# Patient Record
Sex: MALE | Race: WHITE | NOT HISPANIC OR LATINO | Employment: UNEMPLOYED | URBAN - METROPOLITAN AREA
[De-identification: names, ages, dates, MRNs, and addresses within clinical notes are randomized per-mention and may not be internally consistent; named-entity substitution may affect disease eponyms.]

---

## 2018-04-15 ENCOUNTER — NON-PROVIDER VISIT (OUTPATIENT)
Dept: URGENT CARE | Facility: CLINIC | Age: 58
End: 2018-04-15

## 2018-04-15 DIAGNOSIS — Z11.1 ENCOUNTER FOR PPD TEST: ICD-10-CM

## 2018-04-15 PROCEDURE — 86580 TB INTRADERMAL TEST: CPT | Performed by: FAMILY MEDICINE

## 2018-04-16 NOTE — PROGRESS NOTES
Pawel Tatum is a 57 y.o. male here for a non-provider visit for PPD placement -- Step 1 of 2    Reason for PPD:  group home requirement    1. TB evaluation questionnaire completed by patient? Yes      -  If any answers marked yes did you contact a provider prior to placing? Not Indicated  2.  Patient notified to return to clinic for reading on: 4/17/18  3.  PPD Placement documentation completed on TB evaluation questionnaire? Yes  4.  Location of TB evaluation questionnaire filed: [facility]

## 2018-04-18 ENCOUNTER — NON-PROVIDER VISIT (OUTPATIENT)
Dept: URGENT CARE | Facility: CLINIC | Age: 58
End: 2018-04-18

## 2018-04-18 LAB — TB WHEAL 3D P 5 TU DIAM: NORMAL MM

## 2018-04-18 NOTE — NON-PROVIDER
Pawel Tatum is a 57 y.o. male here for a non-provider visit for PPD reading -- Step 1 of 1.      1.  Resulted in Epic under enter/edit results? Yes   2.  TB evaluation questionnaire scanned into chart and original given to patient?Yes      3. Was induration greater than 0 mm? No.    Routed to PCP? No

## 2018-04-23 ENCOUNTER — NON-PROVIDER VISIT (OUTPATIENT)
Dept: URGENT CARE | Facility: CLINIC | Age: 58
End: 2018-04-23

## 2018-04-23 DIAGNOSIS — Z11.1 ENCOUNTER FOR PPD TEST: ICD-10-CM

## 2018-04-23 PROCEDURE — 86580 TB INTRADERMAL TEST: CPT | Performed by: PHYSICIAN ASSISTANT

## 2018-04-23 NOTE — NON-PROVIDER
Pawel Tatum is a 57 y.o. male here for a non-provider visit for PPD placement -- Step 2 of 2    Reason for PPD:  nursing home requirement    1. TB evaluation questionnaire completed by patient? Yes      -  If any answers marked yes did you contact a provider prior to placing? Not Indicated  2.  Patient notified to return to clinic for reading on: 4/25/18  3.  PPD Placement documentation completed on TB evaluation questionnaire? Yes  4.  Location of TB evaluation questionnaire filed: [facility]

## 2018-04-26 ENCOUNTER — NON-PROVIDER VISIT (OUTPATIENT)
Dept: URGENT CARE | Facility: CLINIC | Age: 58
End: 2018-04-26

## 2018-04-26 LAB — TB WHEAL 3D P 5 TU DIAM: NORMAL MM

## 2018-07-20 ENCOUNTER — HOSPITAL ENCOUNTER (INPATIENT)
Dept: HOSPITAL 8 - ED | Age: 58
LOS: 3 days | Discharge: NURSING, REHAB OR LONG-TERM CARE | DRG: 637 | End: 2018-07-23
Attending: FAMILY MEDICINE | Admitting: INTERNAL MEDICINE
Payer: COMMERCIAL

## 2018-07-20 VITALS — HEIGHT: 70 IN | WEIGHT: 153.88 LBS | BODY MASS INDEX: 22.03 KG/M2

## 2018-07-20 DIAGNOSIS — Z90.81: ICD-10-CM

## 2018-07-20 DIAGNOSIS — Z79.82: ICD-10-CM

## 2018-07-20 DIAGNOSIS — F32.9: ICD-10-CM

## 2018-07-20 DIAGNOSIS — Z79.899: ICD-10-CM
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
## 2018-07-20 DIAGNOSIS — K59.00: ICD-10-CM

## 2018-07-20 DIAGNOSIS — J69.0: ICD-10-CM

## 2018-07-20 DIAGNOSIS — R32: ICD-10-CM

## 2018-07-20 DIAGNOSIS — E10.641: Primary | ICD-10-CM

## 2018-07-20 DIAGNOSIS — T38.3X5A: ICD-10-CM

## 2018-07-20 DIAGNOSIS — E10.51: ICD-10-CM

## 2018-07-20 DIAGNOSIS — F03.90: ICD-10-CM

## 2018-07-20 DIAGNOSIS — K86.1: ICD-10-CM

## 2018-07-20 DIAGNOSIS — J98.11: ICD-10-CM

## 2018-07-20 DIAGNOSIS — F10.21: ICD-10-CM

## 2018-07-20 DIAGNOSIS — R33.9: ICD-10-CM

## 2018-07-20 DIAGNOSIS — I10: ICD-10-CM

## 2018-07-20 DIAGNOSIS — G93.41: ICD-10-CM

## 2018-07-20 DIAGNOSIS — E78.00: ICD-10-CM

## 2018-07-20 DIAGNOSIS — Z87.820: ICD-10-CM

## 2018-07-20 DIAGNOSIS — E78.5: ICD-10-CM

## 2018-07-20 DIAGNOSIS — F41.9: ICD-10-CM

## 2018-07-20 DIAGNOSIS — Z79.84: ICD-10-CM

## 2018-07-20 DIAGNOSIS — E55.9: ICD-10-CM

## 2018-07-20 DIAGNOSIS — Z79.4: ICD-10-CM

## 2018-07-20 LAB
<PLATELET ESTIMATE>: (no result)
<PLT MORPHOLOGY>: (no result)
ALBUMIN SERPL-MCNC: 3.5 G/DL (ref 3.4–5)
ALP SERPL-CCNC: 165 U/L (ref 45–117)
ALT SERPL-CCNC: 41 U/L (ref 12–78)
ANION GAP SERPL CALC-SCNC: 12 MMOL/L (ref 5–15)
ANISOCYTOSIS BLD QL SMEAR: (no result)
APAP SERPL-MCNC: < 2 MCG/ML (ref 10–30)
BAND#(MANUAL): 0.2 X10^3/UL
BILIRUB SERPL-MCNC: 0.3 MG/DL (ref 0.2–1)
CALCIUM SERPL-MCNC: 8.5 MG/DL (ref 8.5–10.1)
CHLORIDE SERPL-SCNC: 110 MMOL/L (ref 98–107)
CREAT SERPL-MCNC: 1.01 MG/DL (ref 0.7–1.3)
ERYTHROCYTE [DISTWIDTH] IN BLOOD BY AUTOMATED COUNT: 21 % (ref 9.4–14.8)
INR PPP: 0.95 (ref 0.93–1.1)
LYMPH#(MANUAL): 2.02 X10^3/UL (ref 1–3.4)
LYMPHS% (MANUAL): 10 % (ref 22–44)
MACROCYTES BLD QL SMEAR: (no result)
MCH RBC QN AUTO: 25.9 PG (ref 27.5–34.5)
MCHC RBC AUTO-ENTMCNC: 31.3 G/DL (ref 33.2–36.2)
MCV RBC AUTO: 82.7 FL (ref 81–97)
MD: YES
MICROCYTES BLD QL SMEAR: (no result)
MONOS#(MANUAL): 1.01 X10^3/UL (ref 0.3–2.7)
MONOS% (MANUAL): 5 % (ref 2–9)
NEUTS BAND NFR BLD: 1 % (ref 0–7)
OVALOCYTES BLD QL SMEAR: (no result)
PLATELET # BLD AUTO: 434 X10^3/UL (ref 130–400)
PMV BLD AUTO: 7.4 FL (ref 7.4–10.4)
PROT SERPL-MCNC: 7.4 G/DL (ref 6.4–8.2)
PROTHROMBIN TIME: 9.9 SECONDS (ref 9.6–11.5)
RBC # BLD AUTO: 4.43 X10^6/UL (ref 4.38–5.82)
SEG#(MANUAL): 16.97 X10^3/UL (ref 1.8–6.8)
SEGS% (MANUAL): 84 % (ref 42–75)
TOXIC GRAN: (no result)
TROPONIN I SERPL-MCNC: < 0.015 NG/ML (ref 0–0.04)
VANCOMYCIN TROUGH SERPL-MCNC: 1.9 MG/DL (ref 2.8–20)

## 2018-07-20 PROCEDURE — 84443 ASSAY THYROID STIM HORMONE: CPT

## 2018-07-20 PROCEDURE — 84100 ASSAY OF PHOSPHORUS: CPT

## 2018-07-20 PROCEDURE — 80048 BASIC METABOLIC PNL TOTAL CA: CPT

## 2018-07-20 PROCEDURE — 93005 ELECTROCARDIOGRAM TRACING: CPT

## 2018-07-20 PROCEDURE — 84484 ASSAY OF TROPONIN QUANT: CPT

## 2018-07-20 PROCEDURE — 86900 BLOOD TYPING SEROLOGIC ABO: CPT

## 2018-07-20 PROCEDURE — 99285 EMERGENCY DEPT VISIT HI MDM: CPT

## 2018-07-20 PROCEDURE — 87040 BLOOD CULTURE FOR BACTERIA: CPT

## 2018-07-20 PROCEDURE — 96375 TX/PRO/DX INJ NEW DRUG ADDON: CPT

## 2018-07-20 PROCEDURE — 83605 ASSAY OF LACTIC ACID: CPT

## 2018-07-20 PROCEDURE — 85025 COMPLETE CBC W/AUTO DIFF WBC: CPT

## 2018-07-20 PROCEDURE — 82947 ASSAY GLUCOSE BLOOD QUANT: CPT

## 2018-07-20 PROCEDURE — 83735 ASSAY OF MAGNESIUM: CPT

## 2018-07-20 PROCEDURE — 80307 DRUG TEST PRSMV CHEM ANLYZR: CPT

## 2018-07-20 PROCEDURE — 82962 GLUCOSE BLOOD TEST: CPT

## 2018-07-20 PROCEDURE — C9113 INJ PANTOPRAZOLE SODIUM, VIA: HCPCS

## 2018-07-20 PROCEDURE — 71045 X-RAY EXAM CHEST 1 VIEW: CPT

## 2018-07-20 PROCEDURE — 74175 CTA ABDOMEN W/CONTRAST: CPT

## 2018-07-20 PROCEDURE — 82140 ASSAY OF AMMONIA: CPT

## 2018-07-20 PROCEDURE — 87081 CULTURE SCREEN ONLY: CPT

## 2018-07-20 PROCEDURE — 96365 THER/PROPH/DIAG IV INF INIT: CPT

## 2018-07-20 PROCEDURE — 96361 HYDRATE IV INFUSION ADD-ON: CPT

## 2018-07-20 PROCEDURE — 36415 COLL VENOUS BLD VENIPUNCTURE: CPT

## 2018-07-20 PROCEDURE — 80053 COMPREHEN METABOLIC PANEL: CPT

## 2018-07-20 PROCEDURE — 85610 PROTHROMBIN TIME: CPT

## 2018-07-20 PROCEDURE — 80061 LIPID PANEL: CPT

## 2018-07-20 PROCEDURE — 51702 INSERT TEMP BLADDER CATH: CPT

## 2018-07-20 PROCEDURE — 81001 URINALYSIS AUTO W/SCOPE: CPT

## 2018-07-20 PROCEDURE — 70450 CT HEAD/BRAIN W/O DYE: CPT

## 2018-07-20 PROCEDURE — 96367 TX/PROPH/DG ADDL SEQ IV INF: CPT

## 2018-07-20 PROCEDURE — 86850 RBC ANTIBODY SCREEN: CPT

## 2018-07-20 PROCEDURE — G0480 DRUG TEST DEF 1-7 CLASSES: HCPCS

## 2018-07-20 PROCEDURE — 80329 ANALGESICS NON-OPIOID 1 OR 2: CPT

## 2018-07-20 PROCEDURE — 71275 CT ANGIOGRAPHY CHEST: CPT

## 2018-07-20 RX ADMIN — AMPICILLIN SODIUM AND SULBACTAM SODIUM SCH MLS/HR: 2; 1 INJECTION, POWDER, FOR SOLUTION INTRAMUSCULAR; INTRAVENOUS at 18:38

## 2018-07-20 RX ADMIN — SODIUM CHLORIDE, PRESERVATIVE FREE SCH ML: 5 INJECTION INTRAVENOUS at 21:00

## 2018-07-20 RX ADMIN — ENOXAPARIN SODIUM SCH MG: 40 INJECTION SUBCUTANEOUS at 18:37

## 2018-07-21 VITALS — SYSTOLIC BLOOD PRESSURE: 132 MMHG | DIASTOLIC BLOOD PRESSURE: 67 MMHG

## 2018-07-21 VITALS — SYSTOLIC BLOOD PRESSURE: 111 MMHG | DIASTOLIC BLOOD PRESSURE: 53 MMHG

## 2018-07-21 LAB
<PLATELET ESTIMATE>: ADEQUATE
<PLT MORPHOLOGY>: (no result)
ALBUMIN SERPL-MCNC: 2.9 G/DL (ref 3.4–5)
ALP SERPL-CCNC: 143 U/L (ref 45–117)
ALT SERPL-CCNC: 37 U/L (ref 12–78)
ANION GAP SERPL CALC-SCNC: 10 MMOL/L (ref 5–15)
ANISOCYTOSIS BLD QL SMEAR: (no result)
BASOPHILS NFR BLD MANUAL: 1 % (ref 0–1)
BASOS#(MANUAL): 0.19 X10^3/UL (ref 0–0.1)
BILIRUB SERPL-MCNC: 0.4 MG/DL (ref 0.2–1)
CALCIUM SERPL-MCNC: 8.2 MG/DL (ref 8.5–10.1)
CHLORIDE SERPL-SCNC: 112 MMOL/L (ref 98–107)
CHOL/HDL RATIO: 1.6
CREAT SERPL-MCNC: 0.77 MG/DL (ref 0.7–1.3)
CULTURE INDICATED?: NO
ERYTHROCYTE [DISTWIDTH] IN BLOOD BY AUTOMATED COUNT: 20.8 % (ref 9.4–14.8)
HDL CHOL %: 61 % (ref 26–37)
HDL CHOLESTEROL (DIRECT): 65 MG/DL (ref 40–60)
LDL CHOLESTEROL,CALCULATED: 27 MG/DL (ref 54–169)
LDLC/HDLC SERPL: 0.4 {RATIO} (ref 0.5–3)
LYMPH#(MANUAL): 4.86 X10^3/UL (ref 1–3.4)
LYMPHS% (MANUAL): 26 % (ref 22–44)
MACROCYTES BLD QL SMEAR: (no result)
MCH RBC QN AUTO: 25.3 PG (ref 27.5–34.5)
MCHC RBC AUTO-ENTMCNC: 31.4 G/DL (ref 33.2–36.2)
MCV RBC AUTO: 80.6 FL (ref 81–97)
MD: YES
MICROCYTES BLD QL SMEAR: (no result)
MICROSCOPIC: (no result)
MONOS#(MANUAL): 1.87 X10^3/UL (ref 0.3–2.7)
MONOS% (MANUAL): 10 % (ref 2–9)
OVALOCYTES BLD QL SMEAR: (no result)
PLATELET # BLD AUTO: 395 X10^3/UL (ref 130–400)
PMV BLD AUTO: 7.8 FL (ref 7.4–10.4)
PROT SERPL-MCNC: 6.1 G/DL (ref 6.4–8.2)
RBC # BLD AUTO: 3.98 X10^6/UL (ref 4.38–5.82)
SCHISTOCYTES BLD QL SMEAR: (no result)
SEG#(MANUAL): 11.78 X10^3/UL (ref 1.8–6.8)
SEGS% (MANUAL): 63 % (ref 42–75)
TOXIC GRAN: (no result)
TRIGL SERPL-MCNC: 76 MG/DL (ref 50–200)
VLDLC SERPL CALC-MCNC: 15 MG/DL (ref 0–25)

## 2018-07-21 RX ADMIN — AMPICILLIN SODIUM AND SULBACTAM SODIUM SCH MLS/HR: 2; 1 INJECTION, POWDER, FOR SOLUTION INTRAMUSCULAR; INTRAVENOUS at 15:58

## 2018-07-21 RX ADMIN — AMPICILLIN SODIUM AND SULBACTAM SODIUM SCH MLS/HR: 2; 1 INJECTION, POWDER, FOR SOLUTION INTRAMUSCULAR; INTRAVENOUS at 06:22

## 2018-07-21 RX ADMIN — MEMANTINE SCH MG: 5 TABLET ORAL at 09:25

## 2018-07-21 RX ADMIN — SODIUM CHLORIDE, PRESERVATIVE FREE SCH ML: 5 INJECTION INTRAVENOUS at 22:33

## 2018-07-21 RX ADMIN — AMPICILLIN SODIUM AND SULBACTAM SODIUM SCH MLS/HR: 2; 1 INJECTION, POWDER, FOR SOLUTION INTRAMUSCULAR; INTRAVENOUS at 00:47

## 2018-07-21 RX ADMIN — LISINOPRIL SCH MG: 10 TABLET ORAL at 09:24

## 2018-07-21 RX ADMIN — AMPICILLIN SODIUM AND SULBACTAM SODIUM SCH MLS/HR: 2; 1 INJECTION, POWDER, FOR SOLUTION INTRAMUSCULAR; INTRAVENOUS at 22:33

## 2018-07-21 RX ADMIN — SODIUM CHLORIDE, PRESERVATIVE FREE SCH ML: 5 INJECTION INTRAVENOUS at 07:31

## 2018-07-21 RX ADMIN — ASPIRIN SCH MG: 81 TABLET, COATED ORAL at 09:24

## 2018-07-21 RX ADMIN — DOCUSATE SODIUM 50MG AND SENNOSIDES 8.6MG SCH TAB: 8.6; 5 TABLET, FILM COATED ORAL at 07:30

## 2018-07-21 RX ADMIN — BUDESONIDE AND FORMOTEROL FUMARATE DIHYDRATE SCH MG: 160; 4.5 AEROSOL RESPIRATORY (INHALATION) at 09:00

## 2018-07-21 RX ADMIN — VITAMIN D, TAB 1000IU (100/BT) SCH UNITS: 25 TAB at 09:24

## 2018-07-21 RX ADMIN — ATORVASTATIN CALCIUM SCH MG: 10 TABLET, FILM COATED ORAL at 22:33

## 2018-07-21 RX ADMIN — ENOXAPARIN SODIUM SCH MG: 40 INJECTION SUBCUTANEOUS at 15:58

## 2018-07-22 VITALS — SYSTOLIC BLOOD PRESSURE: 117 MMHG | DIASTOLIC BLOOD PRESSURE: 61 MMHG

## 2018-07-22 VITALS — DIASTOLIC BLOOD PRESSURE: 57 MMHG | SYSTOLIC BLOOD PRESSURE: 112 MMHG

## 2018-07-22 VITALS — DIASTOLIC BLOOD PRESSURE: 66 MMHG | SYSTOLIC BLOOD PRESSURE: 131 MMHG

## 2018-07-22 VITALS — SYSTOLIC BLOOD PRESSURE: 129 MMHG | DIASTOLIC BLOOD PRESSURE: 68 MMHG

## 2018-07-22 LAB
<PLATELET ESTIMATE>: ADEQUATE
<PLT MORPHOLOGY>: (no result)
ALBUMIN SERPL-MCNC: 2.6 G/DL (ref 3.4–5)
ALP SERPL-CCNC: 127 U/L (ref 45–117)
ALT SERPL-CCNC: 35 U/L (ref 12–78)
ANION GAP SERPL CALC-SCNC: 10 MMOL/L (ref 5–15)
ANISOCYTOSIS BLD QL SMEAR: (no result)
BASOPHILS NFR BLD MANUAL: 1 % (ref 0–1)
BASOS#(MANUAL): 0.15 X10^3/UL (ref 0–0.1)
BILIRUB SERPL-MCNC: 0.5 MG/DL (ref 0.2–1)
CALCIUM SERPL-MCNC: 8.2 MG/DL (ref 8.5–10.1)
CHLORIDE SERPL-SCNC: 113 MMOL/L (ref 98–107)
CREAT SERPL-MCNC: 0.7 MG/DL (ref 0.7–1.3)
EOS#(MANUAL): 0.15 X10^3/UL (ref 0–0.4)
EOS% (MANUAL): 1 % (ref 1–7)
ERYTHROCYTE [DISTWIDTH] IN BLOOD BY AUTOMATED COUNT: 20.7 % (ref 9.4–14.8)
LYMPH#(MANUAL): 4.23 X10^3/UL (ref 1–3.4)
LYMPHS% (MANUAL): 28 % (ref 22–44)
MACROCYTES BLD QL SMEAR: (no result)
MCH RBC QN AUTO: 26.3 PG (ref 27.5–34.5)
MCHC RBC AUTO-ENTMCNC: 32.1 G/DL (ref 33.2–36.2)
MCV RBC AUTO: 82 FL (ref 81–97)
MD: YES
MICROCYTES BLD QL SMEAR: (no result)
MONOS#(MANUAL): 2.11 X10^3/UL (ref 0.3–2.7)
MONOS% (MANUAL): 14 % (ref 2–9)
OVALOCYTES BLD QL SMEAR: (no result)
PLATELET # BLD AUTO: 343 X10^3/UL (ref 130–400)
PMV BLD AUTO: 8.4 FL (ref 7.4–10.4)
PROT SERPL-MCNC: 5.7 G/DL (ref 6.4–8.2)
RBC # BLD AUTO: 3.73 X10^6/UL (ref 4.38–5.82)
SEG#(MANUAL): 8.46 X10^3/UL (ref 1.8–6.8)
SEGS% (MANUAL): 56 % (ref 42–75)
TARGETS BLD QL SMEAR: (no result)

## 2018-07-22 PROCEDURE — 0T9B80Z DRAINAGE OF BLADDER WITH DRAINAGE DEVICE, VIA NATURAL OR ARTIFICIAL OPENING ENDOSCOPIC: ICD-10-PCS | Performed by: UROLOGY

## 2018-07-22 RX ADMIN — ASPIRIN SCH MG: 81 TABLET, COATED ORAL at 09:22

## 2018-07-22 RX ADMIN — BUDESONIDE AND FORMOTEROL FUMARATE DIHYDRATE SCH MG: 160; 4.5 AEROSOL RESPIRATORY (INHALATION) at 09:00

## 2018-07-22 RX ADMIN — AMPICILLIN SODIUM AND SULBACTAM SODIUM SCH MLS/HR: 2; 1 INJECTION, POWDER, FOR SOLUTION INTRAMUSCULAR; INTRAVENOUS at 16:53

## 2018-07-22 RX ADMIN — INSULIN LISPRO SCH UNITS: 100 INJECTION, SOLUTION INTRAVENOUS; SUBCUTANEOUS at 21:36

## 2018-07-22 RX ADMIN — LISINOPRIL SCH MG: 10 TABLET ORAL at 09:22

## 2018-07-22 RX ADMIN — VITAMIN D, TAB 1000IU (100/BT) SCH UNITS: 25 TAB at 09:22

## 2018-07-22 RX ADMIN — DOCUSATE SODIUM 50MG AND SENNOSIDES 8.6MG SCH TAB: 8.6; 5 TABLET, FILM COATED ORAL at 09:00

## 2018-07-22 RX ADMIN — INSULIN LISPRO SCH UNITS: 100 INJECTION, SOLUTION INTRAVENOUS; SUBCUTANEOUS at 16:00

## 2018-07-22 RX ADMIN — ATORVASTATIN CALCIUM SCH MG: 10 TABLET, FILM COATED ORAL at 21:35

## 2018-07-22 RX ADMIN — AMPICILLIN SODIUM AND SULBACTAM SODIUM SCH MLS/HR: 2; 1 INJECTION, POWDER, FOR SOLUTION INTRAMUSCULAR; INTRAVENOUS at 10:48

## 2018-07-22 RX ADMIN — SODIUM CHLORIDE, PRESERVATIVE FREE SCH ML: 5 INJECTION INTRAVENOUS at 21:37

## 2018-07-22 RX ADMIN — ENOXAPARIN SODIUM SCH MG: 40 INJECTION SUBCUTANEOUS at 16:56

## 2018-07-22 RX ADMIN — INSULIN LISPRO SCH UNITS: 100 INJECTION, SOLUTION INTRAVENOUS; SUBCUTANEOUS at 14:19

## 2018-07-22 RX ADMIN — AMPICILLIN SODIUM AND SULBACTAM SODIUM SCH MLS/HR: 2; 1 INJECTION, POWDER, FOR SOLUTION INTRAMUSCULAR; INTRAVENOUS at 04:42

## 2018-07-22 RX ADMIN — SODIUM CHLORIDE, PRESERVATIVE FREE SCH ML: 5 INJECTION INTRAVENOUS at 09:00

## 2018-07-22 RX ADMIN — AMPICILLIN SODIUM AND SULBACTAM SODIUM SCH MLS/HR: 2; 1 INJECTION, POWDER, FOR SOLUTION INTRAMUSCULAR; INTRAVENOUS at 22:04

## 2018-07-22 RX ADMIN — MEMANTINE SCH MG: 5 TABLET ORAL at 09:22

## 2018-07-23 VITALS — DIASTOLIC BLOOD PRESSURE: 53 MMHG | SYSTOLIC BLOOD PRESSURE: 119 MMHG

## 2018-07-23 VITALS — DIASTOLIC BLOOD PRESSURE: 61 MMHG | SYSTOLIC BLOOD PRESSURE: 127 MMHG

## 2018-07-23 LAB
<PLATELET ESTIMATE>: ADEQUATE
<PLT MORPHOLOGY>: (no result)
ANION GAP SERPL CALC-SCNC: 8 MMOL/L (ref 5–15)
ANISOCYTOSIS BLD QL SMEAR: (no result)
BURR CELLS BLD QL SMEAR: (no result)
CALCIUM SERPL-MCNC: 8 MG/DL (ref 8.5–10.1)
CHLORIDE SERPL-SCNC: 113 MMOL/L (ref 98–107)
CREAT SERPL-MCNC: 0.68 MG/DL (ref 0.7–1.3)
ERYTHROCYTE [DISTWIDTH] IN BLOOD BY AUTOMATED COUNT: 20.8 % (ref 9.4–14.8)
LYMPH#(MANUAL): 5.22 X10^3/UL (ref 1–3.4)
LYMPHS% (MANUAL): 35 % (ref 22–44)
MACROCYTES BLD QL SMEAR: (no result)
MCH RBC QN AUTO: 26.2 PG (ref 27.5–34.5)
MCHC RBC AUTO-ENTMCNC: 31.9 G/DL (ref 33.2–36.2)
MCV RBC AUTO: 82.3 FL (ref 81–97)
MD: YES
MICROCYTES BLD QL SMEAR: (no result)
MONOS#(MANUAL): 0.75 X10^3/UL (ref 0.3–2.7)
MONOS% (MANUAL): 5 % (ref 2–9)
OVALOCYTES BLD QL SMEAR: (no result)
PLATELET # BLD AUTO: 343 X10^3/UL (ref 130–400)
PMV BLD AUTO: 8.2 FL (ref 7.4–10.4)
RBC # BLD AUTO: 3.5 X10^6/UL (ref 4.38–5.82)
SEG#(MANUAL): 8.94 X10^3/UL (ref 1.8–6.8)
SEGS% (MANUAL): 60 % (ref 42–75)
TARGETS BLD QL SMEAR: (no result)
TOXIC GRAN: (no result)

## 2018-07-23 RX ADMIN — INSULIN LISPRO SCH UNITS: 100 INJECTION, SOLUTION INTRAVENOUS; SUBCUTANEOUS at 17:06

## 2018-07-23 RX ADMIN — SODIUM CHLORIDE, PRESERVATIVE FREE SCH ML: 5 INJECTION INTRAVENOUS at 08:11

## 2018-07-23 RX ADMIN — LISINOPRIL SCH MG: 10 TABLET ORAL at 08:12

## 2018-07-23 RX ADMIN — BUDESONIDE AND FORMOTEROL FUMARATE DIHYDRATE SCH MG: 160; 4.5 AEROSOL RESPIRATORY (INHALATION) at 08:10

## 2018-07-23 RX ADMIN — INSULIN LISPRO SCH UNITS: 100 INJECTION, SOLUTION INTRAVENOUS; SUBCUTANEOUS at 11:50

## 2018-07-23 RX ADMIN — DOCUSATE SODIUM 50MG AND SENNOSIDES 8.6MG SCH TAB: 8.6; 5 TABLET, FILM COATED ORAL at 08:12

## 2018-07-23 RX ADMIN — AMPICILLIN SODIUM AND SULBACTAM SODIUM SCH MLS/HR: 2; 1 INJECTION, POWDER, FOR SOLUTION INTRAMUSCULAR; INTRAVENOUS at 16:45

## 2018-07-23 RX ADMIN — VITAMIN D, TAB 1000IU (100/BT) SCH UNITS: 25 TAB at 08:11

## 2018-07-23 RX ADMIN — AMPICILLIN SODIUM AND SULBACTAM SODIUM SCH MLS/HR: 2; 1 INJECTION, POWDER, FOR SOLUTION INTRAMUSCULAR; INTRAVENOUS at 16:00

## 2018-07-23 RX ADMIN — ENOXAPARIN SODIUM SCH MG: 40 INJECTION SUBCUTANEOUS at 16:42

## 2018-07-23 RX ADMIN — INSULIN LISPRO SCH UNITS: 100 INJECTION, SOLUTION INTRAVENOUS; SUBCUTANEOUS at 08:13

## 2018-07-23 RX ADMIN — AMPICILLIN SODIUM AND SULBACTAM SODIUM SCH MLS/HR: 2; 1 INJECTION, POWDER, FOR SOLUTION INTRAMUSCULAR; INTRAVENOUS at 04:56

## 2018-07-23 RX ADMIN — AMPICILLIN SODIUM AND SULBACTAM SODIUM SCH MLS/HR: 2; 1 INJECTION, POWDER, FOR SOLUTION INTRAMUSCULAR; INTRAVENOUS at 10:20

## 2018-07-23 RX ADMIN — ASPIRIN SCH MG: 81 TABLET, COATED ORAL at 08:11

## 2018-07-23 RX ADMIN — MEMANTINE SCH MG: 5 TABLET ORAL at 08:11

## 2018-08-06 ENCOUNTER — APPOINTMENT (OUTPATIENT)
Dept: MEDICAL GROUP | Facility: MEDICAL CENTER | Age: 58
End: 2018-08-06

## 2018-08-08 ENCOUNTER — TELEPHONE (OUTPATIENT)
Dept: HEALTH INFORMATION MANAGEMENT | Facility: OTHER | Age: 58
End: 2018-08-08

## 2018-08-08 NOTE — TELEPHONE ENCOUNTER
CM outreach call to Devika at [facility].  Pt was scheduled by Devika for discharge follow-up appointment.  Per Devika patient is currently an inpatient at [facility]. [facility] cancelled CM outreach call for today.  Devika requesting CM keep appointment for patient with Dr. Garcia for tomorrow 8/9/18. Devika reports patient may be discharged today.  [facility] requested records be faxed to discharge clinic provider when patient discharges.

## 2018-08-10 ENCOUNTER — PATIENT OUTREACH (OUTPATIENT)
Dept: HEALTH INFORMATION MANAGEMENT | Facility: OTHER | Age: 58
End: 2018-08-10

## 2018-08-16 ENCOUNTER — HOSPITAL ENCOUNTER (OUTPATIENT)
Dept: LAB | Facility: MEDICAL CENTER | Age: 58
End: 2018-08-16
Attending: FAMILY MEDICINE
Payer: COMMERCIAL

## 2018-08-16 ENCOUNTER — TELEPHONE (OUTPATIENT)
Dept: HEALTH INFORMATION MANAGEMENT | Facility: OTHER | Age: 58
End: 2018-08-16

## 2018-08-16 ENCOUNTER — OFFICE VISIT (OUTPATIENT)
Dept: MEDICAL GROUP | Facility: MEDICAL CENTER | Age: 58
End: 2018-08-16
Payer: COMMERCIAL

## 2018-08-16 VITALS
RESPIRATION RATE: 16 BRPM | HEART RATE: 72 BPM | TEMPERATURE: 98.9 F | OXYGEN SATURATION: 96 % | HEIGHT: 75 IN | DIASTOLIC BLOOD PRESSURE: 58 MMHG | SYSTOLIC BLOOD PRESSURE: 118 MMHG

## 2018-08-16 DIAGNOSIS — E11.649 HYPOGLYCEMIC REACTION TO INSULIN IN TYPE 2 DIABETES MELLITUS (HCC): ICD-10-CM

## 2018-08-16 DIAGNOSIS — I10 ESSENTIAL HYPERTENSION: ICD-10-CM

## 2018-08-16 DIAGNOSIS — E11.00 UNCONTROLLED TYPE 2 DIABETES MELLITUS WITH HYPEROSMOLARITY WITHOUT COMA, UNSPECIFIED WHETHER LONG TERM INSULIN USE: ICD-10-CM

## 2018-08-16 DIAGNOSIS — D50.9 MICROCYTIC ANEMIA: ICD-10-CM

## 2018-08-16 DIAGNOSIS — Z09 HOSPITAL DISCHARGE FOLLOW-UP: ICD-10-CM

## 2018-08-16 DIAGNOSIS — E11.51 DIABETIC PERIPHERAL ANGIOPATHY (HCC): ICD-10-CM

## 2018-08-16 DIAGNOSIS — Z87.820 HISTORY OF TRAUMATIC BRAIN INJURY: ICD-10-CM

## 2018-08-16 DIAGNOSIS — E55.9 VITAMIN D DEFICIENCY: ICD-10-CM

## 2018-08-16 DIAGNOSIS — Z96.0 URINARY CATHETER IN PLACE: ICD-10-CM

## 2018-08-16 DIAGNOSIS — R33.9 URINARY RETENTION: ICD-10-CM

## 2018-08-16 LAB
25(OH)D3 SERPL-MCNC: 18 NG/ML (ref 30–100)
ALBUMIN SERPL BCP-MCNC: 4.2 G/DL (ref 3.2–4.9)
ALBUMIN/GLOB SERPL: 1.4 G/DL
ALP SERPL-CCNC: 138 U/L (ref 30–99)
ALT SERPL-CCNC: 13 U/L (ref 2–50)
ANION GAP SERPL CALC-SCNC: 11 MMOL/L (ref 0–11.9)
AST SERPL-CCNC: 16 U/L (ref 12–45)
BASOPHILS # BLD AUTO: 0.7 % (ref 0–1.8)
BASOPHILS # BLD: 0.14 K/UL (ref 0–0.12)
BILIRUB SERPL-MCNC: 0.3 MG/DL (ref 0.1–1.5)
BUN SERPL-MCNC: 22 MG/DL (ref 8–22)
CALCIUM SERPL-MCNC: 9.5 MG/DL (ref 8.5–10.5)
CHLORIDE SERPL-SCNC: 106 MMOL/L (ref 96–112)
CO2 SERPL-SCNC: 18 MMOL/L (ref 20–33)
CREAT SERPL-MCNC: 1.33 MG/DL (ref 0.5–1.4)
EOSINOPHIL # BLD AUTO: 0.14 K/UL (ref 0–0.51)
EOSINOPHIL NFR BLD: 0.7 % (ref 0–6.9)
ERYTHROCYTE [DISTWIDTH] IN BLOOD BY AUTOMATED COUNT: 57.7 FL (ref 35.9–50)
EST. AVERAGE GLUCOSE BLD GHB EST-MCNC: 229 MG/DL
FERRITIN SERPL-MCNC: 15.9 NG/ML (ref 22–322)
GLOBULIN SER CALC-MCNC: 3 G/DL (ref 1.9–3.5)
GLUCOSE SERPL-MCNC: 197 MG/DL (ref 65–99)
HBA1C MFR BLD: 9.6 % (ref 0–5.6)
HCT VFR BLD AUTO: 38 % (ref 42–52)
HGB BLD-MCNC: 11.5 G/DL (ref 14–18)
IMM GRANULOCYTES # BLD AUTO: 0.17 K/UL (ref 0–0.11)
IMM GRANULOCYTES NFR BLD AUTO: 0.8 % (ref 0–0.9)
IRON SATN MFR SERPL: 4 % (ref 15–55)
IRON SERPL-MCNC: 21 UG/DL (ref 50–180)
LYMPHOCYTES # BLD AUTO: 2.95 K/UL (ref 1–4.8)
LYMPHOCYTES NFR BLD: 13.9 % (ref 22–41)
MCH RBC QN AUTO: 24.6 PG (ref 27–33)
MCHC RBC AUTO-ENTMCNC: 30.3 G/DL (ref 33.7–35.3)
MCV RBC AUTO: 81.2 FL (ref 81.4–97.8)
MONOCYTES # BLD AUTO: 1.8 K/UL (ref 0–0.85)
MONOCYTES NFR BLD AUTO: 8.5 % (ref 0–13.4)
NEUTROPHILS # BLD AUTO: 16 K/UL (ref 1.82–7.42)
NEUTROPHILS NFR BLD: 75.4 % (ref 44–72)
NRBC # BLD AUTO: 0 K/UL
NRBC BLD-RTO: 0 /100 WBC
PLATELET # BLD AUTO: 410 K/UL (ref 164–446)
PMV BLD AUTO: 11 FL (ref 9–12.9)
POTASSIUM SERPL-SCNC: 4 MMOL/L (ref 3.6–5.5)
PROT SERPL-MCNC: 7.2 G/DL (ref 6–8.2)
RBC # BLD AUTO: 4.68 M/UL (ref 4.7–6.1)
SODIUM SERPL-SCNC: 135 MMOL/L (ref 135–145)
TIBC SERPL-MCNC: 504 UG/DL (ref 250–450)
WBC # BLD AUTO: 21.2 K/UL (ref 4.8–10.8)

## 2018-08-16 PROCEDURE — 83036 HEMOGLOBIN GLYCOSYLATED A1C: CPT

## 2018-08-16 PROCEDURE — 82728 ASSAY OF FERRITIN: CPT

## 2018-08-16 PROCEDURE — 83550 IRON BINDING TEST: CPT

## 2018-08-16 PROCEDURE — 80053 COMPREHEN METABOLIC PANEL: CPT

## 2018-08-16 PROCEDURE — 83540 ASSAY OF IRON: CPT

## 2018-08-16 PROCEDURE — 82306 VITAMIN D 25 HYDROXY: CPT

## 2018-08-16 PROCEDURE — 85025 COMPLETE CBC W/AUTO DIFF WBC: CPT

## 2018-08-16 PROCEDURE — 99204 OFFICE O/P NEW MOD 45 MIN: CPT | Performed by: FAMILY MEDICINE

## 2018-08-16 PROCEDURE — 36415 COLL VENOUS BLD VENIPUNCTURE: CPT

## 2018-08-16 RX ORDER — ACETAMINOPHEN 325 MG/1
650 TABLET ORAL
COMMUNITY
End: 2018-11-21

## 2018-08-16 RX ORDER — LISINOPRIL 10 MG/1
TABLET ORAL
COMMUNITY
Start: 2018-04-04 | End: 2018-10-04 | Stop reason: SDUPTHER

## 2018-08-16 RX ORDER — GLIMEPIRIDE 1 MG/1
1 TABLET ORAL 2 TIMES DAILY
COMMUNITY
End: 2018-10-04

## 2018-08-16 RX ORDER — ALLOPURINOL 100 MG/1
100 TABLET ORAL
COMMUNITY
Start: 2015-09-15 | End: 2018-08-16

## 2018-08-16 RX ORDER — TAMSULOSIN HYDROCHLORIDE 0.4 MG/1
0.4 CAPSULE ORAL
COMMUNITY
End: 2018-10-04 | Stop reason: SDUPTHER

## 2018-08-16 RX ORDER — MEMANTINE HYDROCHLORIDE 5 MG/1
TABLET ORAL
Status: ON HOLD | COMMUNITY
Start: 2017-11-27 | End: 2018-08-30

## 2018-08-16 RX ORDER — LANOLIN ALCOHOL/MO/W.PET/CERES
100 CREAM (GRAM) TOPICAL
COMMUNITY
End: 2018-11-21

## 2018-08-16 RX ORDER — FOLIC ACID 1 MG/1
1 TABLET ORAL
COMMUNITY
End: 2018-11-21

## 2018-08-16 RX ORDER — ATORVASTATIN CALCIUM 10 MG/1
TABLET, FILM COATED ORAL
COMMUNITY
Start: 2018-04-02 | End: 2018-10-04 | Stop reason: SDUPTHER

## 2018-08-16 RX ORDER — MEMANTINE HYDROCHLORIDE 5 MG/1
TABLET ORAL
COMMUNITY
Start: 2015-10-06 | End: 2018-10-04 | Stop reason: SDUPTHER

## 2018-08-16 RX ORDER — DIPHENOXYLATE HYDROCHLORIDE AND ATROPINE SULFATE 2.5; .025 MG/1; MG/1
TABLET ORAL
Status: ON HOLD | COMMUNITY
End: 2018-08-30

## 2018-08-16 RX ORDER — IBUPROFEN 600 MG/1
600 TABLET ORAL
Status: ON HOLD | COMMUNITY
Start: 2015-10-06 | End: 2018-08-30

## 2018-08-16 RX ORDER — BUPROPION HYDROCHLORIDE 300 MG/1
TABLET ORAL
COMMUNITY
Start: 2018-04-02 | End: 2018-10-04 | Stop reason: SDUPTHER

## 2018-08-16 RX ORDER — BLOOD-GLUCOSE METER
EACH MISCELLANEOUS
Status: ON HOLD | COMMUNITY
End: 2018-08-30

## 2018-08-16 RX ORDER — BLOOD-GLUCOSE METER
KIT MISCELLANEOUS
COMMUNITY
Start: 2017-03-13 | End: 2018-11-21

## 2018-08-16 ASSESSMENT — PATIENT HEALTH QUESTIONNAIRE - PHQ9: CLINICAL INTERPRETATION OF PHQ2 SCORE: 0

## 2018-08-16 NOTE — PATIENT INSTRUCTIONS
If you need further assistance, or have any questions; concerns or lingering symptoms before seeing your Primary Care Provider or specialist.     Do not hesitate to contact us.     Please contact us at the Post-Hospital Follow Up Program at [phone] and ask for the Medical Assistant for Dr Garcia.   Our offices hours are Monday-Friday 8 am-5 pm.

## 2018-08-16 NOTE — TELEPHONE ENCOUNTER
CM contacted [facility] living and requested an updated medication list. CM also contacted [facility] and requested records from assisted living facility. CM previously requested records from SNF.  Devika at [facility] previously informed this CM that  records would be faxed to discharge clinicafter pt discharged.  Discharge clinic has not received records from [facility].

## 2018-08-16 NOTE — PROGRESS NOTES
Subjective:     Pawel Tatum is a 58 y.o. male who presents for Hospital Follow-up.  Chart and discharge summary reviewed.   Date of discharge from [facility]: 8/9/18.  48- hour post discharge RN call completed on 8/10/18 and documented in the medical record by Melanie Soria RN:  POST DISCHARGE CALL:  Discharge Date:8/9/2018   Date of Outreach Call: 8/10/2018  9:20 AM  Now that you're home, how are you doing? Good  Comment:No discharge summary available from [facility].   CM contacted [facility] two requests for records. Pt reports  he was at [facility] for low blood sugar  Do you have questions about your medications? No    Did you fill your medications? Yes    Do you have a follow-up appointment scheduled?Yes  Comment:Post discharge clinic 8/16/18    Discharging Department: Skilled Nursing    Number of Attempts: 1  Current or previous attempts completed within two business days of discharge? Yes  Provided education regarding treatment plan, medication, self-management, ADLs? Yes  Comment:Pt reports he lives at [facility].  Reports he recently moved to [place]. Pt states history of  diabetes. Pt not sure of his current medications.  Has patient completed Advance Directive? If yes, advise them to bring to appointment. No  Care Manager phone number provided? Yes  Is there anything else I can help you with? No  Comment:Pt reports he does get assistance with meds and  care at [facility].         HPI: The patient is a 58-year-old male with type 2 diabetes mellitus with diabetic peripheral angiopathy who was hospitalized last month at [facility] for a severe hypoglycemic reaction to insulin with a blood glucose level of 24.  He was then transferred to [facility] and discharged from [facility] 1 week ago.  We have no records from [facility] despite multiple requests.  We have partial records from [facility].  The patient himself is a poor historian.  He has a history of a traumatic brain  injury with memory loss.  He came alone to this appointment.  He was taken off of insulin and just placed on oral meds due to recurrent hypoglycemic episodes.  He admits to inadvertently giving himself too much insulin.  He lives at [facility] living.  He says that since he returned to [facility] they are now managing his medications.  A urinary catheter was placed during his hospitalization at [facility] due to urinary retention.  Discharge summary mentions needing a urology follow-up in a couple of weeks.  The patient does not recall seeing any urologist and he still has the catheter in place.  He did not recall the reason for the catheter.  The records we received from [facility] showed microcytic anemia.  We have not received an updated medication list from [facility].    Since returning home, patient reports feeling better.  He brought his glucose meter which showed a blood sugar this morning of 99.  However all the blood sugars from the last several days show the readings in the 200s and 300s.     The patient has questions regarding hospitalization or discharge: All of his questions are answered  The patient denies new weakness; has difficulty taking care of self at home.  Patient reports that [facility] gives him his medications and he does not know what they are.      Allergies:   Patient has no known allergies.    Social History:  Social History   Substance Use Topics   • Smoking status: Current Every Day Smoker     Packs/day: 0.25     Years: 15.00   • Smokeless tobacco: Never Used      Comment: Trying to quit   • Alcohol use No        ROS:    Constitutional:  Denies fever, chills, night sweats, fatigue or malaise  HENT: Denies head congestion, ear pain or drainage, decreased hearing, sore throat  Eyes: Denies visual changes, eye drainage or redness, eye pain  Neck: Denies pain, swollen glands, decreased range of motion  Lungs: Denies shortness of breath, wheezing, cough  Cardiovascular: Denies chest  "pain, orthopnea, lower extremity edema, palpitations  Abdominal: Denies abdominal pain, change in bowel habits, nausea or vomiting  Musculoskeletal: Denies back pain, joint pains, decreased range of motion  Endocrine: Denies unexplained weight changes, heat or cold intolerance, hair loss, polyuria or polydipsia  Neurological: Denies dizziness, headache, confusion, focal weakness or numbness.  Admits to memory loss.  Psychiatric: Denies depression, anxiety, insomnia       Objective:     Blood pressure 118/58, pulse 72, temperature 37.2 °C (98.9 °F), resp. rate 16, height 1.905 m (6' 3\"), SpO2 96 %.     Physical Exam:    GEN:  Alert, oriented, in no distress  HEENT:  PERRLA, EOMI.  NECK;  Supple without adenopathy   LUNGS:  Clear to auscultation without rales, rhonci, or wheezes.  CV:  Heart RRR without murmurs or S3 or S4  EXT:  Without cyanosis, clubbing, or edema.  NEURO:  Cranial nerves II through XII intact.  Motor function and sensation grossly intact.  SKIN: Skin discoloration bilateral lower extremities.  PSYCH:  Behavior is appropriate.      Assessment and Plan:     1. Hospital follow-up:   Hospitalization and results reviewed with patient. High risk conditions requiring teaching or care coordination were identified and addressed.The patient demonstrate understanding of admission and underlying conditions. The patient understands discharge instructions and when to seek medical attention. Medications reviewed including instructions regarding high risk medications, dosing and side effects.    The patient is able to safely adhere to ADL/IADL, treatment and medication regimen, self-manage of high-risk conditions? No   The patient requires physical therapy/home health/DME referral? Yes, [facility] started  The patient requires referral to care coordination/behavioral health/social work?  No   Patient requires referral for pharmacy consult? No     2. Hypoglycemic reaction to insulin in type 2 diabetes " mellitus (HCC)  -No recurrence since he is no longer managing his medications.    3. Uncontrolled type 2 diabetes mellitus with hyperosmolarity without coma, unspecified whether long term insulin use (HCC)  -Based on his recent blood sugar readings, he may benefit from restarting insulin since he will not have the increased risk of hypoglycemia because he is no longer managing his own medications.  - COMP METABOLIC PANEL; Future  - HEMOGLOBIN A1C; Future  -    4. Diabetic peripheral angiopathy (HCC)  -Details unknown.    5. Essential hypertension  -Blood pressure currently under control  - COMP METABOLIC PANEL; Future    6. Urinary catheter in place    -Urgent REFERRAL TO UROLOGY    7. Urinary retention    -Urgent REFERRAL TO UROLOGY    8. Microcytic anemia    - CBC WITH DIFFERENTIAL; Future  - IRON/TOTAL IRON BIND; Future  - FERRITIN; Future    9. History of traumatic brain injury  -Subsequent memory loss.    10. Vitamin D deficiency  - VITAMIN D,25 HYDROXY; Future  -He is on a supplement based on his med list from [facility].        Medication Reconciliation  Medication list at end of encounter:   Current Outpatient Prescriptions   Medication Sig Dispense Refill   • allopurinol (ZYLOPRIM) 100 MG Tab Take 100 mg by mouth.     • glucose blood ([employer] TEST STRIPS) strip by Other route.     • ibuprofen (MOTRIN) 600 MG Tab Take 600 mg by mouth.     • memantine (NAMENDA) 5 MG Tab TAKE 1 TAB BY MOUTH TWICE DAILY     • aspirin EC (ECOTRIN) 81 MG Tablet Delayed Response Take  by mouth.     • atorvastatin (LIPITOR) 10 MG Tab Take 1 tablet by mouth daily to prevent heart attacks and strokes     • glucose blood (ONETOUCH VERIO) strip by Does not apply route.     • Cholecalciferol (VITAMIN D-3 SUPER STRENGTH) 2000 UNIT Tab Take  by mouth.     • buPROPion (WELLBUTRIN XL) 300 MG XL tablet Take 1 tablet by mouth every morning     • Glucagon, rDNA, (GLUCAGON EMERGENCY) 1 MG Kit by Intramuscular route.     • lisinopril  (PRINIVIL) 10 MG Tab Take  by mouth.     • memantine (NAMENDA) 5 MG Tab Take 1 tablet by mouth daily     • nicotine polacrilex (NICORETTE) 2 MG Gum Take  by mouth.     • ONETOUCH DELICA LANCETS FINE Misc by Does not apply route.     • Blood Glucose Monitoring Suppl (ONETOUCH VERIO IQ SYSTEM) w/Device Kit by Does not apply route.     • Blood Glucose Calibration (ONETOUCH VERIO) Solution by Does not apply route.     • Insulin Pen Needle (B-D UF III MINI PEN NEEDLES) 31G X 5 MM Misc by Does not apply route.     • acetaminophen (TYLENOL) 325 MG Tab Take 650 mg by mouth.     • Cholecalciferol (VITAMIN D3) 5000 units Tab Take  by mouth.     • folic acid (FOLVITE) 1 MG Tab Take 1 mg by mouth.     • insulin lispro (HUMALOG) 100 UNIT/ML Solution by Subdermal route.     • LIFESCAN FINEPOINT LANCETS Misc by Misc.(Non-Drug; Combo Route) route.     • Multiple Vitamin (MULTI-VITAMINS) Tab Take  by mouth.     • thiamine (THIAMINE) 100 MG tablet Take 100 mg by mouth.       No current facility-administered medications for this visit.        Primary care follow-up:    Recommended followup:  With new Pcp.  I instructed the patient to ask his sister to accompany him to the appointment.  Future Appointments       Provider Department Center    9/12/2018 1:40 PM Flip Carroll M.D. [facility]          Patient Instruction  Patient provided with educational material on discharge diagnosis and management of symptoms/red flags. Patient instructed to keep follow-up appointments and to bring written questions and and actual medications to each office visit. Patient instructed to call PCP/specialist with any problems/questions/concerns. Patient verbalizes understanding and has no further questions at this time.    Total of 45 minutes face-to-face time was spent with patient, with greater than 50% of the time spent in counseling about treatment of his diabetes and coordination of care, including  obtaining his medical records and referring him for urgent urology consult.

## 2018-08-18 DIAGNOSIS — D50.9 IRON DEFICIENCY ANEMIA, UNSPECIFIED IRON DEFICIENCY ANEMIA TYPE: ICD-10-CM

## 2018-08-18 DIAGNOSIS — D72.829 LEUKOCYTOSIS, UNSPECIFIED TYPE: ICD-10-CM

## 2018-08-18 RX ORDER — LANOLIN ALCOHOL/MO/W.PET/CERES
325 CREAM (GRAM) TOPICAL
Qty: 90 TAB | Refills: 2 | OUTPATIENT
Start: 2018-08-18 | End: 2018-10-04 | Stop reason: SDUPTHER

## 2018-08-20 ENCOUNTER — TELEPHONE (OUTPATIENT)
Dept: MEDICAL GROUP | Facility: MEDICAL CENTER | Age: 58
End: 2018-08-20

## 2018-08-20 ENCOUNTER — HOSPITAL ENCOUNTER (OUTPATIENT)
Dept: LAB | Facility: MEDICAL CENTER | Age: 58
End: 2018-08-20
Attending: FAMILY MEDICINE
Payer: COMMERCIAL

## 2018-08-20 DIAGNOSIS — D50.9 IRON DEFICIENCY ANEMIA, UNSPECIFIED IRON DEFICIENCY ANEMIA TYPE: ICD-10-CM

## 2018-08-20 DIAGNOSIS — D72.829 LEUKOCYTOSIS, UNSPECIFIED TYPE: ICD-10-CM

## 2018-08-20 LAB
APPEARANCE UR: ABNORMAL
BACTERIA #/AREA URNS HPF: ABNORMAL /HPF
BASOPHILS # BLD AUTO: 0.9 % (ref 0–1.8)
BASOPHILS # BLD: 0.1 K/UL (ref 0–0.12)
BILIRUB UR QL STRIP.AUTO: NEGATIVE
COLOR UR: YELLOW
EOSINOPHIL # BLD AUTO: 0.3 K/UL (ref 0–0.51)
EOSINOPHIL NFR BLD: 2.6 % (ref 0–6.9)
EPI CELLS #/AREA URNS HPF: ABNORMAL /HPF
ERYTHROCYTE [DISTWIDTH] IN BLOOD BY AUTOMATED COUNT: 56.7 FL (ref 35.9–50)
GLUCOSE UR STRIP.AUTO-MCNC: NEGATIVE MG/DL
HCT VFR BLD AUTO: 34.3 % (ref 42–52)
HGB BLD-MCNC: 10.5 G/DL (ref 14–18)
IMM GRANULOCYTES # BLD AUTO: 0.17 K/UL (ref 0–0.11)
IMM GRANULOCYTES NFR BLD AUTO: 1.5 % (ref 0–0.9)
KETONES UR STRIP.AUTO-MCNC: ABNORMAL MG/DL
LEUKOCYTE ESTERASE UR QL STRIP.AUTO: ABNORMAL
LYMPHOCYTES # BLD AUTO: 3.52 K/UL (ref 1–4.8)
LYMPHOCYTES NFR BLD: 30.3 % (ref 22–41)
MCH RBC QN AUTO: 24.7 PG (ref 27–33)
MCHC RBC AUTO-ENTMCNC: 30.6 G/DL (ref 33.7–35.3)
MCV RBC AUTO: 80.7 FL (ref 81.4–97.8)
MICRO URNS: ABNORMAL
MONOCYTES # BLD AUTO: 1.18 K/UL (ref 0–0.85)
MONOCYTES NFR BLD AUTO: 10.1 % (ref 0–13.4)
MUCOUS THREADS #/AREA URNS HPF: ABNORMAL /HPF
NEUTROPHILS # BLD AUTO: 6.36 K/UL (ref 1.82–7.42)
NEUTROPHILS NFR BLD: 54.6 % (ref 44–72)
NITRITE UR QL STRIP.AUTO: POSITIVE
NRBC # BLD AUTO: 0.04 K/UL
NRBC BLD-RTO: 0.3 /100 WBC
PH UR STRIP.AUTO: 6 [PH]
PLATELET # BLD AUTO: 425 K/UL (ref 164–446)
PMV BLD AUTO: 10.3 FL (ref 9–12.9)
PROT UR QL STRIP: 100 MG/DL
RBC # BLD AUTO: 4.25 M/UL (ref 4.7–6.1)
RBC # URNS HPF: ABNORMAL /HPF
RBC UR QL AUTO: ABNORMAL
SP GR UR STRIP.AUTO: >=1.03
UROBILINOGEN UR STRIP.AUTO-MCNC: 0.2 MG/DL
WBC # BLD AUTO: 11.6 K/UL (ref 4.8–10.8)
WBC #/AREA URNS HPF: ABNORMAL /HPF
YEAST BUDDING URNS QL: PRESENT /HPF
YEAST HYPHAE #/AREA URNS HPF: PRESENT /HPF

## 2018-08-20 PROCEDURE — 87186 SC STD MICRODIL/AGAR DIL: CPT

## 2018-08-20 PROCEDURE — 81001 URINALYSIS AUTO W/SCOPE: CPT

## 2018-08-20 PROCEDURE — 85025 COMPLETE CBC W/AUTO DIFF WBC: CPT

## 2018-08-20 PROCEDURE — 87077 CULTURE AEROBIC IDENTIFY: CPT

## 2018-08-20 PROCEDURE — 87086 URINE CULTURE/COLONY COUNT: CPT

## 2018-08-20 PROCEDURE — 36415 COLL VENOUS BLD VENIPUNCTURE: CPT

## 2018-08-20 RX ORDER — MEMANTINE HYDROCHLORIDE 5 MG/1
TABLET ORAL
Qty: 60 TAB | OUTPATIENT
Start: 2018-08-20

## 2018-08-20 RX ORDER — DIPHENOXYLATE HYDROCHLORIDE AND ATROPINE SULFATE 2.5; .025 MG/1; MG/1
TABLET ORAL
Qty: 30 TAB | OUTPATIENT
Start: 2018-08-20

## 2018-08-20 RX ORDER — LANOLIN ALCOHOL/MO/W.PET/CERES
100 CREAM (GRAM) TOPICAL
Qty: 30 TAB | OUTPATIENT
Start: 2018-08-20

## 2018-08-20 RX ORDER — IBUPROFEN 600 MG/1
600 TABLET ORAL
Qty: 30 TAB | OUTPATIENT
Start: 2018-08-20

## 2018-08-20 RX ORDER — FOLIC ACID 1 MG/1
1 TABLET ORAL
Qty: 30 TAB | OUTPATIENT
Start: 2018-08-20

## 2018-08-20 NOTE — TELEPHONE ENCOUNTER
"VOICEMAIL  1. Caller Name: KELLEY Miles @ [facility]                      Call Back Number: [phone]    2. Message: Returning our call.  1. Pt pharmacy is: [facility]  2. Pt is taking: Vitamin D3 31,000 U, 2 tabs PO (they said they would fax the info)  3. Received med list from [facility], will updated list. Dr Garcia, it wouldn't let me jody meds for removal. Therefore I made it as a \"reorder\" and marked the ones for removal by declined.     "

## 2018-08-20 NOTE — LETTER
"August 20, 2018        Pawel SIEGEL [address] [place] [address]        Dear Pawel:    I left you a general voicemail requesting a call back to relay the following message to you from Dr Garcia about your labs. Please read her message below:     \"Pawel has multiple lab abnormalities;    1. His WBC is very elevated which usually indicates a possible serious infection although he did not have signs or symptoms of illness at his office visit. I have ordered a UA and a repeat CBC which should be done as soon as possible. Instruct them to monitor for signs of illness and have him taken to the ER if he appears ill or if vital signs are abnormal.   2. He has iron deficiency anemia. I have ordered fecal occult stool testing and an Rx for iron supplements. There is no pharmacy listed so please add pharmacy and call in.   3. Vitamin D level is very low. Please verify what dose he is currently taking and instruct them to double it.   4. His diabetes is poorly controlled but that can be addressed at his upcoming appointment with new PCP.     If you have any questions or concerns, please don't hesitate to call.        Sincerely,        Pcp Pt States None    Electronically Signed     "

## 2018-08-20 NOTE — TELEPHONE ENCOUNTER
----- Message from Deandra Garcia M.D. sent at 2018  3:12 PM PDT -----  Patient has multiple lab abnormalities. He lives at [facility] and has memory problems. Please notify Chiquis of the followin. His WBC is very elevated which usually indicates a possible serious infection although he did not have signs or symptoms of illness at his office visit. I have ordered a UA and a repeat CBC which should be done as soon as possible. Instruct them to monitor for signs of illness and have him taken to the ER if he appears ill or if vital signs are abnormal.  2. He has iron deficiency anemia. I have ordered fecal occult stool testing and an Rx for iron supplements. There is no pharmacy listed so please add pharmacy and call in.  3. Vitamin D level is very low. Please verify what dose he is currently taking and instruct them to double it.  4. His diabetes is poorly controlled but that can be addressed at his upcoming appointment with new PCP.  NOTE:  I routed this to Rad[facility] because [facility] will not allow me to route to [address] [place] [address] staff (only Johnny) for test result messages.

## 2018-08-20 NOTE — TELEPHONE ENCOUNTER
General voicemail left on both guest phones, to return call.    Call made to [facility], was [facility], [facility].   They don't take vitals but the med tech said his blood sugars first thing in the morning before eating are in the 300's. Antonette made the comment that guest looks very pale today. She was advised of Dr Garcia's message that his WBC was very elevated and that they should have him take to ER if he appears ill or if vitals are abnormal. She was advised that he has iron deficiency anemia, which could cause the guest to look pale but due to a very elevated WBC, I think he should just go to ER. Advised that Dr Garcia wants repeat CBC and a UA.  I asked them to verify which pharmacy he is using. Antonette thinks it is [facility] but tom might switch so she will verify and let us know. She was advised of the remaining message and will have the nurse call me.

## 2018-08-21 DIAGNOSIS — E55.9 VITAMIN D DEFICIENCY: ICD-10-CM

## 2018-08-21 RX ORDER — MULTIVIT-MIN/IRON/FOLIC ACID/K 18-600-40
2 CAPSULE ORAL DAILY
Qty: 60 CAP | Refills: 2 | Status: SHIPPED | OUTPATIENT
Start: 2018-08-21 | End: 2018-10-04 | Stop reason: SDUPTHER

## 2018-08-21 NOTE — TELEPHONE ENCOUNTER
Per [facility] list, patient is taking Vitamin D3 2,000 U daily. Just to confirm, you want pt to double this amount for 4,000 U daily? We will need to send updated Rx to [facility] so that I can fax actual orders to [facility] per their requirement for orders and meds.

## 2018-08-21 NOTE — TELEPHONE ENCOUNTER
The [facility] med list would be the most accurate since that reflects what he is actually given on a daily basis, specifically the vitamin D dosage.

## 2018-08-22 ENCOUNTER — TELEPHONE (OUTPATIENT)
Dept: MEDICAL GROUP | Facility: MEDICAL CENTER | Age: 58
End: 2018-08-22

## 2018-08-22 NOTE — TELEPHONE ENCOUNTER
Call placed to [facility]. They did receive the 8/16/18 referral and pt is scheduled to see Dr Gleason tomorrow 8/23/18.   [facility] has gotten the supplies for the hemoccult.

## 2018-08-22 NOTE — TELEPHONE ENCOUNTER
----- Message from Deandra Garcia M.D. sent at 8/21/2018  4:05 PM PDT -----  WBC now much closer to normal. We will await urine culture results before treating. Asymptomatic yeast does not generally need treatment. Please inquire re: status of urology consult (which I placed as urgent last week due to indwelling catheter).  Regarding anemia, I ordered hemoccult stool testing but may need to find out from [facility] if patient has does this. I believe sampled can be mailed.   Thanks.

## 2018-08-23 ENCOUNTER — TELEPHONE (OUTPATIENT)
Dept: MEDICAL GROUP | Facility: MEDICAL CENTER | Age: 58
End: 2018-08-23

## 2018-08-23 ENCOUNTER — HOSPITAL ENCOUNTER (INPATIENT)
Facility: MEDICAL CENTER | Age: 58
LOS: 6 days | DRG: 698 | End: 2018-08-30
Attending: EMERGENCY MEDICINE | Admitting: INTERNAL MEDICINE
Payer: COMMERCIAL

## 2018-08-23 ENCOUNTER — APPOINTMENT (OUTPATIENT)
Dept: RADIOLOGY | Facility: MEDICAL CENTER | Age: 58
DRG: 698 | End: 2018-08-23
Attending: EMERGENCY MEDICINE
Payer: COMMERCIAL

## 2018-08-23 DIAGNOSIS — Z16.12 ESBL (EXTENDED SPECTRUM BETA-LACTAMASE) PRODUCING BACTERIA INFECTION: ICD-10-CM

## 2018-08-23 DIAGNOSIS — R10.11 RUQ ABDOMINAL PAIN: ICD-10-CM

## 2018-08-23 DIAGNOSIS — A49.9 ESBL (EXTENDED SPECTRUM BETA-LACTAMASE) PRODUCING BACTERIA INFECTION: ICD-10-CM

## 2018-08-23 DIAGNOSIS — K92.2 GASTROINTESTINAL HEMORRHAGE, UNSPECIFIED GASTROINTESTINAL HEMORRHAGE TYPE: ICD-10-CM

## 2018-08-23 DIAGNOSIS — N39.0 ACUTE UTI: ICD-10-CM

## 2018-08-23 LAB
ALBUMIN SERPL BCP-MCNC: 4.2 G/DL (ref 3.2–4.9)
ALBUMIN/GLOB SERPL: 1.4 G/DL
ALP SERPL-CCNC: 124 U/L (ref 30–99)
ALT SERPL-CCNC: 9 U/L (ref 2–50)
ANION GAP SERPL CALC-SCNC: 13 MMOL/L (ref 0–11.9)
AST SERPL-CCNC: 15 U/L (ref 12–45)
BACTERIA UR CULT: ABNORMAL
BACTERIA UR CULT: ABNORMAL
BASOPHILS # BLD AUTO: 0.8 % (ref 0–1.8)
BASOPHILS # BLD: 0.13 K/UL (ref 0–0.12)
BILIRUB SERPL-MCNC: 0.3 MG/DL (ref 0.1–1.5)
BUN SERPL-MCNC: 17 MG/DL (ref 8–22)
CALCIUM SERPL-MCNC: 9 MG/DL (ref 8.5–10.5)
CHLORIDE SERPL-SCNC: 106 MMOL/L (ref 96–112)
CO2 SERPL-SCNC: 19 MMOL/L (ref 20–33)
CREAT SERPL-MCNC: 1.06 MG/DL (ref 0.5–1.4)
EOSINOPHIL # BLD AUTO: 0.25 K/UL (ref 0–0.51)
EOSINOPHIL NFR BLD: 1.5 % (ref 0–6.9)
ERYTHROCYTE [DISTWIDTH] IN BLOOD BY AUTOMATED COUNT: 56.8 FL (ref 35.9–50)
GLOBULIN SER CALC-MCNC: 2.9 G/DL (ref 1.9–3.5)
GLUCOSE SERPL-MCNC: 94 MG/DL (ref 65–99)
HCT VFR BLD AUTO: 37.5 % (ref 42–52)
HGB BLD-MCNC: 11.4 G/DL (ref 14–18)
IMM GRANULOCYTES # BLD AUTO: 0.17 K/UL (ref 0–0.11)
IMM GRANULOCYTES NFR BLD AUTO: 1 % (ref 0–0.9)
LIPASE SERPL-CCNC: 7 U/L (ref 11–82)
LYMPHOCYTES # BLD AUTO: 3.61 K/UL (ref 1–4.8)
LYMPHOCYTES NFR BLD: 21.4 % (ref 22–41)
MCH RBC QN AUTO: 24.2 PG (ref 27–33)
MCHC RBC AUTO-ENTMCNC: 30.4 G/DL (ref 33.7–35.3)
MCV RBC AUTO: 79.6 FL (ref 81.4–97.8)
MONOCYTES # BLD AUTO: 1.45 K/UL (ref 0–0.85)
MONOCYTES NFR BLD AUTO: 8.6 % (ref 0–13.4)
NEUTROPHILS # BLD AUTO: 11.23 K/UL (ref 1.82–7.42)
NEUTROPHILS NFR BLD: 66.7 % (ref 44–72)
NRBC # BLD AUTO: 0.02 K/UL
NRBC BLD-RTO: 0.1 /100 WBC
PLATELET # BLD AUTO: 464 K/UL (ref 164–446)
PMV BLD AUTO: 9.8 FL (ref 9–12.9)
POTASSIUM SERPL-SCNC: 3.8 MMOL/L (ref 3.6–5.5)
PROT SERPL-MCNC: 7.1 G/DL (ref 6–8.2)
RBC # BLD AUTO: 4.71 M/UL (ref 4.7–6.1)
SIGNIFICANT IND 70042: ABNORMAL
SITE SITE: ABNORMAL
SODIUM SERPL-SCNC: 138 MMOL/L (ref 135–145)
SOURCE SOURCE: ABNORMAL
TROPONIN I SERPL-MCNC: <0.01 NG/ML (ref 0–0.04)
WBC # BLD AUTO: 16.8 K/UL (ref 4.8–10.8)

## 2018-08-23 PROCEDURE — 87086 URINE CULTURE/COLONY COUNT: CPT

## 2018-08-23 PROCEDURE — 87077 CULTURE AEROBIC IDENTIFY: CPT

## 2018-08-23 PROCEDURE — 93005 ELECTROCARDIOGRAM TRACING: CPT | Performed by: EMERGENCY MEDICINE

## 2018-08-23 PROCEDURE — 36415 COLL VENOUS BLD VENIPUNCTURE: CPT

## 2018-08-23 PROCEDURE — 84484 ASSAY OF TROPONIN QUANT: CPT

## 2018-08-23 PROCEDURE — 85025 COMPLETE CBC W/AUTO DIFF WBC: CPT

## 2018-08-23 PROCEDURE — 82271 OCCULT BLOOD OTHER SOURCES: CPT

## 2018-08-23 PROCEDURE — 87186 SC STD MICRODIL/AGAR DIL: CPT

## 2018-08-23 PROCEDURE — 51798 US URINE CAPACITY MEASURE: CPT

## 2018-08-23 PROCEDURE — 700111 HCHG RX REV CODE 636 W/ 250 OVERRIDE (IP): Performed by: EMERGENCY MEDICINE

## 2018-08-23 PROCEDURE — 83690 ASSAY OF LIPASE: CPT

## 2018-08-23 PROCEDURE — 76705 ECHO EXAM OF ABDOMEN: CPT

## 2018-08-23 PROCEDURE — 80053 COMPREHEN METABOLIC PANEL: CPT

## 2018-08-23 PROCEDURE — 99285 EMERGENCY DEPT VISIT HI MDM: CPT

## 2018-08-23 PROCEDURE — 96375 TX/PRO/DX INJ NEW DRUG ADDON: CPT

## 2018-08-23 PROCEDURE — 87040 BLOOD CULTURE FOR BACTERIA: CPT

## 2018-08-23 PROCEDURE — 96376 TX/PRO/DX INJ SAME DRUG ADON: CPT

## 2018-08-23 RX ORDER — GRANULES FOR ORAL 3 G/1
3 POWDER ORAL ONCE
Status: DISCONTINUED | OUTPATIENT
Start: 2018-08-23 | End: 2018-08-24

## 2018-08-23 RX ORDER — KETOROLAC TROMETHAMINE 30 MG/ML
15 INJECTION, SOLUTION INTRAMUSCULAR; INTRAVENOUS ONCE
Status: COMPLETED | OUTPATIENT
Start: 2018-08-23 | End: 2018-08-23

## 2018-08-23 RX ORDER — ONDANSETRON 2 MG/ML
4 INJECTION INTRAMUSCULAR; INTRAVENOUS ONCE
Status: COMPLETED | OUTPATIENT
Start: 2018-08-23 | End: 2018-08-23

## 2018-08-23 RX ORDER — ONDANSETRON 2 MG/ML
4 INJECTION INTRAMUSCULAR; INTRAVENOUS ONCE
Status: COMPLETED | OUTPATIENT
Start: 2018-08-24 | End: 2018-08-23

## 2018-08-23 RX ADMIN — ONDANSETRON 4 MG: 2 INJECTION, SOLUTION INTRAMUSCULAR; INTRAVENOUS at 21:40

## 2018-08-23 RX ADMIN — ONDANSETRON 4 MG: 2 INJECTION, SOLUTION INTRAMUSCULAR; INTRAVENOUS at 23:41

## 2018-08-23 RX ADMIN — KETOROLAC TROMETHAMINE 15 MG: 30 INJECTION, SOLUTION INTRAMUSCULAR at 21:40

## 2018-08-23 ASSESSMENT — PAIN SCALES - GENERAL: PAINLEVEL_OUTOF10: 7

## 2018-08-24 PROBLEM — A41.9 SEPSIS (HCC): Status: ACTIVE | Noted: 2018-08-24

## 2018-08-24 PROBLEM — K92.2 UPPER GI BLEED: Status: ACTIVE | Noted: 2018-08-24

## 2018-08-24 PROBLEM — B96.29 UTI DUE TO EXTENDED-SPECTRUM BETA LACTAMASE (ESBL) PRODUCING ESCHERICHIA COLI: Status: ACTIVE | Noted: 2018-08-24

## 2018-08-24 PROBLEM — Z72.0 TOBACCO ABUSE: Status: ACTIVE | Noted: 2018-08-24

## 2018-08-24 PROBLEM — K80.50 CHOLEDOCHOLITHIASIS: Status: ACTIVE | Noted: 2018-08-24

## 2018-08-24 PROBLEM — N39.0 UTI DUE TO EXTENDED-SPECTRUM BETA LACTAMASE (ESBL) PRODUCING ESCHERICHIA COLI: Status: ACTIVE | Noted: 2018-08-24

## 2018-08-24 PROBLEM — E78.5 HYPERLIPIDEMIA: Status: ACTIVE | Noted: 2018-08-24

## 2018-08-24 PROBLEM — Z16.12 UTI DUE TO EXTENDED-SPECTRUM BETA LACTAMASE (ESBL) PRODUCING ESCHERICHIA COLI: Status: ACTIVE | Noted: 2018-08-24

## 2018-08-24 LAB
ABO GROUP BLD: NORMAL
ABO GROUP BLD: NORMAL
ALBUMIN SERPL BCP-MCNC: 3.4 G/DL (ref 3.2–4.9)
ALBUMIN/GLOB SERPL: 1.5 G/DL
ALP SERPL-CCNC: 99 U/L (ref 30–99)
ALT SERPL-CCNC: 7 U/L (ref 2–50)
ANION GAP SERPL CALC-SCNC: 16 MMOL/L (ref 0–11.9)
APPEARANCE UR: ABNORMAL
APTT PPP: 27.7 SEC (ref 24.7–36)
AST SERPL-CCNC: 12 U/L (ref 12–45)
BACTERIA #/AREA URNS HPF: ABNORMAL /HPF
BASOPHILS # BLD AUTO: 0.6 % (ref 0–1.8)
BASOPHILS # BLD: 0.11 K/UL (ref 0–0.12)
BILIRUB SERPL-MCNC: 0.3 MG/DL (ref 0.1–1.5)
BILIRUB UR QL STRIP.AUTO: NEGATIVE
BLD GP AB SCN SERPL QL: NORMAL
BODY FLD TYPE: ABNORMAL
BUN SERPL-MCNC: 19 MG/DL (ref 8–22)
CALCIUM SERPL-MCNC: 7.4 MG/DL (ref 8.5–10.5)
CHLORIDE SERPL-SCNC: 106 MMOL/L (ref 96–112)
CO2 SERPL-SCNC: 18 MMOL/L (ref 20–33)
COLOR UR: YELLOW
CREAT SERPL-MCNC: 1.03 MG/DL (ref 0.5–1.4)
EKG IMPRESSION: NORMAL
EOSINOPHIL # BLD AUTO: 0.1 K/UL (ref 0–0.51)
EOSINOPHIL NFR BLD: 0.5 % (ref 0–6.9)
EPI CELLS #/AREA URNS HPF: NEGATIVE /HPF
ERYTHROCYTE [DISTWIDTH] IN BLOOD BY AUTOMATED COUNT: 53.1 FL (ref 35.9–50)
GLOBULIN SER CALC-MCNC: 2.3 G/DL (ref 1.9–3.5)
GLUCOSE BLD-MCNC: 168 MG/DL (ref 65–99)
GLUCOSE SERPL-MCNC: 90 MG/DL (ref 65–99)
GLUCOSE UR STRIP.AUTO-MCNC: NEGATIVE MG/DL
HCT VFR BLD AUTO: 31.6 % (ref 42–52)
HEMOCCULT SP1 SPEC QL: POSITIVE
HGB BLD-MCNC: 10.2 G/DL (ref 14–18)
HGB BLD-MCNC: 9.9 G/DL (ref 14–18)
HYALINE CASTS #/AREA URNS LPF: ABNORMAL /LPF
IMM GRANULOCYTES # BLD AUTO: 0.15 K/UL (ref 0–0.11)
IMM GRANULOCYTES NFR BLD AUTO: 0.8 % (ref 0–0.9)
INR PPP: 1.05 (ref 0.87–1.13)
KETONES UR STRIP.AUTO-MCNC: 15 MG/DL
LACTATE BLD-SCNC: 1.2 MMOL/L (ref 0.5–2)
LEUKOCYTE ESTERASE UR QL STRIP.AUTO: ABNORMAL
LYMPHOCYTES # BLD AUTO: 2.84 K/UL (ref 1–4.8)
LYMPHOCYTES NFR BLD: 14.3 % (ref 22–41)
MCH RBC QN AUTO: 24.5 PG (ref 27–33)
MCHC RBC AUTO-ENTMCNC: 31.3 G/DL (ref 33.7–35.3)
MCV RBC AUTO: 78.2 FL (ref 81.4–97.8)
MICRO URNS: ABNORMAL
MONOCYTES # BLD AUTO: 1.49 K/UL (ref 0–0.85)
MONOCYTES NFR BLD AUTO: 7.5 % (ref 0–13.4)
NEUTROPHILS # BLD AUTO: 15.23 K/UL (ref 1.82–7.42)
NEUTROPHILS NFR BLD: 76.3 % (ref 44–72)
NITRITE UR QL STRIP.AUTO: NEGATIVE
NRBC # BLD AUTO: 0.02 K/UL
NRBC BLD-RTO: 0.1 /100 WBC
PH UR STRIP.AUTO: 6 [PH]
PLATELET # BLD AUTO: 408 K/UL (ref 164–446)
PMV BLD AUTO: 9.8 FL (ref 9–12.9)
POTASSIUM SERPL-SCNC: 3.4 MMOL/L (ref 3.6–5.5)
PROT SERPL-MCNC: 5.7 G/DL (ref 6–8.2)
PROT UR QL STRIP: 100 MG/DL
PROTHROMBIN TIME: 13.4 SEC (ref 12–14.6)
RBC # BLD AUTO: 4.04 M/UL (ref 4.7–6.1)
RBC # URNS HPF: ABNORMAL /HPF
RBC UR QL AUTO: ABNORMAL
RH BLD: NORMAL
RH BLD: NORMAL
SODIUM SERPL-SCNC: 140 MMOL/L (ref 135–145)
SP GR UR STRIP.AUTO: 1.02
UROBILINOGEN UR STRIP.AUTO-MCNC: 0.2 MG/DL
WBC # BLD AUTO: 19.9 K/UL (ref 4.8–10.8)
WBC #/AREA URNS HPF: ABNORMAL /HPF

## 2018-08-24 PROCEDURE — 51702 INSERT TEMP BLADDER CATH: CPT

## 2018-08-24 PROCEDURE — 96368 THER/DIAG CONCURRENT INF: CPT

## 2018-08-24 PROCEDURE — 36415 COLL VENOUS BLD VENIPUNCTURE: CPT

## 2018-08-24 PROCEDURE — A9270 NON-COVERED ITEM OR SERVICE: HCPCS | Performed by: INTERNAL MEDICINE

## 2018-08-24 PROCEDURE — 82962 GLUCOSE BLOOD TEST: CPT

## 2018-08-24 PROCEDURE — 86901 BLOOD TYPING SEROLOGIC RH(D): CPT

## 2018-08-24 PROCEDURE — 86900 BLOOD TYPING SEROLOGIC ABO: CPT

## 2018-08-24 PROCEDURE — 700105 HCHG RX REV CODE 258: Performed by: EMERGENCY MEDICINE

## 2018-08-24 PROCEDURE — 96366 THER/PROPH/DIAG IV INF ADDON: CPT

## 2018-08-24 PROCEDURE — 96365 THER/PROPH/DIAG IV INF INIT: CPT

## 2018-08-24 PROCEDURE — 86850 RBC ANTIBODY SCREEN: CPT

## 2018-08-24 PROCEDURE — 85025 COMPLETE CBC W/AUTO DIFF WBC: CPT

## 2018-08-24 PROCEDURE — C9113 INJ PANTOPRAZOLE SODIUM, VIA: HCPCS | Performed by: EMERGENCY MEDICINE

## 2018-08-24 PROCEDURE — 81001 URINALYSIS AUTO W/SCOPE: CPT

## 2018-08-24 PROCEDURE — 80053 COMPREHEN METABOLIC PANEL: CPT

## 2018-08-24 PROCEDURE — 700102 HCHG RX REV CODE 250 W/ 637 OVERRIDE(OP): Performed by: INTERNAL MEDICINE

## 2018-08-24 PROCEDURE — 96375 TX/PRO/DX INJ NEW DRUG ADDON: CPT

## 2018-08-24 PROCEDURE — 85730 THROMBOPLASTIN TIME PARTIAL: CPT

## 2018-08-24 PROCEDURE — 83605 ASSAY OF LACTIC ACID: CPT

## 2018-08-24 PROCEDURE — 85018 HEMOGLOBIN: CPT

## 2018-08-24 PROCEDURE — 85610 PROTHROMBIN TIME: CPT

## 2018-08-24 PROCEDURE — 303105 HCHG CATHETER EXTRA

## 2018-08-24 PROCEDURE — 700105 HCHG RX REV CODE 258: Performed by: INTERNAL MEDICINE

## 2018-08-24 PROCEDURE — 99223 1ST HOSP IP/OBS HIGH 75: CPT | Mod: 25 | Performed by: INTERNAL MEDICINE

## 2018-08-24 PROCEDURE — 700111 HCHG RX REV CODE 636 W/ 250 OVERRIDE (IP): Performed by: EMERGENCY MEDICINE

## 2018-08-24 PROCEDURE — 770020 HCHG ROOM/CARE - TELE (206)

## 2018-08-24 PROCEDURE — 700111 HCHG RX REV CODE 636 W/ 250 OVERRIDE (IP): Performed by: INTERNAL MEDICINE

## 2018-08-24 PROCEDURE — 99406 BEHAV CHNG SMOKING 3-10 MIN: CPT | Mod: 25 | Performed by: INTERNAL MEDICINE

## 2018-08-24 PROCEDURE — C9113 INJ PANTOPRAZOLE SODIUM, VIA: HCPCS | Performed by: INTERNAL MEDICINE

## 2018-08-24 RX ORDER — ONDANSETRON 4 MG/1
4 TABLET, ORALLY DISINTEGRATING ORAL EVERY 4 HOURS PRN
Status: DISCONTINUED | OUTPATIENT
Start: 2018-08-24 | End: 2018-08-30 | Stop reason: HOSPADM

## 2018-08-24 RX ORDER — ONDANSETRON 2 MG/ML
4 INJECTION INTRAMUSCULAR; INTRAVENOUS EVERY 4 HOURS PRN
Status: DISCONTINUED | OUTPATIENT
Start: 2018-08-24 | End: 2018-08-30 | Stop reason: HOSPADM

## 2018-08-24 RX ORDER — GLIMEPIRIDE 2 MG/1
1 TABLET ORAL 2 TIMES DAILY WITH MEALS
Status: DISCONTINUED | OUTPATIENT
Start: 2018-08-24 | End: 2018-08-26

## 2018-08-24 RX ORDER — BUPROPION HYDROCHLORIDE 150 MG/1
300 TABLET, EXTENDED RELEASE ORAL DAILY
Status: DISCONTINUED | OUTPATIENT
Start: 2018-08-24 | End: 2018-08-30 | Stop reason: HOSPADM

## 2018-08-24 RX ORDER — SODIUM CHLORIDE 9 MG/ML
1000 INJECTION, SOLUTION INTRAVENOUS
Status: DISCONTINUED | OUTPATIENT
Start: 2018-08-24 | End: 2018-08-30 | Stop reason: HOSPADM

## 2018-08-24 RX ORDER — SODIUM CHLORIDE, SODIUM LACTATE, POTASSIUM CHLORIDE, CALCIUM CHLORIDE 600; 310; 30; 20 MG/100ML; MG/100ML; MG/100ML; MG/100ML
INJECTION, SOLUTION INTRAVENOUS CONTINUOUS
Status: DISCONTINUED | OUTPATIENT
Start: 2018-08-24 | End: 2018-08-30 | Stop reason: HOSPADM

## 2018-08-24 RX ORDER — ATORVASTATIN CALCIUM 10 MG/1
10 TABLET, FILM COATED ORAL DAILY
Status: DISCONTINUED | OUTPATIENT
Start: 2018-08-24 | End: 2018-08-30 | Stop reason: HOSPADM

## 2018-08-24 RX ORDER — PROMETHAZINE HYDROCHLORIDE 25 MG/1
12.5-25 SUPPOSITORY RECTAL EVERY 4 HOURS PRN
Status: DISCONTINUED | OUTPATIENT
Start: 2018-08-24 | End: 2018-08-30 | Stop reason: HOSPADM

## 2018-08-24 RX ORDER — DEXTROSE MONOHYDRATE 25 G/50ML
25 INJECTION, SOLUTION INTRAVENOUS
Status: DISCONTINUED | OUTPATIENT
Start: 2018-08-24 | End: 2018-08-30 | Stop reason: HOSPADM

## 2018-08-24 RX ORDER — ACETAMINOPHEN 325 MG/1
650 TABLET ORAL EVERY 6 HOURS PRN
Status: DISCONTINUED | OUTPATIENT
Start: 2018-08-24 | End: 2018-08-30 | Stop reason: HOSPADM

## 2018-08-24 RX ORDER — METOCLOPRAMIDE HYDROCHLORIDE 5 MG/ML
10 INJECTION INTRAMUSCULAR; INTRAVENOUS ONCE
Status: COMPLETED | OUTPATIENT
Start: 2018-08-24 | End: 2018-08-24

## 2018-08-24 RX ORDER — PROMETHAZINE HYDROCHLORIDE 25 MG/1
12.5-25 TABLET ORAL EVERY 4 HOURS PRN
Status: DISCONTINUED | OUTPATIENT
Start: 2018-08-24 | End: 2018-08-30 | Stop reason: HOSPADM

## 2018-08-24 RX ORDER — LISINOPRIL 5 MG/1
10 TABLET ORAL
Status: DISCONTINUED | OUTPATIENT
Start: 2018-08-24 | End: 2018-08-30 | Stop reason: HOSPADM

## 2018-08-24 RX ORDER — NICOTINE 21 MG/24HR
14 PATCH, TRANSDERMAL 24 HOURS TRANSDERMAL
Status: DISCONTINUED | OUTPATIENT
Start: 2018-08-24 | End: 2018-08-30 | Stop reason: HOSPADM

## 2018-08-24 RX ORDER — SODIUM CHLORIDE 9 MG/ML
30 INJECTION, SOLUTION INTRAVENOUS
Status: DISCONTINUED | OUTPATIENT
Start: 2018-08-24 | End: 2018-08-30 | Stop reason: HOSPADM

## 2018-08-24 RX ORDER — MEMANTINE HYDROCHLORIDE 10 MG/1
5 TABLET ORAL DAILY
Status: DISCONTINUED | OUTPATIENT
Start: 2018-08-24 | End: 2018-08-30 | Stop reason: HOSPADM

## 2018-08-24 RX ADMIN — PIPERACILLIN AND TAZOBACTAM 3.38 G: 3; .375 INJECTION, POWDER, LYOPHILIZED, FOR SOLUTION INTRAVENOUS; PARENTERAL at 13:46

## 2018-08-24 RX ADMIN — ATORVASTATIN CALCIUM 10 MG: 10 TABLET, FILM COATED ORAL at 07:50

## 2018-08-24 RX ADMIN — SODIUM CHLORIDE 80 MG: 9 INJECTION, SOLUTION INTRAVENOUS at 01:13

## 2018-08-24 RX ADMIN — BUPROPION HYDROCHLORIDE 300 MG: 150 TABLET, EXTENDED RELEASE ORAL at 09:55

## 2018-08-24 RX ADMIN — PIPERACILLIN AND TAZOBACTAM 3.38 G: 3; .375 INJECTION, POWDER, LYOPHILIZED, FOR SOLUTION INTRAVENOUS; PARENTERAL at 21:03

## 2018-08-24 RX ADMIN — GLIMEPIRIDE 1 MG: 2 TABLET ORAL at 17:11

## 2018-08-24 RX ADMIN — PIPERACILLIN AND TAZOBACTAM 3.38 G: 3; .375 INJECTION, POWDER, LYOPHILIZED, FOR SOLUTION INTRAVENOUS; PARENTERAL at 03:20

## 2018-08-24 RX ADMIN — METOCLOPRAMIDE 10 MG: 5 INJECTION, SOLUTION INTRAMUSCULAR; INTRAVENOUS at 02:03

## 2018-08-24 RX ADMIN — SODIUM CHLORIDE, POTASSIUM CHLORIDE, SODIUM LACTATE AND CALCIUM CHLORIDE: 600; 310; 30; 20 INJECTION, SOLUTION INTRAVENOUS at 21:04

## 2018-08-24 RX ADMIN — PIPERACILLIN AND TAZOBACTAM 3.38 G: 3; .375 INJECTION, POWDER, LYOPHILIZED, FOR SOLUTION INTRAVENOUS; PARENTERAL at 09:55

## 2018-08-24 RX ADMIN — MEMANTINE HYDROCHLORIDE 5 MG: 10 TABLET ORAL at 07:50

## 2018-08-24 RX ADMIN — SODIUM CHLORIDE 8 MG/HR: 9 INJECTION, SOLUTION INTRAVENOUS at 13:45

## 2018-08-24 RX ADMIN — INSULIN HUMAN 1 UNITS: 100 INJECTION, SOLUTION PARENTERAL at 17:11

## 2018-08-24 RX ADMIN — SODIUM CHLORIDE, POTASSIUM CHLORIDE, SODIUM LACTATE AND CALCIUM CHLORIDE: 600; 310; 30; 20 INJECTION, SOLUTION INTRAVENOUS at 07:00

## 2018-08-24 RX ADMIN — SODIUM CHLORIDE 8 MG/HR: 9 INJECTION, SOLUTION INTRAVENOUS at 03:47

## 2018-08-24 RX ADMIN — LISINOPRIL 10 MG: 5 TABLET ORAL at 07:50

## 2018-08-24 ASSESSMENT — ENCOUNTER SYMPTOMS
ABDOMINAL PAIN: 1
FLANK PAIN: 0
DIARRHEA: 0
VOMITING: 1
DIAPHORESIS: 0
BLURRED VISION: 0
COUGH: 0
FEVER: 0
SEIZURES: 0
WHEEZING: 0
PALPITATIONS: 0
ROS GI COMMENTS: HEMATEMESIS
NAUSEA: 1
HEADACHES: 0
SHORTNESS OF BREATH: 0
MEMORY LOSS: 1
MYALGIAS: 0
SORE THROAT: 0
SPUTUM PRODUCTION: 0
NECK PAIN: 0
DIZZINESS: 0
BLOOD IN STOOL: 0
FOCAL WEAKNESS: 0
BACK PAIN: 0
BRUISES/BLEEDS EASILY: 0
CHILLS: 0

## 2018-08-24 ASSESSMENT — COGNITIVE AND FUNCTIONAL STATUS - GENERAL
MOBILITY SCORE: 22
SUGGESTED CMS G CODE MODIFIER MOBILITY: CJ
SUGGESTED CMS G CODE MODIFIER DAILY ACTIVITY: CJ
HELP NEEDED FOR BATHING: A LITTLE
TOILETING: A LITTLE
CLIMB 3 TO 5 STEPS WITH RAILING: A LITTLE
WALKING IN HOSPITAL ROOM: A LITTLE
DAILY ACTIVITIY SCORE: 22

## 2018-08-24 ASSESSMENT — LIFESTYLE VARIABLES
EVER_SMOKED: YES
ALCOHOL_USE: NO
EVER_SMOKED: YES

## 2018-08-24 ASSESSMENT — PATIENT HEALTH QUESTIONNAIRE - PHQ9
SUM OF ALL RESPONSES TO PHQ9 QUESTIONS 1 AND 2: 0
2. FEELING DOWN, DEPRESSED, IRRITABLE, OR HOPELESS: NOT AT ALL
1. LITTLE INTEREST OR PLEASURE IN DOING THINGS: NOT AT ALL

## 2018-08-24 ASSESSMENT — PAIN SCALES - GENERAL
PAINLEVEL_OUTOF10: 0

## 2018-08-24 ASSESSMENT — COPD QUESTIONNAIRES
COPD SCREENING SCORE: 4
DO YOU EVER COUGH UP ANY MUCUS OR PHLEGM?: NO/ONLY WITH OCCASIONAL COLDS OR INFECTIONS
HAVE YOU SMOKED AT LEAST 100 CIGARETTES IN YOUR ENTIRE LIFE: YES
DURING THE PAST 4 WEEKS HOW MUCH DID YOU FEEL SHORT OF BREATH: NONE/LITTLE OF THE TIME

## 2018-08-24 NOTE — PROGRESS NOTES
2 RN skin check with Rosendo BENSON. Bilateral ears red, blanching. Diffuse scabbing/bruising throughout. Hammer toe to right 3rd digit, with small scabbed area. Callous to bottom of right foot, and lateral foot. Area of scarring/old area of venous insufficiency to left shin. Bilateral heels flaky, dry, calloused. Sacrum red, blanching. Skin is otherwise intact.

## 2018-08-24 NOTE — ASSESSMENT & PLAN NOTE
This is sepsis (without associated acute organ dysfunction).   Possible source is UTI; may also be intra-abdominal  -see above for treatment plan

## 2018-08-24 NOTE — ED TRIAGE NOTES
Pt does state they removed a staton catheter from him today that he has had for approx 1 month. Pt does report he was able to urinate after they removed it.

## 2018-08-24 NOTE — TELEPHONE ENCOUNTER
----- Message from Deandra Garcia M.D. sent at 8/23/2018  5:04 PM PDT -----  Please contact the responsible party at [facility]. Urine culture shows a highly resistant infection.  Patient will need IV antibiotics, contact isolation, and infectious disease consult.  He needs to go to the ER.

## 2018-08-24 NOTE — CONSULTS
DATE OF SERVICE:  08/24/2018    GASTROINTESTINAL CONSULTATION    REASON FOR CONSULTATION:  Hematemesis, right upper quadrant pain and possible   bile duct stone.    HISTORY OF PRESENT ILLNESS:  The patient is a 58-year-old man who came in   through the emergency room last night because of right upper quadrant pain,   nausea and vomiting occurred after dinner.  He denies any pain now and there   was no radiation through to his back.  He had some nausea and vomiting with   the pain and in the ER vomited some dark emesis.  His medication list   indicates he has taken aspirin and ibuprofen.  He denies any heartburn   currently and he states he had heartburn until 5 years ago when he quit   drinking ultrasound done last night showed a normal gallbladder and a possible   small bile duct stone, but liver enzymes have been normal.  He also has a   urinary tract infection.    PAST MEDICAL HISTORY:  Diabetes, closed head injury with memory deficits,   hypertension, urinary retention with an indwelling Dill, hyperlipidemia,   kidney stones and recurrent urinary tract infections.    PAST SURGICAL HISTORY:  He had a splenectomy.    ALLERGIES:  None.    CURRENT MEDICATIONS:  Atorvastatin, Wellbutrin, glimepiride, insulin,   lisinopril, memantine, nicotine patch, Zosyn IV and Januvia.  He is also on a   pantoprazole drip.    SOCIAL HISTORY:  He is single, never , is currently in assisted living   and worked as a  previously.    HABITS:  He smokes quarter pack per day, does not drink alcohol.    FAMILY HISTORY:  No colon cancer in the family.    REVIEW OF SYSTEMS:  CONSTITUTIONAL:  No fevers or chills.  NEUROLOGIC:  No strokes or seizures.  PSYCHIATRIC:  No problems.  PULMONARY:  Denies asthma, wheezing or cough.  CARDIAC:  No chest pain.  GASTROINTESTINAL:  Denies heartburn or dysphagia.  GENITOURINARY:  Has an indwelling Dill.  MUSCULOSKELETAL:  No problems.  ENDOCRINE:  No history of thyroid  problems.    PHYSICAL EXAMINATION:  GENERAL:  Lean, alert male, in no distress.  VITAL SIGNS:  Temperature is 36.9, pulse is 72, respirations 18, blood   pressure 116/72.  SKIN:  No skin lesions.  LYMPH NODES:  No nodes.  HEAD AND NECK:  Sclerae are anicteric.  Oropharynx clear.  NECK:  Supple.  LUNGS:  Clear bilaterally.  HEART:  Regular rate and rhythm without murmur.  ABDOMEN:  Soft, nontender, no mass.  EXTREMITIES:  No edema.    LABORATORY DATA:  White count 19.9, hemoglobin 9.9, hematocrit 31.6, MCV 78,   and platelets 408,000.  Sodium 140, potassium 3.4, BUN is 19, creatinine is 1,   AST 12, ALT 7, alkaline phosphatase 99 down from 124 yesterday, total   bilirubin 0.3, INR is 1.05.  Ferritin is low at 15.    IMPRESSION:  1.  Nausea and vomiting last night with hematemesis.  2.  Right upper quadrant pain last night, now resolved.  3.  Question of a bile duct stone on ultrasound.  4.  Diabetes.  5.  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  6.  Urinary retention with the recurrent urinary tract infection.  7.  Hypertension.  8.  Hyperlipidemia.  9.  Kidney stones.  10.  Iron deficiency anemia.    PLAN:  1.  MRCP to confirm whether or not a bile duct stone is present.  2.  Gastroscopy tomorrow morning.  I went over the procedure, risks,   alternatives, benefits with him and informed consent was obtained.  3.  Chem panel in the morning.  4.  He will need a colonoscopy evaluation at some point in the future when the   above issues have been addressed.       ____________________________________     MOE CRUZ MD    [facility]    DD:  08/24/2018 15:46:46  DT:  08/24/2018 16:38:16    D#:  [id number]  Job#:  [id number]

## 2018-08-24 NOTE — ASSESSMENT & PLAN NOTE
Urine culture reveals Klebsiella pneumoniae ESBL   Meropenem, midline ordered, remains afebrile, remains asymptomatic  _ID following, will need long term IV abx's, SNF placement in process, no issues with current IV abx's

## 2018-08-24 NOTE — ED PROVIDER NOTES
"ED Provider Note    Scribed for Rick Schneider M.D. by Rony Erickson. 8/23/2018, 8:42 PM.    Primary care provider: Pcp Pt States None  Means of arrival: Ambulance  History obtained from: Patient  History limited by: None    CHIEF COMPLAINT  Chief Complaint   Patient presents with   • UTI   • Abdominal Pain       HPI  Pawel Tatum is a 58 y.o. Male with a recent diagnosis of a UTI who presents to the Emergency Department for evaluation of severe right upper quadrant abdominal pain onset earlier today. The patient reports that he had a catheter in place that was removed today, and he has been able to urinate since its removal. Currently, the patient is experiencing associated constant nausea and vomiting. He has had about 2 episodes of vomiting today. No exacerbating or alleviating factors reported at this time. He has not started treatment for his UTI but states that he plans to fill his prescription tomorrow. The patient confirms that he still has his appendix and gallbladder but his spleen was removed. He denies experiencing fever, chills, and back pain.     REVIEW OF SYSTEMS  Pertinent positives include abdominal pain, nausea, vomiting. Pertinent negatives include no fever or chills. All other systems negative.  C.     PAST MEDICAL HISTORY       SURGICAL HISTORY  patient denies any surgical history    SOCIAL HISTORY  Social History   Substance Use Topics   • Smoking status: Current Every Day Smoker     Packs/day: 0.25     Years: 15.00   • Smokeless tobacco: Never Used      Comment: Trying to quit   • Alcohol use No      History   Drug Use No       FAMILY HISTORY  None noted.     CURRENT MEDICATIONS  Home Medications    **Home medications have not yet been reviewed for this encounter**         ALLERGIES  No Known Allergies    PHYSICAL EXAM  VITAL SIGNS: BP (!) 166/96   Pulse 98   Temp 36.6 °C (97.9 °F)   Resp 16   Ht 1.905 m (6' 3\")   Wt 56 kg (123 lb 7.3 oz)   SpO2 97%   BMI 15.43 kg/m² "     Constitutional: Well developed, Well nourished, mild distress.   HENT: Normocephalic, Atraumatic, Oropharynx moist.   Eyes: Conjunctiva normal, No discharge.   Neck: Supple, No stridor  Cardiovascular: Normal heart rate, Normal rhythm, No murmurs, equal pulses.   Pulmonary: Normal breath sounds, No respiratory distress, No wheezing, No rales, No rhonchi.  Chest: No chest wall tenderness or deformity.   Abdomen:Tenderness to RUQ. Lipoma in right upper abdomen that is nontender.  Back: No CVA tenderness.   : No testicular tenderness.   Musculoskeletal: No major deformities noted, No tenderness.   Skin: Warm, Dry, No erythema, No rash.   Neurologic: Alert & oriented x 3, Normal motor function,  No focal deficits noted.   Psychiatric: Affect normal, Judgment normal, Mood normal.     LABS  Results for orders placed or performed during the hospital encounter of 08/23/18   CBC WITH DIFFERENTIAL   Result Value Ref Range    WBC 16.8 (H) 4.8 - 10.8 K/uL    RBC 4.71 4.70 - 6.10 M/uL    Hemoglobin 11.4 (L) 14.0 - 18.0 g/dL    Hematocrit 37.5 (L) 42.0 - 52.0 %    MCV 79.6 (L) 81.4 - 97.8 fL    MCH 24.2 (L) 27.0 - 33.0 pg    MCHC 30.4 (L) 33.7 - 35.3 g/dL    RDW 56.8 (H) 35.9 - 50.0 fL    Platelet Count 464 (H) 164 - 446 K/uL    MPV 9.8 9.0 - 12.9 fL    Neutrophils-Polys 66.70 44.00 - 72.00 %    Lymphocytes 21.40 (L) 22.00 - 41.00 %    Monocytes 8.60 0.00 - 13.40 %    Eosinophils 1.50 0.00 - 6.90 %    Basophils 0.80 0.00 - 1.80 %    Immature Granulocytes 1.00 (H) 0.00 - 0.90 %    Nucleated RBC 0.10 /100 WBC    Neutrophils (Absolute) 11.23 (H) 1.82 - 7.42 K/uL    Lymphs (Absolute) 3.61 1.00 - 4.80 K/uL    Monos (Absolute) 1.45 (H) 0.00 - 0.85 K/uL    Eos (Absolute) 0.25 0.00 - 0.51 K/uL    Baso (Absolute) 0.13 (H) 0.00 - 0.12 K/uL    Immature Granulocytes (abs) 0.17 (H) 0.00 - 0.11 K/uL    NRBC (Absolute) 0.02 K/uL   COMP METABOLIC PANEL   Result Value Ref Range    Sodium 138 135 - 145 mmol/L    Potassium 3.8 3.6 - 5.5  mmol/L    Chloride 106 96 - 112 mmol/L    Co2 19 (L) 20 - 33 mmol/L    Anion Gap 13.0 (H) 0.0 - 11.9    Glucose 94 65 - 99 mg/dL    Bun 17 8 - 22 mg/dL    Creatinine 1.06 0.50 - 1.40 mg/dL    Calcium 9.0 8.5 - 10.5 mg/dL    AST(SGOT) 15 12 - 45 U/L    ALT(SGPT) 9 2 - 50 U/L    Alkaline Phosphatase 124 (H) 30 - 99 U/L    Total Bilirubin 0.3 0.1 - 1.5 mg/dL    Albumin 4.2 3.2 - 4.9 g/dL    Total Protein 7.1 6.0 - 8.2 g/dL    Globulin 2.9 1.9 - 3.5 g/dL    A-G Ratio 1.4 g/dL   LIPASE   Result Value Ref Range    Lipase 7 (L) 11 - 82 U/L   ESTIMATED GFR   Result Value Ref Range    GFR If African American >60 >60 mL/min/1.73 m 2    GFR If Non African American >60 >60 mL/min/1.73 m 2   TROPONIN   Result Value Ref Range    Troponin I <0.01 0.00 - 0.04 ng/mL   FLUID OCCULT BLOOD   Result Value Ref Range    Occult Blood Positive (A) Negative   EKG (NOW)   Result Value Ref Range    Report       [facility] Emergency Dept.    Test Date:  2018  Pt Name:    SHAUN CORCORAN                  Department: ER  MRN:        [id number]                      Room:       [facility]  Gender:     Male                         Technician: [id number]  :        1960                   Requested By:DANISHA PADGETT  Order #:    [id number]                    Reading MD:    Measurements  Intervals                                Axis  Rate:       98                           P:          76  ND:         184                          QRS:        79  QRSD:       84                           T:          64  QT:         360  QTc:        460    Interpretive Statements  SINUS TACHYCARDIA  MULTIPLE ATRIAL PREMATURE COMPLEXES  BORDERLINE LOW VOLTAGE IN FRONTAL LEADS  BORDERLINE ST ELEVATION, INFERIOR LEADS  No previous ECG available for comparison         All labs reviewed by me.    EK Lead EKG interpreted by me shows a normal sinus rhythm at a rate of 98. Axis normal. Less than one half mm of ST elevation in lead 2 and aVF. No ST  depression. No T wave inversions. MT interval 184. QTc 460. Occasional PACs. Final impression: Sinus tachycardia with borderline ST elevation in inferior leads and occasional PACs.       RADIOLOGY  US-GALLBLADDER   Final Result         1.  Suspected choledocholithiasis with borderline common bile duct dilatation suggesting possible mild obstruction.   2.  Echogenic liver compatible with fatty change versus fibrosis.   3.  Probable small nonobstructing right renal calculus           The radiologist's interpretation of all radiological studies have been reviewed by me.    COURSE & MEDICAL DECISION MAKING  Pertinent Labs & Imaging studies reviewed. (See chart for details)    8:27 PM I discussed the resistant bacteria with pharmacy who recommends trying Fosfomycin.     8:42 PM - Patient seen and examined at bedside. Patient will be treated with Toradol 15 mg, Zofran 4 mg, and Monurol 3 g. Ordered US-Gallbladder, Blood Culture, Troponin, CBC, CMP, Lipase, UA, EKG and Estimated GFR. to evaluate his symptoms. The differential diagnoses include but are not limited to: MI, Cholecystitis, Pyelonephritis, Sepsis, UTI     11:59 PM Consult with Dr. Suh (Gastroenterology) who recommended I continue repeating the CMP and if it goes up, get an MRCP.    12:04 AM Consult with Dr. Cool (Hospitalist) who agrees to admit the patient.     12:05 AM Patient reevaluated at bedside. He reports that now he feels like he is not able to urinate for testing.  I informed him of the diagnostic results and updated him on the plan of care, including hospital admission. The patient understands and agrees to plan of care including hospital admission.      12:09 AM I ordered a staton catheter placement.    Medical Decision Making: At this point time patient started having a GI bleed.  He has a questionable obstructing stone in his common bile duct but no other elevation in his LFTs and negative gallbladder.  At this point time we will trend the CMP  and see if the numbers elevate.  Patient will be started on fosfomycin because of a multiresistant urinary tract infection.    DISPOSITION:  Patient will be admitted to Dr. Cool (Hospitalist) in guarded condition.       FINAL IMPRESSION  1. Acute UTI    2. ESBL (extended spectrum beta-lactamase) producing bacteria infection    3. RUQ abdominal pain    4. Gastrointestinal hemorrhage, unspecified gastrointestinal hemorrhage type          I, Rony Erickson (Scribe), am scribing for, and in the presence of, Rick Schneider M.D.    Electronically signed by: Royn Erickson (Scribe), 8/23/2018    IRick M.D. personally performed the services described in this documentation, as scribed by Rony Erickson in my presence, and it is both accurate and complete.    The note accurately reflects work and decisions made by me.  Rick Schneider  8/24/2018  3:15 AM

## 2018-08-24 NOTE — H&P
Hospital Medicine History & Physical Note    Date of Service  8/24/2018    Primary Care Physician  Pcp Pt States None    Consultants  GI Dr Yanez    Code Status  Full code    Chief Complaint  Abdominal pain    History of Presenting Illness  58 y.o. male with a past medical history of type 2 diabetes mellitus, hypertension and TBI who presented 8/23/2018 with abdominal pain that started earlier today.  History is limited as the patient is a poor historian.  He reports he was at his PCPs office who diagnosed him with a urinary tract infection and was sent to the ER due to having ESBL.  He reports right upper quadrant abdominal pain with no radiation rated at 5/10 intensity.  Associated with nausea and vomiting.  The patient had an episode of hematemesis with blood clots in the ER.  He denies any melena.  He denies any history of liver disease or previous GI bleeds.  Patient is not on any blood thinners.  Denies any fevers or chills.  He reports some urinary urgency.  The patient has a Dill catheter in place.    Review of Systems  Review of Systems   Constitutional: Positive for malaise/fatigue. Negative for chills, diaphoresis and fever.   HENT: Negative for hearing loss and sore throat.    Eyes: Negative for blurred vision.   Respiratory: Negative for cough, sputum production, shortness of breath and wheezing.    Cardiovascular: Negative for chest pain, palpitations and leg swelling.   Gastrointestinal: Positive for abdominal pain, nausea and vomiting. Negative for blood in stool and diarrhea.        Hematemesis   Genitourinary: Positive for urgency. Negative for dysuria and flank pain.   Musculoskeletal: Negative for back pain, joint pain, myalgias and neck pain.   Skin: Negative for rash.   Neurological: Negative for dizziness, focal weakness, seizures and headaches.   Endo/Heme/Allergies: Does not bruise/bleed easily.   Psychiatric/Behavioral: Positive for memory loss. Negative for suicidal ideas.   All other  systems reviewed and are negative.      Past Medical History  TBI, diabetes mellitus type 2, hypertension    Surgical History  Splenectomy    Family History  History reviewed.  No pertinent family history    Social History   reports that he has been smoking.  He has a 3.75 pack-year smoking history. He has never used smokeless tobacco. He reports that he does not drink alcohol or use drugs.    Allergies  No Known Allergies    Medications  Prior to Admission Medications   Prescriptions Last Dose Informant Patient Reported? Taking?   Blood Glucose Calibration (ONETOUCH VERIO) Solution   Yes No   Sig: by Does not apply route.   Blood Glucose Monitoring Suppl (ONETOUCH VERIO IQ SYSTEM) w/Device Kit   Yes No   Sig: by Does not apply route.   Cholecalciferol (VITAMIN D) 2000 units Cap   No No   Sig: Take 2 Caps by mouth every day.   Cholecalciferol (VITAMIN D-3 SUPER STRENGTH) 2000 UNIT Tab   Yes No   Sig: Take  by mouth.   Glucagon, rDNA, (GLUCAGON EMERGENCY) 1 MG Kit   Yes No   Sig: by Intramuscular route.   Insulin Pen Needle (B-D UF III MINI PEN NEEDLES) 31G X 5 MM Misc   Yes No   Sig: by Does not apply route.   LIFESCAN FINEPOINT LANCETS Misc   Yes No   Sig: by Misc.(Non-Drug; Combo Route) route.   Multiple Vitamin (MULTI-VITAMINS) Tab   Yes No   Sig: Take  by mouth.   ONETOUCH DELICA LANCETS FINE Misc   Yes No   Sig: by Does not apply route.   acetaminophen (TYLENOL) 325 MG Tab   Yes No   Sig: Take 650 mg by mouth.   aspirin EC (ECOTRIN) 81 MG Tablet Delayed Response 8/16/2018  Yes No   Sig: Take  by mouth.   atorvastatin (LIPITOR) 10 MG Tab 8/16/2018  Yes No   Sig: Take 1 tablet by mouth daily to prevent heart attacks and strokes   buPROPion (WELLBUTRIN XL) 300 MG XL tablet 8/16/2018  Yes No   Sig: Take 1 tablet by mouth every morning   ferrous sulfate 325 (65 Fe) MG EC tablet   No No   Sig: Take 1 Tab by mouth 3 times a day, with meals.   folic acid (FOLVITE) 1 MG Tab   Yes No   Sig: Take 1 mg by mouth.    glimepiride (AMARYL) 1 MG tablet 8/16/2018  Yes No   Sig: Take 1 mg by mouth 2 times a day.   glucose blood ([employer] TEST STRIPS) strip   Yes No   Sig: by Other route.   glucose blood (ONETOUCH VERIO) strip   Yes No   Sig: by Does not apply route.   ibuprofen (MOTRIN) 600 MG Tab   Yes No   Sig: Take 600 mg by mouth.   linagliptin (TRADJENTA) 5 MG Tab tablet   Yes No   Sig: Take 5 mg by mouth every day.   lisinopril (PRINIVIL) 10 MG Tab 8/16/2018  Yes No   Sig: Take  by mouth.   memantine (NAMENDA) 5 MG Tab 8/16/2018  Yes No   Sig: TAKE 1 TAB BY MOUTH TWICE DAILY   memantine (NAMENDA) 5 MG Tab   Yes No   Sig: Take 1 tablet by mouth daily   metformin (GLUCOPHAGE) 1000 MG tablet 8/16/2018  Yes No   Sig: Take 1,000 mg by mouth 2 times a day, with meals.   nicotine polacrilex (NICORETTE) 2 MG Gum   Yes No   Sig: Take  by mouth.   tamsulosin (FLOMAX) 0.4 MG capsule 8/16/2018  Yes No   Sig: Take 0.4 mg by mouth ONE-HALF HOUR AFTER BREAKFAST.   thiamine (THIAMINE) 100 MG tablet   Yes No   Sig: Take 100 mg by mouth.      Facility-Administered Medications: None       Physical Exam  Blood Pressure: (!) 166/96   Temperature: 36.6 °C (97.9 °F)   Pulse: 93   Respiration: 16   Pulse Oximetry: 94 %     Physical Exam   Constitutional: He appears well-developed and well-nourished. No distress.   HENT:   Head: Normocephalic and atraumatic.   Mouth/Throat: Oropharynx is clear and moist.   Eyes: Pupils are equal, round, and reactive to light. Conjunctivae are normal. No scleral icterus.   Neck: Normal range of motion. Neck supple.   Cardiovascular: Regular rhythm and normal heart sounds.    Tachycardic   Pulmonary/Chest: Effort normal and breath sounds normal. No respiratory distress. He has no wheezes. He has no rales.   Abdominal: Soft. Bowel sounds are normal. He exhibits no distension. There is tenderness (Mild right upper quadrant.  Stewart's negative). There is no rebound.   Musculoskeletal: Normal range of motion. He exhibits no  edema or tenderness.   Lymphadenopathy:     He has no cervical adenopathy.   Neurological: He is alert. No cranial nerve deficit. Coordination normal.   Oriented to person and place   Skin: Skin is warm. No rash noted.   Psychiatric: He has a normal mood and affect. His behavior is normal.   Nursing note and vitals reviewed.      Laboratory:  Recent Labs      08/23/18   1748   WBC  16.8*   RBC  4.71   HEMOGLOBIN  11.4*   HEMATOCRIT  37.5*   MCV  79.6*   MCH  24.2*   MCHC  30.4*   RDW  56.8*   PLATELETCT  464*   MPV  9.8     Recent Labs      08/23/18   1748   SODIUM  138   POTASSIUM  3.8   CHLORIDE  106   CO2  19*   GLUCOSE  94   BUN  17   CREATININE  1.06   CALCIUM  9.0     Recent Labs      08/23/18   1748   ALTSGPT  9   ASTSGOT  15   ALKPHOSPHAT  124*   TBILIRUBIN  0.3   LIPASE  7*   GLUCOSE  94                 Lab Results   Component Value Date    TROPONINI <0.01 08/23/2018       Urinalysis:    Recent Labs      08/24/18   0055   SPECGRAVITY  1.017   GLUCOSEUR  Negative   KETONES  15*   NITRITE  Negative   LEUKESTERAS  Large*   WBCURINE  Packed*   RBCURINE  5-10*   BACTERIA  Many*   EPITHELCELL  Negative        Imaging:  US-GALLBLADDER   Final Result         1.  Suspected choledocholithiasis with borderline common bile duct dilatation suggesting possible mild obstruction.   2.  Echogenic liver compatible with fatty change versus fibrosis.   3.  Probable small nonobstructing right renal calculus               Assessment/Plan:  I anticipate this patient will require at least two midnights for appropriate medical management, necessitating inpatient admission.    Upper GI bleed- (present on admission)   Assessment & Plan    Patient is started on aggressive IV fluid hydration with lactated Ringer's  He will be admitted to the telemetry unit with close cardiac monitoring  He has been started on IV Protonix  GI has been consulted  Monitor H&H every 8 hours, transfuse for hemoglobin less than 7  Check coagulation panel         Choledocholithiasis- (present on admission)   Assessment & Plan    GI has been consulted  Monitor CMP        Sepsis (HCC)- (present on admission)   Assessment & Plan    This is sepsis (without associated acute organ dysfunction).   Likely source is UTI  Patient has been started on IV fluids per septic protocol  Lactate is within normal limits  Patient is started on IV Zosyn  Follow blood and urine cultures          UTI (urinary tract infection)- (present on admission)   Assessment & Plan    Urine culture reveals Klebsiella pneumoniae ESBL sensitive to Zosyn  Started on IV Zosyn        Tobacco abuse- (present on admission)   Assessment & Plan    Tobacco cessation education provided for more than 3 minutes, discussed options of nicotine patch, medical treatment with wellbutrin and chantix. Discussed the risks of smoking including increased risk of heart disease, stroke, cancer and COPD. Discussed the benefits of quitting smoking. Nicotine replacement protocol will be provided to the patient.          Hyperlipidemia- (present on admission)   Assessment & Plan    Continue atorvastatin        Essential hypertension- (present on admission)   Assessment & Plan    Continue lisinopril        Uncontrolled type 2 diabetes mellitus with hyperosmolarity without coma (HCC)- (present on admission)   Assessment & Plan    Start on insulin sliding scale with serial Accu-Cheks  Check hemoglobin A1c  Hypoglycemic protocol in place                VTE prophylaxis: SCD

## 2018-08-24 NOTE — ASSESSMENT & PLAN NOTE
-continues to be stable at this time, daily CBC  Transfuse if hemoglobin drops to below 7 or if he becomes hemodynamically unstable  Gastroenterology did an EGD today which showed grade 4 esophagitis  PPI twice daily for 8 weeks then daily  -will need repeat EGD in 2 months and biopsies at that time to see if Garcia's is presence  -abstain from all ETOH, NSAIDs and ASA use

## 2018-08-24 NOTE — ED TRIAGE NOTES
Pt BIB [facility] from the [facility] with c/c of abd pain. Pt with recent diagnosis of UTI and has not been medicated for it yet

## 2018-08-24 NOTE — CARE PLAN
Problem: Safety  Goal: Will remain free from injury  Patient educated on importance of using the call light if in need of assistance. Patient verbalizes understanding. Bed locked in lowest position, non skid socks on, personal belongings and call light within reach, appropriate sign placed on door.    Problem: Knowledge Deficit  Goal: Knowledge of disease process/condition, treatment plan, diagnostic tests, and medications will improve  Patient updated on POC. All questions answered.

## 2018-08-24 NOTE — ASSESSMENT & PLAN NOTE
Poorly controlled as an outpatient  Hemoglobin A1c is 9.6  Continue Januvia and sliding scale insulin and glimepiride  -good blood sugar control here in the hospital

## 2018-08-24 NOTE — ASSESSMENT & PLAN NOTE
GI following-no new issues at this time and remains without belly pain  Monitor complete metabolic panel  MRCP and CT abdomen has been reviewed  PPI twice daily for 8 weeks then daily  Outpatient GI follow-up for possible colonoscopy  Alcohol cessation

## 2018-08-24 NOTE — PROGRESS NOTES
Gi consult dictated:  Impression:  1. NV last night with hematemesis.   2. RUQ pain last night now resolved.  3. ?CBD stone on US.  4. DM  5. Hx CHI 4 years ago with memory deficits.   6. Urinary retention with recurrent UTIs  7. HTN  8. HLD  9. Kidney stones.   10. Iron defic anemia  Plan:  1. MRCP to confirm if CBD stone present  2. EGD in am.   3. CMP in am.   4. He'll need colonoscopy in near future when above issues have been addressed.

## 2018-08-24 NOTE — DIETARY
"Nutrition Services:  Brief Update    Pt screened for BMI <19 (=15.57) on Admit Screen.   Pt is currently NPO.    Ht: 188 cm, Wt: 55 kg, BMI 15.57.    Spoke with pt at bedside.  Pt appeared thin.  Pt reports a good appetite PTA.    Pt denies recent wt loss and states that he has always been \"thin\".  RD will follow for diet advancement and adequate PO intake.    Recommendations/Plan:  1. Advance diet as tolerated per SLP/MD.  2. Encourage intake of meals.  3. Document intake of all meals as % taken in ADLs to provide interdisciplinary communication across all shifts.   4. Monitor weight.  5. Nutrition rep will continue to see patient for ongoing meal and snack preferences.   6. Obtain supplement order per RD as needed.                "

## 2018-08-25 ENCOUNTER — APPOINTMENT (OUTPATIENT)
Dept: RADIOLOGY | Facility: MEDICAL CENTER | Age: 58
DRG: 698 | End: 2018-08-25
Attending: INTERNAL MEDICINE
Payer: COMMERCIAL

## 2018-08-25 LAB
ANION GAP SERPL CALC-SCNC: 9 MMOL/L (ref 0–11.9)
BASOPHILS # BLD AUTO: 0.5 % (ref 0–1.8)
BASOPHILS # BLD: 0.08 K/UL (ref 0–0.12)
BUN SERPL-MCNC: 13 MG/DL (ref 8–22)
CALCIUM SERPL-MCNC: 7.4 MG/DL (ref 8.5–10.5)
CHLORIDE SERPL-SCNC: 105 MMOL/L (ref 96–112)
CO2 SERPL-SCNC: 24 MMOL/L (ref 20–33)
CREAT SERPL-MCNC: 0.81 MG/DL (ref 0.5–1.4)
EOSINOPHIL # BLD AUTO: 0.19 K/UL (ref 0–0.51)
EOSINOPHIL NFR BLD: 1.1 % (ref 0–6.9)
ERYTHROCYTE [DISTWIDTH] IN BLOOD BY AUTOMATED COUNT: 52.3 FL (ref 35.9–50)
GLUCOSE BLD-MCNC: 117 MG/DL (ref 65–99)
GLUCOSE BLD-MCNC: 130 MG/DL (ref 65–99)
GLUCOSE BLD-MCNC: 209 MG/DL (ref 65–99)
GLUCOSE BLD-MCNC: 289 MG/DL (ref 65–99)
GLUCOSE SERPL-MCNC: 207 MG/DL (ref 65–99)
HCT VFR BLD AUTO: 29.1 % (ref 42–52)
HCT VFR BLD AUTO: 32 % (ref 42–52)
HGB BLD-MCNC: 10.3 G/DL (ref 14–18)
HGB BLD-MCNC: 6.8 G/DL (ref 14–18)
HGB BLD-MCNC: 9.3 G/DL (ref 14–18)
HGB BLD-MCNC: 9.9 G/DL (ref 14–18)
IMM GRANULOCYTES # BLD AUTO: 0.13 K/UL (ref 0–0.11)
IMM GRANULOCYTES NFR BLD AUTO: 0.7 % (ref 0–0.9)
LYMPHOCYTES # BLD AUTO: 2.97 K/UL (ref 1–4.8)
LYMPHOCYTES NFR BLD: 16.9 % (ref 22–41)
MCH RBC QN AUTO: 24.6 PG (ref 27–33)
MCHC RBC AUTO-ENTMCNC: 32 G/DL (ref 33.7–35.3)
MCV RBC AUTO: 77 FL (ref 81.4–97.8)
MONOCYTES # BLD AUTO: 1.43 K/UL (ref 0–0.85)
MONOCYTES NFR BLD AUTO: 8.1 % (ref 0–13.4)
NEUTROPHILS # BLD AUTO: 12.76 K/UL (ref 1.82–7.42)
NEUTROPHILS NFR BLD: 72.7 % (ref 44–72)
NRBC # BLD AUTO: 0 K/UL
NRBC BLD-RTO: 0 /100 WBC
PLATELET # BLD AUTO: 410 K/UL (ref 164–446)
PMV BLD AUTO: 9.8 FL (ref 9–12.9)
POTASSIUM SERPL-SCNC: 3.4 MMOL/L (ref 3.6–5.5)
RBC # BLD AUTO: 3.78 M/UL (ref 4.7–6.1)
SODIUM SERPL-SCNC: 138 MMOL/L (ref 135–145)
WBC # BLD AUTO: 17.6 K/UL (ref 4.8–10.8)

## 2018-08-25 PROCEDURE — 36415 COLL VENOUS BLD VENIPUNCTURE: CPT

## 2018-08-25 PROCEDURE — 3E0234Z INTRODUCTION OF SERUM, TOXOID AND VACCINE INTO MUSCLE, PERCUTANEOUS APPROACH: ICD-10-PCS | Performed by: HOSPITALIST

## 2018-08-25 PROCEDURE — 770020 HCHG ROOM/CARE - TELE (206)

## 2018-08-25 PROCEDURE — 700111 HCHG RX REV CODE 636 W/ 250 OVERRIDE (IP): Performed by: INTERNAL MEDICINE

## 2018-08-25 PROCEDURE — 700105 HCHG RX REV CODE 258: Performed by: INTERNAL MEDICINE

## 2018-08-25 PROCEDURE — 160203 HCHG ENDO MINUTES - 1ST 30 MINS LEVEL 4: Performed by: INTERNAL MEDICINE

## 2018-08-25 PROCEDURE — 90471 IMMUNIZATION ADMIN: CPT

## 2018-08-25 PROCEDURE — A9270 NON-COVERED ITEM OR SERVICE: HCPCS | Performed by: INTERNAL MEDICINE

## 2018-08-25 PROCEDURE — 700111 HCHG RX REV CODE 636 W/ 250 OVERRIDE (IP)

## 2018-08-25 PROCEDURE — 160035 HCHG PACU - 1ST 60 MINS PHASE I: Performed by: INTERNAL MEDICINE

## 2018-08-25 PROCEDURE — 85025 COMPLETE CBC W/AUTO DIFF WBC: CPT

## 2018-08-25 PROCEDURE — 700102 HCHG RX REV CODE 250 W/ 637 OVERRIDE(OP): Performed by: INTERNAL MEDICINE

## 2018-08-25 PROCEDURE — 160002 HCHG RECOVERY MINUTES (STAT): Performed by: INTERNAL MEDICINE

## 2018-08-25 PROCEDURE — C9113 INJ PANTOPRAZOLE SODIUM, VIA: HCPCS | Performed by: INTERNAL MEDICINE

## 2018-08-25 PROCEDURE — 99233 SBSQ HOSP IP/OBS HIGH 50: CPT | Performed by: HOSPITALIST

## 2018-08-25 PROCEDURE — 85018 HEMOGLOBIN: CPT

## 2018-08-25 PROCEDURE — 160048 HCHG OR STATISTICAL LEVEL 1-5: Performed by: INTERNAL MEDICINE

## 2018-08-25 PROCEDURE — 90732 PPSV23 VACC 2 YRS+ SUBQ/IM: CPT | Performed by: HOSPITALIST

## 2018-08-25 PROCEDURE — 99152 MOD SED SAME PHYS/QHP 5/>YRS: CPT | Performed by: INTERNAL MEDICINE

## 2018-08-25 PROCEDURE — 700111 HCHG RX REV CODE 636 W/ 250 OVERRIDE (IP): Performed by: HOSPITALIST

## 2018-08-25 PROCEDURE — 88305 TISSUE EXAM BY PATHOLOGIST: CPT

## 2018-08-25 PROCEDURE — 85014 HEMATOCRIT: CPT

## 2018-08-25 PROCEDURE — 80048 BASIC METABOLIC PNL TOTAL CA: CPT

## 2018-08-25 PROCEDURE — 0DB98ZX EXCISION OF DUODENUM, VIA NATURAL OR ARTIFICIAL OPENING ENDOSCOPIC, DIAGNOSTIC: ICD-10-PCS | Performed by: HOSPITALIST

## 2018-08-25 PROCEDURE — 82962 GLUCOSE BLOOD TEST: CPT | Mod: 91

## 2018-08-25 PROCEDURE — 74181 MRI ABDOMEN W/O CONTRAST: CPT

## 2018-08-25 PROCEDURE — 500066 HCHG BITE BLOCK, ECT: Performed by: INTERNAL MEDICINE

## 2018-08-25 RX ORDER — OMEPRAZOLE 20 MG/1
20 CAPSULE, DELAYED RELEASE ORAL 2 TIMES DAILY
Status: DISCONTINUED | OUTPATIENT
Start: 2018-08-25 | End: 2018-08-30 | Stop reason: HOSPADM

## 2018-08-25 RX ORDER — MIDAZOLAM HYDROCHLORIDE 1 MG/ML
INJECTION INTRAMUSCULAR; INTRAVENOUS
Status: DISCONTINUED | OUTPATIENT
Start: 2018-08-25 | End: 2018-08-25

## 2018-08-25 RX ORDER — MIDAZOLAM HYDROCHLORIDE 1 MG/ML
.5-2 INJECTION INTRAMUSCULAR; INTRAVENOUS PRN
Status: DISCONTINUED | OUTPATIENT
Start: 2018-08-25 | End: 2018-08-25

## 2018-08-25 RX ORDER — SODIUM CHLORIDE 9 MG/ML
500 INJECTION, SOLUTION INTRAVENOUS
Status: ACTIVE | OUTPATIENT
Start: 2018-08-25 | End: 2018-08-25

## 2018-08-25 RX ADMIN — OMEPRAZOLE 20 MG: 20 CAPSULE, DELAYED RELEASE ORAL at 17:47

## 2018-08-25 RX ADMIN — INSULIN HUMAN 2 UNITS: 100 INJECTION, SOLUTION PARENTERAL at 00:56

## 2018-08-25 RX ADMIN — INSULIN HUMAN 3 UNITS: 100 INJECTION, SOLUTION PARENTERAL at 17:48

## 2018-08-25 RX ADMIN — SODIUM CHLORIDE 8 MG/HR: 9 INJECTION, SOLUTION INTRAVENOUS at 00:54

## 2018-08-25 RX ADMIN — ATORVASTATIN CALCIUM 10 MG: 10 TABLET, FILM COATED ORAL at 06:21

## 2018-08-25 RX ADMIN — MEMANTINE HYDROCHLORIDE 5 MG: 10 TABLET ORAL at 06:22

## 2018-08-25 RX ADMIN — LISINOPRIL 10 MG: 5 TABLET ORAL at 06:21

## 2018-08-25 RX ADMIN — GLIMEPIRIDE 1 MG: 2 TABLET ORAL at 17:47

## 2018-08-25 RX ADMIN — PIPERACILLIN AND TAZOBACTAM 3.38 G: 3; .375 INJECTION, POWDER, LYOPHILIZED, FOR SOLUTION INTRAVENOUS; PARENTERAL at 06:21

## 2018-08-25 RX ADMIN — SITAGLIPTIN 100 MG: 100 TABLET, FILM COATED ORAL at 06:21

## 2018-08-25 RX ADMIN — BUPROPION HYDROCHLORIDE 300 MG: 150 TABLET, EXTENDED RELEASE ORAL at 06:21

## 2018-08-25 RX ADMIN — OMEPRAZOLE 20 MG: 20 CAPSULE, DELAYED RELEASE ORAL at 09:10

## 2018-08-25 RX ADMIN — PIPERACILLIN AND TAZOBACTAM 3.38 G: 3; .375 INJECTION, POWDER, LYOPHILIZED, FOR SOLUTION INTRAVENOUS; PARENTERAL at 20:25

## 2018-08-25 RX ADMIN — SODIUM CHLORIDE, POTASSIUM CHLORIDE, SODIUM LACTATE AND CALCIUM CHLORIDE: 600; 310; 30; 20 INJECTION, SOLUTION INTRAVENOUS at 03:43

## 2018-08-25 RX ADMIN — GLIMEPIRIDE 1 MG: 2 TABLET ORAL at 06:21

## 2018-08-25 RX ADMIN — PNEUMOCOCCAL VACCINE POLYVALENT 25 MCG
25; 25; 25; 25; 25; 25; 25; 25; 25; 25; 25; 25; 25; 25; 25; 25; 25; 25; 25; 25; 25; 25; 25 INJECTION, SOLUTION INTRAMUSCULAR; SUBCUTANEOUS at 13:56

## 2018-08-25 RX ADMIN — INSULIN HUMAN 3 UNITS: 100 INJECTION, SOLUTION PARENTERAL at 23:49

## 2018-08-25 RX ADMIN — PIPERACILLIN AND TAZOBACTAM 3.38 G: 3; .375 INJECTION, POWDER, LYOPHILIZED, FOR SOLUTION INTRAVENOUS; PARENTERAL at 13:58

## 2018-08-25 ASSESSMENT — ENCOUNTER SYMPTOMS
BACK PAIN: 0
COUGH: 0
VOMITING: 0
HEADACHES: 0
DIZZINESS: 0
NERVOUS/ANXIOUS: 0
BLURRED VISION: 0
MYALGIAS: 0
FEVER: 0
SHORTNESS OF BREATH: 0
CHILLS: 0
DOUBLE VISION: 0
SORE THROAT: 0
PALPITATIONS: 0
DEPRESSION: 0
NAUSEA: 0
WEAKNESS: 1

## 2018-08-25 ASSESSMENT — PAIN SCALES - GENERAL
PAINLEVEL_OUTOF10: 0

## 2018-08-25 NOTE — PROGRESS NOTES
Received morning report from night shift RN. Assumed care of pt. Pt reports no pain at this time. Updated pt on plan of care. Pt resting comfortably in bed, HOB elevated. Bed alarm in place. Educated on use of call light.

## 2018-08-25 NOTE — PROGRESS NOTES
Bedside report received. Pt A&Ox4.No complaints of pain or SOB. RA. VSS. POC discussed with pt. Pt verbalizes understanding. Call light and belongings within reach. Bed locked and in lowest position. Alarm and fall precautions in place.

## 2018-08-25 NOTE — PROGRESS NOTES
Got a call from lab about sudden decrease in h/h from 9.3 down to 6.8. Informed Dr. Frye. Received orders to get stat redraw. Order placed. BP stables at the moment. No tachycardia.

## 2018-08-25 NOTE — OP REPORT
DATE OF SERVICE:  08/25/2018    REFERRING PHYSICIAN:  Katie Morton MD    PROCEDURE:  Gastroscopy.    INDICATION:  A 58-year-old man admitted through the emergency room on 08/23   with nausea, vomiting, hematemesis and right upper quadrant pain.    ASA:  Class II.    SEDATION TOTAL:  Fentanyl 100 mcg IV, Versed 4 mg IV.    FINDINGS:  1.  Severe grade D reflux esophagitis.  2.  A 3 cm hiatal hernia.  3.  Mild scalloping of the duodenal folds, biopsied for celiac disease.  4.  Sedation time was 0815 and scope out time 0825.    DESCRIPTION OF PROCEDURE:  After informed consent and a time-out, he was   administered IV moderate sedation until he was comfortably quiet.  The Olympus   flexible video gastroscope was advanced gently into the esophagus.  In the   mid and distal esophagus, there was confluent exudative severe esophagitis   starting at 30 cm and going down the GE junction at 40 cm.  There is a 3 cm   hiatal hernia from 40-43 cm.  The stomach was entered and insufflated with   air.  The mid body and antrum were normal.  The scope was passed through the   pylorus into the duodenal bulb and second portion of the duodenum.  There was   some mild cracked-earth appearance of the duodenal mucosa and mild scalloping   of the duodenal folds and biopsies were taken from the second portion of the   bulb to look for celiac disease.  Retroflexed view of the gastric cardia   showed a hiatal hernia.  He tolerated the procedure well and was stable   throughout the entire exam.    IMPRESSION:  1.  Severe grade D reflux esophagitis.  2.  A 3 cm hiatal hernia.  3.  Abnormal duodenal folds, biopsied for celiac disease.    PLAN:  1.  He will need a b.i.d. PPI like omeprazole for 4 weeks to get the   esophagitis healed.  Following that, he will need lifelong once a day therapy   with something like omeprazole or pantoprazole.  2.  MRCP today to look for bile duct stone.  He will need to be n.p.o. until   after the MRCP.  3.   Ideally, he should have repeat endoscopy in 2-3 months, once the mucosa   has healed to assess for Garcia's esophagus.       ____________________________________     MD GLENNA ENCISO / SALINAS    DD:  08/25/2018 08:36:17  DT:  08/25/2018 08:56:21    D#:  [id number]  Job#:  [id number]

## 2018-08-25 NOTE — PROGRESS NOTES
LATE ENTRY:  0900: Patient returned from endoscopy. Patient ambulatory from [facility] to bed, denies dizziness, steady on feet. Vitals obtained, b/p low. Assessment performed. Post-op vitals in place.

## 2018-08-25 NOTE — CARE PLAN
Problem: Safety  Goal: Will remain free from injury  Outcome: PROGRESSING AS EXPECTED  Treaded socks in place  Call light within reach  Pt encouraged to call for assistance before ambulating    Problem: Urinary Elimination:  Goal: Ability to reestablish a normal urinary elimination pattern will improve  Outcome: NOT MET  Dill catheter still n place for urinary retention  IV fluids on board  I/Os in place

## 2018-08-25 NOTE — OR SURGEON
Immediate Post OP Note    PreOp Diagnosis: Hematemesis    PostOp Diagnosis:   1. Severe grade D reflux esophagitis  2. 3cm hiatal hernia  3. Mild scalloping of duodenal folds biopsied for celiac disease.   4. Sedation time 0815; scope out time 0825  Procedure(s):  GASTROSCOPY-ENDO - Wound Class: Clean Contaminated    Surgeon(s):  Jaswant Frye M.D.    Anesthesiologist/Type of Anesthesia:  No anesthesia staff entered./moderate    Surgical Staff:  Endoscopy Technician: Jesus Bryan  Sedation/Monitoring Nurse: Deandra Hawkins R.N.    Specimens removed if any:  A. Duodenal bx.     Estimated Blood Loss: None    Findings: see above    Complications: None  Plan:   1. BID omeprazole for 4 weeks then lifelong once a day PPI.  2. MRCP today. NPO until after MRCP  3. Repeat EGD 2-3 months after healing to assess for Garcia's.         8/25/2018 8:29 AM Jaswant Frye M.D.

## 2018-08-25 NOTE — PROGRESS NOTES
[facility]    Date of Service: 2018    Chief Complaint  58 y.o. male admitted 2018 with UTI and GI bleed    Interval Problem Update  Transfuse packed red blood cells overnight  Afebrile  Abdominal pain is improving  Denies dysuria  Had an EGD today    Consultants/Specialty  Gastroenterology    Disposition  To be determined        Review of Systems   Constitutional: Positive for malaise/fatigue. Negative for chills and fever.   HENT: Negative for hearing loss and sore throat.    Eyes: Negative for blurred vision and double vision.   Respiratory: Negative for cough and shortness of breath.    Cardiovascular: Negative for chest pain, palpitations and leg swelling.   Gastrointestinal: Negative for nausea and vomiting.   Genitourinary: Negative for dysuria and frequency.   Musculoskeletal: Negative for back pain and myalgias.   Skin: Negative for itching and rash.   Neurological: Positive for weakness. Negative for dizziness and headaches.   Psychiatric/Behavioral: Negative for depression. The patient is not nervous/anxious.       Physical Exam  Laboratory/Imaging   Hemodynamics  Temp (24hrs), Av.8 °C (98.3 °F), Min:36.4 °C (97.5 °F), Max:37.2 °C (99 °F)   Temperature: 36.8 °C (98.3 °F)  Pulse  Av.7  Min: 55  Max: 100 Heart Rate (Monitored): 72  Blood Pressure: 120/74, NIBP: (!) 95/56      Respiratory      Respiration: 17, Pulse Oximetry: 90 %        RUL Breath Sounds: Diminished, RML Breath Sounds: Diminished, RLL Breath Sounds: Diminished, LEONARD Breath Sounds: Diminished, LLL Breath Sounds: Diminished    Fluids    Intake/Output Summary (Last 24 hours) at 18 1510  Last data filed at 18 0632   Gross per 24 hour   Intake                0 ml   Output             1450 ml   Net            -1450 ml       Nutrition  Orders Placed This Encounter   Procedures   • Diet Order Regular     Standing Status:   Standing     Number of Occurrences:   1     Order Specific Question:    Diet:     Answer:   Regular [1]     Physical Exam   Constitutional: He is oriented to person, place, and time. He appears cachectic. No distress.   HENT:   Head: Normocephalic and atraumatic.   Mouth/Throat: No oropharyngeal exudate.   Poor dentition   Eyes: Pupils are equal, round, and reactive to light. Conjunctivae and EOM are normal.   Neck: Normal range of motion. Neck supple.   Cardiovascular: Normal rate and regular rhythm.    Pulmonary/Chest: Effort normal and breath sounds normal. No respiratory distress.   Abdominal: Soft. Bowel sounds are normal. He exhibits no distension.   Musculoskeletal: He exhibits no edema or tenderness.   Neurological: He is alert and oriented to person, place, and time.   Skin: Skin is warm and dry. He is not diaphoretic.   Psychiatric: He has a normal mood and affect. His behavior is normal.   Nursing note and vitals reviewed.      Recent Labs      08/23/18 1748 08/24/18   1044   08/25/18   0154  08/25/18   1037  08/25/18   1135   WBC  16.8*  19.9*   --   17.6*   --    --    RBC  4.71  4.04*   --   3.78*   --    --    HEMOGLOBIN  11.4*  9.9*   < >  9.3*  6.8*  9.9*   HEMATOCRIT  37.5*  31.6*   --   29.1*   --   32.0*   MCV  79.6*  78.2*   --   77.0*   --    --    MCH  24.2*  24.5*   --   24.6*   --    --    MCHC  30.4*  31.3*   --   32.0*   --    --    RDW  56.8*  53.1*   --   52.3*   --    --    PLATELETCT  464*  408   --   410   --    --    MPV  9.8  9.8   --   9.8   --    --     < > = values in this interval not displayed.     Recent Labs      08/23/18 1748 08/24/18   1044  08/25/18   0154   SODIUM  138  140  138   POTASSIUM  3.8  3.4*  3.4*   CHLORIDE  106  106  105   CO2  19*  18*  24   GLUCOSE  94  90  207*   BUN  17  19  13   CREATININE  1.06  1.03  0.81   CALCIUM  9.0  7.4*  7.4*     Recent Labs      08/24/18   0254   APTT  27.7   INR  1.05                  Assessment/Plan     Upper GI bleed- (present on admission)   Assessment & Plan    Continue with IV  fluids  Transfuse 1 unit of packed red blood cells overnight  Transfuse if hemoglobin drops to below 7 or if he becomes hemodynamically unstable  Gastroenterology did an EGD today which showed esophagitis  PPI twice daily  We will need repeat EGD in the near future according to GI recommendations        Choledocholithiasis- (present on admission)   Assessment & Plan    GI following alongside  Monitor complete metabolic panel  MRCP ordered for today        Sepsis (HCC)- (present on admission)   Assessment & Plan    This is sepsis (without associated acute organ dysfunction).   Possible source is UTI; may also be intra-abdominal  Patient has been started on IV fluids per septic protocol  Lactate is within normal limits  Continue IV Zosyn therapy for now  Follow blood and urine cultures          UTI (urinary tract infection)- (present on admission)   Assessment & Plan    Urine culture reveals Klebsiella pneumoniae ESBL sensitive to Zosyn  Started on IV Zosyn        Tobacco abuse- (present on admission)   Assessment & Plan    Counseled already  Nicotine patch          Hyperlipidemia- (present on admission)   Assessment & Plan    Continue atorvastatin        Essential hypertension- (present on admission)   Assessment & Plan    Continue lisinopril        Uncontrolled type 2 diabetes mellitus with hyperosmolarity without coma (HCC)- (present on admission)   Assessment & Plan    Poorly controlled  Hemoglobin A1c is 9.6  Continue Januvia and sliding scale insulin and glimepiride            Quality-Core Measures   Reviewed items::  EKG reviewed, Labs reviewed, Medications reviewed and Radiology images reviewed

## 2018-08-26 ENCOUNTER — APPOINTMENT (OUTPATIENT)
Dept: RADIOLOGY | Facility: MEDICAL CENTER | Age: 58
DRG: 698 | End: 2018-08-26
Attending: INTERNAL MEDICINE
Payer: COMMERCIAL

## 2018-08-26 LAB
ALBUMIN SERPL BCP-MCNC: 2.7 G/DL (ref 3.2–4.9)
ALBUMIN/GLOB SERPL: 1.4 G/DL
ALP SERPL-CCNC: 81 U/L (ref 30–99)
ALT SERPL-CCNC: 5 U/L (ref 2–50)
ANION GAP SERPL CALC-SCNC: 6 MMOL/L (ref 0–11.9)
AST SERPL-CCNC: 8 U/L (ref 12–45)
BACTERIA UR CULT: ABNORMAL
BACTERIA UR CULT: ABNORMAL
BILIRUB SERPL-MCNC: 0.2 MG/DL (ref 0.1–1.5)
BUN SERPL-MCNC: 11 MG/DL (ref 8–22)
CALCIUM SERPL-MCNC: 8.2 MG/DL (ref 8.5–10.5)
CHLORIDE SERPL-SCNC: 106 MMOL/L (ref 96–112)
CO2 SERPL-SCNC: 25 MMOL/L (ref 20–33)
CREAT SERPL-MCNC: 0.8 MG/DL (ref 0.5–1.4)
GLOBULIN SER CALC-MCNC: 2 G/DL (ref 1.9–3.5)
GLUCOSE BLD-MCNC: 147 MG/DL (ref 65–99)
GLUCOSE BLD-MCNC: 178 MG/DL (ref 65–99)
GLUCOSE BLD-MCNC: 207 MG/DL (ref 65–99)
GLUCOSE BLD-MCNC: 259 MG/DL (ref 65–99)
GLUCOSE BLD-MCNC: 309 MG/DL (ref 65–99)
GLUCOSE SERPL-MCNC: 260 MG/DL (ref 65–99)
HGB BLD-MCNC: 8.8 G/DL (ref 14–18)
POTASSIUM SERPL-SCNC: 3.6 MMOL/L (ref 3.6–5.5)
PROT SERPL-MCNC: 4.7 G/DL (ref 6–8.2)
SIGNIFICANT IND 70042: ABNORMAL
SITE SITE: ABNORMAL
SODIUM SERPL-SCNC: 137 MMOL/L (ref 135–145)
SOURCE SOURCE: ABNORMAL

## 2018-08-26 PROCEDURE — 700105 HCHG RX REV CODE 258: Performed by: HOSPITALIST

## 2018-08-26 PROCEDURE — 700111 HCHG RX REV CODE 636 W/ 250 OVERRIDE (IP): Performed by: INTERNAL MEDICINE

## 2018-08-26 PROCEDURE — 770020 HCHG ROOM/CARE - TELE (206)

## 2018-08-26 PROCEDURE — 700102 HCHG RX REV CODE 250 W/ 637 OVERRIDE(OP): Performed by: INTERNAL MEDICINE

## 2018-08-26 PROCEDURE — 700105 HCHG RX REV CODE 258: Performed by: INTERNAL MEDICINE

## 2018-08-26 PROCEDURE — 74170 CT ABD WO CNTRST FLWD CNTRST: CPT

## 2018-08-26 PROCEDURE — 700102 HCHG RX REV CODE 250 W/ 637 OVERRIDE(OP): Performed by: HOSPITALIST

## 2018-08-26 PROCEDURE — 700117 HCHG RX CONTRAST REV CODE 255: Performed by: INTERNAL MEDICINE

## 2018-08-26 PROCEDURE — 82962 GLUCOSE BLOOD TEST: CPT | Mod: 91

## 2018-08-26 PROCEDURE — A9270 NON-COVERED ITEM OR SERVICE: HCPCS | Performed by: HOSPITALIST

## 2018-08-26 PROCEDURE — 85018 HEMOGLOBIN: CPT

## 2018-08-26 PROCEDURE — A9270 NON-COVERED ITEM OR SERVICE: HCPCS | Performed by: INTERNAL MEDICINE

## 2018-08-26 PROCEDURE — 80053 COMPREHEN METABOLIC PANEL: CPT

## 2018-08-26 PROCEDURE — 36415 COLL VENOUS BLD VENIPUNCTURE: CPT

## 2018-08-26 PROCEDURE — 99232 SBSQ HOSP IP/OBS MODERATE 35: CPT | Performed by: HOSPITALIST

## 2018-08-26 RX ORDER — DEXTROSE MONOHYDRATE 25 G/50ML
25 INJECTION, SOLUTION INTRAVENOUS
Status: DISCONTINUED | OUTPATIENT
Start: 2018-08-26 | End: 2018-08-30 | Stop reason: HOSPADM

## 2018-08-26 RX ORDER — GLIMEPIRIDE 2 MG/1
2 TABLET ORAL 2 TIMES DAILY WITH MEALS
Status: DISCONTINUED | OUTPATIENT
Start: 2018-08-26 | End: 2018-08-30 | Stop reason: HOSPADM

## 2018-08-26 RX ADMIN — PIPERACILLIN AND TAZOBACTAM 3.38 G: 3; .375 INJECTION, POWDER, LYOPHILIZED, FOR SOLUTION INTRAVENOUS; PARENTERAL at 06:16

## 2018-08-26 RX ADMIN — GLIMEPIRIDE 2 MG: 2 TABLET ORAL at 17:01

## 2018-08-26 RX ADMIN — SODIUM CHLORIDE, POTASSIUM CHLORIDE, SODIUM LACTATE AND CALCIUM CHLORIDE: 600; 310; 30; 20 INJECTION, SOLUTION INTRAVENOUS at 00:02

## 2018-08-26 RX ADMIN — IOHEXOL 50 ML: 240 INJECTION, SOLUTION INTRATHECAL; INTRAVASCULAR; INTRAVENOUS; ORAL at 17:51

## 2018-08-26 RX ADMIN — PIPERACILLIN AND TAZOBACTAM 3.38 G: 3; .375 INJECTION, POWDER, LYOPHILIZED, FOR SOLUTION INTRAVENOUS; PARENTERAL at 13:37

## 2018-08-26 RX ADMIN — GLIMEPIRIDE 1 MG: 2 TABLET ORAL at 06:15

## 2018-08-26 RX ADMIN — MEMANTINE HYDROCHLORIDE 5 MG: 10 TABLET ORAL at 06:15

## 2018-08-26 RX ADMIN — ATORVASTATIN CALCIUM 10 MG: 10 TABLET, FILM COATED ORAL at 06:15

## 2018-08-26 RX ADMIN — IOHEXOL 100 ML: 350 INJECTION, SOLUTION INTRAVENOUS at 17:51

## 2018-08-26 RX ADMIN — INSULIN HUMAN 1 UNITS: 100 INJECTION, SOLUTION PARENTERAL at 06:12

## 2018-08-26 RX ADMIN — PIPERACILLIN AND TAZOBACTAM 3.38 G: 3; .375 INJECTION, POWDER, LYOPHILIZED, FOR SOLUTION INTRAVENOUS; PARENTERAL at 20:53

## 2018-08-26 RX ADMIN — INSULIN HUMAN 10 UNITS: 100 INJECTION, SOLUTION PARENTERAL at 20:51

## 2018-08-26 RX ADMIN — BUPROPION HYDROCHLORIDE 300 MG: 150 TABLET, EXTENDED RELEASE ORAL at 06:16

## 2018-08-26 RX ADMIN — OMEPRAZOLE 20 MG: 20 CAPSULE, DELAYED RELEASE ORAL at 17:01

## 2018-08-26 RX ADMIN — OMEPRAZOLE 20 MG: 20 CAPSULE, DELAYED RELEASE ORAL at 06:15

## 2018-08-26 RX ADMIN — SODIUM CHLORIDE, POTASSIUM CHLORIDE, SODIUM LACTATE AND CALCIUM CHLORIDE: 600; 310; 30; 20 INJECTION, SOLUTION INTRAVENOUS at 09:54

## 2018-08-26 RX ADMIN — SITAGLIPTIN 100 MG: 100 TABLET, FILM COATED ORAL at 06:15

## 2018-08-26 RX ADMIN — LISINOPRIL 10 MG: 5 TABLET ORAL at 06:14

## 2018-08-26 ASSESSMENT — ENCOUNTER SYMPTOMS
SHORTNESS OF BREATH: 0
COUGH: 0
CHILLS: 0
ABDOMINAL PAIN: 0
SORE THROAT: 0
HEADACHES: 0
NERVOUS/ANXIOUS: 0
WEAKNESS: 1
DEPRESSION: 0
VOMITING: 0
MYALGIAS: 0
GASTROINTESTINAL NEGATIVE: 1
FEVER: 0
CARDIOVASCULAR NEGATIVE: 1
BLURRED VISION: 0
DOUBLE VISION: 0
DIZZINESS: 0
CONSTITUTIONAL NEGATIVE: 1
NAUSEA: 0
BACK PAIN: 0
RESPIRATORY NEGATIVE: 1

## 2018-08-26 ASSESSMENT — PAIN SCALES - GENERAL
PAINLEVEL_OUTOF10: 0

## 2018-08-26 NOTE — PROGRESS NOTES
Gastroenterology Progress Note     Author: Jaswant Frye   Date & Time Created: 8/26/2018 6:51 AM    Chief Complaint:  Problem: severe reflux esophagitis and ?CBD stone.     Interval History:  No pain or heartburn. MRI done but report pending. Dx of severe reflux esophagitis was discussed. If no stone on MRI he can go home but if stone he'll need ERCP.     Review of Systems:  Review of Systems   Constitutional: Negative.    Respiratory: Negative.    Cardiovascular: Negative.    Gastrointestinal: Negative.        Physical Exam:  Physical Exam   Constitutional: He is oriented to person, place, and time. He appears well-developed.   Cardiovascular: Normal rate and regular rhythm.    Pulmonary/Chest: Effort normal and breath sounds normal.   Abdominal: Soft. Bowel sounds are normal. He exhibits distension. There is no tenderness.   Musculoskeletal: Normal range of motion. He exhibits no edema.   Neurological: He is alert and oriented to person, place, and time.   Skin: Skin is warm and dry.   Psychiatric: He has a normal mood and affect.       Labs:        Invalid input(s): [id number]  Recent Labs      08/23/18 1748   TROPONINI  <0.01     Recent Labs      08/24/18 1044  08/25/18   0154  08/26/18   0219   SODIUM  140  138  137   POTASSIUM  3.4*  3.4*  3.6   CHLORIDE  106  105  106   CO2  18*  24  25   BUN  19  13  11   CREATININE  1.03  0.81  0.80   CALCIUM  7.4*  7.4*  8.2*     Recent Labs      08/23/18 1748 08/24/18   1044  08/25/18   0154  08/26/18   0219   ALTSGPT  9  7   --   5   ASTSGOT  15  12   --   8*   ALKPHOSPHAT  124*  99   --   81   TBILIRUBIN  0.3  0.3   --   0.2   LIPASE  7*   --    --    --    GLUCOSE  94  90  207*  260*     Recent Labs      08/23/18   1748  08/24/18   0254  08/24/18   1044   08/25/18   0154   08/25/18   1135  08/25/18   1824  08/26/18   0219   RBC  4.71   --   4.04*   --   3.78*   --    --    --    --    HEMOGLOBIN  11.4*   --   9.9*   < >  9.3*   < >  9.9*  10.3*  8.8*    HEMATOCRIT  37.5*   --   31.6*   --   29.1*   --   32.0*   --    --    PLATELETCT  464*   --   408   --   410   --    --    --    --    PROTHROMBTM   --   13.4   --    --    --    --    --    --    --    APTT   --   27.7   --    --    --    --    --    --    --    INR   --   1.05   --    --    --    --    --    --    --     < > = values in this interval not displayed.     Recent Labs      18   1748  18   1044  18   0154  18   0219   WBC  16.8*  19.9*  17.6*   --    NEUTSPOLYS  66.70  76.30*  72.70*   --    LYMPHOCYTES  21.40*  14.30*  16.90*   --    MONOCYTES  8.60  7.50  8.10   --    EOSINOPHILS  1.50  0.50  1.10   --    BASOPHILS  0.80  0.60  0.50   --    ASTSGOT  15  12   --   8*   ALTSGPT  9  7   --   5   ALKPHOSPHAT  124*  99   --   81   TBILIRUBIN  0.3  0.3   --   0.2     Hemodynamics:  Temp (24hrs), Av.8 °C (98.2 °F), Min:36.4 °C (97.5 °F), Max:37.2 °C (98.9 °F)  Temperature: 36.8 °C (98.3 °F)  Pulse  Av.4  Min: 55  Max: 100Heart Rate (Monitored): 72  Blood Pressure: 113/62, NIBP: (!) 95/56     Respiratory:    Respiration: 16, Pulse Oximetry: 90 %        RUL Breath Sounds: Diminished, RML Breath Sounds: Diminished, RLL Breath Sounds: Diminished, LEONARD Breath Sounds: Diminished, LLL Breath Sounds: Diminished  Fluids:    Intake/Output Summary (Last 24 hours) at 18 0651  Last data filed at 18 0600   Gross per 24 hour   Intake                0 ml   Output             1700 ml   Net            -1700 ml     Weight: 56.8 kg (125 lb 3.5 oz)  GI/Nutrition:  Orders Placed This Encounter   Procedures   • Diet Order Diabetic, Clear Liquids - No Red Foods     Standing Status:   Standing     Number of Occurrences:   1     Order Specific Question:   Diet:     Answer:   Diabetic [3]     Order Specific Question:   Diet:     Answer:   Clear Liquids - No Red Foods [12]     Medical Decision Making, by Problem:  Active Hospital Problems    Diagnosis   • Upper GI bleed [K92.2]   • UTI  (urinary tract infection) [N39.0]   • Sepsis (HCC) [A41.9]   • Choledocholithiasis [K80.50]   • Hyperlipidemia [E78.5]   • Tobacco abuse [Z72.0]   • Uncontrolled type 2 diabetes mellitus with hyperosmolarity without coma (HCC) [E11.00]   • Diabetic peripheral angiopathy (HCC) [E11.51]   • Essential hypertension [I10]   Impression:  1. Severe reflux esophagitis.  2. Duodenal bx ?celiac disease  3. RUQ pain ?CBD stone on US.     Plan:  1. Clear liquids for now in case he needs ERCP today.  2. I'll check MRCP results later today.  3. Lifelong PPI therapy.  4. Follow up with GI as outpatient.     Late entry 1045: MRCP neg for stones. Dr. Hughes rec CT pancreas. No need for ERCP.   Quality-Core Measures   Reviewed items::  Labs reviewed and Medications reviewed

## 2018-08-26 NOTE — PROGRESS NOTES
[facility]    Date of Service: 2018    Chief Complaint  58 y.o. male admitted 2018 with UTI and GI bleed    Interval Problem Update  No issues overnight  Abdominal pain is subsided  Afebrile  Still somewhat hyperglycemic    Consultants/Specialty  Gastroenterology    Disposition  To be determined        Review of Systems   Constitutional: Negative for chills, fever and malaise/fatigue.   HENT: Negative for hearing loss and sore throat.    Eyes: Negative for blurred vision and double vision.   Respiratory: Negative for cough and shortness of breath.    Cardiovascular: Negative for chest pain and leg swelling.   Gastrointestinal: Negative for abdominal pain, nausea and vomiting.   Genitourinary: Negative for dysuria and frequency.   Musculoskeletal: Negative for back pain and myalgias.   Skin: Negative for itching and rash.   Neurological: Positive for weakness. Negative for dizziness and headaches.   Psychiatric/Behavioral: Negative for depression. The patient is not nervous/anxious.       Physical Exam  Laboratory/Imaging   Hemodynamics  Temp (24hrs), Av.8 °C (98.3 °F), Min:36.4 °C (97.6 °F), Max:37.2 °C (98.9 °F)   Temperature: 37 °C (98.6 °F)  Pulse  Av.1  Min: 55  Max: 100    Blood Pressure: 132/68      Respiratory      Respiration: 15, Pulse Oximetry: 94 %        RUL Breath Sounds: Diminished, RML Breath Sounds: Diminished, RLL Breath Sounds: Diminished, LEONARD Breath Sounds: Diminished, LLL Breath Sounds: Diminished    Fluids    Intake/Output Summary (Last 24 hours) at 18 1123  Last data filed at 18 0925   Gross per 24 hour   Intake              240 ml   Output             1700 ml   Net            -1460 ml       Nutrition  Orders Placed This Encounter   Procedures   • Diet Order Diabetic (NPO until after CT scan. Can eat after CT scan. ), Regular     Standing Status:   Standing     Number of Occurrences:   1     Order Specific Question:   Diet:     Answer:    Diabetic [3]     Comments:   NPO until after CT scan. Can eat after CT scan.      Order Specific Question:   Diet:     Answer:   Regular [1]     Physical Exam   Constitutional: He is oriented to person, place, and time. He appears well-developed and well-nourished. He appears cachectic.   HENT:   Head: Normocephalic and atraumatic.   Mouth/Throat: No oropharyngeal exudate.   Poor dentition   Eyes: Pupils are equal, round, and reactive to light. EOM are normal. No scleral icterus.   Neck: Normal range of motion. Neck supple. No JVD present.   Cardiovascular: Normal rate and regular rhythm.    No murmur heard.  Pulmonary/Chest: Effort normal and breath sounds normal. No respiratory distress.   Abdominal: Soft. Bowel sounds are normal. He exhibits no distension.   Musculoskeletal: Normal range of motion. He exhibits no edema.   Neurological: He is alert and oriented to person, place, and time.   Skin: Skin is warm and dry.   Psychiatric: He has a normal mood and affect. His behavior is normal.   Nursing note and vitals reviewed.      Recent Labs      08/23/18   1748  08/24/18   1044   08/25/18   0154   08/25/18   1135  08/25/18   1824  08/26/18   0219   WBC  16.8*  19.9*   --   17.6*   --    --    --    --    RBC  4.71  4.04*   --   3.78*   --    --    --    --    HEMOGLOBIN  11.4*  9.9*   < >  9.3*   < >  9.9*  10.3*  8.8*   HEMATOCRIT  37.5*  31.6*   --   29.1*   --   32.0*   --    --    MCV  79.6*  78.2*   --   77.0*   --    --    --    --    MCH  24.2*  24.5*   --   24.6*   --    --    --    --    MCHC  30.4*  31.3*   --   32.0*   --    --    --    --    RDW  56.8*  53.1*   --   52.3*   --    --    --    --    PLATELETCT  464*  408   --   410   --    --    --    --    MPV  9.8  9.8   --   9.8   --    --    --    --     < > = values in this interval not displayed.     Recent Labs      08/24/18   1044  08/25/18   0154  08/26/18   0219   SODIUM  140  138  137   POTASSIUM  3.4*  3.4*  3.6   CHLORIDE  106  105  106    CO2  18*  24  25   GLUCOSE  90  207*  260*   BUN  19  13  11   CREATININE  1.03  0.81  0.80   CALCIUM  7.4*  7.4*  8.2*     Recent Labs      08/24/18   0254   APTT  27.7   INR  1.05                  Assessment/Plan     Upper GI bleed- (present on admission)   Assessment & Plan    Continue with IV fluids  Transfuse 1 unit of packed red blood cells 8/24/2018  Transfuse if hemoglobin drops to below 7 or if he becomes hemodynamically unstable  Gastroenterology did an EGD today which showed esophagitis  PPI twice daily  We will need repeat EGD in the near future according to GI recommendations        Choledocholithiasis- (present on admission)   Assessment & Plan    GI following  Monitor complete metabolic panel  MRCP reviewed and CT abdomen with and without contrast has been recommended and ordered        Sepsis (HCC)- (present on admission)   Assessment & Plan    This is sepsis (without associated acute organ dysfunction).   Possible source is UTI; may also be intra-abdominal  Patient has been started on IV fluids per septic protocol  Lactate is within normal limits  Continue IV Zosyn therapy  Urine shows ESBL  Blood cultures negative thus far          UTI (urinary tract infection)- (present on admission)   Assessment & Plan    Urine culture reveals Klebsiella pneumoniae ESBL sensitive to Zosyn  Continue on IV Zosyn        Tobacco abuse- (present on admission)   Assessment & Plan    Counseled already  Nicotine patch          Hyperlipidemia- (present on admission)   Assessment & Plan    Continue atorvastatin        Essential hypertension- (present on admission)   Assessment & Plan    Continue lisinopril        Uncontrolled type 2 diabetes mellitus with hyperosmolarity without coma (HCC)- (present on admission)   Assessment & Plan    Poorly controlled  Hemoglobin A1c is 9.6  Continue Januvia and sliding scale insulin and glimepiride  Improving            Quality-Core Measures   Reviewed items::  EKG reviewed, Labs  reviewed, Medications reviewed and Radiology images reviewed

## 2018-08-27 LAB
ALBUMIN SERPL BCP-MCNC: 2.7 G/DL (ref 3.2–4.9)
ALBUMIN/GLOB SERPL: 1.2 G/DL
ALP SERPL-CCNC: 85 U/L (ref 30–99)
ALT SERPL-CCNC: 5 U/L (ref 2–50)
ANION GAP SERPL CALC-SCNC: 8 MMOL/L (ref 0–11.9)
AST SERPL-CCNC: 7 U/L (ref 12–45)
BASOPHILS # BLD AUTO: 0.6 % (ref 0–1.8)
BASOPHILS # BLD: 0.08 K/UL (ref 0–0.12)
BILIRUB SERPL-MCNC: 0.2 MG/DL (ref 0.1–1.5)
BUN SERPL-MCNC: 11 MG/DL (ref 8–22)
CALCIUM SERPL-MCNC: 8.5 MG/DL (ref 8.5–10.5)
CHLORIDE SERPL-SCNC: 105 MMOL/L (ref 96–112)
CO2 SERPL-SCNC: 24 MMOL/L (ref 20–33)
CREAT SERPL-MCNC: 0.75 MG/DL (ref 0.5–1.4)
EOSINOPHIL # BLD AUTO: 0.22 K/UL (ref 0–0.51)
EOSINOPHIL NFR BLD: 1.6 % (ref 0–6.9)
ERYTHROCYTE [DISTWIDTH] IN BLOOD BY AUTOMATED COUNT: 53.1 FL (ref 35.9–50)
GLOBULIN SER CALC-MCNC: 2.2 G/DL (ref 1.9–3.5)
GLUCOSE BLD-MCNC: 122 MG/DL (ref 65–99)
GLUCOSE BLD-MCNC: 226 MG/DL (ref 65–99)
GLUCOSE BLD-MCNC: 266 MG/DL (ref 65–99)
GLUCOSE BLD-MCNC: 267 MG/DL (ref 65–99)
GLUCOSE SERPL-MCNC: 183 MG/DL (ref 65–99)
HCT VFR BLD AUTO: 29 % (ref 42–52)
HGB BLD-MCNC: 9.3 G/DL (ref 14–18)
IMM GRANULOCYTES # BLD AUTO: 0.12 K/UL (ref 0–0.11)
IMM GRANULOCYTES NFR BLD AUTO: 0.9 % (ref 0–0.9)
LYMPHOCYTES # BLD AUTO: 3.72 K/UL (ref 1–4.8)
LYMPHOCYTES NFR BLD: 27.8 % (ref 22–41)
MCH RBC QN AUTO: 24.7 PG (ref 27–33)
MCHC RBC AUTO-ENTMCNC: 32.1 G/DL (ref 33.7–35.3)
MCV RBC AUTO: 76.9 FL (ref 81.4–97.8)
MONOCYTES # BLD AUTO: 1.23 K/UL (ref 0–0.85)
MONOCYTES NFR BLD AUTO: 9.2 % (ref 0–13.4)
NEUTROPHILS # BLD AUTO: 7.99 K/UL (ref 1.82–7.42)
NEUTROPHILS NFR BLD: 59.9 % (ref 44–72)
NRBC # BLD AUTO: 0 K/UL
NRBC BLD-RTO: 0 /100 WBC
PLATELET # BLD AUTO: 387 K/UL (ref 164–446)
PMV BLD AUTO: 9.6 FL (ref 9–12.9)
POTASSIUM SERPL-SCNC: 3.7 MMOL/L (ref 3.6–5.5)
PROT SERPL-MCNC: 4.9 G/DL (ref 6–8.2)
RBC # BLD AUTO: 3.77 M/UL (ref 4.7–6.1)
SODIUM SERPL-SCNC: 137 MMOL/L (ref 135–145)
WBC # BLD AUTO: 13.4 K/UL (ref 4.8–10.8)

## 2018-08-27 PROCEDURE — 80053 COMPREHEN METABOLIC PANEL: CPT

## 2018-08-27 PROCEDURE — 700102 HCHG RX REV CODE 250 W/ 637 OVERRIDE(OP): Performed by: INTERNAL MEDICINE

## 2018-08-27 PROCEDURE — 82962 GLUCOSE BLOOD TEST: CPT | Mod: 91

## 2018-08-27 PROCEDURE — A9270 NON-COVERED ITEM OR SERVICE: HCPCS | Performed by: INTERNAL MEDICINE

## 2018-08-27 PROCEDURE — 99255 IP/OBS CONSLTJ NEW/EST HI 80: CPT | Performed by: INTERNAL MEDICINE

## 2018-08-27 PROCEDURE — 700105 HCHG RX REV CODE 258: Performed by: INTERNAL MEDICINE

## 2018-08-27 PROCEDURE — 700102 HCHG RX REV CODE 250 W/ 637 OVERRIDE(OP): Performed by: HOSPITALIST

## 2018-08-27 PROCEDURE — 99232 SBSQ HOSP IP/OBS MODERATE 35: CPT | Performed by: HOSPITALIST

## 2018-08-27 PROCEDURE — A9270 NON-COVERED ITEM OR SERVICE: HCPCS | Performed by: HOSPITALIST

## 2018-08-27 PROCEDURE — 700111 HCHG RX REV CODE 636 W/ 250 OVERRIDE (IP): Performed by: INTERNAL MEDICINE

## 2018-08-27 PROCEDURE — 700105 HCHG RX REV CODE 258: Performed by: HOSPITALIST

## 2018-08-27 PROCEDURE — 770021 HCHG ROOM/CARE - ISO PRIVATE

## 2018-08-27 PROCEDURE — 85025 COMPLETE CBC W/AUTO DIFF WBC: CPT

## 2018-08-27 PROCEDURE — 36415 COLL VENOUS BLD VENIPUNCTURE: CPT

## 2018-08-27 RX ADMIN — SITAGLIPTIN 100 MG: 100 TABLET, FILM COATED ORAL at 05:17

## 2018-08-27 RX ADMIN — OMEPRAZOLE 20 MG: 20 CAPSULE, DELAYED RELEASE ORAL at 17:56

## 2018-08-27 RX ADMIN — MEROPENEM 500 MG: 500 INJECTION, POWDER, FOR SOLUTION INTRAVENOUS at 15:40

## 2018-08-27 RX ADMIN — SODIUM CHLORIDE, POTASSIUM CHLORIDE, SODIUM LACTATE AND CALCIUM CHLORIDE: 600; 310; 30; 20 INJECTION, SOLUTION INTRAVENOUS at 06:41

## 2018-08-27 RX ADMIN — LISINOPRIL 10 MG: 5 TABLET ORAL at 05:18

## 2018-08-27 RX ADMIN — INSULIN HUMAN 7 UNITS: 100 INJECTION, SOLUTION PARENTERAL at 17:59

## 2018-08-27 RX ADMIN — BUPROPION HYDROCHLORIDE 300 MG: 150 TABLET, EXTENDED RELEASE ORAL at 05:17

## 2018-08-27 RX ADMIN — SODIUM CHLORIDE, POTASSIUM CHLORIDE, SODIUM LACTATE AND CALCIUM CHLORIDE: 600; 310; 30; 20 INJECTION, SOLUTION INTRAVENOUS at 17:57

## 2018-08-27 RX ADMIN — MEROPENEM 500 MG: 500 INJECTION, POWDER, FOR SOLUTION INTRAVENOUS at 20:30

## 2018-08-27 RX ADMIN — ATORVASTATIN CALCIUM 10 MG: 10 TABLET, FILM COATED ORAL at 05:17

## 2018-08-27 RX ADMIN — GLIMEPIRIDE 2 MG: 2 TABLET ORAL at 06:41

## 2018-08-27 RX ADMIN — MEMANTINE HYDROCHLORIDE 5 MG: 10 TABLET ORAL at 05:17

## 2018-08-27 RX ADMIN — OMEPRAZOLE 20 MG: 20 CAPSULE, DELAYED RELEASE ORAL at 05:18

## 2018-08-27 RX ADMIN — GLIMEPIRIDE 2 MG: 2 TABLET ORAL at 17:56

## 2018-08-27 RX ADMIN — INSULIN HUMAN 4 UNITS: 100 INJECTION, SOLUTION PARENTERAL at 12:34

## 2018-08-27 RX ADMIN — PIPERACILLIN AND TAZOBACTAM 3.38 G: 3; .375 INJECTION, POWDER, LYOPHILIZED, FOR SOLUTION INTRAVENOUS; PARENTERAL at 05:17

## 2018-08-27 RX ADMIN — INSULIN HUMAN 7 UNITS: 100 INJECTION, SOLUTION PARENTERAL at 20:42

## 2018-08-27 ASSESSMENT — ENCOUNTER SYMPTOMS
HEADACHES: 0
WEAKNESS: 0
DEPRESSION: 0
RESPIRATORY NEGATIVE: 1
COUGH: 0
BLURRED VISION: 0
PSYCHIATRIC NEGATIVE: 1
DOUBLE VISION: 0
ABDOMINAL PAIN: 0
DIARRHEA: 0
CARDIOVASCULAR NEGATIVE: 1
GASTROINTESTINAL NEGATIVE: 1
SORE THROAT: 0
SHORTNESS OF BREATH: 0
VOMITING: 0
PALPITATIONS: 0
MYALGIAS: 0
NAUSEA: 0
BACK PAIN: 0
DIZZINESS: 0
NERVOUS/ANXIOUS: 0
CHILLS: 0
CONSTITUTIONAL NEGATIVE: 1
FEVER: 0

## 2018-08-27 ASSESSMENT — PAIN SCALES - GENERAL
PAINLEVEL_OUTOF10: 0

## 2018-08-27 NOTE — DISCHARGE PLANNING
LSW received phone call from Ginger ([phone]) at [facility] regarding patient's discharge plan. LSW called back and left a message for her to call back. Patient needs to see [facility] prior to discharge for assessment.

## 2018-08-27 NOTE — PROGRESS NOTES
Assumed care of pt.  Bedside report received and whiteboard updated. Discussed plan of care for the day and answered questions.  Chart and MAR reviewed.  Call light and personal belongings are within reach.  Bed in low position and locked. Pt has no needs at this time.    Dentures: top in place  Glasses: in place  Hearing aides: denies

## 2018-08-27 NOTE — DISCHARGE PLANNING
Patient spoke with Ginger at [employer] [phone]. Ginger reports she will be coming by tomorrow morning to complete assessment and that she will fax over an assessment to be completed by physician before hand. LSW reported she would inform physician of paperwork required by [facility].

## 2018-08-27 NOTE — CARE PLAN
Problem: Safety  Goal: Will remain free from injury  Outcome: PROGRESSING AS EXPECTED  Bed locked and lowest position. Treaded socks on. Call light and belongings within reach. Pt educated to call for assistance. Pt verbalized understanding. Hourly rounding in place.     Problem: Knowledge Deficit  Goal: Knowledge of disease process/condition, treatment plan, diagnostic tests, and medications will improve  Outcome: PROGRESSING AS EXPECTED  Discussed POC and medications with pt, pt verbalized understanding.

## 2018-08-27 NOTE — PROGRESS NOTES
[facility] Hospitalist Progress Note    Date of Service: 2018    Chief Complaint  58 y.o. male admitted 2018 with UTI and GI bleed    Interval Problem Update  Feels better  Abdominal pain resolved  No episodes of GI bleeding  Dill in place  Afebrile    Consultants/Specialty  Gastroenterology  Infectious disease    Disposition  To be determined depending on antibiotics        Review of Systems   Constitutional: Negative for chills, fever and malaise/fatigue.   HENT: Negative for hearing loss and sore throat.    Eyes: Negative for blurred vision and double vision.   Respiratory: Negative for cough and shortness of breath.    Cardiovascular: Negative for chest pain and palpitations.   Gastrointestinal: Negative for abdominal pain, diarrhea, nausea and vomiting.   Genitourinary: Negative for dysuria, frequency and urgency.   Musculoskeletal: Negative for back pain and myalgias.   Skin: Negative for itching and rash.   Neurological: Negative for dizziness, weakness and headaches.   Psychiatric/Behavioral: Negative for depression. The patient is not nervous/anxious.       Physical Exam  Laboratory/Imaging   Hemodynamics  Temp (24hrs), Av.9 °C (98.4 °F), Min:36.8 °C (98.3 °F), Max:37.1 °C (98.7 °F)   Temperature: 36.8 °C (98.3 °F)  Pulse  Av.4  Min: 55  Max: 100    Blood Pressure: 157/78      Respiratory      Respiration: 16, Pulse Oximetry: 93 %        RUL Breath Sounds: Clear, RML Breath Sounds: Diminished, RLL Breath Sounds: Diminished, LEONARD Breath Sounds: Clear, LLL Breath Sounds: Diminished    Fluids    Intake/Output Summary (Last 24 hours) at 18 1242  Last data filed at 18 0830   Gross per 24 hour   Intake              120 ml   Output                0 ml   Net              120 ml       Nutrition  Orders Placed This Encounter   Procedures   • Diet Order Diabetic     Standing Status:   Standing     Number of Occurrences:   1     Order Specific Question:   Diet:     Answer:   Diabetic [3]      Physical Exam   Constitutional: He is oriented to person, place, and time. He appears cachectic. No distress.   HENT:   Head: Normocephalic and atraumatic.   Mouth/Throat: No oropharyngeal exudate.   Eyes: Pupils are equal, round, and reactive to light. EOM are normal.   Neck: Normal range of motion. Neck supple. No JVD present.   Cardiovascular: Normal rate and regular rhythm.    No murmur heard.  Pulmonary/Chest: Effort normal and breath sounds normal. No respiratory distress.   Abdominal: Soft. Bowel sounds are normal. He exhibits no distension.   Musculoskeletal: He exhibits no edema or tenderness.   Neurological: He is alert and oriented to person, place, and time.   Skin: Skin is warm and dry. He is not diaphoretic.   Psychiatric: He has a normal mood and affect. His behavior is normal.   Nursing note and vitals reviewed.      Recent Labs      08/25/18   0154   08/25/18   1135  08/25/18   1824  08/26/18 0219 08/27/18 0221   WBC  17.6*   --    --    --    --   13.4*   RBC  3.78*   --    --    --    --   3.77*   HEMOGLOBIN  9.3*   < >  9.9*  10.3*  8.8*  9.3*   HEMATOCRIT  29.1*   --   32.0*   --    --   29.0*   MCV  77.0*   --    --    --    --   76.9*   MCH  24.6*   --    --    --    --   24.7*   MCHC  32.0*   --    --    --    --   32.1*   RDW  52.3*   --    --    --    --   53.1*   PLATELETCT  410   --    --    --    --   387   MPV  9.8   --    --    --    --   9.6    < > = values in this interval not displayed.     Recent Labs      08/25/18   0154  08/26/18 0219 08/27/18 0221   SODIUM  138  137  137   POTASSIUM  3.4*  3.6  3.7   CHLORIDE  105  106  105   CO2  24  25  24   GLUCOSE  207*  260*  183*   BUN  13  11  11   CREATININE  0.81  0.80  0.75   CALCIUM  7.4*  8.2*  8.5                      Assessment/Plan     Upper GI bleed- (present on admission)   Assessment & Plan    Continue with IV fluids  Transfuse 1 unit of packed red blood cells 8/24/2018  Transfuse if hemoglobin drops to below 7 or  if he becomes hemodynamically unstable  Gastroenterology did an EGD today which showed grade 4 esophagitis  PPI twice daily for 8 weeks then daily  Outpatient GI follow-up        Choledocholithiasis- (present on admission)   Assessment & Plan    GI following  Monitor complete metabolic panel  MRCP and CT abdomen has been reviewed  PPI twice daily for 8 weeks then daily  Outpatient GI follow-up for possible colonoscopy  Alcohol cessation        Sepsis (HCC)- (present on admission)   Assessment & Plan    This is sepsis (without associated acute organ dysfunction).   Possible source is UTI; may also be intra-abdominal  Continue IV fluids  Lactate is within normal limits  Urine shows ESBL  Infectious disease consulted  Zosyn changed to meropenem          UTI (urinary tract infection)- (present on admission)   Assessment & Plan    Urine culture reveals Klebsiella pneumoniae ESBL   Meropenem        Tobacco abuse- (present on admission)   Assessment & Plan    Counseled already  Nicotine patch          Hyperlipidemia- (present on admission)   Assessment & Plan    Continue atorvastatin        Essential hypertension- (present on admission)   Assessment & Plan    Continue lisinopril        Uncontrolled type 2 diabetes mellitus with hyperosmolarity without coma (HCC)- (present on admission)   Assessment & Plan    Poorly controlled  Hemoglobin A1c is 9.6  Continue Januvia and sliding scale insulin and glimepiride  Improving            Quality-Core Measures   Reviewed items::  Labs reviewed, Medications reviewed and Radiology images reviewed  Dill catheter::  Urinary Tract Retention or Urinary Tract Obstruction

## 2018-08-27 NOTE — CONSULTS
INFECTIOUS DISEASES INPATIENT CONSULT NOTE     Date of Service: 8/27/2018    Consult Requested By: Katie Morton M.D.    Reason for Consultation: ESBL Klebsiella urinary tract infection    History of Present Illness:   Pawel Tatum is a 58 y.o. man with a history of type 2 diabetes mellitus, chronic pancreatitis, hypertension, recurrent UTIs and history of traumatic brain injury admitted 8/23/2018 for abdominal pain and malaise.  Patient is somewhat of a poor historian so history is also obtained from review of the medical records.  He does not recall the specific symptoms as to why he presented to the hospital however he states that he had been feeling unwell with malaise for approximately 1 week prior to admission.  He initially saw his primary care physician who had recently diagnosed him with a urinary tract infection with ESBL Klebsiella and he was advised to go to the emergency room for IV antibiotics.  In the meantime, patient was also complaining of nausea and vomiting in addition to hematemesis but denies any hematochezia or diarrhea.  Patient denies any associated fevers, or chills.  No back or flank pain.  Of note, patient states he has had a Dill catheter in place for approximately 3 weeks.  He states the Dill was placed when he was transferred to a skilled facility.  Given persistent symptoms, he presented to the ED for further evaluation and management.  On presentation, he was afebrile with a leukocytosis of 16.8.  Urinalysis revealed packed WBCs, large leukocyte esterase and many bacteria.  Recent urine culture on 8/20 was also positive for ESBL Klebsiella.  He was found to have an upper GI bleed and underwent an EGD on 8/25 that revealed severe grade D reflux esophagitis.  He was received blood transfusions and his hemoglobin is now stable.  Blood cultures on admission are currently negative to date.  He is currently on Zosyn.  His Dill was removed today but he has not voided since the  removal.  Infectious disease service consulted for further antibiotic recommendations and management.    Review Of Systems:  All other ROS were reviewed and are negative except as noted above in the HPI.    PMH:   Type 2 diabetes mellitus  Chronic pancreatitis  Hypertension  History of traumatic brain injury  Recurrent UTIs    PSH:  Splenectomy    FAMILY HX:  None per patient    SOCIAL HX:  Social History     Social History   • Marital status: Single     Spouse name: N/A   • Number of children: N/A   • Years of education: N/A     Occupational History   • Not on file.     Social History Main Topics   • Smoking status: Current Every Day Smoker     Packs/day: 0.25     Years: 15.00   • Smokeless tobacco: Never Used      Comment: Trying to quit   • Alcohol use No   • Drug use: No   • Sexual activity: Not Currently     Other Topics Concern   • Not on file     Social History Narrative   • No narrative on file     History   Smoking Status   • Current Every Day Smoker   • Packs/day: 0.25   • Years: 15.00   Smokeless Tobacco   • Never Used     Comment: Trying to quit     History   Alcohol Use No       Allergies/Intolerances:  No Known Allergies    History reviewed with the patient     Other Current Medications:    Current Facility-Administered Medications:   •  insulin regular (HUMULIN R) injection 3-14 Units, 3-14 Units, Subcutaneous, 4X/DAY ACHS, Stopped at 08/27/18 0700 **AND** Accu-Chek ACHS, , , Q AC AND BEDTIME(S) **AND** NOTIFY MD and PharmD, , , Once **AND** glucose 4 g chewable tablet 16 g, 16 g, Oral, Q15 MIN PRN **AND** dextrose 50% (D50W) injection 25 mL, 25 mL, Intravenous, Q15 MIN PRN, Katie Morton M.D.  •  glimepiride (AMARYL) tablet 2 mg, 2 mg, Oral, BID WITH MEALS, Katie Morton M.D., 2 mg at 08/27/18 0641  •  omeprazole (PRILOSEC) capsule 20 mg, 20 mg, Oral, BID, Jaswant Frye M.D., 20 mg at 08/27/18 0518  •  NS infusion 1,680 mL, 30 mL/kg, Intravenous, Once PRN, Allen Cool M.D.  •  Respiratory  Care per Protocol, , Nebulization, Continuous RT, Allen Cool M.D.  •  LR infusion, , Intravenous, Continuous, Katie Morton M.D., Last Rate: 100 mL/hr at 08/27/18 0641  •  NS (BOLUS) infusion 1,000 mL, 1,000 mL, Intravenous, Once PRN, Allen Cool M.D.  •  acetaminophen (TYLENOL) tablet 650 mg, 650 mg, Oral, Q6HRS PRN, Allen Cool M.D.  •  ondansetron (ZOFRAN) syringe/vial injection 4 mg, 4 mg, Intravenous, Q4HRS PRN, Allen Cool M.D.  •  ondansetron (ZOFRAN ODT) dispertab 4 mg, 4 mg, Oral, Q4HRS PRN, Allen Cool M.D.  •  promethazine (PHENERGAN) tablet 12.5-25 mg, 12.5-25 mg, Oral, Q4HRS PRN, Allen Cool M.D.  •  promethazine (PHENERGAN) suppository 12.5-25 mg, 12.5-25 mg, Rectal, Q4HRS PRN, Allen Cool M.D.  •  prochlorperazine (COMPAZINE) injection 5-10 mg, 5-10 mg, Intravenous, Q4HRS PRN, Allen Cool M.D.  •  nicotine (NICODERM) 14 MG/24HR 14 mg, 14 mg, Transdermal, Daily-0600 **AND** Nicotine Replacement Patient Education Materials, , , Once **AND** nicotine polacrilex (NICORETTE) 2 MG piece 2 mg, 2 mg, Oral, Q HOUR PRN, Allen Cool M.D.  •  [COMPLETED] piperacillin-tazobactam (ZOSYN) 3.375 g in  mL IVPB, 3.375 g, Intravenous, Once, Stopped at 08/24/18 0350 **AND** piperacillin-tazobactam (ZOSYN) 3.375 g in  mL IVPB, 3.375 g, Intravenous, Q8HRS, Allen Cool M.D., Stopped at 08/27/18 0917  •  atorvastatin (LIPITOR) tablet 10 mg, 10 mg, Oral, DAILY, Allen Cool M.D., 10 mg at 08/27/18 0517  •  buPROPion SR (WELLBUTRIN-SR) tablet 300 mg, 300 mg, Oral, DAILY, Allen Cool M.D., 300 mg at 08/27/18 0517  •  SITagliptin (JANUVIA) tablet 100 mg, 100 mg, Oral, DAILY, Allen Cool M.D., 100 mg at 08/27/18 0517  •  memantine (NAMENDA) tablet 5 mg, 5 mg, Oral, DAILY, Allen Cool M.D., 5 mg at 08/27/18 0517  •  lisinopril (PRINIVIL) 10 MG tablet 10 mg, 10 mg, Oral, Q DAY, Allen Cool M.D., 10 mg at 08/27/18 0518  •  [DISCONTINUED] insulin regular (HUMULIN R) injection 1-6 Units, 1-6 Units,  "Subcutaneous, Q6HRS, 1 Units at 18 0612 **AND** Accu-Chek Q6 if NPO, , , Q6H **AND** NOTIFY MD and PharmD, , , Once **AND** glucose 4 g chewable tablet 16 g, 16 g, Oral, Q15 MIN PRN **AND** dextrose 50% (D50W) injection 25 mL, 25 mL, Intravenous, Q15 MIN PRN, Allen Cool M.D.  [unfilled]    Most Recent Vital Signs:  /78   Pulse 63   Temp 36.8 °C (98.3 °F)   Resp 16   Ht 1.88 m (6' 2\") Comment: Per 's license in chart  Wt 56.5 kg (124 lb 9 oz)   SpO2 93%   BMI 15.99 kg/m²   Temp  Av.9 °C (98.4 °F)  Min: 36.4 °C (97.5 °F)  Max: 37.5 °C (99.5 °F)    Physical Exam:  General: Older than stated age, disheveled -appearing, no acute distress  HEENT: sclera anicteric, PERRL, EOMI, MMM, no oral lesions  Neck: supple, no lymphadenopathy  Chest: CTAB, no r/r/w, normal work of breathing.  Cardiac: RRR, normal S1 S2, no m/r/g   Abdomen: + bowel sounds, soft, non-tender, non-distended, no HSM  Extremities: WWP, no edema, 2+ pulses  Skin: no rashes or worrisome lesions  Neuro: Alert and oriented times 3, non-focal exam, speech fluent, moves all extremities    Pertinent Lab Results:  Recent Labs      18   0154  18   0221   WBC  17.6*  13.4*      Recent Labs      18   0154   18   1135  18   1824  18   0219  18   0221   HEMOGLOBIN  9.3*   < >  9.9*  10.3*  8.8*  9.3*   HEMATOCRIT  29.1*   --   32.0*   --    --   29.0*   MCV  77.0*   --    --    --    --   76.9*   MCH  24.6*   --    --    --    --   24.7*   PLATELETCT  410   --    --    --    --   387    < > = values in this interval not displayed.         Recent Labs      18   0154  18   SODIUM  138  137  137   POTASSIUM  3.4*  3.6  3.7   CHLORIDE  105  106  105   CO2  24  25  24   CREATININE  0.81  0.80  0.75        Recent Labs      18   ALBUMIN  2.7*  2.7*        Pertinent Micro:  Results     Procedure Component Value Units Date/Time    " URINE CULTURE(NEW) [[id number]]  (Abnormal)  (Susceptibility) Collected:  08/23/18 0055    Order Status:  Completed Specimen:  Urine Updated:  08/26/18 1156     Significant Indicator POS (POS)     Source UR     Site --     Urine Culture -- (A)      Klebsiella pneumoniae ESBL  >100,000 cfu/mL  Extended Spectrum Beta-lactamase (ESBL) isolated.  ESBL's may be clinically resistant to therapy with  Penicillins,Cephalosporins or Aztreonam despite  apparent in vitro susceptibility to some of these agents.  The patient requires contact isolation.  Please contact pharmacy or an Infectious Disease Specialist  if you have any questions about appropriate therapy.   (A)    Narrative:       CALL  [facility]  [address] tel. [phone],    Culture & Susceptibility     KLEBSIELLA PNEUMONIAE ESBL     Antibiotic Sensitivity Microscan Unit Status    Ampicillin Resistant >16 mcg/mL Final    Method: SENSITIVITY, SERGO    Cefepime Resistant <=8 mcg/mL Final    Method: SENSITIVITY, SERGO    Cefotaxime Extended Spectrum Beta Lactamase 16 mcg/mL Final    Method: SENSITIVITY, SERGO    Cefotetan Sensitive <=16 mcg/mL Final    Method: SENSITIVITY, SERGO    Ceftazidime Resistant <=1 mcg/mL Final    Method: SENSITIVITY, SERGO    Ceftriaxone Extended Spectrum Beta Lactamase 32 mcg/mL Final    Method: SENSITIVITY, SERGO    Cefuroxime Resistant 8 mcg/mL Final    Method: SENSITIVITY, SERGO    Cephalothin Resistant >16 mcg/mL Final    Method: SENSITIVITY, SERGO    Ciprofloxacin Intermediate 2 mcg/mL Final    Method: SENSITIVITY, SERGO    Gentamicin Resistant >8 mcg/mL Final    Method: SENSITIVITY, SERGO    Levofloxacin Sensitive <=2 mcg/mL Final    Method: SENSITIVITY, SERGO    Nitrofurantoin Intermediate 64 mcg/mL Final    Method: SENSITIVITY, SERGO    Pip/Tazobactam Sensitive <=16 mcg/mL Final    Method: SENSITIVITY, SERGO    Piperacillin Resistant >64 mcg/mL Final    Method: SENSITIVITY, SERGO    Tigecycline Sensitive <=2 mcg/mL Final    Method: SENSITIVITY, SERGO    Tobramycin Resistant >8  "mcg/mL Final    Method: SENSITIVITY, SERGO    Trimeth/Sulfa Resistant >2/38 mcg/mL Final    Method: SENSITIVITY, SERGO                       BLOOD CULTURE x2 [[id number]] Collected:  08/23/18 2121    Order Status:  Completed Specimen:  Blood from Peripheral Updated:  08/25/18 0852     Significant Indicator NEG     Source BLD     Site PERIPHERAL     Blood Culture No Growth    Note: Blood cultures are incubated for 5 days and  are monitored continuously.Positive blood cultures  are called to the RN and reported as soon as  they are identified.      Narrative:       Contact  Per Hospital Policy: Only change Specimen Src: to \"Line\" if  specified by physician order.    BLOOD CULTURE x2 [[id number]] Collected:  08/23/18 2101    Order Status:  Completed Specimen:  Blood from Peripheral Updated:  08/25/18 0852     Significant Indicator NEG     Source BLD     Site PERIPHERAL     Blood Culture No Growth    Note: Blood cultures are incubated for 5 days and  are monitored continuously.Positive blood cultures  are called to the RN and reported as soon as  they are identified.      Narrative:       Contact  Per Hospital Policy: Only change Specimen Src: to \"Line\" if  specified by physician order.    URINALYSIS,CULTURE IF INDICATED [[id number]]  (Abnormal) Collected:  08/24/18 0055    Order Status:  Completed Specimen:  Urine Updated:  08/24/18 0102     Color Yellow     Character Turbid (A)     Specific Gravity 1.017     Ph 6.0     Glucose Negative mg/dL      Ketones 15 (A) mg/dL      Protein 100 (A) mg/dL      Bilirubin Negative     Urobilinogen, Urine 0.2     Nitrite Negative     Leukocyte Esterase Large (A)     Occult Blood Moderate (A)     Micro Urine Req Microscopic    Blood Culture [[id number]] Collected:  08/24/18 0000    Order Status:  Canceled Specimen:  Other from Peripheral     Blood Culture [[id number]] Collected:  08/24/18 0000    Order Status:  Canceled Specimen:  Other from Peripheral         Blood Culture   Date Value Ref " Range Status   08/23/2018   Preliminary    No Growth    Note: Blood cultures are incubated for 5 days and  are monitored continuously.Positive blood cultures  are called to the RN and reported as soon as  they are identified.          Studies:  Ct-pancreas And Abdomen With & W/o    Result Date: 8/26/2018 8/26/2018 5:40 PM HISTORY/REASON FOR EXAM:  Dilated pancreatic duct on MRI. Evaluate for mass.. TECHNIQUE/EXAM DESCRIPTION:  CT pancreas and abdomen without and with contrast. Initial precontrast thick-section images were obtained through the pancreas.  Following this, nonionic contrast was administered by IV, and thin-section helical scanning performed from the diaphragmatic domes through the iliac crests.  Pancreatic parenchymal phase and portal venous phase (dual-phase) images were obtained following contrast administration. 100 mL of Omnipaque 350 nonionic contrast was administered. Low dose optimization technique was utilized for this CT exam including automated exposure control and adjustment of the mA and/or kV according to patient size. COMPARISON: MRI 8/25/2018. FINDINGS: Liver: Liver is unremarkable Spleen: Surgically absent. Biliary system: Gallbladder and biliary tree are unremarkable.. Common bile duct is not dilated. Pancreas: Extensive calcification throughout the atrophic pancreas. Pancreatic duct: Significantly dilated main pancreatic duct, measuring 1.5 cm. Arterial vasculature: Moderate atherosclerotic disease of the abdominal aorta and its branches. Venous vasculature: The portal vein, superior mesenteric vein, and splenic vein are patent. Vascular encasement: None. Kidneys: Kidneys are unremarkable. Prominent extrarenal pelves. Adrenals: Adrenals are unremarkable. Lymph nodes: There are no enlarged nodes. Bowel: No bowel wall thickening or bowel dilatation. Moderate amount of stool throughout the colon. Trace amount fluid in the right lower quadrant. Pelvis: Not scanned. Lung bases: Moderate  bilateral pleural effusions with patchy bibasilar opacities. Bones: No aggressive osseous lesion.     Extensive calcification throughout the atrophic pancreas could relate to chronic pancreatitis. Dilated pancreatic duct but no pancreatic head mass identified. This could be secondary to chronic pancreatitis. If there is continued clinical concern, further evaluation with US is recommended. Moderate bilateral pleural effusions with patchy bibasilar opacities. Trace amount fluid in the right lower quadrant.     Az-[id number]-w/[id number]    Result Date: 8/26/2018 8/25/2018 1:01 PM HISTORY/REASON FOR EXAM:  Abdominal pain possible common duct stone.. TECHNIQUE/EXAM DESCRIPTION: Magnetic resonance cholangiopancreatography. Magnetic resonance cholangiopancreatography was performed on with axial, coronal, and sagittal thin and thick section heavily T2-weighted sequences. 3-D cholangiographic multiplanar maximum intensity projection (MIP) images were created on a workstation.. The study was performed on a [employer] Signa 1.5 Marnie MRI scanner. COMPARISON: Gallbladder ultrasound 8/23/2018 FINDINGS: The liver is mildly enlarged. There is no evidence for intrahepatic duct dilatation or hepatic mass lesion. The gallbladder has a normal size appearance and wall thickness without evidence of cholelithiasis. The spleen is absent. The pancreas is atrophic. There is marked chronic dilatation of the pancreatic duct to 1 cm. There is a small hypointense filling defect in the proximal pancreatic duct. The bowel gas pattern is unremarkable. The kidneys have a normal size and appearance.     1.  Mild hepatomegaly. 2.  Absent spleen. 3.  No evidence of cholelithiasis or choledocholithiasis. 4.  Common duct at the upper limits of normal. 5.  Chronic atrophic changes of the pancreas with marked dilatation of the pancreatic duct. Recommend pancreatic CT scan to exclude intraductal papilloma. 6.  Report called and an Emergent Document Only message has been  documented for MOE CRUZ in the [facility]  Critical Result system on 8/26/2018 10:38 AM, Message ID [id number].    Us-gallbladder    Result Date: 8/23/2018 8/23/2018 9:30 PM HISTORY/REASON FOR EXAM: Abdominal pain TECHNIQUE/EXAM DESCRIPTION AND NUMBER OF VIEWS:  Real-time sonography of the gallbladder. COMPARISON: None FINDINGS: There is no ascites. The liver is normal in contour. There is no evidence of solid mass lesion. The liver measures 15.37 cm. The liver is echogenic. The gallbladder is normal. There is no evidence of cholelithiasis. The gallbladder wall thickness measures 0.16 cm.  There is no pericholecystic fluid. The common duct measures 0.59 cm. Shadowing structure is seen measuring 4.0 mm that appears likely within the common bile duct. The pancreas is obscured by bowel gas. The visualized aorta is normal in caliber. Intrahepatic IVC is patent. The portal vein is patent with hepatopetal flow. The right kidney measures 10.55 cm. Punctate echogenic focus in the right kidney is seen.     1.  Suspected choledocholithiasis with borderline common bile duct dilatation suggesting possible mild obstruction. 2.  Echogenic liver compatible with fatty change versus fibrosis. 3.  Probable small nonobstructing right renal calculus       IMPRESSION:   1.  Complicated urinary tract infection   2.  ESBL Klebsiella infection  3.  Upper GI bleed secondary to severe erosive esophagitis  4.  Leukocytosis  5.  Type 2 diabetes mellitus    PLAN:   Pawel Tatum is a 58 y.o. man with a history of type 2 diabetes mellitus and recent Dill catheter placement admitted for abdominal pain and hematemesis.  Patient found to have an upper GI bleed secondary to severe erosive esophagitis seen on EGD.  He was also noted to have a urinary tract infection prior to admission with ESBL Klebsiella, which was not treated.  Repeat urine cultures during this admission are the same.  He is currently on Zosyn however we will  switch to meropenem.  Anticipate a 10 day course of meropenem.  Patient will need a midline.  Further recommendations per clinical course.      Plan of care discussed with TAMARA Morton M.D.. Will continue to follow    Fidelia Tabor M.D.

## 2018-08-27 NOTE — PROGRESS NOTES
Gastroenterology ([facility]IC) Progress Note     Author: Hemant SANTAMARIA Maura   Date & Time Created: 8/27/2018 8:20 AM    Chief Complaint: Reflux esophagitis    Interval History:  He feels well and denied abdominal pain, nausea or vomiting.  He is tolerating diabetic diet without problems.  He wants to go home.  Patient denied further modifying factors, associated symptoms or timing issues.    Review of Systems:  Review of Systems   Constitutional: Negative.    Respiratory: Negative.    Cardiovascular: Negative.    Gastrointestinal: Negative.  Negative for abdominal pain.   Genitourinary: Negative.    Psychiatric/Behavioral: Negative.    All other systems reviewed and are negative.      Physical Exam:  Physical Exam   Constitutional: He is oriented to person, place, and time. He appears well-developed and well-nourished. No distress.   HENT:   Head: Normocephalic and atraumatic.   Eyes: No scleral icterus.   Neck: Normal range of motion. Neck supple. No JVD present. No tracheal deviation present.   Cardiovascular: Normal rate, regular rhythm, normal heart sounds and intact distal pulses.    No murmur heard.  Pulmonary/Chest: Effort normal and breath sounds normal. No stridor.   Abdominal: Soft. Bowel sounds are normal. He exhibits no distension. There is no tenderness. There is no rebound.   Musculoskeletal: Normal range of motion. He exhibits no edema or tenderness.   Neurological: He is alert and oriented to person, place, and time. No cranial nerve deficit. Coordination normal.   Skin: Skin is warm and dry. No rash noted. He is not diaphoretic. No erythema.   Psychiatric: He has a normal mood and affect. His behavior is normal.   Nursing note and vitals reviewed.      Labs:        Invalid input(s): [id number]      Recent Labs      08/25/18   0154  08/26/18   0219  08/27/18   0221   SODIUM  138  137  137   POTASSIUM  3.4*  3.6  3.7   CHLORIDE  105  106  105   CO2  24  25  24   BUN  13  11  11   CREATININE  0.81  0.80  0.75    CALCIUM  7.4*  8.2*  8.5     Recent Labs      18   1044  18   0154  189  18   ALTSGPT  7   --   5  5   ASTSGOT  12   --   8*  7*   ALKPHOSPHAT  99   --   81  85   TBILIRUBIN  0.3   --   0.2  0.2   GLUCOSE  90  207*  260*  183*     Recent Labs      18   1044   18   0154   18   1135  18   1824  189  18   RBC  4.04*   --   3.78*   --    --    --    --   3.77*   HEMOGLOBIN  9.9*   < >  9.3*   < >  9.9*  10.3*  8.8*  9.3*   HEMATOCRIT  31.6*   --   29.1*   --   32.0*   --    --   29.0*   PLATELETCT  408   --   410   --    --    --    --   387    < > = values in this interval not displayed.     Recent Labs      18   1044  18   0154  18   WBC  19.9*  17.6*   --   13.4*   NEUTSPOLYS  76.30*  72.70*   --   59.90   LYMPHOCYTES  14.30*  16.90*   --   27.80   MONOCYTES  7.50  8.10   --   9.20   EOSINOPHILS  0.50  1.10   --   1.60   BASOPHILS  0.60  0.50   --   0.60   ASTSGOT  12   --   8*  7*   ALTSGPT  7   --   5  5   ALKPHOSPHAT  99   --   81  85   TBILIRUBIN  0.3   --   0.2  0.2     Hemodynamics:  Temp (24hrs), Av.9 °C (98.5 °F), Min:36.8 °C (98.3 °F), Max:37.1 °C (98.7 °F)  Temperature: 36.8 °C (98.3 °F)  Pulse  Av.6  Min: 55  Max: 100   Blood Pressure: 123/67     Respiratory:    Respiration: 16, Pulse Oximetry: 92 %        RUL Breath Sounds: Clear, RML Breath Sounds: Diminished, RLL Breath Sounds: Diminished, LEONARD Breath Sounds: Clear, LLL Breath Sounds: Diminished  Fluids:    Intake/Output Summary (Last 24 hours) at 18 0820  Last data filed at 18 0925   Gross per 24 hour   Intake              240 ml   Output                0 ml   Net              240 ml     Weight: 56.5 kg (124 lb 9 oz)  GI/Nutrition:  Orders Placed This Encounter   Procedures   • Diet Order Diabetic (NPO until after CT scan. Can eat after CT scan. ), Regular     Standing Status:   Standing     Number of  Occurrences:   1     Order Specific Question:   Diet:     Answer:   Diabetic [3]     Comments:   NPO until after CT scan. Can eat after CT scan.      Order Specific Question:   Diet:     Answer:   Regular [1]     Medical Decision Making, by Problem:  Active Hospital Problems    Diagnosis   • Upper GI bleed [K92.2]   • UTI (urinary tract infection) [N39.0]   • Sepsis (HCC) [A41.9]   • Choledocholithiasis [K80.50]   • Hyperlipidemia [E78.5]   • Tobacco abuse [Z72.0]   • Uncontrolled type 2 diabetes mellitus with hyperosmolarity without coma (HCC) [E11.00]   • Diabetic peripheral angiopathy (HCC) [E11.51]   • Essential hypertension [I10]     Problems:  1. Reflux esophagitis, grade D  2. Hiatal hernia  3. Alcohol-induced chronic pancreatitis, quit drinking  4. Iron deficiency anemia  5. Duodenal scalloping, biopsied to evaluate for celiac sprue  6. Hematemesis, no further episodes since 8/23  7. Epigastric/RUQ pain, resolved 8/23 likely from #1  7. Nausea & vomiting, resolved  8. Diabetes mellitus type 2  9. Hypertension  10. 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   11. Urinary retention with the recurrent urinary tract infection.   12. Hyperlipidemia.   13. History of kidney stones.   14. History of splenectomy    Recommendations:  1. Diabetic diet  2. Continued strict alcohol cessation  3. Pancreas protocol CT scan results reviewed and showed no evidence of pancreatic mass  4. Continue with omeprazole 20mg PO BID x 8 weeks  5. [facility] consultants signed off.  6. Follow-up with Dr. Jaswant Frye to consider outpatient colonoscopy to evaluate iron-deficiency anemia.  Patient stated that he has screening colonoscopy less than 10 year ago that was reportedly normal.     Quality-Core Measures   Reviewed items::  Labs reviewed and Medications reviewed

## 2018-08-27 NOTE — PROGRESS NOTES
Bedside report received. Pt A&Ox4. No complaints of pain or SOB. RA. VSS. POC discussed with pt. Pt verbalizes understanding. Call light and belongings within reach. Bed locked and in lowest position.

## 2018-08-28 ENCOUNTER — APPOINTMENT (OUTPATIENT)
Dept: RADIOLOGY | Facility: MEDICAL CENTER | Age: 58
DRG: 698 | End: 2018-08-28
Attending: INTERNAL MEDICINE
Payer: COMMERCIAL

## 2018-08-28 LAB
GLUCOSE BLD-MCNC: 302 MG/DL (ref 65–99)
GLUCOSE BLD-MCNC: 338 MG/DL (ref 65–99)
GLUCOSE BLD-MCNC: 93 MG/DL (ref 65–99)

## 2018-08-28 PROCEDURE — B54MZZA ULTRASONOGRAPHY OF RIGHT UPPER EXTREMITY VEINS, GUIDANCE: ICD-10-PCS | Performed by: INTERNAL MEDICINE

## 2018-08-28 PROCEDURE — A9270 NON-COVERED ITEM OR SERVICE: HCPCS | Performed by: INTERNAL MEDICINE

## 2018-08-28 PROCEDURE — 700105 HCHG RX REV CODE 258: Performed by: INTERNAL MEDICINE

## 2018-08-28 PROCEDURE — 700111 HCHG RX REV CODE 636 W/ 250 OVERRIDE (IP): Performed by: INTERNAL MEDICINE

## 2018-08-28 PROCEDURE — 99232 SBSQ HOSP IP/OBS MODERATE 35: CPT | Performed by: INTERNAL MEDICINE

## 2018-08-28 PROCEDURE — A9270 NON-COVERED ITEM OR SERVICE: HCPCS | Performed by: HOSPITALIST

## 2018-08-28 PROCEDURE — 770021 HCHG ROOM/CARE - ISO PRIVATE

## 2018-08-28 PROCEDURE — 700102 HCHG RX REV CODE 250 W/ 637 OVERRIDE(OP): Performed by: INTERNAL MEDICINE

## 2018-08-28 PROCEDURE — 700105 HCHG RX REV CODE 258: Performed by: HOSPITALIST

## 2018-08-28 PROCEDURE — 700102 HCHG RX REV CODE 250 W/ 637 OVERRIDE(OP): Performed by: HOSPITALIST

## 2018-08-28 PROCEDURE — 05HY33Z INSERTION OF INFUSION DEVICE INTO UPPER VEIN, PERCUTANEOUS APPROACH: ICD-10-PCS | Performed by: INTERNAL MEDICINE

## 2018-08-28 PROCEDURE — 36569 INSJ PICC 5 YR+ W/O IMAGING: CPT

## 2018-08-28 PROCEDURE — 82962 GLUCOSE BLOOD TEST: CPT | Mod: 91

## 2018-08-28 RX ADMIN — MEROPENEM 500 MG: 500 INJECTION, POWDER, FOR SOLUTION INTRAVENOUS at 20:39

## 2018-08-28 RX ADMIN — SITAGLIPTIN 100 MG: 100 TABLET, FILM COATED ORAL at 06:22

## 2018-08-28 RX ADMIN — BUPROPION HYDROCHLORIDE 300 MG: 150 TABLET, EXTENDED RELEASE ORAL at 06:22

## 2018-08-28 RX ADMIN — GLIMEPIRIDE 2 MG: 2 TABLET ORAL at 06:22

## 2018-08-28 RX ADMIN — MEMANTINE HYDROCHLORIDE 5 MG: 10 TABLET ORAL at 06:22

## 2018-08-28 RX ADMIN — ATORVASTATIN CALCIUM 10 MG: 10 TABLET, FILM COATED ORAL at 06:22

## 2018-08-28 RX ADMIN — MEROPENEM 500 MG: 500 INJECTION, POWDER, FOR SOLUTION INTRAVENOUS at 09:07

## 2018-08-28 RX ADMIN — MEROPENEM 500 MG: 500 INJECTION, POWDER, FOR SOLUTION INTRAVENOUS at 01:44

## 2018-08-28 RX ADMIN — SODIUM CHLORIDE, POTASSIUM CHLORIDE, SODIUM LACTATE AND CALCIUM CHLORIDE: 600; 310; 30; 20 INJECTION, SOLUTION INTRAVENOUS at 06:30

## 2018-08-28 RX ADMIN — INSULIN HUMAN 7 UNITS: 100 INJECTION, SOLUTION PARENTERAL at 20:46

## 2018-08-28 RX ADMIN — OMEPRAZOLE 20 MG: 20 CAPSULE, DELAYED RELEASE ORAL at 06:23

## 2018-08-28 RX ADMIN — MEROPENEM 500 MG: 500 INJECTION, POWDER, FOR SOLUTION INTRAVENOUS at 14:18

## 2018-08-28 RX ADMIN — INSULIN HUMAN 10 UNITS: 100 INJECTION, SOLUTION PARENTERAL at 17:40

## 2018-08-28 RX ADMIN — SODIUM CHLORIDE, POTASSIUM CHLORIDE, SODIUM LACTATE AND CALCIUM CHLORIDE: 600; 310; 30; 20 INJECTION, SOLUTION INTRAVENOUS at 20:03

## 2018-08-28 RX ADMIN — PANCRELIPASE 6000 UNITS: 30000; 6000; 19000 CAPSULE, DELAYED RELEASE PELLETS ORAL at 19:18

## 2018-08-28 RX ADMIN — GLIMEPIRIDE 2 MG: 2 TABLET ORAL at 17:40

## 2018-08-28 RX ADMIN — INSULIN HUMAN 10 UNITS: 100 INJECTION, SOLUTION PARENTERAL at 12:12

## 2018-08-28 RX ADMIN — LISINOPRIL 10 MG: 5 TABLET ORAL at 06:22

## 2018-08-28 RX ADMIN — OMEPRAZOLE 20 MG: 20 CAPSULE, DELAYED RELEASE ORAL at 17:40

## 2018-08-28 ASSESSMENT — ENCOUNTER SYMPTOMS
SHORTNESS OF BREATH: 0
NAUSEA: 0
MYALGIAS: 0
BLOOD IN STOOL: 0
FEVER: 0
ABDOMINAL PAIN: 0
CHILLS: 0
DEPRESSION: 0
BACK PAIN: 0
FLANK PAIN: 0
DIZZINESS: 0
NERVOUS/ANXIOUS: 0
VOMITING: 0
DIARRHEA: 0
HEADACHES: 0

## 2018-08-28 ASSESSMENT — PAIN SCALES - GENERAL
PAINLEVEL_OUTOF10: 0

## 2018-08-28 NOTE — PROGRESS NOTES
Report received. Care assumed. Patient A&Ox4. Pt reports feeling 0/10 pain, no SOB at this time.  Pt denies any needs.Discussed POC for the night with Pt. Call light within reach, encouraged pt to call for assistance.

## 2018-08-28 NOTE — CARE PLAN
Problem: Safety  Goal: Will remain free from falls    Intervention: Assess risk factors for falls   08/27/18 2227   OTHER   Fall Risk Risk to Fall - 0 - 1 point   Risk for Injury-Any positive answers results in the pt being at high risk for fall related injury Not Applicable   Mobility Status Assessment 1-1 Healthcare Provider Required for Assistance with Ambulation & Transfer   History of fall 0   Pt Calls for Assistance Yes     Intervention: Implement fall precautions   08/27/18 2227   OTHER   Environmental Precautions Treaded Slipper Socks on Patient;Mobility Assessed & Appropriate Sign Placed;Report Given to Other Health Care Providers Regarding Fall Risk;Personal Belongings, Wastebasket, Call Bell etc. in Easy Reach;Bed in Low Position;Communication Sign for Patients & Families;Transferred to Stronger Side   IV Pole on Same Side of Bed as Bathroom Yes   Bedrails Bedrails Closest to Bathroom Down         Problem: Pain Management  Goal: Pain level will decrease to patient's comfort goal    Intervention: Follow pain managment plan developed in collaboration with patient and Interdisciplinary Team  Pt assessed for pain q4h and medicated PRN. Pt instructed to notify RN of any new or increasing pain to prevent it from becoming intolerable. Verbalized understanding.

## 2018-08-28 NOTE — DISCHARGE PLANNING
Anticipated Discharge Disposition: SNF    Action: LSW spoke with patient at bedside regarding discharge plan to SNF. Patient was agreeable and [facility] (no first preference). LSW faxed [facility] choice and placed choice on chart.     Barriers to Discharge: SNF acceptance    Plan: LSW for monitor for SNF acceptance and any discharge needs/instructions.     Care Transition Team Assessment    Information Source  Orientation : Oriented x 4 (confused at times )  Information Given By: Patient  Informant's Name: jody   Who is responsible for making decisions for patient? : Patient    Readmission Evaluation  Is this a readmission?: No    Elopement Risk  Legal Hold: No  Ambulatory or Self Mobile in Wheelchair: Yes  Disoriented: No  Psychiatric Symptoms: None  History of Wandering: No  Elopement this Admit: No  Vocalizing Wanting to Leave: No  Displays Behaviors, Body Language Wanting to Leave: No-Not at Risk for Elopement  Elopement Risk: Not at Risk for Elopement    Interdisciplinary Discharge Planning  Does Admitting Nurse Feel This Could be a Complex Discharge?: No  Lives with - Patient's Self Care Capacity: Other (Comments)  Patient or legal guardian wants to designate a caregiver (see row info): No  Support Systems: Home Care Staff  Do You Take your Prescribed Medications Regularly: Yes  Able to Return to Previous ADL's: Yes  Mobility Issues: No  Prior Services: Other (Comments) (assisted living)  Assistance Needed: Unknown at this Time  Durable Medical Equipment: Not Applicable    Discharge Preparedness  What is your plan after discharge?: Uncertain - pending medical team collaboration  What are your discharge supports?: Sibling  Prior Functional Level: Independent with Activities of Daily Living    Functional Assesment  Prior Functional Level: Independent with Activities of Daily Living    Finances  Financial Barriers to Discharge: No  Prescription Coverage: Yes    Vision /  Hearing Impairment  Vision Impairment : No  Right Eye Vision: Impaired, Wears Glasses  Left Eye Vision: Impaired, Wears Glasses  Hearing Impairment : No    Values / Beliefs / Concerns  Values / Beliefs Concerns : No    Advance Directive  Advance Directive?: Living Will    Domestic Abuse  Have you ever been the victim of abuse or violence?: No  Physical Abuse or Sexual Abuse: No  Verbal Abuse or Emotional Abuse: No  Possible Abuse Reported to:: Not Applicable    Psychological Assessment  History of Substance Abuse: None  History of Psychiatric Problems: No  Non-compliant with Treatment: No    Discharge Risks or Barriers  Discharge risks or barriers?: SNF acceptance (Pt living at [facility], can't take back w/ current ABX regement )    Anticipated Discharge Information  Anticipated discharge disposition: Infusion / Enteral - home, SNF

## 2018-08-28 NOTE — CARE PLAN
Problem: Nutritional:  Goal: Achieve adequate nutritional intake  Patient will consume >50% of meals    Outcome: MET Date Met: 08/28/18  Pt's diet advanced and per chart, pt is consuming % x the last 5 meals documented.  Pt eating well.  Please consult RD as needed.

## 2018-08-28 NOTE — DISCHARGE PLANNING
Anticipated Discharge Disposition: SNF Vs home w/ home infusion    Action: LSW spoke with Ginger from [facility] and bedside nurse. Pt is currently in isolation and getting PICC today. Under current ABX regiment pt can not d/c to [facility]. Pt may qualify for SNF or in home infusions pending ABX needs.      Barriers to Discharge: Placement, ABXCare Transition Team Assessment    Information Source  Orientation : Oriented x 4 (confused at times )  Information Given By: Patient  Informant's Name: jody   Who is responsible for making decisions for patient? : Patient    Readmission Evaluation  Is this a readmission?: No    Elopement Risk  Legal Hold: No  Ambulatory or Self Mobile in Wheelchair: Yes  Disoriented: No  Psychiatric Symptoms: None  History of Wandering: No  Elopement this Admit: No  Vocalizing Wanting to Leave: No  Displays Behaviors, Body Language Wanting to Leave: No-Not at Risk for Elopement  Elopement Risk: Not at Risk for Elopement    Interdisciplinary Discharge Planning  Does Admitting Nurse Feel This Could be a Complex Discharge?: No  Lives with - Patient's Self Care Capacity: Other (Comments)  Patient or legal guardian wants to designate a caregiver (see row info): No  Support Systems: Home Care Staff  Do You Take your Prescribed Medications Regularly: Yes  Able to Return to Previous ADL's: Yes  Mobility Issues: No  Prior Services: Other (Comments) (assisted living)  Assistance Needed: Unknown at this Time  Durable Medical Equipment: Not Applicable    Discharge Preparedness  What is your plan after discharge?: Uncertain - pending medical team collaboration  What are your discharge supports?: Sibling  Prior Functional Level: Independent with Activities of Daily Living    Functional Assesment  Prior Functional Level: Independent with Activities of Daily Living    Finances  Financial Barriers to Discharge: No  Prescription Coverage: Yes    Vision / Hearing Impairment  Vision Impairment : No  Right Eye Vision:  Impaired, Wears Glasses  Left Eye Vision: Impaired, Wears Glasses  Hearing Impairment : No    Values / Beliefs / Concerns  Values / Beliefs Concerns : No    Advance Directive  Advance Directive?: Living Will    Domestic Abuse  Have you ever been the victim of abuse or violence?: No  Physical Abuse or Sexual Abuse: No  Verbal Abuse or Emotional Abuse: No  Possible Abuse Reported to:: Not Applicable    Psychological Assessment  History of Substance Abuse: None  History of Psychiatric Problems: No  Non-compliant with Treatment: No    Discharge Risks or Barriers  Discharge risks or barriers?: Complex medical needs ([facility], can't take back w/ current ABX regement )    Anticipated Discharge Information  Anticipated discharge disposition: Infusion / Enteral - home, SNF          Plan: LSW to monitor for discharge needs, physician  and PT/OT recommendations.

## 2018-08-28 NOTE — PROGRESS NOTES
Infectious Disease Progress Note    Author: Fidelia Tabor M.D. Date & Time of service: 2018  8:52 AM    Chief Complaint:  FU ESBL Kleb UTI    Interval History:   AF no CBC feels well, voiding well without staton, no back pain  Labs Reviewed, Medications Reviewed, Radiology Reviewed and Wound Reviewed.    Review of Systems:  Review of Systems   Constitutional: Negative for chills and fever.   Gastrointestinal: Negative for abdominal pain, diarrhea, nausea and vomiting.   Genitourinary: Negative for dysuria and flank pain.   Musculoskeletal: Negative for back pain.       Hemodynamics:  Temp (24hrs), Av.6 °C (97.9 °F), Min:36.2 °C (97.2 °F), Max:36.8 °C (98.3 °F)  Temperature: 36.6 °C (97.9 °F)  Pulse  Av.5  Min: 55  Max: 100   Blood Pressure: 152/87       Physical Exam:  Physical Exam   Constitutional: He is oriented to person, place, and time. He appears well-developed.   Older than stated age   HENT:   Head: Normocephalic and atraumatic.   Eyes: Pupils are equal, round, and reactive to light. EOM are normal.   Neck: Neck supple.   Cardiovascular: Normal rate and regular rhythm.    Pulmonary/Chest: Effort normal. He has no wheezes. He has no rales.   Abdominal: Soft. There is no tenderness.   Musculoskeletal: He exhibits no edema.   Neurological: He is alert and oriented to person, place, and time.   Skin: No rash noted.       Meds:    Current Facility-Administered Medications:   •  meropenem (MERREM) IV  •  insulin regular **AND** Accu-Chek ACHS **AND** NOTIFY MD and PharmD **AND** glucose 4 g **AND** dextrose 50%  •  glimepiride  •  omeprazole  •  NS  •  Respiratory Care per Protocol  •  LR  •  NS  •  acetaminophen  •  ondansetron  •  ondansetron  •  promethazine  •  promethazine  •  prochlorperazine  •  nicotine **AND** Nicotine Replacement Patient Education Materials **AND** nicotine polacrilex  •  atorvastatin  •  buPROPion SR  •  SITagliptin  •  memantine  •  lisinopril  •  [DISCONTINUED]  insulin regular **AND** Accu-Chek Q6 if NPO **AND** NOTIFY MD and PharmD **AND** glucose 4 g **AND** dextrose 50%    Labs:  Recent Labs      08/25/18   1135  08/25/18   1824  08/26/18 0219 08/27/18 0221   WBC   --    --    --   13.4*   RBC   --    --    --   3.77*   HEMOGLOBIN  9.9*  10.3*  8.8*  9.3*   HEMATOCRIT  32.0*   --    --   29.0*   MCV   --    --    --   76.9*   MCH   --    --    --   24.7*   RDW   --    --    --   53.1*   PLATELETCT   --    --    --   387   MPV   --    --    --   9.6   NEUTSPOLYS   --    --    --   59.90   LYMPHOCYTES   --    --    --   27.80   MONOCYTES   --    --    --   9.20   EOSINOPHILS   --    --    --   1.60   BASOPHILS   --    --    --   0.60     Recent Labs      08/26/18 0219 08/27/18 0221   SODIUM  137  137   POTASSIUM  3.6  3.7   CHLORIDE  106  105   CO2  25  24   GLUCOSE  260*  183*   BUN  11  11     Recent Labs      08/26/18 0219 08/27/18 0221   ALBUMIN  2.7*  2.7*   TBILIRUBIN  0.2  0.2   ALKPHOSPHAT  81  85   TOTPROTEIN  4.7*  4.9*   ALTSGPT  5  5   ASTSGOT  8*  7*   CREATININE  0.80  0.75       Imaging:  Ct-pancreas And Abdomen With & W/o    Result Date: 8/26/2018 8/26/2018 5:40 PM HISTORY/REASON FOR EXAM:  Dilated pancreatic duct on MRI. Evaluate for mass.. TECHNIQUE/EXAM DESCRIPTION:  CT pancreas and abdomen without and with contrast. Initial precontrast thick-section images were obtained through the pancreas.  Following this, nonionic contrast was administered by IV, and thin-section helical scanning performed from the diaphragmatic domes through the iliac crests.  Pancreatic parenchymal phase and portal venous phase (dual-phase) images were obtained following contrast administration. 100 mL of Omnipaque 350 nonionic contrast was administered. Low dose optimization technique was utilized for this CT exam including automated exposure control and adjustment of the mA and/or kV according to patient size. COMPARISON: MRI 8/25/2018. FINDINGS: Liver: Liver is  unremarkable Spleen: Surgically absent. Biliary system: Gallbladder and biliary tree are unremarkable.. Common bile duct is not dilated. Pancreas: Extensive calcification throughout the atrophic pancreas. Pancreatic duct: Significantly dilated main pancreatic duct, measuring 1.5 cm. Arterial vasculature: Moderate atherosclerotic disease of the abdominal aorta and its branches. Venous vasculature: The portal vein, superior mesenteric vein, and splenic vein are patent. Vascular encasement: None. Kidneys: Kidneys are unremarkable. Prominent extrarenal pelves. Adrenals: Adrenals are unremarkable. Lymph nodes: There are no enlarged nodes. Bowel: No bowel wall thickening or bowel dilatation. Moderate amount of stool throughout the colon. Trace amount fluid in the right lower quadrant. Pelvis: Not scanned. Lung bases: Moderate bilateral pleural effusions with patchy bibasilar opacities. Bones: No aggressive osseous lesion.     Extensive calcification throughout the atrophic pancreas could relate to chronic pancreatitis. Dilated pancreatic duct but no pancreatic head mass identified. This could be secondary to chronic pancreatitis. If there is continued clinical concern, further evaluation with US is recommended. Moderate bilateral pleural effusions with patchy bibasilar opacities. Trace amount fluid in the right lower quadrant.     Tf-ecidcmy-n/o    Result Date: 8/26/2018 8/25/2018 1:01 PM HISTORY/REASON FOR EXAM:  Abdominal pain possible common duct stone.. TECHNIQUE/EXAM DESCRIPTION: Magnetic resonance cholangiopancreatography. Magnetic resonance cholangiopancreatography was performed on with axial, coronal, and sagittal thin and thick section heavily T2-weighted sequences. 3-D cholangiographic multiplanar maximum intensity projection (MIP) images were created on a workstation.. The study was performed on a [employer]a 1.5 Marnie MRI scanner. COMPARISON: Gallbladder ultrasound 8/23/2018 FINDINGS: The liver is mildly  enlarged. There is no evidence for intrahepatic duct dilatation or hepatic mass lesion. The gallbladder has a normal size appearance and wall thickness without evidence of cholelithiasis. The spleen is absent. The pancreas is atrophic. There is marked chronic dilatation of the pancreatic duct to 1 cm. There is a small hypointense filling defect in the proximal pancreatic duct. The bowel gas pattern is unremarkable. The kidneys have a normal size and appearance.     1.  Mild hepatomegaly. 2.  Absent spleen. 3.  No evidence of cholelithiasis or choledocholithiasis. 4.  Common duct at the upper limits of normal. 5.  Chronic atrophic changes of the pancreas with marked dilatation of the pancreatic duct. Recommend pancreatic CT scan to exclude intraductal papilloma. 6.  Report called and an Emergent Document Only message has been documented for MOE CRUZ in the [employer]  Critical Result system on 8/26/2018 10:38 AM, Message ID [id number].    Us-gallbladder    Result Date: 8/23/2018 8/23/2018 9:30 PM HISTORY/REASON FOR EXAM: Abdominal pain TECHNIQUE/EXAM DESCRIPTION AND NUMBER OF VIEWS:  Real-time sonography of the gallbladder. COMPARISON: None FINDINGS: There is no ascites. The liver is normal in contour. There is no evidence of solid mass lesion. The liver measures 15.37 cm. The liver is echogenic. The gallbladder is normal. There is no evidence of cholelithiasis. The gallbladder wall thickness measures 0.16 cm.  There is no pericholecystic fluid. The common duct measures 0.59 cm. Shadowing structure is seen measuring 4.0 mm that appears likely within the common bile duct. The pancreas is obscured by bowel gas. The visualized aorta is normal in caliber. Intrahepatic IVC is patent. The portal vein is patent with hepatopetal flow. The right kidney measures 10.55 cm. Punctate echogenic focus in the right kidney is seen.     1.  Suspected choledocholithiasis with borderline common bile duct dilatation  suggesting possible mild obstruction. 2.  Echogenic liver compatible with fatty change versus fibrosis. 3.  Probable small nonobstructing right renal calculus       Micro:  Results     Procedure Component Value Units Date/Time    URINE CULTURE(NEW) [[id number]]  (Abnormal)  (Susceptibility) Collected:  08/23/18 0055    Order Status:  Completed Specimen:  Urine Updated:  08/26/18 1155     Significant Indicator POS (POS)     Source UR     Site --     Urine Culture -- (A)      Klebsiella pneumoniae ESBL  >100,000 cfu/mL  Extended Spectrum Beta-lactamase (ESBL) isolated.  ESBL's may be clinically resistant to therapy with  Penicillins,Cephalosporins or Aztreonam despite  apparent in vitro susceptibility to some of these agents.  The patient requires contact isolation.  Please contact pharmacy or an Infectious Disease Specialist  if you have any questions about appropriate therapy.   (A)    Narrative:       CALL  [facility]  [address] tel. [phone],    Culture & Susceptibility     KLEBSIELLA PNEUMONIAE ESBL     Antibiotic Sensitivity Microscan Unit Status    Ampicillin Resistant >16 mcg/mL Final    Method: SENSITIVITY, SERGO    Cefepime Resistant <=8 mcg/mL Final    Method: SENSITIVITY, SERGO    Cefotaxime Extended Spectrum Beta Lactamase 16 mcg/mL Final    Method: SENSITIVITY, SERGO    Cefotetan Sensitive <=16 mcg/mL Final    Method: SENSITIVITY, SERGO    Ceftazidime Resistant <=1 mcg/mL Final    Method: SENSITIVITY, SERGO    Ceftriaxone Extended Spectrum Beta Lactamase 32 mcg/mL Final    Method: SENSITIVITY, SERGO    Cefuroxime Resistant 8 mcg/mL Final    Method: SENSITIVITY, SERGO    Cephalothin Resistant >16 mcg/mL Final    Method: SENSITIVITY, SERGO    Ciprofloxacin Intermediate 2 mcg/mL Final    Method: SENSITIVITY, SERGO    Gentamicin Resistant >8 mcg/mL Final    Method: SENSITIVITY, SERGO    Levofloxacin Sensitive <=2 mcg/mL Final    Method: SENSITIVITY, SERGO    Nitrofurantoin Intermediate 64 mcg/mL Final    Method: SENSITIVITY, SERGO     "Pip/Tazobactam Sensitive <=16 mcg/mL Final    Method: SENSITIVITY, SERGO    Piperacillin Resistant >64 mcg/mL Final    Method: SENSITIVITY, SERGO    Tigecycline Sensitive <=2 mcg/mL Final    Method: SENSITIVITY, SERGO    Tobramycin Resistant >8 mcg/mL Final    Method: SENSITIVITY, SERGO    Trimeth/Sulfa Resistant >2/38 mcg/mL Final    Method: SENSITIVITY, SERGO                       BLOOD CULTURE x2 [[id number]] Collected:  08/23/18 2121    Order Status:  Completed Specimen:  Blood from Peripheral Updated:  08/25/18 0852     Significant Indicator NEG     Source BLD     Site PERIPHERAL     Blood Culture No Growth    Note: Blood cultures are incubated for 5 days and  are monitored continuously.Positive blood cultures  are called to the RN and reported as soon as  they are identified.      Narrative:       Contact  Per Hospital Policy: Only change Specimen Src: to \"Line\" if  specified by physician order.    BLOOD CULTURE x2 [[id number]] Collected:  08/23/18 2101    Order Status:  Completed Specimen:  Blood from Peripheral Updated:  08/25/18 0852     Significant Indicator NEG     Source BLD     Site PERIPHERAL     Blood Culture No Growth    Note: Blood cultures are incubated for 5 days and  are monitored continuously.Positive blood cultures  are called to the RN and reported as soon as  they are identified.      Narrative:       Contact  Per Hospital Policy: Only change Specimen Src: to \"Line\" if  specified by physician order.    URINALYSIS,CULTURE IF INDICATED [[id number]]  (Abnormal) Collected:  08/24/18 0055    Order Status:  Completed Specimen:  Urine Updated:  08/24/18 0102     Color Yellow     Character Turbid (A)     Specific Gravity 1.017     Ph 6.0     Glucose Negative mg/dL      Ketones 15 (A) mg/dL      Protein 100 (A) mg/dL      Bilirubin Negative     Urobilinogen, Urine 0.2     Nitrite Negative     Leukocyte Esterase Large (A)     Occult Blood Moderate (A)     Micro Urine Req Microscopic    Blood Culture [[id number]] " Collected:  08/24/18 0000    Order Status:  Canceled Specimen:  Other from Peripheral     Blood Culture [[id number]] Collected:  08/24/18 0000    Order Status:  Canceled Specimen:  Other from Peripheral           Assessment:  Active Hospital Problems    Diagnosis   • Upper GI bleed [K92.2]   • UTI (urinary tract infection) [N39.0]   • Sepsis (HCC) [A41.9]   • Choledocholithiasis [K80.50]   • Hyperlipidemia [E78.5]   • Tobacco abuse [Z72.0]   • Uncontrolled type 2 diabetes mellitus with hyperosmolarity without coma (HCC) [E11.00]   • Diabetic peripheral angiopathy (HCC) [E11.51]   • Essential hypertension [I10]       Plan:  ESBL Klebsiella UTI  In setting of recent staton - now removed  Afebrile  Leukocytosis  Continue meropenem  Plan for 10 days total  PICC ordered  Stop date 09/06/18    DM2  HgA1c 9.6  Maintain BS less than 150 to control infection    Upper GI bleed, improved  S/p EGD - severe erosive esophagitis  S/p blood transfusions  Hg Stable    SNF placement    Discussed with internal medicine.

## 2018-08-28 NOTE — PROGRESS NOTES
[facility]    Date of Service: 2018    Chief Complaint  58 y.o. male admitted 2018 with UTI and GI bleed    Interval Problem Update  EE-no issues with bleeding and tolerating oral and liquids without issue. Denies any burping at this time.    UTI-staton is out and urinating well on his own right now, tolerating IV merrem and remains afebrile.     Consultants/Specialty  Gastroenterology  Infectious disease    Disposition  To be determined depending on antibiotics        Review of Systems   Constitutional: Negative for chills and fever.   Respiratory: Negative for shortness of breath.    Gastrointestinal: Negative for abdominal pain, blood in stool, diarrhea, nausea and vomiting.   Genitourinary: Negative for dysuria.   Musculoskeletal: Negative for myalgias.   Skin: Negative for rash.   Neurological: Negative for dizziness and headaches.   Psychiatric/Behavioral: Negative for depression. The patient is not nervous/anxious.    All other systems reviewed and are negative.     Physical Exam  Laboratory/Imaging   Hemodynamics  Temp (24hrs), Av.6 °C (97.8 °F), Min:36.2 °C (97.2 °F), Max:36.8 °C (98.2 °F)   Temperature: 36.6 °C (97.9 °F)  Pulse  Av.5  Min: 55  Max: 100    Blood Pressure: 152/87      Respiratory      Respiration: 16, Pulse Oximetry: 93 %        RUL Breath Sounds: Clear, RML Breath Sounds: Diminished, RLL Breath Sounds: Diminished, LEONARD Breath Sounds: Clear, LLL Breath Sounds: Diminished    Fluids    Intake/Output Summary (Last 24 hours) at 18 1645  Last data filed at 18 0810   Gross per 24 hour   Intake              100 ml   Output             3450 ml   Net            -3350 ml       Nutrition  Orders Placed This Encounter   Procedures   • Diet Order Diabetic     Standing Status:   Standing     Number of Occurrences:   1     Order Specific Question:   Diet:     Answer:   Diabetic [3]     Physical Exam   Constitutional: He is oriented to person, place, and  time. He appears cachectic. No distress.   HENT:   Head: Normocephalic and atraumatic.   Mouth/Throat: No oropharyngeal exudate.   Eyes: Pupils are equal, round, and reactive to light. EOM are normal. Right eye exhibits no discharge. Left eye exhibits no discharge.   Neck: Normal range of motion. Neck supple.   Cardiovascular: Normal rate and regular rhythm.    No murmur heard.  Pulmonary/Chest: Effort normal and breath sounds normal. No stridor. He has no wheezes.   Abdominal: Soft. Bowel sounds are normal. There is no tenderness.   Thin   Musculoskeletal: He exhibits no edema or tenderness.   Neurological: He is alert and oriented to person, place, and time. Coordination normal.   Skin: Skin is warm and dry. He is not diaphoretic.   Psychiatric: He has a normal mood and affect. His behavior is normal.   Nursing note and vitals reviewed.      Recent Labs      08/25/18   1824  08/26/18 0219 08/27/18 0221   WBC   --    --   13.4*   RBC   --    --   3.77*   HEMOGLOBIN  10.3*  8.8*  9.3*   HEMATOCRIT   --    --   29.0*   MCV   --    --   76.9*   MCH   --    --   24.7*   MCHC   --    --   32.1*   RDW   --    --   53.1*   PLATELETCT   --    --   387   MPV   --    --   9.6     Recent Labs      08/26/18 0219 08/27/18 0221   SODIUM  137  137   POTASSIUM  3.6  3.7   CHLORIDE  106  105   CO2  25  24   GLUCOSE  260*  183*   BUN  11  11   CREATININE  0.80  0.75   CALCIUM  8.2*  8.5                      Assessment/Plan     Upper GI bleed- (present on admission)   Assessment & Plan    -remains stable right now and no need for transfusion at this time  Transfuse if hemoglobin drops to below 7 or if he becomes hemodynamically unstable  Gastroenterology did an EGD today which showed grade 4 esophagitis  PPI twice daily for 8 weeks then daily  -will need repeat EGD in 2 months and biopsies at that time to see if Garcia's is presence  -abstain from all ETOH, NSAIDs and ASA use        Choledocholithiasis- (present on  admission)   Assessment & Plan    GI following-no new issues at this time  Monitor complete metabolic panel  MRCP and CT abdomen has been reviewed  PPI twice daily for 8 weeks then daily  Outpatient GI follow-up for possible colonoscopy  Alcohol cessation        Sepsis (HCC)- (present on admission)   Assessment & Plan    This is sepsis (without associated acute organ dysfunction).   Possible source is UTI; may also be intra-abdominal  -see above for treatment plan          UTI (urinary tract infection)- (present on admission)   Assessment & Plan    Urine culture reveals Klebsiella pneumoniae ESBL   Meropenem, midline ordered, remains afebrile, remains asymptomatic  _ID following, will need long term IV abx's, likely will need SNF as [facility] will not take him back with PICC in place        Tobacco abuse- (present on admission)   Assessment & Plan    Counseled already  Nicotine patch          Hyperlipidemia- (present on admission)   Assessment & Plan    Continue atorvastatin        Essential hypertension- (present on admission)   Assessment & Plan    Continue lisinopril        Diabetic peripheral angiopathy (HCC)- (present on admission)   Assessment & Plan    -monitor closely, reduce blood sugars and retain good control        Uncontrolled type 2 diabetes mellitus with hyperosmolarity without coma (HCC)- (present on admission)   Assessment & Plan    Poorly controlled as an outpatient  Hemoglobin A1c is 9.6  Continue Januvia and sliding scale insulin and glimepiride  -continues to improve slowly            Quality-Core Measures   Reviewed items::  Labs reviewed, Medications reviewed and Radiology images reviewed  Dill catheter::  Urinary Tract Retention or Urinary Tract Obstruction

## 2018-08-28 NOTE — PROGRESS NOTES
Vascular Access Team    Date of Insertion: 8/28/18  Arm Circumference: 23  Internal length: 16  External Length: 0  Vein Occupancy %: 31  Reason for Midline: IV abx  Labs: WBC 13.4, , INR 1.05, BUN 11, Cr 0.75, GFR >60    Orders confirmed, vessel patency confirmed with ultrasound. Risks and benefits of procedure explained to patient and education regarding line associated bloodstream infections provided. Questions answered.     Power Midline placed in LUE per MD order with ultrasound guidance, initial arm circumference 23cm. 4 Fr, 1 lumen Power Midline placed in right basilic vein after 1 attempt(s). 2 cc's of 1% lidocaine injected intradermally, 21 gauge microintroducer needle and modified Seldinger technique used. 16 cm catheter inserted with good blood return. Secured at 0 cm marker. Each lumen flushed without resistance with 10 mL 0.9% normal saline. Midline secured with Biopatch and Tegaderm.     Midline placement is confirmed by nurse using ultrasound and ability to flush and draw blood. Midline is appropriate for use at this time.  No X-ray is needed for placement confirmation. Pt tolerated procedure well.  Patient condition relayed to unit RN or ordering physician via this post procedure note in the EMR.      BARD Power Midline ref # [id number], Lot # [id number]

## 2018-08-28 NOTE — DISCHARGE PLANNING
Received Choice form at 8535  Agency/Facility Name: [facility]  Referral sent per Choice form @ 1614

## 2018-08-29 LAB
BACTERIA BLD CULT: NORMAL
BACTERIA BLD CULT: NORMAL
BASOPHILS # BLD AUTO: 0.8 % (ref 0–1.8)
BASOPHILS # BLD: 0.09 K/UL (ref 0–0.12)
EOSINOPHIL # BLD AUTO: 0.24 K/UL (ref 0–0.51)
EOSINOPHIL NFR BLD: 2 % (ref 0–6.9)
ERYTHROCYTE [DISTWIDTH] IN BLOOD BY AUTOMATED COUNT: 52.4 FL (ref 35.9–50)
GLUCOSE BLD-MCNC: 114 MG/DL (ref 65–99)
GLUCOSE BLD-MCNC: 190 MG/DL (ref 65–99)
GLUCOSE BLD-MCNC: 201 MG/DL (ref 65–99)
GLUCOSE BLD-MCNC: 273 MG/DL (ref 65–99)
GLUCOSE BLD-MCNC: 321 MG/DL (ref 65–99)
HCT VFR BLD AUTO: 32 % (ref 42–52)
HGB BLD-MCNC: 9.9 G/DL (ref 14–18)
IMM GRANULOCYTES # BLD AUTO: 0.08 K/UL (ref 0–0.11)
IMM GRANULOCYTES NFR BLD AUTO: 0.7 % (ref 0–0.9)
LYMPHOCYTES # BLD AUTO: 4.22 K/UL (ref 1–4.8)
LYMPHOCYTES NFR BLD: 35.5 % (ref 22–41)
MCH RBC QN AUTO: 24 PG (ref 27–33)
MCHC RBC AUTO-ENTMCNC: 30.9 G/DL (ref 33.7–35.3)
MCV RBC AUTO: 77.7 FL (ref 81.4–97.8)
MONOCYTES # BLD AUTO: 1.21 K/UL (ref 0–0.85)
MONOCYTES NFR BLD AUTO: 10.2 % (ref 0–13.4)
NEUTROPHILS # BLD AUTO: 6.06 K/UL (ref 1.82–7.42)
NEUTROPHILS NFR BLD: 50.8 % (ref 44–72)
NRBC # BLD AUTO: 0 K/UL
NRBC BLD-RTO: 0 /100 WBC
PLATELET # BLD AUTO: 434 K/UL (ref 164–446)
PMV BLD AUTO: 9.8 FL (ref 9–12.9)
RBC # BLD AUTO: 4.12 M/UL (ref 4.7–6.1)
SIGNIFICANT IND 70042: NORMAL
SIGNIFICANT IND 70042: NORMAL
SITE SITE: NORMAL
SITE SITE: NORMAL
SOURCE SOURCE: NORMAL
SOURCE SOURCE: NORMAL
WBC # BLD AUTO: 11.9 K/UL (ref 4.8–10.8)

## 2018-08-29 PROCEDURE — 85025 COMPLETE CBC W/AUTO DIFF WBC: CPT

## 2018-08-29 PROCEDURE — 99232 SBSQ HOSP IP/OBS MODERATE 35: CPT | Performed by: INTERNAL MEDICINE

## 2018-08-29 PROCEDURE — 700105 HCHG RX REV CODE 258: Performed by: HOSPITALIST

## 2018-08-29 PROCEDURE — A9270 NON-COVERED ITEM OR SERVICE: HCPCS | Performed by: INTERNAL MEDICINE

## 2018-08-29 PROCEDURE — G8978 MOBILITY CURRENT STATUS: HCPCS | Mod: CH

## 2018-08-29 PROCEDURE — 700102 HCHG RX REV CODE 250 W/ 637 OVERRIDE(OP): Performed by: HOSPITALIST

## 2018-08-29 PROCEDURE — A9270 NON-COVERED ITEM OR SERVICE: HCPCS | Performed by: HOSPITALIST

## 2018-08-29 PROCEDURE — 700102 HCHG RX REV CODE 250 W/ 637 OVERRIDE(OP): Performed by: INTERNAL MEDICINE

## 2018-08-29 PROCEDURE — 36415 COLL VENOUS BLD VENIPUNCTURE: CPT

## 2018-08-29 PROCEDURE — 97161 PT EVAL LOW COMPLEX 20 MIN: CPT

## 2018-08-29 PROCEDURE — 770021 HCHG ROOM/CARE - ISO PRIVATE

## 2018-08-29 PROCEDURE — 82962 GLUCOSE BLOOD TEST: CPT

## 2018-08-29 PROCEDURE — 700105 HCHG RX REV CODE 258: Performed by: INTERNAL MEDICINE

## 2018-08-29 PROCEDURE — G8979 MOBILITY GOAL STATUS: HCPCS | Mod: CH

## 2018-08-29 PROCEDURE — 700111 HCHG RX REV CODE 636 W/ 250 OVERRIDE (IP): Performed by: INTERNAL MEDICINE

## 2018-08-29 PROCEDURE — G8980 MOBILITY D/C STATUS: HCPCS | Mod: CH

## 2018-08-29 RX ADMIN — BUPROPION HYDROCHLORIDE 300 MG: 150 TABLET, EXTENDED RELEASE ORAL at 06:04

## 2018-08-29 RX ADMIN — OMEPRAZOLE 20 MG: 20 CAPSULE, DELAYED RELEASE ORAL at 17:07

## 2018-08-29 RX ADMIN — OMEPRAZOLE 20 MG: 20 CAPSULE, DELAYED RELEASE ORAL at 06:04

## 2018-08-29 RX ADMIN — ATORVASTATIN CALCIUM 10 MG: 10 TABLET, FILM COATED ORAL at 06:04

## 2018-08-29 RX ADMIN — INSULIN HUMAN 4 UNITS: 100 INJECTION, SOLUTION PARENTERAL at 17:09

## 2018-08-29 RX ADMIN — SITAGLIPTIN 100 MG: 100 TABLET, FILM COATED ORAL at 06:04

## 2018-08-29 RX ADMIN — MEROPENEM 500 MG: 500 INJECTION, POWDER, FOR SOLUTION INTRAVENOUS at 14:00

## 2018-08-29 RX ADMIN — INSULIN HUMAN 10 UNITS: 100 INJECTION, SOLUTION PARENTERAL at 11:40

## 2018-08-29 RX ADMIN — INSULIN HUMAN 3 UNITS: 100 INJECTION, SOLUTION PARENTERAL at 20:23

## 2018-08-29 RX ADMIN — SODIUM CHLORIDE, POTASSIUM CHLORIDE, SODIUM LACTATE AND CALCIUM CHLORIDE: 600; 310; 30; 20 INJECTION, SOLUTION INTRAVENOUS at 06:09

## 2018-08-29 RX ADMIN — MEROPENEM 500 MG: 500 INJECTION, POWDER, FOR SOLUTION INTRAVENOUS at 20:21

## 2018-08-29 RX ADMIN — PANCRELIPASE 6000 UNITS: 30000; 6000; 19000 CAPSULE, DELAYED RELEASE PELLETS ORAL at 17:08

## 2018-08-29 RX ADMIN — PANCRELIPASE 6000 UNITS: 30000; 6000; 19000 CAPSULE, DELAYED RELEASE PELLETS ORAL at 11:40

## 2018-08-29 RX ADMIN — PANCRELIPASE 6000 UNITS: 30000; 6000; 19000 CAPSULE, DELAYED RELEASE PELLETS ORAL at 06:04

## 2018-08-29 RX ADMIN — LISINOPRIL 10 MG: 5 TABLET ORAL at 06:04

## 2018-08-29 RX ADMIN — GLIMEPIRIDE 2 MG: 2 TABLET ORAL at 17:08

## 2018-08-29 RX ADMIN — MEROPENEM 500 MG: 500 INJECTION, POWDER, FOR SOLUTION INTRAVENOUS at 02:10

## 2018-08-29 RX ADMIN — MEROPENEM 500 MG: 500 INJECTION, POWDER, FOR SOLUTION INTRAVENOUS at 09:10

## 2018-08-29 RX ADMIN — GLIMEPIRIDE 2 MG: 2 TABLET ORAL at 06:04

## 2018-08-29 RX ADMIN — MEMANTINE HYDROCHLORIDE 5 MG: 10 TABLET ORAL at 06:04

## 2018-08-29 ASSESSMENT — GAIT ASSESSMENTS
DEVIATION: ATAXIC;BRADYKINETIC
GAIT LEVEL OF ASSIST: SUPERVISED
ASSISTIVE DEVICE: OTHER (COMMENTS)
DISTANCE (FEET): 20

## 2018-08-29 ASSESSMENT — ENCOUNTER SYMPTOMS
ABDOMINAL PAIN: 0
HEADACHES: 0
BACK PAIN: 0
NAUSEA: 0
MYALGIAS: 0
DIZZINESS: 0
CHILLS: 0
NERVOUS/ANXIOUS: 0
VOMITING: 0
FEVER: 0
DEPRESSION: 0
FLANK PAIN: 0
DIARRHEA: 0
COUGH: 0

## 2018-08-29 ASSESSMENT — COGNITIVE AND FUNCTIONAL STATUS - GENERAL
MOBILITY SCORE: 24
SUGGESTED CMS G CODE MODIFIER MOBILITY: CH

## 2018-08-29 ASSESSMENT — PAIN SCALES - GENERAL
PAINLEVEL_OUTOF10: 0

## 2018-08-29 NOTE — DISCHARGE PLANNING
Agency/Facility Name: [facility] [place]  Spoke To: DIPAK Elmore  Outcome: Asked to resend referral with updated notes.

## 2018-08-29 NOTE — PROGRESS NOTES
Infectious Disease Progress Note    Author: Fidelia Tabor M.D. Date & Time of service: 2018  9:11 AM    Chief Complaint:  FU ESBL Kleb UTI    Interval History:   AF no CBC feels well, voiding well without staton, no back pain   AF WBC 11.9 no new issues to report, continues to void well without Staton  Labs Reviewed, Medications Reviewed, Radiology Reviewed and Wound Reviewed.    Review of Systems:  Review of Systems   Constitutional: Negative for chills and fever.   Gastrointestinal: Negative for abdominal pain, diarrhea, nausea and vomiting.   Genitourinary: Negative for dysuria and flank pain.   Musculoskeletal: Negative for back pain.       Hemodynamics:  Temp (24hrs), Av.8 °C (98.3 °F), Min:36.8 °C (98.3 °F), Max:36.8 °C (98.3 °F)  Temperature: 36.8 °C (98.3 °F)  Pulse  Av.5  Min: 55  Max: 100   Blood Pressure: 117/83       Physical Exam:  Physical Exam   Constitutional: He is oriented to person, place, and time. He appears well-developed.   Older than stated age   HENT:   Head: Normocephalic and atraumatic.   Eyes: Pupils are equal, round, and reactive to light. EOM are normal.   Neck: Neck supple.   Cardiovascular: Normal rate and regular rhythm.    Pulmonary/Chest: Effort normal. He has no wheezes. He has no rales.   Abdominal: Soft. There is no tenderness.   Musculoskeletal: He exhibits no edema.   PICC line   Neurological: He is alert and oriented to person, place, and time.   Skin: No rash noted.       Meds:    Current Facility-Administered Medications:   •  pancrelipase (Lip-Prot-Amyl)  •  meropenem (MERREM) IV  •  insulin regular **AND** Accu-Chek ACHS **AND** NOTIFY MD and PharmD **AND** glucose 4 g **AND** dextrose 50%  •  glimepiride  •  omeprazole  •  NS  •  Respiratory Care per Protocol  •  LR  •  NS  •  acetaminophen  •  ondansetron  •  ondansetron  •  promethazine  •  promethazine  •  prochlorperazine  •  nicotine **AND** Nicotine Replacement Patient Education Materials  **AND** nicotine polacrilex  •  atorvastatin  •  buPROPion SR  •  SITagliptin  •  memantine  •  lisinopril  •  [DISCONTINUED] insulin regular **AND** Accu-Chek Q6 if NPO **AND** NOTIFY MD and PharmD **AND** glucose 4 g **AND** dextrose 50%    Labs:  Recent Labs      08/27/18 0221 08/29/18   0035   WBC  13.4*  11.9*   RBC  3.77*  4.12*   HEMOGLOBIN  9.3*  9.9*   HEMATOCRIT  29.0*  32.0*   MCV  76.9*  77.7*   MCH  24.7*  24.0*   RDW  53.1*  52.4*   PLATELETCT  387  434   MPV  9.6  9.8   NEUTSPOLYS  59.90  50.80   LYMPHOCYTES  27.80  35.50   MONOCYTES  9.20  10.20   EOSINOPHILS  1.60  2.00   BASOPHILS  0.60  0.80     Recent Labs      08/27/18 0221   SODIUM  137   POTASSIUM  3.7   CHLORIDE  105   CO2  24   GLUCOSE  183*   BUN  11     Recent Labs      08/27/18 0221   ALBUMIN  2.7*   TBILIRUBIN  0.2   ALKPHOSPHAT  85   TOTPROTEIN  4.9*   ALTSGPT  5   ASTSGOT  7*   CREATININE  0.75       Imaging:  Ct-pancreas And Abdomen With & W/o    Result Date: 8/26/2018 8/26/2018 5:40 PM HISTORY/REASON FOR EXAM:  Dilated pancreatic duct on MRI. Evaluate for mass.. TECHNIQUE/EXAM DESCRIPTION:  CT pancreas and abdomen without and with contrast. Initial precontrast thick-section images were obtained through the pancreas.  Following this, nonionic contrast was administered by IV, and thin-section helical scanning performed from the diaphragmatic domes through the iliac crests.  Pancreatic parenchymal phase and portal venous phase (dual-phase) images were obtained following contrast administration. 100 mL of Omnipaque 350 nonionic contrast was administered. Low dose optimization technique was utilized for this CT exam including automated exposure control and adjustment of the mA and/or kV according to patient size. COMPARISON: MRI 8/25/2018. FINDINGS: Liver: Liver is unremarkable Spleen: Surgically absent. Biliary system: Gallbladder and biliary tree are unremarkable.. Common bile duct is not dilated. Pancreas: Extensive  calcification throughout the atrophic pancreas. Pancreatic duct: Significantly dilated main pancreatic duct, measuring 1.5 cm. Arterial vasculature: Moderate atherosclerotic disease of the abdominal aorta and its branches. Venous vasculature: The portal vein, superior mesenteric vein, and splenic vein are patent. Vascular encasement: None. Kidneys: Kidneys are unremarkable. Prominent extrarenal pelves. Adrenals: Adrenals are unremarkable. Lymph nodes: There are no enlarged nodes. Bowel: No bowel wall thickening or bowel dilatation. Moderate amount of stool throughout the colon. Trace amount fluid in the right lower quadrant. Pelvis: Not scanned. Lung bases: Moderate bilateral pleural effusions with patchy bibasilar opacities. Bones: No aggressive osseous lesion.     Extensive calcification throughout the atrophic pancreas could relate to chronic pancreatitis. Dilated pancreatic duct but no pancreatic head mass identified. This could be secondary to chronic pancreatitis. If there is continued clinical concern, further evaluation with US is recommended. Moderate bilateral pleural effusions with patchy bibasilar opacities. Trace amount fluid in the right lower quadrant.     Oq-llslizz-l/o    Result Date: 8/26/2018 8/25/2018 1:01 PM HISTORY/REASON FOR EXAM:  Abdominal pain possible common duct stone.. TECHNIQUE/EXAM DESCRIPTION: Magnetic resonance cholangiopancreatography. Magnetic resonance cholangiopancreatography was performed on with axial, coronal, and sagittal thin and thick section heavily T2-weighted sequences. 3-D cholangiographic multiplanar maximum intensity projection (MIP) images were created on a workstation.. The study was performed on a [employer] 1.5 Marnie MRI scanner. COMPARISON: Gallbladder ultrasound 8/23/2018 FINDINGS: The liver is mildly enlarged. There is no evidence for intrahepatic duct dilatation or hepatic mass lesion. The gallbladder has a normal size appearance and wall thickness without  evidence of cholelithiasis. The spleen is absent. The pancreas is atrophic. There is marked chronic dilatation of the pancreatic duct to 1 cm. There is a small hypointense filling defect in the proximal pancreatic duct. The bowel gas pattern is unremarkable. The kidneys have a normal size and appearance.     1.  Mild hepatomegaly. 2.  Absent spleen. 3.  No evidence of cholelithiasis or choledocholithiasis. 4.  Common duct at the upper limits of normal. 5.  Chronic atrophic changes of the pancreas with marked dilatation of the pancreatic duct. Recommend pancreatic CT scan to exclude intraductal papilloma. 6.  Report called and an Emergent Document Only message has been documented for MOE CRUZ in the [employer]  Critical Result system on 8/26/2018 10:38 AM, Message ID [id number].    Us-gallbladder    Result Date: 8/23/2018 8/23/2018 9:30 PM HISTORY/REASON FOR EXAM: Abdominal pain TECHNIQUE/EXAM DESCRIPTION AND NUMBER OF VIEWS:  Real-time sonography of the gallbladder. COMPARISON: None FINDINGS: There is no ascites. The liver is normal in contour. There is no evidence of solid mass lesion. The liver measures 15.37 cm. The liver is echogenic. The gallbladder is normal. There is no evidence of cholelithiasis. The gallbladder wall thickness measures 0.16 cm.  There is no pericholecystic fluid. The common duct measures 0.59 cm. Shadowing structure is seen measuring 4.0 mm that appears likely within the common bile duct. The pancreas is obscured by bowel gas. The visualized aorta is normal in caliber. Intrahepatic IVC is patent. The portal vein is patent with hepatopetal flow. The right kidney measures 10.55 cm. Punctate echogenic focus in the right kidney is seen.     1.  Suspected choledocholithiasis with borderline common bile duct dilatation suggesting possible mild obstruction. 2.  Echogenic liver compatible with fatty change versus fibrosis. 3.  Probable small nonobstructing right renal calculus  "      Micro:  Results     Procedure Component Value Units Date/Time    BLOOD CULTURE x2 [[id number]] Collected:  08/23/18 2101    Order Status:  Completed Specimen:  Blood from Peripheral Updated:  08/29/18 0300     Significant Indicator NEG     Source BLD     Site PERIPHERAL     Blood Culture No growth after 5 days of incubation.    Narrative:       Contact  Per Hospital Policy: Only change Specimen Src: to \"Line\" if  specified by physician order.    BLOOD CULTURE x2 [[id number]] Collected:  08/23/18 2121    Order Status:  Completed Specimen:  Blood from Peripheral Updated:  08/29/18 0300     Significant Indicator NEG     Source BLD     Site PERIPHERAL     Blood Culture No growth after 5 days of incubation.    Narrative:       Contact  Per Hospital Policy: Only change Specimen Src: to \"Line\" if  specified by physician order.    URINE CULTURE(NEW) [[id number]]  (Abnormal)  (Susceptibility) Collected:  08/23/18 0055    Order Status:  Completed Specimen:  Urine Updated:  08/26/18 1156     Significant Indicator POS (POS)     Source UR     Site --     Urine Culture -- (A)      Klebsiella pneumoniae ESBL  >100,000 cfu/mL  Extended Spectrum Beta-lactamase (ESBL) isolated.  ESBL's may be clinically resistant to therapy with  Penicillins,Cephalosporins or Aztreonam despite  apparent in vitro susceptibility to some of these agents.  The patient requires contact isolation.  Please contact pharmacy or an Infectious Disease Specialist  if you have any questions about appropriate therapy.   (A)    Narrative:       CALL  [facility]  [address] tel. [phone],    Culture & Susceptibility     KLEBSIELLA PNEUMONIAE ESBL     Antibiotic Sensitivity Microscan Unit Status    Ampicillin Resistant >16 mcg/mL Final    Method: SENSITIVITY, SERGO    Cefepime Resistant <=8 mcg/mL Final    Method: SENSITIVITY, SERGO    Cefotaxime Extended Spectrum Beta Lactamase 16 mcg/mL Final    Method: SENSITIVITY, SERGO    Cefotetan Sensitive <=16 mcg/mL Final    Method: " SENSITIVITY, SERGO    Ceftazidime Resistant <=1 mcg/mL Final    Method: SENSITIVITY, SERGO    Ceftriaxone Extended Spectrum Beta Lactamase 32 mcg/mL Final    Method: SENSITIVITY, SERGO    Cefuroxime Resistant 8 mcg/mL Final    Method: SENSITIVITY, SERGO    Cephalothin Resistant >16 mcg/mL Final    Method: SENSITIVITY, SERGO    Ciprofloxacin Intermediate 2 mcg/mL Final    Method: SENSITIVITY, SERGO    Gentamicin Resistant >8 mcg/mL Final    Method: SENSITIVITY, SERGO    Levofloxacin Sensitive <=2 mcg/mL Final    Method: SENSITIVITY, SERGO    Nitrofurantoin Intermediate 64 mcg/mL Final    Method: SENSITIVITY, SERGO    Pip/Tazobactam Sensitive <=16 mcg/mL Final    Method: SENSITIVITY, SERGO    Piperacillin Resistant >64 mcg/mL Final    Method: SENSITIVITY, SERGO    Tigecycline Sensitive <=2 mcg/mL Final    Method: SENSITIVITY, SERGO    Tobramycin Resistant >8 mcg/mL Final    Method: SENSITIVITY, SERGO    Trimeth/Sulfa Resistant >2/38 mcg/mL Final    Method: SENSITIVITY, SERGO                       URINALYSIS,CULTURE IF INDICATED [[id number]]  (Abnormal) Collected:  08/24/18 0055    Order Status:  Completed Specimen:  Urine Updated:  08/24/18 0102     Color Yellow     Character Turbid (A)     Specific Gravity 1.017     Ph 6.0     Glucose Negative mg/dL      Ketones 15 (A) mg/dL      Protein 100 (A) mg/dL      Bilirubin Negative     Urobilinogen, Urine 0.2     Nitrite Negative     Leukocyte Esterase Large (A)     Occult Blood Moderate (A)     Micro Urine Req Microscopic    Blood Culture [[id number]] Collected:  08/24/18 0000    Order Status:  Canceled Specimen:  Other from Peripheral     Blood Culture [[id number]] Collected:  08/24/18 0000    Order Status:  Canceled Specimen:  Other from Peripheral           Assessment:  Active Hospital Problems    Diagnosis   • Upper GI bleed [K92.2]   • UTI (urinary tract infection) [N39.0]   • Sepsis (HCC) [A41.9]   • Choledocholithiasis [K80.50]   • Hyperlipidemia [E78.5]   • Tobacco abuse [Z72.0]   • Uncontrolled  type 2 diabetes mellitus with hyperosmolarity without coma (HCC) [E11.00]   • Diabetic peripheral angiopathy (HCC) [E11.51]   • Essential hypertension [I10]       Plan:  ESBL Klebsiella UTI  In setting of recent staton - removed.  Voiding well  Afebrile  Leukocytosis  Continue meropenem  Plan for 10 days total  Stop date 09/06/18    DM2  HgA1c 9.6  Maintain BS less than 150 to control infection    Upper GI bleed, improved  S/p EGD - severe erosive esophagitis  S/p blood transfusions  Hg Stable    SNF placement    Discussed with internal medicine/Dr. Acosta.

## 2018-08-29 NOTE — THERAPY
"Physical Therapy Evaluation completed.   Bed Mobility:  Supine to Sit: Modified Independent  Transfers: Sit to Stand: Modified Independent  Gait: Level Of Assist: Supervised with No Equipment Needed       Plan of Care: Patient with no further skilled PT needs in the acute care setting at this time  Discharge Recommendations: Equipment: No Equipment Needed.    Mr. Tatum is a 59 y/o male who presents to acute secondary to UTI and GI bleed. He presents with lower extremity strength that is equal bilaterally and WFL. He was able to perform gait, transfers, and bed mobility without physical assist. No additional acute physical therapy needs. He does require SNF stay due to IV antibiotics, may benefit from higher level of physical therapy services there to address IADLs and community tasks.     See \"Rehab Therapy-Acute\" Patient Summary Report for complete documentation.     "

## 2018-08-29 NOTE — PROGRESS NOTES
[facility] Hospitalist Progress Note    Date of Service: 2018    Chief Complaint  58 y.o. male admitted 2018 with UTI and GI bleed    Interval Problem Update  EE-remains with no symptoms, no belly pain. No bleeding issues.     UTI-urinating well. Clear urine in the urinal area.     Consultants/Specialty  Gastroenterology  Infectious disease    Disposition  SNF for long term IV abx's        Review of Systems   Constitutional: Negative for chills and fever.   Respiratory: Negative for cough.    Gastrointestinal: Negative for abdominal pain, nausea and vomiting.   Genitourinary: Negative for urgency.   Musculoskeletal: Negative for myalgias.   Skin: Negative for rash.   Neurological: Negative for dizziness and headaches.   Psychiatric/Behavioral: Negative for depression. The patient is not nervous/anxious.    All other systems reviewed and are negative.     Physical Exam  Laboratory/Imaging   Hemodynamics  Temp (24hrs), Av.8 °C (98.3 °F), Min:36.7 °C (98.1 °F), Max:36.8 °C (98.3 °F)   Temperature: 36.7 °C (98.1 °F)  Pulse  Av.4  Min: 55  Max: 100    Blood Pressure: 122/66      Respiratory      Respiration: 18, Pulse Oximetry: 92 %        RUL Breath Sounds: Clear;Diminished, RML Breath Sounds: Clear;Diminished, RLL Breath Sounds: Clear;Diminished, LEONARD Breath Sounds: Clear;Diminished, LLL Breath Sounds: Clear;Diminished    Fluids    Intake/Output Summary (Last 24 hours) at 18 1246  Last data filed at 18 0421   Gross per 24 hour   Intake                0 ml   Output             1900 ml   Net            -1900 ml       Nutrition  Orders Placed This Encounter   Procedures   • Diet Order Diabetic     Standing Status:   Standing     Number of Occurrences:   1     Order Specific Question:   Diet:     Answer:   Diabetic [3]     Physical Exam   Constitutional: He is oriented to person, place, and time. He appears cachectic. No distress.   HENT:   Head: Normocephalic and atraumatic.   Mouth/Throat: No  oropharyngeal exudate.   Eyes: Pupils are equal, round, and reactive to light. EOM are normal. No scleral icterus.   Neck: Normal range of motion. Neck supple.   Cardiovascular: Normal rate and regular rhythm.  Exam reveals no gallop.    Pulmonary/Chest: Effort normal and breath sounds normal. No stridor. No respiratory distress.   Abdominal: Soft. Bowel sounds are normal. He exhibits no distension. There is no tenderness. There is no rebound and no guarding.   Thin   Musculoskeletal: He exhibits no tenderness.   Neurological: He is alert and oriented to person, place, and time. Coordination normal.   Skin: Skin is warm and dry. He is not diaphoretic.   Psychiatric: He has a normal mood and affect. His behavior is normal.   Nursing note and vitals reviewed.      Recent Labs      08/27/18 0221 08/29/18   0035   WBC  13.4*  11.9*   RBC  3.77*  4.12*   HEMOGLOBIN  9.3*  9.9*   HEMATOCRIT  29.0*  32.0*   MCV  76.9*  77.7*   MCH  24.7*  24.0*   MCHC  32.1*  30.9*   RDW  53.1*  52.4*   PLATELETCT  387  434   MPV  9.6  9.8     Recent Labs      08/27/18 0221   SODIUM  137   POTASSIUM  3.7   CHLORIDE  105   CO2  24   GLUCOSE  183*   BUN  11   CREATININE  0.75   CALCIUM  8.5                      Assessment/Plan     Upper GI bleed- (present on admission)   Assessment & Plan    -continues to be stable at this time, daily CBC  Transfuse if hemoglobin drops to below 7 or if he becomes hemodynamically unstable  Gastroenterology did an EGD today which showed grade 4 esophagitis  PPI twice daily for 8 weeks then daily  -will need repeat EGD in 2 months and biopsies at that time to see if Garcia's is presence  -abstain from all ETOH, NSAIDs and ASA use        Choledocholithiasis- (present on admission)   Assessment & Plan    GI following-no new issues at this time and remains without belly pain  Monitor complete metabolic panel  MRCP and CT abdomen has been reviewed  PPI twice daily for 8 weeks then daily  Outpatient GI  follow-up for possible colonoscopy  Alcohol cessation        Sepsis (HCC)- (present on admission)   Assessment & Plan    This is sepsis (without associated acute organ dysfunction).   Possible source is UTI; may also be intra-abdominal  -see above for treatment plan          UTI (urinary tract infection)- (present on admission)   Assessment & Plan    Urine culture reveals Klebsiella pneumoniae ESBL   Meropenem, midline ordered, remains afebrile, remains asymptomatic  _ID following, will need long term IV abx's, SNF placement in process, no issues with current IV abx's        Tobacco abuse- (present on admission)   Assessment & Plan    Counseled already  Nicotine patch          Hyperlipidemia- (present on admission)   Assessment & Plan    Continue atorvastatin        Essential hypertension- (present on admission)   Assessment & Plan    Continue lisinopril        Diabetic peripheral angiopathy (HCC)- (present on admission)   Assessment & Plan    -monitor closely, reduce blood sugars and retain good control        Uncontrolled type 2 diabetes mellitus with hyperosmolarity without coma (HCC)- (present on admission)   Assessment & Plan    Poorly controlled as an outpatient  Hemoglobin A1c is 9.6  Continue Januvia and sliding scale insulin and glimepiride  -good blood sugar control here in the hospital          Quality-Core Measures   Reviewed items::  Labs reviewed, Medications reviewed and Radiology images reviewed  Dill catheter::  Urinary Tract Retention or Urinary Tract Obstruction

## 2018-08-29 NOTE — DISCHARGE PLANNING
Agency/Facility Name: [facility]  Spoke To: [facility]  Outcome: Patient declined due to non insurance provider.

## 2018-08-30 VITALS
SYSTOLIC BLOOD PRESSURE: 134 MMHG | HEIGHT: 74 IN | HEART RATE: 61 BPM | DIASTOLIC BLOOD PRESSURE: 78 MMHG | WEIGHT: 125 LBS | BODY MASS INDEX: 16.04 KG/M2 | OXYGEN SATURATION: 94 % | TEMPERATURE: 97.9 F | RESPIRATION RATE: 16 BRPM

## 2018-08-30 PROBLEM — K80.50 CHOLEDOCHOLITHIASIS: Status: RESOLVED | Noted: 2018-08-24 | Resolved: 2018-08-30

## 2018-08-30 PROBLEM — K92.2 UPPER GI BLEED: Status: RESOLVED | Noted: 2018-08-24 | Resolved: 2018-08-30

## 2018-08-30 PROBLEM — A41.9 SEPSIS (HCC): Status: RESOLVED | Noted: 2018-08-24 | Resolved: 2018-08-30

## 2018-08-30 LAB — GLUCOSE BLD-MCNC: 206 MG/DL (ref 65–99)

## 2018-08-30 PROCEDURE — 700102 HCHG RX REV CODE 250 W/ 637 OVERRIDE(OP): Performed by: INTERNAL MEDICINE

## 2018-08-30 PROCEDURE — 700105 HCHG RX REV CODE 258: Performed by: INTERNAL MEDICINE

## 2018-08-30 PROCEDURE — A9270 NON-COVERED ITEM OR SERVICE: HCPCS | Performed by: INTERNAL MEDICINE

## 2018-08-30 PROCEDURE — 700105 HCHG RX REV CODE 258: Performed by: HOSPITALIST

## 2018-08-30 PROCEDURE — 700102 HCHG RX REV CODE 250 W/ 637 OVERRIDE(OP): Performed by: HOSPITALIST

## 2018-08-30 PROCEDURE — A9270 NON-COVERED ITEM OR SERVICE: HCPCS | Performed by: HOSPITALIST

## 2018-08-30 PROCEDURE — 82962 GLUCOSE BLOOD TEST: CPT

## 2018-08-30 PROCEDURE — 700111 HCHG RX REV CODE 636 W/ 250 OVERRIDE (IP): Performed by: INTERNAL MEDICINE

## 2018-08-30 PROCEDURE — 99239 HOSP IP/OBS DSCHRG MGMT >30: CPT | Performed by: INTERNAL MEDICINE

## 2018-08-30 PROCEDURE — 99232 SBSQ HOSP IP/OBS MODERATE 35: CPT | Performed by: INTERNAL MEDICINE

## 2018-08-30 RX ORDER — OMEPRAZOLE 20 MG/1
20 CAPSULE, DELAYED RELEASE ORAL 2 TIMES DAILY
Qty: 60 CAP | Refills: 1
Start: 2018-08-30 | End: 2018-11-21

## 2018-08-30 RX ADMIN — OMEPRAZOLE 20 MG: 20 CAPSULE, DELAYED RELEASE ORAL at 06:17

## 2018-08-30 RX ADMIN — INSULIN HUMAN 4 UNITS: 100 INJECTION, SOLUTION PARENTERAL at 06:26

## 2018-08-30 RX ADMIN — ATORVASTATIN CALCIUM 10 MG: 10 TABLET, FILM COATED ORAL at 06:17

## 2018-08-30 RX ADMIN — SODIUM CHLORIDE, POTASSIUM CHLORIDE, SODIUM LACTATE AND CALCIUM CHLORIDE: 600; 310; 30; 20 INJECTION, SOLUTION INTRAVENOUS at 01:15

## 2018-08-30 RX ADMIN — GLIMEPIRIDE 2 MG: 2 TABLET ORAL at 06:17

## 2018-08-30 RX ADMIN — MEMANTINE HYDROCHLORIDE 5 MG: 10 TABLET ORAL at 06:16

## 2018-08-30 RX ADMIN — SITAGLIPTIN 100 MG: 100 TABLET, FILM COATED ORAL at 06:17

## 2018-08-30 RX ADMIN — MEROPENEM 500 MG: 500 INJECTION, POWDER, FOR SOLUTION INTRAVENOUS at 10:18

## 2018-08-30 RX ADMIN — LISINOPRIL 10 MG: 5 TABLET ORAL at 06:16

## 2018-08-30 RX ADMIN — BUPROPION HYDROCHLORIDE 300 MG: 150 TABLET, EXTENDED RELEASE ORAL at 06:16

## 2018-08-30 RX ADMIN — PANCRELIPASE 6000 UNITS: 30000; 6000; 19000 CAPSULE, DELAYED RELEASE PELLETS ORAL at 07:30

## 2018-08-30 RX ADMIN — MEROPENEM 500 MG: 500 INJECTION, POWDER, FOR SOLUTION INTRAVENOUS at 02:36

## 2018-08-30 ASSESSMENT — ENCOUNTER SYMPTOMS
ABDOMINAL PAIN: 0
FEVER: 0
VOMITING: 0
NAUSEA: 0
CHILLS: 0
DIARRHEA: 0
FLANK PAIN: 0
BACK PAIN: 0

## 2018-08-30 ASSESSMENT — PAIN SCALES - GENERAL
PAINLEVEL_OUTOF10: 0
PAINLEVEL_OUTOF10: 0

## 2018-08-30 NOTE — PROGRESS NOTES
Bedside report received by crystal Tinajero. Patient sitting up in bed watching TV at this time. POC discussed, verbalized understanding. No immediate concerns for patient at this time. All safety measures in place.

## 2018-08-30 NOTE — DISCHARGE PLANNING
Received Transport Form @ 8906  Spoke to Jana @ [facility]    [facility] 8/30 @1200 [facility]

## 2018-08-30 NOTE — PROGRESS NOTES
Pt discharged to [facility]. PT alert and orientated and states they understand discharge instructions with no further question. PIV removed. All belongings with pt.

## 2018-08-30 NOTE — THERAPY
Occupational Therapy Contact Note    OT order received, per RN pt is up self and completing ADLs without assistance. Will defer OT eval at this time as he does not have any needs. Please re-order should functional status change. Thank you for the consult.    Marilyn Hernández, OTR/L

## 2018-08-30 NOTE — DISCHARGE INSTRUCTIONS
Discharge Instructions    Discharged to other by medical transportation with escort. Discharged via wheelchair, hospital escort: Yes.  Special equipment needed: Not Applicable    Be sure to schedule a follow-up appointment with your primary care doctor or any specialists as instructed.     Discharge Plan:   Smoking Cessation Offered: Patient Counseled  Pneumococcal Vaccine Administered/Refused: Given (See MAR)  Influenza Vaccine Indication: Indicated: Not available from distributor/    I understand that a diet low in cholesterol, fat, and sodium is recommended for good health. Unless I have been given specific instructions below for another diet, I accept this instruction as my diet prescription.   Other diet: Diabetic Diet    Special Instructions: Sepsis, Adult  Sepsis is a serious infection of your blood or tissues that affects your whole body. The infection that causes sepsis may be bacterial, viral, fungal, or parasitic. Sepsis may be life threatening. Sepsis can cause your blood pressure to drop. This may result in shock. Shock causes your central nervous system and your organs to stop working correctly.  What increases the risk?  Sepsis can happen in anyone, but it is more likely to happen in people who have weakened immune systems.  What are the signs or symptoms?  Symptoms of sepsis can include:  · Fever or low body temperature (hypothermia).  · Rapid breathing (hyperventilation).  · Chills.  · Rapid heartbeat (tachycardia).  · Confusion or light-headedness.  · Trouble breathing.  · Urinating much less than usual.  · Cool, clammy skin or red, flushed skin.  · Other problems with the heart, kidneys, or brain.  How is this diagnosed?  Your health care provider will likely do tests to look for an infection, to see if the infection has spread to your blood, and to see how serious your condition is. Tests can include:  · Blood tests, including cultures of your blood.  · Cultures of other fluids from  your body, such as:  ¨ Urine.  ¨ Pus from wounds.  ¨ Mucus coughed up from your lungs.  · Urine tests other than cultures.  · X-ray exams or other imaging tests.  How is this treated?  Treatment will begin with elimination of the source of infection. If your sepsis is likely caused by a bacterial or fungal infection, you will be given antibiotic or antifungal medicines.  You may also receive:  · Oxygen.  · Fluids through an IV tube.  · Medicines to increase your blood pressure.  · A machine to clean your blood (dialysis) if your kidneys fail.  · A machine to help you breathe if your lungs fail.  Get help right away if:  You get an infection or develop any of the signs and symptoms of sepsis after surgery or a hospitalization.  This information is not intended to replace advice given to you by your health care provider. Make sure you discuss any questions you have with your health care provider.  Document Released: 09/15/2004 Document Revised: 05/25/2017 Document Reviewed: 08/25/2014  [employer] Interactive Patient Education © 2017 [employer]      · Is patient discharged on Warfarin / Coumadin?   No       Upper Gastrointestinal Bleeding  Upper gastrointestinal (GI) bleeding is bleeding from the swallowing tube (esophagus), stomach, or the first part of the small intestine (duodenum). If you have upper GI bleeding, you may vomit blood or have bloody or black stools.  Bleeding can range from mild to serious or even life-threatening. If there is a lot of bleeding, you may need to stay in the hospital.  What are the causes?  This condition may be caused by:  · Ulcer disease of the stomach (peptic ulcer) or duodenum. This is the most common cause of GI bleeding.  · Inflammation, irritation, or swelling of the esophagus (esophagitis).  · A tear in the esophagus.  · Cancer of the esophagus, stomach, or duodenum.  · An abnormal or weakened blood vessel in one of the upper GI structures.  · A bleeding disorder that impairs  the formation of blood clots and causes easy bleeding (coagulopathy).  What increases the risk?  The following factors may make you more likely to develop this condition:  · Being older than 60 years of age.  · Being male.  · Having another long-term disease, especially liver or kidney disease.  · Having a stomach infection caused by Helicobacter pylori bacteria.  · Having frequent or severe vomiting.  · Abusing alcohol.  · Taking certain medicines for a long time, such as:  ¨ NSAIDs.  ¨ Anticoagulants.  What are the signs or symptoms?  Symptoms of this condition include:  · Vomiting blood.  · Black or maroon-colored stools.  · Bloody stools.  · Weakness or dizziness.  · Heartburn.  · Abdominal pain.  · Difficulty swallowing.  · Weight loss.  · Yellow eyes or skin (jaundice).  · Racing heartbeat.  How is this diagnosed?  This condition may be diagnosed based on:  · Your symptoms and medical history.  · A physical exam. During the exam, your health care provider will check for signs of blood loss, such as low blood pressure and a rapid pulse.  · Tests, such as:  ¨ Blood tests to measure your blood cell count and to check for other signs of blood loss and clotting ability.  ¨ Blood tests to check your liver and kidney function.  ¨ A chest X-ray to look for a tear in the esophagus.  ¨ Endoscopy. In this procedure, a flexible scope is put down your esophagus and into your stomach or duodenum to look for the source of bleeding.  ¨ Angiogram. This may be done if the source of bleeding is not found during endoscopy. For an angiogram, X-rays are taken after a dye is injected into your bloodstream.  ¨ Nasogastric tube insertion. This is a tube passed through your nose and down into your stomach. It may be connected to a source of gentle suction to see if any blood comes out.  How is this treated?  Treatment for this condition depends on the cause of the bleeding. Active bleeding is treated at the hospital. Treatment may  include:  · Getting fluids through an IV tube inserted into one of your veins.  · Getting blood through an IV tube (blood transfusion).  · Getting high doses of medicine through the IV to lower stomach acid. This may be done to treat ulcer disease.  · Having endoscopy to treat an area of bleeding with high heat (coagulation), injections, or surgical clips.  · Having a procedure that involves first doing an angiogram and then blocking blood flow to the bleeding site (embolization).  · Stopping or changing some of your regular medicines for a certain amount of time.  · Having other surgical procedures if initial treatments do not control bleeding.  Follow these instructions at home:  · Take over-the-counter and prescription medicines only as told by your health care provider. You may need to avoid NSAIDs or other medicines that increase bleeding.  · Do not drink alcohol.  · Drink enough fluid to keep your urine clear or pale yellow.  · Follow instructions from your health care provider about eating or drinking restrictions.  · Return to your normal activities as told by your health care provider. Ask your health care provider what activities are safe for you.  · Do not use any tobacco products, such as cigarettes, chewing tobacco, and e-cigarettes. If you need help quitting, ask your health care provider.  · Keep all follow-up visits as told by your health care provider. This is important.  Contact a health care provider if:  · You have abdominal pain or heartburn.  · You have unexplained weight loss.  · You have trouble swallowing.  · You have frequent vomiting.  · You develop jaundice.  · You feel weak or dizzy.  · You need help to stop smoking or drinking alcohol.  Get help right away if:  · You have vomiting with blood.  · You have blood in your stools.  · You have severe cramps in your back or abdomen.  · Your symptoms of upper GI bleeding come back after treatment.  This information is not intended to replace  advice given to you by your health care provider. Make sure you discuss any questions you have with your health care provider.  Document Released: 05/04/2017 Document Revised: 08/20/2017 Document Reviewed: 05/04/2017  [employer] Interactive Patient Education © 2017 [employer]          Urinary Tract Infection, Adult  Introduction  A urinary tract infection (UTI) is an infection of any part of the urinary tract. The urinary tract includes the:  · Kidneys.  · Ureters.  · Bladder.  · Urethra.  These organs make, store, and get rid of pee (urine) in the body.  Follow these instructions at home:  · Take over-the-counter and prescription medicines only as told by your doctor.  · If you were prescribed an antibiotic medicine, take it as told by your doctor. Do not stop taking the antibiotic even if you start to feel better.  · Avoid the following drinks:  ¨ Alcohol.  ¨ Caffeine.  ¨ Tea.  ¨ Carbonated drinks.  · Drink enough fluid to keep your pee clear or pale yellow.  · Keep all follow-up visits as told by your doctor. This is important.  · Make sure to:  ¨ Empty your bladder often and completely. Do not to hold pee for long periods of time.  ¨ Empty your bladder before and after sex.  ¨ Wipe from front to back after a bowel movement if you are female. Use each tissue one time when you wipe.  Contact a doctor if:  · You have back pain.  · You have a fever.  · You feel sick to your stomach (nauseous).  · You throw up (vomit).  · Your symptoms do not get better after 3 days.  · Your symptoms go away and then come back.  Get help right away if:  · You have very bad back pain.  · You have very bad lower belly (abdominal) pain.  · You are throwing up and cannot keep down any medicines or water.  This information is not intended to replace advice given to you by your health care provider. Make sure you discuss any questions you have with your health care provider.  Document Released: 06/05/2009 Document Revised: 05/25/2017  Document Reviewed: 11/07/2016  © 2017 [employer]      Depression / Suicide Risk    As you are discharged from this [facility] facility, it is important to learn how to keep safe from harming yourself.    Recognize the warning signs:  · Abrupt changes in personality, positive or negative- including increase in energy   · Giving away possessions  · Change in eating patterns- significant weight changes-  positive or negative  · Change in sleeping patterns- unable to sleep or sleeping all the time   · Unwillingness or inability to communicate  · Depression  · Unusual sadness, discouragement and loneliness  · Talk of wanting to die  · Neglect of personal appearance   · Rebelliousness- reckless behavior  · Withdrawal from people/activities they love  · Confusion- inability to concentrate     If you or a loved one observes any of these behaviors or has concerns about self-harm, here's what you can do:  · Talk about it- your feelings and reasons for harming yourself  · Remove any means that you might use to hurt yourself (examples: pills, rope, extension cords, firearm)  · Get professional help from the community (Mental Health, Substance Abuse, psychological counseling)  · Do not be alone:Call your Safe Contact- someone whom you trust who will be there for you.  · Call your local CRISIS HOTLINE 763-0184 or 299-540-8342  · Call your local Children's Mobile Crisis Response Team [place] (265) 134-8397 or www.[employer]  · Call the toll free National Suicide Prevention Hotlines   · National Suicide Prevention Lifeline 251-234-TQBT (1334)  · National Hope Line Network 800-SUICIDE (554-1690)

## 2018-08-30 NOTE — PROGRESS NOTES
Infectious Disease Progress Note    Author: Fidelia Tabor M.D. Date & Time of service: 2018  10:21 AM    Chief Complaint:  FU ESBL Kleb UTI    Interval History:   AF no CBC feels well, voiding well without staton, no back pain   AF WBC 11.9 no new issues to report, continues to void well without Staton   AF patient feeling great and anxious to transfer to rehab which is planned for today  Labs Reviewed, Medications Reviewed, Radiology Reviewed and Wound Reviewed.    Review of Systems:  Review of Systems   Constitutional: Negative for chills and fever.   Gastrointestinal: Negative for abdominal pain, diarrhea, nausea and vomiting.   Genitourinary: Negative for dysuria and flank pain.   Musculoskeletal: Negative for back pain.       Hemodynamics:  Temp (24hrs), Av.7 °C (98 °F), Min:36.6 °C (97.9 °F), Max:36.8 °C (98.2 °F)  Temperature: 36.6 °C (97.9 °F)  Pulse  Av.7  Min: 55  Max: 100   Blood Pressure: 134/78       Physical Exam:  Physical Exam   Constitutional: He is oriented to person, place, and time. He appears well-developed.   Older than stated age   HENT:   Head: Normocephalic and atraumatic.   Eyes: Pupils are equal, round, and reactive to light. EOM are normal.   Neck: Neck supple.   Cardiovascular: Normal rate and regular rhythm.    Pulmonary/Chest: Effort normal. He has no wheezes. He has no rales.   Abdominal: Soft. There is no tenderness.   Musculoskeletal: He exhibits no edema.   PICC line   Neurological: He is alert and oriented to person, place, and time.   Skin: No rash noted.       Meds:    Current Facility-Administered Medications:   •  pancrelipase (Lip-Prot-Amyl)  •  meropenem (MERREM) IV  •  insulin regular **AND** Accu-Chek ACHS **AND** NOTIFY MD and PharmD **AND** glucose 4 g **AND** dextrose 50%  •  glimepiride  •  omeprazole  •  NS  •  Respiratory Care per Protocol  •  LR  •  NS  •  acetaminophen  •  ondansetron  •  ondansetron  •  promethazine  •  promethazine  •   prochlorperazine  •  nicotine **AND** Nicotine Replacement Patient Education Materials **AND** nicotine polacrilex  •  atorvastatin  •  buPROPion SR  •  SITagliptin  •  memantine  •  lisinopril  •  [DISCONTINUED] insulin regular **AND** Accu-Chek Q6 if NPO **AND** NOTIFY MD and PharmD **AND** glucose 4 g **AND** dextrose 50%    Labs:  Recent Labs      08/29/18   0035   WBC  11.9*   RBC  4.12*   HEMOGLOBIN  9.9*   HEMATOCRIT  32.0*   MCV  77.7*   MCH  24.0*   RDW  52.4*   PLATELETCT  434   MPV  9.8   NEUTSPOLYS  50.80   LYMPHOCYTES  35.50   MONOCYTES  10.20   EOSINOPHILS  2.00   BASOPHILS  0.80     No results for input(s): SODIUM, POTASSIUM, CHLORIDE, CO2, GLUCOSE, BUN, CPKTOTAL in the last 72 hours.  No results for input(s): ALBUMIN, TBILIRUBIN, ALKPHOSPHAT, TOTPROTEIN, ALTSGPT, ASTSGOT, CREATININE in the last 72 hours.    Imaging:  Ct-pancreas And Abdomen With & W/o    Result Date: 8/26/2018 8/26/2018 5:40 PM HISTORY/REASON FOR EXAM:  Dilated pancreatic duct on MRI. Evaluate for mass.. TECHNIQUE/EXAM DESCRIPTION:  CT pancreas and abdomen without and with contrast. Initial precontrast thick-section images were obtained through the pancreas.  Following this, nonionic contrast was administered by IV, and thin-section helical scanning performed from the diaphragmatic domes through the iliac crests.  Pancreatic parenchymal phase and portal venous phase (dual-phase) images were obtained following contrast administration. 100 mL of Omnipaque 350 nonionic contrast was administered. Low dose optimization technique was utilized for this CT exam including automated exposure control and adjustment of the mA and/or kV according to patient size. COMPARISON: MRI 8/25/2018. FINDINGS: Liver: Liver is unremarkable Spleen: Surgically absent. Biliary system: Gallbladder and biliary tree are unremarkable.. Common bile duct is not dilated. Pancreas: Extensive calcification throughout the atrophic pancreas. Pancreatic duct:  Significantly dilated main pancreatic duct, measuring 1.5 cm. Arterial vasculature: Moderate atherosclerotic disease of the abdominal aorta and its branches. Venous vasculature: The portal vein, superior mesenteric vein, and splenic vein are patent. Vascular encasement: None. Kidneys: Kidneys are unremarkable. Prominent extrarenal pelves. Adrenals: Adrenals are unremarkable. Lymph nodes: There are no enlarged nodes. Bowel: No bowel wall thickening or bowel dilatation. Moderate amount of stool throughout the colon. Trace amount fluid in the right lower quadrant. Pelvis: Not scanned. Lung bases: Moderate bilateral pleural effusions with patchy bibasilar opacities. Bones: No aggressive osseous lesion.     Extensive calcification throughout the atrophic pancreas could relate to chronic pancreatitis. Dilated pancreatic duct but no pancreatic head mass identified. This could be secondary to chronic pancreatitis. If there is continued clinical concern, further evaluation with US is recommended. Moderate bilateral pleural effusions with patchy bibasilar opacities. Trace amount fluid in the right lower quadrant.     Py-[id number]-h/[id number]    Result Date: 8/26/2018 8/25/2018 1:01 PM HISTORY/REASON FOR EXAM:  Abdominal pain possible common duct stone.. TECHNIQUE/EXAM DESCRIPTION: Magnetic resonance cholangiopancreatography. Magnetic resonance cholangiopancreatography was performed on with axial, coronal, and sagittal thin and thick section heavily T2-weighted sequences. 3-D cholangiographic multiplanar maximum intensity projection (MIP) images were created on a workstation.. The study was performed on a [employer] 1.5 Marnie MRI scanner. COMPARISON: Gallbladder ultrasound 8/23/2018 FINDINGS: The liver is mildly enlarged. There is no evidence for intrahepatic duct dilatation or hepatic mass lesion. The gallbladder has a normal size appearance and wall thickness without evidence of cholelithiasis. The spleen is absent. The pancreas is  atrophic. There is marked chronic dilatation of the pancreatic duct to 1 cm. There is a small hypointense filling defect in the proximal pancreatic duct. The bowel gas pattern is unremarkable. The kidneys have a normal size and appearance.     1.  Mild hepatomegaly. 2.  Absent spleen. 3.  No evidence of cholelithiasis or choledocholithiasis. 4.  Common duct at the upper limits of normal. 5.  Chronic atrophic changes of the pancreas with marked dilatation of the pancreatic duct. Recommend pancreatic CT scan to exclude intraductal papilloma. 6.  Report called and an Emergent Document Only message has been documented for MOE CRUZ in the [employer]  Critical Result system on 8/26/2018 10:38 AM, Message ID [id number].    Us-gallbladder    Result Date: 8/23/2018 8/23/2018 9:30 PM HISTORY/REASON FOR EXAM: Abdominal pain TECHNIQUE/EXAM DESCRIPTION AND NUMBER OF VIEWS:  Real-time sonography of the gallbladder. COMPARISON: None FINDINGS: There is no ascites. The liver is normal in contour. There is no evidence of solid mass lesion. The liver measures 15.37 cm. The liver is echogenic. The gallbladder is normal. There is no evidence of cholelithiasis. The gallbladder wall thickness measures 0.16 cm.  There is no pericholecystic fluid. The common duct measures 0.59 cm. Shadowing structure is seen measuring 4.0 mm that appears likely within the common bile duct. The pancreas is obscured by bowel gas. The visualized aorta is normal in caliber. Intrahepatic IVC is patent. The portal vein is patent with hepatopetal flow. The right kidney measures 10.55 cm. Punctate echogenic focus in the right kidney is seen.     1.  Suspected choledocholithiasis with borderline common bile duct dilatation suggesting possible mild obstruction. 2.  Echogenic liver compatible with fatty change versus fibrosis. 3.  Probable small nonobstructing right renal calculus       Micro:  Results     Procedure Component Value Units Date/Time    BLOOD  "CULTURE x2 [[id number]] Collected:  08/23/18 2101    Order Status:  Completed Specimen:  Blood from Peripheral Updated:  08/29/18 0300     Significant Indicator NEG     Source BLD     Site PERIPHERAL     Blood Culture No growth after 5 days of incubation.    Narrative:       Contact  Per Hospital Policy: Only change Specimen Src: to \"Line\" if  specified by physician order.    BLOOD CULTURE x2 [[id number]] Collected:  08/23/18 2121    Order Status:  Completed Specimen:  Blood from Peripheral Updated:  08/29/18 0300     Significant Indicator NEG     Source BLD     Site PERIPHERAL     Blood Culture No growth after 5 days of incubation.    Narrative:       Contact  Per Hospital Policy: Only change Specimen Src: to \"Line\" if  specified by physician order.    URINE CULTURE(NEW) [[id number]]  (Abnormal)  (Susceptibility) Collected:  08/23/18 0055    Order Status:  Completed Specimen:  Urine Updated:  08/26/18 1156     Significant Indicator POS (POS)     Source UR     Site --     Urine Culture -- (A)      Klebsiella pneumoniae ESBL  >100,000 cfu/mL  Extended Spectrum Beta-lactamase (ESBL) isolated.  ESBL's may be clinically resistant to therapy with  Penicillins,Cephalosporins or Aztreonam despite  apparent in vitro susceptibility to some of these agents.  The patient requires contact isolation.  Please contact pharmacy or an Infectious Disease Specialist  if you have any questions about appropriate therapy.   (A)    Narrative:       CALL  [facility]  [address] tel. [phone],    Culture & Susceptibility     KLEBSIELLA PNEUMONIAE ESBL     Antibiotic Sensitivity Microscan Unit Status    Ampicillin Resistant >16 mcg/mL Final    Method: SENSITIVITY, SERGO    Cefepime Resistant <=8 mcg/mL Final    Method: SENSITIVITY, SERGO    Cefotaxime Extended Spectrum Beta Lactamase 16 mcg/mL Final    Method: SENSITIVITY, SERGO    Cefotetan Sensitive <=16 mcg/mL Final    Method: SENSITIVITY, SERGO    Ceftazidime Resistant <=1 mcg/mL Final    Method: SENSITIVITY, " SERGO    Ceftriaxone Extended Spectrum Beta Lactamase 32 mcg/mL Final    Method: SENSITIVITY, SERGO    Cefuroxime Resistant 8 mcg/mL Final    Method: SENSITIVITY, SERGO    Cephalothin Resistant >16 mcg/mL Final    Method: SENSITIVITY, SERGO    Ciprofloxacin Intermediate 2 mcg/mL Final    Method: SENSITIVITY, SERGO    Gentamicin Resistant >8 mcg/mL Final    Method: SENSITIVITY, SERGO    Levofloxacin Sensitive <=2 mcg/mL Final    Method: SENSITIVITY, SERGO    Nitrofurantoin Intermediate 64 mcg/mL Final    Method: SENSITIVITY, SERGO    Pip/Tazobactam Sensitive <=16 mcg/mL Final    Method: SENSITIVITY, SERGO    Piperacillin Resistant >64 mcg/mL Final    Method: SENSITIVITY, SERGO    Tigecycline Sensitive <=2 mcg/mL Final    Method: SENSITIVITY, SERGO    Tobramycin Resistant >8 mcg/mL Final    Method: SENSITIVITY, SERGO    Trimeth/Sulfa Resistant >2/38 mcg/mL Final    Method: SENSITIVITY, SERGO                       URINALYSIS,CULTURE IF INDICATED [[id number]]  (Abnormal) Collected:  08/24/18 0055    Order Status:  Completed Specimen:  Urine Updated:  08/24/18 0102     Color Yellow     Character Turbid (A)     Specific Gravity 1.017     Ph 6.0     Glucose Negative mg/dL      Ketones 15 (A) mg/dL      Protein 100 (A) mg/dL      Bilirubin Negative     Urobilinogen, Urine 0.2     Nitrite Negative     Leukocyte Esterase Large (A)     Occult Blood Moderate (A)     Micro Urine Req Microscopic    Blood Culture [[id number]] Collected:  08/24/18 0000    Order Status:  Canceled Specimen:  Other from Peripheral     Blood Culture [[id number]] Collected:  08/24/18 0000    Order Status:  Canceled Specimen:  Other from Peripheral           Assessment:  Active Hospital Problems    Diagnosis   • Upper GI bleed [K92.2]   • UTI (urinary tract infection) [N39.0]   • Sepsis ([facility]) [A41.9]   • Choledocholithiasis [K80.50]   • Hyperlipidemia [E78.5]   • Tobacco abuse [Z72.0]   • Uncontrolled type 2 diabetes mellitus with hyperosmolarity without coma (HCC) [E11.00]   •  Diabetic peripheral angiopathy (HCC) [E11.51]   • Essential hypertension [I10]       Plan:  ESBL Klebsiella UTI  In setting of recent staton - removed.  Voiding well  Afebrile  Leukocytosis  Continue meropenem  Plan for 10 days total  Stop date 09/06/18    DM2  HgA1c 9.6  Maintain BS less than 150 to control infection    Upper GI bleed, improved  S/p EGD - severe erosive esophagitis  S/p blood transfusions  Hg Stable    SNF placement    Follow-up in ID clinic    Discussed with internal medicine/Dr. Acosta.

## 2018-08-30 NOTE — PROGRESS NOTES
Pt sitting up in bed, denies pain discussed plan of care, call light in reach, bed in lowest position.

## 2018-08-30 NOTE — CARE PLAN
Problem: Communication  Goal: The ability to communicate needs accurately and effectively will improve  Outcome: PROGRESSING AS EXPECTED  Patient educated regarding medications, procedures and plan of care.     Problem: Safety  Goal: Will remain free from injury  Outcome: PROGRESSING AS EXPECTED  Patient educated about risk of falls and reasoning for use of tread socks, calling for help, and bed alarms.

## 2018-08-30 NOTE — DISCHARGE SUMMARY
Discharge Summary    CHIEF COMPLAINT ON ADMISSION  Chief Complaint   Patient presents with   • UTI   • Abdominal Pain       Reason for Admission  Abdominal pain     CODE STATUS  Full Code    HPI & HOSPITAL COURSE  This is a 58 y.o. male here with above medical issues. He was found to have an upper GIB and was placed on protonix gtt. He was also placed on IV zosyn for empiric coverage of UTI. GI was consulted and patient underwent EGD with the following findings:    1.  Severe grade D reflux esophagitis.  2.  A 3 cm hiatal hernia.  3.  Mild scalloping of the duodenal folds, biopsied for celiac disease.  4.  Sedation time was 0815 and scope out time 0825.    Biopsies were negative for celiac disease. His bleeding stopped and his H/H remained stable. He was transitioned to PO omeprazole 20 mg BID, of which he will need to receive for at least 2 months and will need repeat EGD in 2-3 months to ensure resolution of erosions and biopsies will need to be taken at that time to R/O Garcia's Esophagus. Patient was extensively counseled on the importance of alcohol and tobacco cessation, both of which he understands. Additionally, his urine culture ended up growing ESBL Klebsiella and he was transitioned to IV merrem at recommendation of ID. He had a midline placed and he will need to receive this ABX through 9/6/18.     Additionally, patient had concern for choledocholithiasis. However, MRCP and CT A/P were negative for any clear CBD distortions or issues at this time. He will also need outpatient COLO by GI in 2-3 months as well. Additionally, CT pancreas protocol showed no evidence of pancreatic mass at this time.        Therefore, he is discharged in fair and stable condition to skilled nursing facility.    The patient met 2-midnight criteria for an inpatient stay at the time of discharge.      FOLLOW UP ITEMS POST DISCHARGE  F/U with GI in 1 month for and 2-3 months repeat EGD and COLO  F/U with his PCP in 1-2  weeks    DISCHARGE DIAGNOSES  Active Problems:    UTI (urinary tract infection) POA: Yes    Uncontrolled type 2 diabetes mellitus with hyperosmolarity without coma (HCC) POA: Yes    Diabetic peripheral angiopathy (HCC) POA: Yes    Essential hypertension POA: Yes    Hyperlipidemia POA: Yes    Tobacco abuse POA: Yes  Resolved Problems:    Upper GI bleed POA: Yes    Sepsis (HCC) POA: Yes    Choledocholithiasis POA: Yes      FOLLOW UP  Future Appointments  Date Time Provider Department Center   9/12/2018 1:40 PM M[facility]     No follow-up provider specified.    MEDICATIONS ON DISCHARGE     Medication List      START taking these medications      Instructions   NS SOLN 100 mL with meropenem 500 MG SOLR 500 mg   500 mg by Intravenous route every 6 hours for 7 days.  Dose:  500 mg     omeprazole 20 MG delayed-release capsule  Commonly known as:  PRILOSEC   Take 1 Cap by mouth 2 Times a Day.  Dose:  20 mg     pancrelipase (Lip-Prot-Amyl) 6000 units Cpep  Commonly known as:  CREON 6000   Take 1 Cap by mouth 3 times a day, with meals.  Dose:  1 Cap        CHANGE how you take these medications      Instructions   memantine 5 MG Tabs  What changed:  Another medication with the same name was removed. Continue taking this medication, and follow the directions you see here.  Commonly known as:  NAMENDA   TAKE 1 TAB BY MOUTH TWICE DAILY     Vitamin D 2000 units Caps  What changed:  Another medication with the same name was removed. Continue taking this medication, and follow the directions you see here.   Take 2 Caps by mouth every day.  Dose:  2 Cap        CONTINUE taking these medications      Instructions   acetaminophen 325 MG Tabs  Commonly known as:  TYLENOL   Take 650 mg by mouth.  Dose:  650 mg     atorvastatin 10 MG Tabs  Commonly known as:  LIPITOR   Take 1 tablet by mouth daily to prevent heart attacks and strokes     buPROPion 300 MG XL tablet  Commonly known as:  WELLBUTRIN XL   Take 1  tablet by mouth every morning     ferrous sulfate 325 (65 Fe) MG EC tablet   Take 1 Tab by mouth 3 times a day, with meals.  Dose:  325 mg     folic acid 1 MG Tabs  Commonly known as:  FOLVITE   Take 1 mg by mouth.  Dose:  1 mg     glimepiride 1 MG tablet  Commonly known as:  AMARYL   Take 1 mg by mouth 2 times a day.  Dose:  1 mg     lisinopril 10 MG Tabs  Commonly known as:  PRINIVIL   Take  by mouth.     metformin 1000 MG tablet  Commonly known as:  GLUCOPHAGE   Take 1,000 mg by mouth 2 times a day, with meals.  Dose:  1000 mg     nicotine polacrilex 2 MG Gum  Commonly known as:  NICORETTE   Take  by mouth.     ONETOUCH VERIO IQ SYSTEM w/Device Kit   by Does not apply route.     1SDKIO Soln   by Does not apply route.     tamsulosin 0.4 MG capsule  Commonly known as:  FLOMAX   Take 0.4 mg by mouth ONE-HALF HOUR AFTER BREAKFAST.  Dose:  0.4 mg     thiamine 100 MG tablet  Commonly known as:  THIAMINE   Take 100 mg by mouth.  Dose:  100 mg     TRADJENTA 5 MG Tabs tablet  Generic drug:  linagliptin   Take 5 mg by mouth every day.  Dose:  5 mg        STOP taking these medications    aspirin EC 81 MG Tbec  Commonly known as:  ECOTRIN     B-D UF III MINI PEN NEEDLES 31G X 5 MM Misc  Generic drug:  Insulin Pen Needle     GLUCAGON EMERGENCY 1 MG Kit  Generic drug:  Glucagon (rDNA)     ibuprofen 600 MG Tabs  Commonly known as:  MOTRIN     [employer] TEST STRIPS strip  Generic drug:  glucose blood     LIFESCAN FINEPOINT LANCETS Misc     MULTI-VITAMINS Tabs     ONETOUCH DELICA LANCETS FINE Misc     ONETOUCH VERIO strip  Generic drug:  glucose blood            Allergies  No Known Allergies    DIET  Orders Placed This Encounter   Procedures   • Diet Order Diabetic     Standing Status:   Standing     Number of Occurrences:   1     Order Specific Question:   Diet:     Answer:   Diabetic [3]       ACTIVITY  As tolerated.  Weight bearing as tolerated    LINES, DRAINS, AND WOUNDS  This is an automated list. Peripheral IVs will  be removed prior to discharge.  PIV Group Right Wrist 22g Flexible Catheter (Active)   Line Secured Taped;Transparent 8/29/2018  8:20 PM   Site Condition / Description Assessed;Patent;Clean;Dry;Intact 8/29/2018  8:20 PM   Dressing Type / Description Transparent;Clean;Dry;Intact 8/29/2018  8:20 PM   Dressing Status Observed 8/29/2018  8:20 PM   Saline Locked Yes 8/29/2018  8:20 PM   Infiltration Grading Used by [facility] and [facility] 0 8/29/2018  8:20 PM   Phlebitis Scale (Used by [facility]) 0 8/29/2018  8:20 PM   Date Primary Tubing Changed 08/29/18 8/29/2018  8:20 PM   NEXT Primary Tubing Change  08/28/18 8/28/2018  1:00 AM       PIV Group Left;Upper Upper Arm  Midline Catheter (for SM only) (Active)   Line Secured Taped;Transparent 8/29/2018  8:20 PM   Site Condition / Description Assessed;Patent;Clean;Dry;Intact 8/29/2018  8:20 PM   Dressing Type / Description Transparent;Clean;Dry;Intact 8/29/2018  8:20 PM   Dressing Status Observed 8/29/2018  8:20 PM   Saline Locked Yes 8/28/2018 11:06 AM   Infiltration Grading Used by [facility] and [facility] 0 8/29/2018  8:20 PM   Phlebitis Scale (Used by [facility]) 0 8/29/2018  8:20 PM   Date Primary Tubing Changed 08/28/18 8/29/2018  8:20 PM   NEXT Primary Tubing Change  09/01/18 8/29/2018  8:20 PM   NEXT Site Change Date 09/25/18 8/28/2018 11:06 AM                     MENTAL STATUS ON TRANSFER  Level of Consciousness: Alert  Orientation : Oriented x 4  Speech: Speech Clear    CONSULTATIONS  GI  ID    PROCEDURES  See above    IR-PICC LINE PLACEMENT   Final Result                  Ultrasound-guided PICC placement performed by qualified nursing staff as    above.          CT-PANCREAS AND ABDOMEN WITH & W/O   Final Result      Extensive calcification throughout the atrophic pancreas could relate to chronic pancreatitis.      Dilated pancreatic duct but no pancreatic head mass identified. This could be secondary to chronic pancreatitis. If there is continued clinical concern, further evaluation with  US is recommended.      Moderate bilateral pleural effusions with patchy bibasilar opacities. Trace amount fluid in the right lower quadrant.            [id number]   Final Result      1.  Mild hepatomegaly.      2.  Absent spleen.      3.  No evidence of cholelithiasis or choledocholithiasis.      4.  Common duct at the upper limits of normal.      5.  Chronic atrophic changes of the pancreas with marked dilatation of the pancreatic duct. Recommend pancreatic CT scan to exclude intraductal papilloma.      6.  Report called and an Emergent Document Only message has been documented for MOE CRUZ in the [employer] system on 8/26/2018 10:38 AM, Message ID [id number].      US-GALLBLADDER   Final Result         1.  Suspected choledocholithiasis with borderline common bile duct dilatation suggesting possible mild obstruction.   2.  Echogenic liver compatible with fatty change versus fibrosis.   3.  Probable small nonobstructing right renal calculus               LABORATORY  Lab Results   Component Value Date    SODIUM 137 08/27/2018    POTASSIUM 3.7 08/27/2018    CHLORIDE 105 08/27/2018    CO2 24 08/27/2018    GLUCOSE 183 (H) 08/27/2018    BUN 11 08/27/2018    CREATININE 0.75 08/27/2018        Lab Results   Component Value Date    WBC 11.9 (H) 08/29/2018    HEMOGLOBIN 9.9 (L) 08/29/2018    HEMATOCRIT 32.0 (L) 08/29/2018    PLATELETCT 434 08/29/2018        Total time of the discharge process exceeds 37 minutes.

## 2018-08-30 NOTE — DISCHARGE PLANNING
Agency/Facility Name: [facility]  Spoke To: Genia  Outcome: Patient declined due to no skilled need.

## 2018-09-04 ENCOUNTER — OFFICE VISIT (OUTPATIENT)
Dept: INFECTIOUS DISEASES | Facility: MEDICAL CENTER | Age: 58
End: 2018-09-04
Payer: COMMERCIAL

## 2018-09-04 VITALS
OXYGEN SATURATION: 96 % | WEIGHT: 120 LBS | BODY MASS INDEX: 14.92 KG/M2 | HEIGHT: 75 IN | TEMPERATURE: 98.7 F | DIASTOLIC BLOOD PRESSURE: 60 MMHG | SYSTOLIC BLOOD PRESSURE: 116 MMHG | HEART RATE: 102 BPM

## 2018-09-04 DIAGNOSIS — N30.00 ACUTE CYSTITIS WITHOUT HEMATURIA: ICD-10-CM

## 2018-09-04 DIAGNOSIS — E11.00 UNCONTROLLED TYPE 2 DIABETES MELLITUS WITH HYPEROSMOLARITY WITHOUT COMA, UNSPECIFIED WHETHER LONG TERM INSULIN USE: ICD-10-CM

## 2018-09-04 PROCEDURE — 99214 OFFICE O/P EST MOD 30 MIN: CPT | Performed by: NURSE PRACTITIONER

## 2018-09-04 NOTE — PROGRESS NOTES
Subjective:     Chief Complaint   Patient presents with   • Hospital Follow-up     ESBL Klebsiella UTI     Infectious Disease clinic follow up  Pawel Tatum 58 y.o.male in clinic today for evaluation and management of ESBL Klebsiella UTI. Primary care provider: Pcp Pt States None. This is my first time meeting Mr. Tatum. History of type 2 diabetes mellitus, chronic pancreatitis, hypertension, recurrent UTIs and history of traumatic brain injury. Pt unable to recall specific BS readings. HgbA1C 9.6 8/16/18. Pt currently residing at [facility] ([facility]     Interval History: pt hospitalized 8/24/18-8/30/18.  He was admitted for abdominal pain and hematemesis.  Patient found to have an upper GI bleed secondary to severe erosive esophagitis seen on EGD. He required blood transfusion. He was also noted to have a urinary tract infection prior to admission with ESBL Klebsiella, which was not treated.  Repeat urine cultures during this admission are the same.  Of note, patient states he has had a Dill catheter in place for approximately 3 weeks.  He states the Dill was placed when he was transferred to a skilled facility.  Dill was removed and patient voided without issue. Discharged to SNF on IV Meropenem.  End date 9/6/18.    Hospital records reviewed    Today 9/4/18: Patient reports feeling well. Pt stating that he is urinating without issue.  Denies dysuria, urgency, frequency, hesitancy, hematuria, flank pain.  Denies feeling generally ill, fevers/chills, general malaise, headache, n/v/d. Pt stating that he is tolerating the IV meropenem without adverse effect.      History reviewed. No pertinent past medical history.    Allergies: Patient has no known allergies.    Current medications and problem list reviewed with patient and updated in [facility].    ROS  As documented above in my HPI       Objective:     PE:   "/60   Pulse (!) 102   Temp 37.1 °C (98.7 °F)   Ht 1.905 m (6' 3\")   Wt 54.4 kg (120 lb)   SpO2 96%   BMI 15.00 kg/m²      Vital signs reviewed  Constitutional: patient is oriented to person, place, and time. Appears well-developed and well-nourished. No distress  Eyes: Conjunctivae normal and EOM are normal. Pupils are equal, round, and reactive to light.   Mouth/Throat: Lips without lesions, good dentition, oropharynx is clear and moist.  Neck: Trachea midline. Normal range of motion. Neck supple. No masses  Cardiovascular: Normal rate, regular rhythm, normal heart sounds and intact distal pulses. No murmur, gallop, or friction rub. No edema.  Pulmonary/Chest: No respiratory distress. Unlabored respiratory effort, lungs clear to auscultation. Diminished in the bases No wheezes or rales.   Abdominal: Soft, non tender. BS + x 4. No masses or hepatosplenomegaly. No CVA tenderness  Musculoskeletal: Normal range of motion. No tenderness, swelling, erythema, deformity noted.  Neurological: He is alert and oriented to person, place, and time. No cranial nerve deficit. Coordination normal.   Skin: Skin is warm and dry. Good turgor. No rashes visable.  KEVAN PICC/midline in place.  No erythema. Non tender. Dressing is slightly soiled and beginning to peel off. Unfortunately we do not have a PICC dressing change In clinic. Dressing was reinforced with Tegaderm. Orders written for skilled nursing to change PICC dressing upon arrival this morning back at the facility.  Psychiatric: Normal mood and affect. Behavior is normal.       Assessment and Plan:   The following treatment plan was discussed with patient at length  1. Acute cystitis without hematuria, ESBL Klebsiella UTI Suspected to be due to indwelling Dill catheter. Dill was removed on admission and patient has had no issues urinating since that time    2. Uncontrolled type 2 diabetes mellitus with hyperosmolarity without coma, unspecified whether long term " insulin use (HCC)  Schedule appointment with Dr. Garcia regarding diabetes management       Complete IV meropenem as scheduled. End date 9/6/18. DC PICC after last dose  From ID standpoint patient can return home to [facility] assisted living. Please schedule patient in hospital follow-up with his primary care provider Dr. Garcia  Discussed importance of glucose control and compliance with DM treatment regimen and diet modifications in regards to infection and wound healing. Keep blood sugars less than 150  Follow up: PRN. FU with PCP for ongoing chronic medical conditions.

## 2018-10-04 ENCOUNTER — OFFICE VISIT (OUTPATIENT)
Dept: MEDICAL GROUP | Facility: MEDICAL CENTER | Age: 58
End: 2018-10-04
Payer: COMMERCIAL

## 2018-10-04 VITALS
RESPIRATION RATE: 16 BRPM | BODY MASS INDEX: 14.92 KG/M2 | DIASTOLIC BLOOD PRESSURE: 74 MMHG | HEIGHT: 75 IN | HEART RATE: 86 BPM | SYSTOLIC BLOOD PRESSURE: 132 MMHG | TEMPERATURE: 98.4 F | WEIGHT: 120 LBS | OXYGEN SATURATION: 95 %

## 2018-10-04 DIAGNOSIS — E11.00 UNCONTROLLED TYPE 2 DIABETES MELLITUS WITH HYPEROSMOLARITY WITHOUT COMA (HCC): ICD-10-CM

## 2018-10-04 DIAGNOSIS — R41.3 MEMORY LOSS: ICD-10-CM

## 2018-10-04 DIAGNOSIS — K92.2 UGI BLEED: ICD-10-CM

## 2018-10-04 DIAGNOSIS — I10 ESSENTIAL HYPERTENSION: ICD-10-CM

## 2018-10-04 DIAGNOSIS — F33.42 RECURRENT MAJOR DEPRESSIVE DISORDER, IN FULL REMISSION (HCC): ICD-10-CM

## 2018-10-04 DIAGNOSIS — E55.9 VITAMIN D DEFICIENCY: ICD-10-CM

## 2018-10-04 DIAGNOSIS — D50.9 IRON DEFICIENCY ANEMIA, UNSPECIFIED IRON DEFICIENCY ANEMIA TYPE: ICD-10-CM

## 2018-10-04 DIAGNOSIS — E78.5 DYSLIPIDEMIA: ICD-10-CM

## 2018-10-04 DIAGNOSIS — N30.00 ACUTE CYSTITIS WITHOUT HEMATURIA: ICD-10-CM

## 2018-10-04 PROBLEM — N39.0 UTI (URINARY TRACT INFECTION): Status: RESOLVED | Noted: 2018-08-24 | Resolved: 2018-10-04

## 2018-10-04 PROCEDURE — 99214 OFFICE O/P EST MOD 30 MIN: CPT | Performed by: NURSE PRACTITIONER

## 2018-10-04 RX ORDER — LANOLIN ALCOHOL/MO/W.PET/CERES
325 CREAM (GRAM) TOPICAL
Qty: 90 TAB | Refills: 2 | Status: SHIPPED | OUTPATIENT
Start: 2018-10-04 | End: 2019-08-02

## 2018-10-04 RX ORDER — TAMSULOSIN HYDROCHLORIDE 0.4 MG/1
0.4 CAPSULE ORAL
Qty: 30 CAP | Refills: 5 | Status: SHIPPED | OUTPATIENT
Start: 2018-10-04 | End: 2019-09-25 | Stop reason: SDUPTHER

## 2018-10-04 RX ORDER — MEMANTINE HYDROCHLORIDE 5 MG/1
TABLET ORAL
Qty: 60 TAB | Refills: 5 | Status: SHIPPED | OUTPATIENT
Start: 2018-10-04 | End: 2019-09-25 | Stop reason: SDUPTHER

## 2018-10-04 RX ORDER — BUPROPION HYDROCHLORIDE 300 MG/1
TABLET ORAL
Qty: 30 TAB | Refills: 5 | Status: SHIPPED | OUTPATIENT
Start: 2018-10-04 | End: 2019-08-02

## 2018-10-04 RX ORDER — LISINOPRIL 10 MG/1
10 TABLET ORAL DAILY
Qty: 30 TAB | Refills: 5 | Status: SHIPPED | OUTPATIENT
Start: 2018-10-04 | End: 2019-08-02

## 2018-10-04 RX ORDER — GLIMEPIRIDE 4 MG/1
4 TABLET ORAL EVERY MORNING
Qty: 30 TAB | Refills: 5 | Status: ON HOLD | OUTPATIENT
Start: 2018-10-04 | End: 2018-12-21

## 2018-10-04 RX ORDER — MULTIVIT-MIN/IRON/FOLIC ACID/K 18-600-40
2 CAPSULE ORAL DAILY
Qty: 60 CAP | Refills: 5 | Status: SHIPPED
Start: 2018-10-04 | End: 2020-05-08

## 2018-10-04 RX ORDER — ATORVASTATIN CALCIUM 10 MG/1
TABLET, FILM COATED ORAL
Qty: 30 TAB | Refills: 5 | Status: SHIPPED | OUTPATIENT
Start: 2018-10-04 | End: 2019-11-06 | Stop reason: SDUPTHER

## 2018-10-04 RX ORDER — ASPIRIN 81 MG/1
81 TABLET, CHEWABLE ORAL DAILY
Qty: 30 TAB | Refills: 5 | Status: SHIPPED | OUTPATIENT
Start: 2018-10-04 | End: 2019-11-06 | Stop reason: SDUPTHER

## 2018-10-04 ASSESSMENT — ENCOUNTER SYMPTOMS
MEMORY LOSS: 1
DEPRESSION: 1

## 2018-10-04 NOTE — PROGRESS NOTES
Subjective:      Pawel Tatum is a 58 y.o. male who presents with Establish Care        CC: Patient is here today to establish care as well as for need of medications for his multiple health problems and referrals for his diabetes and recent upper GI bleed.  He was brought here today by someone from his group home but they declined coming in today to the exam room to give history and more information.    HPI Pawel Tatum      1. Acute cystitis without hematuria  Patient was at the hospital in August and was found to have UTI which was treated with antibiotics.  He states he improved and has had no further symptoms.  He was from the hospital transferred to rehab and now is in a group home.      2. Dyslipidemia  Patient currently on a statin and reports no myalgias.    3. Uncontrolled type 2 diabetes mellitus with hyperosmolarity without coma (HCC)  Patient was discharged from the hospital on oral medication.  He apparently was on insulin at one time but was admitted to the hospital earlier this year for hypoglycemia.  Unfortunately his hemoglobin A1c was in the 9 range.  It appears he was receiving most of his care last year from [facility] and he is listed as a type I diabetic.  He is listed as a type II diabetic and his hospital notes.  He is currently on metformin and Tradjenta as well as 2 mg of Amaryl daily.  However this is not keeping his blood sugars controlled.  He does not have a local endocrinologist.    4. Essential hypertension  Patient needs refills on his low-dose lisinopril.    5. UGI bleed  Patient was found to have an upper GI bleed in the hospital that resolved with omeprazole.  He was advised to follow-up with GI after discharge for repeat EGD but he states he has not seen them as of yet.  He had an EGD in the hospital which showed grade D reflux esophagitis.  He states he has had no further indigestion or nausea.    6. Vitamin D deficiency  Patient needs refills on medications.    7.  Iron deficiency anemia, unspecified iron deficiency anemia type  Patient supposed to be taking iron for history of anemia in hospital probably secondary to GI bleed.    8.  Depression: Patient currently in a group home and needs refills on Wellbutrin.    9.  Memory loss:  Patient on Namenda but I do not see that he is going to psychiatry.  Social History   Substance Use Topics   • Smoking status: Current Every Day Smoker     Packs/day: 0.25     Years: 15.00   • Smokeless tobacco: Never Used      Comment: Trying to quit   • Alcohol use No     Current Outpatient Prescriptions   Medication Sig Dispense Refill   • glimepiride (AMARYL) 4 MG Tab Take 1 Tab by mouth every morning. 30 Tab 5   • buPROPion (WELLBUTRIN XL) 300 MG XL tablet Take 1 tablet by mouth every morning 30 Tab 5   • atorvastatin (LIPITOR) 10 MG Tab Take 1 tablet by mouth daily to prevent heart attacks and strokes 30 Tab 5   • lisinopril (PRINIVIL) 10 MG Tab Take 1 Tab by mouth every day. 30 Tab 5   • memantine (NAMENDA) 5 MG Tab TAKE 1 TAB BY MOUTH TWICE DAILY 60 Tab 5   • metformin (GLUCOPHAGE) 1000 MG tablet Take 1 Tab by mouth 2 times a day, with meals. 60 Tab 5   • tamsulosin (FLOMAX) 0.4 MG capsule Take 1 Cap by mouth ONE-HALF HOUR AFTER BREAKFAST. 30 Cap 5   • Cholecalciferol (VITAMIN D) 2000 units Cap Take 2 Caps by mouth every day. 60 Cap 5   • aspirin (ASPIRIN 81) 81 MG Chew Tab chewable tablet Take 1 Tab by mouth every day. 30 Tab 5   • linagliptin (TRADJENTA) 5 MG Tab tablet Take 1 Tab by mouth every day. 30 Tab 5   • ferrous sulfate 325 (65 Fe) MG EC tablet Take 1 Tab by mouth 3 times a day, with meals. 90 Tab 2   • omeprazole (PRILOSEC) 20 MG delayed-release capsule Take 1 Cap by mouth 2 Times a Day. 60 Cap 1   • pancrelipase, Lip-Prot-Amyl, (CREON 6000) 6000 units Cap DR Particles Take 1 Cap by mouth 3 times a day, with meals. 450 Cap    • acetaminophen (TYLENOL) 325 MG Tab Take 650 mg by mouth.     • folic acid (FOLVITE) 1 MG Tab Take 1 mg  "by mouth.     • thiamine (THIAMINE) 100 MG tablet Take 100 mg by mouth.     • Blood Glucose Monitoring Suppl (ONETOUCH VERIO IQ SYSTEM) w/Device Kit by Does not apply route.     • Blood Glucose Calibration (ONETOUCH VERIO) Solution by Does not apply route.     • nicotine polacrilex (NICORETTE) 2 MG Gum Take  by mouth.       No current facility-administered medications for this visit.    History reviewed. No pertinent family history. History reviewed. No pertinent past medical history.    Review of Systems   Psychiatric/Behavioral: Positive for depression and memory loss.   All other systems reviewed and are negative.         Objective:     /74 (BP Location: Left arm, Patient Position: Sitting, BP Cuff Size: Adult)   Pulse 86   Temp 36.9 °C (98.4 °F) (Temporal)   Resp 16   Ht 1.905 m (6' 3\")   Wt 54.4 kg (120 lb)   SpO2 95%   BMI 15.00 kg/m²      Physical Exam   Constitutional: He is oriented to person, place, and time. He appears well-developed and well-nourished. No distress.   HENT:   Head: Normocephalic and atraumatic.   Right Ear: External ear normal.   Left Ear: External ear normal.   Nose: Nose normal.   Mouth/Throat: Oropharynx is clear and moist.   Eyes: Conjunctivae are normal. Right eye exhibits no discharge. Left eye exhibits no discharge.   Neck: Normal range of motion. Neck supple. No tracheal deviation present. No thyromegaly present.   Cardiovascular: Normal rate, regular rhythm and normal heart sounds.    No murmur heard.  Pulmonary/Chest: Effort normal and breath sounds normal. No respiratory distress. He has no wheezes. He has no rales.   Lymphadenopathy:     He has no cervical adenopathy.   Neurological: He is alert and oriented to person, place, and time. Coordination normal.   Skin: Skin is warm and dry. No rash noted. He is not diaphoretic. No erythema.   Psychiatric: He has a normal mood and affect. His behavior is normal. Judgment and thought content normal.   Patient pleasant " and able to answer some questions but cannot give detailed history regarding his diagnoses.  He feels he is well treated at his group home.   Nursing note and vitals reviewed.              Assessment/Plan:     1. Acute cystitis without hematuria  Patient asymptomatic and has finished antibiotics.    2. Dyslipidemia  We will refill patient's medications and he reports no myalgias.  He had significant workup at the hospital but will need repeat lab work in the near future.  - atorvastatin (LIPITOR) 10 MG Tab; Take 1 tablet by mouth daily to prevent heart attacks and strokes  Dispense: 30 Tab; Refill: 5    3. Uncontrolled type 2 diabetes mellitus with hyperosmolarity without coma (HCC)  Patient is listed as a type II diabetic from the hospital but his records from [facility] show him as a type I diabetic.  He was on insulin but was taken off this from the hospital earlier this year because of hypoglycemia.  There was probably some concern that he was not taking his insulin correctly as well.  I will increase his Amaryl from 2 mg to 4 mg today.  I will get him into endocrinology for follow-up.  - REFERRAL TO ENDOCRINOLOGY  - glimepiride (AMARYL) 4 MG Tab; Take 1 Tab by mouth every morning.  Dispense: 30 Tab; Refill: 5  - metformin (GLUCOPHAGE) 1000 MG tablet; Take 1 Tab by mouth 2 times a day, with meals.  Dispense: 60 Tab; Refill: 5  - aspirin (ASPIRIN 81) 81 MG Chew Tab chewable tablet; Take 1 Tab by mouth every day.  Dispense: 30 Tab; Refill: 5  - linagliptin (TRADJENTA) 5 MG Tab tablet; Take 1 Tab by mouth every day.  Dispense: 30 Tab; Refill: 5    4. Essential hypertension  Patient will continue on low-dose lisinopril and no cough reported.  - lisinopril (PRINIVIL) 10 MG Tab; Take 1 Tab by mouth every day.  Dispense: 30 Tab; Refill: 5    5. UGI bleed  Patient was supposed to follow-up with GI for possible repeat EGD and referral has been placed.  - REFERRAL TO GASTROENTEROLOGY    6. Vitamin D deficiency  Patient to  continue on vitamin D.  - Cholecalciferol (VITAMIN D) 2000 units Cap; Take 2 Caps by mouth every day.  Dispense: 60 Cap; Refill: 5    7. Iron deficiency anemia, unspecified iron deficiency anemia type  Patient on iron and will need repeat CBC in the next few months.  - ferrous sulfate 325 (65 Fe) MG EC tablet; Take 1 Tab by mouth 3 times a day, with meals.  Dispense: 90 Tab; Refill: 2    8. Recurrent major depressive disorder, in full remission (HCC)  If patient is not going to psychiatry, he may need referral for this on his next visit.  - buPROPion (WELLBUTRIN XL) 300 MG XL tablet; Take 1 tablet by mouth every morning  Dispense: 30 Tab; Refill: 5    9. Memory loss    - memantine (NAMENDA) 5 MG Tab; TAKE 1 TAB BY MOUTH TWICE DAILY  Dispense: 60 Tab; Refill: 5

## 2018-11-21 ENCOUNTER — APPOINTMENT (OUTPATIENT)
Dept: RADIOLOGY | Facility: MEDICAL CENTER | Age: 58
DRG: 638 | End: 2018-11-21
Attending: INTERNAL MEDICINE
Payer: COMMERCIAL

## 2018-11-21 ENCOUNTER — APPOINTMENT (OUTPATIENT)
Dept: RADIOLOGY | Facility: MEDICAL CENTER | Age: 58
DRG: 638 | End: 2018-11-21
Attending: STUDENT IN AN ORGANIZED HEALTH CARE EDUCATION/TRAINING PROGRAM
Payer: COMMERCIAL

## 2018-11-21 ENCOUNTER — HOSPITAL ENCOUNTER (INPATIENT)
Facility: MEDICAL CENTER | Age: 58
LOS: 30 days | DRG: 638 | End: 2018-12-21
Attending: EMERGENCY MEDICINE | Admitting: INTERNAL MEDICINE
Payer: COMMERCIAL

## 2018-11-21 DIAGNOSIS — R41.3 MEMORY LOSS: ICD-10-CM

## 2018-11-21 DIAGNOSIS — E11.10 DIABETIC KETOACIDOSIS WITHOUT COMA ASSOCIATED WITH TYPE 2 DIABETES MELLITUS (HCC): ICD-10-CM

## 2018-11-21 DIAGNOSIS — I10 ESSENTIAL HYPERTENSION: ICD-10-CM

## 2018-11-21 DIAGNOSIS — Z79.4 TYPE 2 DIABETES MELLITUS WITH HYPERGLYCEMIA, WITH LONG-TERM CURRENT USE OF INSULIN (HCC): ICD-10-CM

## 2018-11-21 DIAGNOSIS — K92.2 UGI BLEED: ICD-10-CM

## 2018-11-21 DIAGNOSIS — E11.00 UNCONTROLLED TYPE 2 DIABETES MELLITUS WITH HYPEROSMOLARITY WITHOUT COMA (HCC): ICD-10-CM

## 2018-11-21 DIAGNOSIS — F33.42 RECURRENT MAJOR DEPRESSIVE DISORDER, IN FULL REMISSION (HCC): ICD-10-CM

## 2018-11-21 DIAGNOSIS — K86.1 CHRONIC PANCREATITIS, UNSPECIFIED PANCREATITIS TYPE (HCC): ICD-10-CM

## 2018-11-21 DIAGNOSIS — E11.65 TYPE 2 DIABETES MELLITUS WITH HYPERGLYCEMIA, WITH LONG-TERM CURRENT USE OF INSULIN (HCC): ICD-10-CM

## 2018-11-21 DIAGNOSIS — E55.9 VITAMIN D DEFICIENCY: ICD-10-CM

## 2018-11-21 DIAGNOSIS — E78.5 DYSLIPIDEMIA: ICD-10-CM

## 2018-11-21 PROBLEM — E10.10 DIABETIC KETOACIDOSIS ASSOCIATED WITH TYPE 1 DIABETES MELLITUS (HCC): Status: ACTIVE | Noted: 2018-11-21

## 2018-11-21 PROBLEM — I95.9 HYPOTENSION: Status: ACTIVE | Noted: 2018-11-21

## 2018-11-21 LAB
ALBUMIN SERPL BCP-MCNC: 3.5 G/DL (ref 3.2–4.9)
ALBUMIN/GLOB SERPL: 1.5 G/DL
ALP SERPL-CCNC: 75 U/L (ref 30–99)
ALT SERPL-CCNC: 10 U/L (ref 2–50)
AMPHET UR QL SCN: NEGATIVE
ANION GAP SERPL CALC-SCNC: 23 MMOL/L (ref 0–11.9)
ANISOCYTOSIS BLD QL SMEAR: ABNORMAL
APPEARANCE UR: CLEAR
AST SERPL-CCNC: 10 U/L (ref 12–45)
B-OH-BUTYR SERPL-MCNC: 7.43 MMOL/L (ref 0.02–0.27)
BARBITURATES UR QL SCN: NEGATIVE
BASOPHILS # BLD AUTO: 0.9 % (ref 0–1.8)
BASOPHILS # BLD: 0.05 K/UL (ref 0–0.12)
BENZODIAZ UR QL SCN: POSITIVE
BILIRUB SERPL-MCNC: 0.3 MG/DL (ref 0.1–1.5)
BILIRUB UR QL STRIP.AUTO: NEGATIVE
BUN SERPL-MCNC: 54 MG/DL (ref 8–22)
BURR CELLS BLD QL SMEAR: NORMAL
BZE UR QL SCN: NEGATIVE
CALCIUM SERPL-MCNC: 8.2 MG/DL (ref 8.5–10.5)
CANNABINOIDS UR QL SCN: NEGATIVE
CHLORIDE SERPL-SCNC: 97 MMOL/L (ref 96–112)
CO2 SERPL-SCNC: 7 MMOL/L (ref 20–33)
COLOR UR: YELLOW
CREAT SERPL-MCNC: 1.5 MG/DL (ref 0.5–1.4)
EKG IMPRESSION: NORMAL
EOSINOPHIL # BLD AUTO: 0 K/UL (ref 0–0.51)
EOSINOPHIL NFR BLD: 0 % (ref 0–6.9)
ERYTHROCYTE [DISTWIDTH] IN BLOOD BY AUTOMATED COUNT: 72 FL (ref 35.9–50)
GLOBULIN SER CALC-MCNC: 2.3 G/DL (ref 1.9–3.5)
GLUCOSE BLD-MCNC: 501 MG/DL (ref 65–99)
GLUCOSE SERPL-MCNC: 598 MG/DL (ref 65–99)
GLUCOSE UR STRIP.AUTO-MCNC: >=1000 MG/DL
HCT VFR BLD AUTO: 41.5 % (ref 42–52)
HGB BLD-MCNC: 13 G/DL (ref 14–18)
KETONES UR STRIP.AUTO-MCNC: 15 MG/DL
LACTATE BLD-SCNC: 1.9 MMOL/L (ref 0.5–2)
LEUKOCYTE ESTERASE UR QL STRIP.AUTO: NEGATIVE
LYMPHOCYTES # BLD AUTO: 0.75 K/UL (ref 1–4.8)
LYMPHOCYTES NFR BLD: 12.5 % (ref 22–41)
MACROCYTES BLD QL SMEAR: ABNORMAL
MANUAL DIFF BLD: NORMAL
MCH RBC QN AUTO: 28.9 PG (ref 27–33)
MCHC RBC AUTO-ENTMCNC: 31.3 G/DL (ref 33.7–35.3)
MCV RBC AUTO: 92.2 FL (ref 81.4–97.8)
METHADONE UR QL SCN: NEGATIVE
MICRO URNS: ABNORMAL
MICROCYTES BLD QL SMEAR: ABNORMAL
MONOCYTES # BLD AUTO: 0.16 K/UL (ref 0–0.85)
MONOCYTES NFR BLD AUTO: 2.7 % (ref 0–13.4)
MORPHOLOGY BLD-IMP: NORMAL
NEUTROPHILS # BLD AUTO: 5.03 K/UL (ref 1.82–7.42)
NEUTROPHILS NFR BLD: 75 % (ref 44–72)
NEUTS BAND NFR BLD MANUAL: 8.9 % (ref 0–10)
NITRITE UR QL STRIP.AUTO: NEGATIVE
NRBC # BLD AUTO: 0.02 K/UL
NRBC BLD-RTO: 0.3 /100 WBC
OPIATES UR QL SCN: NEGATIVE
OVALOCYTES BLD QL SMEAR: NORMAL
OXYCODONE UR QL SCN: NEGATIVE
PCP UR QL SCN: NEGATIVE
PH UR STRIP.AUTO: 5 [PH]
PLATELET # BLD AUTO: 269 K/UL (ref 164–446)
PLATELET BLD QL SMEAR: NORMAL
PMV BLD AUTO: 11.6 FL (ref 9–12.9)
POIKILOCYTOSIS BLD QL SMEAR: NORMAL
POTASSIUM SERPL-SCNC: 5.2 MMOL/L (ref 3.6–5.5)
PROPOXYPH UR QL SCN: NEGATIVE
PROT SERPL-MCNC: 5.8 G/DL (ref 6–8.2)
PROT UR QL STRIP: NEGATIVE MG/DL
RBC # BLD AUTO: 4.5 M/UL (ref 4.7–6.1)
RBC BLD AUTO: PRESENT
RBC UR QL AUTO: NEGATIVE
SODIUM SERPL-SCNC: 127 MMOL/L (ref 135–145)
SP GR UR STRIP.AUTO: 1.02
TROPONIN I SERPL-MCNC: <0.01 NG/ML (ref 0–0.04)
UROBILINOGEN UR STRIP.AUTO-MCNC: 0.2 MG/DL
WBC # BLD AUTO: 6 K/UL (ref 4.8–10.8)

## 2018-11-21 PROCEDURE — 99291 CRITICAL CARE FIRST HOUR: CPT

## 2018-11-21 PROCEDURE — 36556 INSERT NON-TUNNEL CV CATH: CPT | Mod: RT,GC | Performed by: INTERNAL MEDICINE

## 2018-11-21 PROCEDURE — A9270 NON-COVERED ITEM OR SERVICE: HCPCS | Performed by: STUDENT IN AN ORGANIZED HEALTH CARE EDUCATION/TRAINING PROGRAM

## 2018-11-21 PROCEDURE — 85027 COMPLETE CBC AUTOMATED: CPT

## 2018-11-21 PROCEDURE — 93005 ELECTROCARDIOGRAM TRACING: CPT | Performed by: EMERGENCY MEDICINE

## 2018-11-21 PROCEDURE — 02HV33Z INSERTION OF INFUSION DEVICE INTO SUPERIOR VENA CAVA, PERCUTANEOUS APPROACH: ICD-10-PCS | Performed by: INTERNAL MEDICINE

## 2018-11-21 PROCEDURE — 96361 HYDRATE IV INFUSION ADD-ON: CPT

## 2018-11-21 PROCEDURE — 700105 HCHG RX REV CODE 258: Performed by: STUDENT IN AN ORGANIZED HEALTH CARE EDUCATION/TRAINING PROGRAM

## 2018-11-21 PROCEDURE — 700102 HCHG RX REV CODE 250 W/ 637 OVERRIDE(OP): Performed by: EMERGENCY MEDICINE

## 2018-11-21 PROCEDURE — 83735 ASSAY OF MAGNESIUM: CPT

## 2018-11-21 PROCEDURE — 84484 ASSAY OF TROPONIN QUANT: CPT

## 2018-11-21 PROCEDURE — 71045 X-RAY EXAM CHEST 1 VIEW: CPT

## 2018-11-21 PROCEDURE — 80307 DRUG TEST PRSMV CHEM ANLYZR: CPT

## 2018-11-21 PROCEDURE — 770022 HCHG ROOM/CARE - ICU (200)

## 2018-11-21 PROCEDURE — 82962 GLUCOSE BLOOD TEST: CPT

## 2018-11-21 PROCEDURE — 700105 HCHG RX REV CODE 258: Performed by: EMERGENCY MEDICINE

## 2018-11-21 PROCEDURE — 99291 CRITICAL CARE FIRST HOUR: CPT | Mod: 25 | Performed by: INTERNAL MEDICINE

## 2018-11-21 PROCEDURE — 84100 ASSAY OF PHOSPHORUS: CPT

## 2018-11-21 PROCEDURE — 82010 KETONE BODYS QUAN: CPT

## 2018-11-21 PROCEDURE — 96374 THER/PROPH/DIAG INJ IV PUSH: CPT

## 2018-11-21 PROCEDURE — 93005 ELECTROCARDIOGRAM TRACING: CPT

## 2018-11-21 PROCEDURE — 700102 HCHG RX REV CODE 250 W/ 637 OVERRIDE(OP): Performed by: STUDENT IN AN ORGANIZED HEALTH CARE EDUCATION/TRAINING PROGRAM

## 2018-11-21 PROCEDURE — 85007 BL SMEAR W/DIFF WBC COUNT: CPT

## 2018-11-21 PROCEDURE — 83605 ASSAY OF LACTIC ACID: CPT

## 2018-11-21 PROCEDURE — 81003 URINALYSIS AUTO W/O SCOPE: CPT

## 2018-11-21 PROCEDURE — 80053 COMPREHEN METABOLIC PANEL: CPT | Mod: 91

## 2018-11-21 RX ORDER — AMOXICILLIN 250 MG
2 CAPSULE ORAL 2 TIMES DAILY
Status: DISCONTINUED | OUTPATIENT
Start: 2018-11-22 | End: 2018-12-21 | Stop reason: HOSPADM

## 2018-11-21 RX ORDER — BUPROPION HYDROCHLORIDE 150 MG/1
150 TABLET, EXTENDED RELEASE ORAL 2 TIMES DAILY
Status: DISCONTINUED | OUTPATIENT
Start: 2018-11-22 | End: 2018-12-21 | Stop reason: HOSPADM

## 2018-11-21 RX ORDER — DEXTROSE AND SODIUM CHLORIDE 5; .45 G/100ML; G/100ML
INJECTION, SOLUTION INTRAVENOUS CONTINUOUS
Status: DISCONTINUED | OUTPATIENT
Start: 2018-11-21 | End: 2018-11-22

## 2018-11-21 RX ORDER — SODIUM CHLORIDE 9 MG/ML
1000 INJECTION, SOLUTION INTRAVENOUS ONCE
Status: COMPLETED | OUTPATIENT
Start: 2018-11-21 | End: 2018-11-21

## 2018-11-21 RX ORDER — SODIUM CHLORIDE 9 MG/ML
2000 INJECTION, SOLUTION INTRAVENOUS ONCE
Status: DISCONTINUED | OUTPATIENT
Start: 2018-11-21 | End: 2018-11-21

## 2018-11-21 RX ORDER — TAMSULOSIN HYDROCHLORIDE 0.4 MG/1
0.4 CAPSULE ORAL
Status: DISCONTINUED | OUTPATIENT
Start: 2018-11-22 | End: 2018-12-21 | Stop reason: HOSPADM

## 2018-11-21 RX ORDER — SODIUM CHLORIDE, SODIUM LACTATE, POTASSIUM CHLORIDE, AND CALCIUM CHLORIDE .6; .31; .03; .02 G/100ML; G/100ML; G/100ML; G/100ML
2000 INJECTION, SOLUTION INTRAVENOUS ONCE
Status: COMPLETED | OUTPATIENT
Start: 2018-11-21 | End: 2018-11-21

## 2018-11-21 RX ORDER — DEXTROSE AND SODIUM CHLORIDE 10; .45 G/100ML; G/100ML
INJECTION, SOLUTION INTRAVENOUS CONTINUOUS
Status: ACTIVE | OUTPATIENT
Start: 2018-11-21 | End: 2018-11-22

## 2018-11-21 RX ORDER — ATORVASTATIN CALCIUM 10 MG/1
10 TABLET, FILM COATED ORAL DAILY
Status: DISCONTINUED | OUTPATIENT
Start: 2018-11-22 | End: 2018-12-21 | Stop reason: HOSPADM

## 2018-11-21 RX ORDER — FERROUS SULFATE 325(65) MG
325 TABLET ORAL
Status: DISCONTINUED | OUTPATIENT
Start: 2018-11-21 | End: 2018-12-21 | Stop reason: HOSPADM

## 2018-11-21 RX ORDER — OMEPRAZOLE 20 MG/1
20 CAPSULE, DELAYED RELEASE ORAL EVERY MORNING
COMMUNITY
End: 2019-08-02

## 2018-11-21 RX ORDER — SODIUM CHLORIDE 9 MG/ML
INJECTION, SOLUTION INTRAVENOUS CONTINUOUS
Status: DISCONTINUED | OUTPATIENT
Start: 2018-11-21 | End: 2018-11-22

## 2018-11-21 RX ORDER — MAGNESIUM SULFATE HEPTAHYDRATE 40 MG/ML
4 INJECTION, SOLUTION INTRAVENOUS
Status: DISCONTINUED | OUTPATIENT
Start: 2018-11-21 | End: 2018-11-22

## 2018-11-21 RX ORDER — MAGNESIUM SULFATE HEPTAHYDRATE 40 MG/ML
2 INJECTION, SOLUTION INTRAVENOUS
Status: DISCONTINUED | OUTPATIENT
Start: 2018-11-21 | End: 2018-11-22

## 2018-11-21 RX ORDER — OMEPRAZOLE 20 MG/1
20 CAPSULE, DELAYED RELEASE ORAL EVERY MORNING
Status: DISCONTINUED | OUTPATIENT
Start: 2018-11-22 | End: 2018-12-21 | Stop reason: HOSPADM

## 2018-11-21 RX ORDER — SODIUM CHLORIDE 9 MG/ML
1000 INJECTION, SOLUTION INTRAVENOUS ONCE
Status: DISCONTINUED | OUTPATIENT
Start: 2018-11-21 | End: 2018-11-21

## 2018-11-21 RX ORDER — ACETAMINOPHEN 325 MG/1
650 TABLET ORAL EVERY 6 HOURS PRN
Status: DISCONTINUED | OUTPATIENT
Start: 2018-11-21 | End: 2018-12-21 | Stop reason: HOSPADM

## 2018-11-21 RX ORDER — BISACODYL 10 MG
10 SUPPOSITORY, RECTAL RECTAL
Status: DISCONTINUED | OUTPATIENT
Start: 2018-11-21 | End: 2018-12-21 | Stop reason: HOSPADM

## 2018-11-21 RX ORDER — BUPROPION HYDROCHLORIDE 300 MG/1
300 TABLET ORAL DAILY
Status: DISCONTINUED | OUTPATIENT
Start: 2018-11-22 | End: 2018-11-21

## 2018-11-21 RX ORDER — HEPARIN SODIUM 5000 [USP'U]/ML
5000 INJECTION, SOLUTION INTRAVENOUS; SUBCUTANEOUS EVERY 8 HOURS
Status: DISCONTINUED | OUTPATIENT
Start: 2018-11-21 | End: 2018-11-21

## 2018-11-21 RX ORDER — POLYETHYLENE GLYCOL 3350 17 G/17G
1 POWDER, FOR SOLUTION ORAL
Status: DISCONTINUED | OUTPATIENT
Start: 2018-11-21 | End: 2018-12-21 | Stop reason: HOSPADM

## 2018-11-21 RX ADMIN — INSULIN HUMAN 5 UNITS: 100 INJECTION, SOLUTION PARENTERAL at 20:46

## 2018-11-21 RX ADMIN — SODIUM CHLORIDE, POTASSIUM CHLORIDE, SODIUM LACTATE AND CALCIUM CHLORIDE 2000 ML: 600; 310; 30; 20 INJECTION, SOLUTION INTRAVENOUS at 20:54

## 2018-11-21 RX ADMIN — SODIUM CHLORIDE 2 UNITS/HR: 9 INJECTION, SOLUTION INTRAVENOUS at 23:55

## 2018-11-21 RX ADMIN — Medication 325 MG: at 23:31

## 2018-11-21 RX ADMIN — SODIUM CHLORIDE: 9 INJECTION, SOLUTION INTRAVENOUS at 21:47

## 2018-11-21 RX ADMIN — SODIUM CHLORIDE 1000 ML: 9 INJECTION, SOLUTION INTRAVENOUS at 19:45

## 2018-11-21 RX ADMIN — SODIUM CHLORIDE 1000 ML: 9 INJECTION, SOLUTION INTRAVENOUS at 18:00

## 2018-11-21 ASSESSMENT — ENCOUNTER SYMPTOMS
BLURRED VISION: 0
HEARTBURN: 0
CONSTIPATION: 0
COUGH: 0
CHILLS: 0
NECK PAIN: 0
WEAKNESS: 1
DEPRESSION: 0
VOMITING: 0
FEVER: 0
DIARRHEA: 0
BLOOD IN STOOL: 0
PALPITATIONS: 0
MYALGIAS: 0
SHORTNESS OF BREATH: 0
NAUSEA: 0
HEMOPTYSIS: 0
DIZZINESS: 0
ABDOMINAL PAIN: 0
HEADACHES: 0
DOUBLE VISION: 0

## 2018-11-21 ASSESSMENT — PAIN SCALES - GENERAL: PAINLEVEL_OUTOF10: 0

## 2018-11-22 PROBLEM — K86.1 CHRONIC PANCREATITIS (HCC): Status: ACTIVE | Noted: 2018-11-22

## 2018-11-22 PROBLEM — E83.39 HYPOPHOSPHATEMIA: Status: ACTIVE | Noted: 2018-11-22

## 2018-11-22 LAB
ALBUMIN SERPL BCP-MCNC: 2.9 G/DL (ref 3.2–4.9)
ALBUMIN/GLOB SERPL: 1.5 G/DL
ALP SERPL-CCNC: 64 U/L (ref 30–99)
ALT SERPL-CCNC: 11 U/L (ref 2–50)
ANION GAP SERPL CALC-SCNC: 16 MMOL/L (ref 0–11.9)
ANION GAP SERPL CALC-SCNC: 5 MMOL/L (ref 0–11.9)
ANION GAP SERPL CALC-SCNC: 9 MMOL/L (ref 0–11.9)
AST SERPL-CCNC: 9 U/L (ref 12–45)
BILIRUB SERPL-MCNC: 0.3 MG/DL (ref 0.1–1.5)
BUN SERPL-MCNC: 29 MG/DL (ref 8–22)
BUN SERPL-MCNC: 34 MG/DL (ref 8–22)
BUN SERPL-MCNC: 43 MG/DL (ref 8–22)
CALCIUM SERPL-MCNC: 7.2 MG/DL (ref 8.5–10.5)
CALCIUM SERPL-MCNC: 7.6 MG/DL (ref 8.5–10.5)
CALCIUM SERPL-MCNC: 7.7 MG/DL (ref 8.5–10.5)
CHLORIDE SERPL-SCNC: 103 MMOL/L (ref 96–112)
CHLORIDE SERPL-SCNC: 110 MMOL/L (ref 96–112)
CHLORIDE SERPL-SCNC: 110 MMOL/L (ref 96–112)
CO2 SERPL-SCNC: 13 MMOL/L (ref 20–33)
CO2 SERPL-SCNC: 17 MMOL/L (ref 20–33)
CO2 SERPL-SCNC: 21 MMOL/L (ref 20–33)
CORTIS SERPL-MCNC: 18.6 UG/DL (ref 0–23)
CREAT SERPL-MCNC: 0.74 MG/DL (ref 0.5–1.4)
CREAT SERPL-MCNC: 0.88 MG/DL (ref 0.5–1.4)
CREAT SERPL-MCNC: 1.06 MG/DL (ref 0.5–1.4)
EKG IMPRESSION: NORMAL
GLOBULIN SER CALC-MCNC: 1.9 G/DL (ref 1.9–3.5)
GLUCOSE BLD-MCNC: 103 MG/DL (ref 65–99)
GLUCOSE BLD-MCNC: 128 MG/DL (ref 65–99)
GLUCOSE BLD-MCNC: 153 MG/DL (ref 65–99)
GLUCOSE BLD-MCNC: 184 MG/DL (ref 65–99)
GLUCOSE BLD-MCNC: 203 MG/DL (ref 65–99)
GLUCOSE BLD-MCNC: 249 MG/DL (ref 65–99)
GLUCOSE BLD-MCNC: 256 MG/DL (ref 65–99)
GLUCOSE BLD-MCNC: 276 MG/DL (ref 65–99)
GLUCOSE BLD-MCNC: 285 MG/DL (ref 65–99)
GLUCOSE BLD-MCNC: 300 MG/DL (ref 65–99)
GLUCOSE BLD-MCNC: 337 MG/DL (ref 65–99)
GLUCOSE BLD-MCNC: 356 MG/DL (ref 65–99)
GLUCOSE BLD-MCNC: 454 MG/DL (ref 65–99)
GLUCOSE BLD-MCNC: 74 MG/DL (ref 65–99)
GLUCOSE BLD-MCNC: 90 MG/DL (ref 65–99)
GLUCOSE BLD-MCNC: 90 MG/DL (ref 65–99)
GLUCOSE BLD-MCNC: 93 MG/DL (ref 65–99)
GLUCOSE BLD-MCNC: 98 MG/DL (ref 65–99)
GLUCOSE SERPL-MCNC: 215 MG/DL (ref 65–99)
GLUCOSE SERPL-MCNC: 387 MG/DL (ref 65–99)
GLUCOSE SERPL-MCNC: 98 MG/DL (ref 65–99)
MAGNESIUM SERPL-MCNC: 1.6 MG/DL (ref 1.5–2.5)
MAGNESIUM SERPL-MCNC: 1.8 MG/DL (ref 1.5–2.5)
MAGNESIUM SERPL-MCNC: 1.9 MG/DL (ref 1.5–2.5)
PHOSPHATE SERPL-MCNC: 1.5 MG/DL (ref 2.5–4.5)
PHOSPHATE SERPL-MCNC: 1.6 MG/DL (ref 2.5–4.5)
PHOSPHATE SERPL-MCNC: 3.4 MG/DL (ref 2.5–4.5)
POTASSIUM SERPL-SCNC: 3.7 MMOL/L (ref 3.6–5.5)
POTASSIUM SERPL-SCNC: 3.9 MMOL/L (ref 3.6–5.5)
POTASSIUM SERPL-SCNC: 4.3 MMOL/L (ref 3.6–5.5)
PROT SERPL-MCNC: 4.8 G/DL (ref 6–8.2)
SODIUM SERPL-SCNC: 132 MMOL/L (ref 135–145)
SODIUM SERPL-SCNC: 136 MMOL/L (ref 135–145)
SODIUM SERPL-SCNC: 136 MMOL/L (ref 135–145)

## 2018-11-22 PROCEDURE — 93010 ELECTROCARDIOGRAM REPORT: CPT | Performed by: INTERNAL MEDICINE

## 2018-11-22 PROCEDURE — 700105 HCHG RX REV CODE 258: Performed by: INTERNAL MEDICINE

## 2018-11-22 PROCEDURE — 99291 CRITICAL CARE FIRST HOUR: CPT | Performed by: INTERNAL MEDICINE

## 2018-11-22 PROCEDURE — 80048 BASIC METABOLIC PNL TOTAL CA: CPT | Mod: 91

## 2018-11-22 PROCEDURE — 84100 ASSAY OF PHOSPHORUS: CPT

## 2018-11-22 PROCEDURE — 700105 HCHG RX REV CODE 258: Performed by: STUDENT IN AN ORGANIZED HEALTH CARE EDUCATION/TRAINING PROGRAM

## 2018-11-22 PROCEDURE — C1751 CATH, INF, PER/CENT/MIDLINE: HCPCS

## 2018-11-22 PROCEDURE — 700111 HCHG RX REV CODE 636 W/ 250 OVERRIDE (IP): Performed by: INTERNAL MEDICINE

## 2018-11-22 PROCEDURE — 36556 INSERT NON-TUNNEL CV CATH: CPT

## 2018-11-22 PROCEDURE — 83735 ASSAY OF MAGNESIUM: CPT

## 2018-11-22 PROCEDURE — 82533 TOTAL CORTISOL: CPT

## 2018-11-22 PROCEDURE — 700102 HCHG RX REV CODE 250 W/ 637 OVERRIDE(OP): Performed by: STUDENT IN AN ORGANIZED HEALTH CARE EDUCATION/TRAINING PROGRAM

## 2018-11-22 PROCEDURE — 93005 ELECTROCARDIOGRAM TRACING: CPT | Performed by: STUDENT IN AN ORGANIZED HEALTH CARE EDUCATION/TRAINING PROGRAM

## 2018-11-22 PROCEDURE — A9270 NON-COVERED ITEM OR SERVICE: HCPCS | Performed by: STUDENT IN AN ORGANIZED HEALTH CARE EDUCATION/TRAINING PROGRAM

## 2018-11-22 PROCEDURE — 82962 GLUCOSE BLOOD TEST: CPT | Mod: 91

## 2018-11-22 PROCEDURE — 770022 HCHG ROOM/CARE - ICU (200)

## 2018-11-22 PROCEDURE — 700101 HCHG RX REV CODE 250: Performed by: STUDENT IN AN ORGANIZED HEALTH CARE EDUCATION/TRAINING PROGRAM

## 2018-11-22 RX ORDER — INSULIN GLARGINE 100 [IU]/ML
10 INJECTION, SOLUTION SUBCUTANEOUS
Status: DISCONTINUED | OUTPATIENT
Start: 2018-11-22 | End: 2018-11-24

## 2018-11-22 RX ORDER — SODIUM CHLORIDE 9 MG/ML
1000 INJECTION, SOLUTION INTRAVENOUS ONCE
Status: COMPLETED | OUTPATIENT
Start: 2018-11-22 | End: 2018-11-22

## 2018-11-22 RX ORDER — POTASSIUM CHLORIDE 14.9 MG/ML
20 INJECTION INTRAVENOUS ONCE
Status: COMPLETED | OUTPATIENT
Start: 2018-11-22 | End: 2018-11-22

## 2018-11-22 RX ORDER — M-VIT,TX,IRON,MINS/CALC/FOLIC 27MG-0.4MG
1 TABLET ORAL DAILY
Status: DISCONTINUED | OUTPATIENT
Start: 2018-11-22 | End: 2018-12-21 | Stop reason: HOSPADM

## 2018-11-22 RX ORDER — MAGNESIUM SULFATE HEPTAHYDRATE 40 MG/ML
2 INJECTION, SOLUTION INTRAVENOUS ONCE
Status: COMPLETED | OUTPATIENT
Start: 2018-11-22 | End: 2018-11-22

## 2018-11-22 RX ORDER — SODIUM CHLORIDE, SODIUM LACTATE, POTASSIUM CHLORIDE, CALCIUM CHLORIDE 600; 310; 30; 20 MG/100ML; MG/100ML; MG/100ML; MG/100ML
1000 INJECTION, SOLUTION INTRAVENOUS ONCE
Status: COMPLETED | OUTPATIENT
Start: 2018-11-22 | End: 2018-11-22

## 2018-11-22 RX ORDER — SODIUM CHLORIDE, SODIUM LACTATE, POTASSIUM CHLORIDE, CALCIUM CHLORIDE 600; 310; 30; 20 MG/100ML; MG/100ML; MG/100ML; MG/100ML
INJECTION, SOLUTION INTRAVENOUS CONTINUOUS
Status: DISCONTINUED | OUTPATIENT
Start: 2018-11-22 | End: 2018-11-25

## 2018-11-22 RX ORDER — SODIUM CHLORIDE, SODIUM LACTATE, POTASSIUM CHLORIDE, CALCIUM CHLORIDE 600; 310; 30; 20 MG/100ML; MG/100ML; MG/100ML; MG/100ML
1000 INJECTION, SOLUTION INTRAVENOUS CONTINUOUS
Status: DISCONTINUED | OUTPATIENT
Start: 2018-11-22 | End: 2018-11-22

## 2018-11-22 RX ADMIN — ATORVASTATIN CALCIUM 10 MG: 10 TABLET, FILM COATED ORAL at 05:12

## 2018-11-22 RX ADMIN — DEXTROSE AND SODIUM CHLORIDE: 5; 450 INJECTION, SOLUTION INTRAVENOUS at 04:16

## 2018-11-22 RX ADMIN — SODIUM CHLORIDE, POTASSIUM CHLORIDE, SODIUM LACTATE AND CALCIUM CHLORIDE: 600; 310; 30; 20 INJECTION, SOLUTION INTRAVENOUS at 14:08

## 2018-11-22 RX ADMIN — INSULIN LISPRO 5 UNITS: 100 INJECTION, SOLUTION INTRAVENOUS; SUBCUTANEOUS at 18:17

## 2018-11-22 RX ADMIN — Medication 325 MG: at 11:29

## 2018-11-22 RX ADMIN — STANDARDIZED SENNA CONCENTRATE AND DOCUSATE SODIUM 2 TABLET: 8.6; 5 TABLET, FILM COATED ORAL at 17:57

## 2018-11-22 RX ADMIN — BUPROPION HYDROCHLORIDE 150 MG: 150 TABLET, EXTENDED RELEASE ORAL at 05:12

## 2018-11-22 RX ADMIN — POTASSIUM PHOSPHATE, MONOBASIC AND POTASSIUM PHOSPHATE, DIBASIC 30 MMOL: 224; 236 INJECTION, SOLUTION INTRAVENOUS at 14:08

## 2018-11-22 RX ADMIN — MULTIPLE VITAMINS W/ MINERALS TAB 1 TABLET: TAB at 11:30

## 2018-11-22 RX ADMIN — DIBASIC SODIUM PHOSPHATE, MONOBASIC POTASSIUM PHOSPHATE AND MONOBASIC SODIUM PHOSPHATE 1 TABLET: 852; 155; 130 TABLET ORAL at 17:57

## 2018-11-22 RX ADMIN — MAGNESIUM SULFATE IN WATER 2 G: 40 INJECTION, SOLUTION INTRAVENOUS at 04:23

## 2018-11-22 RX ADMIN — Medication 325 MG: at 17:57

## 2018-11-22 RX ADMIN — PANCRELIPASE 72000 UNITS: 24000; 76000; 120000 CAPSULE, DELAYED RELEASE PELLETS ORAL at 17:58

## 2018-11-22 RX ADMIN — SODIUM CHLORIDE 1000 ML: 9 INJECTION, SOLUTION INTRAVENOUS at 03:37

## 2018-11-22 RX ADMIN — BUPROPION HYDROCHLORIDE 150 MG: 150 TABLET, EXTENDED RELEASE ORAL at 17:57

## 2018-11-22 RX ADMIN — POTASSIUM CHLORIDE 20 MEQ: 14.9 INJECTION, SOLUTION INTRAVENOUS at 01:35

## 2018-11-22 RX ADMIN — INSULIN GLARGINE 10 UNITS: 100 INJECTION, SOLUTION SUBCUTANEOUS at 14:15

## 2018-11-22 RX ADMIN — VITAMIN D, TAB 1000IU (100/BT) 2000 UNITS: 25 TAB at 05:12

## 2018-11-22 RX ADMIN — DEXTROSE AND SODIUM CHLORIDE: 10; .45 INJECTION, SOLUTION INTRAVENOUS at 08:08

## 2018-11-22 RX ADMIN — OMEPRAZOLE 20 MG: 20 CAPSULE, DELAYED RELEASE ORAL at 05:12

## 2018-11-22 RX ADMIN — POTASSIUM CHLORIDE 20 MEQ: 14.9 INJECTION, SOLUTION INTRAVENOUS at 11:23

## 2018-11-22 RX ADMIN — POTASSIUM CHLORIDE 20 MEQ: 14.9 INJECTION, SOLUTION INTRAVENOUS at 05:14

## 2018-11-22 RX ADMIN — INSULIN LISPRO 5 UNITS: 100 INJECTION, SOLUTION INTRAVENOUS; SUBCUTANEOUS at 20:42

## 2018-11-22 RX ADMIN — SODIUM CHLORIDE, POTASSIUM CHLORIDE, SODIUM LACTATE AND CALCIUM CHLORIDE 1000 ML: 600; 310; 30; 20 INJECTION, SOLUTION INTRAVENOUS at 08:20

## 2018-11-22 ASSESSMENT — ENCOUNTER SYMPTOMS
VOMITING: 0
EYES NEGATIVE: 1
INSOMNIA: 0
PALPITATIONS: 0
NERVOUS/ANXIOUS: 0
DIAPHORESIS: 0
MYALGIAS: 1
BRUISES/BLEEDS EASILY: 0
DOUBLE VISION: 0
BACK PAIN: 0
HEARTBURN: 0
NAUSEA: 0
FLANK PAIN: 0
SHORTNESS OF BREATH: 0
SPUTUM PRODUCTION: 0
HEADACHES: 0
COUGH: 0
DIZZINESS: 0
BLURRED VISION: 0
SORE THROAT: 0
ABDOMINAL PAIN: 0
CHILLS: 0
LOSS OF CONSCIOUSNESS: 0
FEVER: 0
FOCAL WEAKNESS: 0

## 2018-11-22 ASSESSMENT — PAIN SCALES - GENERAL
PAINLEVEL_OUTOF10: 0

## 2018-11-22 NOTE — ASSESSMENT & PLAN NOTE
DKA protocols, complete  Fluid resuscitation, complete  Bicarb normal, anion gap closed  Transition to Lantus and medium sliding scale 11/22  Continue electrolyte supplementation as needed  Continue rehydration, patient appears dehydrated still, Hep-Lock IV fluids

## 2018-11-22 NOTE — ASSESSMENT & PLAN NOTE
Secondary to hypotension, holding home regimen  Monitor for appropriate timing to resume home regimen

## 2018-11-22 NOTE — ED PROVIDER NOTES
ED Provider Note    Scribed for Dr. Justin Forrester M.D. by Dada Peñaloza. 11/21/2018  5:37 PM    Primary care provider: JAKE Armando  Means of arrival: EMS  History obtained from: Patient  History limited by: None    CHIEF COMPLAINT  Chief Complaint   Patient presents with   • Loss of Appetite   • Hypotension       [facility]  Pawel Tatum is a 58 y.o. Male, with a history of diabetes, who presents to the Emergency Department via EMS for a loss of appetite. Patient lives in [facility], and was transferred to the ED as he has not been eating or taking in fluids over the last 2 days. His blood pressure has also been severely low at the facility. He was admitted the hospital 3 months ago for a GI bleed secondary to ulcers. Denies dizziness, polyuria.     Patient confirms metformin PO use to regulate his blood sugar which has been elevated recently during checks at his care facility. He underwent a colonoscopy yesterday and was instructed to return to the office today for a follow up.  Patient is somewhat poor historian.  He is diabetic, on oral agents and and reported he was not taking medications in the last couple of days history is somewhat limited secondary to his being a poor historian    REVIEW OF SYSTEMS  Pertinent positives include loss of appetite, low blood pressure. Pertinent negatives include no dizziness, polyuria. As above, all other systems reviewed and are negative.   See HPI for further details.     PAST MEDICAL HISTORY   Diabetes    SURGICAL HISTORY   has a past surgical history that includes gastroscopy-endo (N/A, 8/25/2018).    SOCIAL HISTORY  Social History   Substance Use Topics   • Smoking status: Current Every Day Smoker     Packs/day: 0.25     Years: 15.00   • Smokeless tobacco: Never Used      Comment: Trying to quit   • Alcohol use No      History   Drug Use No       FAMILY HISTORY  History reviewed. No pertinent family history.    CURRENT MEDICATIONS    Current  "Outpatient Prescriptions on File Prior to Encounter   Medication Sig Dispense Refill   • glimepiride (AMARYL) 4 MG Tab Take 1 Tab by mouth every morning. 30 Tab 5   • buPROPion (WELLBUTRIN XL) 300 MG XL tablet Take 1 tablet by mouth every morning 30 Tab 5   • atorvastatin (LIPITOR) 10 MG Tab Take 1 tablet by mouth daily to prevent heart attacks and strokes 30 Tab 5   • lisinopril (PRINIVIL) 10 MG Tab Take 1 Tab by mouth every day. 30 Tab 5   • memantine (NAMENDA) 5 MG Tab TAKE 1 TAB BY MOUTH TWICE DAILY 60 Tab 5   • metformin (GLUCOPHAGE) 1000 MG tablet Take 1 Tab by mouth 2 times a day, with meals. 60 Tab 5   • tamsulosin (FLOMAX) 0.4 MG capsule Take 1 Cap by mouth ONE-HALF HOUR AFTER BREAKFAST. 30 Cap 5   • Cholecalciferol (VITAMIN D) 2000 units Cap Take 2 Caps by mouth every day. 60 Cap 5   • aspirin (ASPIRIN 81) 81 MG Chew Tab chewable tablet Take 1 Tab by mouth every day. 30 Tab 5   • linagliptin (TRADJENTA) 5 MG Tab tablet Take 1 Tab by mouth every day. 30 Tab 5   • ferrous sulfate 325 (65 Fe) MG EC tablet Take 1 Tab by mouth 3 times a day, with meals. 90 Tab 2   • omeprazole (PRILOSEC) 20 MG delayed-release capsule Take 1 Cap by mouth 2 Times a Day. 60 Cap 1   • pancrelipase, Lip-Prot-Amyl, (CREON 6000) 6000 units Cap DR Particles Take 1 Cap by mouth 3 times a day, with meals. 450 Cap    • acetaminophen (TYLENOL) 325 MG Tab Take 650 mg by mouth.     • folic acid (FOLVITE) 1 MG Tab Take 1 mg by mouth.     • thiamine (THIAMINE) 100 MG tablet Take 100 mg by mouth.     • nicotine polacrilex (NICORETTE) 2 MG Gum Take  by mouth.     • Blood Glucose Monitoring Suppl (ONETOUCH VERIO IQ SYSTEM) w/Device Kit by Does not apply route.     • Blood Glucose Calibration (ONETOUCH VERIO) Solution by Does not apply route.        ALLERGIES  No Known Allergies    PHYSICAL EXAM  VITAL SIGNS: Pulse 61   Temp 36.5 °C (97.7 °F) (Temporal)   Ht 1.88 m (6' 2\")   Wt 54.4 kg (120 lb)   BMI 15.41 kg/m²     Constitutional: Well " developed, No distress, Cachectic and chronically ill-appearing  HENT: Normocephalic, Atraumatic, Bilateral external ears normal, Dry mucous membranes, No oral exudates.   Eyes: PERRLA, EOMI, Conjunctiva normal, No discharge.   Neck: No tenderness, Supple, No stridor.   Lymphatic: No lymphadenopathy noted.   Cardiovascular: Tachycardic, Normal rhythm.   Thorax & Lungs: Clear to auscultation bilaterally, tachypnea, No wheezing, No crackles.   Abdomen: Soft, No tenderness, No masses, No pulsatile masses.   Skin: Warm, Dry, No erythema, No rash.   Extremities:, No edema No cyanosis.   Musculoskeletal: No tenderness to palpation or major deformities noted.  Intact distal pulses  Neurologic: Awake, alert. Moves all extremities spontaneously.  Psychiatric: Affect odd, Judgment normal, Mood normal.     LABS  Results for orders placed or performed during the hospital encounter of 18   EKG   Result Value Ref Range    Report       [facility] Emergency Dept.    Test Date:  2018  Pt Name:    SHAUN CORCORAN                  Department: ER  MRN:        [id number]                      Room:        08  Gender:     Male                         Technician: [id number]  :        1960                   Requested By:ER TRIAGE PROTOCOL  Order #:    [id number]                    Reading MD:    Measurements  Intervals                                Axis  Rate:       187                          P:          84  WY:         185                          QRS:        106  QRSD:       102                          T:          80  QT:         396  QTc:        699    Interpretive Statements  SUPRAVENTRICULAR TACHYCARDIA  PAIRED VENTRICULAR PREMATURE COMPLEXES  ABERRANT COMPLEX, POSSIBLY SUPRAVENTRICULAR  RIGHT AXIS DEVIATION  ST ELEVATION, PROBABLE INFERIOR INJURY  ABNRM R PROG, CONSIDER ASMI OR LEAD PLACEMENT  Compared to ECG 2018 21:32:20  Ventricular premature complex(es) now present  Aberrant conducti on of  supraventricular beat(s) now present  Right-axis deviation now present  Myocardial infarct finding now present  Sinus tachycardia no longer present  Atrial premature complex(es) no longer present  ST (T wave) deviation still present        All labs reviewed by me.    EKG  Interpreted by me    Rhythm:  Normal sinus rhythm   Rate: 100  Axis: normal  Ectopy: none  Conduction: normal  ST Segments: Slight ST elevation in II and III  T Waves: no acute change  Q Waves: none  Prolonged OK interval  No reciprocal changes  A lot of artifact  Clinical Impression: Probably normal EKG except first degree AV block      COURSE & MEDICAL DECISION MAKING  Pertinent Labs & Imaging studies reviewed. (See chart for details)    5:37 PM - Patient seen and examined at bedside. Informed him that a work up would be conducted to assess his symptoms. Patient will be treated with IV fluids for hypotension. Ordered urinalysis, beta-hydroxybutyrate, CBC, CMP, troponin, and EKG to evaluate his symptoms. The differential diagnoses include but are not limited to: diabetic keto acidosis, hyperglycemia, sepsis    7:21 PM Informed by nurse that after the first liter of fluids, the patient's blood pressure raised for some time before dropping into the 70's systolic. He will be treated with additional IV fluids.    8:11 PM Informed by nurse of CO2 levels. Patient will be treated with an insulin drip.    8:15 PM Paged for ONOFRE Babb.     8:25 PM Consulted with Dr. Oviedo, ONOFRE Babb, regarding the patient's case. They will admit.     Decision Making:  Patient appeared acutely ill and probably chronically ill as well,, appears likely to be in diabetic ketoacidosis with elevated blood sugar and appearing quite dry tachycardic and hypotensive.  This did prove to be the case he was treated aggressively with IV fluid replacement for his diabetic ketoacidosis and marked dehydration he did have some response to treatment with improvement in blood pressure.  I have  gone ahead and started insulin drip at this time the patient will be admitted to ICU for diabetic ketoacidosis    DISPOSITION:  Patient will be admitted to Dr. Oviedo in guarded condition.     FINAL IMPRESSION  No diagnosis found.      IDada (Scribe), am scribing for, and in the presence of, Justin Forrester M.D..    Electronically signed by: Dada Peñaloza (Scribe), 11/21/2018    IJustin M.D. personally performed the services described in this documentation, as scribed by Dada Peñaloza in my presence, and it is both accurate and complete. C.    The note accurately reflects work and decisions made by me.  Justin Forrester  11/21/2018  11:05 PM

## 2018-11-22 NOTE — PROGRESS NOTES
Critical Care Progress Note    Date of admission  11/21/2018    Chief Complaint  58 y.o. male admitted 11/21/2018 with DKA/hypotension    Hospital Course    58 Y M that presents with DKA without Coma, B-HBA 7.4, AG 23, Co2 of 7. Transferred from assisted living with low appetite x 2 days, hypotension, of note had EGD/Colonoscopy on 11/20 for f/u of recent GIB, also has hx of pancreatic insufficiency, TBI, cognitive impairment, and HTN.         Interval Problem Update  Reviewed last 24 hour events:    A&O x4  Hemodynamics noted - soft Bp, not tachy  UO adequate  D10 1/2 NS  LR 1 liter bolus  I drip 4  LOOKS DRY!  BMPs reviewed    No CXR - last night - hyperinflated, rotated    Clinically assessed and reassessed in particular serial lab and blood sugar  Blood sugar trending down despite weaning insulin drip  Anion gap closing  Bicarb level 21  Magnesium and phosphorus remain low  Transition to SSI    Review of Systems  Review of Systems   Constitutional: Positive for malaise/fatigue. Negative for chills, diaphoresis and fever.   HENT: Negative for congestion and sore throat.    Eyes: Negative.    Respiratory: Negative for cough, sputum production and shortness of breath.    Cardiovascular: Negative for chest pain and palpitations.   Gastrointestinal: Negative for abdominal pain, nausea and vomiting.        GI symptoms improved   Genitourinary: Negative.    Musculoskeletal: Negative for back pain.   Skin: Negative for rash.   Neurological: Negative for focal weakness and headaches.   Endo/Heme/Allergies: Does not bruise/bleed easily.   Psychiatric/Behavioral: The patient is not nervous/anxious and does not have insomnia.         Vital Signs for last 24 hours   Temp:  [36.4 °C (97.5 °F)-36.6 °C (97.9 °F)] 36.6 °C (97.9 °F)  Pulse:  [] 77  Resp:  [14-36] 36    Hemodynamic parameters for last 24 hours       Respiratory       Physical Exam   Physical Exam   Constitutional: He is oriented to person, place, and  time. He appears lethargic. He is cooperative.  Non-toxic appearance. No distress.   HENT:   Head: Normocephalic and atraumatic.   Mouth/Throat: Mucous membranes are dry and not cyanotic.   Eyes: Pupils are equal, round, and reactive to light. EOM are normal. No scleral icterus.   Neck: Neck supple. No JVD present.   Cardiovascular: Normal rate, regular rhythm and intact distal pulses.   No extrasystoles are present.   No murmur heard.  Pulmonary/Chest: Breath sounds normal. No respiratory distress. He has no wheezes. He has no rhonchi. He has no rales.   Abdominal: Soft. Bowel sounds are normal. He exhibits no distension. There is no hepatosplenomegaly. There is no tenderness. There is no guarding and no CVA tenderness.   Musculoskeletal: He exhibits no edema.   Neurological: He is oriented to person, place, and time. He appears lethargic. No cranial nerve deficit or sensory deficit. GCS eye subscore is 4. GCS verbal subscore is 5. GCS motor subscore is 6.   Generalized weakness   Skin: Skin is warm. He is not diaphoretic.   Multiple areas of skin breakdown with Mepilex pads all over the shoulders the back the sacrum, see photographs and nursing       Medications  Current Facility-Administered Medications   Medication Dose Route Frequency Provider Last Rate Last Dose   • Influenza Vaccine Quad pf injection 0.5 mL  0.5 mL Intramuscular Once PRN Jeromy Jackman M.D.       • magnesium sulfate IVPB premix 2 g  2 g Intravenous Once Jeromy Jackman M.D. 25 mL/hr at 11/22/18 0423 2 g at 11/22/18 0423   • potassium chloride in water (KCL) ivpb **Administer in ICU only** 20 mEq  20 mEq Intravenous Once Jeromy Jackman M.D. 50 mL/hr at 11/22/18 0514 20 mEq at 11/22/18 0514   • dextrose 10% and 0.45% NaCl infusion   Intravenous Continuous Wilma Oviedo M.D.   Stopped at 11/21/18 2100   • MD ALERT-PHARMACY TO CONSULT FOR DKA MONITORING 1 Each  1 Each Other PRN Wilma Oviedo M.D.       • magnesium sulfate IVPB premix 2 g   2 g Intravenous Once PRN Wilma Oviedo M.D.        Or   • magnesium sulfate IVPB premix 4 g  4 g Intravenous Once PRN Wilma Oviedo M.D.       • potassium phosphates 30 mmol in  mL ivpb  30 mmol Intravenous Once PRN Wilma Oviedo M.D.        Or   • sodium phosphate 30 mmol in 1/2  mL ivpb  30 mmol Intravenous Once PRN Wilma Oviedo M.D.       • Adult DKA potassium(K+) replacement scale  1 Each Intravenous Q4HRS Wilma Oviedo M.D.   1 Each at 11/22/18 0400   • NS infusion   Intravenous Continuous Wilma Oviedo M.D.   Stopped at 11/22/18 0413   • D5 1/2 NS infusion   Intravenous Continuous Wilma Oviedo M.D. 100 mL/hr at 11/22/18 0416     • senna-docusate (PERICOLACE or SENOKOT S) 8.6-50 MG per tablet 2 Tab  2 Tab Oral BID Wilma Oviedo M.D.        And   • polyethylene glycol/lytes (MIRALAX) PACKET 1 Packet  1 Packet Oral QDAY PRN Wilma Oviedo M.D.        And   • magnesium hydroxide (MILK OF MAGNESIA) suspension 30 mL  30 mL Oral QDAY PRN Wilma Oviedo M.D.        And   • bisacodyl (DULCOLAX) suppository 10 mg  10 mg Rectal QDAY PRN Wilma Oviedo M.D.       • acetaminophen (TYLENOL) tablet 650 mg  650 mg Oral Q6HRS PRN Wilma Oviedo M.D.       • insulin regular human (HUMULIN/NOVOLIN R) 62.5 Units in  mL Infusion  2 Units/hr Intravenous Continuous Wilma Oviedo M.D. 24 mL/hr at 11/22/18 0516 6 Units/hr at 11/22/18 0516   • atorvastatin (LIPITOR) tablet 10 mg  10 mg Oral DAILY Wilma Oviedo M.D.   10 mg at 11/22/18 0512   • vitamin D (cholecalciferol) tablet 2,000 Units  2,000 Units Oral DAILY Wilma Oviedo M.D.   2,000 Units at 11/22/18 0512   • omeprazole (PRILOSEC) capsule 20 mg  20 mg Oral QAVIRGILIO Oviedo M.D.   20 mg at 11/22/18 0512   • tamsulosin (FLOMAX) capsule 0.4 mg  0.4 mg Oral AFTER BREAKFAST Wilma Oviedo M.D.       • ferrous sulfate tablet 325 mg  325 mg Oral TID WITH MEALS Wilma Oviedo M.D.   325 mg at 11/21/18 7511   • buPROPion SR  (WELLBUTRIN-SR) tablet 150 mg  150 mg Oral BID Wilma Oviedo M.D.   150 mg at 11/22/18 0512   • pancrelipase (Lip-Prot-Amyl) (CREON 93333) 85756-70942 units capsule 72,000 Units  3 Cap Oral TID WITH MEALS Wilma Oviedo M.D.   Stopped at 11/21/18 2245   • pancrelipase (Lip-Prot-Amyl) (CREON 46343) 90036-24754 units capsule 24,000 Units  1 Cap Oral With Snacks PRN Wilma Oviedo M.D.        And   • pancrelipase (Lip-Prot-Amyl) (CREON 6000) 6000 units capsule 12,000 Units  2 Cap Oral With Snacks PRN Wilma Oviedo M.D.           Fluids    Intake/Output Summary (Last 24 hours) at 11/22/18 0532  Last data filed at 11/22/18 0400   Gross per 24 hour   Intake          3716.65 ml   Output              700 ml   Net          3016.65 ml       Laboratory      Recent Labs      11/21/18 1841   TROPONINI  <0.01     Recent Labs      11/21/18 1841 11/21/18   2305  11/22/18   0400   SODIUM  127*  132*  136   POTASSIUM  5.2  4.3  3.7   CHLORIDE  97  103  110   CO2  7*  13*  17*   BUN  54*  43*  34*   CREATININE  1.50*  1.06  0.88   MAGNESIUM   --   1.6   --    PHOSPHORUS   --   3.4   --    CALCIUM  8.2*  7.7*  7.2*     Recent Labs      11/21/18 1841 11/21/18   2305  11/22/18   0400   ALTSGPT  10  11   --    ASTSGOT  10*  9*   --    ALKPHOSPHAT  75  64   --    TBILIRUBIN  0.3  0.3   --    GLUCOSE  598*  387*  215*     Recent Labs      11/21/18 1841 11/21/18 2305   WBC  6.0   --    NEUTSPOLYS  75.00*   --    LYMPHOCYTES  12.50*   --    MONOCYTES  2.70   --    EOSINOPHILS  0.00   --    BASOPHILS  0.90   --    ASTSGOT  10*  9*   ALTSGPT  10  11   ALKPHOSPHAT  75  64   TBILIRUBIN  0.3  0.3     Recent Labs      11/21/18 1841   RBC  4.50*   HEMOGLOBIN  13.0*   HEMATOCRIT  41.5*   PLATELETCT  269       Imaging  X-Ray:  I have personally reviewed the images and compared with prior images.  EKG:  I have personally reviewed the images and compared with prior images.    Assessment/Plan  * Diabetic ketoacidosis associated with  type 1 diabetes mellitus (HCC)   Assessment & Plan    DKA protocols  Fluid resuscitation  Anion gap closing, bicarb normal  Transition to Lantus and medium sliding scale  Continue electrolyte supplementation  Continue rehydration, patient appears dehydrated still  Concern of refeeding syndrome potential hair     Hypotension   Assessment & Plan    Strongly suspect a dramatic component of volume depletion  After 3-4 L IV fluids still looks dry this a.m.  Bolus 1 L lactated Ringer's over 4 hours and reassess     Chronic pancreatitis? ([facility])   Assessment & Plan    CT with extensive calcifications of the pancreas  Patient very cachectic and malnourished appearing  Query refeeding syndrome risk  Aggressive electrolyte repletion  Query absorption issues and pancreatic exocrine insufficiency  Dietary consultation  Vitamin supplementation     History of recent UGI bleed- (present on admission)   Assessment & Plan    No bleeding clinically today  Recent endoscopy 11/20  Severe reflux esophagitis  Continue PPI  GI consult as needed  Serial CBC  Transfuse hemoglobin to greater than 7.0, greater than 8.0 if actively bleeding     Essential hypertension- (present on admission)   Assessment & Plan    Secondary to hypotension, holding home regimen  Monitor     Hypophosphatemia   Assessment & Plan    Replete  Potassium and magnesium borderline low, replete as well per DKA electrolyte replacement protocols     Memory loss- (present on admission)   Assessment & Plan    Chronic  Long-term rule out dementia  Multiple metabolic abnormalities may be contributing now     Dyslipidemia- (present on admission)   Assessment & Plan    Resume home regimen when clinically appropriate     Tobacco abuse- (present on admission)   Assessment & Plan    Smoking cessation strongly encouraged  Query COPD, chest x-ray hyperinflated  Nicotine replacement therapy     Vitamin D deficiency- (present on admission)   Assessment & Plan    Replete          VTE:   Contraindicated  Ulcer: PPI  Lines: Central Line  Ongoing indication addressed    I have performed a physical exam and reviewed and updated ROS and Plan today (11/22/2018). In review of yesterday's note (11/21/2018), there are no changes except as documented above.     Patient hypotensive this a.m. when I arrive with blood pressure in 80s.  IV fluid bolus initiated improving so far.  Monitor for the need for placement of CVC initiation of pressor therapy.  Patient on insulin infusion with improving metabolic studies and but with some significant electrolyte disturbances persisting.  Blood sugar borderline.  Awaiting for next BMP.  We will either transition to sliding scale insulin or add additional dextrose and continue insulin infusion.  Aggressive electrolyte supplementation/optimization necessary.  Significant risk for refeeding syndrome.  May need wound care nurse, significant skin breakdown.  If pressure remains low reassess for sepsis presumably from a cutaneous or GI source.  COPD significant although respiratory status acceptable at this time, monitor for worsening respiratory issues and need for intervention.    Discussed patient condition and risk of morbidity and/or mortality with RN, RT, Pharmacy, [employer] [facility] resident, Charge nurse / hot rounds and Patient     The patient remains critically ill.  Critical care time = 35 minutes in directly providing and coordinating critical care and extensive data review.  No time overlap and excludes procedures.

## 2018-11-22 NOTE — ED NOTES
Pt sister called to inquire on status. States that she is on a skype number at this time, and her brother does not have it in his phone. [phone] Pt too tired to call at this time, states he will call her back at a later time. Attempted to call and notify sister, no answer.

## 2018-11-22 NOTE — ED TRIAGE NOTES
"Pawel Tatum  Pt bib EMS. Pt changed into gown and placed on monitor.     Chief Complaint   Patient presents with   • Loss of Appetite   • Hypotension     Pt curently living at [facility] and EMS was called d/t change in pt's behavior and hypotension. Per EMS, pt has not been eating or drinking for past 2 days. Pt also states he had a colonoscopy yesterday and states he was NPO for 24 hrs. Pt had positive orthostatics for EMS and assessment was repeated upon arrival. Pt was 94/47 while supine and when standing BP was 71/39. Per EMS, pt has hx of DM and EMS states BS read \"HI\" on machine.    Dr Forrester at bedside now for initial evaluation.  "

## 2018-11-22 NOTE — ED NOTES
Critical lab results of Glucose 598 and CO2 7 received from [place] in chemistry. Dr. Forrester notified. Sister also called back and left secondary number to reach her through her 's cellphone.  Bridger Araujo [phone]

## 2018-11-22 NOTE — ED NOTES
Bedside report given to [facility] ICU RN. Pt belongings and chart collected and sent. Pt denies any needs or concerns. Taken off floor, all pt care responsibilities handed off at this time.

## 2018-11-22 NOTE — ASSESSMENT & PLAN NOTE
-recent Hospital admission 8/2018 for an episode of upper GI bleed  -Patient had EGD done during hospitalization which showed grade D esophagitis, patient had repeat EGD & colonoscopy at 11/20 and EGD notable for localized nodularity of the mucosa at the GE junction about 5-6 mm.  Colonoscopy was also done but there was stool throughout the colon and a repeat was recommended.  Procedures with GI consultants  -We will hold anticoagulation in this setting, SCDs for DVT prophylaxis  -No apparent active bleed, Hb stable

## 2018-11-22 NOTE — ASSESSMENT & PLAN NOTE
-In setting of likely T2DM with concomitant pancreas dysfunction - patient endorsed fasting and not taking meds prior to 11/20/2018 EGD / colonoscopy, which likely triggered this event.  -HCO3 WNL, AG closed with insulin drip, now on subcutaneous glargine and lispro, home metformin.  -Resolved.

## 2018-11-22 NOTE — ASSESSMENT & PLAN NOTE
Strongly suspect a dramatic component of volume depletion  After 3-4 L IV fluids still looks dry this a.m.  Bolus 1 L lactated Ringer's over 4 hours and reassess  Improved

## 2018-11-22 NOTE — H&P
Internal Medicine Admitting History and Physical    Note Author: Wilma Oviedo M.D.       Name Pawel Tatum     1960   Age/Sex 58 y.o. male   MRN [id number]   Code Status Full     After 5PM or if no immediate response to page, please call for cross-coverage  Attending/Team: Dr. Jackman/LUIS See Patient List for primary contact information  Call [phone] to page    1st Call - Dr. Oviedo      Chief Complaint:   Hypotension    HPI:  58-year-old male with past medical history of type 1 diabetes(difficulties with insulin management in the past with hypoglycemia), pancreatic insufficiency, recent history of upper GI bleed secondary to esophagitis(2018), history of ESBL UTI, hypertension, tobacco use, history of TBI with mild cognitive impairment, history of pancreatic insufficiency who was sent to the hospital from his assisted living facility ([facility]) for poor appetite for the last 2 days and hypotension.    He notes that he had a colonoscopy scheduled for  and had not been eating for that, as well as not taking any of his medications for that.  Furthermore, on review of his records it appears that there is some confusion over whether he is a type I or type II diabetic.  Previous records from [place] suggest that he is a type I, however he has only been getting oral hyperglycemic agents as [facility] will not accept him if he has IV medications.    During my encounter, his blood pressure was 97/50, heart rate of 95 and saturating well at 98%.  On review of his labs, notable for anion gap of 23, potassium of 5.2 and bicarb of 7.  On review of EKG, it shows SVT, with some ST changes and aberrant conduction, however patient during my encounter was in normal rhythm and rate and has no complaints of chest pain, troponin 0.01.    Review of Systems   Constitutional: Positive for malaise/fatigue. Negative for chills and fever.   HENT: Negative for hearing loss and tinnitus.    Eyes: Negative for blurred  vision and double vision.   Respiratory: Negative for cough, hemoptysis and shortness of breath.    Cardiovascular: Negative for chest pain, palpitations and leg swelling.   Gastrointestinal: Negative for abdominal pain, blood in stool, constipation, diarrhea, heartburn, melena, nausea and vomiting.   Genitourinary: Negative for dysuria, frequency and urgency.   Musculoskeletal: Negative for myalgias and neck pain.   Neurological: Positive for weakness. Negative for dizziness and headaches.   Psychiatric/Behavioral: Negative for depression and suicidal ideas.             Past Medical History (Chronic medical problem, known complications and current treatment)     type 1 diabetes, pancreatic insufficiency, recent history of upper GI bleed secondary to esophagitis(8/2018), history of ESBL UTI, hypertension, tobacco use, history of TBI with mild cognitive impairment    Past Surgical History:  Past Surgical History:   Procedure Laterality Date   • GASTROSCOPY-ENDO N/A 8/25/2018    Procedure: GASTROSCOPY-ENDO;  Surgeon: Jaswant Frye M.D.;  Location[facility];  Service: Gastroenterology       Current Outpatient Medications:  Home Medications     Reviewed by Clarisse Singh (Pharmacy Tech) on 11/21/18 at 2118  Med List Status: Complete   Medication Last Dose Status   aspirin (ASPIRIN 81) 81 MG Chew Tab chewable tablet 11/21/2018 Active   atorvastatin (LIPITOR) 10 MG Tab 11/20/2018 Active   buPROPion (WELLBUTRIN XL) 300 MG XL tablet 11/21/2018 Active   Cholecalciferol (VITAMIN D) 2000 units Cap 11/21/2018 Active   ferrous sulfate 325 (65 Fe) MG EC tablet 11/21/2018 Active   glimepiride (AMARYL) 4 MG Tab 11/21/2018 Active   linagliptin (TRADJENTA) 5 MG Tab tablet 11/21/2018 Active   lisinopril (PRINIVIL) 10 MG Tab 11/21/2018 Active   memantine (NAMENDA) 5 MG Tab 11/21/2018 Active   metformin (GLUCOPHAGE) 1000 MG tablet 11/21/2018 Active   omeprazole (PRILOSEC) 20 MG delayed-release capsule  "11/21/2018 Active   Pancrelipase, Lip-Prot-Amyl, (CREON) 74850 units Cap DR Particles 11/21/2018 Active   tamsulosin (FLOMAX) 0.4 MG capsule 11/21/2018 Active                Medication Allergy/Sensitivities:  No Known Allergies      Family History (mandatory)   History reviewed. No pertinent family history.    Social History (mandatory)   Social History     Social History   • Marital status: Single     Spouse name: N/A   • Number of children: N/A   • Years of education: N/A     Occupational History   • Not on file.     Social History Main Topics   • Smoking status: Current Every Day Smoker     Packs/day: 0.25     Years: 15.00   • Smokeless tobacco: Never Used      Comment: Trying to quit   • Alcohol use No   • Drug use: No   • Sexual activity: Not Currently     Other Topics Concern   • Not on file     Social History Narrative   • No narrative on file     Living situation: Lives at an assisted living facility called [facility]  PCP : JAKE Armando    Physical Exam     Vitals:    11/21/18 2045 11/21/18 2056 11/21/18 2100 11/21/18 2114   Pulse:       Resp:  18  (!) 23   Temp:       TempSrc:       SpO2: 96%  98% 97%   Weight:       Height:         Body mass index is 15.41 kg/m².  Pulse 87   Temp 36.5 °C (97.7 °F) (Temporal)   Resp (!) 23   Ht 1.88 m (6' 2\")   Wt 54.4 kg (120 lb)   SpO2 97%   BMI 15.41 kg/m²   O2 therapy: Pulse Oximetry: 97 %    Physical Exam   Constitutional: He is oriented to person, place, and time. No distress.   Extremely thin male, frail appearing, appears much older than his stated age   HENT:   Head: Normocephalic and atraumatic.   Mouth/Throat: No oropharyngeal exudate.   Eyes: Conjunctivae are normal. No scleral icterus.   Cardiovascular: Normal rate, regular rhythm and normal heart sounds.  Exam reveals no friction rub.    No murmur heard.  Pulmonary/Chest: Effort normal and breath sounds normal. No stridor. No respiratory distress. He has no wheezes.   Abdominal: Soft. Bowel sounds " are normal. He exhibits no distension. There is no tenderness.   Musculoskeletal: He exhibits no edema or tenderness.   Lymphadenopathy:     He has no cervical adenopathy.   Neurological: He is alert and oriented to person, place, and time. Coordination normal.   Skin: Skin is warm and dry. He is not diaphoretic. No erythema.   Bruising on his lower extremity   Psychiatric: Affect and judgment normal.         Data Review       Old Records Request:   Completed  Current Records review/summary: Completed    Lab Data Review:  Recent Results (from the past 24 hour(s))   EKG    Collection Time: 18  5:31 PM   Result Value Ref Range    Report       [facility] Emergency Dept.    Test Date:  2018  Pt Name:    SHAUN CORCORAN                  Department: ER  MRN:        [id number]                      Room:        08  Gender:     Male                         Technician: [id number]  :        1960                   Requested By:ER TRIAGE PROTOCOL  Order #:    [id number]                    Reading MD:    Measurements  Intervals                                Axis  Rate:       187                          P:          84  AL:         185                          QRS:        106  QRSD:       102                          T:          80  QT:         396  QTc:        699    Interpretive Statements  SUPRAVENTRICULAR TACHYCARDIA  PAIRED VENTRICULAR PREMATURE COMPLEXES  ABERRANT COMPLEX, POSSIBLY SUPRAVENTRICULAR  RIGHT AXIS DEVIATION  ST ELEVATION, PROBABLE INFERIOR INJURY  ABNRM R PROG, CONSIDER ASMI OR LEAD PLACEMENT  Compared to ECG 2018 21:32:20  Ventricular premature complex(es) now present  Aberrant conducti on of supraventricular beat(s) now present  Right-axis deviation now present  Myocardial infarct finding now present  Sinus tachycardia no longer present  Atrial premature complex(es) no longer present  ST (T wave) deviation still present     ACCU-CHEK GLUCOSE    Collection Time: 18   6:12 PM   Result Value Ref Range    Glucose - Accu-Ck 501 (HH) 65 - 99 mg/dL   CBC w/ Differential    Collection Time: 11/21/18  6:41 PM   Result Value Ref Range    WBC 6.0 4.8 - 10.8 K/uL    RBC 4.50 (L) 4.70 - 6.10 M/uL    Hemoglobin 13.0 (L) 14.0 - 18.0 g/dL    Hematocrit 41.5 (L) 42.0 - 52.0 %    MCV 92.2 81.4 - 97.8 fL    MCH 28.9 27.0 - 33.0 pg    MCHC 31.3 (L) 33.7 - 35.3 g/dL    RDW 72.0 (H) 35.9 - 50.0 fL    Platelet Count 269 164 - 446 K/uL    MPV 11.6 9.0 - 12.9 fL    Neutrophils-Polys 75.00 (H) 44.00 - 72.00 %    Lymphocytes 12.50 (L) 22.00 - 41.00 %    Monocytes 2.70 0.00 - 13.40 %    Eosinophils 0.00 0.00 - 6.90 %    Basophils 0.90 0.00 - 1.80 %    Nucleated RBC 0.30 /100 WBC    Neutrophils (Absolute) 5.03 1.82 - 7.42 K/uL    Lymphs (Absolute) 0.75 (L) 1.00 - 4.80 K/uL    Monos (Absolute) 0.16 0.00 - 0.85 K/uL    Eos (Absolute) 0.00 0.00 - 0.51 K/uL    Baso (Absolute) 0.05 0.00 - 0.12 K/uL    NRBC (Absolute) 0.02 K/uL    Anisocytosis 3+     Macrocytosis 1+     Microcytosis 1+    Complete Metabolic Panel (CMP)    Collection Time: 11/21/18  6:41 PM   Result Value Ref Range    Sodium 127 (L) 135 - 145 mmol/L    Potassium 5.2 3.6 - 5.5 mmol/L    Chloride 97 96 - 112 mmol/L    Co2 7 (LL) 20 - 33 mmol/L    Anion Gap 23.0 (H) 0.0 - 11.9    Glucose 598 (HH) 65 - 99 mg/dL    Bun 54 (H) 8 - 22 mg/dL    Creatinine 1.50 (H) 0.50 - 1.40 mg/dL    Calcium 8.2 (L) 8.5 - 10.5 mg/dL    AST(SGOT) 10 (L) 12 - 45 U/L    ALT(SGPT) 10 2 - 50 U/L    Alkaline Phosphatase 75 30 - 99 U/L    Total Bilirubin 0.3 0.1 - 1.5 mg/dL    Albumin 3.5 3.2 - 4.9 g/dL    Total Protein 5.8 (L) 6.0 - 8.2 g/dL    Globulin 2.3 1.9 - 3.5 g/dL    A-G Ratio 1.5 g/dL   Troponin    Collection Time: 11/21/18  6:41 PM   Result Value Ref Range    Troponin I <0.01 0.00 - 0.04 ng/mL   BETA-HYDROXYBUTYRIC ACID    Collection Time: 11/21/18  6:41 PM   Result Value Ref Range    beta-Hydroxybutyric Acid 7.43 (H) 0.02 - 0.27 mmol/L   LACTIC ACID    Collection  Time: 11/21/18  6:41 PM   Result Value Ref Range    Lactic Acid 1.9 0.5 - 2.0 mmol/L   DIFFERENTIAL MANUAL    Collection Time: 11/21/18  6:41 PM   Result Value Ref Range    Bands-Stabs 8.90 0.00 - 10.00 %    Manual Diff Status PERFORMED    PERIPHERAL SMEAR REVIEW    Collection Time: 11/21/18  6:41 PM   Result Value Ref Range    Peripheral Smear Review see below    PLATELET ESTIMATE    Collection Time: 11/21/18  6:41 PM   Result Value Ref Range    Plt Estimation Normal    MORPHOLOGY    Collection Time: 11/21/18  6:41 PM   Result Value Ref Range    RBC Morphology Present     Poikilocytosis 3+     Ovalocytes 1+     Echinocytes 2+    ESTIMATED GFR    Collection Time: 11/21/18  6:41 PM   Result Value Ref Range    GFR If  58 (A) >60 mL/min/1.73 m 2    GFR If Non  48 (A) >60 mL/min/1.73 m 2       Imaging/Procedures Review:    Independant Imaging Review: Completed  DX-CHEST-PORTABLE (1 VIEW)    (Results Pending)            EKG:   EKG Independant Review: Completed    Records reviewed and summarized in current documentation :  Yes  [employer] teaching service handout given to patient:  No         Assessment/Plan     * Diabetic ketoacidosis associated with type 1 diabetes mellitus (HCC)   Assessment & Plan    Secondary to missed medication, and n.p.o. for his GI procedures on 11/20  We will hold home oral diabetic medications  We will order cultures-blood  Urinalysis and chest x-ray, flu swab  Troponin 0.01, EKG has some changes so we will repeat  DKA protocol with insulin at 0.05/kg  Electrolyte repletion per protocol  ABG ordered     UGI bleed- (present on admission)   Assessment & Plan    Hospital admission 8/2018 for an episode of upper GI bleed  Patient had EGD done during hospitalization which showed grade D esophagitis, patient had repeat EGD colonoscopy yesterday at 11/20 and EGD notable for localized nodularity of the mucosa at the GE junction about 5-6 mm.  Colonoscopy was also done but there  was stool throughout the colon and a repeat was recommended.  Procedures with GI consultants  We will hold anticoagulation in the setting, SCDs for DVT prophylaxis  Close monitoring for any signs of bleeding and daily CBCs     Hypotension   Assessment & Plan    Patient has a history of hypertension and is on medications for that  Possibly secondary to dehydration versus extra medication dose versus adrenal insufficiency versus infectious process  We will hold home medications, check cortisol levels, and support with IV fluids     Memory loss- (present on admission)   Assessment & Plan    History of TBI with mild cognitive impairment as per chart review  Lives at an assisted living [facility] is actively involved in care and cell phone number is in the chart     Tobacco abuse- (present on admission)   Assessment & Plan    Smoking about 1-2 cigarettes/day  Will need counseling before discharge         Anticipated Hospital stay:  >2 midnights        Quality Measures  Quality-Core Measures   Dill catheter::  No Dill  DVT prophylaxis pharmacological::  Contraindicated - High bleeding risk  DVT prophylaxis - mechanical:  SCDs    PCP: JAKE Armando

## 2018-11-22 NOTE — ASSESSMENT & PLAN NOTE
CT with extensive calcifications of the pancreas  Patient very cachectic and malnourished appearing  Query refeeding syndrome risk  Aggressive electrolyte repletion  Query absorption issues and pancreatic exocrine insufficiency  Dietary consultation  Vitamin supplementation  Pancreatic enzyme supplements  Stop oral diabetes medications, may be causing adverse issues

## 2018-11-22 NOTE — PROCEDURES
INDICATION: Hypotension and poor peripheral access  ATTENDING PHYSICIAN: LAURA     CONSENT:   Consent was obtained from patient prior to the procedure. Indications, risks, and benefits were explained at length. The procedure was performed emergently and the permission was implied because of the emergent nature.      PROCEDURE SUMMARY:   A time out was performed. My hands were washed immediately prior to the procedure. I wore a surgical cap, mask with protective eyewear, full gown and sterile gloves throughout the procedure. The patient was placed in Trendelenburg position. RIGHT chest region was prepped using chlorhexidine scrub and draped in sterile fashion using a three quarter sheet drape. The medial and lateral heads of the sternocleidomastoid muscle were identified as was the carotid pulse. The Internal Jugular vein was identified using the ultrasound. Anesthesia was achieved over the vein using 1% lidocaine. Using real-time out of plane guidance, the introducer needle was inserted into the Internal Jugular vein under direct ultrasound visualization. Venous blood was withdrawn. The syringe was removed and a guidewire was advanced into the introducer needle. The guidewire was visualized in the Internal Jugular Vein by ultrasound. A small incision was made at the skin surface with a scalpel and the introducer needle was exchanged for a dilator over the guidewire. After appropriate dilation was obtained, the dilator was exchanged over the wire for a triple lumen central venous catheter. The wire was removed and the catheter was sutured in place. A sterile sorbaview shield was placed over the catheter at the insertion site. The patient tolerated the procedure without any hemodynamic compromise. At time of procedure completion, all ports aspirated and flushed properly. Post-procedure chest x-ray is pending at this time. Estimated blood loss is <5ml.

## 2018-11-22 NOTE — ASSESSMENT & PLAN NOTE
History of TBI with mild cognitive impairment as per chart review  Lives at an assisted living facility called [facility]  Sister is actively involved in care and cell phone number is in the chart

## 2018-11-22 NOTE — ASSESSMENT & PLAN NOTE
Resume home regimen when clinically appropriate  Would not worry about low-fat diet and his current cachectic state for now

## 2018-11-22 NOTE — PROGRESS NOTES
[facility] Progress Note      Admit Date: 11/21/2018  ICU Day: 2    Resident(s): Brigida Hyde  Attending: ONOFRE SMITH/ Dr. Salinas     Date & Time:   11/22/2018   4:03 PM       Patient ID:    Name:             Pawel Tatum   YOB: 1960  Age:                 58 y.o.  male   MRN:               [id number]    HPI:  The patient is a 58-year-old male with past medical history of type 1 diabetes, pancreatic insufficiency, recent history of upper GI bleed secondary to esophagitis, hypertension, tobacco abuse, history of TBI that presents with DKA without coma, transferred from assisted living facility for decreased appetite, hypotension.    Consultants:  PMA:      Interval Events:  -The patient is alert, oriented x4.  -Adequate Urine output  -On DKA protocol   - Stable hemodynamics, Appears dry on aggressive IVF   -Transition to SSI and Insulin Lantus once Stable Sugars.     Review of Systems   Constitutional: Positive for malaise/fatigue. Negative for chills and fever.   HENT: Negative for congestion.    Eyes: Negative for blurred vision and double vision.   Respiratory: Negative for cough and shortness of breath.    Cardiovascular: Negative for chest pain, palpitations and leg swelling.   Gastrointestinal: Negative for abdominal pain, heartburn, nausea and vomiting.   Genitourinary: Negative for dysuria and flank pain.   Musculoskeletal: Positive for myalgias.   Skin: Positive for rash. Negative for itching.   Neurological: Negative for dizziness, loss of consciousness and headaches.   Endo/Heme/Allergies: Does not bruise/bleed easily.   Psychiatric/Behavioral: The patient is not nervous/anxious.        PHYSICAL EXAM  Vitals:    11/22/18 1100 11/22/18 1200 11/22/18 1300 11/22/18 1400   Pulse: 67 66 65 79   Resp:       Temp:  36.7 °C (98 °F)     TempSrc:  Temporal     SpO2: 97% 95% 95% 98%   Weight:       Height:         Body mass index is 15.41 kg/m².  Pulse 79   Temp 36.7 °C (98 °F)  "(Temporal)   Resp (!) 36   Ht 1.88 m (6' 2\")   Wt 54.4 kg (120 lb)   SpO2 98%   BMI 15.41 kg/m²   O2 therapy: Pulse Oximetry: 98 %, O2 Delivery: None (Room Air)    Physical Exam   Constitutional: He is oriented to person, place, and time.   HENT:   Head: Normocephalic and atraumatic.   Mouth/Throat: No oropharyngeal exudate.   Eyes: Pupils are equal, round, and reactive to light. Conjunctivae are normal. No scleral icterus.   Neck: Normal range of motion. Neck supple. No JVD present.   Cardiovascular: Normal rate and regular rhythm.    No murmur heard.  Pulmonary/Chest: Effort normal and breath sounds normal. No respiratory distress.   Abdominal: Soft. Bowel sounds are normal. He exhibits no distension. There is no tenderness.   Musculoskeletal: Normal range of motion. He exhibits no edema.   Neurological: He is alert and oriented to person, place, and time. No cranial nerve deficit.   Skin: Skin is warm and dry.   Multiple areas of Skin breakdown with Mepilex pads all over the shoulders, sacral area.   Psychiatric: Affect normal.   Nursing note and vitals reviewed.    Respiratory:     Respiration: (!) 36, Pulse Oximetry: 98 %    Chest Tube Drains:          HemoDynamics:  Pulse: 79, Heart Rate (Monitored): 78 NIBP: 118/71      Neuro:  Alert, oriented x4.  No acute neurological deficits noted.    Fluids:    Date 11/22/18 0700 - 11/23/18 0659   Shift [id number] 0832-2207 24 Hour Total   I  N  T  A  K  E   I.V. 710.7   710.7      Insulin Volume 139.1   139.1      Volume (mL) (dextrose 10% and 0.45% NaCl infusion) 154.9   154.9      Volume (mL) (lactated ringers infusion) 416.7   416.7    IV Piggyback 100   100      Volume (mL) (potassium chloride in water (KCL) ivpb **Administer in ICU only** 20 mEq) 100   100    Shift Total 810.7   810.7   O  U  T  P  U  T   Urine 650   650      Number of Times Voided 2 x   2 x      Urine Void (mL) 650   650    Shift Total 650   650   .7   160.7 "        Intake/Output Summary (Last 24 hours) at 18 1541  Last data filed at 18 1400   Gross per 24 hour   Intake          4973.32 ml   Output             2050 ml   Net          2923.32 ml       Weight: 54.4 kg (120 lb)  Body mass index is 15.41 kg/m².    Recent Labs      18   0400  18   0810  18   1315   SODIUM  132*  136  136   --    POTASSIUM  4.3  3.7  3.9   --    CHLORIDE  103  110  110   --    CO2  13*  17*  21   --    BUN  43*  34*  29*   --    CREATININE  1.06  0.88  0.74   --    MAGNESIUM  1.6   --   1.9  1.8   PHOSPHORUS  3.4   --   1.5*  1.6*   CALCIUM  7.7*  7.2*  7.6*   --        GI/Nutrition:  Recent Labs      180  18   0810   ALTSGPT  10  11   --    --    ASTSGOT  10*  9*   --    --    ALKPHOSPHAT  75  64   --    --    TBILIRUBIN  0.3  0.3   --    --    GLUCOSE  598*  387*  215*  98       Heme:  Recent Labs      18   RBC  4.50*   HEMOGLOBIN  13.0*   HEMATOCRIT  41.5*   PLATELETCT  269       Infectious Disease:  Temp  Av.6 °C (97.8 °F)  Min: 36.4 °C (97.5 °F)  Max: 36.7 °C (98 °F)  Recent Labs      18   WBC  6.0   --    NEUTSPOLYS  75.00*   --    LYMPHOCYTES  12.50*   --    MONOCYTES  2.70   --    EOSINOPHILS  0.00   --    BASOPHILS  0.90   --    ASTSGOT  10*  9*   ALTSGPT  10  11   ALKPHOSPHAT  75  64   TBILIRUBIN  0.3  0.3       Meds:  • Influenza Vaccine Quad pf  0.5 mL     • therapeutic multivitamin-minerals  1 Tab     • insulin glargine  10 Units     • insulin lispro  2-9 Units     • LR   100 mL/hr at 18 1408   • potassium phosphate ivpb  30 mmol 30 mmol (18 1408)   • senna-docusate  2 Tab      And   • polyethylene glycol/lytes  1 Packet      And   • magnesium hydroxide  30 mL      And   • bisacodyl  10 mg     • acetaminophen  650 mg     • atorvastatin  10 mg     • vitamin D  2,000 Units     • omeprazole  20 mg     • tamsulosin  0.4 mg     •  ferrous sulfate  325 mg     • buPROPion SR  150 mg     • pancrelipase (Lip-Prot-Amyl)  3 Cap     • pancrelipase (Lip-Prot-Amyl)  1 Cap      And   • pancrelipase (Lip-Prot-Amyl)  2 Cap          Procedures:  Right IJ line     Imaging:  DX-CHEST-PORTABLE (1 VIEW)   Final Result      1.  Pulmonary hyperexpansion suggestive of COPD.      2.  No acute cardiac pulmonary findings.      DX-CHEST-PORTABLE (1 VIEW)   Final Result      Right internal jugular catheter placement with the tip likely projecting over the superior vena cava. No pneumothorax is identified.          Problem and Plan:      * Diabetic ketoacidosis associated with type 1 diabetes mellitus (HCC)   Assessment & Plan    -On DKA protocol  -On fluid resuscitation   -Recent BMP bicarb is normal, Anion gap is closing  -Transition to Insulin Lantus and SSI  -On electrolyte replacement protocol       Hypotension   Assessment & Plan    Patient has a history of hypertension and is on medications for that  Possibly secondary to volume depletion  -Held Home medications.  -Receiving bolus of 1 L lactated Ringer's over 4 hours     Chronic pancreatitis? (HCC)   Assessment & Plan    -CT abdomen showed extensive calcifications of the pancreas.  -History of protein calorie malnutrition secondary to pancreatic insufficiency.  -Currently on pancrelipase replacement.  -Diet consultation placed.  -Continue to monitor for refeeding syndrome.     History of recent UGI bleed- (present on admission)   Assessment & Plan    Hospital admission 8/2018 for an episode of upper GI bleed  Patient had EGD done during hospitalization which showed grade D esophagitis, patient had repeat EGD colonoscopy yesterday at 11/20 and EGD notable for localized nodularity of the mucosa at the GE junction about 5-6 mm.  Colonoscopy was also done but there was stool throughout the colon and a repeat was recommended.  Procedures with GI consultants  We will hold anticoagulation in the setting, SCDs for DVT  prophylaxis  Close monitoring for any signs of bleeding and daily CBCs     Hypophosphatemia   Assessment & Plan    -On electrolyte replacement protocol     Memory loss- (present on admission)   Assessment & Plan    History of TBI with mild cognitive impairment as per chart review  Lives at [facility] is actively involved in care and cell phone number is in the chart     Dyslipidemia- (present on admission)   Assessment & Plan    -Resume home regimen when clinically appropriate.     Tobacco abuse- (present on admission)   Assessment & Plan    Smoking about 1-2 cigarettes/day  Will need counseling before discharge     Vitamin D deficiency- (present on admission)   Assessment & Plan    -Replete         DISPO: ICU     CODE STATUS: FULL    Quality Measures:  Dill Catheter: no  DVT Prophylaxis: Contraindicated   Ulcer Prophylaxis: PPI  Antibiotics: None   Lines: Central line

## 2018-11-22 NOTE — ASSESSMENT & PLAN NOTE
Smoking cessation strongly encouraged again  Query COPD, chest x-ray hyperinflated  Nicotine replacement therapy ongoing  Vaccinate before discharge, flu and pneumonia  Consider outpatient pulmonary function studies in 6-minute walk to completely assess his COPD  May be a candidate for outpatient pulmonary rehab program after he is clinically improved a bit from his malnourished state and debilitated state with [facility]

## 2018-11-22 NOTE — ASSESSMENT & PLAN NOTE
Smoking cessation strongly encouraged again  Query COPD, chest x-ray hyperinflated  Nicotine replacement therapy ongoing  Vaccinate before discharge, flu and pneumonia  Consider outpatient pulmonary function studies and 6-minute walk to completely assess his COPD

## 2018-11-22 NOTE — ASSESSMENT & PLAN NOTE
Replete  Potassium and magnesium borderline low, replete as well per DKA electrolyte replacement protocols

## 2018-11-22 NOTE — PROGRESS NOTES
MD notified of decreased BP's (79/44) for 02:00. Orders received to increase MIV to 125mL/hr. Pt able to follow commands and remains asymptomatic. Will continue to monitor pt closely.

## 2018-11-22 NOTE — PROGRESS NOTES
· 2 RN skin check complete.   · Devices in place PIV left ankle.  · Skin assessed under devices intact.  · Heels and elbows boggy, redness, blanches.  · The following interventions in place mepilex to bony prominences.  · Sacrum red and blanching.  · Dry, flaky, callused feet and bilateral lower extremities.

## 2018-11-22 NOTE — ASSESSMENT & PLAN NOTE
Chronic, mild  Long-term rule out dementia  Multiple metabolic abnormalities may be contributing now  Monitor

## 2018-11-22 NOTE — ASSESSMENT & PLAN NOTE
No bleeding clinically today  Recent endoscopy 11/20  Severe reflux esophagitis  Continue PPI times minimum 8 weeks, may need more prolonged therapy  GI consult as needed, probably does need follow-up GI appointment 1-2 months after discharge  Serial CBC as needed  Transfuse hemoglobin to greater than 7.0, greater than 8.0 if actively bleeding

## 2018-11-23 LAB
ANION GAP SERPL CALC-SCNC: 6 MMOL/L (ref 0–11.9)
BASOPHILS # BLD AUTO: 0.6 % (ref 0–1.8)
BASOPHILS # BLD: 0.05 K/UL (ref 0–0.12)
BUN SERPL-MCNC: 13 MG/DL (ref 8–22)
CALCIUM SERPL-MCNC: 7.5 MG/DL (ref 8.5–10.5)
CHLORIDE SERPL-SCNC: 107 MMOL/L (ref 96–112)
CO2 SERPL-SCNC: 22 MMOL/L (ref 20–33)
CREAT SERPL-MCNC: 0.56 MG/DL (ref 0.5–1.4)
EOSINOPHIL # BLD AUTO: 0.2 K/UL (ref 0–0.51)
EOSINOPHIL NFR BLD: 2.4 % (ref 0–6.9)
ERYTHROCYTE [DISTWIDTH] IN BLOOD BY AUTOMATED COUNT: 69 FL (ref 35.9–50)
GLUCOSE BLD-MCNC: 112 MG/DL (ref 65–99)
GLUCOSE BLD-MCNC: 117 MG/DL (ref 65–99)
GLUCOSE BLD-MCNC: 180 MG/DL (ref 65–99)
GLUCOSE BLD-MCNC: 290 MG/DL (ref 65–99)
GLUCOSE BLD-MCNC: 89 MG/DL (ref 65–99)
GLUCOSE SERPL-MCNC: 128 MG/DL (ref 65–99)
HCT VFR BLD AUTO: 34.5 % (ref 42–52)
HGB BLD-MCNC: 11.2 G/DL (ref 14–18)
IMM GRANULOCYTES # BLD AUTO: 0.21 K/UL (ref 0–0.11)
IMM GRANULOCYTES NFR BLD AUTO: 2.6 % (ref 0–0.9)
LYMPHOCYTES # BLD AUTO: 3.03 K/UL (ref 1–4.8)
LYMPHOCYTES NFR BLD: 37 % (ref 22–41)
MAGNESIUM SERPL-MCNC: 1.3 MG/DL (ref 1.5–2.5)
MCH RBC QN AUTO: 28.8 PG (ref 27–33)
MCHC RBC AUTO-ENTMCNC: 32.5 G/DL (ref 33.7–35.3)
MCV RBC AUTO: 88.7 FL (ref 81.4–97.8)
MONOCYTES # BLD AUTO: 0.68 K/UL (ref 0–0.85)
MONOCYTES NFR BLD AUTO: 8.3 % (ref 0–13.4)
NEUTROPHILS # BLD AUTO: 4.01 K/UL (ref 1.82–7.42)
NEUTROPHILS NFR BLD: 49.1 % (ref 44–72)
NRBC # BLD AUTO: 0 K/UL
NRBC BLD-RTO: 0 /100 WBC
PHOSPHATE SERPL-MCNC: 2.1 MG/DL (ref 2.5–4.5)
PLATELET # BLD AUTO: 242 K/UL (ref 164–446)
PMV BLD AUTO: 11.2 FL (ref 9–12.9)
POTASSIUM SERPL-SCNC: 3.6 MMOL/L (ref 3.6–5.5)
RBC # BLD AUTO: 3.89 M/UL (ref 4.7–6.1)
SODIUM SERPL-SCNC: 135 MMOL/L (ref 135–145)
WBC # BLD AUTO: 8.2 K/UL (ref 4.8–10.8)

## 2018-11-23 PROCEDURE — 85025 COMPLETE CBC W/AUTO DIFF WBC: CPT

## 2018-11-23 PROCEDURE — 770006 HCHG ROOM/CARE - MED/SURG/GYN SEMI*

## 2018-11-23 PROCEDURE — A9270 NON-COVERED ITEM OR SERVICE: HCPCS | Performed by: STUDENT IN AN ORGANIZED HEALTH CARE EDUCATION/TRAINING PROGRAM

## 2018-11-23 PROCEDURE — 84100 ASSAY OF PHOSPHORUS: CPT

## 2018-11-23 PROCEDURE — 99233 SBSQ HOSP IP/OBS HIGH 50: CPT | Performed by: INTERNAL MEDICINE

## 2018-11-23 PROCEDURE — 700111 HCHG RX REV CODE 636 W/ 250 OVERRIDE (IP): Performed by: STUDENT IN AN ORGANIZED HEALTH CARE EDUCATION/TRAINING PROGRAM

## 2018-11-23 PROCEDURE — 700102 HCHG RX REV CODE 250 W/ 637 OVERRIDE(OP): Performed by: STUDENT IN AN ORGANIZED HEALTH CARE EDUCATION/TRAINING PROGRAM

## 2018-11-23 PROCEDURE — 83735 ASSAY OF MAGNESIUM: CPT

## 2018-11-23 PROCEDURE — 700105 HCHG RX REV CODE 258: Performed by: STUDENT IN AN ORGANIZED HEALTH CARE EDUCATION/TRAINING PROGRAM

## 2018-11-23 PROCEDURE — 82962 GLUCOSE BLOOD TEST: CPT

## 2018-11-23 PROCEDURE — 700101 HCHG RX REV CODE 250: Performed by: STUDENT IN AN ORGANIZED HEALTH CARE EDUCATION/TRAINING PROGRAM

## 2018-11-23 PROCEDURE — 80048 BASIC METABOLIC PNL TOTAL CA: CPT

## 2018-11-23 PROCEDURE — E0191 PROTECTOR HEEL OR ELBOW: HCPCS | Performed by: INTERNAL MEDICINE

## 2018-11-23 RX ORDER — MAGNESIUM SULFATE HEPTAHYDRATE 40 MG/ML
2 INJECTION, SOLUTION INTRAVENOUS ONCE
Status: COMPLETED | OUTPATIENT
Start: 2018-11-23 | End: 2018-11-23

## 2018-11-23 RX ORDER — MAGNESIUM SULFATE 1 G/100ML
1 INJECTION INTRAVENOUS ONCE
Status: DISCONTINUED | OUTPATIENT
Start: 2018-11-23 | End: 2018-11-23

## 2018-11-23 RX ADMIN — POTASSIUM PHOSPHATE, MONOBASIC AND POTASSIUM PHOSPHATE, DIBASIC 30 MMOL: 224; 236 INJECTION, SOLUTION INTRAVENOUS at 11:46

## 2018-11-23 RX ADMIN — INSULIN LISPRO 2 UNITS: 100 INJECTION, SOLUTION INTRAVENOUS; SUBCUTANEOUS at 14:22

## 2018-11-23 RX ADMIN — MAGNESIUM SULFATE IN WATER 2 G: 40 INJECTION, SOLUTION INTRAVENOUS at 10:37

## 2018-11-23 RX ADMIN — INSULIN LISPRO 5 UNITS: 100 INJECTION, SOLUTION INTRAVENOUS; SUBCUTANEOUS at 21:03

## 2018-11-23 RX ADMIN — DIBASIC SODIUM PHOSPHATE, MONOBASIC POTASSIUM PHOSPHATE AND MONOBASIC SODIUM PHOSPHATE 1 TABLET: 852; 155; 130 TABLET ORAL at 05:44

## 2018-11-23 RX ADMIN — Medication 325 MG: at 14:25

## 2018-11-23 RX ADMIN — SODIUM CHLORIDE, POTASSIUM CHLORIDE, SODIUM LACTATE AND CALCIUM CHLORIDE: 600; 310; 30; 20 INJECTION, SOLUTION INTRAVENOUS at 00:00

## 2018-11-23 RX ADMIN — STANDARDIZED SENNA CONCENTRATE AND DOCUSATE SODIUM 2 TABLET: 8.6; 5 TABLET, FILM COATED ORAL at 18:35

## 2018-11-23 RX ADMIN — Medication 325 MG: at 10:15

## 2018-11-23 RX ADMIN — SODIUM CHLORIDE, POTASSIUM CHLORIDE, SODIUM LACTATE AND CALCIUM CHLORIDE: 600; 310; 30; 20 INJECTION, SOLUTION INTRAVENOUS at 11:04

## 2018-11-23 RX ADMIN — PANCRELIPASE 72000 UNITS: 24000; 76000; 120000 CAPSULE, DELAYED RELEASE PELLETS ORAL at 10:15

## 2018-11-23 RX ADMIN — Medication 325 MG: at 18:35

## 2018-11-23 RX ADMIN — MULTIPLE VITAMINS W/ MINERALS TAB 1 TABLET: TAB at 05:45

## 2018-11-23 RX ADMIN — INSULIN GLARGINE 10 UNITS: 100 INJECTION, SOLUTION SUBCUTANEOUS at 05:56

## 2018-11-23 RX ADMIN — PANCRELIPASE 72000 UNITS: 24000; 76000; 120000 CAPSULE, DELAYED RELEASE PELLETS ORAL at 20:57

## 2018-11-23 RX ADMIN — VITAMIN D, TAB 1000IU (100/BT) 2000 UNITS: 25 TAB at 08:36

## 2018-11-23 RX ADMIN — ATORVASTATIN CALCIUM 10 MG: 10 TABLET, FILM COATED ORAL at 05:44

## 2018-11-23 RX ADMIN — BUPROPION HYDROCHLORIDE 150 MG: 150 TABLET, EXTENDED RELEASE ORAL at 18:35

## 2018-11-23 RX ADMIN — PANCRELIPASE 72000 UNITS: 24000; 76000; 120000 CAPSULE, DELAYED RELEASE PELLETS ORAL at 14:24

## 2018-11-23 RX ADMIN — BUPROPION HYDROCHLORIDE 150 MG: 150 TABLET, EXTENDED RELEASE ORAL at 05:45

## 2018-11-23 RX ADMIN — TAMSULOSIN HYDROCHLORIDE 0.4 MG: 0.4 CAPSULE ORAL at 10:14

## 2018-11-23 RX ADMIN — OMEPRAZOLE 20 MG: 20 CAPSULE, DELAYED RELEASE ORAL at 05:46

## 2018-11-23 ASSESSMENT — ENCOUNTER SYMPTOMS
MYALGIAS: 1
SHORTNESS OF BREATH: 0
SORE THROAT: 0
SPUTUM PRODUCTION: 0
DIAPHORESIS: 0
FOCAL WEAKNESS: 0
EYES NEGATIVE: 1
VOMITING: 0
INSOMNIA: 0
HEARTBURN: 0
CONSTIPATION: 0
CHILLS: 0
POLYDIPSIA: 1
BLOOD IN STOOL: 0
SENSORY CHANGE: 0
BRUISES/BLEEDS EASILY: 0
PALPITATIONS: 0
COUGH: 0
ABDOMINAL PAIN: 0
NAUSEA: 0
BACK PAIN: 0
LOSS OF CONSCIOUSNESS: 0
BLURRED VISION: 0
DIZZINESS: 0
FLANK PAIN: 0
HEADACHES: 0
DIARRHEA: 0
NERVOUS/ANXIOUS: 0
DOUBLE VISION: 0
FEVER: 0

## 2018-11-23 ASSESSMENT — PAIN SCALES - GENERAL
PAINLEVEL_OUTOF10: 0

## 2018-11-23 NOTE — CARE PLAN
Problem: Communication  Goal: The ability to communicate needs accurately and effectively will improve    Intervention: [place] patient and significant other/support system to call light to alert staff of needs  Patient educated to use call light to alert staff when needed. Patient compliant and demonstrates proper use of call light.      Problem: Fluid Volume:  Goal: Will maintain balanced intake and output    Intervention: Monitor, educate, and encourage compliance with therapeutic intake of liquids  Patient admitted with severe dehydration. Pt educated on the importance of hydrating with fluids. Patient demonstrates understanding and drinks water throughout shift.

## 2018-11-23 NOTE — PROGRESS NOTES
· 2 RN skin check complete.   · Devices in place heel float boots, scd's.  Finger probe.  · Skin assessed under devices.  · Confirmed pressure ulcers found on n/a.  · New potential pressure ulcers noted on n/a. Wound consult and MIDAS placed.  · The following interventions in place, heel float boots, pillows in place, n2xgigs.

## 2018-11-23 NOTE — CARE PLAN
Problem: Communication  Goal: The ability to communicate needs accurately and effectively will improve  Outcome: PROGRESSING AS EXPECTED  Educating patient and family about plan of care, procedures, treatments, and medications.  All questions answered.    Problem: Fluid Volume:  Goal: Will maintain balanced intake and output  Outcome: PROGRESSING AS EXPECTED  Increased fluids per order, monitoring intake and output, encouraging PO intake of fluids.

## 2018-11-23 NOTE — DIETARY
"Nutrition Services: Day 2 of admit.  Pawel Tatum is a 58 y.o. male with admitting DX of DKA (diabetic ketoacidoses)   Consult received for Low BMI    Pt states his appetite is good. Pt states he had a colonscopy on the 20th and didn't the day before that. Pt also states he didn't eat after his colonscopy annd stated \"he probably should of eaten\". Pt denies wt loss and reports UBW at 140 lbs (63.6 kg), pt unsure when he last weighed this. Pt requested cottage cheese as a snack.     Assessment:  Ht: 188 cm, Wt 11/21: 54.432 kg via other healthcare provider (unknown when wt was taken), BMI: 15.41  Diet/Intake: Diabetic - Per chart pt PO % 2 meals documented     Evaluation:   1. Current problem list: DKA associated with type 1 DM, hypotension, chronic pancreatitis?, history of recent UGI bleed, essential HTN, hypophosphatemia, dyslipidemia, memory loss, tobacco abuse, vitamin D deficiency  2. Per chart review pt's wt on 10/4: 54.4 kg. Pt needs measured weight to determine percent wt loss.  3. Labs: Glucose 128, Phosphorus 2.1, Magnesium 1.3, , 117, 276  4. Meds: lipitor, wellbutrin-sr, ferrous sulfate, lantus, humalog, magnesium sulfate, prilosec, lip-prot-amyl, k-phos-neutral, pericolace, flomax, MVI with minerals, vitamin D  5. Last BM: 11/22    Malnutrition Risk: Pt noted with muscle loss upon visual exam with hollowness in the temporal region and protruding prominent clavicle. Unable to determine wt loss percent because no recent measured wt.     Recommendations/Plan:  1. Encourage intake of meals  2. Document intake of all meals as % taken in ADL's to provide interdisciplinary communication across all shifts.   3. Please obtain a measured weight. Monitor weight.  4. Nutrition rep will continue to see patient for ongoing meal and snack preferences.  5. Obtain supplement order per RD as needed.    RD following    "

## 2018-11-23 NOTE — PROGRESS NOTES
[facility] ICU Transfer Note                                                                                         ICU Admit Date: 11/21/2018    ICU Discharge Date:  11/23/18    Primary Diagnosis:   Diabetic ketoacidosis associated with type 1 diabetes mellitus (HCC)     ICU Course Summary (Brief Narrative):  The patient is a 58-year-old male with past medical history of type 1 diabetes, pancreatic insufficiency, recent history of upper GI bleed secondary to esophagitis, history of ESBL UTI, hypertension, tobacco abuse, history of TBI with mild cognitive impairment, Protein calorie malnutrition was sent to the hospital from His Assisted living facility for hypotension and poor appetitie for two days. On presentation to the ER BP was 97/50 with HR 95, 98% O2 sat.Blood sugar 501, Anion gap was 23 with bicarb 7, potassium of 5.2. Patient had some ST changes on EKG but no chest pain complains.Troponin was 0.01. Patient was started on DKA protocol, aggressive IVF for Dehydration.Patient quickly improved in the next 24 hours, blood sugars were controlled.He was transitioned to Insulin Lantus 10 units and Medium SSI. Patient lives in an assisted living facility and PT is pending as the patient appears weak,anticipating Skilled nursing needs. Patient is transferred to the medical floor.    Patients home medication- Appears to be he is on Tradjenta, Please discontinue the medication upon discharge since it can cause Pancreatitis and the patient has a history of Chronic pancreatic insufficiency.    Important Events in the ICU:   - Central Line: Right IJ line   - Intubation: None   - Pressors: None   - Dill catheter: None  - Tube feeding: None  - Antibiotics: None   - Other procedures: None      Labs and imaging studies to be continued with their indications:  - CBC: None;   - CMP or BMP: Yes   - Magnesium: Yes  - Phosphorus: Yes  - Chest Xray: No; unless having worsening respiratory status  - Other studies: None      Things to  follow:   - Optimization of Blood sugars and to be discharged with Insulin regimen as the patient is a Type 1 Diabetic with History of hypoglycemic episodes on insulin regimen.  - Diabetic education   - Referral to endocrinology for follow up post discharge, Patients sister is actively involved in patients care she is reachable on her cell phone.She requested for a new PCP upon Discharge.  - PT recommendations- Anticipating skilled nursing care facility needs.

## 2018-11-23 NOTE — PROGRESS NOTES
Critical Care Progress Note    Date of admission  11/21/2018    Chief Complaint  58 y.o. male admitted 11/21/2018 with DKA/hypotension    Hospital Course    58 Y M that presents with DKA without Coma, B-HBA 7.4, AG 23, Co2 of 7. Transferred from assisted living with low appetite x 2 days, hypotension, of note had EGD/Colonoscopy on 11/20 for f/u of recent GIB, also has hx of pancreatic insufficiency, TBI, cognitive impairment, and HTN.         Interval Problem Update  Reviewed last 24 hour events:    Transitioned of Insulin drip late yesterday  BS high last night  A&O x4  Hemodynamics noted   this AM  UO good  Afebrile    EOB  rOOM AIR  Is 750  ppi - HOME  SCDs    Replete magnesium and phosphorus  Stop Trigenta, may be associated with chronic pancreatitis  Diabetes education ordered since regarding to insulin shots which he is not on  Patient unable to ambulate per nursing staff, 2 weeks, unsafe  PT/OT eval and treatment requested  Probably needs skilled nursing home placement with extended therapies for now  Wound care for extended period time seems prudent as well  Consult with  requested for disposition  Outpatient endocrine evaluation next week seems prudent for long-term diabetes management  Educated again Re: Supplemental oral pancreatic enzymes for his exocrine pancreatic insufficiency syndrome  Okay to reduce lab frequency  Okay to reduce IV fluids if patient takes p.o. well and he did, Hep-Lock  Blood sugars improved serially throughout the day with sliding scale insulin    Above plan reviewed at length with the patient but [facility] resident and all agree     YESTERADY  A&O x4  Hemodynamics noted - soft Bp, not tachy  UO adequate  D10 1/2 NS  LR 1 liter bolus  I drip 4  LOOKS DRY!  BMPs reviewed    No CXR - last night - hyperinflated, rotated    Clinically assessed and reassessed in particular serial lab and blood sugar  Blood sugar trending down despite weaning insulin drip  Anion  gap closing  Bicarb level 21  Magnesium and phosphorus remain low  Transition to SSI    Review of Systems  Review of Systems   Constitutional: Positive for malaise/fatigue (Slowly better). Negative for chills, diaphoresis and fever.   HENT: Negative for congestion and sore throat.    Eyes: Negative.    Respiratory: Negative for cough, sputum production and shortness of breath.    Cardiovascular: Negative for chest pain and palpitations.   Gastrointestinal: Negative for abdominal pain, blood in stool, constipation, diarrhea, melena, nausea and vomiting.        GI symptoms improved   Genitourinary: Negative for dysuria, flank pain, frequency, hematuria and urgency.   Musculoskeletal: Negative for back pain and joint pain.   Skin: Negative for rash.   Neurological: Negative for dizziness, sensory change, focal weakness and headaches.   Endo/Heme/Allergies: Positive for polydipsia (Improved dramatically). Does not bruise/bleed easily.   Psychiatric/Behavioral: The patient is not nervous/anxious and does not have insomnia.         Vital Signs for last 24 hours   Temp:  [36.4 °C (97.6 °F)-36.8 °C (98.2 °F)] 36.7 °C (98.1 °F)  Pulse:  [60-85] 68  Resp:  [8-20] 8    Hemodynamic parameters for last 24 hours       Respiratory       Physical Exam   Physical Exam   Constitutional: He is oriented to person, place, and time. He appears cachectic. He is cooperative.  Non-toxic appearance. No distress.   HENT:   Head: Normocephalic and atraumatic.   Mouth/Throat: Mucous membranes are dry and not cyanotic.   Eyes: Pupils are equal, round, and reactive to light. EOM are normal. No scleral icterus.   Neck: Phonation normal. Neck supple. No JVD present. No thyromegaly present.   Cardiovascular: Normal rate, regular rhythm and intact distal pulses.   No extrasystoles are present.   No murmur heard.  Pulmonary/Chest: Breath sounds normal. No respiratory distress. He has no wheezes. He has no rhonchi. He has no rales.   Abdominal: Soft.  Bowel sounds are normal. He exhibits no distension. There is no hepatosplenomegaly. There is no tenderness. There is no guarding and no CVA tenderness.   Musculoskeletal: He exhibits no edema.   Neurological: He is alert and oriented to person, place, and time. No cranial nerve deficit or sensory deficit. GCS eye subscore is 4. GCS verbal subscore is 5. GCS motor subscore is 6.   Generalized weakness   Skin: Skin is warm. No rash noted. He is not diaphoretic. No cyanosis. Nails show no clubbing.   Multiple areas of skin breakdown with Mepilex pads all over the shoulders the back the sacrum, see photographs and nursing, no change   Psychiatric: He has a normal mood and affect. His speech is normal and behavior is normal. Judgment and thought content normal. Cognition and memory are normal.   Vitals reviewed.  Above exam repeated, no significant change except for minor additions as above    Medications  Current Facility-Administered Medications   Medication Dose Route Frequency Provider Last Rate Last Dose   • Influenza Vaccine Quad pf injection 0.5 mL  0.5 mL Intramuscular Once PRN Jeromy Jackman M.D.       • therapeutic multivitamin-minerals (THERAGRAN-M) tablet 1 Tab  1 Tab Oral DAILY Brigida Ciddi, M.D.   1 Tab at 11/23/18 0545   • insulin glargine (LANTUS) injection 10 Units  10 Units Subcutaneous QAM INSULIN Brigida Ciddi, M.D.   10 Units at 11/22/18 1415   • insulin lispro (HUMALOG) injection 2-9 Units  2-9 Units Subcutaneous 4X/DAY ACHS Brigida Ciddi, M.D.   5 Units at 11/22/18 2042   • lactated ringers infusion   Intravenous Continuous Brigida Ciddi, M.D. 100 mL/hr at 11/22/18 1408     • phosphorus (K-PHOS-NEUTRAL, PHOSPHA 250 NEUTRAL) per tablet 1 Tab  1 Tab Oral BID Brigida Ciddi, M.D.   1 Tab at 11/23/18 0544   • senna-docusate (PERICOLACE or SENOKOT S) 8.6-50 MG per tablet 2 Tab  2 Tab Oral BID Wilma Oviedo M.D.   2 Tab at 11/22/18 1757    And   • polyethylene glycol/lytes (MIRALAX) PACKET 1 Packet   1 Packet Oral QDAY PRN Wilma Oviedo M.D.        And   • magnesium hydroxide (MILK OF MAGNESIA) suspension 30 mL  30 mL Oral QDAY PRN Wilma Oviedo M.D.        And   • bisacodyl (DULCOLAX) suppository 10 mg  10 mg Rectal QDAY PRN Wilma Oviedo M.D.       • acetaminophen (TYLENOL) tablet 650 mg  650 mg Oral Q6HRS PRN Wilma Oviedo M.D.       • atorvastatin (LIPITOR) tablet 10 mg  10 mg Oral DAILY Wilma Oviedo M.D.   10 mg at 11/23/18 0544   • vitamin D (cholecalciferol) tablet 2,000 Units  2,000 Units Oral DAILY Wilma Oviedo M.D.   2,000 Units at 11/22/18 0512   • omeprazole (PRILOSEC) capsule 20 mg  20 mg Oral QAM Wilma Oviedo M.D.   20 mg at 11/23/18 0546   • tamsulosin (FLOMAX) capsule 0.4 mg  0.4 mg Oral AFTER BREAKFAST Wilma Oviedo M.D.   Stopped at 11/22/18 0830   • ferrous sulfate tablet 325 mg  325 mg Oral TID WITH MEALS Wilma Oviedo M.D.   325 mg at 11/22/18 1757   • buPROPion SR (WELLBUTRIN-SR) tablet 150 mg  150 mg Oral BID Wilma Oviedo M.D.   150 mg at 11/23/18 0545   • pancrelipase (Lip-Prot-Amyl) (CREON 76676) 09801-54880 units capsule 72,000 Units  3 Cap Oral TID WITH MEALS Wilma Oviedo M.D.   72,000 Units at 11/22/18 1758   • pancrelipase (Lip-Prot-Amyl) (CREON 77276) 99650-63742 units capsule 24,000 Units  1 Cap Oral With Snacks PRN Wilma Oviedo M.D.        And   • pancrelipase (Lip-Prot-Amyl) (CREON 6000) 6000 units capsule 12,000 Units  2 Cap Oral With Snacks PRN Wilma Oviedo M.D.           Fluids    Intake/Output Summary (Last 24 hours) at 11/23/18 0548  Last data filed at 11/23/18 0400   Gross per 24 hour   Intake          3807.01 ml   Output             4050 ml   Net          -242.99 ml       Laboratory      Recent Labs      11/21/18   1841   TROPONINI  <0.01     Recent Labs      11/21/18   2305  11/22/18   0400  11/22/18   0810  11/22/18   1315   SODIUM  132*  136  136   --    POTASSIUM  4.3  3.7  3.9   --    CHLORIDE  103  110  110   --    CO2  13*  17*  21    --    BUN  43*  34*  29*   --    CREATININE  1.06  0.88  0.74   --    MAGNESIUM  1.6   --   1.9  1.8   PHOSPHORUS  3.4   --   1.5*  1.6*   CALCIUM  7.7*  7.2*  7.6*   --      Recent Labs      11/21/18   1841  11/21/18   2305  11/22/18   0400  11/22/18   0810   ALTSGPT  10  11   --    --    ASTSGOT  10*  9*   --    --    ALKPHOSPHAT  75  64   --    --    TBILIRUBIN  0.3  0.3   --    --    GLUCOSE  598*  387*  215*  98     Recent Labs      11/21/18   1841 11/21/18   2305   WBC  6.0   --    NEUTSPOLYS  75.00*   --    LYMPHOCYTES  12.50*   --    MONOCYTES  2.70   --    EOSINOPHILS  0.00   --    BASOPHILS  0.90   --    ASTSGOT  10*  9*   ALTSGPT  10  11   ALKPHOSPHAT  75  64   TBILIRUBIN  0.3  0.3     Recent Labs      11/21/18 1841   RBC  4.50*   HEMOGLOBIN  13.0*   HEMATOCRIT  41.5*   PLATELETCT  269       Imaging  X-Ray:  I have personally reviewed the images and compared with prior images.  EKG:  I have personally reviewed the images and compared with prior images.    Assessment/Plan  * Diabetic ketoacidosis associated with type 1 diabetes mellitus (HCC)   Assessment & Plan    DKA protocols, complete  Fluid resuscitation, complete  Bicarb normal, anion gap closed  Transition to Lantus and medium sliding scale 11/22  Continue electrolyte supplementation as needed  Continue rehydration, patient appears dehydrated still, Hep-Lock IV fluids       Hypotension   Assessment & Plan    Strongly suspect a dramatic component of volume depletion  After 3-4 L IV fluids still looks dry this a.m.  Bolus 1 L lactated Ringer's over 4 hours and reassess  Improved     Chronic pancreatitis? (HCC)   Assessment & Plan    CT with extensive calcifications of the pancreas  Patient very cachectic and malnourished appearing  Query refeeding syndrome risk  Aggressive electrolyte repletion  Query absorption issues and pancreatic exocrine insufficiency  Dietary consultation  Vitamin supplementation  Pancreatic enzyme supplements  Stop oral  diabetes medications, may be causing adverse issues     History of recent UGI bleed- (present on admission)   Assessment & Plan    No bleeding clinically today  Recent endoscopy 11/20  Severe reflux esophagitis  Continue PPI times minimum 8 weeks, may need more prolonged therapy  GI consult as needed, probably does need follow-up GI appointment 1-2 months after discharge  Serial CBC as needed  Transfuse hemoglobin to greater than 7.0, greater than 8.0 if actively bleeding     Essential hypertension- (present on admission)   Assessment & Plan    Secondary to hypotension, holding home regimen  Monitor for appropriate timing to resume home regimen     Hypophosphatemia   Assessment & Plan    Replete  Potassium and magnesium borderline low, replete as well per DKA electrolyte replacement protocols     Memory loss- (present on admission)   Assessment & Plan    Chronic, mild  Long-term rule out dementia  Multiple metabolic abnormalities may be contributing now  Monitor     Dyslipidemia- (present on admission)   Assessment & Plan    Resume home regimen when clinically appropriate  Would not worry about low-fat diet and his current cachectic state for now     Tobacco abuse- (present on admission)   Assessment & Plan    Smoking cessation strongly encouraged again  Query COPD, chest x-ray hyperinflated  Nicotine replacement therapy ongoing  Vaccinate before discharge, flu and pneumonia  Consider outpatient pulmonary function studies in 6-minute walk to completely assess his COPD  May be a candidate for outpatient pulmonary rehab program after he is clinically improved a bit from his malnourished state and debilitated state with rehab at skilled nursing [facility]     Vitamin D deficiency- (present on admission)   Assessment & Plan    Replete          VTE:  Contraindicated  Ulcer: PPI  Lines: Central Line  Ongoing indication addressed    I have performed a physical exam and reviewed and updated ROS and Plan today (11/23/2018). In  review of yesterday's note (11/22/2018), there are no changes except as documented above.     Appears ready for transfer to medical  [facility] will sign off, call if we can be of assistance  Needs PT/OT eval and treatment along with safety eval  Diabetic education necessary  Probably needs placement in skilled nursing unit  / consultation for disposition  Current living circumstances preclude the use of insulin there  Patient has clearly failed oral therapy for diabetes in part because of compliance  Oral agents he is currently on her inappropriate for his degree of chronic pancreatitis evidenced by imaging studies  Short acting and long-acting insulin therapy initiated and so far doing well with this  Repeat diabetic education ongoing  Strongly suggest endocrine follow-up at least once in the near future  Needs wound care and aggressive nutritional supplements  Prognosis guarded    Discussed patient condition and risk of morbidity and/or mortality with RN, RT, Pharmacy, [facility] resident, Charge nurse / hot rounds and Patient

## 2018-11-23 NOTE — CARE PLAN
Problem: Safety  Goal: Will remain free from injury  Outcome: PROGRESSING AS EXPECTED  Bed in lowest position, appropriate signs in place, call light in reach, patient wearing treaded socks, patient educated to call before attempting to exit bed.     Problem: Knowledge Deficit  Goal: Knowledge of disease process/condition, treatment plan, diagnostic tests, and medications will improve  Outcome: PROGRESSING AS EXPECTED  Patient educated on DKA, and importance of checking blood sugars even while fasting. Patient able to verbalize understanding.

## 2018-11-23 NOTE — PROGRESS NOTES
[facility] ICU Progress Note      Admit Date: 11/21/2018  ICU Day: 3    Resident(s): Brigida Hyde  Attending: ONOFRE SMITH/ Dr. Salinas     Date & Time:   11/23/2018   11:33 AM       Patient ID:    Name:             Pawel Tatum   YOB: 1960  Age:                 58 y.o.  male   MRN:               [id number]    HPI:  The patient is a 58-year-old male with past medical history of type 1 diabetes, pancreatic insufficiency, recent history of upper GI bleed secondary to esophagitis, hypertension, tobacco abuse, history of TBI that presents with DKA without coma, transferred from assisted living facility for decreased appetite, hypotension.    Consultants:  PMA:      Interval Events:  -The patient is alert, oriented x4.  -Hemodynamically stable. No acute events overnight.  -Gap is closed and bicarb is at 22  -Transitioned from DKA protocol to SSI, Insulin lantus  -Last Blood sugar is 112   -Receiving IV magnesium and phosphorus replacement   -PT pending, Diabetic education    Review of Systems   Constitutional: Positive for malaise/fatigue. Negative for chills and fever.   HENT: Negative for congestion.    Eyes: Negative for blurred vision and double vision.   Respiratory: Negative for cough and shortness of breath.    Cardiovascular: Negative for chest pain, palpitations and leg swelling.   Gastrointestinal: Negative for abdominal pain, heartburn, nausea and vomiting.   Genitourinary: Negative for dysuria and flank pain.   Musculoskeletal: Positive for myalgias.   Skin: Positive for rash. Negative for itching.   Neurological: Negative for dizziness, loss of consciousness and headaches.   Endo/Heme/Allergies: Does not bruise/bleed easily.   Psychiatric/Behavioral: The patient is not nervous/anxious.      PHYSICAL EXAM  Vitals:    11/23/18 0800 11/23/18 0900 11/23/18 1000 11/23/18 1100   Pulse: 67 70 62 85   Resp: 18 15 12 14   Temp: 36.3 °C (97.4 °F)  36.7 °C (98 °F)    TempSrc: Temporal   "Temporal    SpO2: 98% 99% 99% 97%   Weight:       Height:         Body mass index is 15.41 kg/m².  Pulse 85   Temp 36.7 °C (98 °F) (Temporal)   Resp 14   Ht 1.88 m (6' 2\")   Wt 54.4 kg (120 lb)   SpO2 97%   BMI 15.41 kg/m²   O2 therapy: Pulse Oximetry: 97 %, O2 Delivery: None (Room Air)    Physical Exam   Constitutional: He is oriented to person, place, and time.   HENT:   Head: Normocephalic and atraumatic.   Mouth/Throat: No oropharyngeal exudate.   Eyes: Pupils are equal, round, and reactive to light. Conjunctivae are normal. No scleral icterus.   Neck: Normal range of motion. Neck supple. No JVD present.   Cardiovascular: Normal rate and regular rhythm.    No murmur heard.  Pulmonary/Chest: Effort normal and breath sounds normal. No respiratory distress.   Abdominal: Soft. Bowel sounds are normal. He exhibits no distension. There is no tenderness.   Musculoskeletal: Normal range of motion. He exhibits no edema.   Neurological: He is alert and oriented to person, place, and time. No cranial nerve deficit.   Skin: Skin is warm and dry.   Multiple areas of Skin breakdown with Mepilex pads all over the shoulders, sacral area.   Psychiatric: Affect normal.   Nursing note and vitals reviewed.    Respiratory:     Respiration: 14, Pulse Oximetry: 97 %    Chest Tube Drains:        HemoDynamics:  Pulse: 85, Heart Rate (Monitored): 83 NIBP: 115/60      Neuro:  Alert, oriented x4.  No acute neurological deficits noted.    Fluids:    Date 11/23/18 0700 - 11/24/18 0659   Shift 7090-7676 4091-2664 9005-9448 24 Hour Total   I  N  T  A  K  E   I.V. 400   400      Volume (mL) (lactated ringers infusion) 400   400    Other 300   300      Medications (PO/Enteral Liquids) 300   300    Shift Total 700   700   O  U  T  P  U  T   Urine 650   650      Number of Times Voided 1 x   1 x      Urine Void (mL) 650   650    Shift Total 650   650   NET 50   50        Intake/Output Summary (Last 24 hours) at 11/22/18 1541  Last data filed " at 18 1400   Gross per 24 hour   Intake          4973.32 ml   Output             2050 ml   Net          2923.32 ml          Body mass index is 15.41 kg/m².    Recent Labs      18   04018   0810  11/22/18   1315  18   0553   SODIUM  136  136   --   135   POTASSIUM  3.7  3.9   --   3.6   CHLORIDE  110  110   --   107   CO2  17*  21   --   22   BUN  34*  29*   --   13   CREATININE  0.88  0.74   --   0.56   MAGNESIUM   --   1.9  1.8  1.3*   PHOSPHORUS   --   1.5*  1.6*  2.1*   CALCIUM  7.2*  7.6*   --   7.5*       GI/Nutrition:  Recent Labs      18   0810  18   0553   ALTSGPT  10  11   --    --    --    ASTSGOT  10*  9*   --    --    --    ALKPHOSPHAT  75  64   --    --    --    TBILIRUBIN  0.3  0.3   --    --    --    GLUCOSE  598*  387*  215*  98  128*       Heme:  Recent Labs      18   0553   RBC  4.50*  3.89*   HEMOGLOBIN  13.0*  11.2*   HEMATOCRIT  41.5*  34.5*   PLATELETCT  269  242       Infectious Disease:  Temp  Av.7 °C (98 °F)  Min: 36.3 °C (97.4 °F)  Max: 36.8 °C (98.2 °F)  Recent Labs      18   0553   WBC  6.0   --   8.2   NEUTSPOLYS  75.00*   --   49.10   LYMPHOCYTES  12.50*   --   37.00   MONOCYTES  2.70   --   8.30   EOSINOPHILS  0.00   --   2.40   BASOPHILS  0.90   --   0.60   ASTSGOT  10*  9*   --    ALTSGPT  10  11   --    ALKPHOSPHAT  75  64   --    TBILIRUBIN  0.3  0.3   --        Meds:  • magnesium sulfate  2 g 2 g (18 1037)   • potassium phosphate ivpb  30 mmol     • Influenza Vaccine Quad pf  0.5 mL     • therapeutic multivitamin-minerals  1 Tab     • insulin glargine  10 Units     • insulin lispro  2-9 Units     • LR   100 mL/hr at 18 1104   • senna-docusate  2 Tab      And   • polyethylene glycol/lytes  1 Packet      And   • magnesium hydroxide  30 mL      And   • bisacodyl  10 mg     • acetaminophen  650 mg     • atorvastatin   10 mg     • vitamin D  2,000 Units     • omeprazole  20 mg     • tamsulosin  0.4 mg     • ferrous sulfate  325 mg     • buPROPion SR  150 mg     • pancrelipase (Lip-Prot-Amyl)  3 Cap     • pancrelipase (Lip-Prot-Amyl)  1 Cap      And   • pancrelipase (Lip-Prot-Amyl)  2 Cap          Procedures:  Right IJ line     Imaging:  DX-CHEST-PORTABLE (1 VIEW)   Final Result      1.  Pulmonary hyperexpansion suggestive of COPD.      2.  No acute cardiac pulmonary findings.      DX-CHEST-PORTABLE (1 VIEW)   Final Result      Right internal jugular catheter placement with the tip likely projecting over the superior vena cava. No pneumothorax is identified.          Problem and Plan:      * Diabetic ketoacidosis associated with type 1 diabetes mellitus (HCC)   Assessment & Plan    -Discontinued DKA protocol 11/23/18  -On fluid resuscitation   -Recent BMP bicarb is normal, Anion gap is closed with normal Bicarb   -Transitioned to Insulin Lantus 10 units and medium SSI  -On electrolyte replacement protocol  -Diabetic education placed        Hypotension   Assessment & Plan    Patient has a history of hypertension and is on medications for that  Possibly secondary to volume depletion  -Held Home medications.  -Blood pressure systolic is above 90, resume Home medications when clinically appropriate.     Chronic pancreatitis? (HCC)   Assessment & Plan    -CT abdomen showed extensive calcifications of the pancreas.  -History of protein calorie malnutrition secondary to pancreatic insufficiency.  -Currently on pancrelipase replacement.  -Diet consultation placed.  -Continue to monitor for refeeding syndrome.     History of recent UGI bleed- (present on admission)   Assessment & Plan    -recent Hospital admission 8/2018 for an episode of upper GI bleed  -Patient had EGD done during hospitalization which showed grade D esophagitis, patient had repeat EGD & colonoscopy at 11/20 and EGD notable for localized nodularity of the mucosa at the GE  junction about 5-6 mm.  Colonoscopy was also done but there was stool throughout the colon and a repeat was recommended.  Procedures with GI consultants  -We will hold anticoagulation in the setting, SCDs for DVT prophylaxis  -Close monitoring for any signs of bleeding and daily CBCs  -Stable H&H since admission      Hypophosphatemia   Assessment & Plan    -On electrolyte replacement protocol     Memory loss- (present on admission)   Assessment & Plan    History of TBI with mild cognitive impairment as per chart review  Lives at an assisted [facility] is actively involved in care and cell phone number is in the chart     Dyslipidemia- (present on admission)   Assessment & Plan    -Resume home regimen when clinically appropriate.     Tobacco abuse- (present on admission)   Assessment & Plan    Smoking about 1-2 cigarettes/day  Will need counseling before discharge     Vitamin D deficiency- (present on admission)   Assessment & Plan    -Replete       DISPO: Transfer to Medical floor     CODE STATUS: FULL    Quality Measures:  Dill Catheter: no  DVT Prophylaxis: Contraindicated   Ulcer Prophylaxis: PPI  Antibiotics: None   Lines: Central line

## 2018-11-23 NOTE — ASSESSMENT & PLAN NOTE
-CT abdomen showed extensive calcifications of the pancreas.  -History of protein calorie malnutrition secondary to pancreatic insufficiency.  -Currently on pancrelipase replacement.  -Dietitian following.

## 2018-11-24 PROBLEM — E87.6 HYPOKALEMIA: Status: ACTIVE | Noted: 2018-11-24

## 2018-11-24 PROBLEM — E83.42 HYPOMAGNESEMIA: Status: ACTIVE | Noted: 2018-11-24

## 2018-11-24 PROBLEM — N17.9 ACUTE KIDNEY INJURY (HCC): Status: ACTIVE | Noted: 2018-11-24

## 2018-11-24 PROBLEM — E11.10 DIABETIC KETOACIDOSIS (HCC): Status: ACTIVE | Noted: 2018-11-21

## 2018-11-24 LAB
ALBUMIN SERPL BCP-MCNC: 3 G/DL (ref 3.2–4.9)
ALBUMIN/GLOB SERPL: 1.7 G/DL
ALP SERPL-CCNC: 67 U/L (ref 30–99)
ALT SERPL-CCNC: 18 U/L (ref 2–50)
ANION GAP SERPL CALC-SCNC: 7 MMOL/L (ref 0–11.9)
AST SERPL-CCNC: 30 U/L (ref 12–45)
BILIRUB SERPL-MCNC: 0.3 MG/DL (ref 0.1–1.5)
BUN SERPL-MCNC: 10 MG/DL (ref 8–22)
CALCIUM SERPL-MCNC: 8 MG/DL (ref 8.5–10.5)
CHLORIDE SERPL-SCNC: 107 MMOL/L (ref 96–112)
CO2 SERPL-SCNC: 23 MMOL/L (ref 20–33)
CREAT SERPL-MCNC: 0.74 MG/DL (ref 0.5–1.4)
EST. AVERAGE GLUCOSE BLD GHB EST-MCNC: 453 MG/DL
GLOBULIN SER CALC-MCNC: 1.8 G/DL (ref 1.9–3.5)
GLUCOSE BLD-MCNC: 155 MG/DL (ref 65–99)
GLUCOSE BLD-MCNC: 244 MG/DL (ref 65–99)
GLUCOSE BLD-MCNC: 368 MG/DL (ref 65–99)
GLUCOSE BLD-MCNC: 477 MG/DL (ref 65–99)
GLUCOSE SERPL-MCNC: 227 MG/DL (ref 65–99)
HBA1C MFR BLD: 17.4 % (ref 0–5.6)
MAGNESIUM SERPL-MCNC: 1.5 MG/DL (ref 1.5–2.5)
PHOSPHATE SERPL-MCNC: 2.3 MG/DL (ref 2.5–4.5)
POTASSIUM SERPL-SCNC: 3.7 MMOL/L (ref 3.6–5.5)
PROT SERPL-MCNC: 4.8 G/DL (ref 6–8.2)
SODIUM SERPL-SCNC: 137 MMOL/L (ref 135–145)

## 2018-11-24 PROCEDURE — 82962 GLUCOSE BLOOD TEST: CPT | Mod: 91

## 2018-11-24 PROCEDURE — 700102 HCHG RX REV CODE 250 W/ 637 OVERRIDE(OP): Performed by: STUDENT IN AN ORGANIZED HEALTH CARE EDUCATION/TRAINING PROGRAM

## 2018-11-24 PROCEDURE — 99232 SBSQ HOSP IP/OBS MODERATE 35: CPT | Mod: GC | Performed by: INTERNAL MEDICINE

## 2018-11-24 PROCEDURE — 83036 HEMOGLOBIN GLYCOSYLATED A1C: CPT

## 2018-11-24 PROCEDURE — A9270 NON-COVERED ITEM OR SERVICE: HCPCS | Performed by: STUDENT IN AN ORGANIZED HEALTH CARE EDUCATION/TRAINING PROGRAM

## 2018-11-24 PROCEDURE — 80053 COMPREHEN METABOLIC PANEL: CPT

## 2018-11-24 PROCEDURE — 700101 HCHG RX REV CODE 250: Performed by: STUDENT IN AN ORGANIZED HEALTH CARE EDUCATION/TRAINING PROGRAM

## 2018-11-24 PROCEDURE — 97162 PT EVAL MOD COMPLEX 30 MIN: CPT

## 2018-11-24 PROCEDURE — 83735 ASSAY OF MAGNESIUM: CPT

## 2018-11-24 PROCEDURE — 700105 HCHG RX REV CODE 258: Performed by: STUDENT IN AN ORGANIZED HEALTH CARE EDUCATION/TRAINING PROGRAM

## 2018-11-24 PROCEDURE — G8979 MOBILITY GOAL STATUS: HCPCS | Mod: CI

## 2018-11-24 PROCEDURE — 770006 HCHG ROOM/CARE - MED/SURG/GYN SEMI*

## 2018-11-24 PROCEDURE — G8978 MOBILITY CURRENT STATUS: HCPCS | Mod: CJ

## 2018-11-24 PROCEDURE — 84100 ASSAY OF PHOSPHORUS: CPT

## 2018-11-24 PROCEDURE — 700111 HCHG RX REV CODE 636 W/ 250 OVERRIDE (IP): Performed by: STUDENT IN AN ORGANIZED HEALTH CARE EDUCATION/TRAINING PROGRAM

## 2018-11-24 RX ORDER — INSULIN GLARGINE 100 [IU]/ML
10 INJECTION, SOLUTION SUBCUTANEOUS
Status: DISCONTINUED | OUTPATIENT
Start: 2018-11-25 | End: 2018-11-25

## 2018-11-24 RX ORDER — POTASSIUM CHLORIDE 20 MEQ/1
40 TABLET, EXTENDED RELEASE ORAL ONCE
Status: DISCONTINUED | OUTPATIENT
Start: 2018-11-24 | End: 2018-11-24

## 2018-11-24 RX ORDER — INSULIN GLARGINE 100 [IU]/ML
10 INJECTION, SOLUTION SUBCUTANEOUS EVERY EVENING
Status: DISCONTINUED | OUTPATIENT
Start: 2018-11-24 | End: 2018-11-24

## 2018-11-24 RX ORDER — POTASSIUM CHLORIDE 20 MEQ/1
20 TABLET, EXTENDED RELEASE ORAL ONCE
Status: COMPLETED | OUTPATIENT
Start: 2018-11-24 | End: 2018-11-24

## 2018-11-24 RX ORDER — DEXTROSE MONOHYDRATE 25 G/50ML
25 INJECTION, SOLUTION INTRAVENOUS
Status: DISCONTINUED | OUTPATIENT
Start: 2018-11-24 | End: 2018-11-25

## 2018-11-24 RX ORDER — DEXTROSE MONOHYDRATE 25 G/50ML
25 INJECTION, SOLUTION INTRAVENOUS
Status: DISCONTINUED | OUTPATIENT
Start: 2018-11-24 | End: 2018-11-24

## 2018-11-24 RX ORDER — MAGNESIUM SULFATE HEPTAHYDRATE 40 MG/ML
4 INJECTION, SOLUTION INTRAVENOUS ONCE
Status: COMPLETED | OUTPATIENT
Start: 2018-11-24 | End: 2018-11-24

## 2018-11-24 RX ADMIN — POTASSIUM PHOSPHATE, MONOBASIC AND POTASSIUM PHOSPHATE, DIBASIC 30 MMOL: 224; 236 INJECTION, SOLUTION INTRAVENOUS at 15:40

## 2018-11-24 RX ADMIN — ATORVASTATIN CALCIUM 10 MG: 10 TABLET, FILM COATED ORAL at 04:14

## 2018-11-24 RX ADMIN — TAMSULOSIN HYDROCHLORIDE 0.4 MG: 0.4 CAPSULE ORAL at 08:17

## 2018-11-24 RX ADMIN — STANDARDIZED SENNA CONCENTRATE AND DOCUSATE SODIUM 2 TABLET: 8.6; 5 TABLET, FILM COATED ORAL at 16:51

## 2018-11-24 RX ADMIN — INSULIN LISPRO 3 UNITS: 100 INJECTION, SOLUTION INTRAVENOUS; SUBCUTANEOUS at 11:00

## 2018-11-24 RX ADMIN — POTASSIUM CHLORIDE 20 MEQ: 1500 TABLET, EXTENDED RELEASE ORAL at 14:26

## 2018-11-24 RX ADMIN — OMEPRAZOLE 20 MG: 20 CAPSULE, DELAYED RELEASE ORAL at 04:14

## 2018-11-24 RX ADMIN — Medication 325 MG: at 16:51

## 2018-11-24 RX ADMIN — METFORMIN HYDROCHLORIDE 500 MG: 500 TABLET ORAL at 16:51

## 2018-11-24 RX ADMIN — BUPROPION HYDROCHLORIDE 150 MG: 150 TABLET, EXTENDED RELEASE ORAL at 16:51

## 2018-11-24 RX ADMIN — Medication 325 MG: at 08:17

## 2018-11-24 RX ADMIN — MAGNESIUM SULFATE IN WATER 4 G: 40 INJECTION, SOLUTION INTRAVENOUS at 12:24

## 2018-11-24 RX ADMIN — VITAMIN D, TAB 1000IU (100/BT) 2000 UNITS: 25 TAB at 06:29

## 2018-11-24 RX ADMIN — BUPROPION HYDROCHLORIDE 150 MG: 150 TABLET, EXTENDED RELEASE ORAL at 04:14

## 2018-11-24 RX ADMIN — PANCRELIPASE 72000 UNITS: 24000; 76000; 120000 CAPSULE, DELAYED RELEASE PELLETS ORAL at 08:17

## 2018-11-24 RX ADMIN — MULTIPLE VITAMINS W/ MINERALS TAB 1 TABLET: TAB at 04:14

## 2018-11-24 RX ADMIN — Medication 325 MG: at 12:24

## 2018-11-24 RX ADMIN — PANCRELIPASE 72000 UNITS: 24000; 76000; 120000 CAPSULE, DELAYED RELEASE PELLETS ORAL at 12:24

## 2018-11-24 RX ADMIN — INSULIN LISPRO 2 UNITS: 100 INJECTION, SOLUTION INTRAVENOUS; SUBCUTANEOUS at 08:21

## 2018-11-24 RX ADMIN — METFORMIN HYDROCHLORIDE 500 MG: 500 TABLET ORAL at 12:24

## 2018-11-24 RX ADMIN — INSULIN GLARGINE 10 UNITS: 100 INJECTION, SOLUTION SUBCUTANEOUS at 06:30

## 2018-11-24 RX ADMIN — PANCRELIPASE 72000 UNITS: 24000; 76000; 120000 CAPSULE, DELAYED RELEASE PELLETS ORAL at 16:51

## 2018-11-24 ASSESSMENT — PAIN SCALES - GENERAL
PAINLEVEL_OUTOF10: 0

## 2018-11-24 ASSESSMENT — COGNITIVE AND FUNCTIONAL STATUS - GENERAL
SUGGESTED CMS G CODE MODIFIER DAILY ACTIVITY: CJ
CLIMB 3 TO 5 STEPS WITH RAILING: A LITTLE
TURNING FROM BACK TO SIDE WHILE IN FLAT BAD: A LITTLE
DAILY ACTIVITIY SCORE: 20
SUGGESTED CMS G CODE MODIFIER MOBILITY: CJ
SUGGESTED CMS G CODE MODIFIER MOBILITY: CJ
DRESSING REGULAR LOWER BODY CLOTHING: A LITTLE
HELP NEEDED FOR BATHING: A LITTLE
DRESSING REGULAR UPPER BODY CLOTHING: A LITTLE
MOBILITY SCORE: 22
STANDING UP FROM CHAIR USING ARMS: A LITTLE
MOBILITY SCORE: 21
WALKING IN HOSPITAL ROOM: A LITTLE
PERSONAL GROOMING: A LITTLE
CLIMB 3 TO 5 STEPS WITH RAILING: A LITTLE

## 2018-11-24 ASSESSMENT — ENCOUNTER SYMPTOMS
FEVER: 0
SHORTNESS OF BREATH: 0
DIZZINESS: 0
DIARRHEA: 0
ABDOMINAL PAIN: 0
CHILLS: 0
PALPITATIONS: 0
VOMITING: 0
HEADACHES: 0
COUGH: 0
NAUSEA: 0

## 2018-11-24 ASSESSMENT — GAIT ASSESSMENTS
DISTANCE (FEET): 500
DEVIATION: DECREASED BASE OF SUPPORT;BRADYKINETIC
GAIT LEVEL OF ASSIST: STAND BY ASSIST
ASSISTIVE DEVICE: OTHER (COMMENTS)

## 2018-11-24 ASSESSMENT — PATIENT HEALTH QUESTIONNAIRE - PHQ9
2. FEELING DOWN, DEPRESSED, IRRITABLE, OR HOPELESS: NOT AT ALL
1. LITTLE INTEREST OR PLEASURE IN DOING THINGS: NOT AT ALL
SUM OF ALL RESPONSES TO PHQ9 QUESTIONS 1 AND 2: 0

## 2018-11-24 NOTE — PROGRESS NOTES
Assumed care of patient from night shift RN  58 year old male  Full code  NKCHRISTAL  Admitted on 11/21/2018 for hypotension and diabetic ketoacidosis  Pain: 0/10 denying interventions at this time  A&O x 4  RA  Cardiac: WNL  Last BM 11/23  Diabetic, carb controlled diet  Voiding in urinal  PIV: R IJ triple lumen  Skin: redness on sacrum and back, blanching, hell boots in place to float heels, mepilex on lower back and shoulders  1 assist, patient calls appropriately, refusing bed alarm  Low fall risk per girma reece  Plan: abx  All questions answered and all needs met at this time.Bed in low, locked position.  Call light within reach and patient educated on proper use. RN will implement hourly rounding and answer call lights appropriately.

## 2018-11-24 NOTE — THERAPY
"Physical Therapy Evaluation completed.   Bed Mobility:  Supine to Sit: Supervised (HOB flat, no rail)  Transfers: Sit to Stand: Stand by Assist  Gait: Level Of Assist: Stand by Assist with IV pole       Plan of Care: Will benefit from Physical Therapy 2 times per week  Discharge Recommendations: Equipment: Will Continue to Assess for Equipment Needs. Post-acute therapy: Discharge to home with outpatient or home health for additional skilled therapy services.    Patient is a pleasant 57 YO male that was admitted from [facility] for hypotension and poor appetite for two days. Patient with PMHx significant for DM1, pancreatic insufficiency, recent GI bleed, ESBL UTI, HTN, tobacco abuse, TBI with mild cognitive impairment. Patient presented to PT with cachectic appearance, kyphotic posture, and impaired balance. Patient currently at SBA and supervision level for all mobility, patient ambulated in unit with IV pole for 500ft with SBA. Patient will benefit from continued acute PT services to progress to PLOF. Currently recommend home health PT following medical DC.    See \"Rehab Therapy-Acute\" Patient Summary Report for complete documentation.     "

## 2018-11-24 NOTE — PROGRESS NOTES
Pt refuses bed alarm despite education on indication and risks. Reminded to call for assistance. Personal belongings within reach. Reinforced fall precautions Call light and personal belongings within reach, bed kept low, treaded socks on. Assisted as necessary. Kept rested and comfortable at all times.

## 2018-11-24 NOTE — PROGRESS NOTES
Internal Medicine Interval Note  Note Author: Ju Reece M.D.     Name Pawel Tatum     1960   Age/Sex 58 y.o. male   MRN [id number]   Code Status Full     After 5PM or if no immediate response to page, please call for cross-coverage  Attending/Team: Dr. Hill / Radha See Patient List for primary contact information  Call [phone] to page    1st Call - Day Intern (R1):   Dr. Reece 2nd Call - Day Sr. Resident (R2/R3):   Dr. Locke         Reason for interval visit  (Principal Problem)   DKA      Interval Problem Daily Status Update  (24 hours, problem oriented, brief subjective history, new lab/imaging data pertinent to that problem)   -Transferred from ICU to floors overnight, no acute events.  -Patient reports feeling improved, eating well, ambulating by self.  -Per patient, was diagnosed with DM 4 years ago, initially on insulin, most recently switched to oral meds.  Likely T2DM given this history.  -Will plan to remove central line if patient removes stable overnight.  -Will restart metformin at this this time, continue insulin regimen.  -PT recommends home health with PT services, pending OT recs.  -Will coordinate with SW regarding placement.    Review of Systems   Constitutional: Negative for chills and fever.   Respiratory: Negative for cough and shortness of breath.    Cardiovascular: Negative for chest pain and palpitations.   Gastrointestinal: Negative for abdominal pain, diarrhea, nausea and vomiting.   Genitourinary: Negative for dysuria, frequency, hematuria and urgency.   Neurological: Negative for dizziness and headaches.       Disposition/Barriers to discharge:   Electrolyte derangement in setting of DKA, placement    Consultants/Specialty  ICU  PCP: JAKE Armando      Quality Measures  Quality-Core Measures   Reviewed items::  Labs reviewed and Medications reviewed  Dill catheter::  No Dill  DVT prophylaxis pharmacological::  Contraindicated - High  "bleeding risk  DVT prophylaxis - mechanical:  SCDs  No pharmacological DVT ppx at this time due to high bleed risk (h/o GI bleed in August 2018)        Physical Exam       Vitals:    11/23/18 1925 11/24/18 0335 11/24/18 0700 11/24/18 1300   BP:  116/70 118/68    Pulse: 78 82 88    Resp: 16 16 18    Temp: 36.8 °C (98.2 °F) 36.7 °C (98 °F) 36.6 °C (97.8 °F)    TempSrc: Temporal Temporal Temporal    SpO2: 95% 91% 91%    Weight:    56.5 kg (124 lb 9 oz)   Height:    1.88 m (6' 2\")     Body mass index is 15.99 kg/m². Weight: 56.5 kg (124 lb 9 oz)  Oxygen Therapy:  Pulse Oximetry: 91 %, O2 (LPM): 0, O2 Delivery: None (Room Air)    Physical Exam   Constitutional: He is oriented to person, place, and time. No distress.   Thin-appearing with muscle-wasting   HENT:   Head: Atraumatic.   R eye fully open, L eye partially closed (baseline)   Eyes: Conjunctivae and EOM are normal.   Neck: Normal range of motion. Neck supple.   Cardiovascular: Normal rate and regular rhythm.    Pulmonary/Chest: Effort normal and breath sounds normal. No respiratory distress.   Abdominal: Soft. He exhibits no distension. There is no tenderness. There is no rebound and no guarding.   Musculoskeletal: He exhibits no edema or tenderness.   Neurological: He is alert and oriented to person, place, and time.   Skin: Skin is warm and dry.   Psychiatric: Mood and affect normal.             Assessment/Plan     * Diabetic ketoacidosis (HCC)- (present on admission)   Assessment & Plan    -In setting of likely T2DM with concomitant pancreas dysfunction - patient endorsed fasting and not taking meds prior to 11/20/2018 EGD / colonoscopy, which likely triggered this event.  -HCO3 now WNL, AG closed.  -On glargine, ISS - uptitrate per BGs.  -Restarted metformin.  -Fingerstick glucose checks.  -Hypoglycemia protocol.  -IVF.  -Replete electrolytes as needed.     Hypotension   Assessment & Plan    -Likely 2/2 hypovolemia in setting of DKA.  -Improved with " IVF.  -Hold home lisinopril for now given relatively lower BP.     Hypokalemia- (present on admission)   Assessment & Plan    -Goal K >2.  -Replete as needed.     Hypomagnesemia- (present on admission)   Assessment & Plan    -Goal Mg >2.  -Replete as needed.     Acute kidney injury (HCC)- (present on admission)   Assessment & Plan    -Cr 1.5 on admission (0.75 on 8/27/2018).  -Likely prerenal 2/2 dehydration in setting of DKA.  -Resolved with IVF.     Chronic pancreatitis? (HCC)   Assessment & Plan    -CT abdomen showed extensive calcifications of the pancreas.  -History of protein calorie malnutrition secondary to pancreatic insufficiency.  -Currently on pancrelipase replacement.  -Diet consultation placed.  -Continue to monitor for refeeding syndrome.     History of recent UGI bleed- (present on admission)   Assessment & Plan    -recent Hospital admission 8/2018 for an episode of upper GI bleed  -Patient had EGD done during hospitalization which showed grade D esophagitis, patient had repeat EGD & colonoscopy at 11/20 and EGD notable for localized nodularity of the mucosa at the GE junction about 5-6 mm.  Colonoscopy was also done but there was stool throughout the colon and a repeat was recommended.  Procedures with GI consultants  -We will hold anticoagulation in this setting, SCDs for DVT prophylaxis  -Close monitoring for any signs of bleeding and daily CBCs  -Stable H&H since admission      Essential hypertension- (present on admission)   Assessment & Plan    -Will hold on home lisinopril at this time given relatively lower BP.     Hypophosphatemia   Assessment & Plan    -Goal phos >2.5.  -Replete as needed.     Memory loss- (present on admission)   Assessment & Plan    History of TBI with mild cognitive impairment as per chart review  Lives at an assisted living facility called [facility] is actively involved in care and cell phone number is in the chart     Dyslipidemia- (present on admission)    Assessment & Plan    -On home atorvastatin.     Tobacco abuse- (present on admission)   Assessment & Plan    -Smoking about 1-2 cigarettes/day  -PCP to continue counseling regarding smoking cessation     Vitamin D deficiency- (present on admission)   Assessment & Plan    -Replete

## 2018-11-24 NOTE — CARE PLAN
Problem: Safety  Goal: Will remain free from falls  Outcome: PROGRESSING AS EXPECTED  Reinforced the use of call light when needing assistance. Treaded socks on. Bed on lowest position. Personal belongings within reach. Clutter free environment provided.     Problem: Skin Integrity:  Goal: Risk for impaired skin integrity will decrease  Outcome: PROGRESSING AS EXPECTED  Encouraged to turn to sides

## 2018-11-24 NOTE — PROGRESS NOTES
· 2 RN skin check complete with KELLEY Ho.   · Devices in place heel float boots.  Pillows for support. Mepilex to bilateral scapulas  · Skin assessed under devices: yes  · Confirmed pressure ulcers found N/A  · New potential pressure ulcers noted on sacrum/coccyx area and bilateral heels.  · The following interventions in place, heel float boots, pillows in place, [id number] and mepilex placed to bilateral scapula and middle back.   Blanching redness noted on bilateral heels, coccyx/sacrum area, bilateral scapulas, bilateral elbows. Redness to knuckles to bilateral hands.   Scars noted to anterior LE.

## 2018-11-24 NOTE — ASSESSMENT & PLAN NOTE
-Cr 1.5 on admission (0.75 on 8/27/2018).  -Likely prerenal 2/2 dehydration in setting of DKA.  -Resolved with IVF.

## 2018-11-24 NOTE — PROGRESS NOTES
Pt alert and oriented x4. Offered dinner tray. Consumed 75 to 100% of the meal. Offered fluids. Safety precautions in placed. Bed in lowest position. Treaded socks on. Clutter free environment provided. Reinforced the used of call light when needing assistance.

## 2018-11-25 LAB
25(OH)D3 SERPL-MCNC: 31 NG/ML (ref 30–100)
ANION GAP SERPL CALC-SCNC: 5 MMOL/L (ref 0–11.9)
BUN SERPL-MCNC: 12 MG/DL (ref 8–22)
CALCIUM SERPL-MCNC: 8.2 MG/DL (ref 8.5–10.5)
CHLORIDE SERPL-SCNC: 100 MMOL/L (ref 96–112)
CHOLEST SERPL-MCNC: 97 MG/DL (ref 100–199)
CO2 SERPL-SCNC: 25 MMOL/L (ref 20–33)
CREAT SERPL-MCNC: 0.69 MG/DL (ref 0.5–1.4)
FOLATE SERPL-MCNC: >23.9 NG/ML
GLUCOSE BLD-MCNC: 173 MG/DL (ref 65–99)
GLUCOSE BLD-MCNC: 203 MG/DL (ref 65–99)
GLUCOSE BLD-MCNC: 295 MG/DL (ref 65–99)
GLUCOSE BLD-MCNC: 432 MG/DL (ref 65–99)
GLUCOSE BLD-MCNC: 460 MG/DL (ref 65–99)
GLUCOSE BLD-MCNC: 469 MG/DL (ref 65–99)
GLUCOSE SERPL-MCNC: 510 MG/DL (ref 65–99)
HDLC SERPL-MCNC: 56 MG/DL
LDLC SERPL CALC-MCNC: 29 MG/DL
MAGNESIUM SERPL-MCNC: 1.5 MG/DL (ref 1.5–2.5)
PHOSPHATE SERPL-MCNC: 2.4 MG/DL (ref 2.5–4.5)
POTASSIUM SERPL-SCNC: 4.3 MMOL/L (ref 3.6–5.5)
PREALB SERPL-MCNC: 13 MG/DL (ref 18–38)
SODIUM SERPL-SCNC: 130 MMOL/L (ref 135–145)
TRIGL SERPL-MCNC: 61 MG/DL (ref 0–149)
VIT B12 SERPL-MCNC: 769 PG/ML (ref 211–911)

## 2018-11-25 PROCEDURE — A9270 NON-COVERED ITEM OR SERVICE: HCPCS | Performed by: STUDENT IN AN ORGANIZED HEALTH CARE EDUCATION/TRAINING PROGRAM

## 2018-11-25 PROCEDURE — 700102 HCHG RX REV CODE 250 W/ 637 OVERRIDE(OP): Performed by: STUDENT IN AN ORGANIZED HEALTH CARE EDUCATION/TRAINING PROGRAM

## 2018-11-25 PROCEDURE — 82607 VITAMIN B-12: CPT

## 2018-11-25 PROCEDURE — 99232 SBSQ HOSP IP/OBS MODERATE 35: CPT | Mod: GC | Performed by: INTERNAL MEDICINE

## 2018-11-25 PROCEDURE — 84134 ASSAY OF PREALBUMIN: CPT

## 2018-11-25 PROCEDURE — 82962 GLUCOSE BLOOD TEST: CPT | Mod: 91

## 2018-11-25 PROCEDURE — 83735 ASSAY OF MAGNESIUM: CPT

## 2018-11-25 PROCEDURE — 82306 VITAMIN D 25 HYDROXY: CPT

## 2018-11-25 PROCEDURE — 82746 ASSAY OF FOLIC ACID SERUM: CPT

## 2018-11-25 PROCEDURE — 80048 BASIC METABOLIC PNL TOTAL CA: CPT

## 2018-11-25 PROCEDURE — 84100 ASSAY OF PHOSPHORUS: CPT

## 2018-11-25 PROCEDURE — 700105 HCHG RX REV CODE 258: Performed by: STUDENT IN AN ORGANIZED HEALTH CARE EDUCATION/TRAINING PROGRAM

## 2018-11-25 PROCEDURE — 770006 HCHG ROOM/CARE - MED/SURG/GYN SEMI*

## 2018-11-25 PROCEDURE — 80061 LIPID PANEL: CPT

## 2018-11-25 PROCEDURE — 700111 HCHG RX REV CODE 636 W/ 250 OVERRIDE (IP): Performed by: STUDENT IN AN ORGANIZED HEALTH CARE EDUCATION/TRAINING PROGRAM

## 2018-11-25 RX ORDER — INSULIN GLARGINE 100 [IU]/ML
15 INJECTION, SOLUTION SUBCUTANEOUS
Status: DISCONTINUED | OUTPATIENT
Start: 2018-11-25 | End: 2018-11-26

## 2018-11-25 RX ORDER — INSULIN GLARGINE 100 [IU]/ML
10 INJECTION, SOLUTION SUBCUTANEOUS
Status: DISCONTINUED | OUTPATIENT
Start: 2018-11-25 | End: 2018-11-25

## 2018-11-25 RX ORDER — DEXTROSE MONOHYDRATE 25 G/50ML
25 INJECTION, SOLUTION INTRAVENOUS
Status: DISCONTINUED | OUTPATIENT
Start: 2018-11-25 | End: 2018-11-25

## 2018-11-25 RX ORDER — MAGNESIUM SULFATE HEPTAHYDRATE 40 MG/ML
4 INJECTION, SOLUTION INTRAVENOUS ONCE
Status: COMPLETED | OUTPATIENT
Start: 2018-11-25 | End: 2018-11-25

## 2018-11-25 RX ORDER — INSULIN GLARGINE 100 [IU]/ML
15 INJECTION, SOLUTION SUBCUTANEOUS
Status: DISCONTINUED | OUTPATIENT
Start: 2018-11-25 | End: 2018-11-25

## 2018-11-25 RX ORDER — DEXTROSE MONOHYDRATE 25 G/50ML
25 INJECTION, SOLUTION INTRAVENOUS
Status: DISCONTINUED | OUTPATIENT
Start: 2018-11-25 | End: 2018-11-26

## 2018-11-25 RX ADMIN — MAGNESIUM SULFATE IN WATER 4 G: 40 INJECTION, SOLUTION INTRAVENOUS at 16:03

## 2018-11-25 RX ADMIN — PANCRELIPASE 72000 UNITS: 24000; 76000; 120000 CAPSULE, DELAYED RELEASE PELLETS ORAL at 12:07

## 2018-11-25 RX ADMIN — METFORMIN HYDROCHLORIDE 500 MG: 500 TABLET ORAL at 09:30

## 2018-11-25 RX ADMIN — DIBASIC SODIUM PHOSPHATE, MONOBASIC POTASSIUM PHOSPHATE AND MONOBASIC SODIUM PHOSPHATE 2 TABLET: 852; 155; 130 TABLET ORAL at 15:58

## 2018-11-25 RX ADMIN — TAMSULOSIN HYDROCHLORIDE 0.4 MG: 0.4 CAPSULE ORAL at 09:30

## 2018-11-25 RX ADMIN — VITAMIN D, TAB 1000IU (100/BT) 2000 UNITS: 25 TAB at 05:38

## 2018-11-25 RX ADMIN — METFORMIN HYDROCHLORIDE 500 MG: 500 TABLET ORAL at 17:36

## 2018-11-25 RX ADMIN — OMEPRAZOLE 20 MG: 20 CAPSULE, DELAYED RELEASE ORAL at 05:37

## 2018-11-25 RX ADMIN — STANDARDIZED SENNA CONCENTRATE AND DOCUSATE SODIUM 2 TABLET: 8.6; 5 TABLET, FILM COATED ORAL at 17:36

## 2018-11-25 RX ADMIN — Medication 325 MG: at 09:29

## 2018-11-25 RX ADMIN — BUPROPION HYDROCHLORIDE 150 MG: 150 TABLET, EXTENDED RELEASE ORAL at 05:37

## 2018-11-25 RX ADMIN — SODIUM CHLORIDE, POTASSIUM CHLORIDE, SODIUM LACTATE AND CALCIUM CHLORIDE: 600; 310; 30; 20 INJECTION, SOLUTION INTRAVENOUS at 15:42

## 2018-11-25 RX ADMIN — MULTIPLE VITAMINS W/ MINERALS TAB 1 TABLET: TAB at 05:38

## 2018-11-25 RX ADMIN — Medication 325 MG: at 12:07

## 2018-11-25 RX ADMIN — PANCRELIPASE 72000 UNITS: 24000; 76000; 120000 CAPSULE, DELAYED RELEASE PELLETS ORAL at 09:30

## 2018-11-25 RX ADMIN — PANCRELIPASE 72000 UNITS: 24000; 76000; 120000 CAPSULE, DELAYED RELEASE PELLETS ORAL at 17:36

## 2018-11-25 RX ADMIN — INSULIN GLARGINE 15 UNITS: 100 INJECTION, SOLUTION SUBCUTANEOUS at 12:08

## 2018-11-25 RX ADMIN — INSULIN LISPRO 4 UNITS: 100 INJECTION, SOLUTION INTRAVENOUS; SUBCUTANEOUS at 09:39

## 2018-11-25 RX ADMIN — Medication 325 MG: at 17:36

## 2018-11-25 RX ADMIN — ATORVASTATIN CALCIUM 10 MG: 10 TABLET, FILM COATED ORAL at 05:38

## 2018-11-25 RX ADMIN — BUPROPION HYDROCHLORIDE 150 MG: 150 TABLET, EXTENDED RELEASE ORAL at 17:47

## 2018-11-25 ASSESSMENT — ENCOUNTER SYMPTOMS
DIZZINESS: 0
ABDOMINAL PAIN: 0
VOMITING: 0
DIARRHEA: 0
PALPITATIONS: 0
NAUSEA: 0
FEVER: 0
COUGH: 0
CHILLS: 0
HEARTBURN: 0
SHORTNESS OF BREATH: 0
HEADACHES: 0

## 2018-11-25 ASSESSMENT — PAIN SCALES - GENERAL
PAINLEVEL_OUTOF10: 0
PAINLEVEL_OUTOF10: 0

## 2018-11-25 NOTE — PROGRESS NOTES
Patient's fsbs 469. [facility] paged d/t new insulin orders were put in to start in the future at 1100. This RN calling [facility] to request x1 orders for hyperglycemia management.

## 2018-11-25 NOTE — CARE PLAN
Problem: Safety  Goal: Will remain free from falls  Outcome: PROGRESSING AS EXPECTED  Reinforced the use of call light when needing assistance. Treaded socks on. Bed on lowest position. Personal belongings within reach. Clutter free environment provided.     Problem: Fluid Volume:  Goal: Will maintain balanced intake and output  Outcome: PROGRESSING AS EXPECTED  In IV fluids. Monitored intake and output. Encouraged to have fluids

## 2018-11-25 NOTE — PROGRESS NOTES
Pt alert and oriented x4. Offered dinner tray. Consumed 75 to 100% of the meals. Offered fluids. Safety precautions in placed. Treaded socks on. Bed in lowest position. Reinforced the use of call light when needing assistance.

## 2018-11-25 NOTE — PROGRESS NOTES
Dr. Felipe paged back and gave the ok to implement future orders so MD is aware that pt will receive 4 units of standard lispro plus another 6 units of lispro according to scale d/t fsbs higher than 400. Order read back and confirmed. Recheck fsbs in 1hr.

## 2018-11-25 NOTE — PROGRESS NOTES
Received a call from Kat from Lab with a critical glucose value of 502. Dr. Felipe paged for update.

## 2018-11-25 NOTE — PROGRESS NOTES
Internal Medicine Interval Note  Note Author: Ju Reece M.D.     Name Pawel Tatum     1960   Age/Sex 58 y.o. male   MRN [id number]   Code Status Full     After 5PM or if no immediate response to page, please call for cross-coverage  Attending/Team: Dr. Hill / Radha See Patient List for primary contact information  Call [phone] to page    1st Call - Day Intern (R1):   Dr. Reece 2nd Call - Day Sr. Resident (R2/R3):   Dr. Locke         Reason for interval visit  (Principal Problem)   DKA      Interval Problem Daily Status Update  (24 hours, problem oriented, brief subjective history, new lab/imaging data pertinent to that problem)   -Patient reports feeling improved, eating well, ambulating by self.  -BGs running high in 300-400s, likely due to increased appetitie - nurse reports patient eating well.  On metformin, will continue to uptitrate insulin regimen.  -PT recommends home health with PT services, pending OT recs.  -Will coordinate with SW regarding placement.    Review of Systems   Constitutional: Negative for chills and fever.   Respiratory: Negative for cough and shortness of breath.    Cardiovascular: Negative for chest pain and palpitations.   Gastrointestinal: Negative for abdominal pain, diarrhea, heartburn, nausea and vomiting.   Genitourinary: Negative for dysuria, frequency, hematuria and urgency.   Neurological: Negative for dizziness and headaches.       Disposition/Barriers to discharge:   Electrolyte derangement in setting of DKA, control of hyperglycemia, placement    Consultants/Specialty  ICU  PCP: Rosendo Watters A.P.NYuridia      Quality Measures  Quality-Core Measures   Reviewed items::  Labs reviewed and Medications reviewed  Dill catheter::  No Dill  DVT prophylaxis pharmacological::  Contraindicated - High bleeding risk  DVT prophylaxis - mechanical:  SCDs  No pharmacological DVT ppx at this time due to high bleed risk (h/o GI bleed in August  2018)        Physical Exam       Vitals:    11/24/18 1652 11/24/18 1916 11/25/18 0343 11/25/18 0720   BP: 114/66 139/69 127/68 125/72   Pulse: 69 85 68 72   Resp: 18 18 15 16   Temp: 36.4 °C (97.6 °F) 36.8 °C (98.3 °F) 36.9 °C (98.4 °F) 36.7 °C (98.1 °F)   TempSrc: Temporal Temporal Temporal Temporal   SpO2: 95% 97% 94% 93%   Weight:       Height:         Body mass index is 15.99 kg/m².    Oxygen Therapy:  Pulse Oximetry: 93 %, O2 (LPM): 0, O2 Delivery: None (Room Air)    Physical Exam   Constitutional: He is oriented to person, place, and time. No distress.   Thin-appearing with muscle-wasting   HENT:   Head: Atraumatic.   R eye fully open, L eye partially closed (baseline)   Eyes: Conjunctivae and EOM are normal.   Neck: Normal range of motion. Neck supple.   Cardiovascular: Normal rate and regular rhythm.    Pulmonary/Chest: Effort normal. No respiratory distress.   Abdominal: Soft. He exhibits no distension. There is no tenderness. There is no rebound and no guarding.   Musculoskeletal: He exhibits no edema or tenderness.   Neurological: He is alert and oriented to person, place, and time.   Skin: Skin is warm and dry.   Psychiatric: Mood and affect normal.             Assessment/Plan     * Diabetic ketoacidosis (HCC)- (present on admission)   Assessment & Plan    -In setting of likely T2DM with concomitant pancreas dysfunction - patient endorsed fasting and not taking meds prior to 11/20/2018 EGD / colonoscopy, which likely triggered this event.  -HCO3 now WNL, AG closed.  -On metformin, glargine, ISS - uptitrate insulin per BGs.  -Fingerstick glucose checks.  -Hypoglycemia protocol.  -Discontinue IVF given good PO intake.  -Replete electrolytes as needed.     Hypotension   Assessment & Plan    -Likely 2/2 hypovolemia in setting of DKA.  -Improved with IVF.  -Hold home lisinopril for now given relatively lower BP.     Hypokalemia- (present on admission)   Assessment & Plan    -Goal K >2.  -Replete as needed.      Hypomagnesemia- (present on admission)   Assessment & Plan    -Goal Mg >2.  -Replete as needed.     Acute kidney injury (HCC)- (present on admission)   Assessment & Plan    -Cr 1.5 on admission (0.75 on 8/27/2018).  -Likely prerenal 2/2 dehydration in setting of DKA.  -Resolved with IVF.     Chronic pancreatitis? (HCC)   Assessment & Plan    -CT abdomen showed extensive calcifications of the pancreas.  -History of protein calorie malnutrition secondary to pancreatic insufficiency.  -Currently on pancrelipase replacement.  -Diet consultation placed.  -Continue to monitor for refeeding syndrome.     History of recent UGI bleed- (present on admission)   Assessment & Plan    -recent Hospital admission 8/2018 for an episode of upper GI bleed  -Patient had EGD done during hospitalization which showed grade D esophagitis, patient had repeat EGD & colonoscopy at 11/20 and EGD notable for localized nodularity of the mucosa at the GE junction about 5-6 mm.  Colonoscopy was also done but there was stool throughout the colon and a repeat was recommended.  Procedures with GI consultants  -We will hold anticoagulation in this setting, SCDs for DVT prophylaxis  -No apparent active bleed, Hb stable     Essential hypertension- (present on admission)   Assessment & Plan    -Will hold on home lisinopril at this time given relatively lower BP.     Hypophosphatemia   Assessment & Plan    -Goal phos >2.5.  -Replete as needed.     Memory loss- (present on admission)   Assessment & Plan    History of TBI with mild cognitive impairment as per chart review  Lives at an assisted living facility called [facility]  Sister is actively involved in care and cell phone number is in the chart     Dyslipidemia- (present on admission)   Assessment & Plan    -On home atorvastatin.     Tobacco abuse- (present on admission)   Assessment & Plan    -Smoking about 1-2 cigarettes/day  -PCP to continue counseling regarding smoking cessation     Vitamin D  deficiency- (present on admission)   Assessment & Plan    -Vitamin D level 31.  -On vitamin D supplement.

## 2018-11-25 NOTE — CARE PLAN
I would recommend evaluation by a surgeon.  Recommend Dr Hdz.   Problem: Infection  Goal: Will remain free from infection  Standard precaution in place. Pt educated on s/sx of infection. Infection prevention in place when accessing central line.     Problem: Physical Regulation:  Goal: Diagnostic test results will improve  fsbs over 400. Insulin administered per Dr. Felipe's order. Pt educated on importance of fsbs check before meals and insulin administration per doctor's order. Verbalized understanding.

## 2018-11-26 PROBLEM — E11.9 DIABETES MELLITUS (HCC): Status: ACTIVE | Noted: 2018-11-26

## 2018-11-26 LAB
ANION GAP SERPL CALC-SCNC: 5 MMOL/L (ref 0–11.9)
BUN SERPL-MCNC: 19 MG/DL (ref 8–22)
CALCIUM SERPL-MCNC: 9 MG/DL (ref 8.5–10.5)
CHLORIDE SERPL-SCNC: 101 MMOL/L (ref 96–112)
CO2 SERPL-SCNC: 29 MMOL/L (ref 20–33)
CREAT SERPL-MCNC: 0.86 MG/DL (ref 0.5–1.4)
GLUCOSE BLD-MCNC: 163 MG/DL (ref 65–99)
GLUCOSE BLD-MCNC: 296 MG/DL (ref 65–99)
GLUCOSE BLD-MCNC: 316 MG/DL (ref 65–99)
GLUCOSE BLD-MCNC: 353 MG/DL (ref 65–99)
GLUCOSE SERPL-MCNC: 181 MG/DL (ref 65–99)
MAGNESIUM SERPL-MCNC: 1.9 MG/DL (ref 1.5–2.5)
PHOSPHATE SERPL-MCNC: 3.8 MG/DL (ref 2.5–4.5)
POTASSIUM SERPL-SCNC: 4.2 MMOL/L (ref 3.6–5.5)
SODIUM SERPL-SCNC: 135 MMOL/L (ref 135–145)

## 2018-11-26 PROCEDURE — 84100 ASSAY OF PHOSPHORUS: CPT

## 2018-11-26 PROCEDURE — 700102 HCHG RX REV CODE 250 W/ 637 OVERRIDE(OP): Performed by: STUDENT IN AN ORGANIZED HEALTH CARE EDUCATION/TRAINING PROGRAM

## 2018-11-26 PROCEDURE — A9270 NON-COVERED ITEM OR SERVICE: HCPCS | Performed by: STUDENT IN AN ORGANIZED HEALTH CARE EDUCATION/TRAINING PROGRAM

## 2018-11-26 PROCEDURE — 97165 OT EVAL LOW COMPLEX 30 MIN: CPT

## 2018-11-26 PROCEDURE — 80048 BASIC METABOLIC PNL TOTAL CA: CPT

## 2018-11-26 PROCEDURE — 83735 ASSAY OF MAGNESIUM: CPT

## 2018-11-26 PROCEDURE — 700111 HCHG RX REV CODE 636 W/ 250 OVERRIDE (IP): Performed by: STUDENT IN AN ORGANIZED HEALTH CARE EDUCATION/TRAINING PROGRAM

## 2018-11-26 PROCEDURE — 99232 SBSQ HOSP IP/OBS MODERATE 35: CPT | Mod: GC | Performed by: INTERNAL MEDICINE

## 2018-11-26 PROCEDURE — 82962 GLUCOSE BLOOD TEST: CPT | Mod: 91

## 2018-11-26 PROCEDURE — G8988 SELF CARE GOAL STATUS: HCPCS | Mod: CI

## 2018-11-26 PROCEDURE — G8987 SELF CARE CURRENT STATUS: HCPCS | Mod: CI

## 2018-11-26 PROCEDURE — 770006 HCHG ROOM/CARE - MED/SURG/GYN SEMI*

## 2018-11-26 PROCEDURE — G8989 SELF CARE D/C STATUS: HCPCS | Mod: CI

## 2018-11-26 RX ORDER — INSULIN GLARGINE 100 [IU]/ML
15 INJECTION, SOLUTION SUBCUTANEOUS
Status: DISCONTINUED | OUTPATIENT
Start: 2018-11-27 | End: 2018-11-27

## 2018-11-26 RX ORDER — MAGNESIUM SULFATE 1 G/100ML
1 INJECTION INTRAVENOUS ONCE
Status: COMPLETED | OUTPATIENT
Start: 2018-11-26 | End: 2018-11-26

## 2018-11-26 RX ORDER — DEXTROSE MONOHYDRATE 25 G/50ML
25 INJECTION, SOLUTION INTRAVENOUS
Status: DISCONTINUED | OUTPATIENT
Start: 2018-11-26 | End: 2018-11-27

## 2018-11-26 RX ORDER — LISINOPRIL 10 MG/1
10 TABLET ORAL DAILY
Status: DISCONTINUED | OUTPATIENT
Start: 2018-11-26 | End: 2018-12-21 | Stop reason: HOSPADM

## 2018-11-26 RX ADMIN — PANCRELIPASE 72000 UNITS: 24000; 76000; 120000 CAPSULE, DELAYED RELEASE PELLETS ORAL at 12:45

## 2018-11-26 RX ADMIN — MULTIPLE VITAMINS W/ MINERALS TAB 1 TABLET: TAB at 05:27

## 2018-11-26 RX ADMIN — MAGNESIUM SULFATE IN DEXTROSE 1 G: 10 INJECTION, SOLUTION INTRAVENOUS at 08:42

## 2018-11-26 RX ADMIN — LISINOPRIL 10 MG: 10 TABLET ORAL at 15:48

## 2018-11-26 RX ADMIN — ATORVASTATIN CALCIUM 10 MG: 10 TABLET, FILM COATED ORAL at 05:27

## 2018-11-26 RX ADMIN — DIBASIC SODIUM PHOSPHATE, MONOBASIC POTASSIUM PHOSPHATE AND MONOBASIC SODIUM PHOSPHATE 2 TABLET: 852; 155; 130 TABLET ORAL at 05:27

## 2018-11-26 RX ADMIN — PANCRELIPASE 72000 UNITS: 24000; 76000; 120000 CAPSULE, DELAYED RELEASE PELLETS ORAL at 08:48

## 2018-11-26 RX ADMIN — STANDARDIZED SENNA CONCENTRATE AND DOCUSATE SODIUM 2 TABLET: 8.6; 5 TABLET, FILM COATED ORAL at 17:31

## 2018-11-26 RX ADMIN — Medication 325 MG: at 17:31

## 2018-11-26 RX ADMIN — OMEPRAZOLE 20 MG: 20 CAPSULE, DELAYED RELEASE ORAL at 05:27

## 2018-11-26 RX ADMIN — INSULIN GLARGINE 15 UNITS: 100 INJECTION, SOLUTION SUBCUTANEOUS at 12:41

## 2018-11-26 RX ADMIN — BUPROPION HYDROCHLORIDE 150 MG: 150 TABLET, EXTENDED RELEASE ORAL at 17:31

## 2018-11-26 RX ADMIN — VITAMIN D, TAB 1000IU (100/BT) 2000 UNITS: 25 TAB at 05:28

## 2018-11-26 RX ADMIN — BUPROPION HYDROCHLORIDE 150 MG: 150 TABLET, EXTENDED RELEASE ORAL at 05:27

## 2018-11-26 RX ADMIN — METFORMIN HYDROCHLORIDE 500 MG: 500 TABLET ORAL at 08:48

## 2018-11-26 RX ADMIN — Medication 325 MG: at 12:45

## 2018-11-26 RX ADMIN — PANCRELIPASE 72000 UNITS: 24000; 76000; 120000 CAPSULE, DELAYED RELEASE PELLETS ORAL at 17:30

## 2018-11-26 RX ADMIN — METFORMIN HYDROCHLORIDE 500 MG: 500 TABLET ORAL at 17:31

## 2018-11-26 RX ADMIN — TAMSULOSIN HYDROCHLORIDE 0.4 MG: 0.4 CAPSULE ORAL at 08:48

## 2018-11-26 RX ADMIN — Medication 325 MG: at 08:48

## 2018-11-26 ASSESSMENT — COGNITIVE AND FUNCTIONAL STATUS - GENERAL
DAILY ACTIVITIY SCORE: 21
DRESSING REGULAR LOWER BODY CLOTHING: A LITTLE
HELP NEEDED FOR BATHING: A LITTLE
TOILETING: A LITTLE
SUGGESTED CMS G CODE MODIFIER DAILY ACTIVITY: CJ

## 2018-11-26 ASSESSMENT — ENCOUNTER SYMPTOMS
SHORTNESS OF BREATH: 0
COUGH: 0
HEMOPTYSIS: 0
FEVER: 0
DIZZINESS: 0
NAUSEA: 0
ABDOMINAL PAIN: 0
CHILLS: 0
PALPITATIONS: 0
VOMITING: 0
HEADACHES: 0
DIARRHEA: 0

## 2018-11-26 ASSESSMENT — PAIN SCALES - GENERAL
PAINLEVEL_OUTOF10: 0

## 2018-11-26 ASSESSMENT — ACTIVITIES OF DAILY LIVING (ADL): TOILETING: INDEPENDENT

## 2018-11-26 NOTE — THERAPY
"Occupational Therapy Evaluation completed.   Functional Status: Pt is a 59 y/o male admitted with hypotension, found to have DKA. He was pleasant and cooperative. He is at a supv level for basic self cares, functional mobility, and functional transfers without AD. He denies any further deficits in ADLs at this time, but is very interested in participating with diabetic educator to learn how to self administer insulin.   Plan of Care: Patient with no further skilled OT needs in the acute care setting at this time  Discharge Recommendations:  Equipment: No Equipment Needed. Recommend home health or outpatient transitional care services for continued occupational therapy services      See \"Rehab Therapy-Acute\" Patient Summary Report for complete documentation.    "

## 2018-11-26 NOTE — ASSESSMENT & PLAN NOTE
-CONTROLLED:  ?Type 2 diabetes onset 4 years ago.  -Fingerstick glucose checks.  -Hypoglycemia protocol.  -On metformin, glargine  -12/2:  Insulin Glargine 18-->20u; premeal Humalog 8u-->9u  -12/8: Sliding scale insulin has been stopped, blood glucose well controlled.  -12/10 blood glucose 123-237.  Glargine increased from 20-21 units daily, lispro increased from 9-10 units 3 times daily.  -12/12: Blood glucose well controlled on current regimen.  Lantus changed to PM schedule.  -12/13: Glycemic control improved after Lantus change.  -12/14: Blood glucose following entirely within reference range.  -12/15: Blood glucose 157-187.  We are satisfied with his level of glycemic control given his history.  -12/17: Episode of hypoglycemia found on BMP lab drawn midmorning, 44.  Corrected to 77 after snack, asymptomatic, a.m. lispro decreased to 8 units.  -12/19: Glycemic control declining.  Continue to monitor outpatient

## 2018-11-26 NOTE — CARE PLAN
Problem: Knowledge Deficit  Goal: Knowledge of disease process/condition, treatment plan, diagnostic tests, and medications will improve  Outcome: PROGRESSING SLOWER THAN EXPECTED  Educated pt the importance of ambulating more and discussed being up in chairs for meals and ambulating around the unit in the morning. Discussed the benefits of ambulation with pt, pt verbalized understanding.     Problem: Nutritional:  Goal: Maintenance of adequate nutrition will improve  Outcome: PROGRESSING AS EXPECTED  Educated pt about diabetic diet and discussed snacking with pt, educated pt on fingerstick checks and eating with pt, pt verbalized understanding.

## 2018-11-26 NOTE — PROGRESS NOTES
Internal Medicine Interval Note  Note Author: Ju Reece M.D.     Name Pawel Tatum     1960   Age/Sex 58 y.o. male   MRN [id number]   Code Status Full     After 5PM or if no immediate response to page, please call for cross-coverage  Attending/Team: Dr. Hill / Radha See Patient List for primary contact information  Call [phone] to page    1st Call - Day Intern (R1):   Dr. Reece 2nd Call - Day Sr. Resident (R2/R3):   Dr. Locke         Reason for interval visit  (Principal Problem)   DKA      Interval Problem Daily Status Update  (24 hours, problem oriented, brief subjective history, new lab/imaging data pertinent to that problem)   -Patient feeling well, good appetite, ambulating by self.  -BGs under better control now, will continue to uptitrate insulin regimen, continue metformin, goal BG 200s.  -SW to clarify with [facility] - per patient, [facility] does not accept patients who require insulin injections.  Patient prefers returning to [facility], but agreeable to another group home to continue insulin treatment if necessary.     Review of Systems   Constitutional: Negative for chills and fever.   Respiratory: Negative for cough, hemoptysis and shortness of breath.    Cardiovascular: Negative for chest pain and palpitations.   Gastrointestinal: Negative for abdominal pain, diarrhea, nausea and vomiting.   Genitourinary: Negative for dysuria, frequency, hematuria and urgency.   Neurological: Negative for dizziness and headaches.       Disposition/Barriers to discharge:   Control of hyperglycemia, placement    Consultants/Specialty  ICU  PCP: Rosendo Watters [email or web address]      Quality Measures  Quality-Core Measures   Reviewed items::  Labs reviewed and Medications reviewed  Dill catheter::  No Dill  DVT prophylaxis pharmacological::  Contraindicated - High bleeding risk  DVT prophylaxis - mechanical:  SCDs  No pharmacological DVT ppx at this time due to high bleed risk (h/o GI bleed in August  2018)  Central line in place - extremely difficult per nursing to put in peripheral IV.  Given continued need for lab monitoring, will leave central line in place.  Central line site appears clean without surrounding erythema.        Physical Exam       Vitals:    11/25/18 1558 11/25/18 1900 11/26/18 0320 11/26/18 0756   BP: 116/68 151/87 121/74 119/69   Pulse: 78 (!) 101 73 71   Resp: 16 18 16 15   Temp: 37.1 °C (98.7 °F) 36.7 °C (98 °F) 36.6 °C (97.8 °F) 36.7 °C (98 °F)   TempSrc: Temporal Temporal Temporal Temporal   SpO2: 93% 94% 92% 97%   Weight:       Height:         Body mass index is 15.99 kg/m².    Oxygen Therapy:  Pulse Oximetry: 97 %, O2 (LPM): 0, O2 Delivery: None (Room Air)    Physical Exam   Constitutional: He is oriented to person, place, and time. No distress.   Thin-appearing with muscle-wasting   HENT:   Head: Atraumatic.   R eye fully open, L eye partially closed (baseline)   Eyes: Conjunctivae and EOM are normal.   Neck: Normal range of motion. Neck supple.   Cardiovascular: Normal rate and regular rhythm.    Pulmonary/Chest: Effort normal. No respiratory distress.   Abdominal: Soft. He exhibits no distension. There is no tenderness. There is no rebound and no guarding.   Musculoskeletal: He exhibits no edema or tenderness.   Neurological: He is alert and oriented to person, place, and time.   No gross neurological deficits.   Skin: Skin is warm and dry.   Psychiatric: Mood and affect normal.             Assessment/Plan     * Diabetic ketoacidosis (HCC)- (present on admission)   Assessment & Plan    -In setting of likely T2DM with concomitant pancreas dysfunction - patient endorsed fasting and not taking meds prior to 11/20/2018 EGD / colonoscopy, which likely triggered this event.  -HCO3 WNL, AG closed with insulin drip, now on subcutaneous glargine and lispro, home metformin.  -Resolved.     Diabetes mellitus (HCC)- (present on admission)   Assessment & Plan    -Some discrepancy on whether  patient is T1 or T2DM per chart review.  -Per patient, was diagnosed with DM 4 years ago, initially on insulin, most recently switched to oral meds.  Likely T2DM given this history.  -On metformin, glargine, ISS - uptitrate insulin per BGs.  -Fingerstick glucose checks.  -Hypoglycemia protocol.     Hypotension   Assessment & Plan    -Likely 2/2 hypovolemia in setting of DKA.  -Resolved with IVF.     Hypokalemia- (present on admission)   Assessment & Plan    -Goal K >2.  -Replete as needed.     Hypomagnesemia- (present on admission)   Assessment & Plan    -Goal Mg >2.  -Replete as needed.     Acute kidney injury (HCC)- (present on admission)   Assessment & Plan    -Cr 1.5 on admission (0.75 on 8/27/2018).  -Likely prerenal 2/2 dehydration in setting of DKA.  -Resolved with IVF.     Chronic pancreatitis? (HCC)   Assessment & Plan    -CT abdomen showed extensive calcifications of the pancreas.  -History of protein calorie malnutrition secondary to pancreatic insufficiency.  -Currently on pancrelipase replacement.  -Dietitian following.     History of recent UGI bleed- (present on admission)   Assessment & Plan    -recent Hospital admission 8/2018 for an episode of upper GI bleed  -Patient had EGD done during hospitalization which showed grade D esophagitis, patient had repeat EGD & colonoscopy at 11/20 and EGD notable for localized nodularity of the mucosa at the GE junction about 5-6 mm.  Colonoscopy was also done but there was stool throughout the colon and a repeat was recommended.  Procedures with GI consultants  -We will hold anticoagulation in this setting, SCDs for DVT prophylaxis  -No apparent active bleed, Hb stable     Essential hypertension- (present on admission)   Assessment & Plan    -Restart home lisinopril given improved BP.     Hypophosphatemia   Assessment & Plan    -Goal phos >2.5.  -Replete as needed.     Memory loss- (present on admission)   Assessment & Plan    History of TBI with mild cognitive  impairment as per chart review  Lives at an assisted living facility called [facility]  Sister is actively involved in care and cell phone number is in the chart     Dyslipidemia- (present on admission)   Assessment & Plan    -On home atorvastatin.     Tobacco abuse- (present on admission)   Assessment & Plan    -Smoking about 1-2 cigarettes/day  -PCP to continue counseling regarding smoking cessation     Vitamin D deficiency- (present on admission)   Assessment & Plan    -Vitamin D level 31.  -On vitamin D supplement.

## 2018-11-26 NOTE — DISCHARGE SUMMARY
Internal Medicine Discharge Summary     Date of Admission: 2018  Date of Discharge: 18  Service: [facility], [employer]   Attending Physician: Liz Salinas Bronander, Upadhyay, Pokharel  Senior Resident: Robbie Hyde Al Khafaji  Intern: Tahira Reece    Discharge Diagnoses:   1. Diabetic ketoacidosis due to medication noncompliance in setting of recent outpatient colonoscopy  2. Poorly controlled insulin dependent diabetes     Procedures and Imagin/21: R IV central line placed for access due to poor peripheral access. Did NOT require pressors in ICU     Consultants:   none    History of Present Illness:   Mr. Tatum is a very pleasant male with PMH of insulin dependent diabetes (seems to be type 1), pancreatic insufficiency and history of TBI with mild cognitive impairment who was sent to the hospital from his assisted living ([facility]) due to two days of hypotension. Of note, patient had colonoscopy on  and was not eating or taking his medications in preparation for the procedure. At admission, patient found to be in DKA and was admitted to the ICU for aggressive treatment. Please see admission H& for details.     Hospital Course by Problem:   1. Diabetic ketoacidosis due to medication noncompliance in setting of recent outpatient colonoscopy - beta-hydroxybutyrate elevated at 7.4, anion gap of 23, HCO3 of 7. Patient treated with IV fluids, insulin drip and DKA resolved quickly. Patient was then transitioned to sub cutaneous insulin including sliding scale and transferred to the medical floor. His stay subsequently was unremarkable, with a focus on titration of SC insulin. He was titrated to Glargine 20u plus Lispro 8u AM, 10u Lunch, 10u PM with meals. On this regimen, his blood sugars remained around 100-200. This can be further titrated as an outpatient.    2. Poorly controlled insulin dependent diabetes - patient reports being only on oral medication prior to admission due to his  "facility not being able to accept residents on SC insulin. A1c of 17.4 indicated very poor control, with an average glucose of 453. Based on this, he will certainly need insulin for control, as discussed above. Given his need for SC insulin, placement was an issue.  He was accepted into [facility] assisted living with home health.  He can self-administer insulin.  Patient discharged on Lantus 20 units at evening, NovoLog 8, 10, 10.  Continue metformin that was used in hospital stay too.  We DC'd linagliptin and glimepiride from his home medication list, we also removed the insulin sliding scale from dc med list due to difficulties faced the patient and the facility in handling it.    Discharge summary statements for assisted living facility:  \"Resident can administer and manage his insulin injections and blood sugar checks at this time.  Resident checks blood sugar 2 times daily and will tell staff what his blood sugar is.  Staff will document what resident states.  \"    \"Glargine pen injector: Inject 20 units every evening.  Lispro pen injector: Inject 8 units in a.m. 30 minutes before breakfast, 10 units 30 minutes before lunch, 10 units in p.m. 30 minutes before dinner.  \"    Physical Exam on Day of Discharge: See interval note 12/19/2018.  Constitutional: He is oriented to person, place, and time. No distress.   Thin-appearing with muscle-wasting   HENT:   Head: Atraumatic.   Nose: Nose normal.   R eye fully open, L eye partially closed (baseline)   Eyes: Conjunctivae and EOM are normal.   Neck: Normal range of motion. Neck supple.   Cardiovascular: Normal rate and regular rhythm.    Pulmonary/Chest: Effort normal. No respiratory distress. Lungs are clear to auscultation and percussion bilaterally.  Abdominal: Soft. He exhibits no distension. There is no tenderness. There is no rebound and no guarding.   Musculoskeletal: He exhibits no edema or tenderness.   Neurological: He is alert and oriented to person, " "place, and time.   No gross neurological deficits.   Skin: Skin is warm and dry.   Psychiatric: Mood and affect normal.        Condition at Discharge:   Patient is in stable medical condition with no acute distress, see physical exam above.     Disposition:   To assisted living with home health.    Discharge Medications:      Medication List      START taking these medications      Instructions   glucose 4 g 4 g Chew   Take 4 Tabs by mouth as needed for Low Blood Sugar (If FSBG is less than or equal to 70 mg/dL and patient able to eat or drink).  Dose:  16 g     glucose blood strip  Commonly known as:  CONTOUR NEXT TEST   1 Strip by Other route as needed.  Dose:  1 Each     insulin glargine 100 UNIT/ML Sopn injection  Commonly known as:  LANTUS   \"Resident can self administer  his own insulin, checks blood sugar 2 times daily and will tell staff what his blood sugar is.  Staff will document what resident states.  No insulin doses instructions can be and ranges. Glargine pen injector: Inject 20 units every evening.\"     Insulin Pen Needle 32 G x 4 mm   1 Each by Does not apply route 1 time daily as needed.  Dose:  1 Each     NOVOLOG (insulin aspart) 100 UNIT/ML Sopn injection  Commonly known as:  NOVOLOG   \"Resident can self administer his own insulin, checks blood sugar 2 times daily and will tell staff what his blood sugar is.  Staff will document what resident states. Lispro: Inject 8 units in a.m. 30 minutes before breakfast, 10 units 30 minutes before lunch, 10 units 30 minutes before dinner.  \"        CHANGE how you take these medications      Instructions   Pancrelipase (Lip-Prot-Amyl) 40835 units Cpep  What changed:  additional instructions  Commonly known as:  CREON   Take 1 Cap by mouth See Admin Instructions. 2 caps tid with meals  Dose:  1 Cap        CONTINUE taking these medications      Instructions   aspirin 81 MG Chew chewable tablet  Commonly known as:  ASPIRIN 81   Take 1 Tab by mouth every " day.  Dose:  81 mg     atorvastatin 10 MG Tabs  Commonly known as:  LIPITOR   Take 1 tablet by mouth daily to prevent heart attacks and strokes     buPROPion 300 MG XL tablet  Commonly known as:  WELLBUTRIN XL   Take 1 tablet by mouth every morning     ferrous sulfate 325 (65 Fe) MG EC tablet   Take 1 Tab by mouth 3 times a day, with meals.  Dose:  325 mg     lisinopril 10 MG Tabs  Commonly known as:  PRINIVIL   Take 1 Tab by mouth every day.  Dose:  10 mg     memantine 5 MG Tabs  Commonly known as:  NAMENDA   TAKE 1 TAB BY MOUTH TWICE DAILY     metformin 1000 MG tablet  Commonly known as:  GLUCOPHAGE   Take 1 Tab by mouth 2 times a day, with meals.  Dose:  1000 mg     omeprazole 20 MG delayed-release capsule  Commonly known as:  PRILOSEC   Take 20 mg by mouth every morning.  Dose:  20 mg     tamsulosin 0.4 MG capsule  Commonly known as:  FLOMAX   Take 1 Cap by mouth ONE-HALF HOUR AFTER BREAKFAST.  Dose:  0.4 mg     Vitamin D 2000 units Caps   Take 2 Caps by mouth every day.  Dose:  2 Cap        STOP taking these medications    glimepiride 4 MG Tabs  Commonly known as:  AMARYL     linagliptin 5 MG Tabs tablet  Commonly known as:  TRADJENTA          Instructions:   - Please continue taking your medications (mainly the insulin) as prescribed  - Please follow up with your primary care physician as an out patient in 1-2 weeks   - Please call the office of your primary care physician or come back to the emergency department if your condition getting worse.    Follow-up:   With PCP for routine follow up/ diabetes care.    Time Spent on Discharge: 60 minutes

## 2018-11-26 NOTE — PROGRESS NOTES
"Assumed care of patient at change of shift.  During change of shift report, patient (pt) resting in bed, on room air, with right IJ placed triple lumen central line in place running TKO.  During assessment, pt A&Ox3-4; eventually stated \"I'm in the hospital\" but did could not state which hospital.  Pt instructed to call prior to mobilization, pt verbalizes agreement.  No other needs at this time.  "

## 2018-11-26 NOTE — PROGRESS NOTES
Patient's sister called to get update about patient status. Sister updated. She expressed concern about patient's need for insulin. She states that [facility] will not accept the pt if he needs insulin. This RN told pt's sister that I am not aware whether [facility] will accept him with insulin but all this would be arranged by SW before discharge.   Sister wishes to get a call from the diabetes educator after pt is seen and from nephrologist once appointment is arranged.   Sister will call back tomorrow morning.

## 2018-11-26 NOTE — PROGRESS NOTES
Pt A/O x 4. No complaints of pain or discomfort at this time. Assessment completed and scheduled medications administered per MAR. Pt's fingerstick blood sugar was 173 with 1 unit of humalog administered. Plan of care discussed with pt and the importance of ambulation during the day, pt verbalized understanding. Pt is a stand by assist and calls appropriately. Needs addressed at this time. Bed in lowest position and locked. Call light and belongings within reach.

## 2018-11-27 LAB
ANION GAP SERPL CALC-SCNC: 6 MMOL/L (ref 0–11.9)
BUN SERPL-MCNC: 26 MG/DL (ref 8–22)
CALCIUM SERPL-MCNC: 8.6 MG/DL (ref 8.5–10.5)
CHLORIDE SERPL-SCNC: 102 MMOL/L (ref 96–112)
CO2 SERPL-SCNC: 28 MMOL/L (ref 20–33)
CREAT SERPL-MCNC: 0.74 MG/DL (ref 0.5–1.4)
GLUCOSE BLD-MCNC: 159 MG/DL (ref 65–99)
GLUCOSE BLD-MCNC: 201 MG/DL (ref 65–99)
GLUCOSE BLD-MCNC: 221 MG/DL (ref 65–99)
GLUCOSE BLD-MCNC: 241 MG/DL (ref 65–99)
GLUCOSE SERPL-MCNC: 168 MG/DL (ref 65–99)
MAGNESIUM SERPL-MCNC: 1.4 MG/DL (ref 1.5–2.5)
PHOSPHATE SERPL-MCNC: 3.8 MG/DL (ref 2.5–4.5)
POTASSIUM SERPL-SCNC: 3.8 MMOL/L (ref 3.6–5.5)
SODIUM SERPL-SCNC: 136 MMOL/L (ref 135–145)

## 2018-11-27 PROCEDURE — A9270 NON-COVERED ITEM OR SERVICE: HCPCS | Performed by: STUDENT IN AN ORGANIZED HEALTH CARE EDUCATION/TRAINING PROGRAM

## 2018-11-27 PROCEDURE — 84100 ASSAY OF PHOSPHORUS: CPT

## 2018-11-27 PROCEDURE — 700102 HCHG RX REV CODE 250 W/ 637 OVERRIDE(OP): Performed by: INTERNAL MEDICINE

## 2018-11-27 PROCEDURE — 700102 HCHG RX REV CODE 250 W/ 637 OVERRIDE(OP): Performed by: STUDENT IN AN ORGANIZED HEALTH CARE EDUCATION/TRAINING PROGRAM

## 2018-11-27 PROCEDURE — 97116 GAIT TRAINING THERAPY: CPT

## 2018-11-27 PROCEDURE — 80048 BASIC METABOLIC PNL TOTAL CA: CPT

## 2018-11-27 PROCEDURE — G8980 MOBILITY D/C STATUS: HCPCS | Mod: CI

## 2018-11-27 PROCEDURE — 82962 GLUCOSE BLOOD TEST: CPT | Mod: 91

## 2018-11-27 PROCEDURE — 99232 SBSQ HOSP IP/OBS MODERATE 35: CPT | Mod: GC | Performed by: INTERNAL MEDICINE

## 2018-11-27 PROCEDURE — G8979 MOBILITY GOAL STATUS: HCPCS | Mod: CI

## 2018-11-27 PROCEDURE — 97530 THERAPEUTIC ACTIVITIES: CPT

## 2018-11-27 PROCEDURE — 700111 HCHG RX REV CODE 636 W/ 250 OVERRIDE (IP): Performed by: STUDENT IN AN ORGANIZED HEALTH CARE EDUCATION/TRAINING PROGRAM

## 2018-11-27 PROCEDURE — 770006 HCHG ROOM/CARE - MED/SURG/GYN SEMI*

## 2018-11-27 PROCEDURE — 83735 ASSAY OF MAGNESIUM: CPT

## 2018-11-27 RX ORDER — MAGNESIUM SULFATE HEPTAHYDRATE 40 MG/ML
4 INJECTION, SOLUTION INTRAVENOUS ONCE
Status: COMPLETED | OUTPATIENT
Start: 2018-11-27 | End: 2018-11-27

## 2018-11-27 RX ORDER — DEXTROSE MONOHYDRATE 25 G/50ML
25 INJECTION, SOLUTION INTRAVENOUS
Status: DISCONTINUED | OUTPATIENT
Start: 2018-11-27 | End: 2018-12-03

## 2018-11-27 RX ORDER — INSULIN GLARGINE 100 [IU]/ML
18 INJECTION, SOLUTION SUBCUTANEOUS
Status: DISCONTINUED | OUTPATIENT
Start: 2018-11-28 | End: 2018-12-03

## 2018-11-27 RX ORDER — POTASSIUM CHLORIDE 20 MEQ/1
20 TABLET, EXTENDED RELEASE ORAL ONCE
Status: COMPLETED | OUTPATIENT
Start: 2018-11-27 | End: 2018-11-27

## 2018-11-27 RX ORDER — DEXTROSE MONOHYDRATE 25 G/50ML
25 INJECTION, SOLUTION INTRAVENOUS
Status: DISCONTINUED | OUTPATIENT
Start: 2018-11-27 | End: 2018-11-27

## 2018-11-27 RX ORDER — INSULIN GLARGINE 100 [IU]/ML
15 INJECTION, SOLUTION SUBCUTANEOUS
Status: DISCONTINUED | OUTPATIENT
Start: 2018-11-27 | End: 2018-11-27

## 2018-11-27 RX ORDER — INSULIN GLARGINE 100 [IU]/ML
3 INJECTION, SOLUTION SUBCUTANEOUS ONCE
Status: COMPLETED | OUTPATIENT
Start: 2018-11-27 | End: 2018-11-27

## 2018-11-27 RX ADMIN — BUPROPION HYDROCHLORIDE 150 MG: 150 TABLET, EXTENDED RELEASE ORAL at 06:37

## 2018-11-27 RX ADMIN — OMEPRAZOLE 20 MG: 20 CAPSULE, DELAYED RELEASE ORAL at 06:37

## 2018-11-27 RX ADMIN — PANCRELIPASE 72000 UNITS: 24000; 76000; 120000 CAPSULE, DELAYED RELEASE PELLETS ORAL at 17:54

## 2018-11-27 RX ADMIN — LISINOPRIL 10 MG: 10 TABLET ORAL at 06:37

## 2018-11-27 RX ADMIN — MAGNESIUM SULFATE IN WATER 4 G: 40 INJECTION, SOLUTION INTRAVENOUS at 09:02

## 2018-11-27 RX ADMIN — METFORMIN HYDROCHLORIDE 500 MG: 500 TABLET ORAL at 17:54

## 2018-11-27 RX ADMIN — INSULIN GLARGINE 3 UNITS: 100 INJECTION, SOLUTION SUBCUTANEOUS at 21:03

## 2018-11-27 RX ADMIN — POTASSIUM CHLORIDE 20 MEQ: 1500 TABLET, EXTENDED RELEASE ORAL at 06:45

## 2018-11-27 RX ADMIN — METFORMIN HYDROCHLORIDE 500 MG: 500 TABLET ORAL at 09:00

## 2018-11-27 RX ADMIN — Medication 325 MG: at 09:01

## 2018-11-27 RX ADMIN — PANCRELIPASE 72000 UNITS: 24000; 76000; 120000 CAPSULE, DELAYED RELEASE PELLETS ORAL at 09:01

## 2018-11-27 RX ADMIN — TAMSULOSIN HYDROCHLORIDE 0.4 MG: 0.4 CAPSULE ORAL at 09:00

## 2018-11-27 RX ADMIN — PANCRELIPASE 72000 UNITS: 24000; 76000; 120000 CAPSULE, DELAYED RELEASE PELLETS ORAL at 13:00

## 2018-11-27 RX ADMIN — MULTIPLE VITAMINS W/ MINERALS TAB 1 TABLET: TAB at 06:37

## 2018-11-27 RX ADMIN — Medication 325 MG: at 17:54

## 2018-11-27 RX ADMIN — VITAMIN D, TAB 1000IU (100/BT) 2000 UNITS: 25 TAB at 06:37

## 2018-11-27 RX ADMIN — STANDARDIZED SENNA CONCENTRATE AND DOCUSATE SODIUM 2 TABLET: 8.6; 5 TABLET, FILM COATED ORAL at 06:37

## 2018-11-27 RX ADMIN — INSULIN GLARGINE 15 UNITS: 100 INJECTION, SOLUTION SUBCUTANEOUS at 13:01

## 2018-11-27 RX ADMIN — ATORVASTATIN CALCIUM 10 MG: 10 TABLET, FILM COATED ORAL at 06:37

## 2018-11-27 RX ADMIN — Medication 325 MG: at 13:00

## 2018-11-27 RX ADMIN — BUPROPION HYDROCHLORIDE 150 MG: 150 TABLET, EXTENDED RELEASE ORAL at 17:54

## 2018-11-27 ASSESSMENT — ENCOUNTER SYMPTOMS
VOMITING: 0
COUGH: 0
CHILLS: 0
ABDOMINAL PAIN: 0
SHORTNESS OF BREATH: 0
HEMOPTYSIS: 0
DIARRHEA: 0
NAUSEA: 0
FEVER: 0
DIZZINESS: 0
HEADACHES: 0
FLANK PAIN: 0
PALPITATIONS: 0

## 2018-11-27 ASSESSMENT — GAIT ASSESSMENTS
GAIT LEVEL OF ASSIST: SUPERVISED
DISTANCE (FEET): 500

## 2018-11-27 ASSESSMENT — COGNITIVE AND FUNCTIONAL STATUS - GENERAL
SUGGESTED CMS G CODE MODIFIER MOBILITY: CH
MOBILITY SCORE: 24

## 2018-11-27 ASSESSMENT — PAIN SCALES - GENERAL
PAINLEVEL_OUTOF10: 0
PAINLEVEL_OUTOF10: 0

## 2018-11-27 NOTE — CARE PLAN
Problem: Knowledge Deficit  Goal: Knowledge of disease process/condition, treatment plan, diagnostic tests, and medications will improve  Outcome: PROGRESSING AS EXPECTED  POC discussed with the pt. Reinforced diet instruction and answered some questions regarding pt. Disease process and treatment upon dc.     Problem: Discharge Barriers/Planning  Goal: Patient's continuum of care needs will be met  Outcome: PROGRESSING AS EXPECTED  Pending safe dc plans, coordinating with IDT to meet discharge needs. Pending medical clearance also.

## 2018-11-27 NOTE — PROGRESS NOTES
Internal Medicine Interval Note  Note Author: Ju Reece M.D.     Name Pawel Tatum     1960   Age/Sex 58 y.o. male   MRN [id number]   Code Status Full     After 5PM or if no immediate response to page, please call for cross-coverage  Attending/Team: Dr. Hill / Radha See Patient List for primary contact information  Call [phone] to page    1st Call - Day Intern (R1):   Dr. Reece 2nd Call - Day Sr. Resident (R2/R3):   Dr. Locke         Reason for interval visit  (Principal Problem)   DKA      Interval Problem Daily Status Update  (24 hours, problem oriented, brief subjective history, new lab/imaging data pertinent to that problem)   -Patient feeling well, good appetite, ambulating by self.  -BGs under better control now, will continue to uptitrate insulin regimen, continue metformin (plan to uptitrate on 2018), goal BG 200s.  -Patient states was able to inject insulin by self previously - diabetes educator to review insulin injection / use with patient.  -SW to clarify with [facility] - per patient, [facility] does not accept patients who require insulin injections.  Patient prefers returning to [facility], but agreeable to another group home to continue insulin treatment if necessary.       Review of Systems   Constitutional: Negative for chills and fever.   Respiratory: Negative for cough, hemoptysis and shortness of breath.    Cardiovascular: Negative for chest pain and palpitations.   Gastrointestinal: Negative for abdominal pain, diarrhea, nausea and vomiting.   Genitourinary: Negative for dysuria, flank pain, frequency, hematuria and urgency.   Neurological: Negative for dizziness and headaches.       Disposition/Barriers to discharge:   Control of hyperglycemia, placement    Consultants/Specialty  ICU  PCP: JAKE Armando      Quality Measures  Quality-Core Measures   Reviewed items::  Labs reviewed and Medications reviewed  Dill catheter::  No Dill  DVT prophylaxis  pharmacological::  Contraindicated - High bleeding risk  DVT prophylaxis - mechanical:  SCDs  No pharmacological DVT ppx at this time due to high bleed risk (h/o GI bleed in August 2018)  Central line in place - extremely difficult per nursing to put in peripheral IV.  Given continued need for lab monitoring, will leave central line in place.  Central line site appears clean without surrounding erythema.        Physical Exam       Vitals:    11/26/18 1600 11/26/18 1910 11/27/18 0426 11/27/18 0700   BP:  107/60 110/64 106/65   Pulse: 88 100 63 64   Resp: 16 16 16 16   Temp: 36.5 °C (97.7 °F) 36.8 °C (98.3 °F) 36.4 °C (97.5 °F) 36.6 °C (97.9 °F)   TempSrc: Temporal Temporal Temporal Temporal   SpO2: 94% 92% 94% 95%   Weight:       Height:         Body mass index is 15.99 kg/m².    Oxygen Therapy:  Pulse Oximetry: 95 %, O2 (LPM): 0, O2 Delivery: None (Room Air)    Physical Exam   Constitutional: He is oriented to person, place, and time. No distress.   Thin-appearing with muscle-wasting   HENT:   Head: Atraumatic.   R eye fully open, L eye partially closed (baseline)   Eyes: Conjunctivae and EOM are normal.   Neck: Normal range of motion. Neck supple.   Cardiovascular: Normal rate and regular rhythm.    Pulmonary/Chest: Effort normal and breath sounds normal. No respiratory distress.   Abdominal: Soft. He exhibits no distension. There is no tenderness. There is no rebound and no guarding.   Musculoskeletal: He exhibits no edema or tenderness.   Neurological: He is alert and oriented to person, place, and time.   No gross neurological deficits.   Skin: Skin is warm and dry.   Psychiatric: Mood and affect normal.             Assessment/Plan     * Diabetic ketoacidosis (HCC)- (present on admission)   Assessment & Plan    -In setting of likely T2DM with concomitant pancreas dysfunction - patient endorsed fasting and not taking meds prior to 11/20/2018 EGD / colonoscopy, which likely triggered this event.  -HCO3 WNL, AG  closed with insulin drip, now on subcutaneous glargine and lispro, home metformin.  -Resolved.     Diabetes mellitus (HCC)- (present on admission)   Assessment & Plan    -Some discrepancy on whether patient is T1 or T2DM per chart review.  -Per patient, was diagnosed with DM 4 years ago, initially on insulin, most recently switched to oral meds.  Likely T2DM given this history.  -On metformin, glargine, ISS - uptitrate insulin per BGs.  -Fingerstick glucose checks.  -Hypoglycemia protocol.     Hypotension   Assessment & Plan    -Likely 2/2 hypovolemia in setting of DKA.  -Resolved with IVF.     Hypokalemia- (present on admission)   Assessment & Plan    -Goal K >2.  -Replete as needed.     Hypomagnesemia- (present on admission)   Assessment & Plan    -Goal Mg >2.  -Replete as needed.     Acute kidney injury (HCC)- (present on admission)   Assessment & Plan    -Cr 1.5 on admission (0.75 on 8/27/2018).  -Likely prerenal 2/2 dehydration in setting of DKA.  -Resolved with IVF.     Chronic pancreatitis? (HCC)   Assessment & Plan    -CT abdomen showed extensive calcifications of the pancreas.  -History of protein calorie malnutrition secondary to pancreatic insufficiency.  -Currently on pancrelipase replacement.  -Dietitian following.     History of recent UGI bleed- (present on admission)   Assessment & Plan    -recent Hospital admission 8/2018 for an episode of upper GI bleed  -Patient had EGD done during hospitalization which showed grade D esophagitis, patient had repeat EGD & colonoscopy at 11/20 and EGD notable for localized nodularity of the mucosa at the GE junction about 5-6 mm.  Colonoscopy was also done but there was stool throughout the colon and a repeat was recommended.  Procedures with GI consultants  -We will hold anticoagulation in this setting, SCDs for DVT prophylaxis  -No apparent active bleed, Hb stable     Essential hypertension- (present on admission)   Assessment & Plan    -On home lisinopril.      Hypophosphatemia   Assessment & Plan    -Goal phos >2.5.  -Replete as needed.     Memory loss- (present on admission)   Assessment & Plan    History of TBI with mild cognitive impairment as per chart review  Lives at an assisted living facility called [facility]  Sister is actively involved in care and cell phone number is in the chart     Dyslipidemia- (present on admission)   Assessment & Plan    -On home atorvastatin.     Tobacco abuse- (present on admission)   Assessment & Plan    -Smoking about 1-2 cigarettes/day  -PCP to continue counseling regarding smoking cessation     Vitamin D deficiency- (present on admission)   Assessment & Plan    -Vitamin D level 31.  -On vitamin D supplement.

## 2018-11-27 NOTE — PROGRESS NOTES
Diabetes education: Met with patient this afternoon. Please see consult note.  Plan: CDE to continue to follow and will education /review closer to discharge. Please call [phone] when discharge plan known

## 2018-11-27 NOTE — PROGRESS NOTES
Assumed care of patient, awake with A&Ox4. Denies any complaint of pain/discomfort at this time. Able to make his needs known. Noted triple lumen non tunneled R IJ intact with dressing slightly peeling off, reinforced and changed per protocol. Call light placed within reach, poc discussed with pt. Needs attended.

## 2018-11-27 NOTE — CONSULTS
Diabetes education: Met with Pawel who has hx of diabetes as well as TBI, regarding diabetes management. Pt states he was taking medications given to him by staff at [facility] ( metformin, trajenta and amaryl are listed). Hg a1c went from 9.6% in august to 17.4 % this month. Pt admitted with blood sugar of 598.  Pt states he has given insulin before and has a meter. He states he was doing finger sticks occasionally.  Spoke with Boo DUBOSE who placed a call to [facility] to confirm this and where pt gets his prescriptions.  Plan: CDE will review  Insulin management and include written handout if pt will return to [facility] ( not sure if going on insulin he will be allowed to go back).  Pt is currently on Lantus 15 units at 12  Noon and Humulog 7 units ac meals and sliding scale ac and hs. Current blood sugars are 316 (4 units +7 units). Doses increased today.  Plan: CDE to continue to follow and will education /review closer to discharge. Please call [phone] when discharge plan known.

## 2018-11-27 NOTE — THERAPY
"Physical Therapy Treatment completed.   Bed Mobility:  Supine to Sit: Supervised  Transfers: Sit to Stand: Supervised  Gait: Level Of Assist: Supervised with No Equipment Needed       Plan of Care: Patient with no further skilled PT needs in the acute care setting at this time  Discharge Recommendations: Equipment: Single Point Cane (will leave the decision up to the pt upon d/c)  Post-acute therapy Currently anticipate no further skilled therapy needs once patient is discharged from the inpatient setting.     See \"Rehab Therapy-Acute\" Patient Summary Report for complete documentation.       "

## 2018-11-27 NOTE — CARE PLAN
Problem: Nutritional:  Goal: Achieve adequate nutritional intake  Patient will consume >50% of meals   Outcome: PROGRESSING AS EXPECTED    Intervention: Monitor PO intake, weights, and laboratory values  Per ADL documentation pt is consistently consuming % of meals.   No new weight since admit.   Will continue to follow given low BMI makes pt high nutritional risk.    RD following

## 2018-11-27 NOTE — CARE PLAN
Problem: Communication  Goal: The ability to communicate needs accurately and effectively will improve    Intervention: [place] patient and significant other/support system to call light to alert staff of needs  Communicate POC and goals to pt.

## 2018-11-27 NOTE — CARE PLAN
Problem: Safety  Goal: Will remain free from injury    Intervention: Provide assistance with mobility  Provide standby assistance when ambulating.

## 2018-11-27 NOTE — CARE PLAN
"Problem: Metabolic:  Goal: Ability to maintain appropriate glucose levels will improve    Intervention: Manage blood glucose measurement  Discussed the results from every finger stick; pt began to recognize that fsbg >300 are \"siri high\"       Problem: Pain Management  Goal: Pain level will decrease to patient's comfort goal    Intervention: Follow pain managment plan developed in collaboration with patient and Interdisciplinary Team  Pt continuously reported no pain this shift         "

## 2018-11-28 LAB
ANION GAP SERPL CALC-SCNC: 5 MMOL/L (ref 0–11.9)
BUN SERPL-MCNC: 23 MG/DL (ref 8–22)
CALCIUM SERPL-MCNC: 8.1 MG/DL (ref 8.5–10.5)
CHLORIDE SERPL-SCNC: 103 MMOL/L (ref 96–112)
CO2 SERPL-SCNC: 26 MMOL/L (ref 20–33)
CREAT SERPL-MCNC: 0.74 MG/DL (ref 0.5–1.4)
GLUCOSE BLD-MCNC: 147 MG/DL (ref 65–99)
GLUCOSE BLD-MCNC: 157 MG/DL (ref 65–99)
GLUCOSE BLD-MCNC: 235 MG/DL (ref 65–99)
GLUCOSE SERPL-MCNC: 196 MG/DL (ref 65–99)
MAGNESIUM SERPL-MCNC: 1.6 MG/DL (ref 1.5–2.5)
PHOSPHATE SERPL-MCNC: 2.9 MG/DL (ref 2.5–4.5)
POTASSIUM SERPL-SCNC: 4.5 MMOL/L (ref 3.6–5.5)
SODIUM SERPL-SCNC: 134 MMOL/L (ref 135–145)

## 2018-11-28 PROCEDURE — A9270 NON-COVERED ITEM OR SERVICE: HCPCS | Performed by: STUDENT IN AN ORGANIZED HEALTH CARE EDUCATION/TRAINING PROGRAM

## 2018-11-28 PROCEDURE — 700102 HCHG RX REV CODE 250 W/ 637 OVERRIDE(OP): Performed by: STUDENT IN AN ORGANIZED HEALTH CARE EDUCATION/TRAINING PROGRAM

## 2018-11-28 PROCEDURE — 99231 SBSQ HOSP IP/OBS SF/LOW 25: CPT | Mod: GC | Performed by: INTERNAL MEDICINE

## 2018-11-28 PROCEDURE — 700111 HCHG RX REV CODE 636 W/ 250 OVERRIDE (IP): Performed by: STUDENT IN AN ORGANIZED HEALTH CARE EDUCATION/TRAINING PROGRAM

## 2018-11-28 PROCEDURE — 84100 ASSAY OF PHOSPHORUS: CPT

## 2018-11-28 PROCEDURE — 82962 GLUCOSE BLOOD TEST: CPT

## 2018-11-28 PROCEDURE — 80048 BASIC METABOLIC PNL TOTAL CA: CPT

## 2018-11-28 PROCEDURE — 83735 ASSAY OF MAGNESIUM: CPT

## 2018-11-28 PROCEDURE — 770006 HCHG ROOM/CARE - MED/SURG/GYN SEMI*

## 2018-11-28 RX ORDER — MAGNESIUM SULFATE HEPTAHYDRATE 40 MG/ML
4 INJECTION, SOLUTION INTRAVENOUS ONCE
Status: COMPLETED | OUTPATIENT
Start: 2018-11-28 | End: 2018-11-28

## 2018-11-28 RX ADMIN — BUPROPION HYDROCHLORIDE 150 MG: 150 TABLET, EXTENDED RELEASE ORAL at 06:04

## 2018-11-28 RX ADMIN — ATORVASTATIN CALCIUM 10 MG: 10 TABLET, FILM COATED ORAL at 06:03

## 2018-11-28 RX ADMIN — Medication 325 MG: at 12:45

## 2018-11-28 RX ADMIN — STANDARDIZED SENNA CONCENTRATE AND DOCUSATE SODIUM 2 TABLET: 8.6; 5 TABLET, FILM COATED ORAL at 06:04

## 2018-11-28 RX ADMIN — OMEPRAZOLE 20 MG: 20 CAPSULE, DELAYED RELEASE ORAL at 06:04

## 2018-11-28 RX ADMIN — PANCRELIPASE 72000 UNITS: 24000; 76000; 120000 CAPSULE, DELAYED RELEASE PELLETS ORAL at 12:45

## 2018-11-28 RX ADMIN — LISINOPRIL 10 MG: 10 TABLET ORAL at 06:04

## 2018-11-28 RX ADMIN — VITAMIN D, TAB 1000IU (100/BT) 2000 UNITS: 25 TAB at 06:04

## 2018-11-28 RX ADMIN — Medication 325 MG: at 08:46

## 2018-11-28 RX ADMIN — PANCRELIPASE 72000 UNITS: 24000; 76000; 120000 CAPSULE, DELAYED RELEASE PELLETS ORAL at 08:47

## 2018-11-28 RX ADMIN — PANCRELIPASE 72000 UNITS: 24000; 76000; 120000 CAPSULE, DELAYED RELEASE PELLETS ORAL at 17:46

## 2018-11-28 RX ADMIN — TAMSULOSIN HYDROCHLORIDE 0.4 MG: 0.4 CAPSULE ORAL at 08:46

## 2018-11-28 RX ADMIN — INSULIN GLARGINE 18 UNITS: 100 INJECTION, SOLUTION SUBCUTANEOUS at 12:46

## 2018-11-28 RX ADMIN — MAGNESIUM SULFATE IN WATER 4 G: 40 INJECTION, SOLUTION INTRAVENOUS at 08:47

## 2018-11-28 RX ADMIN — BUPROPION HYDROCHLORIDE 150 MG: 150 TABLET, EXTENDED RELEASE ORAL at 17:46

## 2018-11-28 RX ADMIN — Medication 325 MG: at 17:46

## 2018-11-28 RX ADMIN — METFORMIN HYDROCHLORIDE 500 MG: 500 TABLET ORAL at 08:46

## 2018-11-28 RX ADMIN — MULTIPLE VITAMINS W/ MINERALS TAB 1 TABLET: TAB at 06:04

## 2018-11-28 RX ADMIN — STANDARDIZED SENNA CONCENTRATE AND DOCUSATE SODIUM 2 TABLET: 8.6; 5 TABLET, FILM COATED ORAL at 17:46

## 2018-11-28 RX ADMIN — METFORMIN HYDROCHLORIDE 500 MG: 500 TABLET ORAL at 17:46

## 2018-11-28 ASSESSMENT — ENCOUNTER SYMPTOMS
NAUSEA: 0
FLANK PAIN: 0
VOMITING: 0
PALPITATIONS: 0
HEADACHES: 0
SHORTNESS OF BREATH: 0
HEMOPTYSIS: 0
COUGH: 0
DIZZINESS: 0
ABDOMINAL PAIN: 0
CHILLS: 0
FEVER: 0
DIARRHEA: 0

## 2018-11-28 ASSESSMENT — PAIN SCALES - GENERAL
PAINLEVEL_OUTOF10: 0
PAINLEVEL_OUTOF10: 0

## 2018-11-28 NOTE — CARE PLAN
Problem: Communication  Goal: The ability to communicate needs accurately and effectively will improve    Intervention: [place] patient and significant other/support system to call light to alert staff of needs  Update pt on POC and goals for discharge.

## 2018-11-28 NOTE — FACE TO FACE
Face to Face Supporting Documentation - Home Health    The encounter with this patient was in whole or in part the primary reason for home health admission.    Date of encounter:   Patient:                    MRN:                       YOB: 2020  Pawel Tatum  [id number]  1960     Home health to see patient for:  Skilled Nursing care for assessment, interventions & education, Registered dietitian consult and Home health aide    Skilled need for:  Exacerbation of Chronic Disease State :Diabetes    Skilled nursing interventions to include:  Comment: Medication / glucose monitoring compliance, dietary counseling    Homebound status evidenced by:  Needs the assistance of another person in order to leave the home. Leaving home requires a considerable and taxing effort. There is a normal inability to leave the home.    Community Physician to provide follow up care: PAULINE Armando.     Optional Interventions? No      I certify the face to face encounter for this home health care referral meets the CMS requirements and the encounter/clinical assessment with the patient was, in whole, or in part, for the medical condition(s) listed above, which is the primary reason for home health care. Based on my clinical findings: the service(s) are medically necessary, support the need for home health care, and the homebound criteria are met.  I certify that this patient has had a face to face encounter by myself.  Ju Reece M.D. - NPI: [id number]

## 2018-11-28 NOTE — PROGRESS NOTES
R IJ removed at bedside per order and policy. Patient tolerated well. Per MD no IV access needed at this time.

## 2018-11-28 NOTE — CONSULTS
Diabetes Nurse Specialist:   Reviewed giving injection of Lantus (via pen) with patient.  He was able to do a return demonstration without difficulty.   Written instructions provided for reference should he need it after discharge.

## 2018-11-28 NOTE — PROGRESS NOTES
Internal Medicine Interval Note  Note Author: Ju Reece M.D.     Name Pawel Tatum     1960   Age/Sex 58 y.o. male   MRN [id number]   Code Status Full     After 5PM or if no immediate response to page, please call for cross-coverage  Attending/Team: Dr. Hill / Radha See Patient List for primary contact information  Call [phone] to page    1st Call - Day Intern (R1):   Dr. Reece 2nd Call - Day Sr. Resident (R2/R3):   Dr. Locke         Reason for interval visit  (Principal Problem)   DKA      Interval Problem Daily Status Update  (24 hours, problem oriented, brief subjective history, new lab/imaging data pertinent to that problem)   -Patient feeling well, good appetite, ambulating by self.  -BGs now in goal 200s with insulin regimen, metformin (plan to uptitrate on 2018).  -Patient able to demonstrate self-injection of insulin without difficulty with diabetes educator.  -Per diabetes educator, [facility] would accept patient back on insulin regimen, but concerned regarding compliance with medication and diet.  -Home health request placed per recommendation of diabetes educator to assist patient with DM management upon discharge.  -Diabetes educator and SW to continue discussion with [facility] regarding return.      Review of Systems   Constitutional: Negative for chills and fever.   Respiratory: Negative for cough, hemoptysis and shortness of breath.    Cardiovascular: Negative for chest pain and palpitations.   Gastrointestinal: Negative for abdominal pain, diarrhea, nausea and vomiting.   Genitourinary: Negative for dysuria, flank pain, frequency, hematuria and urgency.   Neurological: Negative for dizziness and headaches.       Disposition/Barriers to discharge:   Placement  Home health approval    Consultants/Specialty  ICU  PCP: JAKE Armando      Quality Measures  Quality-Core Measures   Reviewed items::  Labs reviewed and Medications reviewed  Dill catheter::  No  Aniyah  DVT prophylaxis pharmacological::  Contraindicated - High bleeding risk  DVT prophylaxis - mechanical:  SCDs  No pharmacological DVT ppx at this time due to high bleed risk (h/o GI bleed in August 2018)  Central line in place - extremely difficult per nursing to put in peripheral IV.  Given continued need for lab monitoring, will leave central line in place.  Central line site appears clean without surrounding erythema.        Physical Exam       Vitals:    11/27/18 1600 11/27/18 1935 11/28/18 0335 11/28/18 1420   BP: 102/65 109/62 112/66 119/70   Pulse: 83 82 75 87   Resp: 17 16 16 18   Temp: 36.9 °C (98.5 °F) 36.8 °C (98.2 °F) 36.3 °C (97.4 °F) 36.6 °C (97.8 °F)   TempSrc: Temporal Temporal Temporal Temporal   SpO2: 93% 91% 94% 94%   Weight:       Height:         Body mass index is 15.99 kg/m².    Oxygen Therapy:  Pulse Oximetry: 94 %, O2 (LPM): 0, O2 Delivery: None (Room Air)    Physical Exam   Constitutional: He is oriented to person, place, and time. No distress.   Thin-appearing with muscle-wasting   HENT:   Head: Atraumatic.   R eye fully open, L eye partially closed (baseline)   Eyes: Conjunctivae and EOM are normal.   Neck: Normal range of motion. Neck supple.   Cardiovascular: Normal rate and regular rhythm.    Pulmonary/Chest: Effort normal. No respiratory distress.   Abdominal: Soft. He exhibits no distension. There is no tenderness. There is no rebound and no guarding.   Musculoskeletal: He exhibits no edema or tenderness.   Neurological: He is alert and oriented to person, place, and time.   No gross neurological deficits.   Skin: Skin is warm and dry.   Psychiatric: Mood and affect normal.             Assessment/Plan     * Diabetic ketoacidosis (HCC)- (present on admission)   Assessment & Plan    -In setting of likely T2DM with concomitant pancreas dysfunction - patient endorsed fasting and not taking meds prior to 11/20/2018 EGD / colonoscopy, which likely triggered this event.  -HCO3 WNL, AG  closed with insulin drip, now on subcutaneous glargine and lispro, home metformin.  -Resolved.     Diabetes mellitus (HCC)- (present on admission)   Assessment & Plan    -Some discrepancy on whether patient is T1 or T2DM per chart review.  -Per patient, was diagnosed with DM 4 years ago, initially on insulin, most recently switched to oral meds.  Likely T2DM given this history.  -On metformin, glargine, ISS - uptitrate insulin per BGs.  -Fingerstick glucose checks.  -Hypoglycemia protocol.     Hypotension   Assessment & Plan    -Likely 2/2 hypovolemia in setting of DKA.  -Resolved with IVF.     Hypokalemia- (present on admission)   Assessment & Plan    -Goal K >2.  -Replete as needed.     Hypomagnesemia- (present on admission)   Assessment & Plan    -Goal Mg >2.  -Replete as needed.     Acute kidney injury (HCC)- (present on admission)   Assessment & Plan    -Cr 1.5 on admission (0.75 on 8/27/2018).  -Likely prerenal 2/2 dehydration in setting of DKA.  -Resolved with IVF.     Chronic pancreatitis? (HCC)   Assessment & Plan    -CT abdomen showed extensive calcifications of the pancreas.  -History of protein calorie malnutrition secondary to pancreatic insufficiency.  -Currently on pancrelipase replacement.  -Dietitian following.     History of recent UGI bleed- (present on admission)   Assessment & Plan    -recent Hospital admission 8/2018 for an episode of upper GI bleed  -Patient had EGD done during hospitalization which showed grade D esophagitis, patient had repeat EGD & colonoscopy at 11/20 and EGD notable for localized nodularity of the mucosa at the GE junction about 5-6 mm.  Colonoscopy was also done but there was stool throughout the colon and a repeat was recommended.  Procedures with GI consultants  -We will hold anticoagulation in this setting, SCDs for DVT prophylaxis  -No apparent active bleed, Hb stable     Essential hypertension- (present on admission)   Assessment & Plan    -On home lisinopril.      Hypophosphatemia   Assessment & Plan    -Goal phos >2.5.  -Replete as needed.     Memory loss- (present on admission)   Assessment & Plan    History of TBI with mild cognitive impairment as per chart review  Lives at an assisted living facility called [facility]  Sister is actively involved in care and cell phone number is in the chart     Dyslipidemia- (present on admission)   Assessment & Plan    -On home atorvastatin.     Tobacco abuse- (present on admission)   Assessment & Plan    -Smoking about 1-2 cigarettes/day  -PCP to continue counseling regarding smoking cessation     Vitamin D deficiency- (present on admission)   Assessment & Plan    -Vitamin D level 31.  -On vitamin D supplement.

## 2018-11-28 NOTE — PROGRESS NOTES
Assumed care of patient, awake with A&Ox4. Denies any complaint of pain/discomfort at this time. Able to make his needs known. Noted triple lumen non tunneled R IJ intact with dressing intact, reinforced and changed per protocol. Call light placed within reach, poc discussed with pt. Needs attended.

## 2018-11-28 NOTE — CARE PLAN
Problem: Infection  Goal: Will remain free from infection    Intervention: Assess signs and symptoms of infection  Assess for S&S of infection.

## 2018-11-28 NOTE — DISCHARGE PLANNING
"Anticipated Discharge Disposition: group home    Action: late entry from 11/27  PC from [facility]: stated that patient's blood sugar levels have been problematic for some time due to patients inability to control his blood sugars. As they are going from very low to very high.  Ginger state [facility] is an assisted living facility, and their residents needs to regulate their own medications.  They can hand them to the patient, but can not force him to take medications, or check his blood sugars. Nor can they enforce a diabetic diet. Ginger stated, \"they have to do this on their own.\"  Ginger expressed concerns over patient's diet, as he will buy foods that he shouldn't cakes, sodas etc.  SW asked about sliding scale insulin and if they can administer this.  Ginger stated they can not, it has to be scheduled. Ginger stated the pens work the best.  Pharmacy patient uses is [facility].  This informed was provided to diabetic educator Terri along with phone number of [facility]  Patient's sister is his POA, and they are using [facility]    Barriers to Discharge: acceptance back to [facility]    Plan: follow up with diabetic educator    "

## 2018-11-28 NOTE — PROGRESS NOTES
Patient A&O x 4, up ad jesus alberto. VS stable. No new complaints of pain. Patient educate on self administration of insulin. Had patient administer all insulin during the day and educated on sliding scale. Patient resting comfortably at this time, will continue to monitor.

## 2018-11-29 ENCOUNTER — PATIENT OUTREACH (OUTPATIENT)
Dept: HEALTH INFORMATION MANAGEMENT | Facility: OTHER | Age: 58
End: 2018-11-29

## 2018-11-29 DIAGNOSIS — Z91.89 POTENTIAL FOR DEFICIENT KNOWLEDGE OF DIABETES MELLITUS: ICD-10-CM

## 2018-11-29 DIAGNOSIS — Z79.899 MEDICATION MANAGEMENT: ICD-10-CM

## 2018-11-29 LAB
GLUCOSE BLD-MCNC: 149 MG/DL (ref 65–99)
GLUCOSE BLD-MCNC: 206 MG/DL (ref 65–99)
GLUCOSE BLD-MCNC: 245 MG/DL (ref 65–99)

## 2018-11-29 PROCEDURE — 700102 HCHG RX REV CODE 250 W/ 637 OVERRIDE(OP): Performed by: STUDENT IN AN ORGANIZED HEALTH CARE EDUCATION/TRAINING PROGRAM

## 2018-11-29 PROCEDURE — A9270 NON-COVERED ITEM OR SERVICE: HCPCS | Performed by: STUDENT IN AN ORGANIZED HEALTH CARE EDUCATION/TRAINING PROGRAM

## 2018-11-29 PROCEDURE — 82962 GLUCOSE BLOOD TEST: CPT

## 2018-11-29 PROCEDURE — 99231 SBSQ HOSP IP/OBS SF/LOW 25: CPT | Mod: GC | Performed by: INTERNAL MEDICINE

## 2018-11-29 PROCEDURE — 770006 HCHG ROOM/CARE - MED/SURG/GYN SEMI*

## 2018-11-29 RX ADMIN — MULTIPLE VITAMINS W/ MINERALS TAB 1 TABLET: TAB at 05:27

## 2018-11-29 RX ADMIN — METFORMIN HYDROCHLORIDE 500 MG: 500 TABLET ORAL at 09:04

## 2018-11-29 RX ADMIN — PANCRELIPASE 72000 UNITS: 24000; 76000; 120000 CAPSULE, DELAYED RELEASE PELLETS ORAL at 18:14

## 2018-11-29 RX ADMIN — STANDARDIZED SENNA CONCENTRATE AND DOCUSATE SODIUM 2 TABLET: 8.6; 5 TABLET, FILM COATED ORAL at 05:27

## 2018-11-29 RX ADMIN — TAMSULOSIN HYDROCHLORIDE 0.4 MG: 0.4 CAPSULE ORAL at 09:04

## 2018-11-29 RX ADMIN — OMEPRAZOLE 20 MG: 20 CAPSULE, DELAYED RELEASE ORAL at 05:27

## 2018-11-29 RX ADMIN — Medication 325 MG: at 09:04

## 2018-11-29 RX ADMIN — INSULIN GLARGINE 18 UNITS: 100 INJECTION, SOLUTION SUBCUTANEOUS at 12:44

## 2018-11-29 RX ADMIN — BUPROPION HYDROCHLORIDE 150 MG: 150 TABLET, EXTENDED RELEASE ORAL at 05:27

## 2018-11-29 RX ADMIN — VITAMIN D, TAB 1000IU (100/BT) 2000 UNITS: 25 TAB at 05:27

## 2018-11-29 RX ADMIN — Medication 325 MG: at 12:43

## 2018-11-29 RX ADMIN — ATORVASTATIN CALCIUM 10 MG: 10 TABLET, FILM COATED ORAL at 05:27

## 2018-11-29 RX ADMIN — Medication 325 MG: at 18:14

## 2018-11-29 RX ADMIN — LISINOPRIL 10 MG: 10 TABLET ORAL at 05:27

## 2018-11-29 RX ADMIN — METFORMIN HYDROCHLORIDE 500 MG: 500 TABLET ORAL at 18:14

## 2018-11-29 RX ADMIN — PANCRELIPASE 72000 UNITS: 24000; 76000; 120000 CAPSULE, DELAYED RELEASE PELLETS ORAL at 12:43

## 2018-11-29 RX ADMIN — PANCRELIPASE 72000 UNITS: 24000; 76000; 120000 CAPSULE, DELAYED RELEASE PELLETS ORAL at 09:04

## 2018-11-29 RX ADMIN — BUPROPION HYDROCHLORIDE 150 MG: 150 TABLET, EXTENDED RELEASE ORAL at 18:14

## 2018-11-29 ASSESSMENT — ENCOUNTER SYMPTOMS
ABDOMINAL PAIN: 0
COUGH: 0
VOMITING: 0
CHILLS: 0
HEMOPTYSIS: 0
FLANK PAIN: 0
FEVER: 0
PALPITATIONS: 0
SHORTNESS OF BREATH: 0
NAUSEA: 0
HEADACHES: 0
DIARRHEA: 0
DIZZINESS: 0

## 2018-11-29 ASSESSMENT — PAIN SCALES - GENERAL
PAINLEVEL_OUTOF10: 0
PAINLEVEL_OUTOF10: 0

## 2018-11-29 NOTE — PROGRESS NOTES
Patient A&O x 4, up ad jesus alberto. VS stable. No new complaints of pain. Patient educated on self administration of insulin. Had patient administer all insulin during the day and educated on sliding scale. Patient BG stable. Patient resting comfortably at this time, will continue to monitor.

## 2018-11-29 NOTE — CARE PLAN
Problem: Knowledge Deficit  Goal: Knowledge of disease process/condition, treatment plan, diagnostic tests, and medications will improve    Intervention: Explain information regarding disease process/condition, treatment plan, diagnostic tests, and medications and document in education  Updated patient on plan of care, verbalized understanding       Problem: Knowledge Deficit:  Goal: Knowledge of the prescribed therapeutic regimen will improve    Intervention: Provide patient or significant other/support system with Diabetes information and educate on diet, medication administration, blood sugar monitoring, foot care,  signs and symptoms of a blood sugar imbalance and what to do, and document in education tab  Educated patient on diabetic diet snacks such as sugar free snacks. Verbalized understanding

## 2018-11-29 NOTE — PROGRESS NOTES
"Diabetes education: CDE spoke with Ginger from [facility] at request of Boo DUBOSE. Vince states her staff gives oral medications, will observe finger sticks if done at that time ( often not done or may say done with a number), pt has freedom to shop on Fridays and purchases excessive amounts of foods not on his diabetes meal plan ( Ginger does say they do not have a diabetes diet but offer proteins and carbs). Ginger states they cannot \"police\" his eating and was concerned regarding his fluctuating blood sugars. Ginger states if he has pens, can be independent in using them ( with their staff bringing them in and reminding him to use), but they cannot have a sliding scale, he needs to be in his room ( sometimes he is not available) at the time of the meal and the insulin due. She states if he has a meter it would be at his bedside. She would like to have HH come and visit to supervise his \"diabetes\" and make sure blood sugars are ok. CDE spoke with Dr. Reece regarding this as well as Boo DUBOSE.   CDE called pharmacy used by [facility] to assess if pt has used them for strips. Per pharmacy no meter or test strips have been ordered. Per Boo DUBOSE, [facility] is primary. CDE will bring a meter tomorrow.    Pt seen by Dawna BENSON CDE to review insulin management and give handouts. Please see consult note.  Plan: CDE to follow up tomorrow and bring a  Contour next EZ meter to give and instruct. Pt will need prescriptions for test strips and lancets for contour next meter. Please call [phone] if need change.  "

## 2018-11-29 NOTE — CARE PLAN
Problem: Communication  Goal: The ability to communicate needs accurately and effectively will improve    Intervention: [place] patient and significant other/support system to call light to alert staff of needs  Communicate POC and goals to patient and educate on Diabetes.

## 2018-11-29 NOTE — PROGRESS NOTES
Patient is AOx4. Plan of care discussed. Verbalized understanding. Denies pain. Call light in use, belongings within reach, treaded socks on, bed locked in low position

## 2018-11-29 NOTE — DISCHARGE PLANNING
Anticipated Discharge Disposition: Possibly skilled nursing.    Action: This RN CM received a call from Roni at [facility].  She said that no assisted living facility can give any type of injection to the patient, so they would not be able to give insulin.  Roni said that the patient will need to go to skilled nursing.  She said that in her experience she has found that [facility] does a good job with diabetics and giving insulin.  She also said that his sister, Aniyah Tatum, is his POA but she is currently out of the [place].  We can call her , Bridger, at [phone] and then he can get in touch with Aniyah.    Barriers to Discharge: Will need to see if [facility] pays for a SNF.    Plan: Check with [facility] on Friday to see if they cover SNF's.  If so, work on getting choice for SNF.

## 2018-11-29 NOTE — CARE PLAN
Problem: Infection  Goal: Will remain free from infection    Intervention: Assess signs and symptoms of infection  Monitor for S&S of infection from central line.

## 2018-11-29 NOTE — PROGRESS NOTES
Referral for med review. Evaluate ability to manage medications (insulin) and diet. Unable to reach patient. M requesting patient return call to [phone]. 1st attempt to reach patient.

## 2018-11-29 NOTE — PROGRESS NOTES
Internal Medicine Interval Note  Note Author: Ju Reece M.D.     Name Pawel Tatum     1960   Age/Sex 58 y.o. male   MRN [id number]   Code Status Full     After 5PM or if no immediate response to page, please call for cross-coverage  Attending/Team: Dr. Hill / Radha See Patient List for primary contact information  Call [phone] to page    1st Call - Day Intern (R1):   Dr. Reece 2nd Call - Day Sr. Resident (R2/R3):   Dr. Locke         Reason for interval visit  (Principal Problem)   DKA      Interval Problem Daily Status Update  (24 hours, problem oriented, brief subjective history, new lab/imaging data pertinent to that problem)   -Patient feeling well, good appetite, ambulating by self.  -Central line removed.  -BGs now in goal mid-100s to 200s with insulin regimen, metformin (plan to uptitrate on 2018).  -Patient able to demonstrate self-injection of insulin without difficulty with diabetes educator.  -[facility] remains reluctant to take patient back despite assistance of home health supervision due to h/o medication / dietary noncompliance, poor control of DM.  -Guardianship Services also involved, stating that group home cannot assist patient with giving injections; however, patient was able to demonstrate competency in injecting insulin during session with diabetes educator on 2018.  -Will continue to clarify with SW regarding placement, group home vs. SNF.  -Medically cleared for discharge, pending placement.      Review of Systems   Constitutional: Negative for chills and fever.   Respiratory: Negative for cough, hemoptysis and shortness of breath.    Cardiovascular: Negative for chest pain, palpitations and leg swelling.   Gastrointestinal: Negative for abdominal pain, diarrhea, nausea and vomiting.   Genitourinary: Negative for dysuria, flank pain, frequency, hematuria and urgency.   Neurological: Negative for dizziness and headaches.       Disposition/Barriers  to discharge:   Placement  Home health approval    Consultants/Specialty  ICU  PCP: JAKE Armando      Quality Measures  Quality-Core Measures   Reviewed items::  Labs reviewed and Medications reviewed  Dill catheter::  No Dill  DVT prophylaxis pharmacological::  Contraindicated - High bleeding risk  DVT prophylaxis - mechanical:  SCDs  No pharmacological DVT ppx at this time due to high bleed risk (h/o GI bleed in August 2018)        Physical Exam       Vitals:    11/28/18 1852 11/29/18 0415 11/29/18 0700 11/29/18 1500   BP: 118/63 111/65 116/77 124/71   Pulse: 92 64 68 77   Resp: 16 16 16 16   Temp: 37.1 °C (98.8 °F) 36.6 °C (97.9 °F) 36.3 °C (97.3 °F) 36.4 °C (97.5 °F)   TempSrc: Temporal Temporal Temporal Temporal   SpO2: 93% 95% 93% 98%   Weight:       Height:         Body mass index is 15.99 kg/m².    Oxygen Therapy:  Pulse Oximetry: 98 %, O2 (LPM): 0, O2 Delivery: None (Room Air)    Physical Exam   Constitutional: He is oriented to person, place, and time. No distress.   Thin-appearing with muscle-wasting   HENT:   Head: Atraumatic.   Nose: Nose normal.   R eye fully open, L eye partially closed (baseline)   Eyes: Conjunctivae and EOM are normal.   Neck: Normal range of motion. Neck supple.   Cardiovascular: Normal rate and regular rhythm.    Pulmonary/Chest: Effort normal. No respiratory distress.   Abdominal: Soft. He exhibits no distension. There is no tenderness. There is no rebound and no guarding.   Musculoskeletal: He exhibits no edema or tenderness.   Neurological: He is alert and oriented to person, place, and time.   No gross neurological deficits.   Skin: Skin is warm and dry.   Psychiatric: Mood and affect normal.             Assessment/Plan     * Diabetic ketoacidosis (HCC)- (present on admission)   Assessment & Plan    -In setting of likely T2DM with concomitant pancreas dysfunction - patient endorsed fasting and not taking meds prior to 11/20/2018 EGD / colonoscopy, which likely  triggered this event.  -HCO3 WNL, AG closed with insulin drip, now on subcutaneous glargine and lispro, home metformin.  -Resolved.     Diabetes mellitus (HCC)- (present on admission)   Assessment & Plan    -Some discrepancy on whether patient is T1 or T2DM per chart review.  -Per patient, was diagnosed with DM 4 years ago, initially on insulin, most recently switched to oral meds.  Likely T2DM given this history.  -On metformin, glargine, ISS - uptitrate insulin per BGs.  -Fingerstick glucose checks.  -Hypoglycemia protocol.     Hypotension   Assessment & Plan    -Likely 2/2 hypovolemia in setting of DKA.  -Resolved with IVF.     Hypokalemia- (present on admission)   Assessment & Plan    -Goal K >2.  -Replete as needed.     Hypomagnesemia- (present on admission)   Assessment & Plan    -Goal Mg >2.  -Replete as needed.     Acute kidney injury (HCC)- (present on admission)   Assessment & Plan    -Cr 1.5 on admission (0.75 on 8/27/2018).  -Likely prerenal 2/2 dehydration in setting of DKA.  -Resolved with IVF.     Chronic pancreatitis? (HCC)   Assessment & Plan    -CT abdomen showed extensive calcifications of the pancreas.  -History of protein calorie malnutrition secondary to pancreatic insufficiency.  -Currently on pancrelipase replacement.  -Dietitian following.     History of recent UGI bleed- (present on admission)   Assessment & Plan    -recent Hospital admission 8/2018 for an episode of upper GI bleed  -Patient had EGD done during hospitalization which showed grade D esophagitis, patient had repeat EGD & colonoscopy at 11/20 and EGD notable for localized nodularity of the mucosa at the GE junction about 5-6 mm.  Colonoscopy was also done but there was stool throughout the colon and a repeat was recommended.  Procedures with GI consultants  -We will hold anticoagulation in this setting, SCDs for DVT prophylaxis  -No apparent active bleed, Hb stable     Essential hypertension- (present on admission)   Assessment  & Plan    -On home lisinopril.     Hypophosphatemia   Assessment & Plan    -Goal phos >2.5.  -Replete as needed.     Memory loss- (present on admission)   Assessment & Plan    History of TBI with mild cognitive impairment as per chart review  Lives at an assisted living facility called [facility]  Sister is actively involved in care and cell phone number is in the chart     Dyslipidemia- (present on admission)   Assessment & Plan    -On home atorvastatin.     Tobacco abuse- (present on admission)   Assessment & Plan    -Smoking about 1-2 cigarettes/day  -PCP to continue counseling regarding smoking cessation     Vitamin D deficiency- (present on admission)   Assessment & Plan    -Vitamin D level 31.  -On vitamin D supplement.

## 2018-11-30 LAB
GLUCOSE BLD-MCNC: 194 MG/DL (ref 65–99)
GLUCOSE BLD-MCNC: 199 MG/DL (ref 65–99)
GLUCOSE BLD-MCNC: 226 MG/DL (ref 65–99)
GLUCOSE BLD-MCNC: 375 MG/DL (ref 65–99)

## 2018-11-30 PROCEDURE — A9270 NON-COVERED ITEM OR SERVICE: HCPCS | Performed by: STUDENT IN AN ORGANIZED HEALTH CARE EDUCATION/TRAINING PROGRAM

## 2018-11-30 PROCEDURE — 700102 HCHG RX REV CODE 250 W/ 637 OVERRIDE(OP): Performed by: STUDENT IN AN ORGANIZED HEALTH CARE EDUCATION/TRAINING PROGRAM

## 2018-11-30 PROCEDURE — 82962 GLUCOSE BLOOD TEST: CPT

## 2018-11-30 PROCEDURE — 770006 HCHG ROOM/CARE - MED/SURG/GYN SEMI*

## 2018-11-30 PROCEDURE — 99231 SBSQ HOSP IP/OBS SF/LOW 25: CPT | Mod: GC | Performed by: INTERNAL MEDICINE

## 2018-11-30 RX ADMIN — LISINOPRIL 10 MG: 10 TABLET ORAL at 05:52

## 2018-11-30 RX ADMIN — BUPROPION HYDROCHLORIDE 150 MG: 150 TABLET, EXTENDED RELEASE ORAL at 18:07

## 2018-11-30 RX ADMIN — Medication 325 MG: at 08:11

## 2018-11-30 RX ADMIN — INSULIN GLARGINE 18 UNITS: 100 INJECTION, SOLUTION SUBCUTANEOUS at 12:56

## 2018-11-30 RX ADMIN — BUPROPION HYDROCHLORIDE 150 MG: 150 TABLET, EXTENDED RELEASE ORAL at 05:52

## 2018-11-30 RX ADMIN — VITAMIN D, TAB 1000IU (100/BT) 2000 UNITS: 25 TAB at 05:52

## 2018-11-30 RX ADMIN — MULTIPLE VITAMINS W/ MINERALS TAB 1 TABLET: TAB at 05:52

## 2018-11-30 RX ADMIN — OMEPRAZOLE 20 MG: 20 CAPSULE, DELAYED RELEASE ORAL at 05:52

## 2018-11-30 RX ADMIN — PANCRELIPASE 72000 UNITS: 24000; 76000; 120000 CAPSULE, DELAYED RELEASE PELLETS ORAL at 18:07

## 2018-11-30 RX ADMIN — METFORMIN HYDROCHLORIDE 500 MG: 500 TABLET ORAL at 09:07

## 2018-11-30 RX ADMIN — METFORMIN HYDROCHLORIDE 500 MG: 500 TABLET ORAL at 18:07

## 2018-11-30 RX ADMIN — PANCRELIPASE 72000 UNITS: 24000; 76000; 120000 CAPSULE, DELAYED RELEASE PELLETS ORAL at 12:55

## 2018-11-30 RX ADMIN — Medication 325 MG: at 18:07

## 2018-11-30 RX ADMIN — ATORVASTATIN CALCIUM 10 MG: 10 TABLET, FILM COATED ORAL at 05:52

## 2018-11-30 RX ADMIN — Medication 325 MG: at 12:55

## 2018-11-30 RX ADMIN — PANCRELIPASE 72000 UNITS: 24000; 76000; 120000 CAPSULE, DELAYED RELEASE PELLETS ORAL at 09:13

## 2018-11-30 RX ADMIN — TAMSULOSIN HYDROCHLORIDE 0.4 MG: 0.4 CAPSULE ORAL at 09:07

## 2018-11-30 RX ADMIN — STANDARDIZED SENNA CONCENTRATE AND DOCUSATE SODIUM 2 TABLET: 8.6; 5 TABLET, FILM COATED ORAL at 05:53

## 2018-11-30 ASSESSMENT — ENCOUNTER SYMPTOMS
FLANK PAIN: 0
FEVER: 0
HEMOPTYSIS: 0
HEADACHES: 0
SHORTNESS OF BREATH: 0
CHILLS: 0
COUGH: 0
DIZZINESS: 0
DIARRHEA: 0
VOMITING: 0
ABDOMINAL PAIN: 0
PALPITATIONS: 0
NAUSEA: 0

## 2018-11-30 ASSESSMENT — PAIN SCALES - GENERAL: PAINLEVEL_OUTOF10: 0

## 2018-11-30 NOTE — CARE PLAN
Problem: Communication  Goal: The ability to communicate needs accurately and effectively will improve    Intervention: [place] patient and significant other/support system to call light to alert staff of needs  Reoriented patient to call light and unit routine. Verbalized understanding       Problem: Knowledge Deficit  Goal: Knowledge of disease process/condition, treatment plan, diagnostic tests, and medications will improve    Intervention: Explain information regarding disease process/condition, treatment plan, diagnostic tests, and medications and document in education  Reinforced diabetes education about meal planning and snacks. Verbalized understanding

## 2018-11-30 NOTE — PROGRESS NOTES
Diabetes education: Spoke with Yessi at [facility] regarding concerns with patient at current assisted living. Yessi states she will get in contact with Britni POOLE, outpatient liaison to possibly assist with coordination of care at assisted living. Yessi recommends call with all parties to confirm care can be done.  Pt can do insulin skills. Issue is supervision, reminders at time of finger stick and /insulin as well as patient's commitment to be present at meal time. Issue of shopping independently (?) and purchasing foods not consistent with meal plan. Spoke with Dr. Reece, concern is what is the plan for discharge. Spoke with Deandra BENSON CDE as well.  Plan: CDE will give a Contour meter if the plan is for him to return/go to an assisted living. Please call [phone] when plan is known.

## 2018-11-30 NOTE — CARE PLAN
Problem: Nutritional:  Goal: Achieve adequate nutritional intake  Patient will consume >50% of meals   Outcome: MET Date Met: 11/30/18  Pt continues to have adequate PO intake.     RD to monitor per dept policy

## 2018-11-30 NOTE — PROGRESS NOTES
Patient is AOx4. Plan of care discussed. Verbalized understanding. Denies pain. Given sugar free snack. Call light in use, belongings within reach, treaded socks on, bed locked in low position

## 2018-12-01 LAB
GLUCOSE BLD-MCNC: 181 MG/DL (ref 65–99)
GLUCOSE BLD-MCNC: 196 MG/DL (ref 65–99)
GLUCOSE BLD-MCNC: 222 MG/DL (ref 65–99)
GLUCOSE BLD-MCNC: 244 MG/DL (ref 65–99)

## 2018-12-01 PROCEDURE — 770006 HCHG ROOM/CARE - MED/SURG/GYN SEMI*

## 2018-12-01 PROCEDURE — 700102 HCHG RX REV CODE 250 W/ 637 OVERRIDE(OP): Performed by: STUDENT IN AN ORGANIZED HEALTH CARE EDUCATION/TRAINING PROGRAM

## 2018-12-01 PROCEDURE — A9270 NON-COVERED ITEM OR SERVICE: HCPCS | Performed by: STUDENT IN AN ORGANIZED HEALTH CARE EDUCATION/TRAINING PROGRAM

## 2018-12-01 PROCEDURE — 99231 SBSQ HOSP IP/OBS SF/LOW 25: CPT | Mod: GC | Performed by: INTERNAL MEDICINE

## 2018-12-01 PROCEDURE — 82962 GLUCOSE BLOOD TEST: CPT | Mod: 91

## 2018-12-01 RX ADMIN — STANDARDIZED SENNA CONCENTRATE AND DOCUSATE SODIUM 2 TABLET: 8.6; 5 TABLET, FILM COATED ORAL at 05:42

## 2018-12-01 RX ADMIN — ATORVASTATIN CALCIUM 10 MG: 10 TABLET, FILM COATED ORAL at 05:42

## 2018-12-01 RX ADMIN — METFORMIN HYDROCHLORIDE 1000 MG: 500 TABLET ORAL at 18:16

## 2018-12-01 RX ADMIN — TAMSULOSIN HYDROCHLORIDE 0.4 MG: 0.4 CAPSULE ORAL at 09:04

## 2018-12-01 RX ADMIN — PANCRELIPASE 72000 UNITS: 24000; 76000; 120000 CAPSULE, DELAYED RELEASE PELLETS ORAL at 09:04

## 2018-12-01 RX ADMIN — PANCRELIPASE 72000 UNITS: 24000; 76000; 120000 CAPSULE, DELAYED RELEASE PELLETS ORAL at 18:17

## 2018-12-01 RX ADMIN — BUPROPION HYDROCHLORIDE 150 MG: 150 TABLET, EXTENDED RELEASE ORAL at 18:16

## 2018-12-01 RX ADMIN — BUPROPION HYDROCHLORIDE 150 MG: 150 TABLET, EXTENDED RELEASE ORAL at 05:42

## 2018-12-01 RX ADMIN — LISINOPRIL 10 MG: 10 TABLET ORAL at 05:42

## 2018-12-01 RX ADMIN — PANCRELIPASE 72000 UNITS: 24000; 76000; 120000 CAPSULE, DELAYED RELEASE PELLETS ORAL at 13:03

## 2018-12-01 RX ADMIN — VITAMIN D, TAB 1000IU (100/BT) 2000 UNITS: 25 TAB at 05:43

## 2018-12-01 RX ADMIN — Medication 325 MG: at 18:17

## 2018-12-01 RX ADMIN — METFORMIN HYDROCHLORIDE 500 MG: 500 TABLET ORAL at 09:04

## 2018-12-01 RX ADMIN — Medication 325 MG: at 13:03

## 2018-12-01 RX ADMIN — MULTIPLE VITAMINS W/ MINERALS TAB 1 TABLET: TAB at 05:42

## 2018-12-01 RX ADMIN — Medication 325 MG: at 09:04

## 2018-12-01 RX ADMIN — INSULIN GLARGINE 18 UNITS: 100 INJECTION, SOLUTION SUBCUTANEOUS at 13:05

## 2018-12-01 RX ADMIN — OMEPRAZOLE 20 MG: 20 CAPSULE, DELAYED RELEASE ORAL at 05:42

## 2018-12-01 ASSESSMENT — ENCOUNTER SYMPTOMS
NAUSEA: 0
VOMITING: 0
HEADACHES: 0
FEVER: 0
HEARTBURN: 0
ABDOMINAL PAIN: 0
COUGH: 0
DIZZINESS: 0
CHILLS: 0
DIARRHEA: 0
HEMOPTYSIS: 0
PALPITATIONS: 0
FLANK PAIN: 0
SHORTNESS OF BREATH: 0

## 2018-12-01 ASSESSMENT — PAIN SCALES - GENERAL
PAINLEVEL_OUTOF10: 0
PAINLEVEL_OUTOF10: 0

## 2018-12-01 NOTE — PROGRESS NOTES
Internal Medicine Interval Note  Note Author: Ju Reece M.D.     Name Pawel Tatum     1960   Age/Sex 58 y.o. male   MRN [id number]   Code Status Full     After 5PM or if no immediate response to page, please call for cross-coverage  Attending/Team: Dr. Hill / Radha See Patient List for primary contact information  Call [phone] to page    1st Call - Day Intern (R1):   Dr. Reece 2nd Call - Day Sr. Resident (R2/R3):   Dr. Locke         Reason for interval visit  (Principal Problem)   DKA      Interval Problem Daily Status Update  (24 hours, problem oriented, brief subjective history, new lab/imaging data pertinent to that problem)   -Patient feeling well, good appetite, ambulating by self.  -BGs now in goal mid-100s to 200s with insulin regimen, metformin (plan to uptitrate on 2018).  -Metformin increased, plan to check BMP 2018 to monitor renal function.  -[facility] remains reluctant to take patient back due to h/o medication / dietary noncompliance, poor control of DM despite current optimization of DM med regimen, assistance of home health supervision.  -Guardianship Services also involved, stating that group home cannot assist patient with giving injections; however, patient was able to demonstrate competency in injecting insulin during session with diabetes educator on 2018.   -Will continue to clarify with SW regarding placement, group home vs. SNF.  -Medically cleared for discharge, pending placement.      Review of Systems   Constitutional: Negative for chills and fever.   Respiratory: Negative for cough, hemoptysis and shortness of breath.    Cardiovascular: Negative for chest pain, palpitations and leg swelling.   Gastrointestinal: Negative for abdominal pain, diarrhea, heartburn, nausea and vomiting.   Genitourinary: Negative for dysuria, flank pain, frequency, hematuria and urgency.   Neurological: Negative for dizziness and headaches.        Disposition/Barriers to discharge:   Placement  Home health approval    Consultants/Specialty  ICU  PCP: JAKE Armando      Quality Measures  Quality-Core Measures   Reviewed items::  Labs reviewed and Medications reviewed  Dill catheter::  No Dill  DVT prophylaxis pharmacological::  Contraindicated - High bleeding risk  DVT prophylaxis - mechanical:  SCDs  No pharmacological DVT ppx at this time due to high bleed risk (h/o GI bleed in August 2018)        Physical Exam       Vitals:    11/30/18 0725 11/30/18 1600 11/30/18 1919 12/01/18 0338   BP: 106/66 (!) 96/59 115/65 112/63   Pulse: 73 85 93 63   Resp: 16 15 16 16   Temp: 37.1 °C (98.7 °F) 36.2 °C (97.2 °F) 37.4 °C (99.3 °F) 36.5 °C (97.7 °F)   TempSrc: Temporal Temporal Temporal Temporal   SpO2: 94% 96% 94% 96%   Weight:       Height:         Body mass index is 15.99 kg/m².    Oxygen Therapy:  Pulse Oximetry: 96 %, O2 Delivery: None (Room Air)    Physical Exam   Constitutional: He is oriented to person, place, and time. No distress.   Thin-appearing with muscle-wasting   HENT:   Head: Atraumatic.   Nose: Nose normal.   R eye fully open, L eye partially closed (baseline)   Eyes: Conjunctivae and EOM are normal.   Neck: Normal range of motion. Neck supple.   Cardiovascular: Normal rate and regular rhythm.    Pulmonary/Chest: Effort normal. No respiratory distress.   Abdominal: Soft. There is no tenderness. There is no rebound and no guarding.   Musculoskeletal: He exhibits no edema or tenderness.   Neurological: He is alert and oriented to person, place, and time.   No gross neurological deficits.   Skin: Skin is warm and dry.   Psychiatric: Mood and affect normal.             Assessment/Plan     * Diabetic ketoacidosis (HCC)- (present on admission)   Assessment & Plan    -In setting of likely T2DM with concomitant pancreas dysfunction - patient endorsed fasting and not taking meds prior to 11/20/2018 EGD / colonoscopy, which likely triggered this  event.  -HCO3 WNL, AG closed with insulin drip, now on subcutaneous glargine and lispro, home metformin.  -Resolved.     Diabetes mellitus (HCC)- (present on admission)   Assessment & Plan    -Some discrepancy on whether patient is T1 or T2DM per chart review.  -Per patient, was diagnosed with DM 4 years ago, initially on insulin, most recently switched to oral meds.  Likely T2DM given this history.  -On metformin, glargine, ISS - uptitrate insulin per BGs.  -Fingerstick glucose checks.  -Hypoglycemia protocol.     Hypotension   Assessment & Plan    -Likely 2/2 hypovolemia in setting of DKA.  -Resolved with IVF.     Hypokalemia- (present on admission)   Assessment & Plan    -Goal K >2.  -Replete as needed.     Hypomagnesemia- (present on admission)   Assessment & Plan    -Goal Mg >2.  -Replete as needed.     Acute kidney injury (HCC)- (present on admission)   Assessment & Plan    -Cr 1.5 on admission (0.75 on 8/27/2018).  -Likely prerenal 2/2 dehydration in setting of DKA.  -Resolved with IVF.     Chronic pancreatitis? (HCC)   Assessment & Plan    -CT abdomen showed extensive calcifications of the pancreas.  -History of protein calorie malnutrition secondary to pancreatic insufficiency.  -Currently on pancrelipase replacement.  -Dietitian following.     History of recent UGI bleed- (present on admission)   Assessment & Plan    -recent Hospital admission 8/2018 for an episode of upper GI bleed  -Patient had EGD done during hospitalization which showed grade D esophagitis, patient had repeat EGD & colonoscopy at 11/20 and EGD notable for localized nodularity of the mucosa at the GE junction about 5-6 mm.  Colonoscopy was also done but there was stool throughout the colon and a repeat was recommended.  Procedures with GI consultants  -We will hold anticoagulation in this setting, SCDs for DVT prophylaxis  -No apparent active bleed, Hb stable     Essential hypertension- (present on admission)   Assessment & Plan    -On  home lisinopril.     Hypophosphatemia   Assessment & Plan    -Goal phos >2.5.  -Replete as needed.     Memory loss- (present on admission)   Assessment & Plan    History of TBI with mild cognitive impairment as per chart review  Lives at an assisted living facility called [facility]  Sister is actively involved in care and cell phone number is in the chart     Dyslipidemia- (present on admission)   Assessment & Plan    -On home atorvastatin.     Tobacco abuse- (present on admission)   Assessment & Plan    -Smoking about 1-2 cigarettes/day  -PCP to continue counseling regarding smoking cessation     Vitamin D deficiency- (present on admission)   Assessment & Plan    -Vitamin D level 31.  -On vitamin D supplement.

## 2018-12-01 NOTE — DISCHARGE PLANNING
"Anticipated Discharge Disposition: The plan at this time is for the patient to go to [facility], most likely on Monday, 12/3.    Action: This RN CM has spoken with Kim, diabetes educator, Cintia at [facility] ([phone] and Ashwini from [facility] [phone]).  Ashwini said that the patient's sister, who is currently out of the country on vacation, called her and said that she would be fine having the patient go to [facility].  But Cintia said that insurance would not pay for skilled nursing because the patient doesn't have a skilled need.  Cintia said that the patient is \"not stable for discharge yet\" and that his behavior is erratic.  Cintia suggested involving Britni Moulton ([phone]), who is a  and works for [employer].  She suggested that all above people involved should have a meeting of some sort, maybe a conference call, to discuss this patient.  Once that is done, the owner of [facility], Ginger, should be called to make sure that she understands what her staff will need to do for the patient and then let us know if they can do that.  Kim, diabetic educator, said that the staff will not need to draw up any insulin, that they will only need to remind the patient when to test for his blood sugar and to hand him the insulin pen and let the patient administer his own insulin.    Barriers to Discharge: [facility] not yet able to take the patient back due to lack of understanding of their role in caring for the patient.    Plan: This RN CM attempted to call [facility] Dr. Parks back on his cell phone but the call wouldn't go through.  Will pass this information to SEDRICK Haddad, so that he can work on this on Monday, 12/3.  "

## 2018-12-01 NOTE — PROGRESS NOTES
Internal Medicine Interval Note  Note Author: Alba Martines M.D.     Name Pawel Tatum     1960   Age/Sex 58 y.o. male   MRN [id number]   Code Status Full     After 5PM or if no immediate response to page, please call for cross-coverage  Attending/Team: Dr. Hill / Radha See Patient List for primary contact information  Call [phone] to page    1st Call - Day Intern (R1):   Dr. Reece 2nd Call - Day Sr. Resident (R2/R3):   Dr. Locke         Reason for interval visit  (Principal Problem)   DKA with increased anion gap (23) and bicarbonate of 7  Placement      Interval Problem Daily Status Update  (24 hours, problem oriented, brief subjective history, new lab/imaging data pertinent to that problem)   - No acte events overnight  -Patient feeling well, good appetite, ambulating by self.  -Central line removed, Pt felt much better after that  -BGs now in goal mid-100s to 200s with insulin regimen, metformin (plan to uptitrate on 2018) (some high BS levels when the patient eats more than regular, he has a very good appetite)  -Patient able to demonstrate self-injection of insulin without difficulty with diabetes educator.  -[facility] remains reluctant to take patient back despite assistance of home health supervision due to h/o medication / dietary noncompliance, poor control of DM.  -Guardianship Services also involved, stating that group home cannot assist patient with giving injections; however, patient was able to demonstrate competency in injecting insulin during session with diabetes educator on 2018.  -Will continue to clarify with SW regarding placement, group home vs. SNF.  - Spoke to family member per her request (sister/Aniyah/[phone]), update her on patient medical condition,.  -Medically cleared for discharge, pending placement.      Review of Systems   Constitutional: Negative for chills and fever.   Respiratory: Negative for cough, hemoptysis and shortness  of breath.    Cardiovascular: Negative for chest pain, palpitations and leg swelling.   Gastrointestinal: Negative for abdominal pain, diarrhea, nausea and vomiting.   Genitourinary: Negative for dysuria, flank pain, frequency, hematuria and urgency.   Neurological: Negative for dizziness and headaches.       Disposition/Barriers to discharge:   Placement  Home health approval    Consultants/Specialty  ICU  PCP: JAKE Armando      Quality Measures  Quality-Core Measures   Reviewed items::  Labs reviewed and Medications reviewed  Dill catheter::  No Dill  DVT prophylaxis pharmacological::  Contraindicated - High bleeding risk  DVT prophylaxis - mechanical:  SCDs  No pharmacological DVT ppx at this time due to high bleed risk (h/o GI bleed in August 2018)        Physical Exam       Vitals:    11/29/18 1914 11/30/18 0358 11/30/18 0725 11/30/18 1600   BP: 115/65 109/60 106/66 (!) 96/59   Pulse: 90 61 73 85   Resp: 16 16 16 15   Temp: 37.2 °C (99 °F) 36.3 °C (97.4 °F) 37.1 °C (98.7 °F) 36.2 °C (97.2 °F)   TempSrc: Temporal Temporal Temporal Temporal   SpO2: 95% 96% 94% 96%   Weight:       Height:         Body mass index is 15.99 kg/m².    Oxygen Therapy:  Pulse Oximetry: 96 %, O2 Delivery: None (Room Air)    Physical Exam   Constitutional: He is oriented to person, place, and time. No distress.   Thin-appearing with muscle-wasting   HENT:   Head: Atraumatic.   Nose: Nose normal.   R eye fully open, L eye partially closed (baseline)   Eyes: Conjunctivae and EOM are normal.   Neck: Normal range of motion. Neck supple.   Cardiovascular: Normal rate and regular rhythm.    Pulmonary/Chest: Effort normal. No respiratory distress.   Abdominal: Soft. He exhibits no distension. There is no tenderness. There is no rebound and no guarding.   Musculoskeletal: He exhibits no edema or tenderness.   Neurological: He is alert and oriented to person, place, and time.   No gross neurological deficits.   Skin: Skin is warm and  dry.   Psychiatric: Mood and affect normal.             Assessment/Plan     * Diabetic ketoacidosis (HCC)- (present on admission)   Assessment & Plan    -In setting of likely T2DM with concomitant pancreas dysfunction - patient endorsed fasting and not taking meds prior to 11/20/2018 EGD / colonoscopy, which likely triggered this event.  -HCO3 WNL, AG closed with insulin drip, now on subcutaneous glargine and lispro, home metformin.  -Resolved.     Diabetes mellitus (HCC)- (present on admission)   Assessment & Plan    -Some discrepancy on whether patient is T1 or T2DM per chart review.  -Per patient, was diagnosed with DM 4 years ago, initially on insulin, most recently switched to oral meds.  Likely T2DM given this history.  -On metformin, glargine, ISS - uptitrate insulin per BGs.  -Fingerstick glucose checks.  -Hypoglycemia protocol.     Hypotension   Assessment & Plan    -Likely 2/2 hypovolemia in setting of DKA.  -Resolved with IVF.     Hypokalemia- (present on admission)   Assessment & Plan    -Goal K >2.  -Replete as needed.     Hypomagnesemia- (present on admission)   Assessment & Plan    -Goal Mg >2.  -Replete as needed.     Acute kidney injury (HCC)- (present on admission)   Assessment & Plan    -Cr 1.5 on admission (0.75 on 8/27/2018).  -Likely prerenal 2/2 dehydration in setting of DKA.  -Resolved with IVF.     Chronic pancreatitis? (HCC)   Assessment & Plan    -CT abdomen showed extensive calcifications of the pancreas.  -History of protein calorie malnutrition secondary to pancreatic insufficiency.  -Currently on pancrelipase replacement.  -Dietitian following.     History of recent UGI bleed- (present on admission)   Assessment & Plan    -recent Hospital admission 8/2018 for an episode of upper GI bleed  -Patient had EGD done during hospitalization which showed grade D esophagitis, patient had repeat EGD & colonoscopy at 11/20 and EGD notable for localized nodularity of the mucosa at the GE junction  about 5-6 mm.  Colonoscopy was also done but there was stool throughout the colon and a repeat was recommended.  Procedures with GI consultants  -We will hold anticoagulation in this setting, SCDs for DVT prophylaxis  -No apparent active bleed, Hb stable     Essential hypertension- (present on admission)   Assessment & Plan    -On home lisinopril.     Hypophosphatemia   Assessment & Plan    -Goal phos >2.5.  -Replete as needed.     Memory loss- (present on admission)   Assessment & Plan    History of TBI with mild cognitive impairment as per chart review  Lives at an assisted living facility called [facility]  Sister is actively involved in care and cell phone number is in the chart     Dyslipidemia- (present on admission)   Assessment & Plan    -On home atorvastatin.     Tobacco abuse- (present on admission)   Assessment & Plan    -Smoking about 1-2 cigarettes/day  -PCP to continue counseling regarding smoking cessation     Vitamin D deficiency- (present on admission)   Assessment & Plan    -Vitamin D level 31.  -On vitamin D supplement.

## 2018-12-01 NOTE — PROGRESS NOTES
Spoke with diabetic educator, per diabetic educator patient is not stable to go home and take care of his diabetes at home by himself

## 2018-12-01 NOTE — CARE PLAN
Problem: Knowledge Deficit  Goal: Knowledge of disease process/condition, treatment plan, diagnostic tests, and medications will improve  Outcome: PROGRESSING AS EXPECTED  Updated with plan of care, monitor blood sugar, dietician consult    Problem: Discharge Barriers/Planning  Goal: Patient's continuum of care needs will be met  Outcome: PROGRESSING AS EXPECTED   Pending group home vs snf

## 2018-12-01 NOTE — PROGRESS NOTES
Pt a&o, denies pain and no distress noted.  Updated with plan of care, call light in reach and encourage to call for assistance.

## 2018-12-01 NOTE — PROGRESS NOTES
"Diabetes education: Spoke with Mya from [facility], and Deandra CHRISTINA regarding discharge plan. As per Deandra's note Britni Moulton () could assist with, HH and staff at [facility] to allow patient to remain at [facility]. Pt can on command, give his insulin via insulin pen.  If he could do this independently without cues or \"supervision\" is doubt full.  Pt needs cues , assistance from med tech staff at [facility] ( not to inject insulin only to give pen and remind pt of dose), to take insulin and do finger sticks. Pt needs reminders ( verbal and written) regarding this . Pt needs reminders of what not to buy when \"shopping\" as well as need to be present for medications before meals. Insulin doses need to be consistent, and HH will be needed to help assess blood sugar management. Britni and he staff will support as they are able.  Conversations together with Mya , SEDRICK/CARRI, [facility], and Britni if appropriate as well as MD and CDE if needed would make discharge plan much . Pt cannot go to [facility] on sliding scale.  Pt not appropriate to discharge to [facility] with out above plans in place.  Pt is currently on Lantus 18 units at noon, Humalog 8 units ac meals and Humalog sliding scale ac meals. Blood sugars are 194 ( 1 unit), 375 (5 units), and 199 (1 unit). High pre lunch was attributed to heavy carb breakfast per nursing. Please call [phone] when plans known as patient will need a new Contour ez meter at discharge.  "

## 2018-12-01 NOTE — PROGRESS NOTES
Patient is AOx4. Plan of care discussed. Verbalized understanding. Reinforced diabetes education about snacking. Verbalized understanding. Denies pain. Call light in use, belongings within reach, treaded socks on, bed locked in low position

## 2018-12-02 LAB
GLUCOSE BLD-MCNC: 196 MG/DL (ref 65–99)
GLUCOSE BLD-MCNC: 261 MG/DL (ref 65–99)
GLUCOSE BLD-MCNC: 289 MG/DL (ref 65–99)
GLUCOSE BLD-MCNC: 290 MG/DL (ref 65–99)

## 2018-12-02 PROCEDURE — 82962 GLUCOSE BLOOD TEST: CPT | Mod: 91

## 2018-12-02 PROCEDURE — A9270 NON-COVERED ITEM OR SERVICE: HCPCS | Performed by: STUDENT IN AN ORGANIZED HEALTH CARE EDUCATION/TRAINING PROGRAM

## 2018-12-02 PROCEDURE — 700102 HCHG RX REV CODE 250 W/ 637 OVERRIDE(OP): Performed by: STUDENT IN AN ORGANIZED HEALTH CARE EDUCATION/TRAINING PROGRAM

## 2018-12-02 PROCEDURE — 770006 HCHG ROOM/CARE - MED/SURG/GYN SEMI*

## 2018-12-02 PROCEDURE — 99231 SBSQ HOSP IP/OBS SF/LOW 25: CPT | Mod: GC | Performed by: INTERNAL MEDICINE

## 2018-12-02 RX ADMIN — TAMSULOSIN HYDROCHLORIDE 0.4 MG: 0.4 CAPSULE ORAL at 09:00

## 2018-12-02 RX ADMIN — STANDARDIZED SENNA CONCENTRATE AND DOCUSATE SODIUM 2 TABLET: 8.6; 5 TABLET, FILM COATED ORAL at 04:56

## 2018-12-02 RX ADMIN — OMEPRAZOLE 20 MG: 20 CAPSULE, DELAYED RELEASE ORAL at 04:56

## 2018-12-02 RX ADMIN — Medication 325 MG: at 13:16

## 2018-12-02 RX ADMIN — BUPROPION HYDROCHLORIDE 150 MG: 150 TABLET, EXTENDED RELEASE ORAL at 04:56

## 2018-12-02 RX ADMIN — METFORMIN HYDROCHLORIDE 1000 MG: 500 TABLET ORAL at 09:00

## 2018-12-02 RX ADMIN — INSULIN GLARGINE 18 UNITS: 100 INJECTION, SOLUTION SUBCUTANEOUS at 13:13

## 2018-12-02 RX ADMIN — Medication 325 MG: at 18:15

## 2018-12-02 RX ADMIN — PANCRELIPASE 72000 UNITS: 24000; 76000; 120000 CAPSULE, DELAYED RELEASE PELLETS ORAL at 13:17

## 2018-12-02 RX ADMIN — METFORMIN HYDROCHLORIDE 1000 MG: 500 TABLET ORAL at 18:14

## 2018-12-02 RX ADMIN — ATORVASTATIN CALCIUM 10 MG: 10 TABLET, FILM COATED ORAL at 04:56

## 2018-12-02 RX ADMIN — BUPROPION HYDROCHLORIDE 150 MG: 150 TABLET, EXTENDED RELEASE ORAL at 18:14

## 2018-12-02 RX ADMIN — LISINOPRIL 10 MG: 10 TABLET ORAL at 04:55

## 2018-12-02 RX ADMIN — VITAMIN D, TAB 1000IU (100/BT) 2000 UNITS: 25 TAB at 04:55

## 2018-12-02 RX ADMIN — Medication 325 MG: at 09:00

## 2018-12-02 RX ADMIN — PANCRELIPASE 72000 UNITS: 24000; 76000; 120000 CAPSULE, DELAYED RELEASE PELLETS ORAL at 18:15

## 2018-12-02 RX ADMIN — MULTIPLE VITAMINS W/ MINERALS TAB 1 TABLET: TAB at 04:56

## 2018-12-02 RX ADMIN — PANCRELIPASE 72000 UNITS: 24000; 76000; 120000 CAPSULE, DELAYED RELEASE PELLETS ORAL at 09:00

## 2018-12-02 ASSESSMENT — ENCOUNTER SYMPTOMS
HEADACHES: 0
CHILLS: 0
COUGH: 0
FLANK PAIN: 0
DIARRHEA: 0
SHORTNESS OF BREATH: 0
NAUSEA: 0
DIZZINESS: 0
PALPITATIONS: 0
FEVER: 0
HEARTBURN: 0
VOMITING: 0
HEMOPTYSIS: 0
ABDOMINAL PAIN: 0
WEAKNESS: 0

## 2018-12-02 ASSESSMENT — PAIN SCALES - GENERAL
PAINLEVEL_OUTOF10: 0
PAINLEVEL_OUTOF10: 0

## 2018-12-02 NOTE — PROGRESS NOTES
Assumed care of pt at 0700. Pt A&O x4, vss. No c/o pain. Pt voiding without issues, tolerating diet and medications. Fall precautions and hourly rounding in place. Will continue to monitor and assess needs.

## 2018-12-02 NOTE — PROGRESS NOTES
Internal Medicine Interval Note  Note Author: Ju Reece M.D.     Name Pawel aTtum     1960   Age/Sex 58 y.o. male   MRN [id number]   Code Status Full     After 5PM or if no immediate response to page, please call for cross-coverage  Attending/Team: Dr. Hill / Radha See Patient List for primary contact information  Call [phone] to page    1st Call - Day Intern (R1):   Dr. Reece 2nd Call - Day Sr. Resident (R2/R3):   Dr. Locke         Reason for interval visit  (Principal Problem)   DKA      Interval Problem Daily Status Update  (24 hours, problem oriented, brief subjective history, new lab/imaging data pertinent to that problem)   -Patient feeling well, good appetite, ambulating by self.  -BGs in goal mid-100s to 200s with insulin regimen, metformin (plan to uptitrate on 2018).  -Metformin increased, plan to check BMP 2018 to monitor renal function.  -Coordinate with  for likely care conference on 12/3/2018 to clarify placement - patient was able to demonstrate competency in injecting insulin during session with diabetes educator on 2018, but [facility] concerned regarding h/o medication / dietary noncompliance, poor control of DM.  -Medically cleared for discharge, pending placement.      Review of Systems   Constitutional: Negative for chills, fever and malaise/fatigue.   Respiratory: Negative for cough, hemoptysis and shortness of breath.    Cardiovascular: Negative for chest pain, palpitations and leg swelling.   Gastrointestinal: Negative for abdominal pain, diarrhea, heartburn, nausea and vomiting.   Genitourinary: Negative for dysuria, flank pain, frequency, hematuria and urgency.   Neurological: Negative for dizziness, weakness and headaches.       Disposition/Barriers to discharge:   Placement  Home health approval    Consultants/Specialty  ICU  PCP: JAKE Armando      Quality Measures  Quality-Core Measures   Reviewed  items::  Labs reviewed and Medications reviewed  Dill catheter::  No Dill  DVT prophylaxis pharmacological::  Contraindicated - High bleeding risk  DVT prophylaxis - mechanical:  SCDs  No pharmacological DVT ppx at this time due to high bleed risk (h/o GI bleed in August 2018)        Physical Exam       Vitals:    12/01/18 1526 12/01/18 1911 12/02/18 0332 12/02/18 0700   BP: 112/65 114/67 109/62 127/75   Pulse: 77 89 70 75   Resp: 16 16 16 16   Temp: 37.1 °C (98.7 °F) 37.8 °C (100 °F) 36.3 °C (97.4 °F) 36.2 °C (97.1 °F)   TempSrc: Temporal Temporal Temporal Temporal   SpO2: 98% 93% 95% 93%   Weight: 54 kg (119 lb 0.8 oz)      Height:         Body mass index is 15.28 kg/m². Weight: 54 kg (119 lb 0.8 oz)  Oxygen Therapy:  Pulse Oximetry: 93 %, O2 (LPM): 0, O2 Delivery: None (Room Air)    Physical Exam   Constitutional: He is oriented to person, place, and time. No distress.   Thin-appearing with muscle-wasting   HENT:   Head: Atraumatic.   Nose: Nose normal.   R eye fully open, L eye partially closed (baseline)   Eyes: Conjunctivae and EOM are normal.   Neck: Normal range of motion. Neck supple.   Cardiovascular: Normal rate and regular rhythm.    Pulmonary/Chest: Effort normal and breath sounds normal. No respiratory distress.   Abdominal: Soft. There is no tenderness. There is no rebound and no guarding.   Musculoskeletal: He exhibits no edema or tenderness.   Neurological: He is alert and oriented to person, place, and time.   No gross neurological deficits.   Skin: Skin is warm and dry.   Psychiatric: Mood and affect normal.             Assessment/Plan     * Diabetic ketoacidosis (HCC)- (present on admission)   Assessment & Plan    -In setting of likely T2DM with concomitant pancreas dysfunction - patient endorsed fasting and not taking meds prior to 11/20/2018 EGD / colonoscopy, which likely triggered this event.  -HCO3 WNL, AG closed with insulin drip, now on subcutaneous glargine and lispro, home  metformin.  -Resolved.     Diabetes mellitus (HCC)- (present on admission)   Assessment & Plan    -Some discrepancy on whether patient is T1 or T2DM per chart review.  -Per patient, was diagnosed with DM 4 years ago, initially on insulin, most recently switched to oral meds.  Likely T2DM given this history.  -Fingerstick glucose checks.  -Hypoglycemia protocol.  -On metformin, glargine, ISS - uptitrate insulin per BGs.  -Check BMP 12/7/2018 to monitor renal function (due to reinitiation of metformin).     Hypotension   Assessment & Plan    -Likely 2/2 hypovolemia in setting of DKA.  -Resolved with IVF.     Hypokalemia- (present on admission)   Assessment & Plan    -Goal K >2.  -Replete as needed.     Hypomagnesemia- (present on admission)   Assessment & Plan    -Goal Mg >2.  -Replete as needed.     Acute kidney injury (HCC)- (present on admission)   Assessment & Plan    -Cr 1.5 on admission (0.75 on 8/27/2018).  -Likely prerenal 2/2 dehydration in setting of DKA.  -Resolved with IVF.     Chronic pancreatitis? (HCC)   Assessment & Plan    -CT abdomen showed extensive calcifications of the pancreas.  -History of protein calorie malnutrition secondary to pancreatic insufficiency.  -Currently on pancrelipase replacement.  -Dietitian following.     History of recent UGI bleed- (present on admission)   Assessment & Plan    -recent Hospital admission 8/2018 for an episode of upper GI bleed  -Patient had EGD done during hospitalization which showed grade D esophagitis, patient had repeat EGD & colonoscopy at 11/20 and EGD notable for localized nodularity of the mucosa at the GE junction about 5-6 mm.  Colonoscopy was also done but there was stool throughout the colon and a repeat was recommended.  Procedures with GI consultants  -We will hold anticoagulation in this setting, SCDs for DVT prophylaxis  -No apparent active bleed, Hb stable     Essential hypertension- (present on admission)   Assessment & Plan    -On home  lisinopril.     Hypophosphatemia   Assessment & Plan    -Goal phos >2.5.  -Replete as needed.     Memory loss- (present on admission)   Assessment & Plan    History of TBI with mild cognitive impairment as per chart review  Lives at an assisted living facility called [facility] is actively involved in care and cell phone number is in the chart     Dyslipidemia- (present on admission)   Assessment & Plan    -On home atorvastatin.     Tobacco abuse- (present on admission)   Assessment & Plan    -Smoking about 1-2 cigarettes/day  -PCP to continue counseling regarding smoking cessation     Vitamin D deficiency- (present on admission)   Assessment & Plan    -Vitamin D level 31.  -On vitamin D supplement.

## 2018-12-02 NOTE — PROGRESS NOTES
Received report from day shift. Patient sitting in bed, call bell in reach, A&Ox4, RA, tolerating ada diet, no pain at this time.

## 2018-12-03 ENCOUNTER — PATIENT OUTREACH (OUTPATIENT)
Dept: HEALTH INFORMATION MANAGEMENT | Facility: OTHER | Age: 58
End: 2018-12-03

## 2018-12-03 DIAGNOSIS — Z71.89 COMPLEX CARE COORDINATION: ICD-10-CM

## 2018-12-03 LAB
GLUCOSE BLD-MCNC: 194 MG/DL (ref 65–99)
GLUCOSE BLD-MCNC: 260 MG/DL (ref 65–99)
GLUCOSE BLD-MCNC: 272 MG/DL (ref 65–99)
GLUCOSE BLD-MCNC: 305 MG/DL (ref 65–99)

## 2018-12-03 PROCEDURE — 770006 HCHG ROOM/CARE - MED/SURG/GYN SEMI*

## 2018-12-03 PROCEDURE — 99231 SBSQ HOSP IP/OBS SF/LOW 25: CPT | Mod: GC | Performed by: INTERNAL MEDICINE

## 2018-12-03 PROCEDURE — 700102 HCHG RX REV CODE 250 W/ 637 OVERRIDE(OP): Performed by: STUDENT IN AN ORGANIZED HEALTH CARE EDUCATION/TRAINING PROGRAM

## 2018-12-03 PROCEDURE — A9270 NON-COVERED ITEM OR SERVICE: HCPCS | Performed by: STUDENT IN AN ORGANIZED HEALTH CARE EDUCATION/TRAINING PROGRAM

## 2018-12-03 PROCEDURE — 82962 GLUCOSE BLOOD TEST: CPT | Mod: 91

## 2018-12-03 RX ORDER — DEXTROSE MONOHYDRATE 25 G/50ML
25 INJECTION, SOLUTION INTRAVENOUS
Status: DISCONTINUED | OUTPATIENT
Start: 2018-12-03 | End: 2018-12-10

## 2018-12-03 RX ORDER — INSULIN GLARGINE 100 [IU]/ML
20 INJECTION, SOLUTION SUBCUTANEOUS
Status: DISCONTINUED | OUTPATIENT
Start: 2018-12-04 | End: 2018-12-10

## 2018-12-03 RX ADMIN — METFORMIN HYDROCHLORIDE 1000 MG: 500 TABLET ORAL at 08:39

## 2018-12-03 RX ADMIN — TAMSULOSIN HYDROCHLORIDE 0.4 MG: 0.4 CAPSULE ORAL at 08:39

## 2018-12-03 RX ADMIN — Medication 325 MG: at 08:39

## 2018-12-03 RX ADMIN — Medication 325 MG: at 12:53

## 2018-12-03 RX ADMIN — VITAMIN D, TAB 1000IU (100/BT) 2000 UNITS: 25 TAB at 05:25

## 2018-12-03 RX ADMIN — LISINOPRIL 10 MG: 10 TABLET ORAL at 05:25

## 2018-12-03 RX ADMIN — MULTIPLE VITAMINS W/ MINERALS TAB 1 TABLET: TAB at 05:26

## 2018-12-03 RX ADMIN — BUPROPION HYDROCHLORIDE 150 MG: 150 TABLET, EXTENDED RELEASE ORAL at 17:30

## 2018-12-03 RX ADMIN — INSULIN GLARGINE 18 UNITS: 100 INJECTION, SOLUTION SUBCUTANEOUS at 12:51

## 2018-12-03 RX ADMIN — ATORVASTATIN CALCIUM 10 MG: 10 TABLET, FILM COATED ORAL at 05:25

## 2018-12-03 RX ADMIN — METFORMIN HYDROCHLORIDE 1000 MG: 500 TABLET ORAL at 17:30

## 2018-12-03 RX ADMIN — Medication 325 MG: at 17:30

## 2018-12-03 RX ADMIN — PANCRELIPASE 72000 UNITS: 24000; 76000; 120000 CAPSULE, DELAYED RELEASE PELLETS ORAL at 08:39

## 2018-12-03 RX ADMIN — BUPROPION HYDROCHLORIDE 150 MG: 150 TABLET, EXTENDED RELEASE ORAL at 05:25

## 2018-12-03 RX ADMIN — PANCRELIPASE 72000 UNITS: 24000; 76000; 120000 CAPSULE, DELAYED RELEASE PELLETS ORAL at 12:53

## 2018-12-03 RX ADMIN — PANCRELIPASE 72000 UNITS: 24000; 76000; 120000 CAPSULE, DELAYED RELEASE PELLETS ORAL at 17:31

## 2018-12-03 RX ADMIN — OMEPRAZOLE 20 MG: 20 CAPSULE, DELAYED RELEASE ORAL at 05:25

## 2018-12-03 ASSESSMENT — PAIN SCALES - GENERAL
PAINLEVEL_OUTOF10: 0
PAINLEVEL_OUTOF10: 0

## 2018-12-03 NOTE — CARE PLAN
Problem: Safety  Goal: Will remain free from injury  Fall precautions in place. Bed in low locked position, belongings and call light within reach. Patient using call light for assistance. Hourly rounding in place.

## 2018-12-03 NOTE — PROGRESS NOTES
Assumed care of pt at 0700. Pt A&O x4, vss. Pt ambulating independently, voiding without issues. Pt tolerating medications and diet. Fall precautions and hourly rounding in place. Will continue to monitor and assess needs.

## 2018-12-03 NOTE — PROGRESS NOTES
Internal Medicine Interval Note  Note Author: Lewis Hoffmann M.D.     Name Pawel Tatum     1960   Age/Sex 58 y.o. male   MRN [id number]   Code Status Full Code     After 5PM or if no immediate response to page, please call for cross-coverage  Attending/Team: Jesus See Patient List for primary contact information  Call [phone] to page    1st Call - Day Intern (R1):   T[place]ira 2nd Call - Day Sr. Resident (R2/R3):   Robbie         Reason for interval visit  (Principal Problem)   Diabetic ketoacidosis (HCC)    HPI: 58 y.o. male, past medical history insulin-dependent diabetes mellitus (hemoglobin A1c 17), pancreatic insufficiency, recent esophagitis, history of TBI with mild cognitive impairment, residing [facility] assisted living facility, presents with DKA after medication noncompliance.  Treated with ICU DKA protocol, subsequently transferred to floor, stable.  Per social work, there is concerned about his assisted living facility accepting him in light of the fact they feel he needs more supervision than they can provide.  It is noted that he does not have PT OT needs and is stable medically for discharge.    Interval Problem Daily Status Update  (24 hours)   No complaints this morning.  No acute events overnight.    Pertinent negatives: Denies nausea, vomiting, diarrhea, constipation, syncope, palpitation, shortness of breath, cough, fever, change in bowel or bladder habits, change in appetite.  10 system review of systems otherwise negative.    Consultants/Specialty  None  PCP: JAKE Armando    Disposition  Inpatient for placement.      PMHx:  History reviewed. No pertinent past medical history.    PSHx:  Past Surgical History:   Procedure Laterality Date   • GASTROSCOPY-ENDO N/A 2018    Procedure: GASTROSCOPY-ENDO;  Surgeon: Jaswant Frye M.D.;  Location: ENDOSCOPY [facility];  Service: Gastroenterology       Medications:    Current Facility-Administered  Medications:   •  metFORMIN (GLUCOPHAGE) tablet 1,000 mg, 1,000 mg, Oral, BID WITH MEALS, Ju Reece M.D., 1,000 mg at 12/02/18 1814  •  insulin glargine (LANTUS) injection 18 Units, 18 Units, Subcutaneous, DAILY AT NOON, 18 Units at 12/02/18 1313 **AND** insulin lispro (HUMALOG) injection 8 Units, 8 Units, Subcutaneous, TID AC, 8 Units at 12/02/18 1816 **AND** insulin lispro (HUMALOG) injection 1-6 Units, 1-6 Units, Subcutaneous, TID AC, 3 Units at 12/02/18 1818 **AND** Accu-Chek ACHS, , , Q AC AND BEDTIME(S) **AND** NOTIFY MD and PharmD, , , Once **AND** glucose 4 g chewable tablet 16 g, 16 g, Oral, Q15 MIN PRN **AND** dextrose 50% (D50W) injection 25 mL, 25 mL, Intravenous, Q15 MIN PRN, Ju Reece M.D.  •  lisinopril (PRINIVIL) 10 MG tablet 10 mg, 10 mg, Oral, DAILY, Ju Reece M.D., 10 mg at 12/03/18 0525  •  therapeutic multivitamin-minerals (THERAGRAN-M) tablet 1 Tab, 1 Tab, Oral, DAILY, Brigida Hyde M.D., 1 Tab at 12/03/18 0526  •  senna-docusate (PERICOLACE or SENOKOT S) 8.6-50 MG per tablet 2 Tab, 2 Tab, Oral, BID, 2 Tab at 12/02/18 0456 **AND** polyethylene glycol/lytes (MIRALAX) PACKET 1 Packet, 1 Packet, Oral, QDAY PRN **AND** magnesium hydroxide (MILK OF MAGNESIA) suspension 30 mL, 30 mL, Oral, QDAY PRN **AND** bisacodyl (DULCOLAX) suppository 10 mg, 10 mg, Rectal, QDAY PRN, Wilma Oviedo M.D.  •  acetaminophen (TYLENOL) tablet 650 mg, 650 mg, Oral, Q6HRS PRN, Wilma Oviedo M.D.  •  atorvastatin (LIPITOR) tablet 10 mg, 10 mg, Oral, DAILY, Wilma Oviedo M.D., 10 mg at 12/03/18 0525  •  vitamin D (cholecalciferol) tablet 2,000 Units, 2,000 Units, Oral, DAILY, Wilma Oviedo M.D., 2,000 Units at 12/03/18 0525  •  omeprazole (PRILOSEC) capsule 20 mg, 20 mg, Oral, QAM, Wilma Oviedo M.D., 20 mg at 12/03/18 0525  •  tamsulosin (FLOMAX) capsule 0.4 mg, 0.4 mg, Oral, AFTER BREAKFAST, Wilma Oviedo M.D., 0.4 mg at 12/02/18 0900  •  ferrous sulfate tablet 325 mg, 325 mg, Oral, TID WITH  MEALS, Wilma Oviedo M.D., 325 mg at 12/02/18 1815  •  buPROPion SR (WELLBUTRIN-SR) tablet 150 mg, 150 mg, Oral, BID, Wilma Oviedo M.D., 150 mg at 12/03/18 0525  •  pancrelipase (Lip-Prot-Amyl) (CREON 96627) 00497-66014 units capsule 72,000 Units, 3 Cap, Oral, TID WITH MEALS, Wilma Oviedo M.D., 72,000 Units at 12/02/18 1815  •  pancrelipase (Lip-Prot-Amyl) (CREON 36842) 62487-93226 units capsule 24,000 Units, 1 Cap, Oral, With Snacks PRN **AND** pancrelipase (Lip-Prot-Amyl) (CREON 6000) 6000 units capsule 12,000 Units, 2 Cap, Oral, With Snacks PRN, Wilma Oviedo M.D.    Allergies:  No Known Allergies    Quality Measures  Reviewed items:  EKG reviewed, Labs reviewed, Medications reviewed and Radiology images reviewed  Dill catheter:  No Dill  Central line: No central line  DVT prophylaxis: Mechanical, pharmacological contraindicated due to bleeding risk (history of GI bleed August 2018)            Physical Exam       Vitals:    12/02/18 0700 12/02/18 1500 12/02/18 1931 12/03/18 0340   BP: 127/75 114/64 114/61 127/68   Pulse: 75 100 92 74   Resp: 16 16 16 17   Temp: 36.2 °C (97.1 °F) 37 °C (98.6 °F) 36.8 °C (98.2 °F) 36.3 °C (97.4 °F)   TempSrc: Temporal Temporal Oral Temporal   SpO2: 93% 98% 94% 92%   Weight:       Height:         Body mass index is 15.28 kg/m².    Oxygen Therapy:  Pulse Oximetry: 92 %, O2 Delivery: None (Room Air)    PHYSICAL EXAM:  Constitutional: CACHECTIC male in no distress.   HENT:   Head: Normocephalic and atraumatic.   Eyes: Pupils are equal, round, and reactive to light. EOM are normal.  PTOSIS of left eyelid (baseline).  Neck: Normal range of motion. Neck supple. No JVD present. No tracheal deviation present. No thyromegaly present.   Cardiovascular: Regular rhythm.  Exam reveals no gallop and no friction rub.    No murmur heard.  Pulmonary/Chest: Effort normal and breath sounds normal. No stridor. No respiratory distress. No wheezes. No rales. No tenderness.   Abdominal: Soft.  Bowel sounds are normal. No distension and no mass. There is no tenderness. There is no rebound and no guarding.   Musculoskeletal: Normal range of motion. No edema, tenderness or deformity.   Lymphadenopathy:     No cervical adenopathy.   Neurological: Alert and oriented to person, place, and time. No cranial nerve deficit.   Skin: Skin is warm and dry. No rash noted. No erythema.   Psychiatric: Mood, memory, affect and judgment normal.   Vitals reviewed.      Lab Data Review:         12/3/2018  7:32 AM    No results for input(s): SODIUM, POTASSIUM, CHLORIDE, CO2, BUN, CREATININE, MAGNESIUM, PHOSPHORUS, CALCIUM in the last 72 hours.    No results for input(s): ALTSGPT, ASTSGOT, ALKPHOSPHAT, TBILIRUBIN, DBILIRUBIN, GAMMAGT, AMYLASE, LIPASE, ALB, PREALBUMIN, GLUCOSE in the last 72 hours.    No results for input(s): RBC, HEMOGLOBIN, HEMATOCRIT, PLATELETCT, PROTHROMBTM, APTT, INR, IRON, FERRITIN, TOTIRONBC in the last 72 hours.              Assessment/Plan     * Diabetic ketoacidosis (HCC)- (present on admission)   Assessment & Plan    -In setting of likely T2DM with concomitant pancreas dysfunction - patient endorsed fasting and not taking meds prior to 11/20/2018 EGD / colonoscopy, which likely triggered this event.  -HCO3 WNL, AG closed with insulin drip, now on subcutaneous glargine and lispro, home metformin.  -Resolved.     Diabetes mellitus (HCC)- (present on admission)   Assessment & Plan    -Some discrepancy on whether patient is T1 or T2DM per chart review.  -Per patient, was diagnosed with DM 4 years ago, initially on insulin, most recently switched to oral meds.  Likely T2DM given this history.  -Fingerstick glucose checks.  -Hypoglycemia protocol.  -On metformin, glargine, ISS - uptitrate insulin per BGs.  -Check BMP 12/7/2018 to monitor renal function (due to reinitiation of metformin).  -12/2:  Insulin Glargine 18-->20u; premeal Humalog 8u-->9u     Hypotension   Assessment & Plan    -Likely 2/2  hypovolemia in setting of DKA.  -Resolved with IVF.     Hypokalemia- (present on admission)   Assessment & Plan    -Goal K >2.  -Replete as needed.     Hypomagnesemia- (present on admission)   Assessment & Plan    -Goal Mg >2.  -Replete as needed.     Acute kidney injury (HCC)- (present on admission)   Assessment & Plan    -Cr 1.5 on admission (0.75 on 8/27/2018).  -Likely prerenal 2/2 dehydration in setting of DKA.  -Resolved with IVF.     Chronic pancreatitis? (HCC)   Assessment & Plan    -CT abdomen showed extensive calcifications of the pancreas.  -History of protein calorie malnutrition secondary to pancreatic insufficiency.  -Currently on pancrelipase replacement.  -Dietitian following.     History of recent UGI bleed- (present on admission)   Assessment & Plan    -recent Hospital admission 8/2018 for an episode of upper GI bleed  -Patient had EGD done during hospitalization which showed grade D esophagitis, patient had repeat EGD & colonoscopy at 11/20 and EGD notable for localized nodularity of the mucosa at the GE junction about 5-6 mm.  Colonoscopy was also done but there was stool throughout the colon and a repeat was recommended.  Procedures with GI consultants  -We will hold anticoagulation in this setting, SCDs for DVT prophylaxis  -No apparent active bleed, Hb stable     Essential hypertension- (present on admission)   Assessment & Plan    -On home lisinopril.     Hypophosphatemia   Assessment & Plan    -Goal phos >2.5.  -Replete as needed.     Memory loss- (present on admission)   Assessment & Plan    History of TBI with mild cognitive impairment as per chart review  Lives at an assisted living facility called [facility]  Sister is actively involved in care and cell phone number is in the chart     Dyslipidemia- (present on admission)   Assessment & Plan    -On home atorvastatin.     Tobacco abuse- (present on admission)   Assessment & Plan    -Smoking about 1-2 cigarettes/day  -PCP to continue counseling  regarding smoking cessation     Vitamin D deficiency- (present on admission)   Assessment & Plan    -Vitamin D level 31.  -On vitamin D supplement.     Disposition  Patient has a guardian.  12/3 meeting planned between guardian, next of kin, and parties involved with placement.

## 2018-12-03 NOTE — DISCHARGE PLANNING
Anticipated Discharge Disposition: group home    Action: PC to [facility], [id number] to make a referral for the community outreach program.  Told patient should be accepted,takes about 1-2 weeks before a nurse follows up.  Duration of services ranges between 30 and 90 days.     PC to Ashwini at [facility]: who stated she talked patient's sister who is wanting to send him to [facility].  As they have a nurse who comes in twice a day who will assist patient in checking his blood sugar and administering insulin.  Further they offer diabetic meals. Guardian is trying to arrange someone to come out and assess the patient. Ashwini stated if patient is medically clear to d/c they are okay with patient going back to [facility] until he is accepted at [facility].     PC to [facility] [phone], message left for [facility] director.       Barriers to Discharge: group home acceptance    Plan: follow up with [facility]

## 2018-12-04 PROBLEM — D72.829 LEUKOCYTOSIS: Status: ACTIVE | Noted: 2018-12-04

## 2018-12-04 LAB
ANION GAP SERPL CALC-SCNC: 10 MMOL/L (ref 0–11.9)
BUN SERPL-MCNC: 34 MG/DL (ref 8–22)
CALCIUM SERPL-MCNC: 8.7 MG/DL (ref 8.5–10.5)
CHLORIDE SERPL-SCNC: 100 MMOL/L (ref 96–112)
CO2 SERPL-SCNC: 25 MMOL/L (ref 20–33)
CREAT SERPL-MCNC: 0.96 MG/DL (ref 0.5–1.4)
ERYTHROCYTE [DISTWIDTH] IN BLOOD BY AUTOMATED COUNT: 71 FL (ref 35.9–50)
GLUCOSE BLD-MCNC: 141 MG/DL (ref 65–99)
GLUCOSE BLD-MCNC: 281 MG/DL (ref 65–99)
GLUCOSE BLD-MCNC: 310 MG/DL (ref 65–99)
GLUCOSE BLD-MCNC: 381 MG/DL (ref 65–99)
GLUCOSE SERPL-MCNC: 412 MG/DL (ref 65–99)
HCT VFR BLD AUTO: 30.3 % (ref 42–52)
HGB BLD-MCNC: 9.5 G/DL (ref 14–18)
MCH RBC QN AUTO: 29.6 PG (ref 27–33)
MCHC RBC AUTO-ENTMCNC: 31.4 G/DL (ref 33.7–35.3)
MCV RBC AUTO: 94.4 FL (ref 81.4–97.8)
PLATELET # BLD AUTO: 269 K/UL (ref 164–446)
PMV BLD AUTO: 9.3 FL (ref 9–12.9)
POTASSIUM SERPL-SCNC: 4.6 MMOL/L (ref 3.6–5.5)
RBC # BLD AUTO: 3.21 M/UL (ref 4.7–6.1)
SODIUM SERPL-SCNC: 135 MMOL/L (ref 135–145)
WBC # BLD AUTO: 12.5 K/UL (ref 4.8–10.8)

## 2018-12-04 PROCEDURE — 700102 HCHG RX REV CODE 250 W/ 637 OVERRIDE(OP): Performed by: STUDENT IN AN ORGANIZED HEALTH CARE EDUCATION/TRAINING PROGRAM

## 2018-12-04 PROCEDURE — 82962 GLUCOSE BLOOD TEST: CPT

## 2018-12-04 PROCEDURE — 99232 SBSQ HOSP IP/OBS MODERATE 35: CPT | Mod: GC | Performed by: INTERNAL MEDICINE

## 2018-12-04 PROCEDURE — A9270 NON-COVERED ITEM OR SERVICE: HCPCS | Performed by: STUDENT IN AN ORGANIZED HEALTH CARE EDUCATION/TRAINING PROGRAM

## 2018-12-04 PROCEDURE — 36415 COLL VENOUS BLD VENIPUNCTURE: CPT

## 2018-12-04 PROCEDURE — 85027 COMPLETE CBC AUTOMATED: CPT

## 2018-12-04 PROCEDURE — 80048 BASIC METABOLIC PNL TOTAL CA: CPT

## 2018-12-04 PROCEDURE — 770006 HCHG ROOM/CARE - MED/SURG/GYN SEMI*

## 2018-12-04 RX ADMIN — Medication 325 MG: at 12:32

## 2018-12-04 RX ADMIN — PANCRELIPASE 72000 UNITS: 24000; 76000; 120000 CAPSULE, DELAYED RELEASE PELLETS ORAL at 08:23

## 2018-12-04 RX ADMIN — VITAMIN D, TAB 1000IU (100/BT) 2000 UNITS: 25 TAB at 05:55

## 2018-12-04 RX ADMIN — PANCRELIPASE 72000 UNITS: 24000; 76000; 120000 CAPSULE, DELAYED RELEASE PELLETS ORAL at 17:57

## 2018-12-04 RX ADMIN — METFORMIN HYDROCHLORIDE 1000 MG: 500 TABLET ORAL at 18:02

## 2018-12-04 RX ADMIN — TAMSULOSIN HYDROCHLORIDE 0.4 MG: 0.4 CAPSULE ORAL at 12:36

## 2018-12-04 RX ADMIN — BUPROPION HYDROCHLORIDE 150 MG: 150 TABLET, EXTENDED RELEASE ORAL at 05:55

## 2018-12-04 RX ADMIN — INSULIN GLARGINE 20 UNITS: 100 INJECTION, SOLUTION SUBCUTANEOUS at 12:35

## 2018-12-04 RX ADMIN — Medication 325 MG: at 08:23

## 2018-12-04 RX ADMIN — PANCRELIPASE 72000 UNITS: 24000; 76000; 120000 CAPSULE, DELAYED RELEASE PELLETS ORAL at 12:33

## 2018-12-04 RX ADMIN — ATORVASTATIN CALCIUM 10 MG: 10 TABLET, FILM COATED ORAL at 05:56

## 2018-12-04 RX ADMIN — Medication 325 MG: at 17:57

## 2018-12-04 RX ADMIN — MULTIPLE VITAMINS W/ MINERALS TAB 1 TABLET: TAB at 05:55

## 2018-12-04 RX ADMIN — LISINOPRIL 10 MG: 10 TABLET ORAL at 05:56

## 2018-12-04 RX ADMIN — METFORMIN HYDROCHLORIDE 1000 MG: 500 TABLET ORAL at 08:24

## 2018-12-04 RX ADMIN — BUPROPION HYDROCHLORIDE 150 MG: 150 TABLET, EXTENDED RELEASE ORAL at 17:57

## 2018-12-04 RX ADMIN — OMEPRAZOLE 20 MG: 20 CAPSULE, DELAYED RELEASE ORAL at 05:56

## 2018-12-04 ASSESSMENT — PAIN SCALES - GENERAL
PAINLEVEL_OUTOF10: 0
PAINLEVEL_OUTOF10: 0

## 2018-12-04 NOTE — PROGRESS NOTES
Pt alert and oriented x4. Ambulates to the hallway. Safety precautions in placed. Treaded socks on. Upper bed rails up. Bed in lowest position. Reinforced the use of call light when needing assistance.

## 2018-12-04 NOTE — PROGRESS NOTES
Diabetes education: Met with Pawel to give a Contour next EZ meter and review use of both meter and lancet device. Easy instructions also placed inside meter case for both lancet device and meter for finger sticks and review.  Lantus insulin doses increased as well as Humalog ( now set amount only).  Plan: Pt will need prescriptions for Contour next test strips and lancets at discharge. Prescriptions need to have directions, quantity, refills and dx code ( Ell.65) for medicare to cover. Pt will need Novolog ordered rather than Humalog for insurance coverage. Prescribing  MD needs to have Medicare privileges as well. Please call [phone] if needs change.

## 2018-12-04 NOTE — ASSESSMENT & PLAN NOTE
RESOLVING  12/4 CBC: WBC 12.5  Asymptomatic, afebrile, no identified source for infection.  Suspect stress demargination.  No intervention indicated at this time.  If febrile, culture

## 2018-12-04 NOTE — PROGRESS NOTES
Internal Medicine Interval Note  Note Author: Lewis Hoffmann M.D.     Name Pawel Tatum     1960   Age/Sex 58 y.o. male   MRN [id number]   Code Status Full Code     After 5PM or if no immediate response to page, please call for cross-coverage  Attending/Team: Jesus See Patient List for primary contact information  Call [phone] to page    1st Call - Day Intern (R1):   [place] 2nd Call - Day Sr. Resident (R2/R3):   Robbie         Reason for interval visit  (Principal Problem)   Diabetic ketoacidosis (HCC)    HPI: 58 y.o. male, past medical history insulin-dependent diabetes mellitus (hemoglobin A1c 17), pancreatic insufficiency, recent esophagitis, history of TBI with mild cognitive impairment, residing [facility] assisted [facility] facility, presents with DKA after medication noncompliance.  Treated with ICU DKA protocol, subsequently transferred to floor, stable.  Per social work, there is concerned about his assisted living facility accepting him in light of the fact they feel he needs more supervision than they can provide.  It is noted that he does not have PT OT needs and is stable medically for discharge.    Interval Problem Daily Status Update  (24 hours)   No complaints this morning.  No acute events overnight.  States that he self administered insulin and checked his own blood sugar yesterday.  He did not have any issues with performing these tasks.    Insulin regimen changed yesterday: Glargine increased from 18-20 units, pre-meal Humalog increased from 8-9 units.  Blood glucose increased overnight to 305 (Chemstick) to 412 (BMP).    Seen by diabetes educator yesterday.  Will need insulin order changes upon discharge by Medicare authorized physician, per their instruction.    Social work contacted guardian, family, attempted to contact group home.  Family member found a different home which would allow home nursing visits for diabetes management twice daily in addition to  diabetic meals.  Acceptance by group home remains a barrier to discharge.    Pertinent negatives: Denies nausea, vomiting, diarrhea, constipation, syncope, palpitation, shortness of breath, cough, fever, change in bowel or bladder habits, change in appetite.  10 system review of systems otherwise negative.    Consultants/Specialty  None  PCP: JAKE Armando    Disposition  Inpatient for placement.      PMHx:  History reviewed. No pertinent past medical history.    PSHx:  Past Surgical History:   Procedure Laterality Date   • GASTROSCOPY-ENDO N/A 8/25/2018    Procedure: GASTROSCOPY-ENDO;  Surgeon: Jaswant Frye M.D.;  Location: ENDOSCOPY [facility];  Service: Gastroenterology       Medications:    Current Facility-Administered Medications:   •  insulin glargine (LANTUS) injection 20 Units, 20 Units, Subcutaneous, DAILY AT NOON **AND** insulin lispro (HUMALOG) injection 9 Units, 9 Units, Subcutaneous, TID AC, 9 Units at 12/03/18 1727 **AND** [DISCONTINUED] insulin lispro (HUMALOG) injection 1-6 Units, 1-6 Units, Subcutaneous, TID AC, Stopped at 12/03/18 1700 **AND** Accu-Chek ACHS, , , Q AC AND BEDTIME(S) **AND** NOTIFY MD and PharmD, , , Once **AND** glucose 4 g chewable tablet 16 g, 16 g, Oral, Q15 MIN PRN **AND** dextrose 50% (D50W) injection 25 mL, 25 mL, Intravenous, Q15 MIN PRN, Lewis Hoffmann M.D.  •  metFORMIN (GLUCOPHAGE) tablet 1,000 mg, 1,000 mg, Oral, BID WITH MEALS, Ju Reece M.D., 1,000 mg at 12/03/18 1730  •  lisinopril (PRINIVIL) 10 MG tablet 10 mg, 10 mg, Oral, DAILY, Ju Reece M.D., 10 mg at 12/04/18 0556  •  therapeutic multivitamin-minerals (THERAGRAN-M) tablet 1 Tab, 1 Tab, Oral, DAILY, Brigida Hyde M.D., 1 Tab at 12/04/18 0555  •  senna-docusate (PERICOLACE or SENOKOT S) 8.6-50 MG per tablet 2 Tab, 2 Tab, Oral, BID, 2 Tab at 12/02/18 0456 **AND** polyethylene glycol/lytes (MIRALAX) PACKET 1 Packet, 1 Packet, Oral, QDAY PRN **AND** magnesium hydroxide (MILK OF MAGNESIA)  suspension 30 mL, 30 mL, Oral, QDAY PRN **AND** bisacodyl (DULCOLAX) suppository 10 mg, 10 mg, Rectal, QDAY PRN, Wilma Oviedo M.D.  •  acetaminophen (TYLENOL) tablet 650 mg, 650 mg, Oral, Q6HRS PRN, Wilma Oviedo M.D.  •  atorvastatin (LIPITOR) tablet 10 mg, 10 mg, Oral, DAILY, Wilma Oviedo M.D., 10 mg at 12/04/18 0556  •  vitamin D (cholecalciferol) tablet 2,000 Units, 2,000 Units, Oral, DAILY, Wilma Oviedo M.D., 2,000 Units at 12/04/18 0555  •  omeprazole (PRILOSEC) capsule 20 mg, 20 mg, Oral, QAM, Wilma Oviedo M.D., 20 mg at 12/04/18 0556  •  tamsulosin (FLOMAX) capsule 0.4 mg, 0.4 mg, Oral, AFTER BREAKFAST, Wilma Oviedo M.D., 0.4 mg at 12/03/18 0839  •  ferrous sulfate tablet 325 mg, 325 mg, Oral, TID WITH MEALS, Wilma Oviedo M.D., 325 mg at 12/03/18 1730  •  buPROPion SR (WELLBUTRIN-SR) tablet 150 mg, 150 mg, Oral, BID, Wilma Oviedo M.D., 150 mg at 12/04/18 0555  •  pancrelipase (Lip-Prot-Amyl) (CREON 48331) 69900-01332 units capsule 72,000 Units, 3 Cap, Oral, TID WITH MEALS, Wilma Oveido M.D., 72,000 Units at 12/03/18 1731  •  pancrelipase (Lip-Prot-Amyl) (CREON 45736) 21689-40706 units capsule 24,000 Units, 1 Cap, Oral, With Snacks PRN **AND** pancrelipase (Lip-Prot-Amyl) (CREON 6000) 6000 units capsule 12,000 Units, 2 Cap, Oral, With Snacks PRN, Mokshya Oviedo, M.D.    Allergies:  No Known Allergies    Quality Measures  Reviewed items:  EKG reviewed, Labs reviewed, Medications reviewed and Radiology images reviewed  Dill catheter:  No Dill  Central line: No central line  DVT prophylaxis: Mechanical, pharmacological contraindicated due to bleeding risk (history of GI bleed August 2018)            Physical Exam       Vitals:    12/03/18 0800 12/03/18 1500 12/03/18 1905 12/04/18 0350   BP: 134/77 121/69 115/67 120/68   Pulse: 81 (!) 103 100 73   Resp: 16 20 18 14   Temp: 36.6 °C (97.8 °F) 37.4 °C (99.3 °F) 37.1 °C (98.8 °F) 36.4 °C (97.6 °F)   TempSrc: Temporal Temporal Temporal  Temporal   SpO2: 93% 92% 94% 93%   Weight:       Height:         Body mass index is 15.28 kg/m².    Oxygen Therapy:  Pulse Oximetry: 93 %, O2 (LPM): 0, O2 Delivery: None (Room Air)    PHYSICAL EXAM: Unchanged since yesterday  Constitutional: CACHECTIC male in no distress.   HENT:   Head: Normocephalic and atraumatic.   Eyes: Pupils are equal, round, and reactive to light. EOM are normal.  PTOSIS of left eyelid (baseline).  Neck: Normal range of motion. Neck supple. No JVD present. No tracheal deviation present. No thyromegaly present.   Cardiovascular: Regular rhythm.  Exam reveals no gallop and no friction rub.    No murmur heard.  Pulmonary/Chest: Effort normal and breath sounds normal. No stridor. No respiratory distress. No wheezes. No rales. No tenderness.   Abdominal: Soft. Bowel sounds are normal. No distension and no mass. There is no tenderness. There is no rebound and no guarding.   Musculoskeletal: Normal range of motion. No edema, tenderness or deformity.   Lymphadenopathy:     No cervical adenopathy.   Neurological: Alert and oriented to person, place, and time. No cranial nerve deficit.   Skin: Skin is warm and dry. No rash noted. No erythema.   Psychiatric: Mood, memory, affect and judgment normal.   Vitals reviewed.      Lab Data Review:         12/3/2018  7:32 AM    Recent Labs      12/04/18   0058   SODIUM  135   POTASSIUM  4.6   CHLORIDE  100   CO2  25   BUN  34*   CREATININE  0.96   CALCIUM  8.7       Recent Labs      12/04/18   0058   GLUCOSE  412*       Recent Labs      12/04/18 0058   RBC  3.21*   HEMOGLOBIN  9.5*   HEMATOCRIT  30.3*   PLATELETCT  269       Recent Labs      12/04/18   0058   WBC  12.5*           Assessment/Plan     * Diabetic ketoacidosis (HCC)- (present on admission)   Assessment & Plan    -In setting of likely T2DM with concomitant pancreas dysfunction - patient endorsed fasting and not taking meds prior to 11/20/2018 EGD / colonoscopy, which likely triggered this  event.  -HCO3 WNL, AG closed with insulin drip, now on subcutaneous glargine and lispro, home metformin.  -Resolved.     Diabetes mellitus (HCC)- (present on admission)   Assessment & Plan    -Some discrepancy on whether patient is T1 or T2DM per chart review.  -Per patient, was diagnosed with DM 4 years ago, initially on insulin, most recently switched to oral meds.  Likely T2DM given this history.  -Fingerstick glucose checks.  -Hypoglycemia protocol.  -On metformin, glargine, ISS - uptitrate insulin per BGs.  -Check BMP 12/7/2018 to monitor renal function (due to reinitiation of metformin).  -12/2:  Insulin Glargine 18-->20u; premeal Humalog 8u-->9u  -12/3: Blood glucose increasing from 260s to 3-400      Hypotension   Assessment & Plan    -Likely 2/2 hypovolemia in setting of DKA.  -Resolved with IVF.     Hypokalemia- (present on admission)   Assessment & Plan    -Goal K >2.  -Replete as needed.     Hypomagnesemia- (present on admission)   Assessment & Plan    -Goal Mg >2.  -Replete as needed.     Acute kidney injury (HCC)- (present on admission)   Assessment & Plan    -Cr 1.5 on admission (0.75 on 8/27/2018).  -Likely prerenal 2/2 dehydration in setting of DKA.  -Resolved with IVF.     Chronic pancreatitis? (HCC)   Assessment & Plan    -CT abdomen showed extensive calcifications of the pancreas.  -History of protein calorie malnutrition secondary to pancreatic insufficiency.  -Currently on pancrelipase replacement.  -Dietitian following.     History of recent UGI bleed- (present on admission)   Assessment & Plan    -recent Hospital admission 8/2018 for an episode of upper GI bleed  -Patient had EGD done during hospitalization which showed grade D esophagitis, patient had repeat EGD & colonoscopy at 11/20 and EGD notable for localized nodularity of the mucosa at the GE junction about 5-6 mm.  Colonoscopy was also done but there was stool throughout the colon and a repeat was recommended.  Procedures with GI  consultants  -We will hold anticoagulation in this setting, SCDs for DVT prophylaxis  -No apparent active bleed, Hb stable     Essential hypertension- (present on admission)   Assessment & Plan    -On home lisinopril.     Hypophosphatemia   Assessment & Plan    -Goal phos >2.5.  -Replete as needed.     Memory loss- (present on admission)   Assessment & Plan    History of TBI with mild cognitive impairment as per chart review  Lives at an assisted living facility called [facility]  Sister is actively involved in care and cell phone number is in the chart     Dyslipidemia- (present on admission)   Assessment & Plan    -On home atorvastatin.     Tobacco abuse- (present on admission)   Assessment & Plan    -Smoking about 1-2 cigarettes/day  -PCP to continue counseling regarding smoking cessation     Vitamin D deficiency- (present on admission)   Assessment & Plan    -Vitamin D level 31.  -On vitamin D supplement.       Leukocytosis   Assessment & Plan    12/4 CBC: WBC 12.5  Asymptomatic, afebrile, no identified source for infection.  Suspect stress demargination.  No intervention indicated at this time.  If febrile, culture       Disposition  Patient has a guardian.  12/3 meeting planned between guardian, next of kin, and parties involved with placement: Family has found a new home which would better assist patient with his diabetes needs.  Family willing to accept placement in his old home pending acceptance to the new home.  Guardian to reassess.  Placement remains a barrier to discharge.

## 2018-12-04 NOTE — DISCHARGE PLANNING
"This SW met with pt. At b/s for SS Assessment.  He is A&Ox4.  States he lives at [facility] & would like to return.  The DC complicated r/t rt. Needs to be able to manage his own insulin & [facility] concern that pt unable to.  They will not accept rt. Back.  Sister lives in [place] & is thinking about pt. going to [place].  He utilizes \"cheater glasses\".  Diabetic teaching involved.  Medication is currently managed by [facility].  Pt. States he thinks his PCP is Dr. Garcia.  SW will remain avail. For DC planning.    Care Transition Team Assessment    Information Source  Orientation : Oriented x 4  Information Given By: Patient  Who is responsible for making decisions for patient? : Patient    Readmission Evaluation  Is this a readmission?: No    Elopement Risk  Legal Hold: No  Ambulatory or Self Mobile in Wheelchair: Yes  Disoriented: No  Psychiatric Symptoms: None  History of Wandering: No  Elopement this Admit: No  Vocalizing Wanting to Leave: No  Displays Behaviors, Body Language Wanting to Leave: No-Not at Risk for Elopement  Elopement Risk: Not at Risk for Elopement    Interdisciplinary Discharge Planning  Lives with - Patient's Self Care Capacity: Alone and Able to Care For Self  Patient or legal guardian wants to designate a caregiver (see row info): No  Housing / Facility: Assisted Living Residence  Prior Services: Other (Comments) (assisted living facility)    Discharge Preparedness  What is your plan after discharge?: Other (comment) ([facility] if willing to accept rt. with own insulin manageme)  What are your discharge supports?: Sibling  Prior Functional Level: Ambulatory, Needs Assist with Medication Management  Difficulity with ADLs: None  Difficulity with IADLs: Managing medication  Difficulity with IADL Comments:  (Pt lives at [facility] that helps with meds)    Functional Assesment  Prior Functional Level: Ambulatory, Needs Assist with Medication Management    Finances  Financial Barriers to Discharge: " No  Prescription Coverage: Yes    Vision / Hearing Impairment  Right Eye Vision: Impaired, Patient Declines to Wear Visual Aid  Left Eye Vision: Impaired, Patient Declines to Wear Visual Aid         Advance Directive  Advance Directive?: None  Advance Directive offered?: AD Booklet refused (Pt stating sister has/is DPOA)    Domestic Abuse  Have you ever been the victim of abuse or violence?: No    Psychological Assessment  History of Substance Abuse: None  History of Psychiatric Problems: Yes (Depresssion)  Non-compliant with Treatment: No  Newly Diagnosed Illness: No    Discharge Risks or Barriers  Discharge risks or barriers?: Other (comment) (needs to be able to manage insulin)    Anticipated Discharge Information  Anticipated discharge disposition: Assisted living  Discharge Address: [facility] ([facility] )  Discharge Contact Phone Number: [phone]

## 2018-12-04 NOTE — DISCHARGE PLANNING
Anticipated Discharge Disposition: Group Home    Action: Late entry from 12/3  PC from Tracee Cheema from [facility], who stated they are concerned about taking patient back.  Primarily due to his inability to monitor his diabetics as patient has been to [facility] three times for this issue. Tracee stated they would love to have patient back, but unless someone can come in daily to monitor patient's blood sugar they can't    Barriers to Discharge: placement    Plan: contact Ashwini at [facility]

## 2018-12-05 ENCOUNTER — PATIENT OUTREACH (OUTPATIENT)
Dept: HEALTH INFORMATION MANAGEMENT | Facility: OTHER | Age: 58
End: 2018-12-05

## 2018-12-05 LAB
ANION GAP SERPL CALC-SCNC: 10 MMOL/L (ref 0–11.9)
BUN SERPL-MCNC: 36 MG/DL (ref 8–22)
CALCIUM SERPL-MCNC: 8.9 MG/DL (ref 8.5–10.5)
CHLORIDE SERPL-SCNC: 107 MMOL/L (ref 96–112)
CO2 SERPL-SCNC: 24 MMOL/L (ref 20–33)
CREAT SERPL-MCNC: 0.83 MG/DL (ref 0.5–1.4)
ERYTHROCYTE [DISTWIDTH] IN BLOOD BY AUTOMATED COUNT: 75.3 FL (ref 35.9–50)
GLUCOSE BLD-MCNC: 134 MG/DL (ref 65–99)
GLUCOSE BLD-MCNC: 147 MG/DL (ref 65–99)
GLUCOSE BLD-MCNC: 259 MG/DL (ref 65–99)
GLUCOSE BLD-MCNC: 399 MG/DL (ref 65–99)
GLUCOSE SERPL-MCNC: 110 MG/DL (ref 65–99)
HCT VFR BLD AUTO: 31.5 % (ref 42–52)
HGB BLD-MCNC: 9.6 G/DL (ref 14–18)
MCH RBC QN AUTO: 29.4 PG (ref 27–33)
MCHC RBC AUTO-ENTMCNC: 30.5 G/DL (ref 33.7–35.3)
MCV RBC AUTO: 96.3 FL (ref 81.4–97.8)
PLATELET # BLD AUTO: 261 K/UL (ref 164–446)
PMV BLD AUTO: 9 FL (ref 9–12.9)
POTASSIUM SERPL-SCNC: 4 MMOL/L (ref 3.6–5.5)
RBC # BLD AUTO: 3.27 M/UL (ref 4.7–6.1)
SODIUM SERPL-SCNC: 141 MMOL/L (ref 135–145)
WBC # BLD AUTO: 12.5 K/UL (ref 4.8–10.8)

## 2018-12-05 PROCEDURE — 85027 COMPLETE CBC AUTOMATED: CPT

## 2018-12-05 PROCEDURE — 80048 BASIC METABOLIC PNL TOTAL CA: CPT

## 2018-12-05 PROCEDURE — 700102 HCHG RX REV CODE 250 W/ 637 OVERRIDE(OP): Performed by: STUDENT IN AN ORGANIZED HEALTH CARE EDUCATION/TRAINING PROGRAM

## 2018-12-05 PROCEDURE — 99231 SBSQ HOSP IP/OBS SF/LOW 25: CPT | Mod: GC | Performed by: INTERNAL MEDICINE

## 2018-12-05 PROCEDURE — A9270 NON-COVERED ITEM OR SERVICE: HCPCS | Performed by: STUDENT IN AN ORGANIZED HEALTH CARE EDUCATION/TRAINING PROGRAM

## 2018-12-05 PROCEDURE — 36415 COLL VENOUS BLD VENIPUNCTURE: CPT

## 2018-12-05 PROCEDURE — 770006 HCHG ROOM/CARE - MED/SURG/GYN SEMI*

## 2018-12-05 PROCEDURE — 82962 GLUCOSE BLOOD TEST: CPT | Mod: 91

## 2018-12-05 RX ADMIN — LISINOPRIL 10 MG: 10 TABLET ORAL at 05:08

## 2018-12-05 RX ADMIN — VITAMIN D, TAB 1000IU (100/BT) 2000 UNITS: 25 TAB at 05:09

## 2018-12-05 RX ADMIN — PANCRELIPASE 72000 UNITS: 24000; 76000; 120000 CAPSULE, DELAYED RELEASE PELLETS ORAL at 18:13

## 2018-12-05 RX ADMIN — MULTIPLE VITAMINS W/ MINERALS TAB 1 TABLET: TAB at 05:08

## 2018-12-05 RX ADMIN — BUPROPION HYDROCHLORIDE 150 MG: 150 TABLET, EXTENDED RELEASE ORAL at 18:13

## 2018-12-05 RX ADMIN — PANCRELIPASE 72000 UNITS: 24000; 76000; 120000 CAPSULE, DELAYED RELEASE PELLETS ORAL at 12:30

## 2018-12-05 RX ADMIN — Medication 325 MG: at 12:29

## 2018-12-05 RX ADMIN — TAMSULOSIN HYDROCHLORIDE 0.4 MG: 0.4 CAPSULE ORAL at 08:47

## 2018-12-05 RX ADMIN — OMEPRAZOLE 20 MG: 20 CAPSULE, DELAYED RELEASE ORAL at 05:08

## 2018-12-05 RX ADMIN — METFORMIN HYDROCHLORIDE 1000 MG: 500 TABLET ORAL at 18:12

## 2018-12-05 RX ADMIN — BUPROPION HYDROCHLORIDE 150 MG: 150 TABLET, EXTENDED RELEASE ORAL at 05:09

## 2018-12-05 RX ADMIN — Medication 325 MG: at 08:48

## 2018-12-05 RX ADMIN — INSULIN GLARGINE 20 UNITS: 100 INJECTION, SOLUTION SUBCUTANEOUS at 12:33

## 2018-12-05 RX ADMIN — ATORVASTATIN CALCIUM 10 MG: 10 TABLET, FILM COATED ORAL at 05:09

## 2018-12-05 RX ADMIN — PANCRELIPASE 72000 UNITS: 24000; 76000; 120000 CAPSULE, DELAYED RELEASE PELLETS ORAL at 08:48

## 2018-12-05 RX ADMIN — METFORMIN HYDROCHLORIDE 1000 MG: 500 TABLET ORAL at 08:56

## 2018-12-05 RX ADMIN — Medication 325 MG: at 18:13

## 2018-12-05 ASSESSMENT — PAIN SCALES - GENERAL
PAINLEVEL_OUTOF10: 0
PAINLEVEL_OUTOF10: 0

## 2018-12-05 NOTE — PROGRESS NOTES
Pt alert and oriented x4. Ambulates to the hallway. Safety precautions in placed. Treaded socks on. Bed in lowest position. Upper bed rails up. Clutter free environment provided. Reinforced the use of call light when needing assistance.

## 2018-12-05 NOTE — CARE PLAN
Problem: Safety  Goal: Will remain free from falls  Outcome: PROGRESSING AS EXPECTED  Reinforced the use of call light when needing assistance. Treaded socks on. Bed on lowest position. Personal belongings within reach. Clutter free environment provided.     Problem: Respiratory:  Goal: Respiratory status will improve  Outcome: PROGRESSING AS EXPECTED  Encouraged to do deep breathing techniques. Assisted on semi - fowlers position

## 2018-12-05 NOTE — PROGRESS NOTES
Internal Medicine Interval Note  Note Author: Lewis Hoffmann M.D.     Name Pawel Tatum     1960   Age/Sex 58 y.o. male   MRN [id number]   Code Status Full Code     After 5PM or if no immediate response to page, please call for cross-coverage  Attending/Team: Jesus See Patient List for primary contact information  Call [phone] to page    1st Call - Day Intern (R1):   [place] 2nd Call - Day Sr. Resident (R2/R3):   Robbie         Reason for interval visit  (Principal Problem)   Diabetic ketoacidosis (HCC)    HPI: 58 y.o. male, past medical history insulin-dependent diabetes mellitus (hemoglobin A1c 17), pancreatic insufficiency, recent esophagitis, history of TBI with mild cognitive impairment, residing [facility] assisted [facility] facility, presents with DKA after medication noncompliance.  Treated with ICU DKA protocol, subsequently transferred to floor, stable.  Per social work, there is concerned about his assisted living facility accepting him in light of the fact they feel he needs more supervision than they can provide.  It is noted that he does not have PT OT needs and is stable medically for discharge.    Interval Problem Daily Status Update  (24 hours)   No complaints this morning.  No acute events overnight.  He continues to self administer insulin under the supervision of the nurse.  His previous group home will not accept him, as they cannot monitor his glucose.    Insulin regimen changed 12/3: Glargine increased from 18-20 units, pre-meal Humalog increased from 8-9 units.  Requiring 0-3 units sliding scale.  Blood glucose range 141-381, improving.    Social work contacted guardian, family, attempted to contact group home.  Family member found a different home which would allow home nursing visits for diabetes management twice daily in addition to diabetic meals.  Acceptance by group home remains a barrier to discharge.    Pertinent negatives: Denies nausea, vomiting,  diarrhea, constipation, syncope, palpitation, shortness of breath, cough, fever, change in bowel or bladder habits, change in appetite.  10 system review of systems otherwise negative.    Consultants/Specialty  None  PCP: JAKE Armando    Disposition  Inpatient for placement.      PMHx:  History reviewed. No pertinent past medical history.    PSHx:  Past Surgical History:   Procedure Laterality Date   • GASTROSCOPY-ENDO N/A 8/25/2018    Procedure: GASTROSCOPY-ENDO;  Surgeon: Jaswant Frye M.D.;  Location: ENDOSCOPY [facility];  Service: Gastroenterology       Medications:    Current Facility-Administered Medications:   •  insulin glargine (LANTUS) injection 20 Units, 20 Units, Subcutaneous, DAILY AT NOON, 20 Units at 12/04/18 1235 **AND** insulin lispro (HUMALOG) injection 9 Units, 9 Units, Subcutaneous, TID AC, 9 Units at 12/04/18 1753 **AND** [DISCONTINUED] insulin lispro (HUMALOG) injection 1-6 Units, 1-6 Units, Subcutaneous, TID AC, Stopped at 12/03/18 1700 **AND** Accu-Chek ACHS, , , Q AC AND BEDTIME(S) **AND** NOTIFY MD and PharmD, , , Once **AND** glucose 4 g chewable tablet 16 g, 16 g, Oral, Q15 MIN PRN **AND** dextrose 50% (D50W) injection 25 mL, 25 mL, Intravenous, Q15 MIN PRN, Lewis Hoffmann M.D.  •  metFORMIN (GLUCOPHAGE) tablet 1,000 mg, 1,000 mg, Oral, BID WITH MEALS, uJ Reece M.D., 1,000 mg at 12/04/18 1802  •  lisinopril (PRINIVIL) 10 MG tablet 10 mg, 10 mg, Oral, DAILY, Ju Reece M.D., 10 mg at 12/05/18 0508  •  therapeutic multivitamin-minerals (THERAGRAN-M) tablet 1 Tab, 1 Tab, Oral, DAILY, Brigida Hyde M.D., 1 Tab at 12/05/18 0508  •  senna-docusate (PERICOLACE or SENOKOT S) 8.6-50 MG per tablet 2 Tab, 2 Tab, Oral, BID, 2 Tab at 12/02/18 0456 **AND** polyethylene glycol/lytes (MIRALAX) PACKET 1 Packet, 1 Packet, Oral, QDAY PRN **AND** magnesium hydroxide (MILK OF MAGNESIA) suspension 30 mL, 30 mL, Oral, QDAY PRN **AND** bisacodyl (DULCOLAX) suppository 10 mg, 10 mg,  Rectal, QDAY PRN, Wilma Oviedo M.D.  •  acetaminophen (TYLENOL) tablet 650 mg, 650 mg, Oral, Q6HRS PRN, Wilma Oviedo M.D.  •  atorvastatin (LIPITOR) tablet 10 mg, 10 mg, Oral, DAILY, Wilma Oviedo M.D., 10 mg at 12/05/18 0509  •  vitamin D (cholecalciferol) tablet 2,000 Units, 2,000 Units, Oral, DAILY, Wilma Oviedo M.D., 2,000 Units at 12/05/18 0509  •  omeprazole (PRILOSEC) capsule 20 mg, 20 mg, Oral, QAM, Wilma Oviedo M.D., 20 mg at 12/05/18 0508  •  tamsulosin (FLOMAX) capsule 0.4 mg, 0.4 mg, Oral, AFTER BREAKFAST, Wilma Oviedo M.D., 0.4 mg at 12/04/18 1236  •  ferrous sulfate tablet 325 mg, 325 mg, Oral, TID WITH MEALS, Wilma Oviedo M.D., 325 mg at 12/04/18 1757  •  buPROPion SR (WELLBUTRIN-SR) tablet 150 mg, 150 mg, Oral, BID, Wilma Oviedo M.D., 150 mg at 12/05/18 0509  •  pancrelipase (Lip-Prot-Amyl) (CREON 04229) 06982-27029 units capsule 72,000 Units, 3 Cap, Oral, TID WITH MEALS, Wilma Oviedo M.D., 72,000 Units at 12/04/18 1757  •  pancrelipase (Lip-Prot-Amyl) (CREON 76428) 17708-39012 units capsule 24,000 Units, 1 Cap, Oral, With Snacks PRN **AND** pancrelipase (Lip-Prot-Amyl) (CREON 6000) 6000 units capsule 12,000 Units, 2 Cap, Oral, With Snacks PRN, Wilma Oviedo M.D.    Allergies:  No Known Allergies    Quality Measures  Reviewed items:  EKG reviewed, Labs reviewed, Medications reviewed and Radiology images reviewed  Dill catheter:  No Dill  Central line: No central line  DVT prophylaxis: Mechanical, pharmacological contraindicated due to bleeding risk (history of GI bleed August 2018)            Physical Exam       Vitals:    12/04/18 0800 12/04/18 1500 12/04/18 1915 12/05/18 0400   BP: 122/67 110/57 105/60 120/64   Pulse: 79 (!) 110 99 84   Resp: 20 16 18 18   Temp: 36.7 °C (98.1 °F) 37.1 °C (98.8 °F) 37 °C (98.6 °F) 36.3 °C (97.4 °F)   TempSrc: Temporal Temporal Temporal Temporal   SpO2: 94% 96% 93% 94%   Weight:       Height:         Body mass index is 15.28 kg/m².     Oxygen Therapy:  Pulse Oximetry: 94 %, O2 (LPM): 0, O2 Delivery: None (Room Air)    PHYSICAL EXAM: Unchanged since yesterday  Constitutional: CACHECTIC male in no distress.   HENT:   Head: Normocephalic and atraumatic.   Eyes: Pupils are equal, round, and reactive to light. EOM are normal.  PTOSIS of left eyelid (baseline).  Neck: Normal range of motion. Neck supple. No JVD present. No tracheal deviation present. No thyromegaly present.   Cardiovascular: Regular rhythm.  Exam reveals no gallop and no friction rub.    No murmur heard.  Pulmonary/Chest: Effort normal and breath sounds normal. No stridor. No respiratory distress. No wheezes. No rales. No tenderness.   Abdominal: Soft. Bowel sounds are normal. No distension and no mass. There is no tenderness. There is no rebound and no guarding.   Musculoskeletal: Normal range of motion. No edema, tenderness or deformity.   Lymphadenopathy:     No cervical adenopathy.   Neurological: Alert and oriented to person, place, and time. No cranial nerve deficit.   Skin: Skin is warm and dry. No rash noted. No erythema.   Psychiatric: Mood, memory, affect and judgment normal.   Vitals reviewed.      Lab Data Review:         12/3/2018  7:32 AM    Recent Labs      12/04/18 0058 12/05/18 0228   SODIUM  135  141   POTASSIUM  4.6  4.0   CHLORIDE  100  107   CO2  25  24   BUN  34*  36*   CREATININE  0.96  0.83   CALCIUM  8.7  8.9       Recent Labs      12/04/18 0058 12/05/18 0228   GLUCOSE  412*  110*       Recent Labs      12/04/18 0058 12/05/18 0228   RBC  3.21*  3.27*   HEMOGLOBIN  9.5*  9.6*   HEMATOCRIT  30.3*  31.5*   PLATELETCT  269  261       Recent Labs      12/04/18 0058 12/05/18 0228   WBC  12.5*  12.5*           Assessment/Plan     * Diabetic ketoacidosis (HCC)- (present on admission)   Assessment & Plan    -In setting of likely T2DM with concomitant pancreas dysfunction - patient endorsed fasting and not taking meds prior to 11/20/2018 EGD /  colonoscopy, which likely triggered this event.  -HCO3 WNL, AG closed with insulin drip, now on subcutaneous glargine and lispro, home metformin.  -Resolved.     Diabetes mellitus (HCC)- (present on admission)   Assessment & Plan    -Some discrepancy on whether patient is T1 or T2DM per chart review.  -Per patient, was diagnosed with DM 4 years ago, initially on insulin, most recently switched to oral meds.  Likely T2DM given this history.  -Fingerstick glucose checks.  -Hypoglycemia protocol.  -On metformin, glargine, ISS - uptitrate insulin per BGs.  -Check BMP 12/7/2018 to monitor renal function (due to reinitiation of metformin).  -12/2:  Insulin Glargine 18-->20u; premeal Humalog 8u-->9u  -12/3: Blood glucose increasing from 260s to 3-400   -12/4: Blood glucose improving, 0-3 units sliding scale required, range 141-381     Hypotension   Assessment & Plan    -Likely 2/2 hypovolemia in setting of DKA.  -Resolved with IVF.     Hypokalemia- (present on admission)   Assessment & Plan    -Goal K >2.  -Replete as needed.     Hypomagnesemia- (present on admission)   Assessment & Plan    -Goal Mg >2.  -Replete as needed.     Acute kidney injury (HCC)- (present on admission)   Assessment & Plan    -Cr 1.5 on admission (0.75 on 8/27/2018).  -Likely prerenal 2/2 dehydration in setting of DKA.  -Resolved with IVF.     Chronic pancreatitis? (HCC)   Assessment & Plan    -CT abdomen showed extensive calcifications of the pancreas.  -History of protein calorie malnutrition secondary to pancreatic insufficiency.  -Currently on pancrelipase replacement.  -Dietitian following.     History of recent UGI bleed- (present on admission)   Assessment & Plan    -recent Hospital admission 8/2018 for an episode of upper GI bleed  -Patient had EGD done during hospitalization which showed grade D esophagitis, patient had repeat EGD & colonoscopy at 11/20 and EGD notable for localized nodularity of the mucosa at the GE junction about 5-6 mm.   Colonoscopy was also done but there was stool throughout the colon and a repeat was recommended.  Procedures with GI consultants  -We will hold anticoagulation in this setting, SCDs for DVT prophylaxis  -No apparent active bleed, Hb stable     Essential hypertension- (present on admission)   Assessment & Plan    -On home lisinopril.     Hypophosphatemia   Assessment & Plan    -Goal phos >2.5.  -Replete as needed.     Memory loss- (present on admission)   Assessment & Plan    History of TBI with mild cognitive impairment as per chart review  Lives at an assisted living facility called [facility]  Sister is actively involved in care and cell phone number is in the chart     Dyslipidemia- (present on admission)   Assessment & Plan    -On home atorvastatin.     Tobacco abuse- (present on admission)   Assessment & Plan    -Smoking about 1-2 cigarettes/day  -PCP to continue counseling regarding smoking cessation     Vitamin D deficiency- (present on admission)   Assessment & Plan    -Vitamin D level 31.  -On vitamin D supplement.       Leukocytosis   Assessment & Plan    12/4 CBC: WBC 12.5  Asymptomatic, afebrile, no identified source for infection.  Suspect stress demargination.  No intervention indicated at this time.  If febrile, culture       Disposition  Patient has a guardian.  12/3 meeting planned between guardian, next of kin, and parties involved with placement: Family has found a new home which would better assist patient with his diabetes needs.  Family willing to accept placement in his old home pending acceptance to the new home, unfortunately his previous home will not accept him.  Guardian to reassess.  Placement remains a barrier to discharge.

## 2018-12-05 NOTE — DISCHARGE PLANNING
Anticipated Discharge Disposition: Group Home    Action: PC from Julio at [facility], will be sending a nurse to see patient tomorrow. Requested basic information on patient, [facility] last doctors note    Barriers to Discharge: acceptance    Plan: follow up with [facility] to see if they will accept the patient

## 2018-12-06 PROBLEM — N17.9 ACUTE KIDNEY INJURY (HCC): Status: RESOLVED | Noted: 2018-11-24 | Resolved: 2018-12-06

## 2018-12-06 PROBLEM — E11.10 DIABETIC KETOACIDOSIS (HCC): Status: RESOLVED | Noted: 2018-11-21 | Resolved: 2018-12-06

## 2018-12-06 PROBLEM — I95.9 HYPOTENSION: Status: RESOLVED | Noted: 2018-11-21 | Resolved: 2018-12-06

## 2018-12-06 LAB
ANION GAP SERPL CALC-SCNC: 8 MMOL/L (ref 0–11.9)
BUN SERPL-MCNC: 34 MG/DL (ref 8–22)
CALCIUM SERPL-MCNC: 8.7 MG/DL (ref 8.5–10.5)
CHLORIDE SERPL-SCNC: 106 MMOL/L (ref 96–112)
CO2 SERPL-SCNC: 25 MMOL/L (ref 20–33)
CREAT SERPL-MCNC: 0.68 MG/DL (ref 0.5–1.4)
ERYTHROCYTE [DISTWIDTH] IN BLOOD BY AUTOMATED COUNT: 75.2 FL (ref 35.9–50)
GLUCOSE BLD-MCNC: 175 MG/DL (ref 65–99)
GLUCOSE BLD-MCNC: 236 MG/DL (ref 65–99)
GLUCOSE BLD-MCNC: 322 MG/DL (ref 65–99)
GLUCOSE SERPL-MCNC: 207 MG/DL (ref 65–99)
HCT VFR BLD AUTO: 30.3 % (ref 42–52)
HGB BLD-MCNC: 9.3 G/DL (ref 14–18)
MCH RBC QN AUTO: 29.2 PG (ref 27–33)
MCHC RBC AUTO-ENTMCNC: 30.7 G/DL (ref 33.7–35.3)
MCV RBC AUTO: 95.3 FL (ref 81.4–97.8)
PLATELET # BLD AUTO: 259 K/UL (ref 164–446)
PMV BLD AUTO: 9.1 FL (ref 9–12.9)
POTASSIUM SERPL-SCNC: 4.1 MMOL/L (ref 3.6–5.5)
RBC # BLD AUTO: 3.18 M/UL (ref 4.7–6.1)
SODIUM SERPL-SCNC: 139 MMOL/L (ref 135–145)
WBC # BLD AUTO: 12.6 K/UL (ref 4.8–10.8)

## 2018-12-06 PROCEDURE — 85027 COMPLETE CBC AUTOMATED: CPT

## 2018-12-06 PROCEDURE — 82962 GLUCOSE BLOOD TEST: CPT | Mod: 91

## 2018-12-06 PROCEDURE — 700102 HCHG RX REV CODE 250 W/ 637 OVERRIDE(OP): Performed by: STUDENT IN AN ORGANIZED HEALTH CARE EDUCATION/TRAINING PROGRAM

## 2018-12-06 PROCEDURE — A9270 NON-COVERED ITEM OR SERVICE: HCPCS | Performed by: STUDENT IN AN ORGANIZED HEALTH CARE EDUCATION/TRAINING PROGRAM

## 2018-12-06 PROCEDURE — 770006 HCHG ROOM/CARE - MED/SURG/GYN SEMI*

## 2018-12-06 PROCEDURE — 80048 BASIC METABOLIC PNL TOTAL CA: CPT

## 2018-12-06 PROCEDURE — 99231 SBSQ HOSP IP/OBS SF/LOW 25: CPT | Mod: GC | Performed by: INTERNAL MEDICINE

## 2018-12-06 PROCEDURE — 36415 COLL VENOUS BLD VENIPUNCTURE: CPT

## 2018-12-06 RX ADMIN — Medication 325 MG: at 08:46

## 2018-12-06 RX ADMIN — Medication 325 MG: at 12:31

## 2018-12-06 RX ADMIN — PANCRELIPASE 72000 UNITS: 24000; 76000; 120000 CAPSULE, DELAYED RELEASE PELLETS ORAL at 17:37

## 2018-12-06 RX ADMIN — MULTIPLE VITAMINS W/ MINERALS TAB 1 TABLET: TAB at 05:31

## 2018-12-06 RX ADMIN — METFORMIN HYDROCHLORIDE 1000 MG: 500 TABLET ORAL at 17:37

## 2018-12-06 RX ADMIN — LISINOPRIL 10 MG: 10 TABLET ORAL at 05:31

## 2018-12-06 RX ADMIN — VITAMIN D, TAB 1000IU (100/BT) 2000 UNITS: 25 TAB at 05:31

## 2018-12-06 RX ADMIN — BUPROPION HYDROCHLORIDE 150 MG: 150 TABLET, EXTENDED RELEASE ORAL at 05:31

## 2018-12-06 RX ADMIN — BUPROPION HYDROCHLORIDE 150 MG: 150 TABLET, EXTENDED RELEASE ORAL at 17:38

## 2018-12-06 RX ADMIN — TAMSULOSIN HYDROCHLORIDE 0.4 MG: 0.4 CAPSULE ORAL at 08:46

## 2018-12-06 RX ADMIN — ATORVASTATIN CALCIUM 10 MG: 10 TABLET, FILM COATED ORAL at 05:31

## 2018-12-06 RX ADMIN — PANCRELIPASE 72000 UNITS: 24000; 76000; 120000 CAPSULE, DELAYED RELEASE PELLETS ORAL at 12:31

## 2018-12-06 RX ADMIN — METFORMIN HYDROCHLORIDE 1000 MG: 500 TABLET ORAL at 08:46

## 2018-12-06 RX ADMIN — Medication 325 MG: at 17:38

## 2018-12-06 RX ADMIN — INSULIN GLARGINE 20 UNITS: 100 INJECTION, SOLUTION SUBCUTANEOUS at 12:31

## 2018-12-06 RX ADMIN — OMEPRAZOLE 20 MG: 20 CAPSULE, DELAYED RELEASE ORAL at 05:31

## 2018-12-06 RX ADMIN — PANCRELIPASE 72000 UNITS: 24000; 76000; 120000 CAPSULE, DELAYED RELEASE PELLETS ORAL at 08:47

## 2018-12-06 ASSESSMENT — PAIN SCALES - GENERAL
PAINLEVEL_OUTOF10: 0
PAINLEVEL_OUTOF10: 0

## 2018-12-06 NOTE — PROGRESS NOTES
Internal Medicine Interval Note  Note Author: Lewis Hoffmann M.D.     Name Pawel Tatum     1960   Age/Sex 58 y.o. male   MRN [id number]   Code Status Full Code     After 5PM or if no immediate response to page, please call for cross-coverage  Attending/Team: Jesus See Patient List for primary contact information  Call [phone] to page    1st Call - Day Intern (R1):   [place] 2nd Call - Day Sr. Resident (R2/R3):   Robbie         Reason for interval visit  (Principal Problem)   Diabetic ketoacidosis (HCC)    HPI: 58 y.o. male, past medical history insulin-dependent diabetes mellitus (hemoglobin A1c 17), pancreatic insufficiency, recent esophagitis, history of TBI with mild cognitive impairment, residing [facility] assisted [facility] facility, presents with DKA after medication noncompliance.  Treated with ICU DKA protocol, subsequently transferred to floor, stable.  Per social work, there is concerned about his assisted living facility accepting him in light of the fact they feel he needs more supervision than they can provide.  It is noted that he does not have PT OT needs and is stable medically for discharge.    Interval Problem Daily Status Update  (24 hours)   No complaints this morning.  No acute events overnight.  He continues to self administer insulin under the supervision of the nurse.  His previous group home will not accept him, as they cannot monitor his glucose.    Insulin regimen changed 12/3: Glargine increased from 18-20 units, pre-meal Humalog increased from 8-9 units.  Requiring 0-3 units sliding scale.  Blood glucose range 141-381, improving.    Blood glucose control improved since insulin regimen change.  Patient continues to have spikes midday.  Consider increasing a.m. Insulin if the trend continues.     Acceptance by group home remains a barrier to discharge.    Pertinent negatives: Denies nausea, vomiting, diarrhea, constipation, syncope, palpitation, shortness  of breath, cough, fever, change in bowel or bladder habits, change in appetite.  10 system review of systems otherwise negative.    Consultants/Specialty  None  PCP: JAKE Armando    Disposition  Inpatient for placement.      PMHx:  History reviewed. No pertinent past medical history.    PSHx:  Past Surgical History:   Procedure Laterality Date   • GASTROSCOPY-ENDO N/A 8/25/2018    Procedure: GASTROSCOPY-ENDO;  Surgeon: Jaswant Frye M.D.;  Location: ENDOSCOPY [facility]  Service: Gastroenterology       Medications:    Current Facility-Administered Medications:   •  insulin glargine (LANTUS) injection 20 Units, 20 Units, Subcutaneous, DAILY AT NOON, 20 Units at 12/05/18 1233 **AND** insulin lispro (HUMALOG) injection 9 Units, 9 Units, Subcutaneous, TID AC, 9 Units at 12/05/18 1818 **AND** [DISCONTINUED] insulin lispro (HUMALOG) injection 1-6 Units, 1-6 Units, Subcutaneous, TID AC, Stopped at 12/03/18 1700 **AND** Accu-Chek ACHS, , , Q AC AND BEDTIME(S) **AND** NOTIFY MD and PharmD, , , Once **AND** glucose 4 g chewable tablet 16 g, 16 g, Oral, Q15 MIN PRN **AND** dextrose 50% (D50W) injection 25 mL, 25 mL, Intravenous, Q15 MIN PRN, Lewis Hoffmann M.D.  •  metFORMIN (GLUCOPHAGE) tablet 1,000 mg, 1,000 mg, Oral, BID WITH MEALS, Ju Reece M.D., 1,000 mg at 12/05/18 1812  •  lisinopril (PRINIVIL) 10 MG tablet 10 mg, 10 mg, Oral, DAILY, Ju Reece M.D., 10 mg at 12/06/18 0531  •  therapeutic multivitamin-minerals (THERAGRAN-M) tablet 1 Tab, 1 Tab, Oral, DAILY, Brigida Hyde M.D., 1 Tab at 12/06/18 0531  •  senna-docusate (PERICOLACE or SENOKOT S) 8.6-50 MG per tablet 2 Tab, 2 Tab, Oral, BID, 2 Tab at 12/02/18 0456 **AND** polyethylene glycol/lytes (MIRALAX) PACKET 1 Packet, 1 Packet, Oral, QDAY PRN **AND** magnesium hydroxide (MILK OF MAGNESIA) suspension 30 mL, 30 mL, Oral, QDAY PRN **AND** bisacodyl (DULCOLAX) suppository 10 mg, 10 mg, Rectal, QDAY PRN, Wilma Oviedo M.D.  •  acetaminophen  (TYLENOL) tablet 650 mg, 650 mg, Oral, Q6HRS PRN, Wilma Oviedo M.D.  •  atorvastatin (LIPITOR) tablet 10 mg, 10 mg, Oral, DAILY, Wilma Oviedo M.D., 10 mg at 12/06/18 0531  •  vitamin D (cholecalciferol) tablet 2,000 Units, 2,000 Units, Oral, DAILY, Wilma Oviedo M.D., 2,000 Units at 12/06/18 0531  •  omeprazole (PRILOSEC) capsule 20 mg, 20 mg, Oral, QAM, Wilma Oviedo M.D., 20 mg at 12/06/18 0531  •  tamsulosin (FLOMAX) capsule 0.4 mg, 0.4 mg, Oral, AFTER BREAKFAST, Wilma Oviedo M.D., 0.4 mg at 12/05/18 0847  •  ferrous sulfate tablet 325 mg, 325 mg, Oral, TID WITH MEALS, Wilma Oviedo M.D., 325 mg at 12/05/18 1813  •  buPROPion SR (WELLBUTRIN-SR) tablet 150 mg, 150 mg, Oral, BID, Wilma Oviedo M.D., 150 mg at 12/06/18 0531  •  pancrelipase (Lip-Prot-Amyl) (CREON 64069) 75954-06134 units capsule 72,000 Units, 3 Cap, Oral, TID WITH MEALS, Wilma Oviedo M.D., 72,000 Units at 12/05/18 1813  •  pancrelipase (Lip-Prot-Amyl) (CREON 98678) 22600-43812 units capsule 24,000 Units, 1 Cap, Oral, With Snacks PRN **AND** pancrelipase (Lip-Prot-Amyl) (CREON 6000) 6000 units capsule 12,000 Units, 2 Cap, Oral, With Snacks PRN, Wilma Oviedo M.D.    Allergies:  No Known Allergies    Quality Measures  Reviewed items:  EKG reviewed, Labs reviewed, Medications reviewed and Radiology images reviewed  Dill catheter:  No Dill  Central line: No central line  DVT prophylaxis: Mechanical, pharmacological contraindicated due to bleeding risk (history of GI bleed August 2018)            Physical Exam       Vitals:    12/05/18 0800 12/05/18 1600 12/05/18 1920 12/06/18 0350   BP: 130/78 121/67 123/66 115/63   Pulse: 87 91 88 66   Resp: 18 16 18 18   Temp: 36.5 °C (97.7 °F) 37.1 °C (98.7 °F) 36.9 °C (98.5 °F) 36.4 °C (97.6 °F)   TempSrc: Temporal Temporal Temporal Temporal   SpO2: 97% 94% 92% 96%   Weight:       Height:         Body mass index is 15.28 kg/m².    Oxygen Therapy:  Pulse Oximetry: 96 %, O2 (LPM): 0, O2  Delivery: None (Room Air)    PHYSICAL EXAM: Unchanged since yesterday  Constitutional: CACHECTIC male in no distress.   HENT:   Head: Normocephalic and atraumatic.   Eyes: Pupils are equal, round, and reactive to light. EOM are normal.  PTOSIS of left eyelid (baseline).  Neck: Normal range of motion. Neck supple. No JVD present. No tracheal deviation present. No thyromegaly present.   Cardiovascular: Regular rhythm.  Exam reveals no gallop and no friction rub.    No murmur heard.  Pulmonary/Chest: Effort normal and breath sounds normal. No stridor. No respiratory distress. No wheezes. No rales. No tenderness.   Abdominal: Soft. Bowel sounds are normal. No distension and no mass. There is no tenderness. There is no rebound and no guarding.   Musculoskeletal: Normal range of motion. No edema, tenderness or deformity.   Lymphadenopathy:     No cervical adenopathy.   Neurological: Alert and oriented to person, place, and time. No cranial nerve deficit.   Skin: Skin is warm and dry. No rash noted. No erythema.   Psychiatric: Mood, memory, affect and judgment normal.   Vitals reviewed.      Lab Data Review:         12/3/2018  7:32 AM    Recent Labs      12/04/18 0058 12/05/18 0228 12/06/18   0159   SODIUM  135  141  139   POTASSIUM  4.6  4.0  4.1   CHLORIDE  100  107  106   CO2  25  24  25   BUN  34*  36*  34*   CREATININE  0.96  0.83  0.68   CALCIUM  8.7  8.9  8.7       Recent Labs      12/04/18 0058 12/05/18 0228  12/06/18 0159   GLUCOSE  412*  110*  207*       Recent Labs      12/04/18 0058 12/05/18 0228 12/06/18   0159   RBC  3.21*  3.27*  3.18*   HEMOGLOBIN  9.5*  9.6*  9.3*   HEMATOCRIT  30.3*  31.5*  30.3*   PLATELETCT  269  261  259       Recent Labs      12/04/18 0058 12/05/18 0228 12/06/18   0159   WBC  12.5*  12.5*  12.6*           Assessment/Plan     Diabetes mellitus (HCC)- (present on admission)   Assessment & Plan    -Some discrepancy on whether patient is T1 or T2DM per chart  review.  -Per patient, was diagnosed with DM 4 years ago, initially on insulin, most recently switched to oral meds.  Likely T2DM given this history.  -Fingerstick glucose checks.  -Hypoglycemia protocol.  -On metformin, glargine, ISS - uptitrate insulin per BGs.  -Check BMP 12/7/2018 to monitor renal function (due to reinitiation of metformin).  -12/2:  Insulin Glargine 18-->20u; premeal Humalog 8u-->9u  -12/3: Blood glucose increasing from 260s to 3-400   -12/4: Blood glucose improving, 0-3 units sliding scale required, range 141-381  12/6:glucose control improving, midday spikes over 300 continue.  A.M. Insulin may be increased if trend continues     Hypokalemia- (present on admission)   Assessment & Plan    -Goal K >2.  -Replete as needed.     Hypomagnesemia- (present on admission)   Assessment & Plan    -Goal Mg >2.  -Replete as needed.     Chronic pancreatitis (HCC)   Assessment & Plan    -CT abdomen showed extensive calcifications of the pancreas.  -History of protein calorie malnutrition secondary to pancreatic insufficiency.  -Currently on pancrelipase replacement.  -Dietitian following.     History of recent UGI bleed- (present on admission)   Assessment & Plan    -recent Hospital admission 8/2018 for an episode of upper GI bleed  -Patient had EGD done during hospitalization which showed grade D esophagitis, patient had repeat EGD & colonoscopy at 11/20 and EGD notable for localized nodularity of the mucosa at the GE junction about 5-6 mm.  Colonoscopy was also done but there was stool throughout the colon and a repeat was recommended.  Procedures with GI consultants  -We will hold anticoagulation in this setting, SCDs for DVT prophylaxis  -No apparent active bleed, Hb stable     Essential hypertension- (present on admission)   Assessment & Plan    -On home lisinopril.     Hypophosphatemia   Assessment & Plan    -Goal phos >2.5.  -Replete as needed.     Memory loss- (present on admission)   Assessment &  Plan    History of TBI with mild cognitive impairment as per chart review  Lives at an assisted living facility called [facility]  Sister is actively involved in care and cell phone number is in the chart     Dyslipidemia- (present on admission)   Assessment & Plan    -On home atorvastatin.     Tobacco abuse- (present on admission)   Assessment & Plan    -Smoking about 1-2 cigarettes/day  -PCP to continue counseling regarding smoking cessation     Vitamin D deficiency- (present on admission)   Assessment & Plan    -Vitamin D level 31.  -On vitamin D supplement.       Leukocytosis   Assessment & Plan    12/4 CBC: WBC 12.5  Asymptomatic, afebrile, no identified source for infection.  Suspect stress demargination.  No intervention indicated at this time.  If febrile, culture       Disposition  Patient has a guardian.  12/3 meeting planned between guardian, next of kin, and parties involved with placement: Family has found a new home which would better assist patient with his diabetes needs.  Family willing to accept placement in his old home pending acceptance to the new home, unfortunately his previous home will not accept him.  Placement remains a barrier to discharge.

## 2018-12-06 NOTE — CARE PLAN
Problem: Safety  Goal: Will remain free from falls  Outcome: PROGRESSING AS EXPECTED  Reinforced the use of call light when needing assistance. Treaded socks on. Bed on lowest position. Personal belongings within reach. Clutter free environment provided.     Problem: Knowledge Deficit  Goal: Knowledge of the prescribed therapeutic regimen will improve  Outcome: PROGRESSING AS EXPECTED      Problem: Fluid Volume:  Goal: Will maintain balanced intake and output  Outcome: PROGRESSING AS EXPECTED  Fluids provided. Monitored for intake and output

## 2018-12-06 NOTE — PROGRESS NOTES
Pt alert and oriented x4. Offered dinner tray. Consumed 75 to 100% of the meal. Safety precautions in placed. Bed in lowest position. Upper bed rails up. Treaded socks on. Clutter free environment provided. Reinforced the use of call light when needing assistance.

## 2018-12-07 LAB
ANION GAP SERPL CALC-SCNC: 7 MMOL/L (ref 0–11.9)
BUN SERPL-MCNC: 32 MG/DL (ref 8–22)
CALCIUM SERPL-MCNC: 9.2 MG/DL (ref 8.5–10.5)
CHLORIDE SERPL-SCNC: 107 MMOL/L (ref 96–112)
CO2 SERPL-SCNC: 25 MMOL/L (ref 20–33)
CREAT SERPL-MCNC: 0.64 MG/DL (ref 0.5–1.4)
ERYTHROCYTE [DISTWIDTH] IN BLOOD BY AUTOMATED COUNT: 74.7 FL (ref 35.9–50)
GLUCOSE BLD-MCNC: 150 MG/DL (ref 65–99)
GLUCOSE BLD-MCNC: 235 MG/DL (ref 65–99)
GLUCOSE BLD-MCNC: 252 MG/DL (ref 65–99)
GLUCOSE BLD-MCNC: 261 MG/DL (ref 65–99)
GLUCOSE SERPL-MCNC: 165 MG/DL (ref 65–99)
HCT VFR BLD AUTO: 32.6 % (ref 42–52)
HGB BLD-MCNC: 10.4 G/DL (ref 14–18)
MCH RBC QN AUTO: 30.1 PG (ref 27–33)
MCHC RBC AUTO-ENTMCNC: 31.9 G/DL (ref 33.7–35.3)
MCV RBC AUTO: 94.5 FL (ref 81.4–97.8)
PLATELET # BLD AUTO: 258 K/UL (ref 164–446)
PMV BLD AUTO: 9.5 FL (ref 9–12.9)
POTASSIUM SERPL-SCNC: 3.9 MMOL/L (ref 3.6–5.5)
RBC # BLD AUTO: 3.45 M/UL (ref 4.7–6.1)
SODIUM SERPL-SCNC: 139 MMOL/L (ref 135–145)
WBC # BLD AUTO: 10.9 K/UL (ref 4.8–10.8)

## 2018-12-07 PROCEDURE — 700102 HCHG RX REV CODE 250 W/ 637 OVERRIDE(OP): Performed by: STUDENT IN AN ORGANIZED HEALTH CARE EDUCATION/TRAINING PROGRAM

## 2018-12-07 PROCEDURE — A9270 NON-COVERED ITEM OR SERVICE: HCPCS | Performed by: STUDENT IN AN ORGANIZED HEALTH CARE EDUCATION/TRAINING PROGRAM

## 2018-12-07 PROCEDURE — 82962 GLUCOSE BLOOD TEST: CPT

## 2018-12-07 PROCEDURE — 36415 COLL VENOUS BLD VENIPUNCTURE: CPT

## 2018-12-07 PROCEDURE — 85027 COMPLETE CBC AUTOMATED: CPT

## 2018-12-07 PROCEDURE — 770006 HCHG ROOM/CARE - MED/SURG/GYN SEMI*

## 2018-12-07 PROCEDURE — 80048 BASIC METABOLIC PNL TOTAL CA: CPT

## 2018-12-07 PROCEDURE — 99231 SBSQ HOSP IP/OBS SF/LOW 25: CPT | Mod: GC | Performed by: INTERNAL MEDICINE

## 2018-12-07 RX ADMIN — PANCRELIPASE 72000 UNITS: 24000; 76000; 120000 CAPSULE, DELAYED RELEASE PELLETS ORAL at 17:55

## 2018-12-07 RX ADMIN — Medication 325 MG: at 12:49

## 2018-12-07 RX ADMIN — BUPROPION HYDROCHLORIDE 150 MG: 150 TABLET, EXTENDED RELEASE ORAL at 17:54

## 2018-12-07 RX ADMIN — Medication 325 MG: at 17:55

## 2018-12-07 RX ADMIN — TAMSULOSIN HYDROCHLORIDE 0.4 MG: 0.4 CAPSULE ORAL at 08:39

## 2018-12-07 RX ADMIN — INSULIN GLARGINE 20 UNITS: 100 INJECTION, SOLUTION SUBCUTANEOUS at 12:53

## 2018-12-07 RX ADMIN — VITAMIN D, TAB 1000IU (100/BT) 2000 UNITS: 25 TAB at 06:00

## 2018-12-07 RX ADMIN — PANCRELIPASE 72000 UNITS: 24000; 76000; 120000 CAPSULE, DELAYED RELEASE PELLETS ORAL at 12:49

## 2018-12-07 RX ADMIN — METFORMIN HYDROCHLORIDE 1000 MG: 500 TABLET ORAL at 17:54

## 2018-12-07 RX ADMIN — Medication 325 MG: at 08:38

## 2018-12-07 RX ADMIN — MULTIPLE VITAMINS W/ MINERALS TAB 1 TABLET: TAB at 06:00

## 2018-12-07 RX ADMIN — OMEPRAZOLE 20 MG: 20 CAPSULE, DELAYED RELEASE ORAL at 06:00

## 2018-12-07 RX ADMIN — PANCRELIPASE 72000 UNITS: 24000; 76000; 120000 CAPSULE, DELAYED RELEASE PELLETS ORAL at 08:39

## 2018-12-07 RX ADMIN — ATORVASTATIN CALCIUM 10 MG: 10 TABLET, FILM COATED ORAL at 06:00

## 2018-12-07 RX ADMIN — METFORMIN HYDROCHLORIDE 1000 MG: 500 TABLET ORAL at 08:39

## 2018-12-07 RX ADMIN — BUPROPION HYDROCHLORIDE 150 MG: 150 TABLET, EXTENDED RELEASE ORAL at 06:00

## 2018-12-07 RX ADMIN — LISINOPRIL 10 MG: 10 TABLET ORAL at 06:00

## 2018-12-07 ASSESSMENT — PAIN SCALES - GENERAL
PAINLEVEL_OUTOF10: 0
PAINLEVEL_OUTOF10: 0

## 2018-12-07 NOTE — CARE PLAN
Problem: Communication  Goal: The ability to communicate needs accurately and effectively will improve    Intervention: Educate patient and significant other/support system about the plan of care, procedures, treatments, medications and allow for questions  Educated pt on plan of care, scheduled medications. Pt acknowledged understanding. Able to make needs known, calls appropriately.       Problem: Discharge Barriers/Planning  Goal: Patient's continuum of care needs will be met    Intervention: Involve patient and significant other/support system in setting and prioritizing goals for hospital stay and discharge  Pt pending discharge, awaiting placement/possible group home. Previous home will not accept pt back as pt has insulin needs. SW involved with d/c planning.

## 2018-12-07 NOTE — PROGRESS NOTES
Assumed care of pt, discussed plan of care. A&Ox4. RA, tolerates well. RLL, LLL diminished. Denies pain. Last BM, 12/6. Continent of bowel, bladder. Up-self. On diabetic diet. No PIV. Fall precautions in place; bed lowest position, treaded socks on, call light within reach. All needs met at this time.

## 2018-12-07 NOTE — DISCHARGE PLANNING
Anticipated Discharge Disposition: Skilled/group home    Action: PC to Cintia at [facility], requested a review of chart to see if patient would qualify for skilled.  Cintia stated when she last reviewed, patient did not qualify.   Cintia stated she will have someone else review to see if they see something different.  Cintia asked what patient's d/c plan would be after skilled.   PC to Ana at [facility], [phone]: went over patients d/c plan and what direction they are looking at.  Ana stated if the patient was a consistent level of insulin then a group home.  Patient is currently on a sliding scale, group homes wont' take him. Ana feels patient would do best at a skilled.  SEDRICK went over conversation with [facility] with her.  Ana requested # of [facility] representative, will contact her and see if they can do anything to help patient get admitted.  SEDRICK suggested private pay to a skilled.     Barriers to Discharge: group home-sliding scale of insulin, skilled acceptance    Plan: Follow up with [facility]

## 2018-12-07 NOTE — PROGRESS NOTES
Pt A&O x4, on RA. Assessment performed. Pt declines any pain at the moment. Gave meds per MAR. Pt demonstrated how to correctly perform blood sugar checks and correctly administered insulin. Pt sitting up in bed, ready to eat. Bed locked and lowest position, treaded socks in place, and call light within reach.

## 2018-12-07 NOTE — CARE PLAN
Problem: Discharge Barriers/Planning  Goal: Patient's continuum of care needs will be met  Outcome: PROGRESSING AS EXPECTED  Awaiting placement in group home.    Problem: Knowledge Deficit:  Goal: Knowledge of the prescribed therapeutic regimen will improve  Outcome: PROGRESSING AS EXPECTED  Pt demonstrated how to properly take blood sugar with machine and inject insulin.

## 2018-12-08 LAB
ANION GAP SERPL CALC-SCNC: 6 MMOL/L (ref 0–11.9)
BUN SERPL-MCNC: 36 MG/DL (ref 8–22)
CALCIUM SERPL-MCNC: 9.5 MG/DL (ref 8.5–10.5)
CHLORIDE SERPL-SCNC: 105 MMOL/L (ref 96–112)
CO2 SERPL-SCNC: 24 MMOL/L (ref 20–33)
CREAT SERPL-MCNC: 0.86 MG/DL (ref 0.5–1.4)
ERYTHROCYTE [DISTWIDTH] IN BLOOD BY AUTOMATED COUNT: 77.3 FL (ref 35.9–50)
GLUCOSE BLD-MCNC: 208 MG/DL (ref 65–99)
GLUCOSE BLD-MCNC: 209 MG/DL (ref 65–99)
GLUCOSE BLD-MCNC: 324 MG/DL (ref 65–99)
GLUCOSE SERPL-MCNC: 206 MG/DL (ref 65–99)
HCT VFR BLD AUTO: 32.8 % (ref 42–52)
HGB BLD-MCNC: 10.3 G/DL (ref 14–18)
MCH RBC QN AUTO: 30.1 PG (ref 27–33)
MCHC RBC AUTO-ENTMCNC: 31.4 G/DL (ref 33.7–35.3)
MCV RBC AUTO: 95.9 FL (ref 81.4–97.8)
PLATELET # BLD AUTO: 250 K/UL (ref 164–446)
PMV BLD AUTO: 9 FL (ref 9–12.9)
POTASSIUM SERPL-SCNC: 4.2 MMOL/L (ref 3.6–5.5)
RBC # BLD AUTO: 3.42 M/UL (ref 4.7–6.1)
SODIUM SERPL-SCNC: 135 MMOL/L (ref 135–145)
WBC # BLD AUTO: 10.9 K/UL (ref 4.8–10.8)

## 2018-12-08 PROCEDURE — A9270 NON-COVERED ITEM OR SERVICE: HCPCS | Performed by: STUDENT IN AN ORGANIZED HEALTH CARE EDUCATION/TRAINING PROGRAM

## 2018-12-08 PROCEDURE — 700102 HCHG RX REV CODE 250 W/ 637 OVERRIDE(OP): Performed by: STUDENT IN AN ORGANIZED HEALTH CARE EDUCATION/TRAINING PROGRAM

## 2018-12-08 PROCEDURE — 85027 COMPLETE CBC AUTOMATED: CPT

## 2018-12-08 PROCEDURE — 82962 GLUCOSE BLOOD TEST: CPT | Mod: 91

## 2018-12-08 PROCEDURE — 80048 BASIC METABOLIC PNL TOTAL CA: CPT

## 2018-12-08 PROCEDURE — 770006 HCHG ROOM/CARE - MED/SURG/GYN SEMI*

## 2018-12-08 PROCEDURE — 99231 SBSQ HOSP IP/OBS SF/LOW 25: CPT | Mod: GC | Performed by: INTERNAL MEDICINE

## 2018-12-08 PROCEDURE — 36415 COLL VENOUS BLD VENIPUNCTURE: CPT

## 2018-12-08 RX ADMIN — METFORMIN HYDROCHLORIDE 1000 MG: 500 TABLET ORAL at 17:41

## 2018-12-08 RX ADMIN — PANCRELIPASE 72000 UNITS: 24000; 76000; 120000 CAPSULE, DELAYED RELEASE PELLETS ORAL at 17:41

## 2018-12-08 RX ADMIN — METFORMIN HYDROCHLORIDE 1000 MG: 500 TABLET ORAL at 08:46

## 2018-12-08 RX ADMIN — Medication 325 MG: at 17:41

## 2018-12-08 RX ADMIN — Medication 325 MG: at 12:24

## 2018-12-08 RX ADMIN — INSULIN GLARGINE 20 UNITS: 100 INJECTION, SOLUTION SUBCUTANEOUS at 12:24

## 2018-12-08 RX ADMIN — PANCRELIPASE 72000 UNITS: 24000; 76000; 120000 CAPSULE, DELAYED RELEASE PELLETS ORAL at 08:46

## 2018-12-08 RX ADMIN — MULTIPLE VITAMINS W/ MINERALS TAB 1 TABLET: TAB at 05:32

## 2018-12-08 RX ADMIN — Medication 325 MG: at 08:48

## 2018-12-08 RX ADMIN — BUPROPION HYDROCHLORIDE 150 MG: 150 TABLET, EXTENDED RELEASE ORAL at 05:32

## 2018-12-08 RX ADMIN — BUPROPION HYDROCHLORIDE 150 MG: 150 TABLET, EXTENDED RELEASE ORAL at 17:41

## 2018-12-08 RX ADMIN — ATORVASTATIN CALCIUM 10 MG: 10 TABLET, FILM COATED ORAL at 05:32

## 2018-12-08 RX ADMIN — OMEPRAZOLE 20 MG: 20 CAPSULE, DELAYED RELEASE ORAL at 05:32

## 2018-12-08 RX ADMIN — TAMSULOSIN HYDROCHLORIDE 0.4 MG: 0.4 CAPSULE ORAL at 08:46

## 2018-12-08 RX ADMIN — VITAMIN D, TAB 1000IU (100/BT) 2000 UNITS: 25 TAB at 05:32

## 2018-12-08 RX ADMIN — LISINOPRIL 10 MG: 10 TABLET ORAL at 05:32

## 2018-12-08 RX ADMIN — PANCRELIPASE 72000 UNITS: 24000; 76000; 120000 CAPSULE, DELAYED RELEASE PELLETS ORAL at 12:24

## 2018-12-08 ASSESSMENT — PAIN SCALES - GENERAL
PAINLEVEL_OUTOF10: 0
PAINLEVEL_OUTOF10: 0

## 2018-12-08 NOTE — PROGRESS NOTES
Assumed care of pt, discussed plan of care. A&Ox4. RA. Denies pain. Last BM, 12/7. Continent of bowel, bladder. Up-self. On diabetic diet. No PIV. Fall precautions in place; bed lowest position, treaded socks on, call light within reach. All needs met at this time.    Blood sugar check per pt request. . Self-performed fingerstick. Pt expressed feeling really good about BS. Educated about needing to continue blood sugar management, pt acknowledged understanding.

## 2018-12-08 NOTE — CARE PLAN
Problem: Safety  Goal: Will remain free from falls  Outcome: PROGRESSING AS EXPECTED  Pt walks independently with a steady gait.    Problem: Discharge Barriers/Planning  Goal: Patient's continuum of care needs will be met  Outcome: PROGRESSING AS EXPECTED  Awaiting facility acceptance.

## 2018-12-08 NOTE — PROGRESS NOTES
Pt A&O x4, on RA. Pt denies any pain at the moment. Assessment performed. Bed alarm in place. Pt self-performed fingerstick and continue to educate pt with blood sugar management. Pt sitting up for breakfast. Bed locked and lowest position, treaded socks in place and call light within reach.

## 2018-12-08 NOTE — DISCHARGE PLANNING
Anticipated Discharge Disposition: group home    Action: PC from Ana, talked to patients sister.  It appears his insurance lapsed on 10/31, didn't pay his bill.  Sister plans on calling [facility] to see if the insurance can restarted.  Perez stated that [facility] has assessed the patient and are willing to take him. Patient needs prompting, and [facility] will prompt him.  They are requesting patient go to a skilled for two prior for diabetic management care. Sister is aware she may have to private pay, but is wanting to see if she can get his insurance back on again.     Barriers to Discharge: placement     Plan: follow up with [facility] on Monday

## 2018-12-08 NOTE — CARE PLAN
Problem: Knowledge Deficit  Goal: Knowledge of disease process/condition, treatment plan, diagnostic tests, and medications will improve    Intervention: Explain information regarding disease process/condition, treatment plan, diagnostic tests, and medications and document in education  Educated pt on plan of care, scheduled medications. Blood sugar check and educated on current insulin regimen. Pt self-performed fingerstick without staff assistance.       Problem: Pain Management  Goal: Pain level will decrease to patient's comfort goal    Intervention: Follow pain managment plan developed in collaboration with patient and Interdisciplinary Team  Pt denies pain. No s/sx of pain. Reports being comfortable. PRN tylenol available.

## 2018-12-08 NOTE — PROGRESS NOTES
Internal Medicine Interval Note  Note Author: Alba Martines M.D.     Name Pawel Tatum     1960   Age/Sex 58 y.o. male   MRN [id number]   Code Status Full Code     After 5PM or if no immediate response to page, please call for cross-coverage  Attending/Team: Jesus See Patient List for primary contact information  Call [phone] to page    1st Call - Day Intern (R1):   [place] 2nd Call - Day Sr. Resident (R2/R3):   Robbie         Reason for interval visit  (Principal Problem)   Diabetic ketoacidosis (HCC)  Placement    HPI: 58 y.o. male, past medical history insulin-dependent diabetes mellitus (hemoglobin A1c 17), pancreatic insufficiency, recent esophagitis, history of TBI with mild cognitive impairment, residing [facility] assisted [facility] facility, presents with DKA after medication noncompliance.  Treated with ICU DKA protocol, subsequently transferred to floor, stable.  Per social work, there is concerned about his assisted living facility accepting him in light of the fact they feel he needs more supervision than they can provide.  It is noted that he does not have PT OT needs and is stable medically for discharge.    Interval Problem Daily Status Update  (24 hours)   - No acute events overnight  - blood sugar relatively controlled with the current insuline regiment, medically clear for placement.  No complaints this morning.  No acute events overnight.  He continues to self administer insulin under the supervision of the nurse.  His previous group home will not accept him, as they cannot monitor his glucose.    Insulin regimen changed 12/3: Glargine increased from 18-20 units, pre-meal Humalog increased from 8-9 units.  Requiring 0-3 units sliding scale.  Blood glucose range 141-381, improving.    Blood glucose control improved since insulin regimen change.  Patient continues to have spikes midday.  Consider increasing a.m. Insulin if the trend continues.      Acceptance by group home remains a barrier to discharge.    Pertinent negatives: Denies nausea, vomiting, diarrhea, constipation, syncope, palpitation, shortness of breath, cough, fever, change in bowel or bladder habits, change in appetite.  10 system review of systems otherwise negative.    Consultants/Specialty  None  PCP: JAKE Armando    Disposition  Inpatient for placement.      PMHx:  History reviewed. No pertinent past medical history.    PSHx:  Past Surgical History:   Procedure Laterality Date   • GASTROSCOPY-ENDO N/A 8/25/2018    Procedure: GASTROSCOPY-ENDO;  Surgeon: Jaswant Frye M.D.;  Location: ENDOSCOPY [facility];  Service: Gastroenterology       Medications:    Current Facility-Administered Medications:   •  insulin glargine (LANTUS) injection 20 Units, 20 Units, Subcutaneous, DAILY AT NOON, 20 Units at 12/07/18 1253 **AND** insulin lispro (HUMALOG) injection 9 Units, 9 Units, Subcutaneous, TID AC, 9 Units at 12/07/18 1757 **AND** [DISCONTINUED] insulin lispro (HUMALOG) injection 1-6 Units, 1-6 Units, Subcutaneous, TID AC, Stopped at 12/03/18 1700 **AND** Accu-Chek ACHS, , , Q AC AND BEDTIME(S) **AND** NOTIFY MD and PharmD, , , Once **AND** glucose 4 g chewable tablet 16 g, 16 g, Oral, Q15 MIN PRN **AND** dextrose 50% (D50W) injection 25 mL, 25 mL, Intravenous, Q15 MIN PRN, Lewis Hoffmann M.D.  •  metFORMIN (GLUCOPHAGE) tablet 1,000 mg, 1,000 mg, Oral, BID WITH MEALS, Ju Reece M.D., 1,000 mg at 12/07/18 1754  •  lisinopril (PRINIVIL) 10 MG tablet 10 mg, 10 mg, Oral, DAILY, Ju Reece M.D., 10 mg at 12/07/18 0600  •  therapeutic multivitamin-minerals (THERAGRAN-M) tablet 1 Tab, 1 Tab, Oral, DAILY, Brigida Hyde M.D., 1 Tab at 12/07/18 0600  •  senna-docusate (PERICOLACE or SENOKOT S) 8.6-50 MG per tablet 2 Tab, 2 Tab, Oral, BID, 2 Tab at 12/02/18 0456 **AND** polyethylene glycol/lytes (MIRALAX) PACKET 1 Packet, 1 Packet, Oral, QDAY PRN **AND** magnesium hydroxide (MILK OF  MAGNESIA) suspension 30 mL, 30 mL, Oral, QDAY PRN **AND** bisacodyl (DULCOLAX) suppository 10 mg, 10 mg, Rectal, QDAY PRN, Wilma Oviedo M.D.  •  acetaminophen (TYLENOL) tablet 650 mg, 650 mg, Oral, Q6HRS PRN, Wilma Oviedo M.D.  •  atorvastatin (LIPITOR) tablet 10 mg, 10 mg, Oral, DAILY, Wilma Oviedo M.D., 10 mg at 12/07/18 0600  •  vitamin D (cholecalciferol) tablet 2,000 Units, 2,000 Units, Oral, DAILY, Wilma Oviedo M.D., 2,000 Units at 12/07/18 0600  •  omeprazole (PRILOSEC) capsule 20 mg, 20 mg, Oral, QAM, Wilma Oviedo M.D., 20 mg at 12/07/18 0600  •  tamsulosin (FLOMAX) capsule 0.4 mg, 0.4 mg, Oral, AFTER BREAKFAST, Wilma Oviedo M.D., 0.4 mg at 12/07/18 0839  •  ferrous sulfate tablet 325 mg, 325 mg, Oral, TID WITH MEALS, Wilma Oviedo M.D., 325 mg at 12/07/18 1755  •  buPROPion SR (WELLBUTRIN-SR) tablet 150 mg, 150 mg, Oral, BID, Wilma Oviedo M.D., 150 mg at 12/07/18 1754  •  pancrelipase (Lip-Prot-Amyl) (CREON 36175) 46034-70695 units capsule 72,000 Units, 3 Cap, Oral, TID WITH MEALS, Wilma Oviedo M.D., 72,000 Units at 12/07/18 1755  •  pancrelipase (Lip-Prot-Amyl) (CREON 61347) 32055-86140 units capsule 24,000 Units, 1 Cap, Oral, With Snacks PRN **AND** pancrelipase (Lip-Prot-Amyl) (CREON 6000) 6000 units capsule 12,000 Units, 2 Cap, Oral, With Snacks PRN, Wilma Oviedo M.D.    Allergies:  No Known Allergies    Quality Measures  Reviewed items:  EKG reviewed, Labs reviewed, Medications reviewed and Radiology images reviewed  Dill catheter:  No Dill  Central line: No central line  DVT prophylaxis: Mechanical, pharmacological contraindicated due to bleeding risk (history of GI bleed August 2018)            Physical Exam       Vitals:    12/06/18 1910 12/07/18 0337 12/07/18 0725 12/07/18 1544   BP: 129/68 129/74 133/76 104/58   Pulse: 92 70 70 68   Resp: 16 16 18 18   Temp: 37.5 °C (99.5 °F) 36.3 °C (97.3 °F) 37.3 °C (99.1 °F) 37.6 °C (99.7 °F)   TempSrc: Temporal Temporal Temporal  Temporal   SpO2: 94% 93% 93% 96%   Weight:       Height:         Body mass index is 15.28 kg/m².    Oxygen Therapy:  Pulse Oximetry: 96 %, O2 (LPM): 0, O2 Delivery: None (Room Air)    PHYSICAL EXAM: Unchanged since yesterday  Constitutional: CACHECTIC male in no distress.   HENT:   Head: Normocephalic and atraumatic.   Eyes: Pupils are equal, round, and reactive to light. EOM are normal.  PTOSIS of left eyelid (baseline).  Neck: Normal range of motion. Neck supple. No JVD present. No tracheal deviation present. No thyromegaly present.   Cardiovascular: Regular rhythm.  Exam reveals no gallop and no friction rub.    No murmur heard.  Pulmonary/Chest: Effort normal and breath sounds normal. No stridor. No respiratory distress. No wheezes. No rales. No tenderness.   Abdominal: Soft. Bowel sounds are normal. No distension and no mass. There is no tenderness. There is no rebound and no guarding.   Musculoskeletal: Normal range of motion. No edema, tenderness or deformity.   Lymphadenopathy:     No cervical adenopathy.   Neurological: Alert and oriented to person, place, and time. No cranial nerve deficit.   Skin: Skin is warm and dry. No rash noted. No erythema.   Psychiatric: Mood, memory, affect and judgment normal.   Vitals reviewed.      Lab Data Review:         12/3/2018  7:32 AM    Recent Labs      12/05/18 0228 12/06/18 0159 12/07/18   0401   SODIUM  141  139  139   POTASSIUM  4.0  4.1  3.9   CHLORIDE  107  106  107   CO2  24  25  25   BUN  36*  34*  32*   CREATININE  0.83  0.68  0.64   CALCIUM  8.9  8.7  9.2       Recent Labs      12/05/18 0228 12/06/18 0159  12/07/18   0401   GLUCOSE  110*  207*  165*       Recent Labs      12/05/18 0228 12/06/18 0159 12/07/18   0401   RBC  3.27*  3.18*  3.45*   HEMOGLOBIN  9.6*  9.3*  10.4*   HEMATOCRIT  31.5*  30.3*  32.6*   PLATELETCT  261  259  258       Recent Labs      12/05/18 0228 12/06/18 0159 12/07/18   0401   WBC  12.5*  12.6*  10.9*            Assessment/Plan     Diabetes mellitus (HCC)- (present on admission)   Assessment & Plan    -Some discrepancy on whether patient is T1 or T2DM per chart review.  -Per patient, was diagnosed with DM 4 years ago, initially on insulin, most recently switched to oral meds.  Likely T2DM given this history.  -Fingerstick glucose checks.  -Hypoglycemia protocol.  -On metformin, glargine, ISS - uptitrate insulin per BGs.  -Check BMP 12/7/2018 to monitor renal function (due to reinitiation of metformin).  -12/2:  Insulin Glargine 18-->20u; premeal Humalog 8u-->9u  -12/3: Blood glucose increasing from 260s to 3-400   -12/4: Blood glucose improving, 0-3 units sliding scale required, range 141-381  12/6:glucose control improving, midday spikes over 300 continue.  A.M. Insulin may be increased if trend continues     Hypokalemia- (present on admission)   Assessment & Plan    -Goal K >2.  -Replete as needed.     Hypomagnesemia- (present on admission)   Assessment & Plan    -Goal Mg >2.  -Replete as needed.     Chronic pancreatitis (HCC)   Assessment & Plan    -CT abdomen showed extensive calcifications of the pancreas.  -History of protein calorie malnutrition secondary to pancreatic insufficiency.  -Currently on pancrelipase replacement.  -Dietitian following.     History of recent UGI bleed- (present on admission)   Assessment & Plan    -recent Hospital admission 8/2018 for an episode of upper GI bleed  -Patient had EGD done during hospitalization which showed grade D esophagitis, patient had repeat EGD & colonoscopy at 11/20 and EGD notable for localized nodularity of the mucosa at the GE junction about 5-6 mm.  Colonoscopy was also done but there was stool throughout the colon and a repeat was recommended.  Procedures with GI consultants  -We will hold anticoagulation in this setting, SCDs for DVT prophylaxis  -No apparent active bleed, Hb stable     Essential hypertension- (present on admission)   Assessment & Plan     -On home lisinopril.     Hypophosphatemia   Assessment & Plan    -Goal phos >2.5.  -Replete as needed.     Memory loss- (present on admission)   Assessment & Plan    History of TBI with mild cognitive impairment as per chart review  Lives at an assisted living facility called [facility]  Sister is actively involved in care and cell phone number is in the chart     Dyslipidemia- (present on admission)   Assessment & Plan    -On home atorvastatin.     Tobacco abuse- (present on admission)   Assessment & Plan    -Smoking about 1-2 cigarettes/day  -PCP to continue counseling regarding smoking cessation     Vitamin D deficiency- (present on admission)   Assessment & Plan    -Vitamin D level 31.  -On vitamin D supplement.       Leukocytosis   Assessment & Plan    12/4 CBC: WBC 12.5  Asymptomatic, afebrile, no identified source for infection.  Suspect stress demargination.  No intervention indicated at this time.  If febrile, culture       Disposition  Patient has a guardian.  12/3 meeting planned between guardian, next of kin, and parties involved with placement: Family has found a new home which would better assist patient with his diabetes needs.  Family willing to accept placement in his old home pending acceptance to the new home, unfortunately his previous home will not accept him.  Placement remains a barrier to discharge.

## 2018-12-08 NOTE — PROGRESS NOTES
Internal Medicine Interval Note  Note Author: Lewis Hoffmann M.D.     Name Pawel Tatum     1960   Age/Sex 58 y.o. male   MRN [id number]   Code Status Full Code     After 5PM or if no immediate response to page, please call for cross-coverage  Attending/Team: Jesus See Patient List for primary contact information  Call [phone] to page    1st Call - Day Intern (R1):   [place] 2nd Call - Day Sr. Resident (R2/R3):   Robbie         Reason for interval visit  (Principal Problem)   Diabetic ketoacidosis (HCC)    HPI: 58 y.o. male, past medical history insulin-dependent diabetes mellitus (hemoglobin A1c 17), pancreatic insufficiency, recent esophagitis, history of TBI with mild cognitive impairment, residing [facility] facility, presents with DKA after medication noncompliance.  Treated with ICU DKA protocol, subsequently transferred to floor, stable.  Per social work, there is concerned about his assisted living facility accepting him in light of the fact they feel he needs more supervision than they can provide.  It is noted that he does not have PT OT needs and is stable medically for discharge.    Interval Problem Daily Status Update  (24 hours)   No complaints this morning.  No acute events overnight.  He continues to self administer insulin under the supervision of the nurse.     Insulin regimen changed 12/3: Glargine increased from 18-20 units, pre-meal Humalog increased from 8-9 units.  Sliding scale insulin discontinued.  Blood glucose range 150-232, Improved.  Given his poor previous glucose control, we are satisfied with his current control.     Acceptance by group home remains a barrier to discharge.    Pertinent negatives: Denies nausea, vomiting, diarrhea, constipation, syncope, palpitation, shortness of breath, cough, fever, change in bowel or bladder habits, change in appetite.  10 system review of systems otherwise  negative.    Consultants/Specialty  None  PCP: JAKE Armando    Disposition  Inpatient for placement.      PMHx:  History reviewed. No pertinent past medical history.    PSHx:  Past Surgical History:   Procedure Laterality Date   • GASTROSCOPY-ENDO N/A 8/25/2018    Procedure: GASTROSCOPY-ENDO;  Surgeon: Jaswant Frye M.D.;  Location: [facility];  Service: Gastroenterology       Medications:    Current Facility-Administered Medications:   •  insulin glargine (LANTUS) injection 20 Units, 20 Units, Subcutaneous, DAILY AT NOON, 20 Units at 12/07/18 1253 **AND** insulin lispro (HUMALOG) injection 9 Units, 9 Units, Subcutaneous, TID AC, 9 Units at 12/07/18 1757 **AND** [DISCONTINUED] insulin lispro (HUMALOG) injection 1-6 Units, 1-6 Units, Subcutaneous, TID AC, Stopped at 12/03/18 1700 **AND** Accu-Chek ACHS, , , Q AC AND BEDTIME(S) **AND** NOTIFY MD and PharmD, , , Once **AND** glucose 4 g chewable tablet 16 g, 16 g, Oral, Q15 MIN PRN **AND** dextrose 50% (D50W) injection 25 mL, 25 mL, Intravenous, Q15 MIN PRN, Lewis Hoffmann M.D.  •  metFORMIN (GLUCOPHAGE) tablet 1,000 mg, 1,000 mg, Oral, BID WITH MEALS, Ju Reece M.D., 1,000 mg at 12/07/18 1754  •  lisinopril (PRINIVIL) 10 MG tablet 10 mg, 10 mg, Oral, DAILY, Ju Reece M.D., 10 mg at 12/08/18 0532  •  therapeutic multivitamin-minerals (THERAGRAN-M) tablet 1 Tab, 1 Tab, Oral, DAILY, Brigida Hyde M.D., 1 Tab at 12/08/18 0532  •  senna-docusate (PERICOLACE or SENOKOT S) 8.6-50 MG per tablet 2 Tab, 2 Tab, Oral, BID, 2 Tab at 12/02/18 0456 **AND** polyethylene glycol/lytes (MIRALAX) PACKET 1 Packet, 1 Packet, Oral, QDAY PRN **AND** magnesium hydroxide (MILK OF MAGNESIA) suspension 30 mL, 30 mL, Oral, QDAY PRN **AND** bisacodyl (DULCOLAX) suppository 10 mg, 10 mg, Rectal, QDAY PRN, Wilma Oviedo M.D.  •  acetaminophen (TYLENOL) tablet 650 mg, 650 mg, Oral, Q6HRS PRN, Wilma Oviedo M.D.  •  atorvastatin (LIPITOR) tablet 10 mg, 10 mg,  Oral, DAILY, Wilma Oviedo M.D., 10 mg at 12/08/18 0532  •  vitamin D (cholecalciferol) tablet 2,000 Units, 2,000 Units, Oral, DAILY, Wilma Oviedo M.D., 2,000 Units at 12/08/18 0532  •  omeprazole (PRILOSEC) capsule 20 mg, 20 mg, Oral, QAM, Wilma Oviedo M.D., 20 mg at 12/08/18 0532  •  tamsulosin (FLOMAX) capsule 0.4 mg, 0.4 mg, Oral, AFTER BREAKFAST, Wilma Oviedo M.D., 0.4 mg at 12/07/18 0839  •  ferrous sulfate tablet 325 mg, 325 mg, Oral, TID WITH MEALS, Wilma Oivedo M.D., 325 mg at 12/07/18 1755  •  buPROPion SR (WELLBUTRIN-SR) tablet 150 mg, 150 mg, Oral, BID, Wilma Oviedo M.D., 150 mg at 12/08/18 0532  •  pancrelipase (Lip-Prot-Amyl) (CREON 65563) 63933-14128 units capsule 72,000 Units, 3 Cap, Oral, TID WITH MEALS, Wilma Oviedo M.D., 72,000 Units at 12/07/18 1755  •  pancrelipase (Lip-Prot-Amyl) (CREON 09360) 60848-84441 units capsule 24,000 Units, 1 Cap, Oral, With Snacks PRN **AND** pancrelipase (Lip-Prot-Amyl) (CREON 6000) 6000 units capsule 12,000 Units, 2 Cap, Oral, With Snacks PRN, Wilma Oviedo M.D.    Allergies:  No Known Allergies    Quality Measures  Reviewed items:  EKG reviewed, Labs reviewed, Medications reviewed and Radiology images reviewed  Dill catheter:  No Dill  Central line: No central line  DVT prophylaxis: Mechanical, pharmacological contraindicated due to bleeding risk (history of GI bleed August 2018)            Physical Exam       Vitals:    12/07/18 1544 12/07/18 2035 12/08/18 0245 12/08/18 0720   BP: 104/58 126/74 108/59 132/71   Pulse: 68 85 71 72   Resp: 18 17 16 18   Temp: 37.6 °C (99.7 °F) 37.2 °C (99 °F) 36.7 °C (98.1 °F) 36.4 °C (97.6 °F)   TempSrc: Temporal Temporal Temporal Temporal   SpO2: 96% 90% 92% 94%   Weight:       Height:         Body mass index is 15.28 kg/m².    Oxygen Therapy:  Pulse Oximetry: 94 %, O2 (LPM): 0, O2 Delivery: None (Room Air)    PHYSICAL EXAM: Unchanged since yesterday  Constitutional: CACHECTIC male in no distress.   HENT:    Head: Normocephalic and atraumatic.   Eyes: Pupils are equal, round, and reactive to light. EOM are normal.  PTOSIS of left eyelid (baseline).  Neck: Normal range of motion. Neck supple. No JVD present. No tracheal deviation present. No thyromegaly present.   Cardiovascular: Regular rhythm.  Exam reveals no gallop and no friction rub.    No murmur heard.  Pulmonary/Chest: Effort normal and breath sounds normal. No stridor. No respiratory distress. No wheezes. No rales. No tenderness.   Abdominal: Soft. Bowel sounds are normal. No distension and no mass. There is no tenderness. There is no rebound and no guarding.   Musculoskeletal: Normal range of motion. No edema, tenderness or deformity.   Lymphadenopathy:     No cervical adenopathy.   Neurological: Alert and oriented to person, place, and time. No cranial nerve deficit.   Skin: Skin is warm and dry. No rash noted. No erythema.   Psychiatric: Mood, memory, affect and judgment normal.   Vitals reviewed.      Lab Data Review:         12/3/2018  7:32 AM    Recent Labs      12/06/18 0159 12/07/18   0401  12/08/18 0221   SODIUM  139  139  135   POTASSIUM  4.1  3.9  4.2   CHLORIDE  106  107  105   CO2  25  25  24   BUN  34*  32*  36*   CREATININE  0.68  0.64  0.86   CALCIUM  8.7  9.2  9.5       Recent Labs      12/06/18 0159  12/07/18   0401  12/08/18   0221   GLUCOSE  207*  165*  206*       Recent Labs      12/06/18 0159 12/07/18   0401  12/08/18 0221   RBC  3.18*  3.45*  3.42*   HEMOGLOBIN  9.3*  10.4*  10.3*   HEMATOCRIT  30.3*  32.6*  32.8*   PLATELETCT  259  258  250       Recent Labs      12/06/18 0159 12/07/18   0401  12/08/18 0221   WBC  12.6*  10.9*  10.9*           Assessment/Plan     Diabetes mellitus (HCC)- (present on admission)   Assessment & Plan    -Some discrepancy on whether patient is T1 or T2DM per chart review.  -Per patient, was diagnosed with DM 4 years ago, initially on insulin, most recently switched to oral meds.  Likely T2DM  given this history.  -Fingerstick glucose checks.  -Hypoglycemia protocol.  -On metformin, glargine, ISS - uptitrate insulin per BGs.  -Check BMP 12/7/2018 to monitor renal function (due to reinitiation of metformin).  -12/2:  Insulin Glargine 18-->20u; premeal Humalog 8u-->9u  -12/3: Blood glucose increasing from 260s to 3-400   -12/4: Blood glucose improving, 0-3 units sliding scale required, range 141-381  12/6:glucose control improving, midday spikes over 300 continue.  A.M. Insulin may be increased if trend continues  -12/8: Sign scale insulin has been stopped, blood glucose control continues to improve without sliding scale coverage.     Hypokalemia- (present on admission)   Assessment & Plan    -Goal K >2.  -Replete as needed.     Hypomagnesemia- (present on admission)   Assessment & Plan    -Goal Mg >2.  -Replete as needed.     Chronic pancreatitis (HCC)   Assessment & Plan    -CT abdomen showed extensive calcifications of the pancreas.  -History of protein calorie malnutrition secondary to pancreatic insufficiency.  -Currently on pancrelipase replacement.  -Dietitian following.     History of recent UGI bleed- (present on admission)   Assessment & Plan    -recent Hospital admission 8/2018 for an episode of upper GI bleed  -Patient had EGD done during hospitalization which showed grade D esophagitis, patient had repeat EGD & colonoscopy at 11/20 and EGD notable for localized nodularity of the mucosa at the GE junction about 5-6 mm.  Colonoscopy was also done but there was stool throughout the colon and a repeat was recommended.  Procedures with GI consultants  -We will hold anticoagulation in this setting, SCDs for DVT prophylaxis  -No apparent active bleed, Hb stable     Essential hypertension- (present on admission)   Assessment & Plan    -On home lisinopril.     Hypophosphatemia   Assessment & Plan    -Goal phos >2.5.  -Replete as needed.     Memory loss- (present on admission)   Assessment & Plan     History of TBI with mild cognitive impairment as per chart review  Lives at an assisted living facility called [facility]  Sister is actively involved in care and cell phone number is in the chart     Dyslipidemia- (present on admission)   Assessment & Plan    -On home atorvastatin.     Tobacco abuse- (present on admission)   Assessment & Plan    -Smoking about 1-2 cigarettes/day  -PCP to continue counseling regarding smoking cessation     Vitamin D deficiency- (present on admission)   Assessment & Plan    -Vitamin D level 31.  -On vitamin D supplement.       Leukocytosis   Assessment & Plan    RESOLVING  12/4 CBC: WBC 12.5  Asymptomatic, afebrile, no identified source for infection.  Suspect stress demargination.  No intervention indicated at this time.  If febrile, culture       Disposition  Patient has a guardian.  12/3 meeting planned between guardian, next of kin, and parties involved with placement: Family has found a new home which would better assist patient with his diabetes needs.  Family willing to accept placement in his old home pending acceptance to the new home, unfortunately his previous home will not accept him.  Placement remains a barrier to discharge.

## 2018-12-09 PROBLEM — E87.6 HYPOKALEMIA: Status: RESOLVED | Noted: 2018-11-24 | Resolved: 2018-12-09

## 2018-12-09 PROBLEM — E83.42 HYPOMAGNESEMIA: Status: RESOLVED | Noted: 2018-11-24 | Resolved: 2018-12-09

## 2018-12-09 LAB
ANION GAP SERPL CALC-SCNC: 7 MMOL/L (ref 0–11.9)
BUN SERPL-MCNC: 35 MG/DL (ref 8–22)
CALCIUM SERPL-MCNC: 9.2 MG/DL (ref 8.5–10.5)
CHLORIDE SERPL-SCNC: 108 MMOL/L (ref 96–112)
CO2 SERPL-SCNC: 23 MMOL/L (ref 20–33)
CREAT SERPL-MCNC: 0.88 MG/DL (ref 0.5–1.4)
ERYTHROCYTE [DISTWIDTH] IN BLOOD BY AUTOMATED COUNT: 77.3 FL (ref 35.9–50)
GLUCOSE BLD-MCNC: 123 MG/DL (ref 65–99)
GLUCOSE BLD-MCNC: 140 MG/DL (ref 65–99)
GLUCOSE BLD-MCNC: 237 MG/DL (ref 65–99)
GLUCOSE SERPL-MCNC: 183 MG/DL (ref 65–99)
HCT VFR BLD AUTO: 33.6 % (ref 42–52)
HGB BLD-MCNC: 10.5 G/DL (ref 14–18)
MCH RBC QN AUTO: 30.1 PG (ref 27–33)
MCHC RBC AUTO-ENTMCNC: 31.3 G/DL (ref 33.7–35.3)
MCV RBC AUTO: 96.3 FL (ref 81.4–97.8)
PLATELET # BLD AUTO: 266 K/UL (ref 164–446)
PMV BLD AUTO: 9.6 FL (ref 9–12.9)
POTASSIUM SERPL-SCNC: 4.4 MMOL/L (ref 3.6–5.5)
RBC # BLD AUTO: 3.49 M/UL (ref 4.7–6.1)
SODIUM SERPL-SCNC: 138 MMOL/L (ref 135–145)
WBC # BLD AUTO: 9.8 K/UL (ref 4.8–10.8)

## 2018-12-09 PROCEDURE — 770006 HCHG ROOM/CARE - MED/SURG/GYN SEMI*

## 2018-12-09 PROCEDURE — 85027 COMPLETE CBC AUTOMATED: CPT

## 2018-12-09 PROCEDURE — 700102 HCHG RX REV CODE 250 W/ 637 OVERRIDE(OP): Performed by: STUDENT IN AN ORGANIZED HEALTH CARE EDUCATION/TRAINING PROGRAM

## 2018-12-09 PROCEDURE — 82962 GLUCOSE BLOOD TEST: CPT | Mod: 91

## 2018-12-09 PROCEDURE — 36415 COLL VENOUS BLD VENIPUNCTURE: CPT

## 2018-12-09 PROCEDURE — 80048 BASIC METABOLIC PNL TOTAL CA: CPT

## 2018-12-09 PROCEDURE — A9270 NON-COVERED ITEM OR SERVICE: HCPCS | Performed by: STUDENT IN AN ORGANIZED HEALTH CARE EDUCATION/TRAINING PROGRAM

## 2018-12-09 PROCEDURE — 99231 SBSQ HOSP IP/OBS SF/LOW 25: CPT | Mod: GC | Performed by: INTERNAL MEDICINE

## 2018-12-09 RX ADMIN — METFORMIN HYDROCHLORIDE 1000 MG: 500 TABLET ORAL at 08:43

## 2018-12-09 RX ADMIN — PANCRELIPASE 72000 UNITS: 24000; 76000; 120000 CAPSULE, DELAYED RELEASE PELLETS ORAL at 13:09

## 2018-12-09 RX ADMIN — INSULIN GLARGINE 20 UNITS: 100 INJECTION, SOLUTION SUBCUTANEOUS at 13:03

## 2018-12-09 RX ADMIN — Medication 325 MG: at 17:11

## 2018-12-09 RX ADMIN — VITAMIN D, TAB 1000IU (100/BT) 2000 UNITS: 25 TAB at 05:56

## 2018-12-09 RX ADMIN — OMEPRAZOLE 20 MG: 20 CAPSULE, DELAYED RELEASE ORAL at 05:56

## 2018-12-09 RX ADMIN — Medication 325 MG: at 13:09

## 2018-12-09 RX ADMIN — LISINOPRIL 10 MG: 10 TABLET ORAL at 05:56

## 2018-12-09 RX ADMIN — MULTIPLE VITAMINS W/ MINERALS TAB 1 TABLET: TAB at 05:56

## 2018-12-09 RX ADMIN — METFORMIN HYDROCHLORIDE 1000 MG: 500 TABLET ORAL at 17:12

## 2018-12-09 RX ADMIN — PANCRELIPASE 72000 UNITS: 24000; 76000; 120000 CAPSULE, DELAYED RELEASE PELLETS ORAL at 08:42

## 2018-12-09 RX ADMIN — ATORVASTATIN CALCIUM 10 MG: 10 TABLET, FILM COATED ORAL at 05:56

## 2018-12-09 RX ADMIN — BUPROPION HYDROCHLORIDE 150 MG: 150 TABLET, EXTENDED RELEASE ORAL at 17:10

## 2018-12-09 RX ADMIN — Medication 325 MG: at 08:43

## 2018-12-09 RX ADMIN — PANCRELIPASE 72000 UNITS: 24000; 76000; 120000 CAPSULE, DELAYED RELEASE PELLETS ORAL at 17:10

## 2018-12-09 RX ADMIN — STANDARDIZED SENNA CONCENTRATE AND DOCUSATE SODIUM 2 TABLET: 8.6; 5 TABLET, FILM COATED ORAL at 05:56

## 2018-12-09 RX ADMIN — BUPROPION HYDROCHLORIDE 150 MG: 150 TABLET, EXTENDED RELEASE ORAL at 05:56

## 2018-12-09 RX ADMIN — TAMSULOSIN HYDROCHLORIDE 0.4 MG: 0.4 CAPSULE ORAL at 08:43

## 2018-12-09 ASSESSMENT — PAIN SCALES - GENERAL
PAINLEVEL_OUTOF10: 0
PAINLEVEL_OUTOF10: 0

## 2018-12-09 NOTE — PROGRESS NOTES
Assumed care of pt at 1900, no c/o pain or other discomforts at that time. Pt up self, ambulates steadily to restroom. Pt resting in bed now, call light in reach, no further needs at this time

## 2018-12-09 NOTE — CARE PLAN
Problem: Venous Thromboembolism (VTW)/Deep Vein Thrombosis (DVT) Prevention:  Goal: Patient will participate in Venous Thrombosis (VTE)/Deep Vein Thrombosis (DVT)Prevention Measures  Outcome: PROGRESSING AS EXPECTED  Pt continues to refuse SCDs despite education about DVT/VTE risks and prevention, but pt ambulates to restroom and in room frequently. Continue to educate pt on risks for VTE/DVT and encourage early ambulation    Problem: Bowel/Gastric:  Goal: Normal bowel function is maintained or improved  Outcome: NOT MET  Pt bowel function remains maintained throughout this shift. Pt reports having regular bowel movements similar to his baseline. Continue to assess for altered bowel function and implement interventions as needed

## 2018-12-09 NOTE — PROGRESS NOTES
Internal Medicine Interval Note  Note Author: Lewis Hoffmann M.D.     Name Pawel Tatum     1960   Age/Sex 58 y.o. male   MRN [id number]   Code Status Full Code     After 5PM or if no immediate response to page, please call for cross-coverage  Attending/Team: Jesus See Patient List for primary contact information  Call [phone] to page    1st Call - Day Intern (R1):   [place] 2nd Call - Day Sr. Resident (R2/R3):   Robbie         Reason for interval visit  (Principal Problem)   Diabetic ketoacidosis (HCC)    HPI: 58 y.o. male, past medical history insulin-dependent diabetes mellitus (hemoglobin A1c 17), pancreatic insufficiency, recent esophagitis, history of TBI with mild cognitive impairment, residing [facility] assisted [facility] facility, presents with DKA after medication noncompliance.  Treated with ICU DKA protocol, subsequently transferred to floor, stable.  Per social work, there is concerned about his assisted living facility accepting him in light of the fact they feel he needs more supervision than they can provide.  It is noted that he does not have PT OT needs and is stable medically for discharge.    Interval Problem Daily Status Update  (24 hours)   No complaints this morning.  No acute events overnight.  He continues to self administer insulin under the supervision of the nurse.     Insulin regimen changed 12/3: Glargine increased from 18-20 units, pre-meal Humalog increased from 8-9 units.  Sliding scale insulin discontinued.  Blood glucose range 150-232, Improved.  Given his poor previous glucose control, we are satisfied with his current control.     Acceptance by group home remains a barrier to discharge.    Pertinent negatives: Denies nausea, vomiting, diarrhea, constipation, syncope, palpitation, shortness of breath, cough, fever, change in bowel or bladder habits, change in appetite.  10 system review of systems otherwise  negative.    Consultants/Specialty  None  PCP: JAKE Armando    Disposition  Inpatient for placement.      PMHx:  History reviewed. No pertinent past medical history.    PSHx:  Past Surgical History:   Procedure Laterality Date   • GASTROSCOPY-ENDO N/A 8/25/2018    Procedure: GASTROSCOPY-ENDO;  Surgeon: Jaswant Frye M.D.;  Location: [facility];  Service: Gastroenterology       Medications:    Current Facility-Administered Medications:   •  insulin glargine (LANTUS) injection 20 Units, 20 Units, Subcutaneous, DAILY AT NOON, 20 Units at 12/08/18 1224 **AND** insulin lispro (HUMALOG) injection 9 Units, 9 Units, Subcutaneous, TID AC, 9 Units at 12/09/18 0838 **AND** [DISCONTINUED] insulin lispro (HUMALOG) injection 1-6 Units, 1-6 Units, Subcutaneous, TID AC, Stopped at 12/03/18 1700 **AND** Accu-Chek ACHS, , , Q AC AND BEDTIME(S) **AND** NOTIFY MD and PharmD, , , Once **AND** glucose 4 g chewable tablet 16 g, 16 g, Oral, Q15 MIN PRN **AND** dextrose 50% (D50W) injection 25 mL, 25 mL, Intravenous, Q15 MIN PRN, Lewis Hoffmann M.D.  •  metFORMIN (GLUCOPHAGE) tablet 1,000 mg, 1,000 mg, Oral, BID WITH MEALS, Ju Reece M.D., 1,000 mg at 12/09/18 0843  •  lisinopril (PRINIVIL) 10 MG tablet 10 mg, 10 mg, Oral, DAILY, Ju Reece M.D., 10 mg at 12/09/18 0556  •  therapeutic multivitamin-minerals (THERAGRAN-M) tablet 1 Tab, 1 Tab, Oral, DAILY, Brigida Hyde M.D., 1 Tab at 12/09/18 0556  •  senna-docusate (PERICOLACE or SENOKOT S) 8.6-50 MG per tablet 2 Tab, 2 Tab, Oral, BID, 2 Tab at 12/09/18 0556 **AND** polyethylene glycol/lytes (MIRALAX) PACKET 1 Packet, 1 Packet, Oral, QDAY PRN **AND** magnesium hydroxide (MILK OF MAGNESIA) suspension 30 mL, 30 mL, Oral, QDAY PRN **AND** bisacodyl (DULCOLAX) suppository 10 mg, 10 mg, Rectal, QDAY PRN, Wilma Oviedo M.D.  •  acetaminophen (TYLENOL) tablet 650 mg, 650 mg, Oral, Q6HRS PRN, Wilma Oviedo M.D.  •  atorvastatin (LIPITOR) tablet 10 mg, 10 mg,  Oral, DAILY, Wilma Oviedo M.D., 10 mg at 12/09/18 0556  •  vitamin D (cholecalciferol) tablet 2,000 Units, 2,000 Units, Oral, DAILY, Wilma Oviedo M.D., 2,000 Units at 12/09/18 0556  •  omeprazole (PRILOSEC) capsule 20 mg, 20 mg, Oral, QAM, Wilma Oviedo M.D., 20 mg at 12/09/18 0556  •  tamsulosin (FLOMAX) capsule 0.4 mg, 0.4 mg, Oral, AFTER BREAKFAST, Wilma Oviedo M.D., 0.4 mg at 12/09/18 0843  •  ferrous sulfate tablet 325 mg, 325 mg, Oral, TID WITH MEALS, Wilma Oviedo M.D., 325 mg at 12/09/18 0843  •  buPROPion SR (WELLBUTRIN-SR) tablet 150 mg, 150 mg, Oral, BID, Wilma Oviedo M.D., 150 mg at 12/09/18 0556  •  pancrelipase (Lip-Prot-Amyl) (CREON 64440) 19652-62363 units capsule 72,000 Units, 3 Cap, Oral, TID WITH MEALS, Wilma Oviedo M.D., 72,000 Units at 12/09/18 0842  •  pancrelipase (Lip-Prot-Amyl) (CREON 81713) 04119-16401 units capsule 24,000 Units, 1 Cap, Oral, With Snacks PRN **AND** pancrelipase (Lip-Prot-Amyl) (CREON 6000) 6000 units capsule 12,000 Units, 2 Cap, Oral, With Snacks PRN, Wilma Oviedo M.D.    Allergies:  No Known Allergies    Quality Measures  Reviewed items:  EKG reviewed, Labs reviewed, Medications reviewed and Radiology images reviewed  Dill catheter:  No Dill  Central line: No central line  DVT prophylaxis: Mechanical, pharmacological contraindicated due to bleeding risk (history of GI bleed August 2018)            Physical Exam       Vitals:    12/08/18 1603 12/08/18 1959 12/09/18 0322 12/09/18 0729   BP: 113/66 120/71 110/66 124/77   Pulse: 81 93 65 78   Resp: 16 16 16 17   Temp: 36.8 °C (98.3 °F) 37.5 °C (99.5 °F) 36.4 °C (97.6 °F) 36.3 °C (97.3 °F)   TempSrc: Temporal Temporal Temporal Temporal   SpO2: 94% 95% 95% 95%   Weight:       Height:         Body mass index is 15.77 kg/m². Weight: 55.7 kg (122 lb 12.7 oz)  Oxygen Therapy:  Pulse Oximetry: 95 %, O2 (LPM): 0, O2 Delivery: None (Room Air)    PHYSICAL EXAM: Unchanged since yesterday  Constitutional:  CACHECTIC male in no distress.   HENT:   Head: Normocephalic and atraumatic.   Eyes: Pupils are equal, round, and reactive to light. EOM are normal.  PTOSIS of left eyelid (baseline).  Neck: Normal range of motion. Neck supple. No JVD present. No tracheal deviation present. No thyromegaly present.   Cardiovascular: Regular rhythm.  Exam reveals no gallop and no friction rub.    No murmur heard.  Pulmonary/Chest: Effort normal and breath sounds normal. No stridor. No respiratory distress. No wheezes. No rales. No tenderness.   Abdominal: Soft. Bowel sounds are normal. No distension and no mass. There is no tenderness. There is no rebound and no guarding.   Musculoskeletal: Normal range of motion. No edema, tenderness or deformity.   Lymphadenopathy:     No cervical adenopathy.   Neurological: Alert and oriented to person, place, and time. No cranial nerve deficit.   Skin: Skin is warm and dry. No rash noted. No erythema.   Psychiatric: Mood, memory, affect and judgment normal.   Vitals reviewed.      Lab Data Review:         12/3/2018  7:32 AM    Recent Labs      12/07/18   0401  12/08/18   0221  12/09/18   0230   SODIUM  139  135  138   POTASSIUM  3.9  4.2  4.4   CHLORIDE  107  105  108   CO2  25  24  23   BUN  32*  36*  35*   CREATININE  0.64  0.86  0.88   CALCIUM  9.2  9.5  9.2       Recent Labs      12/07/18   0401  12/08/18   0221  12/09/18   0230   GLUCOSE  165*  206*  183*       Recent Labs      12/07/18   0401  12/08/18   0221  12/09/18   0230   RBC  3.45*  3.42*  3.49*   HEMOGLOBIN  10.4*  10.3*  10.5*   HEMATOCRIT  32.6*  32.8*  33.6*   PLATELETCT  258  250  266       Recent Labs      12/07/18   0401  12/08/18   0221  12/09/18   0230   WBC  10.9*  10.9*  9.8           Assessment/Plan     Diabetes mellitus (HCC)- (present on admission)   Assessment & Plan    -Some discrepancy on whether patient is T1 or T2DM per chart review.  -Per patient, was diagnosed with DM 4 years ago, initially on insulin, most  recently switched to oral meds.  Likely T2DM given this history.  -Fingerstick glucose checks.  -Hypoglycemia protocol.  -On metformin, glargine, ISS - uptitrate insulin per BGs.  -Check BMP 12/7/2018 to monitor renal function (due to reinitiation of metformin).  -12/2:  Insulin Glargine 18-->20u; premeal Humalog 8u-->9u  -12/3: Blood glucose increasing from 260s to 3-400   -12/4: Blood glucose improving, 0-3 units sliding scale required, range 141-381  12/6:glucose control improving, midday spikes over 300 continue.  A.M. Insulin may be increased if trend continues  -12/8: Sign scale insulin has been stopped, blood glucose control continues to improve without sliding scale coverage.     Chronic pancreatitis (HCC)   Assessment & Plan    -CT abdomen showed extensive calcifications of the pancreas.  -History of protein calorie malnutrition secondary to pancreatic insufficiency.  -Currently on pancrelipase replacement.  -Dietitian following.     History of recent UGI bleed- (present on admission)   Assessment & Plan    -recent Hospital admission 8/2018 for an episode of upper GI bleed  -Patient had EGD done during hospitalization which showed grade D esophagitis, patient had repeat EGD & colonoscopy at 11/20 and EGD notable for localized nodularity of the mucosa at the GE junction about 5-6 mm.  Colonoscopy was also done but there was stool throughout the colon and a repeat was recommended.  Procedures with GI consultants  -We will hold anticoagulation in this setting, SCDs for DVT prophylaxis  -No apparent active bleed, Hb stable     Essential hypertension- (present on admission)   Assessment & Plan    -On home lisinopril.     Hypophosphatemia   Assessment & Plan    -Goal phos >2.5.  -Replete as needed.     Memory loss- (present on admission)   Assessment & Plan    History of TBI with mild cognitive impairment as per chart review  Lives at an assisted living facility called [facility]  Sister is actively involved in care  and cell phone number is in the chart     Dyslipidemia- (present on admission)   Assessment & Plan    -On home atorvastatin.     Tobacco abuse- (present on admission)   Assessment & Plan    -Smoking about 1-2 cigarettes/day  -PCP to continue counseling regarding smoking cessation     Vitamin D deficiency- (present on admission)   Assessment & Plan    -Vitamin D level 31.  -On vitamin D supplement.       Leukocytosis   Assessment & Plan    RESOLVING  12/4 CBC: WBC 12.5  Asymptomatic, afebrile, no identified source for infection.  Suspect stress demargination.  No intervention indicated at this time.  If febrile, culture       Disposition  Patient has a guardian.  12/3 meeting planned between guardian, next of kin, and parties involved with placement: Family has found a new home which would better assist patient with his diabetes needs.  Family willing to accept placement in his old home pending acceptance to the new home, unfortunately his previous home will not accept him.  Placement remains a barrier to discharge.

## 2018-12-09 NOTE — CARE PLAN
Problem: Safety  Goal: Will remain free from injury  Outcome: PROGRESSING AS EXPECTED  Pt up to bathroom, steady gait, standby assist, CTM    Problem: Metabolic:  Goal: Ability to maintain appropriate glucose levels will improve  Outcome: PROGRESSING AS EXPECTED  FSBS checked, insulin given per MAR, pt tolerated well, no s/s of complication, CTM

## 2018-12-10 PROBLEM — E83.39 HYPOPHOSPHATEMIA: Status: RESOLVED | Noted: 2018-11-22 | Resolved: 2018-12-10

## 2018-12-10 PROBLEM — D72.829 LEUKOCYTOSIS: Status: RESOLVED | Noted: 2018-12-04 | Resolved: 2018-12-10

## 2018-12-10 LAB
ANION GAP SERPL CALC-SCNC: 6 MMOL/L (ref 0–11.9)
BUN SERPL-MCNC: 33 MG/DL (ref 8–22)
CALCIUM SERPL-MCNC: 8.9 MG/DL (ref 8.5–10.5)
CHLORIDE SERPL-SCNC: 108 MMOL/L (ref 96–112)
CO2 SERPL-SCNC: 23 MMOL/L (ref 20–33)
CREAT SERPL-MCNC: 0.67 MG/DL (ref 0.5–1.4)
ERYTHROCYTE [DISTWIDTH] IN BLOOD BY AUTOMATED COUNT: 78.2 FL (ref 35.9–50)
GLUCOSE BLD-MCNC: 220 MG/DL (ref 65–99)
GLUCOSE BLD-MCNC: 229 MG/DL (ref 65–99)
GLUCOSE BLD-MCNC: 313 MG/DL (ref 65–99)
GLUCOSE SERPL-MCNC: 281 MG/DL (ref 65–99)
HCT VFR BLD AUTO: 31.8 % (ref 42–52)
HGB BLD-MCNC: 10.2 G/DL (ref 14–18)
MCH RBC QN AUTO: 30.9 PG (ref 27–33)
MCHC RBC AUTO-ENTMCNC: 32.1 G/DL (ref 33.7–35.3)
MCV RBC AUTO: 96.4 FL (ref 81.4–97.8)
PLATELET # BLD AUTO: 254 K/UL (ref 164–446)
PMV BLD AUTO: 9.1 FL (ref 9–12.9)
POTASSIUM SERPL-SCNC: 4.5 MMOL/L (ref 3.6–5.5)
RBC # BLD AUTO: 3.3 M/UL (ref 4.7–6.1)
SODIUM SERPL-SCNC: 137 MMOL/L (ref 135–145)
WBC # BLD AUTO: 9.5 K/UL (ref 4.8–10.8)

## 2018-12-10 PROCEDURE — 700102 HCHG RX REV CODE 250 W/ 637 OVERRIDE(OP): Performed by: STUDENT IN AN ORGANIZED HEALTH CARE EDUCATION/TRAINING PROGRAM

## 2018-12-10 PROCEDURE — A9270 NON-COVERED ITEM OR SERVICE: HCPCS | Performed by: STUDENT IN AN ORGANIZED HEALTH CARE EDUCATION/TRAINING PROGRAM

## 2018-12-10 PROCEDURE — 770006 HCHG ROOM/CARE - MED/SURG/GYN SEMI*

## 2018-12-10 PROCEDURE — 80048 BASIC METABOLIC PNL TOTAL CA: CPT

## 2018-12-10 PROCEDURE — 36415 COLL VENOUS BLD VENIPUNCTURE: CPT

## 2018-12-10 PROCEDURE — 82962 GLUCOSE BLOOD TEST: CPT | Mod: 91

## 2018-12-10 PROCEDURE — 85027 COMPLETE CBC AUTOMATED: CPT

## 2018-12-10 PROCEDURE — 99232 SBSQ HOSP IP/OBS MODERATE 35: CPT | Mod: GC | Performed by: INTERNAL MEDICINE

## 2018-12-10 RX ORDER — INSULIN GLARGINE 100 [IU]/ML
21 INJECTION, SOLUTION SUBCUTANEOUS
Status: DISCONTINUED | OUTPATIENT
Start: 2018-12-11 | End: 2018-12-11

## 2018-12-10 RX ORDER — DEXTROSE MONOHYDRATE 25 G/50ML
25 INJECTION, SOLUTION INTRAVENOUS
Status: DISCONTINUED | OUTPATIENT
Start: 2018-12-10 | End: 2018-12-11

## 2018-12-10 RX ADMIN — Medication 325 MG: at 08:09

## 2018-12-10 RX ADMIN — BUPROPION HYDROCHLORIDE 150 MG: 150 TABLET, EXTENDED RELEASE ORAL at 17:29

## 2018-12-10 RX ADMIN — BUPROPION HYDROCHLORIDE 150 MG: 150 TABLET, EXTENDED RELEASE ORAL at 05:49

## 2018-12-10 RX ADMIN — PANCRELIPASE 72000 UNITS: 24000; 76000; 120000 CAPSULE, DELAYED RELEASE PELLETS ORAL at 13:30

## 2018-12-10 RX ADMIN — PANCRELIPASE 72000 UNITS: 24000; 76000; 120000 CAPSULE, DELAYED RELEASE PELLETS ORAL at 17:30

## 2018-12-10 RX ADMIN — Medication 325 MG: at 12:04

## 2018-12-10 RX ADMIN — ATORVASTATIN CALCIUM 10 MG: 10 TABLET, FILM COATED ORAL at 05:49

## 2018-12-10 RX ADMIN — MULTIPLE VITAMINS W/ MINERALS TAB 1 TABLET: TAB at 05:49

## 2018-12-10 RX ADMIN — PANCRELIPASE 72000 UNITS: 24000; 76000; 120000 CAPSULE, DELAYED RELEASE PELLETS ORAL at 08:08

## 2018-12-10 RX ADMIN — METFORMIN HYDROCHLORIDE 1000 MG: 500 TABLET ORAL at 17:29

## 2018-12-10 RX ADMIN — TAMSULOSIN HYDROCHLORIDE 0.4 MG: 0.4 CAPSULE ORAL at 08:09

## 2018-12-10 RX ADMIN — INSULIN GLARGINE 20 UNITS: 100 INJECTION, SOLUTION SUBCUTANEOUS at 12:05

## 2018-12-10 RX ADMIN — OMEPRAZOLE 20 MG: 20 CAPSULE, DELAYED RELEASE ORAL at 05:49

## 2018-12-10 RX ADMIN — VITAMIN D, TAB 1000IU (100/BT) 2000 UNITS: 25 TAB at 05:49

## 2018-12-10 RX ADMIN — METFORMIN HYDROCHLORIDE 1000 MG: 500 TABLET ORAL at 08:09

## 2018-12-10 RX ADMIN — STANDARDIZED SENNA CONCENTRATE AND DOCUSATE SODIUM 2 TABLET: 8.6; 5 TABLET, FILM COATED ORAL at 05:50

## 2018-12-10 RX ADMIN — LISINOPRIL 10 MG: 10 TABLET ORAL at 05:49

## 2018-12-10 RX ADMIN — Medication 325 MG: at 17:29

## 2018-12-10 ASSESSMENT — PAIN SCALES - GENERAL
PAINLEVEL_OUTOF10: 0

## 2018-12-10 NOTE — CARE PLAN
Problem: Bowel/Gastric:  Goal: Normal bowel function is maintained or improved  Outcome: PROGRESSING AS EXPECTED  BMx1 daily    Problem: Metabolic:  Goal: Ability to maintain appropriate glucose levels will improve  Outcome: PROGRESSING AS EXPECTED  FSBS checked, insulin given per MAR, no s/s of complication, CTM

## 2018-12-10 NOTE — DISCHARGE PLANNING
Anticipated Discharge Disposition: TBD    Action: Perez from [employer] [facility] in to see the patient.  Perez stated they have contacted [place] to see if they will take the patient, private pay.  They have contacted the  who sold the patient the policy to see if he can get his insurance policy reinstated.  Perez will keep SW informed on both placement and insurance    Barriers to Discharge: placement    [facility]

## 2018-12-10 NOTE — PROGRESS NOTES
Assumed care of pt at 1900. No c/o pain or other discomforts at that time. Pt resting in bed now, call light in reach, no further needs at this time

## 2018-12-10 NOTE — CARE PLAN
Problem: Metabolic:  Goal: Ability to maintain appropriate glucose levels will improve  Outcome: PROGRESSING AS EXPECTED  Pt capillary blood glucose remains somewhat controlled today with FSBS varying between 120s - 230s. Pt expresses understanding of expected glucose levels and appropriate diet to achieve expected levels. Continue to monitor CBGs and educate pt on dietary guidelines for glucose control    Problem: Knowledge Deficit:  Goal: Knowledge of the prescribed therapeutic regimen will improve  Outcome: PROGRESSING AS EXPECTED  Pt able to express some knowledge on diabetes management thi shift. Continue to educate on diabetes management, possible complications, and sick-day rules and assess for learning

## 2018-12-10 NOTE — PROGRESS NOTES
Internal Medicine Interval Note  Note Author: Lewis Hoffmann M.D.     Name Pawel Tatum     1960   Age/Sex 58 y.o. male   MRN [id number]   Code Status Full Code     After 5PM or if no immediate response to page, please call for cross-coverage  Attending/Team: Jesus See Patient List for primary contact information  Call [phone] to page    1st Call - Day Intern (R1):   [place] 2nd Call - Day Sr. Resident (R2/R3):   Robbie         Reason for interval visit  (Principal Problem)   Diabetic ketoacidosis (HCC)    HPI: 58 y.o. male, past medical history insulin-dependent diabetes mellitus (hemoglobin A1c 17), pancreatic insufficiency, recent esophagitis, history of TBI with mild cognitive impairment, residing [facility] facility, presents with DKA after medication noncompliance.  Treated with ICU DKA protocol, subsequently transferred to floor, stable.  Per social work, there is concerned about his assisted living facility accepting him in light of the fact they feel he needs more supervision than they can provide.  It is noted that he does not have PT OT needs and is stable medically for discharge.    Interval Problem Daily Status Update  (24 hours)   No complaints this morning.  No acute events overnight.  He continues to self administer insulin under the supervision of the nurse.     Insulin regimen changed 12/3: Glargine increased from 18-20 units, pre-meal Humalog increased from 8-9 units.  Sliding scale insulin discontinued.  Blood glucose range 150-232, Improved.  Given his poor previous glucose control, we are satisfied with his current control.     Acceptance by group home remains a barrier to discharge.    Pertinent negatives: Denies nausea, vomiting, diarrhea, constipation, syncope, palpitation, shortness of breath, cough, fever, change in bowel or bladder habits, change in appetite.  10 system review of systems otherwise  negative.    Consultants/Specialty  None  PCP: JAKE Armando    Disposition  Inpatient for placement.      PMHx:  History reviewed. No pertinent past medical history.    PSHx:  Past Surgical History:   Procedure Laterality Date   • GASTROSCOPY-ENDO N/A 8/25/2018    Procedure: GASTROSCOPY-ENDO;  Surgeon: Jaswant Frye M.D.;  Location: [facility];  Service: Gastroenterology       Medications:    Current Facility-Administered Medications:   •  insulin glargine (LANTUS) injection 20 Units, 20 Units, Subcutaneous, DAILY AT NOON, 20 Units at 12/10/18 1205 **AND** insulin lispro (HUMALOG) injection 9 Units, 9 Units, Subcutaneous, TID AC, 9 Units at 12/10/18 1205 **AND** [DISCONTINUED] insulin lispro (HUMALOG) injection 1-6 Units, 1-6 Units, Subcutaneous, TID AC, Stopped at 12/03/18 1700 **AND** Accu-Chek ACHS, , , Q AC AND BEDTIME(S) **AND** NOTIFY MD and PharmD, , , Once **AND** glucose 4 g chewable tablet 16 g, 16 g, Oral, Q15 MIN PRN **AND** dextrose 50% (D50W) injection 25 mL, 25 mL, Intravenous, Q15 MIN PRN, Lewis Hoffmann M.D.  •  metFORMIN (GLUCOPHAGE) tablet 1,000 mg, 1,000 mg, Oral, BID WITH MEALS, Ju Reece M.D., 1,000 mg at 12/10/18 0809  •  lisinopril (PRINIVIL) 10 MG tablet 10 mg, 10 mg, Oral, DAILY, Ju Reece M.D., 10 mg at 12/10/18 0549  •  therapeutic multivitamin-minerals (THERAGRAN-M) tablet 1 Tab, 1 Tab, Oral, DAILY, Brigida Hyde M.D., 1 Tab at 12/10/18 0549  •  senna-docusate (PERICOLACE or SENOKOT S) 8.6-50 MG per tablet 2 Tab, 2 Tab, Oral, BID, 2 Tab at 12/10/18 0550 **AND** polyethylene glycol/lytes (MIRALAX) PACKET 1 Packet, 1 Packet, Oral, QDAY PRN **AND** magnesium hydroxide (MILK OF MAGNESIA) suspension 30 mL, 30 mL, Oral, QDAY PRN **AND** bisacodyl (DULCOLAX) suppository 10 mg, 10 mg, Rectal, QDAY PRN, Wilma Oviedo M.D.  •  acetaminophen (TYLENOL) tablet 650 mg, 650 mg, Oral, Q6HRS PRN, Wilma Oviedo M.D.  •  atorvastatin (LIPITOR) tablet 10 mg, 10 mg,  Oral, DAILY, Wilma Oviedo M.D., 10 mg at 12/10/18 0549  •  vitamin D (cholecalciferol) tablet 2,000 Units, 2,000 Units, Oral, DAILY, Wilma Oviedo M.D., 2,000 Units at 12/10/18 0549  •  omeprazole (PRILOSEC) capsule 20 mg, 20 mg, Oral, QAM, Wilma Oviedo M.D., 20 mg at 12/10/18 0549  •  tamsulosin (FLOMAX) capsule 0.4 mg, 0.4 mg, Oral, AFTER BREAKFAST, Wilma Oviedo M.D., 0.4 mg at 12/10/18 0809  •  ferrous sulfate tablet 325 mg, 325 mg, Oral, TID WITH MEALS, Wilma Oviedo M.D., 325 mg at 12/10/18 1204  •  buPROPion SR (WELLBUTRIN-SR) tablet 150 mg, 150 mg, Oral, BID, Wilma Oviedo M.D., 150 mg at 12/10/18 0549  •  pancrelipase (Lip-Prot-Amyl) (CREON 56763) 08983-22807 units capsule 72,000 Units, 3 Cap, Oral, TID WITH MEALS, Wilma Oviedo M.D., 72,000 Units at 12/10/18 0808  •  pancrelipase (Lip-Prot-Amyl) (CREON 72232) 36294-20530 units capsule 24,000 Units, 1 Cap, Oral, With Snacks PRN **AND** pancrelipase (Lip-Prot-Amyl) (CREON 6000) 6000 units capsule 12,000 Units, 2 Cap, Oral, With Snacks PRN, Wilma Oviedo M.D.    Allergies:  No Known Allergies    Quality Measures  Reviewed items:  EKG reviewed, Labs reviewed, Medications reviewed and Radiology images reviewed  Dill catheter:  No Dill  Central line: No central line  DVT prophylaxis: Mechanical, pharmacological contraindicated due to bleeding risk (history of GI bleed August 2018)            Physical Exam       Vitals:    12/09/18 1630 12/09/18 1900 12/10/18 0400 12/10/18 0800   BP: 106/60 117/63 109/55 133/72   Pulse: 82 (!) 105 73 76   Resp: 16 18 18 17   Temp: 36.8 °C (98.3 °F) 36.4 °C (97.5 °F) 36.4 °C (97.5 °F) 36.4 °C (97.5 °F)   TempSrc: Temporal Temporal Temporal Temporal   SpO2: 94% 94% 97% 93%   Weight:       Height:         Body mass index is 15.77 kg/m².    Oxygen Therapy:  Pulse Oximetry: 93 %, O2 (LPM): 0, O2 Delivery: None (Room Air)    PHYSICAL EXAM: Unchanged since yesterday  Constitutional: CACHECTIC male in no distress.    HENT:   Head: Normocephalic and atraumatic.   Eyes: Pupils are equal, round, and reactive to light. EOM are normal.  PTOSIS of left eyelid (baseline).  Neck: Normal range of motion. Neck supple. No JVD present. No tracheal deviation present. No thyromegaly present.   Cardiovascular: Regular rhythm.  Exam reveals no gallop and no friction rub.    No murmur heard.  Pulmonary/Chest: Effort normal and breath sounds normal. No stridor. No respiratory distress. No wheezes. No rales. No tenderness.   Abdominal: Soft. Bowel sounds are normal. No distension and no mass. There is no tenderness. There is no rebound and no guarding.   Musculoskeletal: Normal range of motion. No edema, tenderness or deformity.   Lymphadenopathy:     No cervical adenopathy.   Neurological: Alert and oriented to person, place, and time. No cranial nerve deficit.   Skin: Skin is warm and dry. No rash noted. No erythema.   Psychiatric: Mood, memory, affect and judgment normal.   Vitals reviewed.      Lab Data Review:         12/3/2018  7:32 AM    Recent Labs      12/08/18 0221 12/09/18 0230  12/10/18   0258   SODIUM  135  138  137   POTASSIUM  4.2  4.4  4.5   CHLORIDE  105  108  108   CO2  24  23  23   BUN  36*  35*  33*   CREATININE  0.86  0.88  0.67   CALCIUM  9.5  9.2  8.9       Recent Labs      12/08/18 0221 12/09/18   0230  12/10/18   0258   GLUCOSE  206*  183*  281*       Recent Labs      12/08/18 0221 12/09/18   0230  12/10/18   0258   RBC  3.42*  3.49*  3.30*   HEMOGLOBIN  10.3*  10.5*  10.2*   HEMATOCRIT  32.8*  33.6*  31.8*   PLATELETCT  250  266  254       Recent Labs      12/08/18 0221 12/09/18   0230  12/10/18   0258   WBC  10.9*  9.8  9.5           Assessment/Plan     Diabetes mellitus (HCC)- (present on admission)   Assessment & Plan    -Some discrepancy on whether patient is T1 or T2DM per chart review.  -Per patient, was diagnosed with DM 4 years ago, initially on insulin, most recently switched to oral meds.  Likely  T2DM given this history.  -Fingerstick glucose checks.  -Hypoglycemia protocol.  -On metformin, glargine, ISS - uptitrate insulin per BGs.  -Check BMP 12/7/2018 to monitor renal function (due to reinitiation of metformin).  -12/2:  Insulin Glargine 18-->20u; premeal Humalog 8u-->9u  -12/3: Blood glucose increasing from 260s to 3-400   -12/4: Blood glucose improving, 0-3 units sliding scale required, range 141-381  12/6:glucose control improving, midday spikes over 300 continue.  A.M. Insulin may be increased if trend continues  -12/8: Sign scale insulin has been stopped, blood glucose control continues to improve without sliding scale coverage.     Chronic pancreatitis (HCC)   Assessment & Plan    -CT abdomen showed extensive calcifications of the pancreas.  -History of protein calorie malnutrition secondary to pancreatic insufficiency.  -Currently on pancrelipase replacement.  -Dietitian following.     History of recent UGI bleed- (present on admission)   Assessment & Plan    -recent Hospital admission 8/2018 for an episode of upper GI bleed  -Patient had EGD done during hospitalization which showed grade D esophagitis, patient had repeat EGD & colonoscopy at 11/20 and EGD notable for localized nodularity of the mucosa at the GE junction about 5-6 mm.  Colonoscopy was also done but there was stool throughout the colon and a repeat was recommended.  Procedures with GI consultants  -We will hold anticoagulation in this setting, SCDs for DVT prophylaxis  -No apparent active bleed, Hb stable     Essential hypertension- (present on admission)   Assessment & Plan    -On home lisinopril.     Hypophosphatemia   Assessment & Plan    -Goal phos >2.5.  -Replete as needed.     Memory loss- (present on admission)   Assessment & Plan    History of TBI with mild cognitive impairment as per chart review  Lives at an assisted living facility called [facility]  Sister is actively involved in care and cell phone number is in the chart      Dyslipidemia- (present on admission)   Assessment & Plan    -On home atorvastatin.     Tobacco abuse- (present on admission)   Assessment & Plan    -Smoking about 1-2 cigarettes/day  -PCP to continue counseling regarding smoking cessation     Vitamin D deficiency- (present on admission)   Assessment & Plan    -Vitamin D level 31.  -On vitamin D supplement.       Leukocytosis   Assessment & Plan    RESOLVING  12/4 CBC: WBC 12.5  Asymptomatic, afebrile, no identified source for infection.  Suspect stress demargination.  No intervention indicated at this time.  If febrile, culture       Disposition  Patient has a guardian.  His previous home refuses to accept him, despite his ability to self administer insulin.  [facility] assisted living will accept him, but he is currently pending a meeting with his guardian 12/10 for resolution of his health insurance lapsing due to an unpaid bill.  Placement remains a barrier to discharge.

## 2018-12-10 NOTE — PROGRESS NOTES
Internal Medicine Interval Note  Note Author: Lewis Hoffmann M.D.     Name Pawel Tatum     1960   Age/Sex 58 y.o. male   MRN [id number]   Code Status Full Code     After 5PM or if no immediate response to page, please call for cross-coverage  Attending/Team: Radha/Javier See Patient List for primary contact information  Call [phone] to page    1st Call - Day Intern (R1):   [place] 2nd Call - Day Sr. Resident (R2/R3):   Johnnie         Reason for interval visit  (Principal Problem)   Diabetic ketoacidosis (HCC)    HPI: 58 y.o. male, past medical history insulin-dependent diabetes mellitus (hemoglobin A1c 17), pancreatic insufficiency, recent esophagitis, history of TBI with mild cognitive impairment, residing [facility] assisted [facility] facility, presents with DKA after medication noncompliance.  Treated with ICU DKA protocol, subsequently transferred to floor, stable.  Per social work, there is concerned about his assisted living facility accepting him in light of the fact they feel he needs more supervision than they can provide.  It is noted that he does not have PT OT needs and is stable medically for discharge.    Interval Problem Daily Status Update  (24 hours)   No complaints.  Self administering insulin under the supervision of the nurse, no issues.  No acute events overnight.  Patient is pending placement.  He has done well off sliding scale insulin.  Insulin glargine increased from 20-21 units, lispro increased from 9-10 units.     Acceptance by group home remains a barrier to discharge.    Pertinent negatives: Denies nausea, vomiting, diarrhea, constipation, syncope, palpitation, shortness of breath, cough, fever, change in bowel or bladder habits, change in appetite.  10 system review of systems otherwise negative.    Consultants/Specialty  None  PCP: JAKE Armando    Disposition  Inpatient for placement.      PMHx:  History reviewed. No pertinent past medical  history.    PSHx:  Past Surgical History:   Procedure Laterality Date   • GASTROSCOPY-ENDO N/A 8/25/2018    Procedure: GASTROSCOPY-ENDO;  Surgeon: Jaswant Frye M.D.;  [facility]: [facility];  Service: Gastroenterology       Medications:    Current Facility-Administered Medications:   •  insulin glargine (LANTUS) injection 20 Units, 20 Units, Subcutaneous, DAILY AT NOON, 20 Units at 12/09/18 1303 **AND** insulin lispro (HUMALOG) injection 9 Units, 9 Units, Subcutaneous, TID AC, 9 Units at 12/09/18 1709 **AND** [DISCONTINUED] insulin lispro (HUMALOG) injection 1-6 Units, 1-6 Units, Subcutaneous, TID AC, Stopped at 12/03/18 1700 **AND** Accu-Chek ACHS, , , Q AC AND BEDTIME(S) **AND** NOTIFY MD and PharmD, , , Once **AND** glucose 4 g chewable tablet 16 g, 16 g, Oral, Q15 MIN PRN **AND** dextrose 50% (D50W) injection 25 mL, 25 mL, Intravenous, Q15 MIN PRN, Lewis Hoffmann M.D.  •  metFORMIN (GLUCOPHAGE) tablet 1,000 mg, 1,000 mg, Oral, BID WITH MEALS, Ju Reece M.D., 1,000 mg at 12/09/18 1712  •  lisinopril (PRINIVIL) 10 MG tablet 10 mg, 10 mg, Oral, DAILY, Ju Reece M.D., 10 mg at 12/10/18 0549  •  therapeutic multivitamin-minerals (THERAGRAN-M) tablet 1 Tab, 1 Tab, Oral, DAILY, Brigida Hyde M.D., 1 Tab at 12/10/18 0549  •  senna-docusate (PERICOLACE or SENOKOT S) 8.6-50 MG per tablet 2 Tab, 2 Tab, Oral, BID, 2 Tab at 12/10/18 0550 **AND** polyethylene glycol/lytes (MIRALAX) PACKET 1 Packet, 1 Packet, Oral, QDAY PRN **AND** magnesium hydroxide (MILK OF MAGNESIA) suspension 30 mL, 30 mL, Oral, QDAY PRN **AND** bisacodyl (DULCOLAX) suppository 10 mg, 10 mg, Rectal, QDAY PRN, Wilma Oviedo M.D.  •  acetaminophen (TYLENOL) tablet 650 mg, 650 mg, Oral, Q6HRS PRN, Wilma Oviedo M.D.  •  atorvastatin (LIPITOR) tablet 10 mg, 10 mg, Oral, DAILY, Wilma Oviedo M.D., 10 mg at 12/10/18 0549  •  vitamin D (cholecalciferol) tablet 2,000 Units, 2,000 Units, Oral, DAILY, Wilma Oviedo M.D., 2,000  Units at 12/10/18 0549  •  omeprazole (PRILOSEC) capsule 20 mg, 20 mg, Oral, QAM, Wilma Oviedo M.D., 20 mg at 12/10/18 0549  •  tamsulosin (FLOMAX) capsule 0.4 mg, 0.4 mg, Oral, AFTER BREAKFAST, Wilma Oviedo M.D., 0.4 mg at 12/09/18 0843  •  ferrous sulfate tablet 325 mg, 325 mg, Oral, TID WITH MEALS, Wilma Oviedo M.D., 325 mg at 12/09/18 1711  •  buPROPion SR (WELLBUTRIN-SR) tablet 150 mg, 150 mg, Oral, BID, Wilma Oviedo M.D., 150 mg at 12/10/18 0549  •  pancrelipase (Lip-Prot-Amyl) (CREON 67907) 08591-82201 units capsule 72,000 Units, 3 Cap, Oral, TID WITH MEALS, Wilma Oviedo M.D., 72,000 Units at 12/09/18 1710  •  pancrelipase (Lip-Prot-Amyl) (CREON 71812) 22917-23059 units capsule 24,000 Units, 1 Cap, Oral, With Snacks PRN **AND** pancrelipase (Lip-Prot-Amyl) (CREON 6000) 6000 units capsule 12,000 Units, 2 Cap, Oral, With Snacks PRN, Wilma Oviedo M.D.    Allergies:  No Known Allergies    Quality Measures  Reviewed items:  EKG reviewed, Labs reviewed, Medications reviewed and Radiology images reviewed  Dill catheter:  No Dill  Central line: No central line  DVT prophylaxis: Mechanical, pharmacological contraindicated due to bleeding risk (history of GI bleed August 2018)            Physical Exam       Vitals:    12/09/18 0729 12/09/18 1630 12/09/18 1900 12/10/18 0400   BP: 124/77 106/60 117/63 109/55   Pulse: 78 82 (!) 105 73   Resp: 17 16 18 18   Temp: 36.3 °C (97.3 °F) 36.8 °C (98.3 °F) 36.4 °C (97.5 °F) 36.4 °C (97.5 °F)   TempSrc: Temporal Temporal Temporal Temporal   SpO2: 95% 94% 94% 97%   Weight:       Height:         Body mass index is 15.77 kg/m².    Oxygen Therapy:  Pulse Oximetry: 97 %, O2 (LPM): 0, O2 Delivery: None (Room Air)    PHYSICAL EXAM: Unchanged since yesterday  Constitutional: CACHECTIC male in no distress.   HENT:   Head: Normocephalic and atraumatic.   Eyes: Pupils are equal, round, and reactive to light. EOM are normal.  PTOSIS of left eyelid (baseline).  Neck: Normal  range of motion. Neck supple. No JVD present. No tracheal deviation present. No thyromegaly present.   Cardiovascular: Regular rhythm.  Exam reveals no gallop and no friction rub.    No murmur heard.  Pulmonary/Chest: Effort normal and breath sounds normal. No stridor. No respiratory distress. No wheezes. No rales. No tenderness.   Abdominal: Soft. Bowel sounds are normal. No distension and no mass. There is no tenderness. There is no rebound and no guarding.   Musculoskeletal: Normal range of motion. No edema, tenderness or deformity.   Lymphadenopathy:     No cervical adenopathy.   Neurological: Alert and oriented to person, place, and time. No cranial nerve deficit.   Skin: Skin is warm and dry. No rash noted. No erythema.   Psychiatric: Mood, memory, affect and judgment normal.   Vitals reviewed.      Lab Data Review:         12/3/2018  7:32 AM    Recent Labs      12/08/18 0221 12/09/18 0230  12/10/18   0258   SODIUM  135  138  137   POTASSIUM  4.2  4.4  4.5   CHLORIDE  105  108  108   CO2  24  23  23   BUN  36*  35*  33*   CREATININE  0.86  0.88  0.67   CALCIUM  9.5  9.2  8.9       Recent Labs      12/08/18 0221 12/09/18   0230  12/10/18   0258   GLUCOSE  206*  183*  281*       Recent Labs      12/08/18 0221 12/09/18   0230  12/10/18   0258   RBC  3.42*  3.49*  3.30*   HEMOGLOBIN  10.3*  10.5*  10.2*   HEMATOCRIT  32.8*  33.6*  31.8*   PLATELETCT  250  266  254       Recent Labs      12/08/18 0221 12/09/18   0230  12/10/18   0258   WBC  10.9*  9.8  9.5           Assessment/Plan     Diabetes mellitus (HCC)  -?Type 2 diabetes onset 4 years ago.  -Fingerstick glucose checks.  -Hypoglycemia protocol.  -On metformin, glargine  -12/2:  Insulin Glargine 18-->20u; premeal Humalog 8u-->9u  -12/8: Sliding scale insulin has been stopped, blood glucose well controlled.  -12/10 blood glucose 123-237.  Glargine increased from 20-21 units daily, lispro increased from 9-10 units 3 times daily.      Tobacco  abuse  -Smoking about 1-2 cigarettes/day  -PCP to continue counseling regarding smoking cessation    Memory loss  History of TBI with mild cognitive impairment as per chart review  Lives at an assisted living facility called [facility]  Sister is actively involved in care and cell phone number is in the chart    History of recent UGI bleed  -recent Hospital admission 8/2018 for an episode of upper GI bleed  -Patient had EGD done during hospitalization which showed grade D esophagitis, patient had repeat EGD & colonoscopy at 11/20 and EGD notable for localized nodularity of the mucosa at the GE junction about 5-6 mm.  Colonoscopy was also done but there was stool throughout the colon and a repeat was recommended.  Procedures with GI consultants  -We will hold anticoagulation in this setting, SCDs for DVT prophylaxis  -No apparent active bleed, Hb stable    Hypotension  -Likely 2/2 hypovolemia in setting of DKA.  -Resolved with IVF.    Hypotension  Strongly suspect a dramatic component of volume depletion  After 3-4 L IV fluids still looks dry this a.m.  Bolus 1 L lactated Ringer's over 4 hours and reassess  Improved    Tobacco abuse  Smoking cessation strongly encouraged again  Query COPD, chest x-ray hyperinflated  Nicotine replacement therapy ongoing  Vaccinate before discharge, flu and pneumonia  Consider outpatient pulmonary function studies in 6-minute walk to completely assess his COPD  May be a candidate for outpatient pulmonary rehab program after he is clinically improved a bit from his malnourished state and debilitated state with rehab at [facility] nursing [facility]    Memory loss  Chronic, mild  Long-term rule out dementia  Multiple metabolic abnormalities may be contributing now  Monitor    Dyslipidemia  Resume home regimen when clinically appropriate  Would not worry about low-fat diet and his current cachectic state for now    Essential hypertension  Secondary to hypotension, holding home regimen  Monitor for  appropriate timing to resume home regimen    Vitamin D deficiency  Replete    History of recent UGI bleed  No bleeding clinically today  Recent endoscopy 11/20  Severe reflux esophagitis  Continue PPI times minimum 8 weeks, may need more prolonged therapy  GI consult as needed, probably does need follow-up GI appointment 1-2 months after discharge  Serial CBC as needed  Transfuse hemoglobin to greater than 7.0, greater than 8.0 if actively bleeding    Hypophosphatemia  Replete  Potassium and magnesium borderline low, replete as well per DKA electrolyte replacement protocols    Chronic pancreatitis (HCC)  CT with extensive calcifications of the pancreas  Patient very cachectic and malnourished appearing  Query refeeding syndrome risk  Aggressive electrolyte repletion  Query absorption issues and pancreatic exocrine insufficiency  Dietary consultation  Vitamin supplementation  Pancreatic enzyme supplements  Stop oral diabetes medications, may be causing adverse issues      -CT abdomen showed extensive calcifications of the pancreas.  -History of protein calorie malnutrition secondary to pancreatic insufficiency.  -Currently on pancrelipase replacement.  -Dietitian following.    Dyslipidemia  -On home atorvastatin.    Vitamin D deficiency  -Vitamin D level 31.  -On vitamin D supplement.    Hypophosphatemia  -Goal phos >2.5.  -Replete as needed.    Essential hypertension  -On home lisinopril.    Acute kidney injury (HCC)  -Cr 1.5 on admission (0.75 on 8/27/2018).  -Likely prerenal 2/2 dehydration in setting of DKA.  -Resolved with IVF.    Hypomagnesemia  -Goal Mg >2.  -Replete as needed.    Hypokalemia  -Goal K >2.  -Replete as needed.    Leukocytosis  RESOLVING  12/4 CBC: WBC 12.5  Asymptomatic, afebrile, no identified source for infection.  Suspect stress demargination.  No intervention indicated at this time.  If febrile, culture        Disposition  Patient has a guardian.  12/3 meeting planned between guardian, next  of kin, and parties involved with placement: Family has found a new home which would better assist patient with his diabetes needs.  Family willing to accept placement in his old home pending acceptance to the new home, unfortunately his previous home will not accept him.  Placement remains a barrier to discharge.

## 2018-12-10 NOTE — PROGRESS NOTES
Pt demonstrated checking FSBS and administering his own insulin x 3 today. Pt performed correctly.

## 2018-12-11 LAB
ANION GAP SERPL CALC-SCNC: 10 MMOL/L (ref 0–11.9)
BUN SERPL-MCNC: 35 MG/DL (ref 8–22)
CALCIUM SERPL-MCNC: 9.4 MG/DL (ref 8.5–10.5)
CHLORIDE SERPL-SCNC: 108 MMOL/L (ref 96–112)
CO2 SERPL-SCNC: 21 MMOL/L (ref 20–33)
CREAT SERPL-MCNC: 0.73 MG/DL (ref 0.5–1.4)
ERYTHROCYTE [DISTWIDTH] IN BLOOD BY AUTOMATED COUNT: 76.6 FL (ref 35.9–50)
GLUCOSE BLD-MCNC: 162 MG/DL (ref 65–99)
GLUCOSE BLD-MCNC: 198 MG/DL (ref 65–99)
GLUCOSE BLD-MCNC: 83 MG/DL (ref 65–99)
GLUCOSE BLD-MCNC: 92 MG/DL (ref 65–99)
GLUCOSE SERPL-MCNC: 126 MG/DL (ref 65–99)
HCT VFR BLD AUTO: 31.8 % (ref 42–52)
HGB BLD-MCNC: 10.1 G/DL (ref 14–18)
MCH RBC QN AUTO: 30.4 PG (ref 27–33)
MCHC RBC AUTO-ENTMCNC: 31.8 G/DL (ref 33.7–35.3)
MCV RBC AUTO: 95.8 FL (ref 81.4–97.8)
PLATELET # BLD AUTO: 247 K/UL (ref 164–446)
PMV BLD AUTO: 9.4 FL (ref 9–12.9)
POTASSIUM SERPL-SCNC: 4.3 MMOL/L (ref 3.6–5.5)
RBC # BLD AUTO: 3.32 M/UL (ref 4.7–6.1)
SODIUM SERPL-SCNC: 139 MMOL/L (ref 135–145)
WBC # BLD AUTO: 9.7 K/UL (ref 4.8–10.8)

## 2018-12-11 PROCEDURE — 700102 HCHG RX REV CODE 250 W/ 637 OVERRIDE(OP): Performed by: INTERNAL MEDICINE

## 2018-12-11 PROCEDURE — 700102 HCHG RX REV CODE 250 W/ 637 OVERRIDE(OP): Performed by: STUDENT IN AN ORGANIZED HEALTH CARE EDUCATION/TRAINING PROGRAM

## 2018-12-11 PROCEDURE — 85027 COMPLETE CBC AUTOMATED: CPT

## 2018-12-11 PROCEDURE — 80048 BASIC METABOLIC PNL TOTAL CA: CPT

## 2018-12-11 PROCEDURE — 770006 HCHG ROOM/CARE - MED/SURG/GYN SEMI*

## 2018-12-11 PROCEDURE — 99231 SBSQ HOSP IP/OBS SF/LOW 25: CPT | Mod: GC | Performed by: INTERNAL MEDICINE

## 2018-12-11 PROCEDURE — 82962 GLUCOSE BLOOD TEST: CPT

## 2018-12-11 PROCEDURE — A9270 NON-COVERED ITEM OR SERVICE: HCPCS | Performed by: STUDENT IN AN ORGANIZED HEALTH CARE EDUCATION/TRAINING PROGRAM

## 2018-12-11 PROCEDURE — 36415 COLL VENOUS BLD VENIPUNCTURE: CPT

## 2018-12-11 RX ORDER — INSULIN GLARGINE 100 [IU]/ML
23 INJECTION, SOLUTION SUBCUTANEOUS
Status: DISCONTINUED | OUTPATIENT
Start: 2018-12-12 | End: 2018-12-11

## 2018-12-11 RX ORDER — DEXTROSE MONOHYDRATE 25 G/50ML
25 INJECTION, SOLUTION INTRAVENOUS
Status: DISCONTINUED | OUTPATIENT
Start: 2018-12-11 | End: 2018-12-11

## 2018-12-11 RX ORDER — DEXTROSE MONOHYDRATE 25 G/50ML
25 INJECTION, SOLUTION INTRAVENOUS
Status: DISCONTINUED | OUTPATIENT
Start: 2018-12-11 | End: 2018-12-12

## 2018-12-11 RX ORDER — INSULIN GLARGINE 100 [IU]/ML
25 INJECTION, SOLUTION SUBCUTANEOUS
Status: DISCONTINUED | OUTPATIENT
Start: 2018-12-11 | End: 2018-12-11

## 2018-12-11 RX ORDER — INSULIN GLARGINE 100 [IU]/ML
20 INJECTION, SOLUTION SUBCUTANEOUS
Status: DISCONTINUED | OUTPATIENT
Start: 2018-12-12 | End: 2018-12-12

## 2018-12-11 RX ADMIN — METFORMIN HYDROCHLORIDE 1000 MG: 500 TABLET ORAL at 17:16

## 2018-12-11 RX ADMIN — METFORMIN HYDROCHLORIDE 1000 MG: 500 TABLET ORAL at 08:19

## 2018-12-11 RX ADMIN — VITAMIN D, TAB 1000IU (100/BT) 2000 UNITS: 25 TAB at 06:40

## 2018-12-11 RX ADMIN — MULTIPLE VITAMINS W/ MINERALS TAB 1 TABLET: TAB at 06:40

## 2018-12-11 RX ADMIN — PANCRELIPASE 72000 UNITS: 24000; 76000; 120000 CAPSULE, DELAYED RELEASE PELLETS ORAL at 12:04

## 2018-12-11 RX ADMIN — PANCRELIPASE 72000 UNITS: 24000; 76000; 120000 CAPSULE, DELAYED RELEASE PELLETS ORAL at 08:19

## 2018-12-11 RX ADMIN — BUPROPION HYDROCHLORIDE 150 MG: 150 TABLET, EXTENDED RELEASE ORAL at 17:16

## 2018-12-11 RX ADMIN — BUPROPION HYDROCHLORIDE 150 MG: 150 TABLET, EXTENDED RELEASE ORAL at 06:40

## 2018-12-11 RX ADMIN — Medication 325 MG: at 08:19

## 2018-12-11 RX ADMIN — Medication 325 MG: at 12:03

## 2018-12-11 RX ADMIN — Medication 325 MG: at 17:23

## 2018-12-11 RX ADMIN — LISINOPRIL 10 MG: 10 TABLET ORAL at 06:40

## 2018-12-11 RX ADMIN — ATORVASTATIN CALCIUM 10 MG: 10 TABLET, FILM COATED ORAL at 06:40

## 2018-12-11 RX ADMIN — OMEPRAZOLE 20 MG: 20 CAPSULE, DELAYED RELEASE ORAL at 06:40

## 2018-12-11 RX ADMIN — PANCRELIPASE 72000 UNITS: 24000; 76000; 120000 CAPSULE, DELAYED RELEASE PELLETS ORAL at 17:17

## 2018-12-11 RX ADMIN — TAMSULOSIN HYDROCHLORIDE 0.4 MG: 0.4 CAPSULE ORAL at 08:19

## 2018-12-11 RX ADMIN — INSULIN GLARGINE 25 UNITS: 100 INJECTION, SOLUTION SUBCUTANEOUS at 12:05

## 2018-12-11 RX ADMIN — STANDARDIZED SENNA CONCENTRATE AND DOCUSATE SODIUM 2 TABLET: 8.6; 5 TABLET, FILM COATED ORAL at 06:40

## 2018-12-11 ASSESSMENT — PAIN SCALES - GENERAL
PAINLEVEL_OUTOF10: 0
PAINLEVEL_OUTOF10: 0

## 2018-12-11 NOTE — PROGRESS NOTES
Internal Medicine Interval Note  Note Author: Igor Blair M.D.     Name Pawel Tatum     1960   Age/Sex 58 y.o. male   MRN [id number]   Code Status Full Code     After 5PM or if no immediate response to page, please call for cross-coverage  Attending/Team: Radha/Javier See Patient List for primary contact information  Call [phone] to page    1st Call - Day Intern (R1):   [place] 2nd Call - Day Sr. Resident (R2/R3):   Johnnie         Reason for interval visit  (Principal Problem)   Diabetic ketoacidosis (HCC)    HPI: 58 y.o. male, past medical history insulin-dependent diabetes mellitus (hemoglobin A1c 17), pancreatic insufficiency, recent esophagitis, history of TBI with mild cognitive impairment, residing [facility] facility, presents with DKA after medication noncompliance.  Treated with ICU DKA protocol, subsequently transferred to floor, stable.  Per social work, there is concerned about his assisted living facility accepting him in light of the fact they feel he needs more supervision than they can provide.  It is noted that he does not have PT OT needs and is stable medically for discharge.    Interval Problem Daily Status Update  (24 hours)   No complaints.  Self administering insulin under the supervision of the nurse, no issues.  Still patient is pending placement. Keep Insulin lantus 20 units, lispro 10 units. Will watch for now, glucose getting better, this morning glucose is 92 may need to increase only the short acting in the future.     Acceptance by group home remains a barrier to discharge.    Pertinent negatives: Denies nausea, vomiting, diarrhea, constipation, syncope, palpitation, shortness of breath, cough, fever, change in bowel or bladder habits, change in appetite.  10 system review of systems otherwise negative.    Consultants/Specialty  None  PCP: JAKE Armando    Disposition  Inpatient for placement.      PMHx:  History reviewed. No  pertinent past medical history.    PSHx:  Past Surgical History:   Procedure Laterality Date   • GASTROSCOPY-ENDO N/A 8/25/2018    Procedure: GASTROSCOPY-ENDO;  Surgeon: Jaswant Frye M.D.;  Location: ENDOSCOPY [facility];  Service: Gastroenterology       Medications:    Current Facility-Administered Medications:   •  [START ON 12/12/2018] insulin glargine (LANTUS) injection 23 Units, 23 Units, Subcutaneous, DAILY AT NOON **AND** insulin lispro (HUMALOG) injection 10 Units, 10 Units, Subcutaneous, TID AC **AND** [DISCONTINUED] insulin lispro (HUMALOG) injection 1-6 Units, 1-6 Units, Subcutaneous, TID AC, Stopped at 12/03/18 1700 **AND** Accu-Chek ACHS, , , Q AC AND BEDTIME(S) **AND** NOTIFY MD and PharmD, , , Once **AND** glucose 4 g chewable tablet 16 g, 16 g, Oral, Q15 MIN PRN **AND** dextrose 50% (D50W) injection 25 mL, 25 mL, Intravenous, Q15 MIN PRN, Dario Herrera M.D.  •  metFORMIN (GLUCOPHAGE) tablet 1,000 mg, 1,000 mg, Oral, BID WITH MEALS, Ju Reece M.D., 1,000 mg at 12/11/18 0819  •  lisinopril (PRINIVIL) 10 MG tablet 10 mg, 10 mg, Oral, DAILY, Ju Reece M.D., 10 mg at 12/11/18 0640  •  therapeutic multivitamin-minerals (THERAGRAN-M) tablet 1 Tab, 1 Tab, Oral, DAILY, Brigida Hyde M.D., 1 Tab at 12/11/18 0640  •  senna-docusate (PERICOLACE or SENOKOT S) 8.6-50 MG per tablet 2 Tab, 2 Tab, Oral, BID, 2 Tab at 12/11/18 0640 **AND** polyethylene glycol/lytes (MIRALAX) PACKET 1 Packet, 1 Packet, Oral, QDAY PRN **AND** magnesium hydroxide (MILK OF MAGNESIA) suspension 30 mL, 30 mL, Oral, QDAY PRN **AND** bisacodyl (DULCOLAX) suppository 10 mg, 10 mg, Rectal, QDAY PRN, Wilma Oviedo M.D.  •  acetaminophen (TYLENOL) tablet 650 mg, 650 mg, Oral, Q6HRS PRN, Wilma Oviedo M.D.  •  atorvastatin (LIPITOR) tablet 10 mg, 10 mg, Oral, DAILY, Wilma Oviedo M.D., 10 mg at 12/11/18 0640  •  vitamin D (cholecalciferol) tablet 2,000 Units, 2,000 Units, Oral, DAILY, Wilma Oviedo M.D., 2,000 Units  at 12/11/18 0640  •  omeprazole (PRILOSEC) capsule 20 mg, 20 mg, Oral, QAM, Wilma Oviedo M.D., 20 mg at 12/11/18 0640  •  tamsulosin (FLOMAX) capsule 0.4 mg, 0.4 mg, Oral, AFTER BREAKFAST, Wilma Oviedo M.D., 0.4 mg at 12/11/18 0819  •  ferrous sulfate tablet 325 mg, 325 mg, Oral, TID WITH MEALS, Wilma Oviedo M.D., 325 mg at 12/11/18 1203  •  buPROPion SR (WELLBUTRIN-SR) tablet 150 mg, 150 mg, Oral, BID, Wilma Oviedo M.D., 150 mg at 12/11/18 0640  •  pancrelipase (Lip-Prot-Amyl) (CREON 16073) 58383-43148 units capsule 72,000 Units, 3 Cap, Oral, TID WITH MEALS, Wilma Oviedo M.D., 72,000 Units at 12/11/18 1204  •  pancrelipase (Lip-Prot-Amyl) (CREON 27488) 83591-67653 units capsule 24,000 Units, 1 Cap, Oral, With Snacks PRN **AND** pancrelipase (Lip-Prot-Amyl) (CREON 6000) 6000 units capsule 12,000 Units, 2 Cap, Oral, With Snacks PRN, Wilma Oviedo M.D.    Allergies:  No Known Allergies    Quality Measures  Reviewed items:  EKG reviewed, Labs reviewed, Medications reviewed and Radiology images reviewed  Dill catheter:  No Dill  Central line: No central line  DVT prophylaxis: Mechanical, pharmacological contraindicated due to bleeding risk (history of GI bleed August 2018)      Physical Exam     Vitals:    12/10/18 1600 12/10/18 1905 12/11/18 0345 12/11/18 0700   BP: 108/60 138/82 125/70 131/74   Pulse: 93 94 65 77   Resp: 17 16 16 20   Temp: 36.4 °C (97.5 °F) 37.1 °C (98.8 °F) 36.4 °C (97.5 °F) 36.8 °C (98.2 °F)   TempSrc: Temporal Temporal Temporal Temporal   SpO2: 94% 95% 96% 94%   Weight:       Height:         Body mass index is 15.77 kg/m².    Oxygen Therapy:  Pulse Oximetry: 94 %, O2 Delivery: None (Room Air)    PHYSICAL EXAM: Unchanged since yesterday  Constitutional: CACHECTIC male in no distress.   HENT:   Head: Normocephalic and atraumatic.   Eyes: Pupils are equal, round, and reactive to light. EOM are normal.  PTOSIS of left eyelid (baseline).  Neck: Normal range of motion. Neck supple. No  JVD present. No tracheal deviation present. No thyromegaly present.   Cardiovascular: Regular rhythm.  Exam reveals no gallop and no friction rub.    No murmur heard.  Pulmonary/Chest: Effort normal and breath sounds normal. No stridor. No respiratory distress. No wheezes. No rales. No tenderness.   Abdominal: Soft. Bowel sounds are normal. No distension and no mass. There is no tenderness. There is no rebound and no guarding.   Musculoskeletal: Normal range of motion. No edema, tenderness or deformity.   Lymphadenopathy:     No cervical adenopathy.   Neurological: Alert and oriented to person, place, and time. No cranial nerve deficit.   Skin: Skin is warm and dry. No rash noted. No erythema.   Psychiatric: Mood, memory, affect and judgment normal.   Vitals reviewed.      Lab Data Review:         12/3/2018  7:32 AM    Recent Labs      12/09/18 0230  12/10/18   0258  12/11/18   0102   SODIUM  138  137  139   POTASSIUM  4.4  4.5  4.3   CHLORIDE  108  108  108   CO2  23  23  21   BUN  35*  33*  35*   CREATININE  0.88  0.67  0.73   CALCIUM  9.2  8.9  9.4       Recent Labs      12/09/18   0230  12/10/18   0258  12/11/18   0102   GLUCOSE  183*  281*  126*       Recent Labs      12/09/18   0230  12/10/18   0258  12/11/18   0102   RBC  3.49*  3.30*  3.32*   HEMOGLOBIN  10.5*  10.2*  10.1*   HEMATOCRIT  33.6*  31.8*  31.8*   PLATELETCT  266  254  247       Recent Labs      12/09/18 0230  12/10/18   0258  12/11/18   0102   WBC  9.8  9.5  9.7           Assessment/Plan     Diabetes mellitus (HCC)  -?Type 2 diabetes onset 4 years ago.  -Fingerstick glucose checks.  -Hypoglycemia protocol.  -On metformin   - 12/11/2018  Insulin Glargine 20u; premeal Humalog 10 U  - patient learned how to inject insulin, practicing daily      Tobacco abuse  -Smoking about 1-2 cigarettes/day  -PCP to continue counseling regarding smoking cessation    Memory loss  History of TBI with mild cognitive impairment as per chart review  Lives at an  assisted living facility called [facility]  Still pending placement for location accept inject insulin       History of recent UGI bleed  -recent Hospital admission 8/2018 for an episode of upper GI bleed  -Patient had EGD done during hospitalization which showed grade D esophagitis, patient had repeat EGD & colonoscopy at 11/20 and EGD notable for localized nodularity of the mucosa at the GE junction about 5-6 mm.  Colonoscopy was also done but there was stool throughout the colon and a repeat was recommended.  Procedures with GI consultants  -We will hold anticoagulation in this setting, SCDs for DVT prophylaxis  -No apparent active bleed, Hb stable    Hypotension  -Likely 2/2 hypovolemia in setting of DKA.  -Resolved with IVF.    Hypotension  Strongly suspect a dramatic component of volume depletion  After 3-4 L IV fluids still looks dry this a.m.  Bolus 1 L lactated Ringer's over 4 hours and reassess  Improved    Tobacco abuse  Smoking cessation strongly encouraged again  Query COPD, chest x-ray hyperinflated  Nicotine replacement therapy ongoing  Vaccinate before discharge, flu and pneumonia  Consider outpatient pulmonary function studies in 6-minute walk to completely assess his COPD  May be a candidate for outpatient pulmonary rehab program after he is clinically improved a bit from his malnourished state and debilitated state with rehab at skilled nursing first      Dyslipidemia  Resume home regimen when clinically appropriate  Would not worry about low-fat diet and his current cachectic state for now    Essential hypertension  Secondary to hypotension, holding home regimen  Monitor for appropriate timing to resume home regimen    Vitamin D deficiency  Replete    History of recent UGI bleed  No bleeding clinically today  Recent endoscopy 11/20  Severe reflux esophagitis  Continue PPI times minimum 8 weeks, may need more prolonged therapy  GI consult as needed, probably does need follow-up GI appointment 1-2  months after discharge  Serial CBC as needed  Transfuse hemoglobin to greater than 7.0, greater than 8.0 if actively bleeding    Hypophosphatemia  Replete  Potassium and magnesium borderline low, replete as well per DKA electrolyte replacement protocols    Chronic pancreatitis (HCC)  CT with extensive calcifications of the pancreas  Vitamin supplementation  Pancreatic enzyme supplements     Dyslipidemia  -On home atorvastatin.    Vitamin D deficiency  -Vitamin D level 31.  -On vitamin D supplement.    Hypophosphatemia  -Goal phos >2.5.  -Replete as needed.    Essential hypertension  -On home lisinopril.    Acute kidney injury (HCC)  -Cr 1.5 on admission (0.75 on 8/27/2018).  -Likely prerenal 2/2 dehydration in setting of DKA.  -Resolved with IVF.    Hypomagnesemia  -Goal Mg >2.  -Replete as needed.    Hypokalemia  -Goal K >2.  -Replete as needed.    Leukocytosis  RESOLVING  12/4 CBC: WBC 12.5  Asymptomatic, afebrile, no identified source for infection.  Suspect stress demargination.  No intervention indicated at this time.  If febrile, culture    Disposition  Patient has a guardian.  12/3 meeting planned between guardian, next of kin, and parties involved with placement: Family has found a new home which would better assist patient with his diabetes needs.  Family willing to accept placement in his old home pending acceptance to the new home, unfortunately his previous home will not accept him.  Placement remains a barrier to discharge.

## 2018-12-11 NOTE — CARE PLAN
Problem: Safety  Goal: Will remain free from injury  Outcome: PROGRESSING AS EXPECTED  Goal being met, safety teaching reinforced.  Diabetic teaching reinforced

## 2018-12-11 NOTE — CARE PLAN
Problem: Bowel/Gastric:  Goal: Normal bowel function is maintained or improved  Outcome: PROGRESSING AS EXPECTED  BMx1    Problem: Metabolic:  Goal: Ability to maintain appropriate glucose levels will improve  Outcome: PROGRESSING AS EXPECTED  Insulin given per MAR, no s.s of complication, CTM

## 2018-12-11 NOTE — PROGRESS NOTES
Assumed care at 1430  PT A&Ox4, denies pain, ambulates independently   PT moved his bowels 2x this shift, refused sennakot  PT able to demonstrate checking his own blood sugar with glucometer  PT able to draw up ordered 10 units of humalog with verbal direction and supervision   Fall precautions in place, hourly rounding done, updated on plan of care

## 2018-12-12 LAB
ANION GAP SERPL CALC-SCNC: 8 MMOL/L (ref 0–11.9)
BUN SERPL-MCNC: 29 MG/DL (ref 8–22)
CALCIUM SERPL-MCNC: 8.7 MG/DL (ref 8.5–10.5)
CHLORIDE SERPL-SCNC: 109 MMOL/L (ref 96–112)
CO2 SERPL-SCNC: 19 MMOL/L (ref 20–33)
CREAT SERPL-MCNC: 0.72 MG/DL (ref 0.5–1.4)
ERYTHROCYTE [DISTWIDTH] IN BLOOD BY AUTOMATED COUNT: 79.7 FL (ref 35.9–50)
GLUCOSE BLD-MCNC: 148 MG/DL (ref 65–99)
GLUCOSE BLD-MCNC: 181 MG/DL (ref 65–99)
GLUCOSE BLD-MCNC: 202 MG/DL (ref 65–99)
GLUCOSE BLD-MCNC: 286 MG/DL (ref 65–99)
GLUCOSE SERPL-MCNC: 213 MG/DL (ref 65–99)
HCT VFR BLD AUTO: 34 % (ref 42–52)
HGB BLD-MCNC: 10.8 G/DL (ref 14–18)
MCH RBC QN AUTO: 30.8 PG (ref 27–33)
MCHC RBC AUTO-ENTMCNC: 31.8 G/DL (ref 33.7–35.3)
MCV RBC AUTO: 96.9 FL (ref 81.4–97.8)
PLATELET # BLD AUTO: 257 K/UL (ref 164–446)
PMV BLD AUTO: 9.3 FL (ref 9–12.9)
POTASSIUM SERPL-SCNC: 4.4 MMOL/L (ref 3.6–5.5)
RBC # BLD AUTO: 3.51 M/UL (ref 4.7–6.1)
SODIUM SERPL-SCNC: 136 MMOL/L (ref 135–145)
WBC # BLD AUTO: 8.6 K/UL (ref 4.8–10.8)

## 2018-12-12 PROCEDURE — 99231 SBSQ HOSP IP/OBS SF/LOW 25: CPT | Mod: GC | Performed by: INTERNAL MEDICINE

## 2018-12-12 PROCEDURE — 36415 COLL VENOUS BLD VENIPUNCTURE: CPT

## 2018-12-12 PROCEDURE — 80048 BASIC METABOLIC PNL TOTAL CA: CPT

## 2018-12-12 PROCEDURE — A9270 NON-COVERED ITEM OR SERVICE: HCPCS | Performed by: STUDENT IN AN ORGANIZED HEALTH CARE EDUCATION/TRAINING PROGRAM

## 2018-12-12 PROCEDURE — 770006 HCHG ROOM/CARE - MED/SURG/GYN SEMI*

## 2018-12-12 PROCEDURE — 700102 HCHG RX REV CODE 250 W/ 637 OVERRIDE(OP): Performed by: STUDENT IN AN ORGANIZED HEALTH CARE EDUCATION/TRAINING PROGRAM

## 2018-12-12 PROCEDURE — 85027 COMPLETE CBC AUTOMATED: CPT

## 2018-12-12 PROCEDURE — 82962 GLUCOSE BLOOD TEST: CPT | Mod: 91

## 2018-12-12 PROCEDURE — 700102 HCHG RX REV CODE 250 W/ 637 OVERRIDE(OP): Performed by: INTERNAL MEDICINE

## 2018-12-12 RX ORDER — INSULIN GLARGINE 100 [IU]/ML
20 INJECTION, SOLUTION SUBCUTANEOUS EVERY EVENING
Status: DISCONTINUED | OUTPATIENT
Start: 2018-12-12 | End: 2018-12-17

## 2018-12-12 RX ORDER — DEXTROSE MONOHYDRATE 25 G/50ML
25 INJECTION, SOLUTION INTRAVENOUS
Status: DISCONTINUED | OUTPATIENT
Start: 2018-12-12 | End: 2018-12-17

## 2018-12-12 RX ADMIN — PANCRELIPASE 72000 UNITS: 24000; 76000; 120000 CAPSULE, DELAYED RELEASE PELLETS ORAL at 08:13

## 2018-12-12 RX ADMIN — METFORMIN HYDROCHLORIDE 1000 MG: 500 TABLET ORAL at 18:31

## 2018-12-12 RX ADMIN — INSULIN LISPRO 10 UNITS: 100 INJECTION, SOLUTION INTRAVENOUS; SUBCUTANEOUS at 12:20

## 2018-12-12 RX ADMIN — METFORMIN HYDROCHLORIDE 1000 MG: 500 TABLET ORAL at 08:12

## 2018-12-12 RX ADMIN — ATORVASTATIN CALCIUM 10 MG: 10 TABLET, FILM COATED ORAL at 05:03

## 2018-12-12 RX ADMIN — PANCRELIPASE 72000 UNITS: 24000; 76000; 120000 CAPSULE, DELAYED RELEASE PELLETS ORAL at 18:32

## 2018-12-12 RX ADMIN — OMEPRAZOLE 20 MG: 20 CAPSULE, DELAYED RELEASE ORAL at 05:03

## 2018-12-12 RX ADMIN — Medication 325 MG: at 18:32

## 2018-12-12 RX ADMIN — INSULIN GLARGINE 20 UNITS: 100 INJECTION, SOLUTION SUBCUTANEOUS at 18:35

## 2018-12-12 RX ADMIN — LISINOPRIL 10 MG: 10 TABLET ORAL at 05:03

## 2018-12-12 RX ADMIN — BUPROPION HYDROCHLORIDE 150 MG: 150 TABLET, EXTENDED RELEASE ORAL at 18:32

## 2018-12-12 RX ADMIN — Medication 325 MG: at 08:13

## 2018-12-12 RX ADMIN — INSULIN LISPRO 10 UNITS: 100 INJECTION, SOLUTION INTRAVENOUS; SUBCUTANEOUS at 18:36

## 2018-12-12 RX ADMIN — PANCRELIPASE 72000 UNITS: 24000; 76000; 120000 CAPSULE, DELAYED RELEASE PELLETS ORAL at 12:19

## 2018-12-12 RX ADMIN — VITAMIN D, TAB 1000IU (100/BT) 2000 UNITS: 25 TAB at 05:03

## 2018-12-12 RX ADMIN — MULTIPLE VITAMINS W/ MINERALS TAB 1 TABLET: TAB at 05:03

## 2018-12-12 RX ADMIN — TAMSULOSIN HYDROCHLORIDE 0.4 MG: 0.4 CAPSULE ORAL at 08:13

## 2018-12-12 RX ADMIN — Medication 325 MG: at 12:18

## 2018-12-12 RX ADMIN — BUPROPION HYDROCHLORIDE 150 MG: 150 TABLET, EXTENDED RELEASE ORAL at 05:03

## 2018-12-12 ASSESSMENT — PAIN SCALES - GENERAL
PAINLEVEL_OUTOF10: 0
PAINLEVEL_OUTOF10: 0

## 2018-12-12 NOTE — CARE PLAN
Problem: Communication  Goal: The ability to communicate needs accurately and effectively will improve    Intervention: Educate patient and significant other/support system about the plan of care, procedures, treatments, medications and allow for questions  Educated pt on plan of care, scheduled medications, and blood sugar check/insulin; pending placement. Pt acknowledges understanding. Pt able to make needs known.      Problem: Knowledge Deficit:  Goal: Knowledge of the prescribed therapeutic regimen will improve    Intervention: Provide patient or significant other/support system with Diabetes information and educate on diet, medication administration, blood sugar monitoring, foot care,  signs and symptoms of a blood sugar imbalance and what to do, and document in education tab  Pt able to demonstrate blood sugar check. Educated pt on blood sugar levels, pt acknowledged understanding. Educational material at bedside for nutritional guidelines.       Problem: Knowledge Deficit:  Goal: Knowledge of the prescribed therapeutic regimen will improve    Intervention: Provide patient or significant other/support system with Diabetes information and educate on diet, medication administration, blood sugar monitoring, foot care,  signs and symptoms of a blood sugar imbalance and what to do, and document in education tab  Pt able to demonstrate blood sugar check. Educated pt on blood sugar levels, pt acknowledged understanding. Educational material at bedside for nutritional guidelines.

## 2018-12-12 NOTE — DISCHARGE PLANNING
Anticipated Discharge Disposition: Group Home    Action: PC from Ana at [facility], stated she has talked to Yessi at [facility].  They are willing to take the patient, have concerns.  Requesting to see if a private nurse could come daily to check patient's blood sugar levels for at least thirty days. As they do not feel patient's blood sugars are stable at this time.    PC to [place], private nurse unable to do insulin due to time commitment.     PC to [facility], [phone], talked to Bev.  After explaining reason for call, suggested I contact Maxwell who is a private duty nurse at  [phone].  PC to Maxwell [phone]; went over concerns of group home, and what they are requesting. Maxwell requested patients current insulin regime, provided to him.  Maxwell will contact Ana at [facility].    Barriers to Discharge: group home acceptance    Plan: follow up with [facility]

## 2018-12-12 NOTE — PROGRESS NOTES
Assumed care of pt, discussed plan of care. A&Ox4. RA. Denies pain. Last BM, 12/11. Continent of bowel, bladder. Up-self. On diabetic diet. No PIV. Fall precautions in place; bed lowest position, treaded socks on, call light within reach. All needs met at this time.    Pt was able to demonstrate checking his blood sugar with supervision.

## 2018-12-12 NOTE — CARE PLAN
Problem: Bowel/Gastric:  Goal: Normal bowel function is maintained or improved  Outcome: PROGRESSING AS EXPECTED  BMx1 daily      Problem: Metabolic:  Goal: Ability to maintain appropriate glucose levels will improve  Outcome: PROGRESSING AS EXPECTED  FSBS checked, insulin given per MAR, CTM

## 2018-12-12 NOTE — PROGRESS NOTES
Internal Medicine Interval Note  Note Author: Lewis Hoffmann M.D.     Name Pawel Tatum     1960   Age/Sex 58 y.o. male   MRN [id number]   Code Status Full Code     After 5PM or if no immediate response to page, please call for cross-coverage  Attending/Team: Radha/Javier See Patient List for primary contact information  Call [phone] to page    1st Call - Day Intern (R1):   [place] 2nd Call - Day Sr. Resident (R2/R3):   Johnnie         Reason for interval visit  (Principal Problem)   Diabetic ketoacidosis (HCC)    HPI: 58 y.o. male, past medical history insulin-dependent diabetes mellitus (hemoglobin A1c 17), pancreatic insufficiency, recent esophagitis, history of TBI with mild cognitive impairment, residing [facility], presents with DKA after medication noncompliance.  Treated with ICU DKA protocol, subsequently transferred to floor, stable.  Per social work, there is concerned about his assisted living facility accepting him in light of the fact they feel he needs more supervision than they can provide.  It is noted that he does not have PT OT needs and is stable medically for discharge.    Interval Problem Daily Status Update  (24 hours)   No complaints.  Self administering insulin under the supervision of the nurse, no issues.  No acute events overnight.  Patient is pending placement.  12/10: Insulin glargine increased from 20-21 units, lispro increased from 9-10 units.  Glycemic control improved since increase.     Acceptance by group home remains a barrier to discharge.    Pertinent negatives: Denies nausea, vomiting, diarrhea, constipation, syncope, palpitation, shortness of breath, cough, fever, change in bowel or bladder habits, change in appetite.  10 system review of systems otherwise negative.    Consultants/Specialty  None  PCP: JAKE Armando    Disposition  Inpatient for placement.      PMHx:  History reviewed. No pertinent past medical  history.    PSHx:  Past Surgical History:   Procedure Laterality Date   • GASTROSCOPY-ENDO N/A 8/25/2018    Procedure: GASTROSCOPY-ENDO;  Surgeon: Jaswant Frye M.D.;  Location: END[facility]COPY [facility];  Service: Gastroenterology       Medications:    Current Facility-Administered Medications:   •  insulin glargine (LANTUS) injection 20 Units, 20 Units, Subcutaneous, DAILY AT NOON **AND** insulin lispro (HUMALOG) injection 10 Units, 10 Units, Subcutaneous, TID AC, 10 Units at 12/12/18 0819 **AND** [DISCONTINUED] insulin lispro (HUMALOG) injection 1-6 Units, 1-6 Units, Subcutaneous, TID AC, Stopped at 12/03/18 1700 **AND** Accu-Chek ACHS, , , Q AC AND BEDTIME(S) **AND** NOTIFY MD and PharmD, , , Once **AND** glucose 4 g chewable tablet 16 g, 16 g, Oral, Q15 MIN PRN **AND** dextrose 50% (D50W) injection 25 mL, 25 mL, Intravenous, Q15 MIN PRN, Igor Blair M.D.  •  metFORMIN (GLUCOPHAGE) tablet 1,000 mg, 1,000 mg, Oral, BID WITH MEALS, Ju Reece M.D., 1,000 mg at 12/12/18 0812  •  lisinopril (PRINIVIL) 10 MG tablet 10 mg, 10 mg, Oral, DAILY, Ju Reece M.D., 10 mg at 12/12/18 0503  •  therapeutic multivitamin-minerals (THERAGRAN-M) tablet 1 Tab, 1 Tab, Oral, DAILY, Brigida Hyde M.D., 1 Tab at 12/12/18 0503  •  senna-docusate (PERICOLACE or SENOKOT S) 8.6-50 MG per tablet 2 Tab, 2 Tab, Oral, BID, 2 Tab at 12/11/18 0640 **AND** polyethylene glycol/lytes (MIRALAX) PACKET 1 Packet, 1 Packet, Oral, QDAY PRN **AND** magnesium hydroxide (MILK OF MAGNESIA) suspension 30 mL, 30 mL, Oral, QDAY PRN **AND** bisacodyl (DULCOLAX) suppository 10 mg, 10 mg, Rectal, QDAY PRN, Wilma Oviedo M.D.  •  acetaminophen (TYLENOL) tablet 650 mg, 650 mg, Oral, Q6HRS PRN, Wilma Oviedo M.D.  •  atorvastatin (LIPITOR) tablet 10 mg, 10 mg, Oral, DAILY, Wilma Oviedo M.D., 10 mg at 12/12/18 0503  •  vitamin D (cholecalciferol) tablet 2,000 Units, 2,000 Units, Oral, DAILY, Wilma Oviedo M.D., 2,000 Units at 12/12/18  0503  •  omeprazole (PRILOSEC) capsule 20 mg, 20 mg, Oral, QAM, Wilma Oviedo M.D., 20 mg at 12/12/18 0503  •  tamsulosin (FLOMAX) capsule 0.4 mg, 0.4 mg, Oral, AFTER BREAKFAST, Wilma Oviedo M.D., 0.4 mg at 12/12/18 0813  •  ferrous sulfate tablet 325 mg, 325 mg, Oral, TID WITH MEALS, Wilma Oviedo M.D., 325 mg at 12/12/18 0813  •  buPROPion SR (WELLBUTRIN-SR) tablet 150 mg, 150 mg, Oral, BID, Wilma Oviedo M.D., 150 mg at 12/12/18 0503  •  pancrelipase (Lip-Prot-Amyl) (CREON 58912) 21889-96073 units capsule 72,000 Units, 3 Cap, Oral, TID WITH MEALS, Wilma Oviedo M.D., 72,000 Units at 12/12/18 0813  •  pancrelipase (Lip-Prot-Amyl) (CREON 47006) 66588-70160 units capsule 24,000 Units, 1 Cap, Oral, With Snacks PRN **AND** pancrelipase (Lip-Prot-Amyl) (CREON 6000) 6000 units capsule 12,000 Units, 2 Cap, Oral, With Snacks PRN, Wilma Oviedo M.D.    Allergies:  No Known Allergies    Quality Measures  Reviewed items:  EKG reviewed, Labs reviewed, Medications reviewed and Radiology images reviewed  Dill catheter:  No Dill  Central line: No central line  DVT prophylaxis: Mechanical, pharmacological contraindicated due to bleeding risk (history of GI bleed August 2018)            Physical Exam       Vitals:    12/11/18 1900 12/11/18 2000 12/12/18 0300 12/12/18 0700   BP: 109/69  104/56 120/80   Pulse: (!) 101 88 72 85   Resp: 19 19 17   Temp: 37.1 °C (98.8 °F)  36.4 °C (97.6 °F) 36.2 °C (97.2 °F)   TempSrc: Temporal  Temporal Temporal   SpO2: 96%  94% 96%   Weight:       Height:         Body mass index is 15.77 kg/m².    Oxygen Therapy:  Pulse Oximetry: 96 %, O2 Delivery: None (Room Air)    PHYSICAL EXAM: Unchanged since yesterday  Constitutional: CACHECTIC male in no distress.   HENT:   Head: Normocephalic and atraumatic.   Eyes: Pupils are equal, round, and reactive to light. EOM are normal.  PTOSIS of left eyelid (baseline).  Neck: Normal range of motion. Neck supple. No JVD present. No tracheal deviation  present. No thyromegaly present.   Cardiovascular: Regular rhythm.  Exam reveals no gallop and no friction rub.    No murmur heard.  Pulmonary/Chest: Effort normal and breath sounds normal. No stridor. No respiratory distress. No wheezes. No rales. No tenderness.   Abdominal: Soft. Bowel sounds are normal. No distension and no mass. There is no tenderness. There is no rebound and no guarding.   Musculoskeletal: Normal range of motion. No edema, tenderness or deformity.   Lymphadenopathy:     No cervical adenopathy.   Neurological: Alert and oriented to person, place, and time. No cranial nerve deficit.   Skin: Skin is warm and dry. No rash noted. No erythema.   Psychiatric: Mood, memory, affect and judgment normal.   Vitals reviewed.      Lab Data Review:         12/3/2018  7:32 AM    Recent Labs      12/10/18   0258  12/11/18   0102  12/12/18   0325   SODIUM  137  139  136   POTASSIUM  4.5  4.3  4.4   CHLORIDE  108  108  109   CO2  23  21  19*   BUN  33*  35*  29*   CREATININE  0.67  0.73  0.72   CALCIUM  8.9  9.4  8.7       Recent Labs      12/10/18   0258  12/11/18   0102  12/12/18   0325   GLUCOSE  281*  126*  213*       Recent Labs      12/10/18   0258  12/11/18   0102  12/12/18   0325   RBC  3.30*  3.32*  3.51*   HEMOGLOBIN  10.2*  10.1*  10.8*   HEMATOCRIT  31.8*  31.8*  34.0*   PLATELETCT  254  247  257       Recent Labs      12/10/18   0258  12/11/18   0102  12/12/18   0325   WBC  9.5  9.7  8.6           Assessment/Plan     Diabetes mellitus (HCC)  -?Type 2 diabetes onset 4 years ago.  -Fingerstick glucose checks.  -Hypoglycemia protocol.  -On metformin, glargine  -12/2:  Insulin Glargine 18-->20u; premeal Humalog 8u-->9u  -12/8: Sliding scale insulin has been stopped, blood glucose well controlled.  -12/10 blood glucose 123-237.  Glargine increased from 20-21 units daily, lispro increased from 9-10 units 3 times daily.  -12/12: Blood glucose well controlled on current regimen.  Lantus changed to PM  schedule.    Memory loss  History of TBI with mild cognitive impairment as per chart review  Lives at an assisted living facility called [facility]  Sister is actively involved in care and cell phone number is in the chart    History of recent UGI bleed  -recent Hospital admission 8/2018 for an episode of upper GI bleed  -Patient had EGD done during hospitalization which showed grade D esophagitis, patient had repeat EGD & colonoscopy at 11/20 and EGD notable for localized nodularity of the mucosa at the GE junction about 5-6 mm.  Colonoscopy was also done but there was stool throughout the colon and a repeat was recommended.  Procedures with GI consultants  -We will hold anticoagulation in this setting, SCDs for DVT prophylaxis  -No apparent active bleed, Hb stable    Tobacco abuse  Smoking cessation strongly encouraged again  Query COPD, chest x-ray hyperinflated  Nicotine replacement therapy ongoing  Vaccinate before discharge, flu and pneumonia  Consider outpatient pulmonary function studies and 6-minute walk to completely assess his COPD    Essential hypertension  On home lisinopril    Chronic pancreatitis (HCC)  CT with extensive calcifications of the pancreas  Patient very cachectic and malnourished appearing  Query refeeding syndrome risk  Aggressive electrolyte repletion  Query absorption issues and pancreatic exocrine insufficiency  Dietary consultation  Vitamin supplementation  Pancreatic enzyme supplements  Stop oral diabetes medications, may be causing adverse issues      -CT abdomen showed extensive calcifications of the pancreas.  -History of protein calorie malnutrition secondary to pancreatic insufficiency.  -Currently on pancrelipase replacement.  -Dietitian following.    Dyslipidemia  -On home atorvastatin.    Vitamin D deficiency  -Vitamin D level 31.  -On vitamin D supplement.    Hypophosphatemia  -Goal phos >2.5.  -Replete as needed.    Essential hypertension  -On home lisinopril.    Tobacco  abuse  -Smoking about 1-2 cigarettes/day  -PCP to continue counseling regarding smoking cessation      Disposition  Patient has a guardian.  12/3 meeting planned between guardian, next of kin, and parties involved with placement: Family has found a new home which would better assist patient with his diabetes needs.  Family willing to accept placement in his old home pending acceptance to the new home, unfortunately his previous home will not accept him.  Placement remains a barrier to discharge.  Family pursuing options for private pay.

## 2018-12-13 ENCOUNTER — HOME HEALTH ADMISSION (OUTPATIENT)
Dept: HOME HEALTH SERVICES | Facility: HOME HEALTHCARE | Age: 58
End: 2018-12-13
Payer: COMMERCIAL

## 2018-12-13 ENCOUNTER — PATIENT OUTREACH (OUTPATIENT)
Dept: HEALTH INFORMATION MANAGEMENT | Facility: OTHER | Age: 58
End: 2018-12-13

## 2018-12-13 LAB
ANION GAP SERPL CALC-SCNC: 4 MMOL/L (ref 0–11.9)
BUN SERPL-MCNC: 30 MG/DL (ref 8–22)
CALCIUM SERPL-MCNC: 9.2 MG/DL (ref 8.5–10.5)
CHLORIDE SERPL-SCNC: 109 MMOL/L (ref 96–112)
CO2 SERPL-SCNC: 23 MMOL/L (ref 20–33)
CREAT SERPL-MCNC: 0.8 MG/DL (ref 0.5–1.4)
ERYTHROCYTE [DISTWIDTH] IN BLOOD BY AUTOMATED COUNT: 82.6 FL (ref 35.9–50)
GLUCOSE BLD-MCNC: 103 MG/DL (ref 65–99)
GLUCOSE BLD-MCNC: 180 MG/DL (ref 65–99)
GLUCOSE BLD-MCNC: 187 MG/DL (ref 65–99)
GLUCOSE BLD-MCNC: 279 MG/DL (ref 65–99)
GLUCOSE SERPL-MCNC: 156 MG/DL (ref 65–99)
HCT VFR BLD AUTO: 34.8 % (ref 42–52)
HGB BLD-MCNC: 10.8 G/DL (ref 14–18)
MCH RBC QN AUTO: 30.6 PG (ref 27–33)
MCHC RBC AUTO-ENTMCNC: 31 G/DL (ref 33.7–35.3)
MCV RBC AUTO: 98.6 FL (ref 81.4–97.8)
PLATELET # BLD AUTO: 281 K/UL (ref 164–446)
PMV BLD AUTO: 9.4 FL (ref 9–12.9)
POTASSIUM SERPL-SCNC: 4.6 MMOL/L (ref 3.6–5.5)
RBC # BLD AUTO: 3.53 M/UL (ref 4.7–6.1)
SODIUM SERPL-SCNC: 136 MMOL/L (ref 135–145)
WBC # BLD AUTO: 9.6 K/UL (ref 4.8–10.8)

## 2018-12-13 PROCEDURE — 80048 BASIC METABOLIC PNL TOTAL CA: CPT

## 2018-12-13 PROCEDURE — 82962 GLUCOSE BLOOD TEST: CPT | Mod: 91

## 2018-12-13 PROCEDURE — 700102 HCHG RX REV CODE 250 W/ 637 OVERRIDE(OP): Performed by: STUDENT IN AN ORGANIZED HEALTH CARE EDUCATION/TRAINING PROGRAM

## 2018-12-13 PROCEDURE — 700102 HCHG RX REV CODE 250 W/ 637 OVERRIDE(OP): Performed by: INTERNAL MEDICINE

## 2018-12-13 PROCEDURE — 99231 SBSQ HOSP IP/OBS SF/LOW 25: CPT | Mod: GC | Performed by: INTERNAL MEDICINE

## 2018-12-13 PROCEDURE — A9270 NON-COVERED ITEM OR SERVICE: HCPCS | Performed by: STUDENT IN AN ORGANIZED HEALTH CARE EDUCATION/TRAINING PROGRAM

## 2018-12-13 PROCEDURE — 85027 COMPLETE CBC AUTOMATED: CPT

## 2018-12-13 PROCEDURE — 36415 COLL VENOUS BLD VENIPUNCTURE: CPT

## 2018-12-13 PROCEDURE — 86480 TB TEST CELL IMMUN MEASURE: CPT

## 2018-12-13 PROCEDURE — 770006 HCHG ROOM/CARE - MED/SURG/GYN SEMI*

## 2018-12-13 RX ADMIN — METFORMIN HYDROCHLORIDE 1000 MG: 500 TABLET ORAL at 09:27

## 2018-12-13 RX ADMIN — PANCRELIPASE 72000 UNITS: 24000; 76000; 120000 CAPSULE, DELAYED RELEASE PELLETS ORAL at 13:07

## 2018-12-13 RX ADMIN — TAMSULOSIN HYDROCHLORIDE 0.4 MG: 0.4 CAPSULE ORAL at 09:27

## 2018-12-13 RX ADMIN — Medication 325 MG: at 09:27

## 2018-12-13 RX ADMIN — BUPROPION HYDROCHLORIDE 150 MG: 150 TABLET, EXTENDED RELEASE ORAL at 04:17

## 2018-12-13 RX ADMIN — INSULIN LISPRO 10 UNITS: 100 INJECTION, SOLUTION INTRAVENOUS; SUBCUTANEOUS at 13:06

## 2018-12-13 RX ADMIN — Medication 325 MG: at 18:03

## 2018-12-13 RX ADMIN — INSULIN LISPRO 10 UNITS: 100 INJECTION, SOLUTION INTRAVENOUS; SUBCUTANEOUS at 08:25

## 2018-12-13 RX ADMIN — INSULIN LISPRO 10 UNITS: 100 INJECTION, SOLUTION INTRAVENOUS; SUBCUTANEOUS at 17:58

## 2018-12-13 RX ADMIN — LISINOPRIL 10 MG: 10 TABLET ORAL at 04:17

## 2018-12-13 RX ADMIN — INSULIN GLARGINE 20 UNITS: 100 INJECTION, SOLUTION SUBCUTANEOUS at 17:59

## 2018-12-13 RX ADMIN — PANCRELIPASE 72000 UNITS: 24000; 76000; 120000 CAPSULE, DELAYED RELEASE PELLETS ORAL at 09:27

## 2018-12-13 RX ADMIN — VITAMIN D, TAB 1000IU (100/BT) 2000 UNITS: 25 TAB at 04:17

## 2018-12-13 RX ADMIN — Medication 325 MG: at 13:07

## 2018-12-13 RX ADMIN — MULTIPLE VITAMINS W/ MINERALS TAB 1 TABLET: TAB at 04:17

## 2018-12-13 RX ADMIN — BUPROPION HYDROCHLORIDE 150 MG: 150 TABLET, EXTENDED RELEASE ORAL at 18:03

## 2018-12-13 RX ADMIN — PANCRELIPASE 72000 UNITS: 24000; 76000; 120000 CAPSULE, DELAYED RELEASE PELLETS ORAL at 18:03

## 2018-12-13 RX ADMIN — METFORMIN HYDROCHLORIDE 1000 MG: 500 TABLET ORAL at 18:03

## 2018-12-13 RX ADMIN — ATORVASTATIN CALCIUM 10 MG: 10 TABLET, FILM COATED ORAL at 04:17

## 2018-12-13 RX ADMIN — OMEPRAZOLE 20 MG: 20 CAPSULE, DELAYED RELEASE ORAL at 04:17

## 2018-12-13 ASSESSMENT — PAIN SCALES - GENERAL
PAINLEVEL_OUTOF10: 0
PAINLEVEL_OUTOF10: 0

## 2018-12-13 NOTE — PROGRESS NOTES
Internal Medicine Interval Note  Note Author: Lewis Hoffmann M.D.     Name Pawel Tatum     1960   Age/Sex 58 y.o. male   MRN [id number]   Code Status Full Code     After 5PM or if no immediate response to page, please call for cross-coverage  Attending/Team: Radha/Javier See Patient List for primary contact information  Call [phone] to page    1st Call - Day Intern (R1):   [place] 2nd Call - Day Sr. Resident (R2/R3):   Johnnie         Reason for interval visit  (Principal Problem)   Diabetic ketoacidosis (HCC)    HPI: 58 y.o. male, past medical history insulin-dependent diabetes mellitus (hemoglobin A1c 17), pancreatic insufficiency, recent esophagitis, history of TBI with mild cognitive impairment, residing [facility] facility, presents with DKA after medication noncompliance.  Treated with ICU DKA protocol, subsequently transferred to floor, stable.  Per social work, there is concerned about his assisted living facility accepting him in light of the fact they feel he needs more supervision than they can provide.  It is noted that he does not have PT OT needs and is stable medically for discharge.    Interval Problem Daily Status Update  (24 hours)   No complaints.  Self administering insulin under the supervision of the nurse, no issues.  No acute events overnight.  Patient is pending placement.  Good glycemic control on insulin glargine 21 units, lispro increased 10 units 3 times daily.     Acceptance by group home remains a barrier to discharge.    Pertinent negatives: Denies nausea, vomiting, diarrhea, constipation, syncope, palpitation, shortness of breath, cough, fever, change in bowel or bladder habits, change in appetite.  10 system review of systems otherwise negative.    Consultants/Specialty  None  PCP: JAKE Armando    Disposition  Inpatient for placement.      PMHx:  History reviewed. No pertinent past medical history.    PSHx:  Past Surgical History:    Procedure Laterality Date   • GASTROSCOPY-ENDO N/A 8/25/2018    Procedure: GASTROSCOPY-ENDO;  Surgeon: Jaswant Frye M.D.;  Location: [facility]OSCOPY [facility];  Service: Gastroenterology       Medications:    Current Facility-Administered Medications:   •  insulin glargine (LANTUS) injection 20 Units, 20 Units, Subcutaneous, Q EVENING, 20 Units at 12/12/18 1835 **AND** insulin lispro (HUMALOG) injection 10 Units, 10 Units, Subcutaneous, TID AC, 10 Units at 12/12/18 1836 **AND** [DISCONTINUED] insulin lispro (HUMALOG) injection 1-6 Units, 1-6 Units, Subcutaneous, TID AC, Stopped at 12/03/18 1700 **AND** Accu-Chek ACHS, , , Q AC AND BEDTIME(S) **AND** NOTIFY MD and PharmD, , , Once **AND** glucose 4 g chewable tablet 16 g, 16 g, Oral, Q15 MIN PRN **AND** dextrose 50% (D50W) injection 25 mL, 25 mL, Intravenous, Q15 MIN PRN, Igor Blair M.D.  •  metFORMIN (GLUCOPHAGE) tablet 1,000 mg, 1,000 mg, Oral, BID WITH MEALS, Ju Reece M.D., 1,000 mg at 12/12/18 1831  •  lisinopril (PRINIVIL) 10 MG tablet 10 mg, 10 mg, Oral, DAILY, Ju Reece M.D., 10 mg at 12/13/18 0417  •  therapeutic multivitamin-minerals (THERAGRAN-M) tablet 1 Tab, 1 Tab, Oral, DAILY, Brigida Hyde M.D., 1 Tab at 12/13/18 0417  •  senna-docusate (PERICOLACE or SENOKOT S) 8.6-50 MG per tablet 2 Tab, 2 Tab, Oral, BID, 2 Tab at 12/11/18 0640 **AND** polyethylene glycol/lytes (MIRALAX) PACKET 1 Packet, 1 Packet, Oral, QDAY PRN **AND** magnesium hydroxide (MILK OF MAGNESIA) suspension 30 mL, 30 mL, Oral, QDAY PRN **AND** bisacodyl (DULCOLAX) suppository 10 mg, 10 mg, Rectal, QDAY PRN, Wilma Oviedo M.D.  •  acetaminophen (TYLENOL) tablet 650 mg, 650 mg, Oral, Q6HRS PRN, Wilma Oviedo M.D.  •  atorvastatin (LIPITOR) tablet 10 mg, 10 mg, Oral, DAILY, Wilma Oviedo M.D., 10 mg at 12/13/18 0417  •  vitamin D (cholecalciferol) tablet 2,000 Units, 2,000 Units, Oral, DAILY, Wilma Oviedo M.D., 2,000 Units at 12/13/18 0417  •  omeprazole  (PRILOSEC) capsule 20 mg, 20 mg, Oral, QAM, Wilma Oviedo M.D., 20 mg at 12/13/18 0417  •  tamsulosin (FLOMAX) capsule 0.4 mg, 0.4 mg, Oral, AFTER BREAKFAST, Wilma Oviedo M.D., 0.4 mg at 12/12/18 0813  •  ferrous sulfate tablet 325 mg, 325 mg, Oral, TID WITH MEALS, Wilma Oviedo M.D., 325 mg at 12/12/18 1832  •  buPROPion SR (WELLBUTRIN-SR) tablet 150 mg, 150 mg, Oral, BID, Wilma Oviedo M.D., 150 mg at 12/13/18 0417  •  pancrelipase (Lip-Prot-Amyl) (CREON 33185) 61035-18413 units capsule 72,000 Units, 3 Cap, Oral, TID WITH MEALS, Wilma Oviedo M.D., 72,000 Units at 12/12/18 1832  •  pancrelipase (Lip-Prot-Amyl) (CREON 81989) 40752-09076 units capsule 24,000 Units, 1 Cap, Oral, With Snacks PRN **AND** pancrelipase (Lip-Prot-Amyl) (CREON 6000) 6000 units capsule 12,000 Units, 2 Cap, Oral, With Snacks PRN, Wilma Oviedo M.D.    Allergies:  No Known Allergies    Quality Measures  Reviewed items:  EKG reviewed, Labs reviewed, Medications reviewed and Radiology images reviewed  Dill catheter:  No Dill  Central line: No central line  DVT prophylaxis: Mechanical, pharmacological contraindicated due to bleeding risk (history of GI bleed August 2018)            Physical Exam       Vitals:    12/12/18 0700 12/12/18 1500 12/12/18 1900 12/13/18 0400   BP: 120/80 100/62 134/75 125/68   Pulse: 85 96 87 76   Resp: 17 16 18 16   Temp: 36.2 °C (97.2 °F) 36.3 °C (97.4 °F) 37 °C (98.6 °F) 36.4 °C (97.5 °F)   TempSrc: Temporal Temporal Temporal Temporal   SpO2: 96% 98% 94% 94%   Weight:       Height:         Body mass index is 15.77 kg/m².    Oxygen Therapy:  Pulse Oximetry: 94 %, O2 Delivery: None (Room Air)    PHYSICAL EXAM: Unchanged  Constitutional: CACHECTIC male in no distress.   HENT:   Head: Normocephalic and atraumatic.   Eyes: Pupils are equal, round, and reactive to light. EOM are normal.  PTOSIS of left eyelid (baseline).  Neck: Normal range of motion. Neck supple. No JVD present. No tracheal deviation  present. No thyromegaly present.   Cardiovascular: Regular rhythm.  Exam reveals no gallop and no friction rub.    No murmur heard.  Pulmonary/Chest: Effort normal and breath sounds normal. No stridor. No respiratory distress. No wheezes. No rales. No tenderness.   Abdominal: Soft. Bowel sounds are normal. No distension and no mass. There is no tenderness. There is no rebound and no guarding.   Musculoskeletal: Normal range of motion. No edema, tenderness or deformity.   Lymphadenopathy:     No cervical adenopathy.   Neurological: Alert and oriented to person, place, and time. No cranial nerve deficit.   Skin: Skin is warm and dry. No rash noted. No erythema.   Psychiatric: Mood, memory, affect and judgment normal.   Vitals reviewed.      Lab Data Review:         12/3/2018  7:32 AM    Recent Labs      12/11/18 0102 12/12/18 0325 12/13/18   0234   SODIUM  139  136  136   POTASSIUM  4.3  4.4  4.6   CHLORIDE  108  109  109   CO2  21  19*  23   BUN  35*  29*  30*   CREATININE  0.73  0.72  0.80   CALCIUM  9.4  8.7  9.2       Recent Labs      12/11/18 0102 12/12/18   0325  12/13/18   0234   GLUCOSE  126*  213*  156*       Recent Labs      12/11/18 0102 12/12/18   0325  12/13/18   0234   RBC  3.32*  3.51*  3.53*   HEMOGLOBIN  10.1*  10.8*  10.8*   HEMATOCRIT  31.8*  34.0*  34.8*   PLATELETCT  247  257  281       Recent Labs      12/11/18 0102 12/12/18 0325  12/13/18   0234   WBC  9.7  8.6  9.6           Assessment/Plan     Diabetes mellitus (HCC)  -?Type 2 diabetes onset 4 years ago.  -Fingerstick glucose checks.  -Hypoglycemia protocol.  -On metformin, glargine  -12/2:  Insulin Glargine 18-->20u; premeal Humalog 8u-->9u  -12/8: Sliding scale insulin has been stopped, blood glucose well controlled.  -12/10 blood glucose 123-237.  Glargine increased from 20-21 units daily, lispro increased from 9-10 units 3 times daily.  -12/12: Blood glucose well controlled on current regimen.  Lantus changed to PM  schedule.  -12/13: Glycemic control improved after Lantus change.    Memory loss  History of TBI with mild cognitive impairment as per chart review  Lives at an assisted living facility called [facility]  Sister is actively involved in care and cell phone number is in the chart    History of recent UGI bleed  -recent Hospital admission 8/2018 for an episode of upper GI bleed  -Patient had EGD done during hospitalization which showed grade D esophagitis, patient had repeat EGD & colonoscopy at 11/20 and EGD notable for localized nodularity of the mucosa at the GE junction about 5-6 mm.  Colonoscopy was also done but there was stool throughout the colon and a repeat was recommended.  Procedures with GI consultants  -We will hold anticoagulation in this setting, SCDs for DVT prophylaxis  -No apparent active bleed, Hb stable    Tobacco abuse  Smoking cessation strongly encouraged again  Query COPD, chest x-ray hyperinflated  Nicotine replacement therapy ongoing  Vaccinate before discharge, flu and pneumonia  Consider outpatient pulmonary function studies and 6-minute walk to completely assess his COPD    Essential hypertension  On home lisinopril    Chronic pancreatitis (HCC)  CT with extensive calcifications of the pancreas  Patient very cachectic and malnourished appearing  Query refeeding syndrome risk  Aggressive electrolyte repletion  Query absorption issues and pancreatic exocrine insufficiency  Dietary consultation  Vitamin supplementation  Pancreatic enzyme supplements  Stop oral diabetes medications, may be causing adverse issues      -CT abdomen showed extensive calcifications of the pancreas.  -History of protein calorie malnutrition secondary to pancreatic insufficiency.  -Currently on pancrelipase replacement.  -Dietitian following.    Dyslipidemia  -On home atorvastatin.    Vitamin D deficiency  -Vitamin D level 31.  -On vitamin D supplement.    Hypophosphatemia  -Goal phos >2.5.  -Replete as  needed.    Essential hypertension  -On home lisinopril.    Tobacco abuse  -Smoking about 1-2 cigarettes/day  -PCP to continue counseling regarding smoking cessation      Disposition  Patient has a guardian.  12/3 meeting planned between guardian, next of kin, and parties involved with placement: Family has found a new home which would better assist patient with his diabetes needs.  Family willing to accept placement in his old home pending acceptance to the new home, unfortunately his previous home will not accept him.  Placement remains a barrier to discharge.  Family pursuing options for private pay.

## 2018-12-13 NOTE — PROGRESS NOTES
Received phone call from [facility], she is working on getting patient placement at  [facility] on Monday 12/17.

## 2018-12-13 NOTE — CARE PLAN
Problem: Discharge Barriers/Planning  Goal: Patient's continuum of care needs will be met    Intervention: Involve patient and significant other/support system in setting and prioritizing goals for hospital stay and discharge  Pt is able to check blood sugars with assistance, give own insulin. Pt continues to await acceptance to group home. Pending private nurse setup for blood sugar checks. SW involved with discharge planning.      Problem: Mobility  Intervention: Encourage ambulation  Pt up-self with steady gait. Up to bathroom with no assistance. Encouraged pt to ambulate halls, increasing activity, and limiting time spent in bed.

## 2018-12-13 NOTE — DISCHARGE PLANNING
Received Choice form at 1410 from DIPAK Haddad.  Agency/Facility Name: [facility]  Referral sent per Choice form at 1415.

## 2018-12-13 NOTE — DISCHARGE PLANNING
Anticipated Discharge Disposition: Group Home, [facility]  Action: [facility] [phone], retuning her call. Ana stated they talked to Maxwell and he is willing to work with them.  Ana requested I set up [facility] and they could work with patient while Maxwell is out of town. Ana stated they are looking at Monday for d/c  Faxed Choice to [facility]    Barriers to Discharge: H/H acceptance    Plan: follow up with [facility] for acceptance

## 2018-12-13 NOTE — CARE PLAN
Problem: Discharge Barriers/Planning  Goal: Patient's continuum of care needs will be met  Outcome: PROGRESSING AS EXPECTED  SW working with guardianship services for discharge planning. Likely D/C Monday to [facility].

## 2018-12-13 NOTE — PROGRESS NOTES
Pt's sister, Aniyah Tatum, called with a number of concerns regarding patient. She is concerned about the length of time patient has been admitted to hospital and how long it is taking to get blood sugars stable. She is concerned the same problem (hyperglycemia, DKA) keeps happening over and over after discharge. She is wondering if patient could or should be seeing an endocrinologist.     She is concerned that if/when patient discharges to [facility] he will not follow a diabetic diet (as he does in hospital) as they allow patients to choose their own meals, and is afraid pt's blood sugars will again be out of control.     She is concerned pt is not gaining weight, admit weight was 120lbs and currently at 122lb 13oz. She is concerned patient may be hoarding meals and snacks and not eating them. She verbalizes patient has a history of hoarding tendencies.     Pt's sister, Aniyah Tatum, requesting to speak with patient's hospital physician. [place] be reached at [phone]. [employer] paged at this time.

## 2018-12-13 NOTE — PROGRESS NOTES
Assumed care of pt, discussed plan of care. No significant changes from prev shift. A&Ox4. RA. Denies pain. Last BM, 12/12. Continent of bowel, bladder. Up-self. On diabetic diet. No PIV. Fall precautions in place; bed lowest position, treaded socks on, call light within reach. All needs met at this time.    Continues to be able to demonstrate self blood sugar checks with no assistance. Denies any snacks but is acknowledgeable about appropriate snacks.

## 2018-12-14 LAB
ANION GAP SERPL CALC-SCNC: 9 MMOL/L (ref 0–11.9)
BUN SERPL-MCNC: 35 MG/DL (ref 8–22)
CALCIUM SERPL-MCNC: 9.3 MG/DL (ref 8.5–10.5)
CHLORIDE SERPL-SCNC: 109 MMOL/L (ref 96–112)
CO2 SERPL-SCNC: 21 MMOL/L (ref 20–33)
CREAT SERPL-MCNC: 0.75 MG/DL (ref 0.5–1.4)
ERYTHROCYTE [DISTWIDTH] IN BLOOD BY AUTOMATED COUNT: 82.2 FL (ref 35.9–50)
GAMMA INTERFERON BACKGROUND BLD IA-ACNC: 0.03 IU/ML
GLUCOSE BLD-MCNC: 141 MG/DL (ref 65–99)
GLUCOSE BLD-MCNC: 157 MG/DL (ref 65–99)
GLUCOSE BLD-MCNC: 185 MG/DL (ref 65–99)
GLUCOSE BLD-MCNC: 227 MG/DL (ref 65–99)
GLUCOSE SERPL-MCNC: 182 MG/DL (ref 65–99)
HCT VFR BLD AUTO: 34.8 % (ref 42–52)
HGB BLD-MCNC: 10.6 G/DL (ref 14–18)
M TB IFN-G BLD-IMP: NEGATIVE
M TB IFN-G CD4+ BCKGRND COR BLD-ACNC: 0 IU/ML
MCH RBC QN AUTO: 30.5 PG (ref 27–33)
MCHC RBC AUTO-ENTMCNC: 30.5 G/DL (ref 33.7–35.3)
MCV RBC AUTO: 100 FL (ref 81.4–97.8)
MITOGEN IGNF BCKGRD COR BLD-ACNC: >10 IU/ML
PLATELET # BLD AUTO: 274 K/UL (ref 164–446)
PMV BLD AUTO: 9.2 FL (ref 9–12.9)
POTASSIUM SERPL-SCNC: 4.6 MMOL/L (ref 3.6–5.5)
QFT TB2 - NIL TBQ2: 0 IU/ML
RBC # BLD AUTO: 3.48 M/UL (ref 4.7–6.1)
SODIUM SERPL-SCNC: 139 MMOL/L (ref 135–145)
WBC # BLD AUTO: 10.2 K/UL (ref 4.8–10.8)

## 2018-12-14 PROCEDURE — 700102 HCHG RX REV CODE 250 W/ 637 OVERRIDE(OP): Performed by: STUDENT IN AN ORGANIZED HEALTH CARE EDUCATION/TRAINING PROGRAM

## 2018-12-14 PROCEDURE — 85027 COMPLETE CBC AUTOMATED: CPT

## 2018-12-14 PROCEDURE — 82962 GLUCOSE BLOOD TEST: CPT | Mod: 91

## 2018-12-14 PROCEDURE — 770006 HCHG ROOM/CARE - MED/SURG/GYN SEMI*

## 2018-12-14 PROCEDURE — A9270 NON-COVERED ITEM OR SERVICE: HCPCS | Performed by: STUDENT IN AN ORGANIZED HEALTH CARE EDUCATION/TRAINING PROGRAM

## 2018-12-14 PROCEDURE — 80048 BASIC METABOLIC PNL TOTAL CA: CPT

## 2018-12-14 PROCEDURE — 36415 COLL VENOUS BLD VENIPUNCTURE: CPT

## 2018-12-14 PROCEDURE — 700102 HCHG RX REV CODE 250 W/ 637 OVERRIDE(OP): Performed by: INTERNAL MEDICINE

## 2018-12-14 PROCEDURE — 99231 SBSQ HOSP IP/OBS SF/LOW 25: CPT | Mod: GC | Performed by: INTERNAL MEDICINE

## 2018-12-14 RX ADMIN — BUPROPION HYDROCHLORIDE 150 MG: 150 TABLET, EXTENDED RELEASE ORAL at 18:06

## 2018-12-14 RX ADMIN — Medication 325 MG: at 14:27

## 2018-12-14 RX ADMIN — INSULIN GLARGINE 20 UNITS: 100 INJECTION, SOLUTION SUBCUTANEOUS at 18:05

## 2018-12-14 RX ADMIN — INSULIN LISPRO 10 UNITS: 100 INJECTION, SOLUTION INTRAVENOUS; SUBCUTANEOUS at 14:30

## 2018-12-14 RX ADMIN — TAMSULOSIN HYDROCHLORIDE 0.4 MG: 0.4 CAPSULE ORAL at 08:23

## 2018-12-14 RX ADMIN — OMEPRAZOLE 20 MG: 20 CAPSULE, DELAYED RELEASE ORAL at 04:19

## 2018-12-14 RX ADMIN — PANCRELIPASE 72000 UNITS: 24000; 76000; 120000 CAPSULE, DELAYED RELEASE PELLETS ORAL at 14:27

## 2018-12-14 RX ADMIN — ATORVASTATIN CALCIUM 10 MG: 10 TABLET, FILM COATED ORAL at 04:19

## 2018-12-14 RX ADMIN — METFORMIN HYDROCHLORIDE 1000 MG: 500 TABLET ORAL at 18:06

## 2018-12-14 RX ADMIN — BUPROPION HYDROCHLORIDE 150 MG: 150 TABLET, EXTENDED RELEASE ORAL at 04:19

## 2018-12-14 RX ADMIN — MULTIPLE VITAMINS W/ MINERALS TAB 1 TABLET: TAB at 04:19

## 2018-12-14 RX ADMIN — LISINOPRIL 10 MG: 10 TABLET ORAL at 04:19

## 2018-12-14 RX ADMIN — INSULIN LISPRO 10 UNITS: 100 INJECTION, SOLUTION INTRAVENOUS; SUBCUTANEOUS at 08:20

## 2018-12-14 RX ADMIN — Medication 325 MG: at 18:07

## 2018-12-14 RX ADMIN — METFORMIN HYDROCHLORIDE 1000 MG: 500 TABLET ORAL at 08:25

## 2018-12-14 RX ADMIN — VITAMIN D, TAB 1000IU (100/BT) 2000 UNITS: 25 TAB at 04:19

## 2018-12-14 RX ADMIN — PANCRELIPASE 72000 UNITS: 24000; 76000; 120000 CAPSULE, DELAYED RELEASE PELLETS ORAL at 08:20

## 2018-12-14 RX ADMIN — PANCRELIPASE 72000 UNITS: 24000; 76000; 120000 CAPSULE, DELAYED RELEASE PELLETS ORAL at 18:06

## 2018-12-14 RX ADMIN — Medication 325 MG: at 08:23

## 2018-12-14 RX ADMIN — INSULIN LISPRO 10 UNITS: 100 INJECTION, SOLUTION INTRAVENOUS; SUBCUTANEOUS at 18:02

## 2018-12-14 RX ADMIN — ACETAMINOPHEN 650 MG: 325 TABLET, FILM COATED ORAL at 18:06

## 2018-12-14 ASSESSMENT — PAIN SCALES - GENERAL
PAINLEVEL_OUTOF10: 3
PAINLEVEL_OUTOF10: 0
PAINLEVEL_OUTOF10: 0

## 2018-12-14 NOTE — DISCHARGE PLANNING
Agency/Facility Name: [facility]   Outcome: Referral sent to [facility] at 1625.

## 2018-12-14 NOTE — DISCHARGE PLANNING
Medical Social Work  PC from [facility]; requested qunatiferon test so patient can go to a group home.

## 2018-12-14 NOTE — DISCHARGE PLANNING
Pending referral - We need Home Health Orders and F2F to be able to process this referral.  Thank you

## 2018-12-14 NOTE — PROGRESS NOTES
Internal Medicine Interval Note  Note Author: Lewis Hoffmann M.D.     Name Pawel Tatum     1960   Age/Sex 58 y.o. male   MRN [id number]   Code Status Full Code     After 5PM or if no immediate response to page, please call for cross-coverage  Attending/Team: Radha/Javier See Patient List for primary contact information  Call [phone] to page    1st Call - Day Intern (R1):   [place] 2nd Call - Day Sr. Resident (R2/R3):   Johnnie         Reason for interval visit  (Principal Problem)   Diabetic ketoacidosis (HCC)    HPI: 58 y.o. male, past medical history insulin-dependent diabetes mellitus (hemoglobin A1c 17), pancreatic insufficiency, recent esophagitis, history of TBI with mild cognitive impairment, residing [facility] assisted [facility] facility, presents with DKA after medication noncompliance.  Treated with ICU DKA protocol, subsequently transferred to floor, stable.  Per social work, there is concerned about his assisted living facility accepting him in light of the fact they feel he needs more supervision than they can provide.  It is noted that he does not have PT OT needs and is stable medically for discharge.    Interval Problem Daily Status Update  (24 hours)   No complaints.  Self administering insulin under the supervision of the nurse, no issues.  No acute events overnight.  Patient is pending placement.  Good glycemic control on insulin glargine 21 units, lispro increased 10 units 3 times daily.    Per social work, family requesting to speak to physician.  I spoke to family yesterday, no questions no major updates.  I informed them that the patient is medically stable for discharge and is glucose is well controlled on a new regimen.  They were concerned that the patient may be hiding food for later consumption in the hospital, I explained to them that that is unlikely.     Acceptance by group home remains a barrier to discharge.    Pertinent negatives: Denies nausea, vomiting,  diarrhea, constipation, syncope, palpitation, shortness of breath, cough, fever, change in bowel or bladder habits, change in appetite.  10 system review of systems otherwise negative.    Consultants/Specialty  None  PCP: JAKE Armando    Disposition  Inpatient for placement.      PMHx:  History reviewed. No pertinent past medical history.    PSHx:  Past Surgical History:   Procedure Laterality Date   • GASTROSCOPY-ENDO N/A 8/25/2018    Procedure: GASTROSCOPY-ENDO;  Surgeon: Jaswant Frye M.D.;  Location: ENDOSCOPY [facility];  Service: Gastroenterology       Medications:    Current Facility-Administered Medications:   •  insulin glargine (LANTUS) injection 20 Units, 20 Units, Subcutaneous, Q EVENING, 20 Units at 12/13/18 1759 **AND** insulin lispro (HUMALOG) injection 10 Units, 10 Units, Subcutaneous, TID AC, 10 Units at 12/13/18 1758 **AND** [DISCONTINUED] insulin lispro (HUMALOG) injection 1-6 Units, 1-6 Units, Subcutaneous, TID AC, Stopped at 12/03/18 1700 **AND** Accu-Chek ACHS, , , Q AC AND BEDTIME(S) **AND** NOTIFY MD and PharmD, , , Once **AND** glucose 4 g chewable tablet 16 g, 16 g, Oral, Q15 MIN PRN **AND** dextrose 50% (D50W) injection 25 mL, 25 mL, Intravenous, Q15 MIN PRN, Igor Blair M.D.  •  metFORMIN (GLUCOPHAGE) tablet 1,000 mg, 1,000 mg, Oral, BID WITH MEALS, Ju Reece M.D., 1,000 mg at 12/13/18 1803  •  lisinopril (PRINIVIL) 10 MG tablet 10 mg, 10 mg, Oral, DAILY, Ju Reece M.D., 10 mg at 12/14/18 0419  •  therapeutic multivitamin-minerals (THERAGRAN-M) tablet 1 Tab, 1 Tab, Oral, DAILY, Brigida Hyde M.D., 1 Tab at 12/14/18 0419  •  senna-docusate (PERICOLACE or SENOKOT S) 8.6-50 MG per tablet 2 Tab, 2 Tab, Oral, BID, Stopped at 12/13/18 1800 **AND** polyethylene glycol/lytes (MIRALAX) PACKET 1 Packet, 1 Packet, Oral, QDAY PRN **AND** magnesium hydroxide (MILK OF MAGNESIA) suspension 30 mL, 30 mL, Oral, QDAY PRN **AND** bisacodyl (DULCOLAX) suppository 10 mg,  10 mg, Rectal, QDAY PRN, Wilma Oviedo M.D.  •  acetaminophen (TYLENOL) tablet 650 mg, 650 mg, Oral, Q6HRS PRN, Wilma Oviedo M.D.  •  atorvastatin (LIPITOR) tablet 10 mg, 10 mg, Oral, DAILY, Wilma Oviedo M.D., 10 mg at 12/14/18 0419  •  vitamin D (cholecalciferol) tablet 2,000 Units, 2,000 Units, Oral, DAILY, Wilma Oviedo M.D., 2,000 Units at 12/14/18 0419  •  omeprazole (PRILOSEC) capsule 20 mg, 20 mg, Oral, QAM, Wilma Oviedo M.D., 20 mg at 12/14/18 0419  •  tamsulosin (FLOMAX) capsule 0.4 mg, 0.4 mg, Oral, AFTER BREAKFAST, Wilma Oviedo M.D., 0.4 mg at 12/13/18 0927  •  ferrous sulfate tablet 325 mg, 325 mg, Oral, TID WITH MEALS, Wilma Oviedo M.D., 325 mg at 12/13/18 1803  •  buPROPion SR (WELLBUTRIN-SR) tablet 150 mg, 150 mg, Oral, BID, Wilma Oviedo M.D., 150 mg at 12/14/18 0419  •  pancrelipase (Lip-Prot-Amyl) (CREON 80073) 33255-84848 units capsule 72,000 Units, 3 Cap, Oral, TID WITH MEALS, Wilma Oviedo M.D., 72,000 Units at 12/13/18 1803  •  pancrelipase (Lip-Prot-Amyl) (CREON 12067) 22763-22021 units capsule 24,000 Units, 1 Cap, Oral, With Snacks PRN **AND** pancrelipase (Lip-Prot-Amyl) (CREON 6000) 6000 units capsule 12,000 Units, 2 Cap, Oral, With Snacks PRN, Wilma Oviedo M.D.    Allergies:  No Known Allergies    Quality Measures  Reviewed items:  EKG reviewed, Labs reviewed, Medications reviewed and Radiology images reviewed  Dill catheter:  No Dill  Central line: No central line  DVT prophylaxis: Mechanical, pharmacological contraindicated due to bleeding risk (history of GI bleed August 2018)            Physical Exam       Vitals:    12/13/18 1500 12/13/18 1953 12/14/18 0400 12/14/18 0720   BP: 119/72 121/77 117/66 132/78   Pulse: 100 93 74 82   Resp: 17 18 18 18   Temp: 36.4 °C (97.6 °F) 36.8 °C (98.3 °F) 36.4 °C (97.6 °F) 36.7 °C (98 °F)   TempSrc: Temporal Temporal Temporal Oral   SpO2: 94% 94% 95% 93%   Weight:       Height:         Body mass index is 15.77 kg/m².     Oxygen Therapy:  Pulse Oximetry: 93 %, O2 (LPM): 0, O2 Delivery: None (Room Air)    PHYSICAL EXAM: Unchanged  Constitutional: CACHECTIC male in no distress.   HENT:   Head: Normocephalic and atraumatic.   Eyes: Pupils are equal, round, and reactive to light. EOM are normal.  PTOSIS of left eyelid (baseline).  Neck: Normal range of motion. Neck supple. No JVD present. No tracheal deviation present. No thyromegaly present.   Cardiovascular: Regular rhythm.  Exam reveals no gallop and no friction rub.    No murmur heard.  Pulmonary/Chest: Effort normal and breath sounds normal. No stridor. No respiratory distress. No wheezes. No rales. No tenderness.   Abdominal: Soft. Bowel sounds are normal. No distension and no mass. There is no tenderness. There is no rebound and no guarding.   Musculoskeletal: Normal range of motion. No edema, tenderness or deformity.   Lymphadenopathy:     No cervical adenopathy.   Neurological: Alert and oriented to person, place, and time. No cranial nerve deficit.   Skin: Skin is warm and dry. No rash noted. No erythema.   Psychiatric: Mood, memory, affect and judgment normal.   Vitals reviewed.      Lab Data Review:         12/3/2018  7:32 AM    Recent Labs      12/12/18 0325 12/13/18 0234 12/14/18   0213   SODIUM  136  136  139   POTASSIUM  4.4  4.6  4.6   CHLORIDE  109  109  109   CO2  19*  23  21   BUN  29*  30*  35*   CREATININE  0.72  0.80  0.75   CALCIUM  8.7  9.2  9.3       Recent Labs      12/12/18 0325 12/13/18   0234  12/14/18   0213   GLUCOSE  213*  156*  182*       Recent Labs      12/12/18 0325 12/13/18   0234  12/14/18   0213   RBC  3.51*  3.53*  3.48*   HEMOGLOBIN  10.8*  10.8*  10.6*   HEMATOCRIT  34.0*  34.8*  34.8*   PLATELETCT  257  281  274       Recent Labs      12/12/18 0325 12/13/18   0234  12/14/18   0213   WBC  8.6  9.6  10.2           Assessment/Plan     Diabetes mellitus (HCC)  -CONTROLLED:  ?Type 2 diabetes onset 4 years ago.  -Fingerstick glucose  checks.  -Hypoglycemia protocol.  -On metformin, glargine  -12/2:  Insulin Glargine 18-->20u; premeal Humalog 8u-->9u  -12/8: Sliding scale insulin has been stopped, blood glucose well controlled.  -12/10 blood glucose 123-237.  Glargine increased from 20-21 units daily, lispro increased from 9-10 units 3 times daily.  -12/12: Blood glucose well controlled on current regimen.  Lantus changed to PM schedule.  -12/13: Glycemic control improved after Lantus change.  -12/14: Blood glucose following entirely within reference range.    Memory loss  History of TBI with mild cognitive impairment as per chart review  Lives at an assisted living facility called [facility]  Sister is actively involved in care and cell phone number is in the chart    History of recent UGI bleed  -recent Hospital admission 8/2018 for an episode of upper GI bleed  -Patient had EGD done during hospitalization which showed grade D esophagitis, patient had repeat EGD & colonoscopy at 11/20 and EGD notable for localized nodularity of the mucosa at the GE junction about 5-6 mm.  Colonoscopy was also done but there was stool throughout the colon and a repeat was recommended.  Procedures with GI consultants  -We will hold anticoagulation in this setting, SCDs for DVT prophylaxis  -No apparent active bleed, Hb stable    Tobacco abuse  Smoking cessation strongly encouraged again  Query COPD, chest x-ray hyperinflated  Nicotine replacement therapy ongoing  Vaccinate before discharge, flu and pneumonia  Consider outpatient pulmonary function studies and 6-minute walk to completely assess his COPD    Essential hypertension  On home lisinopril    Chronic pancreatitis (HCC)  CT with extensive calcifications of the pancreas  Patient very cachectic and malnourished appearing  Query refeeding syndrome risk  Aggressive electrolyte repletion  Query absorption issues and pancreatic exocrine insufficiency  Dietary consultation  Vitamin supplementation  Pancreatic enzyme  supplements  Stop oral diabetes medications, may be causing adverse issues      -CT abdomen showed extensive calcifications of the pancreas.  -History of protein calorie malnutrition secondary to pancreatic insufficiency.  -Currently on pancrelipase replacement.  -Dietitian following.    Dyslipidemia  -On home atorvastatin.    Vitamin D deficiency  -Vitamin D level 31.  -On vitamin D supplement.    Essential hypertension  -On home lisinopril.    Tobacco abuse  -Smoking about 1-2 cigarettes/day  -PCP to continue counseling regarding smoking cessation      Disposition  Patient has a guardian.  12/3 meeting planned between guardian, next of kin, and parties involved with placement: Family has found a new home which would better assist patient with his diabetes needs.  Family willing to accept placement in his old home pending acceptance to the new home, unfortunately his previous home will not accept him.  Placement remains a barrier to discharge.  Family pursuing options for private pay.

## 2018-12-14 NOTE — PROGRESS NOTES
Pt resting in bed. Asking for coffee and snacks this AM; no c/o pain or other needs at this time. Fall precautions in place and call light in reach.

## 2018-12-14 NOTE — PROGRESS NOTES
Assumed care of pt, discussed plan of care. No significant changes from prev shift. A&Ox4. RA. Denies pain. Last BM, 12/13. Continent of bowel, bladder. Up-self. On diabetic diet. No PIV. Fall precautions in place; bed lowest position, treaded socks on, call light within reach. All needs met at this time.

## 2018-12-14 NOTE — CARE PLAN
Problem: Bowel/Gastric:  Goal: Normal bowel function is maintained or improved  Outcome: PROGRESSING AS EXPECTED      Problem: Metabolic:  Goal: Ability to maintain appropriate glucose levels will improve  Outcome: PROGRESSING AS EXPECTED  Pt having blood glucose levels approaching normal levels this shift.

## 2018-12-14 NOTE — DISCHARGE PLANNING
Medical Social Work  PC from [facility], informed by Hayley CALDREA/VERENICE they would have a $50.00 co-pay.  Requested choice be sent to [facility]  Faxed Choice to [facility]  Paged [facility] to request a qunatiferon test

## 2018-12-14 NOTE — FACE TO FACE
Face to Face Supporting Documentation - Home Health    The encounter with this patient was in whole or in part the primary reason for home health admission.    Date of encounter:   Patient:                    MRN:                       YOB: 2020  Pawel Tatum  [id number]  1960     Home health to see patient for:  Skilled Nursing care for assessment, interventions & education    Skilled need for:  Recent Deterioration of Health Status Diabetes mellitus, Medication Management Insulin management and Comment: Neurocognitive disorder    Skilled nursing interventions to include:  Comment: None    Homebound status evidenced by:  Needs the assistance of another person in order to leave the home. Leaving home requires a considerable and taxing effort. There is a normal inability to leave the home.    Community Physician to provide follow up care: JAKE Armando     Optional Interventions? No      I certify the face to face encounter for this home health care referral meets the CMS requirements and the encounter/clinical assessment with the patient was, in whole, or in part, for the medical condition(s) listed above, which is the primary reason for home health care. Based on my clinical findings: the service(s) are medically necessary, support the need for home health care, and the homebound criteria are met.  I certify that this patient has had a face to face encounter by myself.  Lewis Hoffmann M.D. - NPI: [id number]

## 2018-12-14 NOTE — CARE PLAN
Problem: Communication  Goal: The ability to communicate needs accurately and effectively will improve    Intervention: Educate patient and significant other/support system about the plan of care, procedures, treatments, medications and allow for questions  Educated pt on plan of care, scheduled medications, and blood sugar monitoring. Pt acknowledges understanding. Answered all questions and concerns.      Problem: Discharge Barriers/Planning  Goal: Patient's continuum of care needs will be met    Intervention: Involve patient and significant other/support system in setting and prioritizing goals for hospital stay and discharge  SW working on discharge planning. Plan for d/c on Monday to [facility].

## 2018-12-14 NOTE — DISCHARGE PLANNING
Received Choice form at 0840  Agency/Facility Name: [facility]  Referral sent per Choice form @ 0836

## 2018-12-14 NOTE — DISCHARGE PLANNING
ATTN: Case Management  RE: Referral for Home Health    As of 12/14/18, we have accepted the Home Health referral for the patient listed above.    A [facility] clinician will be out to see the patient within 48 hours. If you have any questions or concerns regarding the patient’s transition to Home Health, please do not hesitate to contact us at [phone].      We look forward to collaborating with you,  [facility] Home Health Team

## 2018-12-15 LAB
GLUCOSE BLD-MCNC: 101 MG/DL (ref 65–99)
GLUCOSE BLD-MCNC: 116 MG/DL (ref 65–99)
GLUCOSE BLD-MCNC: 132 MG/DL (ref 65–99)
GLUCOSE BLD-MCNC: 210 MG/DL (ref 65–99)

## 2018-12-15 PROCEDURE — A9270 NON-COVERED ITEM OR SERVICE: HCPCS | Performed by: STUDENT IN AN ORGANIZED HEALTH CARE EDUCATION/TRAINING PROGRAM

## 2018-12-15 PROCEDURE — 770006 HCHG ROOM/CARE - MED/SURG/GYN SEMI*

## 2018-12-15 PROCEDURE — 99231 SBSQ HOSP IP/OBS SF/LOW 25: CPT | Mod: GC | Performed by: INTERNAL MEDICINE

## 2018-12-15 PROCEDURE — 700102 HCHG RX REV CODE 250 W/ 637 OVERRIDE(OP): Performed by: INTERNAL MEDICINE

## 2018-12-15 PROCEDURE — 700102 HCHG RX REV CODE 250 W/ 637 OVERRIDE(OP): Performed by: STUDENT IN AN ORGANIZED HEALTH CARE EDUCATION/TRAINING PROGRAM

## 2018-12-15 PROCEDURE — 82962 GLUCOSE BLOOD TEST: CPT | Mod: 91

## 2018-12-15 RX ADMIN — METFORMIN HYDROCHLORIDE 1000 MG: 500 TABLET ORAL at 17:52

## 2018-12-15 RX ADMIN — METFORMIN HYDROCHLORIDE 1000 MG: 500 TABLET ORAL at 08:48

## 2018-12-15 RX ADMIN — Medication 325 MG: at 17:53

## 2018-12-15 RX ADMIN — PANCRELIPASE 72000 UNITS: 24000; 76000; 120000 CAPSULE, DELAYED RELEASE PELLETS ORAL at 13:31

## 2018-12-15 RX ADMIN — Medication 325 MG: at 13:32

## 2018-12-15 RX ADMIN — BUPROPION HYDROCHLORIDE 150 MG: 150 TABLET, EXTENDED RELEASE ORAL at 17:52

## 2018-12-15 RX ADMIN — TAMSULOSIN HYDROCHLORIDE 0.4 MG: 0.4 CAPSULE ORAL at 08:49

## 2018-12-15 RX ADMIN — ATORVASTATIN CALCIUM 10 MG: 10 TABLET, FILM COATED ORAL at 06:30

## 2018-12-15 RX ADMIN — INSULIN LISPRO 10 UNITS: 100 INJECTION, SOLUTION INTRAVENOUS; SUBCUTANEOUS at 17:57

## 2018-12-15 RX ADMIN — MULTIPLE VITAMINS W/ MINERALS TAB 1 TABLET: TAB at 06:32

## 2018-12-15 RX ADMIN — LISINOPRIL 10 MG: 10 TABLET ORAL at 06:30

## 2018-12-15 RX ADMIN — BUPROPION HYDROCHLORIDE 150 MG: 150 TABLET, EXTENDED RELEASE ORAL at 06:30

## 2018-12-15 RX ADMIN — Medication 325 MG: at 08:49

## 2018-12-15 RX ADMIN — INSULIN LISPRO 10 UNITS: 100 INJECTION, SOLUTION INTRAVENOUS; SUBCUTANEOUS at 08:46

## 2018-12-15 RX ADMIN — INSULIN GLARGINE 20 UNITS: 100 INJECTION, SOLUTION SUBCUTANEOUS at 18:01

## 2018-12-15 RX ADMIN — PANCRELIPASE 72000 UNITS: 24000; 76000; 120000 CAPSULE, DELAYED RELEASE PELLETS ORAL at 08:49

## 2018-12-15 RX ADMIN — OMEPRAZOLE 20 MG: 20 CAPSULE, DELAYED RELEASE ORAL at 06:31

## 2018-12-15 RX ADMIN — VITAMIN D, TAB 1000IU (100/BT) 2000 UNITS: 25 TAB at 06:32

## 2018-12-15 RX ADMIN — INSULIN LISPRO 10 UNITS: 100 INJECTION, SOLUTION INTRAVENOUS; SUBCUTANEOUS at 13:31

## 2018-12-15 RX ADMIN — PANCRELIPASE 72000 UNITS: 24000; 76000; 120000 CAPSULE, DELAYED RELEASE PELLETS ORAL at 17:53

## 2018-12-15 ASSESSMENT — PAIN SCALES - GENERAL
PAINLEVEL_OUTOF10: 0
PAINLEVEL_OUTOF10: 0

## 2018-12-15 NOTE — PROGRESS NOTES
Internal Medicine Interval Note  Note Author: Lewis Hoffmann M.D.     Name Pawel Tatum     1960   Age/Sex 58 y.o. male   MRN [id number]   Code Status Full Code     After 5PM or if no immediate response to page, please call for cross-coverage  Attending/Team: Radha/Javier See Patient List for primary contact information  Call [phone] to page    1st Call - Day Intern (R1):   [place] 2nd Call - Day Sr. Resident (R2/R3):   Johnnie         Reason for interval visit  (Principal Problem)   Diabetic ketoacidosis (HCC)    HPI: 58 y.o. male, past medical history insulin-dependent diabetes mellitus (hemoglobin A1c 17), pancreatic insufficiency, recent esophagitis, history of TBI with mild cognitive impairment, residing [facility] assisted living facility, presents with DKA after medication noncompliance.  Treated with ICU DKA protocol, subsequently transferred to floor, stable.  Per social work, there is concerned about his assisted living facility accepting him in light of the fact they feel he needs more supervision than they can provide.  It is noted that he does not have PT OT needs and is stable medically for discharge.    Interval Problem Daily Status Update  (24 hours)   No complaints.  Self administering insulin under the supervision of the nurse, no issues.  No acute events overnight.  Satisfactory glycemic control on insulin glargine 20 units, lispro increased 10 units 3 times daily.    Patient has been [facility], and [facility].  We are grateful to social work for the help in this case.    Pertinent negatives: Denies nausea, vomiting, diarrhea, constipation, syncope, palpitation, shortness of breath, cough, fever, change in bowel or bladder habits, change in appetite.  10 system review of systems otherwise negative.    Consultants/Specialty  None  PCP: JAKE Armando    Disposition  Inpatient for  placement.      PMHx:  History reviewed. No pertinent past medical history.    PSHx:  Past Surgical History:   Procedure Laterality Date   • GASTROSCOPY-ENDO N/A 8/25/2018    Procedure: GASTROSCOPY-ENDO;  Surgeon: Jaswant Frye M.D.;  Location: [facility];  Service: Gastroenterology       Medications:    Current Facility-Administered Medications:   •  insulin glargine (LANTUS) injection 20 Units, 20 Units, Subcutaneous, Q EVENING, 20 Units at 12/14/18 1805 **AND** insulin lispro (HUMALOG) injection 10 Units, 10 Units, Subcutaneous, TID AC, 10 Units at 12/14/18 1802 **AND** [DISCONTINUED] insulin lispro (HUMALOG) injection 1-6 Units, 1-6 Units, Subcutaneous, TID AC, Stopped at 12/03/18 1700 **AND** Accu-Chek ACHS, , , Q AC AND BEDTIME(S) **AND** NOTIFY MD and PharmD, , , Once **AND** glucose 4 g chewable tablet 16 g, 16 g, Oral, Q15 MIN PRN **AND** dextrose 50% (D50W) injection 25 mL, 25 mL, Intravenous, Q15 MIN PRN, Igor Blair M.D.  •  metFORMIN (GLUCOPHAGE) tablet 1,000 mg, 1,000 mg, Oral, BID WITH MEALS, Ju Reece M.D., 1,000 mg at 12/14/18 1806  •  lisinopril (PRINIVIL) 10 MG tablet 10 mg, 10 mg, Oral, DAILY, Ju Reece M.D., 10 mg at 12/15/18 0630  •  therapeutic multivitamin-minerals (THERAGRAN-M) tablet 1 Tab, 1 Tab, Oral, DAILY, Brigida Hyde M.D., 1 Tab at 12/15/18 0632  •  senna-docusate (PERICOLACE or SENOKOT S) 8.6-50 MG per tablet 2 Tab, 2 Tab, Oral, BID, Stopped at 12/13/18 1800 **AND** polyethylene glycol/lytes (MIRALAX) PACKET 1 Packet, 1 Packet, Oral, QDAY PRN **AND** magnesium hydroxide (MILK OF MAGNESIA) suspension 30 mL, 30 mL, Oral, QDAY PRN **AND** bisacodyl (DULCOLAX) suppository 10 mg, 10 mg, Rectal, QDAY PRN, Wilma Oviedo M.D.  •  acetaminophen (TYLENOL) tablet 650 mg, 650 mg, Oral, Q6HRS PRN, Wilma Oviedo M.D., 650 mg at 12/14/18 1806  •  atorvastatin (LIPITOR) tablet 10 mg, 10 mg, Oral, DAILY, Wilma Oviedo M.D., 10 mg at 12/15/18 0630  •   vitamin D (cholecalciferol) tablet 2,000 Units, 2,000 Units, Oral, DAILY, Wilma Oviedo M.D., 2,000 Units at 12/15/18 0632  •  omeprazole (PRILOSEC) capsule 20 mg, 20 mg, Oral, QAM, Wilma Oviedo M.D., 20 mg at 12/15/18 0631  •  tamsulosin (FLOMAX) capsule 0.4 mg, 0.4 mg, Oral, AFTER BREAKFAST, Wilma Oviedo M.D., 0.4 mg at 12/14/18 0823  •  ferrous sulfate tablet 325 mg, 325 mg, Oral, TID WITH MEALS, Wilma Oivedo M.D., 325 mg at 12/14/18 1807  •  buPROPion SR (WELLBUTRIN-SR) tablet 150 mg, 150 mg, Oral, BID, Wilma Oviedo M.D., 150 mg at 12/15/18 0630  •  pancrelipase (Lip-Prot-Amyl) (CREON 25823) 21455-14973 units capsule 72,000 Units, 3 Cap, Oral, TID WITH MEALS, Wilma Oviedo M.D., 72,000 Units at 12/14/18 1806  •  pancrelipase (Lip-Prot-Amyl) (CREON 93056) 92999-65441 units capsule 24,000 Units, 1 Cap, Oral, With Snacks PRN **AND** pancrelipase (Lip-Prot-Amyl) (CREON 6000) 6000 units capsule 12,000 Units, 2 Cap, Oral, With Snacks PRN, Wilma Oviedo M.D.    Allergies:  No Known Allergies    Quality Measures  Reviewed items:  EKG reviewed, Labs reviewed, Medications reviewed and Radiology images reviewed  Dill catheter:  No Dill  Central line: No central line  DVT prophylaxis: Mechanical, pharmacological contraindicated due to bleeding risk (history of GI bleed August 2018)            Physical Exam       Vitals:    12/14/18 0720 12/14/18 1540 12/14/18 1855 12/15/18 0415   BP: 132/78 114/68 122/64 120/66   Pulse: 82 86 83 86   Resp: 18 18 17 16   Temp: 36.7 °C (98 °F) 36.8 °C (98.3 °F) 36.8 °C (98.3 °F) 36.4 °C (97.6 °F)   TempSrc: Oral Temporal Temporal Temporal   SpO2: 93% 95% 93% 95%   Weight:       Height:         Body mass index is 15.77 kg/m².    Oxygen Therapy:  Pulse Oximetry: 95 %    PHYSICAL EXAM: Unchanged  Constitutional: CACHECTIC male in no distress.   HENT:   Head: Normocephalic and atraumatic.   Eyes: Pupils are equal, round, and reactive to light. EOM are normal.  PTOSIS of left  eyelid (baseline).  Neck: Normal range of motion. Neck supple. No JVD present. No tracheal deviation present. No thyromegaly present.   Cardiovascular: Regular rhythm.  Exam reveals no gallop and no friction rub.    No murmur heard.  Pulmonary/Chest: Effort normal and breath sounds normal. No stridor. No respiratory distress. No wheezes. No rales. No tenderness.   Abdominal: Soft. Bowel sounds are normal. No distension and no mass. There is no tenderness. There is no rebound and no guarding.   Musculoskeletal: Normal range of motion. No edema, tenderness or deformity.   Lymphadenopathy:     No cervical adenopathy.   Neurological: Alert and oriented to person, place, and time. No cranial nerve deficit.   Skin: Skin is warm and dry. No rash noted. No erythema.   Psychiatric: Mood, memory, affect and judgment normal.   Vitals reviewed.      Lab Data Review:         12/3/2018  7:32 AM    Recent Labs      12/13/18   0234  12/14/18   0213   SODIUM  136  139   POTASSIUM  4.6  4.6   CHLORIDE  109  109   CO2  23  21   BUN  30*  35*   CREATININE  0.80  0.75   CALCIUM  9.2  9.3       Recent Labs      12/13/18   0234  12/14/18   0213   GLUCOSE  156*  182*       Recent Labs      12/13/18   0234  12/14/18   0213   RBC  3.53*  3.48*   HEMOGLOBIN  10.8*  10.6*   HEMATOCRIT  34.8*  34.8*   PLATELETCT  281  274       Recent Labs      12/13/18   0234  12/14/18   0213   WBC  9.6  10.2           Assessment/Plan     Diabetes mellitus (HCC)  -CONTROLLED:  ?Type 2 diabetes onset 4 years ago.  -Fingerstick glucose checks.  -Hypoglycemia protocol.  -On metformin, glargine  -12/2:  Insulin Glargine 18-->20u; premeal Humalog 8u-->9u  -12/8: Sliding scale insulin has been stopped, blood glucose well controlled.  -12/10 blood glucose 123-237.  Glargine increased from 20-21 units daily, lispro increased from 9-10 units 3 times daily.  -12/12: Blood glucose well controlled on current regimen.  Lantus changed to PM schedule.  -12/13: Glycemic  control improved after Lantus change.  -12/14: Blood glucose following entirely within reference range.  -12/15: Blood glucose 157-187.  We are satisfied with his level of glycemic control given his history.    Memory loss  History of TBI with mild cognitive impairment as per chart review  Lives at an assisted living facility called [facility]  Sister is actively involved in care and cell phone number is in the chart    History of recent UGI bleed  -recent Hospital admission 8/2018 for an episode of upper GI bleed  -Patient had EGD done during hospitalization which showed grade D esophagitis, patient had repeat EGD & colonoscopy at 11/20 and EGD notable for localized nodularity of the mucosa at the GE junction about 5-6 mm.  Colonoscopy was also done but there was stool throughout the colon and a repeat was recommended.  Procedures with GI consultants  -We will hold anticoagulation in this setting, SCDs for DVT prophylaxis  -No apparent active bleed, Hb stable    Tobacco abuse  Smoking cessation strongly encouraged again  Query COPD, chest x-ray hyperinflated  Nicotine replacement therapy ongoing  Vaccinate before discharge, flu and pneumonia  Consider outpatient pulmonary function studies and 6-minute walk to completely assess his COPD    Essential hypertension  On home lisinopril    Chronic pancreatitis (HCC)  CT with extensive calcifications of the pancreas  Patient very cachectic and malnourished appearing  Query refeeding syndrome risk  Aggressive electrolyte repletion  Query absorption issues and pancreatic exocrine insufficiency  Dietary consultation  Vitamin supplementation  Pancreatic enzyme supplements  Stop oral diabetes medications, may be causing adverse issues      -CT abdomen showed extensive calcifications of the pancreas.  -History of protein calorie malnutrition secondary to pancreatic insufficiency.  -Currently on pancrelipase replacement.  -Dietitian following.    Dyslipidemia  -On home  atorvastatin.    Vitamin D deficiency  -Vitamin D level 31.  -On vitamin D supplement.    Essential hypertension  -On home lisinopril.    Tobacco abuse  -Smoking about 1-2 cigarettes/day  -PCP to continue counseling regarding smoking cessation      Disposition  [facility], coverage will be provided by [employer] with $50 co-pay.  He is medically stable for discharge and will hopefully be logistically able to leave on Monday, pending available bed in group [facility].

## 2018-12-15 NOTE — PROGRESS NOTES
Pt sitting up in bed this AM. Able to demonstrate self care of taking blood sugar and administering insulin. No c/o pain or other needs at this time. Asking for snacks only. BSFS WNL this AM: 116. Call light in reach and fall precautions in place.

## 2018-12-15 NOTE — PROGRESS NOTES
Patient alert, oriented x 4. No c/o pain or distress. Able to demonstrate checking blood sugar level. Needs attended to.

## 2018-12-15 NOTE — CARE PLAN
Problem: Infection  Goal: Will remain free from infection  Outcome: PROGRESSING AS EXPECTED      Problem: Metabolic:  Goal: Ability to maintain appropriate glucose levels will improve  Outcome: PROGRESSING AS EXPECTED  Blood glucose levels trending down.     Problem: Health Behavior:  Goal: Ability to manage health-related needs will improve  Outcome: PROGRESSING AS EXPECTED  Pt adherent to self care plan; see Progress note.

## 2018-12-15 NOTE — CARE PLAN
Problem: Safety  Goal: Will remain free from falls  Outcome: PROGRESSING AS EXPECTED  Patient no risk for fall, reinforce importance of safety by calling for assistance, use of treaded socks.     Problem: Knowledge Deficit  Goal: Knowledge of the prescribed therapeutic regimen will improve  Outcome: PROGRESSING AS EXPECTED  Encourage patient to ask questions reqarding therapeutic regimen. Patient to participate in checking blood sugar and administering insulin to self.

## 2018-12-16 LAB
GLUCOSE BLD-MCNC: 158 MG/DL (ref 65–99)
GLUCOSE BLD-MCNC: 158 MG/DL (ref 65–99)
GLUCOSE BLD-MCNC: 181 MG/DL (ref 65–99)
GLUCOSE BLD-MCNC: 305 MG/DL (ref 65–99)

## 2018-12-16 PROCEDURE — 700102 HCHG RX REV CODE 250 W/ 637 OVERRIDE(OP): Performed by: INTERNAL MEDICINE

## 2018-12-16 PROCEDURE — A9270 NON-COVERED ITEM OR SERVICE: HCPCS | Performed by: STUDENT IN AN ORGANIZED HEALTH CARE EDUCATION/TRAINING PROGRAM

## 2018-12-16 PROCEDURE — 700102 HCHG RX REV CODE 250 W/ 637 OVERRIDE(OP): Performed by: STUDENT IN AN ORGANIZED HEALTH CARE EDUCATION/TRAINING PROGRAM

## 2018-12-16 PROCEDURE — 770006 HCHG ROOM/CARE - MED/SURG/GYN SEMI*

## 2018-12-16 PROCEDURE — 82962 GLUCOSE BLOOD TEST: CPT | Mod: 91

## 2018-12-16 PROCEDURE — 99231 SBSQ HOSP IP/OBS SF/LOW 25: CPT | Mod: GC | Performed by: INTERNAL MEDICINE

## 2018-12-16 RX ADMIN — PANCRELIPASE 72000 UNITS: 24000; 76000; 120000 CAPSULE, DELAYED RELEASE PELLETS ORAL at 17:09

## 2018-12-16 RX ADMIN — INSULIN LISPRO 10 UNITS: 100 INJECTION, SOLUTION INTRAVENOUS; SUBCUTANEOUS at 12:25

## 2018-12-16 RX ADMIN — MULTIPLE VITAMINS W/ MINERALS TAB 1 TABLET: TAB at 05:49

## 2018-12-16 RX ADMIN — ATORVASTATIN CALCIUM 10 MG: 10 TABLET, FILM COATED ORAL at 05:47

## 2018-12-16 RX ADMIN — BUPROPION HYDROCHLORIDE 150 MG: 150 TABLET, EXTENDED RELEASE ORAL at 05:48

## 2018-12-16 RX ADMIN — BUPROPION HYDROCHLORIDE 150 MG: 150 TABLET, EXTENDED RELEASE ORAL at 17:08

## 2018-12-16 RX ADMIN — LISINOPRIL 10 MG: 10 TABLET ORAL at 05:48

## 2018-12-16 RX ADMIN — PANCRELIPASE 72000 UNITS: 24000; 76000; 120000 CAPSULE, DELAYED RELEASE PELLETS ORAL at 12:26

## 2018-12-16 RX ADMIN — METFORMIN HYDROCHLORIDE 1000 MG: 500 TABLET ORAL at 08:57

## 2018-12-16 RX ADMIN — INSULIN GLARGINE 20 UNITS: 100 INJECTION, SOLUTION SUBCUTANEOUS at 17:13

## 2018-12-16 RX ADMIN — INSULIN LISPRO 10 UNITS: 100 INJECTION, SOLUTION INTRAVENOUS; SUBCUTANEOUS at 08:52

## 2018-12-16 RX ADMIN — Medication 325 MG: at 08:55

## 2018-12-16 RX ADMIN — TAMSULOSIN HYDROCHLORIDE 0.4 MG: 0.4 CAPSULE ORAL at 08:54

## 2018-12-16 RX ADMIN — OMEPRAZOLE 20 MG: 20 CAPSULE, DELAYED RELEASE ORAL at 05:48

## 2018-12-16 RX ADMIN — INSULIN LISPRO 10 UNITS: 100 INJECTION, SOLUTION INTRAVENOUS; SUBCUTANEOUS at 17:14

## 2018-12-16 RX ADMIN — VITAMIN D, TAB 1000IU (100/BT) 2000 UNITS: 25 TAB at 05:49

## 2018-12-16 RX ADMIN — PANCRELIPASE 72000 UNITS: 24000; 76000; 120000 CAPSULE, DELAYED RELEASE PELLETS ORAL at 08:55

## 2018-12-16 RX ADMIN — Medication 325 MG: at 12:26

## 2018-12-16 RX ADMIN — METFORMIN HYDROCHLORIDE 1000 MG: 500 TABLET ORAL at 17:29

## 2018-12-16 RX ADMIN — Medication 325 MG: at 17:09

## 2018-12-16 ASSESSMENT — PAIN SCALES - GENERAL
PAINLEVEL_OUTOF10: 0
PAINLEVEL_OUTOF10: 0

## 2018-12-16 NOTE — PROGRESS NOTES
Patient will require referral to [facility] end[facility]in[facility] from his primary care provider for insurance purposes.  Will suggest upon discharge.

## 2018-12-16 NOTE — PROGRESS NOTES
Patient alert and oriented, pleasant and cooperative with cares. No c/o pain or distress. Able to demonstrate ability to check blood sugar. Needs attended to.

## 2018-12-16 NOTE — CARE PLAN
Problem: Knowledge Deficit  Goal: Knowledge of disease process/condition, treatment plan, diagnostic tests, and medications will improve  Outcome: PROGRESSING AS EXPECTED  Discussed with patient importance of maintaining good skin care. Patient showed positive attitude and eager to learn.     Problem: Skin Integrity:  Goal: Risk for impaired skin integrity will decrease  Outcome: PROGRESSING AS EXPECTED  Educated patient on the importance of skin care and foot care to prevent ulcers.

## 2018-12-16 NOTE — PROGRESS NOTES
Internal Medicine Interval Note  Note Author: Lewis Hoffmann M.D.     Name Pawel Tatum     1960   Age/Sex 58 y.o. male   MRN [id number]   Code Status Full Code     After 5PM or if no immediate response to page, please call for cross-coverage  Attending/Team: Radha/Javier See Patient List for primary contact information  Call [phone] to page    1st Call - Day Intern (R1):   [place] 2nd Call - Day Sr. Resident (R2/R3):   Johnnie         Reason for interval visit  (Principal Problem)   Diabetic ketoacidosis (HCC)    HPI: 58 y.o. male, past medical history insulin-dependent diabetes mellitus (hemoglobin A1c 17), pancreatic insufficiency, recent esophagitis, history of TBI with mild cognitive impairment, residing [facility] assisted living facility, presents with DKA after medication noncompliance.  Treated with ICU DKA protocol, subsequently transferred to floor, stable.  Per social work, there is concerned about his assisted living facility accepting him in light of the fact they feel he needs more supervision than they can provide.  It is noted that he does not have PT OT needs and is stable medically for discharge.    Interval Problem Daily Status Update  (24 hours)   No complaints.  Self administering insulin under the supervision of the nurse, no issues.  No acute events overnight.  Satisfactory glycemic control on insulin glargine 20 units, lispro increased 10 units 3 times daily.  Accu-Cheks 116-210.    Patient has been accepted to [facility]s include [facility] beds [facility] the [facility] with [facility], [facility]  We are grateful to social work for the help in this case.    Pertinent negatives: Denies nausea, vomiting, diarrhea, constipation, syncope, palpitation, shortness of breath, cough, fever, change in bowel or bladder habits, change in appetite.  10 system review of systems otherwise negative.    Consultants/Specialty  None  PCP: Rosendo Watters,  A.P.N.    Disposition  Inpatient for placement.      PMHx:  History reviewed. No pertinent past medical history.    PSHx:  Past Surgical History:   Procedure Laterality Date   • GASTROSCOPY-ENDO N/A 8/25/2018    Procedure: GASTROSCOPY-ENDO;  Surgeon: Jaswant Frye M.D.;  Location: ENDOSCOPY [facility];  Service: Gastroenterology       Medications:    Current Facility-Administered Medications:   •  insulin glargine (LANTUS) injection 20 Units, 20 Units, Subcutaneous, Q EVENING, 20 Units at 12/15/18 1801 **AND** insulin lispro (HUMALOG) injection 10 Units, 10 Units, Subcutaneous, TID AC, 10 Units at 12/15/18 1757 **AND** [DISCONTINUED] insulin lispro (HUMALOG) injection 1-6 Units, 1-6 Units, Subcutaneous, TID AC, Stopped at 12/03/18 1700 **AND** Accu-Chek ACHS, , , Q AC AND BEDTIME(S) **AND** NOTIFY MD and PharmD, , , Once **AND** glucose 4 g chewable tablet 16 g, 16 g, Oral, Q15 MIN PRN **AND** dextrose 50% (D50W) injection 25 mL, 25 mL, Intravenous, Q15 MIN PRN, Igor Blair M.D.  •  metFORMIN (GLUCOPHAGE) tablet 1,000 mg, 1,000 mg, Oral, BID WITH MEALS, Ju Reece M.D., 1,000 mg at 12/15/18 1752  •  lisinopril (PRINIVIL) 10 MG tablet 10 mg, 10 mg, Oral, DAILY, Ju Reece M.D., 10 mg at 12/16/18 0548  •  therapeutic multivitamin-minerals (THERAGRAN-M) tablet 1 Tab, 1 Tab, Oral, DAILY, Brigida Hyde M.D., 1 Tab at 12/16/18 0549  •  senna-docusate (PERICOLACE or SENOKOT S) 8.6-50 MG per tablet 2 Tab, 2 Tab, Oral, BID, Stopped at 12/13/18 1800 **AND** polyethylene glycol/lytes (MIRALAX) PACKET 1 Packet, 1 Packet, Oral, QDAY PRN **AND** magnesium hydroxide (MILK OF MAGNESIA) suspension 30 mL, 30 mL, Oral, QDAY PRN **AND** bisacodyl (DULCOLAX) suppository 10 mg, 10 mg, Rectal, QDAY PRN, Wilma Oviedo M.D.  •  acetaminophen (TYLENOL) tablet 650 mg, 650 mg, Oral, Q6HRS PRN, Wilma Oviedo M.D., 650 mg at 12/14/18 1806  •  atorvastatin (LIPITOR) tablet 10 mg, 10 mg, Oral, DAILY, Wilma Oviedo,  M.D., 10 mg at 12/16/18 0547  •  vitamin D (cholecalciferol) tablet 2,000 Units, 2,000 Units, Oral, DAILY, Wilma Oviedo M.D., 2,000 Units at 12/16/18 0549  •  omeprazole (PRILOSEC) capsule 20 mg, 20 mg, Oral, QAM, Wilma Oviedo M.D., 20 mg at 12/16/18 0548  •  tamsulosin (FLOMAX) capsule 0.4 mg, 0.4 mg, Oral, AFTER BREAKFAST, Wilma Oviedo M.D., 0.4 mg at 12/15/18 0849  •  ferrous sulfate tablet 325 mg, 325 mg, Oral, TID WITH MEALS, Wilma Oviedo M.D., 325 mg at 12/15/18 1753  •  buPROPion SR (WELLBUTRIN-SR) tablet 150 mg, 150 mg, Oral, BID, Wilma Oviedo M.D., 150 mg at 12/16/18 0548  •  pancrelipase (Lip-Prot-Amyl) (CREON 37138) 06422-12573 units capsule 72,000 Units, 3 Cap, Oral, TID WITH MEALS, Wilma Oviedo M.D., 72,000 Units at 12/15/18 1753  •  pancrelipase (Lip-Prot-Amyl) (CREON 40784) 37705-66008 units capsule 24,000 Units, 1 Cap, Oral, With Snacks PRN **AND** pancrelipase (Lip-Prot-Amyl) (CREON 6000) 6000 units capsule 12,000 Units, 2 Cap, Oral, With Snacks PRN, Wilma Oviedo M.D.    Allergies:  No Known Allergies    Quality Measures  Reviewed items:  EKG reviewed, Labs reviewed, Medications reviewed and Radiology images reviewed  Dill catheter:  No Dill  Central line: No central line  DVT prophylaxis: Mechanical, pharmacological contraindicated due to bleeding risk (history of GI bleed August 2018)            Physical Exam       Vitals:    12/15/18 0730 12/15/18 1631 12/15/18 1915 12/16/18 0400   BP: 127/78 113/66 130/74 121/67   Pulse: 72 90 (!) 103 71   Resp: 18 18 20 18   Temp: 36.8 °C (98.3 °F) 36.9 °C (98.5 °F) 36.9 °C (98.4 °F) 36.3 °C (97.3 °F)   TempSrc: Oral Oral Temporal Temporal   SpO2: 96% 94% 93% 96%   Weight:       Height:         Body mass index is 15.77 kg/m².    Oxygen Therapy:  Pulse Oximetry: 96 %, O2 (LPM): 0, O2 Delivery: None (Room Air)    PHYSICAL EXAM: Unchanged  Constitutional: CACHECTIC male in no distress.   HENT:   Head: Normocephalic and atraumatic.   Eyes:  Pupils are equal, round, and reactive to light. EOM are normal.  PTOSIS of left eyelid (baseline).  Neck: Normal range of motion. Neck supple. No JVD present. No tracheal deviation present. No thyromegaly present.   Cardiovascular: Regular rhythm.  Exam reveals no gallop and no friction rub.    No murmur heard.  Pulmonary/Chest: Effort normal and breath sounds normal. No stridor. No respiratory distress. No wheezes. No rales. No tenderness.   Abdominal: Soft. Bowel sounds are normal. No distension and no mass. There is no tenderness. There is no rebound and no guarding.   Musculoskeletal: Normal range of motion. No edema, tenderness or deformity.   Lymphadenopathy:     No cervical adenopathy.   Neurological: Alert and oriented to person, place, and time. No cranial nerve deficit.   Skin: Skin is warm and dry. No rash noted. No erythema.   Psychiatric: Mood, memory, affect and judgment normal.   Vitals reviewed.      Lab Data Review:         12/3/2018  7:32 AM    Recent Labs      12/14/18   0213   SODIUM  139   POTASSIUM  4.6   CHLORIDE  109   CO2  21   BUN  35*   CREATININE  0.75   CALCIUM  9.3       Recent Labs      12/14/18   0213   GLUCOSE  182*       Recent Labs      12/14/18 0213   RBC  3.48*   HEMOGLOBIN  10.6*   HEMATOCRIT  34.8*   PLATELETCT  274       Recent Labs      12/14/18 0213   WBC  10.2           Assessment/Plan     Diabetes mellitus (HCC)  -CONTROLLED:  ?Type 2 diabetes onset 4 years ago.  -Fingerstick glucose checks.  -Hypoglycemia protocol.  -On metformin, glargine  -12/2:  Insulin Glargine 18-->20u; premeal Humalog 8u-->9u  -12/8: Sliding scale insulin has been stopped, blood glucose well controlled.  -12/10 blood glucose 123-237.  Glargine increased from 20-21 units daily, lispro increased from 9-10 units 3 times daily.  -12/12: Blood glucose well controlled on current regimen.  Lantus changed to PM schedule.  -12/13: Glycemic control improved after Lantus change.  -12/14: Blood glucose  following entirely within reference range.  -12/15: Blood glucose 157-187.  We are satisfied with his level of glycemic control given his history.    Memory loss  History of TBI with mild cognitive impairment as per chart review  Lives at an assisted living facility called [facility]  Sister is actively involved in care and cell phone number is in the chart    History of recent UGI bleed  -recent Hospital admission 8/2018 for an episode of upper GI bleed  -Patient had EGD done during hospitalization which showed grade D esophagitis, patient had repeat EGD & colonoscopy at 11/20 and EGD notable for localized nodularity of the mucosa at the GE junction about 5-6 mm.  Colonoscopy was also done but there was stool throughout the colon and a repeat was recommended.  Procedures with GI consultants  -We will hold anticoagulation in this setting, SCDs for DVT prophylaxis  -No apparent active bleed, Hb stable    Tobacco abuse  Smoking cessation strongly encouraged again  Query COPD, chest x-ray hyperinflated  Nicotine replacement therapy ongoing  Vaccinate before discharge, flu and pneumonia  Consider outpatient pulmonary function studies and 6-minute walk to completely assess his COPD    Essential hypertension  On home lisinopril    Chronic pancreatitis (HCC)  CT with extensive calcifications of the pancreas  Patient very cachectic and malnourished appearing  Query refeeding syndrome risk  Aggressive electrolyte repletion  Query absorption issues and pancreatic exocrine insufficiency  Dietary consultation  Vitamin supplementation  Pancreatic enzyme supplements  Stop oral diabetes medications, may be causing adverse issues      -CT abdomen showed extensive calcifications of the pancreas.  -History of protein calorie malnutrition secondary to pancreatic insufficiency.  -Currently on pancrelipase replacement.  -Dietitian following.    Dyslipidemia  -On home atorvastatin.    Vitamin D deficiency  -Vitamin D level 31.  -On vitamin D  supplement.    Essential hypertension  -On home lisinopril.    Tobacco abuse  -Smoking about 1-2 cigarettes/day  -PCP to continue counseling regarding smoking cessation      Disposition  [facility] coverage will be provided by [employer] with $50 co-pay.  He is medically stable for discharge and will hopefully be logistically able to leave on Monday, pending available bed in group home.

## 2018-12-16 NOTE — CARE PLAN
Problem: Discharge Barriers/Planning  Goal: Patient's continuum of care needs will be met  Outcome: PROGRESSING AS EXPECTED  Pt is possible going to discharge on 12/17. SW involve in care    Problem: Metabolic:  Goal: Ability to maintain appropriate glucose levels will improve  Outcome: PROGRESSING AS EXPECTED  FSBS checked, insulin given per MAR, CTM

## 2018-12-17 LAB
ANION GAP SERPL CALC-SCNC: 9 MMOL/L (ref 0–11.9)
BUN SERPL-MCNC: 32 MG/DL (ref 8–22)
CALCIUM SERPL-MCNC: 9.5 MG/DL (ref 8.5–10.5)
CHLORIDE SERPL-SCNC: 110 MMOL/L (ref 96–112)
CO2 SERPL-SCNC: 21 MMOL/L (ref 20–33)
CREAT SERPL-MCNC: 0.88 MG/DL (ref 0.5–1.4)
ERYTHROCYTE [DISTWIDTH] IN BLOOD BY AUTOMATED COUNT: 84.9 FL (ref 35.9–50)
GLUCOSE BLD-MCNC: 147 MG/DL (ref 65–99)
GLUCOSE BLD-MCNC: 369 MG/DL (ref 65–99)
GLUCOSE BLD-MCNC: 77 MG/DL (ref 65–99)
GLUCOSE BLD-MCNC: 80 MG/DL (ref 65–99)
GLUCOSE SERPL-MCNC: 45 MG/DL (ref 65–99)
HCT VFR BLD AUTO: 37.6 % (ref 42–52)
HGB BLD-MCNC: 11.7 G/DL (ref 14–18)
MCH RBC QN AUTO: 31.2 PG (ref 27–33)
MCHC RBC AUTO-ENTMCNC: 31.1 G/DL (ref 33.7–35.3)
MCV RBC AUTO: 100.3 FL (ref 81.4–97.8)
PLATELET # BLD AUTO: 287 K/UL (ref 164–446)
PMV BLD AUTO: 9.2 FL (ref 9–12.9)
POTASSIUM SERPL-SCNC: 4.5 MMOL/L (ref 3.6–5.5)
RBC # BLD AUTO: 3.75 M/UL (ref 4.7–6.1)
SODIUM SERPL-SCNC: 140 MMOL/L (ref 135–145)
WBC # BLD AUTO: 10.5 K/UL (ref 4.8–10.8)

## 2018-12-17 PROCEDURE — 700102 HCHG RX REV CODE 250 W/ 637 OVERRIDE(OP): Performed by: STUDENT IN AN ORGANIZED HEALTH CARE EDUCATION/TRAINING PROGRAM

## 2018-12-17 PROCEDURE — A9270 NON-COVERED ITEM OR SERVICE: HCPCS | Performed by: STUDENT IN AN ORGANIZED HEALTH CARE EDUCATION/TRAINING PROGRAM

## 2018-12-17 PROCEDURE — 85027 COMPLETE CBC AUTOMATED: CPT

## 2018-12-17 PROCEDURE — 80048 BASIC METABOLIC PNL TOTAL CA: CPT

## 2018-12-17 PROCEDURE — 770006 HCHG ROOM/CARE - MED/SURG/GYN SEMI*

## 2018-12-17 PROCEDURE — 36415 COLL VENOUS BLD VENIPUNCTURE: CPT

## 2018-12-17 PROCEDURE — 99231 SBSQ HOSP IP/OBS SF/LOW 25: CPT | Mod: GC | Performed by: INTERNAL MEDICINE

## 2018-12-17 PROCEDURE — 82962 GLUCOSE BLOOD TEST: CPT | Mod: 91

## 2018-12-17 RX ORDER — INSULIN GLARGINE 100 [IU]/ML
20 INJECTION, SOLUTION SUBCUTANEOUS EVERY EVENING
Qty: 10 ML | Refills: 0 | Status: SHIPPED | OUTPATIENT
Start: 2018-12-17 | End: 2018-12-19

## 2018-12-17 RX ORDER — INSULIN GLARGINE 100 [IU]/ML
20 INJECTION, SOLUTION SUBCUTANEOUS EVERY EVENING
Status: DISCONTINUED | OUTPATIENT
Start: 2018-12-17 | End: 2018-12-21 | Stop reason: HOSPADM

## 2018-12-17 RX ORDER — DEXTROSE MONOHYDRATE 25 G/50ML
25 INJECTION, SOLUTION INTRAVENOUS
Status: DISCONTINUED | OUTPATIENT
Start: 2018-12-17 | End: 2018-12-21 | Stop reason: HOSPADM

## 2018-12-17 RX ADMIN — INSULIN LISPRO 10 UNITS: 100 INJECTION, SOLUTION INTRAVENOUS; SUBCUTANEOUS at 08:16

## 2018-12-17 RX ADMIN — VITAMIN D, TAB 1000IU (100/BT) 2000 UNITS: 25 TAB at 04:48

## 2018-12-17 RX ADMIN — PANCRELIPASE 72000 UNITS: 24000; 76000; 120000 CAPSULE, DELAYED RELEASE PELLETS ORAL at 08:16

## 2018-12-17 RX ADMIN — BUPROPION HYDROCHLORIDE 150 MG: 150 TABLET, EXTENDED RELEASE ORAL at 18:01

## 2018-12-17 RX ADMIN — Medication 325 MG: at 18:01

## 2018-12-17 RX ADMIN — INSULIN GLARGINE 20 UNITS: 100 INJECTION, SOLUTION SUBCUTANEOUS at 18:00

## 2018-12-17 RX ADMIN — METFORMIN HYDROCHLORIDE 1000 MG: 500 TABLET ORAL at 18:01

## 2018-12-17 RX ADMIN — STANDARDIZED SENNA CONCENTRATE AND DOCUSATE SODIUM 2 TABLET: 8.6; 5 TABLET, FILM COATED ORAL at 04:49

## 2018-12-17 RX ADMIN — Medication 325 MG: at 11:59

## 2018-12-17 RX ADMIN — Medication 325 MG: at 08:16

## 2018-12-17 RX ADMIN — INSULIN LISPRO 10 UNITS: 100 INJECTION, SOLUTION INTRAVENOUS; SUBCUTANEOUS at 11:59

## 2018-12-17 RX ADMIN — METFORMIN HYDROCHLORIDE 1000 MG: 500 TABLET ORAL at 08:16

## 2018-12-17 RX ADMIN — OMEPRAZOLE 20 MG: 20 CAPSULE, DELAYED RELEASE ORAL at 04:49

## 2018-12-17 RX ADMIN — MULTIPLE VITAMINS W/ MINERALS TAB 1 TABLET: TAB at 04:49

## 2018-12-17 RX ADMIN — PANCRELIPASE 72000 UNITS: 24000; 76000; 120000 CAPSULE, DELAYED RELEASE PELLETS ORAL at 11:58

## 2018-12-17 RX ADMIN — PANCRELIPASE 72000 UNITS: 24000; 76000; 120000 CAPSULE, DELAYED RELEASE PELLETS ORAL at 18:01

## 2018-12-17 RX ADMIN — ATORVASTATIN CALCIUM 10 MG: 10 TABLET, FILM COATED ORAL at 04:49

## 2018-12-17 RX ADMIN — LISINOPRIL 10 MG: 10 TABLET ORAL at 04:49

## 2018-12-17 RX ADMIN — BUPROPION HYDROCHLORIDE 150 MG: 150 TABLET, EXTENDED RELEASE ORAL at 04:48

## 2018-12-17 RX ADMIN — TAMSULOSIN HYDROCHLORIDE 0.4 MG: 0.4 CAPSULE ORAL at 08:16

## 2018-12-17 ASSESSMENT — PAIN SCALES - GENERAL: PAINLEVEL_OUTOF10: 0

## 2018-12-17 NOTE — CARE PLAN
Problem: Nutritional:  Goal: Maintenance of adequate nutrition will improve    Intervention: Discuss appropriate dietary choices  Discussed diabetic diet. Pt verbalized understanding.       Problem: Pain Management  Goal: Pain level will decrease to patient's comfort goal  Outcome: PROGRESSING AS EXPECTED  Pain assessed PQRST. Pt denied pain.     Problem: Nutritional:  Goal: Maintenance of adequate nutrition will improve    Intervention: Discuss appropriate dietary choices  Discussed diabetic diet. Pt verbalized understanding.       Problem: Knowledge Deficit:  Goal: Knowledge of the prescribed therapeutic regimen will improve  Outcome: PROGRESSING AS EXPECTED

## 2018-12-17 NOTE — PROGRESS NOTES
Assumed care of pt at [facility] of shift. A/Ox4, discussed plan of care. Pt on rrom air, tolerating diabetic diet, up self with steady gait. All needs met at this time. Bed in lowest position, treaded socks on, personal belongings and call light within reach, instructed to call for any assistance.

## 2018-12-17 NOTE — PROGRESS NOTES
Tech from Lab called with critical result of blood sugar at 1423. Critical lab result read back to .   Dr. Palomares, [facility],  notified of critical lab result at 1434.  Critical lab result read back by Dr. Palomares.

## 2018-12-17 NOTE — PROGRESS NOTES
Internal Medicine Interval Note  Note Author: Lewis Hoffmann M.D.     Name Pawel Tatum     1960   Age/Sex 58 y.o. male   MRN [id number]   Code Status Full Code     After 5PM or if no immediate response to page, please call for cross-coverage  Attending/Team: Radha/Cici See Patient List for primary contact information  Call [phone] to page    1st Call - Day Intern (R1):   [place] 2nd Call - Day Sr. Resident (R2/R3):   Johnnie         Reason for interval visit  (Principal Problem)   Diabetic ketoacidosis (HCC)    HPI: 58 y.o. male, past medical history insulin-dependent diabetes mellitus (hemoglobin A1c 17), pancreatic insufficiency, recent esophagitis, history of TBI with mild cognitive impairment, residing [facility] assisted living facility, presents with DKA after medication noncompliance.  Treated with ICU DKA protocol, subsequently transferred to floor, stable.  Per social work, there is concerned about his assisted living facility accepting him in light of the fact they feel he needs more supervision than they can provide.  It is noted that he does not have PT OT needs and is stable medically for discharge.    Interval Problem Daily Status Update  (24 hours)   No complaints.  Self administering insulin under the supervision of the nurse, no issues.  No acute events overnight.  Satisfactory glycemic control on insulin glargine 20 units, lispro increased 10 units 3 times daily.  Accu-Cheks 158-305.  Patient is medically stable and we anticipate discharge to group home today.    Patient has been accepted to [facility] (not [facility]) custodial with coverage from [facility], and with [facility] home health assistance.  We are grateful to social work for the help in this case.  Requiring additional documentation before bed availability, completed.  Hopeful for discharge tomorrow.    Pertinent negatives: Denies nausea, vomiting, diarrhea, constipation, syncope, palpitation, shortness of breath, cough,  fever, change in bowel or bladder habits, change in appetite.  10 system review of systems otherwise negative.    Consultants/Specialty  None  PCP: JAKE Armando    Disposition  Inpatient for placement.      PMHx:  History reviewed. No pertinent past medical history.    PSHx:  Past Surgical History:   Procedure Laterality Date   • GASTROSCOPY-ENDO N/A 8/25/2018    Procedure: GASTROSCOPY-ENDO;  Surgeon: Jaswant Frye M.D.;  Location: [facility];  Service: Gastroenterology       Medications:    Current Facility-Administered Medications:   •  insulin glargine (LANTUS) injection 20 Units, 20 Units, Subcutaneous, Q EVENING, 20 Units at 12/16/18 1713 **AND** insulin lispro (HUMALOG) injection 10 Units, 10 Units, Subcutaneous, TID AC, 10 Units at 12/16/18 1714 **AND** [DISCONTINUED] insulin lispro (HUMALOG) injection 1-6 Units, 1-6 Units, Subcutaneous, TID AC, Stopped at 12/03/18 1700 **AND** Accu-Chek ACHS, , , Q AC AND BEDTIME(S) **AND** NOTIFY MD and PharmD, , , Once **AND** glucose 4 g chewable tablet 16 g, 16 g, Oral, Q15 MIN PRN **AND** dextrose 50% (D50W) injection 25 mL, 25 mL, Intravenous, Q15 MIN PRN, Igor Blair M.D.  •  metFORMIN (GLUCOPHAGE) tablet 1,000 mg, 1,000 mg, Oral, BID WITH MEALS, Ju Reece M.D., 1,000 mg at 12/16/18 1729  •  lisinopril (PRINIVIL) 10 MG tablet 10 mg, 10 mg, Oral, DAILY, Ju Reece M.D., 10 mg at 12/17/18 0449  •  therapeutic multivitamin-minerals (THERAGRAN-M) tablet 1 Tab, 1 Tab, Oral, DAILY, Brigida Hyde M.D., 1 Tab at 12/17/18 0449  •  senna-docusate (PERICOLACE or SENOKOT S) 8.6-50 MG per tablet 2 Tab, 2 Tab, Oral, BID, 2 Tab at 12/17/18 0449 **AND** polyethylene glycol/lytes (MIRALAX) PACKET 1 Packet, 1 Packet, Oral, QDAY PRN **AND** magnesium hydroxide (MILK OF MAGNESIA) suspension 30 mL, 30 mL, Oral, QDAY PRN **AND** bisacodyl (DULCOLAX) suppository 10 mg, 10 mg, Rectal, QDAY PRN, Wilma Oviedo M.D.  •  acetaminophen (TYLENOL)  tablet 650 mg, 650 mg, Oral, Q6HRS PRN, Wilma Oviedo M.D., 650 mg at 12/14/18 1806  •  atorvastatin (LIPITOR) tablet 10 mg, 10 mg, Oral, DAILY, Wilma Oviedo M.D., 10 mg at 12/17/18 0449  •  vitamin D (cholecalciferol) tablet 2,000 Units, 2,000 Units, Oral, DAILY, Wilma Oviedo M.D., 2,000 Units at 12/17/18 0448  •  omeprazole (PRILOSEC) capsule 20 mg, 20 mg, Oral, QAM, Wilma Oviedo M.D., 20 mg at 12/17/18 0449  •  tamsulosin (FLOMAX) capsule 0.4 mg, 0.4 mg, Oral, AFTER BREAKFAST, Wilma Oviedo M.D., 0.4 mg at 12/16/18 0854  •  ferrous sulfate tablet 325 mg, 325 mg, Oral, TID WITH MEALS, Wilma Oviedo M.D., 325 mg at 12/16/18 1709  •  buPROPion SR (WELLBUTRIN-SR) tablet 150 mg, 150 mg, Oral, BID, Wilma Oviedo M.D., 150 mg at 12/17/18 0448  •  pancrelipase (Lip-Prot-Amyl) (CREON 71807) 83142-93972 units capsule 72,000 Units, 3 Cap, Oral, TID WITH MEALS, Wilma Oviedo M.D., 72,000 Units at 12/16/18 1709  •  pancrelipase (Lip-Prot-Amyl) (CREON 15708) 20609-97303 units capsule 24,000 Units, 1 Cap, Oral, With Snacks PRN **AND** pancrelipase (Lip-Prot-Amyl) (CREON 6000) 6000 units capsule 12,000 Units, 2 Cap, Oral, With Snacks PRN, Wilma Oviedo M.D.    Allergies:  No Known Allergies    Quality Measures  Reviewed items:  EKG reviewed, Labs reviewed, Medications reviewed and Radiology images reviewed  Dill catheter:  No Dill  Central line: No central line  DVT prophylaxis: Mechanical, pharmacological contraindicated due to bleeding risk (history of GI bleed August 2018)            Physical Exam       Vitals:    12/16/18 0715 12/16/18 1550 12/16/18 1915 12/17/18 0358   BP: 117/72 116/64 102/67 120/75   Pulse: 88 87 91 62   Resp: 18 18 16 15   Temp: 36.3 °C (97.3 °F) 36.9 °C (98.5 °F) 36.8 °C (98.3 °F) 36.1 °C (97 °F)   TempSrc: Oral Oral Temporal Temporal   SpO2: 93% 94% 92% 94%   Weight:       Height:         Body mass index is 15.77 kg/m².    Oxygen Therapy:  Pulse Oximetry: 94 %, O2 (LPM): 0, O2  Delivery: None (Room Air)    PHYSICAL EXAM: Unchanged  Constitutional: CACHECTIC male in no distress.   HENT:   Head: Normocephalic and atraumatic.   Eyes: Pupils are equal, round, and reactive to light. EOM are normal.  PTOSIS of left eyelid (baseline).  Neck: Normal range of motion. Neck supple. No JVD present. No tracheal deviation present. No thyromegaly present.   Cardiovascular: Regular rhythm.  Exam reveals no gallop and no friction rub.    No murmur heard.  Pulmonary/Chest: Effort normal and breath sounds normal. No stridor. No respiratory distress. No wheezes. No rales. No tenderness.   Abdominal: Soft. Bowel sounds are normal. No distension and no mass. There is no tenderness. There is no rebound and no guarding.   Musculoskeletal: Normal range of motion. No edema, tenderness or deformity.   Lymphadenopathy:     No cervical adenopathy.   Neurological: Alert and oriented to person, place, and time. No cranial nerve deficit.   Skin: Skin is warm and dry. No rash noted. No erythema.   Psychiatric: Mood, memory, affect and judgment normal.   Vitals reviewed.      Lab Data Review:         12/3/2018  7:32 AM    No results for input(s): SODIUM, POTASSIUM, CHLORIDE, CO2, BUN, CREATININE, MAGNESIUM, PHOSPHORUS, CALCIUM in the last 72 hours.    No results for input(s): ALTSGPT, ASTSGOT, ALKPHOSPHAT, TBILIRUBIN, DBILIRUBIN, GAMMAGT, AMYLASE, LIPASE, ALB, PREALBUMIN, GLUCOSE in the last 72 hours.    No results for input(s): RBC, HEMOGLOBIN, HEMATOCRIT, PLATELETCT, PROTHROMBTM, APTT, INR, IRON, FERRITIN, TOTIRONBC in the last 72 hours.              Assessment/Plan     Diabetes mellitus (HCC)  -CONTROLLED:  ?Type 2 diabetes onset 4 years ago.  -Fingerstick glucose checks.  -Hypoglycemia protocol.  -On metformin, glargine  -12/2:  Insulin Glargine 18-->20u; premeal Humalog 8u-->9u  -12/8: Sliding scale insulin has been stopped, blood glucose well controlled.  -12/10 blood glucose 123-237.  Glargine increased from 20-21  units daily, lispro increased from 9-10 units 3 times daily.  -12/12: Blood glucose well controlled on current regimen.  Lantus changed to PM schedule.  -12/13: Glycemic control improved after Lantus change.  -12/14: Blood glucose following entirely within reference range.  -12/15: Blood glucose 157-187.  We are satisfied with his level of glycemic control given his history.    Memory loss  History of TBI with mild cognitive impairment as per chart review  Lives at an assisted living facility called [facility]  Sister is actively involved in care and cell phone number is in the chart    History of recent UGI bleed  -recent Hospital admission 8/2018 for an episode of upper GI bleed  -Patient had EGD done during hospitalization which showed grade D esophagitis, patient had repeat EGD & colonoscopy at 11/20 and EGD notable for localized nodularity of the mucosa at the GE junction about 5-6 mm.  Colonoscopy was also done but there was stool throughout the colon and a repeat was recommended.  Procedures with GI consultants  -We will hold anticoagulation in this setting, SCDs for DVT prophylaxis  -No apparent active bleed, Hb stable    Tobacco abuse  Smoking cessation strongly encouraged again  Query COPD, chest x-ray hyperinflated  Nicotine replacement therapy ongoing  Vaccinate before discharge, flu and pneumonia  Consider outpatient pulmonary function studies and 6-minute walk to completely assess his COPD    Essential hypertension  On home lisinopril    Chronic pancreatitis (HCC)  CT with extensive calcifications of the pancreas  Patient very cachectic and malnourished appearing  Query refeeding syndrome risk  Aggressive electrolyte repletion  Query absorption issues and pancreatic exocrine insufficiency  Dietary consultation  Vitamin supplementation  Pancreatic enzyme supplements  Stop oral diabetes medications, may be causing adverse issues      -CT abdomen showed extensive calcifications of the pancreas.  -History of  protein calorie malnutrition secondary to pancreatic insufficiency.  -Currently on pancrelipase replacement.  -Dietitian following.    Dyslipidemia  -On home atorvastatin.    Vitamin D deficiency  -Vitamin D level 31.  -On vitamin D supplement.    Essential hypertension  -On home lisinopril.    Tobacco abuse  -Smoking about 1-2 cigarettes/day  -PCP to continue counseling regarding smoking cessation      Disposition  Patient has been accepted to [facility] group home with [facility], coverage will be provided by [employer] with $50 co-pay.  He is medically stable for discharge and will hopefully be logistically able to leave on Monday, pending available bed in group home.

## 2018-12-18 LAB
GLUCOSE BLD-MCNC: 254 MG/DL (ref 65–99)
GLUCOSE BLD-MCNC: 276 MG/DL (ref 65–99)
GLUCOSE BLD-MCNC: 308 MG/DL (ref 65–99)
GLUCOSE BLD-MCNC: 355 MG/DL (ref 65–99)

## 2018-12-18 PROCEDURE — 82962 GLUCOSE BLOOD TEST: CPT | Mod: 91

## 2018-12-18 PROCEDURE — A9270 NON-COVERED ITEM OR SERVICE: HCPCS | Performed by: STUDENT IN AN ORGANIZED HEALTH CARE EDUCATION/TRAINING PROGRAM

## 2018-12-18 PROCEDURE — 700102 HCHG RX REV CODE 250 W/ 637 OVERRIDE(OP): Performed by: STUDENT IN AN ORGANIZED HEALTH CARE EDUCATION/TRAINING PROGRAM

## 2018-12-18 PROCEDURE — 770006 HCHG ROOM/CARE - MED/SURG/GYN SEMI*

## 2018-12-18 PROCEDURE — 99231 SBSQ HOSP IP/OBS SF/LOW 25: CPT | Mod: GC | Performed by: INTERNAL MEDICINE

## 2018-12-18 RX ADMIN — ATORVASTATIN CALCIUM 10 MG: 10 TABLET, FILM COATED ORAL at 05:11

## 2018-12-18 RX ADMIN — Medication 325 MG: at 12:53

## 2018-12-18 RX ADMIN — MULTIPLE VITAMINS W/ MINERALS TAB 1 TABLET: TAB at 05:11

## 2018-12-18 RX ADMIN — METFORMIN HYDROCHLORIDE 1000 MG: 500 TABLET ORAL at 08:28

## 2018-12-18 RX ADMIN — PANCRELIPASE 72000 UNITS: 24000; 76000; 120000 CAPSULE, DELAYED RELEASE PELLETS ORAL at 12:53

## 2018-12-18 RX ADMIN — METFORMIN HYDROCHLORIDE 1000 MG: 500 TABLET ORAL at 17:47

## 2018-12-18 RX ADMIN — PANCRELIPASE 72000 UNITS: 24000; 76000; 120000 CAPSULE, DELAYED RELEASE PELLETS ORAL at 08:31

## 2018-12-18 RX ADMIN — OMEPRAZOLE 20 MG: 20 CAPSULE, DELAYED RELEASE ORAL at 05:11

## 2018-12-18 RX ADMIN — BUPROPION HYDROCHLORIDE 150 MG: 150 TABLET, EXTENDED RELEASE ORAL at 17:47

## 2018-12-18 RX ADMIN — Medication 325 MG: at 08:28

## 2018-12-18 RX ADMIN — Medication 325 MG: at 17:47

## 2018-12-18 RX ADMIN — TAMSULOSIN HYDROCHLORIDE 0.4 MG: 0.4 CAPSULE ORAL at 08:28

## 2018-12-18 RX ADMIN — LISINOPRIL 10 MG: 10 TABLET ORAL at 05:11

## 2018-12-18 RX ADMIN — INSULIN GLARGINE 20 UNITS: 100 INJECTION, SOLUTION SUBCUTANEOUS at 17:50

## 2018-12-18 RX ADMIN — VITAMIN D, TAB 1000IU (100/BT) 2000 UNITS: 25 TAB at 05:11

## 2018-12-18 RX ADMIN — BUPROPION HYDROCHLORIDE 150 MG: 150 TABLET, EXTENDED RELEASE ORAL at 05:11

## 2018-12-18 RX ADMIN — STANDARDIZED SENNA CONCENTRATE AND DOCUSATE SODIUM 2 TABLET: 8.6; 5 TABLET, FILM COATED ORAL at 05:11

## 2018-12-18 RX ADMIN — PANCRELIPASE 72000 UNITS: 24000; 76000; 120000 CAPSULE, DELAYED RELEASE PELLETS ORAL at 17:47

## 2018-12-18 ASSESSMENT — PAIN SCALES - GENERAL: PAINLEVEL_OUTOF10: 0

## 2018-12-18 NOTE — CARE PLAN
Problem: Knowledge Deficit  Goal: Knowledge of disease process/condition, treatment plan, diagnostic tests, and medications will improve  Outcome: PROGRESSING AS EXPECTED  Self injecting own insuline with rn supervision    Problem: Discharge Barriers/Planning  Goal: Patient's continuum of care needs will be met  Outcome: PROGRESSING AS EXPECTED  Awaiting placement, possible group home

## 2018-12-18 NOTE — CARE PLAN
Problem: Bowel/Gastric:  Goal: Normal bowel function is maintained or improved  Outcome: PROGRESSING AS EXPECTED  Pt has a BM daily      Problem: Metabolic:  Goal: Ability to maintain appropriate glucose levels will improve    Intervention: Manage blood glucose measurement  FSBS checked, insulin given per MAR, no s/s of complication, CTM      Problem: Metabolic:  Goal: Ability to maintain appropriate glucose levels will improve    Intervention: Manage blood glucose measurement  FSBS checked, insulin given per MAR, no s/s of complication, CTM

## 2018-12-18 NOTE — PROGRESS NOTES
Alert and able to let his needs known, wanting his blood sugar checked tonight; checked as requested without coverage ordered. Cont plan of care, call light within reach and visual checks through the night

## 2018-12-18 NOTE — PROGRESS NOTES
UPDATE:  Called with blood glucose of 45, improved to 77 after snack, asymptomatic.  AM insulin decreased.

## 2018-12-18 NOTE — PROGRESS NOTES
Internal Medicine Interval Note  Note Author: Lewis Hoffmann M.D.     Name Pawel Tatum     1960   Age/Sex 58 y.o. male   MRN [id number]   Code Status Full Code     After 5PM or if no immediate response to page, please call for cross-coverage  Attending/Team: Radha/Cici See Patient List for primary contact information  Call [phone] to page    1st Call - Day Intern (R1):   [place] 2nd Call - Day Sr. Resident (R2/R3):   Johnnie         Reason for interval visit  (Principal Problem)   Diabetic ketoacidosis (HCC)    HPI: 58 y.o. male, past medical history insulin-dependent diabetes mellitus (hemoglobin A1c 17), pancreatic insufficiency, recent esophagitis, history of TBI with mild cognitive impairment, residing [facility] assisted living facility, presents with DKA after medication noncompliance.  Treated with ICU DKA protocol, subsequently transferred to floor, stable.  Per social work, there is concerned about his assisted living facility accepting him in light of the fact they feel he needs more supervision than they can provide.  It is noted that he does not have PT OT needs and is stable medically for discharge.    Interval Problem Daily Status Update  (24 hours)   No complaints.  Self administering insulin under the supervision of the nurse, no issues.  No acute events overnight.  Satisfactory glycemic control on insulin glargine 20 units, lispro decreased 8 units in the morning, 10 units lunch and dinner after hypoglycemic episode.  Accu-Cheks -254.  Patient is medically stable and we anticipate discharge to group home today or tomorrow.    Patient has been accepted to [facility] (not [facility]) intermediate with coverage from [facility], and with [facility] home health assistance.  We are grateful to social work for the help in this case.  Requiring additional documentation before bed availability, completed.  Hopeful for discharge tomorrow.    Pertinent negatives: Denies nausea, vomiting,  diarrhea, constipation, syncope, palpitation, shortness of breath, cough, fever, change in bowel or bladder habits, change in appetite.  10 system review of systems otherwise negative.    Consultants/Specialty  None  PCP: JAKE Armando    Disposition  Inpatient for placement.      PMHx:  History reviewed. No pertinent past medical history.    PSHx:  Past Surgical History:   Procedure Laterality Date   • GASTROSCOPY-ENDO N/A 8/25/2018    Procedure: GASTROSCOPY-ENDO;  Surgeon: Jaswant Frye M.D.;  [facility]: ENDOSCOPY [facility];  Service: Gastroenterology       Medications:    Current Facility-Administered Medications:   •  insulin glargine (LANTUS) injection 20 Units, 20 Units, Subcutaneous, Q EVENING, 20 Units at 12/17/18 1800 **AND** insulin lispro (HUMALOG) injection 10 Units, 10 Units, Subcutaneous, AFTER LUNCH **AND** [DISCONTINUED] insulin lispro (HUMALOG) injection 1-6 Units, 1-6 Units, Subcutaneous, TID AC, Stopped at 12/03/18 1700 **AND** Accu-Chek ACHS, , , Q AC AND BEDTIME(S) **AND** NOTIFY MD and PharmD, , , Once **AND** glucose 4 g chewable tablet 16 g, 16 g, Oral, Q15 MIN PRN **AND** dextrose 50% (D50W) injection 25 mL, 25 mL, Intravenous, Q15 MIN PRN, Lewis Hoffmann M.D.  •  insulin lispro (HUMALOG) injection 10 Units, 10 Units, Subcutaneous, AFTER DINNER, Lewis Hoffmann M.D., 10 Units at 12/17/18 1759  •  insulin lispro (HUMALOG) injection 8 Units, 8 Units, Subcutaneous, AFTER BREAKFAST, Lewis Hoffmann M.D., 8 Units at 12/18/18 0831  •  metFORMIN (GLUCOPHAGE) tablet 1,000 mg, 1,000 mg, Oral, BID WITH MEALS, Ju Reece M.D., 1,000 mg at 12/18/18 0828  •  lisinopril (PRINIVIL) 10 MG tablet 10 mg, 10 mg, Oral, DAILY, Ju Reece M.D., 10 mg at 12/18/18 0511  •  therapeutic multivitamin-minerals (THERAGRAN-M) tablet 1 Tab, 1 Tab, Oral, DAILY, Brigida Hyde M.D., 1 Tab at 12/18/18 0511  •  senna-docusate (PERICOLACE or SENOKOT S) 8.6-50 MG per tablet 2 Tab, 2 Tab, Oral, BID, 2 Tab  at 12/18/18 0511 **AND** polyethylene glycol/lytes (MIRALAX) PACKET 1 Packet, 1 Packet, Oral, QDAY PRN **AND** magnesium hydroxide (MILK OF MAGNESIA) suspension 30 mL, 30 mL, Oral, QDAY PRN **AND** bisacodyl (DULCOLAX) suppository 10 mg, 10 mg, Rectal, QDAY PRN, Wilma Oviedo M.D.  •  acetaminophen (TYLENOL) tablet 650 mg, 650 mg, Oral, Q6HRS PRN, Wilma Oviedo M.D., 650 mg at 12/14/18 1806  •  atorvastatin (LIPITOR) tablet 10 mg, 10 mg, Oral, DAILY, Wilma Oviedo M.D., 10 mg at 12/18/18 0511  •  vitamin D (cholecalciferol) tablet 2,000 Units, 2,000 Units, Oral, DAILY, Wilma Oviedo M.D., 2,000 Units at 12/18/18 0511  •  omeprazole (PRILOSEC) capsule 20 mg, 20 mg, Oral, QAM, Wilma Oviedo M.D., 20 mg at 12/18/18 0511  •  tamsulosin (FLOMAX) capsule 0.4 mg, 0.4 mg, Oral, AFTER BREAKFAST, Wilma Oviedo M.D., 0.4 mg at 12/18/18 0828  •  ferrous sulfate tablet 325 mg, 325 mg, Oral, TID WITH MEALS, Wilma Oviedo M.D., 325 mg at 12/18/18 0828  •  buPROPion SR (WELLBUTRIN-SR) tablet 150 mg, 150 mg, Oral, BID, Wilma Oviedo M.D., 150 mg at 12/18/18 0511  •  pancrelipase (Lip-Prot-Amyl) (CREON 08561) 71335-82461 units capsule 72,000 Units, 3 Cap, Oral, TID WITH MEALS, Wilma Oviedo M.D., 72,000 Units at 12/18/18 0831  •  pancrelipase (Lip-Prot-Amyl) (CREON 40072) 70576-39094 units capsule 24,000 Units, 1 Cap, Oral, With Snacks PRN **AND** pancrelipase (Lip-Prot-Amyl) (CREON 6000) 6000 units capsule 12,000 Units, 2 Cap, Oral, With Snacks PRN, Wilma Oviedo M.D.    Allergies:  No Known Allergies    Quality Measures  Reviewed items:  EKG reviewed, Labs reviewed, Medications reviewed and Radiology images reviewed  Dill catheter:  No Dill  Central line: No central line  DVT prophylaxis: Mechanical, pharmacological contraindicated due to bleeding risk (history of GI bleed August 2018)            Physical Exam       Vitals:    12/17/18 1700 12/17/18 1945 12/18/18 0355 12/18/18 0800   BP: 105/56 120/76 117/67  140/80   Pulse: 83 86 72 81   Resp: 16 16 15 17   Temp: 36.6 °C (97.9 °F) 36.9 °C (98.5 °F) 36.4 °C (97.6 °F) 36.6 °C (97.8 °F)   TempSrc: Temporal Temporal Temporal Temporal   SpO2: 98% 93% 96% 92%   Weight:       Height:         Body mass index is 15.77 kg/m².    Oxygen Therapy:  Pulse Oximetry: 92 %, O2 (LPM): 0, O2 Delivery: None (Room Air)    PHYSICAL EXAM: Unchanged  Constitutional: CACHECTIC male in no distress.   HENT:   Head: Normocephalic and atraumatic.   Eyes: Pupils are equal, round, and reactive to light. EOM are normal.  PTOSIS of left eyelid (baseline).  Neck: Normal range of motion. Neck supple. No JVD present. No tracheal deviation present. No thyromegaly present.   Cardiovascular: Regular rhythm.  Exam reveals no gallop and no friction rub.    No murmur heard.  Pulmonary/Chest: Effort normal and breath sounds normal. No stridor. No respiratory distress. No wheezes. No rales. No tenderness.   Abdominal: Soft. Bowel sounds are normal. No distension and no mass. There is no tenderness. There is no rebound and no guarding.   Musculoskeletal: Normal range of motion. No edema, tenderness or deformity.   Lymphadenopathy:     No cervical adenopathy.   Neurological: Alert and oriented to person, place, and time. No cranial nerve deficit.   Skin: Skin is warm and dry. No rash noted. No erythema.   Psychiatric: Mood, memory, affect and judgment normal.   Vitals reviewed.      Lab Data Review:         12/3/2018  7:32 AM    Recent Labs      12/17/18   1311   SODIUM  140   POTASSIUM  4.5   CHLORIDE  110   CO2  21   BUN  32*   CREATININE  0.88   CALCIUM  9.5       Recent Labs      12/17/18   1311   GLUCOSE  45*       Recent Labs      12/17/18   1311   RBC  3.75*   HEMOGLOBIN  11.7*   HEMATOCRIT  37.6*   PLATELETCT  287       Recent Labs      12/17/18   1311   WBC  10.5           Assessment/Plan     Diabetes mellitus (HCC)  -CONTROLLED:  ?Type 2 diabetes onset 4 years ago.  -Fingerstick glucose  checks.  -Hypoglycemia protocol.  -On metformin, glargine  -12/2:  Insulin Glargine 18-->20u; premeal Humalog 8u-->9u  -12/8: Sliding scale insulin has been stopped, blood glucose well controlled.  -12/10 blood glucose 123-237.  Glargine increased from 20-21 units daily, lispro increased from 9-10 units 3 times daily.  -12/12: Blood glucose well controlled on current regimen.  Lantus changed to PM schedule.  -12/13: Glycemic control improved after Lantus change.  -12/14: Blood glucose following entirely within reference range.  -12/15: Blood glucose 157-187.  We are satisfied with his level of glycemic control given his history.  -12/17: Episode of hypoglycemia found on BMP lab drawn midmorning, 44.  Corrected to 77 after snack, asymptomatic, a.m. lispro decreased to 8 units.    Memory loss  History of TBI with mild cognitive impairment as per chart review  Lives at an assisted living facility called [facility]  Sister is actively involved in care and cell phone number is in the chart    History of recent UGI bleed  -recent Hospital admission 8/2018 for an episode of upper GI bleed  -Patient had EGD done during hospitalization which showed grade D esophagitis, patient had repeat EGD & colonoscopy at 11/20 and EGD notable for localized nodularity of the mucosa at the GE junction about 5-6 mm.  Colonoscopy was also done but there was stool throughout the colon and a repeat was recommended.  Procedures with GI consultants  -We will hold anticoagulation in this setting, SCDs for DVT prophylaxis  -No apparent active bleed, Hb stable    Tobacco abuse  Smoking cessation strongly encouraged again  Query COPD, chest x-ray hyperinflated  Nicotine replacement therapy ongoing  Vaccinate before discharge, flu and pneumonia  Consider outpatient pulmonary function studies and 6-minute walk to completely assess his COPD    Essential hypertension  On home lisinopril    Chronic pancreatitis (HCC)  CT with extensive calcifications of the  pancreas  Patient very cachectic and malnourished appearing  Query refeeding syndrome risk  Aggressive electrolyte repletion  Query absorption issues and pancreatic exocrine insufficiency  Dietary consultation  Vitamin supplementation  Pancreatic enzyme supplements  Stop oral diabetes medications, may be causing adverse issues      -CT abdomen showed extensive calcifications of the pancreas.  -History of protein calorie malnutrition secondary to pancreatic insufficiency.  -Currently on pancrelipase replacement.  -Dietitian following.    Dyslipidemia  -On home atorvastatin.    Vitamin D deficiency  -Vitamin D level 31.  -On vitamin D supplement.    Essential hypertension  -On home lisinopril.    Tobacco abuse  -Smoking about 1-2 cigarettes/day  -PCP to continue counseling regarding smoking cessation      Disposition  Patient has been accepted to [facility] with [facility], coverage will be provided by [employer] with $50 co-pay.  He is medically stable for discharge and will hopefully be logistically able to leave on Monday, pending available bed in group home.

## 2018-12-19 LAB
GLUCOSE BLD-MCNC: 152 MG/DL (ref 65–99)
GLUCOSE BLD-MCNC: 190 MG/DL (ref 65–99)
GLUCOSE BLD-MCNC: 245 MG/DL (ref 65–99)

## 2018-12-19 PROCEDURE — 99231 SBSQ HOSP IP/OBS SF/LOW 25: CPT | Mod: GC | Performed by: INTERNAL MEDICINE

## 2018-12-19 PROCEDURE — 82962 GLUCOSE BLOOD TEST: CPT

## 2018-12-19 PROCEDURE — 700102 HCHG RX REV CODE 250 W/ 637 OVERRIDE(OP): Performed by: STUDENT IN AN ORGANIZED HEALTH CARE EDUCATION/TRAINING PROGRAM

## 2018-12-19 PROCEDURE — A9270 NON-COVERED ITEM OR SERVICE: HCPCS | Performed by: STUDENT IN AN ORGANIZED HEALTH CARE EDUCATION/TRAINING PROGRAM

## 2018-12-19 PROCEDURE — 770006 HCHG ROOM/CARE - MED/SURG/GYN SEMI*

## 2018-12-19 RX ORDER — INSULIN GLARGINE 100 [IU]/ML
20 INJECTION, SOLUTION SUBCUTANEOUS EVERY EVENING
Qty: 10 ML | Refills: 0 | Status: SHIPPED | OUTPATIENT
Start: 2018-12-19 | End: 2018-12-20

## 2018-12-19 RX ADMIN — OMEPRAZOLE 20 MG: 20 CAPSULE, DELAYED RELEASE ORAL at 04:57

## 2018-12-19 RX ADMIN — STANDARDIZED SENNA CONCENTRATE AND DOCUSATE SODIUM 2 TABLET: 8.6; 5 TABLET, FILM COATED ORAL at 17:45

## 2018-12-19 RX ADMIN — PANCRELIPASE 72000 UNITS: 24000; 76000; 120000 CAPSULE, DELAYED RELEASE PELLETS ORAL at 12:56

## 2018-12-19 RX ADMIN — Medication 325 MG: at 09:13

## 2018-12-19 RX ADMIN — Medication 325 MG: at 17:45

## 2018-12-19 RX ADMIN — TAMSULOSIN HYDROCHLORIDE 0.4 MG: 0.4 CAPSULE ORAL at 09:09

## 2018-12-19 RX ADMIN — STANDARDIZED SENNA CONCENTRATE AND DOCUSATE SODIUM 2 TABLET: 8.6; 5 TABLET, FILM COATED ORAL at 04:57

## 2018-12-19 RX ADMIN — BUPROPION HYDROCHLORIDE 150 MG: 150 TABLET, EXTENDED RELEASE ORAL at 04:57

## 2018-12-19 RX ADMIN — LISINOPRIL 10 MG: 10 TABLET ORAL at 04:57

## 2018-12-19 RX ADMIN — METFORMIN HYDROCHLORIDE 1000 MG: 500 TABLET ORAL at 09:09

## 2018-12-19 RX ADMIN — ATORVASTATIN CALCIUM 10 MG: 10 TABLET, FILM COATED ORAL at 04:57

## 2018-12-19 RX ADMIN — Medication 325 MG: at 12:56

## 2018-12-19 RX ADMIN — METFORMIN HYDROCHLORIDE 1000 MG: 500 TABLET ORAL at 17:45

## 2018-12-19 RX ADMIN — PANCRELIPASE 72000 UNITS: 24000; 76000; 120000 CAPSULE, DELAYED RELEASE PELLETS ORAL at 09:09

## 2018-12-19 RX ADMIN — PANCRELIPASE 72000 UNITS: 24000; 76000; 120000 CAPSULE, DELAYED RELEASE PELLETS ORAL at 17:45

## 2018-12-19 RX ADMIN — MULTIPLE VITAMINS W/ MINERALS TAB 1 TABLET: TAB at 04:57

## 2018-12-19 RX ADMIN — VITAMIN D, TAB 1000IU (100/BT) 2000 UNITS: 25 TAB at 04:57

## 2018-12-19 RX ADMIN — BUPROPION HYDROCHLORIDE 150 MG: 150 TABLET, EXTENDED RELEASE ORAL at 18:00

## 2018-12-19 RX ADMIN — INSULIN GLARGINE 20 UNITS: 100 INJECTION, SOLUTION SUBCUTANEOUS at 17:42

## 2018-12-19 ASSESSMENT — PAIN SCALES - GENERAL: PAINLEVEL_OUTOF10: 0

## 2018-12-19 NOTE — DISCHARGE PLANNING
Anticipated Discharge Disposition: To [facility] once his medications and diabetic supplies have been straightened out and ordered.    Action: This RN CM spoke with Kim, diabetic educator, this morning and she provided a list of all the supplies that need to be ordered.  Have been in contact with Roni, public guardian ([phone]), and she wants the Discharge Summary and Discharge Instructions faxed to her at [phone] and to [facility] at fax # [phone].  These were faxed to both places as requested.  The [employer] Resident was given the list of diabetic supplies that need to be ordered at [facility] and was told to cancel the insulin ordered at [facility].  He texted back that he will do these orders as requested.    Barriers to Discharge: Acceptance by [facility], hopefully later today.      Plan: Will provide a taxi voucher to the patient once [facility] says that we can send him.    Update: Spoke with Luba at [facility].  They received my fax and she said that in the Discharge Summary the doctor wrote that the nurse will inject the insulin and that it has to say that the patient will inject the insulin.  They are faxing to us the exact words they need and then I will text the doctor and request that the DC Summary be changed.  [facility] said they will not be able to take the patient until Thursday morning, 12/20.  I advised the patient of this and he was okay with it.  We will send him by taxi.  [facility] has accepted the patient.    Second Update (12/20, 8:15 AM): Received a fax this morning from [facility] with their required wording in the Discharge Summary.  This RN CM re-typed the three requirements and notified Dr. Hoffmann that he needs to get this information from me and incorporate it into the Discharge Summary.

## 2018-12-19 NOTE — PROGRESS NOTES
Alert x4 and able to let his needs known. Tells me he's excited to leave tomorrow. Blood sugar checked as requested. Cont plan of care, call light within reach and visual checks through the night

## 2018-12-19 NOTE — PROGRESS NOTES
Diabetes education: Pt is to be discharged to [facility] per notes. Pt has been giving his own insulin (per MD notes) with nursing supervision ( vials/syringes not pens and no note as to drawing up or just giving shot).  Plan: Pt was previously given directions on using insulin pens ( and reviewed) as well as a new Contour next meter ( also reviewed and with simple instructions).   Pt is currently on Lantus 20 units at 1800, with Humalog 8 units after breakfast,  10 units after lunch, 10 units after dinner. Finger sticks have been 152, 276, 308, 355 and 254.     Pt will need prescriptions for Contour next test strips and lancets, to test 3 times a day, at discharge. Prescriptions need to have directions, quantity, refills and dx code ( Ell.65) for medicare to cover. Pt will need Novolog  Flex pens (box of 5), ordered rather than Humalog for insurance coverage. Will need prescriptions for Lantus solostar pen ( box of 5), and armando pen needles ( 4 mm box of 100). Prescribing  MD needs to have Medicare privileges as well. Please call [phone] if needs change.    CDE met with patient and reviewed again, use of meter, lancet device, and insulin pens . Pt practiced with saline pens and practiced with lancet device and meter. Pt has handouts on using insulin pens, as well as using lancet device. CDE will meet with pt in am to review again. Please call [phone] if needs change.

## 2018-12-19 NOTE — PROGRESS NOTES
Internal Medicine Interval Note  Note Author: Lewis Hoffmann M.D.     Name Pawel Tatum     1960   Age/Sex 58 y.o. male   MRN [id number]   Code Status Full Code     After 5PM or if no immediate response to page, please call for cross-coverage  Attending/Team: Jahaira See Patient List for primary contact information  Call [phone] to page    1st Call - Day Intern (R1):   [place] 2nd Call - Day Sr. Resident (R2/R3):   Johnnie         Reason for interval visit  (Principal Problem)   Diabetic ketoacidosis (HCC)    HPI: 58 y.o. male, past medical history insulin-dependent diabetes mellitus (hemoglobin A1c 17), pancreatic insufficiency, recent esophagitis, history of TBI with mild cognitive impairment, residing [facility] facility, presents with DKA after medication noncompliance.  Treated with ICU DKA protocol, subsequently transferred to floor, stable.  Per social work, there is concerned about his assisted living facility accepting him in light of the fact they feel he needs more supervision than they can provide.  It is noted that he does not have PT OT needs and is stable medically for discharge.    Interval Problem Daily Status Update  (24 hours)   No complaints.  Self administering insulin under the supervision of the nurse, no issues.  No acute events overnight.  Declining glycemic control on insulin glargine 20 units, lispro 8 units in the morning, 10 units lunch and dinner.  Accu-Cheks [phone].  Patient is medically stable and we anticipate discharge to group home today or tomorrow.    Patient has been accepted to [facility] with coverage from [facility], and with [facility] home health assistance.  We are grateful to social work for the help in this case.    Pertinent negatives: Denies nausea, vomiting, diarrhea, constipation, syncope, palpitation, shortness of breath, cough, fever, change in bowel or bladder habits, change in appetite.  10 system review of  systems otherwise negative.    Consultants/Specialty  None  PCP: JAKE Armando    Disposition  Inpatient for placement.      PMHx:  History reviewed. No pertinent past medical history.    PSHx:  Past Surgical History:   Procedure Laterality Date   • GASTROSCOPY-ENDO N/A 8/25/2018    Procedure: GASTROSCOPY-ENDO;  Surgeon: Jaswant Frye M.D.;  Location: ENDOSCOPY [facility];  Service: Gastroenterology       Medications:    Current Facility-Administered Medications:   •  insulin glargine (LANTUS) injection 20 Units, 20 Units, Subcutaneous, Q EVENING, 20 Units at 12/18/18 1750 **AND** insulin lispro (HUMALOG) injection 10 Units, 10 Units, Subcutaneous, AFTER LUNCH, 10 Units at 12/18/18 1257 **AND** [DISCONTINUED] insulin lispro (HUMALOG) injection 1-6 Units, 1-6 Units, Subcutaneous, TID AC, Stopped at 12/03/18 1700 **AND** Accu-Chek ACHS, , , Q AC AND BEDTIME(S) **AND** NOTIFY MD and PharmD, , , Once **AND** glucose 4 g chewable tablet 16 g, 16 g, Oral, Q15 MIN PRN **AND** dextrose 50% (D50W) injection 25 mL, 25 mL, Intravenous, Q15 MIN PRN, Lewis Hoffmann M.D.  •  insulin lispro (HUMALOG) injection 10 Units, 10 Units, Subcutaneous, AFTER DINNER, Lewis Hoffmann M.D., 10 Units at 12/18/18 1928  •  insulin lispro (HUMALOG) injection 8 Units, 8 Units, Subcutaneous, AFTER BREAKFAST, Lewis Hoffmann M.D., 8 Units at 12/18/18 0831  •  metFORMIN (GLUCOPHAGE) tablet 1,000 mg, 1,000 mg, Oral, BID WITH MEALS, Ju Reece M.D., 1,000 mg at 12/18/18 1747  •  lisinopril (PRINIVIL) 10 MG tablet 10 mg, 10 mg, Oral, DAILY, Ju Reece M.D., 10 mg at 12/19/18 0457  •  therapeutic multivitamin-minerals (THERAGRAN-M) tablet 1 Tab, 1 Tab, Oral, DAILY, Brigida Ciddi, M.D., 1 Tab at 12/19/18 0457  •  senna-docusate (PERICOLACE or SENOKOT S) 8.6-50 MG per tablet 2 Tab, 2 Tab, Oral, BID, 2 Tab at 12/19/18 0457 **AND** polyethylene glycol/lytes (MIRALAX) PACKET 1 Packet, 1 Packet, Oral, QDAY PRN **AND** magnesium hydroxide  (MILK OF MAGNESIA) suspension 30 mL, 30 mL, Oral, QDAY PRN **AND** bisacodyl (DULCOLAX) suppository 10 mg, 10 mg, Rectal, QDAY PRN, Wilma Oviedo M.D.  •  acetaminophen (TYLENOL) tablet 650 mg, 650 mg, Oral, Q6HRS PRN, Wilma Oviedo M.D., 650 mg at 12/14/18 1806  •  atorvastatin (LIPITOR) tablet 10 mg, 10 mg, Oral, DAILY, Wilma Oviedo M.D., 10 mg at 12/19/18 0457  •  vitamin D (cholecalciferol) tablet 2,000 Units, 2,000 Units, Oral, DAILY, Wilma Oviedo M.D., 2,000 Units at 12/19/18 0457  •  omeprazole (PRILOSEC) capsule 20 mg, 20 mg, Oral, QAM, Wilma Oviedo M.D., 20 mg at 12/19/18 0457  •  tamsulosin (FLOMAX) capsule 0.4 mg, 0.4 mg, Oral, AFTER BREAKFAST, Wilma Oviedo M.D., 0.4 mg at 12/18/18 0828  •  ferrous sulfate tablet 325 mg, 325 mg, Oral, TID WITH MEALS, Wilma Oviedo M.D., 325 mg at 12/18/18 1747  •  buPROPion SR (WELLBUTRIN-SR) tablet 150 mg, 150 mg, Oral, BID, Wilma Oviedo M.D., 150 mg at 12/19/18 0457  •  pancrelipase (Lip-Prot-Amyl) (CREON 33673) 58527-70101 units capsule 72,000 Units, 3 Cap, Oral, TID WITH MEALS, Wilma Oviedo M.D., 72,000 Units at 12/18/18 1747  •  pancrelipase (Lip-Prot-Amyl) (CREON 53279) 81798-53135 units capsule 24,000 Units, 1 Cap, Oral, With Snacks PRN **AND** pancrelipase (Lip-Prot-Amyl) (CREON 6000) 6000 units capsule 12,000 Units, 2 Cap, Oral, With Snacks PRN, Wilma Oviedo M.D.    Allergies:  No Known Allergies    Quality Measures  Reviewed items:  EKG reviewed, Labs reviewed, Medications reviewed and Radiology images reviewed  Dill catheter:  No Dill  Central line: No central line  DVT prophylaxis: Mechanical, pharmacological contraindicated due to bleeding risk (history of GI bleed August 2018)            Physical Exam       Vitals:    12/18/18 1552 12/18/18 1830 12/19/18 0430 12/19/18 0715   BP: 114/61 127/74 102/58 137/76   Pulse: 89 95 68 86   Resp: 17 18 18 18   Temp: 36.6 °C (97.8 °F) 37.2 °C (99 °F) 36.9 °C (98.4 °F) 36.9 °C (98.4 °F)    TempSrc: Oral Temporal Temporal Temporal   SpO2: 91% 94% 95% 92%   Weight:       Height:         Body mass index is 15.77 kg/m².    Oxygen Therapy:  Pulse Oximetry: 92 %, O2 (LPM): 0, O2 Delivery: None (Room Air)    PHYSICAL EXAM: Unchanged  Constitutional: CACHECTIC male in no distress.   HENT:   Head: Normocephalic and atraumatic.   Eyes: Pupils are equal, round, and reactive to light. EOM are normal.  PTOSIS of left eyelid (baseline).  Neck: Normal range of motion. Neck supple. No JVD present. No tracheal deviation present. No thyromegaly present.   Cardiovascular: Regular rhythm.  Exam reveals no gallop and no friction rub.    No murmur heard.  Pulmonary/Chest: Effort normal and breath sounds normal. No stridor. No respiratory distress. No wheezes. No rales. No tenderness.   Abdominal: Soft. Bowel sounds are normal. No distension and no mass. There is no tenderness. There is no rebound and no guarding.   Musculoskeletal: Normal range of motion. No edema, tenderness or deformity.   Lymphadenopathy:     No cervical adenopathy.   Neurological: Alert and oriented to person, place, and time. No cranial nerve deficit.   Skin: Skin is warm and dry. No rash noted. No erythema.   Psychiatric: Mood, memory, affect and judgment normal.   Vitals reviewed.      Lab Data Review:         12/3/2018  7:32 AM    Recent Labs      12/17/18   1311   SODIUM  140   POTASSIUM  4.5   CHLORIDE  110   CO2  21   BUN  32*   CREATININE  0.88   CALCIUM  9.5       Recent Labs      12/17/18   1311   GLUCOSE  45*       Recent Labs      12/17/18   1311   RBC  3.75*   HEMOGLOBIN  11.7*   HEMATOCRIT  37.6*   PLATELETCT  287       Recent Labs      12/17/18   1311   WBC  10.5           Assessment/Plan     Diabetes mellitus (HCC)  -CONTROLLED:  ?Type 2 diabetes onset 4 years ago.  -Fingerstick glucose checks.  -Hypoglycemia protocol.  -On metformin, glargine  -12/2:  Insulin Glargine 18-->20u; premeal Humalog 8u-->9u  -12/8: Sliding scale insulin  has been stopped, blood glucose well controlled.  -12/10 blood glucose 123-237.  Glargine increased from 20-21 units daily, lispro increased from 9-10 units 3 times daily.  -12/12: Blood glucose well controlled on current regimen.  Lantus changed to PM schedule.  -12/13: Glycemic control improved after Lantus change.  -12/14: Blood glucose following entirely within reference range.  -12/15: Blood glucose 157-187.  We are satisfied with his level of glycemic control given his history.  -12/17: Episode of hypoglycemia found on BMP lab drawn midmorning, 44.  Corrected to 77 after snack, asymptomatic, a.m. lispro decreased to 8 units.  -12/19: Glycemic control declining.  Continue to monitor outpatient    Memory loss  History of TBI with mild cognitive impairment as per chart review  Lives at an assisted living facility called [facility]  Sister is actively involved in care and cell phone number is in the chart    History of recent UGI bleed  -recent Hospital admission 8/2018 for an episode of upper GI bleed  -Patient had EGD done during hospitalization which showed grade D esophagitis, patient had repeat EGD & colonoscopy at 11/20 and EGD notable for localized nodularity of the mucosa at the GE junction about 5-6 mm.  Colonoscopy was also done but there was stool throughout the colon and a repeat was recommended.  Procedures with GI consultants  -We will hold anticoagulation in this setting, SCDs for DVT prophylaxis  -No apparent active bleed, Hb stable    Tobacco abuse  Smoking cessation strongly encouraged again  Query COPD, chest x-ray hyperinflated  Nicotine replacement therapy ongoing  Vaccinate before discharge, flu and pneumonia  Consider outpatient pulmonary function studies and 6-minute walk to completely assess his COPD    Essential hypertension  On home lisinopril    Chronic pancreatitis (HCC)  CT with extensive calcifications of the pancreas  Patient very cachectic and malnourished appearing  Query refeeding  syndrome risk  Aggressive electrolyte repletion  Query absorption issues and pancreatic exocrine insufficiency  Dietary consultation  Vitamin supplementation  Pancreatic enzyme supplements  Stop oral diabetes medications, may be causing adverse issues      -CT abdomen showed extensive calcifications of the pancreas.  -History of protein calorie malnutrition secondary to pancreatic insufficiency.  -Currently on pancrelipase replacement.  -Dietitian following.    Dyslipidemia  -On home atorvastatin.    Vitamin D deficiency  -Vitamin D level 31.  -On vitamin D supplement.    Essential hypertension  -On home lisinopril.    Tobacco abuse  -Smoking about 1-2 cigarettes/day  -PCP to continue counseling regarding smoking cessation      Disposition  Patient has been accepted to [facility] [employer], coverage will be provided by [employer] with $50 co-pay.  He is medically stable for discharge and will hopefully be logistically able to leave soon, pending available bed in group [facility].

## 2018-12-20 LAB
GLUCOSE BLD-MCNC: 139 MG/DL (ref 65–99)
GLUCOSE BLD-MCNC: 201 MG/DL (ref 65–99)
GLUCOSE BLD-MCNC: 307 MG/DL (ref 65–99)

## 2018-12-20 PROCEDURE — A9270 NON-COVERED ITEM OR SERVICE: HCPCS | Performed by: STUDENT IN AN ORGANIZED HEALTH CARE EDUCATION/TRAINING PROGRAM

## 2018-12-20 PROCEDURE — 99231 SBSQ HOSP IP/OBS SF/LOW 25: CPT | Mod: GC | Performed by: INTERNAL MEDICINE

## 2018-12-20 PROCEDURE — 700102 HCHG RX REV CODE 250 W/ 637 OVERRIDE(OP): Performed by: STUDENT IN AN ORGANIZED HEALTH CARE EDUCATION/TRAINING PROGRAM

## 2018-12-20 PROCEDURE — 82962 GLUCOSE BLOOD TEST: CPT

## 2018-12-20 PROCEDURE — 770006 HCHG ROOM/CARE - MED/SURG/GYN SEMI*

## 2018-12-20 RX ADMIN — Medication 325 MG: at 08:17

## 2018-12-20 RX ADMIN — VITAMIN D, TAB 1000IU (100/BT) 2000 UNITS: 25 TAB at 05:31

## 2018-12-20 RX ADMIN — METFORMIN HYDROCHLORIDE 1000 MG: 500 TABLET ORAL at 18:04

## 2018-12-20 RX ADMIN — OMEPRAZOLE 20 MG: 20 CAPSULE, DELAYED RELEASE ORAL at 05:34

## 2018-12-20 RX ADMIN — MULTIPLE VITAMINS W/ MINERALS TAB 1 TABLET: TAB at 05:32

## 2018-12-20 RX ADMIN — METFORMIN HYDROCHLORIDE 1000 MG: 500 TABLET ORAL at 08:17

## 2018-12-20 RX ADMIN — INSULIN GLARGINE 20 UNITS: 100 INJECTION, SOLUTION SUBCUTANEOUS at 18:08

## 2018-12-20 RX ADMIN — PANCRELIPASE 72000 UNITS: 24000; 76000; 120000 CAPSULE, DELAYED RELEASE PELLETS ORAL at 13:11

## 2018-12-20 RX ADMIN — LISINOPRIL 10 MG: 10 TABLET ORAL at 05:31

## 2018-12-20 RX ADMIN — TAMSULOSIN HYDROCHLORIDE 0.4 MG: 0.4 CAPSULE ORAL at 08:17

## 2018-12-20 RX ADMIN — BUPROPION HYDROCHLORIDE 150 MG: 150 TABLET, EXTENDED RELEASE ORAL at 18:04

## 2018-12-20 RX ADMIN — Medication 325 MG: at 13:11

## 2018-12-20 RX ADMIN — PANCRELIPASE 72000 UNITS: 24000; 76000; 120000 CAPSULE, DELAYED RELEASE PELLETS ORAL at 08:18

## 2018-12-20 RX ADMIN — Medication 325 MG: at 18:04

## 2018-12-20 RX ADMIN — ATORVASTATIN CALCIUM 10 MG: 10 TABLET, FILM COATED ORAL at 05:32

## 2018-12-20 RX ADMIN — STANDARDIZED SENNA CONCENTRATE AND DOCUSATE SODIUM 2 TABLET: 8.6; 5 TABLET, FILM COATED ORAL at 05:32

## 2018-12-20 RX ADMIN — BUPROPION HYDROCHLORIDE 150 MG: 150 TABLET, EXTENDED RELEASE ORAL at 05:32

## 2018-12-20 RX ADMIN — PANCRELIPASE 72000 UNITS: 24000; 76000; 120000 CAPSULE, DELAYED RELEASE PELLETS ORAL at 18:04

## 2018-12-20 ASSESSMENT — PAIN SCALES - GENERAL: PAINLEVEL_OUTOF10: 0

## 2018-12-20 NOTE — PROGRESS NOTES
Diabetes education: Met with Pawel this am to review handout given for lancet device, insulin pens and doses for his Humalog ac meals. Questions answered. Please call [phone] if needs change.

## 2018-12-20 NOTE — PROGRESS NOTES
Patient medically stable for discharge, discharge orders in place.  Patient will discharge pending availability of insulin supplies from pharmacy.  Please refer to discharge summary.

## 2018-12-20 NOTE — PROGRESS NOTES
Internal Medicine Interval Note  Note Author: Lewis Hoffmann M.D.     Name Pawel Tatum     1960   Age/Sex 58 y.o. male   MRN [id number]   Code Status Full Code     After 5PM or if no immediate response to page, please call for cross-coverage  Attending/Team: Radha/Cici See Patient List for primary contact information  Call [phone] to page    1st Call - Day Intern (R1):   [place] 2nd Call - Day Sr. Resident (R2/R3):   Johnnie         Reason for interval visit  (Principal Problem)   Diabetic ketoacidosis (HCC)    HPI: 58 y.o. male, past medical history insulin-dependent diabetes mellitus (hemoglobin A1c 17), pancreatic insufficiency, recent esophagitis, history of TBI with mild cognitive impairment, residing [facility] assisted [facility] facility, presents with DKA after medication noncompliance.  Treated with ICU DKA protocol, subsequently transferred to floor, stable.  Per social work, there is concerned about his assisted living facility accepting him in light of the fact they feel he needs more supervision than they can provide.  It is noted that he does not have PT OT needs and is stable medically for discharge.    Interval Problem Daily Status Update  (24 hours)   No complaints.  Self administering insulin under the supervision of the nurse, no issues.  No acute events overnight.  Satisfactory glycemic control on insulin glargine 20 units, lispro decreased 8 units in the morning, 10 units lunch and dinner after hypoglycemic episode.  Accu-Cheks [phone].  Patient is medically stable and we anticipate discharge to group home today or tomorrow.    Patient [facility]  We are grateful to social work for the help in this case.  Requiring additional documentation before bed availability, completed.  Hopeful for discharge tomorrow.    Pertinent negatives: Denies nausea, vomiting, diarrhea,  constipation, syncope, palpitation, shortness of breath, cough, fever, change in bowel or bladder habits, change in appetite.  10 system review of systems otherwise negative.    Consultants/Specialty  None  PCP: JAKE Armando    Disposition  Inpatient for placement.      PMHx:  History reviewed. No pertinent past medical history.    PSHx:  Past Surgical History:   Procedure Laterality Date   • GASTROSCOPY-ENDO N/A 8/25/2018    Procedure: GASTROSCOPY-ENDO;  Surgeon: Jaswant Frye M.D.;  Location: ENDOSCOPY [facility];  Service: Gastroenterology       Medications:    Current Facility-Administered Medications:   •  insulin glargine (LANTUS) injection 20 Units, 20 Units, Subcutaneous, Q EVENING, 20 Units at 12/19/18 1742 **AND** insulin lispro (HUMALOG) injection 10 Units, 10 Units, Subcutaneous, AFTER LUNCH, 10 Units at 12/19/18 1256 **AND** [DISCONTINUED] insulin lispro (HUMALOG) injection 1-6 Units, 1-6 Units, Subcutaneous, TID AC, Stopped at 12/03/18 1700 **AND** Accu-Chek ACHS, , , Q AC AND BEDTIME(S) **AND** NOTIFY MD and PharmD, , , Once **AND** glucose 4 g chewable tablet 16 g, 16 g, Oral, Q15 MIN PRN **AND** dextrose 50% (D50W) injection 25 mL, 25 mL, Intravenous, Q15 MIN PRN, Lewis Hoffmann M.D.  •  insulin lispro (HUMALOG) injection 10 Units, 10 Units, Subcutaneous, AFTER DINNER, Lewis Hoffmann M.D., 10 Units at 12/19/18 1742  •  insulin lispro (HUMALOG) injection 8 Units, 8 Units, Subcutaneous, AFTER BREAKFAST, Lewis Hoffmann M.D., 8 Units at 12/20/18 0903  •  metFORMIN (GLUCOPHAGE) tablet 1,000 mg, 1,000 mg, Oral, BID WITH MEALS, Ju Reece M.D., 1,000 mg at 12/20/18 0817  •  lisinopril (PRINIVIL) 10 MG tablet 10 mg, 10 mg, Oral, DAILY, Ju Reece M.D., 10 mg at 12/20/18 0531  •  therapeutic multivitamin-minerals (THERAGRAN-M) tablet 1 Tab, 1 Tab, Oral, DAILY, Brigida Hyde M.D., 1 Tab at 12/20/18 0532  •  senna-docusate (PERICOLACE or SENOKOT S) 8.6-50 MG per tablet 2 Tab, 2 Tab,  Oral, BID, 2 Tab at 12/20/18 0532 **AND** polyethylene glycol/lytes (MIRALAX) PACKET 1 Packet, 1 Packet, Oral, QDAY PRN **AND** magnesium hydroxide (MILK OF MAGNESIA) suspension 30 mL, 30 mL, Oral, QDAY PRN **AND** bisacodyl (DULCOLAX) suppository 10 mg, 10 mg, Rectal, QDAY PRN, Wilma Oviedo M.D.  •  acetaminophen (TYLENOL) tablet 650 mg, 650 mg, Oral, Q6HRS PRN, Wilma Oviedo M.D., 650 mg at 12/14/18 1806  •  atorvastatin (LIPITOR) tablet 10 mg, 10 mg, Oral, DAILY, Wilma Oviedo M.D., 10 mg at 12/20/18 0532  •  vitamin D (cholecalciferol) tablet 2,000 Units, 2,000 Units, Oral, DAILY, Wilma Oviedo M.D., 2,000 Units at 12/20/18 0531  •  omeprazole (PRILOSEC) capsule 20 mg, 20 mg, Oral, QAM, Wilma Oviedo M.D., 20 mg at 12/20/18 0534  •  tamsulosin (FLOMAX) capsule 0.4 mg, 0.4 mg, Oral, AFTER BREAKFAST, Wilma Oviedo M.D., 0.4 mg at 12/20/18 0817  •  ferrous sulfate tablet 325 mg, 325 mg, Oral, TID WITH MEALS, Wilma Oviedo M.D., 325 mg at 12/20/18 0817  •  buPROPion SR (WELLBUTRIN-SR) tablet 150 mg, 150 mg, Oral, BID, Wilma Oviedo M.D., 150 mg at 12/20/18 0532  •  pancrelipase (Lip-Prot-Amyl) (CREON 22572) 16776-54335 units capsule 72,000 Units, 3 Cap, Oral, TID WITH MEALS, Wilma Oviedo M.D., 72,000 Units at 12/20/18 0818  •  pancrelipase (Lip-Prot-Amyl) (CREON 31035) 66823-19331 units capsule 24,000 Units, 1 Cap, Oral, With Snacks PRN **AND** pancrelipase (Lip-Prot-Amyl) (CREON 6000) 6000 units capsule 12,000 Units, 2 Cap, Oral, With Snacks PRN, Wilma Oviedo M.D.    Allergies:  No Known Allergies    Quality Measures  Reviewed items:  EKG reviewed, Labs reviewed, Medications reviewed and Radiology images reviewed  Dill catheter:  No Dill  Central line: No central line  DVT prophylaxis: Mechanical, pharmacological contraindicated due to bleeding risk (history of GI bleed August 2018)            Physical Exam       Vitals:    12/19/18 1515 12/19/18 1925 12/20/18 0320 12/20/18 0800   BP:  115/57 124/75 103/64 133/73   Pulse: (!) 106 97 65 74   Resp: 18 16 16 16   Temp: 37.1 °C (98.8 °F) 36.9 °C (98.5 °F) 36.2 °C (97.2 °F) 36.3 °C (97.3 °F)   TempSrc: Temporal Temporal Temporal Temporal   SpO2: 93% 92% 96% 94%   Weight:       Height:         Body mass index is 15.77 kg/m².    Oxygen Therapy:  Pulse Oximetry: 94 %, O2 (LPM): 0, O2 Delivery: None (Room Air)    PHYSICAL EXAM: Unchanged  Constitutional: CACHECTIC male in no distress.   HENT:   Head: Normocephalic and atraumatic.   Eyes: Pupils are equal, round, and reactive to light. EOM are normal.  PTOSIS of left eyelid (baseline).  Neck: Normal range of motion. Neck supple. No JVD present. No tracheal deviation present. No thyromegaly present.   Cardiovascular: Regular rhythm.  Exam reveals no gallop and no friction rub.    No murmur heard.  Pulmonary/Chest: Effort normal and breath sounds normal. No stridor. No respiratory distress. No wheezes. No rales. No tenderness.   Abdominal: Soft. Bowel sounds are normal. No distension and no mass. There is no tenderness. There is no rebound and no guarding.   Musculoskeletal: Normal range of motion. No edema, tenderness or deformity.   Lymphadenopathy:     No cervical adenopathy.   Neurological: Alert and oriented to person, place, and time. No cranial nerve deficit.   Skin: Skin is warm and dry. No rash noted. No erythema.   Psychiatric: Mood, memory, affect and judgment normal.   Vitals reviewed.      Lab Data Review:         12/3/2018  7:32 AM    Recent Labs      12/17/18   1311   SODIUM  140   POTASSIUM  4.5   CHLORIDE  110   CO2  21   BUN  32*   CREATININE  0.88   CALCIUM  9.5       Recent Labs      12/17/18   1311   GLUCOSE  45*       Recent Labs      12/17/18   1311   RBC  3.75*   HEMOGLOBIN  11.7*   HEMATOCRIT  37.6*   PLATELETCT  287       Recent Labs      12/17/18   1311   WBC  10.5           Assessment/Plan     Diabetes mellitus (HCC)  -CONTROLLED:  ?Type 2 diabetes onset 4 years ago.  -Fingerstick  glucose checks.  -Hypoglycemia protocol.  -On metformin, glargine  -12/2:  Insulin Glargine 18-->20u; premeal Humalog 8u-->9u  -12/8: Sliding scale insulin has been stopped, blood glucose well controlled.  -12/10 blood glucose 123-237.  Glargine increased from 20-21 units daily, lispro increased from 9-10 units 3 times daily.  -12/12: Blood glucose well controlled on current regimen.  Lantus changed to PM schedule.  -12/13: Glycemic control improved after Lantus change.  -12/14: Blood glucose following entirely within reference range.  -12/15: Blood glucose 157-187.  We are satisfied with his level of glycemic control given his history.  -12/17: Episode of hypoglycemia found on BMP lab drawn midmorning, 44.  Corrected to 77 after snack, asymptomatic, a.m. lispro decreased to 8 units.    Memory loss  History of TBI with mild cognitive impairment as per chart review  Lives at an assisted living facility called [facility]  Sister is actively involved in care and cell phone number is in the chart    History of recent UGI bleed  -recent Hospital admission 8/2018 for an episode of upper GI bleed  -Patient had EGD done during hospitalization which showed grade D esophagitis, patient had repeat EGD & colonoscopy at 11/20 and EGD notable for localized nodularity of the mucosa at the GE junction about 5-6 mm.  Colonoscopy was also done but there was stool throughout the colon and a repeat was recommended.  Procedures with GI consultants  -We will hold anticoagulation in this setting, SCDs for DVT prophylaxis  -No apparent active bleed, Hb stable    Tobacco abuse  Smoking cessation strongly encouraged again  Query COPD, chest x-ray hyperinflated  Nicotine replacement therapy ongoing  Vaccinate before discharge, flu and pneumonia  Consider outpatient pulmonary function studies and 6-minute walk to completely assess his COPD    Essential hypertension  On home lisinopril    Chronic pancreatitis (HCC)  CT with extensive calcifications  of the pancreas  Patient very cachectic and malnourished appearing  Query refeeding syndrome risk  Aggressive electrolyte repletion  Query absorption issues and pancreatic exocrine insufficiency  Dietary consultation  Vitamin supplementation  Pancreatic enzyme supplements  Stop oral diabetes medications, may be causing adverse issues      -CT abdomen showed extensive calcifications of the pancreas.  -History of protein calorie malnutrition secondary to pancreatic insufficiency.  -Currently on pancrelipase replacement.  -Dietitian following.    Dyslipidemia  -On home atorvastatin.    Vitamin D deficiency  -Vitamin D level 31.  -On vitamin D supplement.    Essential hypertension  -On home lisinopril.    Tobacco abuse  -Smoking about 1-2 cigarettes/day  -PCP to continue counseling regarding smoking cessation      Disposition  Patient has been accepted to [facility] with [facility], coverage will be provided by [employer] with $50 co-pay.  He is medically stable for discharge and will hopefully be logistically able to leave on Monday, pending available bed in group home.

## 2018-12-20 NOTE — CARE PLAN
Problem: Safety  Goal: Will remain free from falls  Outcome: PROGRESSING AS EXPECTED  Strong steady gait, up ad jesus alberto, calls appropriately    Problem: Discharge Barriers/Planning  Goal: Patient's continuum of care needs will be met  Outcome: PROGRESSING AS EXPECTED  Pt self administrating insulin injections. Diabetic education completed, pt feels confident and verbalizes understanding

## 2018-12-20 NOTE — CARE PLAN
Problem: Discharge Barriers/Planning  Goal: Patient's continuum of care needs will be met  Outcome: PROGRESSING AS EXPECTED  [facility]

## 2018-12-20 NOTE — DISCHARGE PLANNING
Anticipated Discharge Disposition: To [facility] once they have approved the Discharge Summary and the medications have been reordered through [facility].  (The guardian had called to say that she requested that [facility] use iovoxing [facility] because otherwise the patient's insurance won't pay for the meds.)      Action: The doctor was notified and is working on the change now.  He has revised the Discharge Summary and it is being faxed to [facility] as they requested.      Barriers to Discharge: Waiting for all meds and diabetes supplies to be changed to [facility].    Plan: This RN CM will let [facility] know once all meds and diabetic supplies have been switched to [facility].    Update (4:15 PM): Dr. Hoffmann called [facility]'s director to find out exactly how she wanted the doctor to write the insulin orders.  Dr. Hoffmann then printed 2 insulin orders and I faxed them to [facility] and requested a call back to tell me if these orders are acceptable to them.  No answer yet.  Will follow up on Friday morning, 12/21.

## 2018-12-21 VITALS
HEART RATE: 76 BPM | SYSTOLIC BLOOD PRESSURE: 111 MMHG | BODY MASS INDEX: 15.76 KG/M2 | WEIGHT: 122.8 LBS | RESPIRATION RATE: 18 BRPM | HEIGHT: 74 IN | TEMPERATURE: 97.2 F | DIASTOLIC BLOOD PRESSURE: 65 MMHG | OXYGEN SATURATION: 95 %

## 2018-12-21 LAB
GLUCOSE BLD-MCNC: 107 MG/DL (ref 65–99)
GLUCOSE BLD-MCNC: 135 MG/DL (ref 65–99)
GLUCOSE BLD-MCNC: 99 MG/DL (ref 65–99)

## 2018-12-21 PROCEDURE — 700102 HCHG RX REV CODE 250 W/ 637 OVERRIDE(OP): Performed by: STUDENT IN AN ORGANIZED HEALTH CARE EDUCATION/TRAINING PROGRAM

## 2018-12-21 PROCEDURE — A9270 NON-COVERED ITEM OR SERVICE: HCPCS | Performed by: STUDENT IN AN ORGANIZED HEALTH CARE EDUCATION/TRAINING PROGRAM

## 2018-12-21 PROCEDURE — 82962 GLUCOSE BLOOD TEST: CPT | Mod: 91

## 2018-12-21 PROCEDURE — 99238 HOSP IP/OBS DSCHRG MGMT 30/<: CPT | Mod: GC | Performed by: INTERNAL MEDICINE

## 2018-12-21 RX ADMIN — MULTIPLE VITAMINS W/ MINERALS TAB 1 TABLET: TAB at 06:08

## 2018-12-21 RX ADMIN — VITAMIN D, TAB 1000IU (100/BT) 2000 UNITS: 25 TAB at 06:08

## 2018-12-21 RX ADMIN — PANCRELIPASE 72000 UNITS: 24000; 76000; 120000 CAPSULE, DELAYED RELEASE PELLETS ORAL at 12:35

## 2018-12-21 RX ADMIN — ATORVASTATIN CALCIUM 10 MG: 10 TABLET, FILM COATED ORAL at 06:08

## 2018-12-21 RX ADMIN — LISINOPRIL 10 MG: 10 TABLET ORAL at 06:08

## 2018-12-21 RX ADMIN — TAMSULOSIN HYDROCHLORIDE 0.4 MG: 0.4 CAPSULE ORAL at 08:51

## 2018-12-21 RX ADMIN — BUPROPION HYDROCHLORIDE 150 MG: 150 TABLET, EXTENDED RELEASE ORAL at 06:08

## 2018-12-21 RX ADMIN — PANCRELIPASE 72000 UNITS: 24000; 76000; 120000 CAPSULE, DELAYED RELEASE PELLETS ORAL at 08:52

## 2018-12-21 RX ADMIN — Medication 325 MG: at 12:35

## 2018-12-21 RX ADMIN — OMEPRAZOLE 20 MG: 20 CAPSULE, DELAYED RELEASE ORAL at 06:08

## 2018-12-21 RX ADMIN — Medication 325 MG: at 08:51

## 2018-12-21 RX ADMIN — METFORMIN HYDROCHLORIDE 1000 MG: 500 TABLET ORAL at 08:51

## 2018-12-21 NOTE — DISCHARGE PLANNING
Anticipated Discharge Disposition: Group Home    Action: PC to [facility] picked up the Rx  Voice from [facility] concerns over Rx, please call  [facility] out of office talked to manager, teleconferenced in Daiana, concerns over several of RX and will not take patient until the d/c summary is update.  Sw stated will contact R doctor and call them back to address issues.  PC to [facility], SEDRICK updated them, will be here to call.  Dr. Polo called and spoke to Daiana about what needs to be corrected.     Barriers to Discharge: corrected RX and D/C    Plan: will fax over when completed

## 2018-12-21 NOTE — DISCHARGE PLANNING
Anticipated Discharge Disposition: Group Home    Action: PC to [facility], [phone] talked to [facility] , stated she talked to Daiana and the patient is good to come to their facility.  PC to [facility], received e scripted Rx but not anything else.  Re-faxed newest Rx to them.  They will call this SW when order is ready to be delivered.    PC to [facility], [phone]: gave her an update     Barriers to Discharge: Rx delivery    Plan: follow up with [facility]

## 2018-12-21 NOTE — CARE PLAN
Problem: Communication  Goal: The ability to communicate needs accurately and effectively will improve  Outcome: PROGRESSING AS EXPECTED  Educate the pt on the use of call light to call for assistance    Problem: Safety  Goal: Will remain free from injury  Outcome: PROGRESSING AS EXPECTED  Nonskid footwear will be provided to the pt to prevent falls

## 2018-12-21 NOTE — PROGRESS NOTES
Discussed with pt discharge progress and barriers this afternoon. Pt is disappointed that he will not be leaving tonight, however pleasant and agreeable.

## 2018-12-21 NOTE — DISCHARGE PLANNING
Medical Social Work  Faxed updated RX and D/C summary to [facility]  PC to Daiana at [facility], everything looks good and can accept patient.  Will provide cab voucher for patient, [phone]  PC to [employer]: informed them of the above information.

## 2018-12-21 NOTE — DISCHARGE INSTRUCTIONS
Discharge Instructions  Discharge Instructions    Discharged to group home by taxi with self. Discharged via wheelchair, hospital escort: Yes.  Special equipment needed: Not Applicable    Be sure to schedule a follow-up appointment with your primary care doctor or any specialists as instructed.     Discharge Plan:   Diet Plan: Discussed  Activity Level: Discussed  Confirmed Follow up Appointment: No (Comments)  Confirmed Symptoms Management: Discussed  Medication Reconciliation Updated: Yes  Influenza Vaccine Indication: Patient Refuses    I understand that a diet low in cholesterol, fat, and sodium is recommended for good health. Unless I have been given specific instructions below for another diet, I accept this instruction as my diet prescription.   Other diet: Diabetic    Special Instructions: None    · Is patient discharged on Warfarin / Coumadin?   No         Diabetic Ketoacidosis  Diabetic ketoacidosis is a life-threatening complication of diabetes. If it is not treated, it can cause severe dehydration and organ damage and can lead to a coma or death.  What are the causes?  This condition develops when there is not enough of the hormone insulin in the body. Insulin helps the body to break down sugar for energy. Without insulin, the body cannot break down sugar, so it breaks down fats instead. This leads to the production of acids that are called ketones. Ketones are poisonous at high levels.  This condition can be triggered by:  · Stress on the body that is brought on by an illness.  · Medicines that raise blood glucose levels.  · Not taking diabetes medicine.  What are the signs or symptoms?  Symptoms of this condition include:  · Fatigue.  · Weight loss.  · Excessive thirst.  · Light-headedness.  · Fruity or sweet-smelling breath.  · Excessive urination.  · Vision changes.  · Confusion or irritability.  · Nausea.  · Vomiting.  · Rapid breathing.  · Abdominal pain.  · Feeling flushed.  How is this  diagnosed?  This condition is diagnosed based on a medical history, a physical exam, and blood tests. You may also have a urine test that checks for ketones.  How is this treated?  This condition may be treated with:  · Fluid replacement. This may be done to correct dehydration.  · Insulin injections. These may be given through the skin or through an IV tube.  · Electrolyte replacement. Electrolytes, such as potassium and sodium, may be given in pill form or through an IV tube.  · Antibiotic medicines. These may be prescribed if your condition was caused by an infection.  Follow these instructions at home:  Eating and drinking  · Drink enough fluids to keep your urine clear or pale yellow.  · If you cannot eat, alternate between drinking fluids with sugar (such as juice) and salty fluids (such as broth or bouillon).  · If you can eat, follow your usual diet and drink sugar-free liquids, such as water.  Other Instructions   · Take insulin as directed by your health care provider. Do not skip insulin injections. Do not use  insulin.  · If your blood sugar is over 240 mg/dL, monitor your urine ketones every 4-6 hours.  · If you were prescribed an antibiotic medicine, finish all of it even if you start to feel better.  · Rest and exercise only as directed by your health care provider.  · If you get sick, call your health care provider and begin treatment quickly. Your body often needs extra insulin to fight an illness.  · Check your blood glucose levels regularly. If your blood glucose is high, drink plenty of fluids. This helps to flush out ketones.  Contact a health care provider if:  · Your blood glucose level is too high or too low.  · You have ketones in your urine.  · You have a fever.  · You cannot eat.  · You cannot tolerate fluids.  · You have been vomiting for more than 2 hours.  · You continue to have symptoms of this condition.  · You develop new symptoms.  Get help right away if:  · Your blood  glucose levels continue to be high (elevated).  · Your monitor reads “high” even when you are taking insulin.  · You faint.  · You have chest pain.  · You have trouble breathing.  · You have a sudden, severe headache.  · You have sudden weakness in one arm or one leg.  · You have sudden trouble speaking or swallowing.  · You have vomiting or diarrhea that gets worse after 3 hours.  · You feel severely fatigued.  · You have trouble thinking.  · You have abdominal pain.  · You are severely dehydrated. Symptoms of severe dehydration include:  ¨ Extreme thirst.  ¨ Dry mouth.  ¨ Blue lips.  ¨ Cold hands and feet.  ¨ Rapid breathing.  This information is not intended to replace advice given to you by your health care provider. Make sure you discuss any questions you have with your health care provider.  Document Released: 12/15/2001 Document Revised: 05/25/2017 Document Reviewed: 11/25/2015  [employer] Interactive Patient Education © 2017 [employer]      Depression / Suicide Risk    As you are discharged from this [facility] facility, it is important to learn how to keep safe from harming yourself.    Recognize the warning signs:  · Abrupt changes in personality, positive or negative- including increase in energy   · Giving away possessions  · Change in eating patterns- significant weight changes-  positive or negative  · Change in sleeping patterns- unable to sleep or sleeping all the time   · Unwillingness or inability to communicate  · Depression  · Unusual sadness, discouragement and loneliness  · Talk of wanting to die  · Neglect of personal appearance   · Rebelliousness- reckless behavior  · Withdrawal from people/activities they love  · Confusion- inability to concentrate     If you or a loved one observes any of these behaviors or has concerns about self-harm, here's what you can do:  · Talk about it- your feelings and reasons for harming yourself  · Remove any means that you might use to hurt yourself  (examples: pills, rope, extension cords, firearm)  · Get professional help from the community (Mental Health, Substance Abuse, psychological counseling)  · Do not be alone:Call your Safe Contact- someone whom you trust who will be there for you.  · Call your local CRISIS HOTLINE 226-4238 or 382-987-7934  · Call your local Children's Mobile Crisis Response Team [place] (736) 967-2738 or www.IPLogic  · Call the toll free National Suicide Prevention Hotlines   · National Suicide Prevention Lifeline 960-375-QXUU (8888)  · National Hope Line Network 800-SUICIDE (036-6669)

## 2018-12-21 NOTE — PROGRESS NOTES
Received discharge orders and okay from social work to discharge pt to [facility] assisted living. Pt educated on discharge instructions and verbalized understanding. Pt has no lines in place. All personal belongings collected and with pt at bedside. Transport requested to take pt to ED entrance via wheelchair for discharge. Cab voucher provided to pt. Pt denies any further needs prior to discharge.

## 2018-12-22 NOTE — PROGRESS NOTES
Internal Medicine Interval Note  Note Author: Igor Blair M.D.     Name Pawel Tatum     1960   Age/Sex 58 y.o. male   MRN [id number]   Code Status Full Code     After 5PM or if no immediate response to page, please call for cross-coverage  Attending/Team: Radha/Cici See Patient List for primary contact information  Call [phone] to page    1st Call - Day Intern (R1):   [place] 2nd Call - Day Sr. Resident (R2/R3):   Johnnie         Reason for interval visit  (Principal Problem)   Diabetic ketoacidosis (HCC)    HPI: 58 y.o. male, past medical history insulin-dependent diabetes mellitus (hemoglobin A1c 17), pancreatic insufficiency, recent esophagitis, history of TBI with mild cognitive impairment, residing [facility] assisted [facility] facility, presents with DKA after medication noncompliance.  Treated with ICU DKA protocol, subsequently transferred to floor, stable.  Per social work, there is concerned about his assisted living facility accepting him in light of the fact they feel he needs more supervision than they can provide.  It is noted that he does not have PT OT needs and is stable medically for discharge.    Interval Problem Daily Status Update  (24 hours)   No complaints.   Discussed with the manager (Cyrus) of his assisted living the patient got accepted, which corrected the discharge orders including diabetes medications as mentioned below.  He can self-administer insulin.  Patient discharged on Lantus 20 units at evening, NovoLog 8, 10, 10.  Continue metformin that was used in hospital stay too.  We DC'd linagliptin and glimepiride from his home medication list, we also removed the insulin sliding scale from dc med list due to difficulties faced the patient and the facility in handling it.    Consultants/Specialty  None  PCP: JAKE Armando    Disposition  Inpatient for placement.    PMHx:  History reviewed. No pertinent past medical history.    PSHx:  Past  Surgical History:   Procedure Laterality Date   • GASTROSCOPY-ENDO N/A 8/25/2018    Procedure: GASTROSCOPY-ENDO;  Surgeon: Jaswant Frye M.D.;  Location: [facility];  Service: Gastroenterology       Medications:    Current Facility-Administered Medications:   •  insulin glargine (LANTUS) injection 20 Units, 20 Units, Subcutaneous, Q EVENING, 20 Units at 12/20/18 1808 **AND** insulin lispro (HUMALOG) injection 10 Units, 10 Units, Subcutaneous, AFTER LUNCH, 10 Units at 12/21/18 1235 **AND** [DISCONTINUED] insulin lispro (HUMALOG) injection 1-6 Units, 1-6 Units, Subcutaneous, TID AC, Stopped at 12/03/18 1700 **AND** Accu-Chek ACHS, , , Q AC AND BEDTIME(S) **AND** NOTIFY MD and PharmD, , , Once **AND** glucose 4 g chewable tablet 16 g, 16 g, Oral, Q15 MIN PRN **AND** dextrose 50% (D50W) injection 25 mL, 25 mL, Intravenous, Q15 MIN PRN, Lewis Hoffmann M.D.  •  insulin lispro (HUMALOG) injection 10 Units, 10 Units, Subcutaneous, AFTER DINNER, Lewis Hoffmann M.D., 10 Units at 12/20/18 1808  •  insulin lispro (HUMALOG) injection 8 Units, 8 Units, Subcutaneous, AFTER BREAKFAST, Lewis Hoffmann M.D., 8 Units at 12/21/18 0857  •  metFORMIN (GLUCOPHAGE) tablet 1,000 mg, 1,000 mg, Oral, BID WITH MEALS, Ju Reece M.D., 1,000 mg at 12/21/18 0851  •  lisinopril (PRINIVIL) 10 MG tablet 10 mg, 10 mg, Oral, DAILY, Ju Reece M.D., 10 mg at 12/21/18 0608  •  therapeutic multivitamin-minerals (THERAGRAN-M) tablet 1 Tab, 1 Tab, Oral, DAILY, Brigida Hyde M.D., 1 Tab at 12/21/18 0608  •  senna-docusate (PERICOLACE or SENOKOT S) 8.6-50 MG per tablet 2 Tab, 2 Tab, Oral, BID, 2 Tab at 12/20/18 0532 **AND** polyethylene glycol/lytes (MIRALAX) PACKET 1 Packet, 1 Packet, Oral, QDAY PRN **AND** magnesium hydroxide (MILK OF MAGNESIA) suspension 30 mL, 30 mL, Oral, QDAY PRN **AND** bisacodyl (DULCOLAX) suppository 10 mg, 10 mg, Rectal, QDAY PRN, Wilma Oviedo M.D.  •  acetaminophen (TYLENOL) tablet 650 mg, 650 mg,  Oral, Q6HRS PRN, Wilma Oviedo M.D., 650 mg at 12/14/18 1806  •  atorvastatin (LIPITOR) tablet 10 mg, 10 mg, Oral, DAILY, Wilma Oviedo M.D., 10 mg at 12/21/18 0608  •  vitamin D (cholecalciferol) tablet 2,000 Units, 2,000 Units, Oral, DAILY, Wilma Oviedo M.D., 2,000 Units at 12/21/18 0608  •  omeprazole (PRILOSEC) capsule 20 mg, 20 mg, Oral, QAM, Wilma Oviedo M.D., 20 mg at 12/21/18 0608  •  tamsulosin (FLOMAX) capsule 0.4 mg, 0.4 mg, Oral, AFTER BREAKFAST, Wilma Oviedo M.D., 0.4 mg at 12/21/18 0851  •  ferrous sulfate tablet 325 mg, 325 mg, Oral, TID WITH MEALS, Wilma Oviedo M.D., 325 mg at 12/21/18 1235  •  buPROPion SR (WELLBUTRIN-SR) tablet 150 mg, 150 mg, Oral, BID, Wilma Oviedo M.D., 150 mg at 12/21/18 0608  •  pancrelipase (Lip-Prot-Amyl) (CREON 87747) 41421-30151 units capsule 72,000 Units, 3 Cap, Oral, TID WITH MEALS, Wilma Oviedo M.D., 72,000 Units at 12/21/18 1235  •  pancrelipase (Lip-Prot-Amyl) (CREON 97499) 40899-02396 units capsule 24,000 Units, 1 Cap, Oral, With Snacks PRN **AND** pancrelipase (Lip-Prot-Amyl) (CREON 6000) 6000 units capsule 12,000 Units, 2 Cap, Oral, With Snacks PRN, Wilma Oviedo M.D.    Allergies:  No Known Allergies    Quality Measures  Reviewed items:  EKG reviewed, Labs reviewed, Medications reviewed and Radiology images reviewed  Dill catheter:  No Dill  Central line: No central line  DVT prophylaxis: Mechanical, pharmacological contraindicated due to bleeding risk (history of GI bleed August 2018)    Physical Exam     Vitals:    12/20/18 1600 12/20/18 1900 12/21/18 0355 12/21/18 0700   BP: 116/65 135/82 103/58 111/65   Pulse: 78 96 68 76   Resp: 16 16 16 18   Temp: 36.6 °C (97.8 °F) 36.4 °C (97.6 °F) 36.2 °C (97.2 °F) 36.2 °C (97.2 °F)   TempSrc: Temporal Temporal Temporal Temporal   SpO2: 94% 92% 94% 95%   Weight:       Height:         Body mass index is 15.77 kg/m².    Oxygen Therapy:  Pulse Oximetry: 95 %, O2 Delivery: None (Room Air)    PHYSICAL  EXAM: Unchanged  Constitutional: CACHECTIC male in no distress.   HENT:   Head: Normocephalic and atraumatic.   Eyes: Pupils are equal, round, and reactive to light. EOM are normal.  PTOSIS of left eyelid (baseline).  Neck: Normal range of motion. Neck supple. No JVD present. No tracheal deviation present. No thyromegaly present.   Cardiovascular: Regular rhythm.  Exam reveals no gallop and no friction rub.    No murmur heard.  Pulmonary/Chest: Effort normal and breath sounds normal. No stridor. No respiratory distress. No wheezes. No rales. No tenderness.   Abdominal: Soft. Bowel sounds are normal. No distension and no mass. There is no tenderness. There is no rebound and no guarding.   Musculoskeletal: Normal range of motion. No edema, tenderness or deformity.   Lymphadenopathy:     No cervical adenopathy.   Neurological: Alert and oriented to person, place, and time. No cranial nerve deficit.   Skin: Skin is warm and dry. No rash noted. No erythema.   Psychiatric: Mood, memory, affect and judgment normal.   Vitals reviewed.      Lab Data Review:     12/3/2018  7:32 AM    No results for input(s): SODIUM, POTASSIUM, CHLORIDE, CO2, BUN, CREATININE, MAGNESIUM, PHOSPHORUS, CALCIUM in the last 72 hours.    No results for input(s): ALTSGPT, ASTSGOT, ALKPHOSPHAT, TBILIRUBIN, DBILIRUBIN, GAMMAGT, AMYLASE, LIPASE, ALB, PREALBUMIN, GLUCOSE in the last 72 hours.    No results for input(s): RBC, HEMOGLOBIN, HEMATOCRIT, PLATELETCT, PROTHROMBTM, APTT, INR, IRON, FERRITIN, TOTIRONBC in the last 72 hours.          Assessment/Plan     Diabetes mellitus (HCC)  -CONTROLLED: Insulin glargine 20 units, NovoLog 8, 10, and 10, metformin 1000 twice daily.    Memory loss  History of TBI with mild cognitive impairment as per chart review  Lives at an assisted living facility called [facility]  Sister is actively involved in care and cell phone number is in the chart    History of recent UGI bleed  -recent Hospital admission 8/2018 for an  episode of upper GI bleed  -Patient had EGD done during hospitalization which showed grade D esophagitis, patient had repeat EGD & colonoscopy at 11/20 and EGD notable for localized nodularity of the mucosa at the GE junction about 5-6 mm.  Colonoscopy was also done but there was stool throughout the colon and a repeat was recommended.  Procedures with GI consultants  -We will hold anticoagulation in this setting, SCDs for DVT prophylaxis  -No apparent active bleed, Hb stable    Tobacco abuse  Smoking cessation strongly encouraged again  Query COPD, chest x-ray hyperinflated  Nicotine replacement therapy ongoing  Vaccinate before discharge, flu and pneumonia  Consider outpatient pulmonary function studies and 6-minute walk to completely assess his COPD    Essential hypertension  On home lisinopril    Chronic pancreatitis (HCC)  Stable on Pancreatic enzyme supplements  Stop oral diabetes medications, may be causing adverse issues       Dyslipidemia  -On home atorvastatin.    Vitamin D deficiency  -Vitamin D level 31.  -On vitamin D supplement.    Essential hypertension  -On home lisinopril.    Tobacco abuse  -Smoking about 1-2 cigarettes/day  -PCP to continue counseling regarding smoking cessation      Disposition  Patient has been accepted to go to assisted facility/hopefully today

## 2018-12-26 NOTE — DOCUMENTATION QUERY
DOCUMENTATION QUERY    PROVIDERS: Please select “Cosign w/ note”to reply to query.    To better represent the severity of illness of your patient, please review the following information and exercise your independent professional judgment in responding to this query.     Protein calorie malnutrition is documented in the Progress Notes. Dietary consult was done. Based upon the clinical findings, risk factors, and treatment, can the severity of the malnutrition be further specified?              The medical record reflects the following:   Clinical Findings  bmi 15.41   Treatment    Risk Factors    Location within medical record  Progress Notes     Thank you,   Nga Morales

## 2018-12-27 ENCOUNTER — PATIENT OUTREACH (OUTPATIENT)
Dept: HEALTH INFORMATION MANAGEMENT | Facility: OTHER | Age: 58
End: 2018-12-27

## 2018-12-27 ENCOUNTER — HOME CARE VISIT (OUTPATIENT)
Dept: HOME HEALTH SERVICES | Facility: HOME HEALTHCARE | Age: 58
End: 2018-12-27
Payer: COMMERCIAL

## 2018-12-27 VITALS
BODY MASS INDEX: 16.96 KG/M2 | WEIGHT: 136.44 LBS | DIASTOLIC BLOOD PRESSURE: 58 MMHG | HEART RATE: 110 BPM | OXYGEN SATURATION: 95 % | HEIGHT: 75 IN | RESPIRATION RATE: 18 BRPM | TEMPERATURE: 99.2 F | SYSTOLIC BLOOD PRESSURE: 110 MMHG

## 2018-12-27 PROCEDURE — 665001 SOC-HOME HEALTH

## 2018-12-27 PROCEDURE — G0493 RN CARE EA 15 MIN HH/HOSPICE: HCPCS

## 2018-12-27 SDOH — ECONOMIC STABILITY: HOUSING INSECURITY: UNSAFE APPLIANCES: 0

## 2018-12-27 SDOH — ECONOMIC STABILITY: HOUSING INSECURITY: UNSAFE COOKING RANGE AREA: 0

## 2018-12-27 ASSESSMENT — PATIENT HEALTH QUESTIONNAIRE - PHQ9
1. LITTLE INTEREST OR PLEASURE IN DOING THINGS: 00
2. FEELING DOWN, DEPRESSED, IRRITABLE, OR HOPELESS: 00
CLINICAL INTERPRETATION OF PHQ2 SCORE: 0

## 2018-12-27 ASSESSMENT — ACTIVITIES OF DAILY LIVING (ADL)
OASIS_M1830: 00
HOME_HEALTH_OASIS: 01
HOME_HEALTH_OASIS: 00

## 2018-12-27 NOTE — PROGRESS NOTES
SBAR Documentation: Situation, Background, Assessment, Recommendation     Situation    Referral for DM education and support post hospital stay   Background       Uncontrolled Type 2 DM I with hyperosmolarity without coma, history of TBI with mild cognitive impairment, Diabetic peripheral angiopathy, HTN, Recent GI BLeed, Memory loss, chronic pancreatitis   Assessment    CC RN has reviewed chart and will coordinate care after [facility] has made start of care visit.  Pawel has history of TBI with mild cognitive impairment and may need more 1 to 1 education with SN present versus telephonic instruction by CC RN.     Recommendation  CC RN will monitor chart and coordinate with [facility] RN Case Manager next week

## 2018-12-29 ENCOUNTER — HOME CARE VISIT (OUTPATIENT)
Dept: HOME HEALTH SERVICES | Facility: HOME HEALTHCARE | Age: 58
End: 2018-12-29
Payer: COMMERCIAL

## 2018-12-29 PROCEDURE — G0495 RN CARE TRAIN/EDU IN HH: HCPCS

## 2018-12-30 VITALS
RESPIRATION RATE: 18 BRPM | OXYGEN SATURATION: 98 % | TEMPERATURE: 98.9 F | SYSTOLIC BLOOD PRESSURE: 120 MMHG | DIASTOLIC BLOOD PRESSURE: 70 MMHG | HEART RATE: 74 BPM

## 2018-12-30 SDOH — ECONOMIC STABILITY: HOUSING INSECURITY: UNSAFE APPLIANCES: 0

## 2018-12-30 SDOH — ECONOMIC STABILITY: HOUSING INSECURITY: UNSAFE COOKING RANGE AREA: 0

## 2018-12-30 ASSESSMENT — ENCOUNTER SYMPTOMS: DEPRESSED MOOD: 1

## 2018-12-31 ENCOUNTER — HOME CARE VISIT (OUTPATIENT)
Dept: HOME HEALTH SERVICES | Facility: HOME HEALTHCARE | Age: 58
End: 2018-12-31
Payer: COMMERCIAL

## 2018-12-31 VITALS
OXYGEN SATURATION: 98 % | SYSTOLIC BLOOD PRESSURE: 140 MMHG | DIASTOLIC BLOOD PRESSURE: 80 MMHG | TEMPERATURE: 98.4 F | HEART RATE: 76 BPM | RESPIRATION RATE: 16 BRPM

## 2018-12-31 PROCEDURE — G0299 HHS/HOSPICE OF RN EA 15 MIN: HCPCS

## 2018-12-31 ASSESSMENT — ENCOUNTER SYMPTOMS
NAUSEA: DENIES
VOMITING: DENIES

## 2019-01-03 ENCOUNTER — HOME CARE VISIT (OUTPATIENT)
Dept: HOME HEALTH SERVICES | Facility: HOME HEALTHCARE | Age: 59
End: 2019-01-03
Payer: COMMERCIAL

## 2019-01-03 VITALS
SYSTOLIC BLOOD PRESSURE: 120 MMHG | OXYGEN SATURATION: 95 % | TEMPERATURE: 98.7 F | HEART RATE: 91 BPM | DIASTOLIC BLOOD PRESSURE: 76 MMHG | RESPIRATION RATE: 18 BRPM

## 2019-01-03 PROCEDURE — G0495 RN CARE TRAIN/EDU IN HH: HCPCS

## 2019-01-03 SDOH — ECONOMIC STABILITY: HOUSING INSECURITY: UNSAFE APPLIANCES: 0

## 2019-01-03 SDOH — ECONOMIC STABILITY: HOUSING INSECURITY: UNSAFE COOKING RANGE AREA: 0

## 2019-01-03 ASSESSMENT — ENCOUNTER SYMPTOMS
NAUSEA: DENIES C/O NAUSEA
VOMITING: DENIES ANY VOMITING

## 2019-01-07 ENCOUNTER — HOSPITAL ENCOUNTER (OUTPATIENT)
Facility: MEDICAL CENTER | Age: 59
End: 2019-01-07
Attending: NURSE PRACTITIONER
Payer: COMMERCIAL

## 2019-01-07 ENCOUNTER — HOME CARE VISIT (OUTPATIENT)
Dept: HOME HEALTH SERVICES | Facility: HOME HEALTHCARE | Age: 59
End: 2019-01-07
Payer: COMMERCIAL

## 2019-01-07 VITALS
RESPIRATION RATE: 18 BRPM | DIASTOLIC BLOOD PRESSURE: 78 MMHG | HEART RATE: 97 BPM | OXYGEN SATURATION: 98 % | SYSTOLIC BLOOD PRESSURE: 140 MMHG | TEMPERATURE: 98.3 F

## 2019-01-07 LAB
ALBUMIN SERPL BCP-MCNC: 4 G/DL (ref 3.2–4.9)
ALBUMIN/GLOB SERPL: 1.5 G/DL
ALP SERPL-CCNC: 78 U/L (ref 30–99)
ALT SERPL-CCNC: 22 U/L (ref 2–50)
AMYLASE SERPL-CCNC: 69 U/L (ref 20–103)
ANION GAP SERPL CALC-SCNC: 11 MMOL/L (ref 0–11.9)
ANISOCYTOSIS BLD QL SMEAR: ABNORMAL
AST SERPL-CCNC: 21 U/L (ref 12–45)
BASOPHILS # BLD AUTO: 1.8 % (ref 0–1.8)
BASOPHILS # BLD: 0.26 K/UL (ref 0–0.12)
BILIRUB SERPL-MCNC: 0.2 MG/DL (ref 0.1–1.5)
BUN SERPL-MCNC: 18 MG/DL (ref 8–22)
BURR CELLS BLD QL SMEAR: NORMAL
CALCIUM SERPL-MCNC: 9.7 MG/DL (ref 8.5–10.5)
CHLORIDE SERPL-SCNC: 106 MMOL/L (ref 96–112)
CHOLEST SERPL-MCNC: 147 MG/DL (ref 100–199)
CO2 SERPL-SCNC: 24 MMOL/L (ref 20–33)
CREAT SERPL-MCNC: 1.02 MG/DL (ref 0.5–1.4)
EOSINOPHIL # BLD AUTO: 0.53 K/UL (ref 0–0.51)
EOSINOPHIL NFR BLD: 3.6 % (ref 0–6.9)
ERYTHROCYTE [DISTWIDTH] IN BLOOD BY AUTOMATED COUNT: 66.3 FL (ref 35.9–50)
FASTING STATUS PATIENT QL REPORTED: NORMAL
GLOBULIN SER CALC-MCNC: 2.7 G/DL (ref 1.9–3.5)
GLUCOSE SERPL-MCNC: 44 MG/DL (ref 65–99)
HAV IGM SERPL QL IA: NEGATIVE
HBV CORE IGM SER QL: NEGATIVE
HBV SURFACE AG SER QL: NEGATIVE
HCT VFR BLD AUTO: 41 % (ref 42–52)
HCV AB SER QL: NEGATIVE
HDLC SERPL-MCNC: 86 MG/DL
HGB BLD-MCNC: 13.2 G/DL (ref 14–18)
LDLC SERPL CALC-MCNC: 50 MG/DL
LIPASE SERPL-CCNC: 10 U/L (ref 11–82)
LYMPHOCYTES # BLD AUTO: 6.17 K/UL (ref 1–4.8)
LYMPHOCYTES NFR BLD: 42 % (ref 22–41)
MACROCYTES BLD QL SMEAR: ABNORMAL
MANUAL DIFF BLD: NORMAL
MCH RBC QN AUTO: 32.5 PG (ref 27–33)
MCHC RBC AUTO-ENTMCNC: 32.2 G/DL (ref 33.7–35.3)
MCV RBC AUTO: 101 FL (ref 81.4–97.8)
MICROCYTES BLD QL SMEAR: ABNORMAL
MONOCYTES # BLD AUTO: 1.18 K/UL (ref 0–0.85)
MONOCYTES NFR BLD AUTO: 8 % (ref 0–13.4)
MORPHOLOGY BLD-IMP: NORMAL
NEUTROPHILS # BLD AUTO: 6.56 K/UL (ref 1.82–7.42)
NEUTROPHILS NFR BLD: 44.6 % (ref 44–72)
NRBC # BLD AUTO: 0.02 K/UL
NRBC BLD-RTO: 0.1 /100 WBC
OVALOCYTES BLD QL SMEAR: NORMAL
PLATELET # BLD AUTO: 279 K/UL (ref 164–446)
PLATELET BLD QL SMEAR: NORMAL
PMV BLD AUTO: 10 FL (ref 9–12.9)
POIKILOCYTOSIS BLD QL SMEAR: NORMAL
POTASSIUM SERPL-SCNC: 4.9 MMOL/L (ref 3.6–5.5)
PROT SERPL-MCNC: 6.7 G/DL (ref 6–8.2)
RBC # BLD AUTO: 4.06 M/UL (ref 4.7–6.1)
RBC BLD AUTO: PRESENT
SODIUM SERPL-SCNC: 141 MMOL/L (ref 135–145)
TRIGL SERPL-MCNC: 54 MG/DL (ref 0–149)
VIT B12 SERPL-MCNC: 178 PG/ML (ref 211–911)
WBC # BLD AUTO: 14.7 K/UL (ref 4.8–10.8)

## 2019-01-07 PROCEDURE — 82150 ASSAY OF AMYLASE: CPT

## 2019-01-07 PROCEDURE — 83036 HEMOGLOBIN GLYCOSYLATED A1C: CPT

## 2019-01-07 PROCEDURE — 85007 BL SMEAR W/DIFF WBC COUNT: CPT

## 2019-01-07 PROCEDURE — 82607 VITAMIN B-12: CPT

## 2019-01-07 PROCEDURE — 80053 COMPREHEN METABOLIC PANEL: CPT

## 2019-01-07 PROCEDURE — 85027 COMPLETE CBC AUTOMATED: CPT

## 2019-01-07 PROCEDURE — 80061 LIPID PANEL: CPT

## 2019-01-07 PROCEDURE — G0299 HHS/HOSPICE OF RN EA 15 MIN: HCPCS

## 2019-01-07 PROCEDURE — 80074 ACUTE HEPATITIS PANEL: CPT

## 2019-01-07 PROCEDURE — 83690 ASSAY OF LIPASE: CPT

## 2019-01-08 ENCOUNTER — HOME CARE VISIT (OUTPATIENT)
Dept: HOME HEALTH SERVICES | Facility: HOME HEALTHCARE | Age: 59
End: 2019-01-08
Payer: COMMERCIAL

## 2019-01-08 LAB
EST. AVERAGE GLUCOSE BLD GHB EST-MCNC: 255 MG/DL
HBA1C MFR BLD: 10.5 % (ref 0–5.6)

## 2019-01-08 SDOH — ECONOMIC STABILITY: HOUSING INSECURITY: UNSAFE APPLIANCES: 0

## 2019-01-08 SDOH — ECONOMIC STABILITY: HOUSING INSECURITY: UNSAFE COOKING RANGE AREA: 0

## 2019-01-09 ENCOUNTER — HOME CARE VISIT (OUTPATIENT)
Dept: HOME HEALTH SERVICES | Facility: HOME HEALTHCARE | Age: 59
End: 2019-01-09
Payer: COMMERCIAL

## 2019-01-09 VITALS
HEART RATE: 104 BPM | TEMPERATURE: 98.9 F | DIASTOLIC BLOOD PRESSURE: 80 MMHG | OXYGEN SATURATION: 95 % | SYSTOLIC BLOOD PRESSURE: 148 MMHG | RESPIRATION RATE: 18 BRPM

## 2019-01-09 PROCEDURE — G0493 RN CARE EA 15 MIN HH/HOSPICE: HCPCS

## 2019-01-09 ASSESSMENT — ENCOUNTER SYMPTOMS
NAUSEA: PATIENT VERBALIZES NO NAUSEA.
VOMITING: PATIENT VERBALIZES NO EMESIS.

## 2019-01-10 ENCOUNTER — APPOINTMENT (OUTPATIENT)
Dept: RADIOLOGY | Facility: MEDICAL CENTER | Age: 59
DRG: 617 | End: 2019-01-10
Attending: EMERGENCY MEDICINE
Payer: COMMERCIAL

## 2019-01-10 ENCOUNTER — HOSPITAL ENCOUNTER (INPATIENT)
Facility: MEDICAL CENTER | Age: 59
LOS: 7 days | DRG: 617 | End: 2019-01-18
Attending: EMERGENCY MEDICINE | Admitting: HOSPITALIST
Payer: COMMERCIAL

## 2019-01-10 ENCOUNTER — HOME CARE VISIT (OUTPATIENT)
Dept: HOME HEALTH SERVICES | Facility: HOME HEALTHCARE | Age: 59
End: 2019-01-10
Payer: COMMERCIAL

## 2019-01-10 DIAGNOSIS — M86.271 SUBACUTE OSTEOMYELITIS OF RIGHT FOOT (HCC): ICD-10-CM

## 2019-01-10 DIAGNOSIS — E11.65 TYPE 2 DIABETES MELLITUS WITH HYPERGLYCEMIA, WITH LONG-TERM CURRENT USE OF INSULIN (HCC): ICD-10-CM

## 2019-01-10 DIAGNOSIS — L97.518 DIABETIC ULCER OF RIGHT FOOT ASSOCIATED WITH TYPE 2 DIABETES MELLITUS, WITH OTHER ULCER SEVERITY, UNSPECIFIED PART OF FOOT (HCC): ICD-10-CM

## 2019-01-10 DIAGNOSIS — Z79.4 TYPE 2 DIABETES MELLITUS WITH HYPERGLYCEMIA, WITH LONG-TERM CURRENT USE OF INSULIN (HCC): ICD-10-CM

## 2019-01-10 DIAGNOSIS — E11.621 DIABETIC ULCER OF RIGHT FOOT ASSOCIATED WITH TYPE 2 DIABETES MELLITUS, WITH OTHER ULCER SEVERITY, UNSPECIFIED PART OF FOOT (HCC): ICD-10-CM

## 2019-01-10 DIAGNOSIS — K86.1 CHRONIC PANCREATITIS, UNSPECIFIED PANCREATITIS TYPE (HCC): ICD-10-CM

## 2019-01-10 LAB
ALBUMIN SERPL BCP-MCNC: 3.7 G/DL (ref 3.2–4.9)
ALBUMIN/GLOB SERPL: 1.4 G/DL
ALP SERPL-CCNC: 80 U/L (ref 30–99)
ALT SERPL-CCNC: 20 U/L (ref 2–50)
ANION GAP SERPL CALC-SCNC: 8 MMOL/L (ref 0–11.9)
ANISOCYTOSIS BLD QL SMEAR: ABNORMAL
APTT PPP: 28.2 SEC (ref 24.7–36)
AST SERPL-CCNC: 19 U/L (ref 12–45)
BASOPHILS # BLD AUTO: 0.9 % (ref 0–1.8)
BASOPHILS # BLD: 0.13 K/UL (ref 0–0.12)
BILIRUB SERPL-MCNC: 0.2 MG/DL (ref 0.1–1.5)
BUN SERPL-MCNC: 22 MG/DL (ref 8–22)
BURR CELLS BLD QL SMEAR: NORMAL
CALCIUM SERPL-MCNC: 9.1 MG/DL (ref 8.5–10.5)
CHLORIDE SERPL-SCNC: 108 MMOL/L (ref 96–112)
CO2 SERPL-SCNC: 20 MMOL/L (ref 20–33)
CREAT SERPL-MCNC: 0.95 MG/DL (ref 0.5–1.4)
CRP SERPL HS-MCNC: 0.11 MG/DL (ref 0–0.75)
EOSINOPHIL # BLD AUTO: 0.38 K/UL (ref 0–0.51)
EOSINOPHIL NFR BLD: 2.7 % (ref 0–6.9)
ERYTHROCYTE [DISTWIDTH] IN BLOOD BY AUTOMATED COUNT: 63.9 FL (ref 35.9–50)
ERYTHROCYTE [SEDIMENTATION RATE] IN BLOOD BY WESTERGREN METHOD: 17 MM/HOUR (ref 0–20)
GLOBULIN SER CALC-MCNC: 2.6 G/DL (ref 1.9–3.5)
GLUCOSE BLD-MCNC: 248 MG/DL (ref 65–99)
GLUCOSE SERPL-MCNC: 335 MG/DL (ref 65–99)
HCT VFR BLD AUTO: 39.7 % (ref 42–52)
HGB BLD-MCNC: 12.5 G/DL (ref 14–18)
INR PPP: 0.92 (ref 0.87–1.13)
LYMPHOCYTES # BLD AUTO: 4.55 K/UL (ref 1–4.8)
LYMPHOCYTES NFR BLD: 32.7 % (ref 22–41)
MACROCYTES BLD QL SMEAR: ABNORMAL
MANUAL DIFF BLD: NORMAL
MCH RBC QN AUTO: 32.9 PG (ref 27–33)
MCHC RBC AUTO-ENTMCNC: 31.5 G/DL (ref 33.7–35.3)
MCV RBC AUTO: 104.5 FL (ref 81.4–97.8)
MICROCYTES BLD QL SMEAR: ABNORMAL
MONOCYTES # BLD AUTO: 1.22 K/UL (ref 0–0.85)
MONOCYTES NFR BLD AUTO: 8.8 % (ref 0–13.4)
MORPHOLOGY BLD-IMP: NORMAL
NEUTROPHILS # BLD AUTO: 7.63 K/UL (ref 1.82–7.42)
NEUTROPHILS NFR BLD: 54 % (ref 44–72)
NEUTS BAND NFR BLD MANUAL: 0.9 % (ref 0–10)
NRBC # BLD AUTO: 0.03 K/UL
NRBC BLD-RTO: 0.2 /100 WBC
OVALOCYTES BLD QL SMEAR: NORMAL
PLATELET # BLD AUTO: 254 K/UL (ref 164–446)
PLATELET BLD QL SMEAR: NORMAL
PMV BLD AUTO: 10.6 FL (ref 9–12.9)
POIKILOCYTOSIS BLD QL SMEAR: NORMAL
POTASSIUM SERPL-SCNC: 5.1 MMOL/L (ref 3.6–5.5)
PROT SERPL-MCNC: 6.3 G/DL (ref 6–8.2)
PROTHROMBIN TIME: 12.4 SEC (ref 12–14.6)
RBC # BLD AUTO: 3.8 M/UL (ref 4.7–6.1)
RBC BLD AUTO: PRESENT
SODIUM SERPL-SCNC: 136 MMOL/L (ref 135–145)
WBC # BLD AUTO: 13.9 K/UL (ref 4.8–10.8)

## 2019-01-10 PROCEDURE — 85610 PROTHROMBIN TIME: CPT

## 2019-01-10 PROCEDURE — 85027 COMPLETE CBC AUTOMATED: CPT

## 2019-01-10 PROCEDURE — 82962 GLUCOSE BLOOD TEST: CPT

## 2019-01-10 PROCEDURE — 99285 EMERGENCY DEPT VISIT HI MDM: CPT

## 2019-01-10 PROCEDURE — 85007 BL SMEAR W/DIFF WBC COUNT: CPT

## 2019-01-10 PROCEDURE — 85652 RBC SED RATE AUTOMATED: CPT

## 2019-01-10 PROCEDURE — 36415 COLL VENOUS BLD VENIPUNCTURE: CPT

## 2019-01-10 PROCEDURE — 80053 COMPREHEN METABOLIC PANEL: CPT

## 2019-01-10 PROCEDURE — 93971 EXTREMITY STUDY: CPT | Mod: RT

## 2019-01-10 PROCEDURE — 86140 C-REACTIVE PROTEIN: CPT

## 2019-01-10 PROCEDURE — 85730 THROMBOPLASTIN TIME PARTIAL: CPT

## 2019-01-10 PROCEDURE — 73630 X-RAY EXAM OF FOOT: CPT | Mod: RT

## 2019-01-10 PROCEDURE — 87040 BLOOD CULTURE FOR BACTERIA: CPT

## 2019-01-10 RX ORDER — DEXTROSE MONOHYDRATE 25 G/50ML
25 INJECTION, SOLUTION INTRAVENOUS
Status: DISCONTINUED | OUTPATIENT
Start: 2019-01-10 | End: 2019-01-11

## 2019-01-11 ENCOUNTER — HOME CARE VISIT (OUTPATIENT)
Dept: HOME HEALTH SERVICES | Facility: HOME HEALTHCARE | Age: 59
End: 2019-01-11
Payer: COMMERCIAL

## 2019-01-11 PROBLEM — E11.65 TYPE 2 DIABETES MELLITUS WITH HYPERGLYCEMIA, WITH LONG-TERM CURRENT USE OF INSULIN (HCC): Status: ACTIVE | Noted: 2018-11-26

## 2019-01-11 PROBLEM — M86.271 SUBACUTE OSTEOMYELITIS OF RIGHT FOOT (HCC): Status: ACTIVE | Noted: 2019-01-11

## 2019-01-11 PROBLEM — G62.9 PERIPHERAL NEUROPATHY: Status: ACTIVE | Noted: 2019-01-11

## 2019-01-11 PROBLEM — Z79.4 TYPE 2 DIABETES MELLITUS WITH HYPERGLYCEMIA, WITH LONG-TERM CURRENT USE OF INSULIN (HCC): Status: ACTIVE | Noted: 2018-11-26

## 2019-01-11 LAB
GLUCOSE BLD-MCNC: 160 MG/DL (ref 65–99)
GLUCOSE BLD-MCNC: 177 MG/DL (ref 65–99)
GLUCOSE BLD-MCNC: 200 MG/DL (ref 65–99)
GLUCOSE BLD-MCNC: 235 MG/DL (ref 65–99)
GLUCOSE BLD-MCNC: 326 MG/DL (ref 65–99)
GLUCOSE BLD-MCNC: 350 MG/DL (ref 65–99)
GLUCOSE BLD-MCNC: 40 MG/DL (ref 65–99)
GLUCOSE BLD-MCNC: 54 MG/DL (ref 65–99)

## 2019-01-11 PROCEDURE — 700105 HCHG RX REV CODE 258: Performed by: EMERGENCY MEDICINE

## 2019-01-11 PROCEDURE — 700102 HCHG RX REV CODE 250 W/ 637 OVERRIDE(OP): Performed by: HOSPITALIST

## 2019-01-11 PROCEDURE — 36415 COLL VENOUS BLD VENIPUNCTURE: CPT

## 2019-01-11 PROCEDURE — 770006 HCHG ROOM/CARE - MED/SURG/GYN SEMI*

## 2019-01-11 PROCEDURE — 87040 BLOOD CULTURE FOR BACTERIA: CPT

## 2019-01-11 PROCEDURE — 700111 HCHG RX REV CODE 636 W/ 250 OVERRIDE (IP): Performed by: EMERGENCY MEDICINE

## 2019-01-11 PROCEDURE — 700105 HCHG RX REV CODE 258: Performed by: INTERNAL MEDICINE

## 2019-01-11 PROCEDURE — 700111 HCHG RX REV CODE 636 W/ 250 OVERRIDE (IP): Performed by: INTERNAL MEDICINE

## 2019-01-11 PROCEDURE — 99223 1ST HOSP IP/OBS HIGH 75: CPT | Performed by: HOSPITALIST

## 2019-01-11 PROCEDURE — 700111 HCHG RX REV CODE 636 W/ 250 OVERRIDE (IP): Performed by: HOSPITALIST

## 2019-01-11 PROCEDURE — 700102 HCHG RX REV CODE 250 W/ 637 OVERRIDE(OP)

## 2019-01-11 PROCEDURE — 700105 HCHG RX REV CODE 258: Performed by: HOSPITALIST

## 2019-01-11 PROCEDURE — A9270 NON-COVERED ITEM OR SERVICE: HCPCS | Performed by: HOSPITALIST

## 2019-01-11 PROCEDURE — 82962 GLUCOSE BLOOD TEST: CPT | Mod: 91

## 2019-01-11 PROCEDURE — 96365 THER/PROPH/DIAG IV INF INIT: CPT

## 2019-01-11 RX ORDER — LISINOPRIL 10 MG/1
10 TABLET ORAL DAILY
Status: DISCONTINUED | OUTPATIENT
Start: 2019-01-11 | End: 2019-01-18 | Stop reason: HOSPADM

## 2019-01-11 RX ORDER — ONDANSETRON 2 MG/ML
4 INJECTION INTRAMUSCULAR; INTRAVENOUS EVERY 4 HOURS PRN
Status: DISCONTINUED | OUTPATIENT
Start: 2019-01-11 | End: 2019-01-18 | Stop reason: HOSPADM

## 2019-01-11 RX ORDER — DEXTROSE MONOHYDRATE 25 G/50ML
25 INJECTION, SOLUTION INTRAVENOUS
Status: DISCONTINUED | OUTPATIENT
Start: 2019-01-11 | End: 2019-01-18 | Stop reason: HOSPADM

## 2019-01-11 RX ORDER — BISACODYL 10 MG
10 SUPPOSITORY, RECTAL RECTAL
Status: DISCONTINUED | OUTPATIENT
Start: 2019-01-11 | End: 2019-01-18 | Stop reason: HOSPADM

## 2019-01-11 RX ORDER — PROMETHAZINE HYDROCHLORIDE 25 MG/1
12.5-25 TABLET ORAL EVERY 4 HOURS PRN
Status: DISCONTINUED | OUTPATIENT
Start: 2019-01-11 | End: 2019-01-18 | Stop reason: HOSPADM

## 2019-01-11 RX ORDER — NICOTINE 21 MG/24HR
14 PATCH, TRANSDERMAL 24 HOURS TRANSDERMAL
Status: DISCONTINUED | OUTPATIENT
Start: 2019-01-11 | End: 2019-01-18 | Stop reason: HOSPADM

## 2019-01-11 RX ORDER — ASPIRIN 81 MG/1
81 TABLET, CHEWABLE ORAL DAILY
Status: DISCONTINUED | OUTPATIENT
Start: 2019-01-11 | End: 2019-01-18 | Stop reason: HOSPADM

## 2019-01-11 RX ORDER — BUPROPION HYDROCHLORIDE 150 MG/1
150 TABLET, EXTENDED RELEASE ORAL 2 TIMES DAILY
Status: DISCONTINUED | OUTPATIENT
Start: 2019-01-11 | End: 2019-01-18 | Stop reason: HOSPADM

## 2019-01-11 RX ORDER — ONDANSETRON 4 MG/1
4 TABLET, ORALLY DISINTEGRATING ORAL EVERY 4 HOURS PRN
Status: DISCONTINUED | OUTPATIENT
Start: 2019-01-11 | End: 2019-01-18 | Stop reason: HOSPADM

## 2019-01-11 RX ORDER — ACETAMINOPHEN 325 MG/1
650 TABLET ORAL EVERY 6 HOURS PRN
Status: DISCONTINUED | OUTPATIENT
Start: 2019-01-11 | End: 2019-01-18 | Stop reason: HOSPADM

## 2019-01-11 RX ORDER — SODIUM CHLORIDE 9 MG/ML
INJECTION, SOLUTION INTRAVENOUS CONTINUOUS
Status: DISCONTINUED | OUTPATIENT
Start: 2019-01-11 | End: 2019-01-18 | Stop reason: HOSPADM

## 2019-01-11 RX ORDER — PROMETHAZINE HYDROCHLORIDE 12.5 MG/1
12.5-25 SUPPOSITORY RECTAL EVERY 4 HOURS PRN
Status: DISCONTINUED | OUTPATIENT
Start: 2019-01-11 | End: 2019-01-18 | Stop reason: HOSPADM

## 2019-01-11 RX ORDER — INSULIN GLARGINE 100 [IU]/ML
20 INJECTION, SOLUTION SUBCUTANEOUS EVERY EVENING
Status: DISCONTINUED | OUTPATIENT
Start: 2019-01-11 | End: 2019-01-17

## 2019-01-11 RX ORDER — OMEPRAZOLE 20 MG/1
20 CAPSULE, DELAYED RELEASE ORAL EVERY MORNING
Status: DISCONTINUED | OUTPATIENT
Start: 2019-01-11 | End: 2019-01-18 | Stop reason: HOSPADM

## 2019-01-11 RX ORDER — CLONIDINE HYDROCHLORIDE 0.1 MG/1
0.1 TABLET ORAL EVERY 6 HOURS PRN
Status: DISCONTINUED | OUTPATIENT
Start: 2019-01-11 | End: 2019-01-18 | Stop reason: HOSPADM

## 2019-01-11 RX ORDER — POLYETHYLENE GLYCOL 3350 17 G/17G
1 POWDER, FOR SOLUTION ORAL
Status: DISCONTINUED | OUTPATIENT
Start: 2019-01-11 | End: 2019-01-18 | Stop reason: HOSPADM

## 2019-01-11 RX ORDER — AMOXICILLIN 250 MG
2 CAPSULE ORAL 2 TIMES DAILY
Status: DISCONTINUED | OUTPATIENT
Start: 2019-01-11 | End: 2019-01-18 | Stop reason: HOSPADM

## 2019-01-11 RX ORDER — TAMSULOSIN HYDROCHLORIDE 0.4 MG/1
0.4 CAPSULE ORAL
Status: DISCONTINUED | OUTPATIENT
Start: 2019-01-11 | End: 2019-01-18 | Stop reason: HOSPADM

## 2019-01-11 RX ORDER — MEMANTINE HYDROCHLORIDE 10 MG/1
5 TABLET ORAL 2 TIMES DAILY
Status: DISCONTINUED | OUTPATIENT
Start: 2019-01-11 | End: 2019-01-18 | Stop reason: HOSPADM

## 2019-01-11 RX ORDER — ATORVASTATIN CALCIUM 10 MG/1
10 TABLET, FILM COATED ORAL EVERY EVENING
Status: DISCONTINUED | OUTPATIENT
Start: 2019-01-11 | End: 2019-01-18 | Stop reason: HOSPADM

## 2019-01-11 RX ADMIN — NICOTINE 14 MG: 14 PATCH, EXTENDED RELEASE TRANSDERMAL at 05:42

## 2019-01-11 RX ADMIN — PANCRELIPASE 72000 UNITS: 24000; 76000; 120000 CAPSULE, DELAYED RELEASE PELLETS ORAL at 07:49

## 2019-01-11 RX ADMIN — VANCOMYCIN HYDROCHLORIDE 1100 MG: 100 INJECTION, POWDER, LYOPHILIZED, FOR SOLUTION INTRAVENOUS at 20:21

## 2019-01-11 RX ADMIN — MEMANTINE HYDROCHLORIDE 5 MG: 10 TABLET ORAL at 17:36

## 2019-01-11 RX ADMIN — INSULIN GLARGINE 20 UNITS: 100 INJECTION, SOLUTION SUBCUTANEOUS at 18:06

## 2019-01-11 RX ADMIN — SODIUM CHLORIDE 3 G: 900 INJECTION INTRAVENOUS at 11:53

## 2019-01-11 RX ADMIN — PANCRELIPASE 72000 UNITS: 24000; 76000; 120000 CAPSULE, DELAYED RELEASE PELLETS ORAL at 17:37

## 2019-01-11 RX ADMIN — INSULIN HUMAN 1 UNITS: 100 INJECTION, SOLUTION PARENTERAL at 12:05

## 2019-01-11 RX ADMIN — ATORVASTATIN CALCIUM 10 MG: 10 TABLET, FILM COATED ORAL at 17:36

## 2019-01-11 RX ADMIN — AMPICILLIN SODIUM AND SULBACTAM SODIUM 3 G: 2; 1 INJECTION, POWDER, FOR SOLUTION INTRAMUSCULAR; INTRAVENOUS at 03:16

## 2019-01-11 RX ADMIN — VANCOMYCIN HYDROCHLORIDE 1800 MG: 100 INJECTION, POWDER, LYOPHILIZED, FOR SOLUTION INTRAVENOUS at 07:29

## 2019-01-11 RX ADMIN — INSULIN HUMAN 1 UNITS: 100 INJECTION, SOLUTION PARENTERAL at 06:36

## 2019-01-11 RX ADMIN — STANDARDIZED SENNA CONCENTRATE AND DOCUSATE SODIUM 2 TABLET: 8.6; 5 TABLET, FILM COATED ORAL at 05:42

## 2019-01-11 RX ADMIN — BUPROPION HYDROCHLORIDE 150 MG: 150 TABLET, EXTENDED RELEASE ORAL at 17:36

## 2019-01-11 RX ADMIN — SODIUM CHLORIDE 3 G: 900 INJECTION INTRAVENOUS at 17:37

## 2019-01-11 RX ADMIN — TAMSULOSIN HYDROCHLORIDE 0.4 MG: 0.4 CAPSULE ORAL at 07:49

## 2019-01-11 RX ADMIN — ASPIRIN 81 MG 81 MG: 81 TABLET ORAL at 05:40

## 2019-01-11 RX ADMIN — OMEPRAZOLE 20 MG: 20 CAPSULE, DELAYED RELEASE ORAL at 05:41

## 2019-01-11 RX ADMIN — LISINOPRIL 10 MG: 10 TABLET ORAL at 06:00

## 2019-01-11 RX ADMIN — MEMANTINE HYDROCHLORIDE 5 MG: 10 TABLET ORAL at 05:41

## 2019-01-11 RX ADMIN — SODIUM CHLORIDE: 9 INJECTION, SOLUTION INTRAVENOUS at 05:28

## 2019-01-11 RX ADMIN — ENOXAPARIN SODIUM 40 MG: 100 INJECTION SUBCUTANEOUS at 05:44

## 2019-01-11 RX ADMIN — BUPROPION HYDROCHLORIDE 150 MG: 150 TABLET, EXTENDED RELEASE ORAL at 05:42

## 2019-01-11 RX ADMIN — INSULIN HUMAN 4 UNITS: 100 INJECTION, SOLUTION PARENTERAL at 20:31

## 2019-01-11 ASSESSMENT — COGNITIVE AND FUNCTIONAL STATUS - GENERAL
DAILY ACTIVITIY SCORE: 24
SUGGESTED CMS G CODE MODIFIER MOBILITY: CH
SUGGESTED CMS G CODE MODIFIER DAILY ACTIVITY: CH
MOBILITY SCORE: 24

## 2019-01-11 ASSESSMENT — ENCOUNTER SYMPTOMS
EYES NEGATIVE: 1
CONSTITUTIONAL NEGATIVE: 1
MUSCULOSKELETAL NEGATIVE: 1
TINGLING: 1
RESPIRATORY NEGATIVE: 1
CARDIOVASCULAR NEGATIVE: 1
PSYCHIATRIC NEGATIVE: 1
GASTROINTESTINAL NEGATIVE: 1

## 2019-01-11 ASSESSMENT — PATIENT HEALTH QUESTIONNAIRE - PHQ9
SUM OF ALL RESPONSES TO PHQ9 QUESTIONS 1 AND 2: 0
1. LITTLE INTEREST OR PLEASURE IN DOING THINGS: NOT AT ALL
2. FEELING DOWN, DEPRESSED, IRRITABLE, OR HOPELESS: NOT AT ALL

## 2019-01-11 ASSESSMENT — LIFESTYLE VARIABLES
EVER_SMOKED: YES
ALCOHOL_USE: NO

## 2019-01-11 ASSESSMENT — PAIN SCALES - GENERAL
PAINLEVEL_OUTOF10: 0

## 2019-01-11 NOTE — ED TRIAGE NOTES
".  Chief Complaint   Patient presents with   • Wound Check     To right foot     ./81   Pulse (!) 108   Temp 37.3 °C (99.2 °F) (Temporal)   Resp 18   Ht 1.905 m (6' 3\")   Wt 70.1 kg (154 lb 8.7 oz)   SpO2 93%   BMI 19.32 kg/m²     Ambulatory to triage for above, sent by Home Health to r/o Osteomyelitis, hx of diabetes, educated on triage process, placed in lobby, told to inform staff of any changes in condition.     Maxwell Rosenbaum at [phone] available to provide patient transportation home upon discharge, can call any time.   "

## 2019-01-11 NOTE — CARE PLAN
Problem: Safety  Goal: Will remain free from falls    Intervention: Assess risk factors for falls  Belongings and cell phone within reach. Call light in reach. Bed locked and in lowest position       Problem: Bowel/Gastric:  Goal: Will not experience complications related to bowel motility    Intervention: Assess baseline bowel pattern  Pt stated he had a BM this morning

## 2019-01-11 NOTE — PROGRESS NOTES
Med rec complete per pt at bedside & pt living facility-[facility]  Allergies have been verified

## 2019-01-11 NOTE — PROGRESS NOTES
"Pharmacy Kinetics 58 y.o. male on vancomycin day # 1 (LD 1,800 mg) 2019    Currently on Vancomycin 1100 mg iv q12hr (15mg/kg q 12 h)    Indication for Treatment: Osteomyelitis    Pertinent history per medical record: Admitted on 1/10/2019 for Osteomyelitis. Wound to his right foot onset three weeks ago with worsening severity in the past few days with swelling onset starting the morning of 1/10/19. History of diabetes. Plain film of the foot mentioned bony remodeling which could correspond with erosive arthropathy or osteomyelitis along the first metatarsal head but no additional abnormalities of the second toe were noted on imaging.    Other antibiotics: Unasyn 3 grams q 6 h    Allergies: Patient has no known allergies.     List concerns for renal function: DM, age, home metformin (not ordered)    Pertinent cultures to date:   none      Recent Labs      01/10/19   2040   WBC  13.9*   NEUTSPOLYS  54.00   BANDSSTABS  0.90     Recent Labs      01/10/19   2040   BUN  22   CREATININE  0.95   ALBUMIN  3.7     No results for input(s): VANCOTROUGH, VANCOPEAK, VANCORANDOM in the last 72 hours.  Intake/Output Summary (Last 24 hours) at 19 0506  Last data filed at 19 0428   Gross per 24 hour   Intake              100 ml   Output                0 ml   Net              100 ml      Blood pressure 152/81, pulse 80, temperature 37.3 °C (99.2 °F), temperature source Temporal, resp. rate (!) 11, height 1.905 m (6' 3\"), weight 70.1 kg (154 lb 8.7 oz), SpO2 92 %. Temp (24hrs), Av.3 °C (99.2 °F), Min:37.3 °C (99.2 °F), Max:37.3 °C (99.2 °F)      A/P   1. Vancomycin dose change: New start Vanco  2. Next vancomycin level: prior to 4th dose (not ordered)  3. Goal trough: 12-16  4. Comments: No history of receiving Vanco from [facility]. Patient is at a low risk of Vanco accumulation given current vitals, renal function, and BMI. At this time will dose per protocol of 15 mg/kg q 12 h. Recommend continued monitoring of " renal function and appropriate de escalation pending culture results.    Alvin Montanez, PharmD

## 2019-01-11 NOTE — PROGRESS NOTES
LIMB PRESERVATION SERVICE INITIAL CONSULT    REASON FOR LPS CONSULTATION: Left Dorsal 2nd Toe, Left Lateral midfoot callus    History of Present Illness:     Pawel Tatum is a 58 y.o. male, with a past medical history that includes uncontrolled type 2 diabetes and HTN, admitted 1/10/2019 for Osteomyelitis (HCC).   LPS has been consulted for his left 2nd toe.  This wound started the first week of January 2019 as a swollen toe that has increased in pain and swelling until the pt admitted to [facility].  Pt has been treating the wound with clean bandges.   The wound has failed to improve.   IV antibiotics were started on this admission.  Infectious diseases has not been consulted.    Xray has been done and does not show osteomyelitis  MRI has not been done  Ortho Dr Truong and Maureen have been contacted to evaluate the pt for POC surgical vs medical management of toe.  Pt was diagnosed with type 2 diabetes 10 years ago but was told he was type 1 as well, and is currently managing with insulin.  Pt checks their blood sugars 3 times a day and reports that these typically run around the high 100s.  They have had previous diabetes education.  They do  have numbness in his feet.  They usually wears regular shoes. They do not check their feet routinely. They have not had previous foot ulcers or foot surgery.     Tobacco Abuse Hx:   reports that he has been smoking.  He has a 3.75 pack-year smoking history. He has never used smokeless tobacco.    Alcohol Abuse Hx:   reports that he does not drink alcohol.    Drug Abuse Hx:   reports that he does not use drugs.    PAST MEDICAL HISTORY:   Past Medical History:   Diagnosis Date   • Diabetes (HCC)    • Hypertension        MEDICATIONS:   Current Facility-Administered Medications   Medication Dose   • aspirin (ASA) chewable tab 81 mg  81 mg   • atorvastatin (LIPITOR) tablet 10 mg  10 mg   • buPROPion SR (WELLBUTRIN-SR) tablet 150 mg  150 mg   • insulin glargine (LANTUS)  injection 20 Units  20 Units   • lisinopril (PRINIVIL) 10 MG tablet 10 mg  10 mg   • memantine (NAMENDA) tablet 5 mg  5 mg   • omeprazole (PRILOSEC) capsule 20 mg  20 mg   • tamsulosin (FLOMAX) capsule 0.4 mg  0.4 mg   • NS infusion     • enoxaparin (LOVENOX) inj 40 mg  40 mg   • cloNIDine (CATAPRES) tablet 0.1 mg  0.1 mg   • ondansetron (ZOFRAN) syringe/vial injection 4 mg  4 mg   • ondansetron (ZOFRAN ODT) dispertab 4 mg  4 mg   • promethazine (PHENERGAN) tablet 12.5-25 mg  12.5-25 mg   • promethazine (PHENERGAN) suppository 12.5-25 mg  12.5-25 mg   • prochlorperazine (COMPAZINE) injection 5-10 mg  5-10 mg   • senna-docusate (PERICOLACE or SENOKOT S) 8.6-50 MG per tablet 2 Tab  2 Tab    And   • polyethylene glycol/lytes (MIRALAX) PACKET 1 Packet  1 Packet    And   • magnesium hydroxide (MILK OF MAGNESIA) suspension 30 mL  30 mL    And   • bisacodyl (DULCOLAX) suppository 10 mg  10 mg   • acetaminophen (TYLENOL) tablet 650 mg  650 mg   • nicotine (NICODERM) 14 MG/24HR 14 mg  14 mg    And   • nicotine polacrilex (NICORETTE) 2 MG piece 2 mg  2 mg   • insulin regular (HUMULIN R) injection 1-6 Units  1-6 Units    And   • glucose 4 g chewable tablet 16 g  16 g    And   • dextrose 50% (D50W) injection 25 mL  25 mL   • MD Alert...Vancomycin per Pharmacy     • pancrelipase (Lip-Prot-Amyl) (CREON 30300) 59520-77416 units capsule 72,000 Units  3 Cap   • ampicillin/sulbactam (UNASYN) 3 g in  mL IVPB  3 g   • vancomycin 1,100 mg in  mL IVPB  15 mg/kg   • insulin regular (HUMULIN R) injection 5 Units  5 Units       ALLERGIES:  No Known Allergies     PAST SURGICAL HISTORY:   Past Surgical History:   Procedure Laterality Date   • GASTROSCOPY-ENDO N/A 8/25/2018    Procedure: GASTROSCOPY-ENDO;  Surgeon: Jaswant Frye M.D.;  Location: [facility];  Service: Gastroenterology   • OTHER ABDOMINAL SURGERY         SOCIAL HISTORY:   Social History     Social History   • Marital status: Single     Spouse  name: N/A   • Number of children: N/A   • Years of education: N/A     Social History Main Topics   • Smoking status: Current Every Day Smoker     Packs/day: 0.25     Years: 15.00   • Smokeless tobacco: Never Used      Comment: Trying to quit   • Alcohol use No   • Drug use: No   • Sexual activity: Not Currently     Other Topics Concern   • Not on file     Social History Narrative   • No narrative on file        FAMILY HISTORY:   Family History   Problem Relation Age of Onset   • No Known Problems Mother    • No Known Problems Father        REVIEW OF SYSTEMS:   Constitutional: Negative for chills, fever   Respiratory: Negative for cough and shortness of breath.    Cardiovascular:Negative for chest pain, swelling in legs, and claudication.   Gastrointestinal: Negative for constipation, diarrhea, nausea and vomiting.   Genitourinary: Negative for dysuria.   Neurological: numbness in feet and lower legs    DIAGNOSTIC DATA:     Lab Results   Component Value Date/Time    GLUCOSE 335 (H) 01/10/2019 08:40 PM        Lab Results   Component Value Date/Time    HBA1C 10.5 (H) 01/07/2019 02:15 PM          WBC   Date/Time Value Ref Range Status   01/10/2019 08:40 PM 13.9 (H) 4.8 - 10.8 K/uL Final     Recent Labs      01/10/19   2040   WBC  13.9*   RBC  3.80*   HEMOGLOBIN  12.5*   HEMATOCRIT  39.7*   MCV  104.5*   MCH  32.9   MCHC  31.5*   RDW  63.9*   PLATELETCT  254   MPV  10.6     Recent Labs      01/10/19   2040   SODIUM  136   POTASSIUM  5.1   CHLORIDE  108   CO2  20   GLUCOSE  335*   BUN  22         ESR:   17    CRP:   0.11    X-ray:   1/10/19  US-EXTREMITY VENOUS LOWER UNILAT RIGHT   Final Result      DX-FOOT-COMPLETE 3+ RIGHT   Final Result         1.  No acute traumatic bony injury.   2.  Bony remodeling at the dorsomedial first metatarsal head, could correspond with erosive arthropathy, consider osteomyelitis as clinically appropriate        MRI: NA    Arterial studies:    Pedal pulses present    Microbiology:   Results   "   Procedure Component Value Units Date/Time    BLOOD CULTURE [[id number]] Collected:  01/11/19 0642    Order Status:  Completed Specimen:  Blood from Peripheral Updated:  01/11/19 0656    Narrative:       Per Hospital Policy: Only change Specimen Src: to \"Line\" if  specified by physician order.    BLOOD CULTURE [[id number]] Collected:  01/11/19 0300    Order Status:  Completed Specimen:  Blood from Peripheral Updated:  01/11/19 0410    Narrative:       Per Hospital Policy: Only change Specimen Src: to \"Line\" if  specified by physician order.    BLOOD CULTURE [[id number]]     Order Status:  Canceled Specimen:  Blood from Peripheral     BLOOD CULTURE (Child) [[id number]] Collected:  01/10/19 2046    Order Status:  Completed Specimen:  Blood from Peripheral Updated:  01/10/19 2052    Narrative:       Per Hospital Policy: Only change Specimen Src: to \"Line\" if  specified by physician order.             PHYSICAL EXAMINATION:     Vitals  /81   Pulse 82   Temp 36.6 °C (97.9 °F) (Temporal)   Resp 20   Ht 1.905 m (6' 3\")   Wt 70.1 kg (154 lb 8.7 oz)   SpO2 92%   BMI 19.32 kg/m²      Pain:        Patient resting comfortably    General Appearance:  Well developed, well nourished, in no acute distress    Lower Extremity Assessment:  Edema    Pulses: RLE- DP pulse +2 palpable; PT pulse +1 palpable               LLE- DP pulse +2 palpable; PT pulse +1 palpable    FROM dorsi/plantar  Structural /mechanical left 2nd hammer toe most pronounced. Hammering bilaterally of toes. Valgus deformation of left 3rd and great toe under 2nd toe    Sensory Assessment  Feet Insensate to light touch      Wound Assessment:    Wound Characteristics                                                                            Location: Left 2nd Toe  Initial Evaluation  Date:1/11/2019   Tissue Type and %: 100% yellow/brown slough   Periwound: Erythema, Callus, Edematous   Drainage: None   Exposed structures Possible Bone palpated   Wound Edges:  "  Attached   Odor: None   S&S of Infection:   Edema/Erythema   Edema: Localized at Site   Sensation: Insensate               Measurements: Initial Evaluation  Date:1/11/2019   Length (cm) 1   Width (cm) 1   Depth (cm) 0.2   Tract/undermine            Procedures:       Debridement :  Callus removal Left midfoot   Cleansed with:                                             NS                             Periwound protected with:   Primary dressing: AGAG   Secondary Dressing:foam   Other: hypafix      Patient Education:    Implications of loss of protective sensation (LOPS) discussed with patient- including increased risk for amputation.  Advised to check foot at least daily, moisturize foot, and to always wear protective foot wear.      Plan:        1. Wound:      Offloading:  Offloading shoe  Ortho Techs involved:pt to get orthotics/prosthetics  as OP Ability involved, pt to wear shoes that do not rub on Wound sites   Rx will be delivered after Sx decided    2. Antibiotics: Continue current ABX    3. Surgery: Pt NPO for Eval by Dr Reddy/Dionne for possible Left 2nd toe Amputation    8. Consults: LPS      DISCHARGE PLAN:    Disposition: TBD    Follow-up: TBD    Other:       Prudencio Dahl R.N.

## 2019-01-11 NOTE — PROGRESS NOTES
LIMB PRESERVATION SERVICE (LPS)    NPO status changed till tonight at midnight for amputation by Dr Truong/Maureen tomorrow. Evaluation tonight by Dr Truong.

## 2019-01-11 NOTE — PROGRESS NOTES
· 2 RN skin check complete with KELLEY Coy.  · Sacrum and uzair. buttocks blanching redness  · R 2nd toe wound, wound consult done  · Discoloration on BLE - intact  · Otherwise skin intact.

## 2019-01-11 NOTE — ASSESSMENT & PLAN NOTE
Controlled on current medication regimen.  Continue home lisinopril.  Prn clonidine available if needed.

## 2019-01-11 NOTE — PROGRESS NOTES
Patient arrived to unit with 75 ml left in a 3g Unasyn bag still hanging on iv pole.  It was charted on MAR as given at 0316.  Pharmacist notified.  RN advised to administer the remaining bag.  RN informed that following dosing times would be modified.

## 2019-01-11 NOTE — ED PROVIDER NOTES
"ED Provider Note    Scribed for Kimber Brito D.O. by Snow Stone. 1/10/2019, 8:02 PM.    Primary care provider: JAKE Armando  Means of arrival: walk in  History obtained from: Patient  History limited by: none    CHIEF COMPLAINT  Chief Complaint   Patient presents with   • Wound Check     To right foot       [facility]  Pawel Tatum is a 58 y.o. male who presents to the Emergency Department for evaluation of a wound to his right foot onset three weeks ago with worsening severity in the past few days. He denies any associated drainage, fever, or pain. He states that he noticed mild swelling to his right foot onset this morning. Pawel states that the wound seems \"drier than before\" when it first appeared. He has a history of diabetes and his blood sugar was 90 this morning and 140 this afternoon. He denies any other symptoms such as vomiting, diarrhea, coughing, wheezing. Pawel has no other medical problems or complaints at this time. Pawel is currently living in a nursing home and states that his nurse was worried about the wound. He denies a history of DVT or PE. He is currently taking a daily 81 mg aspirin but no other blood thinners. He had his spleen removed a few years ago. He denies any alcohol use but states that he \"used to drink heavily four years ago.\" No alleviating factors mentioned.     REVIEW OF SYSTEMS  See HPI for further details. All other systems are negative.     PAST MEDICAL HISTORY   Diabetes     SURGICAL HISTORY   has a past surgical history that includes gastroscopy-endo (N/A, 8/25/2018).    SOCIAL HISTORY  Social History   Substance Use Topics   • Smoking status: Current Every Day Smoker     Packs/day: 0.25     Years: 15.00   • Smokeless tobacco: Never Used      Comment: Trying to quit   • Alcohol use No      History   Drug Use No       FAMILY HISTORY  History reviewed. No pertinent family history.    CURRENT MEDICATIONS  Reviewed.  See Encounter Summary.     ALLERGIES  No Known " "Allergies    PHYSICAL EXAM  VITAL SIGNS: /81   Pulse (!) 108   Temp 37.3 °C (99.2 °F) (Temporal)   Resp 18   Ht 1.905 m (6' 3\")   Wt 70.1 kg (154 lb 8.7 oz)   SpO2 93%   BMI 19.32 kg/m²   Constitutional: Alert and in no apparent distress.  HENT: Normocephalic atraumatic. Bilateral external ears normal. Nose normal. Mucous membranes are moist.  Eyes: Pupils are equal and reactive. Conjunctiva normal. Non-icteric sclera.   Neck: Normal range of motion without tenderness. Supple. No meningeal signs.  Cardiovascular: Tachycardic rate and regular rhythm. No murmurs, gallops or rubs.  Thorax & Lungs: Breath sounds are clear to auscultation bilaterally. No wheezing, rhonchi or rales.  Abdomen: Soft, nontender and nondistended. No peritoneal signs noted. Well healed surgical scare to the midline abdomen with adjacent reducible hernia  Skin: Warm and dry. No rashes are noted. ~2 cm ulcer on second toe of right foot with surrounding erythema and swelling.   Back: No bony tenderness, No CVA tenderness.   Extremities: 2+ peripheral pulses. Cap refill is less than 2 seconds. No edema, cyanosis, or clubbing. Swelling of the right foot, ankle, and calf.  Musculoskeletal: Good range of motion in all major joints. No tenderness to palpation or major deformities noted.   Neurologic: Alert and oriented ×3. The patient moves all 4 extremities and follows commands.  Psychiatric: Affect is normal. Judgment appears to be intact.    DIAGNOSTIC STUDIES / PROCEDURES     LABS  Results for orders placed or performed during the hospital encounter of 01/10/19   [facility] SED RATE   Result Value Ref Range    Sed Rate [facility] 17 0 - 20 mm/hour   CRP QUANTITIVE (NON-CARDIAC)   Result Value Ref Range    Stat C-Reactive Protein 0.11 0.00 - 0.75 mg/dL   CBC WITH DIFFERENTIAL   Result Value Ref Range    WBC 13.9 (H) 4.8 - 10.8 K/uL    RBC 3.80 (L) 4.70 - 6.10 M/uL    Hemoglobin 12.5 (L) 14.0 - 18.0 g/dL    Hematocrit 39.7 (L) 42.0 - " 52.0 %    .5 (H) 81.4 - 97.8 fL    MCH 32.9 27.0 - 33.0 pg    MCHC 31.5 (L) 33.7 - 35.3 g/dL    RDW 63.9 (H) 35.9 - 50.0 fL    Platelet Count 254 164 - 446 K/uL    MPV 10.6 9.0 - 12.9 fL    Nucleated RBC 0.20 /100 WBC    NRBC (Absolute) 0.03 K/uL    Neutrophils-Polys 54.00 44.00 - 72.00 %    Lymphocytes 32.70 22.00 - 41.00 %    Monocytes 8.80 0.00 - 13.40 %    Eosinophils 2.70 0.00 - 6.90 %    Basophils 0.90 0.00 - 1.80 %    Neutrophils (Absolute) 7.63 (H) 1.82 - 7.42 K/uL    Lymphs (Absolute) 4.55 1.00 - 4.80 K/uL    Monos (Absolute) 1.22 (H) 0.00 - 0.85 K/uL    Eos (Absolute) 0.38 0.00 - 0.51 K/uL    Baso (Absolute) 0.13 (H) 0.00 - 0.12 K/uL    Anisocytosis 2+     Macrocytosis 2+     Microcytosis 1+    COMP METABOLIC PANEL   Result Value Ref Range    Sodium 136 135 - 145 mmol/L    Potassium 5.1 3.6 - 5.5 mmol/L    Chloride 108 96 - 112 mmol/L    Co2 20 20 - 33 mmol/L    Anion Gap 8.0 0.0 - 11.9    Glucose 335 (H) 65 - 99 mg/dL    Bun 22 8 - 22 mg/dL    Creatinine 0.95 0.50 - 1.40 mg/dL    Calcium 9.1 8.5 - 10.5 mg/dL    AST(SGOT) 19 12 - 45 U/L    ALT(SGPT) 20 2 - 50 U/L    Alkaline Phosphatase 80 30 - 99 U/L    Total Bilirubin 0.2 0.1 - 1.5 mg/dL    Albumin 3.7 3.2 - 4.9 g/dL    Total Protein 6.3 6.0 - 8.2 g/dL    Globulin 2.6 1.9 - 3.5 g/dL    A-G Ratio 1.4 g/dL   ESTIMATED GFR   Result Value Ref Range    GFR If African American >60 >60 mL/min/1.73 m 2    GFR If Non African American >60 >60 mL/min/1.73 m 2   Prothrombin Time   Result Value Ref Range    PT 12.4 12.0 - 14.6 sec    INR 0.92 0.87 - 1.13   APTT   Result Value Ref Range    APTT 28.2 24.7 - 36.0 sec   PERIPHERAL SMEAR REVIEW   Result Value Ref Range    Peripheral Smear Review see below    PLATELET ESTIMATE   Result Value Ref Range    Plt Estimation Normal    MORPHOLOGY   Result Value Ref Range    RBC Morphology Present     Poikilocytosis 2+     Ovalocytes 1+     Echinocytes 2+    DIFFERENTIAL MANUAL   Result Value Ref Range    Bands-Stabs 0.90  0.00 - 10.00 %    Manual Diff Status PERFORMED    ACCU-CHEK GLUCOSE   Result Value Ref Range    Glucose - Accu-Ck 248 (H) 65 - 99 mg/dL   ACCU-CHEK GLUCOSE   Result Value Ref Range    Glucose - Accu-Ck 177 (H) 65 - 99 mg/dL       All labs were reviewed by me.    RADIOLOGY  US-EXTREMITY VENOUS LOWER UNILAT RIGHT   Final Result      DX-FOOT-COMPLETE 3+ RIGHT   Final Result         1.  No acute traumatic bony injury.   2.  Bony remodeling at the dorsomedial first metatarsal head, could correspond with erosive arthropathy, consider osteomyelitis as clinically appropriate        The radiologist's interpretation of all radiological studies have been reviewed by me.    COURSE & MEDICAL DECISION MAKING  Pertinent Labs & Imaging studies reviewed. (See chart for details)     8:02 PM - Patient seen and examined at bedside. Ordered US-extremity venous, westergren sed rate, CRP quantitative, CBC with differential, CMP, blood culture, PTT, APTT, Dx-foot to evaluate his symptoms. Discussed plan of care which includes blood work and an ultrasound of his leg to rule out possible blood clot. Patient understands and agrees to plan of care at this time.     10:19 PM Patient will be treated with 6 units humulin.    11:31 PM Paged hospitalist.    12:31 AM Spoke to Dr. Salamanca (hospitalist) who agrees to admit the patient.    Decision Making:  This is a 58 y.o. year old male who presents with a wound to the second toe of his right foot.  On initial evaluation, the patient appeared well and in no acute distress.  His vital signs were reassuring.  He did have a 2 cm ulceration with surrounding erythema and swelling present on the dorsal aspect of the second toe on the right foot.  He also had diffuse swelling of his foot, ankle, and calf.  He appeared to have good perfusion to the area.  Workup did include a CBC which was notable for leukocytosis of 14.  ESR and CRP were normal.  Blood cultures have been sent.  I did obtain a plain film of the  foot which mentioned bony remodeling which could correspond with erosive arthropathy or osteomyelitis along the first metatarsal head but no additional abnormalities of the second toe were noted on imaging.  Given the diffuse swelling and history of diabetes with leukocytosis as well as his history that the area has been worsening acutely over the last couple of days, the plan was made to admit for IV antibiotics and further evaluation by the limb preservation service.  His blood sugar was noted to be elevated but he did not have any evidence of DKA.  He was given insulin in the emergency department, and his blood sugar improved.  Additionally, a Doppler of the right lower extremity have been ordered given the more diffuse swelling.  This was negative for DVT.  He remained stable while in the emergency department.    DISPOSITION:  Patient will be admitted to Dr. Fritz (hospitalist) in guarded condition.    FINAL IMPRESSION  1. Diabetic ulcer of right foot associated with type 2 diabetes mellitus, with other ulcer severity, unspecified part of foot (HCC)          Snow CAPONE (Scribe), am scribing for, and in the presence of, Kimber Brito D.O..    Electronically signed by: Snow Stone (Scribe), 1/10/2019    Kimber CAPONE D.O. personally performed the services described in this documentation, as scribed by Snow Sotne in my presence, and it is both accurate and complete. C    The note accurately reflects work and decisions made by me.  Kimber Brito  1/11/2019  1:13 AM

## 2019-01-11 NOTE — ASSESSMENT & PLAN NOTE
S/p right 2nd toe amputation on 1/12/19. Surgical site remains uncomplicated.  Cultures positive for MSSA. Currently on IV cefazolin q8h, anticipated end date 2/23/19.  Continue pain control.  Goal BS < 150.  PT/OT pending.

## 2019-01-11 NOTE — PROGRESS NOTES
Patient was admitted for right toe osteomyelitis. He was seen and examined today.  Await LPS evaluation  On IV abx

## 2019-01-11 NOTE — WOUND TEAM
Wound consult received for right 2nd toe, LPS already consulted and will be seeing patient. Wound team not following at this time.

## 2019-01-11 NOTE — H&P
Hospital Medicine History & Physical Note    Date of Service  2019    Primary Care Physician  JAKE Armando    Consultants  None    Code Status  Full code     Chief Complaint  Toe swelling    History of Presenting Illness  58 y.o. male with history of diabetes mellitus on insulin therapy, essential hypertension with control, and dyslipidemia on statin therapy was in his usual state of health until approximately 1 week prior to admission, when he began to notice swelling of his right second toe.  He also reported bleeding when the toe would touch his socks, but no purulent drainage.  He denies fever or chills.  He has no associated pain, as at baseline he lacks feeling in his feet and toes.  He has no other complaints.     Review of Systems  Review of Systems   Constitutional: Negative.    HENT: Negative.    Eyes: Negative.    Respiratory: Negative.    Cardiovascular: Negative.    Gastrointestinal: Negative.    Genitourinary: Negative.    Musculoskeletal: Negative.    Skin: Negative.    Neurological: Positive for tingling.   Endo/Heme/Allergies: Negative.    Psychiatric/Behavioral: Negative.        Past Medical History   has no past medical history on file.    Surgical History   has a past surgical history that includes gastroscopy-endo (N/A, 2018).     Family History  Patient reports no significant family medical history     Social History   reports that he has been smoking.  He has a 3.75 pack-year smoking history. He has never used smokeless tobacco. He reports that he does not drink alcohol or use drugs.    Allergies  No Known Allergies    Medications  Prior to Admission Medications   Prescriptions Last Dose Informant Patient Reported? Taking?   Cholecalciferol (VITAMIN D) 2000 units Cap   No No   Sig: Take 2 Caps by mouth every day.   Insulin Pen Needle 32 G x 4 mm   No No   Si Each by Does not apply route 1 time daily as needed.   NOVOLOG, insulin aspart, (NOVOLOG) 100 UNIT/ML Solution  "Pen-injector injection 1/10/2019 at Unknown time  No No   Sig: \"Resident can self administer his own insulin, checks blood sugar 2 times daily and will tell staff what his blood sugar is.  Staff will document what resident states. Lispro: Inject 8 units in a.m. 30 minutes before breakfast, 10 units 30 minutes before lunch, 10 units 30 minutes before dinner.  \"   Pancrelipase, Lip-Prot-Amyl, (CREON) 79349 units Cap DR Particles 1/10/2019 at Unknown time  No No   Sig: Take 1 Cap by mouth See Admin Instructions. 2 caps tid with meals   aspirin (ASPIRIN 81) 81 MG Chew Tab chewable tablet   No No   Sig: Take 1 Tab by mouth every day.   atorvastatin (LIPITOR) 10 MG Tab   No No   Sig: Take 1 tablet by mouth daily to prevent heart attacks and strokes   buPROPion (WELLBUTRIN XL) 300 MG XL tablet 1/10/2019 at Unknown time  No No   Sig: Take 1 tablet by mouth every morning   ferrous sulfate 325 (65 Fe) MG EC tablet 1/10/2019 at Unknown time  No No   Sig: Take 1 Tab by mouth 3 times a day, with meals.   glucose 4 g 4 g Chew Tab prn  No No   Sig: Take 4 Tabs by mouth as needed for Low Blood Sugar (If FSBG is less than or equal to 70 mg/dL and patient able to eat or drink).   glucose blood (CONTOUR NEXT TEST) strip   No No   Si Strip by Other route as needed.   insulin glargine (LANTUS) 100 UNIT/ML Solution Pen-injector injection 2019 at Unknown time  No No   Sig: \"Resident can self administer  his own insulin, checks blood sugar 2 times daily and will tell staff what his blood sugar is.  Staff will document what resident states.  No insulin doses instructions can be and ranges. Glargine pen injector: Inject 20 units every evening.\"   lisinopril (PRINIVIL) 10 MG Tab 1/10/2019 at Unknown time  No No   Sig: Take 1 Tab by mouth every day.   memantine (NAMENDA) 5 MG Tab 1/10/2019 at Unknown time  No No   Sig: TAKE 1 TAB BY MOUTH TWICE DAILY   metformin (GLUCOPHAGE) 1000 MG tablet   No No   Sig: Take 1 Tab by mouth 2 times a " day, with meals.   omeprazole (PRILOSEC) 20 MG delayed-release capsule 1/10/2019 at Unknown time  Yes No   Sig: Take 20 mg by mouth every morning.   tamsulosin (FLOMAX) 0.4 MG capsule 1/10/2019 at Unknown time  No No   Sig: Take 1 Cap by mouth ONE-HALF HOUR AFTER BREAKFAST.      Facility-Administered Medications: None       Physical Exam  Temp:  [37.3 °C (99.2 °F)] 37.3 °C (99.2 °F)  Pulse:  [] 80  Resp:  [11-19] 11  BP: (152)/(81) 152/81    Physical Exam   Constitutional: He is oriented to person, place, and time. He appears well-developed and well-nourished. No distress.   HENT:   Head: Normocephalic and atraumatic.   Eyes: Pupils are equal, round, and reactive to light. Conjunctivae are normal.   Neck: Normal range of motion. Neck supple. No tracheal deviation present. No thyromegaly present.   Cardiovascular: Normal rate, regular rhythm and normal heart sounds.  Exam reveals no gallop and no friction rub.    No murmur heard.  Pulmonary/Chest: Effort normal and breath sounds normal. No respiratory distress. He has no wheezes.   Abdominal: Soft. Bowel sounds are normal. He exhibits no distension and no mass. There is no tenderness. There is no rebound and no guarding.   Musculoskeletal: Normal range of motion. He exhibits no edema.   Lymphadenopathy:     He has no cervical adenopathy.   Neurological: He is alert and oriented to person, place, and time. No cranial nerve deficit.   Skin: Skin is warm and dry. He is not diaphoretic.   Psychiatric: He has a normal mood and affect.   Nursing note and vitals reviewed.      Laboratory:  Recent Labs      01/10/19   2040   WBC  13.9*   RBC  3.80*   HEMOGLOBIN  12.5*   HEMATOCRIT  39.7*   MCV  104.5*   MCH  32.9   MCHC  31.5*   RDW  63.9*   PLATELETCT  254   MPV  10.6     Recent Labs      01/10/19   2040   SODIUM  136   POTASSIUM  5.1   CHLORIDE  108   CO2  20   GLUCOSE  335*   BUN  22   CREATININE  0.95   CALCIUM  9.1     Recent Labs      01/10/19   2040   ALTSGPT   20   ASTSGOT  19   ALKPHOSPHAT  80   TBILIRUBIN  0.2   GLUCOSE  335*     Recent Labs      01/10/19   2138   APTT  28.2   INR  0.92             No results for input(s): TROPONINI in the last 72 hours.    Urinalysis:    No results found     Imaging:  US-EXTREMITY VENOUS LOWER UNILAT RIGHT   Final Result      DX-FOOT-COMPLETE 3+ RIGHT   Final Result         1.  No acute traumatic bony injury.   2.  Bony remodeling at the dorsomedial first metatarsal head, could correspond with erosive arthropathy, consider osteomyelitis as clinically appropriate            Assessment/Plan:  I anticipate this patient will require at least two midnights for appropriate medical management, necessitating inpatient admission.    * Subacute osteomyelitis of right foot (HCC)   Assessment & Plan    Start IV antibiotics, limb preservation consultation placed.  Cultures if wound becomes purulent.      Type 2 diabetes mellitus with hyperglycemia, with long-term current use of insulin (HCC)- (present on admission)   Assessment & Plan    Continue current medication regimen, correction dose insulin therapy      Chronic pancreatitis (HCC)- (present on admission)   Assessment & Plan    On enzyme replacement therapy.  Monitor.      Essential hypertension- (present on admission)   Assessment & Plan    Controlled with current medication regimen.       Peripheral neuropathy   Assessment & Plan    Not currently medicated for the same.       Memory loss- (present on admission)   Assessment & Plan    On namenda        Dyslipidemia- (present on admission)   Assessment & Plan    Continue statin therapy          VTE prophylaxis: SCD, lovenox

## 2019-01-11 NOTE — ASSESSMENT & PLAN NOTE
Glycohemoglobin down to 10.5 on 1/7/2019 (previously 17.4 in Nov 2018).  Was maintained on 20 units of lantus every evening, metformin 1000 mg  twice daily, and BID novolog injections at home.    Increased lantus to 25 units every evening and added meal time correctional insulin. Continue SSI as required ACHS.  Continue metformin.  Continue BS ACHS.  Continue diabetic diet.    **Goal BS < 150 for optimal healing**

## 2019-01-11 NOTE — PROGRESS NOTES
Pharmacy Kinetics Addendum:    58 y.o. male on vancomycin day # 1 1/11/2019    Currently on Vancomycin Loading Dose - 1800 mg iv given 0730 1/11/19    Indication for Treatment: Osteomyelitis r/o    Recent Labs      01/10/19   2040   BUN  22   CREATININE  0.95   ALBUMIN  3.7     No results for input(s): VANCOTROUGH, VANCOPEAK, VANCORANDOM in the last 72 hours.    A/P   1. Vancomycin dose change: begin 1100mg (15mg/kg) q12hr (2000, 0800)  2. Next vancomycin level: prior to 5th dose (at steady state; 1/13 at 0700)  3. Goal trough: 12-16 mcg/mL  4. Comments: Xray of right foot notes potential for osteomyelitis of first metatarsal head.  Further imagining and wound culture obtainment may be helpful to definitively diagnose osteomyelitis.  No purulence noted, WBC elevated on admit, afebrile.  LPS consulting.  Agree with q12hr dosing, will order based upon loading dose administration time of ~0800 this AM.    Tye Parks, KarenD

## 2019-01-11 NOTE — DISCHARGE PLANNING
[facility]. Please write home care orders upon discharge to home for continuum of care.[facility] at  [phone] if there are any questions.

## 2019-01-12 PROBLEM — D72.829 LEUCOCYTOSIS: Status: ACTIVE | Noted: 2019-01-12

## 2019-01-12 LAB
ANION GAP SERPL CALC-SCNC: 7 MMOL/L (ref 0–11.9)
BASOPHILS # BLD AUTO: 1.3 % (ref 0–1.8)
BASOPHILS # BLD: 0.14 K/UL (ref 0–0.12)
BUN SERPL-MCNC: 20 MG/DL (ref 8–22)
CALCIUM SERPL-MCNC: 8.9 MG/DL (ref 8.5–10.5)
CHLORIDE SERPL-SCNC: 106 MMOL/L (ref 96–112)
CO2 SERPL-SCNC: 25 MMOL/L (ref 20–33)
CREAT SERPL-MCNC: 0.91 MG/DL (ref 0.5–1.4)
EOSINOPHIL # BLD AUTO: 0.47 K/UL (ref 0–0.51)
EOSINOPHIL NFR BLD: 4.5 % (ref 0–6.9)
ERYTHROCYTE [DISTWIDTH] IN BLOOD BY AUTOMATED COUNT: 60.6 FL (ref 35.9–50)
GLUCOSE BLD-MCNC: 106 MG/DL (ref 65–99)
GLUCOSE BLD-MCNC: 166 MG/DL (ref 65–99)
GLUCOSE BLD-MCNC: 208 MG/DL (ref 65–99)
GLUCOSE BLD-MCNC: 410 MG/DL (ref 65–99)
GLUCOSE SERPL-MCNC: 245 MG/DL (ref 65–99)
GRAM STN SPEC: NORMAL
GRAM STN SPEC: NORMAL
HCT VFR BLD AUTO: 37.7 % (ref 42–52)
HGB BLD-MCNC: 12.3 G/DL (ref 14–18)
IMM GRANULOCYTES # BLD AUTO: 0.27 K/UL (ref 0–0.11)
IMM GRANULOCYTES NFR BLD AUTO: 2.6 % (ref 0–0.9)
LYMPHOCYTES # BLD AUTO: 3.63 K/UL (ref 1–4.8)
LYMPHOCYTES NFR BLD: 34.6 % (ref 22–41)
MCH RBC QN AUTO: 33.2 PG (ref 27–33)
MCHC RBC AUTO-ENTMCNC: 32.6 G/DL (ref 33.7–35.3)
MCV RBC AUTO: 101.9 FL (ref 81.4–97.8)
MONOCYTES # BLD AUTO: 1.36 K/UL (ref 0–0.85)
MONOCYTES NFR BLD AUTO: 13 % (ref 0–13.4)
NEUTROPHILS # BLD AUTO: 4.62 K/UL (ref 1.82–7.42)
NEUTROPHILS NFR BLD: 44 % (ref 44–72)
NRBC # BLD AUTO: 0.03 K/UL
NRBC BLD-RTO: 0.3 /100 WBC
PLATELET # BLD AUTO: 256 K/UL (ref 164–446)
PMV BLD AUTO: 10.2 FL (ref 9–12.9)
POTASSIUM SERPL-SCNC: 4.8 MMOL/L (ref 3.6–5.5)
RBC # BLD AUTO: 3.7 M/UL (ref 4.7–6.1)
SIGNIFICANT IND 70042: NORMAL
SIGNIFICANT IND 70042: NORMAL
SITE SITE: NORMAL
SITE SITE: NORMAL
SODIUM SERPL-SCNC: 138 MMOL/L (ref 135–145)
SOURCE SOURCE: NORMAL
SOURCE SOURCE: NORMAL
WBC # BLD AUTO: 10.5 K/UL (ref 4.8–10.8)

## 2019-01-12 PROCEDURE — 87070 CULTURE OTHR SPECIMN AEROBIC: CPT | Mod: 91

## 2019-01-12 PROCEDURE — 700105 HCHG RX REV CODE 258: Performed by: HOSPITALIST

## 2019-01-12 PROCEDURE — 82962 GLUCOSE BLOOD TEST: CPT

## 2019-01-12 PROCEDURE — 501838 HCHG SUTURE GENERAL: Performed by: ORTHOPAEDIC SURGERY

## 2019-01-12 PROCEDURE — 87015 SPECIMEN INFECT AGNT CONCNTJ: CPT

## 2019-01-12 PROCEDURE — 700102 HCHG RX REV CODE 250 W/ 637 OVERRIDE(OP): Performed by: HOSPITALIST

## 2019-01-12 PROCEDURE — 700111 HCHG RX REV CODE 636 W/ 250 OVERRIDE (IP): Performed by: INTERNAL MEDICINE

## 2019-01-12 PROCEDURE — 87205 SMEAR GRAM STAIN: CPT

## 2019-01-12 PROCEDURE — A9270 NON-COVERED ITEM OR SERVICE: HCPCS | Performed by: HOSPITALIST

## 2019-01-12 PROCEDURE — 88305 TISSUE EXAM BY PATHOLOGIST: CPT

## 2019-01-12 PROCEDURE — 160048 HCHG OR STATISTICAL LEVEL 1-5: Performed by: ORTHOPAEDIC SURGERY

## 2019-01-12 PROCEDURE — 88311 DECALCIFY TISSUE: CPT

## 2019-01-12 PROCEDURE — 0Y6R0Z0 DETACHMENT AT RIGHT 2ND TOE, COMPLETE, OPEN APPROACH: ICD-10-PCS | Performed by: ORTHOPAEDIC SURGERY

## 2019-01-12 PROCEDURE — 700111 HCHG RX REV CODE 636 W/ 250 OVERRIDE (IP)

## 2019-01-12 PROCEDURE — 160002 HCHG RECOVERY MINUTES (STAT): Performed by: ORTHOPAEDIC SURGERY

## 2019-01-12 PROCEDURE — 99233 SBSQ HOSP IP/OBS HIGH 50: CPT | Performed by: INTERNAL MEDICINE

## 2019-01-12 PROCEDURE — 700105 HCHG RX REV CODE 258: Performed by: INTERNAL MEDICINE

## 2019-01-12 PROCEDURE — 160039 HCHG SURGERY MINUTES - EA ADDL 1 MIN LEVEL 3: Performed by: ORTHOPAEDIC SURGERY

## 2019-01-12 PROCEDURE — 87075 CULTR BACTERIA EXCEPT BLOOD: CPT | Mod: 91

## 2019-01-12 PROCEDURE — 160009 HCHG ANES TIME/MIN: Performed by: ORTHOPAEDIC SURGERY

## 2019-01-12 PROCEDURE — A9270 NON-COVERED ITEM OR SERVICE: HCPCS | Performed by: ORTHOPAEDIC SURGERY

## 2019-01-12 PROCEDURE — 700111 HCHG RX REV CODE 636 W/ 250 OVERRIDE (IP): Performed by: ANESTHESIOLOGY

## 2019-01-12 PROCEDURE — 160028 HCHG SURGERY MINUTES - 1ST 30 MINS LEVEL 3: Performed by: ORTHOPAEDIC SURGERY

## 2019-01-12 PROCEDURE — 85025 COMPLETE CBC W/AUTO DIFF WBC: CPT

## 2019-01-12 PROCEDURE — 87186 SC STD MICRODIL/AGAR DIL: CPT

## 2019-01-12 PROCEDURE — 36415 COLL VENOUS BLD VENIPUNCTURE: CPT

## 2019-01-12 PROCEDURE — 99255 IP/OBS CONSLTJ NEW/EST HI 80: CPT | Performed by: INTERNAL MEDICINE

## 2019-01-12 PROCEDURE — 770006 HCHG ROOM/CARE - MED/SURG/GYN SEMI*

## 2019-01-12 PROCEDURE — 700102 HCHG RX REV CODE 250 W/ 637 OVERRIDE(OP): Performed by: ORTHOPAEDIC SURGERY

## 2019-01-12 PROCEDURE — 700101 HCHG RX REV CODE 250

## 2019-01-12 PROCEDURE — 700111 HCHG RX REV CODE 636 W/ 250 OVERRIDE (IP): Performed by: HOSPITALIST

## 2019-01-12 PROCEDURE — 500881 HCHG PACK, EXTREMITY: Performed by: ORTHOPAEDIC SURGERY

## 2019-01-12 PROCEDURE — A6223 GAUZE >16<=48 NO W/SAL W/O B: HCPCS | Performed by: ORTHOPAEDIC SURGERY

## 2019-01-12 PROCEDURE — 160036 HCHG PACU - EA ADDL 30 MINS PHASE I: Performed by: ORTHOPAEDIC SURGERY

## 2019-01-12 PROCEDURE — 80048 BASIC METABOLIC PNL TOTAL CA: CPT

## 2019-01-12 PROCEDURE — 87077 CULTURE AEROBIC IDENTIFY: CPT | Mod: 91

## 2019-01-12 PROCEDURE — 160035 HCHG PACU - 1ST 60 MINS PHASE I: Performed by: ORTHOPAEDIC SURGERY

## 2019-01-12 RX ORDER — LORAZEPAM 2 MG/ML
0.5 INJECTION INTRAMUSCULAR
Status: DISCONTINUED | OUTPATIENT
Start: 2019-01-12 | End: 2019-01-12 | Stop reason: HOSPADM

## 2019-01-12 RX ORDER — VANCOMYCIN HYDROCHLORIDE 500 MG/10ML
INJECTION, POWDER, LYOPHILIZED, FOR SOLUTION INTRAVENOUS
Status: COMPLETED | OUTPATIENT
Start: 2019-01-12 | End: 2019-01-12

## 2019-01-12 RX ORDER — HYDROMORPHONE HYDROCHLORIDE 1 MG/ML
0.4 INJECTION, SOLUTION INTRAMUSCULAR; INTRAVENOUS; SUBCUTANEOUS
Status: DISCONTINUED | OUTPATIENT
Start: 2019-01-12 | End: 2019-01-12 | Stop reason: HOSPADM

## 2019-01-12 RX ORDER — SODIUM CHLORIDE, SODIUM LACTATE, POTASSIUM CHLORIDE, CALCIUM CHLORIDE 600; 310; 30; 20 MG/100ML; MG/100ML; MG/100ML; MG/100ML
INJECTION, SOLUTION INTRAVENOUS CONTINUOUS
Status: DISCONTINUED | OUTPATIENT
Start: 2019-01-12 | End: 2019-01-12 | Stop reason: HOSPADM

## 2019-01-12 RX ORDER — OXYCODONE HCL 5 MG/5 ML
10 SOLUTION, ORAL ORAL
Status: DISCONTINUED | OUTPATIENT
Start: 2019-01-12 | End: 2019-01-12 | Stop reason: HOSPADM

## 2019-01-12 RX ORDER — HYDROMORPHONE HYDROCHLORIDE 1 MG/ML
0.2 INJECTION, SOLUTION INTRAMUSCULAR; INTRAVENOUS; SUBCUTANEOUS
Status: DISCONTINUED | OUTPATIENT
Start: 2019-01-12 | End: 2019-01-12 | Stop reason: HOSPADM

## 2019-01-12 RX ORDER — HYDROMORPHONE HYDROCHLORIDE 1 MG/ML
0.1 INJECTION, SOLUTION INTRAMUSCULAR; INTRAVENOUS; SUBCUTANEOUS
Status: DISCONTINUED | OUTPATIENT
Start: 2019-01-12 | End: 2019-01-12 | Stop reason: HOSPADM

## 2019-01-12 RX ORDER — HALOPERIDOL 5 MG/ML
1 INJECTION INTRAMUSCULAR
Status: DISCONTINUED | OUTPATIENT
Start: 2019-01-12 | End: 2019-01-12 | Stop reason: HOSPADM

## 2019-01-12 RX ORDER — HYDROCODONE BITARTRATE AND ACETAMINOPHEN 10; 325 MG/1; MG/1
1 TABLET ORAL EVERY 4 HOURS PRN
Status: DISCONTINUED | OUTPATIENT
Start: 2019-01-12 | End: 2019-01-18 | Stop reason: HOSPADM

## 2019-01-12 RX ORDER — OXYCODONE HCL 5 MG/5 ML
5 SOLUTION, ORAL ORAL
Status: DISCONTINUED | OUTPATIENT
Start: 2019-01-12 | End: 2019-01-12 | Stop reason: HOSPADM

## 2019-01-12 RX ORDER — MEPERIDINE HYDROCHLORIDE 25 MG/ML
12.5 INJECTION INTRAMUSCULAR; INTRAVENOUS; SUBCUTANEOUS
Status: DISCONTINUED | OUTPATIENT
Start: 2019-01-12 | End: 2019-01-12 | Stop reason: HOSPADM

## 2019-01-12 RX ORDER — DIPHENHYDRAMINE HYDROCHLORIDE 50 MG/ML
12.5 INJECTION INTRAMUSCULAR; INTRAVENOUS
Status: DISCONTINUED | OUTPATIENT
Start: 2019-01-12 | End: 2019-01-12 | Stop reason: HOSPADM

## 2019-01-12 RX ORDER — HYDROCODONE BITARTRATE AND ACETAMINOPHEN 5; 325 MG/1; MG/1
1-2 TABLET ORAL EVERY 6 HOURS PRN
Status: DISCONTINUED | OUTPATIENT
Start: 2019-01-12 | End: 2019-01-12

## 2019-01-12 RX ORDER — MAGNESIUM HYDROXIDE 1200 MG/15ML
LIQUID ORAL
Status: COMPLETED | OUTPATIENT
Start: 2019-01-12 | End: 2019-01-12

## 2019-01-12 RX ORDER — ONDANSETRON 2 MG/ML
4 INJECTION INTRAMUSCULAR; INTRAVENOUS
Status: DISCONTINUED | OUTPATIENT
Start: 2019-01-12 | End: 2019-01-12 | Stop reason: HOSPADM

## 2019-01-12 RX ORDER — HYDRALAZINE HYDROCHLORIDE 20 MG/ML
5 INJECTION INTRAMUSCULAR; INTRAVENOUS
Status: DISCONTINUED | OUTPATIENT
Start: 2019-01-12 | End: 2019-01-12 | Stop reason: HOSPADM

## 2019-01-12 RX ADMIN — BUPROPION HYDROCHLORIDE 150 MG: 150 TABLET, EXTENDED RELEASE ORAL at 05:42

## 2019-01-12 RX ADMIN — PANCRELIPASE 72000 UNITS: 24000; 76000; 120000 CAPSULE, DELAYED RELEASE PELLETS ORAL at 17:37

## 2019-01-12 RX ADMIN — OMEPRAZOLE 20 MG: 20 CAPSULE, DELAYED RELEASE ORAL at 05:42

## 2019-01-12 RX ADMIN — SODIUM CHLORIDE 3 G: 900 INJECTION INTRAVENOUS at 17:39

## 2019-01-12 RX ADMIN — INSULIN HUMAN 6 UNITS: 100 INJECTION, SOLUTION PARENTERAL at 20:55

## 2019-01-12 RX ADMIN — HYDROCODONE BITARTRATE AND ACETAMINOPHEN 1 TABLET: 10; 325 TABLET ORAL at 20:46

## 2019-01-12 RX ADMIN — HYDROCODONE BITARTRATE AND ACETAMINOPHEN 1 TABLET: 5; 325 TABLET ORAL at 18:37

## 2019-01-12 RX ADMIN — ASPIRIN 81 MG 81 MG: 81 TABLET ORAL at 05:43

## 2019-01-12 RX ADMIN — SODIUM CHLORIDE 3 G: 900 INJECTION INTRAVENOUS at 00:14

## 2019-01-12 RX ADMIN — NICOTINE 14 MG: 14 PATCH, EXTENDED RELEASE TRANSDERMAL at 05:44

## 2019-01-12 RX ADMIN — SODIUM CHLORIDE 3 G: 900 INJECTION INTRAVENOUS at 23:50

## 2019-01-12 RX ADMIN — ENOXAPARIN SODIUM 40 MG: 100 INJECTION SUBCUTANEOUS at 05:43

## 2019-01-12 RX ADMIN — SODIUM CHLORIDE 3 G: 900 INJECTION INTRAVENOUS at 13:00

## 2019-01-12 RX ADMIN — STANDARDIZED SENNA CONCENTRATE AND DOCUSATE SODIUM 2 TABLET: 8.6; 5 TABLET, FILM COATED ORAL at 17:31

## 2019-01-12 RX ADMIN — INSULIN GLARGINE 20 UNITS: 100 INJECTION, SOLUTION SUBCUTANEOUS at 17:40

## 2019-01-12 RX ADMIN — BUPROPION HYDROCHLORIDE 150 MG: 150 TABLET, EXTENDED RELEASE ORAL at 17:31

## 2019-01-12 RX ADMIN — VANCOMYCIN HYDROCHLORIDE 1100 MG: 100 INJECTION, POWDER, LYOPHILIZED, FOR SOLUTION INTRAVENOUS at 09:18

## 2019-01-12 RX ADMIN — MEMANTINE HYDROCHLORIDE 5 MG: 10 TABLET ORAL at 05:43

## 2019-01-12 RX ADMIN — LISINOPRIL 10 MG: 10 TABLET ORAL at 05:45

## 2019-01-12 RX ADMIN — HYDROCODONE BITARTRATE AND ACETAMINOPHEN 1 TABLET: 5; 325 TABLET ORAL at 17:29

## 2019-01-12 RX ADMIN — ATORVASTATIN CALCIUM 10 MG: 10 TABLET, FILM COATED ORAL at 17:31

## 2019-01-12 RX ADMIN — MEMANTINE HYDROCHLORIDE 5 MG: 10 TABLET ORAL at 17:30

## 2019-01-12 RX ADMIN — SODIUM CHLORIDE: 9 INJECTION, SOLUTION INTRAVENOUS at 03:43

## 2019-01-12 RX ADMIN — SODIUM CHLORIDE 3 G: 900 INJECTION INTRAVENOUS at 05:44

## 2019-01-12 RX ADMIN — SODIUM CHLORIDE: 9 INJECTION, SOLUTION INTRAVENOUS at 17:38

## 2019-01-12 RX ADMIN — VANCOMYCIN HYDROCHLORIDE 1100 MG: 100 INJECTION, POWDER, LYOPHILIZED, FOR SOLUTION INTRAVENOUS at 19:45

## 2019-01-12 ASSESSMENT — ENCOUNTER SYMPTOMS
VOMITING: 0
PALPITATIONS: 0
EYE DISCHARGE: 0
DIARRHEA: 0
SEIZURES: 0
DEPRESSION: 0
NERVOUS/ANXIOUS: 0
HEADACHES: 0
MYALGIAS: 0
COUGH: 0
SHORTNESS OF BREATH: 0
EYE REDNESS: 0
STRIDOR: 0
BLURRED VISION: 0
WEIGHT LOSS: 0
NECK PAIN: 0
NAUSEA: 0
ABDOMINAL PAIN: 0
EYE PAIN: 0
DIZZINESS: 0
ORTHOPNEA: 0
BACK PAIN: 0
FEVER: 0
CHILLS: 0
INSOMNIA: 0
SPUTUM PRODUCTION: 0
HEARTBURN: 0
FOCAL WEAKNESS: 0

## 2019-01-12 ASSESSMENT — PAIN SCALES - GENERAL
PAINLEVEL_OUTOF10: 5
PAINLEVEL_OUTOF10: 8
PAINLEVEL_OUTOF10: 2
PAINLEVEL_OUTOF10: 0
PAINLEVEL_OUTOF10: 8
PAINLEVEL_OUTOF10: 2
PAINLEVEL_OUTOF10: 4
PAINLEVEL_OUTOF10: 0
PAINLEVEL_OUTOF10: 2
PAINLEVEL_OUTOF10: 0

## 2019-01-12 NOTE — PROGRESS NOTES
"Pharmacy Kinetics 58 y.o. male on vancomycin day # 2 2019    Currently on Vancomycin 1100 mg iv q12hr (, 0800)    Indication for Treatment: Osteomyelitis    Pertinent history per medical record: Admitted on 1/10/2019 for osteomyelitis. Wound to his right foot onset three weeks ago with worsening severity in the past few days with swelling onset starting the morning of 1/10/19. History of diabetes. Plain film of the foot mentioned bony remodeling which could correspond with erosive arthropathy or osteomyelitis along the first metatarsal head but no additional abnormalities of the second toe were noted on imaging.  ID and ortho surgery consulted.  Plan for right 2nd toe amputation 1 AM.    Other antibiotics: Unasyn 3g q6hr    Allergies: Patient has no known allergies.     List concerns for renal function : DM, age    Pertinent cultures to date:    - Blood cx - NGTD    MRSA nares swab if pneumonia is a concern (ordered/positive/negative/n-a): N/A    Recent Labs      01/10/19   2040  01/12/19   0408   WBC  13.9*  10.5   NEUTSPOLYS  54.00  44.00   BANDSSTABS  0.90   --      Recent Labs      01/10/19   2040  01/12/19   0408   BUN  22  20   CREATININE  0.95  0.91   ALBUMIN  3.7   --      No results for input(s): VANCOTROUGH, VANCOPEAK, VANCORANDOM in the last 72 hours.  Intake/Output Summary (Last 24 hours) at 19 1123  Last data filed at 19   Gross per 24 hour   Intake              800 ml   Output                0 ml   Net              800 ml      Blood pressure 136/76, pulse 78, temperature 36.6 °C (97.9 °F), temperature source Temporal, resp. rate 20, height 1.905 m (6' 3\"), weight 70.1 kg (154 lb 8.7 oz), SpO2 93 %. Temp (24hrs), Av.6 °C (97.9 °F), Min:36.6 °C (97.8 °F), Max:36.6 °C (97.9 °F)      A/P   1. Vancomycin dose change: none  1. Next vancomycin level: prior to 5th dose (at steady state;  at 0700)  2. Goal trough: 12-16 mcg/mL  2. Comments: ID and ortho surgery " consulted.  Plan for right 2nd toe amputation due to ulcer, erythema, swelling, pain and purulent drainage.  Mild concern for vancomycin clearance due to uncontrolled DM, will assess with trough in AM at steady state.  Duration of antibiotics to be determined based upon operative findings.    Tye Parks, PharmD, BCPS

## 2019-01-12 NOTE — DIETARY
Nutrition Services: Day 1 of admit.  57 yo male admitted with osteomyelitis.    Consult for wound noted on admitting screen.  Also Diabetes meal planning education.     Evaluation:   1. Pt with diabetic foot ulcer - not pressure ulcer  2. A1C 10.5 with average daily glucose 255  3. Pt would benefit from improved glucose control to aid wound healing  4. Consult for DM diet instruction - pt with another discipline when stopped by.     Recommendations/Plan: Will f/u for diet education prior to discharge.

## 2019-01-12 NOTE — PROGRESS NOTES
POST-OP NOTE FOR LIMB PRESERVATION SERVICE    SURGERY DATE: 1/12/2019    PROCEDURE: Procedure(s):  TOE AMPUTATION - Wound Class: Dirty or Infected    WEIGHT BEARING STATUS: Weight bearing as tolerated    PT CONSULT: Yes    ANTIBIOTICS: Per ID recommendation    PLAN TO RETURN TO O.R.: No    WOUND CARE PLAN: Incisional dressing - Change POD #2 (Directions: Adaptic over incision, Gauze, Roll Gauze, Ace Wrap)    FOLLOW-UP: Follow up with  Isidro Truong M.D.  in 2 weeks    DURABLE MEDICAL EQUIPMENT: Offloading shoe    OTHER: none      Isidro Truong M.D.

## 2019-01-12 NOTE — PROGRESS NOTES
Accidentally given ASA and Lovenox this morning despite LPS order of NPO. Will pass on to oncoming shift.

## 2019-01-12 NOTE — PROGRESS NOTES
Hospital Medicine Daily Progress Note    Date of Service  1/12/2019    Chief Complaint  58 y.o. male admitted 1/10/2019 with right 2nd toe ulcer    Hospital Course    57 y/o M with PMh of DM came in with above.      Interval Problem Update  No acute issue overnight. Plan to have surgery today.    Consultants/Specialty  Dr. Truong    Code Status  full    Disposition  Remain on the floor    Review of Systems  Review of Systems   Constitutional: Negative for chills, fever and weight loss.   HENT: Negative for congestion and nosebleeds.    Eyes: Negative for blurred vision, pain, discharge and redness.   Respiratory: Negative for cough, sputum production, shortness of breath and stridor.    Cardiovascular: Negative for chest pain, palpitations and orthopnea.   Gastrointestinal: Negative for abdominal pain, diarrhea, heartburn, nausea and vomiting.   Genitourinary: Negative for dysuria, frequency and urgency.   Musculoskeletal: Negative for back pain, myalgias and neck pain.   Skin: Negative for itching and rash.   Neurological: Negative for dizziness, focal weakness, seizures and headaches.   Psychiatric/Behavioral: Negative for depression. The patient is not nervous/anxious and does not have insomnia.         Physical Exam  Temp:  [36.6 °C (97.8 °F)-36.6 °C (97.9 °F)] 36.6 °C (97.9 °F)  Pulse:  [] 108  Resp:  [16-20] 16  BP: (118-153)/(61-90) 118/61    Physical Exam   Constitutional: He is oriented to person, place, and time. No distress.   HENT:   Head: Normocephalic and atraumatic.   Mouth/Throat: Oropharynx is clear and moist.   Eyes: Pupils are equal, round, and reactive to light. Conjunctivae and EOM are normal.   Neck: Normal range of motion. Neck supple. No tracheal deviation present. No thyromegaly present.   Cardiovascular: Normal rate and regular rhythm.    No murmur heard.  Pulmonary/Chest: Effort normal and breath sounds normal. No respiratory distress. He has no wheezes.   Abdominal: Soft. Bowel  sounds are normal. He exhibits no distension. There is no tenderness.   Musculoskeletal: He exhibits tenderness. He exhibits no edema.   Right 2nd toe wound   Neurological: He is alert and oriented to person, place, and time. No cranial nerve deficit.   Skin: Skin is warm and dry. He is not diaphoretic. No erythema.   Psychiatric: He has a normal mood and affect. His behavior is normal. Thought content normal.       Fluids    Intake/Output Summary (Last 24 hours) at 01/12/19 0814  Last data filed at 01/11/19 2000   Gross per 24 hour   Intake              800 ml   Output                0 ml   Net              800 ml       Laboratory  Recent Labs      01/10/19   2040  01/12/19   0408   WBC  13.9*  10.5   RBC  3.80*  3.70*   HEMOGLOBIN  12.5*  12.3*   HEMATOCRIT  39.7*  37.7*   MCV  104.5*  101.9*   MCH  32.9  33.2*   MCHC  31.5*  32.6*   RDW  63.9*  60.6*   PLATELETCT  254  256   MPV  10.6  10.2     Recent Labs      01/10/19   2040  01/12/19   0408   SODIUM  136  138   POTASSIUM  5.1  4.8   CHLORIDE  108  106   CO2  20  25   GLUCOSE  335*  245*   BUN  22  20   CREATININE  0.95  0.91   CALCIUM  9.1  8.9     Recent Labs      01/10/19   2138   APTT  28.2   INR  0.92               Imaging  US-EXTREMITY VENOUS LOWER UNILAT RIGHT   Final Result      DX-FOOT-COMPLETE 3+ RIGHT   Final Result         1.  No acute traumatic bony injury.   2.  Bony remodeling at the dorsomedial first metatarsal head, could correspond with erosive arthropathy, consider osteomyelitis as clinically appropriate           Assessment/Plan  * Subacute osteomyelitis of right foot (HCC)   Assessment & Plan    NPO  Surgery today  On IV vanco and unasyn  Follow culture     Type 2 diabetes mellitus with hyperglycemia, with long-term current use of insulin (HCC)- (present on admission)   Assessment & Plan    Continue current medication regimen, correction dose insulin therapy      Chronic pancreatitis (HCC)- (present on admission)   Assessment & Plan    On  enzyme replacement therapy.  Monitor.      Essential hypertension- (present on admission)   Assessment & Plan    Controlled with current medication regimen.       Leucocytosis   Assessment & Plan    Related to above  Follow cbc in am     Peripheral neuropathy   Assessment & Plan    Not currently medicated for the same.       Memory loss- (present on admission)   Assessment & Plan    On namenda        Dyslipidemia- (present on admission)   Assessment & Plan    Continue statin therapy           VTE prophylaxis: lovenox

## 2019-01-12 NOTE — CONSULTS
INFECTIOUS DISEASES INPATIENT CONSULT NOTE     Date of Service: 1/12/2019    Consult Requested By: Marcelo Suero M.D.    Reason for Consultation: Right diabetic foot ulcer    History of Present Illness:   Pawel Tatum is a 58 y.o. male with history of type 2 diabetes mellitus complicated by peripheral diabetic neuropathy, hypertension and history of ESBL urinary tract infection in August 2018 admitted 1/10/2019 for worsening right second toe infection.  Patient lives in an assisted living and was in his usual state of health until approximately 1 week prior to admission when he noticed increasing swelling and erythema of his right second toe.  He also had intermittent bleeding at the site.  He does not recall any prior injury or trauma however he does have peripheral neuropathy and admits to wearing poorly fitting shoes and not diabetic shoes.  He denies any associated fevers or chills.  No associated nausea, vomiting, diarrhea or dysuria.  No abdominal pain.  Due to increasing erythema, and swelling he presented to the ED for further evaluation and management.  On presentation, he was afebrile with a leukocytosis of 13.9.  X-ray of the right foot revealed bony remodeling at the dorsal medial first metatarsal head concerning for erosive arthropathy or osteomyelitis.  Patient was started on broad-spectrum antibiotics.  He was evaluated by orthopedic surgery with plans for right second toe amputation and irrigation and debridement today.  Infectious disease service consulted for further antibiotic recommendations and management.    Review Of Systems:  All other ROS were reviewed and are negative except as noted above in the HPI.    PMH:   Past Medical History:   Diagnosis Date   • Diabetes (HCC)    • Hypertension    History of ESBL urinary tract infection in August 2018  Peripheral neuropathy    PSH:  Past Surgical History:   Procedure Laterality Date   • GASTROSCOPY-ENDO N/A 8/25/2018    Procedure:  GASTROSCOPY-ENDO;  Surgeon: Jaswant Frye M.D.;  Location: ENDOSCOPY [facility];  Service: Gastroenterology   • OTHER ABDOMINAL SURGERY         FAMILY HX:  Family History   Problem Relation Age of Onset   • No Known Problems Mother    • No Known Problems Father        SOCIAL HX:  Social History     Social History   • Marital status: Single     Spouse name: N/A   • Number of children: N/A   • Years of education: N/A     Occupational History   • Not on file.     Social History Main Topics   • Smoking status: Current Every Day Smoker     Packs/day: 0.25     Years: 15.00   • Smokeless tobacco: Never Used      Comment: Trying to quit   • Alcohol use No   • Drug use: No   • Sexual activity: Not Currently     Other Topics Concern   • Not on file     Social History Narrative   • No narrative on file     History   Smoking Status   • Current Every Day Smoker   • Packs/day: 0.25   • Years: 15.00   Smokeless Tobacco   • Never Used     Comment: Trying to quit     History   Alcohol Use No       Allergies/Intolerances:  No Known Allergies    History reviewed with the patient     Other Current Medications:    Current Facility-Administered Medications:   •  aspirin (ASA) chewable tab 81 mg, 81 mg, Oral, DAILY, Wilian Salamanca M.D., 81 mg at 01/12/19 0543  •  atorvastatin (LIPITOR) tablet 10 mg, 10 mg, Oral, Q EVENING, Wilian Salamanca M.D., 10 mg at 01/11/19 1736  •  buPROPion SR (WELLBUTRIN-SR) tablet 150 mg, 150 mg, Oral, BID, Wilian Salamanca M.D., 150 mg at 01/12/19 0542  •  insulin glargine (LANTUS) injection 20 Units, 20 Units, Subcutaneous, Q EVENING, Wilian Salamanca M.D., 20 Units at 01/11/19 1806  •  lisinopril (PRINIVIL) 10 MG tablet 10 mg, 10 mg, Oral, DAILY, Wilian Salamanca M.D., 10 mg at 01/12/19 0545  •  memantine (NAMENDA) tablet 5 mg, 5 mg, Oral, BID, Wilian Salamanca M.D., 5 mg at 01/12/19 0543  •  omeprazole (PRILOSEC) capsule 20 mg, 20 mg, Oral, QAM, Wilian A. [place] Verne, M.D., 20 mg at 01/12/19 0542  •   tamsulosin (FLOMAX) capsule 0.4 mg, 0.4 mg, Oral, AFTER BREAKFAST, Wilian Salamanca M.D., Stopped at 01/12/19 0830  •  NS infusion, , Intravenous, Continuous, Wilian Salamanca M.D., Last Rate: 100 mL/hr at 01/12/19 0343  •  enoxaparin (LOVENOX) inj 40 mg, 40 mg, Subcutaneous, DAILY, Wilian Salamanca M.D., 40 mg at 01/12/19 0543  •  cloNIDine (CATAPRES) tablet 0.1 mg, 0.1 mg, Oral, Q6HRS PRN, Wilian Salamanca M.D.  •  ondansetron (ZOFRAN) syringe/vial injection 4 mg, 4 mg, Intravenous, Q4HRS PRN, Wilian Salamanca M.D.  •  ondansetron (ZOFRAN ODT) dispertab 4 mg, 4 mg, Oral, Q4HRS PRN, Wilian Salamanca M.D.  •  promethazine (PHENERGAN) tablet 12.5-25 mg, 12.5-25 mg, Oral, Q4HRS PRN, Wilian Salamanca M.D.  •  promethazine (PHENERGAN) suppository 12.5-25 mg, 12.5-25 mg, Rectal, Q4HRS PRN, Wilian Salamanca M.D.  •  prochlorperazine (COMPAZINE) injection 5-10 mg, 5-10 mg, Intravenous, Q4HRS PRN, Wilian Salamanca M.D.  •  senna-docusate (PERICOLACE or SENOKOT S) 8.6-50 MG per tablet 2 Tab, 2 Tab, Oral, BID, 2 Tab at 01/11/19 0542 **AND** polyethylene glycol/lytes (MIRALAX) PACKET 1 Packet, 1 Packet, Oral, QDAY PRN **AND** magnesium hydroxide (MILK OF MAGNESIA) suspension 30 mL, 30 mL, Oral, QDAY PRN **AND** bisacodyl (DULCOLAX) suppository 10 mg, 10 mg, Rectal, QDAY PRN, Wilian Salamanca M.D.  •  acetaminophen (TYLENOL) tablet 650 mg, 650 mg, Oral, Q6HRS PRN, Wilian Salamanca M.D.  •  nicotine (NICODERM) 14 MG/24HR 14 mg, 14 mg, Transdermal, Daily-0600, 14 mg at 01/12/19 0544 **AND** Nicotine Replacement Patient Education Materials, , , Once **AND** nicotine polacrilex (NICORETTE) 2 MG piece 2 mg, 2 mg, Oral, Q HOUR PRN, Wilian Salamanca M.D.  •  insulin regular (HUMULIN R) injection 1-6 Units, 1-6 Units, Subcutaneous, 4X/DAY ACHS, Stopped at 01/12/19 0700 **AND** Accu-Chek ACHS, , , Q AC AND BEDTIME(S) **AND** NOTIFY MD and PharmD, , , Once **AND** glucose 4 g chewable tablet 16 g, 16 g, Oral, Q15 MIN PRN **AND** dextrose 50% (D50W)  "injection 25 mL, 25 mL, Intravenous, Q15 MIN PRN, Wilian Salamanca M.D.  •  MD Alert...Vancomycin per Pharmacy, , Other, pharmacy to dose, Wilian Salamanca M.D.  •  pancrelipase (Lip-Prot-Amyl) (CREON 91338) 30619-25387 units capsule 72,000 Units, 3 Cap, Oral, TID WITH MEALS, Wilian Salamanca M.D., Stopped at 19 0730  •  ampicillin/sulbactam (UNASYN) 3 g in  mL IVPB, 3 g, Intravenous, Q6HRS, Wilian Salamanca M.D., Stopped at 19 0614  •  vancomycin 1,100 mg in  mL IVPB, 15 mg/kg, Intravenous, Q12HR, Marcelo Suero M.D., Last Rate: 125 mL/hr at 19, 1,100 mg at 19 09  [unfilled]    Most Recent Vital Signs:  /76   Pulse 78   Temp 36.6 °C (97.9 °F) (Temporal)   Resp 20   Ht 1.905 m (6' 3\")   Wt 70.1 kg (154 lb 8.7 oz)   SpO2 93%   BMI 19.32 kg/m²   Temp  Av.7 °C (98.1 °F)  Min: 36.6 °C (97.8 °F)  Max: 37.3 °C (99.2 °F)    Physical Exam:  General: Older than stated age, well-appearing, no acute distress, nontoxic  HEENT: sclera anicteric, PERRL, EOMI, MMM, no oral lesions  Neck: supple, no lymphadenopathy  Chest: CTAB, no r/r/w, normal work of breathing.  Cardiac: RRR, normal S1 S2, no m/r/g   Abdomen: + bowel sounds, soft, non-tender, non-distended, no HSM  Extremities: Right second toe ulceration on dorsal aspect with surrounding edema and erythema.  The right second toe is slightly deformed in hammertoe configuration.  There is also a right lateral foot ulcer grade 1 without any drainage or surrounding erythema  Skin: See above  Neuro: Alert and oriented times 3, non-focal exam, speech fluent, moves all extremities    Pertinent Lab Results:  Recent Labs      01/10/19   2040  01/12/19   0408   WBC  13.9*  10.5      Recent Labs      01/10/19   2040  01/12/19   0408   HEMOGLOBIN  12.5*  12.3*   HEMATOCRIT  39.7*  37.7*   MCV  104.5*  101.9*   MCH  32.9  33.2*   MACROCYTOSIS  2+   --    ANISOCYTOSIS  2+   --    PLATELETCT  254  256         Recent Labs      " "01/10/19   2040  01/12/19   0408   SODIUM  136  138   POTASSIUM  5.1  4.8   CHLORIDE  108  106   CO2  20  25   CREATININE  0.95  0.91        Recent Labs      01/10/19   2040   ALBUMIN  3.7        Pertinent Micro:  Results     Procedure Component Value Units Date/Time    BLOOD CULTURE [[id number]] Collected:  01/11/19 0642    Order Status:  Completed Specimen:  Blood from Peripheral Updated:  01/12/19 0748     Significant Indicator NEG     Source BLD     Site PERIPHERAL     Blood Culture No Growth    Note: Blood cultures are incubated for 5 days and  are monitored continuously.Positive blood cultures  are called to the RN and reported as soon as  they are identified.      Narrative:       Per Hospital Policy: Only change Specimen Src: to \"Line\" if  specified by physician order.    BLOOD CULTURE (Child) [[id number]] Collected:  01/10/19 2046    Order Status:  Completed Specimen:  Blood from Peripheral Updated:  01/12/19 0748     Significant Indicator NEG     Source BLD     Site PERIPHERAL     Blood Culture No Growth    Note: Blood cultures are incubated for 5 days and  are monitored continuously.Positive blood cultures  are called to the RN and reported as soon as  they are identified.      Narrative:       Per Hospital Policy: Only change Specimen Src: to \"Line\" if  specified by physician order.    BLOOD CULTURE [[id number]] Collected:  01/11/19 0300    Order Status:  Completed Specimen:  Blood from Peripheral Updated:  01/12/19 0748     Significant Indicator NEG     Source BLD     Site PERIPHERAL     Blood Culture No Growth    Note: Blood cultures are incubated for 5 days and  are monitored continuously.Positive blood cultures  are called to the RN and reported as soon as  they are identified.      Narrative:       Per Hospital Policy: Only change Specimen Src: to \"Line\" if  specified by physician order.    BLOOD CULTURE [[id number]]     Order Status:  Canceled Specimen:  Blood from Peripheral         Blood Culture "   Date Value Ref Range Status   2019   Preliminary    No Growth    Note: Blood cultures are incubated for 5 days and  are monitored continuously.Positive blood cultures  are called to the RN and reported as soon as  they are identified.          Studies:  Dx-foot-complete 3+ Right    Result Date: 1/10/2019  1/10/2019 8:14 PM HISTORY/REASON FOR EXAM: Atraumatic Pain/Swelling/Deformity TECHNIQUE/EXAM DESCRIPTION:  AP, lateral, and oblique views of the RIGHT foot. COMPARISON:  None FINDINGS: The bony structures and articulations appear within normal limits without visualized fracture, subluxation, or dislocation. Bony remodeling of the dorsal medial first metatarsal head is seen.     1.  No acute traumatic bony injury. 2.  Bony remodeling at the dorsomedial first metatarsal head, could correspond with erosive arthropathy, consider osteomyelitis as clinically appropriate    Us-extremity Venous Lower Unilat Right    Result Date: 2019   Vascular Laboratory  CONCLUSIONS  Normal right lower extremity superficial and deep venous examination.  SHAUN CORCORAN  Exam Date:     01/10/2019 21:21  Room #:     Inpatient  Priority:     Stat  Ht (in):             Wt (lb):  Ordering Physician:        CHAVO MARC  Referring Physician:       [id number], BLISS  Sonographer:               Nila Urbina RVT  Study Type:                Complete Unilateral  Technical Quality:         Adequate  Age:    58    Gender:     M  MRN:    [id number]  :    1960      BSA:  Indications:     Localized swelling, mass and lump, right lower limb  CPT Codes:       20621  ICD Codes:         History:         Swelling of right calf and foot. No prior duplex.  Limitations:  PROCEDURES:  Right lower extremity venous duplex imaging.  The following venous structures were evaluated: common femoral, profunda  femoral, proximal portion of the greater saphenous, femoral, popliteal,  peroneal and posterior tibial veins.  Serial compression, augmentation  maneuvers, color and spectral Doppler flow  evaluations were performed.  FINDINGS:  Right lower extremity -  No evidence of deep venous thrombosis.  Complete color filling and compressibility with normal venous flow dynamics  including spontaneous flow, response to augmentation maneuvers, and  respiratory phasicity.  Flow was evaluated in the contralateral common femoral vein and normal  venous flow dynamics including spontaneous flow, respiratory phasic  variation and augmentation were demonstrated.  Natalia Bustos MD  (Electronically Signed)  Final Date:      11 January 2019                   01:03      IMPRESSION:   1.  Right diabetic foot ulcer, grade 3 concerning for osteomyelitis  2.  Cellulitis of the right foot  3.  Poorly controlled type 2 diabetes mellitus  4.  Diabetic peripheral neuropathy      PLAN:   Pawel Tatum is a 58 y.o. man with a history of poorly controlled type 2 diabetes mellitus complicated by peripheral neuropathy and diabetic foot ulcers of the right foot admitted for worsening right second toe ulceration associated with erythema and edema.  His right second toe has a full-thickness ulceration in the appearance is highly concerning for osteomyelitis.  He has been evaluated by the limb preservation in addition to the orthopedic surgeon with plans for right second toe amputation today.  Blood cultures are currently negative.  Agree with continuing vancomycin and Unasyn for now.  Monitor renal function and Vanco trough.  Duration of antibiotics will depend on extent of surgery, operative findings, culture results and pathology.  He will need better blood sugar control to help control infection and promote wound healing.  Further recommendations per clinical course and operative findings.      Plan of care discussed with TAMARA Suero M.D. and Prudencio Dahl RN from limb preservation service. Will continue to follow    Fidelia Tabor M.D.

## 2019-01-12 NOTE — CARE PLAN
Problem: Communication  Goal: The ability to communicate needs accurately and effectively will improve  Outcome: PROGRESSING AS EXPECTED  Discussed POC with patient. Patient understands his NPO status starting midnight. Updated white board. Call light within reach.    Problem: Venous Thromboembolism (VTW)/Deep Vein Thrombosis (DVT) Prevention:  Goal: Patient will participate in Venous Thrombosis (VTE)/Deep Vein Thrombosis (DVT)Prevention Measures  Outcome: PROGRESSING AS EXPECTED  Patient is on ASA, Lovenox and SCDs on when in bed. Also ambulates occassionally in room.

## 2019-01-12 NOTE — H&P
Surgery Orthopedic History & Physical Note    Date  1/12/2019    Primary Care Physician  PAULINE Armando.    CC  Right 2nd toe diabetic ulcer    HPI  This is a 58 y.o. male who presented with poorly controlled diabetes mellitus and a right 2nd toe full thickness ulcer with surrounding erythema, swelling, pain, and purulent drainage.  He states that the ulcer developed over a matter of days.  He denies a history of trauma.  He denies chest pain, SOB, fevers, chills, nausea, headache, and dizziness.    Past Medical History:   Diagnosis Date   • Diabetes (HCC)    • Hypertension        Past Surgical History:   Procedure Laterality Date   • GASTROSCOPY-ENDO N/A 8/25/2018    Procedure: GASTROSCOPY-ENDO;  Surgeon: Jaswant Frye M.D.;  Location: ENDOSCOPY [facility];  Service: Gastroenterology   • OTHER ABDOMINAL SURGERY         Current Facility-Administered Medications   Medication Dose Route Frequency Provider Last Rate Last Dose   • aspirin (ASA) chewable tab 81 mg  81 mg Oral DAILY Wilian Salamanca M.D.   81 mg at 01/12/19 0543   • atorvastatin (LIPITOR) tablet 10 mg  10 mg Oral Q EVENING Wilian Salamanca M.D.   10 mg at 01/11/19 1736   • buPROPion SR (WELLBUTRIN-SR) tablet 150 mg  150 mg Oral BID Wilian Salamanca M.D.   150 mg at 01/12/19 0542   • insulin glargine (LANTUS) injection 20 Units  20 Units Subcutaneous Q EVENING Wilian Salamanca M.D.   20 Units at 01/11/19 1806   • lisinopril (PRINIVIL) 10 MG tablet 10 mg  10 mg Oral DAILY Wilian Salamanca M.D.   10 mg at 01/12/19 0545   • memantine (NAMENDA) tablet 5 mg  5 mg Oral BID Wilian Salamanca M.D.   5 mg at 01/12/19 0543   • omeprazole (PRILOSEC) capsule 20 mg  20 mg Oral QAM Wilian Salamanca M.D.   20 mg at 01/12/19 0542   • tamsulosin (FLOMAX) capsule 0.4 mg  0.4 mg Oral AFTER BREAKFAST Wilian Salamanca M.D.   Stopped at 01/12/19 4251   • NS infusion   Intravenous Continuous Wilian Salamanca M.D. 100 mL/hr at 01/12/19 7833     • enoxaparin (LOVENOX) inj  40 mg  40 mg Subcutaneous DAILY Wilian Salamanca M.D.   40 mg at 01/12/19 0543   • cloNIDine (CATAPRES) tablet 0.1 mg  0.1 mg Oral Q6HRS PRN Wilian Salamanca M.D.       • ondansetron (ZOFRAN) syringe/vial injection 4 mg  4 mg Intravenous Q4HRS PRN Wilian Salamanac M.D.       • ondansetron (ZOFRAN ODT) dispertab 4 mg  4 mg Oral Q4HRS PRN Wilian Salamanca M.D.       • promethazine (PHENERGAN) tablet 12.5-25 mg  12.5-25 mg Oral Q4HRS PRN Wilian Salamanca M.D.       • promethazine (PHENERGAN) suppository 12.5-25 mg  12.5-25 mg Rectal Q4HRS PRN Wilian Salamanca M.D.       • prochlorperazine (COMPAZINE) injection 5-10 mg  5-10 mg Intravenous Q4HRS PRN Wilian Salamanca M.D.       • senna-docusate (PERICOLACE or SENOKOT S) 8.6-50 MG per tablet 2 Tab  2 Tab Oral BID Wilian Salamanca M.D.   2 Tab at 01/11/19 0542    And   • polyethylene glycol/lytes (MIRALAX) PACKET 1 Packet  1 Packet Oral QDAY PRN Wilian Salamanca M.D.        And   • magnesium hydroxide (MILK OF MAGNESIA) suspension 30 mL  30 mL Oral QDAY PRN Wilian Salamanca M.D.        And   • bisacodyl (DULCOLAX) suppository 10 mg  10 mg Rectal QDAY PRN Wilian Salamanca M.D.       • acetaminophen (TYLENOL) tablet 650 mg  650 mg Oral Q6HRS PRN Wilian Salamanca M.D.       • nicotine (NICODERM) 14 MG/24HR 14 mg  14 mg Transdermal Daily-0600 Wilian Salamanca M.D.   14 mg at 01/12/19 0544    And   • nicotine polacrilex (NICORETTE) 2 MG piece 2 mg  2 mg Oral Q HOUR PRN Wilian Salamanca M.D.       • insulin regular (HUMULIN R) injection 1-6 Units  1-6 Units Subcutaneous 4X/DAY TYSON Salamanca M.D.   Stopped at 01/12/19 0700    And   • glucose 4 g chewable tablet 16 g  16 g Oral Q15 MIN PRN Wilian Salamanca M.D.        And   • dextrose 50% (D50W) injection 25 mL  25 mL Intravenous Q15 MIN PRN Wilian Salamanca M.D.       • MD Alert...Vancomycin per Pharmacy   Other pharmacy to dose Wilian Salamanca M.D.       • pancrelipase (Lip-Prot-Amyl) (CREON 67962) 45197-85650 units capsule 72,000 Units   3 Cap Oral TID WITH MEALS Wilian Salamanca M.D.   Stopped at 01/12/19 0730   • ampicillin/sulbactam (UNASYN) 3 g in  mL IVPB  3 g Intravenous Q6HRS Wilian Salamanca M.D.   Stopped at 01/12/19 0614   • vancomycin 1,100 mg in  mL IVPB  15 mg/kg Intravenous Q12HR Marcelo Suero M.D.   Stopped at 01/11/19 2221       Social History     Social History   • Marital status: Single     Spouse name: N/A   • Number of children: N/A   • Years of education: N/A     Occupational History   • Not on file.     Social History Main Topics   • Smoking status: Current Every Day Smoker     Packs/day: 0.25     Years: 15.00   • Smokeless tobacco: Never Used      Comment: Trying to quit   • Alcohol use No   • Drug use: No   • Sexual activity: Not Currently     Other Topics Concern   • Not on file     Social History Narrative   • No narrative on file       Family History   Problem Relation Age of Onset   • No Known Problems Mother    • No Known Problems Father        Allergies  Patient has no known allergies.    Review of Systems  Negative except for as stated in HPI    Physical Exam   Constitutional: He is oriented to person, place, and time. He appears well-developed and well-nourished.   HENT:   Head: Normocephalic and atraumatic.   Neck: Normal range of motion. Neck supple. No tracheal deviation present.   Cardiovascular: Normal rate and regular rhythm.    Pulmonary/Chest: Effort normal. No respiratory distress.   Abdominal: Soft. He exhibits no distension. There is no tenderness.   Musculoskeletal: Normal range of motion.   Neurological: He is alert and oriented to person, place, and time.   Skin: Skin is warm and dry.   Psychiatric: He has a normal mood and affect.   RLE:  Draining ulcer overlying dorsal right second toe, hammertoe deformity, grade 1 ulcer overlying 5th metatarsal base without surrounding signs of infection, motor intact EHL/FHL/TA/GSC, decreased sensation with baseline neuropathy, 2+DP pulse with brisk cap  refill all toes    Vital Signs  Blood Pressure: 136/76   Temperature: 36.6 °C (97.9 °F)   Pulse: 78   Respiration: 20   Pulse Oximetry: 93 %       Labs:  Recent Labs      01/10/19   2040  01/12/19   0408   WBC  13.9*  10.5   RBC  3.80*  3.70*   HEMOGLOBIN  12.5*  12.3*   HEMATOCRIT  39.7*  37.7*   MCV  104.5*  101.9*   MCH  32.9  33.2*   MCHC  31.5*  32.6*   RDW  63.9*  60.6*   PLATELETCT  254  256   MPV  10.6  10.2     Recent Labs      01/10/19   2040  01/12/19   0408   SODIUM  136  138   POTASSIUM  5.1  4.8   CHLORIDE  108  106   CO2  20  25   GLUCOSE  335*  245*   BUN  22  20   CREATININE  0.95  0.91   CALCIUM  9.1  8.9     Recent Labs      01/10/19   2138   APTT  28.2   INR  0.92     Recent Labs      01/10/19   2040  01/10/19   2138   ASTSGOT  19   --    ALTSGPT  20   --    TBILIRUBIN  0.2   --    ALKPHOSPHAT  80   --    GLOBULIN  2.6   --    INR   --   0.92       Radiology:  US-EXTREMITY VENOUS LOWER UNILAT RIGHT   Final Result      DX-FOOT-COMPLETE 3+ RIGHT   Final Result         1.  No acute traumatic bony injury.   2.  Bony remodeling at the dorsomedial first metatarsal head, could correspond with erosive arthropathy, consider osteomyelitis as clinically appropriate            Assessment/Plan:  1) Right 2nd toe infected full thickness diabetic ulcer with likely osteomyelitis    -NPO  -Consented for OR today for right 2nd toe amputation and right foot irrigation and debridement  -Continue antibiotics per ID recs

## 2019-01-13 LAB
GLUCOSE BLD-MCNC: 218 MG/DL (ref 65–99)
GLUCOSE BLD-MCNC: 258 MG/DL (ref 65–99)
GLUCOSE BLD-MCNC: 296 MG/DL (ref 65–99)
GLUCOSE BLD-MCNC: 370 MG/DL (ref 65–99)
VANCOMYCIN TROUGH SERPL-MCNC: 18.8 UG/ML (ref 10–20)

## 2019-01-13 PROCEDURE — 82962 GLUCOSE BLOOD TEST: CPT | Mod: 91

## 2019-01-13 PROCEDURE — 700102 HCHG RX REV CODE 250 W/ 637 OVERRIDE(OP): Performed by: INTERNAL MEDICINE

## 2019-01-13 PROCEDURE — A9270 NON-COVERED ITEM OR SERVICE: HCPCS | Performed by: INTERNAL MEDICINE

## 2019-01-13 PROCEDURE — 770006 HCHG ROOM/CARE - MED/SURG/GYN SEMI*

## 2019-01-13 PROCEDURE — 700102 HCHG RX REV CODE 250 W/ 637 OVERRIDE(OP): Performed by: HOSPITALIST

## 2019-01-13 PROCEDURE — 36415 COLL VENOUS BLD VENIPUNCTURE: CPT

## 2019-01-13 PROCEDURE — A9270 NON-COVERED ITEM OR SERVICE: HCPCS | Performed by: HOSPITALIST

## 2019-01-13 PROCEDURE — 99233 SBSQ HOSP IP/OBS HIGH 50: CPT | Performed by: INTERNAL MEDICINE

## 2019-01-13 PROCEDURE — 80202 ASSAY OF VANCOMYCIN: CPT

## 2019-01-13 PROCEDURE — 700111 HCHG RX REV CODE 636 W/ 250 OVERRIDE (IP): Performed by: INTERNAL MEDICINE

## 2019-01-13 PROCEDURE — 700105 HCHG RX REV CODE 258: Performed by: HOSPITALIST

## 2019-01-13 PROCEDURE — 700105 HCHG RX REV CODE 258: Performed by: INTERNAL MEDICINE

## 2019-01-13 PROCEDURE — 700111 HCHG RX REV CODE 636 W/ 250 OVERRIDE (IP): Performed by: HOSPITALIST

## 2019-01-13 RX ORDER — IBUPROFEN 400 MG/1
400 TABLET ORAL EVERY 6 HOURS PRN
Status: DISCONTINUED | OUTPATIENT
Start: 2019-01-13 | End: 2019-01-18 | Stop reason: HOSPADM

## 2019-01-13 RX ADMIN — TAMSULOSIN HYDROCHLORIDE 0.4 MG: 0.4 CAPSULE ORAL at 08:00

## 2019-01-13 RX ADMIN — HYDROCODONE BITARTRATE AND ACETAMINOPHEN 1 TABLET: 10; 325 TABLET ORAL at 05:45

## 2019-01-13 RX ADMIN — VANCOMYCIN HYDROCHLORIDE 1100 MG: 100 INJECTION, POWDER, LYOPHILIZED, FOR SOLUTION INTRAVENOUS at 08:00

## 2019-01-13 RX ADMIN — SODIUM CHLORIDE: 9 INJECTION, SOLUTION INTRAVENOUS at 08:00

## 2019-01-13 RX ADMIN — HYDROCODONE BITARTRATE AND ACETAMINOPHEN 1 TABLET: 10; 325 TABLET ORAL at 09:53

## 2019-01-13 RX ADMIN — INSULIN GLARGINE 20 UNITS: 100 INJECTION, SOLUTION SUBCUTANEOUS at 17:57

## 2019-01-13 RX ADMIN — INSULIN HUMAN 5 UNITS: 100 INJECTION, SOLUTION PARENTERAL at 20:33

## 2019-01-13 RX ADMIN — MEMANTINE HYDROCHLORIDE 5 MG: 10 TABLET ORAL at 05:32

## 2019-01-13 RX ADMIN — SODIUM CHLORIDE 3 G: 900 INJECTION INTRAVENOUS at 17:51

## 2019-01-13 RX ADMIN — SODIUM CHLORIDE 3 G: 900 INJECTION INTRAVENOUS at 05:33

## 2019-01-13 RX ADMIN — IBUPROFEN 400 MG: 400 TABLET, FILM COATED ORAL at 09:56

## 2019-01-13 RX ADMIN — STANDARDIZED SENNA CONCENTRATE AND DOCUSATE SODIUM 2 TABLET: 8.6; 5 TABLET, FILM COATED ORAL at 17:53

## 2019-01-13 RX ADMIN — HYDROCODONE BITARTRATE AND ACETAMINOPHEN 1 TABLET: 10; 325 TABLET ORAL at 14:26

## 2019-01-13 RX ADMIN — ATORVASTATIN CALCIUM 10 MG: 10 TABLET, FILM COATED ORAL at 17:54

## 2019-01-13 RX ADMIN — INSULIN HUMAN 2 UNITS: 100 INJECTION, SOLUTION PARENTERAL at 05:41

## 2019-01-13 RX ADMIN — BUPROPION HYDROCHLORIDE 150 MG: 150 TABLET, EXTENDED RELEASE ORAL at 17:54

## 2019-01-13 RX ADMIN — PANCRELIPASE 72000 UNITS: 24000; 76000; 120000 CAPSULE, DELAYED RELEASE PELLETS ORAL at 11:57

## 2019-01-13 RX ADMIN — OMEPRAZOLE 20 MG: 20 CAPSULE, DELAYED RELEASE ORAL at 05:32

## 2019-01-13 RX ADMIN — SODIUM CHLORIDE 3 G: 900 INJECTION INTRAVENOUS at 11:57

## 2019-01-13 RX ADMIN — ENOXAPARIN SODIUM 40 MG: 100 INJECTION SUBCUTANEOUS at 05:32

## 2019-01-13 RX ADMIN — INSULIN HUMAN 3 UNITS: 100 INJECTION, SOLUTION PARENTERAL at 11:44

## 2019-01-13 RX ADMIN — SODIUM CHLORIDE 3 G: 900 INJECTION INTRAVENOUS at 23:45

## 2019-01-13 RX ADMIN — IBUPROFEN 400 MG: 400 TABLET, FILM COATED ORAL at 17:54

## 2019-01-13 RX ADMIN — ASPIRIN 81 MG 81 MG: 81 TABLET ORAL at 05:32

## 2019-01-13 RX ADMIN — PANCRELIPASE 72000 UNITS: 24000; 76000; 120000 CAPSULE, DELAYED RELEASE PELLETS ORAL at 17:52

## 2019-01-13 RX ADMIN — PANCRELIPASE 72000 UNITS: 24000; 76000; 120000 CAPSULE, DELAYED RELEASE PELLETS ORAL at 07:59

## 2019-01-13 RX ADMIN — BUPROPION HYDROCHLORIDE 150 MG: 150 TABLET, EXTENDED RELEASE ORAL at 05:32

## 2019-01-13 RX ADMIN — SODIUM CHLORIDE: 9 INJECTION, SOLUTION INTRAVENOUS at 23:45

## 2019-01-13 RX ADMIN — INSULIN HUMAN 3 UNITS: 100 INJECTION, SOLUTION PARENTERAL at 17:54

## 2019-01-13 RX ADMIN — NICOTINE 14 MG: 14 PATCH, EXTENDED RELEASE TRANSDERMAL at 05:32

## 2019-01-13 RX ADMIN — LISINOPRIL 10 MG: 10 TABLET ORAL at 05:32

## 2019-01-13 RX ADMIN — VANCOMYCIN HYDROCHLORIDE 800 MG: 100 INJECTION, POWDER, LYOPHILIZED, FOR SOLUTION INTRAVENOUS at 20:14

## 2019-01-13 RX ADMIN — MEMANTINE HYDROCHLORIDE 5 MG: 10 TABLET ORAL at 17:53

## 2019-01-13 ASSESSMENT — ENCOUNTER SYMPTOMS
INSOMNIA: 0
EYE DISCHARGE: 0
FOCAL WEAKNESS: 0
HEADACHES: 0
HEARTBURN: 0
BLURRED VISION: 0
WEIGHT LOSS: 0
CHILLS: 0
BACK PAIN: 0
NERVOUS/ANXIOUS: 0
NAUSEA: 0
PALPITATIONS: 0
VOMITING: 0
MYALGIAS: 0
FEVER: 0
ORTHOPNEA: 0
MYALGIAS: 1
SHORTNESS OF BREATH: 0
EYE PAIN: 0
SEIZURES: 0
DIARRHEA: 0
SPUTUM PRODUCTION: 0
DEPRESSION: 0
COUGH: 0
DIZZINESS: 0
NECK PAIN: 0
ABDOMINAL PAIN: 0
STRIDOR: 0

## 2019-01-13 ASSESSMENT — PAIN SCALES - GENERAL
PAINLEVEL_OUTOF10: 0
PAINLEVEL_OUTOF10: 4
PAINLEVEL_OUTOF10: 8
PAINLEVEL_OUTOF10: 6
PAINLEVEL_OUTOF10: 8
PAINLEVEL_OUTOF10: 3
PAINLEVEL_OUTOF10: 6

## 2019-01-13 NOTE — PROGRESS NOTES
RN received report, assumed patient care. Patient is resting in bed, eating dinner. Patient recently returned from surgery for amputation of right second toe. Patient received additional dose of pain medication for pain of 8/10. Call light within reach. All needs met at this time.

## 2019-01-13 NOTE — PROGRESS NOTES
Infectious Disease Progress Note    Author: Fidelia Tabor M.D. Date & Time of service: 2019  11:49 AM    Chief Complaint:  Follow-up for right second toe osteomyelitis    Interval History:   afebrile no CBC, patient underwent right second toe amputation and I&D yesterday, he has 8 out of 10 right foot pain, tolerating IV antibiotics without any issues, no diarrhea  Labs Reviewed, Medications Reviewed, Radiology Reviewed and Wound Reviewed.    Review of Systems:  Review of Systems   Constitutional: Negative for chills and fever.   Respiratory: Negative for cough and shortness of breath.    Gastrointestinal: Negative for abdominal pain, diarrhea, nausea and vomiting.   Genitourinary: Negative for dysuria.   Musculoskeletal: Positive for joint pain and myalgias.   Neurological: Negative for dizziness.       Hemodynamics:  Temp (24hrs), Av.7 °C (98 °F), Min:36.3 °C (97.4 °F), Max:36.9 °C (98.5 °F)  Temperature: 36.5 °C (97.7 °F)  Pulse  Av.3  Min: 69  Max: 108   Blood Pressure: 153/91, NIBP: 138/82       Physical Exam:  Physical Exam   Constitutional: He is oriented to person, place, and time. He appears well-developed.   Thin  Older than stated age  Chronically ill-appearing   HENT:   Head: Normocephalic and atraumatic.   Eyes: Pupils are equal, round, and reactive to light. EOM are normal.   Neck: Neck supple.   Cardiovascular: Normal rate.    Murmur heard.  Pulmonary/Chest: Effort normal. He has no wheezes.   Abdominal: Soft. There is no tenderness.   Musculoskeletal:   Right foot with surgical dressing in place.  Very long right great toenail.  Toes are warm and wiggle   Neurological: He is alert and oriented to person, place, and time.       Meds:    Current Facility-Administered Medications:   •  ibuprofen  •  vancomycin  •  HYDROcodone/acetaminophen  •  aspirin  •  atorvastatin  •  buPROPion SR  •  insulin glargine  •  lisinopril  •  memantine  •  omeprazole  •  tamsulosin  •  NS  •   enoxaparin  •  cloNIDine  •  ondansetron  •  ondansetron  •  promethazine  •  promethazine  •  prochlorperazine  •  senna-docusate **AND** polyethylene glycol/lytes **AND** magnesium hydroxide **AND** bisacodyl  •  acetaminophen  •  nicotine **AND** Nicotine Replacement Patient Education Materials **AND** nicotine polacrilex  •  insulin regular **AND** Accu-Chek ACHS **AND** NOTIFY MD and PharmD **AND** glucose 4 g **AND** dextrose 50%  •  MD Alert...Vancomycin per Pharmacy  •  pancrelipase (Lip-Prot-Amyl)  •  ampicillin-sulbactam (UNASYN) IV    Labs:  Recent Labs      01/10/19   2040  01/12/19   0408   WBC  13.9*  10.5   RBC  3.80*  3.70*   HEMOGLOBIN  12.5*  12.3*   HEMATOCRIT  39.7*  37.7*   MCV  104.5*  101.9*   MCH  32.9  33.2*   RDW  63.9*  60.6*   PLATELETCT  254  256   MPV  10.6  10.2   NEUTSPOLYS  54.00  44.00   LYMPHOCYTES  32.70  34.60   MONOCYTES  8.80  13.00   EOSINOPHILS  2.70  4.50   BASOPHILS  0.90  1.30   RBCMORPHOLO  Present   --      Recent Labs      01/10/19   2040  01/12/19   0408   SODIUM  136  138   POTASSIUM  5.1  4.8   CHLORIDE  108  106   CO2  20  25   GLUCOSE  335*  245*   BUN  22  20     Recent Labs      01/10/19   2040  01/12/19   0408   ALBUMIN  3.7   --    TBILIRUBIN  0.2   --    ALKPHOSPHAT  80   --    TOTPROTEIN  6.3   --    ALTSGPT  20   --    ASTSGOT  19   --    CREATININE  0.95  0.91       Imaging:  Dx-foot-complete 3+ Right    Result Date: 1/10/2019  1/10/2019 8:14 PM HISTORY/REASON FOR EXAM: Atraumatic Pain/Swelling/Deformity TECHNIQUE/EXAM DESCRIPTION:  AP, lateral, and oblique views of the RIGHT foot. COMPARISON:  None FINDINGS: The bony structures and articulations appear within normal limits without visualized fracture, subluxation, or dislocation. Bony remodeling of the dorsal medial first metatarsal head is seen.     1.  No acute traumatic bony injury. 2.  Bony remodeling at the dorsomedial first metatarsal head, could correspond with erosive arthropathy, consider  osteomyelitis as clinically appropriate    Us-extremity Venous Lower Unilat Right    Result Date: 2019   Vascular Laboratory  CONCLUSIONS  Normal right lower extremity superficial and deep venous examination.  SHAUN CORCORAN  Exam Date:     01/10/2019 21:21  Room #:     Inpatient  Priority:     Stat  Ht (in):             Wt (lb):  Ordering Physician:        CHAVO MARC  Referring Physician:       [id number], BLISS  Sonographer:               Nila Urbina RVT  Study Type:                Complete Unilateral  Technical Quality:         Adequate  Age:    58    Gender:     M  MRN:    [id number]  :    1960      BSA:  Indications:     Localized swelling, mass and lump, right lower limb  CPT Codes:       56582  ICD Codes:         History:         Swelling of right calf and foot. No prior duplex.  Limitations:  PROCEDURES:  Right lower extremity venous duplex imaging.  The following venous structures were evaluated: common femoral, profunda  femoral, proximal portion of the greater saphenous, femoral, popliteal,  peroneal and posterior tibial veins.  Serial compression, augmentation maneuvers, color and spectral Doppler flow  evaluations were performed.  FINDINGS:  Right lower extremity -  No evidence of deep venous thrombosis.  Complete color filling and compressibility with normal venous flow dynamics  including spontaneous flow, response to augmentation maneuvers, and  respiratory phasicity.  Flow was evaluated in the contralateral common femoral vein and normal  venous flow dynamics including spontaneous flow, respiratory phasic  variation and augmentation were demonstrated.  Natalia Bustos MD  (Electronically Signed)  Final Date:      2019                   01:03      Micro:  Results     Procedure Component Value Units Date/Time    GRAM STAIN [[id number]] Collected:  19 1453    Order Status:  Completed Specimen:  Bone Updated:  19     Significant Indicator .     Source BONE     Site  "Right second metatarsal head     Gram Stain Result No organisms seen.    GRAM STAIN [[id number]] Collected:  01/12/19 1448    Order Status:  Completed Specimen:  Tissue Updated:  01/12/19 2228     Significant Indicator .     Source TISS     Site Right second toe     Gram Stain Result No organisms seen.    CULTURE TISSUE W/ GRM STAIN [[id number]] Collected:  01/12/19 1453    Order Status:  Completed Specimen:  Other Updated:  01/12/19 1549    ANAEROBIC CULTURE [[id number]] Collected:  01/12/19 1453    Order Status:  Completed Specimen:  Other Updated:  01/12/19 1549    CULTURE TISSUE W/ GRM STAIN [[id number]] Collected:  01/12/19 1448    Order Status:  Completed Specimen:  Other Updated:  01/12/19 1547    ANAEROBIC CULTURE [[id number]] Collected:  01/12/19 1448    Order Status:  Completed Specimen:  Other Updated:  01/12/19 1547    BLOOD CULTURE [[id number]] Collected:  01/11/19 0642    Order Status:  Completed Specimen:  Blood from Peripheral Updated:  01/12/19 0748     Significant Indicator NEG     Source BLD     Site PERIPHERAL     Blood Culture No Growth    Note: Blood cultures are incubated for 5 days and  are monitored continuously.Positive blood cultures  are called to the RN and reported as soon as  they are identified.      Narrative:       Per Hospital Policy: Only change Specimen Src: to \"Line\" if  specified by physician order.    BLOOD CULTURE (Child) [[id number]] Collected:  01/10/19 2046    Order Status:  Completed Specimen:  Blood from Peripheral Updated:  01/12/19 0748     Significant Indicator NEG     Source BLD     Site PERIPHERAL     Blood Culture No Growth    Note: Blood cultures are incubated for 5 days and  are monitored continuously.Positive blood cultures  are called to the RN and reported as soon as  they are identified.      Narrative:       Per Hospital Policy: Only change Specimen Src: to \"Line\" if  specified by physician order.    BLOOD CULTURE [[id number]] Collected:  01/11/19 0300    Order " "Status:  Completed Specimen:  Blood from Peripheral Updated:  01/12/19 0748     Significant Indicator NEG     Source BLD     Site PERIPHERAL     Blood Culture No Growth    Note: Blood cultures are incubated for 5 days and  are monitored continuously.Positive blood cultures  are called to the RN and reported as soon as  they are identified.      Narrative:       Per Hospital Policy: Only change Specimen Src: to \"Line\" if  specified by physician order.    BLOOD CULTURE [[id number]]     Order Status:  Canceled Specimen:  Blood from Peripheral           Assessment:  Active Hospital Problems    Diagnosis   • *Subacute osteomyelitis of right foot ([facility]) [M86.271]   • Type 2 diabetes mellitus with hyperglycemia, with long-term current use of insulin ([facility]) [E11.65, Z79.4]   • Peripheral neuropathy [G62.9]       Plan:  Right foot osteomyelitis -ongoing workup  Afebrile  Leukocytosis resolved  Status post right second toe amputation through metatarsophalangeal joint and I&D on 1/12/19 by Dr. Reddy  OR culture (bone from right second metatarsal) - pending  Continue vancomycin and Unasyn for now  Monitor renal function and Vanco trough  Vanco trough 18 today  Will de-escalate antibiotics accordingly  Continue wound care  Duration of antibiotics will depend on culture results, pathology    Diabetic peripheral neuropathy  Contributing to above    Poorly controlled type 2 diabetes mellitus  Hemoglobin A1c 10.5  Maintain blood sugars under 150 control infection and promote wound healing    Discussed with internal medicine/Dr Suero  "

## 2019-01-13 NOTE — OP REPORT
DATE OF SERVICE:  01/12/2019    PREOPERATIVE DIAGNOSES:  1.  Right second toe deformity with osteomyelitis.  2.  Right foot infection.    POSTOPERATIVE DIAGNOSES:  1.  Right second toe deformity with osteomyelitis.  2.  Right foot infection.    PROCEDURES PERFORMED:  1.  Right second toe amputation at the metatarsophalangeal joint.  2.  Right foot irrigation and debridement, dorsal and plantar compartments of   the foot.    SURGEON:  Nicanor Reddy MD    FIRST ASSISTANT:  Isidro Truong MD    SECOND ASSISTANT:  CHUCK Mena    ANESTHESIA:  General endotracheal with ankle block.    ESTIMATED BLOOD LOSS:  None.    COMPLICATIONS:  None.    POSTOPERATIVE PLAN:  1.  Weightbearing as tolerated in a postop shoe.  2.  Preoperative antibiotics.  3.  Follow up in 2 weeks.  4.  Antibiotics per infectious disease.    INDICATIONS:  The patient is a pleasant 58-year-old male.  He has problems   with his right foot.  The above diagnoses made and options were discussed   including operative and nonoperative and he elected to undergo operative   intervention.  Risks and benefits were discussed as per my H and P.    PROCEDURE IN DETAIL:  The patient was brought into the operating room.  He   underwent general endotracheal anesthesia without complications.  His right   lower extremity was prepped and draped in a standard fashion.  Positive site   verification confirmed his right lower extremity as well as procedure and   confirmation that he received preoperative antibiotics.  Esmarch was used to   exsanguinate his foot, ankle and leg and was used as an ankle tourniquet.  An   elliptical incision was made at the base of the second toe directly down to   the bone.  This was brought and a disarticulation was performed at the second   metatarsophalangeal joint.  Next, using a 15 blade, sharp excisional   debridement was performed of necrotic tissue in and around the area back to   good quality tissue.  Total incision  length was approximately 2 cm.    Tourniquet was released.  Hemostasis was obtained.  The wound was then closed   meticulously with interrupted nylon suture.  Sterile dressings were applied.    He was transferred to the recovery room in good condition.    Utilization of Dr. Truong was necessary for patient positioning, holding,   retracting, wound closure, and dressing placement.  He was present throughout   the entire procedure.       ____________________________________     MD ANGEL MCCONNELL / NTS    DD:  01/12/2019 16:52:27  DT:  01/12/2019 18:10:32    D#:  [id number]  Job#:  [id number]    cc: [facility]

## 2019-01-13 NOTE — PROGRESS NOTES
Offloading shoe was delivered and fitted to patient RLE.  If any further assistance needed, please call extension [phone] or place order for Ortho Technician assistance as a communication order in [place].

## 2019-01-13 NOTE — PROGRESS NOTES
[facility] Medicine Daily Progress Note    Date of Service  1/13/2019    Chief Complaint  58 y.o. male admitted 1/10/2019 with right 2nd toe ulcer    Hospital Course    59 y/o M with PMh of DM came in with above.      Interval Problem Update  POD 1 right 2nd to amputation. Complaint about pain at surgical site.    Consultants/Specialty  Dr. Truong    Code Status  full    Disposition  Remain on the floor    Review of Systems  Review of Systems   Constitutional: Negative for chills, fever and weight loss.   HENT: Negative for congestion and nosebleeds.    Eyes: Negative for blurred vision, pain and discharge.   Respiratory: Negative for cough, sputum production, shortness of breath and stridor.    Cardiovascular: Negative for chest pain, palpitations and orthopnea.   Gastrointestinal: Negative for diarrhea, heartburn and nausea.   Genitourinary: Negative for dysuria and urgency.   Musculoskeletal: Negative for back pain, myalgias and neck pain.   Skin: Negative for itching and rash.   Neurological: Negative for dizziness, focal weakness, seizures and headaches.   Psychiatric/Behavioral: Negative for depression. The patient is not nervous/anxious and does not have insomnia.         Physical Exam  Temp:  [36.3 °C (97.4 °F)-36.9 °C (98.5 °F)] 36.6 °C (97.9 °F)  Pulse:  [69-92] 79  Resp:  [12-20] 15  BP: (136-167)/(73-90) 139/73    Physical Exam   Constitutional: He is oriented to person, place, and time. No distress.   HENT:   Head: Normocephalic and atraumatic.   Mouth/Throat: Oropharynx is clear and moist.   Eyes: Pupils are equal, round, and reactive to light. EOM are normal.   Neck: Normal range of motion. No tracheal deviation present. No thyromegaly present.   Cardiovascular: Normal rate and regular rhythm.    No murmur heard.  Pulmonary/Chest: Effort normal and breath sounds normal. No respiratory distress. He has no wheezes.   Abdominal: Soft. Bowel sounds are normal. He exhibits no distension.   Musculoskeletal:  He exhibits tenderness. He exhibits no edema.   Right 2nd toe amputation   Neurological: He is alert and oriented to person, place, and time. No cranial nerve deficit.   Skin: Skin is warm and dry. He is not diaphoretic. No erythema.   Psychiatric: He has a normal mood and affect. His behavior is normal. Thought content normal.       Fluids    Intake/Output Summary (Last 24 hours) at 01/13/19 0730  Last data filed at 01/13/19 0600   Gross per 24 hour   Intake              360 ml   Output             2100 ml   Net            -1740 ml       Laboratory  Recent Labs      01/10/19   2040  01/12/19   0408   WBC  13.9*  10.5   RBC  3.80*  3.70*   HEMOGLOBIN  12.5*  12.3*   HEMATOCRIT  39.7*  37.7*   MCV  104.5*  101.9*   MCH  32.9  33.2*   MCHC  31.5*  32.6*   RDW  63.9*  60.6*   PLATELETCT  254  256   MPV  10.6  10.2     Recent Labs      01/10/19   2040  01/12/19   0408   SODIUM  136  138   POTASSIUM  5.1  4.8   CHLORIDE  108  106   CO2  20  25   GLUCOSE  335*  245*   BUN  22  20   CREATININE  0.95  0.91   CALCIUM  9.1  8.9     Recent Labs      01/10/19   2138   APTT  28.2   INR  0.92               Imaging  US-EXTREMITY VENOUS LOWER UNILAT RIGHT   Final Result      DX-FOOT-COMPLETE 3+ RIGHT   Final Result         1.  No acute traumatic bony injury.   2.  Bony remodeling at the dorsomedial first metatarsal head, could correspond with erosive arthropathy, consider osteomyelitis as clinically appropriate           Assessment/Plan  * Subacute osteomyelitis of right foot (HCC)   Assessment & Plan    POD 1 right 2nd toe amputation  On IV vanco and unasyn  Follow culture     Type 2 diabetes mellitus with hyperglycemia, with long-term current use of insulin (HCC)- (present on admission)   Assessment & Plan    Continue current medication regimen, correction dose insulin therapy      Chronic pancreatitis (HCC)- (present on admission)   Assessment & Plan    On enzyme replacement therapy.  Monitor.      Essential hypertension-  (present on admission)   Assessment & Plan    Controlled with current medication regimen.       Leucocytosis   Assessment & Plan    Related to above  Follow cbc in am     Peripheral neuropathy   Assessment & Plan    Not currently medicated for the same.       Memory loss- (present on admission)   Assessment & Plan    On namenda        Dyslipidemia- (present on admission)   Assessment & Plan    Continue statin therapy           VTE prophylaxis: lovenox

## 2019-01-13 NOTE — PROGRESS NOTES
"Pharmacy Kinetics 58 y.o. male on vancomycin day # 3 2019    Currently on Vancomycin 1100 mg iv q12hr (, 800)     Indication for Treatment: Osteomyelitis     Pertinent history per medical record: Admitted on 1/10/2019 for osteomyelitis. Wound to his right foot onset three weeks ago with worsening severity in the past few days with swelling onset starting the morning of 1/10/19. History of diabetes. Plain film of the foot mentioned bony remodeling which could correspond with erosive arthropathy or osteomyelitis along the first metatarsal head but no additional abnormalities of the second toe were noted on imaging.  ID and ortho surgery consulted.  Plan for right 2nd toe amputation 1 AM.     Other antibiotics: Unasyn 3g q6hr     Allergies: Patient has no known allergies.      List concerns for renal function : DM, age     Pertinent cultures to date:    - bone/tissue cx - NGTD   - Blood cx - NGTD     MRSA nares swab if pneumonia is a concern (ordered/positive/negative/n-a): N/A    Recent Labs      01/10/19   2040  01/12/19   0408   WBC  13.9*  10.5   NEUTSPOLYS  54.00  44.00   BANDSSTABS  0.90   --      Recent Labs      01/10/19   2040  01/12/19   0408   BUN  22  20   CREATININE  0.95  0.91   ALBUMIN  3.7   --      Recent Labs      19   0659   VANCOTROUGH  18.8     Intake/Output Summary (Last 24 hours) at 19 1051  Last data filed at 19 0600   Gross per 24 hour   Intake              360 ml   Output             2100 ml   Net            -1740 ml      Blood pressure 153/91, pulse 72, temperature 36.5 °C (97.7 °F), temperature source Temporal, resp. rate 18, height 1.905 m (6' 3\"), weight 70.1 kg (154 lb 8.7 oz), SpO2 91 %. Temp (24hrs), Av.7 °C (98 °F), Min:36.3 °C (97.4 °F), Max:36.9 °C (98.5 °F)      A/P   1. Vancomycin dose change: reduce dose to 800mg q12hr (, 08)  2. Next vancomycin level: 2-3 days , or sooner if clinically indicated  3. Goal trough: 12-16 " mcg/mL  4. Comments: Vancomycin trough at steady state resulted supratherapeutic.  Patient POD #1 of right foot second toe amputation.  Cultures taken in OR.  All NGTD at the moment.  Surgery recommending to continue antibiotics.  Tolerating vancomycin clearance, will reduce dosing proportionally, monitor culture results and assessment of source control post-amputation.    Tye Parks, KarenD, BCPS

## 2019-01-13 NOTE — OR NURSING
PT resting comfortably at present time, RLE toes are pink warm and dry, able to move freely.  RLE w/ ace wrap around surgical site, elevated on 2 pillows.  Complained of some mild pain, medicated w/ Fentanyl 25 mcg x 1.  PT tolerating PO, given ginger ale.  Report called to [facility], transport arranged.

## 2019-01-13 NOTE — CONSULTS
DATE OF SERVICE:  01/12/2019    CONSULTING PHYSICIAN:  Wilian Salamanca MD    REASON FOR CONSULTATION:  Right second toe infection.    HISTORY OF PRESENT ILLNESS:  The patient is a 58-year-old male with diabetes   and some neuropathy.  He presents with approximately a 2-week history of a   right second toe wound.  He does have some bleeding, has had some drainage.    He denies any fevers or chills.  He denies any pain.  He denies any known   trauma.  He feels that all things are progressively getting worse.  He is   doing better since he has been in the hospital.    Review of systems, past medical history, surgical history, family history,   social history, allergies, medications are reviewed on Dr. Salamanca's admit note.    PHYSICAL EXAMINATION:  VITAL SIGNS:  Afebrile, vital signs stable.  GENERAL:  Well-appearing male in no acute distress.  PSYCHIATRIC:  Pleasant demeanor, normal affect.  EYES:  Pupils equal, round.  RESPIRATIONS:  Regular rate, unlabored breathing.  CARDIOVASCULAR:  Palpable dorsalis pedis pulses and posterior tibial pulse.    Regular rate and rhythm.  Brisk capillary refill.  NEUROLOGIC:  Sensation is diminished throughout his foot.  EHL, FHL 5/5.  SKIN:  Shows on his right foot full-thickness ulceration of his right second   toe dorsally.  He has a wound over the lateral aspect of the base of his fifth   metatarsal.  There is no surrounding cellulitis or fluctuance over the base   of the fifth, but he does have some cellulitis around his second toe.  MUSCULOSKELETAL:  He has fixed second and third hammertoes.  He has no gross   instability.  He has satisfactory muscle strength.  He has no tenderness to   palpation.  He is nonambulatory.    IMAGING:  X-rays reviewed demonstrate hammertoes, no obvious osteomyelitis.    His radiologic result is confirmed.    LABORATORY DATA:  Reviewed demonstrate a white count of 10.5.  He has a   glucose of 245 today.  Blood cultures are negative.    IMPRESSION:  1.   Diabetes.  2.  Neuropathy.  3.  Chronic ulceration over his second toe with likely underlying   osteomyelitis.  4.  Lateral fifth wound.    PLAN:  I had a long discussion with the patient about his options including   operative and nonoperative.  He elected to proceed with operative   intervention.  I have recommended a right second toe amputation.  Procedure   and postoperative course were discussed in detail and all questions were   answered.  Risks of surgery explained to include, but not limited to wound   problems, infection, nerve injury, vascular injury, need for further surgery.    He understands and accepts these risks and agrees to proceed.  We will   schedule this next available.       ____________________________________     MD ANGEL MCCONNELL / SALINAS    DD:  01/12/2019 16:30:31  DT:  01/12/2019 18:32:58    D#:  [id number]  Job#:  [id number]    cc: [facility]

## 2019-01-13 NOTE — CARE PLAN
Problem: Infection  Goal: Will remain free from infection  Patient with osteo of 2nd digit of right foot. Patient on IV abx.     Problem: Pain Management  Goal: Pain level will decrease to patient's comfort goal  Patient has PO pain medications. Patient states after 2nd dose that he pain is not well controlled. RN to beltran [facility]ANDS for breakthrough medication.

## 2019-01-14 ENCOUNTER — PATIENT OUTREACH (OUTPATIENT)
Dept: HEALTH INFORMATION MANAGEMENT | Facility: OTHER | Age: 59
End: 2019-01-14

## 2019-01-14 ENCOUNTER — HOME CARE VISIT (OUTPATIENT)
Dept: HOME HEALTH SERVICES | Facility: HOME HEALTHCARE | Age: 59
End: 2019-01-14
Payer: COMMERCIAL

## 2019-01-14 LAB
BACTERIA TISS AEROBE CULT: ABNORMAL
BACTERIA TISS AEROBE CULT: ABNORMAL
GLUCOSE BLD-MCNC: 160 MG/DL (ref 65–99)
GLUCOSE BLD-MCNC: 267 MG/DL (ref 65–99)
GLUCOSE BLD-MCNC: 293 MG/DL (ref 65–99)
GLUCOSE BLD-MCNC: 368 MG/DL (ref 65–99)
GRAM STN SPEC: ABNORMAL
PATHOLOGY CONSULT NOTE: NORMAL
SIGNIFICANT IND 70042: ABNORMAL
SITE SITE: ABNORMAL
SOURCE SOURCE: ABNORMAL

## 2019-01-14 PROCEDURE — 700105 HCHG RX REV CODE 258: Performed by: INTERNAL MEDICINE

## 2019-01-14 PROCEDURE — 700105 HCHG RX REV CODE 258: Performed by: HOSPITALIST

## 2019-01-14 PROCEDURE — 700111 HCHG RX REV CODE 636 W/ 250 OVERRIDE (IP): Performed by: INTERNAL MEDICINE

## 2019-01-14 PROCEDURE — 770006 HCHG ROOM/CARE - MED/SURG/GYN SEMI*

## 2019-01-14 PROCEDURE — 82962 GLUCOSE BLOOD TEST: CPT | Mod: 91

## 2019-01-14 PROCEDURE — 99233 SBSQ HOSP IP/OBS HIGH 50: CPT | Performed by: INTERNAL MEDICINE

## 2019-01-14 PROCEDURE — 700111 HCHG RX REV CODE 636 W/ 250 OVERRIDE (IP): Performed by: HOSPITALIST

## 2019-01-14 PROCEDURE — A9270 NON-COVERED ITEM OR SERVICE: HCPCS | Performed by: HOSPITALIST

## 2019-01-14 PROCEDURE — 99232 SBSQ HOSP IP/OBS MODERATE 35: CPT | Performed by: INTERNAL MEDICINE

## 2019-01-14 PROCEDURE — 700102 HCHG RX REV CODE 250 W/ 637 OVERRIDE(OP): Performed by: HOSPITALIST

## 2019-01-14 RX ORDER — CEFAZOLIN SODIUM 2 G/100ML
2 INJECTION, SOLUTION INTRAVENOUS EVERY 8 HOURS
Status: DISCONTINUED | OUTPATIENT
Start: 2019-01-14 | End: 2019-01-18 | Stop reason: HOSPADM

## 2019-01-14 RX ADMIN — SODIUM CHLORIDE: 9 INJECTION, SOLUTION INTRAVENOUS at 13:23

## 2019-01-14 RX ADMIN — INSULIN HUMAN 3 UNITS: 100 INJECTION, SOLUTION PARENTERAL at 21:22

## 2019-01-14 RX ADMIN — VANCOMYCIN HYDROCHLORIDE 800 MG: 100 INJECTION, POWDER, LYOPHILIZED, FOR SOLUTION INTRAVENOUS at 08:23

## 2019-01-14 RX ADMIN — STANDARDIZED SENNA CONCENTRATE AND DOCUSATE SODIUM 2 TABLET: 8.6; 5 TABLET, FILM COATED ORAL at 17:17

## 2019-01-14 RX ADMIN — INSULIN HUMAN 5 UNITS: 100 INJECTION, SOLUTION PARENTERAL at 17:18

## 2019-01-14 RX ADMIN — SODIUM CHLORIDE 3 G: 900 INJECTION INTRAVENOUS at 12:34

## 2019-01-14 RX ADMIN — NICOTINE 14 MG: 14 PATCH, EXTENDED RELEASE TRANSDERMAL at 06:50

## 2019-01-14 RX ADMIN — ASPIRIN 81 MG 81 MG: 81 TABLET ORAL at 06:45

## 2019-01-14 RX ADMIN — INSULIN HUMAN 3 UNITS: 100 INJECTION, SOLUTION PARENTERAL at 12:34

## 2019-01-14 RX ADMIN — CEFAZOLIN SODIUM 2 G: 2 INJECTION, SOLUTION INTRAVENOUS at 13:23

## 2019-01-14 RX ADMIN — TAMSULOSIN HYDROCHLORIDE 0.4 MG: 0.4 CAPSULE ORAL at 08:24

## 2019-01-14 RX ADMIN — HYDROCODONE BITARTRATE AND ACETAMINOPHEN 1 TABLET: 10; 325 TABLET ORAL at 17:16

## 2019-01-14 RX ADMIN — PANCRELIPASE 72000 UNITS: 24000; 76000; 120000 CAPSULE, DELAYED RELEASE PELLETS ORAL at 12:34

## 2019-01-14 RX ADMIN — INSULIN HUMAN 1 UNITS: 100 INJECTION, SOLUTION PARENTERAL at 06:41

## 2019-01-14 RX ADMIN — MEMANTINE HYDROCHLORIDE 5 MG: 10 TABLET ORAL at 06:44

## 2019-01-14 RX ADMIN — BUPROPION HYDROCHLORIDE 150 MG: 150 TABLET, EXTENDED RELEASE ORAL at 17:16

## 2019-01-14 RX ADMIN — SODIUM CHLORIDE 3 G: 900 INJECTION INTRAVENOUS at 06:46

## 2019-01-14 RX ADMIN — HYDROCODONE BITARTRATE AND ACETAMINOPHEN 1 TABLET: 10; 325 TABLET ORAL at 06:44

## 2019-01-14 RX ADMIN — MEMANTINE HYDROCHLORIDE 5 MG: 10 TABLET ORAL at 17:16

## 2019-01-14 RX ADMIN — PANCRELIPASE 72000 UNITS: 24000; 76000; 120000 CAPSULE, DELAYED RELEASE PELLETS ORAL at 17:17

## 2019-01-14 RX ADMIN — BUPROPION HYDROCHLORIDE 150 MG: 150 TABLET, EXTENDED RELEASE ORAL at 06:44

## 2019-01-14 RX ADMIN — ENOXAPARIN SODIUM 40 MG: 100 INJECTION SUBCUTANEOUS at 06:46

## 2019-01-14 RX ADMIN — OMEPRAZOLE 20 MG: 20 CAPSULE, DELAYED RELEASE ORAL at 06:44

## 2019-01-14 RX ADMIN — INSULIN GLARGINE 20 UNITS: 100 INJECTION, SOLUTION SUBCUTANEOUS at 17:17

## 2019-01-14 RX ADMIN — CEFAZOLIN SODIUM 2 G: 2 INJECTION, SOLUTION INTRAVENOUS at 21:28

## 2019-01-14 RX ADMIN — PANCRELIPASE 72000 UNITS: 24000; 76000; 120000 CAPSULE, DELAYED RELEASE PELLETS ORAL at 08:23

## 2019-01-14 RX ADMIN — LISINOPRIL 10 MG: 10 TABLET ORAL at 06:44

## 2019-01-14 RX ADMIN — ATORVASTATIN CALCIUM 10 MG: 10 TABLET, FILM COATED ORAL at 17:16

## 2019-01-14 ASSESSMENT — ENCOUNTER SYMPTOMS
MYALGIAS: 0
NERVOUS/ANXIOUS: 0
HEARTBURN: 0
INSOMNIA: 0
EYE PAIN: 0
DEPRESSION: 0
NECK PAIN: 0
BLURRED VISION: 0
ORTHOPNEA: 0
HEADACHES: 0
FOCAL WEAKNESS: 0
FEVER: 0
MYALGIAS: 1
ABDOMINAL PAIN: 0
COUGH: 0
SPUTUM PRODUCTION: 0
BACK PAIN: 0
STRIDOR: 0
NAUSEA: 0
VOMITING: 0
CHILLS: 0
SHORTNESS OF BREATH: 0
DIZZINESS: 0
DIARRHEA: 0

## 2019-01-14 ASSESSMENT — PAIN SCALES - GENERAL
PAINLEVEL_OUTOF10: 3
PAINLEVEL_OUTOF10: 0

## 2019-01-14 NOTE — PROGRESS NOTES
" LIMB PRESERVATION SERVICE    HPI:      Pawel Tatum is a 58 y.o. male, with a past medical history that includes uncontrolled type 2 diabetes and HTN, admitted 1/10/2019 for Osteomyelitis (HCC).   LPS has been consulted for his left 2nd toe.  This wound started the first week of January 2019 as a swollen toe that has increased in pain and swelling until the pt admitted to [facility].  Pt has been treating the wound with clean bandges.   The wound has failed to improve.   IV antibiotics were started on this admission.  Infectious diseases has not been consulted.    Xray has been done and does not show osteomyelitis  MRI has not been done  Ortho Dr Truong and Maureen have been contacted to evaluate the pt for POC surgical vs medical management of toe.  Pt was diagnosed with type 2 diabetes 10 years ago but was told he was type 1 as well, and is currently managing with insulin.  Pt checks their blood sugars 3 times a day and reports that these typically run around the high 100s.  They have had previous diabetes education.  They do  have numbness in his feet.  They usually wears regular shoes. They do not check their feet routinely. They have not had previous foot ulcers or foot surgery.     ASSESSMENT: Procedure(s):  1.  Right second toe amputation at the metatarsophalangeal joint.  2.  Right foot irrigation and debridement, dorsal and plantar compartments of   the foot.    Patient denies fevers, chills, nausea, vomiting.  Pain on palpation and dressing change  /79   Pulse 72   Temp 36.7 °C (98.1 °F) (Temporal)   Resp 15   Ht 1.905 m (6' 3\")   Wt 70.1 kg (154 lb 8.7 oz)   SpO2 94%   BMI 19.32 kg/m²         Wound Characteristics           Location: Right 2nd Toe Initial Evaluation  Date:1/14/2019   Tissue Type and %: 100% Red   Periwound: Erythema, Edematous   Drainage: Minimal Sanginous   Exposed structures Sutures   Wound Edges:   Attached 100% Approximated   Odor: None   S&S of Infection:   " Edema/Erythema   Edema: Localized at Site   Sensation: Insensate                       DIABETES MANAGEMENT:  Lab Results   Component Value Date/Time    GLUCOSE 245 (H) 01/12/2019 04:08 AM      Lab Results   Component Value Date/Time    HBA1C 10.5 (H) 01/07/2019 02:15 PM        Diabetes education ordered    INFECTION MANAGEMENT:  WBC   Date/Time Value Ref Range Status   01/12/2019 04:08 AM 10.5 4.8 - 10.8 K/uL Final       Microbiology:   Results     Procedure Component Value Units Date/Time    CULTURE TISSUE W/ GRM STAIN [[id number]]  (Abnormal) Collected:  01/12/19 1448    Order Status:  Completed Specimen:  Tissue Updated:  01/13/19 1608     Significant Indicator POS (POS)     Source TISS     Site Right second toe     Tissue Culture - (A)     Gram Stain Result No organisms seen.     Tissue Culture Staphylococcus aureus  Rare growth   (A)    ANAEROBIC CULTURE [[id number]] Collected:  01/12/19 1448    Order Status:  Completed Specimen:  Tissue Updated:  01/13/19 1608     Significant Indicator NEG     Source TISS     Site Right second toe     Anaerobic Culture, Culture Res Culture in progress.    CULTURE TISSUE W/ GRM STAIN [[id number]] Collected:  01/12/19 1453    Order Status:  Completed Specimen:  Bone Updated:  01/13/19 1608     Significant Indicator NEG     Source BONE     Site Right second metatarsal head     Tissue Culture No growth at 24 hours     Gram Stain Result No organisms seen.    ANAEROBIC CULTURE [[id number]] Collected:  01/12/19 1453    Order Status:  Completed Specimen:  Bone Updated:  01/13/19 1608     Significant Indicator NEG     Source BONE     Site Right second metatarsal head     Anaerobic Culture, Culture Res Culture in progress.    GRAM STAIN [[id number]] Collected:  01/12/19 1453    Order Status:  Completed Specimen:  Bone Updated:  01/12/19 2228     Significant Indicator .     Source BONE     Site Right second metatarsal head     Gram Stain Result No organisms seen.    GRAM STAIN [[id number]]  "Collected:  01/12/19 1448    Order Status:  Completed Specimen:  Tissue Updated:  01/12/19 2228     Significant Indicator .     Source TISS     Site Right second toe     Gram Stain Result No organisms seen.    BLOOD CULTURE [[id number]] Collected:  01/11/19 0642    Order Status:  Completed Specimen:  Blood from Peripheral Updated:  01/12/19 0748     Significant Indicator NEG     Source BLD     Site PERIPHERAL     Blood Culture No Growth    Note: Blood cultures are incubated for 5 days and  are monitored continuously.Positive blood cultures  are called to the RN and reported as soon as  they are identified.      Narrative:       Per Hospital Policy: Only change Specimen Src: to \"Line\" if  specified by physician order.    BLOOD CULTURE (Child) [[id number]] Collected:  01/10/19 2046    Order Status:  Completed Specimen:  Blood from Peripheral Updated:  01/12/19 0748     Significant Indicator NEG     Source BLD     Site PERIPHERAL     Blood Culture No Growth    Note: Blood cultures are incubated for 5 days and  are monitored continuously.Positive blood cultures  are called to the RN and reported as soon as  they are identified.      Narrative:       Per Hospital Policy: Only change Specimen Src: to \"Line\" if  specified by physician order.    BLOOD CULTURE [[id number]] Collected:  01/11/19 0300    Order Status:  Completed Specimen:  Blood from Peripheral Updated:  01/12/19 0748     Significant Indicator NEG     Source BLD     Site PERIPHERAL     Blood Culture No Growth    Note: Blood cultures are incubated for 5 days and  are monitored continuously.Positive blood cultures  are called to the RN and reported as soon as  they are identified.      Narrative:       Per Hospital Policy: Only change Specimen Src: to \"Line\" if  specified by physician order.    BLOOD CULTURE [[id number]]     Order Status:  Canceled Specimen:  Blood from Peripheral          NURSING TO CHANGE RIGHT 2ND TOE BKA SURGICAL SITE DRESSING DAILY AND PRN FOR " SATURATION PRN DISLODGEMENT  Nursing to cleanse wound/periwound with Normal Saline (NS).  Pat periwound dry.  Apply skin prep/No Sting to periwound.  Let air dry for 1-2 minutes. Apply SINGLE layer adaptic, cut to size, to suture sites. Cover with Non Adhesive Foam and secure with Roll Gauze.  Then apply elastic bandage to secure dressings in place.   Please take Weekly Wound Photos. Notify wound team if wound deteriorates or fails to progress.      PLAN:  Right Second Toe - Limb preservation Status Guarded - Marked erythema and pain at site will continue to monitor - surgical bone cultures negative as of 1/14  Wound care: Incisional dressing - Change POD #2 (Directions: Adaptic over incision, Gauze, Roll Gauze, Ace Wrap)  Nursing to change Right 2nd toe every day and PRN for Saturation or Dislodgement  Wound Care by Nursing, LPS to Follow.    Offloading: Offloading shoe    Antibiotics: Per ID recommendation    Surgery:    DISCHARGE PLAN:    Disposition: TBD    Follow-up: LPS rounds in [facility]ound Clinic 2/8/19    Other:       Prudencio Dahl R.N.

## 2019-01-14 NOTE — PROGRESS NOTES
Infectious Disease Progress Note    Author: Da Segal M.D. Date & Time of service: 2019  12:56 PM    Chief Complaint:  Follow-up for right second toe osteomyelitis    Interval History:   afebrile no CBC, patient underwent right second toe amputation and I&D yesterday, he has 8 out of 10 right foot pain, tolerating IV antibiotics without any issues, no diarrhea   afebrile, no CBC today.  Patient reports this, no new issues    Labs Reviewed, Medications Reviewed, Radiology Reviewed and Wound Reviewed.    Review of Systems:  Review of Systems   Constitutional: Negative for chills and fever.   Respiratory: Negative for cough and shortness of breath.    Gastrointestinal: Negative for abdominal pain, diarrhea, nausea and vomiting.   Genitourinary: Negative for dysuria.   Musculoskeletal: Positive for joint pain and myalgias.   Neurological: Negative for dizziness.   All other systems reviewed and are negative.      Hemodynamics:  Temp (24hrs), Av.7 °C (98.1 °F), Min:36.5 °C (97.7 °F), Max:36.8 °C (98.3 °F)  Temperature: 36.7 °C (98.1 °F)  Pulse  Av.8  Min: 69  Max: 108   Blood Pressure: 149/82       Physical Exam:  Physical Exam   Constitutional: He is oriented to person, place, and time. He appears well-developed.   Thin  Older than stated age  Chronically ill-appearing   HENT:   Head: Normocephalic and atraumatic.   Eyes: Pupils are equal, round, and reactive to light. EOM are normal.   Neck: Neck supple.   Cardiovascular: Normal rate.    Murmur heard.  Pulmonary/Chest: Effort normal. He has no wheezes.   Abdominal: Soft. There is no tenderness.   Musculoskeletal: He exhibits no edema.   Right foot with surgical dressing in place. Toes are warm and wiggle   Neurological: He is alert and oriented to person, place, and time.   No gross focal neuro deficit   Skin: Skin is warm and dry.   Psychiatric: He has a normal mood and affect. His behavior is normal.   Nursing note and vitals reviewed.      Meds:    Current Facility-Administered Medications:   •  ibuprofen  •  vancomycin  •  HYDROcodone/acetaminophen  •  aspirin  •  atorvastatin  •  buPROPion SR  •  insulin glargine  •  lisinopril  •  memantine  •  omeprazole  •  tamsulosin  •  NS  •  enoxaparin  •  cloNIDine  •  ondansetron  •  ondansetron  •  promethazine  •  promethazine  •  prochlorperazine  •  senna-docusate **AND** polyethylene glycol/lytes **AND** magnesium hydroxide **AND** bisacodyl  •  acetaminophen  •  nicotine **AND** Nicotine Replacement Patient Education Materials **AND** nicotine polacrilex  •  insulin regular **AND** Accu-Chek ACHS **AND** NOTIFY MD and PharmD **AND** glucose 4 g **AND** dextrose 50%  •  MD Alert...Vancomycin per Pharmacy  •  pancrelipase (Lip-Prot-Amyl)  •  ampicillin-sulbactam (UNASYN) IV    Labs:  Recent Labs      01/12/19   0408   WBC  10.5   RBC  3.70*   HEMOGLOBIN  12.3*   HEMATOCRIT  37.7*   MCV  101.9*   MCH  33.2*   RDW  60.6*   PLATELETCT  256   MPV  10.2   NEUTSPOLYS  44.00   LYMPHOCYTES  34.60   MONOCYTES  13.00   EOSINOPHILS  4.50   BASOPHILS  1.30     Recent Labs      01/12/19   0408   SODIUM  138   POTASSIUM  4.8   CHLORIDE  106   CO2  25   GLUCOSE  245*   BUN  20     Recent Labs      01/12/19   0408   CREATININE  0.91       Imaging:  Dx-foot-complete 3+ Right    Result Date: 1/10/2019  1/10/2019 8:14 PM HISTORY/REASON FOR EXAM: Atraumatic Pain/Swelling/Deformity TECHNIQUE/EXAM DESCRIPTION:  AP, lateral, and oblique views of the RIGHT foot. COMPARISON:  None FINDINGS: The bony structures and articulations appear within normal limits without visualized fracture, subluxation, or dislocation. Bony remodeling of the dorsal medial first metatarsal head is seen.     1.  No acute traumatic bony injury. 2.  Bony remodeling at the dorsomedial first metatarsal head, could correspond with erosive arthropathy, consider osteomyelitis as clinically appropriate    Us-extremity Venous Lower Unilat Right    Result Date:  2019   Vascular Laboratory  CONCLUSIONS  Normal right lower extremity superficial and deep venous examination.  SHAUN CORCORAN  Exam Date:     01/10/2019 21:21  Room #:     Inpatient  Priority:     Stat  Ht (in):             Wt (lb):  Ordering Physician:        CHAVO MARC  Referring Physician:       [id number], BLISS  Sonographer:               Nila Urbina RVT  Study Type:                Complete Unilateral  Technical Quality:         Adequate  Age:    58    Gender:     M  MRN:    [id number]  :    1960      BSA:  Indications:     Localized swelling, mass and lump, right lower limb  CPT Codes:       68293  ICD Codes:         History:         Swelling of right calf and foot. No prior duplex.  Limitations:  PROCEDURES:  Right lower extremity venous duplex imaging.  The following venous structures were evaluated: common femoral, profunda  femoral, proximal portion of the greater saphenous, femoral, popliteal,  peroneal and posterior tibial veins.  Serial compression, augmentation maneuvers, color and spectral Doppler flow  evaluations were performed.  FINDINGS:  Right lower extremity -  No evidence of deep venous thrombosis.  Complete color filling and compressibility with normal venous flow dynamics  including spontaneous flow, response to augmentation maneuvers, and  respiratory phasicity.  Flow was evaluated in the contralateral common femoral vein and normal  venous flow dynamics including spontaneous flow, respiratory phasic  variation and augmentation were demonstrated.  Natalia Bustos MD  (Electronically Signed)  Final Date:      2019                   01:03      Micro:  Results     Procedure Component Value Units Date/Time    CULTURE TISSUE W/ GRM STAIN [[id number]] Collected:  19 1453    Order Status:  Completed Specimen:  Bone Updated:  19 1048     Significant Indicator NEG     Source BONE     Site Right second metatarsal head     Tissue Culture No growth at 48 hours     Gram  Stain Result No organisms seen.    ANAEROBIC CULTURE [[id number]] Collected:  01/12/19 1453    Order Status:  Completed Specimen:  Bone Updated:  01/14/19 1048     Significant Indicator NEG     Source BONE     Site Right second metatarsal head     Anaerobic Culture, Culture Res Culture in progress.    ANAEROBIC CULTURE [[id number]] Collected:  01/12/19 1448    Order Status:  Completed Specimen:  Tissue Updated:  01/14/19 1048     Significant Indicator NEG     Source TISS     Site Right second toe     Anaerobic Culture, Culture Res Culture in progress.    CULTURE TISSUE W/ GRM STAIN [[id number]]  (Abnormal)  (Susceptibility) Collected:  01/12/19 1448    Order Status:  Completed Specimen:  Tissue Updated:  01/14/19 1048     Significant Indicator POS (POS)     Source TISS     Site Right second toe     Tissue Culture - (A)     Gram Stain Result No organisms seen.     Tissue Culture Staphylococcus aureus  Rare growth   (A)    Culture & Susceptibility     STAPHYLOCOCCUS AUREUS     Antibiotic Sensitivity Microscan Unit Status    Ampicillin/sulbactam Sensitive <=8/4 mcg/mL Final    Method: SENSITIVITY, SERGO    Clindamycin Sensitive <=0.5 mcg/mL Final    Method: SENSITIVITY, SERGO    Daptomycin Sensitive 1 mcg/mL Final    Method: SENSITIVITY, SERGO    Erythromycin Sensitive <=0.5 mcg/mL Final    Method: SENSITIVITY, SERGO    Moxifloxacin Sensitive <=0.5 mcg/mL Final    Method: SENSITIVITY, SERGO    Oxacillin Sensitive <=0.25 mcg/mL Final    Method: SENSITIVITY, SERGO    Tetracycline Sensitive <=4 mcg/mL Final    Method: SENSITIVITY, SERGO    Trimeth/Sulfa Sensitive <=0.5/9.5 mcg/mL Final    Method: SENSITIVITY, SERGO    Vancomycin Sensitive 2 mcg/mL Final    Method: SENSITIVITY, SERGO                       GRAM STAIN [[id number]] Collected:  01/12/19 1453    Order Status:  Completed Specimen:  Bone Updated:  01/12/19 2228     Significant Indicator .     Source BONE     Site Right second metatarsal head     Gram Stain Result No organisms seen.     "GRAM STAIN [[id number]] Collected:  01/12/19 1448    Order Status:  Completed Specimen:  Tissue Updated:  01/12/19 2228     Significant Indicator .     Source TISS     Site Right second toe     Gram Stain Result No organisms seen.    BLOOD CULTURE [[id number]] Collected:  01/11/19 0642    Order Status:  Completed Specimen:  Blood from Peripheral Updated:  01/12/19 0748     Significant Indicator NEG     Source BLD     Site PERIPHERAL     Blood Culture No Growth    Note: Blood cultures are incubated for 5 days and  are monitored continuously.Positive blood cultures  are called to the RN and reported as soon as  they are identified.      Narrative:       Per Hospital Policy: Only change Specimen Src: to \"Line\" if  specified by physician order.    BLOOD CULTURE (Child) [[id number]] Collected:  01/10/19 2046    Order Status:  Completed Specimen:  Blood from Peripheral Updated:  01/12/19 0748     Significant Indicator NEG     Source BLD     Site PERIPHERAL     Blood Culture No Growth    Note: Blood cultures are incubated for 5 days and  are monitored continuously.Positive blood cultures  are called to the RN and reported as soon as  they are identified.      Narrative:       Per Hospital Policy: Only change Specimen Src: to \"Line\" if  specified by physician order.    BLOOD CULTURE [[id number]] Collected:  01/11/19 0300    Order Status:  Completed Specimen:  Blood from Peripheral Updated:  01/12/19 0748     Significant Indicator NEG     Source BLD     Site PERIPHERAL     Blood Culture No Growth    Note: Blood cultures are incubated for 5 days and  are monitored continuously.Positive blood cultures  are called to the RN and reported as soon as  they are identified.      Narrative:       Per Hospital Policy: Only change Specimen Src: to \"Line\" if  specified by physician order.    BLOOD CULTURE [[id number]]     Order Status:  Canceled Specimen:  Blood from Peripheral           Assessment:  58-year-old male with type 2 diabetes " completed by peripheral neuropathy, hypertension, admitted 1/10/2019 with right second toe infection.  X-ray was concerning for underlying osteomyelitis of the first metatarsal head (some bony remodeling noted on imaging). On 1/12, I&D was performed with amputation of the right second toe at the metatarsophalangeal joint.  Necrotic tissue noted Intra-Op.    Plan:  Right foot osteomyelitis   Afebrile  Leukocytosis resolved  Status post right second toe amputation through metatarsophalangeal joint and I&D on 1/12/19 by Dr. Reddy  OR culture growing MSSA  Will discontinue IV vancomycin and Unasyn and start IV cefazolin 2 g every 8 hours  Continue wound care  Pathology pending  Given that some concerning x-ray findings were noted on the first metatarsal head, irrespective of the second metatarsal pathology, will likely treat for an underlying residual osteomyelitis with 6 weeks of IV antibiotics  Continue IV Ancef 2 g every 8 hours with an anticipated stop date of 2/23/2019    Diabetic peripheral neuropathy  Contributing to above    Poorly controlled type 2 diabetes mellitus  Hemoglobin A1c 10.5  Likely contributing to infection and delayed wound  Maintain blood sugars under 150 control infection and promote wound healing    Discussed with internal medicine/Dr Suero    ID will follow.  Please call questions

## 2019-01-14 NOTE — PROGRESS NOTES
SBAR Documentation: Situation, Background, Assessment, Recommendation     Situation    DM management   Background       Uncontrolled Type 2 DM I with hyperosmolarity without coma, history of TBI with mild cognitive impairment, Diabetic peripheral angiopathy, HTN, Recent GI BLeed, Memory loss, chronic pancreatitis      Assessment    Patient admitted to [facility] on 1/10/19 for diabetic ulcer to right 2nd toe.   Recommendation CC RN to monitor for discharge

## 2019-01-14 NOTE — PROGRESS NOTES
[facility] Medicine Daily Progress Note    Date of Service  1/14/2019    Chief Complaint  58 y.o. male admitted 1/10/2019 with right 2nd toe ulcer    Hospital Course    57 y/o M with PMh of DM came in with above.      Interval Problem Update  POD 2 right 2nd to amputation. Pain is better than yesterday.    Consultants/Specialty  Dr. Truong    Code Status  full    Disposition  Remain on the floor    Review of Systems  Review of Systems   Constitutional: Negative for chills and fever.   HENT: Negative for congestion.    Eyes: Negative for blurred vision and pain.   Respiratory: Negative for cough, sputum production, shortness of breath and stridor.    Cardiovascular: Negative for chest pain and orthopnea.   Gastrointestinal: Negative for diarrhea, heartburn and nausea.   Genitourinary: Negative for dysuria and urgency.   Musculoskeletal: Negative for back pain, myalgias and neck pain.   Skin: Negative for itching and rash.   Neurological: Negative for dizziness, focal weakness and headaches.   Psychiatric/Behavioral: Negative for depression. The patient is not nervous/anxious and does not have insomnia.         Physical Exam  Temp:  [36.5 °C (97.7 °F)-36.8 °C (98.3 °F)] 36.7 °C (98.1 °F)  Pulse:  [72-83] 77  Resp:  [15-18] 17  BP: (142-153)/(71-85) 149/82    Physical Exam   Constitutional: He is oriented to person, place, and time. No distress.   HENT:   Head: Normocephalic and atraumatic.   Mouth/Throat: Oropharynx is clear and moist.   Eyes: Pupils are equal, round, and reactive to light. EOM are normal.   Neck: Normal range of motion. No thyromegaly present.   Cardiovascular: Normal rate and regular rhythm.    No murmur heard.  Pulmonary/Chest: Effort normal and breath sounds normal. No respiratory distress.   Abdominal: Soft. He exhibits no distension.   Musculoskeletal: He exhibits tenderness. He exhibits no edema.   Right 2nd toe amputation   Neurological: He is alert and oriented to person, place, and time. No  cranial nerve deficit.   Skin: Skin is warm and dry. He is not diaphoretic. No erythema.   Psychiatric: He has a normal mood and affect. His behavior is normal. Thought content normal.       Fluids    Intake/Output Summary (Last 24 hours) at 01/14/19 1049  Last data filed at 01/14/19 0900   Gross per 24 hour   Intake             1140 ml   Output             1640 ml   Net             -500 ml       Laboratory  Recent Labs      01/12/19   0408   WBC  10.5   RBC  3.70*   HEMOGLOBIN  12.3*   HEMATOCRIT  37.7*   MCV  101.9*   MCH  33.2*   MCHC  32.6*   RDW  60.6*   PLATELETCT  256   MPV  10.2     Recent Labs      01/12/19   0408   SODIUM  138   POTASSIUM  4.8   CHLORIDE  106   CO2  25   GLUCOSE  245*   BUN  20   CREATININE  0.91   CALCIUM  8.9                   Imaging  US-EXTREMITY VENOUS LOWER UNILAT RIGHT   Final Result      DX-FOOT-COMPLETE 3+ RIGHT   Final Result         1.  No acute traumatic bony injury.   2.  Bony remodeling at the dorsomedial first metatarsal head, could correspond with erosive arthropathy, consider osteomyelitis as clinically appropriate           Assessment/Plan  * Subacute osteomyelitis of right foot (HCC)   Assessment & Plan    POD 2 right 2nd toe amputation  On IV vanco and unasyn  Follow culture: staph aureus  ID following     Type 2 diabetes mellitus with hyperglycemia, with long-term current use of insulin (HCC)- (present on admission)   Assessment & Plan    Continue current medication regimen, correction dose insulin therapy      Chronic pancreatitis (HCC)- (present on admission)   Assessment & Plan    On enzyme replacement therapy.  Monitor.      Essential hypertension- (present on admission)   Assessment & Plan    Controlled with current medication regimen.       Leucocytosis   Assessment & Plan    Related to above  Follow cbc in am     Peripheral neuropathy   Assessment & Plan    Not currently medicated for the same.       Memory loss- (present on admission)   Assessment & Plan    On  namenda        Dyslipidemia- (present on admission)   Assessment & Plan    Continue statin therapy           VTE prophylaxis: lovenox

## 2019-01-15 PROBLEM — D72.829 LEUKOCYTOSIS: Status: RESOLVED | Noted: 2019-01-12 | Resolved: 2019-01-15

## 2019-01-15 LAB
BACTERIA SPEC ANAEROBE CULT: NORMAL
BACTERIA TISS AEROBE CULT: ABNORMAL
BACTERIA TISS AEROBE CULT: ABNORMAL
GLUCOSE BLD-MCNC: 191 MG/DL (ref 65–99)
GLUCOSE BLD-MCNC: 318 MG/DL (ref 65–99)
GLUCOSE BLD-MCNC: 333 MG/DL (ref 65–99)
GLUCOSE BLD-MCNC: 341 MG/DL (ref 65–99)
GRAM STN SPEC: ABNORMAL
SIGNIFICANT IND 70042: ABNORMAL
SIGNIFICANT IND 70042: NORMAL
SITE SITE: ABNORMAL
SITE SITE: NORMAL
SOURCE SOURCE: ABNORMAL
SOURCE SOURCE: NORMAL

## 2019-01-15 PROCEDURE — 700102 HCHG RX REV CODE 250 W/ 637 OVERRIDE(OP): Performed by: NURSE PRACTITIONER

## 2019-01-15 PROCEDURE — A9270 NON-COVERED ITEM OR SERVICE: HCPCS | Performed by: NURSE PRACTITIONER

## 2019-01-15 PROCEDURE — 700105 HCHG RX REV CODE 258: Performed by: NURSE PRACTITIONER

## 2019-01-15 PROCEDURE — A9270 NON-COVERED ITEM OR SERVICE: HCPCS | Performed by: HOSPITALIST

## 2019-01-15 PROCEDURE — 99232 SBSQ HOSP IP/OBS MODERATE 35: CPT | Performed by: HOSPITALIST

## 2019-01-15 PROCEDURE — 700102 HCHG RX REV CODE 250 W/ 637 OVERRIDE(OP): Performed by: INTERNAL MEDICINE

## 2019-01-15 PROCEDURE — 700102 HCHG RX REV CODE 250 W/ 637 OVERRIDE(OP): Performed by: HOSPITALIST

## 2019-01-15 PROCEDURE — 82962 GLUCOSE BLOOD TEST: CPT | Mod: 91

## 2019-01-15 PROCEDURE — 700111 HCHG RX REV CODE 636 W/ 250 OVERRIDE (IP): Performed by: HOSPITALIST

## 2019-01-15 PROCEDURE — 99232 SBSQ HOSP IP/OBS MODERATE 35: CPT | Performed by: INTERNAL MEDICINE

## 2019-01-15 PROCEDURE — 700105 HCHG RX REV CODE 258: Performed by: HOSPITALIST

## 2019-01-15 PROCEDURE — 700111 HCHG RX REV CODE 636 W/ 250 OVERRIDE (IP): Performed by: INTERNAL MEDICINE

## 2019-01-15 PROCEDURE — 770006 HCHG ROOM/CARE - MED/SURG/GYN SEMI*

## 2019-01-15 PROCEDURE — A9270 NON-COVERED ITEM OR SERVICE: HCPCS | Performed by: INTERNAL MEDICINE

## 2019-01-15 RX ORDER — FERROUS SULFATE 325(65) MG
325 TABLET ORAL
Status: DISCONTINUED | OUTPATIENT
Start: 2019-01-16 | End: 2019-01-18 | Stop reason: HOSPADM

## 2019-01-15 RX ADMIN — STANDARDIZED SENNA CONCENTRATE AND DOCUSATE SODIUM 2 TABLET: 8.6; 5 TABLET, FILM COATED ORAL at 05:31

## 2019-01-15 RX ADMIN — INSULIN HUMAN 4 UNITS: 100 INJECTION, SOLUTION PARENTERAL at 16:58

## 2019-01-15 RX ADMIN — IBUPROFEN 400 MG: 400 TABLET, FILM COATED ORAL at 08:09

## 2019-01-15 RX ADMIN — LISINOPRIL 10 MG: 10 TABLET ORAL at 05:31

## 2019-01-15 RX ADMIN — ASPIRIN 81 MG 81 MG: 81 TABLET ORAL at 05:32

## 2019-01-15 RX ADMIN — ATORVASTATIN CALCIUM 10 MG: 10 TABLET, FILM COATED ORAL at 16:57

## 2019-01-15 RX ADMIN — METFORMIN HYDROCHLORIDE 1000 MG: 500 TABLET ORAL at 20:40

## 2019-01-15 RX ADMIN — PANCRELIPASE 72000 UNITS: 24000; 76000; 120000 CAPSULE, DELAYED RELEASE PELLETS ORAL at 11:19

## 2019-01-15 RX ADMIN — CEFAZOLIN SODIUM 2 G: 2 INJECTION, SOLUTION INTRAVENOUS at 15:13

## 2019-01-15 RX ADMIN — IBUPROFEN 400 MG: 400 TABLET, FILM COATED ORAL at 20:40

## 2019-01-15 RX ADMIN — SODIUM CHLORIDE: 9 INJECTION, SOLUTION INTRAVENOUS at 20:40

## 2019-01-15 RX ADMIN — INSULIN HUMAN 4 UNITS: 100 INJECTION, SOLUTION PARENTERAL at 20:35

## 2019-01-15 RX ADMIN — INSULIN GLARGINE 20 UNITS: 100 INJECTION, SOLUTION SUBCUTANEOUS at 16:59

## 2019-01-15 RX ADMIN — INSULIN HUMAN 4 UNITS: 100 INJECTION, SOLUTION PARENTERAL at 11:20

## 2019-01-15 RX ADMIN — CEFAZOLIN SODIUM 2 G: 2 INJECTION, SOLUTION INTRAVENOUS at 05:29

## 2019-01-15 RX ADMIN — TAMSULOSIN HYDROCHLORIDE 0.4 MG: 0.4 CAPSULE ORAL at 09:43

## 2019-01-15 RX ADMIN — OMEPRAZOLE 20 MG: 20 CAPSULE, DELAYED RELEASE ORAL at 05:31

## 2019-01-15 RX ADMIN — ENOXAPARIN SODIUM 40 MG: 100 INJECTION SUBCUTANEOUS at 05:31

## 2019-01-15 RX ADMIN — INSULIN HUMAN 1 UNITS: 100 INJECTION, SOLUTION PARENTERAL at 05:26

## 2019-01-15 RX ADMIN — MEMANTINE HYDROCHLORIDE 5 MG: 10 TABLET ORAL at 16:57

## 2019-01-15 RX ADMIN — PANCRELIPASE 72000 UNITS: 24000; 76000; 120000 CAPSULE, DELAYED RELEASE PELLETS ORAL at 16:55

## 2019-01-15 RX ADMIN — MEMANTINE HYDROCHLORIDE 5 MG: 10 TABLET ORAL at 05:32

## 2019-01-15 RX ADMIN — CEFAZOLIN SODIUM 2 G: 2 INJECTION, SOLUTION INTRAVENOUS at 21:53

## 2019-01-15 RX ADMIN — BUPROPION HYDROCHLORIDE 150 MG: 150 TABLET, EXTENDED RELEASE ORAL at 05:32

## 2019-01-15 RX ADMIN — NICOTINE 14 MG: 14 PATCH, EXTENDED RELEASE TRANSDERMAL at 05:32

## 2019-01-15 RX ADMIN — SODIUM CHLORIDE: 9 INJECTION, SOLUTION INTRAVENOUS at 00:43

## 2019-01-15 RX ADMIN — BUPROPION HYDROCHLORIDE 150 MG: 150 TABLET, EXTENDED RELEASE ORAL at 16:57

## 2019-01-15 RX ADMIN — PANCRELIPASE 72000 UNITS: 24000; 76000; 120000 CAPSULE, DELAYED RELEASE PELLETS ORAL at 07:50

## 2019-01-15 ASSESSMENT — ENCOUNTER SYMPTOMS
ORTHOPNEA: 0
SPUTUM PRODUCTION: 0
HEADACHES: 0
DIZZINESS: 0
SHORTNESS OF BREATH: 0
DIAPHORESIS: 0
WEAKNESS: 0
FEVER: 0
WHEEZING: 0
DIARRHEA: 0
SENSORY CHANGE: 0
BRUISES/BLEEDS EASILY: 0
INSOMNIA: 0
SORE THROAT: 0
CHILLS: 0
PND: 0
NERVOUS/ANXIOUS: 0
ABDOMINAL PAIN: 0
DEPRESSION: 0
FOCAL WEAKNESS: 0
HEMOPTYSIS: 0
VOMITING: 0
COUGH: 0
PALPITATIONS: 0
CONSTIPATION: 0
TINGLING: 0
NAUSEA: 0
SPEECH CHANGE: 0

## 2019-01-15 ASSESSMENT — PAIN SCALES - GENERAL
PAINLEVEL_OUTOF10: 3
PAINLEVEL_OUTOF10: 6

## 2019-01-15 NOTE — DISCHARGE PLANNING
PATRICIAW informed that Ana from [facility] has been calling asking for an update on the Pt. LSW check Pt chart and only saw paperwork from [facility] on POA for the Pt with his sister Aniyah as his health care agent. LSW staffed with SEDRICK Lundbreg. LSW left a VM for Pt sister Aniyah asking for a call back. LSW contacted Perez at [facility] [phone] stating that they only have a POA on file and asked if they are the Pt guardian. Perez stated that they are not the guardian but have been hired by the Pt sister as a CM and both Pt and sister signed a release. Ana stated she has sent paperwork regarding this to [facility] multiple times. PATRICIAW asked if she could fax the paperwork to this LSW and then LSW can follow up with her. Perez stated she will fax the paperwork and LSW stated once they have the paperwork this LSW will follow up with her.

## 2019-01-15 NOTE — PROGRESS NOTES
Hospital Medicine Daily Progress Note    Date of Service  1/15/2019    Chief Complaint  Toe swelling    Hospital Course   Mr. Tatum is a pleasant 57 y/o male who presented for swelling in his right 2nd toe and worsening diabetic ulcer.  He has a past medical history of poorly controlled insulin-dependent diabetes mellitus, hypertension, chronic pancreatitis, and dyslipidemia. Imaging revealed osteomyelitis and on 1/12/2019 the patient underwent right second toe amputation at the metatarsophalangeal joint and irrigation debridement of the dorsal and plantar compartments of the foot. Since then he has done well, though has had uncontrolled blood sugars.  Cultures were positive for MSSA so infectious disease was consulted and the patient is anticipated to require IV antibiotics through February 23, 2019.      Interval Problem Update  Pain is well-controlled with ibuprofen.  No complaints overnight, states he slept well.  Unable to visualize the surgical site due to the dressing in place, which is C/D/I.  Currently on IV cefazolin q8h.  Sugars still elevated, range 191-341.  VSS.  Last labs done 1/12/19 were stable.    Antibiotic end date 2/23/19.    Consultants/Specialty  Orthopedic surgery, LPS  Infectious disease    Code Status  Full code    Disposition  Possible LTAC placement, TPH will evaluate the patient.  (Came from [facility] Living with [facility] home [facility].)    Review of Systems  Review of Systems   Constitutional: Negative for chills, diaphoresis, fever and malaise/fatigue.   HENT: Negative for congestion, nosebleeds and sore throat.    Respiratory: Negative for cough, hemoptysis, sputum production, shortness of breath and wheezing.    Cardiovascular: Negative for chest pain, palpitations, orthopnea, leg swelling and PND.   Gastrointestinal: Negative for abdominal pain, constipation, diarrhea, nausea and vomiting.   Genitourinary: Negative for dysuria, frequency, hematuria and urgency.    Musculoskeletal: Positive for joint pain (at right 2nd toe surgical site).   Skin: Negative for itching and rash.   Neurological: Negative for dizziness, tingling, sensory change, speech change, focal weakness, weakness and headaches.   Endo/Heme/Allergies: Does not bruise/bleed easily.   Psychiatric/Behavioral: Negative for depression. The patient is not nervous/anxious and does not have insomnia.    All other systems reviewed and are negative.     Physical Exam  Temp:  [36.7 °C (98.1 °F)-37 °C (98.6 °F)] 36.7 °C (98.1 °F)  Pulse:  [71-80] 71  Resp:  [16-18] 18  BP: (139-150)/(68-92) 150/92  SpO2:  [90 %-94 %] 94 %    Physical Exam   Constitutional: He is oriented to person, place, and time. He appears well-developed and well-nourished. He is active and cooperative. He does not appear ill. No distress.   HENT:   Head: Normocephalic and atraumatic.   Right Ear: External ear normal.   Left Ear: External ear normal.   Nose: Nose normal.   Eyes: Pupils are equal, round, and reactive to light. Right eye exhibits no discharge. Left eye exhibits no discharge. No scleral icterus.   Neck: Normal range of motion. Neck supple. No JVD present.   Cardiovascular: Normal rate, regular rhythm, normal heart sounds and intact distal pulses.  Exam reveals no gallop and no friction rub.    No murmur heard.  Pulmonary/Chest: Effort normal. No stridor. No respiratory distress. He has decreased breath sounds in the right lower field and the left lower field. He has no wheezes. He has no rhonchi. He has no rales.   Abdominal: Soft. He exhibits no distension. Bowel sounds are increased. There is no tenderness. There is no rebound and no guarding.   Musculoskeletal: Normal range of motion. He exhibits no edema.   Neurological: He is alert and oriented to person, place, and time. No sensory deficit. GCS eye subscore is 4. GCS verbal subscore is 5. GCS motor subscore is 6.   Skin: Skin is warm and dry. No rash noted. He is not diaphoretic.  No erythema. No pallor.        Psychiatric: He has a normal mood and affect. His speech is normal and behavior is normal. Judgment and thought content normal. Cognition and memory are normal.   Nursing note and vitals reviewed.    Fluids    Intake/Output Summary (Last 24 hours) at 01/15/19 1928  Last data filed at 01/15/19 0231   Gross per 24 hour   Intake                0 ml   Output             1450 ml   Net            -1450 ml     Laboratory    Imaging  US-EXTREMITY VENOUS LOWER UNILAT RIGHT   Final Result      DX-FOOT-COMPLETE 3+ RIGHT   Final Result         1.  No acute traumatic bony injury.   2.  Bony remodeling at the dorsomedial first metatarsal head, could correspond with erosive arthropathy, consider osteomyelitis as clinically appropriate         Assessment/Plan  * Subacute osteomyelitis of right foot (HCC)- (present on admission)   Assessment & Plan    S/p right 2nd toe amputation on 1/12/19. Surgical site appears uncomplicated.  Cultures positive for MSSA. Currently on IV cefazolin q8h, anticipated end date 2/23/19.  Continue pain control.  Control BS.  PT/OT order placed today.     Type 2 diabetes mellitus with hyperglycemia, with long-term current use of insulin (HCC)- (present on admission)   Assessment & Plan    Glycohemoglobin 10.5 on 1/7/2019.  The patient has been maintained on 20 units of lantus every evening, metformin 1000 mg  twice daily, and twice daily novolog injections at home.  He is currently on 20 units of lantus every evening and sliding scale insulin.  Blood sugars have ranged from 191-341 over the last 24 hours.  Continue lantus at current dose.  Resume metformin starting this evening.  Continue sliding scale insulin as needed and Accu-Cheks ACHS.  Continue diabetic diet.     Essential hypertension- (present on admission)   Assessment & Plan    Controlled on current medication regimen.  Continue home lisinopril.  Prn clonidine available if needed.     Peripheral neuropathy-  (present on admission)   Assessment & Plan    Not currently on medication for this at home.  We will continue to monitor.     Chronic pancreatitis (HCC)- (present on admission)   Assessment & Plan    Stable.  Continue enzyme replacement therapy.      Memory loss- (present on admission)   Assessment & Plan    Continue home namenda.  Currently alert and oriented x4.     Dyslipidemia- (present on admission)   Assessment & Plan    Lipid profile done 1/7/2019 was normal.  Resume home atorvastatin.     Tobacco abuse- (present on admission)   Assessment & Plan    Continue with nicotine replacement protocol as ordered.  Continue to reiterate the importance of cessation.     Vitamin D deficiency- (present on admission)   Assessment & Plan    Last level checked was normal in November 2018.  We will continue home vitamin D supplementation.        VTE prophylaxis: Lovenox

## 2019-01-15 NOTE — PROGRESS NOTES
Guardian ship called wanted updates on pt. This Rn knew nothing of guardianship so asked charge and management about this. Told to transfer them to case management, did but accidentally put the caller on hold. They called back again and wanted an update transferred them to case management. Talked with CM said they were looking in to this and they would call guardianship back when informed on case. Charge informed guardianship that we were working on this problem but no updates would be given from nurses until confirmation on guardianship. CM working on situation.

## 2019-01-15 NOTE — CARE PLAN
Problem: Communication  Goal: The ability to communicate needs accurately and effectively will improve  Outcome: PROGRESSING SLOWER THAN EXPECTED  Pt is able to communicate his needs, though at times he seems forgetful. Or RN will have an in-depth conversation and pt will later say he does not fully understand    Problem: Pain Management  Goal: Pain level will decrease to patient's comfort goal  Outcome: PROGRESSING AS EXPECTED  Pain is tolerable with minimal PRN medications. Keeping extremity elevated

## 2019-01-15 NOTE — DISCHARGE PLANNING
SEDRICK Dahl with EPS met with this LSW and requested Pt H&P. PATRICIAW provided EPS with Pt H&P.

## 2019-01-15 NOTE — PROGRESS NOTES
Infectious Disease Progress Note    Author: Da Segal M.D. Date & Time of service: 1/15/2019  10:09 AM    Chief Complaint:  Follow-up for right second toe osteomyelitis    Interval History:   afebrile no CBC, patient underwent right second toe amputation and I&D yesterday, he has 8 out of 10 right foot pain, tolerating IV antibiotics without any issues, no diarrhea   afebrile, no CBC today.  Patient reports this, no new issues  11/15 afebrile, no CBC today.  Tolerating antibiotics.    Labs Reviewed, Medications Reviewed, Radiology Reviewed and Wound Reviewed.    Review of Systems:  Review of Systems   Constitutional: Negative for chills and fever.   Respiratory: Negative for cough and shortness of breath.    Cardiovascular: Negative for chest pain.   Gastrointestinal: Negative for abdominal pain, diarrhea, nausea and vomiting.   Genitourinary: Negative for dysuria.   Musculoskeletal: Positive for joint pain.   Skin: Negative for itching and rash.   Neurological: Negative for dizziness.   All other systems reviewed and are negative.      Hemodynamics:  Temp (24hrs), Av.9 °C (98.5 °F), Min:36.8 °C (98.3 °F), Max:37 °C (98.6 °F)  Temperature: 36.8 °C (98.3 °F)  Pulse  Av.2  Min: 67  Max: 108   Blood Pressure: 146/80       Physical Exam:  Physical Exam   Constitutional: He is oriented to person, place, and time. He appears well-developed.   Thin  Older than stated age  Chronically ill-appearing   HENT:   Head: Normocephalic and atraumatic.   Eyes: Pupils are equal, round, and reactive to light. EOM are normal.   Neck: Neck supple.   Cardiovascular: Normal rate.    Murmur heard.  Pulmonary/Chest: Effort normal. He has no wheezes.   Abdominal: Soft. There is no tenderness.   Musculoskeletal: He exhibits no edema.   Right foot with surgical dressing in place. Toes are warm and wiggle   Neurological: He is alert and oriented to person, place, and time.   No gross focal neuro deficit   Skin: Skin is  warm and dry.   Psychiatric: He has a normal mood and affect. His behavior is normal.   Nursing note and vitals reviewed.  No change compared to 1/14    Meds:    Current Facility-Administered Medications:   •  ceFAZolin  •  ibuprofen  •  HYDROcodone/acetaminophen  •  aspirin  •  atorvastatin  •  buPROPion SR  •  insulin glargine  •  lisinopril  •  memantine  •  omeprazole  •  tamsulosin  •  NS  •  enoxaparin  •  cloNIDine  •  ondansetron  •  ondansetron  •  promethazine  •  promethazine  •  prochlorperazine  •  senna-docusate **AND** polyethylene glycol/lytes **AND** magnesium hydroxide **AND** bisacodyl  •  acetaminophen  •  nicotine **AND** Nicotine Replacement Patient Education Materials **AND** nicotine polacrilex  •  insulin regular **AND** Accu-Chek ACHS **AND** NOTIFY MD and PharmD **AND** glucose 4 g **AND** dextrose 50%  •  pancrelipase (Lip-Prot-Amyl)    Labs:  No results for input(s): WBC, RBC, HEMOGLOBIN, HEMATOCRIT, MCV, MCH, RDW, PLATELETCT, MPV, NEUTSPOLYS, LYMPHOCYTES, MONOCYTES, EOSINOPHILS, BASOPHILS, RBCMORPHOLO in the last 72 hours.  No results for input(s): SODIUM, POTASSIUM, CHLORIDE, CO2, GLUCOSE, BUN, CPKTOTAL in the last 72 hours.  No results for input(s): ALBUMIN, TBILIRUBIN, ALKPHOSPHAT, TOTPROTEIN, ALTSGPT, ASTSGOT, CREATININE in the last 72 hours.    Imaging:  Dx-foot-complete 3+ Right    Result Date: 1/10/2019  1/10/2019 8:14 PM HISTORY/REASON FOR EXAM: Atraumatic Pain/Swelling/Deformity TECHNIQUE/EXAM DESCRIPTION:  AP, lateral, and oblique views of the RIGHT foot. COMPARISON:  None FINDINGS: The bony structures and articulations appear within normal limits without visualized fracture, subluxation, or dislocation. Bony remodeling of the dorsal medial first metatarsal head is seen.     1.  No acute traumatic bony injury. 2.  Bony remodeling at the dorsomedial first metatarsal head, could correspond with erosive arthropathy, consider osteomyelitis as clinically appropriate    Us-extremity  Venous Lower Unilat Right    Result Date: 2019   Vascular Laboratory  CONCLUSIONS  Normal right lower extremity superficial and deep venous examination.  SHAUN CORCORAN  Exam Date:     01/10/2019 21:21  Room #:     Inpatient  Priority:     Stat  Ht (in):             Wt (lb):  Ordering Physician:        CHAVO MARC  Referring Physician:       [id number], BLISS  Sonographer:               Nila Urbina RVT  Study Type:                Complete Unilateral  Technical Quality:         Adequate  Age:    58    Gender:     M  MRN:    [id number]  :    1960      BSA:  Indications:     Localized swelling, mass and lump, right lower limb  CPT Codes:       23500  ICD Codes:         History:         Swelling of right calf and foot. No prior duplex.  Limitations:  PROCEDURES:  Right lower extremity venous duplex imaging.  The following venous structures were evaluated: common femoral, profunda  femoral, proximal portion of the greater saphenous, femoral, popliteal,  peroneal and posterior tibial veins.  Serial compression, augmentation maneuvers, color and spectral Doppler flow  evaluations were performed.  FINDINGS:  Right lower extremity -  No evidence of deep venous thrombosis.  Complete color filling and compressibility with normal venous flow dynamics  including spontaneous flow, response to augmentation maneuvers, and  respiratory phasicity.  Flow was evaluated in the contralateral common femoral vein and normal  venous flow dynamics including spontaneous flow, respiratory phasic  variation and augmentation were demonstrated.  Natalia Bustos MD  (Electronically Signed)  Final Date:      2019                   01:03      Micro:  Results     Procedure Component Value Units Date/Time    CULTURE TISSUE W/ GRM STAIN [[id number]]  (Abnormal) Collected:  19 1453    Order Status:  Completed Specimen:  Bone Updated:  19 1523     Significant Indicator POS (POS)     Source BONE     Site Right second  metatarsal head     Tissue Culture Growth noted after further incubation, see below for  organism identification.   (A)     Gram Stain Result No organisms seen.     Tissue Culture Staphylococcus aureus  Isolated from enrichment broth only, please correlate with  clinical condition.  See previous culture for sensitivity report.   (A)    ANAEROBIC CULTURE [[id number]] Collected:  01/12/19 1453    Order Status:  Completed Specimen:  Bone Updated:  01/14/19 1523     Significant Indicator NEG     Source BONE     Site Right second metatarsal head     Anaerobic Culture, Culture Res Culture in progress.    ANAEROBIC CULTURE [[id number]] Collected:  01/12/19 1448    Order Status:  Completed Specimen:  Tissue Updated:  01/14/19 1048     Significant Indicator NEG     Source TISS     Site Right second toe     Anaerobic Culture, Culture Res Culture in progress.    CULTURE TISSUE W/ GRM STAIN [[id number]]  (Abnormal)  (Susceptibility) Collected:  01/12/19 1448    Order Status:  Completed Specimen:  Tissue Updated:  01/14/19 1048     Significant Indicator POS (POS)     Source TISS     Site Right second toe     Tissue Culture - (A)     Gram Stain Result No organisms seen.     Tissue Culture Staphylococcus aureus  Rare growth   (A)    Culture & Susceptibility     STAPHYLOCOCCUS AUREUS     Antibiotic Sensitivity Microscan Unit Status    Ampicillin/sulbactam Sensitive <=8/4 mcg/mL Final    Method: SENSITIVITY, SERGO    Clindamycin Sensitive <=0.5 mcg/mL Final    Method: SENSITIVITY, SERGO    Daptomycin Sensitive 1 mcg/mL Final    Method: SENSITIVITY, SERGO    Erythromycin Sensitive <=0.5 mcg/mL Final    Method: SENSITIVITY, SERGO    Moxifloxacin Sensitive <=0.5 mcg/mL Final    Method: SENSITIVITY, SERGO    Oxacillin Sensitive <=0.25 mcg/mL Final    Method: SENSITIVITY, SERGO    Tetracycline Sensitive <=4 mcg/mL Final    Method: SENSITIVITY, SERGO    Trimeth/Sulfa Sensitive <=0.5/9.5 mcg/mL Final    Method: SENSITIVITY, SERGO    Vancomycin Sensitive 2  "mcg/mL Final    Method: SENSITIVITY, SERGO                       GRAM STAIN [[id number]] Collected:  01/12/19 1453    Order Status:  Completed Specimen:  Bone Updated:  01/12/19 2228     Significant Indicator .     Source BONE     Site Right second metatarsal head     Gram Stain Result No organisms seen.    GRAM STAIN [[id number]] Collected:  01/12/19 1448    Order Status:  Completed Specimen:  Tissue Updated:  01/12/19 2228     Significant Indicator .     Source TISS     Site Right second toe     Gram Stain Result No organisms seen.    BLOOD CULTURE [[id number]] Collected:  01/11/19 0642    Order Status:  Completed Specimen:  Blood from Peripheral Updated:  01/12/19 0748     Significant Indicator NEG     Source BLD     Site PERIPHERAL     Blood Culture No Growth    Note: Blood cultures are incubated for 5 days and  are monitored continuously.Positive blood cultures  are called to the RN and reported as soon as  they are identified.      Narrative:       Per Hospital Policy: Only change Specimen Src: to \"Line\" if  specified by physician order.    BLOOD CULTURE (Child) [[id number]] Collected:  01/10/19 2046    Order Status:  Completed Specimen:  Blood from Peripheral Updated:  01/12/19 0748     Significant Indicator NEG     Source BLD     Site PERIPHERAL     Blood Culture No Growth    Note: Blood cultures are incubated for 5 days and  are monitored continuously.Positive blood cultures  are called to the RN and reported as soon as  they are identified.      Narrative:       Per Hospital Policy: Only change Specimen Src: to \"Line\" if  specified by physician order.    BLOOD CULTURE [[id number]] Collected:  01/11/19 0300    Order Status:  Completed Specimen:  Blood from Peripheral Updated:  01/12/19 0748     Significant Indicator NEG     Source BLD     Site PERIPHERAL     Blood Culture No Growth    Note: Blood cultures are incubated for 5 days and  are monitored continuously.Positive blood cultures  are called to the RN and " "reported as soon as  they are identified.      Narrative:       Per Hospital Policy: Only change Specimen Src: to \"Line\" if  specified by physician order.    BLOOD CULTURE [[id number]]     Order Status:  Canceled Specimen:  Blood from Peripheral           Assessment:  58-year-old male with type 2 diabetes completed by peripheral neuropathy, hypertension, admitted 1/10/2019 with right second toe infection.  X-ray was concerning for underlying osteomyelitis of the first metatarsal head (some bony remodeling noted on imaging). On 1/12, I&D was performed with amputation of the right second toe at the metatarsophalangeal joint.  Necrotic tissue noted Intra-Op.    Plan:  Right foot osteomyelitis   Afebrile  Leukocytosis resolved  Status post right second toe amputation through metatarsophalangeal joint and I&D on 1/12/19 by Dr. Reddy  OR culture growing MSSA  Will discontinue IV vancomycin and Unasyn and start IV cefazolin 2 g every 8 hours  Continue wound care  Pathology pending  Given that some concerning x-ray findings were noted on the first metatarsal head, irrespective of the second metatarsal pathology, will likely treat for an underlying residual osteomyelitis with 6 weeks of IV antibiotics  Continue IV Ancef 2 g every 8 hours with an anticipated stop date of 2/23/2019  Plan to transfer to facility to continue antibiotics    Diabetic peripheral neuropathy  Contributing to above    Poorly controlled type 2 diabetes mellitus  Hemoglobin A1c 10.5  Likely contributing to infection and delayed wound  Maintain blood sugars under 150 control infection and promote wound healing    Discussed with internal medicine/Dr Carter    ID will follow.  Please call questions  "

## 2019-01-15 NOTE — PROGRESS NOTES
" [facility]    HPI:      Pawel Tatum is a 58 y.o. male, with a past medical history that includes uncontrolled type 2 diabetes and HTN, admitted 1/10/2019 for Osteomyelitis (HCC).   LPS has been consulted for his left 2nd toe.  This wound started the first week of January 2019 as a swollen toe that has increased in pain and swelling until the pt admitted to [facility].  Pt has been treating the wound with clean bandges.   The wound has failed to improve.   IV antibiotics were started on this admission.  Infectious diseases has not been consulted.    Xray has been done and does not show osteomyelitis  MRI has not been done  Ortho Dr Truong and Maureen      ASSESSMENT: Procedure(s): 1/12/19  1.  Right second toe amputation at the metatarsophalangeal joint.  2.  Right foot irrigation and debridement, dorsal and plantar compartments of   the foot.    Patient denies fevers, chills, nausea, vomiting.  Pain controlled  /80   Pulse 78   Temp 36.8 °C (98.3 °F) (Temporal)   Resp 16   Ht 1.905 m (6' 3\")   Wt 70.1 kg (154 lb 8.7 oz)   SpO2 91%   BMI 19.32 kg/m²      +2 pedal pulses  Erythema improved, edema remains. Sang drainage, maceration in between 3rd toe and 2nd toe amp site     scab to proximal 5th metarsal          DIABETES MANAGEMENT:  Lab Results   Component Value Date/Time    GLUCOSE 245 (H) 01/12/2019 04:08 AM      Lab Results   Component Value Date/Time    HBA1C 10.5 (H) 01/07/2019 02:15 PM        Diabetes education ordered    INFECTION MANAGEMENT:  WBC   Date/Time Value Ref Range Status   01/12/2019 04:08 AM 10.5 4.8 - 10.8 K/uL Final       Microbiology:   Results     Procedure Component Value Units Date/Time    CULTURE TISSUE W/ GRM STAIN [[id number]]  (Abnormal) Collected:  01/12/19 6553    Order Status:  Completed Specimen:  Bone Updated:  01/15/19 1059     Significant Indicator POS (POS)     Source BONE     Site Right second metatarsal head     Tissue Culture Growth noted after " further incubation, see below for  organism identification.   (A)     Gram Stain Result No organisms seen.     Tissue Culture Staphylococcus aureus  Isolated from enrichment broth only.  See previous culture for sensitivity report.   (A)    ANAEROBIC CULTURE [[id number]] Collected:  01/12/19 1453    Order Status:  Completed Specimen:  Bone Updated:  01/15/19 1055     Significant Indicator NEG     Source BONE     Site Right second metatarsal head     Anaerobic Culture, Culture Res Culture in progress.    ANAEROBIC CULTURE [[id number]] Collected:  01/12/19 1448    Order Status:  Completed Specimen:  Tissue Updated:  01/15/19 1051     Significant Indicator NEG     Source TISS     Site Right second toe     Anaerobic Culture, Culture Res No Anaerobes isolated.    CULTURE TISSUE W/ GRM STAIN [[id number]]  (Abnormal)  (Susceptibility) Collected:  01/12/19 1448    Order Status:  Completed Specimen:  Tissue Updated:  01/15/19 1051     Significant Indicator POS (POS)     Source TISS     Site Right second toe     Tissue Culture - (A)     Gram Stain Result No organisms seen.     Tissue Culture Staphylococcus aureus  Rare growth   (A)    Culture & Susceptibility     STAPHYLOCOCCUS AUREUS     Antibiotic Sensitivity Microscan Unit Status    Ampicillin/sulbactam Sensitive <=8/4 mcg/mL Final    Method: SENSITIVITY, SERGO    Clindamycin Sensitive <=0.5 mcg/mL Final    Method: SENSITIVITY, SERGO    Daptomycin Sensitive 1 mcg/mL Final    Method: SENSITIVITY, SERGO    Erythromycin Sensitive <=0.5 mcg/mL Final    Method: SENSITIVITY, SERGO    Moxifloxacin Sensitive <=0.5 mcg/mL Final    Method: SENSITIVITY, SERGO    Oxacillin Sensitive <=0.25 mcg/mL Final    Method: SENSITIVITY, SERGO    Tetracycline Sensitive <=4 mcg/mL Final    Method: SENSITIVITY, SERGO    Trimeth/Sulfa Sensitive <=0.5/9.5 mcg/mL Final    Method: SENSITIVITY, SERGO    Vancomycin Sensitive 2 mcg/mL Final    Method: SENSITIVITY, SERGO                       GRAM STAIN [[id number]] Collected:   "01/12/19 1453    Order Status:  Completed Specimen:  Bone Updated:  01/12/19 2228     Significant Indicator .     Source BONE     Site Right second metatarsal head     Gram Stain Result No organisms seen.    GRAM STAIN [[id number]] Collected:  01/12/19 1448    Order Status:  Completed Specimen:  Tissue Updated:  01/12/19 2228     Significant Indicator .     Source TISS     Site Right second toe     Gram Stain Result No organisms seen.    BLOOD CULTURE [[id number]] Collected:  01/11/19 0642    Order Status:  Completed Specimen:  Blood from Peripheral Updated:  01/12/19 0748     Significant Indicator NEG     Source BLD     Site PERIPHERAL     Blood Culture No Growth    Note: Blood cultures are incubated for 5 days and  are monitored continuously.Positive blood cultures  are called to the RN and reported as soon as  they are identified.      Narrative:       Per Hospital Policy: Only change Specimen Src: to \"Line\" if  specified by physician order.    BLOOD CULTURE (Child) [[id number]] Collected:  01/10/19 2046    Order Status:  Completed Specimen:  Blood from Peripheral Updated:  01/12/19 0748     Significant Indicator NEG     Source BLD     Site PERIPHERAL     Blood Culture No Growth    Note: Blood cultures are incubated for 5 days and  are monitored continuously.Positive blood cultures  are called to the RN and reported as soon as  they are identified.      Narrative:       Per Hospital Policy: Only change Specimen Src: to \"Line\" if  specified by physician order.    BLOOD CULTURE [[id number]] Collected:  01/11/19 0300    Order Status:  Completed Specimen:  Blood from Peripheral Updated:  01/12/19 0748     Significant Indicator NEG     Source BLD     Site PERIPHERAL     Blood Culture No Growth    Note: Blood cultures are incubated for 5 days and  are monitored continuously.Positive blood cultures  are called to the RN and reported as soon as  they are identified.      Narrative:       Per Hospital Policy: Only change " "Specimen Src: to \"Line\" if  specified by physician order.    BLOOD CULTURE [[id number]]     Order Status:  Canceled Specimen:  Blood from Peripheral              PLAN:  Right Second Toe - improved from yesterday. surgical bone cultures negative as of 1/14  Wound care: drsg orders updated, aquacel ag to maceration   Nursing to change Right 2nd toe every day and PRN for Saturation or Dislodgement  Wound Care by Nursing, LPS to Follow.    Offloading: Offloading shoe. orthotech to secure pegs in shoe    Antibiotics: Per ID recommendation        DISCHARGE PLAN:    Disposition: SNF, LTACH referrals     Follow-up: LPS rounds in [facility] 2/8/19            "

## 2019-01-15 NOTE — DISCHARGE PLANNING
LSW received fax from Perez from [facility] stating that thy are contracted to assist with the following: medical appt, care coordination, [place] and SSA benefit assessment. Pt confirmed this and asked that [facility] be updated. LSW contacted Perez at [facility] [phone] stating that doctor requested for Pt to transfer to LTAC and LSW received choice from the Pt for [facility] with the [place] location as his first choice and the [place] location as his second choice.

## 2019-01-15 NOTE — DISCHARGE PLANNING
Anticipated Discharge Disposition: LTAC vs return to [facility]    Action: LSW met with Pt at bedside and completed an assessment. Pt sister is his POA and Pt stated he has case management services from [employer] [place]. Pt lives at [facility] and stated that they manage his medications and have a doctor that does facility visits. Per Pt, his sister Aniyah has hired someone to assist with transportation. Pt was given choice for LTAC and Pt chose [facility].     Barriers to Discharge: acceptance to LTAC    Plan: discharge to LTAC upon medical clearance and acceptance.     Care Transition Team Assessment    Information Source  Orientation : Oriented x 4  Information Given By: Patient  Informant's Name: Pawel Tatum  Who is responsible for making decisions for patient? : Patient  Name(s) of Primary Decision Maker: Sister Josseline is POA    Readmission Evaluation  Is this a readmission?: No    Elopement Risk  Legal Hold: No  Ambulatory or Self Mobile in Wheelchair: Yes  Disoriented: No  Psychiatric Symptoms: None  History of Wandering: No  Elopement this Admit: No  Vocalizing Wanting to Leave: No  Displays Behaviors, Body Language Wanting to Leave: No-Not at Risk for Elopement  Elopement Risk: Not at Risk for Elopement    Interdisciplinary Discharge Planning  Patient or legal guardian wants to designate a caregiver (see row info): No    Discharge Preparedness  What is your plan after discharge?: Other (comment) (LTAC)  Prior Functional Level: Ambulatory, Independent with Activities of Daily Living, Needs Assist with Medication Management  Difficulity with ADLs: None  Difficulity with IADLs: Driving, Managing medication, Keeping track of finances    Functional Assesment  Prior Functional Level: Ambulatory, Independent with Activities of Daily Living, Needs Assist with Medication Management    Finances  Financial Barriers to Discharge: No  Prescription Coverage: Yes    Vision / Hearing Impairment  Vision Impairment  : Yes  Right Eye Vision: Impaired, Wears Glasses  Left Eye Vision: Impaired, Wears Glasses  Hearing Impairment : No    Values / Beliefs / Concerns  Values / Beliefs Concerns : No  Special Hospitalization Concerns: none         Domestic Abuse  Have you ever been the victim of abuse or violence?: No  Physical Abuse or Sexual Abuse: No  Verbal Abuse or Emotional Abuse: No  Possible Abuse Reported to:: Not Applicable    Psychological Assessment  History of Substance Abuse: None    Discharge Risks or Barriers  Discharge risks or barriers?: No    Anticipated Discharge Information  Anticipated discharge disposition: LTAC

## 2019-01-15 NOTE — CARE PLAN
Problem: Safety  Goal: Will remain free from injury  Outcome: PROGRESSING AS EXPECTED  Pt uses call light if needs help    Problem: Mobility  Goal: Risk for activity intolerance will decrease  Outcome: PROGRESSING AS EXPECTED  Encouraged pt to ambulate today

## 2019-01-16 LAB
BACTERIA BLD CULT: NORMAL
GLUCOSE BLD-MCNC: 148 MG/DL (ref 65–99)
GLUCOSE BLD-MCNC: 219 MG/DL (ref 65–99)
GLUCOSE BLD-MCNC: 257 MG/DL (ref 65–99)
GLUCOSE BLD-MCNC: 287 MG/DL (ref 65–99)
SIGNIFICANT IND 70042: NORMAL
SITE SITE: NORMAL
SOURCE SOURCE: NORMAL

## 2019-01-16 PROCEDURE — 700102 HCHG RX REV CODE 250 W/ 637 OVERRIDE(OP): Performed by: HOSPITALIST

## 2019-01-16 PROCEDURE — 82962 GLUCOSE BLOOD TEST: CPT | Mod: 91

## 2019-01-16 PROCEDURE — 99232 SBSQ HOSP IP/OBS MODERATE 35: CPT | Performed by: HOSPITALIST

## 2019-01-16 PROCEDURE — 700102 HCHG RX REV CODE 250 W/ 637 OVERRIDE(OP): Performed by: NURSE PRACTITIONER

## 2019-01-16 PROCEDURE — A9270 NON-COVERED ITEM OR SERVICE: HCPCS | Performed by: NURSE PRACTITIONER

## 2019-01-16 PROCEDURE — 97161 PT EVAL LOW COMPLEX 20 MIN: CPT

## 2019-01-16 PROCEDURE — 700111 HCHG RX REV CODE 636 W/ 250 OVERRIDE (IP): Performed by: INTERNAL MEDICINE

## 2019-01-16 PROCEDURE — 97165 OT EVAL LOW COMPLEX 30 MIN: CPT

## 2019-01-16 PROCEDURE — 770006 HCHG ROOM/CARE - MED/SURG/GYN SEMI*

## 2019-01-16 PROCEDURE — A9270 NON-COVERED ITEM OR SERVICE: HCPCS | Performed by: HOSPITALIST

## 2019-01-16 PROCEDURE — 700111 HCHG RX REV CODE 636 W/ 250 OVERRIDE (IP): Performed by: HOSPITALIST

## 2019-01-16 RX ADMIN — INSULIN HUMAN 3 UNITS: 100 INJECTION, SOLUTION PARENTERAL at 11:11

## 2019-01-16 RX ADMIN — INSULIN GLARGINE 20 UNITS: 100 INJECTION, SOLUTION SUBCUTANEOUS at 17:02

## 2019-01-16 RX ADMIN — TAMSULOSIN HYDROCHLORIDE 0.4 MG: 0.4 CAPSULE ORAL at 08:38

## 2019-01-16 RX ADMIN — MEMANTINE HYDROCHLORIDE 5 MG: 10 TABLET ORAL at 17:00

## 2019-01-16 RX ADMIN — CEFAZOLIN SODIUM 2 G: 2 INJECTION, SOLUTION INTRAVENOUS at 21:45

## 2019-01-16 RX ADMIN — NICOTINE 14 MG: 14 PATCH, EXTENDED RELEASE TRANSDERMAL at 05:31

## 2019-01-16 RX ADMIN — FERROUS SULFATE TAB 325 MG (65 MG ELEMENTAL FE) 325 MG: 325 (65 FE) TAB at 08:38

## 2019-01-16 RX ADMIN — ENOXAPARIN SODIUM 40 MG: 100 INJECTION SUBCUTANEOUS at 05:29

## 2019-01-16 RX ADMIN — BUPROPION HYDROCHLORIDE 150 MG: 150 TABLET, EXTENDED RELEASE ORAL at 05:27

## 2019-01-16 RX ADMIN — METFORMIN HYDROCHLORIDE 1000 MG: 500 TABLET ORAL at 16:32

## 2019-01-16 RX ADMIN — PANCRELIPASE 72000 UNITS: 24000; 76000; 120000 CAPSULE, DELAYED RELEASE PELLETS ORAL at 11:05

## 2019-01-16 RX ADMIN — INSULIN HUMAN 2 UNITS: 100 INJECTION, SOLUTION PARENTERAL at 05:30

## 2019-01-16 RX ADMIN — CEFAZOLIN SODIUM 2 G: 2 INJECTION, SOLUTION INTRAVENOUS at 05:29

## 2019-01-16 RX ADMIN — STANDARDIZED SENNA CONCENTRATE AND DOCUSATE SODIUM 2 TABLET: 8.6; 5 TABLET, FILM COATED ORAL at 05:28

## 2019-01-16 RX ADMIN — ASPIRIN 81 MG 81 MG: 81 TABLET ORAL at 05:27

## 2019-01-16 RX ADMIN — LISINOPRIL 10 MG: 10 TABLET ORAL at 05:27

## 2019-01-16 RX ADMIN — CEFAZOLIN SODIUM 2 G: 2 INJECTION, SOLUTION INTRAVENOUS at 13:54

## 2019-01-16 RX ADMIN — METFORMIN HYDROCHLORIDE 1000 MG: 500 TABLET ORAL at 08:38

## 2019-01-16 RX ADMIN — BUPROPION HYDROCHLORIDE 150 MG: 150 TABLET, EXTENDED RELEASE ORAL at 17:01

## 2019-01-16 RX ADMIN — STANDARDIZED SENNA CONCENTRATE AND DOCUSATE SODIUM 2 TABLET: 8.6; 5 TABLET, FILM COATED ORAL at 17:01

## 2019-01-16 RX ADMIN — PANCRELIPASE 72000 UNITS: 24000; 76000; 120000 CAPSULE, DELAYED RELEASE PELLETS ORAL at 16:32

## 2019-01-16 RX ADMIN — VITAMIN D, TAB 1000IU (100/BT) 4000 UNITS: 25 TAB at 05:27

## 2019-01-16 RX ADMIN — OMEPRAZOLE 20 MG: 20 CAPSULE, DELAYED RELEASE ORAL at 05:27

## 2019-01-16 RX ADMIN — PANCRELIPASE 72000 UNITS: 24000; 76000; 120000 CAPSULE, DELAYED RELEASE PELLETS ORAL at 08:38

## 2019-01-16 RX ADMIN — ATORVASTATIN CALCIUM 10 MG: 10 TABLET, FILM COATED ORAL at 17:01

## 2019-01-16 RX ADMIN — MEMANTINE HYDROCHLORIDE 5 MG: 10 TABLET ORAL at 05:27

## 2019-01-16 RX ADMIN — INSULIN HUMAN 3 UNITS: 100 INJECTION, SOLUTION PARENTERAL at 16:33

## 2019-01-16 ASSESSMENT — COGNITIVE AND FUNCTIONAL STATUS - GENERAL
SUGGESTED CMS G CODE MODIFIER MOBILITY: CI
MOBILITY SCORE: 23
CLIMB 3 TO 5 STEPS WITH RAILING: A LITTLE
DAILY ACTIVITIY SCORE: 24
SUGGESTED CMS G CODE MODIFIER DAILY ACTIVITY: CH

## 2019-01-16 ASSESSMENT — ENCOUNTER SYMPTOMS
FEVER: 0
FOCAL WEAKNESS: 0
DIZZINESS: 0
DIARRHEA: 0
BRUISES/BLEEDS EASILY: 0
PALPITATIONS: 0
PND: 0
NAUSEA: 0
ABDOMINAL PAIN: 0
COUGH: 0
HEADACHES: 0
SPUTUM PRODUCTION: 0
WEAKNESS: 0
CONSTIPATION: 0
SORE THROAT: 0
NERVOUS/ANXIOUS: 0
ORTHOPNEA: 0
VOMITING: 0
DEPRESSION: 0
WHEEZING: 0
HEMOPTYSIS: 0
SENSORY CHANGE: 0
TINGLING: 0
INSOMNIA: 0
DIAPHORESIS: 0
SPEECH CHANGE: 0
SHORTNESS OF BREATH: 0
CHILLS: 0

## 2019-01-16 ASSESSMENT — GAIT ASSESSMENTS
DISTANCE (FEET): 250
GAIT LEVEL OF ASSIST: STAND BY ASSIST
DEVIATION: DECREASED BASE OF SUPPORT

## 2019-01-16 ASSESSMENT — PAIN SCALES - GENERAL
PAINLEVEL_OUTOF10: 0

## 2019-01-16 ASSESSMENT — ACTIVITIES OF DAILY LIVING (ADL): TOILETING: INDEPENDENT

## 2019-01-16 NOTE — ASSESSMENT & PLAN NOTE
Continue with nicotine replacement protocol as ordered.  Continue to reiterate the importance of cessation.

## 2019-01-16 NOTE — PROGRESS NOTES
Hospital Medicine Daily Progress Note    Date of Service  1/16/2019    Chief Complaint  Toe swelling    Hospital Course   Mr. Tatum is a pleasant 57 y/o male who presented for swelling in his right 2nd toe and worsening diabetic ulcer.  He has a past medical history of poorly controlled insulin-dependent diabetes mellitus, hypertension, chronic pancreatitis, and dyslipidemia. Imaging revealed osteomyelitis and on 1/12/2019 the patient underwent right second toe amputation at the metatarsophalangeal joint and irrigation debridement of the dorsal and plantar compartments of the foot. Since then he has done well, though has had uncontrolled blood sugars.  Cultures were positive for MSSA so infectious disease was consulted and the patient is anticipated to require IV antibiotics through February 23, 2019.      Interval Problem Update  Sitting in a chair at the bedside. Mobilizing & eating without difficulty. Right foot boot in place. Denies pain today.   No issues overnight.  VSS. On room air.  No labs done today.    Antibiotic end date 2/23/19.    Consultants/Specialty  Orthopedic surgery, LPS  Infectious disease    Code Status  Full code    Disposition  Possible LTAC placement, TPH will evaluate the patient.  (Came from [facility] with [facility].)    Review of Systems  Review of Systems   Constitutional: Negative for chills, diaphoresis, fever and malaise/fatigue.   HENT: Negative for congestion, nosebleeds and sore throat.    Respiratory: Negative for cough, hemoptysis, sputum production, shortness of breath and wheezing.    Cardiovascular: Negative for chest pain, palpitations, orthopnea, leg swelling and PND.   Gastrointestinal: Negative for abdominal pain, constipation, diarrhea, nausea and vomiting.   Genitourinary: Negative for dysuria, frequency, hematuria and urgency.   Musculoskeletal: Negative for joint pain.        Denies pain   Skin: Negative for itching and rash.   Neurological:  Negative for dizziness, tingling, sensory change, speech change, focal weakness, weakness and headaches.   Endo/Heme/Allergies: Does not bruise/bleed easily.   Psychiatric/Behavioral: Negative for depression. The patient is not nervous/anxious and does not have insomnia.    All other systems reviewed and are negative.     Physical Exam  Temp:  [36.7 °C (98 °F)-37.2 °C (99 °F)] 37.2 °C (99 °F)  Pulse:  [] 107  Resp:  [16-18] 18  BP: (126-150)/(74-92) 140/81  SpO2:  [90 %-95 %] 92 %    Physical Exam   Constitutional: He is oriented to person, place, and time. He appears well-developed and well-nourished. He is active and cooperative. He does not appear ill. No distress.   HENT:   Head: Normocephalic and atraumatic.   Eyes: Pupils are equal, round, and reactive to light. Conjunctivae are normal. Right eye exhibits no discharge. Left eye exhibits no discharge. No scleral icterus.   Neck: Normal range of motion. Neck supple. No JVD present.   Cardiovascular: Normal rate, regular rhythm, normal heart sounds and intact distal pulses.  Exam reveals no gallop and no friction rub.    No murmur heard.  Pulmonary/Chest: Effort normal. No stridor. No respiratory distress. He has no decreased breath sounds. He has no wheezes. He has no rhonchi. He has no rales.   Abdominal: Soft. Bowel sounds are normal. He exhibits no distension. There is no tenderness. There is no rebound and no guarding.   Musculoskeletal: Normal range of motion. He exhibits no edema.   Neurological: He is alert and oriented to person, place, and time. He has normal strength. No sensory deficit. GCS eye subscore is 4. GCS verbal subscore is 5. GCS motor subscore is 6.   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   Skin: Skin is warm and dry. No rash noted. He is not diaphoretic. No erythema. No pallor.        Psychiatric: He has a normal mood and affect. His speech is normal and behavior is  normal. Judgment and thought content normal. Cognition and memory are normal.   Nursing note and vitals reviewed.    Fluids    Intake/Output Summary (Last 24 hours) at 01/16/19 1352  Last data filed at 01/16/19 0839   Gross per 24 hour   Intake                0 ml   Output             1525 ml   Net            -1525 ml     Laboratory    Imaging  US-EXTREMITY VENOUS LOWER UNILAT RIGHT   Final Result      DX-FOOT-COMPLETE 3+ RIGHT   Final Result         1.  No acute traumatic bony injury.   2.  Bony remodeling at the dorsomedial first metatarsal head, could correspond with erosive arthropathy, consider osteomyelitis as clinically appropriate         Assessment/Plan  * Subacute osteomyelitis of right foot (HCC)- (present on admission)   Assessment & Plan    S/p right 2nd toe amputation on 1/12/19. Surgical site appears uncomplicated.  Cultures positive for MSSA. Currently on IV cefazolin q8h, anticipated end date 2/23/19.  Continue pain control. Currently denies pain.  Goal BS < 150.  PT/OT pending.     Type 2 diabetes mellitus with hyperglycemia, with long-term current use of insulin (formerly [facility])- (present on admission)   Assessment & Plan    Glycohemoglobin 10.5 on 1/7/2019.  Was maintained on 20 units of lantus every evening, metformin 1000 mg  twice daily, and BID novolog injections at home.    He is currently on 20 units of lantus every evening and sliding scale insulin, which will be continued..  Blood sugars were 219-318 overnight. Metformin resumed last night.   Continue BS ACHS.  Continue diabetic diet.     Essential hypertension- (present on admission)   Assessment & Plan    Controlled on current medication regimen.  Continue home lisinopril.  Prn clonidine available if needed.     Peripheral neuropathy- (present on admission)   Assessment & Plan    Not currently on medication for this at home.  We will continue to monitor.     Chronic pancreatitis (HCC)- (present on admission)   Assessment & Plan    Stable.   Continue enzyme replacement therapy.      Memory loss- (present on admission)   Assessment & Plan    Continue home namenda.  Currently alert and oriented x4.     Dyslipidemia- (present on admission)   Assessment & Plan    Lipid profile done 1/7/2019 was normal.  Resume home atorvastatin.     Tobacco abuse- (present on admission)   Assessment & Plan    Continue with nicotine replacement protocol as ordered.  Continue to reiterate the importance of cessation.     Vitamin D deficiency- (present on admission)   Assessment & Plan    Last level checked was normal in November 2018.  We will continue home vitamin D supplementation.        VTE prophylaxis: Lovenox

## 2019-01-16 NOTE — THERAPY
"Occupational Therapy Evaluation completed.   Functional Status:    57 y/o male admitted for osteomyelitis, now s/p R 2nd toe amputation. Pt doing well today, completed sup>sit with Mod I, sit>stand Mod I without AD. Ambulated around room and sat in chair. Pt has boxers and a shirt on, which he reportedly donned independently. He was instructed on how to secure offloading boot, pt demonstrated this task with supv. Pt has no further acute OT needs at this time.     Plan of Care: Patient with no further skilled OT needs in the acute care setting at this time  Discharge Recommendations:  Equipment: No Equipment Needed. Post-acute therapy Currently anticipate no further skilled therapy needs once patient is discharged from the inpatient setting.    See \"Rehab Therapy-Acute\" Patient Summary Report for complete documentation.    "

## 2019-01-16 NOTE — PROGRESS NOTES
Star from [employer] called to request updates on acceptance to [facility]. Updated Star that per most recent CM note, TPH will review case today.     Roni reiterated that pt does NOT have medicare, he has [facility] only, and accepting facility will need to accept the insurance. This RN LM for CM to this effect.

## 2019-01-16 NOTE — CARE PLAN
Problem: Bowel/Gastric:  Goal: Normal bowel function is maintained or improved  Outcome: PROGRESSING AS EXPECTED  Pt reports BM yesterday and today. Promote PO intake food and fluids, encourage mobility, bowel protocol as ordered.    Problem: Fluid Volume:  Goal: Will maintain balanced intake and output  Outcome: PROGRESSING AS EXPECTED  Pt tolerating PO intake fluids and voiding clear yellow urine with no issues. Continue to provide fluids and monitor.

## 2019-01-16 NOTE — CARE PLAN
Problem: Knowledge Deficit  Goal: Knowledge of disease process/condition, treatment plan, diagnostic tests, and medications will improve  Outcome: PROGRESSING SLOWER THAN EXPECTED  Pt seems to be hungry late at night, asking for food. Educated on diabetic diet, MD trying new medication, and offered sugar free pudding. This was not enough and pt stated he needed a meal so staff ordered from the grill. May need some follow-up on diet management.     Problem: Mobility  Goal: Risk for activity intolerance will decrease  Outcome: PROGRESSING AS EXPECTED  Pt moves well, needs some encouragement to ambulate more often. Urged to use restroom at night, prefers to use urinal at bedside.

## 2019-01-16 NOTE — PROGRESS NOTES
Pt request to know if lantus or regular insulin dosing should be adjusted, as his fsbg remains in the 200s.     Spoke with MD Carter and notified him of pt request. Per MD, he will review chart and place orders as appropriate.

## 2019-01-17 LAB
ALBUMIN SERPL BCP-MCNC: 3.8 G/DL (ref 3.2–4.9)
ALBUMIN/GLOB SERPL: 1.4 G/DL
ALP SERPL-CCNC: 81 U/L (ref 30–99)
ALT SERPL-CCNC: 6 U/L (ref 2–50)
ANION GAP SERPL CALC-SCNC: 10 MMOL/L (ref 0–11.9)
AST SERPL-CCNC: 11 U/L (ref 12–45)
BACTERIA SPEC ANAEROBE CULT: ABNORMAL
BACTERIA SPEC ANAEROBE CULT: ABNORMAL
BASOPHILS # BLD AUTO: 1 % (ref 0–1.8)
BASOPHILS # BLD: 0.11 K/UL (ref 0–0.12)
BILIRUB SERPL-MCNC: 0.3 MG/DL (ref 0.1–1.5)
BUN SERPL-MCNC: 23 MG/DL (ref 8–22)
CALCIUM SERPL-MCNC: 9.8 MG/DL (ref 8.5–10.5)
CHLORIDE SERPL-SCNC: 106 MMOL/L (ref 96–112)
CO2 SERPL-SCNC: 23 MMOL/L (ref 20–33)
CREAT SERPL-MCNC: 0.88 MG/DL (ref 0.5–1.4)
EOSINOPHIL # BLD AUTO: 0.46 K/UL (ref 0–0.51)
EOSINOPHIL NFR BLD: 4.3 % (ref 0–6.9)
ERYTHROCYTE [DISTWIDTH] IN BLOOD BY AUTOMATED COUNT: 55.8 FL (ref 35.9–50)
GLOBULIN SER CALC-MCNC: 2.7 G/DL (ref 1.9–3.5)
GLUCOSE BLD-MCNC: 188 MG/DL (ref 65–99)
GLUCOSE BLD-MCNC: 221 MG/DL (ref 65–99)
GLUCOSE BLD-MCNC: 297 MG/DL (ref 65–99)
GLUCOSE BLD-MCNC: 319 MG/DL (ref 65–99)
GLUCOSE SERPL-MCNC: 240 MG/DL (ref 65–99)
HCT VFR BLD AUTO: 36.8 % (ref 42–52)
HGB BLD-MCNC: 12.2 G/DL (ref 14–18)
IMM GRANULOCYTES # BLD AUTO: 0.21 K/UL (ref 0–0.11)
IMM GRANULOCYTES NFR BLD AUTO: 2 % (ref 0–0.9)
LYMPHOCYTES # BLD AUTO: 4.58 K/UL (ref 1–4.8)
LYMPHOCYTES NFR BLD: 42.7 % (ref 22–41)
MCH RBC QN AUTO: 33 PG (ref 27–33)
MCHC RBC AUTO-ENTMCNC: 33.2 G/DL (ref 33.7–35.3)
MCV RBC AUTO: 99.5 FL (ref 81.4–97.8)
MONOCYTES # BLD AUTO: 1.22 K/UL (ref 0–0.85)
MONOCYTES NFR BLD AUTO: 11.4 % (ref 0–13.4)
NEUTROPHILS # BLD AUTO: 4.14 K/UL (ref 1.82–7.42)
NEUTROPHILS NFR BLD: 38.6 % (ref 44–72)
NRBC # BLD AUTO: 0 K/UL
NRBC BLD-RTO: 0 /100 WBC
PLATELET # BLD AUTO: 334 K/UL (ref 164–446)
PMV BLD AUTO: 9.6 FL (ref 9–12.9)
POTASSIUM SERPL-SCNC: 3.8 MMOL/L (ref 3.6–5.5)
PROT SERPL-MCNC: 6.5 G/DL (ref 6–8.2)
RBC # BLD AUTO: 3.7 M/UL (ref 4.7–6.1)
SIGNIFICANT IND 70042: ABNORMAL
SITE SITE: ABNORMAL
SODIUM SERPL-SCNC: 139 MMOL/L (ref 135–145)
SOURCE SOURCE: ABNORMAL
WBC # BLD AUTO: 10.7 K/UL (ref 4.8–10.8)

## 2019-01-17 PROCEDURE — A9270 NON-COVERED ITEM OR SERVICE: HCPCS | Performed by: NURSE PRACTITIONER

## 2019-01-17 PROCEDURE — 80053 COMPREHEN METABOLIC PANEL: CPT

## 2019-01-17 PROCEDURE — 36415 COLL VENOUS BLD VENIPUNCTURE: CPT

## 2019-01-17 PROCEDURE — A9270 NON-COVERED ITEM OR SERVICE: HCPCS | Performed by: HOSPITALIST

## 2019-01-17 PROCEDURE — 700111 HCHG RX REV CODE 636 W/ 250 OVERRIDE (IP): Performed by: HOSPITALIST

## 2019-01-17 PROCEDURE — 99232 SBSQ HOSP IP/OBS MODERATE 35: CPT | Performed by: HOSPITALIST

## 2019-01-17 PROCEDURE — 85025 COMPLETE CBC W/AUTO DIFF WBC: CPT

## 2019-01-17 PROCEDURE — 700102 HCHG RX REV CODE 250 W/ 637 OVERRIDE(OP): Performed by: HOSPITALIST

## 2019-01-17 PROCEDURE — 99232 SBSQ HOSP IP/OBS MODERATE 35: CPT | Performed by: INTERNAL MEDICINE

## 2019-01-17 PROCEDURE — 700111 HCHG RX REV CODE 636 W/ 250 OVERRIDE (IP): Performed by: INTERNAL MEDICINE

## 2019-01-17 PROCEDURE — 82962 GLUCOSE BLOOD TEST: CPT | Mod: 91

## 2019-01-17 PROCEDURE — 770006 HCHG ROOM/CARE - MED/SURG/GYN SEMI*

## 2019-01-17 PROCEDURE — 700102 HCHG RX REV CODE 250 W/ 637 OVERRIDE(OP): Performed by: NURSE PRACTITIONER

## 2019-01-17 RX ORDER — INSULIN GLARGINE 100 [IU]/ML
25 INJECTION, SOLUTION SUBCUTANEOUS EVERY EVENING
Status: DISCONTINUED | OUTPATIENT
Start: 2019-01-17 | End: 2019-01-18

## 2019-01-17 RX ORDER — INSULIN GLARGINE 100 [IU]/ML
25 INJECTION, SOLUTION SUBCUTANEOUS EVERY EVENING
Status: DISCONTINUED | OUTPATIENT
Start: 2019-01-17 | End: 2019-01-17

## 2019-01-17 RX ADMIN — PANCRELIPASE 72000 UNITS: 24000; 76000; 120000 CAPSULE, DELAYED RELEASE PELLETS ORAL at 17:24

## 2019-01-17 RX ADMIN — BUPROPION HYDROCHLORIDE 150 MG: 150 TABLET, EXTENDED RELEASE ORAL at 17:23

## 2019-01-17 RX ADMIN — PANCRELIPASE 72000 UNITS: 24000; 76000; 120000 CAPSULE, DELAYED RELEASE PELLETS ORAL at 08:04

## 2019-01-17 RX ADMIN — OMEPRAZOLE 20 MG: 20 CAPSULE, DELAYED RELEASE ORAL at 06:00

## 2019-01-17 RX ADMIN — ASPIRIN 81 MG 81 MG: 81 TABLET ORAL at 06:00

## 2019-01-17 RX ADMIN — CEFAZOLIN SODIUM 2 G: 2 INJECTION, SOLUTION INTRAVENOUS at 22:50

## 2019-01-17 RX ADMIN — STANDARDIZED SENNA CONCENTRATE AND DOCUSATE SODIUM 2 TABLET: 8.6; 5 TABLET, FILM COATED ORAL at 17:24

## 2019-01-17 RX ADMIN — INSULIN GLARGINE 25 UNITS: 100 INJECTION, SOLUTION SUBCUTANEOUS at 21:07

## 2019-01-17 RX ADMIN — LISINOPRIL 10 MG: 10 TABLET ORAL at 06:01

## 2019-01-17 RX ADMIN — TAMSULOSIN HYDROCHLORIDE 0.4 MG: 0.4 CAPSULE ORAL at 08:04

## 2019-01-17 RX ADMIN — METFORMIN HYDROCHLORIDE 1000 MG: 500 TABLET ORAL at 08:03

## 2019-01-17 RX ADMIN — INSULIN LISPRO 4 UNITS: 100 INJECTION, SOLUTION INTRAVENOUS; SUBCUTANEOUS at 22:47

## 2019-01-17 RX ADMIN — CEFAZOLIN SODIUM 2 G: 2 INJECTION, SOLUTION INTRAVENOUS at 05:56

## 2019-01-17 RX ADMIN — MEMANTINE HYDROCHLORIDE 5 MG: 10 TABLET ORAL at 17:23

## 2019-01-17 RX ADMIN — INSULIN HUMAN 3 UNITS: 100 INJECTION, SOLUTION PARENTERAL at 12:07

## 2019-01-17 RX ADMIN — INSULIN HUMAN 2 UNITS: 100 INJECTION, SOLUTION PARENTERAL at 17:28

## 2019-01-17 RX ADMIN — INSULIN HUMAN 4 UNITS: 100 INJECTION, SOLUTION PARENTERAL at 21:07

## 2019-01-17 RX ADMIN — NICOTINE 14 MG: 14 PATCH, EXTENDED RELEASE TRANSDERMAL at 06:03

## 2019-01-17 RX ADMIN — FERROUS SULFATE TAB 325 MG (65 MG ELEMENTAL FE) 325 MG: 325 (65 FE) TAB at 08:04

## 2019-01-17 RX ADMIN — VITAMIN D, TAB 1000IU (100/BT) 4000 UNITS: 25 TAB at 06:00

## 2019-01-17 RX ADMIN — ENOXAPARIN SODIUM 40 MG: 100 INJECTION SUBCUTANEOUS at 06:02

## 2019-01-17 RX ADMIN — PANCRELIPASE 72000 UNITS: 24000; 76000; 120000 CAPSULE, DELAYED RELEASE PELLETS ORAL at 12:06

## 2019-01-17 RX ADMIN — METFORMIN HYDROCHLORIDE 1000 MG: 500 TABLET ORAL at 17:23

## 2019-01-17 RX ADMIN — INSULIN HUMAN 1 UNITS: 100 INJECTION, SOLUTION PARENTERAL at 06:04

## 2019-01-17 RX ADMIN — BUPROPION HYDROCHLORIDE 150 MG: 150 TABLET, EXTENDED RELEASE ORAL at 06:00

## 2019-01-17 RX ADMIN — MEMANTINE HYDROCHLORIDE 5 MG: 10 TABLET ORAL at 06:01

## 2019-01-17 RX ADMIN — ATORVASTATIN CALCIUM 10 MG: 10 TABLET, FILM COATED ORAL at 17:23

## 2019-01-17 RX ADMIN — CEFAZOLIN SODIUM 2 G: 2 INJECTION, SOLUTION INTRAVENOUS at 14:23

## 2019-01-17 ASSESSMENT — ENCOUNTER SYMPTOMS
SHORTNESS OF BREATH: 0
HEMOPTYSIS: 0
ORTHOPNEA: 0
DEPRESSION: 0
INSOMNIA: 0
WHEEZING: 0
NERVOUS/ANXIOUS: 0
HEADACHES: 0
COUGH: 0
WEAKNESS: 0
PALPITATIONS: 0
FOCAL WEAKNESS: 0
SENSORY CHANGE: 0
TINGLING: 0
ABDOMINAL PAIN: 0
DIZZINESS: 0
SPEECH CHANGE: 0
BRUISES/BLEEDS EASILY: 0
NAUSEA: 0
FEVER: 0
PND: 0
VOMITING: 0
DIARRHEA: 0
CONSTIPATION: 0
DIAPHORESIS: 0
CHILLS: 0
SPUTUM PRODUCTION: 0

## 2019-01-17 ASSESSMENT — PAIN SCALES - GENERAL: PAINLEVEL_OUTOF10: 0

## 2019-01-17 NOTE — PROGRESS NOTES
[facility] Medicine Daily Progress Note    Date of Service  1/17/2019    Chief Complaint  Toe swelling    Hospital Course   Mr. Tatum is a pleasant 59 y/o male who presented for swelling in his right 2nd toe and worsening diabetic ulcer.  He has a past medical history of poorly controlled insulin-dependent diabetes mellitus, hypertension, chronic pancreatitis, and dyslipidemia. Imaging revealed osteomyelitis and on 1/12/2019 the patient underwent right second toe amputation at the metatarsophalangeal joint and irrigation debridement of the dorsal and plantar compartments of the foot. Since then he has done well, though has had uncontrolled blood sugars.  Cultures were positive for MSSA so infectious disease was consulted and the patient is anticipated to require IV antibiotics through February 23, 2019.      Interval Problem Update  No problems overnight. Denies pain.   Waiting for insurance approval for LTAC placement.  Labs today are stable.  VSS.    Currently on IV ancef q8h. Antibiotic end date 2/23/19.    Consultants/Specialty  Orthopedic surgery, LPS  Infectious disease    Code Status  Full code    Disposition  Possible LTAC placement, pending insurance auth.  (Came from [facility] with [facility].)    Review of Systems  Review of Systems   Constitutional: Negative for chills, diaphoresis, fever and malaise/fatigue.   HENT: Negative.    Respiratory: Negative for cough, hemoptysis, sputum production, shortness of breath and wheezing.    Cardiovascular: Negative for chest pain, palpitations, orthopnea, leg swelling and PND.   Gastrointestinal: Negative for abdominal pain, constipation, diarrhea, nausea and vomiting.   Genitourinary: Negative for dysuria, frequency, hematuria and urgency.   Musculoskeletal: Negative for joint pain.        Denies pain   Skin: Negative for rash.   Neurological: Negative for dizziness, tingling, sensory change, speech change, focal weakness, weakness and  headaches.   Endo/Heme/Allergies: Does not bruise/bleed easily.   Psychiatric/Behavioral: Negative for depression. The patient is not nervous/anxious and does not have insomnia.    All other systems reviewed and are negative.     Physical Exam  Temp:  [36.4 °C (97.6 °F)-37.1 °C (98.8 °F)] 36.4 °C (97.6 °F)  Pulse:  [69-83] 69  Resp:  [16-17] 17  BP: (140-160)/(76-87) 141/76  SpO2:  [92 %-97 %] 95 %    Physical Exam   Constitutional: He is oriented to person, place, and time. He appears well-developed and well-nourished. He is active and cooperative. He does not appear ill. No distress.   HENT:   Head: Normocephalic and atraumatic.   Eyes: Pupils are equal, round, and reactive to light. Conjunctivae and EOM are normal. Right eye exhibits no discharge. Left eye exhibits no discharge. No scleral icterus.   Neck: Normal range of motion. Neck supple. No JVD present.   Cardiovascular: Normal rate, regular rhythm, normal heart sounds and intact distal pulses.  Exam reveals no gallop and no friction rub.    No murmur heard.  Pulmonary/Chest: Effort normal. No stridor. No respiratory distress. He has no decreased breath sounds. He has no wheezes. He has no rhonchi. He has no rales.   Abdominal: Soft. Bowel sounds are normal. He exhibits no distension. There is no tenderness. There is no rebound and no guarding.   Musculoskeletal: Normal range of motion. He exhibits no edema.   Neurological: He is alert and oriented to person, place, and time. He has normal strength. No sensory deficit. Gait normal. GCS eye subscore is 4. GCS verbal subscore is 5. GCS motor subscore is 6.   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   Skin: Skin is warm and dry. No rash noted. He is not diaphoretic. No erythema. No pallor.        Psychiatric: He has a normal mood and affect. His speech is normal and behavior is normal. Judgment and thought content normal. Cognition and memory are  normal.   Nursing note and vitals reviewed.    Fluids    Intake/Output Summary (Last 24 hours) at 01/17/19 1720  Last data filed at 01/17/19 1400   Gross per 24 hour   Intake              720 ml   Output             1250 ml   Net             -530 ml     Laboratory    Imaging  US-EXTREMITY VENOUS LOWER UNILAT RIGHT   Final Result      DX-FOOT-COMPLETE 3+ RIGHT   Final Result         1.  No acute traumatic bony injury.   2.  Bony remodeling at the dorsomedial first metatarsal head, could correspond with erosive arthropathy, consider osteomyelitis as clinically appropriate         Assessment/Plan  * Subacute osteomyelitis of right foot (HCC)- (present on admission)   Assessment & Plan    S/p right 2nd toe amputation on 1/12/19. Surgical site remains uncomplicated.  Cultures positive for MSSA. Currently on IV cefazolin q8h, anticipated end date 2/23/19.  Continue pain control.  Goal BS < 150.  PT/OT pending.     Type 2 diabetes mellitus with hyperglycemia, with long-term current use of insulin (HCC)- (present on admission)   Assessment & Plan    Glycohemoglobin down to 10.5 on 1/7/2019 (previously 17.4 in Nov 2018).  Was maintained on 20 units of lantus every evening, metformin 1000 mg  twice daily, and BID novolog injections at home.    Increased lantus to 25 units every evening and added meal time correctional insulin. Continue SSI as required ACHS.  Continue metformin.  Continue BS ACHS.  Continue diabetic diet.    **Goal BS < 150 for optimal healing**     Essential hypertension- (present on admission)   Assessment & Plan    Controlled on current medication regimen.  Continue home lisinopril.  Prn clonidine available if needed.     Peripheral neuropathy- (present on admission)   Assessment & Plan    Not currently on medication for this at home.  We will continue to monitor.     Chronic pancreatitis (HCC)- (present on admission)   Assessment & Plan    Stable.  Continue enzyme replacement therapy.      Memory loss-  (present on admission)   Assessment & Plan    Continue home namenda.  Currently alert and oriented x4.     Dyslipidemia- (present on admission)   Assessment & Plan    Lipid profile done 1/7/2019 was normal.  Resume home atorvastatin.     Tobacco abuse- (present on admission)   Assessment & Plan    Continue with nicotine replacement protocol as ordered.  Continue to reiterate the importance of cessation.     Vitamin D deficiency- (present on admission)   Assessment & Plan    Last level checked was normal in November 2018.  We will continue home vitamin D supplementation.        VTE prophylaxis: Lovenox

## 2019-01-17 NOTE — PROGRESS NOTES
Infectious Disease Progress Note    Author: Da Segal M.D. Date & Time of service: 2019  9:04 AM    Chief Complaint:  Follow-up for right second toe osteomyelitis    Interval History:   afebrile no CBC, patient underwent right second toe amputation and I&D yesterday, he has 8 out of 10 right foot pain, tolerating IV antibiotics without any issues, no diarrhea   afebrile, no CBC today.  Patient reports this, no new issues  11/15 afebrile, no CBC today.  Tolerating antibiotics.   afebrile, white count 10.7.  Tolerating antibiotics, no new issues, plan transfer to [facility] when accepted    Labs Reviewed, Medications Reviewed, Radiology Reviewed and Wound Reviewed.    Review of Systems:  Review of Systems   Constitutional: Negative for chills and fever.   Respiratory: Negative for cough and shortness of breath.    Cardiovascular: Negative for chest pain.   Gastrointestinal: Negative for abdominal pain, diarrhea, nausea and vomiting.   Genitourinary: Negative for dysuria.   Musculoskeletal: Positive for joint pain.   Skin: Negative for itching and rash.   Neurological: Negative for dizziness.   All other systems reviewed and are negative.      Hemodynamics:  Temp (24hrs), Av °C (98.6 °F), Min:36.8 °C (98.2 °F), Max:37.2 °C (99 °F)  Temperature: 36.9 °C (98.5 °F)  Pulse  Av.4  Min: 67  Max: 108   Blood Pressure: 140/79       Physical Exam:  Physical Exam   Constitutional: He is oriented to person, place, and time. He appears well-developed.   Thin  Older than stated age  Chronically ill-appearing   HENT:   Head: Normocephalic and atraumatic.   Eyes: Pupils are equal, round, and reactive to light. EOM are normal.   Neck: Neck supple.   Cardiovascular: Normal rate.    Murmur heard.  Pulmonary/Chest: Effort normal. He has no wheezes.   Abdominal: Soft. There is no tenderness.   Musculoskeletal: He exhibits no edema.   Right foot with surgical dressing in place. Toes are warm and wiggle    Neurological: He is alert and oriented to person, place, and time.   No gross focal neuro deficit   Skin: Skin is warm and dry.   Psychiatric: He has a normal mood and affect. His behavior is normal.   Nursing note and vitals reviewed.  No change compared to 1/15    Meds:    Current Facility-Administered Medications:   •  vitamin D  •  ferrous sulfate  •  metformin  •  ceFAZolin  •  ibuprofen  •  HYDROcodone/acetaminophen  •  aspirin  •  atorvastatin  •  buPROPion SR  •  insulin glargine  •  lisinopril  •  memantine  •  omeprazole  •  tamsulosin  •  NS  •  enoxaparin  •  cloNIDine  •  ondansetron  •  ondansetron  •  promethazine  •  promethazine  •  prochlorperazine  •  senna-docusate **AND** polyethylene glycol/lytes **AND** magnesium hydroxide **AND** bisacodyl  •  acetaminophen  •  nicotine **AND** Nicotine Replacement Patient Education Materials **AND** nicotine polacrilex  •  insulin regular **AND** Accu-Chek ACHS **AND** NOTIFY MD and PharmD **AND** glucose 4 g **AND** dextrose 50%  •  pancrelipase (Lip-Prot-Amyl)    Labs:  Recent Labs      01/17/19   0408   WBC  10.7   RBC  3.70*   HEMOGLOBIN  12.2*   HEMATOCRIT  36.8*   MCV  99.5*   MCH  33.0   RDW  55.8*   PLATELETCT  334   MPV  9.6   NEUTSPOLYS  38.60*   LYMPHOCYTES  42.70*   MONOCYTES  11.40   EOSINOPHILS  4.30   BASOPHILS  1.00     Recent Labs      01/17/19   0408   SODIUM  139   POTASSIUM  3.8   CHLORIDE  106   CO2  23   GLUCOSE  240*   BUN  23*     Recent Labs      01/17/19   0408   ALBUMIN  3.8   TBILIRUBIN  0.3   ALKPHOSPHAT  81   TOTPROTEIN  6.5   ALTSGPT  6   ASTSGOT  11*   CREATININE  0.88       Imaging:  Dx-foot-complete 3+ Right    Result Date: 1/10/2019  1/10/2019 8:14 PM HISTORY/REASON FOR EXAM: Atraumatic Pain/Swelling/Deformity TECHNIQUE/EXAM DESCRIPTION:  AP, lateral, and oblique views of the RIGHT foot. COMPARISON:  None FINDINGS: The bony structures and articulations appear within normal limits without visualized fracture, subluxation,  or dislocation. Bony remodeling of the dorsal medial first metatarsal head is seen.     1.  No acute traumatic bony injury. 2.  Bony remodeling at the dorsomedial first metatarsal head, could correspond with erosive arthropathy, consider osteomyelitis as clinically appropriate    Us-extremity Venous Lower Unilat Right    Result Date: 2019   Vascular Laboratory  CONCLUSIONS  Normal right lower extremity superficial and deep venous examination.  SHAUN CORCORAN  Exam Date:     01/10/2019 21:21  Room #:     Inpatient  Priority:     Stat  Ht (in):             Wt (lb):  Ordering Physician:        CHAVO MARC  Referring Physician:       [id number], BLISS  Sonographer:               Nila Urbina RVT  Study Type:                Complete Unilateral  Technical Quality:         Adequate  Age:    58    Gender:     M  MRN:    [id number]  :    1960      BSA:  Indications:     Localized swelling, mass and lump, right lower limb  CPT Codes:       43178  ICD Codes:         History:         Swelling of right calf and foot. No prior duplex.  Limitations:  PROCEDURES:  Right lower extremity venous duplex imaging.  The following venous structures were evaluated: common femoral, profunda  femoral, proximal portion of the greater saphenous, femoral, popliteal,  peroneal and posterior tibial veins.  Serial compression, augmentation maneuvers, color and spectral Doppler flow  evaluations were performed.  FINDINGS:  Right lower extremity -  No evidence of deep venous thrombosis.  Complete color filling and compressibility with normal venous flow dynamics  including spontaneous flow, response to augmentation maneuvers, and  respiratory phasicity.  Flow was evaluated in the contralateral common femoral vein and normal  venous flow dynamics including spontaneous flow, respiratory phasic  variation and augmentation were demonstrated.  Natalia Bustos MD  (Electronically Signed)  Final Date:      2019                    "01:03      Micro:  Results     Procedure Component Value Units Date/Time    BLOOD CULTURE [[id number]] Collected:  01/11/19 0642    Order Status:  Completed Specimen:  Blood from Peripheral Updated:  01/16/19 1100     Significant Indicator NEG     Source BLD     Site PERIPHERAL     Blood Culture No growth after 5 days of incubation.    Narrative:       Per Hospital Policy: Only change Specimen Src: to \"Line\" if  specified by physician order.    BLOOD CULTURE [[id number]] Collected:  01/11/19 0300    Order Status:  Completed Specimen:  Blood from Peripheral Updated:  01/16/19 0500     Significant Indicator NEG     Source BLD     Site PERIPHERAL     Blood Culture No growth after 5 days of incubation.    Narrative:       Per Hospital Policy: Only change Specimen Src: to \"Line\" if  specified by physician order.    BLOOD CULTURE (Child) [[id number]] Collected:  01/10/19 2046    Order Status:  Completed Specimen:  Blood from Peripheral Updated:  01/16/19 0100     Significant Indicator NEG     Source BLD     Site PERIPHERAL     Blood Culture No growth after 5 days of incubation.    Narrative:       Per Hospital Policy: Only change Specimen Src: to \"Line\" if  specified by physician order.    CULTURE TISSUE W/ GRM STAIN [[id number]]  (Abnormal) Collected:  01/12/19 1453    Order Status:  Completed Specimen:  Bone Updated:  01/15/19 1055     Significant Indicator POS (POS)     Source BONE     Site Right second metatarsal head     Tissue Culture Growth noted after further incubation, see below for  organism identification.   (A)     Gram Stain Result No organisms seen.     Tissue Culture Staphylococcus aureus  Isolated from enrichment broth only.  See previous culture for sensitivity report.   (A)    ANAEROBIC CULTURE [[id number]] Collected:  01/12/19 1453    Order Status:  Completed Specimen:  Bone Updated:  01/15/19 1055     Significant Indicator NEG     Source BONE     Site Right second metatarsal head     Anaerobic Culture, " Culture Res Culture in progress.    ANAEROBIC CULTURE [[id number]] Collected:  01/12/19 1448    Order Status:  Completed Specimen:  Tissue Updated:  01/15/19 1051     Significant Indicator NEG     Source TISS     Site Right second toe     Anaerobic Culture, Culture Res No Anaerobes isolated.    CULTURE TISSUE W/ GRM STAIN [[id number]]  (Abnormal)  (Susceptibility) Collected:  01/12/19 1448    Order Status:  Completed Specimen:  Tissue Updated:  01/15/19 1051     Significant Indicator POS (POS)     Source TISS     Site Right second toe     Tissue Culture - (A)     Gram Stain Result No organisms seen.     Tissue Culture Staphylococcus aureus  Rare growth   (A)    Culture & Susceptibility     STAPHYLOCOCCUS AUREUS     Antibiotic Sensitivity Microscan Unit Status    Ampicillin/sulbactam Sensitive <=8/4 mcg/mL Final    Method: SENSITIVITY, SERGO    Clindamycin Sensitive <=0.5 mcg/mL Final    Method: SENSITIVITY, SERGO    Daptomycin Sensitive 1 mcg/mL Final    Method: SENSITIVITY, SERGO    Erythromycin Sensitive <=0.5 mcg/mL Final    Method: SENSITIVITY, SERGO    Moxifloxacin Sensitive <=0.5 mcg/mL Final    Method: SENSITIVITY, SERGO    Oxacillin Sensitive <=0.25 mcg/mL Final    Method: SENSITIVITY, SERGO    Tetracycline Sensitive <=4 mcg/mL Final    Method: SENSITIVITY, SERGO    Trimeth/Sulfa Sensitive <=0.5/9.5 mcg/mL Final    Method: SENSITIVITY, SERGO    Vancomycin Sensitive 2 mcg/mL Final    Method: SENSITIVITY, SERGO                       GRAM STAIN [[id number]] Collected:  01/12/19 1453    Order Status:  Completed Specimen:  Bone Updated:  01/12/19 2228     Significant Indicator .     Source BONE     Site Right second metatarsal head     Gram Stain Result No organisms seen.    GRAM STAIN [[id number]] Collected:  01/12/19 1448    Order Status:  Completed Specimen:  Tissue Updated:  01/12/19 2228     Significant Indicator .     Source TISS     Site Right second toe     Gram Stain Result No organisms seen.    BLOOD CULTURE [[id number]]      Order Status:  Canceled Specimen:  Blood from Peripheral           Assessment:  58-year-old male with type 2 diabetes completed by peripheral neuropathy, hypertension, admitted 1/10/2019 with right second toe infection.  X-ray was concerning for underlying osteomyelitis of the first metatarsal head (some bony remodeling noted on imaging). On 1/12, I&D was performed with amputation of the right second toe at the metatarsophalangeal joint.  Necrotic tissue noted Intra-Op.    Plan:  Right foot osteomyelitis   Afebrile  Leukocytosis resolved  Status post right second toe amputation through metatarsophalangeal joint and I&D on 1/12/19 by Dr. Reddy  OR culture growing MSSA  Will discontinue IV vancomycin and Unasyn and start IV cefazolin 2 g every 8 hours  Continue wound care  Pathology pending  Given that some concerning x-ray findings were noted on the first metatarsal head, irrespective of the second metatarsal pathology, will likely treat for an underlying residual osteomyelitis with 6 weeks of IV antibiotics  Continue IV Ancef 2 g every 8 hours with an anticipated stop date of 2/23/2019  Plan to transfer to [facility] to continue antibiotics    Diabetic peripheral neuropathy  Contributing to above    Poorly controlled type 2 diabetes mellitus  Hemoglobin A1c 10.5  Likely contributing to infection and delayed wound  Maintain blood sugars under 150 control infection and promote wound healing    Disposition  If transferred to [facility], we will continue to follow there    Discussed with internal medicine/Dr Carter    ID will follow peripherally. Please call questions

## 2019-01-17 NOTE — DISCHARGE PLANNING
Agency/Facility Name: [facility]  Spoke To: Estelle  Outcome: Accepted, pending bed and auth from insurance.

## 2019-01-18 ENCOUNTER — HOSPITAL ENCOUNTER (OUTPATIENT)
Facility: MEDICAL CENTER | Age: 59
End: 2019-02-11
Attending: HOSPITALIST | Admitting: HOSPITALIST
Payer: COMMERCIAL

## 2019-01-18 VITALS
SYSTOLIC BLOOD PRESSURE: 143 MMHG | HEIGHT: 75 IN | DIASTOLIC BLOOD PRESSURE: 82 MMHG | OXYGEN SATURATION: 93 % | HEART RATE: 77 BPM | WEIGHT: 142.64 LBS | RESPIRATION RATE: 17 BRPM | BODY MASS INDEX: 17.74 KG/M2 | TEMPERATURE: 98.7 F

## 2019-01-18 LAB
GLUCOSE BLD-MCNC: 11 MG/DL (ref 65–99)
GLUCOSE BLD-MCNC: 128 MG/DL (ref 65–99)
GLUCOSE BLD-MCNC: 143 MG/DL (ref 65–99)
GLUCOSE BLD-MCNC: 291 MG/DL (ref 65–99)
GLUCOSE BLD-MCNC: 312 MG/DL (ref 65–99)
GLUCOSE BLD-MCNC: 352 MG/DL (ref 65–99)

## 2019-01-18 PROCEDURE — 99239 HOSP IP/OBS DSCHRG MGMT >30: CPT | Performed by: HOSPITALIST

## 2019-01-18 PROCEDURE — 82962 GLUCOSE BLOOD TEST: CPT | Mod: 91

## 2019-01-18 PROCEDURE — 700111 HCHG RX REV CODE 636 W/ 250 OVERRIDE (IP): Performed by: INTERNAL MEDICINE

## 2019-01-18 PROCEDURE — A9270 NON-COVERED ITEM OR SERVICE: HCPCS | Performed by: NURSE PRACTITIONER

## 2019-01-18 PROCEDURE — 700111 HCHG RX REV CODE 636 W/ 250 OVERRIDE (IP): Performed by: HOSPITALIST

## 2019-01-18 PROCEDURE — 700102 HCHG RX REV CODE 250 W/ 637 OVERRIDE(OP): Performed by: HOSPITALIST

## 2019-01-18 PROCEDURE — 700101 HCHG RX REV CODE 250: Performed by: HOSPITALIST

## 2019-01-18 PROCEDURE — 700102 HCHG RX REV CODE 250 W/ 637 OVERRIDE(OP): Performed by: NURSE PRACTITIONER

## 2019-01-18 PROCEDURE — 87641 MR-STAPH DNA AMP PROBE: CPT

## 2019-01-18 PROCEDURE — A9270 NON-COVERED ITEM OR SERVICE: HCPCS | Performed by: HOSPITALIST

## 2019-01-18 RX ORDER — DEXTROSE MONOHYDRATE 25 G/50ML
25 INJECTION, SOLUTION INTRAVENOUS PRN
Start: 2019-01-18 | End: 2019-08-02

## 2019-01-18 RX ORDER — INSULIN GLARGINE 100 [IU]/ML
20 INJECTION, SOLUTION SUBCUTANEOUS EVERY EVENING
Start: 2019-01-18 | End: 2019-08-02

## 2019-01-18 RX ORDER — INSULIN GLARGINE 100 [IU]/ML
20 INJECTION, SOLUTION SUBCUTANEOUS EVERY EVENING
Status: DISCONTINUED | OUTPATIENT
Start: 2019-01-18 | End: 2019-01-18 | Stop reason: HOSPADM

## 2019-01-18 RX ORDER — IBUPROFEN 400 MG/1
400 TABLET ORAL EVERY 6 HOURS PRN
Start: 2019-01-18 | End: 2019-08-02

## 2019-01-18 RX ORDER — CEFAZOLIN SODIUM 2 G/100ML
2 INJECTION, SOLUTION INTRAVENOUS EVERY 8 HOURS
Start: 2019-01-18 | End: 2019-02-23

## 2019-01-18 RX ORDER — NICOTINE 21 MG/24HR
1 PATCH, TRANSDERMAL 24 HOURS TRANSDERMAL EVERY 24 HOURS
Start: 2019-01-18 | End: 2019-08-02

## 2019-01-18 RX ADMIN — BUPROPION HYDROCHLORIDE 150 MG: 150 TABLET, EXTENDED RELEASE ORAL at 05:53

## 2019-01-18 RX ADMIN — LISINOPRIL 10 MG: 10 TABLET ORAL at 05:53

## 2019-01-18 RX ADMIN — DEXTROSE MONOHYDRATE 25 ML: 25 INJECTION, SOLUTION INTRAVENOUS at 01:32

## 2019-01-18 RX ADMIN — OMEPRAZOLE 20 MG: 20 CAPSULE, DELAYED RELEASE ORAL at 05:52

## 2019-01-18 RX ADMIN — TAMSULOSIN HYDROCHLORIDE 0.4 MG: 0.4 CAPSULE ORAL at 08:59

## 2019-01-18 RX ADMIN — VITAMIN D, TAB 1000IU (100/BT) 4000 UNITS: 25 TAB at 05:52

## 2019-01-18 RX ADMIN — PANCRELIPASE 72000 UNITS: 24000; 76000; 120000 CAPSULE, DELAYED RELEASE PELLETS ORAL at 11:59

## 2019-01-18 RX ADMIN — FERROUS SULFATE TAB 325 MG (65 MG ELEMENTAL FE) 325 MG: 325 (65 FE) TAB at 08:59

## 2019-01-18 RX ADMIN — ENOXAPARIN SODIUM 40 MG: 100 INJECTION SUBCUTANEOUS at 05:50

## 2019-01-18 RX ADMIN — MEMANTINE HYDROCHLORIDE 5 MG: 10 TABLET ORAL at 05:52

## 2019-01-18 RX ADMIN — ASPIRIN 81 MG 81 MG: 81 TABLET ORAL at 05:53

## 2019-01-18 RX ADMIN — CEFAZOLIN SODIUM 2 G: 2 INJECTION, SOLUTION INTRAVENOUS at 13:32

## 2019-01-18 RX ADMIN — CEFAZOLIN SODIUM 2 G: 2 INJECTION, SOLUTION INTRAVENOUS at 05:50

## 2019-01-18 RX ADMIN — NICOTINE 14 MG: 14 PATCH, EXTENDED RELEASE TRANSDERMAL at 05:51

## 2019-01-18 RX ADMIN — PANCRELIPASE 72000 UNITS: 24000; 76000; 120000 CAPSULE, DELAYED RELEASE PELLETS ORAL at 08:59

## 2019-01-18 NOTE — DISCHARGE PLANNING
LSW informed by [facility] that [facility] will accept today at 1630. LSW left a VM for Perez at [facility] asking for a call back. LSW contacted Pt sister and FELIZ Aniyah to inform her of the transfer. Aniyah stated she had concerns about the Pt previous hospital stay at [facility] and wanted record information. PATRICIAW scanned and secure emailed a Medical records request form to Aniyah at [email or web address]. LSW met with Pt and Pt signed COBRA form for discharge to [facility].

## 2019-01-18 NOTE — DISCHARGE PLANNING
"LSW spoke with JAYLA Lopez who stated she spoke with the Pt sister/POA and that the POA seemed on board with everything after the conversation. PATRICIAW received a VM from Ana from [employer] asking for a call back. PATRICIAW informed Ana of the Pt discharge time to [facility]. Ana stated that the Pt sister is upset and wanted to file a complaint regarding the Pt care due to issues with his blood sugars and stated \"you guys really need to do a better job.\" LSW followed up with Pt sister Aniyah who stated she did speak with JAYLA Lopez and LSW asked if she wanted to file a complaint. Aniyah said no but stated she is worried about the Pt \"rolling over to\" [facility]. LSW asked if she would like to speak with the PA or the doctor and Aniyah requested to speak with the doctor. PATRICIAW provided Dr. Carter with the Pt sister/POA contact number.   "

## 2019-01-18 NOTE — PROGRESS NOTES
Diabetes education: Pt known from previous visit. Please see consult note.  Plan: CDE will follow up tomorrow to assess needs. Per discharge note, pt to go to [facility]. Please change regular insulin sliding scale to Humalog sliding scale so patient would only need one injection per meal not two. Please call [phone] if needs change.

## 2019-01-18 NOTE — DISCHARGE PLANNING
Agency/Facility Name: [facility]  Spoke To: Allyn  Outcome: They have received auth, waiting to hear from MD to ensure they can accept. Will call this [facility] back once everything is good.

## 2019-01-18 NOTE — DISCHARGE INSTRUCTIONS
Discharge Instructions    Discharged to home by medical transportation with escort. Discharged via wheelchair, hospital escort: Yes.  Special equipment needed: Not Applicable    Be sure to schedule a follow-up appointment with your primary care doctor or any specialists as instructed.     Discharge Plan:   Smoking Cessation Offered: Patient Refused  Pneumococcal Vaccine Administered/Refused: Not given - Patient refused pneumococcal vaccine  Influenza Vaccine Indication: Patient Refuses, Not indicated: Previously immunized this influenza season and > 8 years of age    I understand that a diet low in cholesterol, fat, and sodium is recommended for good health. Unless I have been given specific instructions below for another diet, I accept this instruction as my diet prescription.   Other diet: regular     Special Instructions: Discharge instructions for the Orthopedic Patient    Follow up with Primary Care Physician within 2 weeks of discharge to home, regarding:  Review of medications and diagnostic testing.  Surveillance for medical complications.  Workup and treatment of osteoporosis, if appropriate.     -Is this a Joint Replacement patient? No    -Is this patient being discharged with medication to prevent blood clots?  Yes, Aspirin Aspirin, ASA oral tablets  What is this medicine?  ASPIRIN (AS pir in) is a pain reliever. It is used to treat mild pain and fever. This medicine is also used as directed by a doctor to prevent and to treat heart attacks, to prevent strokes, and to treat arthritis or inflammation.  This medicine may be used for other purposes; ask your health care provider or pharmacist if you have questions.  COMMON BRAND NAME(S): Aspir-Low, Aspir-Alisha, Aspirtab, Cortney Advanced Aspirin, Cortney Aspirin, Cortney Aspirin Extra Strength, Cortney Aspirin Plus, Cortney Extra Strength, Cortney Extra Strength Plus, Cortney Genuine Aspirin, Cortney Womens Aspirin, Bufferin, Bufferin Extra Strength, Bufferin Low Dose  What  should I tell my health care provider before I take this medicine?  They need to know if you have any of these conditions:  -anemia  -asthma  -bleeding problems  -child with chickenpox, the flu, or other viral infection  -diabetes  -gout  -if you frequently drink alcohol containing drinks  -kidney disease  -liver disease  -low level of vitamin K  -lupus  -smoke tobacco  -stomach ulcers or other problems  -an unusual or allergic reaction to aspirin, tartrazine dye, other medicines, dyes, or preservatives  -pregnant or trying to get pregnant  -breast-feeding  How should I use this medicine?  Take this medicine by mouth with a glass of water. Follow the directions on the package or prescription label. You can take this medicine with or without food. If it upsets your stomach, take it with food. Do not take your medicine more often than directed.  Talk to your pediatrician regarding the use of this medicine in children. While this drug may be prescribed for children as young as 12 years of age for selected conditions, precautions do apply. Children and teenagers should not use this medicine to treat chicken pox or flu symptoms unless directed by a doctor.  Patients over 65 years old may have a stronger reaction and need a smaller dose.  Overdosage: If you think you have taken too much of this medicine contact a poison control center or emergency room at once.  NOTE: This medicine is only for you. Do not share this medicine with others.  What if I miss a dose?  If you are taking this medicine on a regular schedule and miss a dose, take it as soon as you can. If it is almost time for your next dose, take only that dose. Do not take double or extra doses.  What may interact with this medicine?  Do not take this medicine with any of the following medications:  -cidofovir  -ketorolac  -probenecid  This medicine may also interact with the following medications:  -alcohol  -alendronate  -bismuth  subsalicylate  -flavocoxid  -herbal supplements like feverfew, garlic, kiarra, ginkgo biloba, horse chestnut  -medicines for diabetes or glaucoma like acetazolamide, methazolamide  -medicines for gout  -medicines that treat or prevent blood clots like enoxaparin, heparin, ticlopidine, warfarin  -other aspirin and aspirin-like medicines  -NSAIDs, medicines for pain and inflammation, like ibuprofen or naproxen  -pemetrexed  -sulfinpyrazone  -varicella live vaccine  This list may not describe all possible interactions. Give your health care provider a list of all the medicines, herbs, non-prescription drugs, or dietary supplements you use. Also tell them if you smoke, drink alcohol, or use illegal drugs. Some items may interact with your medicine.  What should I watch for while using this medicine?  If you are treating yourself for pain, tell your doctor or health care professional if the pain lasts more than 10 days, if it gets worse, or if there is a new or different kind of pain. Tell your doctor if you see redness or swelling. Also, check with your doctor if you have a fever that lasts for more than 3 days. Only take this medicine to prevent heart attacks or blood clotting if prescribed by your doctor or health care professional.  Do not take aspirin or aspirin-like medicines with this medicine. Too much aspirin can be dangerous. Always read the labels carefully.  This medicine can irritate your stomach or cause bleeding problems. Do not smoke cigarettes or drink alcohol while taking this medicine. Do not lie down for 30 minutes after taking this medicine to prevent irritation to your throat.  If you are scheduled for any medical or dental procedure, tell your healthcare provider that you are taking this medicine. You may need to stop taking this medicine before the procedure.  This medicine may be used to treat migraines. If you take migraine medicines for 10 or more days a month, your migraines may get worse.  Keep a diary of headache days and medicine use. Contact your healthcare professional if your migraine attacks occur more frequently.  What side effects may I notice from receiving this medicine?  Side effects that you should report to your doctor or health care professional as soon as possible:  -allergic reactions like skin rash, itching or hives, swelling of the face, lips, or tongue  -breathing problems  -changes in hearing, ringing in the ears  -confusion  -general ill feeling or flu-like symptoms  -pain on swallowing  -redness, blistering, peeling or loosening of the skin, including inside the mouth or nose  -signs and symptoms of bleeding such as bloody or black, tarry stools; red or dark-brown urine; spitting up blood or brown material that looks like coffee grounds; red spots on the skin; unusual bruising or bleeding from the eye, gums, or nose  -trouble passing urine or change in the amount of urine  -unusually weak or tired  -yellowing of the eyes or skin  Side effects that usually do not require medical attention (report to your doctor or health care professional if they continue or are bothersome):  -diarrhea or constipation  -headache  -nausea, vomiting  -stomach gas, heartburn  This list may not describe all possible side effects. Call your doctor for medical advice about side effects. You may report side effects to FDA at 4-170-FDA-5364.  Where should I keep my medicine?  Keep out of the reach of children.  Store at room temperature between 15 and 30 degrees C (59 and 86 degrees F). Protect from heat and moisture. Do not use this medicine if it has a strong vinegar smell. Throw away any unused medicine after the expiration date.  NOTE: This sheet is a summary. It may not cover all possible information. If you have questions about this medicine, talk to your doctor, pharmacist, or health care provider.  © 2018 [employer]vier/Gold Standard (2014-08-19 11:30:31)      · Is patient discharged on Warfarin /  Coumadin?   No     Depression / Suicide Risk    As you are discharged from this [facility] facility, it is important to learn how to keep safe from harming yourself.    Recognize the warning signs:  · Abrupt changes in personality, positive or negative- including increase in energy   · Giving away possessions  · Change in eating patterns- significant weight changes-  positive or negative  · Change in sleeping patterns- unable to sleep or sleeping all the time   · Unwillingness or inability to communicate  · Depression  · Unusual sadness, discouragement and loneliness  · Talk of wanting to die  · Neglect of personal appearance   · Rebelliousness- reckless behavior  · Withdrawal from people/activities they love  · Confusion- inability to concentrate     If you or a loved one observes any of these behaviors or has concerns about self-harm, here's what you can do:  · Talk about it- your feelings and reasons for harming yourself  · Remove any means that you might use to hurt yourself (examples: pills, rope, extension cords, firearm)  · Get professional help from the community (Mental Health, Substance Abuse, psychological counseling)  · Do not be alone:Call your Safe Contact- someone whom you trust who will be there for you.  · Call your local CRISIS HOTLINE 611-0362 or 547-630-4501  · Call your local Children's Mobile Crisis Response Team [place] (428) 363-1469 or www.[employer]  · Call the toll free National Suicide Prevention Hotlines   · National Suicide Prevention Lifeline 148-090-HYCE (8527)  · National Hope Line Network 800-SUICIDE (152-6556)

## 2019-01-18 NOTE — CONSULTS
Diabetes education: Pt has hx of diabetes on insulin. Hx of TBI as well. Pt is well known from previous admit ( 11/21 to 12/21/18). Pt was discharged to [facility], with very specific directions and handouts for him to use there as he needed to give his own insulin.   Pt was admitted with blood sugar of 335 and Hg A1c of 10.5% (down from 17.4% 11/24/18).  Pt is currently on Lantus 25 units pm ( just increased), with Humalog 4 unit ac meals and Regular insulin sliding scale coverage ac and hs. Pt needs to be on only one kind of fast acting insulin not two. Pt will need his sliding scale coverage changed to Humalog ( other wise patient will get two shots every meal). Current blood sugars are 188 (1 unit), 297 (3 units) and 221 (2 units) all of regular as Humalog had not yet started.  Plan: CDE will follow up tomorrow to assess needs. Per discharge note, pt to go to [facility]. Please change regular insulin sliding scale to Humalog sliding scale so patient would only need one injection per meal not two. Please call [phone] if needs change.      Previous visit and education:   Consults  Date of Service: 11/28/2018 12:11 PM  Dawna Beckwith R.N.         Diabetes Nurse Specialist:   Reviewed giving injection of Lantus (via pen) with patient.  He was able to do a return demonstration without difficulty.   Written instructions provided for reference should he need it after discharge.                    Consults  Date of Service: 11/26/2018 5:36 PM  Terri Mancilla R.N.         Diabetes education: Met with Pawel who has hx of diabetes as well as TBI, regarding diabetes management. Pt states he was taking medications given to him by staff at assisted living ( metformin, trajenta and amaryl are listed). Hg a1c went from 9.6% in august to 17.4 % this month. Pt admitted with blood sugar of 598.  Pt states he has given insulin before and has a meter. He states he was doing finger sticks occasionally.  Spoke with Boo DUBOSE who placed a call  to [facility] living to confirm this and where pt gets his prescriptions.  Plan: CDE will review  Insulin management and include written handout if pt will return to [facility] ( not sure if going on insulin he will be allowed to go back).  Pt is currently on Lantus 15 units at 12  Noon and Humulog 7 units ac meals and sliding scale ac and hs. Current blood sugars are 316 (4 units +7 units). Doses increased today.  Plan: CDE to continue to follow and will education /review closer to discharge. Please call [phone] when discharge plan known.             Progress Notes  Date of Service: 12/19/2018 10:56 AM  Terri Mancilla R.N.         Diabetes education: Pt is to be discharged to [facility] per notes. Pt has been giving his own insulin (per MD notes) with nursing supervision ( vials/syringes not pens and no note as to drawing up or just giving shot).  Plan: Pt was previously given directions on using insulin pens ( and reviewed) as well as a new Contour next meter ( also reviewed and with simple instructions).   Pt is currently on Lantus 20 units at 1800, with Humalog 8 units after breakfast,  10 units after lunch, 10 units after dinner. Finger sticks have been 152, 276, 308, 355 and 254.      Pt will need prescriptions for Contour next test strips and lancets, to test 3 times a day, at discharge. Prescriptions need to have directions, quantity, refills and dx code ( Ell.65) for medicare to cover. Pt will need Novolog  Flex pens (box of 5), ordered rather than Humalog for insurance coverage. Will need prescriptions for Lantus solostar pen ( box of 5), and armando pen needles ( 4 mm box of 100). Prescribing  MD needs to have Medicare privileges as well. Please call [phone] if needs change.           Progress Notes  Date of Service: 12/20/2018 1:23 PM  Terri Mancilla R.N.         Diabetes education: Met with Pawel this am to review handout given for lancet device, insulin pens and doses for his Humalog ac meals.  Questions answered. Please call [phone] if needs change.

## 2019-01-18 NOTE — DIETARY
Nutrition Services: Consult  Consult received for diabetes diet education.  Education was provided to pt on 1/15 via explanation and handouts.      Thank you for the consult, please continue to consult RD as needed.

## 2019-01-18 NOTE — DISCHARGE SUMMARY
Discharge Summary    CHIEF COMPLAINT ON ADMISSION  Chief Complaint   Patient presents with   • Wound Check     To right foot     Reason for Admission  Toe swelling    CODE STATUS  Full Code    HPI & HOSPITAL COURSE  Mr. Tatum is a pleasant 57 y/o male who presented with swelling in his right 2nd toe and worsening diabetic ulcer.  He has a past medical history of poorly controlled insulin-dependent diabetes mellitus, hypertension, chronic pancreatitis, and dyslipidemia. Imaging revealed osteomyelitis and on 1/12/2019 the patient underwent right second toe amputation at the metatarsophalangeal joint and irrigation debridement of the dorsal and plantar compartments of the foot. Since then he has done well, though has had brittle and uncontrolled blood sugars. Cultures were positive for MSSA so infectious disease was consulted and the patient is anticipated to require IV cefazolin through February 23, 2019. He has otherwise remained stable, though he had a hypoglycemic episode the night prior to discharge where his blood sugar dropped to 11.  He was given juice and his sugar this morning was back up to 312.  His diabetic meds and insulin were adjusted and he should be monitored closely at [facility] while attempting to stabilize his blood sugars. His labs and vital signs have remained stable otherwise.    Therefore, he is discharged in good and stable condition to a long-term acute care hospital.    The patient met 2-midnight criteria for an inpatient stay at the time of discharge.    FOLLOW UP ITEMS POST DISCHARGE  PCP & LPS/wound care clinic after discharge from [facility].  **Highly recommend outpatient endocrinology referral at the time of PCP f/u.**    DISCHARGE DIAGNOSES  Principal Problem:    Subacute osteomyelitis of right foot (HCC) POA: Yes  Active Problems:    Type 2 diabetes mellitus with hyperglycemia, with long-term current use of insulin (HCC) POA: Yes    Essential hypertension POA: Yes    Vitamin D deficiency POA:  Yes    Tobacco abuse POA: Yes    Dyslipidemia POA: Yes    Memory loss POA: Yes    Chronic pancreatitis (HCC) POA: Yes    Peripheral neuropathy POA: Yes  Resolved Problems:    Leukocytosis POA: Unknown    FOLLOW UP  Future Appointments  Date Time Provider Department Center   2/8/2019 9:00 AM Nicanor Reddy M.D. [facility] [address]     MEDICATIONS ON DISCHARGE     Medication List      START taking these medications      Instructions   ceFAZolin in dextrose 2 g/100mL IVPB  Commonly known as:  ANCEF   100 mL by Intravenous route every 8 hours for 36 days.  Dose:  2 g     dextrose 50% 50 % Soln  Commonly known as:  D50W   25 mL by Intravenous route as needed (If FSBG is less than or equal to 70 mg/dL and If patient is NPO).  Dose:  25 mL     ibuprofen 400 MG Tabs  Commonly known as:  MOTRIN   Take 1 Tab by mouth every 6 hours as needed.  Dose:  400 mg     insulin lispro 100 UNIT/ML Soln  Commonly known as:  HUMALOG   Inject 1-6 Units as instructed 4 Times a Day,Before Meals and at Bedtime.  Dose:  1-6 Units     nicotine 14 MG/24HR Pt24  Commonly known as:  NICODERM   Apply 1 Patch to skin as directed every 24 hours.  Dose:  1 Patch     nicotine polacrilex 2 MG Gum  Commonly known as:  NICORETTE   Take 1 Each by mouth every 1 hour as needed for Smoking Cessation (For nicotine urge).  Dose:  2 mg        CHANGE how you take these medications      Instructions   insulin glargine 100 UNIT/ML Soln  What changed:  · medication strength  · how much to take  · how to take this  · when to take this  · additional instructions  Commonly known as:  LANTUS   Inject 20 Units as instructed every evening.  Dose:  20 Units        CONTINUE taking these medications      Instructions   aspirin 81 MG Chew chewable tablet  Commonly known as:  ASPIRIN 81   Take 1 Tab by mouth every day.  Dose:  81 mg     atorvastatin 10 MG Tabs  Commonly known as:  LIPITOR   Take 1 tablet by mouth daily to prevent heart attacks and strokes     buPROPion 300 MG XL  tablet  Commonly known as:  WELLBUTRIN XL   Take 1 tablet by mouth every morning     ferrous sulfate 325 (65 Fe) MG EC tablet   Take 1 Tab by mouth 3 times a day, with meals.  Dose:  325 mg     glucose 4 g 4 g Chew   Take 4 Tabs by mouth as needed for Low Blood Sugar (If FSBG is less than or equal to 70 mg/dL and patient able to eat or drink).  Dose:  16 g     lisinopril 10 MG Tabs  Commonly known as:  PRINIVIL   Take 1 Tab by mouth every day.  Dose:  10 mg     memantine 5 MG Tabs  Commonly known as:  NAMENDA   TAKE 1 TAB BY MOUTH TWICE DAILY     metformin 1000 MG tablet  Commonly known as:  GLUCOPHAGE   Take 1 Tab by mouth 2 times a day, with meals.  Dose:  1000 mg     omeprazole 20 MG delayed-release capsule  Commonly known as:  PRILOSEC   Take 20 mg by mouth every morning.  Dose:  20 mg     Pancrelipase (Lip-Prot-Amyl) 04665 units Cpep   Take 36,000 Units by mouth 3 times a day.  Dose:  45652 Units     tamsulosin 0.4 MG capsule  Commonly known as:  FLOMAX   Take 1 Cap by mouth ONE-HALF HOUR AFTER BREAKFAST.  Dose:  0.4 mg     Vitamin D 2000 units Caps   Take 2 Caps by mouth every day.  Dose:  2 Cap        STOP taking these medications    NOVOLOG (insulin aspart) 100 UNIT/ML Sopn injection  Commonly known as:  NOVOLOG          Allergies  No Known Allergies    DIET  Orders Placed This Encounter   Procedures   • Diet Order Diabetic     Standing Status:   Standing     Number of Occurrences:   1     Order Specific Question:   Diet:     Answer:   Diabetic [3]     ACTIVITY  As tolerated.  Weight bearing as tolerated    LINES, DRAINS, AND WOUNDS  This is an automated list. Peripheral IVs will be removed prior to discharge.  Peripheral IV 01/16/19 22 G Left Forearm (Active)   Site Assessment Clean;Dry;Intact 1/18/2019  8:00 AM   Dressing Type Occlusive;Transparent 1/18/2019  8:00 AM   Line Status Flushed 1/18/2019  8:00 AM   Dressing Status Clean;Dry;Intact 1/18/2019  8:00 AM   Dressing Intervention Dressing changed per  protocol 1/16/2019  7:40 PM   Date Primary Tubing Changed 01/16/19 1/16/2019  8:41 AM   Date Secondary Tubing Changed 01/16/19 1/16/2019  8:41 AM   NEXT Primary Tubing Change  01/19/19 1/16/2019  7:40 PM   NEXT Secondary Tubing Change  01/17/19 1/16/2019  7:40 PM   Infiltration Grading ([facility]) 0 1/18/2019  8:00 AM   Phlebitis Scale ([facility] Only) 0 1/18/2019  8:00 AM       Wound 01/09/19 Diabetic Ulcer Toe (Comment which one) Right Foot Third Toe Diabetic Ulcer (Active)       Wound 01/09/19 Other (comment) Leg Left Anterior Shin Skin Breakdown (Active)       Peripheral IV 01/16/19 22 G Left Forearm (Active)   Site Assessment Clean;Dry;Intact 1/18/2019  8:00 AM   Dressing Type Occlusive;Transparent 1/18/2019  8:00 AM   Line Status Flushed 1/18/2019  8:00 AM   Dressing Status Clean;Dry;Intact 1/18/2019  8:00 AM   Dressing Intervention Dressing changed per protocol 1/16/2019  7:40 PM   Date Primary Tubing Changed 01/16/19 1/16/2019  8:41 AM   Date Secondary Tubing Changed 01/16/19 1/16/2019  8:41 AM   NEXT Primary Tubing Change  01/19/19 1/16/2019  7:40 PM   NEXT Secondary Tubing Change  01/17/19 1/16/2019  7:40 PM   Infiltration Grading ([facility]) 0 1/18/2019  8:00 AM   Phlebitis Scale ([facility] Only) 0 1/18/2019  8:00 AM             MENTAL STATUS ON TRANSFER  Level of Consciousness: Alert  Orientation : Oriented x 4  Speech: Speech Clear    CONSULTATIONS  LPS  Infectious disease    PROCEDURES  1/12/2019: Right second toe amputation at the metatarsalphalangeal joint with right foot irrigation and debridement of the dorsal and plantar compartments of the foot by Dr. Reddy.    LABORATORY  Lab Results   Component Value Date    SODIUM 139 01/17/2019    POTASSIUM 3.8 01/17/2019    CHLORIDE 106 01/17/2019    CO2 23 01/17/2019    GLUCOSE 240 (H) 01/17/2019    BUN 23 (H) 01/17/2019    CREATININE 0.88 01/17/2019      Lab Results   Component Value Date    WBC 10.7 01/17/2019    HEMOGLOBIN 12.2 (L) 01/17/2019     HEMATOCRIT 36.8 (L) 01/17/2019    PLATELETCT 334 01/17/2019      Total time of the discharge process exceeds 45 minutes.

## 2019-01-18 NOTE — DISCHARGE PLANNING
Agency/Facility Name: [facility]  Spoke To: Allyn  Outcome: Can accept today at [facility], requesting  time of 1500     Received Transport Form @ 1254  Spoke to Margie @ [facility]    [facility].    REMSA Auth is [id number]    Allyn at [facility] notified of transport time.

## 2019-01-18 NOTE — DISCHARGE PLANNING
"LSW received VM from the Pt sister and POA stating she was worried about the Pt blood sugars as Pt \"passed out last night and has high sugars today and Pawel (Pt) stated that nursing would not give him insulin. LSW contacted charge KELLEY Maravilla asking her to contact the Pt sister and POA to discuss the concern as this LSW is unable to answer POA questions regarding the Pt insulin. KELLEY Maravilla stated she will contact the Pt sister/POA.  "

## 2019-01-19 LAB
ANION GAP SERPL CALC-SCNC: 11 MMOL/L (ref 0–11.9)
BUN SERPL-MCNC: 22 MG/DL (ref 8–22)
CALCIUM SERPL-MCNC: 9.6 MG/DL (ref 8.4–10.2)
CHLORIDE SERPL-SCNC: 104 MMOL/L (ref 96–112)
CO2 SERPL-SCNC: 23 MMOL/L (ref 20–33)
CREAT SERPL-MCNC: 0.85 MG/DL (ref 0.5–1.4)
ERYTHROCYTE [DISTWIDTH] IN BLOOD BY AUTOMATED COUNT: 55.8 FL (ref 35.9–50)
GLUCOSE BLD-MCNC: 228 MG/DL (ref 65–99)
GLUCOSE BLD-MCNC: 23 MG/DL (ref 65–99)
GLUCOSE BLD-MCNC: 276 MG/DL (ref 65–99)
GLUCOSE BLD-MCNC: 362 MG/DL (ref 65–99)
GLUCOSE BLD-MCNC: 71 MG/DL (ref 65–99)
GLUCOSE SERPL-MCNC: 71 MG/DL (ref 65–99)
HCT VFR BLD AUTO: 37.7 % (ref 42–52)
HGB BLD-MCNC: 12.3 G/DL (ref 14–18)
MCH RBC QN AUTO: 32.7 PG (ref 27–33)
MCHC RBC AUTO-ENTMCNC: 32.6 G/DL (ref 33.7–35.3)
MCV RBC AUTO: 100.3 FL (ref 81.4–97.8)
MRSA DNA SPEC QL NAA+PROBE: NORMAL
PLATELET # BLD AUTO: 358 K/UL (ref 164–446)
PMV BLD AUTO: 9.8 FL (ref 9–12.9)
POTASSIUM SERPL-SCNC: 3.6 MMOL/L (ref 3.6–5.5)
RBC # BLD AUTO: 3.76 M/UL (ref 4.7–6.1)
SIGNIFICANT IND 70042: NORMAL
SITE SITE: NORMAL
SODIUM SERPL-SCNC: 138 MMOL/L (ref 135–145)
SOURCE SOURCE: NORMAL
WBC # BLD AUTO: 12.3 K/UL (ref 4.8–10.8)

## 2019-01-19 PROCEDURE — 82962 GLUCOSE BLOOD TEST: CPT | Mod: 91

## 2019-01-19 PROCEDURE — 85027 COMPLETE CBC AUTOMATED: CPT

## 2019-01-19 PROCEDURE — 80048 BASIC METABOLIC PNL TOTAL CA: CPT

## 2019-01-19 ASSESSMENT — ENCOUNTER SYMPTOMS
COUGH: 0
CONSTIPATION: 0
SHORTNESS OF BREATH: 0
DIARRHEA: 0
ABDOMINAL PAIN: 0
FEVER: 0
CHILLS: 0
VOMITING: 0
SORE THROAT: 0
HEADACHES: 0
NAUSEA: 0

## 2019-01-19 NOTE — PROGRESS NOTES
Was made aware by social work that the patient's sister had concerns regarding his transferred to [facility] today.  I spoke with her at length regarding his previous stay in addition to his current state and the capabilities of an acute care hospital.  She would like for the patient to started on an insulin pump and see an endocrinologist.  She verbalized dissatisfaction from his previous month-long stay at [facility].  Multiple issues were discussed and the sister then felt comfortable with the patient as transfer and plan of care going forward.  Shortly after I spoke with the sister we received notification from social work again that she would like to talk to Dr. Carter.  Dr. Carter spoke, also at length, with the patient's sister regarding his previous care and plan of care going forward.  She was once again reassured about the patient's care and he notified our manager Denniese about her dissatisfaction from his previous stay.  She will follow-up with patient sister.  The patient is cleared for transfer to [facility] today.  Social work was updated on our conversations with the patient's sister.

## 2019-01-19 NOTE — [facility]
Hospital Medicine Progress Note, Adult, Complex               Author: Noemi Melara Date & Time created: 1/19/2019  9:13 AM     CC: 2nd toe OM, amputation    Interval History:  Transferred from [facility]  Hypoglycemia to [facility] this am, previously at 11 prior to transfer  No current complaints    Review of Systems:  Review of Systems   Constitutional: Negative for chills and fever.   HENT: Negative for sore throat.    Respiratory: Negative for cough and shortness of breath.    Cardiovascular: Negative for chest pain.   Gastrointestinal: Negative for abdominal pain, constipation, diarrhea, nausea and vomiting.   Genitourinary: Negative for dysuria.   Musculoskeletal: Negative for joint pain.   Neurological: Negative for headaches.       T 98 P 75 /62 RR 18 SaO2 90% I/O 611/brp  Physical Exam   Constitutional: He is oriented to person, place, and time. He appears well-developed. No distress.   HENT:   Head: Normocephalic and atraumatic.   Eyes: Conjunctivae are normal. Right eye exhibits no discharge. Left eye exhibits no discharge. No scleral icterus.   Neck: No tracheal deviation present.   Cardiovascular: Normal rate and regular rhythm.    Pulmonary/Chest: Effort normal. No respiratory distress. He has no wheezes. He exhibits no tenderness.   Abdominal: Soft. Bowel sounds are normal. He exhibits no distension. There is no tenderness.   Musculoskeletal: He exhibits no edema.   Neurological: He is alert and oriented to person, place, and time.   Skin: Skin is warm.   R foot wrapped, 2nd toe amputation   Vitals reviewed.      Labs:          Recent Labs      01/17/19 0408  01/19/19   0220   SODIUM  139  138   POTASSIUM  3.8  3.6   CHLORIDE  106  104   CO2  23  23   BUN  23*  22   CREATININE  0.88  0.85   CALCIUM  9.8  9.6     Recent Labs      01/17/19 0408 01/19/19   0220   ALTSGPT  6   --    ASTSGOT  11*   --    ALKPHOSPHAT  81   --    TBILIRUBIN  0.3   --    GLUCOSE  240*  71     Recent Labs      01/17/19 0408   01/19/19 0220   RBC  3.70*  3.76*   HEMOGLOBIN  12.2*  12.3*   HEMATOCRIT  36.8*  37.7*   PLATELETCT  334  358     Recent Labs      01/17/19   0408  01/19/19 0220   WBC  10.7  12.3*   NEUTSPOLYS  38.60*   --    LYMPHOCYTES  42.70*   --    MONOCYTES  11.40   --    EOSINOPHILS  4.30   --    BASOPHILS  1.00   --    ASTSGOT  11*   --    ALTSGPT  6   --    ALKPHOSPHAT  81   --    TBILIRUBIN  0.3   --           GI/Nutrition:  Orders Placed This Encounter   Procedures   • Diet Order Diabetic (NCS,HS snacks)     Standing Status:   Standing     Number of Occurrences:   1     Order Specific Question:   Diet:     Answer:   Diabetic [3]     Comments:   NCS,HS snacks     Medical Decision Making, by Problem:  R 2nd toe OM s/p amputation 1/12 (Maureen) ([facility])    -Ancef 2/23   -ambulation with heel    -local wound care, no wound RN until Monday for documentation  DM1, adult onset (per endo in [place] area)   -brittle   -decrease levemir, add premeal insulin   -outpatient referral for insulin pump/endo   -check [place] sugar due to am hypoglycemia   -unclear if metformin helping  Leukocytosis   -secondary to above  S/p splenectomy  Depression  H/o chronic pancreatitis   -creon with meals  HTN   -zestril  Memory loss   -namenda  BPH   -flomax  HLD   -lipitor  Tobacco use   -nicotine patch  Nutrition   -po          Quality-Core Measures   Reviewed items::  Medications reviewed  Dill catheter::  No Dill  DVT prophylaxis pharmacological::  Enoxaparin (Lovenox)  Antibiotics:  Treating active infection/contamination beyond 24 hours perioperative coverage

## 2019-01-20 LAB
GLUCOSE BLD-MCNC: 221 MG/DL (ref 65–99)
GLUCOSE BLD-MCNC: 250 MG/DL (ref 65–99)
GLUCOSE BLD-MCNC: 275 MG/DL (ref 65–99)
GLUCOSE BLD-MCNC: 282 MG/DL (ref 65–99)

## 2019-01-20 PROCEDURE — 82962 GLUCOSE BLOOD TEST: CPT | Mod: 91

## 2019-01-20 ASSESSMENT — ENCOUNTER SYMPTOMS
NAUSEA: 0
VOMITING: 0
DIARRHEA: 0
FEVER: 0
SORE THROAT: 0
SHORTNESS OF BREATH: 0
HEADACHES: 0
COUGH: 0
CONSTIPATION: 0
ABDOMINAL PAIN: 0

## 2019-01-20 NOTE — [facility]
Hospital Medicine Progress Note, Adult, Complex               Author: Noemi LEONID Melara Date & Time created: 1/20/2019  10:08 AM     CC: 2nd toe OM, amputation    Interval History:  RN misinterpreted my insulin orders and no SSI coverage given resulting in high sugars over past 24 hours  No complaints  Denies pain  Eating well    Review of Systems:  Review of Systems   Constitutional: Negative for fever.   HENT: Negative for sore throat.    Respiratory: Negative for cough and shortness of breath.    Cardiovascular: Negative for chest pain.   Gastrointestinal: Negative for abdominal pain, constipation, diarrhea, nausea and vomiting.   Genitourinary: Negative for dysuria.   Musculoskeletal: Negative for joint pain.   Neurological: Negative for headaches.       T 97.7 P80 /73 RR 19 SaO2 91% I/O 1.8/brp  Physical Exam   Constitutional: He is oriented to person, place, and time. He appears well-developed. No distress.   HENT:   Head: Normocephalic and atraumatic.   Eyes: Conjunctivae are normal. Right eye exhibits no discharge. Left eye exhibits no discharge. No scleral icterus.   Neck: No tracheal deviation present.   Cardiovascular: Normal rate and regular rhythm.    Pulmonary/Chest: Effort normal. No respiratory distress. He has no wheezes. He exhibits no tenderness.   Abdominal: Soft. Bowel sounds are normal. He exhibits no distension. There is no tenderness. There is no rebound.   Musculoskeletal: He exhibits no edema.   Neurological: He is alert and oriented to person, place, and time.   Skin: Skin is warm.   R foot wrapped, 2nd toe amputation   Vitals reviewed.      Labs:          Recent Labs      01/19/19 0220   SODIUM  138   POTASSIUM  3.6   CHLORIDE  104   CO2  23   BUN  22   CREATININE  0.85   CALCIUM  9.6     Recent Labs      01/19/19 0220   GLUCOSE  71     Recent Labs      01/19/19 0220   RBC  3.76*   HEMOGLOBIN  12.3*   HEMATOCRIT  37.7*   PLATELETCT  358     Recent Labs      01/19/19 0220    WBC  12.3*          GI/Nutrition:  Orders Placed This Encounter   Procedures   • Diet Order Diabetic (NCS,HS snacks)     Standing Status:   Standing     Number of Occurrences:   1     Order Specific Question:   Diet:     Answer:   Diabetic [3]     Comments:   NCS,HS snacks     Medical Decision Making, by Problem:  R 2nd toe OM s/p amputation 1/12 (Maureen) ([facility])    -Ancef 2/23   -ambulation with heel shoe only   -local wound care, no wound RN until tomorrow for documentation  DM1, adult onset ([place]o [place])   -brittle   -increase levemir to 10, continue QAC and SS I  Leukocytosis   -secondary to above   -follow  S/p splenectomy  Depression  H/o chronic pancreatitis   -creon with meals  HTN   -zestril  Memory loss   -namenda  BPH   -flomax  HLD   -lipitor  Tobacco use   -nicotine patch  Nutrition   -po          Quality-Core Measures   Reviewed items::  Medications reviewed  Dill catheter::  No Dill  DVT prophylaxis pharmacological::  Enoxaparin (Lovenox)  Antibiotics:  Treating active infection/contamination beyond 24 hours perioperative coverage

## 2019-01-21 ENCOUNTER — PATIENT OUTREACH (OUTPATIENT)
Dept: HEALTH INFORMATION MANAGEMENT | Facility: OTHER | Age: 59
End: 2019-01-21

## 2019-01-21 LAB
GLUCOSE BLD-MCNC: 185 MG/DL (ref 65–99)
GLUCOSE BLD-MCNC: 214 MG/DL (ref 65–99)
GLUCOSE BLD-MCNC: 282 MG/DL (ref 65–99)
GLUCOSE BLD-MCNC: 405 MG/DL (ref 65–99)

## 2019-01-21 PROCEDURE — 82962 GLUCOSE BLOOD TEST: CPT | Mod: 91

## 2019-01-21 ASSESSMENT — ENCOUNTER SYMPTOMS
SORE THROAT: 0
CHILLS: 0
BACK PAIN: 0
NAUSEA: 0
CONSTIPATION: 0
HEADACHES: 0
DIARRHEA: 0
FEVER: 0
COUGH: 0
MYALGIAS: 0
SHORTNESS OF BREATH: 0
PALPITATIONS: 0
ABDOMINAL PAIN: 0
VOMITING: 0

## 2019-01-21 NOTE — [facility]
Hospital Medicine Progress Note, Adult, Complex               Author: Noemi Melara Date & Time created: 1/21/2019  9:18 AM     CC: 2nd toe OM, amputation    Interval History:  BG improved but too high, adjustments to insulin will be made  Patient feeling well  Wound care to document surgical wound today    Review of Systems:  Review of Systems   Constitutional: Negative for chills and fever.   HENT: Negative for sore throat.    Respiratory: Negative for cough and shortness of breath.    Cardiovascular: Negative for chest pain and palpitations.   Gastrointestinal: Negative for abdominal pain, constipation, diarrhea, nausea and vomiting.   Genitourinary: Negative for dysuria.   Musculoskeletal: Negative for joint pain.   Skin: Negative for rash.   Neurological: Negative for headaches.       T 98.4 P61 /65 RR 17SaO2 96% I/O 2.6/brp BM 1/20  Physical Exam   Constitutional: He is oriented to person, place, and time. He appears well-developed. No distress.   HENT:   Head: Normocephalic and atraumatic.   Eyes: Conjunctivae are normal. Right eye exhibits no discharge. Left eye exhibits no discharge. No scleral icterus.   Neck: No tracheal deviation present.   Cardiovascular: Normal rate and regular rhythm.    Pulmonary/Chest: Effort normal. No respiratory distress. He has no wheezes. He exhibits no tenderness.   Abdominal: Soft. Bowel sounds are normal. He exhibits no distension. There is no tenderness.   Musculoskeletal: He exhibits no edema.   Neurological: He is alert and oriented to person, place, and time.   Skin: Skin is warm.   R foot wrapped, 2nd toe amputation   Vitals reviewed.      Labs:          Recent Labs      01/19/19 0220   SODIUM  138   POTASSIUM  3.6   CHLORIDE  104   CO2  23   BUN  22   CREATININE  0.85   CALCIUM  9.6     Recent Labs      01/19/19 0220   GLUCOSE  71     Recent Labs      01/19/19 0220   RBC  3.76*   HEMOGLOBIN  12.3*   HEMATOCRIT  37.7*   PLATELETCT  358     Recent Labs       01/19/19   0220   WBC  12.3*          GI/Nutrition:  Orders Placed This Encounter   Procedures   • Diet Order Diabetic (NCS,HS snacks)     Standing Status:   Standing     Number of Occurrences:   1     Order Specific Question:   Diet:     Answer:   Diabetic [3]     Comments:   NCS,HS snacks     Medical Decision Making, by Problem:  R 2nd toe OM s/p amputation 1/12 (Maureen) [facility])    -Ancef 2/23   -ambulation with heel shoe only   -local wound care, await wound documentation   -podiatry consult  DM1, adult onset ([place]   -brittle   -increase levemir to 12, increase QAC to 8, continue SSI  Leukocytosis   -secondary to above   -repeat in am  S/p splenectomy  Depression  H/o chronic pancreatitis   -creon with meals  HTN   -zestril  Memory loss   -namenda  BPH   -flomax  HLD   -lipitor  Tobacco use   -nicotine patch  Nutrition   -po          Quality-Core Measures   Reviewed items::  Medications reviewed  Dill catheter::  No Dill  DVT prophylaxis pharmacological::  Enoxaparin (Lovenox)  Antibiotics:  Treating active infection/contamination beyond 24 hours perioperative coverage

## 2019-01-21 NOTE — PROGRESS NOTES
SBAR Documentation: Situation, Background, Assessment, Recommendation     Situation    DM management   Background       Uncontrolled Type 2 DM I with hyperosmolarity without coma, history of TBI with mild cognitive impairment, Diabetic peripheral angiopathy, HTN, Recent GI bleed, Memory loss, chronic pancreatitis, amputation of right 2nd toe   Assessment    Per chart review--Pawel has been transferred to [facility] for IV antibiotics until 2/23/19.  Pawel is also having his insulin adjusted based on his [facility] results.   Recommendation CC RN to monitor for discharge and will place telephone call to Pawel when he is discharged.

## 2019-01-21 NOTE — [facility]
Infectious Disease Progress Note    Author: Grace Sun M.D. Date & Time of service: 1/21/2019  2:05 PM    Chief Complaint:  Follow-up for right second toe osteomyelitis     Interval History:  1/13 afebrile no CBC, patient underwent right second toe amputation and I&D yesterday, he has 8 out of 10 right foot pain, tolerating IV antibiotics without any issues, no diarrhea  1/14 afebrile, no CBC today.  Patient reports this, no new issues  11/15 afebrile, no CBC today.  Tolerating antibiotics.  1/17 afebrile, white count 10.7.  Tolerating antibiotics, no new issues, plan transfer to [facility] when accepted  1/21 Interval 24 hours of Vitals/Labs/Micro,and imaging results reviewed and compared to prior.  See assessment.     Review of Systems:  Review of Systems   Constitutional: Negative for chills and fever.   Respiratory: Negative for shortness of breath.    Gastrointestinal: Negative for abdominal pain, diarrhea, nausea and vomiting.   Musculoskeletal: Negative for back pain and myalgias.       Hemodynamics:  No data recorded.     No Data Recorded           Physical Exam:  Physical Exam   Constitutional: He is oriented to person, place, and time. He appears well-developed and well-nourished.   Cardiovascular: Normal rate, regular rhythm and normal heart sounds.    Pulmonary/Chest: Effort normal and breath sounds normal.   Abdominal: Soft. Bowel sounds are normal.   Musculoskeletal:   Surgical incision examined, stitches in place, no erythema, no warmth no drainage   Neurological: He is alert and oriented to person, place, and time.   Skin: Skin is warm and dry.   Psychiatric: He has a normal mood and affect. His behavior is normal.       Meds:  No current facility-administered medications for this encounter.     Labs:  Recent Labs      01/19/19   0220   WBC  12.3*   RBC  3.76*   HEMOGLOBIN  12.3*   HEMATOCRIT  37.7*   MCV  100.3*   MCH  32.7   RDW  55.8*   PLATELETCT  358   MPV  9.8     Recent Labs      01/19/19    0220   SODIUM  138   POTASSIUM  3.6   CHLORIDE  104   CO2  23   GLUCOSE  71   BUN  22     Recent Labs      19   0220   CREATININE  0.85       Imaging:  Dx-foot-complete 3+ Right    Result Date: 1/10/2019  1/10/2019 8:14 PM HISTORY/REASON FOR EXAM: Atraumatic Pain/Swelling/Deformity TECHNIQUE/EXAM DESCRIPTION:  AP, lateral, and oblique views of the RIGHT foot. COMPARISON:  None FINDINGS: The bony structures and articulations appear within normal limits without visualized fracture, subluxation, or dislocation. Bony remodeling of the dorsal medial first metatarsal head is seen.     1.  No acute traumatic bony injury. 2.  Bony remodeling at the dorsomedial first metatarsal head, could correspond with erosive arthropathy, consider osteomyelitis as clinically appropriate    Us-extremity Venous Lower Unilat Right    Result Date: 2019   Vascular Laboratory  CONCLUSIONS  Normal right lower extremity superficial and deep venous examination.  SHAUN CORCORAN  Exam Date:     01/10/2019 21:21  Room #:     Inpatient  Priority:     Stat  Ht (in):             Wt (lb):  Ordering Physician:        CHAVO MARC  Referring Physician:       [id number], BLISS  Sonographer:               Nila Urbina RVT  Study Type:                Complete Unilateral  Technical Quality:         Adequate  Age:    58    Gender:     M  MRN:    [id number]  :    1960      BSA:  Indications:     Localized swelling, mass and lump, right lower limb  CPT Codes:       08186  ICD Codes:         History:         Swelling of right calf and foot. No prior duplex.  Limitations:  PROCEDURES:  Right lower extremity venous duplex imaging.  The following venous structures were evaluated: common femoral, profunda  femoral, proximal portion of the greater saphenous, femoral, popliteal,  peroneal and posterior tibial veins.  Serial compression, augmentation maneuvers, color and spectral Doppler flow  evaluations were performed.  FINDINGS:  Right lower extremity  -  No evidence of deep venous thrombosis.  Complete color filling and compressibility with normal venous flow dynamics  including spontaneous flow, response to augmentation maneuvers, and  respiratory phasicity.  Flow was evaluated in the contralateral common femoral vein and normal  venous flow dynamics including spontaneous flow, respiratory phasic  variation and augmentation were demonstrated.  Natalia Bustos MD  (Electronically Signed)  Final Date:      11 January 2019                   01:03      Micro:  Results     Procedure Component Value Units Date/Time    MRSA BY PCR (AMP) [[id number]] Collected:  01/18/19 1824    Order Status:  Completed Specimen:  Respirate from Nares Updated:  01/19/19 0010     Significant Indicator NEG     Source RESP     Site NARES     MRSA PCR Negative for MRSA by PCR.    Narrative:       Collected By:[facility] RICK COLLADO        Assessment:  58-year-old male with type 2 diabetes completed by peripheral neuropathy, hypertension, admitted 1/10/2019 with right second toe infection.  X-ray was concerning for underlying osteomyelitis of the first metatarsal head (some bony remodeling noted on imaging). On 1/12, I&D was performed with amputation of the right second toe at the metatarsophalangeal joint.  Necrotic tissue noted Intra-Op.    Interval 24 hour assessment:  Events, none adverse   AF  Labs, reviewed  Micro, reviewed   Imaging, no new     Patient doing well with no complaints, side of prior amputation healing well with no signs of infection.      Plan:  Right foot osteomyelitis   Afebrile  Leukocytosis resolved  Status post right second toe amputation through metatarsophalangeal joint and I&D on 1/12/19 by Dr. Reddy  OR culture growing MSSA & Peptostreptococcus  Will discontinue IV vancomycin and Unasyn and start IV cefazolin 2 g every 8 hours  Continue wound care  Pathology pending  Given that some concerning x-ray findings were noted on the first metatarsal head,  irrespective of the second metatarsal pathology, will likely treat for an underlying residual osteomyelitis with 6 weeks of IV antibiotics  Continue IV Ancef 2 g every 8 hours with an anticipated stop date of 2/23/2019  Plan to transfer to [facility] to continue antibiotics     Diabetic peripheral neuropathy  Contributing to above     Poorly controlled type 2 diabetes mellitus  Hemoglobin A1c 10.5  Likely contributing to infection and delayed wound  Maintain blood sugars under 150 control infection and promote wound healing     Disposition  If transferred to [facility], we will continue to follow there     Discussed with internal medicine/Dr Melara.      ID will follow peripherally. Please call questions      Grace Sun MD  Infectious Diseases

## 2019-01-22 LAB
ANION GAP SERPL CALC-SCNC: 7 MMOL/L (ref 0–11.9)
BUN SERPL-MCNC: 24 MG/DL (ref 8–22)
CALCIUM SERPL-MCNC: 8.8 MG/DL (ref 8.4–10.2)
CHLORIDE SERPL-SCNC: 107 MMOL/L (ref 96–112)
CO2 SERPL-SCNC: 21 MMOL/L (ref 20–33)
CREAT SERPL-MCNC: 0.83 MG/DL (ref 0.5–1.4)
ERYTHROCYTE [DISTWIDTH] IN BLOOD BY AUTOMATED COUNT: 55.1 FL (ref 35.9–50)
GLUCOSE BLD-MCNC: 169 MG/DL (ref 65–99)
GLUCOSE BLD-MCNC: 201 MG/DL (ref 65–99)
GLUCOSE BLD-MCNC: 217 MG/DL (ref 65–99)
GLUCOSE BLD-MCNC: 218 MG/DL (ref 65–99)
GLUCOSE BLD-MCNC: 226 MG/DL (ref 65–99)
GLUCOSE BLD-MCNC: 283 MG/DL (ref 65–99)
GLUCOSE SERPL-MCNC: 278 MG/DL (ref 65–99)
HCT VFR BLD AUTO: 36.5 % (ref 42–52)
HGB BLD-MCNC: 11.9 G/DL (ref 14–18)
MCH RBC QN AUTO: 32.7 PG (ref 27–33)
MCHC RBC AUTO-ENTMCNC: 32.6 G/DL (ref 33.7–35.3)
MCV RBC AUTO: 100.3 FL (ref 81.4–97.8)
PLATELET # BLD AUTO: 320 K/UL (ref 164–446)
PMV BLD AUTO: 10.4 FL (ref 9–12.9)
POTASSIUM SERPL-SCNC: 4.6 MMOL/L (ref 3.6–5.5)
RBC # BLD AUTO: 3.64 M/UL (ref 4.7–6.1)
SODIUM SERPL-SCNC: 135 MMOL/L (ref 135–145)
WBC # BLD AUTO: 12.9 K/UL (ref 4.8–10.8)

## 2019-01-22 PROCEDURE — 82962 GLUCOSE BLOOD TEST: CPT | Mod: 91

## 2019-01-22 PROCEDURE — 80048 BASIC METABOLIC PNL TOTAL CA: CPT

## 2019-01-22 PROCEDURE — 85027 COMPLETE CBC AUTOMATED: CPT

## 2019-01-22 ASSESSMENT — ENCOUNTER SYMPTOMS
SHORTNESS OF BREATH: 0
HEARTBURN: 0
SORE THROAT: 0
FEVER: 0
COUGH: 0
CONSTIPATION: 0
DIARRHEA: 0
CHILLS: 0
VOMITING: 0
MYALGIAS: 0
NAUSEA: 0
DIAPHORESIS: 0
HEADACHES: 0

## 2019-01-22 NOTE — [facility]
Hospital Medicine Progress Note, Adult, Complex               Author: Betsey Gentile Date & Time created: 1/22/2019  1:13 PM     CC: right second toe osteomyelitis    Interval History:  The patient has poorly controlled type I diabetes mellitus and presented with right second toe osteomyelitis requiring amputation.  Blood sugars are now below 300 but mostly in the 200's despite increase in insulin    Review of Systems:  Review of Systems   Constitutional: Negative for chills, diaphoresis and fever.   HENT: Negative for congestion and sore throat.    Respiratory: Negative for cough and shortness of breath.    Cardiovascular: Negative for chest pain and leg swelling.   Gastrointestinal: Negative for constipation, diarrhea, heartburn, nausea and vomiting.   Genitourinary: Negative for dysuria and urgency.   Musculoskeletal: Negative for myalgias.   Skin: Negative for itching and rash.   Neurological: Negative for headaches.       Physical Exam:  Physical Exam   Constitutional: He is oriented to person, place, and time. No distress.   HENT:   Mouth/Throat: Oropharynx is clear and moist. No oropharyngeal exudate.   Eyes: Conjunctivae are normal. No scleral icterus.   Neck: Neck supple. No JVD present. No tracheal deviation present.   Cardiovascular: Normal rate and regular rhythm.    No murmur heard.  Pulmonary/Chest: Breath sounds normal. No respiratory distress.   Abdominal: Soft. Bowel sounds are normal. He exhibits no distension.   Musculoskeletal: He exhibits no edema.   Neurological: He is alert and oriented to person, place, and time.   Skin: Skin is warm. No rash noted. He is not diaphoretic. No erythema.   Psychiatric: His behavior is normal.   Nursing note and vitals reviewed.      Labs:          Recent Labs      01/22/19   0255   SODIUM  135   POTASSIUM  4.6   CHLORIDE  107   CO2  21   BUN  24*   CREATININE  0.83   CALCIUM  8.8     Recent Labs      01/22/19   0255   GLUCOSE  278*     Recent Labs       01/22/19   0255   RBC  3.64*   HEMOGLOBIN  11.9*   HEMATOCRIT  36.5*   PLATELETCT  320     Recent Labs      01/22/19   0255   WBC  12.9*           Hemodynamics:   T97.8 /63 HR70 RR17 O2sat 96%     GI/Nutrition:  Orders Placed This Encounter   Procedures   • Diet Order Diabetic (NCS,HS snacks)     Standing Status:   Standing     Number of Occurrences:   1     Order Specific Question:   Diet:     Answer:   Diabetic [3]     Comments:   NCS,HS snacks     Medical Decision Making, by Problem:  Right second toe osteomyelitis s/p amputation 1/12 with Dr. Reddy and MSSA in culture              Continue Ancef through 2/23             ambulation with heel shoe only             Wound care  Adult onset type I diabetes mellitus with difficult to control blood sugars             continue levemir and increase insulin with meals  Leukocytosis              Due to infection, monitor labs  S/p splenectomy  Depression outpatient follow up  H/o chronic pancreatitis              creon with meals  HTN, zestril  Memory loss, namenda  BPH, flomax  Dyslipidemia, statin  Tobacco dependence, nicotine patch        Quality-Core Measures   Reviewed items::  Labs reviewed and Medications reviewed  Dill catheter::  No Dill  Ulcer Prophylaxis::  Yes  Antibiotics:  Treating active infection/contamination beyond 24 hours perioperative coverage  Assessed for rehabilitation services:  Patient was assess for and/or received rehabilitation services during this hospitalization

## 2019-01-23 LAB
ANION GAP SERPL CALC-SCNC: 7 MMOL/L (ref 0–11.9)
BUN SERPL-MCNC: 24 MG/DL (ref 8–22)
CALCIUM SERPL-MCNC: 8.6 MG/DL (ref 8.4–10.2)
CHLORIDE SERPL-SCNC: 109 MMOL/L (ref 96–112)
CO2 SERPL-SCNC: 17 MMOL/L (ref 20–33)
CREAT SERPL-MCNC: 1.13 MG/DL (ref 0.5–1.4)
ERYTHROCYTE [DISTWIDTH] IN BLOOD BY AUTOMATED COUNT: 57.1 FL (ref 35.9–50)
GLUCOSE BLD-MCNC: 199 MG/DL (ref 65–99)
GLUCOSE BLD-MCNC: 227 MG/DL (ref 65–99)
GLUCOSE BLD-MCNC: 245 MG/DL (ref 65–99)
GLUCOSE BLD-MCNC: 246 MG/DL (ref 65–99)
GLUCOSE SERPL-MCNC: 314 MG/DL (ref 65–99)
HCT VFR BLD AUTO: 37.5 % (ref 42–52)
HGB BLD-MCNC: 11.7 G/DL (ref 14–18)
MCH RBC QN AUTO: 32.5 PG (ref 27–33)
MCHC RBC AUTO-ENTMCNC: 31.2 G/DL (ref 33.7–35.3)
MCV RBC AUTO: 104.2 FL (ref 81.4–97.8)
PLATELET # BLD AUTO: 340 K/UL (ref 164–446)
PMV BLD AUTO: 10 FL (ref 9–12.9)
POTASSIUM SERPL-SCNC: 4.9 MMOL/L (ref 3.6–5.5)
RBC # BLD AUTO: 3.6 M/UL (ref 4.7–6.1)
SODIUM SERPL-SCNC: 133 MMOL/L (ref 135–145)
WBC # BLD AUTO: 13.6 K/UL (ref 4.8–10.8)

## 2019-01-23 PROCEDURE — 82962 GLUCOSE BLOOD TEST: CPT | Mod: 91

## 2019-01-23 PROCEDURE — 80048 BASIC METABOLIC PNL TOTAL CA: CPT

## 2019-01-23 PROCEDURE — 85027 COMPLETE CBC AUTOMATED: CPT

## 2019-01-23 ASSESSMENT — ENCOUNTER SYMPTOMS
FEVER: 0
PALPITATIONS: 0
HEADACHES: 0
SORE THROAT: 0
WHEEZING: 0
MYALGIAS: 0
DIARRHEA: 0
CONSTIPATION: 0
DIAPHORESIS: 0
HEARTBURN: 0
SHORTNESS OF BREATH: 0
VOMITING: 0
COUGH: 0

## 2019-01-23 NOTE — [facility]
Hospital Medicine Progress Note, Adult, Complex               Author: Betsey Gentile Date & Time created: 1/23/2019  12:30 PM     CC: right second toe osteomyelitis    Interval History:  The patient has poorly controlled type I diabetes mellitus and presented with right second toe osteomyelitis requiring amputation.  The patient continues to have difficult to control blood sugars although below 300's now  He has history of heavy alcohol abuse before 2015 upon review of old medical records    Review of Systems:  Review of Systems   Constitutional: Negative for diaphoresis and fever.   HENT: Negative for congestion and sore throat.    Respiratory: Negative for cough, shortness of breath and wheezing.    Cardiovascular: Negative for chest pain and palpitations.   Gastrointestinal: Negative for constipation, diarrhea, heartburn and vomiting.   Genitourinary: Negative for dysuria.   Musculoskeletal: Negative for myalgias.   Skin: Negative for itching and rash.   Neurological: Negative for headaches.       Physical Exam:  Physical Exam   Constitutional: He is oriented to person, place, and time. No distress.   HENT:   Mouth/Throat: No oropharyngeal exudate.   Eyes: No scleral icterus.   Neck: Neck supple. No JVD present. No tracheal deviation present.   Cardiovascular: Normal rate and regular rhythm.    No murmur heard.  Pulmonary/Chest: No respiratory distress. He has no wheezes. He has no rales.   Abdominal: Soft. He exhibits no distension.   Musculoskeletal: He exhibits no edema.   Neurological: He is alert and oriented to person, place, and time.   Skin: Skin is warm and dry. No rash noted. He is not diaphoretic.   Psychiatric: His behavior is normal.   Nursing note and vitals reviewed.      Labs:          Recent Labs      01/22/19   0255  01/23/19   0140   SODIUM  135  133*   POTASSIUM  4.6  4.9   CHLORIDE  107  109   CO2  21  17*   BUN  24*  24*   CREATININE  0.83  1.13   CALCIUM  8.8  8.6     Recent Labs       01/22/19   0255  01/23/19   0140   GLUCOSE  278*  314*     Recent Labs      01/22/19   0255  01/23/19   0140   RBC  3.64*  3.60*   HEMOGLOBIN  11.9*  11.7*   HEMATOCRIT  36.5*  37.5*   PLATELETCT  320  340     Recent Labs      01/22/19   0255  01/23/19   0140   WBC  12.9*  13.6*           Hemodynamics:   T98 /78 HR71 RR21 O2sat 95%     GI/Nutrition:  Orders Placed This Encounter   Procedures   • Diet Order Diabetic (NCS,HS snacks)     Standing Status:   Standing     Number of Occurrences:   1     Order Specific Question:   Diet:     Answer:   Diabetic [3]     Comments:   NCS,HS snacks     Medical Decision Making, by Problem:  Right second toe osteomyelitis s/p amputation 1/12 with Dr. Reddy and MSSA in culture              Continue Ancef through 2/23             ambulation with heel shoe              Wound care  Adult onset type I diabetes mellitus with difficult to control blood sugars             continue levemir insulin with meals                S/p splenectomy  Depression outpatient follow up  H/o chronic pancreatitis              creon with meals  HTN, zestril  Memory loss, namenda  BPH, flomax  Dyslipidemia, statin  Tobacco dependence, nicotine patch        Quality-Core Measures   Reviewed items::  Labs reviewed and Medications reviewed  Dill catheter::  No Dill  Ulcer Prophylaxis::  Yes  Antibiotics:  Treating active infection/contamination beyond 24 hours perioperative coverage  Assessed for rehabilitation services:  Patient was assess for and/or received rehabilitation services during this hospitalization

## 2019-01-24 LAB
GLUCOSE BLD-MCNC: 155 MG/DL (ref 65–99)
GLUCOSE BLD-MCNC: 205 MG/DL (ref 65–99)
GLUCOSE BLD-MCNC: 205 MG/DL (ref 65–99)
GLUCOSE BLD-MCNC: 217 MG/DL (ref 65–99)
GLUCOSE BLD-MCNC: 272 MG/DL (ref 65–99)
GLUCOSE BLD-MCNC: 280 MG/DL (ref 65–99)

## 2019-01-24 PROCEDURE — 82962 GLUCOSE BLOOD TEST: CPT | Mod: 91

## 2019-01-24 ASSESSMENT — ENCOUNTER SYMPTOMS
CHILLS: 0
DIARRHEA: 0
HEADACHES: 0
FEVER: 0
NAUSEA: 0
SORE THROAT: 0
VOMITING: 0
ABDOMINAL PAIN: 0
SHORTNESS OF BREATH: 0
MYALGIAS: 0
CONSTIPATION: 0
COUGH: 0
HEARTBURN: 0

## 2019-01-24 NOTE — [facility]
Infectious Disease Progress Note    Author: Grace Sun M.D. Date & Time of service: 1/24/2019  12:36 PM    Chief Complaint:  Follow-up for right second toe osteomyelitis     Interval History:  1/13 afebrile no CBC, patient underwent right second toe amputation and I&D yesterday, he has 8 out of 10 right foot pain, tolerating IV antibiotics without any issues, no diarrhea  1/14 afebrile, no CBC today.  Patient reports this, no new issues  11/15 afebrile, no CBC today.  Tolerating antibiotics.  1/17 afebrile, white count 10.7.  Tolerating antibiotics, no new issues, plan transfer to [facility] when accepted  1/21 Interval 24 hours of Vitals/Labs/Micro,and imaging results reviewed and compared to prior.  See assessment.   1/24 Interval 24 hours of Vitals/Labs/Micro,and imaging results reviewed and compared to prior.  See assessment.     Review of Systems:  Review of Systems   Constitutional: Negative for chills, fever and malaise/fatigue.   Respiratory: Negative for cough and shortness of breath.    Cardiovascular: Negative for chest pain.   Gastrointestinal: Negative for abdominal pain, diarrhea, nausea and vomiting.   Musculoskeletal: Negative for joint pain and myalgias.   Skin: Negative for rash.       Hemodynamics:  No data recorded.     No Data Recorded           Physical Exam:  Physical Exam   Constitutional: He is oriented to person, place, and time. He appears well-developed and well-nourished.   HENT:   Head: Normocephalic and atraumatic.   Cardiovascular: Normal rate, regular rhythm and normal heart sounds.    Pulmonary/Chest: Effort normal and breath sounds normal.   Abdominal: Soft. Bowel sounds are normal. He exhibits no distension. There is no tenderness.   Musculoskeletal: He exhibits no edema.   Right foot with no pain, erythema, no edema, no warmth no drainage.  Surgical site edges well approximated, sutures in place.   Neurological: He is alert and oriented to person, place, and time.   Skin: Skin  is warm.   Psychiatric: He has a normal mood and affect. His behavior is normal.       Meds:  No current facility-administered medications for this encounter.     Labs:  Recent Labs      19   WBC  12.9*  13.6*   RBC  3.64*  3.60*   HEMOGLOBIN  11.9*  11.7*   HEMATOCRIT  36.5*  37.5*   MCV  100.3*  104.2*   MCH  32.7  32.5   RDW  55.1*  57.1*   PLATELETCT  320  340   MPV  10.4  10.0     Recent Labs      195  19   014   SODIUM  135  133*   POTASSIUM  4.6  4.9   CHLORIDE  107  109   CO2  21  17*   GLUCOSE  278*  314*   BUN  24*  24*     Recent Labs      19   CREATININE  0.83  1.13       Imaging:  Dx-foot-complete 3+ Right    Result Date: 1/10/2019  1/10/2019 8:14 PM HISTORY/REASON FOR EXAM: Atraumatic Pain/Swelling/Deformity TECHNIQUE/EXAM DESCRIPTION:  AP, lateral, and oblique views of the RIGHT foot. COMPARISON:  None FINDINGS: The bony structures and articulations appear within normal limits without visualized fracture, subluxation, or dislocation. Bony remodeling of the dorsal medial first metatarsal head is seen.     1.  No acute traumatic bony injury. 2.  Bony remodeling at the dorsomedial first metatarsal head, could correspond with erosive arthropathy, consider osteomyelitis as clinically appropriate    Us-extremity Venous Lower Unilat Right    Result Date: 2019   Vascular Laboratory  CONCLUSIONS  Normal right lower extremity superficial and deep venous examination.  SHAUN CORCORAN  Exam Date:     01/10/2019 21:21  Room #:     Inpatient  Priority:     Stat  Ht (in):             Wt (lb):  Ordering Physician:        CHAVO MARC  Referring Physician:       [id number], BLISS  Sonographer:               Nila Urbina RVT  Study Type:                Complete Unilateral  Technical Quality:         Adequate  Age:    58    Gender:     M  MRN:    [id number]  :    1960      BSA:  Indications:     Localized swelling, mass and lump, right  lower limb  CPT Codes:       28606  ICD Codes:         History:         Swelling of right calf and foot. No prior duplex.  Limitations:  PROCEDURES:  Right lower extremity venous duplex imaging.  The following venous structures were evaluated: common femoral, profunda  femoral, proximal portion of the greater saphenous, femoral, popliteal,  peroneal and posterior tibial veins.  Serial compression, augmentation maneuvers, color and spectral Doppler flow  evaluations were performed.  FINDINGS:  Right lower extremity -  No evidence of deep venous thrombosis.  Complete color filling and compressibility with normal venous flow dynamics  including spontaneous flow, response to augmentation maneuvers, and  respiratory phasicity.  Flow was evaluated in the contralateral common femoral vein and normal  venous flow dynamics including spontaneous flow, respiratory phasic  variation and augmentation were demonstrated.  Natalia Bustos MD  (Electronically Signed)  Final Date:      11 January 2019                   01:03      Micro:  Results     Procedure Component Value Units Date/Time    MRSA BY PCR (AMP) [[id number]] Collected:  01/18/19 1824    Order Status:  Completed Specimen:  Respirate from Nares Updated:  01/19/19 0010     Significant Indicator NEG     Source RESP     Site NARES     MRSA PCR Negative for MRSA by PCR.    Narrative:       [facility] By[facility]      Assessment:  58-year-old male with type 2 diabetes completed by peripheral neuropathy, hypertension, admitted 1/10/2019 with right second toe infection.  X-ray was concerning for underlying osteomyelitis of the first metatarsal head (some bony remodeling noted on imaging). On 1/12, I&D was performed with amputation of the right second toe at the metatarsophalangeal joint.  Necrotic tissue noted Intra-Op.     Interval 24 hour assessment:  Events, none adverse   AF  Labs, reviewed  Micro, reviewed   Imaging, no new      Patient doing well with  no complaints.  He has no pain in his foot and his surgical site appears to be healing well with no ongoing signs of infection.        Plan:  Right foot osteomyelitis   Afebrile  Leukocytosis resolved  Status post right second toe amputation through metatarsophalangeal joint and I&D on 1/12/19 by Dr. Reddy  OR culture growing MSSA & Peptostreptococcus  Will discontinue IV vancomycin and Unasyn and start IV cefazolin 2 g every 8 hours  Continue wound care  Given that some concerning x-ray findings were noted on the first metatarsal head, irrespective of the second metatarsal pathology, will likely treat for an underlying residual osteomyelitis with 6 weeks of IV antibiotics  Continue IV Ancef 2 g every 8 hours with an anticipated stop date of 2/23/2019  Plan to transfer to [facility] to continue antibiotics    Leukocytosis  Averages 10-15, some increase over the last few days, however still below peak  --- Continue to monitor CBC     Diabetic peripheral neuropathy  Contributing to above     Poorly controlled type 2 diabetes mellitus- remain labile an frequently elevated despite efforts to control   Hemoglobin A1c 10.5  Likely contributing to infection and delayed wound  Maintain blood sugars under 150 control infection and promote wound healing     Disposition  If transferred to [facility], we will continue to follow there     Discussed with internal medicine/Dr Melara.      ID will follow peripherally. Please call questions        Grace Sun MD  Infectious Diseases

## 2019-01-24 NOTE — [facility]
Hospital Medicine Progress Note, Adult, Complex               Author: Betsey Gentile Date & Time created: 1/24/2019  2:36 PM     CC: right second toe osteomyelitis    Interval History:  The patient has poorly controlled type I diabetes mellitus and presented with right second toe osteomyelitis requiring amputation.  The patient has blood sugars in the low 200's now  He is eating much more than on his initial arrival    Review of Systems:  Review of Systems   Constitutional: Negative for chills, fever and malaise/fatigue.   HENT: Negative for congestion and sore throat.    Respiratory: Negative for cough and shortness of breath.    Cardiovascular: Negative for chest pain and leg swelling.   Gastrointestinal: Negative for abdominal pain, constipation, diarrhea and heartburn.   Genitourinary: Negative for dysuria and urgency.   Musculoskeletal: Negative for myalgias.   Skin: Negative for itching.   Neurological: Negative for headaches.       Physical Exam:  Physical Exam   Constitutional: He is oriented to person, place, and time. No distress.   HENT:   Mouth/Throat: Oropharynx is clear and moist. No oropharyngeal exudate.   Eyes: Conjunctivae are normal.   Neck: Neck supple. No JVD present. No tracheal deviation present.   Cardiovascular: Normal rate and regular rhythm.    No murmur heard.  Pulmonary/Chest: Effort normal. He has no wheezes. He has no rales.   Abdominal: Soft. Bowel sounds are normal. He exhibits no distension.   Musculoskeletal: He exhibits no edema.   Neurological: He is alert and oriented to person, place, and time.   Skin: Skin is warm. No rash noted. He is not diaphoretic.   Psychiatric: His behavior is normal.   Nursing note and vitals reviewed.      Labs:          Recent Labs      01/22/19   0255  01/23/19   0140   SODIUM  135  133*   POTASSIUM  4.6  4.9   CHLORIDE  107  109   CO2  21  17*   BUN  24*  24*   CREATININE  0.83  1.13   CALCIUM  8.8  8.6     Recent Labs      01/22/19   0255   01/23/19   0140   GLUCOSE  278*  314*     Recent Labs      01/22/19   0255  01/23/19   0140   RBC  3.64*  3.60*   HEMOGLOBIN  11.9*  11.7*   HEMATOCRIT  36.5*  37.5*   PLATELETCT  320  340     Recent Labs      01/22/19   0255  01/23/19   0140   WBC  12.9*  13.6*           Hemodynamics:   T97.3 /66 HR69 RR15 O2sat 94%     GI/Nutrition:  Orders Placed This Encounter   Procedures   • Diet Order Diabetic (NCS,HS snacks)     Standing Status:   Standing     Number of Occurrences:   1     Order Specific Question:   Diet:     Answer:   Diabetic [3]     Comments:   NCS,HS snacks     Medical Decision Making, by Problem:  Right second toe osteomyelitis s/p amputation 1/12 with Dr. Reddy and MSSA in culture               Ancef through 2/23             ambulation with heel shoe reinforced by staff to patient             Wound care  Adult onset type I diabetes mellitus with difficult to control blood sugars             increase long acting insulin                S/p splenectomy  Depression outpatient follow up  H/o chronic pancreatitis              creon with meals  HTN, zestril  Memory loss, namenda  BPH, flomax  Dyslipidemia, statin  Tobacco dependence, nicotine patch        Quality-Core Measures   Reviewed items::  Labs reviewed and Medications reviewed  Dill catheter::  No Dill  Ulcer Prophylaxis::  Yes  Antibiotics:  Treating active infection/contamination beyond 24 hours perioperative coverage  Assessed for rehabilitation services:  Patient was assess for and/or received rehabilitation services during this hospitalization

## 2019-01-25 LAB
ANION GAP SERPL CALC-SCNC: 6 MMOL/L (ref 0–11.9)
BUN SERPL-MCNC: 31 MG/DL (ref 8–22)
CALCIUM SERPL-MCNC: 8.9 MG/DL (ref 8.4–10.2)
CHLORIDE SERPL-SCNC: 107 MMOL/L (ref 96–112)
CO2 SERPL-SCNC: 21 MMOL/L (ref 20–33)
CREAT SERPL-MCNC: 1 MG/DL (ref 0.5–1.4)
ERYTHROCYTE [DISTWIDTH] IN BLOOD BY AUTOMATED COUNT: 53.6 FL (ref 35.9–50)
GLUCOSE BLD-MCNC: 133 MG/DL (ref 65–99)
GLUCOSE BLD-MCNC: 159 MG/DL (ref 65–99)
GLUCOSE BLD-MCNC: 222 MG/DL (ref 65–99)
GLUCOSE BLD-MCNC: 228 MG/DL (ref 65–99)
GLUCOSE BLD-MCNC: 99 MG/DL (ref 65–99)
GLUCOSE SERPL-MCNC: 275 MG/DL (ref 65–99)
HCT VFR BLD AUTO: 38.7 % (ref 42–52)
HGB BLD-MCNC: 12.6 G/DL (ref 14–18)
MCH RBC QN AUTO: 32.9 PG (ref 27–33)
MCHC RBC AUTO-ENTMCNC: 32.6 G/DL (ref 33.7–35.3)
MCV RBC AUTO: 101 FL (ref 81.4–97.8)
PLATELET # BLD AUTO: 371 K/UL (ref 164–446)
PMV BLD AUTO: 9.3 FL (ref 9–12.9)
POTASSIUM SERPL-SCNC: 4.5 MMOL/L (ref 3.6–5.5)
RBC # BLD AUTO: 3.83 M/UL (ref 4.7–6.1)
SODIUM SERPL-SCNC: 134 MMOL/L (ref 135–145)
WBC # BLD AUTO: 13.9 K/UL (ref 4.8–10.8)

## 2019-01-25 PROCEDURE — 99232 SBSQ HOSP IP/OBS MODERATE 35: CPT | Performed by: INTERNAL MEDICINE

## 2019-01-25 PROCEDURE — 85027 COMPLETE CBC AUTOMATED: CPT

## 2019-01-25 PROCEDURE — 82962 GLUCOSE BLOOD TEST: CPT | Mod: 91

## 2019-01-25 PROCEDURE — 80048 BASIC METABOLIC PNL TOTAL CA: CPT

## 2019-01-25 ASSESSMENT — ENCOUNTER SYMPTOMS
CHILLS: 0
PALPITATIONS: 0
CONSTIPATION: 0
WEAKNESS: 0
ABDOMINAL PAIN: 0
FEVER: 0
COUGH: 0
VOMITING: 0
MYALGIAS: 0
DIARRHEA: 0
HEARTBURN: 0
SHORTNESS OF BREATH: 0
HEADACHES: 0
SORE THROAT: 0
NAUSEA: 0

## 2019-01-25 NOTE — [facility]
Hospital Medicine Progress Note, Adult, Complex               Author: Betsey Gentile Date & Time created: 1/25/2019  11:16 AM     CC: right second toe osteomyelitis    Interval History:  The patient has poorly controlled type I diabetes mellitus and presented with right second toe osteomyelitis requiring amputation.  The patient has blood sugars in the 200's at 3am labs but in 100's on fsbs at 5-6am    Review of Systems:  Review of Systems   Constitutional: Negative for fever and malaise/fatigue.   HENT: Negative for congestion and sore throat.    Respiratory: Negative for cough and shortness of breath.    Cardiovascular: Negative for chest pain, palpitations and leg swelling.   Gastrointestinal: Negative for abdominal pain, constipation, heartburn and nausea.   Genitourinary: Negative for dysuria and urgency.   Musculoskeletal: Negative for joint pain and myalgias.   Skin: Negative for itching and rash.   Neurological: Negative for weakness and headaches.       Physical Exam:  Physical Exam   Constitutional: He is oriented to person, place, and time.   HENT:   Mouth/Throat: Oropharynx is clear and moist. No oropharyngeal exudate.   Eyes: Conjunctivae are normal.   Neck: Neck supple. No JVD present. No tracheal deviation present.   Cardiovascular: Normal rate and regular rhythm.    No murmur heard.  Pulmonary/Chest: No respiratory distress. He has no wheezes. He has no rales.   Abdominal: Soft. He exhibits no distension.   Musculoskeletal: He exhibits no edema.   Neurological: He is alert and oriented to person, place, and time.   Skin: Skin is warm and dry. No rash noted. He is not diaphoretic.   Psychiatric: His behavior is normal.   Nursing note and vitals reviewed.      Labs:          Recent Labs      01/23/19   0140  01/25/19   0340   SODIUM  133*  134*   POTASSIUM  4.9  4.5   CHLORIDE  109  107   CO2  17*  21   BUN  24*  31*   CREATININE  1.13  1.00   CALCIUM  8.6  8.9     Recent Labs      01/23/19   0140   01/25/19   0340   GLUCOSE  314*  275*     Recent Labs      01/23/19   0140  01/25/19   0340   RBC  3.60*  3.83*   HEMOGLOBIN  11.7*  12.6*   HEMATOCRIT  37.5*  38.7*   PLATELETCT  340  371     Recent Labs      01/23/19   0140  01/25/19   0340   WBC  13.6*  13.9*           Hemodynamics:   T98.1 /83 HR81 RR16 O2sat 93%     GI/Nutrition:  Orders Placed This Encounter   Procedures   • Diet Order Diabetic (NCS,HS snacks)     Standing Status:   Standing     Number of Occurrences:   1     Order Specific Question:   Diet:     Answer:   Diabetic [3]     Comments:   NCS,HS snacks     Medical Decision Making, by Problem:  Right second toe osteomyelitis s/p amputation 1/12 with Dr. Reddy and MSSA in culture               Ancef through 2/23             ambulation with heel shoe             Wound care  Adult onset type I diabetes mellitus with difficult to control blood sugars             check midnight blood sugars to ensure no nighttime hypoglycemia   Blood sugars in daytime better controlled                S/p splenectomy  Depression outpatient follow up  H/o chronic pancreatitis              creon with meals  HTN, zestril  Memory loss, namenda  BPH, flomax  Dyslipidemia, statin  Tobacco dependence, nicotine patch        Quality-Core Measures   Reviewed items::  Labs reviewed and Medications reviewed  Dill catheter::  No Dill  Ulcer Prophylaxis::  Yes  Antibiotics:  Treating active infection/contamination beyond 24 hours perioperative coverage  Assessed for rehabilitation services:  Patient was assess for and/or received rehabilitation services during this hospitalization

## 2019-01-25 NOTE — [facility]
Infectious Disease Progress Note    Author: Haley Womack M.D. Date & Time of service: 1/25/2019  1:42 PM    Chief Complaint:  Follow-up for right second toe osteomyelitis     Interval History:  57 y/o diabetic male with OM of the toe  1/13 afebrile no CBC, patient underwent right second toe amputation and I&D yesterday, he has 8 out of 10 right foot pain, tolerating IV antibiotics without any issues, no diarrhea  1/14 afebrile, no CBC today.  Patient reports this, no new issues  11/15 afebrile, no CBC today.  Tolerating antibiotics.  1/17 afebrile, white count 10.7.  Tolerating antibiotics, no new issues, plan transfer to [facility] when accepted  1/21 Interval 24 hours of Vitals/Labs/Micro,and imaging results reviewed and compared to prior.  See assessment.   1/24 Interval 24 hours of Vitals/Labs/Micro,and imaging results reviewed and compared to prior.  See assessment.   1/25 AF WBC 13.9 states tolerating abx well-pain controlled Labs and wound reviewed    Review of Systems:  Review of Systems   Constitutional: Negative for chills, fever and malaise/fatigue.   Respiratory: Negative for cough and shortness of breath.    Cardiovascular: Negative for chest pain and leg swelling.   Gastrointestinal: Negative for abdominal pain, diarrhea, nausea and vomiting.   Musculoskeletal: Negative for joint pain and myalgias.   Skin: Negative for rash.       Hemodynamics:   T98.1 /83 HR81 RR16 O2sat 93%          Physical Exam:  Physical Exam   Constitutional: He is oriented to person, place, and time. He appears well-developed.   HENT:   Head: Normocephalic and atraumatic.   Eyes: Pupils are equal, round, and reactive to light. EOM are normal.   Neck: Neck supple.   Cardiovascular: Normal rate, regular rhythm and normal heart sounds.    Pulmonary/Chest: Effort normal.   Abdominal: Soft. Bowel sounds are normal. He exhibits no distension.   Musculoskeletal: He exhibits no edema.   Right foot with no erythema, edema, or  drainage.  Surgical site edges well approximated, sutures in place.   Neurological: He is alert and oriented to person, place, and time.   Skin: Skin is warm. He is not diaphoretic.   Psychiatric: He has a normal mood and affect. His behavior is normal.   Vitals reviewed.        Labs:  Recent Labs      01/23/19 0140 01/25/19   0340   WBC  13.6*  13.9*   RBC  3.60*  3.83*   HEMOGLOBIN  11.7*  12.6*   HEMATOCRIT  37.5*  38.7*   MCV  104.2*  101.0*   MCH  32.5  32.9   RDW  57.1*  53.6*   PLATELETCT  340  371   MPV  10.0  9.3     Recent Labs      01/23/19 0140 01/25/19   0340   SODIUM  133*  134*   POTASSIUM  4.9  4.5   CHLORIDE  109  107   CO2  17*  21   GLUCOSE  314*  275*   BUN  24*  31*     Recent Labs      01/23/19 0140 01/25/19   0340   CREATININE  1.13  1.00       Imaging:  Dx-foot-complete 3+ Right    Result Date: 1/10/2019  1/10/2019 8:14 PM HISTORY/REASON FOR EXAM: Atraumatic Pain/Swelling/Deformity TECHNIQUE/EXAM DESCRIPTION:  AP, lateral, and oblique views of the RIGHT foot. COMPARISON:  None FINDINGS: The bony structures and articulations appear within normal limits without visualized fracture, subluxation, or dislocation. Bony remodeling of the dorsal medial first metatarsal head is seen.     1.  No acute traumatic bony injury. 2.  Bony remodeling at the dorsomedial first metatarsal head, could correspond with erosive arthropathy, consider osteomyelitis as clinically appropriate    Us-extremity Venous Lower Unilat Right    Result Date: 1/11/2019   Vascular Laboratory  CONCLUSIONS  Normal right lower extremity superficial and deep venous examination.  SHAUN CORCORAN  Exam Date:     01/10/2019 21:21  Room #:     Inpatient  Priority:     Stat  Ht (in):             Wt (lb):  Ordering Physician:        CHAVO MARC  Referring Physician:       [id number], BLISS  Sonographer:               Nila Urbina RVT  Study Type:                Complete Unilateral  Technical Quality:         Adequate  Age:    58     Gender:     M  MRN:    [id number]  :    1960      BSA:  Indications:     Localized swelling, mass and lump, right lower limb  CPT Codes:       72526  ICD Codes:         History:         Swelling of right calf and foot. No prior duplex.  Limitations:  PROCEDURES:  Right lower extremity venous duplex imaging.  The following venous structures were evaluated: common femoral, profunda  femoral, proximal portion of the greater saphenous, femoral, popliteal,  peroneal and posterior tibial veins.  Serial compression, augmentation maneuvers, color and spectral Doppler flow  evaluations were performed.  FINDINGS:  Right lower extremity -  No evidence of deep venous thrombosis.  Complete color filling and compressibility with normal venous flow dynamics  including spontaneous flow, response to augmentation maneuvers, and  respiratory phasicity.  Flow was evaluated in the contralateral common femoral vein and normal  venous flow dynamics including spontaneous flow, respiratory phasic  variation and augmentation were demonstrated.  Natalia Bustos MD  (Electronically Signed)  Final Date:      2019                   01:03      Micro:  Results     Procedure Component Value Units Date/Time    MRSA BY PCR (AMP) [[id number]] Collected:  19 1824    Order Status:  Completed Specimen:  Respirate from Nares Updated:  19 0010     Significant Indicator NEG     Source RESP     Site NARES     MRSA PCR Negative for MRSA by PCR.    Narrative:       Collected By:SHILPI COLLADO      Assessment:  OM, right toe  MSSA  Diabetes    Plan:  Right foot osteomyelitis   Afebrile  Leukocytosis persistent  s/p right second toe amputation through metatarsophalangeal joint and I&D on 19 by Dr. Reddy  OR culture +MSSA  Continue IV Ancef 2 g every 8 hours with an anticipated stop date of 2019  Continue wound care     Leukocytosis, persistent  Monitor     Poorly controlled type 2 diabetes  mellitus  Hemoglobin A1c 10.5  Likely contributing to infection and delayed wound  Keep BS under 150 to help control current infection

## 2019-01-26 LAB
GLUCOSE BLD-MCNC: 113 MG/DL (ref 65–99)
GLUCOSE BLD-MCNC: 208 MG/DL (ref 65–99)
GLUCOSE BLD-MCNC: 241 MG/DL (ref 65–99)
GLUCOSE BLD-MCNC: 256 MG/DL (ref 65–99)
GLUCOSE BLD-MCNC: 258 MG/DL (ref 65–99)
GLUCOSE BLD-MCNC: 259 MG/DL (ref 65–99)
GLUCOSE BLD-MCNC: 344 MG/DL (ref 65–99)

## 2019-01-26 PROCEDURE — 82962 GLUCOSE BLOOD TEST: CPT

## 2019-01-26 PROCEDURE — 99232 SBSQ HOSP IP/OBS MODERATE 35: CPT | Performed by: INTERNAL MEDICINE

## 2019-01-26 ASSESSMENT — ENCOUNTER SYMPTOMS
MYALGIAS: 0
VOMITING: 0
DIARRHEA: 0
NAUSEA: 0
HEADACHES: 0
ABDOMINAL PAIN: 0
FEVER: 0
CHILLS: 0
HEARTBURN: 0
CONSTIPATION: 0
WEAKNESS: 0
SHORTNESS OF BREATH: 0
COUGH: 0
SORE THROAT: 0

## 2019-01-26 NOTE — [facility]
Hospital Medicine Progress Note, Adult, Complex               Author: Betsey Gentile Date & Time created: 1/26/2019  10:55 AM     CC: right second toe osteomyelitis    Interval History:  The patient has poorly controlled type I diabetes mellitus and presented with right second toe osteomyelitis requiring amputation.  Midnight blood sugars are not too low    Review of Systems:  Review of Systems   Constitutional: Negative for chills and fever.   HENT: Negative for congestion and sore throat.    Respiratory: Negative for cough and shortness of breath.    Cardiovascular: Negative for chest pain and leg swelling.   Gastrointestinal: Negative for abdominal pain, constipation and heartburn.   Genitourinary: Negative for dysuria.   Musculoskeletal: Negative for joint pain.   Skin: Negative for rash.   Neurological: Negative for weakness and headaches.       Physical Exam:  Physical Exam   Constitutional: He is oriented to person, place, and time. No distress.   HENT:   Mouth/Throat: Oropharynx is clear and moist. No oropharyngeal exudate.   Eyes: Conjunctivae are normal.   Neck: Neck supple. No tracheal deviation present.   Cardiovascular: Normal rate.    No murmur heard.  Pulmonary/Chest: Breath sounds normal. No respiratory distress.   Abdominal: Soft. Bowel sounds are normal. He exhibits no distension.   Musculoskeletal: He exhibits no edema.   Neurological: He is alert and oriented to person, place, and time.   Skin: Skin is warm. No rash noted. He is not diaphoretic. No erythema.   Psychiatric: His behavior is normal.   Nursing note and vitals reviewed.      Labs:          Recent Labs      01/25/19   0340   SODIUM  134*   POTASSIUM  4.5   CHLORIDE  107   CO2  21   BUN  31*   CREATININE  1.00   CALCIUM  8.9     Recent Labs      01/25/19   0340   GLUCOSE  275*     Recent Labs      01/25/19   0340   RBC  3.83*   HEMOGLOBIN  12.6*   HEMATOCRIT  38.7*   PLATELETCT  371     Recent Labs      01/25/19   0340   WBC  13.9*            Hemodynamics:   T97.9 /66 HR84 RR18 O2sat 92%     GI/Nutrition:  Orders Placed This Encounter   Procedures   • Diet Order Diabetic (NCS,HS snacks)     Standing Status:   Standing     Number of Occurrences:   1     Order Specific Question:   Diet:     Answer:   Diabetic [3]     Comments:   NCS,HS snacks     Medical Decision Making, by Problem:  Right second toe osteomyelitis s/p amputation 1/12 with Dr. Reddy and MSSA in culture               Ancef through 2/23             ambulation with heel shoe             Wound care with sutures removed, podiatry evaluation pending to nail care    Adult onset type I diabetes mellitus, brittle             continue insulin                S/p splenectomy  Depression outpatient follow up  H/o chronic pancreatitis              creon with meals  HTN, zestril  Memory loss, namenda  BPH, flomax  Dyslipidemia, statin  Tobacco dependence, nicotine patch        Quality-Core Measures   Reviewed items::  Labs reviewed and Medications reviewed  Dill catheter::  No Dill  Ulcer Prophylaxis::  Yes  Antibiotics:  Treating active infection/contamination beyond 24 hours perioperative coverage  Assessed for rehabilitation services:  Patient was assess for and/or received rehabilitation services during this hospitalization

## 2019-01-26 NOTE — [facility]
Infectious Disease Progress Note    Author: Haley Womack M.D. Date & Time of service: 1/26/2019  12:18 PM    Chief Complaint:  Follow-up for right second toe osteomyelitis     Interval History:  59 y/o diabetic male with OM of the toe  1/13 afebrile no CBC, patient underwent right second toe amputation and I&D yesterday, he has 8 out of 10 right foot pain, tolerating IV antibiotics without any issues, no diarrhea  1/14 afebrile, no CBC today.  Patient reports this, no new issues  11/15 afebrile, no CBC today.  Tolerating antibiotics.  1/17 afebrile, white count 10.7.  Tolerating antibiotics, no new issues, plan transfer to [facility] when accepted  1/21 Interval 24 hours of Vitals/Labs/Micro,and imaging results reviewed and compared to prior.  See assessment.   1/24 Interval 24 hours of Vitals/Labs/Micro,and imaging results reviewed and compared to prior.  See assessment.   1/25 AF WBC 13.9 states tolerating abx well-pain controlled Labs and wound reviewed  1/26 AF no CBC sutures removed-no new complaints Wound reviewed    Review of Systems:  Review of Systems   Constitutional: Negative for chills, fever and malaise/fatigue.   Respiratory: Negative for cough and shortness of breath.    Cardiovascular: Negative for chest pain.   Gastrointestinal: Negative for abdominal pain, diarrhea, nausea and vomiting.   Genitourinary: Negative for dysuria.   Musculoskeletal: Negative for joint pain and myalgias.   Skin: Negative for rash.   All other systems reviewed and are negative.      Hemodynamics:   T97.9 /66 HR84 RR18 O2sat 92%      Physical Exam:  Physical Exam   Constitutional: He is oriented to person, place, and time. He appears well-developed.   HENT:   Head: Normocephalic and atraumatic.   Eyes: Pupils are equal, round, and reactive to light. EOM are normal.   Neck: Neck supple.   Cardiovascular: Normal rate.    Pulmonary/Chest: Effort normal. No respiratory distress.   Abdominal: Soft. Bowel sounds are normal.  He exhibits no distension. There is no tenderness.   Musculoskeletal: He exhibits no edema.   Right foot with no erythema, edema, or drainage.  Surgical site edges well approximated, sutures removed  Dry and callused feet   Lymphadenopathy:     He has no cervical adenopathy.   Neurological: He is alert and oriented to person, place, and time.   Skin: Skin is warm. He is not diaphoretic.   Psychiatric: He has a normal mood and affect. His behavior is normal.   Vitals reviewed.        Labs:  Recent Labs      01/25/19   0340   WBC  13.9*   RBC  3.83*   HEMOGLOBIN  12.6*   HEMATOCRIT  38.7*   MCV  101.0*   MCH  32.9   RDW  53.6*   PLATELETCT  371   MPV  9.3     Recent Labs      01/25/19   0340   SODIUM  134*   POTASSIUM  4.5   CHLORIDE  107   CO2  21   GLUCOSE  275*   BUN  31*     Recent Labs      01/25/19   0340   CREATININE  1.00       Imaging:  Dx-foot-complete 3+ Right    Result Date: 1/10/2019  1/10/2019 8:14 PM HISTORY/REASON FOR EXAM: Atraumatic Pain/Swelling/Deformity TECHNIQUE/EXAM DESCRIPTION:  AP, lateral, and oblique views of the RIGHT foot. COMPARISON:  None FINDINGS: The bony structures and articulations appear within normal limits without visualized fracture, subluxation, or dislocation. Bony remodeling of the dorsal medial first metatarsal head is seen.     1.  No acute traumatic bony injury. 2.  Bony remodeling at the dorsomedial first metatarsal head, could correspond with erosive arthropathy, consider osteomyelitis as clinically appropriate    Us-extremity Venous Lower Unilat Right    Result Date: 1/11/2019   Vascular Laboratory  CONCLUSIONS  Normal right lower extremity superficial and deep venous examination.  SHAUN CORCORAN  Exam Date:     01/10/2019 21:21  Room #:     Inpatient  Priority:     Stat  Ht (in):             Wt (lb):  Ordering Physician:        CHAVO MARC  Referring Physician:       [id number], BLISS  Sonographer:               Nila Urbina RVT  Study Type:                Complete Unilateral   Technical Quality:         Adequate  Age:    58    Gender:     M  MRN:    [id number]  :    1960      BSA:  Indications:     Localized swelling, mass and lump, right lower limb  CPT Codes:       03536  ICD Codes:         History:         Swelling of right calf and foot. No prior duplex.  Limitations:  PROCEDURES:  Right lower extremity venous duplex imaging.  The following venous structures were evaluated: common femoral, profunda  femoral, proximal portion of the greater saphenous, femoral, popliteal,  peroneal and posterior tibial veins.  Serial compression, augmentation maneuvers, color and spectral Doppler flow  evaluations were performed.  FINDINGS:  Right lower extremity -  No evidence of deep venous thrombosis.  Complete color filling and compressibility with normal venous flow dynamics  including spontaneous flow, response to augmentation maneuvers, and  respiratory phasicity.  Flow was evaluated in the contralateral common femoral vein and normal  venous flow dynamics including spontaneous flow, respiratory phasic  variation and augmentation were demonstrated.  Natalia Bustos MD  (Electronically Signed)  Final Date:      2019                   01:03      Micro:  Results     ** No results found for the last 168 hours. **      Assessment:  OM, right toe  MSSA  Diabetes    Plan:  Right foot osteomyelitis   Afebrile  Leukocytosis persistent at last check  s/p right second toe amputation through metatarsophalangeal joint and I&D on 19 by Dr. Reddy  OR culture +MSSA and peptostrep  Continue IV Ancef 2 g every 8 hours with an anticipated stop date of 2019  Continue wound care     Leukocytosis, persistent  Monitor  Check CBC in am     Poorly controlled type 2 diabetes mellitus  Hemoglobin A1c 10.5  Likely contributing to infection and delayed wound  Keep BS under 150 to help control current infection

## 2019-01-27 LAB
GLUCOSE BLD-MCNC: 138 MG/DL (ref 65–99)
GLUCOSE BLD-MCNC: 230 MG/DL (ref 65–99)

## 2019-01-27 PROCEDURE — 82962 GLUCOSE BLOOD TEST: CPT

## 2019-01-27 PROCEDURE — 99232 SBSQ HOSP IP/OBS MODERATE 35: CPT | Performed by: INTERNAL MEDICINE

## 2019-01-27 ASSESSMENT — ENCOUNTER SYMPTOMS
DIARRHEA: 0
SORE THROAT: 0
FEVER: 0
HEADACHES: 0
CONSTIPATION: 0
MYALGIAS: 0
VOMITING: 0
DIAPHORESIS: 0
COUGH: 0
NAUSEA: 0
CHILLS: 0
SHORTNESS OF BREATH: 0
WEAKNESS: 0
ABDOMINAL PAIN: 0
PALPITATIONS: 0
HEARTBURN: 0

## 2019-01-27 NOTE — [facility]
Hospital Medicine Progress Note, Adult, Complex               Author: Betsey Gentile Date & Time created: 1/27/2019  11:12 AM     CC: right second toe osteomyelitis    Interval History:  The patient has poorly controlled type I diabetes mellitus and presented with right second toe osteomyelitis requiring amputation.  Midnight blood sugars in 200's  Patient with sugars over 200 during day but lowest before breakfast    Review of Systems:  Review of Systems   Constitutional: Negative for diaphoresis and fever.   HENT: Negative for congestion and sore throat.    Respiratory: Negative for cough and shortness of breath.    Cardiovascular: Negative for chest pain, palpitations and leg swelling.   Gastrointestinal: Negative for constipation, heartburn and nausea.   Genitourinary: Negative for dysuria and urgency.   Musculoskeletal: Negative for joint pain.   Neurological: Negative for weakness and headaches.       Physical Exam:  Physical Exam   Constitutional: He is oriented to person, place, and time. No distress.   HENT:   Mouth/Throat: No oropharyngeal exudate.   Eyes: EOM are normal. No scleral icterus.   Neck: Neck supple. No tracheal deviation present.   Cardiovascular: Normal rate.    No murmur heard.  Pulmonary/Chest: Breath sounds normal. No respiratory distress.   Abdominal: Soft. He exhibits no distension.   Musculoskeletal: He exhibits no edema.   Foot well healed   Neurological: He is alert and oriented to person, place, and time.   Skin: Skin is warm and dry. No rash noted. He is not diaphoretic.   Psychiatric: His behavior is normal.   Nursing note and vitals reviewed.      Labs:          Recent Labs      01/25/19   0340   SODIUM  134*   POTASSIUM  4.5   CHLORIDE  107   CO2  21   BUN  31*   CREATININE  1.00   CALCIUM  8.9     Recent Labs      01/25/19   0340   GLUCOSE  275*     Recent Labs      01/25/19   0340   RBC  3.83*   HEMOGLOBIN  12.6*   HEMATOCRIT  38.7*   PLATELETCT  371     Recent Labs       01/25/19   0340   WBC  13.9*           Hemodynamics:   T97.1 /70 HR70 RR19 O2sat 94%     GI/Nutrition:  Orders Placed This Encounter   Procedures   • Diet Order Diabetic (NCS,HS snacks)     Standing Status:   Standing     Number of Occurrences:   1     Order Specific Question:   Diet:     Answer:   Diabetic [3]     Comments:   NCS,HS snacks     Medical Decision Making, by Problem:  Right second toe osteomyelitis s/p amputation 1/12 with Dr. Reddy and MSSA in culture               Ancef through 2/23             ambulation with heel shoe             Wound healed, sutures removed, podiatry evaluation pending for nail care    Adult onset type I diabetes mellitus, brittle             add am long acting insulin                S/p splenectomy  Depression outpatient follow up  H/o chronic pancreatitis              creon with meals  HTN, zestril  Memory loss, namenda  BPH, flomax  Dyslipidemia, statin  Tobacco dependence, nicotine patch        Quality-Core Measures   Reviewed items::  Labs reviewed and Medications reviewed  Dill catheter::  No Dill  Ulcer Prophylaxis::  Yes  Antibiotics:  Treating active infection/contamination beyond 24 hours perioperative coverage  Assessed for rehabilitation services:  Patient was assess for and/or received rehabilitation services during this hospitalization

## 2019-01-27 NOTE — [facility]
Infectious Disease Progress Note    Author: Haley Womack M.D. Date & Time of service: 1/27/2019  2:32 PM    Chief Complaint:  Follow-up for right second toe osteomyelitis     Interval History:  59 y/o diabetic male with OM of the toe  1/13 afebrile no CBC, patient underwent right second toe amputation and I&D yesterday, he has 8 out of 10 right foot pain, tolerating IV antibiotics without any issues, no diarrhea  1/14 afebrile, no CBC today.  Patient reports this, no new issues  11/15 afebrile, no CBC today.  Tolerating antibiotics.  1/17 afebrile, white count 10.7.  Tolerating antibiotics, no new issues, plan transfer to [facility] when accepted  1/21 Interval 24 hours of Vitals/Labs/Micro,and imaging results reviewed and compared to prior.  See assessment.   1/24 Interval 24 hours of Vitals/Labs/Micro,and imaging results reviewed and compared to prior.  See assessment.   1/25 AF WBC 13.9 states tolerating abx well-pain controlled Labs and wound reviewed  1/26 AF no CBC sutures removed-no new complaints Wound reviewed  1/27 AF feels pretty good-no new complaints    Review of Systems:  Review of Systems   Constitutional: Negative for chills, fever and malaise/fatigue.   Respiratory: Negative for cough and shortness of breath.    Cardiovascular: Negative for chest pain.   Gastrointestinal: Negative for abdominal pain, diarrhea, nausea and vomiting.   Genitourinary: Negative for dysuria.   Musculoskeletal: Negative for joint pain and myalgias.   Skin: Negative for rash.   All other systems reviewed and are negative.      Hemodynamics:  T97.1 /70 HR70 RR19 O2sat 94%        Physical Exam:  Physical Exam   Constitutional: He is oriented to person, place, and time. He appears well-developed.   HENT:   Head: Normocephalic and atraumatic.   Eyes: Pupils are equal, round, and reactive to light. No scleral icterus.   Neck: Normal range of motion.   Cardiovascular: Normal rate.    Pulmonary/Chest: Effort normal. He has no  wheezes. He has no rales.   Abdominal: Soft. Bowel sounds are normal. There is no rebound and no guarding.   Musculoskeletal: He exhibits no edema.   Right foot with no erythema, edema, or drainage.  Surgical site edges well approximated, sutures removed  Dry and callused feet   Lymphadenopathy:     He has no cervical adenopathy.   Neurological: He is alert and oriented to person, place, and time.   Skin: Skin is warm. He is not diaphoretic.   Psychiatric: He has a normal mood and affect. His behavior is normal.   Vitals reviewed.        Labs:  Recent Labs      01/25/19   0340   WBC  13.9*   RBC  3.83*   HEMOGLOBIN  12.6*   HEMATOCRIT  38.7*   MCV  101.0*   MCH  32.9   RDW  53.6*   PLATELETCT  371   MPV  9.3     Recent Labs      01/25/19   0340   SODIUM  134*   POTASSIUM  4.5   CHLORIDE  107   CO2  21   GLUCOSE  275*   BUN  31*     Recent Labs      01/25/19   0340   CREATININE  1.00       Imaging:  Dx-foot-complete 3+ Right    Result Date: 1/10/2019  1/10/2019 8:14 PM HISTORY/REASON FOR EXAM: Atraumatic Pain/Swelling/Deformity TECHNIQUE/EXAM DESCRIPTION:  AP, lateral, and oblique views of the RIGHT foot. COMPARISON:  None FINDINGS: The bony structures and articulations appear within normal limits without visualized fracture, subluxation, or dislocation. Bony remodeling of the dorsal medial first metatarsal head is seen.     1.  No acute traumatic bony injury. 2.  Bony remodeling at the dorsomedial first metatarsal head, could correspond with erosive arthropathy, consider osteomyelitis as clinically appropriate    Us-extremity Venous Lower Unilat Right    Result Date: 1/11/2019   Vascular Laboratory  CONCLUSIONS  Normal right lower extremity superficial and deep venous examination.  SHAUN CORCORAN  Exam Date:     01/10/2019 21:21  Room #:     Inpatient  Priority:     Stat  Ht (in):             Wt (lb):  Ordering Physician:        CHAVO MARC  Referring Physician:       [id number], BLISS  Sonographer:               Nila  LEI Urbina  Study Type:                Complete Unilateral  Technical Quality:         Adequate  Age:    58    Gender:     M  MRN:    [id number]  :    1960      BSA:  Indications:     Localized swelling, mass and lump, right lower limb  CPT Codes:       23728  ICD Codes:         History:         Swelling of right calf and foot. No prior duplex.  Limitations:  PROCEDURES:  Right lower extremity venous duplex imaging.  The following venous structures were evaluated: common femoral, profunda  femoral, proximal portion of the greater saphenous, femoral, popliteal,  peroneal and posterior tibial veins.  Serial compression, augmentation maneuvers, color and spectral Doppler flow  evaluations were performed.  FINDINGS:  Right lower extremity -  No evidence of deep venous thrombosis.  Complete color filling and compressibility with normal venous flow dynamics  including spontaneous flow, response to augmentation maneuvers, and  respiratory phasicity.  Flow was evaluated in the contralateral common femoral vein and normal  venous flow dynamics including spontaneous flow, respiratory phasic  variation and augmentation were demonstrated.  Natalia Bustos MD  (Electronically Signed)  Final Date:      2019                   01:03      Micro:  Results     ** No results found for the last 168 hours. **      Assessment:  OM, right toe  MSSA  Diabetes    Plan:  Right foot osteomyelitis   Afebrile  Leukocytosis persistent at last check  s/p right second toe amputation through metatarsophalangeal joint and I&D on 19 by Dr. Reddy  OR culture +MSSA and peptostrep  Continue IV Ancef 2 g every 8 hours  Stop date of 2019  Continue wound care  Check CBC in am     Leukocytosis, persistent  Monitor  As above     Poorly controlled type 2 diabetes mellitus  Hemoglobin A1c 10.5  Likely contributing to infection and delayed wound  Keep BS under 150 to help control current infection

## 2019-01-28 LAB
ANION GAP SERPL CALC-SCNC: 7 MMOL/L (ref 0–11.9)
BUN SERPL-MCNC: 29 MG/DL (ref 8–22)
CALCIUM SERPL-MCNC: 9 MG/DL (ref 8.4–10.2)
CHLORIDE SERPL-SCNC: 108 MMOL/L (ref 96–112)
CO2 SERPL-SCNC: 20 MMOL/L (ref 20–33)
CREAT SERPL-MCNC: 0.91 MG/DL (ref 0.5–1.4)
ERYTHROCYTE [DISTWIDTH] IN BLOOD BY AUTOMATED COUNT: 52.3 FL (ref 35.9–50)
GLUCOSE BLD-MCNC: 174 MG/DL (ref 65–99)
GLUCOSE BLD-MCNC: 211 MG/DL (ref 65–99)
GLUCOSE BLD-MCNC: 234 MG/DL (ref 65–99)
GLUCOSE SERPL-MCNC: 209 MG/DL (ref 65–99)
HCT VFR BLD AUTO: 39.4 % (ref 42–52)
HGB BLD-MCNC: 12.7 G/DL (ref 14–18)
MCH RBC QN AUTO: 32.8 PG (ref 27–33)
MCHC RBC AUTO-ENTMCNC: 32.2 G/DL (ref 33.7–35.3)
MCV RBC AUTO: 101.8 FL (ref 81.4–97.8)
PLATELET # BLD AUTO: 370 K/UL (ref 164–446)
PMV BLD AUTO: 10.1 FL (ref 9–12.9)
POTASSIUM SERPL-SCNC: 4.7 MMOL/L (ref 3.6–5.5)
RBC # BLD AUTO: 3.87 M/UL (ref 4.7–6.1)
SODIUM SERPL-SCNC: 135 MMOL/L (ref 135–145)
WBC # BLD AUTO: 13.2 K/UL (ref 4.8–10.8)

## 2019-01-28 PROCEDURE — 85027 COMPLETE CBC AUTOMATED: CPT

## 2019-01-28 PROCEDURE — 80048 BASIC METABOLIC PNL TOTAL CA: CPT

## 2019-01-28 PROCEDURE — 82962 GLUCOSE BLOOD TEST: CPT

## 2019-01-28 ASSESSMENT — ENCOUNTER SYMPTOMS
CONSTIPATION: 0
HEADACHES: 0
ABDOMINAL PAIN: 0
SHORTNESS OF BREATH: 0
PALPITATIONS: 0
CHILLS: 0
VOMITING: 0
FEVER: 0
DIAPHORESIS: 0
HEARTBURN: 0
SORE THROAT: 0

## 2019-01-28 NOTE — [facility]
Hospital Medicine Progress Note, Adult, Complex               Author: Betsey Gentile Date & Time created: 1/28/2019  2:16 PM     CC: right second toe osteomyelitis    Interval History:  The patient has poorly controlled type I diabetes mellitus and presented with right second toe osteomyelitis requiring amputation.  The patient is tolerating a higher dose of long acting insulin with no hypoglycemia    Review of Systems:  Review of Systems   Constitutional: Negative for chills, diaphoresis and fever.   HENT: Negative for congestion and sore throat.    Respiratory: Negative for shortness of breath.    Cardiovascular: Negative for chest pain, palpitations and leg swelling.   Gastrointestinal: Negative for abdominal pain, constipation, heartburn and vomiting.   Genitourinary: Negative for dysuria.   Musculoskeletal: Negative for joint pain.   Neurological: Negative for headaches.       Physical Exam:  Physical Exam   Constitutional: He is oriented to person, place, and time. No distress.   HENT:   Mouth/Throat: No oropharyngeal exudate.   Eyes: EOM are normal. No scleral icterus.   Neck: Neck supple. No tracheal deviation present.   Cardiovascular: Normal rate.    No murmur heard.  Pulmonary/Chest: Effort normal. No respiratory distress. He has no wheezes. He has no rales.   Abdominal: Soft. Bowel sounds are normal. He exhibits no distension.   Musculoskeletal: He exhibits no edema.   Foot well healed   Neurological: He is alert and oriented to person, place, and time. Coordination normal.   Skin: Skin is warm and dry. No rash noted.   Psychiatric: His behavior is normal.   Nursing note and vitals reviewed.      Labs:          Recent Labs      01/28/19   0658   SODIUM  135   POTASSIUM  4.7   CHLORIDE  108   CO2  20   BUN  29*   CREATININE  0.91   CALCIUM  9.0     Recent Labs      01/28/19   0658   GLUCOSE  209*     Recent Labs      01/28/19   0658   RBC  3.87*   HEMOGLOBIN  12.7*   HEMATOCRIT  39.4*   PLATELETCT  370      Recent Labs      01/28/19   0658   WBC  13.2*           Hemodynamics:   T97.1 /67 HR76 RR17 O2sat 94%     GI/Nutrition:  Orders Placed This Encounter   Procedures   • Diet Order Diabetic (NCS,HS snacks)     Standing Status:   Standing     Number of Occurrences:   1     Order Specific Question:   Diet:     Answer:   Diabetic [3]     Comments:   NCS,HS snacks     Medical Decision Making, by Problem:  Right second toe osteomyelitis s/p amputation 1/12 with Dr. Reddy and MSSA in culture               Ancef through 2/23             ambulation with heel shoe             Wound healed, sutures removed, podiatry evaluation ordered for nail care    Adult onset type I diabetes mellitus, brittle             monitor on am and pm long acting insulin   Outpatient endocrinology referral placed                S/p splenectomy  Depression outpatient follow up  H/o chronic pancreatitis              creon with meals  HTN, zestril  Memory loss, namenda  BPH, flomax  Dyslipidemia, statin  Tobacco dependence, nicotine patch        Quality-Core Measures   Reviewed items::  Labs reviewed and Medications reviewed  Dill catheter::  No Dill  Ulcer Prophylaxis::  Yes  Antibiotics:  Treating active infection/contamination beyond 24 hours perioperative coverage  Assessed for rehabilitation services:  Patient was assess for and/or received rehabilitation services during this hospitalization

## 2019-01-29 LAB
GLUCOSE BLD-MCNC: 111 MG/DL (ref 65–99)
GLUCOSE BLD-MCNC: 202 MG/DL (ref 65–99)
GLUCOSE BLD-MCNC: 249 MG/DL (ref 65–99)

## 2019-01-29 PROCEDURE — 99232 SBSQ HOSP IP/OBS MODERATE 35: CPT | Performed by: INTERNAL MEDICINE

## 2019-01-29 PROCEDURE — 82962 GLUCOSE BLOOD TEST: CPT

## 2019-01-29 ASSESSMENT — ENCOUNTER SYMPTOMS
HEADACHES: 0
SPUTUM PRODUCTION: 0
CONSTIPATION: 0
VOMITING: 0
FEVER: 0
MYALGIAS: 0
NAUSEA: 0
SORE THROAT: 0
ABDOMINAL PAIN: 0
COUGH: 0
SHORTNESS OF BREATH: 0
CHILLS: 0
HEMOPTYSIS: 0
DIARRHEA: 0

## 2019-01-29 NOTE — [facility]
Infectious Disease Progress Note    Author: Haley Womack M.D. Date & Time of service: 1/29/2019  1:49 PM    Chief Complaint:  Follow-up for right second toe osteomyelitis     Interval History:  59 y/o diabetic male with OM of the toe  1/13 afebrile no CBC, patient underwent right second toe amputation and I&D yesterday, he has 8 out of 10 right foot pain, tolerating IV antibiotics without any issues, no diarrhea  1/14 afebrile, no CBC today.  Patient reports this, no new issues  11/15 afebrile, no CBC today.  Tolerating antibiotics.  1/17 afebrile, white count 10.7.  Tolerating antibiotics, no new issues, plan transfer to [facility] when accepted  1/21 Interval 24 hours of Vitals/Labs/Micro,and imaging results reviewed and compared to prior.  See assessment.   1/24 Interval 24 hours of Vitals/Labs/Micro,and imaging results reviewed and compared to prior.  See assessment.   1/25 AF WBC 13.9 states tolerating abx well-pain controlled Labs and wound reviewed  1/26 AF no CBC sutures removed-no new complaints Wound reviewed  1/27 AF feels pretty good-no new complaints  1/29 AF WBC 13.2 tolerating abx well-eating lunch.  Good appetite    Review of Systems:  Review of Systems   Constitutional: Negative for chills, fever and malaise/fatigue.   Respiratory: Negative for cough, hemoptysis, sputum production and shortness of breath.    Cardiovascular: Negative for leg swelling.   Gastrointestinal: Negative for abdominal pain, diarrhea, nausea and vomiting.   Genitourinary: Negative for dysuria.   Musculoskeletal: Negative for joint pain and myalgias.   Skin: Negative for rash.   All other systems reviewed and are negative.      Hemodynamics:  T 97.8 P82 /72 RR 18SaO2 92%      Physical Exam:  Physical Exam   Constitutional: He is oriented to person, place, and time. He appears well-developed. No distress.   HENT:   Head: Normocephalic and atraumatic.   Mouth/Throat: No oropharyngeal exudate.   Eyes: Pupils are equal,  round, and reactive to light. EOM are normal.   Neck: Neck supple.   Cardiovascular: Normal rate.    Pulmonary/Chest: Effort normal. No respiratory distress.   Abdominal: Soft. He exhibits no distension. There is no tenderness.   Musculoskeletal: He exhibits no edema.   Right foot with no erythema, edema, or drainage.  Surgical site edges well approximated, sutures removed  Dry and callused feet   Lymphadenopathy:     He has no cervical adenopathy.   Neurological: He is alert and oriented to person, place, and time.   Skin: Skin is warm.   Psychiatric: He has a normal mood and affect. His behavior is normal.   Vitals reviewed.        Labs:  Recent Labs      01/28/19   0658   WBC  13.2*   RBC  3.87*   HEMOGLOBIN  12.7*   HEMATOCRIT  39.4*   MCV  101.8*   MCH  32.8   RDW  52.3*   PLATELETCT  370   MPV  10.1     Recent Labs      01/28/19   0658   SODIUM  135   POTASSIUM  4.7   CHLORIDE  108   CO2  20   GLUCOSE  209*   BUN  29*     Recent Labs      01/28/19   0658   CREATININE  0.91       Imaging:  Dx-foot-complete 3+ Right    Result Date: 1/10/2019  1/10/2019 8:14 PM HISTORY/REASON FOR EXAM: Atraumatic Pain/Swelling/Deformity TECHNIQUE/EXAM DESCRIPTION:  AP, lateral, and oblique views of the RIGHT foot. COMPARISON:  None FINDINGS: The bony structures and articulations appear within normal limits without visualized fracture, subluxation, or dislocation. Bony remodeling of the dorsal medial first metatarsal head is seen.     1.  No acute traumatic bony injury. 2.  Bony remodeling at the dorsomedial first metatarsal head, could correspond with erosive arthropathy, consider osteomyelitis as clinically appropriate    Us-extremity Venous Lower Unilat Right    Result Date: 1/11/2019   Vascular Laboratory  CONCLUSIONS  Normal right lower extremity superficial and deep venous examination.  SHAUN CORCORAN  Exam Date:     01/10/2019 21:21  Room #:     Inpatient  Priority:     Stat  Ht (in):             Wt (lb):  Ordering Physician:         CHAVO MARC  Referring Physician:       [id number], BLISS  Sonographer:               Nila Urbina RVT  Study Type:                Complete Unilateral  Technical Quality:         Adequate  Age:    58    Gender:     M  MRN:    [id number]  :    1960      BSA:  Indications:     Localized swelling, mass and lump, right lower limb  CPT Codes:       86007  ICD Codes:         History:         Swelling of right calf and foot. No prior duplex.  Limitations:  PROCEDURES:  Right lower extremity venous duplex imaging.  The following venous structures were evaluated: common femoral, profunda  femoral, proximal portion of the greater saphenous, femoral, popliteal,  peroneal and posterior tibial veins.  Serial compression, augmentation maneuvers, color and spectral Doppler flow  evaluations were performed.  FINDINGS:  Right lower extremity -  No evidence of deep venous thrombosis.  Complete color filling and compressibility with normal venous flow dynamics  including spontaneous flow, response to augmentation maneuvers, and  respiratory phasicity.  Flow was evaluated in the contralateral common femoral vein and normal  venous flow dynamics including spontaneous flow, respiratory phasic  variation and augmentation were demonstrated.  Natalia Bustos MD  (Electronically Signed)  Final Date:      2019                   01:03      Micro:  Results     ** No results found for the last 168 hours. **      Assessment:  OM, right toe  MSSA  Diabetes    Plan:  Right foot osteomyelitis   Afebrile  Leukocytosis persistent at last check-decreased  s/p right second toe amputation through metatarsophalangeal joint and I&D on 19 by Dr. Reddy  OR culture +MSSA and peptostrep  Continue IV Ancef 2 g every 8 hours  Stop date of 2019  Continue wound care  Check CBC in am     Leukocytosis, persistent  Monitor  As above  Pulm toilet  OOB as tolerated     Poorly controlled type 2 diabetes mellitus  Hemoglobin A1c  10.5  Likely contributing to infection and delayed wound  Keep BS under 150 to help control current infection

## 2019-01-29 NOTE — [facility]
Hospital Medicine Progress Note, Adult, Complex               Author: Noemi Melara Date & Time created: 1/29/2019  9:20 AM     CC: 2nd toe OM, amputation    Interval History:  Feeling well  Tolerating abx  BG around 200s, SSI had been stopped    Review of Systems:  Review of Systems   Constitutional: Negative for fever.   HENT: Negative for sore throat.    Respiratory: Negative for cough and shortness of breath.    Cardiovascular: Negative for chest pain.   Gastrointestinal: Negative for abdominal pain, constipation, diarrhea, nausea and vomiting.   Genitourinary: Negative for dysuria.   Musculoskeletal: Negative for joint pain.   Skin: Negative for rash.   Neurological: Negative for headaches.       T 97.8 P82 /72 RR 18SaO2 92% I/O 3.2/brp BM 1/26  Physical Exam   Constitutional: He appears well-developed. No distress.   HENT:   Head: Normocephalic and atraumatic.   Eyes: Conjunctivae are normal. Right eye exhibits no discharge. Left eye exhibits no discharge. No scleral icterus.   Neck: No tracheal deviation present.   Cardiovascular: Normal rate and regular rhythm.    Pulmonary/Chest: Effort normal. No respiratory distress. He has no wheezes. He exhibits no tenderness.   Abdominal: Soft. Bowel sounds are normal. He exhibits no distension. There is no tenderness.   Musculoskeletal: He exhibits no edema.   Neurological: He is alert.   Skin: Skin is warm.   Healed 2nd toe amputation   Vitals reviewed.      Labs:          Recent Labs      01/28/19   0658   SODIUM  135   POTASSIUM  4.7   CHLORIDE  108   CO2  20   BUN  29*   CREATININE  0.91   CALCIUM  9.0     Recent Labs      01/28/19   0658   GLUCOSE  209*     Recent Labs      01/28/19   0658   RBC  3.87*   HEMOGLOBIN  12.7*   HEMATOCRIT  39.4*   PLATELETCT  370     Recent Labs      01/28/19   0658   WBC  13.2*          GI/Nutrition:  Orders Placed This Encounter   Procedures   • Diet Order Diabetic (NCS,HS snacks)     Standing Status:   Standing     Number  of Occurrences:   1     Order Specific Question:   Diet:     Answer:   Diabetic [3]     Comments:   NCS,HS snacks     Medical Decision Making, by Problem:  R 2nd toe OM s/p amputation 1/12 (Maureen) (MSSA)    -Ancef 2/23   -ambulation with heel shoe only  DM1, adult onset (per endo in [place] area)   -brittle   -continue levemir   -increase meal aspart to 12, initiate SSI with meals  Leukocytosis, persistent   -non toxic  S/p splenectomy  Depression  H/o chronic pancreatitis   -creon with meals  HTN   -zestril  Memory loss   -namenda  BPH   -flomax  HLD   -lipitor  Tobacco use   -nicotine patch  Nutrition   -po          Quality-Core Measures   Reviewed items::  Medications reviewed  Dill catheter::  No Dill  DVT prophylaxis pharmacological::  Enoxaparin (Lovenox)  Antibiotics:  Treating active infection/contamination beyond 24 hours perioperative coverage

## 2019-01-30 LAB
GLUCOSE BLD-MCNC: 130 MG/DL (ref 65–99)
GLUCOSE BLD-MCNC: 192 MG/DL (ref 65–99)
GLUCOSE BLD-MCNC: 194 MG/DL (ref 65–99)
GLUCOSE BLD-MCNC: 224 MG/DL (ref 65–99)
GLUCOSE BLD-MCNC: 54 MG/DL (ref 65–99)
GLUCOSE BLD-MCNC: 68 MG/DL (ref 65–99)

## 2019-01-30 PROCEDURE — 82962 GLUCOSE BLOOD TEST: CPT | Mod: 91

## 2019-01-30 ASSESSMENT — ENCOUNTER SYMPTOMS
SHORTNESS OF BREATH: 0
VOMITING: 0
PALPITATIONS: 0
SORE THROAT: 0
NAUSEA: 0
ABDOMINAL PAIN: 0
CONSTIPATION: 0
FEVER: 0
DIARRHEA: 0
EYE PAIN: 0
HEADACHES: 0
COUGH: 0

## 2019-01-30 NOTE — [facility]
Hospital Medicine Progress Note, Adult, Complex               Author: Noemi Melara Date & Time created: 1/30/2019  8:07 AM     CC: 2nd toe OM, amputation    Interval History:  Insulin adjusted with some improvement  No concerns      Review of Systems:  Review of Systems   Constitutional: Negative for fever.   HENT: Negative for sore throat.    Eyes: Negative for pain.   Respiratory: Negative for cough and shortness of breath.    Cardiovascular: Negative for chest pain and palpitations.   Gastrointestinal: Negative for abdominal pain, constipation, diarrhea, nausea and vomiting.   Genitourinary: Negative for dysuria.   Musculoskeletal: Negative for joint pain.   Skin: Negative for itching and rash.   Neurological: Negative for headaches.       T 97.2 P83BP 101/58RR 18SaO2 93% I/O 2.5 /brp BM 1/26  Physical Exam   Constitutional: He appears well-developed. No distress.   HENT:   Head: Normocephalic and atraumatic.   Eyes: Conjunctivae are normal. Right eye exhibits no discharge. Left eye exhibits no discharge. No scleral icterus.   Neck: No tracheal deviation present.   Cardiovascular: Normal rate and regular rhythm.    Pulmonary/Chest: Effort normal. No respiratory distress. He has no wheezes. He exhibits no tenderness.   Abdominal: Soft. Bowel sounds are normal. He exhibits no distension. There is no tenderness. There is no rebound.   Musculoskeletal: He exhibits no edema.   Neurological: He is alert.   Skin: Skin is warm.   Healed 2nd toe amputation   Vitals reviewed.      Labs:          Recent Labs      01/28/19   0658   SODIUM  135   POTASSIUM  4.7   CHLORIDE  108   CO2  20   BUN  29*   CREATININE  0.91   CALCIUM  9.0     Recent Labs      01/28/19   0658   GLUCOSE  209*     Recent Labs      01/28/19   0658   RBC  3.87*   HEMOGLOBIN  12.7*   HEMATOCRIT  39.4*   PLATELETCT  370     Recent Labs      01/28/19   0658   WBC  13.2*          GI/Nutrition:  Orders Placed This Encounter   Procedures   • Diet Order  Diabetic (NCS,HS snacks)     Standing Status:   Standing     Number of Occurrences:   1     Order Specific Question:   Diet:     Answer:   Diabetic [3]     Comments:   NCS,HS snacks     Medical Decision Making, by Problem:  R 2nd toe OM s/p amputation 1/12 (Maureen) ([facility])    -Ancef 2/23   -ambulation with heel shoe   DM1, adult onset (per endo in [place] area)   -brittle   -continue levemir   -meal aspart to 12, initiate SSI with meals   -outpatient endo appointment made  Leukocytosis, persistent   -non toxic   -following  S/p splenectomy  Depression  H/o chronic pancreatitis   -creon with meals  HTN   -zestril  Memory loss   -namenda  BPH   -flomax  HLD   -lipitor  Tobacco use   -nicotine patch  Nutrition   -po          Quality-Core Measures   Reviewed items::  Medications reviewed  Dill catheter::  No Dill  DVT prophylaxis pharmacological::  Enoxaparin (Lovenox)  Antibiotics:  Treating active infection/contamination beyond 24 hours perioperative coverage

## 2019-01-31 LAB
GLUCOSE BLD-MCNC: 144 MG/DL (ref 65–99)
GLUCOSE BLD-MCNC: 147 MG/DL (ref 65–99)
GLUCOSE BLD-MCNC: 273 MG/DL (ref 65–99)
GLUCOSE BLD-MCNC: 69 MG/DL (ref 65–99)

## 2019-01-31 PROCEDURE — 82962 GLUCOSE BLOOD TEST: CPT | Mod: 91

## 2019-01-31 PROCEDURE — 99232 SBSQ HOSP IP/OBS MODERATE 35: CPT | Performed by: INTERNAL MEDICINE

## 2019-01-31 ASSESSMENT — ENCOUNTER SYMPTOMS
COUGH: 0
FEVER: 0
SHORTNESS OF BREATH: 0
ABDOMINAL PAIN: 0
VOMITING: 0
HEADACHES: 0
HEMOPTYSIS: 0
DIARRHEA: 0
CHILLS: 0
MYALGIAS: 0
EYE PAIN: 0
CONSTIPATION: 0
NAUSEA: 0
SPUTUM PRODUCTION: 0
SORE THROAT: 0

## 2019-01-31 NOTE — [facility]
Infectious Disease Progress Note    Author: Haley Womack M.D. Date & Time of service: 1/31/2019  10:35 AM    Chief Complaint:  Follow-up for right second toe osteomyelitis     Interval History:  57 y/o diabetic male with OM of the toe  1/13 afebrile no CBC, patient underwent right second toe amputation and I&D yesterday, he has 8 out of 10 right foot pain, tolerating IV antibiotics without any issues, no diarrhea  1/14 afebrile, no CBC today.  Patient reports this, no new issues  11/15 afebrile, no CBC today.  Tolerating antibiotics.  1/17 afebrile, white count 10.7.  Tolerating antibiotics, no new issues, plan transfer to [facility] when accepted  1/21 Interval 24 hours of Vitals/Labs/Micro,and imaging results reviewed and compared to prior.  See assessment.   1/24 Interval 24 hours of Vitals/Labs/Micro,and imaging results reviewed and compared to prior.  See assessment.   1/25 AF WBC 13.9 states tolerating abx well-pain controlled Labs and wound reviewed  1/26 AF no CBC sutures removed-no new complaints Wound reviewed  1/27 AF feels pretty good-no new complaints  1/29 AF WBC 13.2 tolerating abx well-eating lunch.  Good appetite  1/31 AF tolerating abx well-ate all of breakfast. Denies pain    Review of Systems:  Review of Systems   Constitutional: Negative for chills, fever and malaise/fatigue.   Respiratory: Negative for cough, hemoptysis, sputum production and shortness of breath.    Cardiovascular: Negative for chest pain and leg swelling.   Gastrointestinal: Negative for abdominal pain, diarrhea, nausea and vomiting.   Genitourinary: Negative for dysuria.   Musculoskeletal: Negative for joint pain and myalgias.   Skin: Negative for rash.   All other systems reviewed and are negative.      Hemodynamics:  T 97.2 P83BP 101/58RR 18SaO2 93%        Physical Exam:  Physical Exam   Constitutional: He is oriented to person, place, and time. He appears well-developed. No distress.   HENT:   Head: Normocephalic and  atraumatic.   Mouth/Throat: No oropharyngeal exudate.   Eyes: Pupils are equal, round, and reactive to light. EOM are normal.   Neck: Neck supple.   Cardiovascular: Normal rate.    Pulmonary/Chest: Effort normal. No respiratory distress. He has no wheezes. He has no rales.   Abdominal: Soft. He exhibits no distension. There is no tenderness. There is no rebound.   Musculoskeletal: He exhibits no edema.   Right foot with no erythema, edema, or drainage.  Surgical site edges well approximated, sutures removed. Healed  Dry and callused feet   Lymphadenopathy:     He has no cervical adenopathy.   Neurological: He is alert and oriented to person, place, and time.   Skin: Skin is warm. No erythema.   Psychiatric: He has a normal mood and affect. His behavior is normal.   Vitals reviewed.        Labs:  No results for input(s): WBC, RBC, HEMOGLOBIN, HEMATOCRIT, MCV, MCH, RDW, PLATELETCT, MPV, NEUTSPOLYS, LYMPHOCYTES, MONOCYTES, EOSINOPHILS, BASOPHILS, RBCMORPHOLO in the last 72 hours.  No results for input(s): SODIUM, POTASSIUM, CHLORIDE, CO2, GLUCOSE, BUN, CPKTOTAL in the last 72 hours.  No results for input(s): ALBUMIN, TBILIRUBIN, ALKPHOSPHAT, TOTPROTEIN, ALTSGPT, ASTSGOT, CREATININE in the last 72 hours.    Imaging:  Dx-foot-complete 3+ Right    Result Date: 1/10/2019  1/10/2019 8:14 PM HISTORY/REASON FOR EXAM: Atraumatic Pain/Swelling/Deformity TECHNIQUE/EXAM DESCRIPTION:  AP, lateral, and oblique views of the RIGHT foot. COMPARISON:  None FINDINGS: The bony structures and articulations appear within normal limits without visualized fracture, subluxation, or dislocation. Bony remodeling of the dorsal medial first metatarsal head is seen.     1.  No acute traumatic bony injury. 2.  Bony remodeling at the dorsomedial first metatarsal head, could correspond with erosive arthropathy, consider osteomyelitis as clinically appropriate    Us-extremity Venous Lower Unilat Right    Result Date: 1/11/2019   Vascular Laboratory   CONCLUSIONS  Normal right lower extremity superficial and deep venous examination.  SHAUN CORCORAN  Exam Date:     01/10/2019 21:21  Room #:     Inpatient  Priority:     Stat  Ht (in):             Wt (lb):  Ordering Physician:        CHAVO MARC  Referring Physician:       [id number], BLISS  Sonographer:               Nila Urbina RVT  Study Type:                Complete Unilateral  Technical Quality:         Adequate  Age:    58    Gender:     M  MRN:    [id number]  :    1960      BSA:  Indications:     Localized swelling, mass and lump, right lower limb  CPT Codes:       09622  ICD Codes:         History:         Swelling of right calf and foot. No prior duplex.  Limitations:  PROCEDURES:  Right lower extremity venous duplex imaging.  The following venous structures were evaluated: common femoral, profunda  femoral, proximal portion of the greater saphenous, femoral, popliteal,  peroneal and posterior tibial veins.  Serial compression, augmentation maneuvers, color and spectral Doppler flow  evaluations were performed.  FINDINGS:  Right lower extremity -  No evidence of deep venous thrombosis.  Complete color filling and compressibility with normal venous flow dynamics  including spontaneous flow, response to augmentation maneuvers, and  respiratory phasicity.  Flow was evaluated in the contralateral common femoral vein and normal  venous flow dynamics including spontaneous flow, respiratory phasic  variation and augmentation were demonstrated.  Natalia Bustos MD  (Electronically Signed)  Final Date:      2019                   01:03      Micro:  Results     ** No results found for the last 168 hours. **      Assessment:  OM, right toe  MSSA  Diabetes    Plan:  Right foot osteomyelitis   Afebrile  Leukocytosis persistent at last check-decreased  s/p right second toe amputation through metatarsophalangeal joint and I&D on 19 by Dr. Reddy  OR culture +MSSA and peptostrep  Continue IV Ancef 2  g every 8 hours  Stop date of 2/23/2019  Continue wound care  Check CBC in am     Leukocytosis, persistent  Monitor  As above-check CBC  Pulm toilet  OOB as tolerated     Poorly controlled type 2 diabetes mellitus  Hemoglobin A1c 10.5  Likely contributing to infection and delayed wound  Keep BS under 150 to help control current infection

## 2019-01-31 NOTE — ADDENDUM NOTE
Encounter addended by: Fidelia Tabor M.D. on: 1/31/2019  8:51 AM<BR>    Actions taken: Sign clinical note

## 2019-01-31 NOTE — [facility]
Hospital Medicine Progress Note, Adult, Complex               Author: Noemi Melara Date & Time created: 1/31/2019  12:42 PM     CC: 2nd toe OM, amputation    Interval History:  Low sugar of 53 yesterday, asymptomatic  No low in 24 hours  Endo appt made    Review of Systems:  Review of Systems   Constitutional: Negative for fever.   HENT: Negative for sore throat.    Eyes: Negative for pain.   Respiratory: Negative for cough and shortness of breath.    Cardiovascular: Negative for chest pain.   Gastrointestinal: Negative for abdominal pain, constipation, diarrhea, nausea and vomiting.   Genitourinary: Negative for dysuria.   Musculoskeletal: Negative for joint pain.   Neurological: Negative for headaches.       T 97.7 P86BP 98/62RR 19SaO2 93% I/O 1.9 /brp BM 1/30  Physical Exam   Constitutional: He appears well-developed. No distress.   HENT:   Head: Normocephalic and atraumatic.   Eyes: Conjunctivae are normal. Right eye exhibits no discharge. Left eye exhibits no discharge. No scleral icterus.   Neck: Neck supple. No tracheal deviation present.   Cardiovascular: Normal rate and regular rhythm.    Pulmonary/Chest: Effort normal. No respiratory distress. He has no wheezes. He exhibits no tenderness.   Abdominal: Soft. Bowel sounds are normal. He exhibits no distension. There is no tenderness. There is no rebound.   Musculoskeletal: He exhibits no edema.   Neurological: He is alert.   Skin: Skin is warm.   Healed 2nd toe amputation   Vitals reviewed.      Labs:          No results for input(s): SODIUM, POTASSIUM, CHLORIDE, CO2, BUN, CREATININE, MAGNESIUM, PHOSPHORUS, CALCIUM in the last 72 hours.  No results for input(s): ALTSGPT, ASTSGOT, ALKPHOSPHAT, TBILIRUBIN, DBILIRUBIN, GAMMAGT, AMYLASE, LIPASE, ALB, PREALBUMIN, GLUCOSE in the last 72 hours.  No results for input(s): RBC, HEMOGLOBIN, HEMATOCRIT, PLATELETCT, PROTHROMBTM, APTT, INR, IRON, FERRITIN, TOTIRONBC in the last 72 hours.            GI/Nutrition:  Orders Placed This Encounter   Procedures   • Diet Order Diabetic (NCS,HS snacks)     Standing Status:   Standing     Number of Occurrences:   1     Order Specific Question:   Diet:     Answer:   Diabetic [3]     Comments:   NCS,HS snacks     Medical Decision Making, by Problem:  R 2nd toe OM s/p amputation 1/12 (Maureen) ([facility])    -Ancef 2/23   -ambulation with heel shoe    -hoping to transition to oral abx for DC in 1-2 weeks  DM1, adult onset ([place]   -brittle   -continue levemir   -meal aspart to 12, initiate SSI with meals   -outpatient endo appointment made 2/19  Leukocytosis, persistent   -non toxic   -follow up in am per ID request  S/p splenectomy  Depression  H/o chronic pancreatitis   -creon with meals  HTN   -zestril  Memory loss   -namenda  BPH   -flomax  HLD   -lipitor  Tobacco use   -nicotine patch  Nutrition   -po          Quality-Core Measures   Reviewed items::  Medications reviewed  Dill catheter::  No Dill  DVT prophylaxis pharmacological::  Enoxaparin (Lovenox)  Antibiotics:  Treating active infection/contamination beyond 24 hours perioperative coverage

## 2019-02-01 LAB
BASOPHILS # BLD AUTO: 1.7 % (ref 0–1.8)
BASOPHILS # BLD: 0.21 K/UL (ref 0–0.12)
EOSINOPHIL # BLD AUTO: 1.18 K/UL (ref 0–0.51)
EOSINOPHIL NFR BLD: 9.7 % (ref 0–6.9)
ERYTHROCYTE [DISTWIDTH] IN BLOOD BY AUTOMATED COUNT: 52.7 FL (ref 35.9–50)
GLUCOSE BLD-MCNC: 134 MG/DL (ref 65–99)
GLUCOSE BLD-MCNC: 168 MG/DL (ref 65–99)
GLUCOSE BLD-MCNC: 228 MG/DL (ref 65–99)
GLUCOSE BLD-MCNC: 75 MG/DL (ref 65–99)
HCT VFR BLD AUTO: 38.4 % (ref 42–52)
HGB BLD-MCNC: 12.5 G/DL (ref 14–18)
IMM GRANULOCYTES # BLD AUTO: 0.28 K/UL (ref 0–0.11)
IMM GRANULOCYTES NFR BLD AUTO: 2.3 % (ref 0–0.9)
LYMPHOCYTES # BLD AUTO: 4.86 K/UL (ref 1–4.8)
LYMPHOCYTES NFR BLD: 39.9 % (ref 22–41)
MCH RBC QN AUTO: 33.2 PG (ref 27–33)
MCHC RBC AUTO-ENTMCNC: 32.6 G/DL (ref 33.7–35.3)
MCV RBC AUTO: 102.1 FL (ref 81.4–97.8)
MONOCYTES # BLD AUTO: 1.52 K/UL (ref 0–0.85)
MONOCYTES NFR BLD AUTO: 12.5 % (ref 0–13.4)
NEUTROPHILS # BLD AUTO: 4.13 K/UL (ref 1.82–7.42)
NEUTROPHILS NFR BLD: 33.9 % (ref 44–72)
NRBC # BLD AUTO: 0.02 K/UL
NRBC BLD-RTO: 0.2 /100 WBC
PLATELET # BLD AUTO: 344 K/UL (ref 164–446)
PMV BLD AUTO: 9.7 FL (ref 9–12.9)
RBC # BLD AUTO: 3.76 M/UL (ref 4.7–6.1)
WBC # BLD AUTO: 12.2 K/UL (ref 4.8–10.8)

## 2019-02-01 PROCEDURE — 99232 SBSQ HOSP IP/OBS MODERATE 35: CPT | Performed by: INTERNAL MEDICINE

## 2019-02-01 PROCEDURE — 85025 COMPLETE CBC W/AUTO DIFF WBC: CPT

## 2019-02-01 PROCEDURE — 82962 GLUCOSE BLOOD TEST: CPT

## 2019-02-01 ASSESSMENT — ENCOUNTER SYMPTOMS
HEMOPTYSIS: 0
SPUTUM PRODUCTION: 0
FEVER: 0
DIZZINESS: 0
HEADACHES: 0
COUGH: 0
NAUSEA: 0
CHILLS: 0
SORE THROAT: 0
ABDOMINAL PAIN: 0
MYALGIAS: 0
DIARRHEA: 0
VOMITING: 0
SHORTNESS OF BREATH: 0

## 2019-02-01 NOTE — [facility]
Infectious Disease Progress Note    Author: Fidelia Tabor M.D. Date & Time of service: 2/1/2019  9:26 AM    Chief Complaint:  Follow-up for right second toe osteomyelitis     Interval History:  59 y/o diabetic male with OM of the toe  1/13 afebrile no CBC, patient underwent right second toe amputation and I&D yesterday, he has 8 out of 10 right foot pain, tolerating IV antibiotics without any issues, no diarrhea  1/14 afebrile, no CBC today.  Patient reports this, no new issues  11/15 afebrile, no CBC today.  Tolerating antibiotics.  1/17 afebrile, white count 10.7.  Tolerating antibiotics, no new issues, plan transfer to [facility] when accepted  1/21 Interval 24 hours of Vitals/Labs/Micro,and imaging results reviewed and compared to prior.  See assessment.   1/24 Interval 24 hours of Vitals/Labs/Micro,and imaging results reviewed and compared to prior.  See assessment.   1/25 AF WBC 13.9 states tolerating abx well-pain controlled Labs and wound reviewed  1/26 AF no CBC sutures removed-no new complaints Wound reviewed  1/27 AF feels pretty good-no new complaints  1/29 AF WBC 13.2 tolerating abx well-eating lunch.  Good appetite  1/31 AF tolerating abx well-ate all of breakfast. Denies pain  2/1 afebrile WBC 12.2 patient doing well without any foot pain, has no new complaint  Review of Systems:  Review of Systems   Constitutional: Negative for chills, fever and malaise/fatigue.   Respiratory: Negative for cough, hemoptysis, sputum production and shortness of breath.    Cardiovascular: Negative for chest pain and leg swelling.   Gastrointestinal: Negative for abdominal pain, diarrhea, nausea and vomiting.   Genitourinary: Negative for dysuria.   Musculoskeletal: Negative for joint pain and myalgias.   Skin: Negative for rash.   Neurological: Negative for dizziness and headaches.   All other systems reviewed and are negative.      Hemodynamics:  T 97.6 P80BP 129/78RR 21SaO2 91%      Physical Exam:  Physical Exam    Constitutional: He is oriented to person, place, and time. He appears well-developed. No distress.   Older than stated age   HENT:   Head: Normocephalic and atraumatic.   Mouth/Throat: No oropharyngeal exudate.   Eyes: Pupils are equal, round, and reactive to light. EOM are normal.   Neck: Neck supple.   Cardiovascular: Normal rate.    Pulmonary/Chest: Effort normal. No respiratory distress. He has no wheezes. He has no rales.   Abdominal: Soft. He exhibits no distension. There is no tenderness. There is no rebound.   Musculoskeletal: He exhibits no edema.   Right foot with no erythema, edema, or drainage.  Evidence of right second toe amputation with surgical site edges well approximated, sutures removed. Healed  Dry and callused feet   Lymphadenopathy:     He has no cervical adenopathy.   Neurological: He is alert and oriented to person, place, and time.   Skin: Skin is warm. No erythema.   Psychiatric: He has a normal mood and affect. His behavior is normal.   Vitals reviewed.        Labs:  Recent Labs      02/01/19   0315   WBC  12.2*   RBC  3.76*   HEMOGLOBIN  12.5*   HEMATOCRIT  38.4*   MCV  102.1*   MCH  33.2*   RDW  52.7*   PLATELETCT  344   MPV  9.7   NEUTSPOLYS  33.90*   LYMPHOCYTES  39.90   MONOCYTES  12.50   EOSINOPHILS  9.70*   BASOPHILS  1.70     No results for input(s): SODIUM, POTASSIUM, CHLORIDE, CO2, GLUCOSE, BUN, CPKTOTAL in the last 72 hours.  No results for input(s): ALBUMIN, TBILIRUBIN, ALKPHOSPHAT, TOTPROTEIN, ALTSGPT, ASTSGOT, CREATININE in the last 72 hours.    Imaging:  None new to review             Micro:  Results     ** No results found for the last 168 hours. **      Assessment:  OM, right toe  MSSA  Diabetes    Plan:  Right foot osteomyelitis   Afebrile  Leukocytosis persistent at last check-decreased  s/p right second toe amputation through metatarsophalangeal joint and I&D on 1/12/19 by Dr. Reddy  OR culture +MSSA and peptostrep  Continue IV Ancef 2 g every 8 hours  Stop date of  2/23/2019  Continue wound care     Leukocytosis, persistent  Monitor  On antibiotics above  Pulm toilet  OOB as tolerated     Poorly controlled type 2 diabetes mellitus  Hemoglobin A1c 10.5  Likely contributing to infection and delayed wound  Keep BS under 150 to help control current infection

## 2019-02-01 NOTE — [facility]
Hospital Medicine Progress Note, Adult, Complex               Author: Noemi JAQUEZ Saritha Date & Time created: 2/1/2019  8:39 AM     CC: 2nd toe OM, amputation    Interval History:  No events  Feels well  Looking forward to discharge when IV abx complete    Review of Systems:  Review of Systems   Constitutional: Negative for fever.   HENT: Negative for sore throat.    Respiratory: Negative for cough and shortness of breath.    Cardiovascular: Negative for chest pain.   Gastrointestinal: Negative for abdominal pain, diarrhea, nausea and vomiting.   Genitourinary: Negative for dysuria.   Musculoskeletal: Negative for joint pain.   Neurological: Negative for headaches.       T 97.6 P80BP 129/78RR 21SaO2 91% I/O 3 /brp BM 1/30  Physical Exam   Constitutional: He appears well-developed. No distress.   HENT:   Head: Normocephalic and atraumatic.   Eyes: Conjunctivae are normal. Right eye exhibits no discharge. Left eye exhibits no discharge. No scleral icterus.   Neck: Neck supple. No tracheal deviation present.   Cardiovascular: Normal rate and regular rhythm.    Pulmonary/Chest: Effort normal. No respiratory distress. He has no wheezes. He exhibits no tenderness.   Abdominal: Soft. Bowel sounds are normal. He exhibits no distension. There is no tenderness.   Musculoskeletal: He exhibits no edema.   Neurological: He is alert.   Skin: Skin is warm.   Healed 2nd toe amputation   Vitals reviewed.      Labs:          No results for input(s): SODIUM, POTASSIUM, CHLORIDE, CO2, BUN, CREATININE, MAGNESIUM, PHOSPHORUS, CALCIUM in the last 72 hours.  No results for input(s): ALTSGPT, ASTSGOT, ALKPHOSPHAT, TBILIRUBIN, DBILIRUBIN, GAMMAGT, AMYLASE, LIPASE, ALB, PREALBUMIN, GLUCOSE in the last 72 hours.  Recent Labs      02/01/19 0315   RBC  3.76*   HEMOGLOBIN  12.5*   HEMATOCRIT  38.4*   PLATELETCT  344     Recent Labs      02/01/19 0315   WBC  12.2*   NEUTSPOLYS  33.90*   LYMPHOCYTES  39.90   MONOCYTES  12.50   EOSINOPHILS  9.70*    BASOPHILS  1.70          GI/Nutrition:  Orders Placed This Encounter   Procedures   • Diet Order Diabetic (NCS,HS snacks)     Standing Status:   Standing     Number of Occurrences:   1     Order Specific Question:   Diet:     Answer:   Diabetic [3]     Comments:   NCS,HS snacks     Medical Decision Making, by Problem:  R 2nd toe OM s/p amputation 1/12 (Maureen) ([facility])    -Ancef 2/23   -ambulation with heel shoe    -hoping to transition to oral abx to complete the final 2 weeks abx, await ID decision  DM1, adult onset [place]   -brittle   -continue levemir   -meal aspart to 12, initiate SSI with meals   -outpatient endo appointment made 2/19  Leukocytosis, persistent   -non toxic   -remains elevated  S/p splenectomy  Depression  H/o chronic pancreatitis   -creon with meals  HTN   -zestril  Memory loss   -namenda  BPH   -flomax  HLD   -lipitor  Tobacco use   -nicotine patch  Nutrition   -po          Quality-Core Measures   Reviewed items::  Medications reviewed and Labs reviewed  Dill catheter::  No Dill  DVT prophylaxis pharmacological::  Enoxaparin (Lovenox)  Antibiotics:  Treating active infection/contamination beyond 24 hours perioperative coverage

## 2019-02-02 LAB
GLUCOSE BLD-MCNC: 231 MG/DL (ref 65–99)
GLUCOSE BLD-MCNC: 284 MG/DL (ref 65–99)
GLUCOSE BLD-MCNC: 327 MG/DL (ref 65–99)
GLUCOSE BLD-MCNC: 394 MG/DL (ref 65–99)
GLUCOSE BLD-MCNC: 51 MG/DL (ref 65–99)
GLUCOSE BLD-MCNC: 74 MG/DL (ref 65–99)

## 2019-02-02 PROCEDURE — 99232 SBSQ HOSP IP/OBS MODERATE 35: CPT | Performed by: INTERNAL MEDICINE

## 2019-02-02 PROCEDURE — 82962 GLUCOSE BLOOD TEST: CPT | Mod: 91

## 2019-02-02 ASSESSMENT — ENCOUNTER SYMPTOMS
HEMOPTYSIS: 0
CHILLS: 0
NAUSEA: 0
COUGH: 0
ABDOMINAL PAIN: 0
SHORTNESS OF BREATH: 0
SPUTUM PRODUCTION: 0
SORE THROAT: 0
MYALGIAS: 0
DIARRHEA: 0
VOMITING: 0
DIZZINESS: 0
HEADACHES: 0
FEVER: 0

## 2019-02-02 NOTE — [facility]
Hospital Medicine Progress Note, Adult, Complex               Author: Noemi JAQUEZ [place] Date & Time created: 2/2/2019  9:20 AM     CC: 2nd toe OM, amputation    Interval History:  Feels well  Hoping to transition to oral abx for DC next week  BG high at [facility] as pre dinner too low for meal coverage    Review of Systems:  Review of Systems   Constitutional: Negative for chills and fever.   HENT: Negative for sore throat.    Respiratory: Negative for cough and shortness of breath.    Cardiovascular: Negative for chest pain.   Gastrointestinal: Negative for abdominal pain, diarrhea, nausea and vomiting.   Genitourinary: Negative for dysuria.   Musculoskeletal: Negative for joint pain.   Neurological: Negative for headaches.       T 97 P89BP 125/71RR 18SaO2 91% I/O 2.6/brp BM 2/1  Physical Exam   Constitutional: He is oriented to person, place, and time. He appears well-developed. No distress.   HENT:   Head: Normocephalic and atraumatic.   Eyes: Conjunctivae are normal. Right eye exhibits no discharge. Left eye exhibits no discharge. No scleral icterus.   Neck: Neck supple. No tracheal deviation present.   Cardiovascular: Normal rate and regular rhythm.    Pulmonary/Chest: Effort normal. No respiratory distress. He has no wheezes. He exhibits no tenderness.   Abdominal: Soft. Bowel sounds are normal. He exhibits no distension. There is no tenderness.   Musculoskeletal: He exhibits no edema.   Neurological: He is alert and oriented to person, place, and time.   Skin: Skin is warm.   Healed 2nd toe amputation   Vitals reviewed.      Labs:          No results for input(s): SODIUM, POTASSIUM, CHLORIDE, CO2, BUN, CREATININE, MAGNESIUM, PHOSPHORUS, CALCIUM in the last 72 hours.  No results for input(s): ALTSGPT, ASTSGOT, ALKPHOSPHAT, TBILIRUBIN, DBILIRUBIN, GAMMAGT, AMYLASE, LIPASE, ALB, PREALBUMIN, GLUCOSE in the last 72 hours.  Recent Labs      02/01/19   0315   RBC  3.76*   HEMOGLOBIN  12.5*   HEMATOCRIT  38.4*   PLATELETCT   344     Recent Labs      02/01/19   0315   WBC  12.2*   NEUTSPOLYS  33.90*   LYMPHOCYTES  39.90   MONOCYTES  12.50   EOSINOPHILS  9.70*   BASOPHILS  1.70          GI/Nutrition:  Orders Placed This Encounter   Procedures   • Diet Order Diabetic (NCS,HS snacks)     Standing Status:   Standing     Number of Occurrences:   1     Order Specific Question:   Diet:     Answer:   Diabetic [3]     Comments:   NCS,HS snacks     Medical Decision Making, by Problem:  R 2nd toe OM s/p amputation 1/12 (Maureen) ([facility])    -Ancef 2/23   -ambulation with heel shoe    -hoping to transition to oral abx to complete the final 2 weeks abx, await ID decision  DM1, adult onset (per endo in [place] area)   -brittle   -continue levemir   -meal aspart to 12, initiate SSI with meals   -outpatient endo appointment made 2/19  Leukocytosis, persistent   -non toxic   -remains elevated  S/p splenectomy  Depression  H/o chronic pancreatitis   -creon with meals  HTN   -zestril  Memory loss   -namenda  BPH   -flomax  HLD   -lipitor  Tobacco use   -nicotine patch  Nutrition   -po          Quality-Core Measures   Reviewed items::  Medications reviewed  Dill catheter::  No Dill  DVT prophylaxis pharmacological::  Enoxaparin (Lovenox)  Antibiotics:  Treating active infection/contamination beyond 24 hours perioperative coverage

## 2019-02-02 NOTE — [facility]
Infectious Disease Progress Note    Author: Fidelia Tabor M.D. Date & Time of service: 2/2/2019  1:13 PM    Chief Complaint:  Follow-up for right second toe osteomyelitis     Interval History:  57 y/o diabetic male with OM of the toe  1/13 afebrile no CBC, patient underwent right second toe amputation and I&D yesterday, he has 8 out of 10 right foot pain, tolerating IV antibiotics without any issues, no diarrhea  1/14 afebrile, no CBC today.  Patient reports this, no new issues  11/15 afebrile, no CBC today.  Tolerating antibiotics.  1/17 afebrile, white count 10.7.  Tolerating antibiotics, no new issues, plan transfer to [facility] when accepted  1/21 Interval 24 hours of Vitals/Labs/Micro,and imaging results reviewed and compared to prior.  See assessment.   1/24 Interval 24 hours of Vitals/Labs/Micro,and imaging results reviewed and compared to prior.  See assessment.   1/25 AF WBC 13.9 states tolerating abx well-pain controlled Labs and wound reviewed  1/26 AF no CBC sutures removed-no new complaints Wound reviewed  1/27 AF feels pretty good-no new complaints  1/29 AF WBC 13.2 tolerating abx well-eating lunch.  Good appetite  1/31 AF tolerating abx well-ate all of breakfast. Denies pain  2/1 afebrile WBC 12.2 patient doing well without any foot pain, has no new complaint  2/2 afebrile no CBC done today, patient concerned about his blood sugars overnight and this morning he states his blood sugars was 300 also inquiring if he needs to remain on IV antibiotics through his anticipated stop date    Review of Systems:  Review of Systems   Constitutional: Negative for chills, fever and malaise/fatigue.   Respiratory: Negative for cough, hemoptysis, sputum production and shortness of breath.    Cardiovascular: Negative for leg swelling.   Gastrointestinal: Negative for abdominal pain, diarrhea, nausea and vomiting.   Genitourinary: Negative for dysuria.   Musculoskeletal: Negative for joint pain and myalgias.    Neurological: Negative for dizziness and headaches.   All other systems reviewed and are negative.      Hemodynamics:  T 97 P89BP 125/71RR 18SaO2 91%       Physical Exam:  Physical Exam   Constitutional: He is oriented to person, place, and time. He appears well-developed. No distress.   Older than stated age   HENT:   Head: Normocephalic and atraumatic.   Mouth/Throat: No oropharyngeal exudate.   Eyes: Pupils are equal, round, and reactive to light. EOM are normal.   Neck: Neck supple.   Cardiovascular: Normal rate.    Pulmonary/Chest: Effort normal. No respiratory distress. He has no wheezes. He has no rales.   Abdominal: Soft. He exhibits no distension. There is no tenderness. There is no rebound.   Musculoskeletal: He exhibits no edema.   Right foot with no erythema, edema, or drainage.  Evidence of right second toe amputation with surgical site edges well approximated, sutures removed. Healed  Dry and callused feet   Lymphadenopathy:     He has no cervical adenopathy.   Neurological: He is alert and oriented to person, place, and time.   Skin: Skin is warm. No erythema.   Psychiatric: He has a normal mood and affect. His behavior is normal.   Pleasant   Vitals reviewed.        Labs:  Recent Labs      02/01/19   0315   WBC  12.2*   RBC  3.76*   HEMOGLOBIN  12.5*   HEMATOCRIT  38.4*   MCV  102.1*   MCH  33.2*   RDW  52.7*   PLATELETCT  344   MPV  9.7   NEUTSPOLYS  33.90*   LYMPHOCYTES  39.90   MONOCYTES  12.50   EOSINOPHILS  9.70*   BASOPHILS  1.70     No results for input(s): SODIUM, POTASSIUM, CHLORIDE, CO2, GLUCOSE, BUN, CPKTOTAL in the last 72 hours.  No results for input(s): ALBUMIN, TBILIRUBIN, ALKPHOSPHAT, TOTPROTEIN, ALTSGPT, ASTSGOT, CREATININE in the last 72 hours.    Imaging:  None new to review             Micro:  Results     ** No results found for the last 168 hours. **      Assessment:  OM, right toe  MSSA  Diabetes    Plan:  Right foot osteomyelitis   Afebrile  Leukocytosis persistent at last  check-decreased  s/p right second toe amputation through metatarsophalangeal joint and I&D on 1/12/19 by Dr. Reddy  OR culture +MSSA and peptostrep  Continue IV Ancef 2 g every 8 hours  Stop date of 2/23/2019  Transition to p.o. Augmentin on 2/9 to complete last 2 weeks of antibiotic course  Continue wound care     Leukocytosis, persistent  Monitor  On antibiotics above  Pulm toilet  OOB as tolerated     Poorly controlled type 2 diabetes mellitus  Hemoglobin A1c 10.5  Likely contributing to infection and delayed wound  Keep BS under 150 to help control current infection    Discussed with Dr. Melara

## 2019-02-03 LAB
GLUCOSE BLD-MCNC: 157 MG/DL (ref 65–99)
GLUCOSE BLD-MCNC: 235 MG/DL (ref 65–99)
GLUCOSE BLD-MCNC: 316 MG/DL (ref 65–99)
GLUCOSE BLD-MCNC: 347 MG/DL (ref 65–99)

## 2019-02-03 PROCEDURE — 82962 GLUCOSE BLOOD TEST: CPT

## 2019-02-03 ASSESSMENT — ENCOUNTER SYMPTOMS
FEVER: 0
SORE THROAT: 0
VOMITING: 0
SHORTNESS OF BREATH: 0
DIARRHEA: 0
NAUSEA: 0
ABDOMINAL PAIN: 0
HEADACHES: 0
CHILLS: 0
CONSTIPATION: 0
COUGH: 0

## 2019-02-03 NOTE — [facility]
Hospital Medicine Progress Note, Adult, Complex               Author: Noemi LEONID Melara Date & Time created: 2/3/2019  11:40 AM     CC: 2nd toe OM, amputation    Interval History:  BG high  No events  Feels well    Review of Systems:  Review of Systems   Constitutional: Negative for chills and fever.   HENT: Negative for sore throat.    Respiratory: Negative for cough and shortness of breath.    Cardiovascular: Negative for chest pain.   Gastrointestinal: Negative for abdominal pain, constipation, diarrhea, nausea and vomiting.   Genitourinary: Negative for dysuria.   Musculoskeletal: Negative for joint pain.   Neurological: Negative for headaches.       T 97.6 P81BP 128/77RR 19SaO2 94% I/O 2.4/2.7 BM 2/2  Physical Exam   Constitutional: He is oriented to person, place, and time. He appears well-developed. No distress.   HENT:   Head: Normocephalic and atraumatic.   Eyes: Conjunctivae are normal. Right eye exhibits no discharge. Left eye exhibits no discharge. No scleral icterus.   Neck: Neck supple. No tracheal deviation present.   Cardiovascular: Normal rate and regular rhythm.    Pulmonary/Chest: Effort normal. No respiratory distress. He has no wheezes. He exhibits no tenderness.   Abdominal: Soft. Bowel sounds are normal. He exhibits no distension. There is no tenderness.   Musculoskeletal: He exhibits no edema.   Neurological: He is alert and oriented to person, place, and time.   Skin: Skin is warm.   Healed 2nd toe amputation   Vitals reviewed.      Labs:          No results for input(s): SODIUM, POTASSIUM, CHLORIDE, CO2, BUN, CREATININE, MAGNESIUM, PHOSPHORUS, CALCIUM in the last 72 hours.  No results for input(s): ALTSGPT, ASTSGOT, ALKPHOSPHAT, TBILIRUBIN, DBILIRUBIN, GAMMAGT, AMYLASE, LIPASE, ALB, PREALBUMIN, GLUCOSE in the last 72 hours.  Recent Labs      02/01/19 0315   RBC  3.76*   HEMOGLOBIN  12.5*   HEMATOCRIT  38.4*   PLATELETCT  344     Recent Labs      02/01/19 0315   WBC  12.2*   NEUTSPOLYS   33.90*   LYMPHOCYTES  39.90   MONOCYTES  12.50   EOSINOPHILS  9.70*   BASOPHILS  1.70          GI/Nutrition:  Orders Placed This Encounter   Procedures   • Diet Order Diabetic (NCS,HS snacks)     Standing Status:   Standing     Number of Occurrences:   1     Order Specific Question:   Diet:     Answer:   Diabetic [3]     Comments:   NCS,HS snacks     Medical Decision Making, by Problem:  R 2nd toe OM s/p amputation 1/12 (Maureen) ([facility])    -Ancef 2/8   -ambulation with heel shoe    -Augmentin 2/9 for 2 weeks  DM1, adult onset ([place]   -brittle   -continue levemir, increase am to 10   -meal aspart to 12, SSI with meals   -outpatient endo appointment made 2/19  Leukocytosis, persistent   -non toxic   -remains elevated  S/p splenectomy  Depression  H/o chronic pancreatitis   -creon with meals  HTN   -zestril  Memory loss   -namenda  BPH   -flomax  HLD   -lipitor  Tobacco use   -nicotine patch  Nutrition   -po          Quality-Core Measures   Reviewed items::  Medications reviewed  Dill catheter::  No Dill  DVT prophylaxis pharmacological::  Enoxaparin (Lovenox)  Antibiotics:  Treating active infection/contamination beyond 24 hours perioperative coverage

## 2019-02-04 LAB
GLUCOSE BLD-MCNC: 140 MG/DL (ref 65–99)
GLUCOSE BLD-MCNC: 152 MG/DL (ref 65–99)
GLUCOSE BLD-MCNC: 81 MG/DL (ref 65–99)

## 2019-02-04 PROCEDURE — 99232 SBSQ HOSP IP/OBS MODERATE 35: CPT | Performed by: INTERNAL MEDICINE

## 2019-02-04 PROCEDURE — 82962 GLUCOSE BLOOD TEST: CPT | Mod: 91

## 2019-02-04 ASSESSMENT — ENCOUNTER SYMPTOMS
CHILLS: 0
DIARRHEA: 0
CONSTIPATION: 0
SORE THROAT: 0
COUGH: 0
HEMOPTYSIS: 0
SHORTNESS OF BREATH: 0
NAUSEA: 0
MYALGIAS: 0
DIZZINESS: 0
VOMITING: 0
HEADACHES: 0
FEVER: 0
ABDOMINAL PAIN: 0
SPUTUM PRODUCTION: 0

## 2019-02-04 NOTE — [facility]
Infectious Disease Progress Note    Author: Fidelia Tabor M.D. Date & Time of service: 2/4/2019  12:58 PM    Chief Complaint:  Follow-up for right second toe osteomyelitis     Interval History:  57 y/o diabetic male with OM of the toe  1/13 afebrile no CBC, patient underwent right second toe amputation and I&D yesterday, he has 8 out of 10 right foot pain, tolerating IV antibiotics without any issues, no diarrhea  1/14 afebrile, no CBC today.  Patient reports this, no new issues  11/15 afebrile, no CBC today.  Tolerating antibiotics.  1/17 afebrile, white count 10.7.  Tolerating antibiotics, no new issues, plan transfer to [facility] when accepted  1/21 Interval 24 hours of Vitals/Labs/Micro,and imaging results reviewed and compared to prior.  See assessment.   1/24 Interval 24 hours of Vitals/Labs/Micro,and imaging results reviewed and compared to prior.  See assessment.   1/25 AF WBC 13.9 states tolerating abx well-pain controlled Labs and wound reviewed  1/26 AF no CBC sutures removed-no new complaints Wound reviewed  1/27 AF feels pretty good-no new complaints  1/29 AF WBC 13.2 tolerating abx well-eating lunch.  Good appetite  1/31 AF tolerating abx well-ate all of breakfast. Denies pain  2/1 afebrile WBC 12.2 patient doing well without any foot pain, has no new complaint  2/2 afebrile no CBC done today, patient concerned about his blood sugars overnight and this morning he states his blood sugars was 300 also inquiring if he needs to remain on IV antibiotics through his anticipated stop date  2/4 afebrile patient states his blood sugars are much improved last 24 hours, he is eager to complete his IV antibiotics and go home with p.o. Antibiotics    Review of Systems:  Review of Systems   Constitutional: Negative for chills, fever and malaise/fatigue.   Respiratory: Negative for cough, hemoptysis, sputum production and shortness of breath.    Cardiovascular: Negative for leg swelling.   Gastrointestinal: Negative  for abdominal pain, diarrhea, nausea and vomiting.   Genitourinary: Negative for dysuria.   Musculoskeletal: Negative for joint pain and myalgias.   Neurological: Negative for dizziness and headaches.   All other systems reviewed and are negative.      Hemodynamics:  T 98 P91BP 107/69RR 16SaO2 95%      Physical Exam:  Physical Exam   Constitutional: He is oriented to person, place, and time. He appears well-developed. No distress.   Older than stated age   HENT:   Head: Normocephalic and atraumatic.   Mouth/Throat: No oropharyngeal exudate.   Eyes: Pupils are equal, round, and reactive to light. EOM are normal.   Neck: Neck supple.   Cardiovascular: Normal rate.    Pulmonary/Chest: Effort normal. No respiratory distress. He has no wheezes. He has no rales.   Abdominal: Soft. He exhibits no distension. There is no tenderness. There is no rebound.   Musculoskeletal: He exhibits no edema.   Right foot with no erythema, edema, or drainage.  Evidence of right second toe amputation with surgical site edges well approximated, sutures removed. Healed     Lymphadenopathy:     He has no cervical adenopathy.   Neurological: He is alert and oriented to person, place, and time.   Skin: Skin is warm. No erythema.   Psychiatric: He has a normal mood and affect. His behavior is normal.   Pleasant   Vitals reviewed.        Labs:  No results for input(s): WBC, RBC, HEMOGLOBIN, HEMATOCRIT, MCV, MCH, RDW, PLATELETCT, MPV, NEUTSPOLYS, LYMPHOCYTES, MONOCYTES, EOSINOPHILS, BASOPHILS, RBCMORPHOLO in the last 72 hours.  No results for input(s): SODIUM, POTASSIUM, CHLORIDE, CO2, GLUCOSE, BUN, CPKTOTAL in the last 72 hours.  No results for input(s): ALBUMIN, TBILIRUBIN, ALKPHOSPHAT, TOTPROTEIN, ALTSGPT, ASTSGOT, CREATININE in the last 72 hours.    Imaging:  None new to review             Micro:  Results     ** No results found for the last 168 hours. **      Assessment:  OM, right toe  MSSA  Diabetes    Plan:  Right foot osteomyelitis    Afebrile  Leukocytosis persistent at last check-decreased  s/p right second toe amputation through metatarsophalangeal joint and I&D on 1/12/19 by Dr. Reddy  OR culture +MSSA and peptostrep  Continue IV Ancef 2 g every 8 hours  Stop date of 2/23/2019  Transition to p.o. Augmentin on 2/9 to complete last 2 weeks of antibiotic course  Continue wound care     Leukocytosis, persistent  Monitor  On antibiotics above  Pulm toilet  OOB as tolerated     Poorly controlled type 2 diabetes mellitus  Hemoglobin A1c 10.5  Likely contributing to infection and delayed wound  Keep BS under 150 to help control current infection    Discussed with Dr. Melara.  ID signing off.  Please reconsult if needed

## 2019-02-04 NOTE — [facility]
Hospital Medicine Progress Note, Adult, Complex               Author: Noemi JAQUEZ [place] Date & Time created: 2/4/2019  8:32 AM     CC: 2nd toe OM, amputation    Interval History:  Improved sugars with insulin adjustments  No concerns  No SE from abx reported    Review of Systems:  Review of Systems   Constitutional: Negative for fever.   HENT: Negative for sore throat.    Respiratory: Negative for cough and shortness of breath.    Cardiovascular: Negative for chest pain.   Gastrointestinal: Negative for abdominal pain, constipation, diarrhea, nausea and vomiting.   Genitourinary: Negative for dysuria.   Musculoskeletal: Negative for joint pain.       T 98 P91BP 107/69RR 16SaO2 95% I/O2.7/brp BM 2/3  Physical Exam   Constitutional: He is oriented to person, place, and time. He appears well-developed. No distress.   HENT:   Head: Normocephalic and atraumatic.   Mouth/Throat: No oropharyngeal exudate.   Eyes: Conjunctivae are normal. Right eye exhibits no discharge. Left eye exhibits no discharge. No scleral icterus.   Neck: Neck supple. No tracheal deviation present.   Cardiovascular: Normal rate and regular rhythm.    Pulmonary/Chest: Effort normal and breath sounds normal. No respiratory distress. He exhibits no tenderness.   Abdominal: Soft. Bowel sounds are normal. He exhibits no distension. There is no tenderness.   Musculoskeletal: He exhibits no edema.   Neurological: He is alert and oriented to person, place, and time.   Skin: Skin is warm.   Healed 2nd toe amputation   Vitals reviewed.      Labs:          No results for input(s): SODIUM, POTASSIUM, CHLORIDE, CO2, BUN, CREATININE, MAGNESIUM, PHOSPHORUS, CALCIUM in the last 72 hours.  No results for input(s): ALTSGPT, ASTSGOT, ALKPHOSPHAT, TBILIRUBIN, DBILIRUBIN, GAMMAGT, AMYLASE, LIPASE, ALB, PREALBUMIN, GLUCOSE in the last 72 hours.  No results for input(s): RBC, HEMOGLOBIN, HEMATOCRIT, PLATELETCT, PROTHROMBTM, APTT, INR, IRON, FERRITIN, TOTIRONBC in the last  72 hours.           GI/Nutrition:  Orders Placed This Encounter   Procedures   • Diet Order Diabetic (NCS,HS snacks)     Standing Status:   Standing     Number of Occurrences:   1     Order Specific Question:   Diet:     Answer:   Diabetic [3]     Comments:   NCS,HS snacks     Medical Decision Making, by Problem:  R 2nd toe OM s/p amputation 1/12 (Maureen) ([facility])    -Ancef 2/8   -ambulation with heel shoe    -Augmentin 2/9 for 2 weeks  DM1, adult onset (per endo in [place] area)   -brittle   -continue levemir at current dosing   -meal aspart to 12, SSI with meals   -outpatient endo appointment made 2/19   -improved last 24 hours  Leukocytosis, persistent   -non toxic   -remains elevated   -follow up in am  S/p splenectomy  Depression  H/o chronic pancreatitis   -creon with meals  HTN   -zestril  Memory loss   -namenda  BPH   -flomax  HLD   -lipitor  Tobacco use   -nicotine patch  Nutrition   -po    Am labs  Quality-Core Measures   Reviewed items::  Medications reviewed  Dill catheter::  No Dill  DVT prophylaxis pharmacological::  Enoxaparin (Lovenox)  Antibiotics:  Treating active infection/contamination beyond 24 hours perioperative coverage

## 2019-02-05 LAB
ALBUMIN SERPL BCP-MCNC: 3.3 G/DL (ref 3.2–4.9)
ALBUMIN/GLOB SERPL: 1.1 G/DL
ALP SERPL-CCNC: 92 U/L (ref 30–99)
ALT SERPL-CCNC: 9 U/L (ref 2–50)
ANION GAP SERPL CALC-SCNC: 10 MMOL/L (ref 0–11.9)
AST SERPL-CCNC: 26 U/L (ref 12–45)
BILIRUB SERPL-MCNC: 0.2 MG/DL (ref 0.1–1.5)
BUN SERPL-MCNC: 35 MG/DL (ref 8–22)
CALCIUM SERPL-MCNC: 9 MG/DL (ref 8.4–10.2)
CHLORIDE SERPL-SCNC: 107 MMOL/L (ref 96–112)
CO2 SERPL-SCNC: 16 MMOL/L (ref 20–33)
CREAT SERPL-MCNC: 1.12 MG/DL (ref 0.5–1.4)
ERYTHROCYTE [DISTWIDTH] IN BLOOD BY AUTOMATED COUNT: 51.3 FL (ref 35.9–50)
ERYTHROCYTE [SEDIMENTATION RATE] IN BLOOD BY WESTERGREN METHOD: 15 MM/HOUR (ref 0–20)
GLOBULIN SER CALC-MCNC: 2.9 G/DL (ref 1.9–3.5)
GLUCOSE BLD-MCNC: 149 MG/DL (ref 65–99)
GLUCOSE BLD-MCNC: 204 MG/DL (ref 65–99)
GLUCOSE BLD-MCNC: 225 MG/DL (ref 65–99)
GLUCOSE BLD-MCNC: 51 MG/DL (ref 65–99)
GLUCOSE BLD-MCNC: 70 MG/DL (ref 65–99)
GLUCOSE BLD-MCNC: 81 MG/DL (ref 65–99)
GLUCOSE BLD-MCNC: 99 MG/DL (ref 65–99)
GLUCOSE SERPL-MCNC: 185 MG/DL (ref 65–99)
HCT VFR BLD AUTO: 38.4 % (ref 42–52)
HGB BLD-MCNC: 12.3 G/DL (ref 14–18)
MCH RBC QN AUTO: 32.7 PG (ref 27–33)
MCHC RBC AUTO-ENTMCNC: 32 G/DL (ref 33.7–35.3)
MCV RBC AUTO: 102.1 FL (ref 81.4–97.8)
PLATELET # BLD AUTO: 325 K/UL (ref 164–446)
PMV BLD AUTO: 10 FL (ref 9–12.9)
POTASSIUM SERPL-SCNC: 4.8 MMOL/L (ref 3.6–5.5)
PROT SERPL-MCNC: 6.2 G/DL (ref 6–8.2)
RBC # BLD AUTO: 3.76 M/UL (ref 4.7–6.1)
SODIUM SERPL-SCNC: 133 MMOL/L (ref 135–145)
WBC # BLD AUTO: 13.8 K/UL (ref 4.8–10.8)

## 2019-02-05 PROCEDURE — 85027 COMPLETE CBC AUTOMATED: CPT

## 2019-02-05 PROCEDURE — 80053 COMPREHEN METABOLIC PANEL: CPT

## 2019-02-05 PROCEDURE — 85652 RBC SED RATE AUTOMATED: CPT

## 2019-02-05 PROCEDURE — 82962 GLUCOSE BLOOD TEST: CPT | Mod: 91

## 2019-02-05 ASSESSMENT — ENCOUNTER SYMPTOMS
DIAPHORESIS: 0
FEVER: 0
ABDOMINAL PAIN: 0
CONSTIPATION: 0
HEARTBURN: 0
HEADACHES: 0
NAUSEA: 0
SHORTNESS OF BREATH: 0
WHEEZING: 0
SORE THROAT: 0

## 2019-02-06 LAB
GLUCOSE BLD-MCNC: 224 MG/DL (ref 65–99)
GLUCOSE BLD-MCNC: 317 MG/DL (ref 65–99)

## 2019-02-06 PROCEDURE — 82962 GLUCOSE BLOOD TEST: CPT

## 2019-02-06 ASSESSMENT — ENCOUNTER SYMPTOMS
PALPITATIONS: 0
HEADACHES: 0
SPUTUM PRODUCTION: 0
CONSTIPATION: 0
SORE THROAT: 0
HEARTBURN: 0
ABDOMINAL PAIN: 0
SHORTNESS OF BREATH: 0
FEVER: 0

## 2019-02-06 NOTE — [facility]
Hospital Medicine Progress Note, Adult, Complex               Author: Betsey Gentile Date & Time created: 2/5/2019  4:06 PM     CC: right second toe osteomyelitis    Interval History:  The patient has poorly controlled type I diabetes mellitus and presented with right second toe osteomyelitis requiring amputation.  Blood sugars better controlled on long acting insulin bid and sliding scale and meal scheduled insulin    Review of Systems:  Review of Systems   Constitutional: Negative for diaphoresis and fever.   HENT: Negative for congestion and sore throat.    Respiratory: Negative for shortness of breath and wheezing.    Cardiovascular: Negative for chest pain and leg swelling.   Gastrointestinal: Negative for abdominal pain, constipation, heartburn and nausea.   Genitourinary: Negative for dysuria and urgency.   Skin: Negative for rash.   Neurological: Negative for headaches.       Physical Exam:  Physical Exam   Constitutional: He is oriented to person, place, and time. No distress.   HENT:   Mouth/Throat: Oropharynx is clear and moist.   Eyes: Conjunctivae are normal. No scleral icterus.   Neck: Neck supple. No tracheal deviation present.   Cardiovascular: Normal rate.    No murmur heard.  Pulmonary/Chest: Effort normal and breath sounds normal. No respiratory distress.   Abdominal: Soft. He exhibits no distension.   Musculoskeletal: He exhibits no edema.   Foot well healed   Neurological: He is alert and oriented to person, place, and time. Coordination normal.   Skin: Skin is warm. No rash noted. He is not diaphoretic.   Psychiatric: His behavior is normal.   Nursing note and vitals reviewed.      Labs:          Recent Labs      02/05/19   0200   SODIUM  133*   POTASSIUM  4.8   CHLORIDE  107   CO2  16*   BUN  35*   CREATININE  1.12   CALCIUM  9.0     Recent Labs      02/05/19   0200   ALTSGPT  9   ASTSGOT  26   ALKPHOSPHAT  92   TBILIRUBIN  0.2   GLUCOSE  185*     Recent Labs      02/05/19   0200   RBC  3.76*    HEMOGLOBIN  12.3*   HEMATOCRIT  38.4*   PLATELETCT  325     Recent Labs      02/05/19   0200   WBC  13.8*   ASTSGOT  26   ALTSGPT  9   ALKPHOSPHAT  92   TBILIRUBIN  0.2           Hemodynamics:   T99.8 /61 HR87 RR20 O2sat 98%     GI/Nutrition:  Orders Placed This Encounter   Procedures   • Diet Order Diabetic (NCS,HS snacks)     Standing Status:   Standing     Number of Occurrences:   1     Order Specific Question:   Diet:     Answer:   Diabetic [3]     Comments:   NCS,HS snacks     Medical Decision Making, by Problem:  Right second toe osteomyelitis s/p amputation 1/12 with Dr. Reddy and MSSA in culture               Ancef through 2/23             ambulation with heel shoe             Wound healed    Adult onset type I diabetes mellitus, brittle             monitor blood sugars   Will need to get good control off sliding scale insulin as cannot do sliding scale at his care facility                S/p splenectomy  Depression   H/o chronic pancreatitis              creon with meals  HTN, zestril  Memory loss, namenda  BPH, flomax  Dyslipidemia, statin  Tobacco dependence, nicotine patch        Quality-Core Measures   Reviewed items::  Labs reviewed and Medications reviewed  Dill catheter::  No Dill  Ulcer Prophylaxis::  Yes  Antibiotics:  Treating active infection/contamination beyond 24 hours perioperative coverage  Assessed for rehabilitation services:  Patient was assess for and/or received rehabilitation services during this hospitalization

## 2019-02-07 LAB
GLUCOSE BLD-MCNC: 109 MG/DL (ref 65–99)
GLUCOSE BLD-MCNC: 147 MG/DL (ref 65–99)
GLUCOSE BLD-MCNC: 159 MG/DL (ref 65–99)
GLUCOSE BLD-MCNC: 159 MG/DL (ref 65–99)
GLUCOSE BLD-MCNC: 164 MG/DL (ref 65–99)
GLUCOSE BLD-MCNC: 211 MG/DL (ref 65–99)
GLUCOSE BLD-MCNC: 92 MG/DL (ref 65–99)

## 2019-02-07 PROCEDURE — 82962 GLUCOSE BLOOD TEST: CPT

## 2019-02-07 ASSESSMENT — ENCOUNTER SYMPTOMS
SORE THROAT: 0
COUGH: 0
FEVER: 0
ABDOMINAL PAIN: 0
SHORTNESS OF BREATH: 0
HEADACHES: 0
CHILLS: 0
HEARTBURN: 0
CONSTIPATION: 0

## 2019-02-07 NOTE — [facility]
Hospital Medicine Progress Note, Adult, Complex               Author: Betsey Gentile Date & Time created: 2/7/2019  2:24 PM     CC: right second toe osteomyelitis    Interval History:  The patient has poorly controlled type I diabetes mellitus and presented with right second toe osteomyelitis requiring amputation.  The patient has blood sugars in acceptable range on scheduled insulin    Review of Systems:  Review of Systems   Constitutional: Negative for chills and fever.   HENT: Negative for congestion and sore throat.    Respiratory: Negative for cough and shortness of breath.    Cardiovascular: Negative for chest pain and leg swelling.   Gastrointestinal: Negative for abdominal pain, constipation and heartburn.   Genitourinary: Negative for dysuria and frequency.   Skin: Negative for itching.   Neurological: Negative for headaches.       Physical Exam:  Physical Exam   Constitutional: He is oriented to person, place, and time. No distress.   HENT:   Mouth/Throat: Oropharynx is clear and moist.   Eyes: Conjunctivae are normal. No scleral icterus.   Neck: No JVD present. No tracheal deviation present.   Cardiovascular: Normal rate.    No murmur heard.  Pulmonary/Chest: Effort normal and breath sounds normal. No stridor.   Abdominal: Soft. He exhibits no distension. There is no tenderness.   Musculoskeletal: He exhibits no edema.   Foot well healed with small scab between first and third toes   Neurological: He is alert and oriented to person, place, and time. Coordination normal.   Skin: Skin is dry. No rash noted. No erythema.   Psychiatric: His behavior is normal.   Nursing note and vitals reviewed.      Labs:          Recent Labs      02/05/19   0200   SODIUM  133*   POTASSIUM  4.8   CHLORIDE  107   CO2  16*   BUN  35*   CREATININE  1.12   CALCIUM  9.0     Recent Labs      02/05/19   0200   ALTSGPT  9   ASTSGOT  26   ALKPHOSPHAT  92   TBILIRUBIN  0.2   GLUCOSE  185*     Recent Labs      02/05/19   0200   RBC   3.76*   HEMOGLOBIN  12.3*   HEMATOCRIT  38.4*   PLATELETCT  325     Recent Labs      02/05/19   0200   WBC  13.8*   ASTSGOT  26   ALTSGPT  9   ALKPHOSPHAT  92   TBILIRUBIN  0.2           Hemodynamics:   T97.6 /56 HR89 RR18 O2sat 93%     GI/Nutrition:  Orders Placed This Encounter   Procedures   • Diet Order Diabetic (NCS,HS snacks)     Standing Status:   Standing     Number of Occurrences:   1     Order Specific Question:   Diet:     Answer:   Diabetic [3]     Comments:   NCS,HS snacks     Medical Decision Making, by Problem:  Right second toe osteomyelitis s/p amputation 1/12 with Dr. Reddy and MSSA in culture               Ancef through 2/8 and then po antibiotics through 2/23 per ID             ambulation with heel shoe             Wound healed     Adult onset type I diabetes mellitus, brittle             continue night long acting insulin and meal time insulin                S/p splenectomy  Depression   H/o chronic pancreatitis              creon with meals  HTN, zestril  Memory loss, namenda  BPH, flomax  Dyslipidemia, statin  Tobacco dependence, nicotine patch        Quality-Core Measures   Reviewed items::  Labs reviewed and Medications reviewed  Dill catheter::  No Dill  Ulcer Prophylaxis::  Yes  Antibiotics:  Treating active infection/contamination beyond 24 hours perioperative coverage  Assessed for rehabilitation services:  Patient was assess for and/or received rehabilitation services during this hospitalization

## 2019-02-07 NOTE — [facility]
Hospital Medicine Progress Note, Adult, Complex               Author: Betsey Gentile Date & Time created: 2/6/2019  4:05 PM     CC: right second toe osteomyelitis    Interval History:  The patient has poorly controlled type I diabetes mellitus and presented with right second toe osteomyelitis requiring amputation.  The patient had hypoglycemia last night    Review of Systems:  Review of Systems   Constitutional: Negative for fever.   HENT: Negative for congestion and sore throat.    Respiratory: Negative for sputum production and shortness of breath.    Cardiovascular: Negative for chest pain and palpitations.   Gastrointestinal: Negative for abdominal pain, constipation and heartburn.   Genitourinary: Negative for dysuria, frequency and urgency.   Skin: Negative for itching and rash.   Neurological: Negative for headaches.       Physical Exam:  Physical Exam   Constitutional: He is oriented to person, place, and time. No distress.   HENT:   Mouth/Throat: Oropharynx is clear and moist. No oropharyngeal exudate.   Eyes: No scleral icterus.   Neck: No JVD present. No tracheal deviation present.   Cardiovascular: Normal rate.    No murmur heard.  Pulmonary/Chest: Effort normal and breath sounds normal. No stridor. No respiratory distress.   Abdominal: Soft. He exhibits no distension.   Musculoskeletal: He exhibits no edema.   Foot well healed   Neurological: He is alert and oriented to person, place, and time. Coordination normal.   Skin: Skin is warm and dry.   Psychiatric: His behavior is normal.   Nursing note and vitals reviewed.      Labs:          Recent Labs      02/05/19   0200   SODIUM  133*   POTASSIUM  4.8   CHLORIDE  107   CO2  16*   BUN  35*   CREATININE  1.12   CALCIUM  9.0     Recent Labs      02/05/19   0200   ALTSGPT  9   ASTSGOT  26   ALKPHOSPHAT  92   TBILIRUBIN  0.2   GLUCOSE  185*     Recent Labs      02/05/19   0200   RBC  3.76*   HEMOGLOBIN  12.3*   HEMATOCRIT  38.4*   PLATELETCT  325     Recent  Labs      02/05/19   0200   WBC  13.8*   ASTSGOT  26   ALTSGPT  9   ALKPHOSPHAT  92   TBILIRUBIN  0.2           Hemodynamics:   T97.9 /68 HR98 RR18 O2sat 97%     GI/Nutrition:  Orders Placed This Encounter   Procedures   • Diet Order Diabetic (NCS,HS snacks)     Standing Status:   Standing     Number of Occurrences:   1     Order Specific Question:   Diet:     Answer:   Diabetic [3]     Comments:   NCS,HS snacks     Medical Decision Making, by Problem:  Right second toe osteomyelitis s/p amputation 1/12 with Dr. Reddy and MSSA in culture               Ancef through 2/8 and then po antibiotics through 2/23             ambulation with heel shoe             Wound healed no longer needing dressings    Adult onset type I diabetes mellitus, brittle             stop sliding scale insulin, increase meal time insulin, stop am long acting insulin                S/p splenectomy  Depression   H/o chronic pancreatitis              creon with meals  HTN, zestril  Memory loss, namenda  BPH, flomax  Dyslipidemia, statin  Tobacco dependence, nicotine patch        Quality-Core Measures   Reviewed items::  Labs reviewed and Medications reviewed  Dill catheter::  No Dill  Ulcer Prophylaxis::  Yes  Antibiotics:  Treating active infection/contamination beyond 24 hours perioperative coverage  Assessed for rehabilitation services:  Patient was assess for and/or received rehabilitation services during this hospitalization

## 2019-02-08 LAB
GLUCOSE BLD-MCNC: 183 MG/DL (ref 65–99)
GLUCOSE BLD-MCNC: 300 MG/DL (ref 65–99)
GLUCOSE BLD-MCNC: 57 MG/DL (ref 65–99)
GLUCOSE BLD-MCNC: 86 MG/DL (ref 65–99)

## 2019-02-08 PROCEDURE — 82962 GLUCOSE BLOOD TEST: CPT | Mod: 91

## 2019-02-08 ASSESSMENT — ENCOUNTER SYMPTOMS
FEVER: 0
SHORTNESS OF BREATH: 0
ABDOMINAL PAIN: 0
PALPITATIONS: 0
DIAPHORESIS: 0
VOMITING: 0
HEARTBURN: 0
CONSTIPATION: 0
SORE THROAT: 0
HEADACHES: 0
WHEEZING: 0

## 2019-02-08 NOTE — [facility]
Hospital Medicine Progress Note, Adult, Complex               Author: Betsey Gentile Date & Time created: 2/8/2019  10:43 AM     CC: right second toe osteomyelitis    Interval History:  The patient has poorly controlled type I diabetes mellitus and presented with right second toe osteomyelitis requiring amputation.  He is eating well and denies any pain or diarrhea    Review of Systems:  Review of Systems   Constitutional: Negative for diaphoresis and fever.   HENT: Negative for congestion and sore throat.    Respiratory: Negative for shortness of breath and wheezing.    Cardiovascular: Negative for chest pain, palpitations and leg swelling.   Gastrointestinal: Negative for abdominal pain, constipation, heartburn and vomiting.   Genitourinary: Negative for dysuria and urgency.   Skin: Negative for rash.   Neurological: Negative for headaches.       Physical Exam:  Physical Exam   Constitutional: He is oriented to person, place, and time. No distress.   HENT:   Mouth/Throat: Oropharynx is clear and moist. No oropharyngeal exudate.   Eyes: No scleral icterus.   Neck: No JVD present. No tracheal deviation present.   Cardiovascular: Normal rate.    No murmur heard.  Pulmonary/Chest: Effort normal and breath sounds normal. No stridor. He has no wheezes. He has no rales.   Abdominal: Soft. Bowel sounds are normal. He exhibits no distension.   Musculoskeletal: He exhibits no edema.   Foot well healed with small scab between first and third toes   Neurological: He is alert and oriented to person, place, and time. Coordination normal.   Skin: Skin is warm and dry. No rash noted. No erythema.   Psychiatric: His behavior is normal.   Nursing note and vitals reviewed.      Labs:          No results for input(s): SODIUM, POTASSIUM, CHLORIDE, CO2, BUN, CREATININE, MAGNESIUM, PHOSPHORUS, CALCIUM in the last 72 hours.  No results for input(s): ALTSGPT, ASTSGOT, ALKPHOSPHAT, TBILIRUBIN, DBILIRUBIN, GAMMAGT, AMYLASE, LIPASE, ALB,  PREALBUMIN, GLUCOSE in the last 72 hours.  No results for input(s): RBC, HEMOGLOBIN, HEMATOCRIT, PLATELETCT, PROTHROMBTM, APTT, INR, IRON, FERRITIN, TOTIRONBC in the last 72 hours.            Hemodynamics:   T97.3 /71 HR95 RR19 O2sat 95%     GI/Nutrition:  Orders Placed This Encounter   Procedures   • Diet Order Diabetic (NCS,HS snacks)     Standing Status:   Standing     Number of Occurrences:   1     Order Specific Question:   Diet:     Answer:   Diabetic [3]     Comments:   NCS,HS snacks     Medical Decision Making, by Problem:  Right second toe osteomyelitis s/p amputation 1/12 with Dr. Reddy and MSSA in culture               Ancef through 2/8 and then po antibiotics through 2/23 per ID             ambulation with heel shoe    Adult onset type I diabetes mellitus, brittle             continue secheduled insulin   Outpatient follow up with endocrinology set up                S/p splenectomy  Depression   H/o chronic pancreatitis              creon with meals  HTN, zestril  Memory loss, namenda  BPH, flomax  Dyslipidemia, statin  Tobacco dependence, nicotine patch        Quality-Core Measures   Reviewed items::  Labs reviewed and Medications reviewed  Dill catheter::  No Dill  Ulcer Prophylaxis::  Yes  Antibiotics:  Treating active infection/contamination beyond 24 hours perioperative coverage  Assessed for rehabilitation services:  Patient was assess for and/or received rehabilitation services during this hospitalization

## 2019-02-09 LAB — GLUCOSE BLD-MCNC: 216 MG/DL (ref 65–99)

## 2019-02-09 PROCEDURE — 82962 GLUCOSE BLOOD TEST: CPT

## 2019-02-09 ASSESSMENT — ENCOUNTER SYMPTOMS
CONSTIPATION: 0
CHILLS: 0
SORE THROAT: 0
COUGH: 0
HEARTBURN: 0
SHORTNESS OF BREATH: 0
VOMITING: 0
ABDOMINAL PAIN: 0
HEADACHES: 0
MYALGIAS: 0
FEVER: 0

## 2019-02-09 NOTE — [facility]
Hospital Medicine Progress Note, Adult, Complex               Author: Betsey Gentile Date & Time created: 2/9/2019  1:50 PM     CC: right second toe osteomyelitis    Interval History:  The patient has poorly controlled type I diabetes mellitus and presented with right second toe osteomyelitis requiring amputation.  He had low blood sugar of 57 midday 2/8    Review of Systems:  Review of Systems   Constitutional: Negative for chills and fever.   HENT: Negative for sore throat.    Respiratory: Negative for cough and shortness of breath.    Cardiovascular: Negative for chest pain and leg swelling.   Gastrointestinal: Negative for abdominal pain, constipation, heartburn and vomiting.   Genitourinary: Negative for dysuria.   Musculoskeletal: Negative for myalgias.   Skin: Negative for itching and rash.   Neurological: Negative for headaches.       Physical Exam:  Physical Exam   Constitutional: He is oriented to person, place, and time. No distress.   HENT:   Mouth/Throat: No oropharyngeal exudate.   Eyes: Conjunctivae are normal. No scleral icterus.   Neck: No JVD present. No tracheal deviation present.   Cardiovascular: Normal rate.    No murmur heard.  Pulmonary/Chest: Effort normal. No stridor. No respiratory distress. He has no wheezes. He has no rales.   Abdominal: Soft. Bowel sounds are normal. He exhibits no distension.   Musculoskeletal: He exhibits no edema.   Foot well healed with small scab between first and third toes   Neurological: He is alert and oriented to person, place, and time.   Skin: Skin is warm. No rash noted. He is not diaphoretic.   Psychiatric: His behavior is normal.   Nursing note and vitals reviewed.      Labs:          No results for input(s): SODIUM, POTASSIUM, CHLORIDE, CO2, BUN, CREATININE, MAGNESIUM, PHOSPHORUS, CALCIUM in the last 72 hours.  No results for input(s): ALTSGPT, ASTSGOT, ALKPHOSPHAT, TBILIRUBIN, DBILIRUBIN, GAMMAGT, AMYLASE, LIPASE, ALB, PREALBUMIN, GLUCOSE in the last 72  hours.  No results for input(s): RBC, HEMOGLOBIN, HEMATOCRIT, PLATELETCT, PROTHROMBTM, APTT, INR, IRON, FERRITIN, TOTIRONBC in the last 72 hours.            Hemodynamics:   T97.2 /63 HR75 RR19 O2sat 92%     GI/Nutrition:  Orders Placed This Encounter   Procedures   • Diet Order Diabetic (NCS,HS snacks)     Standing Status:   Standing     Number of Occurrences:   1     Order Specific Question:   Diet:     Answer:   Diabetic [3]     Comments:   NCS,HS snacks     Medical Decision Making, by Problem:  Right second toe osteomyelitis s/p amputation 1/12 with Dr. Reddy and MSSA in culture               Ancef to finish today then start po augmentin through 2/23             ambulation with heel shoe    Adult onset type I diabetes mellitus, brittle             continue secheduled insulin   Outpatient follow up with endocrinology as scheduled                S/p splenectomy  Depression   H/o chronic pancreatitis              creon with meals  HTN, zestril  Memory loss, namenda  BPH, flomax  Dyslipidemia, statin  Tobacco dependence, nicotine patch        Quality-Core Measures   Reviewed items::  Labs reviewed and Medications reviewed  Dill catheter::  No Dill  Ulcer Prophylaxis::  Yes  Antibiotics:  Treating active infection/contamination beyond 24 hours perioperative coverage  Assessed for rehabilitation services:  Patient was assess for and/or received rehabilitation services during this hospitalization

## 2019-02-10 LAB
GLUCOSE BLD-MCNC: 115 MG/DL (ref 65–99)
GLUCOSE BLD-MCNC: 149 MG/DL (ref 65–99)
GLUCOSE BLD-MCNC: 162 MG/DL (ref 65–99)
GLUCOSE BLD-MCNC: 175 MG/DL (ref 65–99)
GLUCOSE BLD-MCNC: 177 MG/DL (ref 65–99)
GLUCOSE BLD-MCNC: 269 MG/DL (ref 65–99)
GLUCOSE BLD-MCNC: 301 MG/DL (ref 65–99)

## 2019-02-10 PROCEDURE — 82962 GLUCOSE BLOOD TEST: CPT | Mod: 91

## 2019-02-10 ASSESSMENT — ENCOUNTER SYMPTOMS
DIAPHORESIS: 0
PALPITATIONS: 0
DIARRHEA: 0
CONSTIPATION: 0
COUGH: 0
SORE THROAT: 0
FEVER: 0
NAUSEA: 0
HEADACHES: 0
SHORTNESS OF BREATH: 0
HEARTBURN: 0
MYALGIAS: 0

## 2019-02-10 NOTE — [facility]
Hospital Medicine Progress Note, Adult, Complex               Author: Betsey Gentile Date & Time created: 2/10/2019  10:06 AM     CC: right second toe osteomyelitis    Interval History:  The patient has poorly controlled type I diabetes mellitus and presented with right second toe osteomyelitis requiring amputation.  Blood sugars are consistently below 200 on scheduled insulin    Review of Systems:  Review of Systems   Constitutional: Negative for diaphoresis and fever.   HENT: Negative for sore throat.    Respiratory: Negative for cough and shortness of breath.    Cardiovascular: Negative for chest pain and palpitations.   Gastrointestinal: Negative for constipation, diarrhea, heartburn and nausea.   Genitourinary: Negative for dysuria and urgency.   Musculoskeletal: Negative for myalgias.   Skin: Negative for itching.   Neurological: Negative for headaches.       Physical Exam:  Physical Exam   Constitutional: He is oriented to person, place, and time. No distress.   HENT:   Mouth/Throat: Oropharynx is clear and moist.   Eyes: No scleral icterus.   Neck: Neck supple. No tracheal deviation present.   Cardiovascular: Normal rate.    No murmur heard.  Pulmonary/Chest: Effort normal. No stridor. No respiratory distress. He has no wheezes. He has no rales.   Abdominal: Soft. He exhibits no distension.   Musculoskeletal: He exhibits no edema.   Foot well healed with small scab between first and third toes   Neurological: He is alert and oriented to person, place, and time.   Skin: Skin is warm and dry. No rash noted. He is not diaphoretic.   Psychiatric: His behavior is normal.   Nursing note and vitals reviewed.      Labs:          No results for input(s): SODIUM, POTASSIUM, CHLORIDE, CO2, BUN, CREATININE, MAGNESIUM, PHOSPHORUS, CALCIUM in the last 72 hours.  No results for input(s): ALTSGPT, ASTSGOT, ALKPHOSPHAT, TBILIRUBIN, DBILIRUBIN, GAMMAGT, AMYLASE, LIPASE, ALB, PREALBUMIN, GLUCOSE in the last 72 hours.  No  results for input(s): RBC, HEMOGLOBIN, HEMATOCRIT, PLATELETCT, PROTHROMBTM, APTT, INR, IRON, FERRITIN, TOTIRONBC in the last 72 hours.            Hemodynamics:   T98.1 /71 HR85 RR19 O2sat 94%     GI/Nutrition:  Orders Placed This Encounter   Procedures   • Diet Order Diabetic (NCS,HS snacks)     Standing Status:   Standing     Number of Occurrences:   1     Order Specific Question:   Diet:     Answer:   Diabetic [3]     Comments:   NCS,HS snacks     Medical Decision Making, by Problem:  Right second toe osteomyelitis s/p amputation 1/12 with Dr. Reddy and MSSA in culture               Ancef compoleted, continue po augmentin through 2/23             ambulation with heel shoe, patient moves independently    Adult onset type I diabetes mellitus, brittle             continue secheduled insulin   Outpatient follow up with endocrinology                 S/p splenectomy  Depression   H/o chronic pancreatitis              creon with meals  HTN, zestril  Memory loss, namenda  BPH, flomax  Dyslipidemia, statin  Tobacco dependence, nicotine patch        Quality-Core Measures   Reviewed items::  Labs reviewed and Medications reviewed  Dill catheter::  No Dill  Ulcer Prophylaxis::  Yes  Antibiotics:  Treating active infection/contamination beyond 24 hours perioperative coverage  Assessed for rehabilitation services:  Patient was assess for and/or received rehabilitation services during this hospitalization

## 2019-02-11 LAB
ANION GAP SERPL CALC-SCNC: 9 MMOL/L (ref 0–11.9)
BUN SERPL-MCNC: 31 MG/DL (ref 8–22)
CALCIUM SERPL-MCNC: 9.5 MG/DL (ref 8.4–10.2)
CHLORIDE SERPL-SCNC: 105 MMOL/L (ref 96–112)
CO2 SERPL-SCNC: 23 MMOL/L (ref 20–33)
CREAT SERPL-MCNC: 1.03 MG/DL (ref 0.5–1.4)
ERYTHROCYTE [DISTWIDTH] IN BLOOD BY AUTOMATED COUNT: 49.5 FL (ref 35.9–50)
GLUCOSE BLD-MCNC: 249 MG/DL (ref 65–99)
GLUCOSE BLD-MCNC: 88 MG/DL (ref 65–99)
GLUCOSE SERPL-MCNC: 234 MG/DL (ref 65–99)
HCT VFR BLD AUTO: 41 % (ref 42–52)
HGB BLD-MCNC: 13.2 G/DL (ref 14–18)
MCH RBC QN AUTO: 33.2 PG (ref 27–33)
MCHC RBC AUTO-ENTMCNC: 32.2 G/DL (ref 33.7–35.3)
MCV RBC AUTO: 103 FL (ref 81.4–97.8)
PLATELET # BLD AUTO: 359 K/UL (ref 164–446)
PMV BLD AUTO: 9.9 FL (ref 9–12.9)
POTASSIUM SERPL-SCNC: 5.2 MMOL/L (ref 3.6–5.5)
RBC # BLD AUTO: 3.98 M/UL (ref 4.7–6.1)
SODIUM SERPL-SCNC: 137 MMOL/L (ref 135–145)
WBC # BLD AUTO: 12.5 K/UL (ref 4.8–10.8)

## 2019-02-11 PROCEDURE — 80048 BASIC METABOLIC PNL TOTAL CA: CPT

## 2019-02-11 PROCEDURE — 85027 COMPLETE CBC AUTOMATED: CPT

## 2019-02-11 PROCEDURE — 82962 GLUCOSE BLOOD TEST: CPT | Mod: 91

## 2019-02-20 ENCOUNTER — PATIENT OUTREACH (OUTPATIENT)
Dept: HEALTH INFORMATION MANAGEMENT | Facility: OTHER | Age: 59
End: 2019-02-20

## 2019-02-20 NOTE — PROGRESS NOTES
SBAR Documentation: Situation, Background, Assessment, Recommendation     Situation    DM management and follow up after being discharged from [facility]   Background       Uncontrolled Type 2 DM I with hyperosmolarity without coma, history of TBI with mild cognitive impairment, Diabetic peripheral angiopathy, HTN, Recent GI bleed, Memory loss, chronic pancreatitis, amputation of right 2nd toe   Assessment    Outbound call to Pawel and left voice mail message with contact number to return call to CC RN to discuss enrollment in the Community Care Management program.   Recommendation Plan:  CC RN to schedule for telephone call next week to discuss enrollment in the Community Care Management program.

## 2019-02-26 ENCOUNTER — PATIENT OUTREACH (OUTPATIENT)
Dept: HEALTH INFORMATION MANAGEMENT | Facility: OTHER | Age: 59
End: 2019-02-26

## 2019-02-26 NOTE — PROGRESS NOTES
SBAR Documentation: Situation, Background, Assessment, Recommendation     Situation    DM management and follow up after being discharged from [facility]    Background       Uncontrolled Type 2 DM I with hyperosmolarity without coma, history of TBI with mild cognitive impairment, Diabetic peripheral angiopathy, HTN, Recent GI bleed, Memory loss, chronic pancreatitis, amputation of right 2nd toe   Assessment    Outbound call to Pawel to inform of the Community Care Management program.  Pawel states his sister is currently out of town and is not available to talk.  Pawel states he has 2 private nurses that take him to his appointments and check on him.  He states he performs blood glucose monitoring and gives himself his insulin with the assist of the caregivers at [facility] where he resides.     Recommendation Due to Marks history of memory loss, CC RN will plan to schedule for telephone call next week to obtain permission to speak with his sister to  discuss enrollment in the Community Care Management program.

## 2019-03-05 ENCOUNTER — PATIENT OUTREACH (OUTPATIENT)
Dept: HEALTH INFORMATION MANAGEMENT | Facility: OTHER | Age: 59
End: 2019-03-05

## 2019-03-05 NOTE — PROGRESS NOTES
SBAR Documentation: Situation, Background, Assessment, Recommendation     Situation    DM management   Background       Uncontrolled Type 2 DM I with hyperosmolarity without coma, history of TBI with mild cognitive impairment, Diabetic peripheral angiopathy, HTN, Recent GI bleed, Memory loss, chronic pancreatitis, amputation of right 2nd toe   Assessment    Outbound call to Pawel and left voice mail message with contact number to return call to CC RN to discuss enrollment in the Community Care Management program.   Outbound  Call to Pawel's sister, Aniyah, and explained the Community Care Management program.  Pawel lives in a group home and she states she has hired a RN to come daily and assist her brother with performing his blood glucose level and then administering his insulin.  She states this has kept Pawel out of the hospital for the past few weeks.  Aniyah would like to have [facility] RN call her brother daily to remind him to take his insulin, and [facility] RN explained the program is intended to help patient's learn to manage their chronic medical condition at home.  CCM RN explained that Pawel would need to answer his telephone every day and that CCM RN cannot assist him with trouble shooting his glucometer or insulin pen.  Aniyah states she wants to speak with her brother to see if he even remembers that CCM RN called him last week and that will help her to determine if Pawel is capable of participating in the Community Care Management program.   Recommendation [facility] RN provided Aniyah with contact information to call CCM RN to discuss the Community Care Management program.

## 2019-04-17 ENCOUNTER — PATIENT OUTREACH (OUTPATIENT)
Dept: HEALTH INFORMATION MANAGEMENT | Facility: OTHER | Age: 59
End: 2019-04-17

## 2019-04-17 NOTE — PROGRESS NOTES
SBAR Documentation: Situation, Background, Assessment, Recommendation     Situation    DM management   Background       Uncontrolled Type 2 DM I with hyperosmolarity without coma, history of TBI with mild cognitive impairment, Diabetic peripheral angiopathy, HTN, Recent GI bleed, Memory loss, chronic pancreatitis, amputation of right 2nd toe   Assessment    CCM RN has not heard back from patient's sister, Aniyah. At this time patient resides in a group home and has a private RN who assists with insulin administration.  Due to patients cognitive impairment due to TBI, CCM RN will not enroll patient at this time.

## 2019-05-09 ENCOUNTER — HOSPITAL ENCOUNTER (OUTPATIENT)
Dept: LAB | Facility: MEDICAL CENTER | Age: 59
End: 2019-05-09
Attending: INTERNAL MEDICINE
Payer: COMMERCIAL

## 2019-05-09 PROCEDURE — 36415 COLL VENOUS BLD VENIPUNCTURE: CPT

## 2019-05-09 PROCEDURE — 84681 ASSAY OF C-PEPTIDE: CPT

## 2019-05-11 LAB — C PEPTIDE SERPL-MCNC: 1 NG/ML (ref 0.8–3.5)

## 2019-05-15 ENCOUNTER — OFFICE VISIT (OUTPATIENT)
Dept: NEUROLOGY | Facility: MEDICAL CENTER | Age: 59
End: 2019-05-15
Payer: COMMERCIAL

## 2019-05-15 VITALS
WEIGHT: 165.4 LBS | TEMPERATURE: 97.8 F | OXYGEN SATURATION: 95 % | HEIGHT: 72 IN | HEART RATE: 109 BPM | SYSTOLIC BLOOD PRESSURE: 140 MMHG | BODY MASS INDEX: 22.4 KG/M2 | DIASTOLIC BLOOD PRESSURE: 70 MMHG | RESPIRATION RATE: 18 BRPM

## 2019-05-15 DIAGNOSIS — I63.9 CEREBROVASCULAR ACCIDENT (CVA), UNSPECIFIED MECHANISM (HCC): ICD-10-CM

## 2019-05-15 DIAGNOSIS — G51.0 BELL'S PALSY: ICD-10-CM

## 2019-05-15 DIAGNOSIS — R41.3 MEMORY LOSS: ICD-10-CM

## 2019-05-15 PROCEDURE — 99205 OFFICE O/P NEW HI 60 MIN: CPT

## 2019-05-15 ASSESSMENT — PATIENT HEALTH QUESTIONNAIRE - PHQ9
3. TROUBLE FALLING OR STAYING ASLEEP OR SLEEPING TOO MUCH: NOT AT ALL
8. MOVING OR SPEAKING SO SLOWLY THAT OTHER PEOPLE COULD HAVE NOTICED. OR THE OPPOSITE, BEING SO FIGETY OR RESTLESS THAT YOU HAVE BEEN MOVING AROUND A LOT MORE THAN USUAL: NOT AT ALL
2. FEELING DOWN, DEPRESSED, IRRITABLE, OR HOPELESS: NOT AT ALL
9. THOUGHTS THAT YOU WOULD BE BETTER OFF DEAD, OR OF HURTING YOURSELF: NOT AT ALL
6. FEELING BAD ABOUT YOURSELF - OR THAT YOU ARE A FAILURE OR HAVE LET YOURSELF OR YOUR FAMILY DOWN: NOT AL ALL
1. LITTLE INTEREST OR PLEASURE IN DOING THINGS: NOT AT ALL
5. POOR APPETITE OR OVEREATING: NOT AT ALL
4. FEELING TIRED OR HAVING LITTLE ENERGY: NOT AT ALL
SUM OF ALL RESPONSES TO PHQ9 QUESTIONS 1 AND 2: 0
SUM OF ALL RESPONSES TO PHQ QUESTIONS 1-9: 0
7. TROUBLE CONCENTRATING ON THINGS, SUCH AS READING THE NEWSPAPER OR WATCHING TELEVISION: NOT AT ALL

## 2019-05-15 NOTE — PROGRESS NOTES
CC: Memory loss, cognitive dysfunction     Here with a private nurse  Referred by PCP for memory loss     HPI  57 yo pleasant gentleman who presents for initial neurological evaluation for cognitive dysfunction.  Does not know why he is here. Feels his memory is good. He tells me that he had lived in [facility] before moving to [place] 1.5 years ago.   Tripped and fell down the steps in [facility] 3 years ago while he was intoxicated. (used to be an alcoholic)   Type 1 DM  - from his 20's, now undergoing further investigations as he might be suffering from type 2 DM.  Memory loss dates from after he had stopped drinking alcohol.  He came to [place] 1.5 years ago and had been in assisted living facility.  Initially lived in assisted living in [facility].  Dm type 1 is the reason for assisted living as he has lots of complications of his disease.  Drive - does not drive about 1.5 year ago   License  and his sister felt that it should not be renewed.  Bills- he reports that his  sister takes care of this due to memory loss.  Not cooking anymore.  Assisted living provides his meals.  Makes tea however does not use stove.  Laundry - the facility does this for him.   No hallucination , no behavioral changes reported.  No weakness, numbness, noHeadaches   No tremors and no known strokes or TIAs  Hypoglycemic episodes as he uses insulin.    Medications will be faxed over   He administers his medications and usually reports no trouble doing it.    He used to be alcoholic and quit in 2015  Intense drinking 50-54 yo and stopped   At age 14 started to use alcohol, denies any drug use.   [place] and stopped working 3 years ago for the reason of alcohol and diabetes management.  College degree international  relations and english.  He was diagnosed with TBI after fall from the stairs when he was intoxicated  and after that he had stopped drinking alcohol altogether.  He is here at urge of his primary care  provider.      Review of Systems   Psychiatric/Behavioral: Positive for memory loss.     Past Medical History:   Diagnosis Date   • Diabetes (HCC)    • Hypertension      Past Surgical History:   Procedure Laterality Date   • TOE AMPUTATION Right 1/12/2019    Procedure: TOE AMPUTATION;  Surgeon: Nicanor Reddy M.D.;  Location: SURGERY [facility];  Service: Orthopedics   • GASTROSCOPY-ENDO N/A 8/25/2018    Procedure: GASTROSCOPY-ENDO;  Surgeon: Jaswant Frye M.D.;  Location: ENDOSCOPY [facility];  Service: Gastroenterology   • OTHER ABDOMINAL SURGERY         Social History     Social History   • Marital status: Single     Spouse name: N/A   • Number of children: N/A   • Years of education: N/A     Occupational History   • Not on file.     Social History Main Topics   • Smoking status: Current Every Day Smoker     Packs/day: 0.25     Years: 15.00   • Smokeless tobacco: Never Used      Comment: Trying to quit   • Alcohol use No   • Drug use: No   • Sexual activity: Not Currently     Other Topics Concern   • Not on file     Social History Narrative   • No narrative on file     Family History   Problem Relation Age of Onset   • No Known Problems Mother    • No Known Problems Father      Current Outpatient Prescriptions on File Prior to Visit   Medication Sig Dispense Refill   • insulin lispro (HUMALOG) 100 UNIT/ML Solution Inject 1-6 Units as instructed 4 Times a Day,Before Meals and at Bedtime.     • atorvastatin (LIPITOR) 10 MG Tab Take 1 tablet by mouth daily to prevent heart attacks and strokes 30 Tab 5   • lisinopril (PRINIVIL) 10 MG Tab Take 1 Tab by mouth every day. 30 Tab 5   • memantine (NAMENDA) 5 MG Tab TAKE 1 TAB BY MOUTH TWICE DAILY (Patient taking differently: TAKE 1 TAB BY MOUTH TWICE DAILY) 60 Tab 5   • metformin (GLUCOPHAGE) 1000 MG tablet Take 1 Tab by mouth 2 times a day, with meals. 60 Tab 5   • ibuprofen (MOTRIN) 400 MG Tab Take 1 Tab by mouth every 6 hours as needed.     • insulin  glargine (LANTUS) 100 UNIT/ML Solution Inject 20 Units as instructed every evening. (Patient not taking: Reported on 5/15/2019)     • nicotine (NICODERM) 14 MG/24HR PATCH 24 HR Apply 1 Patch to skin as directed every 24 hours. (Patient not taking: Reported on 5/15/2019)     • nicotine polacrilex (NICORETTE) 2 MG Gum Take 1 Each by mouth every 1 hour as needed for Smoking Cessation (For nicotine urge). (Patient not taking: Reported on 5/15/2019)     • dextrose 50% (D50W) 50 % Solution 25 mL by Intravenous route as needed (If FSBG is less than or equal to 70 mg/dL and If patient is NPO).     • Pancrelipase, Lip-Prot-Amyl, 05473 units Cap DR Particles Take 36,000 Units by mouth 3 times a day.     • glucose 4 g 4 g Chew Tab Take 4 Tabs by mouth as needed for Low Blood Sugar (If FSBG is less than or equal to 70 mg/dL and patient able to eat or drink). (Patient not taking: Reported on 5/15/2019) 300 Tab 0   • omeprazole (PRILOSEC) 20 MG delayed-release capsule Take 20 mg by mouth every morning.     • buPROPion (WELLBUTRIN XL) 300 MG XL tablet Take 1 tablet by mouth every morning (Patient taking differently: 300 mg. Take 1 tablet by mouth every morning) 30 Tab 5   • tamsulosin (FLOMAX) 0.4 MG capsule Take 1 Cap by mouth ONE-HALF HOUR AFTER BREAKFAST. (Patient not taking: Reported on 5/15/2019) 30 Cap 5   • Cholecalciferol (VITAMIN D) 2000 units Cap Take 2 Caps by mouth every day. (Patient not taking: Reported on 5/15/2019) 60 Cap 5   • aspirin (ASPIRIN 81) 81 MG Chew Tab chewable tablet Take 1 Tab by mouth every day. (Patient not taking: Reported on 5/15/2019) 30 Tab 5   • ferrous sulfate 325 (65 Fe) MG EC tablet Take 1 Tab by mouth 3 times a day, with meals. 90 Tab 2     No current facility-administered medications on file prior to visit.        Vitals:    05/15/19 0825   BP: 140/70   Pulse: (!) 109   Resp: 18   Temp: 36.6 °C (97.8 °F)   SpO2: 95%     Physical examination   Constitutional: Well-developed, well-nourished,  good hygiene. Appears stated age.  Cardiovascular: RRR, with no murmurs, rubs or gallops. No carotid bruits.   Respiratory: Lungs CTA B/L, no W/R/R.   Abdomen: Soft Non-tender to Palpation. Non-distended.  Extremities: No peripheral edema, pedal pulses intact.   Skin: Warm, dry, intact. No rashes observed.  Eyes: Sclera anicteric   Funduscopic: Optic discs flat with no evidence of papilledema or pallor.   Neurologic:   Mental Status: Awake, alert, oriented x 3.   Speech: Fluent with normal prosody.   Memory: MOCA 23/30    Concentration: Attentive. Able to focus on history and follow multi-step commands.              Fund of Knowledge: Appropriate   Cranial Nerves:    CN II: PERRL. No afferent pupillary defect.    CN III, IV, VI: Good eye closure, EOMI.     CN V: Facial sensation intact and symmetric.     CN VII: Peripheral facial palsy left sided (patient states this happened many years ago for unknown reason )     CN VIII: Hearing intact.     CN IX and X: Palate elevates symmetrically. Normal gag reflex.    CN XI: Symmetric shoulder shrug.     CN XII: Tongue midline.    Sensory: Intact light touch, vibration and temperature.    Coordination: No evidence of past-pointing on finger to nose testing, no dysdiadochokinesia. Heel to shin intact.             DTR's: 2+ throughout without clonus.    Babinski: Toes downgoing bilaterally.   Romberg: Negative.   Movements: No tremors observed.   Musculoskeletal:    Strength: 5/5 in upper and lower extremities in RUE and RLE  4-/5 in LUE and 5-/5 LLE e.   Joints: No swelling.      Gait: Steady, narrow based.    Tone: Normal bulk and ton    Imaging none   Labs reviewed    Impression and plan   Cognitive dysfunction   History of possible concussion and long history of ETOH abuse   Exam significant for LHB weakness and L sided peripheral facial palsy   He is not aware of a prior stroke or bells' palsy etc and these findings are according to patient present for > 15 years   Diff  diagnosis includes Vascular cognitive disorder vs MCI 2/2 AD vs multifactorial etiology including ETOH abuse and possible longstanding cortical dysfunction also contributed by concussion(s).  Unclear why he is in Assisted living facility and I presume this is not solely due to type 1 DM  We will request neuropsychological evaluation to help us delineate the diagnosis   Also check B12, folic acid TSH and RPR  RTC after above studies are coimpleted.    Patient was seen for 60 minutes face to face of which > 50% of appointment time was spent on counseling and coordination of care regarding the above.

## 2019-05-16 ASSESSMENT — ENCOUNTER SYMPTOMS: MEMORY LOSS: 1

## 2019-05-17 DIAGNOSIS — R29.90 STROKE-LIKE EPISODE: ICD-10-CM

## 2019-05-17 DIAGNOSIS — I63.9 CEREBROVASCULAR ACCIDENT (CVA), UNSPECIFIED MECHANISM (HCC): ICD-10-CM

## 2019-05-17 NOTE — PATIENT INSTRUCTIONS
Dementia  Introduction  Dementia means losing some of your brain ability. People with dementia may have problems with:  · Memory.  · Making decisions.  · Behavior.  · Speaking.  · Thinking.  · Solving problems.  Follow these instructions at home:  Medicine  · Take over-the-counter and prescription medicines only as told by your doctor.  · Avoid taking medicines that can change how you think. These include pain or sleeping medicines.  Lifestyle  · Make healthy choices:  ¨ Be active as told by your doctor.  ¨ Do not use any tobacco products, such as cigarettes, chewing tobacco, and e-cigarettes. If you need help quitting, ask your doctor.  ¨ Eat a healthy diet.  ¨ When you get stressed, do something to help yourself relax. Your doctor can give you tips.  ¨ Spend time with other people.  · Drink enough fluid to keep your pee (urine) clear or pale yellow.  · Make sure you get good sleep. Use these tips to help you get a good night's rest:  ¨ Try not to take naps during the day.  ¨ Keep your sleeping area dark and cool.  ¨ In the few hours before you go to bed, try not to do any exercise.  ¨ Try not to have foods and drinks with caffeine in the evening.  General instructions  · Talk with your doctor to figure out:  ¨ What you need help with.  ¨ What your safety needs are.  · If you were given a bracelet that tracks your location, make sure to wear it.  · Keep all follow-up visits as told by your doctor. This is important.  Contact a doctor if:  · You have any new problems.  · You have problems with choking or swallowing.  · You have any symptoms of a different sickness.  Get help right away if:  · You have a fever.  · You feel mixed up (confused) or more mixed up than before.  · You have new sleepiness.  · You have sleepiness that gets worse.  · You have a hard time staying awake.  · You or your family members are worried for your safety.  This information is not intended to replace advice given to you by your health  care provider. Make sure you discuss any questions you have with your health care provider.  Document Released: 11/30/2009 Document Revised: 05/25/2017 Document Reviewed: 09/14/2016  © 2017 [employer]

## 2019-05-29 ENCOUNTER — HOSPITAL ENCOUNTER (OUTPATIENT)
Dept: LAB | Facility: MEDICAL CENTER | Age: 59
End: 2019-05-29
Payer: COMMERCIAL

## 2019-05-29 ENCOUNTER — HOSPITAL ENCOUNTER (OUTPATIENT)
Dept: LAB | Facility: MEDICAL CENTER | Age: 59
End: 2019-05-29
Attending: INTERNAL MEDICINE
Payer: COMMERCIAL

## 2019-05-29 DIAGNOSIS — R29.90 STROKE-LIKE EPISODE: ICD-10-CM

## 2019-05-29 DIAGNOSIS — R41.3 MEMORY LOSS: ICD-10-CM

## 2019-05-29 LAB
ALBUMIN SERPL BCP-MCNC: 4.7 G/DL (ref 3.2–4.9)
BUN SERPL-MCNC: 27 MG/DL (ref 8–22)
CALCIUM SERPL-MCNC: 9.9 MG/DL (ref 8.5–10.5)
CHLORIDE SERPL-SCNC: 107 MMOL/L (ref 96–112)
CO2 SERPL-SCNC: 21 MMOL/L (ref 20–33)
CREAT SERPL-MCNC: 1 MG/DL (ref 0.5–1.4)
EST. AVERAGE GLUCOSE BLD GHB EST-MCNC: 137 MG/DL
FOLATE SERPL-MCNC: 11.5 NG/ML
GLUCOSE SERPL-MCNC: 173 MG/DL (ref 65–99)
HBA1C MFR BLD: 6.4 % (ref 0–5.6)
PHOSPHATE SERPL-MCNC: 3.8 MG/DL (ref 2.5–4.5)
POTASSIUM SERPL-SCNC: 4.5 MMOL/L (ref 3.6–5.5)
SODIUM SERPL-SCNC: 139 MMOL/L (ref 135–145)
T4 FREE SERPL-MCNC: 0.8 NG/DL (ref 0.53–1.43)
TREPONEMA PALLIDUM IGG+IGM AB [PRESENCE] IN SERUM OR PLASMA BY IMMUNOASSAY: NON REACTIVE
TSH SERPL DL<=0.005 MIU/L-ACNC: 2.55 UIU/ML (ref 0.38–5.33)
VIT B12 SERPL-MCNC: 224 PG/ML (ref 211–911)

## 2019-05-29 PROCEDURE — 36415 COLL VENOUS BLD VENIPUNCTURE: CPT

## 2019-05-29 PROCEDURE — 86780 TREPONEMA PALLIDUM: CPT

## 2019-05-29 PROCEDURE — 84443 ASSAY THYROID STIM HORMONE: CPT

## 2019-05-29 PROCEDURE — 82607 VITAMIN B-12: CPT

## 2019-05-29 PROCEDURE — 86341 ISLET CELL ANTIBODY: CPT

## 2019-05-29 PROCEDURE — 80069 RENAL FUNCTION PANEL: CPT

## 2019-05-29 PROCEDURE — 83036 HEMOGLOBIN GLYCOSYLATED A1C: CPT

## 2019-05-29 PROCEDURE — 84439 ASSAY OF FREE THYROXINE: CPT

## 2019-05-29 PROCEDURE — 82746 ASSAY OF FOLIC ACID SERUM: CPT

## 2019-05-31 LAB — GAD65 AB SER IA-ACNC: <5 IU/ML (ref 0–5)

## 2019-06-07 ENCOUNTER — HOSPITAL ENCOUNTER (OUTPATIENT)
Dept: RADIOLOGY | Facility: MEDICAL CENTER | Age: 59
End: 2019-06-07
Payer: COMMERCIAL

## 2019-06-07 DIAGNOSIS — R41.3 MEMORY LOSS: ICD-10-CM

## 2019-06-07 DIAGNOSIS — G51.0 BELL'S PALSY: ICD-10-CM

## 2019-06-07 DIAGNOSIS — I63.9 CEREBROVASCULAR ACCIDENT (CVA), UNSPECIFIED MECHANISM (HCC): ICD-10-CM

## 2019-06-07 PROCEDURE — 70551 MRI BRAIN STEM W/O DYE: CPT

## 2019-06-07 PROCEDURE — 70544 MR ANGIOGRAPHY HEAD W/O DYE: CPT

## 2019-06-07 PROCEDURE — 70549 MR ANGIOGRAPH NECK W/O&W/DYE: CPT

## 2019-06-07 PROCEDURE — 700117 HCHG RX CONTRAST REV CODE 255

## 2019-06-07 PROCEDURE — A9585 GADOBUTROL INJECTION: HCPCS

## 2019-06-07 RX ORDER — GADOBUTROL 604.72 MG/ML
10 INJECTION INTRAVENOUS ONCE
Status: COMPLETED | OUTPATIENT
Start: 2019-06-07 | End: 2019-06-07

## 2019-06-07 RX ADMIN — GADOBUTROL 10 ML: 604.72 INJECTION INTRAVENOUS at 12:52

## 2019-08-02 ENCOUNTER — HOSPITAL ENCOUNTER (EMERGENCY)
Facility: MEDICAL CENTER | Age: 59
End: 2019-08-02
Attending: EMERGENCY MEDICINE
Payer: COMMERCIAL

## 2019-08-02 VITALS
HEART RATE: 95 BPM | SYSTOLIC BLOOD PRESSURE: 153 MMHG | DIASTOLIC BLOOD PRESSURE: 91 MMHG | BODY MASS INDEX: 20.02 KG/M2 | RESPIRATION RATE: 18 BRPM | HEIGHT: 75 IN | TEMPERATURE: 98.2 F | OXYGEN SATURATION: 91 % | WEIGHT: 161 LBS

## 2019-08-02 DIAGNOSIS — G51.0 LEFT-SIDED BELL'S PALSY: ICD-10-CM

## 2019-08-02 DIAGNOSIS — E11.649 HYPOGLYCEMIC EPISODE IN PATIENT WITH DIABETES MELLITUS (HCC): ICD-10-CM

## 2019-08-02 LAB
ALBUMIN SERPL BCP-MCNC: 4 G/DL (ref 3.2–4.9)
ALBUMIN/GLOB SERPL: 1.4 G/DL
ALP SERPL-CCNC: 78 U/L (ref 30–99)
ALT SERPL-CCNC: 11 U/L (ref 2–50)
ANION GAP SERPL CALC-SCNC: 12 MMOL/L (ref 0–11.9)
AST SERPL-CCNC: 19 U/L (ref 12–45)
BASOPHILS # BLD AUTO: 0.8 % (ref 0–1.8)
BASOPHILS # BLD: 0.14 K/UL (ref 0–0.12)
BILIRUB SERPL-MCNC: 0.3 MG/DL (ref 0.1–1.5)
BUN SERPL-MCNC: 22 MG/DL (ref 8–22)
CALCIUM SERPL-MCNC: 8.5 MG/DL (ref 8.5–10.5)
CHLORIDE SERPL-SCNC: 102 MMOL/L (ref 96–112)
CO2 SERPL-SCNC: 19 MMOL/L (ref 20–33)
CREAT SERPL-MCNC: 1.07 MG/DL (ref 0.5–1.4)
EOSINOPHIL # BLD AUTO: 0.2 K/UL (ref 0–0.51)
EOSINOPHIL NFR BLD: 1.1 % (ref 0–6.9)
ERYTHROCYTE [DISTWIDTH] IN BLOOD BY AUTOMATED COUNT: 54.4 FL (ref 35.9–50)
GLOBULIN SER CALC-MCNC: 2.9 G/DL (ref 1.9–3.5)
GLUCOSE BLD-MCNC: 138 MG/DL (ref 65–99)
GLUCOSE BLD-MCNC: 300 MG/DL (ref 65–99)
GLUCOSE BLD-MCNC: 60 MG/DL (ref 65–99)
GLUCOSE SERPL-MCNC: 410 MG/DL (ref 65–99)
HCT VFR BLD AUTO: 44.3 % (ref 42–52)
HGB BLD-MCNC: 13.9 G/DL (ref 14–18)
IMM GRANULOCYTES # BLD AUTO: 0.16 K/UL (ref 0–0.11)
IMM GRANULOCYTES NFR BLD AUTO: 0.9 % (ref 0–0.9)
LYMPHOCYTES # BLD AUTO: 2.7 K/UL (ref 1–4.8)
LYMPHOCYTES NFR BLD: 15.2 % (ref 22–41)
MCH RBC QN AUTO: 27.9 PG (ref 27–33)
MCHC RBC AUTO-ENTMCNC: 31.4 G/DL (ref 33.7–35.3)
MCV RBC AUTO: 89 FL (ref 81.4–97.8)
MONOCYTES # BLD AUTO: 1.61 K/UL (ref 0–0.85)
MONOCYTES NFR BLD AUTO: 9.1 % (ref 0–13.4)
NEUTROPHILS # BLD AUTO: 12.9 K/UL (ref 1.82–7.42)
NEUTROPHILS NFR BLD: 72.9 % (ref 44–72)
NRBC # BLD AUTO: 0 K/UL
NRBC BLD-RTO: 0 /100 WBC
PLATELET # BLD AUTO: 311 K/UL (ref 164–446)
PMV BLD AUTO: 10.8 FL (ref 9–12.9)
POTASSIUM SERPL-SCNC: 4.5 MMOL/L (ref 3.6–5.5)
PROT SERPL-MCNC: 6.9 G/DL (ref 6–8.2)
RBC # BLD AUTO: 4.98 M/UL (ref 4.7–6.1)
SODIUM SERPL-SCNC: 133 MMOL/L (ref 135–145)
WBC # BLD AUTO: 17.7 K/UL (ref 4.8–10.8)

## 2019-08-02 PROCEDURE — 80053 COMPREHEN METABOLIC PANEL: CPT

## 2019-08-02 PROCEDURE — 99284 EMERGENCY DEPT VISIT MOD MDM: CPT

## 2019-08-02 PROCEDURE — 85025 COMPLETE CBC W/AUTO DIFF WBC: CPT

## 2019-08-02 PROCEDURE — 82962 GLUCOSE BLOOD TEST: CPT | Mod: 91

## 2019-08-02 RX ORDER — LISINOPRIL 10 MG/1
15 TABLET ORAL DAILY
COMMUNITY
End: 2019-08-20

## 2019-08-02 RX ORDER — PANTOPRAZOLE SODIUM 40 MG/1
40 TABLET, DELAYED RELEASE ORAL DAILY
COMMUNITY
End: 2019-11-06 | Stop reason: SDUPTHER

## 2019-08-02 NOTE — CONSULTS
"Pawel Tatum is a 60 y/o male brought in by EMS for possible stroke. Found down w/ AMS and a blood glucose of 26. L facial droop noted. Persisted after glucose administration. On examination facial droop is c/w a 7th n palsy. Upon further questioning the patient states he's had the facial droop for \"a few years\". In this regard, this is consistent with an old bell's palsy, and not c/w stroke. Stroke alert was canceled.         "

## 2019-08-02 NOTE — ED NOTES
Pt arrived via ems from care home, per ems care staff  Found pts FSBS 29 , gave pt glucose and FSBS increaesd 69, ems gave 250 of D10 and FSBS increased to 400. Staff also concerned with facial droop. U/a neuro at bedside and found pt to have hx of bells palsy FSBS 60. Pt provided box lunch. Pt caox3 speaking in full sentences no distress, no sob, no cp, no abd pain.  MD at bedside

## 2019-08-02 NOTE — ED PROVIDER NOTES
ED Provider Note    Scribed for Edi Ramos MD by Kerry Kong. 8/2/2019, 2:40 PM.    Primary care provider: Daryl Mariano M.D.  Means of arrival: EMS  History obtained from: Patient, EMS  History limited by: None    CHIEF COMPLAINT  Chief Complaint   Patient presents with   • Hypoglycemia       HPI  Pawel Tatum is a 59 y.o. Male, with uncontrolled diabetes, who presents to the Emergency Department for left-sided facial weakness. The patient notes that he had not eaten lunch and felt generalized weakness before fainting. Upon EMS arrival, the patient was found lying down in the supine position with an altered mental status, left-sided facial droop and a blood glucose of 26 mg/dL. After administering glucose, the patient's left sided facial droop persisted and had a blood glucose level of 60 mg/dL. Associated confusion. Denies fevers, nausea, vomiting, numbness or loss of sensation to upper or lower extremities. He notes that he was diagnosed with Bell's Palsy a few years ago and has had a left-sided facial droop since then. Further past medical history includes hypertension.       REVIEW OF SYSTEMS  Pertinent positives include left-sided facial weakness, left-sided facial droop, generalized weakness, syncope, hypoglycemia, confusion. Pertinent negatives include no fevers, nausea, vomiting, numbness or loss of sensation to upper or lower extremities. All other systems reviewed and negative.    PAST MEDICAL HISTORY   has a past medical history of Diabetes (HCC) and Hypertension.    SURGICAL HISTORY   has a past surgical history that includes gastroscopy-endo (N/A, 8/25/2018); other abdominal surgery; and toe amputation (Right, 1/12/2019).    SOCIAL HISTORY  Social History     Tobacco Use   • Smoking status: Current Every Day Smoker     Packs/day: 0.25     Years: 15.00     Pack years: 3.75   • Smokeless tobacco: Never Used   • Tobacco comment: Trying to quit   Substance Use Topics   • Alcohol use: No    • Drug use: No      Social History     Substance and Sexual Activity   Drug Use No       FAMILY HISTORY  Family History   Problem Relation Age of Onset   • No Known Problems Mother    • No Known Problems Father        CURRENT MEDICATIONS    Current Outpatient Medications:   •  ibuprofen (MOTRIN) 400 MG Tab, Take 1 Tab by mouth every 6 hours as needed., Disp: , Rfl:   •  insulin glargine (LANTUS) 100 UNIT/ML Solution, Inject 20 Units as instructed every evening. (Patient not taking: Reported on 5/15/2019), Disp: , Rfl:   •  insulin lispro (HUMALOG) 100 UNIT/ML Solution, Inject 1-6 Units as instructed 4 Times a Day,Before Meals and at Bedtime., Disp: , Rfl:   •  nicotine (NICODERM) 14 MG/24HR PATCH 24 HR, Apply 1 Patch to skin as directed every 24 hours. (Patient not taking: Reported on 5/15/2019), Disp: , Rfl:   •  nicotine polacrilex (NICORETTE) 2 MG Gum, Take 1 Each by mouth every 1 hour as needed for Smoking Cessation (For nicotine urge). (Patient not taking: Reported on 5/15/2019), Disp: , Rfl:   •  dextrose 50% (D50W) 50 % Solution, 25 mL by Intravenous route as needed (If FSBG is less than or equal to 70 mg/dL and If patient is NPO)., Disp: , Rfl:   •  Pancrelipase, Lip-Prot-Amyl, 67125 units Cap DR Particles, Take 36,000 Units by mouth 3 times a day., Disp: , Rfl:   •  glucose 4 g 4 g Chew Tab, Take 4 Tabs by mouth as needed for Low Blood Sugar (If FSBG is less than or equal to 70 mg/dL and patient able to eat or drink). (Patient not taking: Reported on 5/15/2019), Disp: 300 Tab, Rfl: 0  •  omeprazole (PRILOSEC) 20 MG delayed-release capsule, Take 20 mg by mouth every morning., Disp: , Rfl:   •  buPROPion (WELLBUTRIN XL) 300 MG XL tablet, Take 1 tablet by mouth every morning (Patient taking differently: 300 mg. Take 1 tablet by mouth every morning), Disp: 30 Tab, Rfl: 5  •  atorvastatin (LIPITOR) 10 MG Tab, Take 1 tablet by mouth daily to prevent heart attacks and strokes, Disp: 30 Tab, Rfl: 5  •  lisinopril  "(PRINIVIL) 10 MG Tab, Take 1 Tab by mouth every day., Disp: 30 Tab, Rfl: 5  •  memantine (NAMENDA) 5 MG Tab, TAKE 1 TAB BY MOUTH TWICE DAILY (Patient taking differently: TAKE 1 TAB BY MOUTH TWICE DAILY), Disp: 60 Tab, Rfl: 5  •  metformin (GLUCOPHAGE) 1000 MG tablet, Take 1 Tab by mouth 2 times a day, with meals., Disp: 60 Tab, Rfl: 5  •  tamsulosin (FLOMAX) 0.4 MG capsule, Take 1 Cap by mouth ONE-HALF HOUR AFTER BREAKFAST. (Patient not taking: Reported on 5/15/2019), Disp: 30 Cap, Rfl: 5  •  Cholecalciferol (VITAMIN D) 2000 units Cap, Take 2 Caps by mouth every day. (Patient not taking: Reported on 5/15/2019), Disp: 60 Cap, Rfl: 5  •  aspirin (ASPIRIN 81) 81 MG Chew Tab chewable tablet, Take 1 Tab by mouth every day. (Patient not taking: Reported on 5/15/2019), Disp: 30 Tab, Rfl: 5  •  ferrous sulfate 325 (65 Fe) MG EC tablet, Take 1 Tab by mouth 3 times a day, with meals., Disp: 90 Tab, Rfl: 2    ALLERGIES  No Known Allergies    PHYSICAL EXAM  VITAL SIGNS: /89   Pulse 95   Temp 36.8 °C (98.2 °F) (Tympanic)   Resp 18   Ht 1.905 m (6' 3\")   Wt 73 kg (161 lb)   BMI 20.12 kg/m²     General: Alert, No acute distress  Skin: Warm, dry, normal for ethnicity  Head: Normocephalic, atraumatic  Neck: Trachea midline, no tenderness  Eye: PERRL, normal conjunctiva  ENMT: Oral mucosa moist, no pharyngeal erythema or exudate  Cardiovascular: Regular rate and rhythm, No murmur, Normal peripheral perfusion  Respiratory: Lungs CTA, respirations are non-labored, breath sounds are equal  Gastrointestinal: Soft, nontender, non distended  Musculoskeletal: No swelling, no deformity  Neurological: Patient is alert and oriented to person, place, and time. Facial nerve deficit on the entire left side of the face and left forearm. Strength and sensation intact. No pronator drift. Normal finger to nose without ataxia or past pointing. EOMI intact without nystagmus. Patellar reflex symmetric bilaterally.   Lymphatics: No " lymphadenopathy  Psychiatric: Cooperative, appropriate mood & affect      DIAGNOSTIC STUDIES/PROCEDURES    LABS  Results for orders placed or performed during the hospital encounter of 08/02/19   CBC w/ Differential   Result Value Ref Range    WBC 17.7 (H) 4.8 - 10.8 K/uL    RBC 4.98 4.70 - 6.10 M/uL    Hemoglobin 13.9 (L) 14.0 - 18.0 g/dL    Hematocrit 44.3 42.0 - 52.0 %    MCV 89.0 81.4 - 97.8 fL    MCH 27.9 27.0 - 33.0 pg    MCHC 31.4 (L) 33.7 - 35.3 g/dL    RDW 54.4 (H) 35.9 - 50.0 fL    Platelet Count 311 164 - 446 K/uL    MPV 10.8 9.0 - 12.9 fL    Neutrophils-Polys 72.90 (H) 44.00 - 72.00 %    Lymphocytes 15.20 (L) 22.00 - 41.00 %    Monocytes 9.10 0.00 - 13.40 %    Eosinophils 1.10 0.00 - 6.90 %    Basophils 0.80 0.00 - 1.80 %    Immature Granulocytes 0.90 0.00 - 0.90 %    Nucleated RBC 0.00 /100 WBC    Neutrophils (Absolute) 12.90 (H) 1.82 - 7.42 K/uL    Lymphs (Absolute) 2.70 1.00 - 4.80 K/uL    Monos (Absolute) 1.61 (H) 0.00 - 0.85 K/uL    Eos (Absolute) 0.20 0.00 - 0.51 K/uL    Baso (Absolute) 0.14 (H) 0.00 - 0.12 K/uL    Immature Granulocytes (abs) 0.16 (H) 0.00 - 0.11 K/uL    NRBC (Absolute) 0.00 K/uL   Complete Metabolic Panel (CMP)   Result Value Ref Range    Sodium 133 (L) 135 - 145 mmol/L    Potassium 4.5 3.6 - 5.5 mmol/L    Chloride 102 96 - 112 mmol/L    Co2 19 (L) 20 - 33 mmol/L    Anion Gap 12.0 (H) 0.0 - 11.9    Glucose 410 (H) 65 - 99 mg/dL    Bun 22 8 - 22 mg/dL    Creatinine 1.07 0.50 - 1.40 mg/dL    Calcium 8.5 8.5 - 10.5 mg/dL    AST(SGOT) 19 12 - 45 U/L    ALT(SGPT) 11 2 - 50 U/L    Alkaline Phosphatase 78 30 - 99 U/L    Total Bilirubin 0.3 0.1 - 1.5 mg/dL    Albumin 4.0 3.2 - 4.9 g/dL    Total Protein 6.9 6.0 - 8.2 g/dL    Globulin 2.9 1.9 - 3.5 g/dL    A-G Ratio 1.4 g/dL   ESTIMATED GFR   Result Value Ref Range    GFR If African American >60 >60 mL/min/1.73 m 2    GFR If Non African American >60 >60 mL/min/1.73 m 2   ACCU-CHEK GLUCOSE   Result Value Ref Range    Glucose - Accu-Ck 60 (L)  "65 - 99 mg/dL   ACCU-CHEK GLUCOSE   Result Value Ref Range    Glucose - Accu-Ck 138 (H) 65 - 99 mg/dL   ACCU-CHEK GLUCOSE   Result Value Ref Range    Glucose - Accu-Ck 300 (H) 65 - 99 mg/dL       All labs reviewed by me.      COURSE & MEDICAL DECISION MAKING  Pertinent Labs & Imaging studies reviewed. (See chart for details)    2:40 PM - Patient seen and examined at bedside.  Ordered estimated GFR  to evaluate his symptoms. The differential diagnoses include but are not limited to:  hypoglycemia, chronic Bell's Palsy, electrolyte abnormalities     2:55 PM Ordered Accu-chek, UA culture, CMP and CBC with differential      4:21 PM Recheck: Patient re-evaluated at beside. Patient's lab results discussed. He was informed that his lab work was unremarkable. The patient reports feeling well. He had a normal blood glucose level on the late Acc-chek but will continue to observe.The patient understood and is in agreement.     6:58 PM The patient was reassessed. His blood glucose is now 116 mg/dL. He is comfortable with being discharged at this time.     7:02 PM - Re-examined; The patient is resting in bed and is stable for discharge at this time. I discussed his above findings were overall unremarkable and plans for discharge.  He  was given a referral to [facility] and instructed to return to the ED if his symptoms worsen. Patient understands and agrees. His vitals prior to discharge are: /91   Pulse 95   Temp 36.8 °C (98.2 °F) (Tympanic)   Resp 18   Ht 1.905 m (6' 3\")   Wt 73 kg (161 lb)   SpO2 91%   BMI 20.12 kg/m²         Decision Making:  This is a 59 y.o. year old male who presents with hypoglycemic episode.  He notes he forgot a meal today after taking his insulin, he does not entirely recall what happened but was found to be poorly responsive and diaphoretic, blood sugar of 20.  Feeling better, no hypoglycemia here in the ED but blood sugar did dip from 400 after oral glucose back to 60, so he has been " observed until blood sugars are improving.  Treated by EMS with glucose, treated here in the ED with complex carbohydrates and a protein meal.  Initially, staff at his care facility were concerned with regard to worsening of his chronic facial droop.  The patient has Bell's palsy, he has had facial droop for years.  Unremarkable neurologic exam with involvement of the forehead with regard to the droop, this is chronic, nonacute, he has no symptoms nor signs of stroke to the extremities with an NIH stroke scale 0 adjusted for his chronic Bell's palsy.  Clearly this is not a CVA, no indication for emergent imaging, on-call neurologist per stroke protocol evaluated the patient as well and concurs.  No indication for inpatient management.    The patient will return for new or worsening symptoms and is stable at the time of discharge.    DISPOSITION:  Patient will be discharged home in stable condition.    FOLLOW UP:  Daryl Mariano M.D.  [address] [place] [id number]  [phone]    Schedule an appointment as soon as possible for a visit in 3 days        FINAL IMPRESSION  1. Hypoglycemic episode in patient with diabetes mellitus (HCC)    2. Left-sided Bell's palsy          Kerry CAPONE (Zohreh), am scribing for, and in the presence of, Edi Ramos MD.    Electronically signed by: Kerry Kong (Zohreh), 8/2/2019    Edi CAPONE MD personally performed the services described in this documentation, as scribed by Kerry Kong in my presence, and it is both accurate and complete    C    The note accurately reflects work and decisions made by me.  Edi Ramos  8/2/2019  8:46 PM

## 2019-08-03 NOTE — ED NOTES
Pt received discharge instructions and understood all including follow up, pt ambulated to lobby with friend with no difficulty

## 2019-08-03 NOTE — ED NOTES
Pt sitting up comfortably, speaking with friends,no needs at this time. Bed in lowered position. Side rails up, call light inreach

## 2019-08-03 NOTE — ED NOTES
The Medication Reconciliation process has been completed via the MAR from [facility].    Allergies have been reviewed

## 2019-08-20 ENCOUNTER — OFFICE VISIT (OUTPATIENT)
Dept: MEDICAL GROUP | Facility: MEDICAL CENTER | Age: 59
End: 2019-08-20
Payer: COMMERCIAL

## 2019-08-20 VITALS
OXYGEN SATURATION: 97 % | TEMPERATURE: 98.5 F | HEART RATE: 84 BPM | SYSTOLIC BLOOD PRESSURE: 146 MMHG | HEIGHT: 72 IN | BODY MASS INDEX: 21.26 KG/M2 | DIASTOLIC BLOOD PRESSURE: 78 MMHG | RESPIRATION RATE: 16 BRPM | WEIGHT: 157 LBS

## 2019-08-20 DIAGNOSIS — Z86.69 H/O BELL'S PALSY: ICD-10-CM

## 2019-08-20 DIAGNOSIS — Z23 NEED FOR TDAP VACCINATION: ICD-10-CM

## 2019-08-20 DIAGNOSIS — K46.9 ABDOMINAL HERNIA WITHOUT OBSTRUCTION AND WITHOUT GANGRENE, RECURRENCE NOT SPECIFIED, UNSPECIFIED HERNIA TYPE: ICD-10-CM

## 2019-08-20 DIAGNOSIS — K86.1 CHRONIC PANCREATITIS, UNSPECIFIED PANCREATITIS TYPE (HCC): ICD-10-CM

## 2019-08-20 DIAGNOSIS — E11.00 UNCONTROLLED TYPE 2 DIABETES MELLITUS WITH HYPEROSMOLARITY WITHOUT COMA (HCC): ICD-10-CM

## 2019-08-20 DIAGNOSIS — I10 ESSENTIAL HYPERTENSION: ICD-10-CM

## 2019-08-20 PROBLEM — M86.271 SUBACUTE OSTEOMYELITIS OF RIGHT FOOT (HCC): Status: RESOLVED | Noted: 2019-01-11 | Resolved: 2019-08-20

## 2019-08-20 PROBLEM — K92.2 UGI BLEED: Status: RESOLVED | Noted: 2018-08-24 | Resolved: 2019-08-20

## 2019-08-20 PROCEDURE — 90471 IMMUNIZATION ADMIN: CPT | Performed by: NURSE PRACTITIONER

## 2019-08-20 PROCEDURE — 90715 TDAP VACCINE 7 YRS/> IM: CPT | Performed by: NURSE PRACTITIONER

## 2019-08-20 PROCEDURE — 99214 OFFICE O/P EST MOD 30 MIN: CPT | Mod: 25 | Performed by: NURSE PRACTITIONER

## 2019-08-20 RX ORDER — METFORMIN HYDROCHLORIDE 500 MG/1
1000 TABLET, EXTENDED RELEASE ORAL 2 TIMES DAILY
Qty: 120 TAB | Refills: 3
Start: 2019-08-20 | End: 2019-12-19

## 2019-08-20 RX ORDER — LISINOPRIL 20 MG/1
20 TABLET ORAL DAILY
Qty: 90 TAB | Refills: 3 | Status: SHIPPED | OUTPATIENT
Start: 2019-08-20 | End: 2019-11-06 | Stop reason: SDUPTHER

## 2019-08-20 ASSESSMENT — ENCOUNTER SYMPTOMS: MEMORY LOSS: 1

## 2019-08-20 NOTE — PROGRESS NOTES
Subjective:      Pawel Tatum is a 59 y.o. male who presents with Follow-Up (General check up)        CC: Patient is here today accompanied by his nurse at [facility] for general follow-up on multiple health problems including diabetes, hypertension and pancreatitis.  He is also followed by geriatric specialty care.    HPI.    1. Uncontrolled type 2 diabetes mellitus with hyperosmolarity without coma (HCC)  I have not seen patient since his initial visit in October of last year when he was living at a group home.  Since that time he developed osteomyelitis of his right second toe with subsequent amputation and rehab in January.  He is now following with endocrinology which is prescribing his Levemir insulin at 14 units nightly as well as NovoLog insulin 6 to 8 units 3 times daily and metformin.  It is somewhat confusing as to whether he is taking 1000 or 2000 mg of metformin twice a day.  He also now has a DEXA scan and his nurse states that his blood sugars have been running slightly low.    2. Essential hypertension  Patient currently on lisinopril and the dosage was recently increased to 15 mg by his geriatric group.  He is having to take 2 separate pills.  His blood pressure is slightly high.    3. Need for Tdap vaccination  Patient due for vaccine today    4. Abdominal hernia without obstruction and without gangrene, recurrence not specified, unspecified hernia type  Patient noted to have a right-sided reducible abdominal hernia and he is not sure how long he has had this but it does not bother him.    5. Chronic pancreatitis, unspecified pancreatitis type (HCC)  Patient apparently had pancreatitis recently and is now on pancrelipase.  He follows with endocrinology for this.    6. H/O Bell's palsy  Patient apparently has history of Bell's palsy and was recently seen by neurology and at the ER.  He is having further work-up through neurology for this weakness  Past Medical History:    Diagnosis Date   • Diabetes (HCC)    • Hypertension      Social History     Socioeconomic History   • Marital status: Single     Spouse name: Not on file   • Number of children: Not on file   • Years of education: Not on file   • Highest education level: Not on file   Occupational History   • Not on file   Social Needs   • Financial resource strain: Not on file   • Food insecurity:     Worry: Not on file     Inability: Not on file   • Transportation needs:     Medical: Not on file     Non-medical: Not on file   Tobacco Use   • Smoking status: Current Every Day Smoker     Packs/day: 0.25     Years: 15.00     Pack years: 3.75   • Smokeless tobacco: Never Used   • Tobacco comment: Trying to quit   Substance and Sexual Activity   • Alcohol use: No   • Drug use: No   • Sexual activity: Not Currently   Lifestyle   • Physical activity:     Days per week: Not on file     Minutes per session: Not on file   • Stress: Not on file   Relationships   • Social connections:     Talks on phone: Not on file     Gets together: Not on file     Attends Confucianist service: Not on file     Active member of club or organization: Not on file     Attends meetings of clubs or organizations: Not on file     Relationship status: Not on file   • Intimate partner violence:     Fear of current or ex partner: Not on file     Emotionally abused: Not on file     Physically abused: Not on file     Forced sexual activity: Not on file   Other Topics Concern   • Not on file   Social History Narrative   • Not on file     Current Outpatient Medications   Medication Sig Dispense Refill   • metFORMIN ER (GLUCOPHAGE XR) 500 MG TABLET SR 24 HR Take 2 Tabs by mouth 2 times a day. 120 Tab 3   • lisinopril (PRINIVIL) 20 MG Tab Take 1 Tab by mouth every day. 90 Tab 3   • Lactobacillus-Inulin ([facility] DIGESTIVE HEALTH PO) Take 1 Dose by mouth.     • insulin detemir (LEVEMIR) 100 UNIT/ML Solution Inject 14 Units as instructed every evening.     • insulin aspart  (NOVOLOG) 100 UNIT/ML Solution Inject 6-8 Units as instructed 3 times a day before meals. 6 units QAM  7 units Noon  8 units QPM     • pantoprazole (PROTONIX) 40 MG Tablet Delayed Response Take 40 mg by mouth every day.     • atorvastatin (LIPITOR) 10 MG Tab Take 1 tablet by mouth daily to prevent heart attacks and strokes 30 Tab 5   • memantine (NAMENDA) 5 MG Tab TAKE 1 TAB BY MOUTH TWICE DAILY 60 Tab 5   • tamsulosin (FLOMAX) 0.4 MG capsule Take 1 Cap by mouth ONE-HALF HOUR AFTER BREAKFAST. 30 Cap 5   • Cholecalciferol (VITAMIN D) 2000 units Cap Take 2 Caps by mouth every day. 60 Cap 5   • aspirin (ASPIRIN 81) 81 MG Chew Tab chewable tablet Take 1 Tab by mouth every day. 30 Tab 5     No current facility-administered medications for this visit.      Family History   Problem Relation Age of Onset   • No Known Problems Mother    • No Known Problems Father          Review of Systems   Psychiatric/Behavioral: Positive for memory loss.   All other systems reviewed and are negative.         Objective:     /78 (BP Location: Right arm, Patient Position: Sitting, BP Cuff Size: Adult)   Pulse 84   Temp 36.9 °C (98.5 °F) (Temporal)   Resp 16   Ht 1.829 m (6')   Wt 71.2 kg (157 lb)   SpO2 97%   BMI 21.29 kg/m²      Physical Exam   Constitutional: He is oriented to person, place, and time. He appears well-developed and well-nourished. No distress.   HENT:   Head: Normocephalic and atraumatic.   Right Ear: External ear normal.   Left Ear: External ear normal.   Nose: Nose normal.   Mouth/Throat: Oropharynx is clear and moist.   Eyes: Conjunctivae are normal. Right eye exhibits no discharge. Left eye exhibits no discharge.   Neck: Normal range of motion. Neck supple. No tracheal deviation present. No thyromegaly present.   Cardiovascular: Normal rate, regular rhythm and normal heart sounds.   No murmur heard.  Pulmonary/Chest: Effort normal and breath sounds normal. No respiratory distress. He has no wheezes. He has  no rales.   Abdominal:   Bulging in the right mid to lower abdomen which is reducible in the office.  There is no tenderness or redness at the site   Lymphadenopathy:     He has no cervical adenopathy.   Neurological: He is alert and oriented to person, place, and time. Coordination normal.   Left-sided facial weakness   Skin: Skin is warm and dry. No rash noted. He is not diaphoretic. No erythema.   Absent right second toe.   Psychiatric: He has a normal mood and affect. His behavior is normal. Judgment and thought content normal. Cognition and memory are impaired.   Nursing note and vitals reviewed.              Assessment/Plan:     1. Uncontrolled type 2 diabetes mellitus with hyperosmolarity without coma (HCC)  I advised patient's nurse to contact his endocrinologist regarding the occasional episodes of hypoglycemia and he might need a lower dose of his insulin.  I also question whether he should be on 2000 mg of metformin twice a day as the usual dosing is only 1000 mg twice a day and this should be clarified with endocrinology.  - metFORMIN ER (GLUCOPHAGE XR) 500 MG TABLET SR 24 HR; Take 2 Tabs by mouth 2 times a day.  Dispense: 120 Tab; Refill: 3    2. Essential hypertension  Blood pressure is slightly high so I will increase his lisinopril to 20 mg a day and this also will help him from having to take 2 separate pills at the 10 mg and 5 mg dosing.  I did advise his nurse that he does not need to come to us on a regular basis if he is following with geriatric specialty care and his endocrinologist but if he does wish to continue with us, I will see him back in 6 months.  - lisinopril (PRINIVIL) 20 MG Tab; Take 1 Tab by mouth every day.  Dispense: 90 Tab; Refill: 3    3. Need for Tdap vaccination  I have placed the below orders and discussed them with an approved delegating provider. The MA is performing the below orders under the direction of Dr. Silverman    - Tdap =>6yo IM    4. Abdominal hernia without  obstruction and without gangrene, recurrence not specified, unspecified hernia type  I discussed options with patient and his nurse and offered to refer him to a general surgeon to discuss repair of the hernia but since it is not currently bothering him they have decided to wait.    5. Chronic pancreatitis, unspecified pancreatitis type (HCC)  Patient will continue to follow with endocrinology who is treating this.    6. H/O Bell's palsy  Patient reminded to follow with neurology for his work-up on his apparent chronic left-sided weakness.

## 2019-09-25 DIAGNOSIS — R41.3 MEMORY LOSS: ICD-10-CM

## 2019-09-25 RX ORDER — TAMSULOSIN HYDROCHLORIDE 0.4 MG/1
0.4 CAPSULE ORAL
Qty: 30 CAP | Refills: 5 | Status: SHIPPED | OUTPATIENT
Start: 2019-09-25 | End: 2019-11-06 | Stop reason: SDUPTHER

## 2019-09-25 RX ORDER — MEMANTINE HYDROCHLORIDE 5 MG/1
TABLET ORAL
Qty: 60 TAB | Refills: 5 | Status: SHIPPED | OUTPATIENT
Start: 2019-09-25 | End: 2019-11-22

## 2019-10-01 ENCOUNTER — OFFICE VISIT (OUTPATIENT)
Dept: MEDICAL GROUP | Facility: MEDICAL CENTER | Age: 59
End: 2019-10-01
Payer: COMMERCIAL

## 2019-10-01 VITALS
BODY MASS INDEX: 21.54 KG/M2 | WEIGHT: 159 LBS | HEART RATE: 100 BPM | TEMPERATURE: 98.6 F | RESPIRATION RATE: 16 BRPM | OXYGEN SATURATION: 97 % | SYSTOLIC BLOOD PRESSURE: 124 MMHG | DIASTOLIC BLOOD PRESSURE: 72 MMHG | HEIGHT: 72 IN

## 2019-10-01 DIAGNOSIS — Z23 INFLUENZA VACCINE NEEDED: ICD-10-CM

## 2019-10-01 DIAGNOSIS — E11.00 UNCONTROLLED TYPE 2 DIABETES MELLITUS WITH HYPEROSMOLARITY WITHOUT COMA (HCC): ICD-10-CM

## 2019-10-01 DIAGNOSIS — I10 ESSENTIAL HYPERTENSION: ICD-10-CM

## 2019-10-01 DIAGNOSIS — K86.1 CHRONIC PANCREATITIS, UNSPECIFIED PANCREATITIS TYPE (HCC): ICD-10-CM

## 2019-10-01 LAB
HBA1C MFR BLD: 6.9 % (ref 0–5.6)
INT CON NEG: NEGATIVE
INT CON POS: POSITIVE

## 2019-10-01 PROCEDURE — 83036 HEMOGLOBIN GLYCOSYLATED A1C: CPT | Performed by: NURSE PRACTITIONER

## 2019-10-01 PROCEDURE — 90471 IMMUNIZATION ADMIN: CPT | Performed by: NURSE PRACTITIONER

## 2019-10-01 PROCEDURE — 90686 IIV4 VACC NO PRSV 0.5 ML IM: CPT | Performed by: NURSE PRACTITIONER

## 2019-10-01 PROCEDURE — 99213 OFFICE O/P EST LOW 20 MIN: CPT | Mod: 25 | Performed by: NURSE PRACTITIONER

## 2019-10-01 ASSESSMENT — ENCOUNTER SYMPTOMS
CARDIOVASCULAR NEGATIVE: 1
MUSCULOSKELETAL NEGATIVE: 1
MEMORY LOSS: 1
FOCAL WEAKNESS: 1
CONSTITUTIONAL NEGATIVE: 1
EYES NEGATIVE: 1
GASTROINTESTINAL NEGATIVE: 1
RESPIRATORY NEGATIVE: 1

## 2019-10-01 NOTE — PROGRESS NOTES
Subjective:      Pawel Tatum is a 59 y.o. male who presents with Medication Management (lisinopril)        CC: Patient is here today accompanied by his nurse assistant for follow-up on diabetes and hypertension.    HPI       1. Uncontrolled type 2 diabetes mellitus with hyperosmolarity without coma (HCC)  Patient was previously being followed by endocrinology at [facility] who is no longer there and they are requesting a new referral to endocrinology.    Patient is listed as a type II diabetic but apparently it was felt that he might be a type I.  His KRISTY 65 was less than 5 and C-peptide was normal at 1.  However he is very thin and despite this tends to have hyperglycemia.  He is currently on a combination of metformin with short and long-acting insulin.  I saw patient for the first time after an emergency room visit for hyperglycemia 2 months ago.  His hemoglobin A1c has increased slightly to 6.9 although his DEXA scan is showing 136 blood sugar average.  He has had occasional episodes of hypoglycemia in the morning.  He was accidentally taking 3000 mg of metformin on his last visit and this was adjusted back down to 2000 mg/day.    2. Chronic pancreatitis, unspecified pancreatitis type (HCC)  Patient is on Creon but does not appear to be following with gastroenterology but it was recently refilled for him.  He denies abdominal pain.    3. Essential hypertension  Patient currently on lisinopril 20 mg which was recently adjusted and his blood pressure is controlled.    4. Influenza vaccine needed  Patient due for seasonal vaccine  Past Medical History:   Diagnosis Date   • Diabetes (HCC)    • Hypertension      Social History     Socioeconomic History   • Marital status: Single     Spouse name: Not on file   • Number of children: Not on file   • Years of education: Not on file   • Highest education level: Not on file   Occupational History   • Not on file   Social Needs   • Financial resource strain: Not on file    • Food insecurity:     Worry: Not on file     Inability: Not on file   • Transportation needs:     Medical: Not on file     Non-medical: Not on file   Tobacco Use   • Smoking status: Current Every Day Smoker     Packs/day: 0.25     Years: 15.00     Pack years: 3.75   • Smokeless tobacco: Never Used   • Tobacco comment: Trying to quit   Substance and Sexual Activity   • Alcohol use: No   • Drug use: No   • Sexual activity: Not Currently   Lifestyle   • Physical activity:     Days per week: Not on file     Minutes per session: Not on file   • Stress: Not on file   Relationships   • Social connections:     Talks on phone: Not on file     Gets together: Not on file     Attends Rastafari service: Not on file     Active member of club or organization: Not on file     Attends meetings of clubs or organizations: Not on file     Relationship status: Not on file   • Intimate partner violence:     Fear of current or ex partner: Not on file     Emotionally abused: Not on file     Physically abused: Not on file     Forced sexual activity: Not on file   Other Topics Concern   • Not on file   Social History Narrative   • Not on file     Current Outpatient Medications   Medication Sig Dispense Refill   • pancrelipase, Lip-Prot-Amyl, (CREON 68796) 02762-397[address] units Cap DR Particles Take  by mouth 3 times a day, with meals.     • memantine (NAMENDA) 5 MG Tab TAKE 1 TAB BY MOUTH TWICE DAILY 60 Tab 5   • tamsulosin (FLOMAX) 0.4 MG capsule Take 1 Cap by mouth ONE-HALF HOUR AFTER BREAKFAST. 30 Cap 5   • metFORMIN ER (GLUCOPHAGE XR) 500 MG TABLET SR 24 HR Take 2 Tabs by mouth 2 times a day. 120 Tab 3   • lisinopril (PRINIVIL) 20 MG Tab Take 1 Tab by mouth every day. 90 Tab 3   • Lactobacillus-Inulin (CULTURELLE DIGESTIVE HEALTH PO) Take 1 Dose by mouth.     • insulin detemir (LEVEMIR) 100 UNIT/ML Solution Inject 14 Units as instructed every evening.     • insulin aspart (NOVOLOG) 100 UNIT/ML Solution Inject 6-8 Units as instructed 3  times a day before meals. 6 units QAM  7 units Noon  8 units QPM     • pantoprazole (PROTONIX) 40 MG Tablet Delayed Response Take 40 mg by mouth every day.     • atorvastatin (LIPITOR) 10 MG Tab Take 1 tablet by mouth daily to prevent heart attacks and strokes 30 Tab 5   • Cholecalciferol (VITAMIN D) 2000 units Cap Take 2 Caps by mouth every day. 60 Cap 5   • aspirin (ASPIRIN 81) 81 MG Chew Tab chewable tablet Take 1 Tab by mouth every day. 30 Tab 5     No current facility-administered medications for this visit.      Family History   Problem Relation Age of Onset   • No Known Problems Mother    • No Known Problems Father          Review of Systems   Constitutional: Negative.    HENT: Negative.    Eyes: Negative.    Respiratory: Negative.    Cardiovascular: Negative.    Gastrointestinal: Negative.    Genitourinary: Negative.    Musculoskeletal: Negative.    Skin: Negative.    Neurological: Positive for focal weakness.   Endo/Heme/Allergies: Negative.    Psychiatric/Behavioral: Positive for memory loss.   All other systems reviewed and are negative.         Objective:     /72 (BP Location: Left arm, Patient Position: Sitting, BP Cuff Size: Adult)   Pulse 100   Temp 37 °C (98.6 °F) (Temporal)   Resp 16   Ht 1.829 m (6')   Wt 72.1 kg (159 lb)   SpO2 97%   BMI 21.56 kg/m²      Physical Exam   Constitutional: He is oriented to person, place, and time. He appears well-developed and well-nourished. No distress.   HENT:   Head: Normocephalic and atraumatic.   Right Ear: External ear normal.   Left Ear: External ear normal.   Nose: Nose normal.   Mouth/Throat: Oropharynx is clear and moist.   Eyes: Conjunctivae are normal. Right eye exhibits no discharge. Left eye exhibits no discharge.   Neck: Normal range of motion. Neck supple. No tracheal deviation present. No thyromegaly present.   Cardiovascular: Normal rate, regular rhythm and normal heart sounds.   No murmur heard.  Pulmonary/Chest: Effort normal and  breath sounds normal. No respiratory distress. He has no wheezes. He has no rales.   Lymphadenopathy:     He has no cervical adenopathy.   Neurological: He is alert and oriented to person, place, and time. Coordination normal.   Left-sided weakness and facial weakness.   Skin: Skin is warm and dry. No rash noted. He is not diaphoretic. No erythema.   Psychiatric: He has a normal mood and affect. His behavior is normal. Judgment and thought content normal.   Nursing note and vitals reviewed.              Assessment/Plan:     1. Uncontrolled type 2 diabetes mellitus with hyperosmolarity without coma (HCC)  I have Patient's diagnosis is type II diabetic which he came to us on and it appears that his duglas 65 and C-peptide were normal but there is some suspicion for possible type 1 diabetes.  I will refer him to our diabetic group to decide on which type of diabetes he should be labeled.  In the meantime he will continue with his metformin, NovoLog insulin at 5 units at breakfast and lunch and 7 units in the evening which is slightly lower because of his hypoglycemia.  He will continue with his Levemir at 14 units/day.  He will continue with his ACE inhibitor and statin.  - REFERRAL TO ENDOCRINOLOGY  - POCT  A1C  - MICROALBUMIN CREAT RATIO URINE; Future    2. Chronic pancreatitis, unspecified pancreatitis type (HCC)  Patient will continue with his Creon although I did recommend he follow with gastroenterology for this problem.    3. Essential hypertension  Blood pressure appears controlled on his ACE inhibitor at 20 mg/day which was changed last visit.    4. Influenza vaccine needed  I have placed the below orders and discussed them with an approved delegating provider. The MA is performing the below orders under the direction of Dr. Silverman    - Influenza Vaccine Quad Injection (PF)

## 2019-10-07 ENCOUNTER — OFFICE VISIT (OUTPATIENT)
Dept: NEUROLOGY | Facility: MEDICAL CENTER | Age: 59
End: 2019-10-07
Payer: COMMERCIAL

## 2019-10-07 VITALS
HEIGHT: 72 IN | RESPIRATION RATE: 16 BRPM | TEMPERATURE: 97.6 F | OXYGEN SATURATION: 95 % | BODY MASS INDEX: 21.1 KG/M2 | HEART RATE: 101 BPM | DIASTOLIC BLOOD PRESSURE: 70 MMHG | SYSTOLIC BLOOD PRESSURE: 138 MMHG | WEIGHT: 155.8 LBS

## 2019-10-07 DIAGNOSIS — E51.2 WERNICKE ENCEPHALOPATHY: ICD-10-CM

## 2019-10-07 DIAGNOSIS — R41.3 MEMORY LOSS: ICD-10-CM

## 2019-10-07 PROCEDURE — 99214 OFFICE O/P EST MOD 30 MIN: CPT

## 2019-10-07 ASSESSMENT — ENCOUNTER SYMPTOMS
NEUROLOGICAL NEGATIVE: 1
RESPIRATORY NEGATIVE: 1
MEMORY LOSS: 1
EYES NEGATIVE: 1
CARDIOVASCULAR NEGATIVE: 1
GASTROINTESTINAL NEGATIVE: 1

## 2019-10-07 NOTE — PATIENT INSTRUCTIONS
Wernicke-Korsakoff Syndrome  Introduction  Wernicke-Korsakoff syndrome (WKS) is a brain disorder that is characterized by memory loss and a serious mental condition that impairs thinking and emotions (psychosis). The term actually refers to two conditions that often occur together.  Wernicke encephalopathy happens first. This disease damages the hypothalamus and thalamus portions of the brain. As the disease gets worse, Wernicke encephalopathy is followed by Korsakoff syndrome (Korsakoff psychosis). This condition can permanently damage the areas of the brain that are responsible for memory.  What are the causes?  This condition is caused by a lack of vitamin B1 (thiamine). This deficiency can result from:  · Alcoholism.  · Poor nutrition.  · Certain medical conditions.  What increases the risk?  This condition is more likely to develop in:  · People who have had severe alcoholism for 5 years or longer.  · People who have poor nutrition.  · People who have AIDS (acquired immunodeficiency syndrome).  · People who have a history of chronic infections.  · People who have anorexia nervosa.  · People who receive kidney dialysis.  · People who have advanced cancer.  What are the signs or symptoms?  Symptoms vary based on what stage of the disease you have.  Symptoms of Wernicke encephalopathy include:  · Uncoordinated muscles.  · Abnormal eye movements.  · Confusion.  · Loss of other mental abilities.  · Double vision.  · Eyelid drooping.  · Symptoms of alcohol withdrawal.  As the disease gets worse, symptoms of Korsakoff syndrome may develop. These include:  · Severe memory loss.  · Inability to form new memories.  · Hallucinations.  · Tendency to make up stories.  How is this diagnosed?  There is no specific test to diagnose this condition. It may be diagnosed based on:  · Your symptoms and medical history. Your health care provider may suspect this condition if you abuse alcohol.  · A physical exam.  · Blood tests to  check your thiamine level and to look for other signs of malnutrition.  · Tests to check for memory loss.  · Imaging studies to look for changes to the brain and other body parts. These may include MRI and CT scans.  How is this treated?  Treatment for this condition needs to start early. If the condition is diagnosed and treated in the early stages, it can be reversed. If the condition is left untreated, it can cause permanent brain damage. Treatment may include:  · Thiamine replacement therapy. You may be given thiamine through an IV tube or by mouth.  · Treatment for alcoholism.  · Medicine and mental health counseling for brain-related symptoms of chronic dementia.  Follow these instructions at home:  · Take medicines only as told by your health care provider.  · Do not drink alcohol. Get treatment for alcoholism if needed.  · Eat a nutritious diet.  · Keep all follow-up visits as told by your health care provider. This is important, especially for mental health counseling.  · Get caregiver support if necessary.  Contact a health care provider if:  · You have confusion or memory problems.  · You have severe alcoholism.  Get help right away if:  · Your symptoms become severe.  · You have serious thoughts about hurting yourself or others.  This information is not intended to replace advice given to you by your health care provider. Make sure you discuss any questions you have with your health care provider.  Document Released: 12/08/2003 Document Revised: 05/25/2017 Document Reviewed: 09/02/2015  © 2017 [employer]vier

## 2019-10-07 NOTE — PROGRESS NOTES
"Follow up appointment for cognitive decline    HPI  Mr Tatum is returning after MRI brain and neuropsychological testing to discuss the findings and prognosis.  He presents here with his nurse.  He had been doing well, living in assisted living facility and managing to stay active.  He reads, does puzzles, crosswords and is trying to engage in physical activity as much as he can.  He had seen Dr ada Houston and enjoyed the testing and meeting Dr Houston very much.  Since his last visit to me he had an ED visit for \" possible stroke: which ended up being hypoglycemic episode with glucose of 26.  Now his nurse states that he has continuous glucose monitor in order to avoid these alterations in his BG levels.  He is doing well today and denies any new symptoms.He is trying to watch his BP and his sugars and snacks often to avoid drop in his glucose levels.    Review of Systems   Constitutional: Positive for malaise/fatigue.   HENT: Negative.    Eyes: Negative.    Respiratory: Negative.    Cardiovascular: Negative.    Gastrointestinal: Negative.    Neurological: Negative.    Endo/Heme/Allergies: Negative.    Psychiatric/Behavioral: Positive for memory loss.     Past Medical History:   Diagnosis Date   • Diabetes (HCC)    • Hypertension      Past Surgical History:   Procedure Laterality Date   • TOE AMPUTATION Right 1/12/2019    Procedure: TOE AMPUTATION;  Surgeon: Nicanor Reddy M.D.;  Location: SURGERY [facility];  Service: Orthopedics   • GASTROSCOPY-ENDO N/A 8/25/2018    Procedure: GASTROSCOPY-ENDO;  Surgeon: Jaswant Frye M.D.;  Location: ENDOSCOPY [facility];  Service: Gastroenterology   • OTHER ABDOMINAL SURGERY       Family History   Problem Relation Age of Onset   • No Known Problems Mother    • No Known Problems Father      Social History     Socioeconomic History   • Marital status: Single     Spouse name: Not on file   • Number of children: Not on file   • Years of education: Not on file   • " Highest education level: Not on file   Occupational History   • Not on file   Social Needs   • Financial resource strain: Not on file   • Food insecurity:     Worry: Not on file     Inability: Not on file   • Transportation needs:     Medical: Not on file     Non-medical: Not on file   Tobacco Use   • Smoking status: Current Every Day Smoker     Packs/day: 0.25     Years: 15.00     Pack years: 3.75   • Smokeless tobacco: Never Used   • Tobacco comment: Trying to quit   Substance and Sexual Activity   • Alcohol use: No   • Drug use: No   • Sexual activity: Not Currently   Lifestyle   • Physical activity:     Days per week: Not on file     Minutes per session: Not on file   • Stress: Not on file   Relationships   • Social connections:     Talks on phone: Not on file     Gets together: Not on file     Attends Rastafarian service: Not on file     Active member of club or organization: Not on file     Attends meetings of clubs or organizations: Not on file     Relationship status: Not on file   • Intimate partner violence:     Fear of current or ex partner: Not on file     Emotionally abused: Not on file     Physically abused: Not on file     Forced sexual activity: Not on file   Other Topics Concern   • Not on file   Social History Narrative   • Not on file     Current Outpatient Medications on File Prior to Visit   Medication Sig Dispense Refill   • pancrelipase, Lip-Prot-Amyl, (CREON 37294) [id number] units Cap DR Particles Take  by mouth 3 times a day, with meals.     • tamsulosin (FLOMAX) 0.4 MG capsule Take 1 Cap by mouth ONE-HALF HOUR AFTER BREAKFAST. 30 Cap 5   • metFORMIN ER (GLUCOPHAGE XR) 500 MG TABLET SR 24 HR Take 2 Tabs by mouth 2 times a day. 120 Tab 3   • lisinopril (PRINIVIL) 20 MG Tab Take 1 Tab by mouth every day. 90 Tab 3   • Lactobacillus-Inulin (CULTUREE DIGESTIVE HEALTH PO) Take 1 Dose by mouth.     • insulin detemir (LEVEMIR) 100 UNIT/ML Solution Inject 14 Units as instructed every evening.     •  insulin aspart (NOVOLOG) 100 UNIT/ML Solution Inject 6-8 Units as instructed 3 times a day before meals. 6 units QAM  7 units Noon  8 units QPM     • pantoprazole (PROTONIX) 40 MG Tablet Delayed Response Take 40 mg by mouth every day.     • atorvastatin (LIPITOR) 10 MG Tab Take 1 tablet by mouth daily to prevent heart attacks and strokes 30 Tab 5   • Cholecalciferol (VITAMIN D) 2000 units Cap Take 2 Caps by mouth every day. 60 Cap 5   • aspirin (ASPIRIN 81) 81 MG Chew Tab chewable tablet Take 1 Tab by mouth every day. 30 Tab 5   • memantine (NAMENDA) 5 MG Tab TAKE 1 TAB BY MOUTH TWICE DAILY 60 Tab 5     No current facility-administered medications on file prior to visit.      Encounter Vitals  Standard Vitals  Vitals  Blood Pressure: 138/70  Temperature: 36.4 °C (97.6 °F)  Temp src: Temporal  Pulse: (!) 101  Respiration: 16  Pulse Oximetry: 95 %  Height: 182.9 cm (6')  Weight: 70.7 kg (155 lb 12.8 oz)  Encounter Vitals  Temperature: 36.4 °C (97.6 °F)  Temp src: Temporal  Blood Pressure: 138/70  Pulse: (!) 101  Respiration: 16  Pulse Oximetry: 95 %  Weight: 70.7 kg (155 lb 12.8 oz)  Height: 182.9 cm (6')  BMI (Calculated): 21.13  Physical examination   Constitutional: Well-developed, well-nourished, good hygiene. Appears stated age.  Cardiovascular: RRR, with no murmurs, rubs or gallops. No carotid bruits.   Respiratory: Lungs CTA B/L, no W/R/R.   Abdomen: Soft Non-tender to Palpation. Non-distended.  Extremities: No peripheral edema, pedal pulses intact.   Skin: Warm, dry, intact. No rashes observed.  Eyes: Sclera anicteric              Funduscopic: Optic discs flat with no evidence of papilledema or pallor.   Neurologic:              Mental Status: Awake, alert, oriented x 3.              Speech: Fluent with normal prosody.              Memory: MOCA 23/30               Concentration: Attentive. Able to focus on history and follow multi-step commands.                         Fund of Knowledge: Appropriate               Cranial Nerves:                          CN II: PERRL. No afferent pupillary defect.                          CN III, IV, VI: Good eye closure, EOMI.                           CN V: Facial sensation intact and symmetric.                           CN VII: Peripheral facial palsy left sided (patient states this happened many years ago for unknown reason )                           CN VIII: Hearing intact.                           CN IX and X: Palate elevates symmetrically. Normal gag reflex.                          CN XI: Symmetric shoulder shrug.                           CN XII: Tongue midline.               Sensory: Intact light touch, vibration and temperature.               Coordination: No evidence of past-pointing on finger to nose testing, no dysdiadochokinesia. Heel to shin intact.                        DTR's: 2+ throughout without clonus.               Babinski: Toes downgoing bilaterally.              Romberg: Negative.              Movements: No tremors observed.   Musculoskeletal:               Strength: 5/5 in upper and lower extremities in RUE and RLE  4-/5 in LUE and 5-/5 LLE e.              Joints: No swelling.                  Gait: Steady, narrow based.               Tone: Normal bulk and tone     Imaging, labs and other investigations reviewed  MRI brain personally reviewed and discussed with patient   Moderate microvascular ischemic changes  No stroke or other structural lesions    MRA head and neck 6/2019 [facility]    Neuropsychological testing :   Moderate cognitive dysfunction likely 2/2 Wernicke Korsakoff  DM encephalopathy and also multiple TBIs in the past    Impression and plan  Cognitive dysfunction   Likely 2/2 WKS, DM,TBI  No AD or other dementia pattern a this time  Labs wnl  Recommended ginko biloba, ceonzyme q10, omega 3   MV  No clear benefit in starting Namenda (memantine) and he does not feel this is helpful - I gave him an option to decide if he wishes to stay on this medication or  not  Engage brain in activities, exercise, healthy diet and socialization     RTC prn

## 2019-10-21 ENCOUNTER — TELEPHONE (OUTPATIENT)
Dept: MEDICAL GROUP | Facility: MEDICAL CENTER | Age: 59
End: 2019-10-21

## 2019-10-21 NOTE — TELEPHONE ENCOUNTER
This was not stopped by me but apparently by his neurologist  so they should contact their office for a discharge order if he stopped it.

## 2019-10-21 NOTE — TELEPHONE ENCOUNTER
1. Caller Name: [facility]                                       Call Back Number: [phone]      Called stating they need a DC order to be faxed to them at [phone] for the Memantine RX             Patient approves a detailed voicemail message: N\A

## 2019-11-06 DIAGNOSIS — E78.5 DYSLIPIDEMIA: ICD-10-CM

## 2019-11-06 DIAGNOSIS — I10 ESSENTIAL HYPERTENSION: ICD-10-CM

## 2019-11-06 DIAGNOSIS — E11.00 UNCONTROLLED TYPE 2 DIABETES MELLITUS WITH HYPEROSMOLARITY WITHOUT COMA (HCC): ICD-10-CM

## 2019-11-06 RX ORDER — ATORVASTATIN CALCIUM 10 MG/1
TABLET, FILM COATED ORAL
Qty: 30 TAB | Refills: 6 | Status: SHIPPED | OUTPATIENT
Start: 2019-11-06 | End: 2019-12-10 | Stop reason: SDUPTHER

## 2019-11-06 RX ORDER — LACTOBACILLUS RHAMNOSUS GG 10B CELL
CAPSULE ORAL
Qty: 30 CAP | Refills: 6 | Status: SHIPPED
Start: 2019-11-06 | End: 2020-03-24

## 2019-11-06 RX ORDER — ASPIRIN 81 MG/1
TABLET, CHEWABLE ORAL
Qty: 30 TAB | Refills: 6 | Status: SHIPPED | OUTPATIENT
Start: 2019-11-06 | End: 2019-12-10 | Stop reason: SDUPTHER

## 2019-11-06 RX ORDER — LISINOPRIL 20 MG/1
TABLET ORAL
Qty: 30 TAB | Refills: 6 | Status: SHIPPED | OUTPATIENT
Start: 2019-11-06 | End: 2019-12-10 | Stop reason: SDUPTHER

## 2019-11-06 RX ORDER — PANTOPRAZOLE SODIUM 40 MG/1
TABLET, DELAYED RELEASE ORAL
Qty: 30 TAB | Refills: 3 | Status: SHIPPED | OUTPATIENT
Start: 2019-11-06 | End: 2019-12-10 | Stop reason: SDUPTHER

## 2019-11-06 RX ORDER — TAMSULOSIN HYDROCHLORIDE 0.4 MG/1
CAPSULE ORAL
Qty: 30 CAP | Refills: 3 | Status: SHIPPED | OUTPATIENT
Start: 2019-11-06 | End: 2019-12-10 | Stop reason: SDUPTHER

## 2019-11-06 RX ORDER — CHOLECALCIFEROL (VITAMIN D3) 50 MCG
TABLET ORAL
Qty: 60 TAB | Refills: 3 | Status: SHIPPED | OUTPATIENT
Start: 2019-11-06 | End: 2019-12-10 | Stop reason: SDUPTHER

## 2019-11-18 RX ORDER — PANCRELIPASE 24000; 76000; 120000 [USP'U]/1; [USP'U]/1; [USP'U]/1
CAPSULE, DELAYED RELEASE PELLETS ORAL
Qty: 60 CAP | Refills: 3 | Status: SHIPPED | OUTPATIENT
Start: 2019-11-18 | End: 2019-12-10 | Stop reason: SDUPTHER

## 2019-11-19 ENCOUNTER — TELEPHONE (OUTPATIENT)
Dept: NEUROLOGY | Facility: MEDICAL CENTER | Age: 59
End: 2019-11-19

## 2019-11-19 NOTE — TELEPHONE ENCOUNTER
Assisted living place is requesting written orders for the following:    Omega 3 take 1 qd    CO  Q-10 take  1 qd     Multi Vit take 1 qd    Ginkgo Biloba take 1 qd

## 2019-11-25 ENCOUNTER — OFFICE VISIT (OUTPATIENT)
Dept: ENDOCRINOLOGY | Facility: MEDICAL CENTER | Age: 59
End: 2019-11-25
Payer: COMMERCIAL

## 2019-11-25 VITALS
BODY MASS INDEX: 19.02 KG/M2 | SYSTOLIC BLOOD PRESSURE: 116 MMHG | OXYGEN SATURATION: 93 % | WEIGHT: 153 LBS | HEART RATE: 84 BPM | DIASTOLIC BLOOD PRESSURE: 62 MMHG | HEIGHT: 75 IN

## 2019-11-25 DIAGNOSIS — I10 ESSENTIAL HYPERTENSION: ICD-10-CM

## 2019-11-25 DIAGNOSIS — E11.00 UNCONTROLLED TYPE 2 DIABETES MELLITUS WITH HYPEROSMOLARITY WITHOUT COMA (HCC): ICD-10-CM

## 2019-11-25 PROCEDURE — 99204 OFFICE O/P NEW MOD 45 MIN: CPT | Performed by: PHYSICIAN ASSISTANT

## 2019-11-25 NOTE — PATIENT INSTRUCTIONS
STOP;  NOVOLOG  Metformin    Start:  1.  Start with Ozempic 0.25 once a week for 4 weeks then INCREASE to 0.5 for three weeks then increase to 1.0 (if you have a sample pen 0.5 +0.5 so take two injections this will equal 1.0). Ozempic can cause nausea and upset stomach feelings but this generally only lasts a few days.  If this persists longer than 2 weeks and is not tolerable please discontinue the medication and let us know of the issue.  2.  Synjardy 10/1000 one pill per day  Starting on an SGLT2  medication will concentrates sugar in the urin and can cause yeast infections or bladder infections.  To stop this from happening please drink plenty of water and use wet wipes or wet toilet paper after every time you urinate.   3.  Levemir 14 units at night before bed (this is unchanged)

## 2019-11-25 NOTE — LETTER
November 25, 2019        Pawel Tatum  [address]  [place] [address]        To Whom it may concern:      STOP;  NOVOLOG  Metformin    Start:  1.  Start with Ozempic 0.25 once a week for 4 weeks then INCREASE to 0.5 for three weeks then increase to 1.0 (if you have a sample pen 0.5 +0.5 so take two injections this will equal 1.0). Ozempic can cause nausea and upset stomach feelings but this generally only lasts a few days.  If this persists longer than 2 weeks and is not tolerable please discontinue the medication and let us know of the issue.  2.  Synjardy 10/1000 one pill per day  Starting on an SGLT2  medication will concentrates sugar in the urin and can cause yeast infections or bladder infections.  To stop this from happening please drink plenty of water and use wet wipes or wet toilet paper after every time you urinate.     3.  Novolog can be used 2 units if blood glucose readings are above 250.  Otherwise please stop.        If you have any questions or concerns, please don't hesitate to call.        Sincerely,        Nick Herman P.A.-C. BC-ADM  Board Certified- [employer] Diabetes [facility]    Electronically Signed

## 2019-11-25 NOTE — PROGRESS NOTES
Return to office Patient Consult Note  Referred by: JAKE Armando    Reason for consult: Diabetes Management Type 2    HPI:  Pawel Tatum is a 59 y.o. old patient who is seeing us today for diabetes care.  This is a pleasant patient with diabetes and I appreciate the opportunity to participate in the care of this patient.    Labs of 10/1/2019 HbA1c is 6.9, GFR >60, c-peptide 1.0, KRISTY-65 negatvie    BG Diary:11/25/2019  In the AM  No log      1. Uncontrolled type 2 diabetes mellitus with hyperosmolarity without coma (HCC)  This is a new patient on 11/25/19  They are on:  1.  Metformin ER 500mg two twice a day  2.  Levemir 14 units at night  3.  Novolog 5 before breakfast and lunch and 7 before dinner    STOP;  NOVOLOG  Metformin    Start:  1.  Start with Ozempic 0.25 once a week for 3 weeks then INCREASE to 0.5 for three weeks then increase to 1.0 (if you have a sample pen 0.5 +0.5 so take two injections this will equal 1.0). Ozempic can cause nausea and upset stomach feelings but this generally only lasts a few days.  If this persists longer than 2 weeks and is not tolerable please discontinue the medication and let us know of the issue.  2.  Synjardy 10/1000 one pill per day    2. Essential hypertension  This is stable today and no new changes are needed or required in today's visit      ROS:   Constitutional: No night sweats.  Eyes:  No visual changes.  Cardiac: No chest pain, No palpitations or racing heart rate.  Resp: No shortness of breath, No cough,   Gi: No Diarrhea      All other systems were reviewed and were/are negative.      Past Medical History:  Patient Active Problem List    Diagnosis Date Noted   • Type 2 diabetes mellitus with hyperglycemia, with long-term current use of insulin ([facility]) 11/26/2018     Priority: High   • Essential hypertension 08/16/2018     Priority: Medium   • Peripheral neuropathy 01/11/2019     Priority: Low   • Chronic pancreatitis ([facility]) 11/22/2018     Priority:  Low   • Dyslipidemia 10/04/2018     Priority: Low   • Memory loss 10/04/2018     Priority: Low   • Tobacco abuse 08/24/2018     Priority: Low   • Vitamin D deficiency 08/16/2018     Priority: Low   • H/O Bell's palsy 08/20/2019   • Recurrent major depressive disorder, in full remission ([facility]) 10/04/2018   • Uncontrolled type 2 diabetes mellitus with hyperosmolarity without coma ([facility]) 08/16/2018   • Diabetic peripheral angiopathy ([facility]) 08/16/2018       Past Surgical History:  Past Surgical History:   Procedure Laterality Date   • TOE AMPUTATION Right 1/12/2019    Procedure: TOE AMPUTATION;  Surgeon: Nicanor Reddy M.D.;  Location: SURGERY [facility];  Service: Orthopedics   • GASTROSCOPY-ENDO N/A 8/25/2018    Procedure: GASTROSCOPY-ENDO;  Surgeon: Jaswant Frye M.D.;  Location: ENDOSCOPY [facility];  Service: Gastroenterology   • OTHER ABDOMINAL SURGERY         Allergies:  Patient has no known allergies.    Social History:  Social History     Socioeconomic History   • Marital status: Single     Spouse name: Not on file   • Number of children: Not on file   • Years of education: Not on file   • Highest education level: Not on file   Occupational History   • Not on file   Social Needs   • Financial resource strain: Not on file   • Food insecurity:     Worry: Not on file     Inability: Not on file   • Transportation needs:     Medical: Not on file     Non-medical: Not on file   Tobacco Use   • Smoking status: Current Every Day Smoker     Packs/day: 0.25     Years: 15.00     Pack years: 3.75   • Smokeless tobacco: Never Used   • Tobacco comment: Trying to quit   Substance and Sexual Activity   • Alcohol use: No   • Drug use: No   • Sexual activity: Not Currently   Lifestyle   • Physical activity:     Days per week: Not on file     Minutes per session: Not on file   • Stress: Not on file   Relationships   • Social connections:     Talks on phone: Not on file     Gets together: Not on file     Attends  Yazdanism service: Not on file     Active member of club or organization: Not on file     Attends meetings of clubs or organizations: Not on file     Relationship status: Not on file   • Intimate partner violence:     Fear of current or ex partner: Not on file     Emotionally abused: Not on file     Physically abused: Not on file     Forced sexual activity: Not on file   Other Topics Concern   • Not on file   Social History Narrative   • Not on file       Family History:  Family History   Problem Relation Age of Onset   • No Known Problems Mother    • No Known Problems Father        Medications:    Current Outpatient Medications:   •  CREON 16745-75152 units Cap DR Particles, TAKE (2) CAPSULES BY MOUTH ONCE DAILY, Disp: 60 Cap, Rfl: 3  •  Cholecalciferol (VITAMIN D) 2000 UNIT Tab, TAKE (2) TABLETS BY MOUTH ONCE DAILY., Disp: 60 Tab, Rfl: 3  •  tamsulosin (FLOMAX) 0.4 MG capsule, TAKE (1) CAPSULE BY MOUTH ONCE DAILY 1/2 HOUR AFTER BREAKFAST., Disp: 30 Cap, Rfl: 3  •  pantoprazole (PROTONIX) 40 MG Tablet Delayed Response, TAKE 1 TABLET BY MOUTH ONCE DAILY., Disp: 30 Tab, Rfl: 3  •  lisinopril (PRINIVIL) 20 MG Tab, TAKE 1 TABLET BY MOUTH ONCE DAILY., Disp: 30 Tab, Rfl: 6  •  Lactobacillus-Inulin (CULTURELLE DIGESTIVE HEALTH) Cap, TAKE (1) CAPSULE BY MOUTH ONCE DAILY., Disp: 30 Cap, Rfl: 6  •  atorvastatin (LIPITOR) 10 MG Tab, TAKE 1 TABLET BY MOUTH EVERY NIGHT AS DIRECTED., Disp: 30 Tab, Rfl: 6  •  aspirin (ASA) 81 MG Chew Tab chewable tablet, CHEW 1 TABLET BY MOUTH ONCE DAILY., Disp: 30 Tab, Rfl: 6  •  metFORMIN ER (GLUCOPHAGE XR) 500 MG TABLET SR 24 HR, Take 2 Tabs by mouth 2 times a day., Disp: 120 Tab, Rfl: 3  •  Lactobacillus-Inulin ([employer] HEALTH PO), Take 1 Dose by mouth., Disp: , Rfl:   •  insulin detemir (LEVEMIR) 100 UNIT/ML Solution, Inject 14 Units as instructed every evening., Disp: , Rfl:   •  insulin aspart (NOVOLOG) 100 UNIT/ML Solution, Inject 6-8 Units as instructed 3 times a day before  "meals. 6 units QAM 7 units Noon 8 units QPM, Disp: , Rfl:   •  Cholecalciferol (VITAMIN D) 2000 units Cap, Take 2 Caps by mouth every day., Disp: 60 Cap, Rfl: 5        Physical Examination:   Vital signs: /62 (BP Location: Left arm, Patient Position: Sitting)   Pulse 84   Ht 1.905 m (6' 3\")   Wt 69.4 kg (153 lb)   SpO2 93%   BMI 19.12 kg/m²   General: No distress, cooperative, well dressed and well nourished.   Eyes: No scleral icterus or discharge, No hyposphagma  ENMT: Normal on external inspection of nose, lips, No nasal drainage   Neck: No abnormal masses on inspection  Resp: Normal effort, Bilateral clear to auscultation, No wheezing, No rales  CVS: Regular rate and rhythm, S1 S2 normal, No murmur. No gallop  Extremities: No edema bilateral extremities  Neuro: Alert and oriented  Skin: No rash, No Ulcers  Psych: Normal mood and affect      Assessment and Plan:    1. Uncontrolled type 2 diabetes mellitus with hyperosmolarity without coma (HCC)    STOP;  NOVOLOG  Metformin    Start:  1.  Start with Ozempic 0.25 once a week for 4 weeks then INCREASE to 0.5 for three weeks then increase to 1.0 (if you have a sample pen 0.5 +0.5 so take two injections this will equal 1.0). Ozempic can cause nausea and upset stomach feelings but this generally only lasts a few days.  If this persists longer than 2 weeks and is not tolerable please discontinue the medication and let us know of the issue.  2.  Synjardy 10/1000 one pill per day  Starting on an SGLT2  medication will concentrates sugar in the urin and can cause yeast infections or bladder infections.  To stop this from happening please drink plenty of water and use wet wipes or wet toilet paper after every time you urinate.   3.  Levemir 14 units at night before bed (this is unchanged)      2. Essential hypertension  This is stable today and no new changes are needed or required in today's visit      Return in about 6 weeks (around 1/6/2020).    This patient " during there office visit today was started on a new medication Ozempic and Synjardy.  Side effects of the new medication were discussed with the patient today in the office.       Thank you kindly for allowing me to participate in the diabetes care plan for this patient.    Nick Herman PA-C, [facility]  Board Certified - Advanced Diabetes Management  11/25/19    CC:   JAKE Armando

## 2019-12-02 ENCOUNTER — TELEPHONE (OUTPATIENT)
Dept: ENDOCRINOLOGY | Facility: MEDICAL CENTER | Age: 59
End: 2019-12-02

## 2019-12-03 NOTE — TELEPHONE ENCOUNTER
Was the patient seen in the last year in this department? Yes11/25/19    Does patient have an active prescription for medications requested? No     Received Request Via: Pharmacy requesting Rx for both    PER LOV 11/25/19:    Ozempic 0.25 once a week for 3 weeks then INCREASE to 0.5 for three weeks then increase to 1.0 (if you have a sample pen 0.5 +0.5 so take two injections this will equal 1.0). Ozempic can cause nausea and upset stomach feelings but this generally only lasts a few days.  If this persists longer than 2 weeks and is not tolerable please discontinue the medication and let us know of the issue.    Synjardy 10/1000 one pill per day

## 2019-12-09 ENCOUNTER — TELEPHONE (OUTPATIENT)
Dept: ENDOCRINOLOGY | Facility: MEDICAL CENTER | Age: 59
End: 2019-12-09

## 2019-12-09 NOTE — TELEPHONE ENCOUNTER
VOICEMAIL 12/06/19  1. Caller Name: Maxwell Waldrop                      Call Back Number: [phone]    2. Message: would like to discuss this Pt and his medications because he has had high blood sugars over the past week and the Pt is getting very worried as well as him. Pt has a Dexcom please review the report     3. Patient approves office to leave a detailed voicemail/MyChart message: yes      Phone Number Called: [phone]    Call outcome: Spoke to Maxwell Waldrop    Message: Maxwell is a  Private duty nurse hired by the Pt sister. He is a caretaker for the Pt and is included in the Pt care group. He stated that he was not at the LOV with the Pt but he knows that his meds were adjusted and he is concerned because some of his levels have gone over 300. Pt is currently on synjardy 10/1000 QD, Levemir 14U QHS, and just started on the Ozempic at 0.25 per week. He would libe Nick to look at the Dexcom report that I have scanned into the Pt chart. I did inform him that as we titrate the Pt up to the full dose of the Ozempic we do expect some spike in the blood glucose but I will flag this for Nick and ask that he take a look at the report. He is wondering if the Pt should be increasing his Levemir while we are on the low dose of the ozempic?     Dexcom report scanned into media (dated 12/06/19)

## 2019-12-10 DIAGNOSIS — E11.00 UNCONTROLLED TYPE 2 DIABETES MELLITUS WITH HYPEROSMOLARITY WITHOUT COMA (HCC): ICD-10-CM

## 2019-12-10 DIAGNOSIS — E78.5 DYSLIPIDEMIA: ICD-10-CM

## 2019-12-10 DIAGNOSIS — I10 ESSENTIAL HYPERTENSION: ICD-10-CM

## 2019-12-10 RX ORDER — CHOLECALCIFEROL (VITAMIN D3) 50 MCG
2000 TABLET ORAL DAILY
Qty: 60 TAB | Refills: 11 | Status: SHIPPED
Start: 2019-12-10 | End: 2019-12-19

## 2019-12-10 RX ORDER — TAMSULOSIN HYDROCHLORIDE 0.4 MG/1
0.4 CAPSULE ORAL DAILY
Qty: 30 CAP | Refills: 11 | Status: SHIPPED | OUTPATIENT
Start: 2019-12-10 | End: 2020-03-24 | Stop reason: SDUPTHER

## 2019-12-10 RX ORDER — ATORVASTATIN CALCIUM 10 MG/1
TABLET, FILM COATED ORAL
Qty: 30 TAB | Refills: 6 | Status: ON HOLD
Start: 2019-12-10 | End: 2020-01-06

## 2019-12-10 RX ORDER — LISINOPRIL 20 MG/1
TABLET ORAL
Qty: 30 TAB | Refills: 11 | Status: ON HOLD | OUTPATIENT
Start: 2019-12-10 | End: 2020-01-06 | Stop reason: SDUPTHER

## 2019-12-10 RX ORDER — PANTOPRAZOLE SODIUM 40 MG/1
TABLET, DELAYED RELEASE ORAL
Qty: 30 TAB | Refills: 11 | Status: ON HOLD
Start: 2019-12-10 | End: 2020-01-06

## 2019-12-10 RX ORDER — ASPIRIN 81 MG/1
TABLET, CHEWABLE ORAL
Qty: 30 TAB | Refills: 11 | Status: SHIPPED | OUTPATIENT
Start: 2019-12-10 | End: 2020-03-24 | Stop reason: SDUPTHER

## 2019-12-16 NOTE — TELEPHONE ENCOUNTER
"\"I left a message to increase the ozempic to 0.5 and Increase the levemir to 20 units at night\"    -Nick   "

## 2019-12-18 ENCOUNTER — TELEPHONE (OUTPATIENT)
Dept: ENDOCRINOLOGY | Facility: MEDICAL CENTER | Age: 59
End: 2019-12-18

## 2019-12-18 NOTE — TELEPHONE ENCOUNTER
VOICEMAIL  1. Caller Name: Maxwell NUrse at [facility] re Pawel Tatum                   Call Back Number:[phone]      2. Message: nurse  called wanting to let you know pt bs have been at 400 in the morning and patient does not want to eat due to his elevated blood sugars  Please advise    3. Patient approves office to leave a detailed voicemail/MyChart message: yes

## 2019-12-19 ENCOUNTER — HOSPITAL ENCOUNTER (INPATIENT)
Facility: MEDICAL CENTER | Age: 59
LOS: 18 days | DRG: 637 | End: 2020-01-06
Attending: EMERGENCY MEDICINE | Admitting: HOSPITALIST
Payer: COMMERCIAL

## 2019-12-19 ENCOUNTER — APPOINTMENT (OUTPATIENT)
Dept: ENDOCRINOLOGY | Facility: MEDICAL CENTER | Age: 59
End: 2019-12-19
Payer: COMMERCIAL

## 2019-12-19 ENCOUNTER — APPOINTMENT (OUTPATIENT)
Dept: CARDIOLOGY | Facility: MEDICAL CENTER | Age: 59
DRG: 637 | End: 2019-12-19
Attending: INTERNAL MEDICINE
Payer: COMMERCIAL

## 2019-12-19 ENCOUNTER — APPOINTMENT (OUTPATIENT)
Dept: RADIOLOGY | Facility: MEDICAL CENTER | Age: 59
DRG: 637 | End: 2019-12-19
Attending: HOSPITALIST
Payer: COMMERCIAL

## 2019-12-19 ENCOUNTER — APPOINTMENT (OUTPATIENT)
Dept: RADIOLOGY | Facility: MEDICAL CENTER | Age: 59
DRG: 637 | End: 2019-12-19
Attending: EMERGENCY MEDICINE
Payer: COMMERCIAL

## 2019-12-19 DIAGNOSIS — E10.65 UNCONTROLLED TYPE 1 DIABETES MELLITUS WITH HYPERGLYCEMIA (HCC): ICD-10-CM

## 2019-12-19 DIAGNOSIS — E10.10 DIABETIC KETOACIDOSIS WITHOUT COMA ASSOCIATED WITH TYPE 1 DIABETES MELLITUS (HCC): ICD-10-CM

## 2019-12-19 DIAGNOSIS — N17.9 AKI (ACUTE KIDNEY INJURY) (HCC): ICD-10-CM

## 2019-12-19 DIAGNOSIS — I95.9 HYPOTENSION, UNSPECIFIED HYPOTENSION TYPE: ICD-10-CM

## 2019-12-19 DIAGNOSIS — L03.113 CELLULITIS OF RIGHT UPPER EXTREMITY: ICD-10-CM

## 2019-12-19 DIAGNOSIS — E10.11 DIABETIC KETOACIDOSIS WITH COMA ASSOCIATED WITH TYPE 1 DIABETES MELLITUS (HCC): ICD-10-CM

## 2019-12-19 DIAGNOSIS — I10 ESSENTIAL HYPERTENSION: ICD-10-CM

## 2019-12-19 PROBLEM — E87.29 METABOLIC ACIDOSIS, INCREASED ANION GAP: Status: ACTIVE | Noted: 2019-12-19

## 2019-12-19 PROBLEM — E87.1 HYPONATREMIA: Status: ACTIVE | Noted: 2019-12-19

## 2019-12-19 LAB
ALBUMIN SERPL BCP-MCNC: 3.6 G/DL (ref 3.2–4.9)
ALBUMIN/GLOB SERPL: 1.7 G/DL
ALP SERPL-CCNC: 74 U/L (ref 30–99)
ALT SERPL-CCNC: 10 U/L (ref 2–50)
ANION GAP SERPL CALC-SCNC: 13 MMOL/L (ref 0–11.9)
ANION GAP SERPL CALC-SCNC: 23 MMOL/L (ref 0–11.9)
ANISOCYTOSIS BLD QL SMEAR: ABNORMAL
APPEARANCE UR: CLEAR
APTT PPP: 24.5 SEC (ref 24.7–36)
AST SERPL-CCNC: 10 U/L (ref 12–45)
B-OH-BUTYR SERPL-MCNC: 5.33 MMOL/L (ref 0.02–0.27)
BASOPHILS # BLD AUTO: 0 % (ref 0–1.8)
BASOPHILS # BLD AUTO: 0.2 % (ref 0–1.8)
BASOPHILS # BLD: 0 K/UL (ref 0–0.12)
BASOPHILS # BLD: 0.02 K/UL (ref 0–0.12)
BILIRUB SERPL-MCNC: 0.5 MG/DL (ref 0.1–1.5)
BILIRUB UR QL STRIP.AUTO: NEGATIVE
BUN SERPL-MCNC: 125 MG/DL (ref 8–22)
BUN SERPL-MCNC: 163 MG/DL (ref 8–22)
BURR CELLS BLD QL SMEAR: NORMAL
CALCIUM SERPL-MCNC: 6.7 MG/DL (ref 8.5–10.5)
CALCIUM SERPL-MCNC: 8.4 MG/DL (ref 8.5–10.5)
CHLORIDE SERPL-SCNC: 110 MMOL/L (ref 96–112)
CHLORIDE SERPL-SCNC: 93 MMOL/L (ref 96–112)
CO2 SERPL-SCNC: 13 MMOL/L (ref 20–33)
CO2 SERPL-SCNC: 13 MMOL/L (ref 20–33)
COLOR UR: YELLOW
CREAT SERPL-MCNC: 1.61 MG/DL (ref 0.5–1.4)
CREAT SERPL-MCNC: 2.33 MG/DL (ref 0.5–1.4)
EKG IMPRESSION: NORMAL
EOSINOPHIL # BLD AUTO: 0 K/UL (ref 0–0.51)
EOSINOPHIL # BLD AUTO: 0.01 K/UL (ref 0–0.51)
EOSINOPHIL NFR BLD: 0 % (ref 0–6.9)
EOSINOPHIL NFR BLD: 0.1 % (ref 0–6.9)
ERYTHROCYTE [DISTWIDTH] IN BLOOD BY AUTOMATED COUNT: 52.3 FL (ref 35.9–50)
ERYTHROCYTE [DISTWIDTH] IN BLOOD BY AUTOMATED COUNT: 53.1 FL (ref 35.9–50)
GLOBULIN SER CALC-MCNC: 2.1 G/DL (ref 1.9–3.5)
GLUCOSE BLD-MCNC: 111 MG/DL (ref 65–99)
GLUCOSE BLD-MCNC: 124 MG/DL (ref 65–99)
GLUCOSE BLD-MCNC: 211 MG/DL (ref 65–99)
GLUCOSE BLD-MCNC: 263 MG/DL (ref 65–99)
GLUCOSE BLD-MCNC: 307 MG/DL (ref 65–99)
GLUCOSE BLD-MCNC: 95 MG/DL (ref 65–99)
GLUCOSE BLD-MCNC: 98 MG/DL (ref 65–99)
GLUCOSE SERPL-MCNC: 439 MG/DL (ref 65–99)
GLUCOSE SERPL-MCNC: 442 MG/DL (ref 65–99)
GLUCOSE UR STRIP.AUTO-MCNC: >=1000 MG/DL
HCT VFR BLD AUTO: 21.5 % (ref 42–52)
HCT VFR BLD AUTO: 31.3 % (ref 42–52)
HGB BLD-MCNC: 10.7 G/DL (ref 14–18)
HGB BLD-MCNC: 7.2 G/DL (ref 14–18)
IMM GRANULOCYTES # BLD AUTO: 0.22 K/UL (ref 0–0.11)
IMM GRANULOCYTES NFR BLD AUTO: 1.8 % (ref 0–0.9)
INR PPP: 1.04 (ref 0.87–1.13)
KETONES UR STRIP.AUTO-MCNC: ABNORMAL MG/DL
LACTATE BLD-SCNC: 2.3 MMOL/L (ref 0.5–2)
LACTATE BLD-SCNC: 2.5 MMOL/L (ref 0.5–2)
LACTATE BLD-SCNC: 3.2 MMOL/L (ref 0.5–2)
LEUKOCYTE ESTERASE UR QL STRIP.AUTO: NEGATIVE
LV EJECT FRACT  99904: 65
LYMPHOCYTES # BLD AUTO: 1.31 K/UL (ref 1–4.8)
LYMPHOCYTES # BLD AUTO: 1.69 K/UL (ref 1–4.8)
LYMPHOCYTES NFR BLD: 13.8 % (ref 22–41)
LYMPHOCYTES NFR BLD: 7 % (ref 22–41)
MACROCYTES BLD QL SMEAR: ABNORMAL
MAGNESIUM SERPL-MCNC: 1.8 MG/DL (ref 1.5–2.5)
MANUAL DIFF BLD: NORMAL
MCH RBC QN AUTO: 31.6 PG (ref 27–33)
MCH RBC QN AUTO: 32.5 PG (ref 27–33)
MCHC RBC AUTO-ENTMCNC: 33.3 G/DL (ref 33.7–35.3)
MCHC RBC AUTO-ENTMCNC: 34.2 G/DL (ref 33.7–35.3)
MCV RBC AUTO: 94.7 FL (ref 81.4–97.8)
MCV RBC AUTO: 95.1 FL (ref 81.4–97.8)
MICRO URNS: ABNORMAL
MONOCYTES # BLD AUTO: 1.14 K/UL (ref 0–0.85)
MONOCYTES # BLD AUTO: 1.35 K/UL (ref 0–0.85)
MONOCYTES NFR BLD AUTO: 11 % (ref 0–13.4)
MONOCYTES NFR BLD AUTO: 6.1 % (ref 0–13.4)
MORPHOLOGY BLD-IMP: NORMAL
MYELOCYTES NFR BLD MANUAL: 0.9 %
NEUTROPHILS # BLD AUTO: 15.91 K/UL (ref 1.82–7.42)
NEUTROPHILS # BLD AUTO: 8.95 K/UL (ref 1.82–7.42)
NEUTROPHILS NFR BLD: 73.1 % (ref 44–72)
NEUTROPHILS NFR BLD: 85.1 % (ref 44–72)
NITRITE UR QL STRIP.AUTO: NEGATIVE
NRBC # BLD AUTO: 0.04 K/UL
NRBC # BLD AUTO: 0.04 K/UL
NRBC BLD-RTO: 0.2 /100 WBC
NRBC BLD-RTO: 0.3 /100 WBC
PH UR STRIP.AUTO: 5 [PH] (ref 5–8)
PHOSPHATE SERPL-MCNC: 3.3 MG/DL (ref 2.5–4.5)
PLATELET # BLD AUTO: 255 K/UL (ref 164–446)
PLATELET # BLD AUTO: 371 K/UL (ref 164–446)
PLATELET BLD QL SMEAR: NORMAL
PMV BLD AUTO: 11.3 FL (ref 9–12.9)
PMV BLD AUTO: 11.4 FL (ref 9–12.9)
POIKILOCYTOSIS BLD QL SMEAR: NORMAL
POLYCHROMASIA BLD QL SMEAR: NORMAL
POTASSIUM SERPL-SCNC: 3.5 MMOL/L (ref 3.6–5.5)
POTASSIUM SERPL-SCNC: 5.5 MMOL/L (ref 3.6–5.5)
PROMYELOCYTES NFR BLD MANUAL: 0.9 %
PROT SERPL-MCNC: 5.7 G/DL (ref 6–8.2)
PROT UR QL STRIP: NEGATIVE MG/DL
PROTHROMBIN TIME: 13.9 SEC (ref 12–14.6)
RBC # BLD AUTO: 2.25 M/UL (ref 4.7–6.1)
RBC # BLD AUTO: 3.29 M/UL (ref 4.7–6.1)
RBC BLD AUTO: PRESENT
RBC UR QL AUTO: NEGATIVE
SCHISTOCYTES BLD QL SMEAR: NORMAL
SODIUM SERPL-SCNC: 129 MMOL/L (ref 135–145)
SODIUM SERPL-SCNC: 136 MMOL/L (ref 135–145)
SP GR UR STRIP.AUTO: 1.02
TROPONIN T SERPL-MCNC: 24 NG/L (ref 6–19)
UROBILINOGEN UR STRIP.AUTO-MCNC: 0.2 MG/DL
WBC # BLD AUTO: 12.2 K/UL (ref 4.8–10.8)
WBC # BLD AUTO: 18.7 K/UL (ref 4.8–10.8)

## 2019-12-19 PROCEDURE — 700111 HCHG RX REV CODE 636 W/ 250 OVERRIDE (IP): Performed by: EMERGENCY MEDICINE

## 2019-12-19 PROCEDURE — 87086 URINE CULTURE/COLONY COUNT: CPT

## 2019-12-19 PROCEDURE — 93005 ELECTROCARDIOGRAM TRACING: CPT | Performed by: INTERNAL MEDICINE

## 2019-12-19 PROCEDURE — 700111 HCHG RX REV CODE 636 W/ 250 OVERRIDE (IP): Performed by: HOSPITALIST

## 2019-12-19 PROCEDURE — 80053 COMPREHEN METABOLIC PANEL: CPT

## 2019-12-19 PROCEDURE — 82962 GLUCOSE BLOOD TEST: CPT

## 2019-12-19 PROCEDURE — 700105 HCHG RX REV CODE 258: Performed by: HOSPITALIST

## 2019-12-19 PROCEDURE — 304561 HCHG STAT O2

## 2019-12-19 PROCEDURE — 83605 ASSAY OF LACTIC ACID: CPT

## 2019-12-19 PROCEDURE — 84484 ASSAY OF TROPONIN QUANT: CPT

## 2019-12-19 PROCEDURE — 96375 TX/PRO/DX INJ NEW DRUG ADDON: CPT

## 2019-12-19 PROCEDURE — 93308 TTE F-UP OR LMTD: CPT

## 2019-12-19 PROCEDURE — 770022 HCHG ROOM/CARE - ICU (200)

## 2019-12-19 PROCEDURE — 700102 HCHG RX REV CODE 250 W/ 637 OVERRIDE(OP): Performed by: EMERGENCY MEDICINE

## 2019-12-19 PROCEDURE — 84100 ASSAY OF PHOSPHORUS: CPT

## 2019-12-19 PROCEDURE — 85730 THROMBOPLASTIN TIME PARTIAL: CPT

## 2019-12-19 PROCEDURE — 96376 TX/PRO/DX INJ SAME DRUG ADON: CPT

## 2019-12-19 PROCEDURE — 05HA33Z INSERTION OF INFUSION DEVICE INTO LEFT BRACHIAL VEIN, PERCUTANEOUS APPROACH: ICD-10-PCS | Performed by: HOSPITALIST

## 2019-12-19 PROCEDURE — 700102 HCHG RX REV CODE 250 W/ 637 OVERRIDE(OP): Performed by: HOSPITALIST

## 2019-12-19 PROCEDURE — B54NZZA ULTRASONOGRAPHY OF LEFT UPPER EXTREMITY VEINS, GUIDANCE: ICD-10-PCS | Performed by: HOSPITALIST

## 2019-12-19 PROCEDURE — 93005 ELECTROCARDIOGRAM TRACING: CPT

## 2019-12-19 PROCEDURE — 99291 CRITICAL CARE FIRST HOUR: CPT | Performed by: PSYCHIATRY & NEUROLOGY

## 2019-12-19 PROCEDURE — 99291 CRITICAL CARE FIRST HOUR: CPT

## 2019-12-19 PROCEDURE — 85610 PROTHROMBIN TIME: CPT

## 2019-12-19 PROCEDURE — 96367 TX/PROPH/DG ADDL SEQ IV INF: CPT

## 2019-12-19 PROCEDURE — 85025 COMPLETE CBC W/AUTO DIFF WBC: CPT

## 2019-12-19 PROCEDURE — 96365 THER/PROPH/DIAG IV INF INIT: CPT

## 2019-12-19 PROCEDURE — 81003 URINALYSIS AUTO W/O SCOPE: CPT

## 2019-12-19 PROCEDURE — 80048 BASIC METABOLIC PNL TOTAL CA: CPT

## 2019-12-19 PROCEDURE — 93308 TTE F-UP OR LMTD: CPT | Mod: 26 | Performed by: INTERNAL MEDICINE

## 2019-12-19 PROCEDURE — 36415 COLL VENOUS BLD VENIPUNCTURE: CPT

## 2019-12-19 PROCEDURE — 82010 KETONE BODYS QUAN: CPT

## 2019-12-19 PROCEDURE — 83735 ASSAY OF MAGNESIUM: CPT

## 2019-12-19 PROCEDURE — 71045 X-RAY EXAM CHEST 1 VIEW: CPT

## 2019-12-19 PROCEDURE — 85007 BL SMEAR W/DIFF WBC COUNT: CPT

## 2019-12-19 PROCEDURE — 700105 HCHG RX REV CODE 258: Performed by: EMERGENCY MEDICINE

## 2019-12-19 PROCEDURE — 87040 BLOOD CULTURE FOR BACTERIA: CPT

## 2019-12-19 PROCEDURE — 85027 COMPLETE CBC AUTOMATED: CPT

## 2019-12-19 PROCEDURE — 93005 ELECTROCARDIOGRAM TRACING: CPT | Performed by: EMERGENCY MEDICINE

## 2019-12-19 PROCEDURE — 76937 US GUIDE VASCULAR ACCESS: CPT

## 2019-12-19 PROCEDURE — 99223 1ST HOSP IP/OBS HIGH 75: CPT | Performed by: HOSPITALIST

## 2019-12-19 RX ORDER — ACETAMINOPHEN 325 MG/1
650 TABLET ORAL EVERY 6 HOURS PRN
Status: DISCONTINUED | OUTPATIENT
Start: 2019-12-19 | End: 2020-01-06 | Stop reason: HOSPADM

## 2019-12-19 RX ORDER — DEXTROSE, SODIUM CHLORIDE, SODIUM LACTATE, POTASSIUM CHLORIDE, AND CALCIUM CHLORIDE 5; .6; .31; .03; .02 G/100ML; G/100ML; G/100ML; G/100ML; G/100ML
INJECTION, SOLUTION INTRAVENOUS CONTINUOUS
Status: ACTIVE | OUTPATIENT
Start: 2019-12-19 | End: 2019-12-20

## 2019-12-19 RX ORDER — ONDANSETRON 4 MG/1
4 TABLET, ORALLY DISINTEGRATING ORAL EVERY 4 HOURS PRN
Status: DISCONTINUED | OUTPATIENT
Start: 2019-12-19 | End: 2020-01-06 | Stop reason: HOSPADM

## 2019-12-19 RX ORDER — SODIUM CHLORIDE, SODIUM LACTATE, POTASSIUM CHLORIDE, AND CALCIUM CHLORIDE .6; .31; .03; .02 G/100ML; G/100ML; G/100ML; G/100ML
30 INJECTION, SOLUTION INTRAVENOUS
Status: COMPLETED | OUTPATIENT
Start: 2019-12-19 | End: 2019-12-19

## 2019-12-19 RX ORDER — PROCHLORPERAZINE EDISYLATE 5 MG/ML
5-10 INJECTION INTRAMUSCULAR; INTRAVENOUS EVERY 4 HOURS PRN
Status: DISCONTINUED | OUTPATIENT
Start: 2019-12-19 | End: 2019-12-24

## 2019-12-19 RX ORDER — PROMETHAZINE HYDROCHLORIDE 25 MG/1
12.5-25 TABLET ORAL EVERY 4 HOURS PRN
Status: DISCONTINUED | OUTPATIENT
Start: 2019-12-19 | End: 2019-12-24

## 2019-12-19 RX ORDER — SODIUM CHLORIDE, SODIUM LACTATE, POTASSIUM CHLORIDE, CALCIUM CHLORIDE 600; 310; 30; 20 MG/100ML; MG/100ML; MG/100ML; MG/100ML
INJECTION, SOLUTION INTRAVENOUS CONTINUOUS
Status: ACTIVE | OUTPATIENT
Start: 2019-12-19 | End: 2019-12-20

## 2019-12-19 RX ORDER — BISACODYL 10 MG
10 SUPPOSITORY, RECTAL RECTAL
Status: DISCONTINUED | OUTPATIENT
Start: 2019-12-19 | End: 2019-12-25

## 2019-12-19 RX ORDER — TAMSULOSIN HYDROCHLORIDE 0.4 MG/1
0.4 CAPSULE ORAL DAILY
Status: DISCONTINUED | OUTPATIENT
Start: 2019-12-20 | End: 2020-01-06 | Stop reason: HOSPADM

## 2019-12-19 RX ORDER — DEXTROSE AND SODIUM CHLORIDE 10; .45 G/100ML; G/100ML
INJECTION, SOLUTION INTRAVENOUS CONTINUOUS
Status: ACTIVE | OUTPATIENT
Start: 2019-12-19 | End: 2019-12-20

## 2019-12-19 RX ORDER — POTASSIUM CHLORIDE 7.45 MG/ML
10 INJECTION INTRAVENOUS
Status: COMPLETED | OUTPATIENT
Start: 2019-12-19 | End: 2019-12-20

## 2019-12-19 RX ORDER — SODIUM CHLORIDE, SODIUM LACTATE, POTASSIUM CHLORIDE, AND CALCIUM CHLORIDE .6; .31; .03; .02 G/100ML; G/100ML; G/100ML; G/100ML
2000 INJECTION, SOLUTION INTRAVENOUS ONCE
Status: DISCONTINUED | OUTPATIENT
Start: 2019-12-19 | End: 2019-12-19

## 2019-12-19 RX ORDER — OMEPRAZOLE 20 MG/1
20 CAPSULE, DELAYED RELEASE ORAL DAILY
Status: DISCONTINUED | OUTPATIENT
Start: 2019-12-20 | End: 2019-12-21

## 2019-12-19 RX ORDER — MAGNESIUM SULFATE HEPTAHYDRATE 40 MG/ML
4 INJECTION, SOLUTION INTRAVENOUS
Status: COMPLETED | OUTPATIENT
Start: 2019-12-19 | End: 2019-12-20

## 2019-12-19 RX ORDER — SODIUM CHLORIDE, SODIUM LACTATE, POTASSIUM CHLORIDE, AND CALCIUM CHLORIDE .6; .31; .03; .02 G/100ML; G/100ML; G/100ML; G/100ML
30 INJECTION, SOLUTION INTRAVENOUS ONCE
Status: COMPLETED | OUTPATIENT
Start: 2019-12-19 | End: 2019-12-19

## 2019-12-19 RX ORDER — ONDANSETRON 2 MG/ML
4 INJECTION INTRAMUSCULAR; INTRAVENOUS ONCE
Status: COMPLETED | OUTPATIENT
Start: 2019-12-19 | End: 2019-12-19

## 2019-12-19 RX ORDER — SODIUM CHLORIDE, SODIUM LACTATE, POTASSIUM CHLORIDE, CALCIUM CHLORIDE 600; 310; 30; 20 MG/100ML; MG/100ML; MG/100ML; MG/100ML
1000 INJECTION, SOLUTION INTRAVENOUS ONCE
Status: COMPLETED | OUTPATIENT
Start: 2019-12-19 | End: 2019-12-19

## 2019-12-19 RX ORDER — ONDANSETRON 2 MG/ML
4 INJECTION INTRAMUSCULAR; INTRAVENOUS EVERY 4 HOURS PRN
Status: DISCONTINUED | OUTPATIENT
Start: 2019-12-19 | End: 2020-01-06 | Stop reason: HOSPADM

## 2019-12-19 RX ORDER — SODIUM CHLORIDE 9 MG/ML
2000 INJECTION, SOLUTION INTRAVENOUS ONCE
Status: DISCONTINUED | OUTPATIENT
Start: 2019-12-19 | End: 2019-12-19

## 2019-12-19 RX ORDER — POLYETHYLENE GLYCOL 3350 17 G/17G
1 POWDER, FOR SOLUTION ORAL
Status: DISCONTINUED | OUTPATIENT
Start: 2019-12-19 | End: 2019-12-25

## 2019-12-19 RX ORDER — PROMETHAZINE HYDROCHLORIDE 25 MG/1
12.5-25 SUPPOSITORY RECTAL EVERY 4 HOURS PRN
Status: DISCONTINUED | OUTPATIENT
Start: 2019-12-19 | End: 2019-12-24

## 2019-12-19 RX ORDER — SODIUM CHLORIDE 9 MG/ML
1000 INJECTION, SOLUTION INTRAVENOUS ONCE
Status: COMPLETED | OUTPATIENT
Start: 2019-12-19 | End: 2019-12-19

## 2019-12-19 RX ORDER — MAGNESIUM SULFATE HEPTAHYDRATE 40 MG/ML
2 INJECTION, SOLUTION INTRAVENOUS
Status: COMPLETED | OUTPATIENT
Start: 2019-12-19 | End: 2019-12-20

## 2019-12-19 RX ORDER — AMOXICILLIN 250 MG
2 CAPSULE ORAL 2 TIMES DAILY
Status: DISCONTINUED | OUTPATIENT
Start: 2019-12-19 | End: 2019-12-25

## 2019-12-19 RX ORDER — HEPARIN SODIUM 5000 [USP'U]/ML
5000 INJECTION, SOLUTION INTRAVENOUS; SUBCUTANEOUS EVERY 8 HOURS
Status: DISCONTINUED | OUTPATIENT
Start: 2019-12-19 | End: 2019-12-21

## 2019-12-19 RX ADMIN — SODIUM CHLORIDE, POTASSIUM CHLORIDE, SODIUM LACTATE AND CALCIUM CHLORIDE 2178 ML: 600; 310; 30; 20 INJECTION, SOLUTION INTRAVENOUS at 12:01

## 2019-12-19 RX ADMIN — SODIUM CHLORIDE 8 UNITS/HR: 9 INJECTION, SOLUTION INTRAVENOUS at 17:35

## 2019-12-19 RX ADMIN — CEFTRIAXONE SODIUM 1 G: 1 INJECTION, POWDER, FOR SOLUTION INTRAMUSCULAR; INTRAVENOUS at 13:42

## 2019-12-19 RX ADMIN — SODIUM CHLORIDE 1000 ML: 9 INJECTION, SOLUTION INTRAVENOUS at 11:40

## 2019-12-19 RX ADMIN — DEXTROSE AND SODIUM CHLORIDE: 10; .45 INJECTION, SOLUTION INTRAVENOUS at 18:14

## 2019-12-19 RX ADMIN — SODIUM CHLORIDE, SODIUM LACTATE, POTASSIUM CHLORIDE, CALCIUM CHLORIDE AND DEXTROSE MONOHYDRATE: 5; 600; 310; 30; 20 INJECTION, SOLUTION INTRAVENOUS at 17:15

## 2019-12-19 RX ADMIN — ONDANSETRON 4 MG: 2 INJECTION INTRAMUSCULAR; INTRAVENOUS at 12:55

## 2019-12-19 RX ADMIN — MAGNESIUM SULFATE 2 G: 2 INJECTION INTRAVENOUS at 22:21

## 2019-12-19 RX ADMIN — SODIUM CHLORIDE, POTASSIUM CHLORIDE, SODIUM LACTATE AND CALCIUM CHLORIDE 1000 ML: 600; 310; 30; 20 INJECTION, SOLUTION INTRAVENOUS at 14:55

## 2019-12-19 RX ADMIN — SODIUM CHLORIDE 8 UNITS/HR: 9 INJECTION, SOLUTION INTRAVENOUS at 14:52

## 2019-12-19 RX ADMIN — ONDANSETRON 4 MG: 2 INJECTION INTRAMUSCULAR; INTRAVENOUS at 12:01

## 2019-12-19 RX ADMIN — SODIUM CHLORIDE, POTASSIUM CHLORIDE, SODIUM LACTATE AND CALCIUM CHLORIDE: 600; 310; 30; 20 INJECTION, SOLUTION INTRAVENOUS at 16:55

## 2019-12-19 RX ADMIN — SODIUM CHLORIDE, POTASSIUM CHLORIDE, SODIUM LACTATE AND CALCIUM CHLORIDE 2178 ML: 600; 310; 30; 20 INJECTION, SOLUTION INTRAVENOUS at 12:15

## 2019-12-19 ASSESSMENT — ENCOUNTER SYMPTOMS
ABDOMINAL PAIN: 1
DIARRHEA: 1
FOCAL WEAKNESS: 0
FEVER: 0
COUGH: 0
LOSS OF CONSCIOUSNESS: 0
PALPITATIONS: 0
CHILLS: 0
MYALGIAS: 0
NAUSEA: 1
SHORTNESS OF BREATH: 1
VOMITING: 0
SORE THROAT: 0
SPUTUM PRODUCTION: 0
BLOOD IN STOOL: 0
BLURRED VISION: 0
HEADACHES: 0
WEAKNESS: 1
DEPRESSION: 0
WEIGHT LOSS: 0
SENSORY CHANGE: 0

## 2019-12-19 ASSESSMENT — COGNITIVE AND FUNCTIONAL STATUS - GENERAL
TOILETING: A LITTLE
SUGGESTED CMS G CODE MODIFIER DAILY ACTIVITY: CK
WALKING IN HOSPITAL ROOM: A LITTLE
MOBILITY SCORE: 18
SUGGESTED CMS G CODE MODIFIER MOBILITY: CK
MOVING TO AND FROM BED TO CHAIR: A LITTLE
DRESSING REGULAR LOWER BODY CLOTHING: A LITTLE
DAILY ACTIVITIY SCORE: 18
CLIMB 3 TO 5 STEPS WITH RAILING: A LITTLE
STANDING UP FROM CHAIR USING ARMS: A LITTLE
EATING MEALS: A LITTLE
MOVING FROM LYING ON BACK TO SITTING ON SIDE OF FLAT BED: A LITTLE
HELP NEEDED FOR BATHING: A LITTLE
DRESSING REGULAR UPPER BODY CLOTHING: A LITTLE
TURNING FROM BACK TO SIDE WHILE IN FLAT BAD: A LITTLE
PERSONAL GROOMING: A LITTLE

## 2019-12-19 ASSESSMENT — LIFESTYLE VARIABLES
EVER FELT BAD OR GUILTY ABOUT YOUR DRINKING: NO
ALCOHOL_USE: NO
EVER_SMOKED: NEVER
TOTAL SCORE: 0
TOTAL SCORE: 0
HAVE PEOPLE ANNOYED YOU BY CRITICIZING YOUR DRINKING: NO
EVER_SMOKED: YES
HAVE YOU EVER FELT YOU SHOULD CUT DOWN ON YOUR DRINKING: NO
DOES PATIENT WANT TO STOP DRINKING: CANNOT ASSESS
CONSUMPTION TOTAL: INCOMPLETE
TOTAL SCORE: 0
EVER HAD A DRINK FIRST THING IN THE MORNING TO STEADY YOUR NERVES TO GET RID OF A HANGOVER: NO

## 2019-12-19 ASSESSMENT — COPD QUESTIONNAIRES
DO YOU EVER COUGH UP ANY MUCUS OR PHLEGM?: NO/ONLY WITH OCCASIONAL COLDS OR INFECTIONS
HAVE YOU SMOKED AT LEAST 100 CIGARETTES IN YOUR ENTIRE LIFE: YES
DURING THE PAST 4 WEEKS HOW MUCH DID YOU FEEL SHORT OF BREATH: NONE/LITTLE OF THE TIME
COPD SCREENING SCORE: 4

## 2019-12-19 NOTE — ASSESSMENT & PLAN NOTE
Patient off norepinephrine for more than 24 hours  Blood pressure trending up  Resume ace inhibitor for BP 12/22: Lisinopril

## 2019-12-19 NOTE — ED TRIAGE NOTES
"Pt BIB EMS from SNF d/t hyperglycemia. Reports recent change to diabetic medication and inability to control blood sugars at SNF. Pt arrives A&Ox4, BP 70/30, and . C/o nausea and vomiting for \"several days\" and SOB. Pt denies CP/palpitations. PIV started, blood sent to lab, and 1L NS bolus with pressure bag started.  "

## 2019-12-19 NOTE — ASSESSMENT & PLAN NOTE
128 on intake labs, normal last several days  Likely hypovolemic, in part related to hyperglycemia as well  IVF ongoing  Monitor with serial BMP

## 2019-12-19 NOTE — ASSESSMENT & PLAN NOTE
DKA, DINESH, elevated Lactate, all improved, component of non-anion gap metabolic acidosis resolved  Continue aggressive fluid repletion with LR and tx of DKA  Monitor for any signs of concurrent  NAGMA, chloride elevated, limit chloride containing solutions  No role for alkali therapy at present

## 2019-12-19 NOTE — CONSULTS
Critical Care Consultation    Date of consult: 12/19/2019    Referring Physician  Ryan Hernandez M.D.    Reason for Consultation  DKA    History of Presenting Illness  59 y.o. male w/ PMH DM, HTN who lives at an [facility] who presented 12/19/2019 with mild SOB and found to be in DKA upon arrival to the ED. HE notes recent swithc of his meds. HE denies any fevers, chill, cough, or myalgias. He does endorse generalized weakness, nausea without vomiting, mild abdominal pain and diarrhea. He notes poor po intake over the past week.The patient is somewhat slow to respond and there is no other friends or family avail be for history. Pt was tachycardic on arrival and due to SOB cards was consulted in the ED and didnt feel any further w/u was necessary. In take labs revealed; Glucose 442, trace urine ketones, Anion gap of 23, BHB 5.33 and Na 128.         Code Status  Full Code    Review of Systems  Review of Systems   Constitutional: Positive for malaise/fatigue. Negative for chills, fever and weight loss.   HENT: Negative for congestion and sore throat.    Eyes: Negative for blurred vision.   Respiratory: Positive for shortness of breath. Negative for cough and sputum production.    Cardiovascular: Negative for chest pain, palpitations and leg swelling.   Gastrointestinal: Positive for abdominal pain, diarrhea and nausea. Negative for blood in stool and vomiting.   Genitourinary: Negative for dysuria.   Musculoskeletal: Negative for myalgias.   Skin: Negative for rash.   Neurological: Positive for weakness. Negative for sensory change, focal weakness, loss of consciousness and headaches.   Psychiatric/Behavioral: Negative for depression.       Past Medical History   has a past medical history of Diabetes (HCC) and Hypertension.    Surgical History   has a past surgical history that includes gastroscopy-endo (N/A, 8/25/2018); other abdominal surgery; and toe amputation (Right, 1/12/2019).    Family History  family history  includes No Known Problems in his father and mother.    Social History   reports that he has been smoking. He has a 3.75 pack-year smoking history. He has never used smokeless tobacco. He reports that he does not drink alcohol or use drugs.    Medications  Home Medications     Reviewed by Clarisse Fuentes (Pharmacy Tech) on 12/19/19 at 1205  Med List Status: Complete   Medication Last Dose Status   aspirin (ASA) 81 MG Chew Tab chewable tablet 12/19/2019 Active   atorvastatin (LIPITOR) 10 MG Tab 12/19/2019 Active   Cholecalciferol (VITAMIN D) 2000 units Cap 12/19/2019 Active   Empagliflozin-metFORMIN HCl ER  MG TABLET SR 24 HR 12/19/2019 Active   insulin detemir (LEVEMIR) 100 UNIT/ML Solution 12/18/2019 Active   Lactobacillus-Inulin ([facility]IG[facility]IVE [facility]) Cap 12/19/2019 Active   lisinopril (PRINIVIL) 20 MG Tab 12/19/2019 Active   pancrelipase, Lip-Prot-Amyl, (CREON) 51772-32590 units Cap DR Particles 12/19/2019 Active   pantoprazole (PROTONIX) 40 MG Tablet Delayed Response 12/19/2019 Active   Semaglutide, 1 MG/DOSE, (OZEMPIC, 1 MG/DOSE,) 2 MG/1.5ML Solution Pen-injector 12/17/2019 Active   tamsulosin (FLOMAX) 0.4 MG capsule 12/19/2019 Active              Current Facility-Administered Medications   Medication Dose Route Frequency Provider Last Rate Last Dose   • lactated ringers infusion (BOLUS): BMI less than or equal to 30  30 mL/kg Intravenous Once Ryan Hernandez M.D.       • insulin regular human (HUMULIN/NOVOLIN R) 62.5 Units in  mL Infusion  8 Units/hr Intravenous Continuous Ryan Hernandez M.D.       • tamsulosin (FLOMAX) capsule 0.4 mg  0.4 mg Oral DAILY CHIARA Rhodes.O.       • pantoprazole (PROTONIX) EC tablet 40 mg  40 mg Oral DAILY Georges Celaya D.O.       • pancrelipase (Lip-Prot-Amyl) (CREON 46603) 86783-80664 units capsule 48,000 Units  2 Cap Oral DAILY Georges Celaya D.O.       • lactated ringers infusion (BOLUS)  2,000 mL Intravenous Once  CHIARA Rhodes.O.        Or   • NS (BOLUS) infusion 2,000 mL  2,000 mL Intravenous Once CHIARA Rhodes.O.       • D10%-0.45% NaCl infusion   Intravenous Continuous CHIARA Rhodes.O.       • senna-docusate (PERICOLACE or SENOKOT S) 8.6-50 MG per tablet 2 Tab  2 Tab Oral BID CHIARA Rhodes.O.        And   • polyethylene glycol/lytes (MIRALAX) PACKET 1 Packet  1 Packet Oral QDAY PRN CHIARA Rhodes.O.        And   • magnesium hydroxide (MILK OF MAGNESIA) suspension 30 mL  30 mL Oral QDAY PRN CHIARA Rhodes.O.        And   • bisacodyl (DULCOLAX) suppository 10 mg  10 mg Rectal QDAY PRN CHIARA Rhodes.O.       • MD ALERT-PHARMACY TO CONSULT FOR DKA MONITORING 1 Each  1 Each Other PRN CHIARA Rhodes.O.       • Adult DKA potassium(K+) replacement scale  1 Each Intravenous Q4HRS CHIARA Rhodes.O.       • magnesium sulfate IVPB premix 2 g  2 g Intravenous Once PRN CHIARA Rhodes.O.        Or   • magnesium sulfate IVPB premix 4 g  4 g Intravenous Once PRN Georges Celaya D.O.       • potassium phosphates 30 mmol in  mL ivpb  30 mmol Intravenous Once PRN CHIARA Rhodes.O.        Or   • sodium phosphate 30 mmol in 1/2  mL ivpb  30 mmol Intravenous Once PRN Georges Celaya D.O.       • Respiratory Care per Protocol   Nebulization Continuous RT CHIARA Rhodes.O.       • lactated ringers infusion   Intravenous Continuous CHIARA Rhodes.O.       • D5LR infusion   Intravenous Continuous CHIARA Rhodes.O.       • heparin injection 5,000 Units  5,000 Units Subcutaneous Q8HRS Georges Celaya D.O.       • acetaminophen (TYLENOL) tablet 650 mg  650 mg Oral Q6HRS PRN CHIARA Rhodes.O.       • ondansetron (ZOFRAN) syringe/vial injection 4 mg  4 mg Intravenous Q4HRS PRN Georges Celaya D.O.       • ondansetron (ZOFRAN ODT) dispertab 4  mg  4 mg Oral Q4HRS PRN YULI RhdoesO.       • promethazine (PHENERGAN) tablet 12.5-25 mg  12.5-25 mg Oral Q4HRS PRN CHIARA Rhodes.O.       • promethazine (PHENERGAN) suppository 12.5-25 mg  12.5-25 mg Rectal Q4HRS PRN CHIARA Rhodes.O.       • prochlorperazine (COMPAZINE) injection 5-10 mg  5-10 mg Intravenous Q4HRS PRN CHIARA Rhodes.O.       • MD ALERT-INSULIN DRIP INITIAL RATE BY PHARMACY AT 0.1 UNITS/KG/HR 1 Each  1 Each Other PRN Georges Celaya D.O.         Current Outpatient Medications   Medication Sig Dispense Refill   • aspirin (ASA) 81 MG Chew Tab chewable tablet CHEW 1 TABLET BY MOUTH ONCE DAILY. 30 Tab 11   • atorvastatin (LIPITOR) 10 MG Tab TAKE 1 TABLET BY MOUTH EVERY NIGHT AS DIRECTED. 30 Tab 6   • pancrelipase, Lip-Prot-Amyl, (CREON) 80394-92081 units Cap DR Particles Take 2 Caps by mouth every day. 60 Cap 11   • pantoprazole (PROTONIX) 40 MG Tablet Delayed Response TAKE 1 TABLET BY MOUTH ONCE DAILY. 30 Tab 11   • tamsulosin (FLOMAX) 0.4 MG capsule Take 1 Cap by mouth every day. 30 Cap 11   • lisinopril (PRINIVIL) 20 MG Tab TAKE 1 TABLET BY MOUTH ONCE DAILY. 30 Tab 11   • Empagliflozin-metFORMIN HCl ER  MG TABLET SR 24 HR Take 1 Tab by mouth every morning with breakfast. 30 Tab 11   • Semaglutide, 1 MG/DOSE, (OZEMPIC, 1 MG/DOSE,) 2 MG/1.5ML Solution Pen-injector Inject 1 mg as instructed every 7 days. 2 PEN 11   • Lactobacillus-Inulin ([facility]) Cap TAKE (1) CAPSULE BY MOUTH ONCE DAILY. 30 Cap 6   • insulin detemir (LEVEMIR) 100 UNIT/ML Solution Inject 20 Units as instructed every evening.     • Cholecalciferol (VITAMIN D) 2000 units Cap Take 2 Caps by mouth every day. 60 Cap 5       Allergies  No Known Allergies    Vital Signs last 24 hours  Temp:  [35.6 °C (96.1 °F)-36.3 °C (97.3 °F)] 35.6 °C (96.1 °F)  Pulse:  [] 96  Resp:  [18-20] 20  BP: ()/(39-51) 100/51  SpO2:  [82 %-95 %] 82 %    Physical  Exam  Physical Exam  Constitutional:       General: He is not in acute distress.     Appearance: He is ill-appearing. He is not toxic-appearing.      Comments: Decreased muscle bulk throughout     HENT:      Head: Normocephalic and atraumatic.      Nose: Nose normal.      Mouth/Throat:      Mouth: Mucous membranes are dry.      Pharynx: No oropharyngeal exudate or posterior oropharyngeal erythema.   Eyes:      Extraocular Movements: Extraocular movements intact.      Pupils: Pupils are equal, round, and reactive to light.   Neck:      Musculoskeletal: Normal range of motion.   Cardiovascular:      Rate and Rhythm: Regular rhythm. Tachycardia present.      Pulses: Normal pulses.   Pulmonary:      Effort: Pulmonary effort is normal.      Breath sounds: Normal breath sounds.   Abdominal:      General: Abdomen is flat. Bowel sounds are normal.      Palpations: Abdomen is soft.   Musculoskeletal:      Right lower leg: No edema.      Left lower leg: No edema.   Skin:     General: Skin is warm and dry.      Capillary Refill: Capillary refill takes 2 to 3 seconds.      Coloration: Skin is not jaundiced.   Neurological:      General: No focal deficit present.      Mental Status: He is alert and oriented to person, place, and time.      Cranial Nerves: No cranial nerve deficit.      Motor: No weakness.   Psychiatric:         Mood and Affect: Mood normal.         Behavior: Behavior normal.         Fluids    Intake/Output Summary (Last 24 hours) at 12/19/2019 1441  Last data filed at 12/19/2019 1305  Gross per 24 hour   Intake 3000 ml   Output --   Net 3000 ml       Laboratory  Recent Results (from the past 48 hour(s))   EKG (NOW)    Collection Time: 12/19/19 11:19 AM   Result Value Ref Range    Report       [facility]    Test Date:  2019-12-19  Pt Name:    SHAUN CORCORAN                  Department: ER  MRN:        [id number]                      Room:       RD 05  Gender:     Male                          Technician: [id number]  :        1960                   Requested By:ER TRIAGE PROTOCOL  Order #:    [id number]                    Reading MD: CRAOL ROSA MD    Measurements  Intervals                                Axis  Rate:       89                           P:          85  GA:         171                          QRS:        84  QRSD:       91                           T:          81  QT:         356  QTc:        434    Interpretive Statements  Sinus rhythm  Consider right atrial enlargement  Minimal ST elevation, anterior leads  Lateral leads are also involved  First degree AV block no longer present  Right-axis deviation no longer present  T-wave abnormality no longer present  ST (T wave) deviation still present  Electronically Signed On  14:11:45 PST by CAROL ROSA MD     EKG (NOW)    Collection Time: 19 11:30 AM   Result Value Ref Range    Report       [facility] Emergency Dept.    Test Date:  2019  Pt Name:    SHAUN CORCORAN                  Department: ER  MRN:        [id number]                      Room:       [facility]  Gender:     Male                         Technician: [id number]  :        1960                   Requested By:ER TRIAGE PROTOCOL  Order #:    [id number]                    Reading MD: CAROL ROSA MD    Measurements  Intervals                                Axis  Rate:       101                          P:          90  GA:         175                          QRS:        92  QRSD:       92                           T:          73  QT:         348  QTc:        452    Interpretive Statements  Sinus tachycardia  Right atrial enlargement  Lateral leads are also involved  Compared to ECG 2019 11:19:35  Sinus rhythm no longer present  First degree AV block no longer present  Right-axis deviation no longer present  T-wave abnormality no longer present  ST (T wave) deviation still present   Electronically Signed On  12- 14:13:00 PST by CAROL ROSA MD     Lactic acid (lactate)    Collection Time: 12/19/19 11:35 AM   Result Value Ref Range    Lactic Acid 2.5 (H) 0.5 - 2.0 mmol/L   CBC WITH DIFFERENTIAL    Collection Time: 12/19/19 11:35 AM   Result Value Ref Range    WBC 18.7 (H) 4.8 - 10.8 K/uL    RBC 3.29 (L) 4.70 - 6.10 M/uL    Hemoglobin 10.7 (L) 14.0 - 18.0 g/dL    Hematocrit 31.3 (L) 42.0 - 52.0 %    MCV 95.1 81.4 - 97.8 fL    MCH 32.5 27.0 - 33.0 pg    MCHC 34.2 33.7 - 35.3 g/dL    RDW 53.1 (H) 35.9 - 50.0 fL    Platelet Count 371 164 - 446 K/uL    MPV 11.4 9.0 - 12.9 fL    Neutrophils-Polys 85.10 (H) 44.00 - 72.00 %    Lymphocytes 7.00 (L) 22.00 - 41.00 %    Monocytes 6.10 0.00 - 13.40 %    Eosinophils 0.00 0.00 - 6.90 %    Basophils 0.00 0.00 - 1.80 %    Nucleated RBC 0.20 /100 WBC    Neutrophils (Absolute) 15.91 (H) 1.82 - 7.42 K/uL    Lymphs (Absolute) 1.31 1.00 - 4.80 K/uL    Monos (Absolute) 1.14 (H) 0.00 - 0.85 K/uL    Eos (Absolute) 0.00 0.00 - 0.51 K/uL    Baso (Absolute) 0.00 0.00 - 0.12 K/uL    NRBC (Absolute) 0.04 K/uL    Anisocytosis 1+     Macrocytosis 1+    COMP METABOLIC PANEL    Collection Time: 12/19/19 11:35 AM   Result Value Ref Range    Sodium 129 (L) 135 - 145 mmol/L    Potassium 5.5 3.6 - 5.5 mmol/L    Chloride 93 (L) 96 - 112 mmol/L    Co2 13 (L) 20 - 33 mmol/L    Anion Gap 23.0 (H) 0.0 - 11.9    Glucose 442 (H) 65 - 99 mg/dL    Bun 163 (HH) 8 - 22 mg/dL    Creatinine 2.33 (H) 0.50 - 1.40 mg/dL    Calcium 8.4 (L) 8.5 - 10.5 mg/dL    AST(SGOT) 10 (L) 12 - 45 U/L    ALT(SGPT) 10 2 - 50 U/L    Alkaline Phosphatase 74 30 - 99 U/L    Total Bilirubin 0.5 0.1 - 1.5 mg/dL    Albumin 3.6 3.2 - 4.9 g/dL    Total Protein 5.7 (L) 6.0 - 8.2 g/dL    Globulin 2.1 1.9 - 3.5 g/dL    A-G Ratio 1.7 g/dL   Troponin    Collection Time: 12/19/19 11:35 AM   Result Value Ref Range    Troponin T 24 (H) 6 - 19 ng/L   APTT    Collection Time: 12/19/19 11:35 AM   Result Value Ref Range    APTT 24.5 (L) 24.7 -  36.0 sec   Prothrombin (PT/INR)    Collection Time: 19 11:35 AM   Result Value Ref Range    PT 13.9 12.0 - 14.6 sec    INR 1.04 0.87 - 1.13   BETA-HYDROXYBUTYRIC ACID    Collection Time: 19 11:35 AM   Result Value Ref Range    beta-Hydroxybutyric Acid 5.33 (H) 0.02 - 0.27 mmol/L   DIFFERENTIAL MANUAL    Collection Time: 19 11:35 AM   Result Value Ref Range    Myelocytes 0.90 %    Progranulocytes 0.90 %    Manual Diff Status PERFORMED    PERIPHERAL SMEAR REVIEW    Collection Time: 19 11:35 AM   Result Value Ref Range    Peripheral Smear Review see below    PLATELET ESTIMATE    Collection Time: 19 11:35 AM   Result Value Ref Range    Plt Estimation Normal    MORPHOLOGY    Collection Time: 19 11:35 AM   Result Value Ref Range    RBC Morphology Present     Polychromia 1+     Poikilocytosis 1+     Schistocytes 1+     Echinocytes 1+    ESTIMATED GFR    Collection Time: 19 11:35 AM   Result Value Ref Range    GFR If African American 35 (A) >60 mL/min/1.73 m 2    GFR If Non  29 (A) >60 mL/min/1.73 m 2   EKG (NOW)    Collection Time: 19 12:13 PM   Result Value Ref Range    Report       [facility] Emergency Dept.    Test Date:  2019  Pt Name:    SHAUN CORCORAN                  Department: ER  MRN:        [id number]                      Room:       [facility]  Gender:     Male                         Technician: [id number]  :        1960                   Requested By:MISTI MARCUM  Order #:    [id number]                    Reading MD: CAROL ROSA MD    Measurements  Intervals                                Axis  Rate:       74                           P:          86  MT:         186                          QRS:        92  QRSD:       100                          T:          82  QT:         399  QTc:        443    Interpretive Statements  Sinus rhythm  Baseline wander in lead(s) V3  Compared to ECG 2019 11:30:08  Sinus  tachycardia no longer present  Atrial abnormality no longer present  ST (T wave) deviation no longer present  Electronically Signed On 12- 14:12:32 PST by CAROL ROSA MD     URINALYSIS    Collection Time: 12/19/19  2:17 PM   Result Value Ref Range    Micro Urine Req see below        Imaging  EC-ECHOCARDIOGRAM LTD W/O CONT         DX-CHEST-PORTABLE (1 VIEW)   Final Result         No acute cardiac or pulmonary abnormality is identified.          Assessment/Plan  Diabetic ketoacidosis without coma associated with type 1 diabetes mellitus (HCC)  Assessment & Plan  Anion Gap 22 Glucose 442, BHB 5.33  Likely secondary to recent med changes  Insulin gtt and DKA protocol  Transition to subq when gap closes and per protocol  Monitor and Replete lytes prn      Metabolic acidosis, increased anion gap  Assessment & Plan  DKA, DINESH, elevated Lactate  Continue aggressive fluid repletion with LR and tx of DKA  Monitor for any signs of concurrent  NAGMA  No role for alkali therapy at present    DINESH (acute kidney injury) (HCC)  Assessment & Plan  Likely pre-renal   IVF  Renal dose meds, avoid nephrotoxins  Strict I/Os  Follow renal function        Essential hypertension- (present on admission)  Assessment & Plan  Hypotensive on admission hold home meds for now      Hyponatremia  Assessment & Plan  128 on intake labs  Likely hypovolemic  IVF  Monitor    Leukocytosis  Assessment & Plan  Afebrile no over signs of infection, CXR and UA unremarkable  Potentially stress related      Discussed patient condition and risk of morbidity and/or mortality with Hospitalist, RN, RT, Pharmacy, Code status disscussed and Patient.    The patient remains critically ill.  Critical care time = 35 minutes in directly providing and coordinating critical care and extensive data review.  No time overlap and excludes procedures.

## 2019-12-19 NOTE — ASSESSMENT & PLAN NOTE
Blood sugar improved, anion gap closed, bicarb level is 12/21 still improving  Likely secondary to recent med changes by endocrinology  Transitioned from IV insulin/DKA protocols to SSI and long-acting insulin late 12/20, tolerating  Continue to monitor BMP and blood sugar closely for need to resume IV insulin, patient appears brittle  Monitor and Replete lytes prn  Element of hypoglycemia this a.m., poor p.o. intake contributing  Reduce long-acting insulin for now and encourage p.o. and readjust insulin as clinically necessary

## 2019-12-19 NOTE — ED PROVIDER NOTES
ED Provider Note    Scribed for Ryan Hernandez M.D. by Justin Lan. 12/19/2019  11:35 AM    Primary care provider: JAKE Armando  Means of arrival: Joana  History obtained from: Patient  History limited by: Critical condition    CHIEF COMPLAINT  Shortness of Breath  Nausea    HPI  Pawel Tatum is a 59 y.o. male who presents to the Emergency Department via EMS for acute moderate shortness of breath which began earlier today. Today patient was found to have a borderline EKG and sugars were around 350. He recently switched medications for his diabetes one week ago and he has been hyperglycemic since. He has not been eating well recently. Patient was found to be weak, confused, and pale one week ago by staff at an assisted living facility. There is some nausea, negative chest pain. Further history of present illness cannot be obtained due to the patient's critical condition.    REVIEW OF SYSTEMS  Pertinent positives include hyperglycemia, nausea, pale, weakness, confused.   Pertinent negatives include no chest pain.    Further history of present illness cannot be obtained due to the patient's critical condition.    PAST MEDICAL HISTORY   has a past medical history of Diabetes (HCC) and Hypertension.    SURGICAL HISTORY   has a past surgical history that includes gastroscopy-endo (N/A, 8/25/2018); other abdominal surgery; and toe amputation (Right, 1/12/2019).    SOCIAL HISTORY  Social History     Tobacco Use   • Smoking status: Current Every Day Smoker     Packs/day: 0.25     Years: 15.00     Pack years: 3.75   • Smokeless tobacco: Never Used   • Tobacco comment: Trying to quit   Substance Use Topics   • Alcohol use: No   • Drug use: No      Social History     Substance and Sexual Activity   Drug Use No     FAMILY HISTORY  Family History   Problem Relation Age of Onset   • No Known Problems Mother    • No Known Problems Father      CURRENT MEDICATIONS  Current Outpatient Medications:   •   "Cholecalciferol (VITAMIN D) 2000 UNIT Tab, Take 1 Tab by mouth every day., Disp: 60 Tab, Rfl: 11  •  aspirin (ASA) 81 MG Chew Tab chewable tablet, CHEW 1 TABLET BY MOUTH ONCE DAILY., Disp: 30 Tab, Rfl: 11  •  atorvastatin (LIPITOR) 10 MG Tab, TAKE 1 TABLET BY MOUTH EVERY NIGHT AS DIRECTED., Disp: 30 Tab, Rfl: 6  •  pancrelipase, Lip-Prot-Amyl, (CREON) 14675-73379 units Cap DR Particles, Take 2 Caps by mouth every day., Disp: 60 Cap, Rfl: 11  •  pantoprazole (PROTONIX) 40 MG Tablet Delayed Response, TAKE 1 TABLET BY MOUTH ONCE DAILY., Disp: 30 Tab, Rfl: 11  •  tamsulosin (FLOMAX) 0.4 MG capsule, Take 1 Cap by mouth every day., Disp: 30 Cap, Rfl: 11  •  lisinopril (PRINIVIL) 20 MG Tab, TAKE 1 TABLET BY MOUTH ONCE DAILY., Disp: 30 Tab, Rfl: 11  •  Empagliflozin-metFORMIN HCl ER  MG TABLET SR 24 HR, Take 1 Tab by mouth every morning with breakfast., Disp: 30 Tab, Rfl: 11  •  Semaglutide, 1 MG/DOSE, (OZEMPIC, 1 MG/DOSE,) 2 MG/1.5ML Solution Pen-injector, Inject 1 mg as instructed every 7 days., Disp: 2 PEN, Rfl: 11  •  Lactobacillus-Inulin (CULTURELLE DIGESTIVE HEALTH) Cap, TAKE (1) CAPSULE BY MOUTH ONCE DAILY., Disp: 30 Cap, Rfl: 6  •  metFORMIN ER (GLUCOPHAGE XR) 500 MG TABLET SR 24 HR, Take 2 Tabs by mouth 2 times a day., Disp: 120 Tab, Rfl: 3  •  Lactobacillus-Inulin ([employer] HEALTH PO), Take 1 Dose by mouth., Disp: , Rfl:   •  insulin detemir (LEVEMIR) 100 UNIT/ML Solution, Inject 14 Units as instructed every evening., Disp: , Rfl:   •  insulin aspart (NOVOLOG) 100 UNIT/ML Solution, Inject 6-8 Units as instructed 3 times a day before meals. 6 units QAM 7 units Noon 8 units QPM, Disp: , Rfl:   •  Cholecalciferol (VITAMIN D) 2000 units Cap, Take 2 Caps by mouth every day., Disp: 60 Cap, Rfl: 5    ALLERGIES  No Known Allergies    PHYSICAL EXAM  VITAL SIGNS: Ht 1.905 m (6' 3\")   Wt 72.6 kg (160 lb)   BMI 20.00 kg/m²     Constitutional: Well developed, Well nourished, moderate acute distress " ill-appearing appearance.   HENT: Normocephalic, Atraumatic, Bilateral external ears normal, Oropharynx extremely dry, No oral exudates.   Eyes: PERRLA, EOMI, Conjunctiva normal, No discharge.   Neck: No tenderness, Supple, No stridor.   Lymphatic: No lymphadenopathy noted.   Cardiovascular: Tachycardic, good pulses  Thorax & Lungs: Tachypneic but clear to auscultation bilaterally, mild respiratory distress, No wheezing, No crackles.   Abdomen: Soft, No tenderness, No masses, No pulsatile masses.   Skin: Warm, Dry, No erythema, No rash.   Extremities:, No edema No cyanosis.   Musculoskeletal: No tenderness to palpation or major deformities noted.  Intact distal pulses  Neurologic: Awake, alert.  But confused psychiatric: Affect normal, Judgment normal, Mood normal.     LABS  Results for orders placed or performed during the hospital encounter of 12/19/19   EC-ECHOCARDIOGRAM LTD W/O CONT   Result Value Ref Range    Left Ventrical Ejection Fraction 65    Lactic acid (lactate)   Result Value Ref Range    Lactic Acid 2.5 (H) 0.5 - 2.0 mmol/L   CBC WITH DIFFERENTIAL   Result Value Ref Range    WBC 18.7 (H) 4.8 - 10.8 K/uL    RBC 3.29 (L) 4.70 - 6.10 M/uL    Hemoglobin 10.7 (L) 14.0 - 18.0 g/dL    Hematocrit 31.3 (L) 42.0 - 52.0 %    MCV 95.1 81.4 - 97.8 fL    MCH 32.5 27.0 - 33.0 pg    MCHC 34.2 33.7 - 35.3 g/dL    RDW 53.1 (H) 35.9 - 50.0 fL    Platelet Count 371 164 - 446 K/uL    MPV 11.4 9.0 - 12.9 fL    Neutrophils-Polys 85.10 (H) 44.00 - 72.00 %    Lymphocytes 7.00 (L) 22.00 - 41.00 %    Monocytes 6.10 0.00 - 13.40 %    Eosinophils 0.00 0.00 - 6.90 %    Basophils 0.00 0.00 - 1.80 %    Nucleated RBC 0.20 /100 WBC    Neutrophils (Absolute) 15.91 (H) 1.82 - 7.42 K/uL    Lymphs (Absolute) 1.31 1.00 - 4.80 K/uL    Monos (Absolute) 1.14 (H) 0.00 - 0.85 K/uL    Eos (Absolute) 0.00 0.00 - 0.51 K/uL    Baso (Absolute) 0.00 0.00 - 0.12 K/uL    NRBC (Absolute) 0.04 K/uL    Anisocytosis 1+     Macrocytosis 1+    COMP METABOLIC  PANEL   Result Value Ref Range    Sodium 129 (L) 135 - 145 mmol/L    Potassium 5.5 3.6 - 5.5 mmol/L    Chloride 93 (L) 96 - 112 mmol/L    Co2 13 (L) 20 - 33 mmol/L    Anion Gap 23.0 (H) 0.0 - 11.9    Glucose 442 (H) 65 - 99 mg/dL    Bun 163 (HH) 8 - 22 mg/dL    Creatinine 2.33 (H) 0.50 - 1.40 mg/dL    Calcium 8.4 (L) 8.5 - 10.5 mg/dL    AST(SGOT) 10 (L) 12 - 45 U/L    ALT(SGPT) 10 2 - 50 U/L    Alkaline Phosphatase 74 30 - 99 U/L    Total Bilirubin 0.5 0.1 - 1.5 mg/dL    Albumin 3.6 3.2 - 4.9 g/dL    Total Protein 5.7 (L) 6.0 - 8.2 g/dL    Globulin 2.1 1.9 - 3.5 g/dL    A-G Ratio 1.7 g/dL   URINALYSIS   Result Value Ref Range    Color Yellow     Character Clear     Specific Gravity 1.016 <1.035    Ph 5.0 5.0 - 8.0    Glucose >=1000 (A) Negative mg/dL    Ketones Trace (A) Negative mg/dL    Protein Negative Negative mg/dL    Bilirubin Negative Negative    Urobilinogen, Urine 0.2 Negative    Nitrite Negative Negative    Leukocyte Esterase Negative Negative    Occult Blood Negative Negative    Micro Urine Req see below    Troponin   Result Value Ref Range    Troponin T 24 (H) 6 - 19 ng/L   APTT   Result Value Ref Range    APTT 24.5 (L) 24.7 - 36.0 sec   Prothrombin (PT/INR)   Result Value Ref Range    PT 13.9 12.0 - 14.6 sec    INR 1.04 0.87 - 1.13   BETA-HYDROXYBUTYRIC ACID   Result Value Ref Range    beta-Hydroxybutyric Acid 5.33 (H) 0.02 - 0.27 mmol/L   DIFFERENTIAL MANUAL   Result Value Ref Range    Myelocytes 0.90 %    Progranulocytes 0.90 %    Manual Diff Status PERFORMED    PERIPHERAL SMEAR REVIEW   Result Value Ref Range    Peripheral Smear Review see below    PLATELET ESTIMATE   Result Value Ref Range    Plt Estimation Normal    MORPHOLOGY   Result Value Ref Range    RBC Morphology Present     Polychromia 1+     Poikilocytosis 1+     Schistocytes 1+     Echinocytes 1+    ESTIMATED GFR   Result Value Ref Range    GFR If African American 35 (A) >60 mL/min/1.73 m 2    GFR If Non  29 (A) >60  mL/min/1.73 m 2   LACTIC ACID   Result Value Ref Range    Lactic Acid 3.2 (H) 0.5 - 2.0 mmol/L   ACCU-CHEK GLUCOSE   Result Value Ref Range    Glucose - Accu-Ck 307 (H) 65 - 99 mg/dL   ACCU-CHEK GLUCOSE   Result Value Ref Range    Glucose - Accu-Ck 263 (H) 65 - 99 mg/dL   ACCU-CHEK GLUCOSE   Result Value Ref Range    Glucose - Accu-Ck 211 (H) 65 - 99 mg/dL   EKG (NOW)   Result Value Ref Range    Report       [facility] Emergency Dept.    Test Date:  2019  Pt Name:    SHAUN CORCORAN                  Department: ER  MRN:        [id number]                      Room:       RD 05  Gender:     Male                         Technician: [id number]  :        1960                   Requested By:ER TRIAGE PROTOCOL  Order #:    [id number]                    Reading MD: CAROL ROSA MD    Measurements  Intervals                                Axis  Rate:       89                           P:          85  OH:         171                          QRS:        84  QRSD:       91                           T:          81  QT:         356  QTc:        434    Interpretive Statements  Sinus rhythm  Consider right atrial enlargement  Minimal ST elevation, anterior leads  Lateral leads are also involved  First degree AV block no longer present  Right-axis deviation no longer present  T-wave abnormality no longer present  ST (T wave) deviation still present  Electronically Signed On  14:11:45 PST by CAROL ROSA MD     EKG (NOW)   Result Value Ref Range    Report       [facility] Emergency Dept.    Test Date:  2019  Pt Name:    SHAUN CORCORAN                  Department: ER  MRN:        [id number]                      Room:       RD 05  Gender:     Male                         Technician: [id number]  :        1960                   Requested By:ER TRIAGE PROTOCOL  Order #:    [id number]                    Reading MD: CAROL ROSA MD    Measurements  Intervals                                 Axis  Rate:       101                          P:          90  AK:         175                          QRS:        92  QRSD:       92                           T:          73  QT:         348  QTc:        452    Interpretive Statements  Sinus tachycardia  Right atrial enlargement  Lateral leads are also involved  Compared to ECG 2019 11:19:35  Sinus rhythm no longer present  First degree AV block no longer present  Right-axis deviation no longer present  T-wave abnormality no longer present  ST (T wave) deviation still present   Electronically Signed On 2019 14:13:00 PST by CAROL ROSA MD     EKG (NOW)   Result Value Ref Range    Report       [facility] Emergency Dept.    Test Date:  2019  Pt Name:    SHAUN CORCORAN                  Department: ER  MRN:        [id number]                      Room:       [facility]  Gender:     Male                         Technician: [id number]  :        1960                   Requested By:MISTI MARCUM  Order #:    [id number]                    Reading MD: CAROL ROSA MD    Measurements  Intervals                                Axis  Rate:       74                           P:          86  AK:         186                          QRS:        92  QRSD:       100                          T:          82  QT:         399  QTc:        443    Interpretive Statements  Sinus rhythm  Baseline wander in lead(s) V3  Compared to ECG 2019 11:30:08  Sinus tachycardia no longer present  Atrial abnormality no longer present  ST (T wave) deviation no longer present  Electronically Signed On 2019 14:12:32 PST by CAROL ROSA MD        RADIOLOGY  IR-MIDLINE CATHETER INSERTION WO GUIDANCE > AGE 3   Final Result                  Ultrasound-guided midline placement performed by qualified nursing staff    as above.          EC-ECHOCARDIOGRAM LTD W/O CONT   Final Result      DX-CHEST-PORTABLE (1 VIEW)   Final  Result         No acute cardiac or pulmonary abnormality is identified.      The radiologist's interpretation of all radiological studies have been reviewed by me.    COURSE & MEDICAL DECISION MAKING  Pertinent Labs & Imaging studies reviewed. (See chart for details)    11:35 AM - Patient seen and examined at bedside. Patient will be treated with Lactated Ringers Bolus, Zofran 4 mg. Ordered EKG, DX-Chest, Lactic Acid, CBC with Diff, CMP, Urinalysis, Urine Culture, Blood Culture, Troponin, APTT, PT/INR, Beta-Hydroxybutyric Acid, Venous Blood Gas, eGFR, Morphology, Platelet Estimate to evaluate his symptoms. The differential diagnoses include but are not limited to: Sepsis, DKA, ACS. The patient verbalizes agreement and understands my plan of care as I have outlined it.    11:40 AM - Paged Cardiology.    11:45 AM - I spoke with Dr. Christensen (Cardiology) who agrees evaluate the patient.    11:58 PM - Patient was reevaluated. He is still nauseated, BP is at 85.    12:10 PM - Reevaluated patient at bedside, cardiology is present. His heart rate is improving. He will be treated with an NS infusion 1000 mL.    12:29 PM - Patient was reevaluated. His blood pressure has improved. Patient will be treated with Zofran 4 mg.    12:44 PM - Patient will be treated with Rocephin 1000 mg.    2:00 PM - Patient was reevaluated. Updated patient and family on diagnostic studies conducted which indicate Diabetic Ketoacidosis. Informed them it is likely he will be hospitalized and the patient and family verbalize agreement and understand.    2:09 PM - I spoke with Dr. Tong regarding patient's presentation, he agrees to hospitalize patient. Patient's plan of care has been transferred at this time.    HYDRATION: Based on the patient's presentation of Hypotension the patient was given IV fluids. IV Hydration was used because oral hydration was not adequate alone. Upon recheck following hydration, the patient was slightly  improved.    HYDRATION: Based on the patient's presentation of Hypotension the patient was given IV fluids. IV Hydration was used because oral hydration was not adequate alone. Upon recheck following hydration, the patient was improved.    CRITICAL CARE  The very real possibility of a deterioration of this patient's condition required the highest level of my preparedness for sudden, emergent intervention. I provided critical care services, which included medication orders, frequent reevaluations of the patient's condition and response to treatment, ordering and reviewing test results, and discussing the case with various consultants. The critical care time associated with the care of the patient was 35 minutes. Review chart for interventions. This time is exclusive of any other billable procedures.     Decision Making:  Patient is critically ill, tachycardic, hypotensive, borderline EKG, there was definitely some ST elevation but it appears that it is at its baseline with WI depression, there were no septal reciprocal changes but there were peaked T waves, due to the patient's abnormal EKG I contacted cardiology who came and evaluated the patient, I did not feel like this was a STEMI I felt like this was the patient's baseline with possibly some demand ischemia.  Patient is profoundly hypotensive, 2 IVs were established, fluid resuscitated with lactated Ringer's.  With improvement of the patient's blood pressure.  Laboratory test show the patient is in diabetic ketoacidosis, started the patient on insulin drip.  Discussed the case with the hospitalist for admission to the ICU.        DISPOSITION:  Patient will be hospitalized by Dr. Tong in critical condition.    FINAL IMPRESSION  1. Diabetic ketoacidosis with coma associated with type 1 diabetes mellitus (HCC)    2. Hypotension, unspecified hypotension type    3. DINESH (acute kidney injury) ([facility])       Critical Care Time of 35 minutes excluding procedures.    I,  Justin Lan (Scribe), am scribing for, and in the presence of, Ryan Hernandez M.D..    Electronically signed by: Justin Lan (Tellyibalejandro), 12/19/2019    I, Ryan Hernandez M.D. personally performed the services described in this documentation, as scribed by Justin Lan in my presence, and it is both accurate and complete.    C    The note accurately reflects work and decisions made by me.  Ryan Hernandez  12/19/2019  6:13 PM

## 2019-12-19 NOTE — ASSESSMENT & PLAN NOTE
Likely pre-renal, function normalized 12/22  Continue IV fluid resuscitation  Renal dose meds, avoid nephrotoxins  Strict I/Os  Continue serial BMP

## 2019-12-19 NOTE — ED NOTES
Med Rec completed per patient's MAR   Allergies reviewed  No ORAL antibiotics in last 14 days

## 2019-12-19 NOTE — ASSESSMENT & PLAN NOTE
Afebrile no over signs of infection, CXR and UA unremarkable  Potentially stress related  ROS for ID negative again, continue to monitor mostly for infection  Off pressors  White count still borderline elevated, continue to monitor

## 2019-12-20 PROBLEM — D64.9 NORMOCHROMIC NORMOCYTIC ANEMIA: Status: ACTIVE | Noted: 2019-12-20

## 2019-12-20 LAB
ABO GROUP BLD: NORMAL
ANION GAP SERPL CALC-SCNC: 7 MMOL/L (ref 0–11.9)
ANION GAP SERPL CALC-SCNC: 9 MMOL/L (ref 0–11.9)
BARCODED ABORH UBTYP: 600
BARCODED PRD CODE UBPRD: NORMAL
BARCODED UNIT NUM UBUNT: NORMAL
BLD GP AB SCN SERPL QL: NORMAL
BUN SERPL-MCNC: 104 MG/DL (ref 8–22)
BUN SERPL-MCNC: 116 MG/DL (ref 8–22)
CALCIUM SERPL-MCNC: 7.2 MG/DL (ref 8.5–10.5)
CALCIUM SERPL-MCNC: 7.4 MG/DL (ref 8.5–10.5)
CHLORIDE SERPL-SCNC: 114 MMOL/L (ref 96–112)
CHLORIDE SERPL-SCNC: 114 MMOL/L (ref 96–112)
CO2 SERPL-SCNC: 17 MMOL/L (ref 20–33)
CO2 SERPL-SCNC: 17 MMOL/L (ref 20–33)
COMPONENT R 8504R: NORMAL
CREAT SERPL-MCNC: 1.29 MG/DL (ref 0.5–1.4)
CREAT SERPL-MCNC: 1.47 MG/DL (ref 0.5–1.4)
ERYTHROCYTE [DISTWIDTH] IN BLOOD BY AUTOMATED COUNT: 53 FL (ref 35.9–50)
GLUCOSE BLD-MCNC: 102 MG/DL (ref 65–99)
GLUCOSE BLD-MCNC: 102 MG/DL (ref 65–99)
GLUCOSE BLD-MCNC: 118 MG/DL (ref 65–99)
GLUCOSE BLD-MCNC: 124 MG/DL (ref 65–99)
GLUCOSE BLD-MCNC: 139 MG/DL (ref 65–99)
GLUCOSE BLD-MCNC: 152 MG/DL (ref 65–99)
GLUCOSE BLD-MCNC: 157 MG/DL (ref 65–99)
GLUCOSE BLD-MCNC: 223 MG/DL (ref 65–99)
GLUCOSE BLD-MCNC: 225 MG/DL (ref 65–99)
GLUCOSE BLD-MCNC: 84 MG/DL (ref 65–99)
GLUCOSE BLD-MCNC: 88 MG/DL (ref 65–99)
GLUCOSE BLD-MCNC: 89 MG/DL (ref 65–99)
GLUCOSE BLD-MCNC: 95 MG/DL (ref 65–99)
GLUCOSE BLD-MCNC: 96 MG/DL (ref 65–99)
GLUCOSE SERPL-MCNC: 107 MG/DL (ref 65–99)
GLUCOSE SERPL-MCNC: 138 MG/DL (ref 65–99)
HCT VFR BLD AUTO: 20.2 % (ref 42–52)
HGB BLD-MCNC: 6.9 G/DL (ref 14–18)
LACTATE BLD-SCNC: 2.2 MMOL/L (ref 0.5–2)
LACTATE BLD-SCNC: 2.3 MMOL/L (ref 0.5–2)
MAGNESIUM SERPL-MCNC: 2.4 MG/DL (ref 1.5–2.5)
MCH RBC QN AUTO: 32.9 PG (ref 27–33)
MCHC RBC AUTO-ENTMCNC: 34.2 G/DL (ref 33.7–35.3)
MCV RBC AUTO: 96.2 FL (ref 81.4–97.8)
PHOSPHATE SERPL-MCNC: 2.7 MG/DL (ref 2.5–4.5)
PLATELET # BLD AUTO: 263 K/UL (ref 164–446)
PMV BLD AUTO: 11.1 FL (ref 9–12.9)
POTASSIUM SERPL-SCNC: 4 MMOL/L (ref 3.6–5.5)
POTASSIUM SERPL-SCNC: 4.1 MMOL/L (ref 3.6–5.5)
PRODUCT TYPE UPROD: NORMAL
RBC # BLD AUTO: 2.1 M/UL (ref 4.7–6.1)
RH BLD: NORMAL
SODIUM SERPL-SCNC: 138 MMOL/L (ref 135–145)
SODIUM SERPL-SCNC: 140 MMOL/L (ref 135–145)
UNIT STATUS USTAT: NORMAL
WBC # BLD AUTO: 10.9 K/UL (ref 4.8–10.8)

## 2019-12-20 PROCEDURE — P9016 RBC LEUKOCYTES REDUCED: HCPCS

## 2019-12-20 PROCEDURE — 82962 GLUCOSE BLOOD TEST: CPT | Mod: 91

## 2019-12-20 PROCEDURE — 700101 HCHG RX REV CODE 250: Performed by: INTERNAL MEDICINE

## 2019-12-20 PROCEDURE — 700105 HCHG RX REV CODE 258: Performed by: INTERNAL MEDICINE

## 2019-12-20 PROCEDURE — 86900 BLOOD TYPING SEROLOGIC ABO: CPT

## 2019-12-20 PROCEDURE — 700111 HCHG RX REV CODE 636 W/ 250 OVERRIDE (IP): Performed by: HOSPITALIST

## 2019-12-20 PROCEDURE — 700111 HCHG RX REV CODE 636 W/ 250 OVERRIDE (IP): Performed by: INTERNAL MEDICINE

## 2019-12-20 PROCEDURE — 700105 HCHG RX REV CODE 258: Performed by: HOSPITALIST

## 2019-12-20 PROCEDURE — 30243N1 TRANSFUSION OF NONAUTOLOGOUS RED BLOOD CELLS INTO CENTRAL VEIN, PERCUTANEOUS APPROACH: ICD-10-PCS | Performed by: INTERNAL MEDICINE

## 2019-12-20 PROCEDURE — 85027 COMPLETE CBC AUTOMATED: CPT

## 2019-12-20 PROCEDURE — 770022 HCHG ROOM/CARE - ICU (200)

## 2019-12-20 PROCEDURE — 84100 ASSAY OF PHOSPHORUS: CPT

## 2019-12-20 PROCEDURE — 83605 ASSAY OF LACTIC ACID: CPT | Mod: 91

## 2019-12-20 PROCEDURE — 86923 COMPATIBILITY TEST ELECTRIC: CPT

## 2019-12-20 PROCEDURE — 99233 SBSQ HOSP IP/OBS HIGH 50: CPT | Performed by: HOSPITALIST

## 2019-12-20 PROCEDURE — 700102 HCHG RX REV CODE 250 W/ 637 OVERRIDE(OP): Performed by: HOSPITALIST

## 2019-12-20 PROCEDURE — 83735 ASSAY OF MAGNESIUM: CPT

## 2019-12-20 PROCEDURE — A9270 NON-COVERED ITEM OR SERVICE: HCPCS | Performed by: HOSPITALIST

## 2019-12-20 PROCEDURE — 86901 BLOOD TYPING SEROLOGIC RH(D): CPT

## 2019-12-20 PROCEDURE — 80048 BASIC METABOLIC PNL TOTAL CA: CPT | Mod: 91

## 2019-12-20 PROCEDURE — 86850 RBC ANTIBODY SCREEN: CPT

## 2019-12-20 PROCEDURE — 36430 TRANSFUSION BLD/BLD COMPNT: CPT

## 2019-12-20 PROCEDURE — 700102 HCHG RX REV CODE 250 W/ 637 OVERRIDE(OP): Performed by: INTERNAL MEDICINE

## 2019-12-20 PROCEDURE — 99291 CRITICAL CARE FIRST HOUR: CPT | Performed by: INTERNAL MEDICINE

## 2019-12-20 RX ORDER — INSULIN GLARGINE 100 [IU]/ML
10 INJECTION, SOLUTION SUBCUTANEOUS ONCE
Status: COMPLETED | OUTPATIENT
Start: 2019-12-20 | End: 2019-12-20

## 2019-12-20 RX ORDER — SODIUM CHLORIDE, SODIUM LACTATE, POTASSIUM CHLORIDE, CALCIUM CHLORIDE 600; 310; 30; 20 MG/100ML; MG/100ML; MG/100ML; MG/100ML
INJECTION, SOLUTION INTRAVENOUS CONTINUOUS
Status: DISCONTINUED | OUTPATIENT
Start: 2019-12-20 | End: 2019-12-22

## 2019-12-20 RX ORDER — SODIUM CHLORIDE, SODIUM LACTATE, POTASSIUM CHLORIDE, AND CALCIUM CHLORIDE .6; .31; .03; .02 G/100ML; G/100ML; G/100ML; G/100ML
1000 INJECTION, SOLUTION INTRAVENOUS ONCE
Status: COMPLETED | OUTPATIENT
Start: 2019-12-20 | End: 2019-12-20

## 2019-12-20 RX ORDER — INSULIN GLARGINE 100 [IU]/ML
20 INJECTION, SOLUTION SUBCUTANEOUS EVERY EVENING
Status: DISCONTINUED | OUTPATIENT
Start: 2019-12-20 | End: 2019-12-22

## 2019-12-20 RX ORDER — POTASSIUM CHLORIDE 7.45 MG/ML
10 INJECTION INTRAVENOUS
Status: COMPLETED | OUTPATIENT
Start: 2019-12-20 | End: 2019-12-20

## 2019-12-20 RX ADMIN — ONDANSETRON 4 MG: 2 INJECTION INTRAMUSCULAR; INTRAVENOUS at 11:03

## 2019-12-20 RX ADMIN — NOREPINEPHRINE BITARTRATE 5 MCG/MIN: 1 INJECTION INTRAVENOUS at 18:53

## 2019-12-20 RX ADMIN — POTASSIUM CHLORIDE 10 MEQ: 7.46 INJECTION, SOLUTION INTRAVENOUS at 01:07

## 2019-12-20 RX ADMIN — POTASSIUM CHLORIDE 10 MEQ: 7.46 INJECTION, SOLUTION INTRAVENOUS at 08:24

## 2019-12-20 RX ADMIN — INSULIN GLARGINE 10 UNITS: 100 INJECTION, SOLUTION SUBCUTANEOUS at 12:27

## 2019-12-20 RX ADMIN — TAMSULOSIN HYDROCHLORIDE 0.4 MG: 0.4 CAPSULE ORAL at 05:14

## 2019-12-20 RX ADMIN — OMEPRAZOLE 20 MG: 20 CAPSULE, DELAYED RELEASE ORAL at 05:14

## 2019-12-20 RX ADMIN — HEPARIN SODIUM 5000 UNITS: 5000 INJECTION, SOLUTION INTRAVENOUS; SUBCUTANEOUS at 14:51

## 2019-12-20 RX ADMIN — SODIUM CHLORIDE 4 UNITS/HR: 9 INJECTION, SOLUTION INTRAVENOUS at 05:10

## 2019-12-20 RX ADMIN — POTASSIUM CHLORIDE 10 MEQ: 7.46 INJECTION, SOLUTION INTRAVENOUS at 00:19

## 2019-12-20 RX ADMIN — INSULIN HUMAN 3 UNITS: 100 INJECTION, SOLUTION PARENTERAL at 18:06

## 2019-12-20 RX ADMIN — SODIUM CHLORIDE, POTASSIUM CHLORIDE, SODIUM LACTATE AND CALCIUM CHLORIDE: 600; 310; 30; 20 INJECTION, SOLUTION INTRAVENOUS at 15:00

## 2019-12-20 RX ADMIN — INSULIN GLARGINE 20 UNITS: 100 INJECTION, SOLUTION SUBCUTANEOUS at 18:06

## 2019-12-20 RX ADMIN — HEPARIN SODIUM 5000 UNITS: 5000 INJECTION, SOLUTION INTRAVENOUS; SUBCUTANEOUS at 21:23

## 2019-12-20 RX ADMIN — SODIUM CHLORIDE, POTASSIUM CHLORIDE, SODIUM LACTATE AND CALCIUM CHLORIDE 1000 ML: 600; 310; 30; 20 INJECTION, SOLUTION INTRAVENOUS at 07:46

## 2019-12-20 RX ADMIN — NOREPINEPHRINE BITARTRATE 10 MCG/MIN: 1 INJECTION INTRAVENOUS at 02:42

## 2019-12-20 RX ADMIN — PANCRELIPASE 48000 UNITS: 24000; 76000; 120000 CAPSULE, DELAYED RELEASE PELLETS ORAL at 05:28

## 2019-12-20 RX ADMIN — DEXTROSE AND SODIUM CHLORIDE: 10; .45 INJECTION, SOLUTION INTRAVENOUS at 08:48

## 2019-12-20 RX ADMIN — INSULIN HUMAN 3 UNITS: 100 INJECTION, SOLUTION PARENTERAL at 21:22

## 2019-12-20 RX ADMIN — POTASSIUM CHLORIDE 10 MEQ: 7.46 INJECTION, SOLUTION INTRAVENOUS at 09:30

## 2019-12-20 ASSESSMENT — ENCOUNTER SYMPTOMS
WEIGHT LOSS: 1
NERVOUS/ANXIOUS: 0
CHILLS: 0
NAUSEA: 1
SORE THROAT: 1
DEPRESSION: 0
HEADACHES: 0
SHORTNESS OF BREATH: 0
SPEECH CHANGE: 0
PND: 0
FOCAL WEAKNESS: 0
BLURRED VISION: 0
BACK PAIN: 0
FEVER: 0
NECK PAIN: 0
SORE THROAT: 0
PALPITATIONS: 0
BRUISES/BLEEDS EASILY: 0
SENSORY CHANGE: 0
DIZZINESS: 0
DOUBLE VISION: 0
COUGH: 0
ORTHOPNEA: 0
VOMITING: 0
ABDOMINAL PAIN: 0
SPUTUM PRODUCTION: 0
NAUSEA: 0

## 2019-12-20 ASSESSMENT — LIFESTYLE VARIABLES: SUBSTANCE_ABUSE: 0

## 2019-12-20 NOTE — ASSESSMENT & PLAN NOTE
"Case management has requested consultation from psychiatry to assess patient's capacity with respect to self diabetic management following discharge, this consult has been requested - 12/28 per psychiatry: \"INCAPACITATED to manage his DM on his own\"  DC sliding scale and give mealtime dose only to simplify regimen  Lantus 25 units  Blood sugars controlled  Continue high intensity statin therapy  Aspirin 81    "

## 2019-12-20 NOTE — PROGRESS NOTES
Notified Dr. Salinas of FSBS 89 per protocol. Order received to increase D10 1/2NS to a rate of 200ml/hr.

## 2019-12-20 NOTE — DISCHARGE PLANNING
LSW was unable to assess pt however bedside RN stated that pt lives at [facility] assisted living and as a guardian named, Star [phone].

## 2019-12-20 NOTE — PROGRESS NOTES
Hospital Medicine Daily Progress Note    Date of Service  12/20/2019    Chief Complaint  Altered mentation    Hospital Course    59 y.o. male admitted 12/19/2019 with DKA and seizures      Interval Problem Update  A+Ox4  Chronic back pain  Weak but moves all ext  On 9mcg levophed  Transfused 1 unit pRBC's  Transfer off insulin drip and start part of his home insulin dosing.  Poor oral intake.  I have ordered him boost supplements    Consultants/Specialty  Pulmonary/Intensivist    Code Status  FULL    Disposition  Monitor in ICU    Review of Systems  Review of Systems   Constitutional: Positive for weight loss. Negative for chills and fever.   HENT: Positive for sore throat. Negative for congestion.    Respiratory: Negative for cough and shortness of breath.    Cardiovascular: Negative for chest pain, palpitations and leg swelling.   Gastrointestinal: Negative for abdominal pain, nausea and vomiting.   Genitourinary: Negative for dysuria.   Musculoskeletal: Negative for back pain and neck pain.   Neurological: Negative for dizziness, speech change and headaches.   Psychiatric/Behavioral: Negative for depression and substance abuse.        Physical Exam  Temp:  [36.7 °C (98.1 °F)-37.3 °C (99.2 °F)] 37.3 °C (99.2 °F)  Pulse:  [64-91] 84  Resp:  [13-41] 21  BP: ()/(39-61) 102/52  SpO2:  [83 %-100 %] 91 %    Physical Exam  Vitals signs reviewed.   Constitutional:       Appearance: Normal appearance.   HENT:      Head: Normocephalic and atraumatic.      Nose: Nose normal.      Mouth/Throat:      Mouth: Mucous membranes are dry.   Eyes:      General: No scleral icterus.        Right eye: No discharge.         Left eye: No discharge.      Extraocular Movements: Extraocular movements intact.      Conjunctiva/sclera: Conjunctivae normal.   Cardiovascular:      Rate and Rhythm: Normal rate and regular rhythm.      Pulses:           Radial pulses are 2+ on the right side and 2+ on the left side.        Dorsalis pedis  pulses are 2+ on the right side and 2+ on the left side.      Heart sounds: Normal heart sounds. No murmur.   Pulmonary:      Effort: Pulmonary effort is normal. No respiratory distress.      Breath sounds: Normal breath sounds. No wheezing.   Abdominal:      General: Abdomen is flat. There is no distension.      Palpations: Abdomen is soft. There is no mass.      Tenderness: There is no tenderness.   Musculoskeletal:         General: No swelling.      Right lower leg: No edema.      Left lower leg: No edema.   Skin:     General: Skin is warm.      Coloration: Skin is not jaundiced.   Neurological:      General: No focal deficit present.      Mental Status: He is alert and oriented to person, place, and time.   Psychiatric:         Mood and Affect: Mood normal.         Behavior: Behavior normal.         Fluids    Intake/Output Summary (Last 24 hours) at 12/20/2019 1853  Last data filed at 12/20/2019 1800  Gross per 24 hour   Intake 4378.49 ml   Output 5050 ml   Net -671.51 ml       Laboratory  Recent Labs     12/19/19  1135 12/19/19  1930 12/20/19  0200   WBC 18.7* 12.2* 10.9*   RBC 3.29* 2.25* 2.10*   HEMOGLOBIN 10.7* 7.2* 6.9*   HEMATOCRIT 31.3* 21.5* 20.2*   MCV 95.1 94.7 96.2   MCH 32.5 31.6 32.9   MCHC 34.2 33.3* 34.2   RDW 53.1* 52.3* 53.0*   PLATELETCT 371 255 263   MPV 11.4 11.3 11.1     Recent Labs     12/19/19  1930 12/20/19  0200 12/20/19  0621   SODIUM 136 138 140   POTASSIUM 3.5* 4.1 4.0   CHLORIDE 110 114* 114*   CO2 13* 17* 17*   GLUCOSE 439* 138* 107*   * 116* 104*   CREATININE 1.61* 1.47* 1.29   CALCIUM 6.7* 7.2* 7.4*     Recent Labs     12/19/19  1135   APTT 24.5*   INR 1.04               Imaging  IR-MIDLINE CATHETER INSERTION WO GUIDANCE > AGE 3   Final Result                  Ultrasound-guided midline placement performed by qualified nursing staff    as above.          EC-ECHOCARDIOGRAM LTD W/O CONT   Final Result      DX-CHEST-PORTABLE (1 VIEW)   Final Result         No acute cardiac or  pulmonary abnormality is identified.           Assessment/Plan  Diabetic ketoacidosis without coma associated with type 1 diabetes mellitus (HCC)  Assessment & Plan  Hypoglycemic upon admission then converted with hyperglycemia.  Had initial anion gap and acidosis.  Had improvement with IV fluids and insulin drip  He was transitioned to Lantus however with poor oral intake concern of potential hypoglycemia we will continue to closely monitor in the ICU  Glycohemoglobin: 6.9 past 3 months    Metabolic acidosis, increased anion gap  Assessment & Plan  DKA    DINESH (acute kidney injury) ([facility])  Assessment & Plan  Continue IV fluid resuscitation.  Minimal decrease in BUN and creatinine since admission.  Monitor BMP and avoid nephrotoxins  12/20 BUN: 104, creatinine: 1.29    Essential hypertension- (present on admission)  Assessment & Plan  Patient has required vasopressors  IV fluids  Monitor vitals and strict I's and O's  Holding outpatient lisinopril 20 mg    Hyponatremia  Assessment & Plan  Resolved with IV fluids and treatment of DKA    Leukocytosis  Assessment & Plan  Suspect stress-induced secondary to DKA  Downtrending continue to monitor  Monitor vitals for any signs of fever to suspect/rule out infectious etiology.       VTE prophylaxis: Heparin

## 2019-12-20 NOTE — PROGRESS NOTES
Doctor Jimmie notified of lab values, hypotension, and concern of a GI bleed. Repeat lactic, repeat bmp, and levo gtt verbal orders given. Pt. Remains asymptomatic all other vitals stable.

## 2019-12-20 NOTE — CARE PLAN
Problem: Hemodynamic Status  Goal: Vital Signs and Fluid Balance Management  Outcome: PROGRESSING SLOWER THAN EXPECTED  Note:   Pt started on vasopressors to maintain MAP >60, bolus of LR and 1 unit of blood given. Monitoring SBP q15 mins     Problem: Safety  Goal: Will remain free from injury  Outcome: MET  Note:   Injury free, encouraging movement/ repositioning. Pt declines assistance     Problem: Knowledge Deficit  Goal: Knowledge of disease process/condition, treatment plan, diagnostic tests, and medications will improve  Outcome: MET  Intervention: Assess knowledge level of disease process/condition, treatment plan, diagnostic tests, and medications  Note:   Provided with each interaction

## 2019-12-20 NOTE — PROGRESS NOTES
Doctor Gonda notified at 0300 about labs. Verbal to give 1 uprc. Keep insulin and d10 .45% ns running and recheck lactic.

## 2019-12-20 NOTE — PROGRESS NOTES
Tried EVERYTHING to obtain labs but due to dehydration and hypotension was unable. Cannot get blood from venipuncture, venipuncture with ultrasound, midline, PIVs (new and old). Asked lab to come up and attempt. MD notified

## 2019-12-20 NOTE — PROGRESS NOTES
FSBS 84, notified Dr. Salinas still awaiting food tray. Called placed to dietary. Pt provided juice per Dr. Salinas. On recheck pt FSBS 118.

## 2019-12-20 NOTE — H&P
Hospital Medicine History & Physical Note    Date of Service  12/19/2019    Primary Care Physician  PAULINE Armando.    Consultants  Critical care medicine    Code Status  Full    Chief Complaint  Altered mentation    History of Presenting Illness  59 y.o. male who is a resident of a skilled nursing facility and has a history of type 2 diabetes.  By report he had been confused today and was found to have a glucose of 350.  Apparently 1 week ago his medications for his diabetes have been changed, and his sugars have been running high since that time.  In the ED the patient has been found to be in DKA.  He is unfortunately confused and unable to give me a good history however he is resting comfortably and denies any distress or pain.  In the ED so far he is gotten 1 L of normal saline, followed by 3 L of lactated Ringer's.  On my examination he is currently running 8 units of insulin IV    Review of Systems  Review of Systems   Unable to perform ROS: Mental status change       Past Medical History   has a past medical history of Diabetes (HCC) and Hypertension.    Surgical History   has a past surgical history that includes gastroscopy-endo (N/A, 8/25/2018); other abdominal surgery; and toe amputation (Right, 1/12/2019).     Family History  family history includes No Known Problems in his father and mother.     Social History   reports that he has been smoking. He has a 3.75 pack-year smoking history. He has never used smokeless tobacco. He reports that he does not drink alcohol or use drugs.    Allergies  No Known Allergies    Medications  Prior to Admission Medications   Prescriptions Last Dose Informant Patient Reported? Taking?   Cholecalciferol (VITAMIN D) 2000 units Cap 12/19/2019 at AM MAR from Other Facility No No   Sig: Take 2 Caps by mouth every day.   Empagliflozin-metFORMIN HCl ER  MG TABLET SR 24 HR 12/19/2019 at AM MAR from Other Facility No No   Sig: Take 1 Tab by mouth every morning with  breakfast.   Lactobacillus-Inulin ([facility] DIGESTIVE [facility]) Cap 12/19/2019 at AM MAR from Other Facility No No   Sig: TAKE (1) CAPSULE BY MOUTH ONCE DAILY.   Semaglutide, 1 MG/DOSE, (OZEMPIC, 1 MG/DOSE,) 2 MG/1.5ML Solution Pen-injector 12/17/2019 at PM MAR from Other Facility No No   Sig: Inject 1 mg as instructed every 7 days.   aspirin (ASA) 81 MG Chew Tab chewable tablet 12/19/2019 at AM MAR from Other Facility No No   Sig: CHEW 1 TABLET BY MOUTH ONCE DAILY.   atorvastatin (LIPITOR) 10 MG Tab 12/19/2019 at AM MAR from Other Facility No No   Sig: TAKE 1 TABLET BY MOUTH EVERY NIGHT AS DIRECTED.   insulin detemir (LEVEMIR) 100 UNIT/ML Solution 12/18/2019 at PM MAR from Other Facility Yes No   Sig: Inject 20 Units as instructed every evening.   lisinopril (PRINIVIL) 20 MG Tab 12/19/2019 at AM MAR from Other Facility No No   Sig: TAKE 1 TABLET BY MOUTH ONCE DAILY.   pancrelipase, Lip-Prot-Amyl, (CREON) 98216-88941 units Cap DR Particles 12/19/2019 at AM MAR from Other Facility No No   Sig: Take 2 Caps by mouth every day.   pantoprazole (PROTONIX) 40 MG Tablet Delayed Response 12/19/2019 at AM MAR from Other Facility No No   Sig: TAKE 1 TABLET BY MOUTH ONCE DAILY.   tamsulosin (FLOMAX) 0.4 MG capsule 12/19/2019 at AM MAR from Other Facility No No   Sig: Take 1 Cap by mouth every day.      Facility-Administered Medications: None       Physical Exam  Temp:  [35.6 °C (96.1 °F)-36.4 °C (97.6 °F)] 36.4 °C (97.6 °F)  Pulse:  [] 101  Resp:  [14-23] 23  BP: ()/(39-63) 82/45  SpO2:  [81 %-100 %] 98 %    Physical Exam  Constitutional:       General: He is not in acute distress.     Appearance: He is well-developed. He is not diaphoretic.   HENT:      Head: Normocephalic and atraumatic.   Eyes:      General: No scleral icterus.     Conjunctiva/sclera: Conjunctivae normal.   Neck:      Vascular: No JVD.   Cardiovascular:      Rate and Rhythm: Normal rate.      Heart sounds: Murmur present. No gallop.    Pulmonary:       Effort: Pulmonary effort is normal. No respiratory distress.      Breath sounds: No stridor. No wheezing or rales.   Abdominal:      Palpations: Abdomen is soft.      Tenderness: There is no tenderness. There is no guarding or rebound.   Musculoskeletal:         General: No tenderness.      Right lower leg: No edema.      Left lower leg: No edema.   Skin:     General: Skin is warm and dry.      Findings: No rash.   Neurological:      General: No focal deficit present.      Mental Status: He is disoriented.   Psychiatric:         Mood and Affect: Mood normal.         Laboratory:  Recent Labs     12/19/19  1135   WBC 18.7*   RBC 3.29*   HEMOGLOBIN 10.7*   HEMATOCRIT 31.3*   MCV 95.1   MCH 32.5   MCHC 34.2   RDW 53.1*   PLATELETCT 371   MPV 11.4     Recent Labs     12/19/19  1135   SODIUM 129*   POTASSIUM 5.5   CHLORIDE 93*   CO2 13*   GLUCOSE 442*   *   CREATININE 2.33*   CALCIUM 8.4*     Recent Labs     12/19/19  1135   ALTSGPT 10   ASTSGOT 10*   ALKPHOSPHAT 74   TBILIRUBIN 0.5   GLUCOSE 442*     Recent Labs     12/19/19  1135   APTT 24.5*   INR 1.04     No results for input(s): NTPROBNP in the last 72 hours.      Recent Labs     12/19/19  1135   TROPONINT 24*       Urinalysis:    Recent Labs     12/19/19  1417   SPECGRAVITY 1.016   GLUCOSEUR >=1000*   KETONES Trace*   NITRITE Negative   LEUKESTERAS Negative        Imaging:  IR-MIDLINE CATHETER INSERTION WO GUIDANCE > AGE 3   Final Result                  Ultrasound-guided midline placement performed by qualified nursing staff    as above.          EC-ECHOCARDIOGRAM LTD W/O CONT   Final Result      DX-CHEST-PORTABLE (1 VIEW)   Final Result         No acute cardiac or pulmonary abnormality is identified.            Assessment/Plan:  I anticipate this patient will require at least two midnights for appropriate medical management, necessitating inpatient admission.    Diabetic ketoacidosis without coma associated with type 1 diabetes mellitus  ([facility])  Assessment & Plan  Suspect due to recent change in the patient's medications  Admit to ICU  Initiate DKA protocol  Continue to titrate IV insulin  Draw serial labs and replace potassium magnesium and phosphorus as appropriate    Metabolic acidosis, increased anion gap  Assessment & Plan  DKA    DINESH (acute kidney injury) ([facility])  Assessment & Plan  The patient is clearly prerenal  Continue aggressive fluid resuscitation  Renally dose medications as appropriate  Follow BMP and urine output  Consider further work-up if he does not normalize once DKA and volume deficit has been addressed    Essential hypertension- (present on admission)  Assessment & Plan  The patient has been hypotensive since presentation  Suspect a component of prerenal status.  Holding lisinopril  Continue fluid resuscitation    Hyponatremia  Assessment & Plan  Patient corrects when taking glucose into account    Leukocytosis  Assessment & Plan  No obvious signs of infection on evaluation review of labs or imaging.  Likely stress-induced  Repeat CBC in a.m.  Be vigilant for signs of infection      VTE prophylaxis: Heparin

## 2019-12-20 NOTE — PROGRESS NOTES
Patient is in need of midline for IV access for treatment of diabetic ketoacidosis.  I have discussed with nursing and reviewed prior labs on patient.  Patient is in acute renal insufficiency secondary to DKA and dehydration.  I expect his renal function to improve with aggressive hydration.  I have alerted nurse to okay a midline placement.

## 2019-12-20 NOTE — PROCEDURES
Vascular Access Team    Date of Insertion: 12/19/19  Arm Circumference: n/a  Line Length: 10  Line Size: 20  Vein Occupancy %: 45  Reason for Midline: Access  Labs: WBC 18.7, , , Cr 2.33, GFR 29, INR 1.04    Orders confirmed, vessel patency confirmed with ultrasound. Risks and benefits of procedure explained to patient and education regarding line associated bloodstream infections provided. Questions answered.     PowerGlide Midline placed in LUE per licensed provider order with ultrasound guidance. 20g, 10 cm line placed in Brachial vein after 1 attempt(s).  Catheter inserted with brisk blood return. Secured with 0cm external from insertion site.  Line flushed without resistance with 10 mL 0.9% normal saline.  Midline secured with Biopatch and Tegaderm.     Midline placement is confirmed by nurse using ultrasound and ability to flush and draw blood. Midline is appropriate for use at this time.  No X-ray is needed for placement confirmation. Pt tolerated procedure well.  Patient condition relayed to unit RN or ordering physician via this post procedure note in the EMR.    Ultrasound images uploaded to PACS and viewable in the EMR - yes  Ultrasound imaged printed and placed in paper chart - no      BARD PowerGlide Midline ref # [id number], Lot # [id number]

## 2019-12-20 NOTE — PROGRESS NOTES
Critical Care Progress Note    Date of admission  12/19/2019    Chief Complaint  59 y.o. male admitted 12/19/2019 with DKA    Hospital Course    59 y.o. male w/ PMH DM, HTN who lives at an group home who presented 12/19/2019 with mild SOB and found to be in DKA upon arrival to the ED. HE notes recent swithc of his meds. HE denies any fevers, chill, cough, or myalgias. He does endorse generalized weakness, nausea without vomiting, mild abdominal pain and diarrhea. He notes poor po intake over the past week.The patient is somewhat slow to respond and there is no other friends or family avail be for history. Pt was tachycardic on arrival and due to SOB cards was consulted in the ED and didnt feel any further w/u was necessary. In take labs revealed; Glucose 442, trace urine ketones, Anion gap of 23, BHB 5.33 and Na 128.       Interval Problem Update  Reviewed last 24 hour events:    A&O x4  Denies N/V/D  SR 80s  SBp 80-100s  UO good  NE drip 10  I/Os + 3.3 L  Hgb 6.9  No bleeding clinically  I drip 4  D10 150 -> 200 cc/hr  LA 2.2    Liter bolus LR  Transfuse 1 unit of blood  Monitor for bleeding  Transition to SSI/Levimir  Continue to actively titrate norepinephrine    Serial follow-up  Blood sugars between 102 and 225 after transition  Patient given half his usual p.m. dose of Levemir and started on medium sliding scale  Insulin drip continued for 2 hours after dose of long-acting insulin and then discontinued  Blood pressure improved with fluid bolus and unit of blood  Still requiring norepinephrine although dose is been coming down from 10 -> 7 -> 5 mics  Volume status reassessed with exam and he actually appears euvolemic  Echocardiogram reviewed again, LV ejection fraction 65% with normal right-sided size and function, no significant pericardial effusion noted either  IV fluids continue    Review of Systems  Review of Systems   Constitutional: Positive for malaise/fatigue (Improved). Negative for chills and fever.    HENT: Negative for sore throat.    Eyes: Negative for blurred vision and double vision.   Respiratory: Negative for cough, sputum production and shortness of breath.    Cardiovascular: Negative for chest pain, palpitations, orthopnea and PND.   Gastrointestinal: Positive for nausea (Improved). Negative for abdominal pain and vomiting (Resolved).   Genitourinary: Negative.    Musculoskeletal: Negative for back pain.   Neurological: Negative for sensory change, speech change, focal weakness and headaches.   Endo/Heme/Allergies: Does not bruise/bleed easily.   Psychiatric/Behavioral: Negative for depression. The patient is not nervous/anxious.         Vital Signs for last 24 hours   Temp:  [36.7 °C (98.1 °F)-37.3 °C (99.2 °F)] 37.3 °C (99.2 °F)  Pulse:  [64-91] 84  Resp:  [13-34] 21  BP: ()/(39-61) 102/52  SpO2:  [83 %-100 %] 91 %    Hemodynamic parameters for last 24 hours       Respiratory Information for the last 24 hours       Physical Exam   Physical Exam  Constitutional:       Appearance: He is underweight. He is ill-appearing.      Interventions: Nasal cannula in place.   HENT:      Head: Normocephalic and atraumatic.      Mouth/Throat:      Mouth: Mucous membranes are dry.   Eyes:      General: No scleral icterus.     Extraocular Movements: Extraocular movements intact.      Pupils: Pupils are equal, round, and reactive to light.   Neck:      Musculoskeletal: Neck supple. No neck rigidity.   Cardiovascular:      Rate and Rhythm: Normal rate and regular rhythm.  No extrasystoles are present.     Pulses: Normal pulses.      Heart sounds: No murmur. No friction rub. No gallop.       Comments: Sinus rhythm  Pulmonary:      Effort: Pulmonary effort is normal. No respiratory distress.      Breath sounds: No wheezing, rhonchi or rales.   Abdominal:      General: Abdomen is flat. Bowel sounds are normal. There is no distension.      Palpations: Abdomen is soft.      Tenderness: There is no tenderness. There is  no right CVA tenderness, left CVA tenderness or guarding.   Musculoskeletal:         General: No swelling.      Right lower leg: No edema.      Left lower leg: No edema.   Skin:     General: Skin is warm and dry.      Capillary Refill: Capillary refill takes less than 2 seconds.      Coloration: Skin is not cyanotic.      Nails: There is no clubbing.     Neurological:      General: No focal deficit present.      Mental Status: He is alert and oriented to person, place, and time.      GCS: GCS eye subscore is 4. GCS verbal subscore is 5. GCS motor subscore is 6.      Cranial Nerves: Cranial nerves are intact.      Sensory: Sensation is intact.      Motor: Weakness (Mild and diffuse) present.      Comments: Follows well   Psychiatric:         Attention and Perception: Attention normal.         Mood and Affect: Mood normal. Mood is not anxious.         Speech: Speech normal.         Behavior: Behavior is not agitated. Behavior is cooperative.         Cognition and Memory: Cognition and memory normal.         Medications  Current Facility-Administered Medications   Medication Dose Route Frequency Provider Last Rate Last Dose   • insulin glargine (LANTUS) injection 20 Units  20 Units Subcutaneous Q EVENING Charli Salinas M.D.   20 Units at 12/20/19 1806   • insulin regular (HUMULIN R) injection 2-9 Units  2-9 Units Subcutaneous 4X/DAY [facility] Charli Salinas M.D.   3 Units at 12/20/19 1806    And   • glucose 4 g chewable tablet 16 g  16 g Oral Q15 MIN PRN Charli Salinas M.D.        And   • DEXTROSE 10% BOLUS 250 mL  250 mL Intravenous Q15 MIN PRN Charli Salinas M.D.       • lactated ringers infusion   Intravenous Continuous Rosendo Alas D.O. 75 mL/hr at 12/20/19 1500     • tamsulosin (FLOMAX) capsule 0.4 mg  0.4 mg Oral DAILY Georges Celaya, D.O.   0.4 mg at 12/20/19 0514   • omeprazole (PRILOSEC) capsule 20 mg  20 mg Oral DAILY Georges Celaya D.O.   20 mg at 12/20/19 0514   • pancrelipase  (Lip-Prot-Amyl) (CREON 15986) 36302-79153 units capsule 48,000 Units  2 Cap Oral DAILY Georges Celaya D.O.   48,000 Units at 12/20/19 0528   • senna-docusate (PERICOLACE or SENOKOT S) 8.6-50 MG per tablet 2 Tab  2 Tab Oral BID CHIARA [email or web address].OYuridia   Stopped at 12/20/19 0600    And   • polyethylene glycol/lytes (MIRALAX) PACKET 1 Packet  1 Packet Oral QDAY PRN CHIARA Rhodes.O.        And   • magnesium hydroxide (MILK OF MAGNESIA) suspension 30 mL  30 mL Oral QDAY PRN CHIARA Rhodes.O.        And   • bisacodyl (DULCOLAX) suppository 10 mg  10 mg Rectal QDAY PRN CHIARA Rhodes.O.       • Respiratory Care per Protocol   Nebulization Continuous RT CHIARA Rhodes.O.       • heparin injection 5,000 Units  5,000 Units Subcutaneous Q8HRS Georges Celaya D.O.   5,000 Units at 12/20/19 1451   • acetaminophen (TYLENOL) tablet 650 mg  650 mg Oral Q6HRS PRN CHIARA Rhodes.O.       • ondansetron (ZOFRAN) syringe/vial injection 4 mg  4 mg Intravenous Q4HRS PRN Georges Celaya D.O.   4 mg at 12/20/19 1103   • ondansetron (ZOFRAN ODT) dispertab 4 mg  4 mg Oral Q4HRS PRN Georges Celaya D.O.       • promethazine (PHENERGAN) tablet 12.5-25 mg  12.5-25 mg Oral Q4HRS PRN CHIARA Rhodes.O.       • promethazine (PHENERGAN) suppository 12.5-25 mg  12.5-25 mg Rectal Q4HRS PRN CHIARA Rhodes.O.       • prochlorperazine (COMPAZINE) injection 5-10 mg  5-10 mg Intravenous Q4HRS PRN Georges Bunuel-Judith, D.O.       • norepinephrine (LEVOPHED) 8 mg in  mL Infusion  0-30 mcg/min Intravenous Continuous Marvin Samuel Jr., D.O. 9.4 mL/hr at 12/20/19 1853 5 mcg/min at 12/20/19 1853       Fluids    Intake/Output Summary (Last 24 hours) at 12/20/2019 2048  Last data filed at 12/20/2019 1800  Gross per 24 hour   Intake 4228.49 ml   Output 4250 ml   Net -21.51 ml       Laboratory          Recent Labs     12/19/19  1930  12/20/19 0200 12/20/19  0621   SODIUM 136 138 140   POTASSIUM 3.5* 4.1 4.0   CHLORIDE 110 114* 114*   CO2 13* 17* 17*   * 116* 104*   CREATININE 1.61* 1.47* 1.29   MAGNESIUM 1.8  --  2.4   PHOSPHORUS 3.3  --  2.7   CALCIUM 6.7* 7.2* 7.4*     Recent Labs     12/19/19  1135 12/19/19 1930 12/20/19 0200 12/20/19  0621   ALTSGPT 10  --   --   --    ASTSGOT 10*  --   --   --    ALKPHOSPHAT 74  --   --   --    TBILIRUBIN 0.5  --   --   --    GLUCOSE 442* 439* 138* 107*     Recent Labs     12/19/19 1135 12/19/19 1930 12/20/19  0200   WBC 18.7* 12.2* 10.9*   NEUTSPOLYS 85.10* 73.10*  --    LYMPHOCYTES 7.00* 13.80*  --    MONOCYTES 6.10 11.00  --    EOSINOPHILS 0.00 0.10  --    BASOPHILS 0.00 0.20  --    ASTSGOT 10*  --   --    ALTSGPT 10  --   --    ALKPHOSPHAT 74  --   --    TBILIRUBIN 0.5  --   --      Recent Labs     12/19/19 1135 12/19/19 1930 12/20/19  0200   RBC 3.29* 2.25* 2.10*   HEMOGLOBIN 10.7* 7.2* 6.9*   HEMATOCRIT 31.3* 21.5* 20.2*   PLATELETCT 371 255 263   PROTHROMBTM 13.9  --   --    APTT 24.5*  --   --    INR 1.04  --   --        Imaging  X-Ray:  I have personally reviewed the images and compared with prior images.  EKG:  I have personally reviewed the images and compared with prior images.  Echo:   Reviewed    Assessment/Plan  Diabetic ketoacidosis without coma associated with type 1 diabetes mellitus (HCC)  Assessment & Plan  Blood sugar improved, anion gap closed, bicarb level improving  Likely secondary to recent med changes by endocrinology  Insulin gtt and DKA protocol ongoing  Transition to subq insulin today  Monitor and Replete lytes prn      Hypotension  Assessment & Plan  Improved, secondary to hypovolemia  Continue fluid resuscitation  In part due to acidosis, this also has improved  No active bleeding although hemoglobin 6.9, will transfuse  Completeness check cortisol level in a.m.  Echocardiogram grossly normal, doubt primary cardiac etiology  Oxygenation good, doubt  thromboembolic event    Normochromic normocytic anemia  Assessment & Plan  Hemoglobin has drifted down to 6.9 with rehydration  No obvious clinical bleeding, monitoring  Transfuse 1 unit of blood  Serial CBC  If further transfusion required obtain TEG with mapping and assess for the need for additional blood products    Metabolic acidosis, increased anion gap  Assessment & Plan  DKA, DINESH, elevated Lactate, all improved  Continue aggressive fluid repletion with LR and tx of DKA  Monitor for any signs of concurrent  NAGMA, chloride elevated, limit chloride containing solutions  No role for alkali therapy at present    DINESH (acute kidney injury) (HCC)  Assessment & Plan  Likely pre-renal, improved  IVF ongoing  Renal dose meds, avoid nephrotoxins  Strict I/Os  Serial BMP    Essential hypertension- (present on admission)  Assessment & Plan  Resume home regimen is clinically appropriate, patient still on norepinephrine infusion      Hyponatremia  Assessment & Plan  128 on intake labs, normal today at 138  Likely hypovolemic  IVF ongoing  Monitor with serial BMP    Leukocytosis  Assessment & Plan  Afebrile no over signs of infection, CXR and UA unremarkable  Potentially stress related  ROS for ID negative, continue to monitor    Dyslipidemia- (present on admission)  Assessment & Plan  Resume statin    Tobacco abuse- (present on admission)  Assessment & Plan  Smoking cessation encouraged       VTE:  Heparin  Ulcer: PPI  Lines: None    I have performed a physical exam and reviewed and updated ROS and Plan today (12/20/2019). In review of yesterday's note (12/19/2019), there are no changes except as documented above.     Patient lawrence critically ill.  Acidosis has improved and we will transition from continuous infusion of insulin to SSI.  Blood pressure however still is problematic and will continue to titrate norepinephrine.  He does not appear to be septic and and his echocardiogram looks good.  We will continue fluid  resuscitation and attempt to wean off norepinephrine.  We will monitor for bleeding which does not appear to be present.  Prognosis remains guarded.  Patient is at high risk for increased morbidity mortality without above critical care interventions.    Discussed patient condition and risk of morbidity and/or mortality with Hospitalist, RN, RT, Pharmacy, , Charge nurse / hot rounds and Patient     The patient remains critically ill.  Critical care time = 40 minutes in directly providing and coordinating critical care and extensive data review.  No time overlap and excludes procedures.

## 2019-12-21 LAB
ANION GAP SERPL CALC-SCNC: 6 MMOL/L (ref 0–11.9)
BACTERIA UR CULT: NORMAL
BASOPHILS # BLD AUTO: 0.6 % (ref 0–1.8)
BASOPHILS # BLD: 0.06 K/UL (ref 0–0.12)
BUN SERPL-MCNC: 39 MG/DL (ref 8–22)
CALCIUM SERPL-MCNC: 7.8 MG/DL (ref 8.5–10.5)
CHLORIDE SERPL-SCNC: 110 MMOL/L (ref 96–112)
CO2 SERPL-SCNC: 21 MMOL/L (ref 20–33)
CREAT SERPL-MCNC: 0.85 MG/DL (ref 0.5–1.4)
EOSINOPHIL # BLD AUTO: 0.17 K/UL (ref 0–0.51)
EOSINOPHIL NFR BLD: 1.8 % (ref 0–6.9)
ERYTHROCYTE [DISTWIDTH] IN BLOOD BY AUTOMATED COUNT: 51.4 FL (ref 35.9–50)
GLUCOSE BLD-MCNC: 100 MG/DL (ref 65–99)
GLUCOSE BLD-MCNC: 112 MG/DL (ref 65–99)
GLUCOSE BLD-MCNC: 130 MG/DL (ref 65–99)
GLUCOSE BLD-MCNC: 164 MG/DL (ref 65–99)
GLUCOSE BLD-MCNC: 99 MG/DL (ref 65–99)
GLUCOSE SERPL-MCNC: 140 MG/DL (ref 65–99)
HCT VFR BLD AUTO: 24.5 % (ref 42–52)
HGB BLD-MCNC: 8.4 G/DL (ref 14–18)
IMM GRANULOCYTES # BLD AUTO: 0.35 K/UL (ref 0–0.11)
IMM GRANULOCYTES NFR BLD AUTO: 3.7 % (ref 0–0.9)
LYMPHOCYTES # BLD AUTO: 2.65 K/UL (ref 1–4.8)
LYMPHOCYTES NFR BLD: 28.3 % (ref 22–41)
MCH RBC QN AUTO: 32.6 PG (ref 27–33)
MCHC RBC AUTO-ENTMCNC: 34.4 G/DL (ref 33.7–35.3)
MCV RBC AUTO: 94.6 FL (ref 81.4–97.8)
MONOCYTES # BLD AUTO: 1.63 K/UL (ref 0–0.85)
MONOCYTES NFR BLD AUTO: 17.4 % (ref 0–13.4)
NEUTROPHILS # BLD AUTO: 4.51 K/UL (ref 1.82–7.42)
NEUTROPHILS NFR BLD: 48.2 % (ref 44–72)
NRBC # BLD AUTO: 0.09 K/UL
NRBC BLD-RTO: 1 /100 WBC
PLATELET # BLD AUTO: 289 K/UL (ref 164–446)
PMV BLD AUTO: 11 FL (ref 9–12.9)
POTASSIUM SERPL-SCNC: 3.9 MMOL/L (ref 3.6–5.5)
RBC # BLD AUTO: 2.58 M/UL (ref 4.7–6.1)
SIGNIFICANT IND 70042: NORMAL
SITE SITE: NORMAL
SODIUM SERPL-SCNC: 137 MMOL/L (ref 135–145)
SOURCE SOURCE: NORMAL
WBC # BLD AUTO: 9.4 K/UL (ref 4.8–10.8)

## 2019-12-21 PROCEDURE — A9270 NON-COVERED ITEM OR SERVICE: HCPCS | Performed by: HOSPITALIST

## 2019-12-21 PROCEDURE — 99233 SBSQ HOSP IP/OBS HIGH 50: CPT | Performed by: HOSPITALIST

## 2019-12-21 PROCEDURE — 770022 HCHG ROOM/CARE - ICU (200)

## 2019-12-21 PROCEDURE — 82962 GLUCOSE BLOOD TEST: CPT

## 2019-12-21 PROCEDURE — 85025 COMPLETE CBC W/AUTO DIFF WBC: CPT

## 2019-12-21 PROCEDURE — 700105 HCHG RX REV CODE 258: Performed by: HOSPITALIST

## 2019-12-21 PROCEDURE — 80048 BASIC METABOLIC PNL TOTAL CA: CPT

## 2019-12-21 PROCEDURE — 700102 HCHG RX REV CODE 250 W/ 637 OVERRIDE(OP): Performed by: INTERNAL MEDICINE

## 2019-12-21 PROCEDURE — 99291 CRITICAL CARE FIRST HOUR: CPT | Performed by: INTERNAL MEDICINE

## 2019-12-21 PROCEDURE — 700102 HCHG RX REV CODE 250 W/ 637 OVERRIDE(OP): Performed by: HOSPITALIST

## 2019-12-21 PROCEDURE — 97165 OT EVAL LOW COMPLEX 30 MIN: CPT

## 2019-12-21 PROCEDURE — 700111 HCHG RX REV CODE 636 W/ 250 OVERRIDE (IP): Performed by: HOSPITALIST

## 2019-12-21 RX ORDER — OMEPRAZOLE 20 MG/1
20 CAPSULE, DELAYED RELEASE ORAL 2 TIMES DAILY
Status: DISCONTINUED | OUTPATIENT
Start: 2019-12-21 | End: 2019-12-25

## 2019-12-21 RX ORDER — DRONABINOL 2.5 MG/1
2.5 CAPSULE ORAL
Status: DISCONTINUED | OUTPATIENT
Start: 2019-12-21 | End: 2019-12-25

## 2019-12-21 RX ADMIN — ACETAMINOPHEN 650 MG: 325 TABLET, FILM COATED ORAL at 15:03

## 2019-12-21 RX ADMIN — SENNOSIDES AND DOCUSATE SODIUM 2 TABLET: 8.6; 5 TABLET ORAL at 17:01

## 2019-12-21 RX ADMIN — HEPARIN SODIUM 5000 UNITS: 5000 INJECTION, SOLUTION INTRAVENOUS; SUBCUTANEOUS at 05:17

## 2019-12-21 RX ADMIN — INSULIN HUMAN 2 UNITS: 100 INJECTION, SOLUTION PARENTERAL at 17:09

## 2019-12-21 RX ADMIN — DRONABINOL 2.5 MG: 2.5 CAPSULE ORAL at 17:01

## 2019-12-21 RX ADMIN — TAMSULOSIN HYDROCHLORIDE 0.4 MG: 0.4 CAPSULE ORAL at 05:17

## 2019-12-21 RX ADMIN — SODIUM CHLORIDE, POTASSIUM CHLORIDE, SODIUM LACTATE AND CALCIUM CHLORIDE 1000 ML: 600; 310; 30; 20 INJECTION, SOLUTION INTRAVENOUS at 16:30

## 2019-12-21 RX ADMIN — INSULIN GLARGINE 20 UNITS: 100 INJECTION, SOLUTION SUBCUTANEOUS at 17:08

## 2019-12-21 RX ADMIN — SODIUM CHLORIDE, POTASSIUM CHLORIDE, SODIUM LACTATE AND CALCIUM CHLORIDE: 600; 310; 30; 20 INJECTION, SOLUTION INTRAVENOUS at 04:00

## 2019-12-21 RX ADMIN — OMEPRAZOLE 20 MG: 20 CAPSULE, DELAYED RELEASE ORAL at 05:17

## 2019-12-21 RX ADMIN — ACETAMINOPHEN 650 MG: 325 TABLET, FILM COATED ORAL at 08:41

## 2019-12-21 RX ADMIN — PANCRELIPASE 48000 UNITS: 24000; 76000; 120000 CAPSULE, DELAYED RELEASE PELLETS ORAL at 08:33

## 2019-12-21 RX ADMIN — OMEPRAZOLE 20 MG: 20 CAPSULE, DELAYED RELEASE ORAL at 13:27

## 2019-12-21 ASSESSMENT — ENCOUNTER SYMPTOMS
NAUSEA: 1
VOMITING: 0
BLURRED VISION: 0
SORE THROAT: 1
SPEECH CHANGE: 0
PND: 0
BRUISES/BLEEDS EASILY: 0
CHILLS: 0
SHORTNESS OF BREATH: 0
DIARRHEA: 0
ABDOMINAL PAIN: 0
PALPITATIONS: 0
FOCAL WEAKNESS: 0
NERVOUS/ANXIOUS: 0
FEVER: 0
SPUTUM PRODUCTION: 0
ORTHOPNEA: 0
SORE THROAT: 0
SENSORY CHANGE: 0
DOUBLE VISION: 0
DIZZINESS: 0
WEIGHT LOSS: 1
HEADACHES: 0
DEPRESSION: 0
NAUSEA: 0
COUGH: 0
BACK PAIN: 0
BLOOD IN STOOL: 0
NECK PAIN: 0

## 2019-12-21 ASSESSMENT — COGNITIVE AND FUNCTIONAL STATUS - GENERAL
EATING MEALS: A LITTLE
PERSONAL GROOMING: A LITTLE
TOILETING: A LITTLE
HELP NEEDED FOR BATHING: A LITTLE
DAILY ACTIVITIY SCORE: 17
DRESSING REGULAR LOWER BODY CLOTHING: A LOT
DRESSING REGULAR UPPER BODY CLOTHING: A LITTLE
SUGGESTED CMS G CODE MODIFIER DAILY ACTIVITY: CK

## 2019-12-21 ASSESSMENT — LIFESTYLE VARIABLES
HAVE YOU EVER FELT YOU SHOULD CUT DOWN ON YOUR DRINKING: YES
EVER HAD A DRINK FIRST THING IN THE MORNING TO STEADY YOUR NERVES TO GET RID OF A HANGOVER: NO
ALCOHOL_USE: NO
HAVE PEOPLE ANNOYED YOU BY CRITICIZING YOUR DRINKING: NO
EVER FELT BAD OR GUILTY ABOUT YOUR DRINKING: NO
HAVE YOU EVER FELT YOU SHOULD CUT DOWN ON YOUR DRINKING: NO
HOW MANY TIMES IN THE PAST YEAR HAVE YOU HAD 5 OR MORE DRINKS IN A DAY: 0
ON A TYPICAL DAY WHEN YOU DRINK ALCOHOL HOW MANY DRINKS DO YOU HAVE: 0
ALCOHOL_USE: NO
TOTAL SCORE: 0
TOTAL SCORE: 0
AVERAGE NUMBER OF DAYS PER WEEK YOU HAVE A DRINK CONTAINING ALCOHOL: 0
TOTAL SCORE: 0
CONSUMPTION TOTAL: NEGATIVE

## 2019-12-21 ASSESSMENT — PATIENT HEALTH QUESTIONNAIRE - PHQ9
2. FEELING DOWN, DEPRESSED, IRRITABLE, OR HOPELESS: NOT AT ALL
SUM OF ALL RESPONSES TO PHQ9 QUESTIONS 1 AND 2: 0
1. LITTLE INTEREST OR PLEASURE IN DOING THINGS: NOT AT ALL

## 2019-12-21 ASSESSMENT — ACTIVITIES OF DAILY LIVING (ADL): TOILETING: INDEPENDENT

## 2019-12-21 NOTE — ASSESSMENT & PLAN NOTE
-Colonoscopy could not be completed because of poor prep  -EGD on 12/27 with erosive esophogitis LA grade D  - Dr. Villarreal  -Continue omeprazole and sucralfate for 2 weeks per GI team  -Outpatient follow-up with GI team

## 2019-12-21 NOTE — ASSESSMENT & PLAN NOTE
Hemoglobin has drifted down to 6.9 with rehydration  Still no obvious clinical bleeding, continue to monitor  Transfuse 1 unit of blood, follow-up hemoglobin 8.4  Serial CBC  If further transfusion required obtain TEG with mapping and assess for the need for additional blood products

## 2019-12-21 NOTE — PROGRESS NOTES
Hospital Medicine Daily Progress Note    Date of Service  12/21/2019    Chief Complaint  Altered mentation    Hospital Course    59 y.o. male admitted 12/19/2019 with DKA and seizures      Interval Problem Update  On room air  Poor oral intake and RN encouraging Boost  Using urinal with adequate output  One unit pRBCs 12/20  Pulls 1500cc on IS   Patient stated it was okay to call and give updates to his Sister Aniyah as she had requested a phone call from me.  I spoke to Aniyah today.  She states that he has been sober for 4 to 5 years from alcohol.  Patient lives in assisted living facility.  She states that he does try and buy food but hoards it and it ends up going bad.  I did discuss his concern of ongoing weight loss and she requested a possible appetite stimulant.  I alerted her that patient had some concerns of melena today and asked if he had ever had a colonoscopy she states that she took him for a colonoscopy about 11 years ago.  She states that he does have some memory issues likely secondary to his history of heavy alcohol abuse.  I reviewed labs and gave updates to the patient's sister.    Consultants/Specialty  Pulmonary/Intensivist    Code Status  FULL    Disposition  Monitor in ICU    Review of Systems  Review of Systems   Constitutional: Positive for weight loss. Negative for chills and fever.   HENT: Positive for sore throat. Negative for congestion.    Respiratory: Negative for cough and shortness of breath.    Cardiovascular: Negative for palpitations and leg swelling.   Gastrointestinal: Positive for melena. Negative for abdominal pain and nausea.   Genitourinary: Negative for dysuria.   Musculoskeletal: Negative for back pain and neck pain.   Neurological: Negative for dizziness, speech change and headaches.   Psychiatric/Behavioral: Negative for depression.        Physical Exam  Temp:  [36.6 °C (97.8 °F)-37.3 °C (99.2 °F)] 36.6 °C (97.8 °F)  Pulse:  [] 76  Resp:  [12-43] 22  BP:  ()/(49-72) 125/58  SpO2:  [85 %-98 %] 95 %    Physical Exam  Vitals signs reviewed.   Constitutional:       Appearance: Normal appearance.   HENT:      Head: Normocephalic and atraumatic.      Nose: Nose normal.      Mouth/Throat:      Mouth: Mucous membranes are dry.   Eyes:      General: No scleral icterus.        Right eye: No discharge.         Left eye: No discharge.      Extraocular Movements: Extraocular movements intact.      Conjunctiva/sclera: Conjunctivae normal.   Neck:      Musculoskeletal: No muscular tenderness.   Cardiovascular:      Rate and Rhythm: Normal rate and regular rhythm.      Pulses:           Radial pulses are 2+ on the right side and 2+ on the left side.        Dorsalis pedis pulses are 2+ on the right side and 2+ on the left side.      Heart sounds: Normal heart sounds. No murmur.   Pulmonary:      Effort: Pulmonary effort is normal. No respiratory distress.      Breath sounds: Normal breath sounds. No wheezing.   Abdominal:      General: Abdomen is flat. There is no distension.      Palpations: Abdomen is soft. There is no mass.      Tenderness: There is no tenderness.   Musculoskeletal:         General: No swelling.      Right lower leg: No edema.      Left lower leg: No edema.   Lymphadenopathy:      Cervical: No cervical adenopathy.   Skin:     General: Skin is warm.      Coloration: Skin is pale. Skin is not jaundiced.   Neurological:      General: No focal deficit present.      Mental Status: He is alert and oriented to person, place, and time.   Psychiatric:         Mood and Affect: Mood normal.         Behavior: Behavior normal.         Fluids    Intake/Output Summary (Last 24 hours) at 12/21/2019 1521  Last data filed at 12/21/2019 1400  Gross per 24 hour   Intake 2428.26 ml   Output 3300 ml   Net -871.74 ml       Laboratory  Recent Labs     12/19/19  1930 12/20/19  0200 12/21/19  0320   WBC 12.2* 10.9* 9.4   RBC 2.25* 2.10* 2.58*   HEMOGLOBIN 7.2* 6.9* 8.4*   HEMATOCRIT  21.5* 20.2* 24.5*   MCV 94.7 96.2 94.6   MCH 31.6 32.9 32.6   MCHC 33.3* 34.2 34.4   RDW 52.3* 53.0* 51.4*   PLATELETCT 255 263 289   MPV 11.3 11.1 11.0     Recent Labs     12/20/19  0200 12/20/19  0621 12/21/19  0922   SODIUM 138 140 137   POTASSIUM 4.1 4.0 3.9   CHLORIDE 114* 114* 110   CO2 17* 17* 21   GLUCOSE 138* 107* 140*   * 104* 39*   CREATININE 1.47* 1.29 0.85   CALCIUM 7.2* 7.4* 7.8*     Recent Labs     12/19/19  1135   APTT 24.5*   INR 1.04               Imaging  IR-MIDLINE CATHETER INSERTION WO GUIDANCE > AGE 3   Final Result                  Ultrasound-guided midline placement performed by qualified nursing staff    as above.          EC-ECHOCARDIOGRAM LTD W/O CONT   Final Result      DX-CHEST-PORTABLE (1 VIEW)   Final Result         No acute cardiac or pulmonary abnormality is identified.           Assessment/Plan  Diabetic ketoacidosis without coma associated with type 1 diabetes mellitus (HCC)  Assessment & Plan  Hypoglycemic upon admission then converted with hyperglycemia.  Had initial anion gap and acidosis.  Had improvement with IV fluids and insulin drip  He was transitioned to Lantus 20 units however with poor oral intake concern of potential hypoglycemia we will continue to closely monitor in the ICU  Initiated Marinol to help increase appetite.  Glycohemoglobin: 6.9 past 3 months  He states he has a endocrinologist outpatient.  Outpatient is on empagliflozin/metformin  mg daily, Levemir 20 units nightly, semaglutide 1 mg subcutaneous every week    Hypotension  Assessment & Plan  Weaned off pressors but with marginal blood pressor  Monitor in ICU   S/p 1 unit pRBC's 12/20   Holding outpatient lisinopril    Normochromic normocytic anemia  Assessment & Plan  12/21 nursing noted melena.  Guaiac stools initiated omeprazole 20 mg twice daily.  On 12/21 I was speaking to his Sister Aniyah she had taken him for a colonoscopy approximately 11 years ago    Metabolic acidosis, increased  anion gap  Assessment & Plan  DKA    DINESH (acute kidney injury) (HCC)  Assessment & Plan  Continue IV fluid resuscitation.  Minimal decrease in BUN and creatinine since admission.  Monitor BMP and avoid nephrotoxins  12/20 BUN: 104, creatinine: 1.29  12/21 BUN: 39, creatinine: 0.85    Essential hypertension- (present on admission)  Assessment & Plan  Patient has required vasopressors  IV fluids  Monitor vitals and strict I's and O's  Holding outpatient lisinopril 20 mg    Hyponatremia  Assessment & Plan  Resolved with IV fluids and treatment of DKA  12/21 sodium: 137    Leukocytosis  Assessment & Plan  Suspect stress-induced secondary to DKA  Downtrending continue to monitor  Monitor vitals for any signs of fever to suspect/rule out infectious etiology.    Tobacco abuse- (present on admission)  Assessment & Plan  Encouraged cessation       VTE prophylaxis: Heparin

## 2019-12-21 NOTE — CARE PLAN
Problem: Safety  Goal: Will remain free from falls  Outcome: PROGRESSING AS EXPECTED  Intervention: Assess risk factors for falls  Note:   Discussed fall risk and universal safety precautions with patient. Pt verbalizes understanding. All safety precautions in place.      Problem: Knowledge Deficit  Goal: Knowledge of the prescribed therapeutic regimen will improve  Outcome: PROGRESSING AS EXPECTED  Intervention: Discuss information regarding therpeutic regimen and document in education  Note:   Discussed plan of care with patient, including medications. Addressed questions and concerns, patient verbalizes understanding.

## 2019-12-21 NOTE — PROGRESS NOTES
Late entry:    Spoke with Roni, pt legal guardian at [phone]    Pt transport person in [place] [phone], please call when pt has discharge plans    Zaynab from [facility] assisted living pt resident, updated on pt. [phone].     Notified  Dot of contacts

## 2019-12-21 NOTE — CARE PLAN
Problem: Safety  Goal: Will remain free from falls  Outcome: PROGRESSING AS EXPECTED     Problem: Venous Thromboembolism (VTW)/Deep Vein Thrombosis (DVT) Prevention:  Goal: Patient will participate in Venous Thrombosis (VTE)/Deep Vein Thrombosis (DVT)Prevention Measures  Outcome: PROGRESSING AS EXPECTED  Note:   Heparin SQ and SCDs in use.      Problem: Skin Integrity  Goal: Risk for impaired skin integrity will decrease  Outcome: PROGRESSING AS EXPECTED  Note:   Scattered abrasions; fragile skin. Encourage mobility as tolerated when patient is hemodynamically stable.

## 2019-12-21 NOTE — ASSESSMENT & PLAN NOTE
Improved, secondary to hypovolemia, took an extra day or so to wean off norepinephrine  Resuscitation complete  Admitting acidosis resolved  No active bleeding although hemoglobin 6.9, will transfuse, follow-up hemoglobin improved  Echocardiogram grossly normal, doubt primary cardiac etiology  Oxygenation good, doubt thromboembolic event

## 2019-12-22 LAB
ANION GAP SERPL CALC-SCNC: 8 MMOL/L (ref 0–11.9)
BUN SERPL-MCNC: 22 MG/DL (ref 8–22)
CALCIUM SERPL-MCNC: 7.8 MG/DL (ref 8.5–10.5)
CHLORIDE SERPL-SCNC: 110 MMOL/L (ref 96–112)
CO2 SERPL-SCNC: 23 MMOL/L (ref 20–33)
CREAT SERPL-MCNC: 0.77 MG/DL (ref 0.5–1.4)
ERYTHROCYTE [DISTWIDTH] IN BLOOD BY AUTOMATED COUNT: 52.7 FL (ref 35.9–50)
GLUCOSE BLD-MCNC: 106 MG/DL (ref 65–99)
GLUCOSE BLD-MCNC: 164 MG/DL (ref 65–99)
GLUCOSE BLD-MCNC: 173 MG/DL (ref 65–99)
GLUCOSE BLD-MCNC: 195 MG/DL (ref 65–99)
GLUCOSE BLD-MCNC: 258 MG/DL (ref 65–99)
GLUCOSE BLD-MCNC: 47 MG/DL (ref 65–99)
GLUCOSE SERPL-MCNC: 43 MG/DL (ref 65–99)
HCT VFR BLD AUTO: 23.1 % (ref 42–52)
HGB BLD-MCNC: 7.9 G/DL (ref 14–18)
MCH RBC QN AUTO: 32.5 PG (ref 27–33)
MCHC RBC AUTO-ENTMCNC: 34.2 G/DL (ref 33.7–35.3)
MCV RBC AUTO: 95.1 FL (ref 81.4–97.8)
PLATELET # BLD AUTO: 267 K/UL (ref 164–446)
PMV BLD AUTO: 10.8 FL (ref 9–12.9)
POTASSIUM SERPL-SCNC: 3.5 MMOL/L (ref 3.6–5.5)
RBC # BLD AUTO: 2.43 M/UL (ref 4.7–6.1)
SODIUM SERPL-SCNC: 141 MMOL/L (ref 135–145)
WBC # BLD AUTO: 12.2 K/UL (ref 4.8–10.8)

## 2019-12-22 PROCEDURE — A9270 NON-COVERED ITEM OR SERVICE: HCPCS | Performed by: INTERNAL MEDICINE

## 2019-12-22 PROCEDURE — 80048 BASIC METABOLIC PNL TOTAL CA: CPT

## 2019-12-22 PROCEDURE — 700111 HCHG RX REV CODE 636 W/ 250 OVERRIDE (IP): Performed by: HOSPITALIST

## 2019-12-22 PROCEDURE — 99233 SBSQ HOSP IP/OBS HIGH 50: CPT | Performed by: HOSPITALIST

## 2019-12-22 PROCEDURE — 770001 HCHG ROOM/CARE - MED/SURG/GYN PRIV*

## 2019-12-22 PROCEDURE — 82962 GLUCOSE BLOOD TEST: CPT | Mod: 91

## 2019-12-22 PROCEDURE — 85027 COMPLETE CBC AUTOMATED: CPT

## 2019-12-22 PROCEDURE — 700105 HCHG RX REV CODE 258: Performed by: HOSPITALIST

## 2019-12-22 PROCEDURE — A9270 NON-COVERED ITEM OR SERVICE: HCPCS | Performed by: HOSPITALIST

## 2019-12-22 PROCEDURE — 700102 HCHG RX REV CODE 250 W/ 637 OVERRIDE(OP): Performed by: HOSPITALIST

## 2019-12-22 PROCEDURE — 700102 HCHG RX REV CODE 250 W/ 637 OVERRIDE(OP): Performed by: INTERNAL MEDICINE

## 2019-12-22 PROCEDURE — 99233 SBSQ HOSP IP/OBS HIGH 50: CPT | Performed by: INTERNAL MEDICINE

## 2019-12-22 RX ORDER — POTASSIUM CHLORIDE 20 MEQ/1
40 TABLET, EXTENDED RELEASE ORAL ONCE
Status: COMPLETED | OUTPATIENT
Start: 2019-12-22 | End: 2019-12-22

## 2019-12-22 RX ORDER — ATORVASTATIN CALCIUM 10 MG/1
10 TABLET, FILM COATED ORAL EVERY EVENING
Status: DISCONTINUED | OUTPATIENT
Start: 2019-12-22 | End: 2019-12-25

## 2019-12-22 RX ORDER — MIRTAZAPINE 15 MG/1
15 TABLET, FILM COATED ORAL
Status: DISCONTINUED | OUTPATIENT
Start: 2019-12-22 | End: 2019-12-25

## 2019-12-22 RX ORDER — INSULIN GLARGINE 100 [IU]/ML
10 INJECTION, SOLUTION SUBCUTANEOUS EVERY EVENING
Status: DISCONTINUED | OUTPATIENT
Start: 2019-12-22 | End: 2019-12-29

## 2019-12-22 RX ORDER — LISINOPRIL 10 MG/1
10 TABLET ORAL
Status: DISCONTINUED | OUTPATIENT
Start: 2019-12-22 | End: 2019-12-25

## 2019-12-22 RX ADMIN — INSULIN GLARGINE 10 UNITS: 100 INJECTION, SOLUTION SUBCUTANEOUS at 17:12

## 2019-12-22 RX ADMIN — OMEPRAZOLE 20 MG: 20 CAPSULE, DELAYED RELEASE ORAL at 05:43

## 2019-12-22 RX ADMIN — INSULIN HUMAN 2 UNITS: 100 INJECTION, SOLUTION PARENTERAL at 17:12

## 2019-12-22 RX ADMIN — PANCRELIPASE 48000 UNITS: 24000; 76000; 120000 CAPSULE, DELAYED RELEASE PELLETS ORAL at 08:58

## 2019-12-22 RX ADMIN — SENNOSIDES AND DOCUSATE SODIUM 2 TABLET: 8.6; 5 TABLET ORAL at 17:05

## 2019-12-22 RX ADMIN — TAMSULOSIN HYDROCHLORIDE 0.4 MG: 0.4 CAPSULE ORAL at 05:44

## 2019-12-22 RX ADMIN — MIRTAZAPINE 15 MG: 15 TABLET, FILM COATED ORAL at 20:25

## 2019-12-22 RX ADMIN — LISINOPRIL 10 MG: 10 TABLET ORAL at 12:15

## 2019-12-22 RX ADMIN — Medication 16 G: at 07:41

## 2019-12-22 RX ADMIN — ATORVASTATIN CALCIUM 10 MG: 10 TABLET, FILM COATED ORAL at 17:06

## 2019-12-22 RX ADMIN — SODIUM CHLORIDE, POTASSIUM CHLORIDE, SODIUM LACTATE AND CALCIUM CHLORIDE: 600; 310; 30; 20 INJECTION, SOLUTION INTRAVENOUS at 05:53

## 2019-12-22 RX ADMIN — INSULIN HUMAN 2 UNITS: 100 INJECTION, SOLUTION PARENTERAL at 12:19

## 2019-12-22 RX ADMIN — DRONABINOL 2.5 MG: 2.5 CAPSULE ORAL at 12:14

## 2019-12-22 RX ADMIN — OMEPRAZOLE 20 MG: 20 CAPSULE, DELAYED RELEASE ORAL at 17:06

## 2019-12-22 RX ADMIN — PROCHLORPERAZINE EDISYLATE 5 MG: 5 INJECTION INTRAMUSCULAR; INTRAVENOUS at 10:45

## 2019-12-22 RX ADMIN — DRONABINOL 2.5 MG: 2.5 CAPSULE ORAL at 17:11

## 2019-12-22 RX ADMIN — INSULIN HUMAN 2 UNITS: 100 INJECTION, SOLUTION PARENTERAL at 20:27

## 2019-12-22 RX ADMIN — POTASSIUM CHLORIDE 40 MEQ: 1500 TABLET, EXTENDED RELEASE ORAL at 10:30

## 2019-12-22 ASSESSMENT — ENCOUNTER SYMPTOMS
ORTHOPNEA: 0
NAUSEA: 0
NECK PAIN: 0
BACK PAIN: 0
DIZZINESS: 0
DEPRESSION: 0
SPEECH CHANGE: 0
HEADACHES: 0
ABDOMINAL PAIN: 0
FEVER: 0
DOUBLE VISION: 0
WEIGHT LOSS: 1
BLOOD IN STOOL: 0
STRIDOR: 0
PND: 0
WEAKNESS: 1
SPUTUM PRODUCTION: 0
FOCAL WEAKNESS: 0
SORE THROAT: 0
VOMITING: 0
SHORTNESS OF BREATH: 0
PALPITATIONS: 0
SENSORY CHANGE: 0
BRUISES/BLEEDS EASILY: 0
DEPRESSION: 1
NERVOUS/ANXIOUS: 0
CHILLS: 0
COUGH: 0
BLURRED VISION: 0
DIARRHEA: 0

## 2019-12-22 NOTE — PROGRESS NOTES
Syed from Lab called with critical result of glucose of 43 at 0730. Critical lab result read back to Syed.   Dr. Alas notified of critical lab result at 0800.  Critical lab result read back by Dr. Alas .

## 2019-12-22 NOTE — PROGRESS NOTES
Pt's FSBS 47 this morning. PRN glucose tabs given; pt took several minutes to finish all four tabs. FSBS recheck 15 min after pt finished PRN glucose was 106.

## 2019-12-22 NOTE — PROGRESS NOTES
Critical Care Progress Note    Date of admission  12/19/2019    Chief Complaint  59 y.o. male admitted 12/19/2019 with DKA    Hospital Course    59 y.o. male w/ PMH DM, HTN who lives at an custodial who presented 12/19/2019 with mild SOB and found to be in DKA upon arrival to the ED. HE notes recent swithc of his meds. HE denies any fevers, chill, cough, or myalgias. He does endorse generalized weakness, nausea without vomiting, mild abdominal pain and diarrhea. He notes poor po intake over the past week.The patient is somewhat slow to respond and there is no other friends or family avail be for history. Pt was tachycardic on arrival and due to SOB cards was consulted in the ED and didnt feel any further w/u was necessary. In take labs revealed; Glucose 442, trace urine ketones, Anion gap of 23, BHB 5.33 and Na 128.       Interval Problem Update  Reviewed last 24 hour events:    A&O x4  Follows well  Blood sugar low this AM  SR 70s  SBp 110s  Off NE drip yesterday  UO ok  Tm 99.7  Taking PO ok  UO good   Midline  LR 75  PPI  SQ heparin  1 lpm NC 98%  No CXR    Replete K  Resume home ACE/Statin  Mobilize  Reduce Lantus  Transfer     YESTERDAY  A&O x4  Follows well  SR 80s  SBp 120s  NE 2  UO good  Poor PO  Transitioned to SSI  Hgb 8.4 after one unit pRBCs  Room air  IS 1500  SQ heparin  PPI home med  No ABX    BMP pending  Pain - neuropathic -> JOHAN? SSRI?  Continue to actively titrate NE drip  Mobilize if we get him off pressors  Therapies may be needed    Serial follow-up  Norepinephrine dose coming down  BMP finally back, potassium 3.9, bicarb 21, anion gap 6, chloride 110, BUN 39 with creatinine 0.85    Review of Systems  Review of Systems   Constitutional: Positive for malaise/fatigue (Almost resolved). Negative for chills and fever.        Appetite poor   HENT: Negative for sore throat.    Eyes: Negative for blurred vision and double vision.   Respiratory: Negative for cough, sputum production and shortness of  breath.    Cardiovascular: Negative for chest pain, palpitations, orthopnea and PND.   Gastrointestinal: Negative for abdominal pain, blood in stool, diarrhea, melena, nausea (Resolved) and vomiting.   Genitourinary: Negative.    Musculoskeletal: Negative for back pain.   Neurological: Positive for weakness (Had difficulty with ambulation yesterday, nonfocal). Negative for sensory change, speech change, focal weakness and headaches.   Endo/Heme/Allergies: Does not bruise/bleed easily.   Psychiatric/Behavioral: Negative for depression. The patient is not nervous/anxious.         Vital Signs for last 24 hours   Temp:  [36.7 °C (98.1 °F)-37.6 °C (99.7 °F)] 37.1 °C (98.7 °F)  Pulse:  [67-91] 91  Resp:  [12-23] 20  BP: ()/(49-63) 118/57  SpO2:  [93 %-100 %] 100 %    Hemodynamic parameters for last 24 hours       Respiratory Information for the last 24 hours       Physical Exam   Physical Exam  Vitals signs reviewed.   Constitutional:       Appearance: He is underweight. He is ill-appearing (Chronic, not acute). He is not toxic-appearing.      Interventions: Nasal cannula in place.   HENT:      Head: Normocephalic and atraumatic.      Mouth/Throat:      Mouth: Mucous membranes are moist.   Eyes:      General: No scleral icterus.     Extraocular Movements: Extraocular movements intact.      Pupils: Pupils are equal, round, and reactive to light.   Neck:      Musculoskeletal: Neck supple. No neck rigidity.   Cardiovascular:      Rate and Rhythm: Normal rate and regular rhythm.  No extrasystoles are present.     Pulses: Normal pulses.      Heart sounds: No murmur. No friction rub. No gallop.       Comments: Sinus rhythm  Pulmonary:      Effort: Pulmonary effort is normal. No respiratory distress.      Breath sounds: No wheezing, rhonchi or rales.   Abdominal:      General: Abdomen is flat. Bowel sounds are normal. There is no distension.      Palpations: Abdomen is soft.      Tenderness: There is no tenderness. There  is no right CVA tenderness, left CVA tenderness or guarding.   Musculoskeletal:         General: No swelling.      Right lower leg: No edema.      Left lower leg: No edema.   Skin:     General: Skin is warm and dry.      Capillary Refill: Capillary refill takes less than 2 seconds.      Coloration: Skin is not cyanotic.      Nails: There is no clubbing.     Neurological:      General: No focal deficit present.      Mental Status: He is alert and oriented to person, place, and time.      GCS: GCS eye subscore is 4. GCS verbal subscore is 5. GCS motor subscore is 6.      Cranial Nerves: Cranial nerves are intact.      Sensory: Sensation is intact.      Motor: Weakness (Mild and diffuse) present.      Comments: Follows well   Psychiatric:         Attention and Perception: Attention normal.         Mood and Affect: Mood normal. Mood is not anxious.         Speech: Speech normal.         Behavior: Behavior is not agitated. Behavior is cooperative.         Cognition and Memory: Cognition and memory normal.         Medications  Current Facility-Administered Medications   Medication Dose Route Frequency Provider Last Rate Last Dose   • mirtazapine (REMERON) tablet 15 mg  15 mg Oral QHS CHIARA Brooks.O.       • atorvastatin (LIPITOR) tablet 10 mg  10 mg Oral Q EVENING CHIARA Brooks.O.       • lisinopril (PRINIVIL) 10 MG tablet 10 mg  10 mg Oral Q DAY CHIARA Brooks.O.   10 mg at 12/22/19 1215   • insulin glargine (LANTUS) injection 10 Units  10 Units Subcutaneous Q EVENING CHIARA Brooks.O.       • omeprazole (PRILOSEC) capsule 20 mg  20 mg Oral BID CHIARA Brooks.O.   20 mg at 12/22/19 0543   • dronabinol (MARINOL) capsule 2.5 mg  2.5 mg Oral BEFORE LUNCH AND DINNER CHIARA Brooks.O.   2.5 mg at 12/22/19 1214   • insulin regular (HUMULIN R) injection 2-9 Units  2-9 Units Subcutaneous 4X/DAY TYSON Salinas M.D.   2 Units at 12/22/19 1219    And   • glucose 4 g chewable tablet 16 g  16 g Oral Q15  MIN PRN Charli Salinas M.D.   16 g at 12/22/19 0741    And   • DEXTROSE 10% BOLUS 250 mL  250 mL Intravenous Q15 MIN PRN Charli Salinas M.D.       • tamsulosin (FLOMAX) capsule 0.4 mg  0.4 mg Oral DAILY Georges Celaya, D.O.   0.4 mg at 12/22/19 0544   • pancrelipase (Lip-Prot-Amyl) (CREON 48203) 40397-56373 units capsule 48,000 Units  2 Cap Oral DAILY Georges Celaya D.O.   48,000 Units at 12/22/19 0858   • senna-docusate (PERICOLACE or SENOKOT S) 8.6-50 MG per tablet 2 Tab  2 Tab Oral BID Georges Celaya D.O.   2 Tab at 12/21/19 1701    And   • polyethylene glycol/lytes (MIRALAX) PACKET 1 Packet  1 Packet Oral QDAY PRN Georges Celaya D.O.        And   • magnesium hydroxide (MILK OF MAGNESIA) suspension 30 mL  30 mL Oral QDAY PRN Georges Celaya D.O.        And   • bisacodyl (DULCOLAX) suppository 10 mg  10 mg Rectal QDAY PRN Georges Celaya D.O.       • Respiratory Care per Protocol   Nebulization Continuous RT Georges Celaya D.O.       • acetaminophen (TYLENOL) tablet 650 mg  650 mg Oral Q6HRS PRN Georges Celaya D.O.   650 mg at 12/21/19 1503   • ondansetron (ZOFRAN) syringe/vial injection 4 mg  4 mg Intravenous Q4HRS PRN Georges Celaya D.O.   4 mg at 12/20/19 1103   • ondansetron (ZOFRAN ODT) dispertab 4 mg  4 mg Oral Q4HRS PRN Georges Celaya D.O.       • promethazine (PHENERGAN) tablet 12.5-25 mg  12.5-25 mg Oral Q4HRS PRN Georges Celaya D.O.       • promethazine (PHENERGAN) suppository 12.5-25 mg  12.5-25 mg Rectal Q4HRS PRN Georges Celaya D.O.       • prochlorperazine (COMPAZINE) injection 5-10 mg  5-10 mg Intravenous Q4HRS PRN CHIARA Rhodes.O.   5 mg at 12/22/19 1045       Fluids    Intake/Output Summary (Last 24 hours) at 12/22/2019 1443  Last data filed at 12/22/2019 1400  Gross per 24 hour   Intake 2250 ml   Output 2425 ml   Net -175 ml       Laboratory          Recent Labs      12/19/19 1930 12/20/19 0621 12/21/19 0922 12/22/19  0550   SODIUM 136   < > 140 137 141   POTASSIUM 3.5*   < > 4.0 3.9 3.5*   CHLORIDE 110   < > 114* 110 110   CO2 13*   < > 17* 21 23   *   < > 104* 39* 22   CREATININE 1.61*   < > 1.29 0.85 0.77   MAGNESIUM 1.8  --  2.4  --   --    PHOSPHORUS 3.3  --  2.7  --   --    CALCIUM 6.7*   < > 7.4* 7.8* 7.8*    < > = values in this interval not displayed.     Recent Labs     12/20/19 0621 12/21/19 0922 12/22/19  0550   GLUCOSE 107* 140* 43*     Recent Labs     12/19/19 1930 12/20/19 0200 12/21/19 0320 12/22/19  0550   WBC 12.2* 10.9* 9.4 12.2*   NEUTSPOLYS 73.10*  --  48.20  --    LYMPHOCYTES 13.80*  --  28.30  --    MONOCYTES 11.00  --  17.40*  --    EOSINOPHILS 0.10  --  1.80  --    BASOPHILS 0.20  --  0.60  --      Recent Labs     12/20/19 0200 12/21/19 0320 12/22/19  0550   RBC 2.10* 2.58* 2.43*   HEMOGLOBIN 6.9* 8.4* 7.9*   HEMATOCRIT 20.2* 24.5* 23.1*   PLATELETCT 263 289 267       Imaging  X-Ray:  I have personally reviewed the images and compared with prior images.  EKG:  I have personally reviewed the images and compared with prior images.  Echo:   Reviewed    Assessment/Plan  Diabetic ketoacidosis without coma associated with type 1 diabetes mellitus (HCC)  Assessment & Plan  Blood sugar improved, anion gap closed, bicarb level is 12/21 still improving  Likely secondary to recent med changes by endocrinology  Transitioned from IV insulin/DKA protocols to SSI and long-acting insulin late 12/20, tolerating  Continue to monitor BMP and blood sugar closely for need to resume IV insulin, patient appears brittle  Monitor and Replete lytes prn  Element of hypoglycemia this a.m., poor p.o. intake contributing  Reduce long-acting insulin for now and encourage p.o. and readjust insulin as clinically necessary      Hypotension  Assessment & Plan  Improved, secondary to hypovolemia, took an extra day or so to wean off norepinephrine  Resuscitation  complete  Admitting acidosis resolved  No active bleeding although hemoglobin 6.9, will transfuse, follow-up hemoglobin improved  Echocardiogram grossly normal, doubt primary cardiac etiology  Oxygenation good, doubt thromboembolic event    Normochromic normocytic anemia  Assessment & Plan  Hemoglobin has drifted down to 6.9 with rehydration  Still no obvious clinical bleeding, continue to monitor  Transfuse 1 unit of blood, follow-up hemoglobin 8.4  Serial CBC  If further transfusion required obtain TEG with mapping and assess for the need for additional blood products    Metabolic acidosis, increased anion gap  Assessment & Plan  DKA, DINESH, elevated Lactate, all improved, component of non-anion gap metabolic acidosis resolved  Continue aggressive fluid repletion with LR and tx of DKA  Monitor for any signs of concurrent  NAGMA, chloride elevated, limit chloride containing solutions  No role for alkali therapy at present    DINESH (acute kidney injury) (HCC)  Assessment & Plan  Likely pre-renal, function normalized 12/22  Continue IV fluid resuscitation  Renal dose meds, avoid nephrotoxins  Strict I/Os  Continue serial BMP    Essential hypertension- (present on admission)  Assessment & Plan  Patient off norepinephrine for more than 24 hours  Blood pressure trending up  Resume ace inhibitor for BP 12/22: Lisinopril      Hyponatremia  Assessment & Plan  128 on intake labs, normal last several days  Likely hypovolemic, in part related to hyperglycemia as well  IVF ongoing  Monitor with serial BMP    Leukocytosis  Assessment & Plan  Afebrile no over signs of infection, CXR and UA unremarkable  Potentially stress related  ROS for ID negative again, continue to monitor mostly for infection  Off pressors  White count still borderline elevated, continue to monitor    Dyslipidemia- (present on admission)  Assessment & Plan  Resume statin    Tobacco abuse- (present on admission)  Assessment & Plan  Smoking cessation encouraged  again       VTE:  Heparin  Ulcer: PPI  Lines: None    I have performed a physical exam and reviewed and updated ROS and Plan today (12/22/2019). In review of yesterday's note (12/21/2019), there are no changes except as documented above.     Discussed patient condition and risk of morbidity and/or mortality with Hospitalist, RN, RT, Pharmacy, Charge nurse / hot rounds and Patient

## 2019-12-22 NOTE — CARE PLAN
Problem: Safety  Goal: Will remain free from falls  Outcome: PROGRESSING AS EXPECTED  Intervention: Implement fall precautions  Flowsheets  Taken 12/21/2019 2127  Bed Alarm: Yes - Alarm On  Taken 12/21/2019 2000  Environmental Precautions: Treaded Slipper Socks on Patient;Personal Belongings, Wastebasket, Call Bell etc. in Easy Reach;Report Given to Other Health Care Providers Regarding Fall Risk;Bed in Low Position;Communication Sign for Patients & Families;Mobility Assessed & Appropriate Sign Placed     Problem: Infection  Goal: Will remain free from infection  Outcome: PROGRESSING AS EXPECTED     Problem: Hemodynamic Status  Goal: Vital Signs and Fluid Balance Management  Outcome: PROGRESSING AS EXPECTED  Intervention: Cardiac Monitoring, Pulse Oximetry  Note:   Off of Levo since 1200 12/21. MAP sustaining >60, SBP sustaining >90     Problem: Skin Integrity  Goal: Risk for impaired skin integrity will decrease  Outcome: PROGRESSING SLOWER THAN EXPECTED

## 2019-12-22 NOTE — CARE PLAN
Problem: Nutritional:  Goal: Achieve adequate nutritional intake  Description  Patient will consume >50% of meals/supplments   Outcome: NOT MET

## 2019-12-22 NOTE — PROGRESS NOTES
[facility] Medicine Daily Progress Note    Date of Service  12/22/2019    Chief Complaint  Altered mentation    Hospital Course    59 y.o. male admitted 12/19/2019 with DKA and seizures      Interval Problem Update  Hypoglycemic (Glu:40) this am.  Slight increase in diet per RN overnight but still not eating well  Start mirtazipine for depression and appetite stimulation    Consultants/Specialty  Pulmonary/Intensivist    Code Status  FULL    Disposition  Monitor in ICU    Review of Systems  Review of Systems   Constitutional: Positive for weight loss. Negative for chills and fever.   Respiratory: Negative for cough, shortness of breath and stridor.    Cardiovascular: Negative for palpitations and leg swelling.   Gastrointestinal: Negative for abdominal pain and nausea.   Genitourinary: Negative for dysuria.   Musculoskeletal: Negative for back pain and neck pain.   Neurological: Negative for dizziness, speech change and headaches.   Psychiatric/Behavioral: Positive for depression.        Physical Exam  Temp:  [36.7 °C (98.1 °F)-37.5 °C (99.5 °F)] 37.1 °C (98.7 °F)  Pulse:  [67-91] 76  Resp:  [12-23] 20  BP: ()/(49-63) 118/57  SpO2:  [93 %-100 %] 100 %    Physical Exam  Vitals signs reviewed.   Constitutional:       Appearance: Normal appearance.      Comments: Thin, frail and appears older than stated age   HENT:      Head: Normocephalic and atraumatic.      Nose: Nose normal.      Mouth/Throat:      Mouth: Mucous membranes are dry.   Eyes:      General: No scleral icterus.        Right eye: No discharge.         Left eye: No discharge.      Extraocular Movements: Extraocular movements intact.      Conjunctiva/sclera: Conjunctivae normal.   Neck:      Musculoskeletal: No muscular tenderness.   Cardiovascular:      Rate and Rhythm: Normal rate and regular rhythm.      Pulses:           Radial pulses are 2+ on the right side and 2+ on the left side.        Dorsalis pedis pulses are 2+ on the right side and 2+ on  the left side.      Heart sounds: Normal heart sounds. No murmur.   Pulmonary:      Effort: Pulmonary effort is normal. No respiratory distress.      Breath sounds: Normal breath sounds. No wheezing.   Abdominal:      General: Abdomen is flat. There is no distension.      Palpations: Abdomen is soft. There is no mass.      Tenderness: There is no tenderness.   Musculoskeletal:      Right lower leg: No edema.      Left lower leg: No edema.      Comments: Atrophy of musculature   Lymphadenopathy:      Cervical: No cervical adenopathy.   Skin:     General: Skin is warm.      Coloration: Skin is pale. Skin is not jaundiced.   Neurological:      General: No focal deficit present.      Mental Status: He is alert and oriented to person, place, and time.      Cranial Nerves: No cranial nerve deficit.   Psychiatric:         Mood and Affect: Mood normal.         Behavior: Behavior normal.         Fluids    Intake/Output Summary (Last 24 hours) at 12/22/2019 1622  Last data filed at 12/22/2019 1400  Gross per 24 hour   Intake 1900 ml   Output 2425 ml   Net -525 ml       Laboratory  Recent Labs     12/20/19  0200 12/21/19  0320 12/22/19  0550   WBC 10.9* 9.4 12.2*   RBC 2.10* 2.58* 2.43*   HEMOGLOBIN 6.9* 8.4* 7.9*   HEMATOCRIT 20.2* 24.5* 23.1*   MCV 96.2 94.6 95.1   MCH 32.9 32.6 32.5   MCHC 34.2 34.4 34.2   RDW 53.0* 51.4* 52.7*   PLATELETCT 263 289 267   MPV 11.1 11.0 10.8     Recent Labs     12/20/19  0621 12/21/19  0922 12/22/19  0550   SODIUM 140 137 141   POTASSIUM 4.0 3.9 3.5*   CHLORIDE 114* 110 110   CO2 17* 21 23   GLUCOSE 107* 140* 43*   * 39* 22   CREATININE 1.29 0.85 0.77   CALCIUM 7.4* 7.8* 7.8*                   Imaging  IR-MIDLINE CATHETER INSERTION WO GUIDANCE > AGE 3   Final Result                  Ultrasound-guided midline placement performed by qualified nursing staff    as above.          EC-ECHOCARDIOGRAM LTD W/O CONT   Final Result      DX-CHEST-PORTABLE (1 VIEW)   Final Result         No acute  cardiac or pulmonary abnormality is identified.           Assessment/Plan  Diabetic ketoacidosis without coma associated with type 1 diabetes mellitus (HCC)  Assessment & Plan  Hypoglycemic upon admission then converted with hyperglycemia.  Had initial anion gap and acidosis.  Had improvement with IV fluids and insulin drip  Lantus changed to 10 units from 20 units due to hyperglycemia 12/22 AM  Initiated Marinol to help increase appetite.  I have added mirtazapine on for appetite stimulation and depression  Glycohemoglobin: 6.9 past 3 months  He states he has a endocrinologist outpatient.  Outpatient is on empagliflozin/metformin  mg daily, Levemir 20 units nightly, semaglutide 1 mg subcutaneous every week    Hypotension  Assessment & Plan  Weaned off pressors but with marginal blood pressor  Monitor in ICU   S/p 1 unit pRBC's 12/20   Lisinopril 10mg daily    Normochromic normocytic anemia  Assessment & Plan  12/21 nursing noted melena.  Guaiac stools initiated omeprazole 20 mg twice daily.  On 12/21 I was speaking to his Sister Aniyah she had taken him for a colonoscopy approximately 11 years ago    Metabolic acidosis, increased anion gap  Assessment & Plan  DKA    DINESH (acute kidney injury) (HCC)  Assessment & Plan  Continue IV fluid resuscitation.  Minimal decrease in BUN and creatinine since admission.  Monitor BMP and avoid nephrotoxins  12/20 BUN: 104, creatinine: 1.29  12/21 BUN: 39, creatinine: 0.85    Essential hypertension- (present on admission)  Assessment & Plan  Patient has required vasopressors  IV fluids  Monitor vitals and strict I's and O's  Lisinopril 10 mg initiated with blood pressure parameters    Hyponatremia  Assessment & Plan  Resolved with IV fluids and treatment of DKA  12/21 sodium: 137    Leukocytosis  Assessment & Plan  Suspect stress-induced secondary to DKA  Downtrending continue to monitor  Monitor vitals for any signs of fever to suspect/rule out infectious  etiology.    Dyslipidemia- (present on admission)  Assessment & Plan  Atorvastatin 10 mg daily    Tobacco abuse- (present on admission)  Assessment & Plan  Encouraged cessation       VTE prophylaxis: Heparin

## 2019-12-22 NOTE — CARE PLAN
Problem: Fluid Volume:  Goal: Will maintain balanced intake and output  Outcome: PROGRESSING SLOWER THAN EXPECTED  Intervention: Monitor, educate, and encourage compliance with therapeutic intake of liquids  Note:   Pt on IV fluids; per report pt has had poor oral intake. Will monitor PO intake to see if IV fluids can possibly be discontinued. Will discuss in rounds.       Problem: Pain Management  Goal: Pain level will decrease to patient's comfort goal  Outcome: PROGRESSING AS EXPECTED  Note:   Pt denies having pain

## 2019-12-22 NOTE — DIETARY
"Nutrition services: Day 3 of admit.  Pawel Tatum is a 59 y.o. male with admitting DX of DKA, DINESH.   Problem list per MD notes:   1. Diabetic ketoacidosis without coma associated with type 1 diabetes mellitus. Hypoglycemic upon admission then converted with hyperglycemia. Initiated Marinol to help increase appetite.Glycohemoglobin: 6.9 past 3 months  2. Hypotension  3. Normochromic normocytic anemia  4. Metabolic acidosis, increased anion gap  5. DINESH   6. Essential hypertension  7. Hyponatremia  8. Leukocytosis  9. Tobacco abuse    Pt seen for low BMI. Pt reports eating ~ 25% (bites of meals per pt) of usual intake for past  week due to poor appetite/dislikes assisted living facility. Pt states appetite is now better while in hospital, eating ~ 50% of meals and drinking three Boost Glucose Control/day. Question accuracy of pt's report as Boost drinks building up at bedside and PO intake poor per ADL. Pt unsure of wt loss, thinks UBW is 165#.       Assessment:  Height: 190.5 cm (6' 3\")  Weight: 64.5 kg (142 lb 3.2 oz)- per bed scale on 12/21. First measured wt this admit.   Weight to Use in Calculations: 72.6 kg (160 lb)  Body mass index is 17.77 kg/m²., BMI classification: Underweight.   Diet/Intake: Diabetic, High Calorie diet with Boost Glucose Control wice daily as snacks.  Per ADL, PO intake variable but appears to be 0-<25% of eight out of 10 past meals.      Evaluation:   1. Discussed with RN, there is some question whether pt has type I or Type II DM or both. Pt on SSI and Lantus and following consistent carbohydrate diet. Provided handouts on consistent carbohydrate diet per pt request, pt declined verbal education.   2. Pertinent Labs: K+ 3.5, am Glu 43. FSBS:  (12/21-12/22).   3. Pertinent Meds: Marinol, Lantus, SSI, LR @ 75 ml/hr, Remeron, Levophed, Prilosec, Zofran PRN, Pancrelipase (48, 000 units once daily), Kdur, Compazine/Phenergan PRN, Bowel protocol.  4. Per chart review, wt was 153 " lbs on 11/25/19. Pt with 8% wt loss over 3-4 weeks (severe)      Malnutrition Risk: Pt with severe acute illness related malnutrition possibly related to DKA as evidenced by pt's reported PO intake of  <50% of energy needs for past week and severe wt loss of 8% over 3-4 weeks based on chart review. Pt also noted to have moderate to severe muscle and fat loss (hollowing to temple region, slight hollowness/dark circles and loose skin to orbital region, some shoulder angling, and prominent/protruding clavicle bone).     Recommendations/Plan:    1. Continue current diet per MD orders.  2. Boost Glucose sent twice daily as snacks, added after breakfast snack to help prevent low blood sugars.   3. Encourage PO and document all intake  as % taken in ADL's to provide interdisciplinary communication across all shifts.   4. Monitor weight.  5. Nutrition rep will continue to see patient for ongoing meal and snack preferences.   6. RD to monitor for consistently adequate intake.

## 2019-12-23 LAB
ANION GAP SERPL CALC-SCNC: 5 MMOL/L (ref 0–11.9)
BUN SERPL-MCNC: 18 MG/DL (ref 8–22)
CALCIUM SERPL-MCNC: 8.2 MG/DL (ref 8.5–10.5)
CHLORIDE SERPL-SCNC: 109 MMOL/L (ref 96–112)
CO2 SERPL-SCNC: 26 MMOL/L (ref 20–33)
CREAT SERPL-MCNC: 0.79 MG/DL (ref 0.5–1.4)
ERYTHROCYTE [DISTWIDTH] IN BLOOD BY AUTOMATED COUNT: 56.4 FL (ref 35.9–50)
GLUCOSE BLD-MCNC: 113 MG/DL (ref 65–99)
GLUCOSE BLD-MCNC: 175 MG/DL (ref 65–99)
GLUCOSE BLD-MCNC: 196 MG/DL (ref 65–99)
GLUCOSE BLD-MCNC: 245 MG/DL (ref 65–99)
GLUCOSE SERPL-MCNC: 119 MG/DL (ref 65–99)
HCT VFR BLD AUTO: 25.1 % (ref 42–52)
HEMOCCULT STL QL: POSITIVE
HGB BLD-MCNC: 8.1 G/DL (ref 14–18)
MCH RBC QN AUTO: 31.5 PG (ref 27–33)
MCHC RBC AUTO-ENTMCNC: 32.3 G/DL (ref 33.7–35.3)
MCV RBC AUTO: 97.7 FL (ref 81.4–97.8)
PLATELET # BLD AUTO: 301 K/UL (ref 164–446)
PMV BLD AUTO: 10.8 FL (ref 9–12.9)
POTASSIUM SERPL-SCNC: 4 MMOL/L (ref 3.6–5.5)
RBC # BLD AUTO: 2.57 M/UL (ref 4.7–6.1)
SODIUM SERPL-SCNC: 140 MMOL/L (ref 135–145)
WBC # BLD AUTO: 12.9 K/UL (ref 4.8–10.8)

## 2019-12-23 PROCEDURE — 770006 HCHG ROOM/CARE - MED/SURG/GYN SEMI*

## 2019-12-23 PROCEDURE — A9270 NON-COVERED ITEM OR SERVICE: HCPCS | Performed by: HOSPITALIST

## 2019-12-23 PROCEDURE — 700102 HCHG RX REV CODE 250 W/ 637 OVERRIDE(OP): Performed by: HOSPITALIST

## 2019-12-23 PROCEDURE — 82272 OCCULT BLD FECES 1-3 TESTS: CPT

## 2019-12-23 PROCEDURE — 80048 BASIC METABOLIC PNL TOTAL CA: CPT

## 2019-12-23 PROCEDURE — 85027 COMPLETE CBC AUTOMATED: CPT

## 2019-12-23 PROCEDURE — 82962 GLUCOSE BLOOD TEST: CPT | Mod: 91

## 2019-12-23 PROCEDURE — 99232 SBSQ HOSP IP/OBS MODERATE 35: CPT | Performed by: HOSPITALIST

## 2019-12-23 RX ADMIN — DRONABINOL 2.5 MG: 2.5 CAPSULE ORAL at 17:22

## 2019-12-23 RX ADMIN — PANCRELIPASE 48000 UNITS: 24000; 76000; 120000 CAPSULE, DELAYED RELEASE PELLETS ORAL at 07:36

## 2019-12-23 RX ADMIN — OMEPRAZOLE 20 MG: 20 CAPSULE, DELAYED RELEASE ORAL at 17:22

## 2019-12-23 RX ADMIN — DRONABINOL 2.5 MG: 2.5 CAPSULE ORAL at 11:57

## 2019-12-23 RX ADMIN — OMEPRAZOLE 20 MG: 20 CAPSULE, DELAYED RELEASE ORAL at 05:51

## 2019-12-23 RX ADMIN — INSULIN HUMAN 2 UNITS: 100 INJECTION, SOLUTION PARENTERAL at 20:46

## 2019-12-23 RX ADMIN — ATORVASTATIN CALCIUM 10 MG: 10 TABLET, FILM COATED ORAL at 17:21

## 2019-12-23 RX ADMIN — TAMSULOSIN HYDROCHLORIDE 0.4 MG: 0.4 CAPSULE ORAL at 05:51

## 2019-12-23 RX ADMIN — INSULIN HUMAN 2 UNITS: 100 INJECTION, SOLUTION PARENTERAL at 11:53

## 2019-12-23 RX ADMIN — INSULIN GLARGINE 10 UNITS: 100 INJECTION, SOLUTION SUBCUTANEOUS at 17:26

## 2019-12-23 RX ADMIN — LISINOPRIL 10 MG: 10 TABLET ORAL at 05:51

## 2019-12-23 RX ADMIN — INSULIN HUMAN 3 UNITS: 100 INJECTION, SOLUTION PARENTERAL at 17:25

## 2019-12-23 RX ADMIN — ACETAMINOPHEN 650 MG: 325 TABLET, FILM COATED ORAL at 11:57

## 2019-12-23 RX ADMIN — MIRTAZAPINE 15 MG: 15 TABLET, FILM COATED ORAL at 20:48

## 2019-12-23 ASSESSMENT — ENCOUNTER SYMPTOMS
CHILLS: 0
BACK PAIN: 0
WEIGHT LOSS: 1
COUGH: 0
NERVOUS/ANXIOUS: 0
ABDOMINAL PAIN: 0
NECK PAIN: 0
WEAKNESS: 1
DEPRESSION: 1
STRIDOR: 0
PALPITATIONS: 0
FEVER: 0
HEADACHES: 0
SHORTNESS OF BREATH: 0
NAUSEA: 0

## 2019-12-23 NOTE — DISCHARGE PLANNING
Care Transition Team Assessment  In the case of an emergency, pt's legal NOK is sister Uriel Tatum /  RNCM met with pt at bedside and obtained the information used in this assessment. Pt verified accuracy of facesheet. Pt lives in [facility] assisted living.  Pt uses [facility]. Prior to current hospitalization, pt was completely independent in ADLS/IADLS.  Pt has a limited support system. Pt denies any hx of substance use and denies any dx of mh. Discharge plans currently undetermined.    Information Source:pt  Orientation : Oriented x 4  Information Given By: Patient  Informant's Name: (Pawel)  Who is responsible for making decisions for patient? : Patient         Elopement Risk  Legal Hold: No  Ambulatory or Self Mobile in Wheelchair: No-Not an Elopement Risk  Elopement Risk: Not at Risk for Elopement    Interdisciplinary Discharge Planning  Does Admitting Nurse Feel This Could be a Complex Discharge?: No  Primary Care Physician: (Dr. Watters)  Lives with - Patient's Self Care Capacity: Other (Comments)  Patient or legal guardian wants to designate a caregiver (see row info): (Assist Living )  Support Systems: Family Member(s)  Housing / Facility: Assisted Living Residence  Name of Care Facility: [facility]  Do You Take your Prescribed Medications Regularly: Yes  Mobility Issues: No  Prior Services: None  Durable Medical Equipment: Not Applicable    Discharge Preparedness  What is your plan after discharge?: Uncertain - pending medical team collaboration  What are your discharge supports?: Sibling  Prior Functional Level: Ambulatory, Independent with Activities of Daily Living, Needs Assist with Medication Management  Difficulity with ADLs: None  Difficulity with IADLs: Cooking, Driving, Laundry, Managing medication, Shopping    Functional Assesment  Prior Functional Level: Ambulatory, Independent with Activities of Daily Living, Needs Assist with Medication  Management    Finances  Prescription Coverage: Yes    Vision / Hearing Impairment  Vision Impairment : No  Hearing Impairment : No         Advance Directive  Advance Directive?: DPOA for Health Care    Domestic Abuse  Have you ever been the victim of abuse or violence?: No  Physical Abuse or Sexual Abuse: No  Verbal Abuse or Emotional Abuse: No  Possible Abuse Reported to:: Not Applicable    Psychological Assessment  History of Substance Abuse: None  History of Psychiatric Problems: No         Anticipated Discharge Information  Anticipated discharge disposition: Discharge needs currently unknown

## 2019-12-23 NOTE — PROGRESS NOTES
[facility] Medicine Daily Progress Note    Date of Service  12/23/2019    Chief Complaint  Altered mentation    Hospital Course    59 y.o. male admitted 12/19/2019 with DKA and seizures      Interval Problem Update  Ate half his dinner last night  Refused bowel protocol  Hgb:8.1  Refused mobilization to chair  Weak.  I have ordered PT/OT    Consultants/Specialty  Pulmonary/Intensivist    Code Status  FULL    Disposition  Transfer to medical with near future discharge back to assisted living facility.    Review of Systems  Review of Systems   Constitutional: Positive for malaise/fatigue and weight loss. Negative for chills and fever.   Respiratory: Negative for cough, shortness of breath and stridor.    Cardiovascular: Negative for palpitations and leg swelling.   Gastrointestinal: Negative for abdominal pain and nausea.   Genitourinary: Negative for dysuria.   Musculoskeletal: Negative for back pain and neck pain.   Neurological: Positive for weakness. Negative for headaches.   Psychiatric/Behavioral: Positive for depression. The patient is not nervous/anxious.         Physical Exam  Temp:  [36.7 °C (98 °F)-37.3 °C (99.2 °F)] 36.7 °C (98 °F)  Pulse:  [70-77] 77  Resp:  [20] 20  BP: (116-142)/(57-64) 142/64  SpO2:  [91 %-100 %] 95 %    Physical Exam  Vitals signs reviewed.   Constitutional:       Appearance: Normal appearance.      Comments: Thin, frail and appears older than stated age   HENT:      Head: Normocephalic and atraumatic.      Nose: Nose normal.      Mouth/Throat:      Mouth: Mucous membranes are dry.      Pharynx: No oropharyngeal exudate.   Eyes:      General:         Right eye: No discharge.         Left eye: No discharge.      Extraocular Movements: Extraocular movements intact.      Conjunctiva/sclera: Conjunctivae normal.   Neck:      Musculoskeletal: No muscular tenderness.   Cardiovascular:      Rate and Rhythm: Normal rate and regular rhythm.      Pulses:           Radial pulses are 2+ on the  right side and 2+ on the left side.        Dorsalis pedis pulses are 2+ on the right side and 2+ on the left side.      Heart sounds: Normal heart sounds. No murmur.   Pulmonary:      Effort: Pulmonary effort is normal. No respiratory distress.      Breath sounds: Normal breath sounds. No wheezing.   Abdominal:      General: Abdomen is flat. There is no distension.      Palpations: Abdomen is soft. There is no mass.      Tenderness: There is no tenderness.   Musculoskeletal:      Right lower leg: No edema.      Left lower leg: No edema.      Comments: Atrophy of musculature   Lymphadenopathy:      Cervical: No cervical adenopathy.   Skin:     General: Skin is warm.      Coloration: Skin is pale. Skin is not jaundiced.   Neurological:      General: No focal deficit present.      Mental Status: He is alert and oriented to person, place, and time.      Cranial Nerves: No cranial nerve deficit.   Psychiatric:         Mood and Affect: Mood normal.         Behavior: Behavior normal.         Fluids    Intake/Output Summary (Last 24 hours) at 12/23/2019 1055  Last data filed at 12/23/2019 0800  Gross per 24 hour   Intake 950 ml   Output 3475 ml   Net -2525 ml       Laboratory  Recent Labs     12/21/19  0320 12/22/19  0550 12/23/19  0555   WBC 9.4 12.2* 12.9*   RBC 2.58* 2.43* 2.57*   HEMOGLOBIN 8.4* 7.9* 8.1*   HEMATOCRIT 24.5* 23.1* 25.1*   MCV 94.6 95.1 97.7   MCH 32.6 32.5 31.5   MCHC 34.4 34.2 32.3*   RDW 51.4* 52.7* 56.4*   PLATELETCT 289 267 301   MPV 11.0 10.8 10.8     Recent Labs     12/21/19  0922 12/22/19  0550 12/23/19  0555   SODIUM 137 141 140   POTASSIUM 3.9 3.5* 4.0   CHLORIDE 110 110 109   CO2 21 23 26   GLUCOSE 140* 43* 119*   BUN 39* 22 18   CREATININE 0.85 0.77 0.79   CALCIUM 7.8* 7.8* 8.2*                   Imaging  IR-MIDLINE CATHETER INSERTION WO GUIDANCE > AGE 3   Final Result                  Ultrasound-guided midline placement performed by qualified nursing staff    as above.           EC-ECHOCARDIOGRAM LTD W/O CONT   Final Result      DX-CHEST-PORTABLE (1 VIEW)   Final Result         No acute cardiac or pulmonary abnormality is identified.           Assessment/Plan  Diabetic ketoacidosis without coma associated with type 1 diabetes mellitus (HCC)  Assessment & Plan  Hypoglycemic upon admission then converted with hyperglycemia.  Had initial anion gap and acidosis.  Had improvement with IV fluids and insulin drip  Lantus changed to 10 units from 20 units due to hyperglycemia 12/22 AM  Initiated Marinol to help increase appetite.  I have added mirtazapine on for appetite stimulation and depression  Glycohemoglobin: 6.9 past 3 months  He states he has a endocrinologist outpatient.  Outpatient is on empagliflozin/metformin  mg daily, Levemir 20 units nightly, semaglutide 1 mg subcutaneous every week    Hypotension  Assessment & Plan  Weaned off pressors but with marginal blood pressor  Monitor in ICU   S/p 1 unit pRBC's 12/20   Lisinopril 10mg daily    Normochromic normocytic anemia  Assessment & Plan  12/21 nursing noted melena.  Guaiac stools initiated omeprazole 20 mg twice daily.  On 12/21 I was speaking to his Sister Aniyah she had taken him for a colonoscopy approximately 11 years ago    Metabolic acidosis, increased anion gap  Assessment & Plan  DKA    DINESH (acute kidney injury) ([facility])  Assessment & Plan  Continue IV fluid resuscitation.  Minimal decrease in BUN and creatinine since admission.  Monitor BMP and avoid nephrotoxins  12/20 BUN: 104, creatinine: 1.29  12/21 BUN: 39, creatinine: 0.85    Essential hypertension- (present on admission)  Assessment & Plan  Patient has required vasopressors  IV fluids  Monitor vitals and strict I's and O's  Lisinopril 10 mg initiated with blood pressure parameters    Hyponatremia  Assessment & Plan  Resolved with IV fluids and treatment of DKA  12/21 sodium: 137    Leukocytosis  Assessment & Plan  Suspect stress-induced secondary to  DKA  Downtrending continue to monitor  Monitor vitals for any signs of fever to suspect/rule out infectious etiology.    Dyslipidemia- (present on admission)  Assessment & Plan  Atorvastatin 10 mg daily    Tobacco abuse- (present on admission)  Assessment & Plan  Encouraged cessation       VTE prophylaxis: Heparin

## 2019-12-23 NOTE — DISCHARGE PLANNING
Discussed in rounds, questions about guardianship. Called [employer]. [phone]. Spoke to Lavonne, pt's guardian is Marie Garcia (currently on vacation) and  is Roni. Both can be reached at the above number.

## 2019-12-23 NOTE — CARE PLAN
Problem: Bowel/Gastric:  Goal: Normal bowel function is maintained or improved  Outcome: PROGRESSING SLOWER THAN EXPECTED  Note:   Pt refusing bowl medication. Education provided on indication for stool softeners but pt continues to refuse.      Problem: Mobility  Goal: Risk for activity intolerance will decrease  Outcome: PROGRESSING SLOWER THAN EXPECTED  Intervention: Encourage patient to increase activity level in collaboration with Interdisciplinary Team  Note:   Pt refused mobility this AM; states that he will consider getting up to a chair later today.

## 2019-12-23 NOTE — CARE PLAN
Problem: Safety  Goal: Will remain free from falls  Outcome: PROGRESSING AS EXPECTED  Intervention: Implement fall precautions  Flowsheets  Taken 12/23/2019 0306  Bed Alarm: Yes - Alarm On  Taken 12/22/2019 2000  Environmental Precautions: Treaded Slipper Socks on Patient;Personal Belongings, Wastebasket, Call Bell etc. in Easy Reach;Report Given to Other Health Care Providers Regarding Fall Risk;Bed in Low Position;Communication Sign for Patients & Families;Mobility Assessed & Appropriate Sign Placed     Problem: Infection  Goal: Will remain free from infection  Outcome: PROGRESSING AS EXPECTED     Problem: Mobility  Goal: Risk for activity intolerance will decrease  Outcome: PROGRESSING SLOWER THAN EXPECTED

## 2019-12-23 NOTE — DISCHARGE PLANNING
"Spoke to Star at Guardian's office. They are following pt for \"case management\" only. Pt's sister Uriel Tatum is POA. Received copy of AD, have faxed to [facility] to scan.  "

## 2019-12-24 PROBLEM — E87.29 METABOLIC ACIDOSIS, INCREASED ANION GAP: Status: RESOLVED | Noted: 2019-12-19 | Resolved: 2019-12-24

## 2019-12-24 LAB
ANION GAP SERPL CALC-SCNC: 7 MMOL/L (ref 0–11.9)
BACTERIA BLD CULT: NORMAL
BACTERIA BLD CULT: NORMAL
BUN SERPL-MCNC: 20 MG/DL (ref 8–22)
CALCIUM SERPL-MCNC: 7.9 MG/DL (ref 8.5–10.5)
CHLORIDE SERPL-SCNC: 109 MMOL/L (ref 96–112)
CO2 SERPL-SCNC: 24 MMOL/L (ref 20–33)
CREAT SERPL-MCNC: 0.77 MG/DL (ref 0.5–1.4)
ERYTHROCYTE [DISTWIDTH] IN BLOOD BY AUTOMATED COUNT: 59.5 FL (ref 35.9–50)
GLUCOSE BLD-MCNC: 194 MG/DL (ref 65–99)
GLUCOSE BLD-MCNC: 244 MG/DL (ref 65–99)
GLUCOSE BLD-MCNC: 295 MG/DL (ref 65–99)
GLUCOSE SERPL-MCNC: 241 MG/DL (ref 65–99)
HCT VFR BLD AUTO: 25.9 % (ref 42–52)
HGB BLD-MCNC: 8.4 G/DL (ref 14–18)
MCH RBC QN AUTO: 32.2 PG (ref 27–33)
MCHC RBC AUTO-ENTMCNC: 32.4 G/DL (ref 33.7–35.3)
MCV RBC AUTO: 99.2 FL (ref 81.4–97.8)
PLATELET # BLD AUTO: 307 K/UL (ref 164–446)
PMV BLD AUTO: 10.4 FL (ref 9–12.9)
POTASSIUM SERPL-SCNC: 4.1 MMOL/L (ref 3.6–5.5)
RBC # BLD AUTO: 2.61 M/UL (ref 4.7–6.1)
SIGNIFICANT IND 70042: NORMAL
SIGNIFICANT IND 70042: NORMAL
SITE SITE: NORMAL
SITE SITE: NORMAL
SODIUM SERPL-SCNC: 140 MMOL/L (ref 135–145)
SOURCE SOURCE: NORMAL
SOURCE SOURCE: NORMAL
WBC # BLD AUTO: 12 K/UL (ref 4.8–10.8)

## 2019-12-24 PROCEDURE — 82962 GLUCOSE BLOOD TEST: CPT | Mod: 91

## 2019-12-24 PROCEDURE — 700102 HCHG RX REV CODE 250 W/ 637 OVERRIDE(OP): Performed by: INTERNAL MEDICINE

## 2019-12-24 PROCEDURE — A9270 NON-COVERED ITEM OR SERVICE: HCPCS | Performed by: INTERNAL MEDICINE

## 2019-12-24 PROCEDURE — 36415 COLL VENOUS BLD VENIPUNCTURE: CPT

## 2019-12-24 PROCEDURE — 700102 HCHG RX REV CODE 250 W/ 637 OVERRIDE(OP): Performed by: HOSPITALIST

## 2019-12-24 PROCEDURE — 97161 PT EVAL LOW COMPLEX 20 MIN: CPT

## 2019-12-24 PROCEDURE — A9270 NON-COVERED ITEM OR SERVICE: HCPCS | Performed by: HOSPITALIST

## 2019-12-24 PROCEDURE — 770006 HCHG ROOM/CARE - MED/SURG/GYN SEMI*

## 2019-12-24 PROCEDURE — 99232 SBSQ HOSP IP/OBS MODERATE 35: CPT | Performed by: INTERNAL MEDICINE

## 2019-12-24 PROCEDURE — 80048 BASIC METABOLIC PNL TOTAL CA: CPT

## 2019-12-24 PROCEDURE — 85027 COMPLETE CBC AUTOMATED: CPT

## 2019-12-24 RX ADMIN — OMEPRAZOLE 20 MG: 20 CAPSULE, DELAYED RELEASE ORAL at 17:31

## 2019-12-24 RX ADMIN — DRONABINOL 2.5 MG: 2.5 CAPSULE ORAL at 12:11

## 2019-12-24 RX ADMIN — ASPIRIN 81 MG: 81 TABLET, COATED ORAL at 17:31

## 2019-12-24 RX ADMIN — INSULIN HUMAN 3 UNITS: 100 INJECTION, SOLUTION PARENTERAL at 14:12

## 2019-12-24 RX ADMIN — TAMSULOSIN HYDROCHLORIDE 0.4 MG: 0.4 CAPSULE ORAL at 05:00

## 2019-12-24 RX ADMIN — MIRTAZAPINE 15 MG: 15 TABLET, FILM COATED ORAL at 21:35

## 2019-12-24 RX ADMIN — INSULIN HUMAN 2 UNITS: 100 INJECTION, SOLUTION PARENTERAL at 08:53

## 2019-12-24 RX ADMIN — OMEPRAZOLE 20 MG: 20 CAPSULE, DELAYED RELEASE ORAL at 05:00

## 2019-12-24 RX ADMIN — PANCRELIPASE 48000 UNITS: 24000; 76000; 120000 CAPSULE, DELAYED RELEASE PELLETS ORAL at 08:52

## 2019-12-24 RX ADMIN — LISINOPRIL 10 MG: 10 TABLET ORAL at 05:00

## 2019-12-24 RX ADMIN — INSULIN GLARGINE 10 UNITS: 100 INJECTION, SOLUTION SUBCUTANEOUS at 17:33

## 2019-12-24 RX ADMIN — INSULIN HUMAN 3 UNITS: 100 INJECTION, SOLUTION PARENTERAL at 21:35

## 2019-12-24 RX ADMIN — DRONABINOL 2.5 MG: 2.5 CAPSULE ORAL at 17:31

## 2019-12-24 RX ADMIN — INSULIN HUMAN 5 UNITS: 100 INJECTION, SOLUTION PARENTERAL at 17:31

## 2019-12-24 RX ADMIN — SENNOSIDES AND DOCUSATE SODIUM 2 TABLET: 8.6; 5 TABLET ORAL at 05:01

## 2019-12-24 RX ADMIN — ATORVASTATIN CALCIUM 10 MG: 10 TABLET, FILM COATED ORAL at 17:31

## 2019-12-24 ASSESSMENT — ENCOUNTER SYMPTOMS
NERVOUS/ANXIOUS: 0
WEAKNESS: 1
FEVER: 0
HEADACHES: 0
STRIDOR: 0
CHILLS: 0
PALPITATIONS: 0
DEPRESSION: 1
NAUSEA: 0
BACK PAIN: 0
SHORTNESS OF BREATH: 0
NECK PAIN: 0
WEIGHT LOSS: 1
COUGH: 0
ABDOMINAL PAIN: 0

## 2019-12-24 ASSESSMENT — COGNITIVE AND FUNCTIONAL STATUS - GENERAL
MOBILITY SCORE: 24
SUGGESTED CMS G CODE MODIFIER MOBILITY: CH

## 2019-12-24 ASSESSMENT — GAIT ASSESSMENTS
GAIT LEVEL OF ASSIST: SUPERVISED
DISTANCE (FEET): 300

## 2019-12-24 NOTE — CARE PLAN
Appropriate Fall precautions in place. Patient oriented to room and call light. Patient educated regarding safety and fall precautions.   DVT prophylaxis in place. Ambulation, mobility, and frequent repositioning encouraged. Patient educated about DVT precautions and prevention.

## 2019-12-24 NOTE — CARE PLAN
Problem: Safety  Goal: Will remain free from falls  Outcome: PROGRESSING SLOWER THAN EXPECTED     Problem: Infection  Goal: Will remain free from infection  Outcome: PROGRESSING SLOWER THAN EXPECTED     Problem: Venous Thromboembolism (VTW)/Deep Vein Thrombosis (DVT) Prevention:  Goal: Patient will participate in Venous Thrombosis (VTE)/Deep Vein Thrombosis (DVT)Prevention Measures  Outcome: PROGRESSING SLOWER THAN EXPECTED     Problem: Bowel/Gastric:  Goal: Normal bowel function is maintained or improved  Outcome: PROGRESSING SLOWER THAN EXPECTED  Goal: Will not experience complications related to bowel motility  Outcome: PROGRESSING SLOWER THAN EXPECTED     Problem: Knowledge Deficit  Goal: Knowledge of the prescribed therapeutic regimen will improve  Outcome: PROGRESSING SLOWER THAN EXPECTED     Problem: Discharge Barriers/Planning  Goal: Patient's continuum of care needs will be met  Outcome: PROGRESSING SLOWER THAN EXPECTED     Problem: Fluid Volume:  Goal: Will maintain balanced intake and output  Outcome: PROGRESSING SLOWER THAN EXPECTED     Problem: Skin Integrity  Goal: Risk for impaired skin integrity will decrease  Outcome: PROGRESSING SLOWER THAN EXPECTED     Problem: Respiratory:  Goal: Respiratory status will improve  Outcome: PROGRESSING SLOWER THAN EXPECTED     Problem: Hemodynamic Status  Goal: Vital Signs and Fluid Balance Management  Outcome: PROGRESSING SLOWER THAN EXPECTED     Problem: Pain Management  Goal: Pain level will decrease to patient's comfort goal  Outcome: PROGRESSING SLOWER THAN EXPECTED     Problem: Mobility  Goal: Risk for activity intolerance will decrease  Outcome: PROGRESSING SLOWER THAN EXPECTED

## 2019-12-24 NOTE — DISCHARGE PLANNING
Anticipated Discharge Disposition: TBD    Action: Pt discussed in IDT rounds. Per MD, pt wants to discharge to SNF. PT eval cleared pt with no needs.    LSW discussed with bedside RN. RN confirmed that pt is able to ambulate fine. Discussed insulin. RN to research more if pt was able to do insulin prior. Pt hx of TBI. Questions regarding pt's ability to make decisions. RN to discus with MD tomorrow.    Barriers to Discharge: None    Plan: LSW continue to assess for discharge need.

## 2019-12-24 NOTE — PROGRESS NOTES
2 RN Skin Check    2 RN skin check complete with KELLEY Mark   Devices in place: SCDs.  Skin assessed under devices: yes.  Confirmed pressure ulcers found on: none found.  New potential pressure ulcers noted on none found . Wound consult placed N/A.  The following interventions in place Mepilex place on sacrum.    Middle finger on right hand noted to be pale with poor capillary refill. Bilateral heels and elbows red but blanching. Blanchable redness noted on bottom, prophylactic Mepilex placed. Encouraged maximum mobilization.

## 2019-12-24 NOTE — CARE PLAN
Problem: Nutritional:  Goal: Achieve adequate nutritional intake  Description  Patient will consume >50% of meals/supplments   Outcome: PROGRESSING AS EXPECTED     Pt taking 50% of most meals and supplements with some variability.   Encourage intake of meals/supplements.  Document intake for communication across all disciplines.

## 2019-12-24 NOTE — THERAPY
"Physical Therapy Evaluation completed.   Bed Mobility:  Supine to Sit: Independent  Transfers: Sit to Stand: Independent  Gait: Supervised with No Equipment Needed       Plan of Care: Patient with no further skilled PT needs in the acute care setting at this time  Discharge Recommendations: Equipment: No Equipment Needed. Post-acute therapy Currently anticipate no further skilled therapy needs once patient is discharged from the inpatient setting.    Pt admitted for DKA and seizure workup. He presents without gross mobility impairment and today completed x300' of gait without device or LOB. Pt able to perform tight, 180deg turns without LOB as well. He reports living at a facility where he can receive help if necessary. At this time, pt does not require further acute PT intervention and appears functionally capable of return home.     See \"Rehab Therapy-Acute\" Patient Summary Report for complete documentation.     "

## 2019-12-24 NOTE — PROGRESS NOTES
Assumed care of pt at 0745. Report received and bedside rounding completed with night RN. Pt is calm no SOB, or in any acute distress noted.    Pt is considered a moderate fall risk. edu provided on risk level.   Fall precautions in place,  bed alarm. - Treaded non slip socks. Bed locked. Communication board updated with POC. Call light and pt belongings within reach - pt makes needs known - hourly rounding in place. See flowsheets for further assessment.

## 2019-12-24 NOTE — PROGRESS NOTES
12 Hour chart check completed. Patient report called to KELLEY Brady in [facility]. Vitals obtained prior to transfer. Patient educated about transfer process and expectations. Pt transferred via wheelchair accompanied by unit tech.

## 2019-12-25 ENCOUNTER — APPOINTMENT (OUTPATIENT)
Dept: RADIOLOGY | Facility: MEDICAL CENTER | Age: 59
DRG: 637 | End: 2019-12-25
Attending: INTERNAL MEDICINE
Payer: COMMERCIAL

## 2019-12-25 PROBLEM — K92.1 MELANOTIC STOOLS: Status: ACTIVE | Noted: 2019-12-20

## 2019-12-25 PROBLEM — N17.9 AKI (ACUTE KIDNEY INJURY) (HCC): Status: RESOLVED | Noted: 2018-11-24 | Resolved: 2019-12-25

## 2019-12-25 PROBLEM — I95.9 HYPOTENSION: Status: RESOLVED | Noted: 2018-11-21 | Resolved: 2019-12-25

## 2019-12-25 PROBLEM — D64.9 NORMOCYTIC ANEMIA: Status: ACTIVE | Noted: 2019-12-25

## 2019-12-25 PROBLEM — R41.89 COGNITIVE DECLINE: Status: ACTIVE | Noted: 2019-12-25

## 2019-12-25 PROBLEM — E10.65 UNCONTROLLED TYPE 1 DIABETES MELLITUS WITH HYPERGLYCEMIA (HCC): Status: ACTIVE | Noted: 2019-12-19

## 2019-12-25 PROBLEM — Z72.0 TOBACCO ABUSE: Status: RESOLVED | Noted: 2018-08-24 | Resolved: 2019-12-25

## 2019-12-25 PROBLEM — E43 SEVERE PROTEIN-CALORIE MALNUTRITION (HCC): Status: ACTIVE | Noted: 2019-12-25

## 2019-12-25 PROBLEM — E87.1 HYPONATREMIA: Status: RESOLVED | Noted: 2019-12-19 | Resolved: 2019-12-25

## 2019-12-25 LAB
ALBUMIN SERPL BCP-MCNC: 2.5 G/DL (ref 3.2–4.9)
ALBUMIN/GLOB SERPL: 1.4 G/DL
ALP SERPL-CCNC: 67 U/L (ref 30–99)
ALT SERPL-CCNC: 12 U/L (ref 2–50)
ANION GAP SERPL CALC-SCNC: 4 MMOL/L (ref 0–11.9)
AST SERPL-CCNC: 13 U/L (ref 12–45)
BASOPHILS # BLD AUTO: 0.4 % (ref 0–1.8)
BASOPHILS # BLD: 0.05 K/UL (ref 0–0.12)
BILIRUB SERPL-MCNC: 0.2 MG/DL (ref 0.1–1.5)
BUN SERPL-MCNC: 18 MG/DL (ref 8–22)
CALCIUM SERPL-MCNC: 7.9 MG/DL (ref 8.5–10.5)
CHLORIDE SERPL-SCNC: 108 MMOL/L (ref 96–112)
CO2 SERPL-SCNC: 27 MMOL/L (ref 20–33)
CREAT SERPL-MCNC: 0.79 MG/DL (ref 0.5–1.4)
CRP SERPL HS-MCNC: 2.83 MG/DL (ref 0–0.75)
EOSINOPHIL # BLD AUTO: 0.29 K/UL (ref 0–0.51)
EOSINOPHIL NFR BLD: 2.3 % (ref 0–6.9)
ERYTHROCYTE [DISTWIDTH] IN BLOOD BY AUTOMATED COUNT: 57.1 FL (ref 35.9–50)
ERYTHROCYTE [SEDIMENTATION RATE] IN BLOOD BY WESTERGREN METHOD: 25 MM/HOUR (ref 0–20)
EST. AVERAGE GLUCOSE BLD GHB EST-MCNC: 154 MG/DL
FERRITIN SERPL-MCNC: 46.8 NG/ML (ref 22–322)
FOLATE SERPL-MCNC: 13.1 NG/ML
GLOBULIN SER CALC-MCNC: 1.8 G/DL (ref 1.9–3.5)
GLUCOSE BLD-MCNC: 133 MG/DL (ref 65–99)
GLUCOSE BLD-MCNC: 145 MG/DL (ref 65–99)
GLUCOSE BLD-MCNC: 202 MG/DL (ref 65–99)
GLUCOSE BLD-MCNC: 222 MG/DL (ref 65–99)
GLUCOSE BLD-MCNC: 268 MG/DL (ref 65–99)
GLUCOSE SERPL-MCNC: 131 MG/DL (ref 65–99)
HBA1C MFR BLD: 7 % (ref 0–5.6)
HCT VFR BLD AUTO: 24.1 % (ref 42–52)
HGB BLD-MCNC: 7.9 G/DL (ref 14–18)
HGB RETIC QN AUTO: 30.4 PG/CELL (ref 29–35)
IMM GRANULOCYTES # BLD AUTO: 0.34 K/UL (ref 0–0.11)
IMM GRANULOCYTES NFR BLD AUTO: 2.7 % (ref 0–0.9)
IMM RETICS NFR: 38.4 % (ref 9.3–17.4)
IRON SATN MFR SERPL: 11 % (ref 15–55)
IRON SERPL-MCNC: 25 UG/DL (ref 50–180)
LYMPHOCYTES # BLD AUTO: 4.4 K/UL (ref 1–4.8)
LYMPHOCYTES NFR BLD: 34.8 % (ref 22–41)
MAGNESIUM SERPL-MCNC: 1.3 MG/DL (ref 1.5–2.5)
MCH RBC QN AUTO: 32.5 PG (ref 27–33)
MCHC RBC AUTO-ENTMCNC: 32.8 G/DL (ref 33.7–35.3)
MCV RBC AUTO: 99.2 FL (ref 81.4–97.8)
MONOCYTES # BLD AUTO: 1.52 K/UL (ref 0–0.85)
MONOCYTES NFR BLD AUTO: 12 % (ref 0–13.4)
NEUTROPHILS # BLD AUTO: 6.06 K/UL (ref 1.82–7.42)
NEUTROPHILS NFR BLD: 47.8 % (ref 44–72)
NRBC # BLD AUTO: 0.2 K/UL
NRBC BLD-RTO: 1.6 /100 WBC
PHOSPHATE SERPL-MCNC: 2.4 MG/DL (ref 2.5–4.5)
PLATELET # BLD AUTO: 284 K/UL (ref 164–446)
PMV BLD AUTO: 10.2 FL (ref 9–12.9)
POTASSIUM SERPL-SCNC: 3.8 MMOL/L (ref 3.6–5.5)
PREALB SERPL-MCNC: 9 MG/DL (ref 18–38)
PROCALCITONIN SERPL-MCNC: <0.05 NG/ML
PROT SERPL-MCNC: 4.3 G/DL (ref 6–8.2)
RBC # BLD AUTO: 2.43 M/UL (ref 4.7–6.1)
RETICS # AUTO: 0.12 M/UL (ref 0.04–0.06)
RETICS/RBC NFR: 5.1 % (ref 0.8–2.1)
SODIUM SERPL-SCNC: 139 MMOL/L (ref 135–145)
TIBC SERPL-MCNC: 238 UG/DL (ref 250–450)
TSH SERPL DL<=0.005 MIU/L-ACNC: 1.49 UIU/ML (ref 0.38–5.33)
VIT B12 SERPL-MCNC: 557 PG/ML (ref 211–911)
WBC # BLD AUTO: 12.7 K/UL (ref 4.8–10.8)

## 2019-12-25 PROCEDURE — 82607 VITAMIN B-12: CPT

## 2019-12-25 PROCEDURE — 87040 BLOOD CULTURE FOR BACTERIA: CPT | Mod: 91

## 2019-12-25 PROCEDURE — 83540 ASSAY OF IRON: CPT

## 2019-12-25 PROCEDURE — 700102 HCHG RX REV CODE 250 W/ 637 OVERRIDE(OP): Performed by: HOSPITALIST

## 2019-12-25 PROCEDURE — 85046 RETICYTE/HGB CONCENTRATE: CPT

## 2019-12-25 PROCEDURE — 85652 RBC SED RATE AUTOMATED: CPT

## 2019-12-25 PROCEDURE — A9270 NON-COVERED ITEM OR SERVICE: HCPCS | Performed by: HOSPITALIST

## 2019-12-25 PROCEDURE — 85025 COMPLETE CBC W/AUTO DIFF WBC: CPT

## 2019-12-25 PROCEDURE — 83735 ASSAY OF MAGNESIUM: CPT

## 2019-12-25 PROCEDURE — 84145 PROCALCITONIN (PCT): CPT

## 2019-12-25 PROCEDURE — 71045 X-RAY EXAM CHEST 1 VIEW: CPT

## 2019-12-25 PROCEDURE — A9270 NON-COVERED ITEM OR SERVICE: HCPCS | Performed by: INTERNAL MEDICINE

## 2019-12-25 PROCEDURE — 99233 SBSQ HOSP IP/OBS HIGH 50: CPT | Performed by: INTERNAL MEDICINE

## 2019-12-25 PROCEDURE — 84134 ASSAY OF PREALBUMIN: CPT

## 2019-12-25 PROCEDURE — 700111 HCHG RX REV CODE 636 W/ 250 OVERRIDE (IP): Performed by: INTERNAL MEDICINE

## 2019-12-25 PROCEDURE — 700105 HCHG RX REV CODE 258: Performed by: INTERNAL MEDICINE

## 2019-12-25 PROCEDURE — 82728 ASSAY OF FERRITIN: CPT

## 2019-12-25 PROCEDURE — 82746 ASSAY OF FOLIC ACID SERUM: CPT

## 2019-12-25 PROCEDURE — 83036 HEMOGLOBIN GLYCOSYLATED A1C: CPT

## 2019-12-25 PROCEDURE — 770006 HCHG ROOM/CARE - MED/SURG/GYN SEMI*

## 2019-12-25 PROCEDURE — 36415 COLL VENOUS BLD VENIPUNCTURE: CPT

## 2019-12-25 PROCEDURE — 84443 ASSAY THYROID STIM HORMONE: CPT

## 2019-12-25 PROCEDURE — 82962 GLUCOSE BLOOD TEST: CPT | Mod: 91

## 2019-12-25 PROCEDURE — 84100 ASSAY OF PHOSPHORUS: CPT

## 2019-12-25 PROCEDURE — 700102 HCHG RX REV CODE 250 W/ 637 OVERRIDE(OP): Performed by: INTERNAL MEDICINE

## 2019-12-25 PROCEDURE — 86140 C-REACTIVE PROTEIN: CPT

## 2019-12-25 PROCEDURE — 83550 IRON BINDING TEST: CPT

## 2019-12-25 PROCEDURE — 80053 COMPREHEN METABOLIC PANEL: CPT

## 2019-12-25 PROCEDURE — C9113 INJ PANTOPRAZOLE SODIUM, VIA: HCPCS | Performed by: INTERNAL MEDICINE

## 2019-12-25 RX ORDER — ATORVASTATIN CALCIUM 40 MG/1
40 TABLET, FILM COATED ORAL EVERY EVENING
Status: DISCONTINUED | OUTPATIENT
Start: 2019-12-25 | End: 2020-01-06 | Stop reason: HOSPADM

## 2019-12-25 RX ORDER — ASCORBIC ACID 500 MG
500 TABLET ORAL 2 TIMES DAILY
Status: DISCONTINUED | OUTPATIENT
Start: 2019-12-25 | End: 2019-12-31

## 2019-12-25 RX ORDER — MAGNESIUM SULFATE HEPTAHYDRATE 40 MG/ML
4 INJECTION, SOLUTION INTRAVENOUS ONCE
Status: COMPLETED | OUTPATIENT
Start: 2019-12-25 | End: 2019-12-25

## 2019-12-25 RX ORDER — THIAMINE MONONITRATE (VIT B1) 100 MG
100 TABLET ORAL DAILY
Status: DISCONTINUED | OUTPATIENT
Start: 2019-12-25 | End: 2020-01-06 | Stop reason: HOSPADM

## 2019-12-25 RX ORDER — POLYETHYLENE GLYCOL 3350 17 G/17G
1 POWDER, FOR SOLUTION ORAL 2 TIMES DAILY
Status: DISCONTINUED | OUTPATIENT
Start: 2019-12-25 | End: 2020-01-06 | Stop reason: HOSPADM

## 2019-12-25 RX ADMIN — Medication 100 MG: at 12:20

## 2019-12-25 RX ADMIN — POLYETHYLENE GLYCOL 3350 1 PACKET: 17 POWDER, FOR SOLUTION ORAL at 18:02

## 2019-12-25 RX ADMIN — MAGNESIUM SULFATE IN WATER 4 G: 40 INJECTION, SOLUTION INTRAVENOUS at 10:45

## 2019-12-25 RX ADMIN — INSULIN GLARGINE 10 UNITS: 100 INJECTION, SOLUTION SUBCUTANEOUS at 18:07

## 2019-12-25 RX ADMIN — TAMSULOSIN HYDROCHLORIDE 0.4 MG: 0.4 CAPSULE ORAL at 04:41

## 2019-12-25 RX ADMIN — OXYCODONE HYDROCHLORIDE AND ACETAMINOPHEN 500 MG: 500 TABLET ORAL at 12:19

## 2019-12-25 RX ADMIN — INSULIN HUMAN 5 UNITS: 100 INJECTION, SOLUTION PARENTERAL at 12:25

## 2019-12-25 RX ADMIN — SENNOSIDES AND DOCUSATE SODIUM 2 TABLET: 8.6; 5 TABLET ORAL at 04:41

## 2019-12-25 RX ADMIN — DIBASIC SODIUM PHOSPHATE, MONOBASIC POTASSIUM PHOSPHATE AND MONOBASIC SODIUM PHOSPHATE 2 TABLET: 852; 155; 130 TABLET ORAL at 12:19

## 2019-12-25 RX ADMIN — ATORVASTATIN CALCIUM 40 MG: 40 TABLET, FILM COATED ORAL at 18:03

## 2019-12-25 RX ADMIN — OMEPRAZOLE 20 MG: 20 CAPSULE, DELAYED RELEASE ORAL at 04:41

## 2019-12-25 RX ADMIN — OXYCODONE HYDROCHLORIDE AND ACETAMINOPHEN 500 MG: 500 TABLET ORAL at 18:02

## 2019-12-25 RX ADMIN — ASPIRIN 81 MG: 81 TABLET, COATED ORAL at 04:41

## 2019-12-25 RX ADMIN — INSULIN HUMAN 3 UNITS: 100 INJECTION, SOLUTION PARENTERAL at 18:06

## 2019-12-25 RX ADMIN — SODIUM CHLORIDE 8 MG/HR: 9 INJECTION, SOLUTION INTRAVENOUS at 18:01

## 2019-12-25 RX ADMIN — PANCRELIPASE 48000 UNITS: 24000; 76000; 120000 CAPSULE, DELAYED RELEASE PELLETS ORAL at 08:11

## 2019-12-25 RX ADMIN — CEFTRIAXONE SODIUM 1 G: 1 INJECTION, POWDER, FOR SOLUTION INTRAMUSCULAR; INTRAVENOUS at 12:16

## 2019-12-25 RX ADMIN — SODIUM CHLORIDE 8 MG/HR: 9 INJECTION, SOLUTION INTRAVENOUS at 12:19

## 2019-12-25 RX ADMIN — THERA TABS 1 TABLET: TAB at 12:19

## 2019-12-25 RX ADMIN — SODIUM CHLORIDE 80 MG: 9 INJECTION, SOLUTION INTRAVENOUS at 12:18

## 2019-12-25 ASSESSMENT — ENCOUNTER SYMPTOMS
CHILLS: 0
DEPRESSION: 1
COUGH: 0
HEADACHES: 0
CONSTITUTIONAL NEGATIVE: 1
WEIGHT LOSS: 1
EYES NEGATIVE: 1
WEAKNESS: 1
NEUROLOGICAL NEGATIVE: 1
BACK PAIN: 0
STRIDOR: 0
GASTROINTESTINAL NEGATIVE: 1
SHORTNESS OF BREATH: 0
NERVOUS/ANXIOUS: 0
PSYCHIATRIC NEGATIVE: 1
CARDIOVASCULAR NEGATIVE: 1
NAUSEA: 0
FEVER: 0
ABDOMINAL PAIN: 0
RESPIRATORY NEGATIVE: 1
PALPITATIONS: 0
MUSCULOSKELETAL NEGATIVE: 1
NECK PAIN: 0

## 2019-12-25 NOTE — PROGRESS NOTES
Hospital Medicine Daily Progress Note    Date of Service  12/25/2019    Chief Complaint  Altered mentation    Hospital Course    59 y.o. male admitted 12/19/2019 with DKA and seizures, status post ICU stay.  Now transferred to medical regular nursing floor.  Concern for GI bleeding, worsening cognitive decline.      Interval Problem Update  Patient seen and evaluated on rounds and I discussed with nursing staff on rounds  This morning, he is increasingly somnolent  He is not interested in talking to me, could not be engaged into a productive conversation  Concern for underlying GI bleeding  I have requested consultation from gastroenterology, I have personally discussed the case with Dr. Wakefield from gastroenterology  Patient is somnolent today, I am stopping his Marinol and Remeron  Protonix initiated  Electrolytes replaced including hypomagnesemia, hypophosphatemia  Clear liquid diet without any reds until GI evaluation  Requesting diabetic education  Requesting speech therapy cognitive evaluation    I called patient's sister, Aniyah who is patient's POA for healthcare decisions and financial decisions per Aniyah.  Patient has underlying history of profound alcoholism which was complicated by 2 TBI's in the past.  Concern for underlying worsening cognitive decline in part attributed to alcoholism and TBI's.  Has been between multiple assisted living facilities, sister is concerned that patient may not exhibit appropriate understanding of his underlying disease process, critical illness, diabetes mellitus, insulin needs.  To this effect, plan was developed as noted in assessment and plan below.  All questions and concerns answered.    Consultants/Specialty  Pulmonology/critical care  Gastroenterology  Palliative care    Code Status  Full code    Disposition  Anticipate will need short-term postacute placement prior to discharge to his group home/assisted living facility  Discussed with /case  management  SNF orders placed    Review of Systems  Review of Systems   Constitutional: Positive for malaise/fatigue and weight loss. Negative for chills and fever.   Respiratory: Negative for cough, shortness of breath and stridor.    Cardiovascular: Negative for palpitations and leg swelling.   Gastrointestinal: Negative for abdominal pain and nausea.   Genitourinary: Negative for dysuria.   Musculoskeletal: Negative for back pain and neck pain.   Neurological: Positive for weakness. Negative for headaches.   Psychiatric/Behavioral: Positive for depression. The patient is not nervous/anxious.         Physical Exam  Temp:  [36.9 °C (98.4 °F)-37.9 °C (100.2 °F)] 37.7 °C (99.8 °F)  Pulse:  [71-95] 71  Resp:  [16-18] 17  BP: ()/(40-60) 114/50  SpO2:  [87 %-97 %] 96 %    Physical Exam  Vitals signs reviewed.   Constitutional:       Appearance: Normal appearance.      Comments: Thin, frail and appears older than stated age   HENT:      Head: Normocephalic and atraumatic.      Nose: Nose normal.      Mouth/Throat:      Mouth: Mucous membranes are dry.      Pharynx: No oropharyngeal exudate.   Eyes:      General:         Right eye: No discharge.         Left eye: No discharge.      Extraocular Movements: Extraocular movements intact.      Conjunctiva/sclera: Conjunctivae normal.   Neck:      Musculoskeletal: No muscular tenderness.   Cardiovascular:      Rate and Rhythm: Normal rate and regular rhythm.      Pulses:           Radial pulses are 2+ on the right side and 2+ on the left side.        Dorsalis pedis pulses are 2+ on the right side and 2+ on the left side.      Heart sounds: Normal heart sounds. No murmur.   Pulmonary:      Effort: Pulmonary effort is normal. No respiratory distress.      Breath sounds: Normal breath sounds. No wheezing.   Abdominal:      General: Abdomen is flat. There is no distension.      Palpations: Abdomen is soft. There is no mass.      Tenderness: There is no tenderness.    Musculoskeletal:      Right lower leg: No edema.      Left lower leg: No edema.      Comments: Atrophy of musculature   Lymphadenopathy:      Cervical: No cervical adenopathy.   Skin:     General: Skin is warm.      Coloration: Skin is pale. Skin is not jaundiced.   Neurological:      General: No focal deficit present.      Mental Status: He is alert and oriented to person, place, and time.      Cranial Nerves: No cranial nerve deficit.   Psychiatric:         Mood and Affect: Mood normal.         Behavior: Behavior normal.       Examination remains unchanged    Fluids    Intake/Output Summary (Last 24 hours) at 12/25/2019 1117  Last data filed at 12/25/2019 0900  Gross per 24 hour   Intake 360 ml   Output --   Net 360 ml       Laboratory  Recent Labs     12/23/19  0555 12/24/19  0514 12/25/19  0331   WBC 12.9* 12.0* 12.7*   RBC 2.57* 2.61* 2.43*   HEMOGLOBIN 8.1* 8.4* 7.9*   HEMATOCRIT 25.1* 25.9* 24.1*   MCV 97.7 99.2* 99.2*   MCH 31.5 32.2 32.5   MCHC 32.3* 32.4* 32.8*   RDW 56.4* 59.5* 57.1*   PLATELETCT 301 307 284   MPV 10.8 10.4 10.2     Recent Labs     12/23/19  0555 12/24/19  0514 12/25/19  0331   SODIUM 140 140 139   POTASSIUM 4.0 4.1 3.8   CHLORIDE 109 109 108   CO2 26 24 27   GLUCOSE 119* 241* 131*   BUN 18 20 18   CREATININE 0.79 0.77 0.79   CALCIUM 8.2* 7.9* 7.9*                   Imaging  IR-MIDLINE CATHETER INSERTION WO GUIDANCE > AGE 3   Final Result                  Ultrasound-guided midline placement performed by qualified nursing staff    as above.          EC-ECHOCARDIOGRAM LTD W/O CONT   Final Result      DX-CHEST-PORTABLE (1 VIEW)   Final Result         No acute cardiac or pulmonary abnormality is identified.      DX-CHEST-LIMITED (1 VIEW)    (Results Pending)        Assessment/Plan  Melanotic stools- (present on admission)  Assessment & Plan  Previously, patient with severe esophagitis on EGD  Has not completed a colonoscopy for a long time per sister  On presentation he had profound uremia  which could indicate an upper GI bleed  At this time I would initiate the patient on IV Protonix 8 mg/h after an initial bolus of 80 mg x 1  I have requested gastroenterology consultation  I have personally discussed the case with Dr. lunsford who will evaluate the patient and provide further input  Monitor Hb / Restrictive transfusion strategy  Holding aspirin 81  Vitamin C supplementation  Work-up also concerning for iron deficiency anemia, will initiate intravenous iron supplementation once infectious issues have been ruled out    Cognitive decline- (present on admission)  Assessment & Plan  Patient Sister Aniyah is patient's POA for both healthcare and financial concerns.    Moderate to severe cognitive decline from underlying Wernicke Korsakoff given profound history of alcoholism and history of multiple TBI's in the past.    Followed by [facility] in the outpatient setting  Previous MRI with atrophy    We will have social workers reach out to sister to help with her questions regarding guardianship issues as asked by her today, which will be likely done tomorrow    We will obtain speech therapy evaluation for cognition evaluation  Avoid sedative, narcotics / BZDs as able  Patient increasingly somnolent, will stop Remeron and Marinol    We will consult palliative care to help with advance care planning    Uncontrolled type 1 diabetes mellitus with hyperglycemia (HCC)- (present on admission)  Assessment & Plan  Patient presented with diabetic ketoacidosis which has resolved    Suspect that patient has type 1 diabetes, would be insulin-dependent  On presentation lactic acidosis which could be contributed by metformin    Sister concerned that with patient's underlying cognitive decline, he is unable to completely understand his disease process and manage it appropriately.  He hoards food in his room, or inappropriately use insulin when his blood sugars are high and use sugar packets when his blood sugars are  low.  Previously, has been expressed to the sister that patient needs to be able to administer his own insulin for him to be in an assisted living facility/group home situation based on [place] state laws.    I believe that patient would benefit from being on Lantus with sliding scale insulin therapy    To further understand above concerns, I have requested diabetic education.  Will obtain input from our diabetic education nurse regarding above.  We will also obtain a cognitive evaluation with speech therapy.    Meanwhile we will continue Lantus 10 units  We will continue sliding scale insulin therapy    Continue high intensity statin therapy  Aspirin 81 once GI issues have resolved  Lisinopril once blood pressure stable  We will check hemoglobin A1c and lipid profile    Leukocytosis- (present on admission)  Assessment & Plan  Improved initially after a dose of ceftriaxone in the emergency department  Now persistently uptrending  I am requesting further evaluation  We will check blood cultures, urinalysis, chest x-ray, ESR, CRP and pro calcitonin  For now we will initiate the patient on IV ceftriaxone and de-escalate as clinically appropriate    Normocytic anemia- (present on admission)  Assessment & Plan  Component of iron deficiency anemia  Plan as noted above with concerns for acute bleeding  Monitor Hb / Restrictive transfusion strategy    Severe protein-calorie malnutrition (HCC)- (present on admission)  Assessment & Plan  Will check prealbumin levels  Optimize nutrition  Multivitamin support, daily multivitamin and thiamine  Stop Marinol/Remeron as this is causing sedation    Dyslipidemia- (present on admission)  Assessment & Plan  Atorvastatin 40 mg daily with underlying risk factors    Essential hypertension- (present on admission)  Assessment & Plan  Holding anti hypertensive therapy  Resume as clinically appropriate       VTE prophylaxis: SCDs - holding subcutaneous heparin/Lovenox unless issues with GI  bleeding have been addressed as reviewed above    Patient was seen for 50 minutes face to face of which > 50% of appointment time was spent on counseling and coordination of care regarding the above.

## 2019-12-25 NOTE — PROGRESS NOTES
[facility] Medicine Daily Progress Note    Date of Service  12/24/2019    Chief Complaint  Altered mentation    Hospital Course    59 y.o. male admitted 12/19/2019 with DKA and seizures      Interval Problem Update  Patient seen and evaluated on rounds and I discussed with nursing staff on rounds  Hemoglobin stable  No other acute complaints  Cognitive decline evident    Consultants/Specialty  Pulmonary/Intensivist    Code Status  FULL    Disposition  Anticipate will need short-term postacute placement prior to discharge to his group home  Discussed with /case management  SNF orders placed    Review of Systems  Review of Systems   Constitutional: Positive for malaise/fatigue and weight loss. Negative for chills and fever.   Respiratory: Negative for cough, shortness of breath and stridor.    Cardiovascular: Negative for palpitations and leg swelling.   Gastrointestinal: Negative for abdominal pain and nausea.   Genitourinary: Negative for dysuria.   Musculoskeletal: Negative for back pain and neck pain.   Neurological: Positive for weakness. Negative for headaches.   Psychiatric/Behavioral: Positive for depression. The patient is not nervous/anxious.         Physical Exam  Temp:  [36.9 °C (98.4 °F)-37.8 °C (100 °F)] 36.9 °C (98.4 °F)  Pulse:  [] 86  Resp:  [18-20] 18  BP: (105-123)/(48-63) 114/53  SpO2:  [90 %-98 %] 92 %    Physical Exam  Vitals signs reviewed.   Constitutional:       Appearance: Normal appearance.      Comments: Thin, frail and appears older than stated age   HENT:      Head: Normocephalic and atraumatic.      Nose: Nose normal.      Mouth/Throat:      Mouth: Mucous membranes are dry.      Pharynx: No oropharyngeal exudate.   Eyes:      General:         Right eye: No discharge.         Left eye: No discharge.      Extraocular Movements: Extraocular movements intact.      Conjunctiva/sclera: Conjunctivae normal.   Neck:      Musculoskeletal: No muscular tenderness.   Cardiovascular:       Rate and Rhythm: Normal rate and regular rhythm.      Pulses:           Radial pulses are 2+ on the right side and 2+ on the left side.        Dorsalis pedis pulses are 2+ on the right side and 2+ on the left side.      Heart sounds: Normal heart sounds. No murmur.   Pulmonary:      Effort: Pulmonary effort is normal. No respiratory distress.      Breath sounds: Normal breath sounds. No wheezing.   Abdominal:      General: Abdomen is flat. There is no distension.      Palpations: Abdomen is soft. There is no mass.      Tenderness: There is no tenderness.   Musculoskeletal:      Right lower leg: No edema.      Left lower leg: No edema.      Comments: Atrophy of musculature   Lymphadenopathy:      Cervical: No cervical adenopathy.   Skin:     General: Skin is warm.      Coloration: Skin is pale. Skin is not jaundiced.   Neurological:      General: No focal deficit present.      Mental Status: He is alert and oriented to person, place, and time.      Cranial Nerves: No cranial nerve deficit.   Psychiatric:         Mood and Affect: Mood normal.         Behavior: Behavior normal.         Fluids    Intake/Output Summary (Last 24 hours) at 12/24/2019 1646  Last data filed at 12/23/2019 2000  Gross per 24 hour   Intake 350 ml   Output 625 ml   Net -275 ml       Laboratory  Recent Labs     12/22/19  0550 12/23/19  0555 12/24/19  0514   WBC 12.2* 12.9* 12.0*   RBC 2.43* 2.57* 2.61*   HEMOGLOBIN 7.9* 8.1* 8.4*   HEMATOCRIT 23.1* 25.1* 25.9*   MCV 95.1 97.7 99.2*   MCH 32.5 31.5 32.2   MCHC 34.2 32.3* 32.4*   RDW 52.7* 56.4* 59.5*   PLATELETCT 267 301 307   MPV 10.8 10.8 10.4     Recent Labs     12/22/19  0550 12/23/19  0555 12/24/19  0514   SODIUM 141 140 140   POTASSIUM 3.5* 4.0 4.1   CHLORIDE 110 109 109   CO2 23 26 24   GLUCOSE 43* 119* 241*   BUN 22 18 20   CREATININE 0.77 0.79 0.77   CALCIUM 7.8* 8.2* 7.9*                   Imaging  IR-MIDLINE CATHETER INSERTION WO GUIDANCE > AGE 3   Final Result                   Ultrasound-guided midline placement performed by qualified nursing staff    as above.          EC-ECHOCARDIOGRAM LTD W/O CONT   Final Result      DX-CHEST-PORTABLE (1 VIEW)   Final Result         No acute cardiac or pulmonary abnormality is identified.           Assessment/Plan  Diabetic ketoacidosis without coma associated with type 1 diabetes mellitus (HCC)- (present on admission)  Assessment & Plan  Diabetic ketoacidosis has resolved  Currently on Lantus/sliding care insulin therapy  Optimize diabetic control    Normochromic normocytic anemia- (present on admission)  Assessment & Plan  12/21 nursing noted melena.  Guaiac stools initiated omeprazole 20 mg twice daily.  On 12/21 I was speaking to his Sister Aniyah she had taken him for a colonoscopy approximately 11 years ago    Continue omeprazole  We will check her insurance, B12, folate, reticulocyte count and TSH  No evidence of acute bleeding, occult positive  Continue to monitor hemoglobin    Consult with GI tomorrow    Essential hypertension- (present on admission)  Assessment & Plan  Patient has required vasopressors  IV fluids  Monitor vitals and strict I's and O's  Lisinopril 10 mg initiated with blood pressure parameters    Hyponatremia- (present on admission)  Assessment & Plan  Resolved with IV fluids and treatment of DKA  12/21 sodium: 137    Leukocytosis- (present on admission)  Assessment & Plan  Suspect stress-induced secondary to DKA  Downtrending continue to monitor  Monitor vitals for any signs of fever to suspect/rule out infectious etiology.    DINESH (acute kidney injury) (HCC)- (present on admission)  Assessment & Plan  resolved    Hypotension- (present on admission)  Assessment & Plan  Resolved    Dyslipidemia- (present on admission)  Assessment & Plan  Atorvastatin 10 mg daily    Tobacco abuse- (present on admission)  Assessment & Plan  Encouraged cessation       VTE prophylaxis: SCDs

## 2019-12-25 NOTE — CONSULTS
Date of Consultation:  12/25/2019    Patient: : Pawel Tatum  MRN: [id number]    Referring Physician: Dr. Pace     GI:Félix Wakefield M.D.     Reason for Consultation:Uremia, stool occult positive    History of Present Illness:   Thank you for allowing me to consult on this patient. 60 yo male with history of DM, HTN who lives at an [facility] who presented 12/19/2019 with mild SOB and found to be in DKA upon arrival to the ED on 12/19/2019.  Patient was admitted to the ICU with management with glucose of 442, trace urine ketones, anion gap of 23..  Patient was treated for DKA and ultimately transferred out.  With hydration patient's hemoglobin drifted down to 6.9.  Patient was transfused 1 unit of blood.  And ultimately transfer out.  Also arrival show significant BUN elevation.  Patient is A&O x3 however not interested in talking to me during my interview with him.  Patient however does deny tomas hematemesis hemoptysis melena or blood per rectum. A fecal occult blood was positive on 12/23/2019.  Patient's uremia also resolved.     I have reviewed patient's last record from August 25, 2018 of which an endoscopy was performed showing severe grade D reflux esophagitis, 3 cm hiatal hernia, mild scalloping of duodenal fold biopsied for celiac disease.  It was utilizing fentanyl and Versed for sedation      Past Medical History:   Diagnosis Date   • Diabetes (HCC)    • Hypertension          Past Surgical History:   Procedure Laterality Date   • TOE AMPUTATION Right 1/12/2019    Procedure: TOE AMPUTATION;  Surgeon: Nicanor Reddy M.D.;  Location: SURGERY [facility];  Service: Orthopedics   • GASTROSCOPY-ENDO N/A 8/25/2018    Procedure: GASTROSCOPY-ENDO;  Surgeon: Jaswant Frye M.D.;  Location: ENDOSCOPY [facility];  Service: Gastroenterology   • OTHER ABDOMINAL SURGERY         Family History   Problem Relation Age of Onset   • No Known Problems Mother    • No Known Problems Father        Social History      Socioeconomic History   • Marital status: Single     Spouse name: Not on file   • Number of children: Not on file   • Years of education: Not on file   • Highest education level: Not on file   Occupational History   • Not on file   Social Needs   • Financial resource strain: Not on file   • Food insecurity:     Worry: Not on file     Inability: Not on file   • Transportation needs:     Medical: Not on file     Non-medical: Not on file   Tobacco Use   • Smoking status: Current Every Day Smoker     Packs/day: 0.25     Years: 15.00     Pack years: 3.75   • Smokeless tobacco: Never Used   • Tobacco comment: Trying to quit   Substance and Sexual Activity   • Alcohol use: No   • Drug use: No   • Sexual activity: Not Currently   Lifestyle   • Physical activity:     Days per week: Not on file     Minutes per session: Not on file   • Stress: Not on file   Relationships   • Social connections:     Talks on phone: Not on file     Gets together: Not on file     Attends [employer] service: Not on file     Active member of club or organization: Not on file     Attends meetings of clubs or organizations: Not on file     Relationship status: Not on file   • Intimate partner violence:     Fear of current or ex partner: Not on file     Emotionally abused: Not on file     Physically abused: Not on file     Forced sexual activity: Not on file   Other Topics Concern   • Not on file   Social History Narrative   • Not on file       Review of Systems   Unable to perform ROS: Medical condition   Constitutional: Negative.    HENT: Negative.    Eyes: Negative.    Respiratory: Negative.    Cardiovascular: Negative.    Gastrointestinal: Negative.    Genitourinary: Negative.    Musculoskeletal: Negative.    Skin: Negative.    Neurological: Negative.    Endo/Heme/Allergies: Negative.    Psychiatric/Behavioral: Negative.          Physical Exam:  Vitals:    12/24/19 1905 12/25/19 0300 12/25/19 0436 12/25/19 0705   BP: 110/60 (!) 98/40 (!) 94/42  114/50   Pulse: 95 81  71   Resp: 18 16  17   Temp: 37.4 °C (99.4 °F) 37.9 °C (100.2 °F)  37.7 °C (99.8 °F)   TempSrc: Temporal Temporal  Temporal   SpO2: 92% (!) 87% 97% 96%   Weight:       Height:           Physical Exam   Constitutional: He is oriented to person, place, and time and well-developed, well-nourished, and in no distress. No distress.   HENT:   Head: Normocephalic and atraumatic.   Eyes: Pupils are equal, round, and reactive to light. Conjunctivae and EOM are normal. Left eye exhibits no discharge. No scleral icterus.   Neck: Normal range of motion. Neck supple. No JVD present. No tracheal deviation present. No thyromegaly present.   Cardiovascular: Normal rate and regular rhythm. Exam reveals no gallop and no friction rub.   No murmur heard.  Pulmonary/Chest: Effort normal and breath sounds normal. No stridor. No respiratory distress. He has no wheezes. He has no rales. He exhibits no tenderness.   Abdominal: Soft. Bowel sounds are normal. He exhibits no distension and no mass. There is no tenderness. There is no rebound and no guarding.   Midline surgical scar, well healed   Musculoskeletal:         General: No tenderness or edema.   Lymphadenopathy:     He has no cervical adenopathy.   Neurological: He is alert and oriented to person, place, and time.   Skin: Skin is warm and dry. He is not diaphoretic.         Labs:  Recent Labs     12/23/19  0555 12/24/19  0514 12/25/19  0331   WBC 12.9* 12.0* 12.7*   RBC 2.57* 2.61* 2.43*   HEMOGLOBIN 8.1* 8.4* 7.9*   HEMATOCRIT 25.1* 25.9* 24.1*   MCV 97.7 99.2* 99.2*   MCH 31.5 32.2 32.5   MCHC 32.3* 32.4* 32.8*   RDW 56.4* 59.5* 57.1*   PLATELETCT 301 307 284   MPV 10.8 10.4 10.2     Recent Labs     12/23/19  0555 12/24/19  0514 12/25/19  0331   SODIUM 140 140 139   POTASSIUM 4.0 4.1 3.8   CHLORIDE 109 109 108   CO2 26 24 27   GLUCOSE 119* 241* 131*   BUN 18 20 18             Imaging:  IR-MIDLINE CATHETER INSERTION WO GUIDANCE > AGE 1  Narrative:  HISTORY/REASON FOR EXAM:  Midline Placement       TECHNIQUE/EXAM DESCRIPTION AND NUMBER OF VIEWS: Midline insertion with   ultrasound guidance.      FINDINGS: Midline insertion with Ultrasound Guidance was performed by   qualified nursing staff without the assistance of a Radiologist. Midline   positioning as measured by RN or as appropriate length of catheter   selected.   Impression:              Ultrasound-guided midline placement performed by qualified nursing staff   as above.   EC-ECHOCARDIOGRAM LTD W/O CONT  Transthoracic  Echo Report    Echocardiography Laboratory    CONCLUSIONS  TDS. No prior study is available for comparison.   Left ventricular ejection fraction is visually estimated to be 65%.    SHAUN CORCORAN  Exam Date:         2019                      12:58  Exam Location:     Inpatient  Priority:          Routine    Ordering Physician:        MISTI MARCUM  Referring Physician:  Sonographer:               Estella Ruff RDCS    Age:    59     Gender:    M  MRN:    [id number]  :    1960  BSA:    1.98   Ht (in):    74     Wt (lb):    160  Exam Type:     Limited    Indications:     Abnormal EKG  ICD Codes:       794.31    CPT Codes:       73716    BP:   89     /   46     HR:   89  Technical Quality:       Technically difficult study                            incomplete information is                            obtained    MEASUREMENTS  (Male / Female) Normal Values  2D ECHO  LV Diastolic Diameter PLAX        3.4 cm                4.2 - 5.9 / 3.9 - 5.3   cm  LV Systolic Diameter PLAX         2 cm                  2.1 - 4.0 cm  IVS Diastolic Thickness           0.7 cm                  LVPW Diastolic Thickness          0.84 cm                 LVOT Diameter                     2.1 cm                  Estimated LV Ejection Fraction    65 %                      * Indicates values subject to auto-interpretation  LV EF:  65    %    FINDINGS  Left Ventricle  Normal left ventricular size and  systolic function. Left ventricular   ejection fraction is visually estimated to be 65%.    Right Ventricle  Normal right ventricular size and systolic function.    Right Atrium    Left Atrium    Mitral Valve  Structurally normal mitral valve. Trace mitral regurgitation.    Aortic Valve  Structurally normal aortic valve without significant stenosis or   regurgitation.    Tricuspid Valve    Pulmonic Valve    Pericardium  Normal pericardium without effusion.    Aorta  Normal aortic root for body surface area.    Dago Manley MD  (Electronically Signed)  Final Date:     19 December 2019                   15:45  DX-CHEST-PORTABLE (1 VIEW)  Narrative: 12/19/2019 11:48 AM    HISTORY/REASON FOR EXAM:  Hypoglycemia and sepsis and shortness of breath    TECHNIQUE/EXAM DESCRIPTION AND NUMBER OF VIEWS:  Single portable view of the chest.    COMPARISON: 11/21/2018    FINDINGS:    Heart size is within normal limits.  No focal infiltrates or consolidations are identified in the lungs.  No pleural fluid collections are identified.  No pneumothorax is appreciated.  Impression: No acute cardiac or pulmonary abnormality is identified.            Impressions:  1. Anemia - normocytic; likely multifactorial   2. Low iron saturation/ferritin  3. Diabetic ketoacidosis, resolved  4. Uremia - resolved  5. History of severe esophagitis  6. Cognitive decline  7. Type 1 DM  8. Protein malnutrition  9. HTN  10. Leukocytosis.    59-year-old male with history of traumatic brain injury, hypertension type 1 diabetes, admitted for DKA.  Likely secondary to medication change.  After hydration hemoglobin down trended to 7.2 who has been stable.  Occult stool blood was positive.  Last lab with leukocytosis on August 2, 2019 hemoglobin was 13.9.  Etiology of anemia unclear could be secondary to dilutional versus abnormality.  Ferritin low however iron saturation low, binding capacity normal.  B12 folate normal.  Due to occurrence of positive  occult blood and that evaluation is for anemia would recommend endoscopic evaluation with EGD and colon. This anemia is likely chronic rather than rapid bleeding given no reports of melena or hematochezia.     Recommendations:  1.  Extensive discussion with patient's sister via telephone who is his healthcare power of  was conducted.  Risk and benefit discussed with her.  She would like to discuss further with her brother before proceeding. I have encourage to let us know her decision and notify the RN/Hospitalist.   2. Agree with neuropsychiatric evaluation (patient was A&Ox4 for me)  3. Continue monitor H/H, transfuse to keep above 7  4. Chronic medical management of medical condition per team  5. Electrolyte correction per team.   6. Dr. Villarreal to assume GI Hospitalist tomorrow.      This note was generated using voice recognition software which has a small chance of producing errors of grammar and possibly content. I have made every reasonable attempt to find and correct any obvious errors, but expect that some may not be found prior to finalization of this note.

## 2019-12-25 NOTE — CARE PLAN
Problem: Safety  Goal: Will remain free from falls  Outcome: PROGRESSING AS EXPECTED  Intervention: Assess risk factors for falls  Note:   Patient educated regarding fall risks. Environmental hazards reviewed such as IV tubing, SCDs, spills. Patient directed to use call light for assistance before getting out of bed or ambulating. Patient verbalized understanding.        Problem: Infection  Goal: Will remain free from infection  Outcome: PROGRESSING AS EXPECTED  Intervention: Assess signs and symptoms of infection  Note:   Patient educated regarding infection control including hand hygiene, personal cares. Patient educated regarding s/s of infection including pain, fever, heat and redness at wound site. Patient encouraged to discuss any concerns with RN or care team. Patient verbalized understanding.

## 2019-12-25 NOTE — CARE PLAN
Problem: Safety  Goal: Will remain free from falls  Outcome: PROGRESSING AS EXPECTED  Note:   Bed in lowest position, call light within reach. Bed alarm on. Patient educated regarding safety precautions. Room free of clutter.       Problem: Infection  Goal: Will remain free from infection  Outcome: PROGRESSING AS EXPECTED  Note:   Educated patient on proper handwashing techniques with soap and water. Explained the importance of washing hands after using the restroom, before and after eating. Hand  is available on the walls of the rooms for use when hands are not visibly soiled.

## 2019-12-25 NOTE — ASSESSMENT & PLAN NOTE
Component of iron deficiency anemia - received IV Iron  Monitor Hgb / Restrictive transfusion strategy

## 2019-12-25 NOTE — ASSESSMENT & PLAN NOTE
Patient Sister Aniyah is patient's POA for both healthcare and financial concerns.  Moderate to severe cognitive decline from underlying Wernicke Korsakoff given profound history of alcoholism and history of multiple TBI's in the past.  Previous MRI with atrophy  Avoid sedative, narcotics / BZDs as able

## 2019-12-26 LAB
ALBUMIN SERPL BCP-MCNC: 2.6 G/DL (ref 3.2–4.9)
ALBUMIN/GLOB SERPL: 1.4 G/DL
ALP SERPL-CCNC: 75 U/L (ref 30–99)
ALT SERPL-CCNC: 12 U/L (ref 2–50)
ANION GAP SERPL CALC-SCNC: 7 MMOL/L (ref 0–11.9)
AST SERPL-CCNC: 15 U/L (ref 12–45)
BASOPHILS # BLD AUTO: 1 % (ref 0–1.8)
BASOPHILS # BLD: 0.13 K/UL (ref 0–0.12)
BILIRUB SERPL-MCNC: 0.2 MG/DL (ref 0.1–1.5)
BUN SERPL-MCNC: 13 MG/DL (ref 8–22)
CALCIUM SERPL-MCNC: 7.8 MG/DL (ref 8.5–10.5)
CHLORIDE SERPL-SCNC: 106 MMOL/L (ref 96–112)
CO2 SERPL-SCNC: 26 MMOL/L (ref 20–33)
CREAT SERPL-MCNC: 0.72 MG/DL (ref 0.5–1.4)
EOSINOPHIL # BLD AUTO: 0.34 K/UL (ref 0–0.51)
EOSINOPHIL NFR BLD: 2.5 % (ref 0–6.9)
ERYTHROCYTE [DISTWIDTH] IN BLOOD BY AUTOMATED COUNT: 56.3 FL (ref 35.9–50)
GLOBULIN SER CALC-MCNC: 1.9 G/DL (ref 1.9–3.5)
GLUCOSE BLD-MCNC: 138 MG/DL (ref 65–99)
GLUCOSE BLD-MCNC: 140 MG/DL (ref 65–99)
GLUCOSE BLD-MCNC: 236 MG/DL (ref 65–99)
GLUCOSE BLD-MCNC: 299 MG/DL (ref 65–99)
GLUCOSE BLD-MCNC: 323 MG/DL (ref 65–99)
GLUCOSE SERPL-MCNC: 112 MG/DL (ref 65–99)
HCT VFR BLD AUTO: 25.8 % (ref 42–52)
HGB BLD-MCNC: 8.2 G/DL (ref 14–18)
IMM GRANULOCYTES # BLD AUTO: 0.28 K/UL (ref 0–0.11)
IMM GRANULOCYTES NFR BLD AUTO: 2.1 % (ref 0–0.9)
LYMPHOCYTES # BLD AUTO: 4.18 K/UL (ref 1–4.8)
LYMPHOCYTES NFR BLD: 30.8 % (ref 22–41)
MAGNESIUM SERPL-MCNC: 1.8 MG/DL (ref 1.5–2.5)
MCH RBC QN AUTO: 31.7 PG (ref 27–33)
MCHC RBC AUTO-ENTMCNC: 31.8 G/DL (ref 33.7–35.3)
MCV RBC AUTO: 99.6 FL (ref 81.4–97.8)
MONOCYTES # BLD AUTO: 1.7 K/UL (ref 0–0.85)
MONOCYTES NFR BLD AUTO: 12.5 % (ref 0–13.4)
NEUTROPHILS # BLD AUTO: 6.93 K/UL (ref 1.82–7.42)
NEUTROPHILS NFR BLD: 51.1 % (ref 44–72)
NRBC # BLD AUTO: 0.08 K/UL
NRBC BLD-RTO: 0.6 /100 WBC
PHOSPHATE SERPL-MCNC: 2.5 MG/DL (ref 2.5–4.5)
PLATELET # BLD AUTO: 333 K/UL (ref 164–446)
PMV BLD AUTO: 10.1 FL (ref 9–12.9)
POTASSIUM SERPL-SCNC: 3.7 MMOL/L (ref 3.6–5.5)
PROT SERPL-MCNC: 4.5 G/DL (ref 6–8.2)
RBC # BLD AUTO: 2.59 M/UL (ref 4.7–6.1)
SODIUM SERPL-SCNC: 139 MMOL/L (ref 135–145)
WBC # BLD AUTO: 13.6 K/UL (ref 4.8–10.8)

## 2019-12-26 PROCEDURE — 700102 HCHG RX REV CODE 250 W/ 637 OVERRIDE(OP): Performed by: INTERNAL MEDICINE

## 2019-12-26 PROCEDURE — 97535 SELF CARE MNGMENT TRAINING: CPT

## 2019-12-26 PROCEDURE — A9270 NON-COVERED ITEM OR SERVICE: HCPCS | Performed by: HOSPITALIST

## 2019-12-26 PROCEDURE — C9113 INJ PANTOPRAZOLE SODIUM, VIA: HCPCS | Performed by: INTERNAL MEDICINE

## 2019-12-26 PROCEDURE — 700105 HCHG RX REV CODE 258: Performed by: INTERNAL MEDICINE

## 2019-12-26 PROCEDURE — 700102 HCHG RX REV CODE 250 W/ 637 OVERRIDE(OP): Performed by: HOSPITALIST

## 2019-12-26 PROCEDURE — 97530 THERAPEUTIC ACTIVITIES: CPT

## 2019-12-26 PROCEDURE — 85025 COMPLETE CBC W/AUTO DIFF WBC: CPT

## 2019-12-26 PROCEDURE — 83735 ASSAY OF MAGNESIUM: CPT

## 2019-12-26 PROCEDURE — 99233 SBSQ HOSP IP/OBS HIGH 50: CPT | Performed by: INTERNAL MEDICINE

## 2019-12-26 PROCEDURE — 700101 HCHG RX REV CODE 250: Performed by: INTERNAL MEDICINE

## 2019-12-26 PROCEDURE — 92523 SPEECH SOUND LANG COMPREHEN: CPT

## 2019-12-26 PROCEDURE — A9270 NON-COVERED ITEM OR SERVICE: HCPCS | Performed by: INTERNAL MEDICINE

## 2019-12-26 PROCEDURE — 770006 HCHG ROOM/CARE - MED/SURG/GYN SEMI*

## 2019-12-26 PROCEDURE — 84100 ASSAY OF PHOSPHORUS: CPT

## 2019-12-26 PROCEDURE — 80053 COMPREHEN METABOLIC PANEL: CPT

## 2019-12-26 PROCEDURE — 700111 HCHG RX REV CODE 636 W/ 250 OVERRIDE (IP): Performed by: INTERNAL MEDICINE

## 2019-12-26 PROCEDURE — 36415 COLL VENOUS BLD VENIPUNCTURE: CPT

## 2019-12-26 PROCEDURE — 82962 GLUCOSE BLOOD TEST: CPT

## 2019-12-26 RX ORDER — OMEPRAZOLE 20 MG/1
20 CAPSULE, DELAYED RELEASE ORAL 2 TIMES DAILY
Status: DISCONTINUED | OUTPATIENT
Start: 2019-12-26 | End: 2020-01-06 | Stop reason: HOSPADM

## 2019-12-26 RX ADMIN — INSULIN HUMAN 3 UNITS: 100 INJECTION, SOLUTION PARENTERAL at 21:48

## 2019-12-26 RX ADMIN — SODIUM CHLORIDE 8 MG/HR: 9 INJECTION, SOLUTION INTRAVENOUS at 06:40

## 2019-12-26 RX ADMIN — TAMSULOSIN HYDROCHLORIDE 0.4 MG: 0.4 CAPSULE ORAL at 06:37

## 2019-12-26 RX ADMIN — OXYCODONE HYDROCHLORIDE AND ACETAMINOPHEN 500 MG: 500 TABLET ORAL at 17:56

## 2019-12-26 RX ADMIN — ATORVASTATIN CALCIUM 40 MG: 40 TABLET, FILM COATED ORAL at 17:01

## 2019-12-26 RX ADMIN — CEFTRIAXONE SODIUM 1 G: 1 INJECTION, POWDER, FOR SOLUTION INTRAMUSCULAR; INTRAVENOUS at 05:55

## 2019-12-26 RX ADMIN — OXYCODONE HYDROCHLORIDE AND ACETAMINOPHEN 500 MG: 500 TABLET ORAL at 08:16

## 2019-12-26 RX ADMIN — OMEPRAZOLE 20 MG: 20 CAPSULE, DELAYED RELEASE ORAL at 11:20

## 2019-12-26 RX ADMIN — INSULIN HUMAN 5 UNITS: 100 INJECTION, SOLUTION PARENTERAL at 17:57

## 2019-12-26 RX ADMIN — PANCRELIPASE 48000 UNITS: 24000; 76000; 120000 CAPSULE, DELAYED RELEASE PELLETS ORAL at 08:16

## 2019-12-26 RX ADMIN — POLYETHYLENE GLYCOL 3350, SODIUM SULFATE ANHYDROUS, SODIUM BICARBONATE, SODIUM CHLORIDE, POTASSIUM CHLORIDE 4 L: 236; 22.74; 6.74; 5.86; 2.97 POWDER, FOR SOLUTION ORAL at 17:01

## 2019-12-26 RX ADMIN — INSULIN GLARGINE 10 UNITS: 100 INJECTION, SOLUTION SUBCUTANEOUS at 19:31

## 2019-12-26 RX ADMIN — OMEPRAZOLE 20 MG: 20 CAPSULE, DELAYED RELEASE ORAL at 17:01

## 2019-12-26 RX ADMIN — POLYETHYLENE GLYCOL 3350 1 PACKET: 17 POWDER, FOR SOLUTION ORAL at 17:01

## 2019-12-26 RX ADMIN — Medication 100 MG: at 06:37

## 2019-12-26 RX ADMIN — THERA TABS 1 TABLET: TAB at 06:37

## 2019-12-26 ASSESSMENT — ENCOUNTER SYMPTOMS
CHILLS: 0
STRIDOR: 0
DEPRESSION: 1
PALPITATIONS: 0
WEAKNESS: 1
CONSTIPATION: 0
BLOOD IN STOOL: 0
ABDOMINAL PAIN: 0
SHORTNESS OF BREATH: 0
HEARTBURN: 0
VOMITING: 0
NECK PAIN: 0
COUGH: 0
BACK PAIN: 0
NAUSEA: 0
NERVOUS/ANXIOUS: 0
WEIGHT LOSS: 1
HEADACHES: 0
DIARRHEA: 0
FEVER: 0

## 2019-12-26 ASSESSMENT — COGNITIVE AND FUNCTIONAL STATUS - GENERAL
DAILY ACTIVITIY SCORE: 23
HELP NEEDED FOR BATHING: A LITTLE
SUGGESTED CMS G CODE MODIFIER DAILY ACTIVITY: CI

## 2019-12-26 NOTE — CARE PLAN
Problem: Safety  Goal: Will remain free from falls  Outcome: PROGRESSING AS EXPECTED  Intervention: Implement fall precautions  Note:   Treaded slippers in place, bed in lowest position, bed alarm in place, bed controls on, call light within reach, fall risk bracelet on     Problem: Skin Integrity  Goal: Risk for impaired skin integrity will decrease  Outcome: PROGRESSING AS EXPECTED  Intervention: Assess risk factors for impaired skin integrity and/or pressure ulcers  Note:   Assessing skin for breakdown, monitoring for changes in skin integrity.

## 2019-12-26 NOTE — CARE PLAN
Problem: Safety  Goal: Will remain free from falls  Outcome: PROGRESSING SLOWER THAN EXPECTED     Problem: Infection  Goal: Will remain free from infection  Outcome: PROGRESSING SLOWER THAN EXPECTED     Problem: Venous Thromboembolism (VTW)/Deep Vein Thrombosis (DVT) Prevention:  Goal: Patient will participate in Venous Thrombosis (VTE)/Deep Vein Thrombosis (DVT)Prevention Measures  Outcome: PROGRESSING SLOWER THAN EXPECTED     Problem: Bowel/Gastric:  Goal: Normal bowel function is maintained or improved  Outcome: PROGRESSING SLOWER THAN EXPECTED  Goal: Will not experience complications related to bowel motility  Outcome: PROGRESSING SLOWER THAN EXPECTED     Problem: Knowledge Deficit  Goal: Knowledge of the prescribed therapeutic regimen will improve  Outcome: PROGRESSING SLOWER THAN EXPECTED     Problem: Discharge Barriers/Planning  Goal: Patient's continuum of care needs will be met  Outcome: PROGRESSING SLOWER THAN EXPECTED     Problem: Fluid Volume:  Goal: Will maintain balanced intake and output  Outcome: PROGRESSING SLOWER THAN EXPECTED     Problem: Skin Integrity  Goal: Risk for impaired skin integrity will decrease  Outcome: PROGRESSING SLOWER THAN EXPECTED     Problem: Respiratory:  Goal: Respiratory status will improve  Outcome: PROGRESSING SLOWER THAN EXPECTED     Problem: Hemodynamic Status  Goal: Vital Signs and Fluid Balance Management  Outcome: PROGRESSING SLOWER THAN EXPECTED     Problem: Pain Management  Goal: Pain level will decrease to patient's comfort goal  Outcome: PROGRESSING SLOWER THAN EXPECTED     Problem: Mobility  Goal: Risk for activity intolerance will decrease  Outcome: PROGRESSING SLOWER THAN EXPECTED     Problem: Psychosocial Needs:  Goal: Level of anxiety will decrease  Outcome: PROGRESSING SLOWER THAN EXPECTED

## 2019-12-26 NOTE — THERAPY
"Speech Language Therapy Evaluation completed to address cognition  Functional Status:  The patient was seen for cognitive-linguistic evaluation. The patient was asleep prior to evaluation but agreeable to evaluation.The patient was oriented to self, location, date and time but not to reason. Patient with mild difficulty explaining reason for admit and current medical POC. The patient reported he lives in an assisted living facility with assistance for high level cognitive tasks such as meals, medications and MD appointments. Patient was given the Neurobehavioral cognitive status examination (COGNISTAT) in addition to non-standardized assessments. Patient scored \"Average\" (WFL) on all domains assessed except for memory which revealed moderate deficits. Complex reading comprehension revealed Moderate-severe results, however, simple reading compehension tasks were WFL for this setting. Of note,  Patient with flat affect and would slow response time and often would pause for several seconds before answering. Per Patient report, current findings appear consistent with baseline cognitive functioning.     Recommendations:  1) continued intermittent supervision upon discharge for assistance with medication management, safety and decision making. No further skilled SLP services indicated at this time. Thank you for the consult.     Plan of Care: Patient with no further skilled SLP needs in the acute care setting at this time    Post-Acute Therapy: Currently anticipate no further skilled therapy needs once patient is discharged from the inpatient setting.    See \"Rehab Therapy-Acute\" Patient Summary Report for complete documentation.   "

## 2019-12-26 NOTE — PROGRESS NOTES
Gastroenterology Progress Note     Author: Hector Vegaan   Date & Time Created: 12/26/2019 10:21 AM    Chief Complaint:  Iron deficiency anemia, heme positive stool    Interval History:  12/26/2019: Hgb stable.  Denies GI complaints.  He is alert and oriented.  He agrees to endoscopic evaluation today.    Review of Systems:  Review of Systems   Constitutional: Negative for chills and fever.   Respiratory: Negative for shortness of breath.    Cardiovascular: Negative for chest pain.   Gastrointestinal: Negative for abdominal pain, blood in stool, constipation, diarrhea, heartburn, melena, nausea and vomiting.       Physical Exam:  Physical Exam  Vitals signs and nursing note reviewed.   Constitutional:       Appearance: Normal appearance.   HENT:      Mouth/Throat:      Mouth: Mucous membranes are moist.      Pharynx: Oropharynx is clear. No oropharyngeal exudate or posterior oropharyngeal erythema.   Eyes:      General: No scleral icterus.  Cardiovascular:      Rate and Rhythm: Normal rate and regular rhythm.      Heart sounds: No murmur.   Pulmonary:      Effort: Pulmonary effort is normal. No respiratory distress.      Breath sounds: Normal breath sounds. No stridor. No wheezing, rhonchi or rales.   Abdominal:      General: Abdomen is flat. Bowel sounds are normal. There is no distension.      Palpations: Abdomen is soft. There is no mass.      Tenderness: There is no tenderness. There is no guarding or rebound.      Hernia: No hernia is present.   Skin:     General: Skin is warm and dry.   Neurological:      General: No focal deficit present.      Mental Status: He is alert and oriented to person, place, and time.         Labs:          Recent Labs     12/24/19  0514 12/25/19  0331 12/26/19  0246   SODIUM 140 139 139   POTASSIUM 4.1 3.8 3.7   CHLORIDE 109 108 106   CO2 24 27 26   BUN 20 18 13   CREATININE 0.77 0.79 0.72   MAGNESIUM  --  1.3* 1.8   PHOSPHORUS  --  2.4* 2.5   CALCIUM 7.9* 7.9* 7.8*      Recent Labs     19  0514 19  0331 19  0246   ALTSGPT  --  12 12   ASTSGOT  --  13 15   ALKPHOSPHAT  --  67 75   TBILIRUBIN  --  0.2 0.2   PREALBUMIN  --  9.0*  --    GLUCOSE 241* 131* 112*     Recent Labs     19  0514 19  0331 19  0246   RBC 2.61* 2.43* 2.59*   HEMOGLOBIN 8.4* 7.9* 8.2*   HEMATOCRIT 25.9* 24.1* 25.8*   PLATELETCT 307 284 333   IRON  --  25*  --    FERRITIN  --  46.8  --    TOTIRONBC  --  238*  --      Recent Labs     19  0514 19  0246   WBC 12.0* 12.7* 13.6*   NEUTSPOLYS  --  47.80 51.10   LYMPHOCYTES  --  34.80 30.80   MONOCYTES  --  12.00 12.50   EOSINOPHILS  --  2.30 2.50   BASOPHILS  --  0.40 1.00   ASTSGOT  --  13 15   ALTSGPT  --  12 12   ALKPHOSPHAT  --  67 75   TBILIRUBIN  --  0.2 0.2     Hemodynamics:  Temp (24hrs), Av °C (98.6 °F), Min:36.2 °C (97.2 °F), Max:37.3 °C (99.2 °F)  Temperature: 36.2 °C (97.2 °F)  Pulse  Av.9  Min: 50  Max: 110   Blood Pressure: 103/52     Respiratory:    Respiration: 18, Pulse Oximetry: 97 %        RUL Breath Sounds: Diminished, RML Breath Sounds: Diminished, RLL Breath Sounds: Diminished, LEONARD Breath Sounds: Diminished, LLL Breath Sounds: Diminished  Fluids:    Intake/Output Summary (Last 24 hours) at 2019 1021  Last data filed at 2019 0600  Gross per 24 hour   Intake 860 ml   Output 500 ml   Net 360 ml        GI/Nutrition:  Orders Placed This Encounter   Procedures   • Diet Order Diabetic (please add a boost), Clear Liquids - No Red Foods     Standing Status:   Standing     Number of Occurrences:   1     Order Specific Question:   Diet:     Answer:   Diabetic [3]     Comments:   please add a boost     Order Specific Question:   Diet:     Answer:   Clear Liquids - No Red Foods [12]     Medical Decision Making, by Problem:  Active Hospital Problems    Diagnosis   • Melanotic stools [K92.1]   • Cognitive decline [R41.89]   • Uncontrolled type 1 diabetes mellitus with  hyperglycemia (HCC) [E10.65]   • Leukocytosis [D72.829]   • Severe protein-calorie malnutrition (HCC) [E43]   • Normocytic anemia [D64.9]   • Dyslipidemia [E78.5]   • Essential hypertension [I10]     PROBLEMS:  1. Anemia - normocytic; likely multifactorial   2. Low iron saturation/ferritin  3. Diabetic ketoacidosis, resolved  4. Uremia - resolved  5. History of severe esophagitis  6. Cognitive decline  7. Type 1 DM  8. Protein malnutrition  9. HTN  10. Leukocytosis.  11. Blood in stool, occult    Plan:  1. Plan for EGD and colonoscopy tomorrow.  Clear liquid diet today and NPO at midnight.  4L Golytely bowel prep this evening.    Quality-Core Measures

## 2019-12-26 NOTE — PROGRESS NOTES
Assumed care of patient at 1900. Received report from day RN. Patient is A & O x 4, c/o 0/10 pain in, denies n/t. Bed alarm on, bed controls on, bed locked and in lowest position. Treaded slippers in place. Fall precautions and signs on door in place. Updated plan of care on board. Call light within reach. Hourly rounding in place.

## 2019-12-27 LAB
ALBUMIN SERPL BCP-MCNC: 2.5 G/DL (ref 3.2–4.9)
ALBUMIN/GLOB SERPL: 1.3 G/DL
ALP SERPL-CCNC: 83 U/L (ref 30–99)
ALT SERPL-CCNC: 13 U/L (ref 2–50)
ANION GAP SERPL CALC-SCNC: 10 MMOL/L (ref 0–11.9)
AST SERPL-CCNC: 14 U/L (ref 12–45)
BILIRUB SERPL-MCNC: 0.2 MG/DL (ref 0.1–1.5)
BUN SERPL-MCNC: 12 MG/DL (ref 8–22)
CALCIUM SERPL-MCNC: 7.6 MG/DL (ref 8.5–10.5)
CHLORIDE SERPL-SCNC: 107 MMOL/L (ref 96–112)
CO2 SERPL-SCNC: 24 MMOL/L (ref 20–33)
CREAT SERPL-MCNC: 0.65 MG/DL (ref 0.5–1.4)
ERYTHROCYTE [DISTWIDTH] IN BLOOD BY AUTOMATED COUNT: 55.4 FL (ref 35.9–50)
GLOBULIN SER CALC-MCNC: 2 G/DL (ref 1.9–3.5)
GLUCOSE BLD-MCNC: 104 MG/DL (ref 65–99)
GLUCOSE BLD-MCNC: 207 MG/DL (ref 65–99)
GLUCOSE BLD-MCNC: 323 MG/DL (ref 65–99)
GLUCOSE BLD-MCNC: 84 MG/DL (ref 65–99)
GLUCOSE SERPL-MCNC: 63 MG/DL (ref 65–99)
HCT VFR BLD AUTO: 25.3 % (ref 42–52)
HGB BLD-MCNC: 8.1 G/DL (ref 14–18)
MCH RBC QN AUTO: 31.9 PG (ref 27–33)
MCHC RBC AUTO-ENTMCNC: 32 G/DL (ref 33.7–35.3)
MCV RBC AUTO: 99.6 FL (ref 81.4–97.8)
PATHOLOGY CONSULT NOTE: NORMAL
PLATELET # BLD AUTO: 277 K/UL (ref 164–446)
PMV BLD AUTO: 11 FL (ref 9–12.9)
POTASSIUM SERPL-SCNC: 3.4 MMOL/L (ref 3.6–5.5)
PROT SERPL-MCNC: 4.5 G/DL (ref 6–8.2)
RBC # BLD AUTO: 2.54 M/UL (ref 4.7–6.1)
SODIUM SERPL-SCNC: 141 MMOL/L (ref 135–145)
WBC # BLD AUTO: 12.2 K/UL (ref 4.8–10.8)

## 2019-12-27 PROCEDURE — 99497 ADVNCD CARE PLAN 30 MIN: CPT | Performed by: INTERNAL MEDICINE

## 2019-12-27 PROCEDURE — 88312 SPECIAL STAINS GROUP 1: CPT | Mod: 59

## 2019-12-27 PROCEDURE — 0DB98ZX EXCISION OF DUODENUM, VIA NATURAL OR ARTIFICIAL OPENING ENDOSCOPIC, DIAGNOSTIC: ICD-10-PCS | Performed by: INTERNAL MEDICINE

## 2019-12-27 PROCEDURE — 36415 COLL VENOUS BLD VENIPUNCTURE: CPT

## 2019-12-27 PROCEDURE — A9270 NON-COVERED ITEM OR SERVICE: HCPCS | Performed by: INTERNAL MEDICINE

## 2019-12-27 PROCEDURE — 85027 COMPLETE CBC AUTOMATED: CPT

## 2019-12-27 PROCEDURE — A9270 NON-COVERED ITEM OR SERVICE: HCPCS | Performed by: HOSPITALIST

## 2019-12-27 PROCEDURE — 700102 HCHG RX REV CODE 250 W/ 637 OVERRIDE(OP): Performed by: INTERNAL MEDICINE

## 2019-12-27 PROCEDURE — 160202 HCHG ENDO MINUTES - 1ST 30 MINS LEVEL 3: Performed by: INTERNAL MEDICINE

## 2019-12-27 PROCEDURE — 160002 HCHG RECOVERY MINUTES (STAT): Performed by: INTERNAL MEDICINE

## 2019-12-27 PROCEDURE — 700111 HCHG RX REV CODE 636 W/ 250 OVERRIDE (IP): Performed by: INTERNAL MEDICINE

## 2019-12-27 PROCEDURE — 160035 HCHG PACU - 1ST 60 MINS PHASE I: Performed by: INTERNAL MEDICINE

## 2019-12-27 PROCEDURE — 88305 TISSUE EXAM BY PATHOLOGIST: CPT | Mod: 59

## 2019-12-27 PROCEDURE — 82962 GLUCOSE BLOOD TEST: CPT | Mod: 91

## 2019-12-27 PROCEDURE — 500066 HCHG BITE BLOCK, ECT: Performed by: INTERNAL MEDICINE

## 2019-12-27 PROCEDURE — 99152 MOD SED SAME PHYS/QHP 5/>YRS: CPT | Performed by: INTERNAL MEDICINE

## 2019-12-27 PROCEDURE — 80053 COMPREHEN METABOLIC PANEL: CPT

## 2019-12-27 PROCEDURE — 0DB58ZX EXCISION OF ESOPHAGUS, VIA NATURAL OR ARTIFICIAL OPENING ENDOSCOPIC, DIAGNOSTIC: ICD-10-PCS | Performed by: INTERNAL MEDICINE

## 2019-12-27 PROCEDURE — 770006 HCHG ROOM/CARE - MED/SURG/GYN SEMI*

## 2019-12-27 PROCEDURE — 0DB68ZX EXCISION OF STOMACH, VIA NATURAL OR ARTIFICIAL OPENING ENDOSCOPIC, DIAGNOSTIC: ICD-10-PCS | Performed by: INTERNAL MEDICINE

## 2019-12-27 PROCEDURE — 700102 HCHG RX REV CODE 250 W/ 637 OVERRIDE(OP): Performed by: HOSPITALIST

## 2019-12-27 PROCEDURE — 99233 SBSQ HOSP IP/OBS HIGH 50: CPT | Mod: 25 | Performed by: INTERNAL MEDICINE

## 2019-12-27 PROCEDURE — 160048 HCHG OR STATISTICAL LEVEL 1-5: Performed by: INTERNAL MEDICINE

## 2019-12-27 PROCEDURE — 88342 IMHCHEM/IMCYTCHM 1ST ANTB: CPT

## 2019-12-27 PROCEDURE — 700105 HCHG RX REV CODE 258: Performed by: INTERNAL MEDICINE

## 2019-12-27 RX ORDER — MIDAZOLAM HYDROCHLORIDE 1 MG/ML
.5-2 INJECTION INTRAMUSCULAR; INTRAVENOUS PRN
Status: DISCONTINUED | OUTPATIENT
Start: 2019-12-27 | End: 2019-12-27 | Stop reason: HOSPADM

## 2019-12-27 RX ORDER — SODIUM CHLORIDE 9 MG/ML
500 INJECTION, SOLUTION INTRAVENOUS
Status: DISCONTINUED | OUTPATIENT
Start: 2019-12-27 | End: 2019-12-27 | Stop reason: HOSPADM

## 2019-12-27 RX ORDER — SUCRALFATE ORAL 1 G/10ML
1 SUSPENSION ORAL EVERY 6 HOURS
Status: DISCONTINUED | OUTPATIENT
Start: 2019-12-27 | End: 2020-01-06 | Stop reason: HOSPADM

## 2019-12-27 RX ORDER — POTASSIUM CHLORIDE 20 MEQ/1
40 TABLET, EXTENDED RELEASE ORAL EVERY 4 HOURS
Status: COMPLETED | OUTPATIENT
Start: 2019-12-27 | End: 2019-12-27

## 2019-12-27 RX ADMIN — OMEPRAZOLE 20 MG: 20 CAPSULE, DELAYED RELEASE ORAL at 18:06

## 2019-12-27 RX ADMIN — POTASSIUM CHLORIDE 40 MEQ: 1500 TABLET, EXTENDED RELEASE ORAL at 12:34

## 2019-12-27 RX ADMIN — PANCRELIPASE 48000 UNITS: 24000; 76000; 120000 CAPSULE, DELAYED RELEASE PELLETS ORAL at 12:34

## 2019-12-27 RX ADMIN — SODIUM CHLORIDE 125 MG: 9 INJECTION, SOLUTION INTRAVENOUS at 18:06

## 2019-12-27 RX ADMIN — OMEPRAZOLE 20 MG: 20 CAPSULE, DELAYED RELEASE ORAL at 05:00

## 2019-12-27 RX ADMIN — POTASSIUM CHLORIDE 40 MEQ: 1500 TABLET, EXTENDED RELEASE ORAL at 18:07

## 2019-12-27 RX ADMIN — THERA TABS 1 TABLET: TAB at 05:00

## 2019-12-27 RX ADMIN — INSULIN HUMAN 6 UNITS: 100 INJECTION, SOLUTION PARENTERAL at 20:38

## 2019-12-27 RX ADMIN — OXYCODONE HYDROCHLORIDE AND ACETAMINOPHEN 500 MG: 500 TABLET ORAL at 05:00

## 2019-12-27 RX ADMIN — TAMSULOSIN HYDROCHLORIDE 0.4 MG: 0.4 CAPSULE ORAL at 05:00

## 2019-12-27 RX ADMIN — SUCRALFATE 1 G: 1 SUSPENSION ORAL at 23:56

## 2019-12-27 RX ADMIN — SUCRALFATE 1 G: 1 SUSPENSION ORAL at 18:06

## 2019-12-27 RX ADMIN — INSULIN GLARGINE 10 UNITS: 100 INJECTION, SOLUTION SUBCUTANEOUS at 18:20

## 2019-12-27 RX ADMIN — Medication 100 MG: at 05:00

## 2019-12-27 RX ADMIN — INSULIN HUMAN 3 UNITS: 100 INJECTION, SOLUTION PARENTERAL at 18:20

## 2019-12-27 RX ADMIN — ATORVASTATIN CALCIUM 40 MG: 40 TABLET, FILM COATED ORAL at 18:07

## 2019-12-27 RX ADMIN — CEFTRIAXONE SODIUM 1 G: 1 INJECTION, POWDER, FOR SOLUTION INTRAMUSCULAR; INTRAVENOUS at 05:05

## 2019-12-27 RX ADMIN — OXYCODONE HYDROCHLORIDE AND ACETAMINOPHEN 500 MG: 500 TABLET ORAL at 18:07

## 2019-12-27 RX ADMIN — SUCRALFATE 1 G: 1 SUSPENSION ORAL at 12:35

## 2019-12-27 RX ADMIN — ASPIRIN 81 MG: 81 TABLET, COATED ORAL at 18:06

## 2019-12-27 ASSESSMENT — ENCOUNTER SYMPTOMS
DIARRHEA: 0
SHORTNESS OF BREATH: 0
HEARTBURN: 0
BLOOD IN STOOL: 0
COUGH: 0
FEVER: 0
PALPITATIONS: 0
STRIDOR: 0
NAUSEA: 0
HEADACHES: 0
ABDOMINAL PAIN: 0
BACK PAIN: 0
WEIGHT LOSS: 1
CONSTIPATION: 0
WEAKNESS: 1
NERVOUS/ANXIOUS: 0
NECK PAIN: 0
CHILLS: 0
VOMITING: 0
DEPRESSION: 1

## 2019-12-27 NOTE — CARE PLAN
Problem: Knowledge Deficit  Goal: Knowledge of the prescribed therapeutic regimen will improve  Outcome: PROGRESSING AS EXPECTED  Intervention: Discuss information regarding therpeutic regimen and document in education  Note:   Discussed with patient about his EGD and colonoscopy and the importance of the procedure     Problem: Psychosocial Needs:  Goal: Level of anxiety will decrease  Outcome: PROGRESSING AS EXPECTED  Intervention: Identify and develop with patient strategies to cope with anxiety triggers  Note:   Reassuring the patient when they get anxious and helping them cope via non-pharmacological methods      Cardiology/Vascular Medicine Inpatient Progress Note    No new complaints  No new interval events noted on telemetry    Vital Signs Last 24 Hrs  T(C): 36.6 (2019 16:37), Max: 36.7 (2019 14:42)  T(F): 97.9 (2019 16:37), Max: 98.1 (2019 14:42)  HR: 82 (2019 16:37) (70 - 82)  BP: 100/54 (2019 16:37) (100/54 - 103/50)  BP(mean): --  RR: 17 (2019 16:37) (17 - 20)  SpO2: 94% (2019 16:37) (94% - 99%)    Appearance: NAD, laying flat in bed  HEENT:   Normal oral mucosa, PERRL, EOMI	  Cardiovascular: Normal S1 S2, No JVD, 2-3/6 SM  Respiratory: Coarse breath sounds bilaterally  Psychiatry: Awake, alert  Gastrointestinal:  Soft, Non-tender, + BS	  Skin: No rashes, No ecchymoses, No cyanosis	  Neurologic: Non-focal  Extremities: Normal range of motion, No clubbing, cyanosis or edema  Vascular: Peripheral pulses palpable 2+ bilaterally    MEDICATIONS  (STANDING):  amitriptyline 25 milliGRAM(s) Oral at bedtime  aspirin enteric coated 81 milliGRAM(s) Oral daily  atorvastatin 40 milliGRAM(s) Oral at bedtime  buDESOnide 160 MICROgram(s)/formoterol 4.5 MICROgram(s) Inhaler 2 Puff(s) Inhalation two times a day  dextrose 5%. 1000 milliLiter(s) (50 mL/Hr) IV Continuous <Continuous>  dextrose 50% Injectable 12.5 Gram(s) IV Push once  dextrose 50% Injectable 25 Gram(s) IV Push once  dextrose 50% Injectable 25 Gram(s) IV Push once  docusate sodium 100 milliGRAM(s) Oral three times a day  enoxaparin Injectable 40 milliGRAM(s) SubCutaneous every 24 hours  insulin lispro (HumaLOG) corrective regimen sliding scale   SubCutaneous three times a day before meals  insulin lispro (HumaLOG) corrective regimen sliding scale   SubCutaneous at bedtime  isosorbide   mononitrate ER Tablet (IMDUR) 60 milliGRAM(s) Oral daily  levalbuterol Inhalation 0.63 milliGRAM(s) Inhalation every 6 hours  levothyroxine 112 MICROGram(s) Oral daily  metoprolol succinate ER 50 milliGRAM(s) Oral daily  mirtazapine 15 milliGRAM(s) Oral at bedtime  polyethylene glycol 3350 17 Gram(s) Oral daily  prednisoLONE acetate 1% Suspension 1 Drop(s) Right EYE four times a day  predniSONE   Tablet 40 milliGRAM(s) Oral daily  senna 2 Tablet(s) Oral at bedtime  tamsulosin 0.4 milliGRAM(s) Oral two times a day  tiotropium 18 MICROgram(s) Capsule 1 Capsule(s) Inhalation daily    MEDICATIONS  (PRN):  acetaminophen   Tablet .. 650 milliGRAM(s) Oral every 6 hours PRN Mild Pain (1 - 3), Moderate Pain (4 - 6)  benzonatate 100 milliGRAM(s) Oral every 8 hours PRN Cough  dextrose 40% Gel 15 Gram(s) Oral once PRN Blood Glucose LESS THAN 70 milliGRAM(s)/deciliter  glucagon  Injectable 1 milliGRAM(s) IntraMuscular once PRN Glucose LESS THAN 70 milligrams/deciliter      LABS:	 	                          11.2   13.57 )-----------( 191      ( 2019 07:04 )             34.9     06-20    139  |  108<H>  |  26<H>  ----------------------------<  147<H>  4.4   |  20<L>  |  0.96    Ca    9.1      2019 07:04  Mg     2.0     06-20      < from: Cardiac Cath Lab - Adult (17 @ 14:14) >  [facility]  Department of Cardiology  [address] [place] [address]  [phone]  Cath Lab Report -- Comprehensive Report  Patient: ÁNGEL GARZA  Study date: 2017  Account number: [id number]  MR number:[id number]  : 05/10/1936  Gender: Male  Race: O  Case Physician(s):  Shanti Bowie M.D.  Referring Physician:  INDICATIONS: Unstable angina - CCS4.  HISTORY: The patient has a history of coronary artery disease. The patient  has hypertension, medication-treated dyslipidemia, low HDL, and a family  history of coronary artery disease.  PROCEDURE:  --  Left heart catheterization with ventriculography.  --  Left coronary angiography.  --  Right coronary angiography.  --  Bypass graft angiography.  TECHNIQUE: The risks and alternatives of the procedures and conscious  sedation were explained to the patient and informed consent was obtained.  Cardiac catheterization performed electively.  Local anesthetic given. Right femoral artery access. Localanesthetic  given. A 5FR SHEATH PINNACLE was inserted in the vessel, utilizing the  modified Seldinger technique. Left heart catheterization. Ventriculography  was performed. A 5FR FR4.0 EXPO catheter was utilized. After recording  ascending aortic pressure, the catheter was advanced across the aortic  valve and left ventricular pressure was recorded. Imaging was performed  using an CAVAZOS projection. Post-ventriculography LV pressure was obtained.  The catheter was gradually withdrawn into the aorta under continuous  pressure monitoring and aortic pressure was recorded. Left coronary artery  angiography. The vessel was injected utilizing a 5FR FL4.0 EXPO catheter  and positioned in the vessel ostia under fluoroscopic guidance.  Angiography was performed in multiple projections using hand-injection of  contrast. Right coronary artery angiography. The vessel was injected  utilizing a 5FR FR4.0 EXPO catheter and positioned in the vessel ostia  under fluoroscopic guidance. Angiography was performed in multiple  projections using hand-injection of contrast. Graft angiography. The  vessel was injected utilizing a 5FR FR4.0 EXPO catheter and positioned in  the vessel ostia under fluoroscopic guidance. Angiography was performed in  multiple projections using hand-injection of contrast. RADIATION EXPOSURE:  5.5 min.  CONTRAST GIVEN: Omnipaque 28 ml.  VENTRICLES: EF estimated was 55 %.  CORONARY VESSELS: The coronary circulation is left dominant.  LM:   --  LM: Normal.  LAD:   --  Proximal LAD: There was a 100 % stenosis.  --  D1: There was a tubular 25 % stenosis at the site of a prior  angioplasty with stent placement.  CX:   --  LPDA: There was a 100 % stenosis.  RCA:   --  Mid RCA: There was a 100 % stenosis.  GRAFTS:   --  Graft to the mid LAD: The graft was a normal sized LIMA.  Distal vessel angiography showed mild diffuse disease.  --  Graft to the LPDA: The graft was a normal sized saphenous vein graft  from the aorta. Graft angiography showed mild atherosclerosis. Distal  vessel angiography showed mild diffuse disease.  COMPLICATIONS: There were no complications.  DIAGNOSTIC RECOMMENDATIONS: Medical management is recommended.  Prepared and signed by  Shanti Bowie M.D.  Signed 2017 18:16:49  HEMODYNAMIC TABLES  Pressures:  Baseline/ Room Air  Pressures:  - HR: 69  Pressures:  - Rhythm:  Pressures:  -- Aortic Pressure (S/D/M): 113/64/84  Pressures:  -- Left Ventricle (s/edp): 118/12/--  Outputs:  Baseline/ Room Air  Outputs:  -- CALCULATIONS[age]  Outputs:  -- CALCULATIONS: Body Surface Area: 1.78  Outputs:  -- CALCULATIONS: Height in cm: 167.00  Outputs:  -- CALCULATIONS: Sex: Male  Outputs:  -- CALCULATIONS: Weight in k.70    < end of copied text >      < from: CT Angio Chest w/ IV Cont (19 @ 21:39) >  EXAM:  CT ANGIO CHEST (W)AW IC        PROCEDURE DATE:  2019         INTERPRETATION:  CLINICAL INFORMATION: Dyspnea. Evaluate for pulmonary   embolus    COMPARISON: None.    PROCEDURE:   CT Angiography of the Chest.  90 ml of Omnipaque 350 was injected intravenously. 10 ml were discarded.  Sagittal and coronal reformats were performed as well as 3D (MIP)   reconstructions.      FINDINGS:    LUNGS AND AIRWAYS: Patent central airways.  Punctate left apical nodular   opacities likely distal mucus impaction. Bilateral parenchymal scarring.    PLEURA: No pleural effusion.    MEDIASTINUM AND CECY: No lymphadenopathy.    VESSELS: Poor bolus opacification and respiratory motion limits   evaluation for pulmonary embolus. No central or lobar pulmonary embolus.   Evaluation of the segmental and subsegmental pulmonary arteries is   nondiagnostic. Normal left-sided 3 vessel aortic arch.    HEART: Heart size is normal. No pericardial effusion.    CHEST WALL AND LOWER NECK: Status post midline sternotomy    VISUALIZED UPPER ABDOMEN: 2 cm heterogeneous left adrenal nodule. The   gallbladder is surgically absent. 1.7 cm indeterminate left renal lesion.    BONES: There is multilevel thoracic spondylosis.    IMPRESSION:     No central or lobar acute pulmonary arterial embolus. Nondiagnostic study   for acute pulmonary arterial embolus of the segmental and subsegmental   pulmonary arteries.    Indeterminate 2 cm left adrenal lesion.    1.7 cm indeterminate left renal lesion.      CR PENN M.D., RADIOLOGY RESIDENT  This document has been electronically signed.  AMERICA BULL M.D., ATTENDING RADIOLOGIST  This document has been electronically signed. 2019 12:10AM      < from: Transthoracic Echocardiogram (19 @ 09:52) >    Patient name: Ángel Garza  YOB: 1936   Age: 83 (M)   MR#: [id number]  Study Date: 2019  Location: 45 Sonographer: Lauren Finkelstein, [facility]  Study quality: Technically good  Referring Physician: Edwar Wilkins MD  Blood Pressure: 102/61 mmHg  Height: 167 cm  Weight: 67 kg  BSA: 1.8 m2  ------------------------------------------------------------------------  PROCEDURE: Transthoracic echocardiogram with 2-D, M-Mode  and complete spectral and color flow Doppler.  INDICATION: Abnormal electrocardiogram (ECG) (EKG)  (R94.31), Shortness of breath (R06.02)  ------------------------------------------------------------------------  DIMENSIONS:  Dimensions:     Normal Values:  LA:     4.3 cm    2.0 - 4.0 cm  Ao:     3.0 cm    2.0- 3.8 cm  SEPTUM: 0.8 cm    0.6 - 1.2 cm  PWT:    0.8 cm    0.6 - 1.1 cm  LVIDd:  3.8 cm    3.0 - 5.6 cm  LVIDs:  2.5 cm    1.8 - 4.0 cm  Derived Variables:  LVMI: 49 g/m2  RWT: 0.42  Fractional short: 34 %  Ejection Fraction (Teicholtz): 64 %  ------------------------------------------------------------------------  OBSERVATIONS:  Mitral Valve: Mitral annular calcification, otherwise  normal mitral valve. Mild mitral regurgitation.  Aortic Root: Normal aortic root.  Aortic Valve: Calcified trileaflet aortic valve with normal  opening. Mild-moderate aortic regurgitation.  Left Atrium: Normal left atrium.  LA volume index = 24  cc/m2.  Left Ventricle: Normal left ventricular systolic function.  No segmental wall motion abnormalities. Normal left  ventricular internal dimensions and wall thicknesses.  Right Heart: Normal right atrium. The right ventricle is  not well visualized; grossly normal right ventricular  systolic function. Normal tricuspid valve.  Mild tricuspid  regurgitation. Normal pulmonic valve.  Pericardium/PleuraNormal pericardium with no pericardial  effusion.  ------------------------------------------------------------------------  CONCLUSIONS:  1. Mitral annular calcification, otherwise normal mitral  valve. Mild mitral regurgitation.  2. Calcified trileaflet aortic valve with normal opening.  Mild-moderate aortic regurgitation.  3. Normal left ventricular internal dimensions and wall  thicknesses.  4. Normal left ventricular systolic function. No segmental  wall motion abnormalities.  5. The right ventricle is not well visualized; grossly  normal right ventricular systolic function.  6. Normal tricuspid valve.  Mild tricuspid regurgitation.  ------------------------------------------------------------------------  Confirmed on  2019 - 14:00:20 by CORINA Leiva  ------------------------------------------------------------------------    < end of copied text >

## 2019-12-27 NOTE — PROCEDURES
DATE OF SERVICE:  12/27/2019    PROCEDURE:  Upper endoscopy with biopsies.      :  Hector Villarreal MD    INDICATION:  The patient is a 59-year-old gentleman who was noted to have iron   deficiency anemia and Hemoccult positive stools.  He did not drink prep for   colonoscopy and thus only upper endoscopy being performed.      INFORMED CONSENT:  Written informed consent was obtained from the patient   after thorough explanation of indications, benefits and risks of the   procedure, which included but was not limited to bleeding, infection,   perforation, adverse reaction to medications and missed lesions.      MEDICATIONS GIVEN:  Versed 4 mg and fentanyl 100 mcg was administered IV in   titrated doses throughout the procedure.      Time of sedation was from 10:16 a.m. to 10:26 a.m.      Continuous telemetry, BP, pulse oximetry were performed throughout the   procedure.      Midsize flexible upper endoscope was used for the procedure.      FINDINGS:  Patient was placed in the left lateral decubitus position.  After   sedation was achieved, the endoscope was passed into the posterior pharynx   under direct visualization and advanced to second portion of the duodenum   without difficulty.  The patient tolerated procedure well with the following   findings:    1.  Esophagus:  He had evidence of severe erosive esophagitis, LA grade D,   extending from the GE junction at 41 cm up to 30 cm.  Biopsies were obtained   to rule out Candida esophagitis.    2.  Stomach:  He did have a 4 cm hiatal hernia.  The remainder of the gastric   mucosa appeared normal.  Random gastric biopsies were obtained for H. pylori.      3.  Duodenum normal.  Random biopsies obtained for celiac disease.      IMPRESSION:    1.  Severe erosive esophagitis, possible etiology of recent iron deficiency   anemia and Hemoccult positive stools.    2.  Large hiatal hernia.      PLAN:    1.  Await biopsies.    2.  PPI b.i.d.    3.  Sucralfate 1  g 4 times daily.    4.  Advance diet.    5.  Given stable hemoglobin and no evidence for ongoing bleeding, recommend   outpatient colonoscopy depending on clinical status.    6.  GI to sign off, please call with questions.       ____________________________________     MD MARGOT FRITZ / SALINAS    DD:  12/27/2019 10:32:29  DT:  12/27/2019 11:11:47    D#:  [id number]  Job#:  [id number]

## 2019-12-27 NOTE — PROGRESS NOTES
Assumed care of patient at 0700. Report received from KELLEY Ruiz. Patient A&Ox4, denies pain, other vitals stable on 2L oxygen. Patient is standby assist and able to turn self. Patient refused to finish Golytely last night for scheduled EGD, only completing half of the ordered dose; bowel movement was not clear. Endoscopy team was notified. Call light within reach; will continue to monitor.

## 2019-12-27 NOTE — DISCHARGE PLANNING
DIPAK received call from pt's case manger Roni. Pt has received case management services with [employer] [place] for a year. Per Star, pt has a private nurse that assists with insulin. [facility] can assist as well because they received authorization to assist with insulin from the state. Roni stated that pt has lost 20 pounds in 3 weeks and asked about possible palliative consult. Roni requested a care conference at 1400 tomorrow. She will be at [facility] and will call POA to be involved.    DIPAK updated MD of above and confirmed he will be available for care conference. MD confirmed a consult to palliative was already placed.

## 2019-12-27 NOTE — CONSULTS
"Reason for PC Consult: Advance Care Planning    Consulted by:   Dr. Pace    Assessment:  General:   59 year old male admitted for DKA on 12/19/19. Pt has a history of type 2 diabetes and HTN. Pt was transferred from [facility] after being confused with a glucose of 350. Pt was recently changed diabetes medication by his endocrinologist.     Dyspnea: No, 92% on 1L NC  Last BM: 12/27/19    Pain: No    Depression: No   Dementia: No      Spiritual:  Is Rastafari or spirituality important for coping with this illness? No, Pt declined visit form   Has a  or spiritual provider visit been requested? No    Palliative Performance Scale: 40%    Advance Directive: Advance Directive    DPOA: Yes, Aniyah Tatum ([phone])  POLST: None on File      Code Status: Full     Outcome:  PC RN with Dr. Pace, Ena DUBOSE met with Marie and Roni case workers, and via phone with Maxwell Nurse and Aniyah COLLINS. Long discussion regarding pt's diabetes management, eating disorder (hording food, not eating, keeping rotten/moldy food) and past history of TBIs. Discussed pt's long history of hoarding food, not eating inspite of adequate access to food. Roni states that when the first visited pt, they cleaned several trash bags of moldy food, hoarded food (for example several bottles of lemon juice). Aniyah verbalized that pt has been that way for \"years\".     Maxwell expressed concern about pt's inability to manage diabetes medication, specifically a sliding scale insulin. Discussed concern for pt's inability to understand the consequences of not managing diabetes properly. Case workers inquired about hospice, PC RN discussed hospice care, philosophy, goals and support provided. Discussed sending referral to see if pt qualifies. Marie requested a referral be sent to [facility], Aniyah verbalized agreement.     Discussed code status, AD and POLST. Roni states pt does have a POLST on file at [facility], Aniyah states pt choose to be " "Full code at that time. Dr. Pace discussed making decisions based on clinical appropriateness and someone's wishes can change based on clinical picture and disease trajectory. Marie asked when would Aniyah start making decisions on pt's behalf, PC RN explained that once the attending physician feels pt cannot make his own decisions then Aniyah would be called to make medical decisions. Discussed capacity vs competency and guardianship, Marie and Roni expressed wanting to have pt evaluated as he has demonstrated an inability to care for himself for \"some time\". Aniyah verbalized agreement.     Obtained hospice choice for [facility], gave to Ena DUBOSE.    Provided contact card to Roni, encouraged them to call with any questions or concerns.     Active listening, education, validation, normalization, therapeutic touch, and emotional support provided throughout encounter.    Updated:   Dr. Pace, PC Team    Plan:   Pt to be evaluated for hospice care, continue GOC discussion as clinical picture changes.     Recommendations: I recommend a hospice consult.    Thank you for allowing Palliative Care to participate in this patient's care. Please feel free to call [phone] with any questions or concerns.  "

## 2019-12-27 NOTE — PROGRESS NOTES
Hospital Medicine Daily Progress Note    Date of Service  12/26/2019    Chief Complaint  Altered mentation    Hospital Course    59 y.o. male admitted 12/19/2019 with DKA and seizures, status post ICU stay.  Now transferred to medical regular nursing floor.  Concern for GI bleeding, worsening cognitive decline.      Interval Problem Update  Patient seen and evaluated on rounds and I discussed with nursing staff on rounds  Does not have any active complaints today  Agreeable to EGD, colonoscopy tomorrow  I have discussed the case with GI, Dr. Villarreal  Pending cognitive, diabetic education evaluation    Consultants/Specialty  Pulmonology/critical care  Gastroenterology  Palliative care    Code Status  Full code    Disposition  Anticipate will need short-term postacute placement prior to discharge to his group home/assisted living facility  Discussed with /case management  SNF orders placed    Review of Systems  Review of Systems   Constitutional: Positive for malaise/fatigue and weight loss. Negative for chills and fever.   Respiratory: Negative for cough, shortness of breath and stridor.    Cardiovascular: Negative for palpitations and leg swelling.   Gastrointestinal: Negative for abdominal pain and nausea.   Genitourinary: Negative for dysuria.   Musculoskeletal: Negative for back pain and neck pain.   Neurological: Positive for weakness. Negative for headaches.   Psychiatric/Behavioral: Positive for depression. The patient is not nervous/anxious.         Physical Exam  Temp:  [36.2 °C (97.2 °F)-37.4 °C (99.4 °F)] 37.4 °C (99.4 °F)  Pulse:  [70-81] 70  Resp:  [16-18] 16  BP: (103-136)/(52-67) 112/52  SpO2:  [91 %-99 %] 91 %    Physical Exam  Vitals signs reviewed.   Constitutional:       Appearance: Normal appearance.      Comments: Thin, frail and appears older than stated age   HENT:      Head: Normocephalic and atraumatic.      Nose: Nose normal.      Mouth/Throat:      Mouth: Mucous membranes are  dry.      Pharynx: No oropharyngeal exudate.   Eyes:      General:         Right eye: No discharge.         Left eye: No discharge.      Extraocular Movements: Extraocular movements intact.      Conjunctiva/sclera: Conjunctivae normal.   Neck:      Musculoskeletal: No muscular tenderness.   Cardiovascular:      Rate and Rhythm: Normal rate and regular rhythm.      Pulses:           Radial pulses are 2+ on the right side and 2+ on the left side.        Dorsalis pedis pulses are 2+ on the right side and 2+ on the left side.      Heart sounds: Normal heart sounds. No murmur.   Pulmonary:      Effort: Pulmonary effort is normal. No respiratory distress.      Breath sounds: Normal breath sounds. No wheezing.   Abdominal:      General: Abdomen is flat. There is no distension.      Palpations: Abdomen is soft. There is no mass.      Tenderness: There is no tenderness.   Musculoskeletal:      Right lower leg: No edema.      Left lower leg: No edema.      Comments: Atrophy of musculature   Lymphadenopathy:      Cervical: No cervical adenopathy.   Skin:     General: Skin is warm.      Coloration: Skin is pale. Skin is not jaundiced.   Neurological:      General: No focal deficit present.      Mental Status: He is alert and oriented to person, place, and time.      Cranial Nerves: No cranial nerve deficit.   Psychiatric:         Mood and Affect: Mood normal.         Behavior: Behavior normal.       Examination remains unchanged    Fluids    Intake/Output Summary (Last 24 hours) at 12/26/2019 1650  Last data filed at 12/26/2019 1435  Gross per 24 hour   Intake 620 ml   Output 500 ml   Net 120 ml       Laboratory  Recent Labs     12/24/19  0514 12/25/19  0331 12/26/19  0246   WBC 12.0* 12.7* 13.6*   RBC 2.61* 2.43* 2.59*   HEMOGLOBIN 8.4* 7.9* 8.2*   HEMATOCRIT 25.9* 24.1* 25.8*   MCV 99.2* 99.2* 99.6*   MCH 32.2 32.5 31.7   MCHC 32.4* 32.8* 31.8*   RDW 59.5* 57.1* 56.3*   PLATELETCT 307 284 333   MPV 10.4 10.2 10.1     Recent  Labs     12/24/19  0514 12/25/19  0331 12/26/19  0246   SODIUM 140 139 139   POTASSIUM 4.1 3.8 3.7   CHLORIDE 109 108 106   CO2 24 27 26   GLUCOSE 241* 131* 112*   BUN 20 18 13   CREATININE 0.77 0.79 0.72   CALCIUM 7.9* 7.9* 7.8*                   Imaging  DX-CHEST-LIMITED (1 VIEW)   Final Result      Stable chest with hyperinflation, aortic ectasia but no consolidation      IR-MIDLINE CATHETER INSERTION WO GUIDANCE > AGE 3   Final Result                  Ultrasound-guided midline placement performed by qualified nursing staff    as above.          EC-ECHOCARDIOGRAM LTD W/O CONT   Final Result      DX-CHEST-PORTABLE (1 VIEW)   Final Result         No acute cardiac or pulmonary abnormality is identified.           Assessment/Plan  Melanotic stools- (present on admission)  Assessment & Plan  Previously, patient with severe esophagitis on EGD  Has not completed a colonoscopy for a long time per sister  On presentation he had profound uremia which could indicate an upper GI bleed  At this time I would initiate the patient on IV Protonix 8 mg/h after an initial bolus of 80 mg x 1  I have requested gastroenterology consultation  GI evaluating  Monitor Hb / Restrictive transfusion strategy  Holding aspirin 81  Vitamin C supplementation  Work-up also concerning for iron deficiency anemia, will initiate intravenous iron supplementation once infectious issues have been ruled out    Cognitive decline- (present on admission)  Assessment & Plan  Patient Sister Aniyah is patient's POA for both healthcare and financial concerns.    Moderate to severe cognitive decline from underlying Wernicke Korsakoff given profound history of alcoholism and history of multiple TBI's in the past.    Followed by [facility] in the outpatient setting  Previous MRI with atrophy    We will have social workers reach out to sister to help with her questions regarding guardianship issues as asked by her today, which will be likely done  tomorrow    We will obtain speech therapy evaluation for cognition evaluation  Avoid sedative, narcotics / BZDs as able  Patient increasingly somnolent, will stop Remeron and Marinol    We will consult palliative care to help with advance care planning    Uncontrolled type 1 diabetes mellitus with hyperglycemia (HCC)- (present on admission)  Assessment & Plan  Patient presented with diabetic ketoacidosis which has resolved    Suspect that patient has type 1 diabetes, would be insulin-dependent  On presentation lactic acidosis which could be contributed by metformin    Sister concerned that with patient's underlying cognitive decline, he is unable to completely understand his disease process and manage it appropriately.  He hoards food in his room, or inappropriately use insulin when his blood sugars are high and use sugar packets when his blood sugars are low.  Previously, has been expressed to the sister that patient needs to be able to administer his own insulin for him to be in an assisted living facility/group home situation based on [place] state laws.    I believe that patient would benefit from being on Lantus with sliding scale insulin therapy    To further understand above concerns, I have requested diabetic education.  Will obtain input from our diabetic education nurse regarding above.  We will also obtain a cognitive evaluation with speech therapy.    Meanwhile we will continue Lantus 10 units  We will continue sliding scale insulin therapy    Continue high intensity statin therapy  Aspirin 81 once GI issues have resolved  Lisinopril once blood pressure stable  We will check hemoglobin A1c and lipid profile    Leukocytosis- (present on admission)  Assessment & Plan  Improved initially after a dose of ceftriaxone in the emergency department  Now persistently uptrending  I am requesting further evaluation  We will check blood cultures, urinalysis, chest x-ray, ESR, CRP and pro calcitonin  For now we will  initiate the patient on IV ceftriaxone and de-escalate as clinically appropriate  Will stop abx tomorrow if no infectious concerns apparent     Normocytic anemia- (present on admission)  Assessment & Plan  Component of iron deficiency anemia  Plan as noted above with concerns for acute bleeding  Monitor Hb / Restrictive transfusion strategy    Severe protein-calorie malnutrition (HCC)- (present on admission)  Assessment & Plan  Will check prealbumin levels  Optimize nutrition  Multivitamin support, daily multivitamin and thiamine  Stop Marinol/Remeron as this is causing sedation    Dyslipidemia- (present on admission)  Assessment & Plan  Atorvastatin 40 mg daily with underlying risk factors    Essential hypertension- (present on admission)  Assessment & Plan  Holding anti hypertensive therapy  Resume as clinically appropriate       VTE prophylaxis: SCDs - holding subcutaneous heparin/Lovenox unless issues with GI bleeding have been addressed as reviewed above    Patient was seen for 41 minutes face to face of which > 50% of appointment time was spent on counseling and coordination of care regarding the above.

## 2019-12-27 NOTE — PROGRESS NOTES
Gastroenterology Progress Note     Author: Hector Villarreal   Date & Time Created: 12/27/2019 11:31 AM    Chief Complaint:  Iron deficiency anemia, heme positive stool    History of Present Illness:  60 yo male with history of DM, HTN who lives at an [facility] who presented 12/19/2019 with mild SOB and found to be in DKA upon arrival to the ED on 12/19/2019.  Patient was admitted to the ICU with management with glucose of 442, trace urine ketones, anion gap of 23..  Patient was treated for DKA and ultimately transferred out.  With hydration patient's hemoglobin drifted down to 6.9.  Patient was transfused 1 unit of blood.  And ultimately transfer out.  Also arrival show significant BUN elevation.  Patient is A&O x3 however not interested in talking to me during my interview with him.  Patient however does deny tomas hematemesis hemoptysis melena or blood per rectum. A fecal occult blood was positive on 12/23/2019.  Patient's uremia also resolved.      I have reviewed patient's last record from August 25, 2018 of which an endoscopy was performed showing severe grade D reflux esophagitis, 3 cm hiatal hernia, mild scalloping of duodenal fold biopsied for celiac disease.  It was utilizing fentanyl and Versed for sedation    Interval History:  12/26/2019: Hgb stable.  Denies GI complaints.  He is alert and oriented.  He agrees to endoscopic evaluation today.  12/27/2019: Hgb stable.  Denies GI symptoms.  Refused to drink colon prep.    Review of Systems:  Review of Systems   Constitutional: Negative for chills and fever.   Respiratory: Negative for shortness of breath.    Cardiovascular: Negative for chest pain.   Gastrointestinal: Negative for abdominal pain, blood in stool, constipation, diarrhea, heartburn, melena, nausea and vomiting.       Physical Exam:  Physical Exam  Vitals signs and nursing note reviewed.   Constitutional:       Appearance: Normal appearance.   HENT:      Mouth/Throat:      Mouth: Mucous  membranes are moist.      Pharynx: Oropharynx is clear. No oropharyngeal exudate or posterior oropharyngeal erythema.   Eyes:      General: No scleral icterus.  Cardiovascular:      Rate and Rhythm: Normal rate and regular rhythm.      Heart sounds: No murmur.   Pulmonary:      Effort: Pulmonary effort is normal. No respiratory distress.      Breath sounds: Normal breath sounds. No stridor. No wheezing, rhonchi or rales.   Abdominal:      General: Abdomen is flat. Bowel sounds are normal. There is no distension.      Palpations: Abdomen is soft. There is no mass.      Tenderness: There is no tenderness. There is no guarding or rebound.      Hernia: No hernia is present.   Skin:     General: Skin is warm and dry.   Neurological:      General: No focal deficit present.      Mental Status: He is alert and oriented to person, place, and time.         Labs:          Recent Labs     12/25/19 0331 12/26/19 0246 12/27/19 0240   SODIUM 139 139 141   POTASSIUM 3.8 3.7 3.4*   CHLORIDE 108 106 107   CO2 27 26 24   BUN 18 13 12   CREATININE 0.79 0.72 0.65   MAGNESIUM 1.3* 1.8  --    PHOSPHORUS 2.4* 2.5  --    CALCIUM 7.9* 7.8* 7.6*     Recent Labs     12/25/19 0331 12/26/19 0246 12/27/19 0240   ALTSGPT 12 12 13   ASTSGOT 13 15 14   ALKPHOSPHAT 67 75 83   TBILIRUBIN 0.2 0.2 0.2   PREALBUMIN 9.0*  --   --    GLUCOSE 131* 112* 63*     Recent Labs     12/25/19 0331 12/26/19 0246 12/27/19 0240   RBC 2.43* 2.59* 2.54*   HEMOGLOBIN 7.9* 8.2* 8.1*   HEMATOCRIT 24.1* 25.8* 25.3*   PLATELETCT 284 333 277   IRON 25*  --   --    FERRITIN 46.8  --   --    TOTIRONBC 238*  --   --      Recent Labs     12/25/19 0331 12/26/19 0246 12/27/19 0240   WBC 12.7* 13.6* 12.2*   NEUTSPOLYS 47.80 51.10  --    LYMPHOCYTES 34.80 30.80  --    MONOCYTES 12.00 12.50  --    EOSINOPHILS 2.30 2.50  --    BASOPHILS 0.40 1.00  --    ASTSGOT 13 15 14   ALTSGPT 12 12 13   ALKPHOSPHAT 67 75 83   TBILIRUBIN 0.2 0.2 0.2     Hemodynamics:  Temp (24hrs),  Av.9 °C (98.5 °F), Min:36.2 °C (97.2 °F), Max:37.4 °C (99.4 °F)  Temperature: 36.7 °C (98 °F)  Pulse  Av.8  Min: 50  Max: 110   Blood Pressure: (Abnormal) 95/57     Respiratory:    Respiration: 18, Pulse Oximetry: 92 %        RUL Breath Sounds: Diminished, RML Breath Sounds: Diminished, RLL Breath Sounds: Diminished, LEONARD Breath Sounds: Diminished, LLL Breath Sounds: Diminished  Fluids:    Intake/Output Summary (Last 24 hours) at 2019 1021  Last data filed at 2019 0600  Gross per 24 hour   Intake 860 ml   Output 500 ml   Net 360 ml        GI/Nutrition:  Orders Placed This Encounter   Procedures   • Diet NPO     Standing Status:   Standing     Number of Occurrences:   8     Order Specific Question:   Restrict to:     Answer:   Sips with Medications [3]     Medical Decision Making, by Problem:  Active Hospital Problems    Diagnosis   • Melanotic stools [K92.1]   • Cognitive decline [R41.89]   • Uncontrolled type 1 diabetes mellitus with hyperglycemia (HCC) [E10.65]   • Leukocytosis [D72.829]   • Severe protein-calorie malnutrition (HCC) [E43]   • Normocytic anemia [D64.9]   • Dyslipidemia [E78.5]   • Essential hypertension [I10]     EGD Findings (2019):  1. Severe erosive esophagitis  2. Hiatal hernia    PROBLEMS:  1. Anemia - normocytic; likely multifactorial.  Likely some chronic blood loss related to severe erosive esophagitis.  2. Low iron saturation/ferritin  3. Diabetic ketoacidosis, resolved  4. Uremia - resolved  5. History of severe esophagitis  6. Cognitive decline  7. Type 1 DM  8. Protein malnutrition  9. HTN  10. Leukocytosis.  11. Blood in stool, occult    Plan:  1. Omeprazole 20 mg BID  2. Sucralfate 1 gram QID x2 weeks  3. Advance diet  4. Will arrange outpatient GI follow-up in 4-6 weeks.  Would likely repeat EGD in 8 weeks and proceed with colonoscopy at the same time if he agrees.  5. GI to sign off.  Please call with questions.    Quality-Core Measures

## 2019-12-27 NOTE — THERAPY
"Occupational Therapy Treatment completed   Functional Status: Pt seen for OT tx today, pt making progress with set POC. Pt performed bed mobility, STS eob, toileting, toilet t/f, standing h/g tasks, LB dressing, functional mobility in hallway with supervision w/o AD, no lob noted. Pt with flat affect, reports receives help with medication, laundry, light housekeeping and meals by [facility] staff. Pt appears to be close to his functional baseline and does not demonstrate the need for further acute OT needs at this time and will be discharged from the acute OT services at this time.   Plan of Care: Patient with no further skilled OT needs in the acute care setting at this time  Discharge Recommendations:  Equipment No Equipment Needed. Post-acute therapy Anticipate that the patient will have no further occupational therapy needs after discharge from the hospital.       See \"Rehab Therapy-Acute\" Patient Summary Report for complete documentation.   "

## 2019-12-27 NOTE — PROGRESS NOTES
"Assumed care of patient at 1900. Received report from day RN. Patient is A & O x 4, c/o no pain, denies n/t. Pt refusing to finish GoLytely for EGD with colonoscopy procedure tomorrow, stating \"This is the last cup I'm drinking\". Bed alarm on, bed controls on, bed locked and in lowest position. Treaded slippers in place. Fall precautions and signs on door in place. Updated plan of care on board. Call light within reach. Hourly rounding in place.   "

## 2019-12-27 NOTE — PROGRESS NOTES
Diabetes education: Met with pt this afternoon. Please see consult note.  Plan: Pt unable to be independent with insulin skills but can follow directions when told. Pt had previously been given handouts, not sure if that will help at this point as pt was more involved in care during last visit.

## 2019-12-27 NOTE — PALLIATIVE CARE
Palliative Care   Received call from Ena DUBOSE, Care Conference scheduled for today at 1400.        Plan:   PC RN will attend care conference today at 1400.       Thank you for allowing Palliative Care to participate in this patient's care. Please feel free to call [phone] with any questions or concerns.

## 2019-12-27 NOTE — OR SURGEON
Immediate Post OP Note    PreOp Diagnosis: Iron deficiency anemia, heme positive stool    PostOp Diagnosis: Erosive esophagitis, hiatal hernia    Procedure(s):  GASTROSCOPY with cold forceps biopsies - Wound Class: Clean Contaminated    Surgeon(s):  Hector Villarreal M.D.    Anesthesiologist/Type of Anesthesia:  Moderate Sedation    Surgical Staff:  Circulator: Jack Lindquist R.N.  Sedation/Monitoring Nurse: Mandy Mon R.N.    Specimens removed if any:  ID Type Source Tests Collected by Time Destination   A : biopsies, rule out celiac Tissue Duodenum PATHOLOGY SPECIMEN Hector Villarreal M.D. 12/27/2019 10:21 AM    B : biopsies, rule out h pylori Tissue Gastric PATHOLOGY SPECIMEN Hector Villarreal M.D. 12/27/2019 10:24 AM    C : biopsies, rule out candida Tissue Esophagus PATHOLOGY SPECIMEN Hector Villarreal M.D. 12/27/2019 10:24 AM        Estimated Blood Loss: None    Findings:  1. Severe erosive esophagitis, LA Grade D from 30 cm to 41 cm  2. 4 cm hiatal hernia    Complications: None        12/27/2019 10:27 AM Hector Villarreal M.D.

## 2019-12-27 NOTE — DISCHARGE PLANNING
Received Choice form at [phone]  Agency/Facility Name: [facility]  Referral sent per Choice form @ [phone]

## 2019-12-27 NOTE — CONSULTS
"Diabetes education: Met with Pawel this afternoon. Pt known from previous education. Pt was admitted with blood sugar of 442 and Hg A1c of 7.9% ( up from 6.9%  10/1/19). Pt is currently on Lantus 10 units pm with regular insulin sliding scale coverage ac and hs. Current blood sugars are 138, 140 and 299 ( 5 units).     Met with pt to discuss how he takes his insulin at \"home\". Pt states he has a nurse who comes and assists him. Pt was able to place pen needle on saline pen, did not prime until reminded, dialed 10 units ( his recall) and injected ( push plunger on pen) to \"squirt\" saline on practice device. Pt did not inject into practice device. Asked to redial 4 units and inject, pt was able to follow directions.  Pt had been seen by Nick DICKERSON at [facility] and medications were changed. Novolog discontinued, Levermir dose slightly increase to cover low dose of Ozempic ( once a week), and synjardy 10/1000. These were to start after visist 11/25/19.  Plan: Pt unable to be independent with insulin skills but can follow directions when told. Pt had previously been given handouts, not sure if that will help at this point as pt was more involved in care during last visit.    Previous education:    Terri Mancilla R.N.   Diabetes Health Educator      Consults   Signed   Date of Service:  1/17/2019  7:29 PM                     Diabetes education: Pt has hx of diabetes on insulin. Hx of TBI as well. Pt is well known from previous admit ( 11/21 to 12/21/18). Pt was discharged to [facility], with very specific directions and handouts for him to use there as he needed to give his own insulin.   Pt was admitted with blood sugar of 335 and Hg A1c of 10.5% (down from 17.4% 11/24/18).  Pt is currently on Lantus 25 units pm ( just increased), with Humalog 4 unit ac meals and Regular insulin sliding scale coverage ac and hs. Pt needs to be on only one kind of fast acting insulin not two. Pt will need his sliding scale coverage " changed to Humalog ( other wise patient will get two shots every meal). Current blood sugars are 188 (1 unit), 297 (3 units) and 221 (2 units) all of regular as Humalog had not yet started.  Plan: CDE will follow up tomorrow to assess needs. Per discharge note, pt to go to [facility]. Please change regular insulin sliding scale to Humalog sliding scale so patient would only need one injection per meal not two. Please call [phone] if needs change.        Previous visit and education:   Consults  Date of Service: 11/28/2018 12:11 PM  Dawna Beckwith R.N.         Diabetes Nurse Specialist:   Reviewed giving injection of Lantus (via pen) with patient.  He was able to do a return demonstration without difficulty.   Written instructions provided for reference should he need it after discharge.                    Consults  Date of Service: 11/26/2018 5:36 PM  Terri Mancilla R.N.         Diabetes education: Met with Pawel who has hx of diabetes as well as TBI, regarding diabetes management. Pt states he was taking medications given to him by staff at [facility] ( metformin, trajenta and amaryl are listed). Hg a1c went from 9.6% in august to 17.4 % this month. Pt admitted with blood sugar of 598.  Pt states he has given insulin before and has a meter. He states he was doing finger sticks occasionally.  Spoke with Boo DUBOSE who placed a call to [facility] to confirm this and where pt gets his prescriptions.  Plan: CDE will review  Insulin management and include written handout if pt will return to [facility] ( not sure if going on insulin he will be allowed to go back).  Pt is currently on Lantus 15 units at 12  Noon and Humulog 7 units ac meals and sliding scale ac and hs. Current blood sugars are 316 (4 units +7 units). Doses increased today.  Plan: CDE to continue to follow and will education /review closer to discharge. Please call [phone] when discharge plan known.              Progress Notes  Date of Service: 12/19/2018  10:56 AM  Terri Mancilla R.N.         Diabetes education: Pt is to be discharged to [facility] per notes. Pt has been giving his own insulin (per MD notes) with nursing supervision ( vials/syringes not pens and no note as to drawing up or just giving shot).  Plan: Pt was previously given directions on using insulin pens ( and reviewed) as well as a new Contour next meter ( also reviewed and with simple instructions).   Pt is currently on Lantus 20 units at 1800, with Humalog 8 units after breakfast,  10 units after lunch, 10 units after dinner. Finger sticks have been 152, 276, 308, 355 and 254.      Pt will need prescriptions for Contour next test strips and lancets, to test 3 times a day, at discharge. Prescriptions need to have directions, quantity, refills and dx code ( Ell.65) for medicare to cover. Pt will need Novolog  Flex pens (box of 5), ordered rather than Humalog for insurance coverage. Will need prescriptions for Lantus solostar pen ( box of 5), and armando pen needles ( 4 mm box of 100). Prescribing  MD needs to have Medicare privileges as well. Please call [phone] if needs change.            Progress Notes  Date of Service: 12/20/2018 1:23 PM  Terri Mancilla R.N.         Diabetes education: Met with Pawel this am to review handout given for lancet device, insulin pens and doses for his Humalog ac meals. Questions answered. Please call [phone] if needs change.

## 2019-12-28 LAB
ALBUMIN SERPL BCP-MCNC: 2.5 G/DL (ref 3.2–4.9)
ALBUMIN/GLOB SERPL: 1.3 G/DL
ALP SERPL-CCNC: 79 U/L (ref 30–99)
ALT SERPL-CCNC: 11 U/L (ref 2–50)
ANION GAP SERPL CALC-SCNC: 5 MMOL/L (ref 0–11.9)
AST SERPL-CCNC: 13 U/L (ref 12–45)
BASOPHILS # BLD AUTO: 1 % (ref 0–1.8)
BASOPHILS # BLD: 0.11 K/UL (ref 0–0.12)
BILIRUB SERPL-MCNC: 0.2 MG/DL (ref 0.1–1.5)
BUN SERPL-MCNC: 12 MG/DL (ref 8–22)
CALCIUM SERPL-MCNC: 7.8 MG/DL (ref 8.5–10.5)
CHLORIDE SERPL-SCNC: 108 MMOL/L (ref 96–112)
CO2 SERPL-SCNC: 24 MMOL/L (ref 20–33)
CREAT SERPL-MCNC: 0.74 MG/DL (ref 0.5–1.4)
EOSINOPHIL # BLD AUTO: 0.27 K/UL (ref 0–0.51)
EOSINOPHIL NFR BLD: 2.4 % (ref 0–6.9)
ERYTHROCYTE [DISTWIDTH] IN BLOOD BY AUTOMATED COUNT: 56.7 FL (ref 35.9–50)
GLOBULIN SER CALC-MCNC: 1.9 G/DL (ref 1.9–3.5)
GLUCOSE BLD-MCNC: 143 MG/DL (ref 65–99)
GLUCOSE BLD-MCNC: 265 MG/DL (ref 65–99)
GLUCOSE BLD-MCNC: 308 MG/DL (ref 65–99)
GLUCOSE BLD-MCNC: 316 MG/DL (ref 65–99)
GLUCOSE SERPL-MCNC: 135 MG/DL (ref 65–99)
HCT VFR BLD AUTO: 25.5 % (ref 42–52)
HGB BLD-MCNC: 8 G/DL (ref 14–18)
IMM GRANULOCYTES # BLD AUTO: 0.18 K/UL (ref 0–0.11)
IMM GRANULOCYTES NFR BLD AUTO: 1.6 % (ref 0–0.9)
LYMPHOCYTES # BLD AUTO: 3.24 K/UL (ref 1–4.8)
LYMPHOCYTES NFR BLD: 29.2 % (ref 22–41)
MAGNESIUM SERPL-MCNC: 1.4 MG/DL (ref 1.5–2.5)
MCH RBC QN AUTO: 31.9 PG (ref 27–33)
MCHC RBC AUTO-ENTMCNC: 31.4 G/DL (ref 33.7–35.3)
MCV RBC AUTO: 101.6 FL (ref 81.4–97.8)
MONOCYTES # BLD AUTO: 1.58 K/UL (ref 0–0.85)
MONOCYTES NFR BLD AUTO: 14.2 % (ref 0–13.4)
NEUTROPHILS # BLD AUTO: 5.73 K/UL (ref 1.82–7.42)
NEUTROPHILS NFR BLD: 51.6 % (ref 44–72)
NRBC # BLD AUTO: 0.02 K/UL
NRBC BLD-RTO: 0.2 /100 WBC
PHOSPHATE SERPL-MCNC: 2 MG/DL (ref 2.5–4.5)
PLATELET # BLD AUTO: 310 K/UL (ref 164–446)
PMV BLD AUTO: 9.6 FL (ref 9–12.9)
POTASSIUM SERPL-SCNC: 4.1 MMOL/L (ref 3.6–5.5)
PROT SERPL-MCNC: 4.4 G/DL (ref 6–8.2)
RBC # BLD AUTO: 2.51 M/UL (ref 4.7–6.1)
SODIUM SERPL-SCNC: 137 MMOL/L (ref 135–145)
WBC # BLD AUTO: 11.1 K/UL (ref 4.8–10.8)

## 2019-12-28 PROCEDURE — 99254 IP/OBS CNSLTJ NEW/EST MOD 60: CPT | Mod: GC | Performed by: PSYCHIATRY & NEUROLOGY

## 2019-12-28 PROCEDURE — 700105 HCHG RX REV CODE 258: Performed by: INTERNAL MEDICINE

## 2019-12-28 PROCEDURE — 82962 GLUCOSE BLOOD TEST: CPT | Mod: 91

## 2019-12-28 PROCEDURE — 700111 HCHG RX REV CODE 636 W/ 250 OVERRIDE (IP): Performed by: INTERNAL MEDICINE

## 2019-12-28 PROCEDURE — A9270 NON-COVERED ITEM OR SERVICE: HCPCS | Performed by: INTERNAL MEDICINE

## 2019-12-28 PROCEDURE — 80053 COMPREHEN METABOLIC PANEL: CPT

## 2019-12-28 PROCEDURE — 700105 HCHG RX REV CODE 258: Performed by: FAMILY MEDICINE

## 2019-12-28 PROCEDURE — 700102 HCHG RX REV CODE 250 W/ 637 OVERRIDE(OP): Performed by: INTERNAL MEDICINE

## 2019-12-28 PROCEDURE — A9270 NON-COVERED ITEM OR SERVICE: HCPCS | Performed by: HOSPITALIST

## 2019-12-28 PROCEDURE — 99232 SBSQ HOSP IP/OBS MODERATE 35: CPT | Performed by: INTERNAL MEDICINE

## 2019-12-28 PROCEDURE — 700102 HCHG RX REV CODE 250 W/ 637 OVERRIDE(OP): Performed by: HOSPITALIST

## 2019-12-28 PROCEDURE — 36415 COLL VENOUS BLD VENIPUNCTURE: CPT

## 2019-12-28 PROCEDURE — 84100 ASSAY OF PHOSPHORUS: CPT

## 2019-12-28 PROCEDURE — 83735 ASSAY OF MAGNESIUM: CPT

## 2019-12-28 PROCEDURE — 85025 COMPLETE CBC W/AUTO DIFF WBC: CPT

## 2019-12-28 PROCEDURE — 770006 HCHG ROOM/CARE - MED/SURG/GYN SEMI*

## 2019-12-28 RX ORDER — SODIUM CHLORIDE 9 MG/ML
250 INJECTION, SOLUTION INTRAVENOUS ONCE
Status: COMPLETED | OUTPATIENT
Start: 2019-12-28 | End: 2019-12-28

## 2019-12-28 RX ORDER — MAGNESIUM SULFATE HEPTAHYDRATE 40 MG/ML
4 INJECTION, SOLUTION INTRAVENOUS ONCE
Status: COMPLETED | OUTPATIENT
Start: 2019-12-28 | End: 2019-12-28

## 2019-12-28 RX ADMIN — Medication 100 MG: at 06:25

## 2019-12-28 RX ADMIN — ASPIRIN 81 MG: 81 TABLET, COATED ORAL at 06:24

## 2019-12-28 RX ADMIN — SODIUM CHLORIDE 125 MG: 9 INJECTION, SOLUTION INTRAVENOUS at 06:26

## 2019-12-28 RX ADMIN — TAMSULOSIN HYDROCHLORIDE 0.4 MG: 0.4 CAPSULE ORAL at 06:24

## 2019-12-28 RX ADMIN — OXYCODONE HYDROCHLORIDE AND ACETAMINOPHEN 500 MG: 500 TABLET ORAL at 18:06

## 2019-12-28 RX ADMIN — INSULIN HUMAN 6 UNITS: 100 INJECTION, SOLUTION PARENTERAL at 21:41

## 2019-12-28 RX ADMIN — SUCRALFATE 1 G: 1 SUSPENSION ORAL at 22:04

## 2019-12-28 RX ADMIN — SODIUM CHLORIDE 250 ML: 9 INJECTION, SOLUTION INTRAVENOUS at 04:15

## 2019-12-28 RX ADMIN — OXYCODONE HYDROCHLORIDE AND ACETAMINOPHEN 500 MG: 500 TABLET ORAL at 06:25

## 2019-12-28 RX ADMIN — SUCRALFATE 1 G: 1 SUSPENSION ORAL at 13:01

## 2019-12-28 RX ADMIN — PANCRELIPASE 48000 UNITS: 24000; 76000; 120000 CAPSULE, DELAYED RELEASE PELLETS ORAL at 08:47

## 2019-12-28 RX ADMIN — INSULIN HUMAN 5 UNITS: 100 INJECTION, SOLUTION PARENTERAL at 12:49

## 2019-12-28 RX ADMIN — SUCRALFATE 1 G: 1 SUSPENSION ORAL at 18:06

## 2019-12-28 RX ADMIN — INSULIN GLARGINE 10 UNITS: 100 INJECTION, SOLUTION SUBCUTANEOUS at 18:11

## 2019-12-28 RX ADMIN — THERA TABS 1 TABLET: TAB at 06:25

## 2019-12-28 RX ADMIN — DIBASIC SODIUM PHOSPHATE, MONOBASIC POTASSIUM PHOSPHATE AND MONOBASIC SODIUM PHOSPHATE 2 TABLET: 852; 155; 130 TABLET ORAL at 08:46

## 2019-12-28 RX ADMIN — OMEPRAZOLE 20 MG: 20 CAPSULE, DELAYED RELEASE ORAL at 18:06

## 2019-12-28 RX ADMIN — SUCRALFATE 1 G: 1 SUSPENSION ORAL at 06:25

## 2019-12-28 RX ADMIN — ATORVASTATIN CALCIUM 40 MG: 40 TABLET, FILM COATED ORAL at 18:06

## 2019-12-28 RX ADMIN — OMEPRAZOLE 20 MG: 20 CAPSULE, DELAYED RELEASE ORAL at 06:24

## 2019-12-28 RX ADMIN — INSULIN HUMAN 6 UNITS: 100 INJECTION, SOLUTION PARENTERAL at 18:11

## 2019-12-28 RX ADMIN — MAGNESIUM SULFATE IN WATER 4 G: 40 INJECTION, SOLUTION INTRAVENOUS at 08:47

## 2019-12-28 ASSESSMENT — ENCOUNTER SYMPTOMS
NAUSEA: 0
CHILLS: 0
BACK PAIN: 0
HEADACHES: 0
NECK PAIN: 0
WEAKNESS: 1
FEVER: 0
SHORTNESS OF BREATH: 0
DEPRESSION: 1
COUGH: 0
PALPITATIONS: 0
WEIGHT LOSS: 1
ABDOMINAL PAIN: 0
NERVOUS/ANXIOUS: 0
STRIDOR: 0

## 2019-12-28 NOTE — PROGRESS NOTES
Assumed care of patient at 0700. Report received from KELLEY Rodríguez. Patient A&Ox4, denies pain in, other vitals stable on room oxygen. Patient is standby assist and able to turn self. C/o pain at right IV site with infusion of ferrous gluconate. No signs of infiltration or phlebitis. IV hub swabbed with alcohol, flushed with 10 mL NS, and primary and secondary IV tubing changed. Call light within reach; will continue to monitor.

## 2019-12-28 NOTE — PSYCHIATRY
"PSYCHIATRIC CONSULTATION:    Reason for admission:  59 y.o. male admitted 12/19/2019 with DKA and seizures, status post ICU stay.  Now transferred to medical regular nursing floor.  Concern for GI bleeding, worsening cognitive decline.      Reason for consult: Does patient have capacity regarding his diabetes management ? Can he administer his own insulin ? Does he exhibit understanding of this complex process of management of his diabetes wi insulin   Requesting Physician: Bao Pace M.D.  Supervising Physician: Becky Tong M.D., M.D.  Source of Information: patient report and medical record    Legal status: Not applicable    Chief Complaint: \" My blood sugars were high.\"    HPI:   Pawel Tatum is a 59 year old male with a PMH of uncontrolled diabetes, seizures, DKA, cognitive decline, hypertension, dyslipidemia, Bell's palsy and peripheral neuropathy who presented to [facility] via EMS for shortness of breath. He had borderline EKG and noted sugars around 350.  Patient switched his medications for diabetes 1 week prior to this admission.  Patient found weak, confused and pale.  Patient has been living in assisted living home before this admit.    Per chart, patient has unspecified neurocognitive disorder.  His Sister Aniyah is patient's DURABLE POWER OF  for both healthcare and financial concerns.  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  Previous MRI shows atrophy.  Patient had speech therapy/cognitive eval.  Patient unknown to the psychiatric service.    Psych was consulted to evaluate patient has capacity to manage his own insulin regime.  Patient states he lives that ' [facility]' for the past 3-1/2 years.  Patient states his sister decided patient needed a higher level of care for his diabetic management.  Patient states ' [facility]' administers and manages all his medications.  Patient states he " has a blood sugar monitor and checks it daily.  Unsure if his housing also monitors his blood sugar.  Patient states he is unsure if he was to live independently if he could fully manage his medications.  Patient states when he is blood sugars are too high he would just exercise which is not an adequate answer.  When asked about low blood sugars patient stated he can go to the common room and get an orange or banana.  Patient states his blood sugar fluctuates from 50 into the 400s.  Patient states he takes 10 units long-acting at night but unsure what medications he takes during the day.  Hemoglobin A1c checked 7 months ago was 6.4 patient states he follows up with his PCP for annual eye checks, monofilament checks, kidney function.  Patient denies any diabetic peripheral neuropathy.  Patient denies any visual changes.  Creatinine checked today was 0.74.    Patient denies any psychiatric history.  Patient denies being on any psychiatric medication.  Patient denies family history of mental illness.  Patient denies depression, anxiety, saima, PTSD, AVH.  No history of psychosis.  Patient denies any suicidal or homicidal ideations.  Patient does not seem to be a harm to self.    Patient reports long history of alcohol use disorder.  Patient states he quit alcohol 7 years ago and has been sober for 7 years.  Patient denies cravings.  Patient denies any other substance use.    Patient states he is not fully comfortable at [facility] and would be interested in looking at other housing options.    Review of Systems:  Psychiatric:  Depression: Patient denies low mood, anhedonia, suicide, weight changes, appetite changes      Saima: Patient denies any history of saima or bipolar.  Anxiety/Panic Attacks: Patient denies any history of anxiety or panic attacks.  PTSD symptom: Patient denies nightmares, flashbacks, avoidance.  Psychosis: Patient denies AVH.  Patient does not seem to be internally stimulated.  Other: History of  "alcohol use disorder.  In remission 7 years sober  Constitutional: Well developed, Well nourished  HENT: Normocephalic, Atraumatic, Bilateral external ears normal.   Eyes: PERRLA  Neck: No tenderness, Supple  Lymphatic: No lymphadenopathy noted.   Cardiovascular: Tachycardic  Thorax & Lungs:  Negative  Abdomen: Soft, No tenderness, No masses, No pulsatile masses.   Skin: Warm, Dry, No erythema, No rash.   Extremities:, No edema No cyanosis.   Musculoskeletal: No tenderness to palpation or major deformities noted  Neurologic: Awake, alert.    All other systems reviewed and are negative.     Psychiatric Examination: observed phenomenon:  Vitals: /63   Pulse 71   Temp 37.2 °C (98.9 °F) (Temporal)   Resp 18   Ht 1.905 m (6' 3\")   Wt 63.5 kg (139 lb 15.9 oz)   SpO2 90%   BMI 17.50 kg/m²  Body mass index is 17.5 kg/m².      Appearance: 59-year-old white male, looks stated age, well-nourished, alert, long hair, wearing reading glasses, in facility attire  Muscle Strength/Tone: Within normal limits  Gait/Station: Patient can ambulate without assistance  Speech: Regular rate tone and volume rhythm syntax.  No stutter noted  Thought Process: Linear and goal oriented  Associations: No loose associations  Abnormal/Psychotic Thoughts (ex): Denies AVH.  No delusions or paranoia noted.  Does not seem to be internally stimulated.  Insight/Judgement: Limited/limited  Orientation: Alert and oriented x4  Memory: Intact  Attention/Concentration: Intact  Language: Fluent  Fund of Knowledge: Appropriate fund of knowledge  Mood: Patient reports he is fine          Affect: Full, euthymic          SI/HI: Patient denies any suicidal or homicidal ideations  Neurological Testing:( ie clock, cube drawing, MMSE, MOCA,etc.) patient was able to draw a clock appropriately.  But, need to rule out Warneke Korsakoff's.       Past Psychiatric Hx:   Denies any past psychiatric history    Family Psychiatric Hx:  Denies any family history of " mental illness    Social Hx:  Patient lives at [facility] assisted living.  He has been there for 3.5 years  Patient sister is his DURABLE POWER OF  for medical decisions and finances.    Social History     Socioeconomic History   • Marital status: Single     Spouse name: Not on file   • Number of children: Not on file   • Years of education: Not on file   • Highest education level: Not on file   Occupational History   • Not on file   Social Needs   • Financial resource strain: Not on file   • Food insecurity:     Worry: Not on file     Inability: Not on file   • Transportation needs:     Medical: Not on file     Non-medical: Not on file   Tobacco Use   • Smoking status: Current Every Day Smoker     Packs/day: 0.25     Years: 15.00     Pack years: 3.75   • Smokeless tobacco: Never Used   • Tobacco comment: Trying to quit   Substance and Sexual Activity   • Alcohol use: No   • Drug use: No   • Sexual activity: Not Currently   Lifestyle   • Physical activity:     Days per week: Not on file     Minutes per session: Not on file   • Stress: Not on file   Relationships   • Social connections:     Talks on phone: Not on file     Gets together: Not on file     Attends Zoroastrianism service: Not on file     Active member of club or organization: Not on file     Attends meetings of clubs or organizations: Not on file     Relationship status: Not on file   • Intimate partner violence:     Fear of current or ex partner: Not on file     Emotionally abused: Not on file     Physically abused: Not on file     Forced sexual activity: Not on file   Other Topics Concern   • Not on file   Social History Narrative   • Not on file       Drug/Alcohol/Tobacco Hx:   Drugs: Denies   Alcohol: History of alcohol use disorder.  Sober for 7 years   Tobacco: Denies    Medical Hx: labs, MARS, medications, etc were reviewed. Only those findings of potential interest to psychiatry are noted below:  Neurological:    TBIs: Multiple   SZs:  Denies   Strokes: Denies   Other: Denies  Other medical:   Thyroid: Denies   Diabetes: Positive.  On insulin   Cardiovascular disease: Denies  Past Medical History:   Diagnosis Date   • Diabetes (HCC)    • Hypertension      Past Surgical History:   Procedure Laterality Date   • PB UPPER GI ENDOSCOPY,BIOPSY N/A 12/27/2019    Procedure: GASTROSCOPY, WITH BIOPSY;  Surgeon: Hector Villarreal M.D.;  Location: ENDOSCOPY [facility];  Service: Gastroenterology   • TOE AMPUTATION Right 1/12/2019    Procedure: TOE AMPUTATION;  Surgeon: Nicanor Reddy M.D.;  Location: SURG[facility];  Service: Orthopedics   • GASTROSCOPY-ENDO N/A 8/25/2018    Procedure: GASTROSCOPY-ENDO;  Surgeon: Jaswant Frye M.D.;  Location: ENDOSCOPY [facility];  Service: Gastroenterology   • OTHER ABDOMINAL SURGERY         Allergies:   No Known Allergies    Medications:  Current Facility-Administered Medications   Medication Dose Route Frequency Provider Last Rate Last Dose   • magnesium sulfate IVPB premix 4 g  4 g Intravenous Once Bao Pace M.D. 25 mL/hr at 12/28/19 0847 4 g at 12/28/19 0847   • sucralfate (CARAFATE) 1 GM/10ML suspension 1 g  1 g Oral Q6HRS Hector Villarreal M.D.   1 g at 12/28/19 0625   • [START ON 12/29/2019] ferric gluconate complex (FERRLECIT) 250 mg in  mL IVPB  250 mg Intravenous Q24HRS Bao Pace M.D.       • aspirin EC (ECOTRIN) tablet 81 mg  81 mg Oral DAILY Bao Pace M.D.   81 mg at 12/28/19 0624   • omeprazole (PRILOSEC) capsule 20 mg  20 mg Oral BID Hector Villarreal M.D.   20 mg at 12/28/19 0624   • polyethylene glycol/lytes (MIRALAX) PACKET 1 Packet  1 Packet Oral BID Bao Pace M.D.   1 Packet at 12/26/19 1701   • atorvastatin (LIPITOR) tablet 40 mg  40 mg Oral Q EVENING Bao Pace M.D.   40 mg at 12/27/19 1807   • multivitamin (THERAGRAN) tablet 1 Tab  1 Tab Oral DAILY Bao Pace M.D.   1 Tab at 12/28/19 0625   • thiamine tablet 100 mg  100 mg Oral DAILY Bao  MANDY Pace   100 mg at 12/28/19 0625   • ascorbic acid tablet 500 mg  500 mg Oral BID Bao Pace M.D.   500 mg at 12/28/19 0625   • insulin glargine (LANTUS) injection 10 Units  10 Units Subcutaneous Q EVENING CHIARA Brooks.OYuridia   10 Units at 12/27/19 1820   • insulin regular (HUMULIN R) injection 2-9 Units  2-9 Units Subcutaneous 4X/DAY TYSON Salinas M.D.   Stopped at 12/28/19 0800    And   • glucose 4 g chewable tablet 16 g  16 g Oral Q15 MIN PRN Charli Salinas M.D.   16 g at 12/22/19 0741    And   • DEXTROSE 10% BOLUS 250 mL  250 mL Intravenous Q15 MIN PRN Charli Salinas M.D.       • tamsulosin (FLOMAX) capsule 0.4 mg  0.4 mg Oral DAILY Georges Celaya D.O.   0.4 mg at 12/28/19 0624   • pancrelipase (Lip-Prot-Amyl) (CREON 75359) 16411-66598 units capsule 48,000 Units  2 Cap Oral DAILY Georges Celaya D.O.   48,000 Units at 12/28/19 0847   • Respiratory Care per Protocol   Nebulization Continuous RT Georges Celaya, D.O.       • acetaminophen (TYLENOL) tablet 650 mg  650 mg Oral Q6HRS PRN Georges Celaya D.O.   650 mg at 12/23/19 1157   • ondansetron (ZOFRAN) syringe/vial injection 4 mg  4 mg Intravenous Q4HRS PRN Georges Celaya D.O.   4 mg at 12/20/19 1103   • ondansetron (ZOFRAN ODT) dispertab 4 mg  4 mg Oral Q4HRS PRN Georges Celaya D.O.           Labs/ Testing:  Recent Results (from the past 48 hour(s))   ACCU-CHEK GLUCOSE    Collection Time: 12/26/19 12:02 PM   Result Value Ref Range    Glucose - Accu-Ck 140 (H) 65 - 99 mg/dL   ACCU-CHEK GLUCOSE    Collection Time: 12/26/19  5:55 PM   Result Value Ref Range    Glucose - Accu-Ck 299 (H) 65 - 99 mg/dL   ACCU-CHEK GLUCOSE    Collection Time: 12/26/19  7:31 PM   Result Value Ref Range    Glucose - Accu-Ck 323 (H) 65 - 99 mg/dL   ACCU-CHEK GLUCOSE    Collection Time: 12/26/19  9:42 PM   Result Value Ref Range    Glucose - Accu-Ck 236 (H) 65 - 99 mg/dL   CBC WITHOUT DIFFERENTIAL    Collection  Time: 12/27/19  2:40 AM   Result Value Ref Range    WBC 12.2 (H) 4.8 - 10.8 K/uL    RBC 2.54 (L) 4.70 - 6.10 M/uL    Hemoglobin 8.1 (L) 14.0 - 18.0 g/dL    Hematocrit 25.3 (L) 42.0 - 52.0 %    MCV 99.6 (H) 81.4 - 97.8 fL    MCH 31.9 27.0 - 33.0 pg    MCHC 32.0 (L) 33.7 - 35.3 g/dL    RDW 55.4 (H) 35.9 - 50.0 fL    Platelet Count 277 164 - 446 K/uL    MPV 11.0 9.0 - 12.9 fL   Comp Metabolic Panel    Collection Time: 12/27/19  2:40 AM   Result Value Ref Range    Sodium 141 135 - 145 mmol/L    Potassium 3.4 (L) 3.6 - 5.5 mmol/L    Chloride 107 96 - 112 mmol/L    Co2 24 20 - 33 mmol/L    Anion Gap 10.0 0.0 - 11.9    Glucose 63 (L) 65 - 99 mg/dL    Bun 12 8 - 22 mg/dL    Creatinine 0.65 0.50 - 1.40 mg/dL    Calcium 7.6 (L) 8.5 - 10.5 mg/dL    AST(SGOT) 14 12 - 45 U/L    ALT(SGPT) 13 2 - 50 U/L    Alkaline Phosphatase 83 30 - 99 U/L    Total Bilirubin 0.2 0.1 - 1.5 mg/dL    Albumin 2.5 (L) 3.2 - 4.9 g/dL    Total Protein 4.5 (L) 6.0 - 8.2 g/dL    Globulin 2.0 1.9 - 3.5 g/dL    A-G Ratio 1.3 g/dL   ESTIMATED GFR    Collection Time: 12/27/19  2:40 AM   Result Value Ref Range    GFR If African American >60 >60 mL/min/1.73 m 2    GFR If Non African American >60 >60 mL/min/1.73 m 2   ACCU-CHEK GLUCOSE    Collection Time: 12/27/19  8:49 AM   Result Value Ref Range    Glucose - Accu-Ck 84 65 - 99 mg/dL   ACCU-CHEK GLUCOSE    Collection Time: 12/27/19 12:41 PM   Result Value Ref Range    Glucose - Accu-Ck 104 (H) 65 - 99 mg/dL   Histology Request    Collection Time: 12/27/19 12:47 PM   Result Value Ref Range    Pathology Request Sent to [facility]o    ACCU-CHEK GLUCOSE    Collection Time: 12/27/19  6:19 PM   Result Value Ref Range    Glucose - Accu-Ck 207 (H) 65 - 99 mg/dL   ACCU-CHEK GLUCOSE    Collection Time: 12/27/19  8:34 PM   Result Value Ref Range    Glucose - Accu-Ck 323 (H) 65 - 99 mg/dL   CBC WITH DIFFERENTIAL    Collection Time: 12/28/19  4:02 AM   Result Value Ref Range    WBC 11.1 (H) 4.8 - 10.8 K/uL    RBC 2.51 (L) 4.70 -  6.10 M/uL    Hemoglobin 8.0 (L) 14.0 - 18.0 g/dL    Hematocrit 25.5 (L) 42.0 - 52.0 %    .6 (H) 81.4 - 97.8 fL    MCH 31.9 27.0 - 33.0 pg    MCHC 31.4 (L) 33.7 - 35.3 g/dL    RDW 56.7 (H) 35.9 - 50.0 fL    Platelet Count 310 164 - 446 K/uL    MPV 9.6 9.0 - 12.9 fL    Neutrophils-Polys 51.60 44.00 - 72.00 %    Lymphocytes 29.20 22.00 - 41.00 %    Monocytes 14.20 (H) 0.00 - 13.40 %    Eosinophils 2.40 0.00 - 6.90 %    Basophils 1.00 0.00 - 1.80 %    Immature Granulocytes 1.60 (H) 0.00 - 0.90 %    Nucleated RBC 0.20 /100 WBC    Neutrophils (Absolute) 5.73 1.82 - 7.42 K/uL    Lymphs (Absolute) 3.24 1.00 - 4.80 K/uL    Monos (Absolute) 1.58 (H) 0.00 - 0.85 K/uL    Eos (Absolute) 0.27 0.00 - 0.51 K/uL    Baso (Absolute) 0.11 0.00 - 0.12 K/uL    Immature Granulocytes (abs) 0.18 (H) 0.00 - 0.11 K/uL    NRBC (Absolute) 0.02 K/uL   Comp Metabolic Panel    Collection Time: 12/28/19  4:02 AM   Result Value Ref Range    Sodium 137 135 - 145 mmol/L    Potassium 4.1 3.6 - 5.5 mmol/L    Chloride 108 96 - 112 mmol/L    Co2 24 20 - 33 mmol/L    Anion Gap 5.0 0.0 - 11.9    Glucose 135 (H) 65 - 99 mg/dL    Bun 12 8 - 22 mg/dL    Creatinine 0.74 0.50 - 1.40 mg/dL    Calcium 7.8 (L) 8.5 - 10.5 mg/dL    AST(SGOT) 13 12 - 45 U/L    ALT(SGPT) 11 2 - 50 U/L    Alkaline Phosphatase 79 30 - 99 U/L    Total Bilirubin 0.2 0.1 - 1.5 mg/dL    Albumin 2.5 (L) 3.2 - 4.9 g/dL    Total Protein 4.4 (L) 6.0 - 8.2 g/dL    Globulin 1.9 1.9 - 3.5 g/dL    A-G Ratio 1.3 g/dL   MAGNESIUM    Collection Time: 12/28/19  4:02 AM   Result Value Ref Range    Magnesium 1.4 (L) 1.5 - 2.5 mg/dL   PHOSPHORUS    Collection Time: 12/28/19  4:02 AM   Result Value Ref Range    Phosphorus 2.0 (L) 2.5 - 4.5 mg/dL   ESTIMATED GFR    Collection Time: 12/28/19  4:02 AM   Result Value Ref Range    GFR If African American >60 >60 mL/min/1.73 m 2    GFR If Non African American >60 >60 mL/min/1.73 m 2   ACCU-CHEK GLUCOSE    Collection Time: 12/28/19  9:04 AM   Result Value  Ref Range    Glucose - Accu-Ck 143 (H) 65 - 99 mg/dL       DX-CHEST-LIMITED (1 VIEW)   Final Result      Stable chest with hyperinflation, aortic ectasia but no consolidation      IR-MIDLINE CATHETER INSERTION WO GUIDANCE > AGE 3   Final Result                  Ultrasound-guided midline placement performed by qualified nursing staff    as above.          EC-ECHOCARDIOGRAM LTD W/O CONT   Final Result      DX-CHEST-PORTABLE (1 VIEW)   Final Result         No acute cardiac or pulmonary abnormality is identified.          ECG: QTc 443      ASSESSMENT:   Mr. Tatum is a 59-year-old male with history of uncontrolled diabetes.  Psychiatry was consulted to assess capacity to manage insulin regime.  At this time patient lacks capacity to manage his own medications.  Patient's power of  (his sister) initially thought patient was unable to manage his medications 3.5 years ago and placed him in [facility] where they administer all of his medications.  Now, patient has been admitted for DKA.  Patient was unable to answer appropriately what he would do if he read high or low blood sugars on his glucometer.  Patient unsure of his day diabetes medication regime.  Patient of appeared misinformed about his diabetes and needs to be reeducated.  That being said, patient's last hemoglobin A1c was 6.47 months ago.  Patient denies any diabetic peripheral neuropathy.  Patient denies any visual changes.  Patient's states he follows up with his outpatient provider appropriately.  It appears his medication regime was changed 1 week prior to admission which likely contributed to his DKA.    Capacity is an assessment of a specific ability and the patient's ability to make a decision regarding a specific issue. A determination of incapacity does not have a specific time frame associated with it.      Pt is not capacitated to manage his own insulin regime. In order to hold an incapacitated pt on a medical hold, there must ALSO be an  imminent  risk of death and/or disability to be determined by the treatment team.The final decision to hold or not hold is up to the treatment team.      If there are any questions, please call Risk Management at [phone] and/or consult the [employer] and [place] [employer] literature.       Psych:  1. Unspecified neurocognitive disorder  -Rule out Warneke Korsakoff's  2.  History of alcohol use disorder-in remission.  7 years sober    Medical:  Uncontrolled diabetes-on insulin  Normocytic anemia  Dyslipidemia-on statin  Hypertension-defer to medical team  Leukocytosis-resolving        PLAN:  Legal status: Not applicable    I would recommend patient get reeducated in diabetic management.  Then I would let the patient administer his own insulin and  medication in front of his nurse and assess if he is able to.  It appears patient does have some unspecified neurocognitive disorder which could limit him in this process.  Otherwise, patient needs to live at a place like [facility] where they manage all his insulin regime.  [facility] likely also needs to be reeducated on diabetic management.  At this time it would not be safe for patient to manage his own insulin.    Disposition: Deferred to medical team.    Psych signing off  Thank you for the consult.

## 2019-12-28 NOTE — PROGRESS NOTES
Hospital Medicine Daily Progress Note    Date of Service  12/27/2019    Chief Complaint  Altered mentation    Hospital Course    59 y.o. male admitted 12/19/2019 with DKA and seizures, status post ICU stay.  Now transferred to medical regular nursing floor.  Concern for GI bleeding, worsening cognitive decline.      Interval Problem Update  Patient seen and evaluated on rounds and I discussed with nursing staff on rounds  Does not have any active complaints today  Poor prep this morning  Has no complaints, had some diarrhea  Still having no clear bowel movements  EGD completed  Care conference completed  Discussed with GI  IV iron initiated    Consultants/Specialty  Pulmonology/critical care  Gastroenterology  Palliative care    Code Status  Full code    Disposition  Anticipate will need short-term postacute placement prior to discharge to his group home/assisted living facility  Discussed with /case management  SNF orders placed    Review of Systems  Review of Systems   Constitutional: Positive for malaise/fatigue and weight loss. Negative for chills and fever.   Respiratory: Negative for cough, shortness of breath and stridor.    Cardiovascular: Negative for palpitations and leg swelling.   Gastrointestinal: Negative for abdominal pain and nausea.   Genitourinary: Negative for dysuria.   Musculoskeletal: Negative for back pain and neck pain.   Neurological: Positive for weakness. Negative for headaches.   Psychiatric/Behavioral: Positive for depression. The patient is not nervous/anxious.         Physical Exam  Temp:  [36.2 °C (97.2 °F)-37.4 °C (99.4 °F)] 36.7 °C (98 °F)  Pulse:  [71-87] 84  Resp:  [11-31] 18  BP: ()/(52-71) 95/57  SpO2:  [90 %-97 %] 92 %    Physical Exam  Vitals signs reviewed.   Constitutional:       Appearance: Normal appearance.      Comments: Thin, frail and appears older than stated age   HENT:      Head: Normocephalic and atraumatic.      Nose: Nose normal.       Mouth/Throat:      Mouth: Mucous membranes are dry.      Pharynx: No oropharyngeal exudate.   Eyes:      General:         Right eye: No discharge.         Left eye: No discharge.      Extraocular Movements: Extraocular movements intact.      Conjunctiva/sclera: Conjunctivae normal.   Neck:      Musculoskeletal: No muscular tenderness.   Cardiovascular:      Rate and Rhythm: Normal rate and regular rhythm.      Pulses:           Radial pulses are 2+ on the right side and 2+ on the left side.        Dorsalis pedis pulses are 2+ on the right side and 2+ on the left side.      Heart sounds: Normal heart sounds. No murmur.   Pulmonary:      Effort: Pulmonary effort is normal. No respiratory distress.      Breath sounds: Normal breath sounds. No wheezing.   Abdominal:      General: Abdomen is flat. There is no distension.      Palpations: Abdomen is soft. There is no mass.      Tenderness: There is no tenderness.   Musculoskeletal:      Right lower leg: No edema.      Left lower leg: No edema.      Comments: Atrophy of musculature   Lymphadenopathy:      Cervical: No cervical adenopathy.   Skin:     General: Skin is warm.      Coloration: Skin is pale. Skin is not jaundiced.   Neurological:      General: No focal deficit present.      Mental Status: He is alert and oriented to person, place, and time.      Cranial Nerves: No cranial nerve deficit.   Psychiatric:         Mood and Affect: Mood normal.         Behavior: Behavior normal.       Examination remains unchanged    Fluids    Intake/Output Summary (Last 24 hours) at 12/27/2019 1610  Last data filed at 12/27/2019 0600  Gross per 24 hour   Intake --   Output 750 ml   Net -750 ml       Laboratory  Recent Labs     12/25/19  0331 12/26/19  0246 12/27/19  0240   WBC 12.7* 13.6* 12.2*   RBC 2.43* 2.59* 2.54*   HEMOGLOBIN 7.9* 8.2* 8.1*   HEMATOCRIT 24.1* 25.8* 25.3*   MCV 99.2* 99.6* 99.6*   MCH 32.5 31.7 31.9   MCHC 32.8* 31.8* 32.0*   RDW 57.1* 56.3* 55.4*   PLATELETCT  284 333 277   MPV 10.2 10.1 11.0     Recent Labs     12/25/19  0331 12/26/19  0246 12/27/19  0240   SODIUM 139 139 141   POTASSIUM 3.8 3.7 3.4*   CHLORIDE 108 106 107   CO2 27 26 24   GLUCOSE 131* 112* 63*   BUN 18 13 12   CREATININE 0.79 0.72 0.65   CALCIUM 7.9* 7.8* 7.6*                   Imaging  DX-CHEST-LIMITED (1 VIEW)   Final Result      Stable chest with hyperinflation, aortic ectasia but no consolidation      IR-MIDLINE CATHETER INSERTION WO GUIDANCE > AGE 3   Final Result                  Ultrasound-guided midline placement performed by qualified nursing staff    as above.          EC-ECHOCARDIOGRAM LTD W/O CONT   Final Result      DX-CHEST-PORTABLE (1 VIEW)   Final Result         No acute cardiac or pulmonary abnormality is identified.           Assessment/Plan  Uncontrolled type 1 diabetes mellitus with hyperglycemia (HCC)- (present on admission)  Assessment & Plan  Patient presented with diabetic ketoacidosis which has resolved    Suspect that patient has type 1 diabetes, would be insulin-dependent  On presentation lactic acidosis which could be contributed by metformin    Sister concerned that with patient's underlying cognitive decline, he is unable to completely understand his disease process and manage it appropriately.  He hoards food in his room, or inappropriately use insulin when his blood sugars are high and use sugar packets when his blood sugars are low.  Previously, has been expressed to the sister that patient needs to be able to administer his own insulin for him to be in an assisted living facility/group home situation based on [place] state laws.    I believe that patient would benefit from being on Lantus with sliding scale insulin therapy    Diabetic education pending    Case management has requested consultation from psychiatry to assess patient's capacity with respect to self diabetic management following discharge, this consult has been requested by me    Meanwhile we will continue  Lantus 10 units  We will continue sliding scale insulin therapy    Continue high intensity statin therapy  Aspirin 81  Lisinopril once blood pressure stable    Normocytic anemia- (present on admission)  Assessment & Plan  Component of iron deficiency anemia  GI bleeding, daily rule out as noted above  IV iron replacement initiated, ferric gluconate initiated, closely monitor  Monitor Hb / Restrictive transfusion strategy    Cognitive decline- (present on admission)  Assessment & Plan  Patient Sister Aniyah is patient's POA for both healthcare and financial concerns.    Moderate to severe cognitive decline from underlying Wernicke Korsakoff given profound history of alcoholism and history of multiple TBI's in the past.    Followed by [facility] in the outpatient setting  Previous MRI with atrophy    Speech therapy, cognitive evaluation completed  Avoid sedative, narcotics / BZDs as able  Avoid sedatives  Avoid sedative, narcotics / BZDs as able    Goals of care being discussed with patient, family    Melanotic stools- (present on admission)  Assessment & Plan  Colonoscopy could not be completed because of poor prep  EGD on December 27, 2019 with esophagitis  Continue omeprazole and sucralfate for 2 weeks per GI team  Outpatient follow-up with GI team    Leukocytosis- (present on admission)  Assessment & Plan  Improved initially after a dose of ceftriaxone in the emergency department  No infectious concerns apparent  Stop antibiotics, monitor clinically     Severe protein-calorie malnutrition (HCC)- (present on admission)  Assessment & Plan  Optimize nutrition  Multivitamin support, daily multivitamin and thiamine  Stop Marinol/Remeron as this is causing sedation    Dyslipidemia- (present on admission)  Assessment & Plan  Atorvastatin 40 mg daily with underlying risk factors    Essential hypertension- (present on admission)  Assessment & Plan  Holding anti hypertensive therapy  Resume as clinically appropriate        VTE prophylaxis: SCDs for now    Care conference today, attended by me, case management, patient's family in presence and over the phone and outpatient social workers.  Discussed patient's current plan of care, hospitalization, work-up thus far.  Discussed current CODE STATUS, discussed the process of cardiac resuscitation/potential complications.  Patient already has a POLST form, the status of full CODE STATUS.  Discussed, high risk of cardiac arrest if presentation had been delayed in the setting of DKA.  Further goals of care discussed, at this time patient remains full code.  Hospice consultation requested.  Capacity with respect to decision making for diabetes from psychiatry requested.    Today I personally spent 25 minutes discussing advanced care planning with the patient, patient's family.  Time spent on advanced care planning is in addition to time spent managing, evaluating this medically complex and challenging patient.  Today I am billing [id number], [id number] for my services today.  No overlap in time between these services.

## 2019-12-28 NOTE — PROGRESS NOTES
Assumed care at 1900, report received from day shift RN.  Pt sitting up in bed, eating. Pt is A&O x4, denies any pain at this time. Bed locked and in lowest position. Call light and belongings within reach. Plan of care discussed and whiteboard updated, Pt has no questions at this time

## 2019-12-28 NOTE — CARE PLAN
Problem: Safety  Goal: Will remain free from falls  Outcome: PROGRESSING AS EXPECTED     Problem: Venous Thromboembolism (VTW)/Deep Vein Thrombosis (DVT) Prevention:  Goal: Patient will participate in Venous Thrombosis (VTE)/Deep Vein Thrombosis (DVT)Prevention Measures  Outcome: PROGRESSING AS EXPECTED

## 2019-12-28 NOTE — PROGRESS NOTES
"Dr Hunter called for updates on BP of 87/36 and 88/35. Pt is asymptomatic and upset about being woken up to have repeat vitals taken. MD notified of order for \"do NOT bolus\"; upon review of patient notes, there is no indication why this order was placed on 12/19. MD ordered 250mL NS bolus x1.  "

## 2019-12-28 NOTE — CARE PLAN
Problem: Safety  Goal: Will remain free from falls  Outcome: PROGRESSING AS EXPECTED  Pt reminded to call for assistance before ambulating. Bed is in the lowest/locked position. Call light and belongings are within reach. Hourly rounding in place.     Problem: Pain Management  Goal: Pain level will decrease to patient's comfort goal  Outcome: PROGRESSING AS EXPECTED  Pt c/o no pain at this time

## 2019-12-28 NOTE — CARE PLAN
Problem: Venous Thromboembolism (VTW)/Deep Vein Thrombosis (DVT) Prevention:  Goal: Patient will participate in Venous Thrombosis (VTE)/Deep Vein Thrombosis (DVT)Prevention Measures  Outcome: PROGRESSING AS EXPECTED  Flowsheets (Taken 12/28/2019 0800)  Risk Assessment Score: 1  VTE RISK: Moderate  Pharmacologic Prophylaxis Used: Contraindicated per MD     Problem: Bowel/Gastric:  Goal: Normal bowel function is maintained or improved  Outcome: PROGRESSING AS EXPECTED

## 2019-12-29 LAB
ALBUMIN SERPL BCP-MCNC: 2.6 G/DL (ref 3.2–4.9)
ALBUMIN/GLOB SERPL: 1.2 G/DL
ALP SERPL-CCNC: 90 U/L (ref 30–99)
ALT SERPL-CCNC: 11 U/L (ref 2–50)
ANION GAP SERPL CALC-SCNC: 6 MMOL/L (ref 0–11.9)
AST SERPL-CCNC: 11 U/L (ref 12–45)
BILIRUB SERPL-MCNC: 0.1 MG/DL (ref 0.1–1.5)
BUN SERPL-MCNC: 14 MG/DL (ref 8–22)
CALCIUM SERPL-MCNC: 7.9 MG/DL (ref 8.5–10.5)
CHLORIDE SERPL-SCNC: 107 MMOL/L (ref 96–112)
CO2 SERPL-SCNC: 25 MMOL/L (ref 20–33)
CREAT SERPL-MCNC: 0.87 MG/DL (ref 0.5–1.4)
ERYTHROCYTE [DISTWIDTH] IN BLOOD BY AUTOMATED COUNT: 57.1 FL (ref 35.9–50)
GLOBULIN SER CALC-MCNC: 2.1 G/DL (ref 1.9–3.5)
GLUCOSE BLD-MCNC: 139 MG/DL (ref 65–99)
GLUCOSE BLD-MCNC: 317 MG/DL (ref 65–99)
GLUCOSE BLD-MCNC: 346 MG/DL (ref 65–99)
GLUCOSE BLD-MCNC: 366 MG/DL (ref 65–99)
GLUCOSE SERPL-MCNC: 152 MG/DL (ref 65–99)
HCT VFR BLD AUTO: 26.2 % (ref 42–52)
HGB BLD-MCNC: 8.1 G/DL (ref 14–18)
MAGNESIUM SERPL-MCNC: 1.7 MG/DL (ref 1.5–2.5)
MCH RBC QN AUTO: 31.3 PG (ref 27–33)
MCHC RBC AUTO-ENTMCNC: 30.9 G/DL (ref 33.7–35.3)
MCV RBC AUTO: 101.2 FL (ref 81.4–97.8)
PHOSPHATE SERPL-MCNC: 2.8 MG/DL (ref 2.5–4.5)
PLATELET # BLD AUTO: 406 K/UL (ref 164–446)
PMV BLD AUTO: 9.5 FL (ref 9–12.9)
POTASSIUM SERPL-SCNC: 4.1 MMOL/L (ref 3.6–5.5)
PROT SERPL-MCNC: 4.7 G/DL (ref 6–8.2)
RBC # BLD AUTO: 2.59 M/UL (ref 4.7–6.1)
SODIUM SERPL-SCNC: 138 MMOL/L (ref 135–145)
WBC # BLD AUTO: 12 K/UL (ref 4.8–10.8)

## 2019-12-29 PROCEDURE — 700102 HCHG RX REV CODE 250 W/ 637 OVERRIDE(OP): Performed by: INTERNAL MEDICINE

## 2019-12-29 PROCEDURE — 700111 HCHG RX REV CODE 636 W/ 250 OVERRIDE (IP): Performed by: INTERNAL MEDICINE

## 2019-12-29 PROCEDURE — 80053 COMPREHEN METABOLIC PANEL: CPT

## 2019-12-29 PROCEDURE — 82962 GLUCOSE BLOOD TEST: CPT

## 2019-12-29 PROCEDURE — 83735 ASSAY OF MAGNESIUM: CPT

## 2019-12-29 PROCEDURE — A9270 NON-COVERED ITEM OR SERVICE: HCPCS | Performed by: HOSPITALIST

## 2019-12-29 PROCEDURE — A9270 NON-COVERED ITEM OR SERVICE: HCPCS | Performed by: INTERNAL MEDICINE

## 2019-12-29 PROCEDURE — 85027 COMPLETE CBC AUTOMATED: CPT

## 2019-12-29 PROCEDURE — 700102 HCHG RX REV CODE 250 W/ 637 OVERRIDE(OP): Performed by: HOSPITALIST

## 2019-12-29 PROCEDURE — 84100 ASSAY OF PHOSPHORUS: CPT

## 2019-12-29 PROCEDURE — 700105 HCHG RX REV CODE 258: Performed by: INTERNAL MEDICINE

## 2019-12-29 PROCEDURE — 36415 COLL VENOUS BLD VENIPUNCTURE: CPT

## 2019-12-29 PROCEDURE — 99232 SBSQ HOSP IP/OBS MODERATE 35: CPT | Performed by: INTERNAL MEDICINE

## 2019-12-29 PROCEDURE — 770001 HCHG ROOM/CARE - MED/SURG/GYN PRIV*

## 2019-12-29 PROCEDURE — 770006 HCHG ROOM/CARE - MED/SURG/GYN SEMI*

## 2019-12-29 RX ORDER — MAGNESIUM SULFATE HEPTAHYDRATE 40 MG/ML
4 INJECTION, SOLUTION INTRAVENOUS ONCE
Status: COMPLETED | OUTPATIENT
Start: 2019-12-29 | End: 2019-12-29

## 2019-12-29 RX ORDER — INSULIN GLARGINE 100 [IU]/ML
20 INJECTION, SOLUTION SUBCUTANEOUS EVERY EVENING
Status: DISCONTINUED | OUTPATIENT
Start: 2019-12-29 | End: 2020-01-03

## 2019-12-29 RX ADMIN — INSULIN HUMAN 6 UNITS: 100 INJECTION, SOLUTION PARENTERAL at 12:53

## 2019-12-29 RX ADMIN — DIBASIC SODIUM PHOSPHATE, MONOBASIC POTASSIUM PHOSPHATE AND MONOBASIC SODIUM PHOSPHATE 2 TABLET: 852; 155; 130 TABLET ORAL at 08:53

## 2019-12-29 RX ADMIN — OXYCODONE HYDROCHLORIDE AND ACETAMINOPHEN 500 MG: 500 TABLET ORAL at 05:57

## 2019-12-29 RX ADMIN — ATORVASTATIN CALCIUM 40 MG: 40 TABLET, FILM COATED ORAL at 17:22

## 2019-12-29 RX ADMIN — SODIUM CHLORIDE 250 MG: 9 INJECTION, SOLUTION INTRAVENOUS at 06:14

## 2019-12-29 RX ADMIN — INSULIN HUMAN 6 UNITS: 100 INJECTION, SOLUTION PARENTERAL at 20:33

## 2019-12-29 RX ADMIN — OXYCODONE HYDROCHLORIDE AND ACETAMINOPHEN 500 MG: 500 TABLET ORAL at 17:22

## 2019-12-29 RX ADMIN — THERA TABS 1 TABLET: TAB at 05:58

## 2019-12-29 RX ADMIN — INSULIN HUMAN 8 UNITS: 100 INJECTION, SOLUTION PARENTERAL at 17:20

## 2019-12-29 RX ADMIN — SUCRALFATE 1 G: 1 SUSPENSION ORAL at 17:22

## 2019-12-29 RX ADMIN — TAMSULOSIN HYDROCHLORIDE 0.4 MG: 0.4 CAPSULE ORAL at 05:57

## 2019-12-29 RX ADMIN — SUCRALFATE 1 G: 1 SUSPENSION ORAL at 12:51

## 2019-12-29 RX ADMIN — INSULIN GLARGINE 20 UNITS: 100 INJECTION, SOLUTION SUBCUTANEOUS at 17:20

## 2019-12-29 RX ADMIN — Medication 100 MG: at 05:58

## 2019-12-29 RX ADMIN — OMEPRAZOLE 20 MG: 20 CAPSULE, DELAYED RELEASE ORAL at 05:58

## 2019-12-29 RX ADMIN — SUCRALFATE 1 G: 1 SUSPENSION ORAL at 22:20

## 2019-12-29 RX ADMIN — MAGNESIUM SULFATE IN WATER 4 G: 40 INJECTION, SOLUTION INTRAVENOUS at 10:24

## 2019-12-29 RX ADMIN — PANCRELIPASE 48000 UNITS: 24000; 76000; 120000 CAPSULE, DELAYED RELEASE PELLETS ORAL at 08:45

## 2019-12-29 RX ADMIN — SUCRALFATE 1 G: 1 SUSPENSION ORAL at 05:58

## 2019-12-29 RX ADMIN — OMEPRAZOLE 20 MG: 20 CAPSULE, DELAYED RELEASE ORAL at 17:22

## 2019-12-29 RX ADMIN — ASPIRIN 81 MG: 81 TABLET, COATED ORAL at 05:57

## 2019-12-29 ASSESSMENT — ENCOUNTER SYMPTOMS
CHILLS: 0
FEVER: 0
HEADACHES: 0
STRIDOR: 0
NAUSEA: 0
PALPITATIONS: 0
WEIGHT LOSS: 1
DEPRESSION: 1
SHORTNESS OF BREATH: 0
NECK PAIN: 0
WEAKNESS: 1
NERVOUS/ANXIOUS: 0
BACK PAIN: 0
ABDOMINAL PAIN: 0
COUGH: 0

## 2019-12-29 NOTE — PROGRESS NOTES
Received report, assumed pt care. Pt a&o x 4, VSS, Assessment completed. Resting comfortably in bed with call light, bedside table in reach. No c/o at this time. Pt has scabs on left foot and leg. Sacrum red and blanching. Side rails up x 2. Instructed to use call light when needing to get OOB verbalized understanding. Bed alarm on, bed in low position. Will continue to monitor.

## 2019-12-29 NOTE — PROGRESS NOTES
Hospital Medicine Daily Progress Note    Date of Service  12/29/2019    Chief Complaint  Altered mentation    Hospital Course    59 y.o. male admitted 12/19/2019 with DKA and seizures, status post ICU stay.  Now transferred to medical regular nursing floor.  Concern for GI bleeding, worsening cognitive decline.      Interval Problem Update  Patient seen and evaluated on rounds and I discussed with nursing staff on rounds  Does not have any active complaints today  Psychiatry evaluating   No other complaints  Continue IV iron    Consultants/Specialty  Pulmonology/critical care  Gastroenterology  Palliative care    Code Status  Full code    Disposition  Anticipate will need short-term postacute placement prior to discharge to his group home/assisted living facility  Discussed with /case management  SNF orders placed    Review of Systems  Review of Systems   Constitutional: Positive for malaise/fatigue and weight loss. Negative for chills and fever.   Respiratory: Negative for cough, shortness of breath and stridor.    Cardiovascular: Negative for palpitations and leg swelling.   Gastrointestinal: Negative for abdominal pain and nausea.   Genitourinary: Negative for dysuria.   Musculoskeletal: Negative for back pain and neck pain.   Neurological: Positive for weakness. Negative for headaches.   Psychiatric/Behavioral: Positive for depression. The patient is not nervous/anxious.         Physical Exam  Temp:  [37.2 °C (98.9 °F)-37.5 °C (99.5 °F)] 37.2 °C (98.9 °F)  Pulse:  [77-93] 78  Resp:  [16-18] 16  BP: (113-133)/(55-61) 123/60  SpO2:  [92 %-94 %] 92 %    Physical Exam  Vitals signs reviewed.   Constitutional:       Appearance: Normal appearance.      Comments: Thin, frail and appears older than stated age   HENT:      Head: Normocephalic and atraumatic.      Nose: Nose normal.      Mouth/Throat:      Mouth: Mucous membranes are dry.      Pharynx: No oropharyngeal exudate.   Eyes:      General:          Right eye: No discharge.         Left eye: No discharge.      Extraocular Movements: Extraocular movements intact.      Conjunctiva/sclera: Conjunctivae normal.   Neck:      Musculoskeletal: No muscular tenderness.   Cardiovascular:      Rate and Rhythm: Normal rate and regular rhythm.      Pulses:           Radial pulses are 2+ on the right side and 2+ on the left side.        Dorsalis pedis pulses are 2+ on the right side and 2+ on the left side.      Heart sounds: Normal heart sounds. No murmur.   Pulmonary:      Effort: Pulmonary effort is normal. No respiratory distress.      Breath sounds: Normal breath sounds. No wheezing.   Abdominal:      General: Abdomen is flat. There is no distension.      Palpations: Abdomen is soft. There is no mass.      Tenderness: There is no tenderness.   Musculoskeletal:      Right lower leg: No edema.      Left lower leg: No edema.      Comments: Atrophy of musculature   Lymphadenopathy:      Cervical: No cervical adenopathy.   Skin:     General: Skin is warm.      Coloration: Skin is pale. Skin is not jaundiced.   Neurological:      General: No focal deficit present.      Mental Status: He is alert and oriented to person, place, and time.      Cranial Nerves: No cranial nerve deficit.   Psychiatric:         Mood and Affect: Mood normal.         Behavior: Behavior normal.       Examination remains unchanged    Fluids    Intake/Output Summary (Last 24 hours) at 12/29/2019 1338  Last data filed at 12/29/2019 1258  Gross per 24 hour   Intake 340 ml   Output 2350 ml   Net -2010 ml       Laboratory  Recent Labs     12/27/19  0240 12/28/19  0402 12/29/19  0103   WBC 12.2* 11.1* 12.0*   RBC 2.54* 2.51* 2.59*   HEMOGLOBIN 8.1* 8.0* 8.1*   HEMATOCRIT 25.3* 25.5* 26.2*   MCV 99.6* 101.6* 101.2*   MCH 31.9 31.9 31.3   MCHC 32.0* 31.4* 30.9*   RDW 55.4* 56.7* 57.1*   PLATELETCT 277 310 406   MPV 11.0 9.6 9.5     Recent Labs     12/27/19  0240 12/28/19  0402 12/29/19  0103   SODIUM 141 137  138   POTASSIUM 3.4* 4.1 4.1   CHLORIDE 107 108 107   CO2 24 24 25   GLUCOSE 63* 135* 152*   BUN 12 12 14   CREATININE 0.65 0.74 0.87   CALCIUM 7.6* 7.8* 7.9*                   Imaging  DX-CHEST-LIMITED (1 VIEW)   Final Result      Stable chest with hyperinflation, aortic ectasia but no consolidation      IR-MIDLINE CATHETER INSERTION WO GUIDANCE > AGE 3   Final Result                  Ultrasound-guided midline placement performed by qualified nursing staff    as above.          EC-ECHOCARDIOGRAM LTD W/O CONT   Final Result      DX-CHEST-PORTABLE (1 VIEW)   Final Result         No acute cardiac or pulmonary abnormality is identified.           Assessment/Plan  Uncontrolled type 1 diabetes mellitus with hyperglycemia (HCC)- (present on admission)  Assessment & Plan  Patient presented with diabetic ketoacidosis which has resolved    Suspect that patient has type 1 diabetes, would be insulin-dependent  On presentation lactic acidosis which could be contributed by metformin    Sister concerned that with patient's underlying cognitive decline, he is unable to completely understand his disease process and manage it appropriately.  He hoards food in his room, or inappropriately use insulin when his blood sugars are high and use sugar packets when his blood sugars are low.  Previously, has been expressed to the sister that patient needs to be able to administer his own insulin for him to be in an assisted living facility/group home situation based on [place] state laws.    I believe that patient would benefit from being on Lantus with sliding scale insulin therapy    Continue diabetes education     Case management has requested consultation from psychiatry to assess patient's capacity with respect to self diabetic management following discharge, this consult has been requested by me - appreciate evaluations     Meanwhile we will continue Lantus 20 units  We will continue sliding scale insulin therapy  Titration of insulin  regimen continue at this time by me     Continue high intensity statin therapy  Aspirin 81  Lisinopril once blood pressure stable    Normocytic anemia- (present on admission)  Assessment & Plan  Component of iron deficiency anemia  GI bleeding, daily rule out as noted above  IV iron replacement initiated, ferric gluconate initiated, closely monitor  Monitor Hb / Restrictive transfusion strategy    Cognitive decline- (present on admission)  Assessment & Plan  Patient Sister Aniyah is patient's POA for both healthcare and financial concerns.    Moderate to severe cognitive decline from underlying Wernicke Korsakoff given profound history of alcoholism and history of multiple TBI's in the past.    Followed by [facility] in the outpatient setting  Previous MRI with atrophy    Speech therapy, cognitive evaluation completed  Avoid sedative, narcotics / BZDs as able  Avoid sedatives  Avoid sedative, narcotics / BZDs as able    Goals of care being discussed with patient, family    Melanotic stools- (present on admission)  Assessment & Plan  Colonoscopy could not be completed because of poor prep  EGD on December 27, 2019 with esophagitis  Continue omeprazole and sucralfate for 2 weeks per GI team  Outpatient follow-up with GI team    Leukocytosis- (present on admission)  Assessment & Plan  Improved initially after a dose of ceftriaxone in the emergency department  No infectious concerns apparent  Stop antibiotics, monitor clinically     Severe protein-calorie malnutrition (HCC)- (present on admission)  Assessment & Plan  Optimize nutrition  Multivitamin support, daily multivitamin and thiamine  Stop Marinol/Remeron as this is causing sedation    Dyslipidemia- (present on admission)  Assessment & Plan  Atorvastatin 40 mg daily with underlying risk factors    Essential hypertension- (present on admission)  Assessment & Plan  Holding anti hypertensive therapy  Resume as clinically appropriate       VTE prophylaxis: SCDs  for now

## 2019-12-29 NOTE — PROGRESS NOTES
Assumed care at 1900, report received from day shift RN.  Pt sitting up in bed, awake.Pt is A&O x4, denies any pain at this time. Bed locked and in lowest position. Call light and belongings within reach. Plan of care discussed and whiteboard updated, Pt has no questions at this time.

## 2019-12-29 NOTE — CARE PLAN
Problem: Safety  Goal: Will remain free from falls  Outcome: PROGRESSING AS EXPECTED  Pt calls for assistance before ambulating. Bed is in the lowest/locked position and bed alarm is in place. Belongings and call light are within reach.      Problem: Pain Management  Goal: Pain level will decrease to patient's comfort goal  Outcome: PROGRESSING AS EXPECTED  Pt c/o no pain at this time

## 2019-12-29 NOTE — PROGRESS NOTES
Hospital Medicine Daily Progress Note    Date of Service  12/28/2019    Chief Complaint  Altered mentation    Hospital Course    59 y.o. male admitted 12/19/2019 with DKA and seizures, status post ICU stay.  Now transferred to medical regular nursing floor.  Concern for GI bleeding, worsening cognitive decline.      Interval Problem Update  Patient seen and evaluated on rounds and I discussed with nursing staff on rounds  Does not have any active complaints today  Psychiatry to evaluate the patient today  This morning slightly confused, felt his glucose monitoring device was attached to him  No other complaints  Continue IV iron    Consultants/Specialty  Pulmonology/critical care  Gastroenterology  Palliative care    Code Status  Full code    Disposition  Anticipate will need short-term postacute placement prior to discharge to his group home/assisted living facility  Discussed with /case management  SNF orders placed    Review of Systems  Review of Systems   Constitutional: Positive for malaise/fatigue and weight loss. Negative for chills and fever.   Respiratory: Negative for cough, shortness of breath and stridor.    Cardiovascular: Negative for palpitations and leg swelling.   Gastrointestinal: Negative for abdominal pain and nausea.   Genitourinary: Negative for dysuria.   Musculoskeletal: Negative for back pain and neck pain.   Neurological: Positive for weakness. Negative for headaches.   Psychiatric/Behavioral: Positive for depression. The patient is not nervous/anxious.         Physical Exam  Temp:  [37.2 °C (98.9 °F)-37.4 °C (99.3 °F)] 37.2 °C (99 °F)  Pulse:  [71-86] 86  Resp:  [16-18] 18  BP: ()/(36-63) 113/55  SpO2:  [90 %-93 %] 92 %    Physical Exam  Vitals signs reviewed.   Constitutional:       Appearance: Normal appearance.      Comments: Thin, frail and appears older than stated age   HENT:      Head: Normocephalic and atraumatic.      Nose: Nose normal.      Mouth/Throat:       Mouth: Mucous membranes are dry.      Pharynx: No oropharyngeal exudate.   Eyes:      General:         Right eye: No discharge.         Left eye: No discharge.      Extraocular Movements: Extraocular movements intact.      Conjunctiva/sclera: Conjunctivae normal.   Neck:      Musculoskeletal: No muscular tenderness.   Cardiovascular:      Rate and Rhythm: Normal rate and regular rhythm.      Pulses:           Radial pulses are 2+ on the right side and 2+ on the left side.        Dorsalis pedis pulses are 2+ on the right side and 2+ on the left side.      Heart sounds: Normal heart sounds. No murmur.   Pulmonary:      Effort: Pulmonary effort is normal. No respiratory distress.      Breath sounds: Normal breath sounds. No wheezing.   Abdominal:      General: Abdomen is flat. There is no distension.      Palpations: Abdomen is soft. There is no mass.      Tenderness: There is no tenderness.   Musculoskeletal:      Right lower leg: No edema.      Left lower leg: No edema.      Comments: Atrophy of musculature   Lymphadenopathy:      Cervical: No cervical adenopathy.   Skin:     General: Skin is warm.      Coloration: Skin is pale. Skin is not jaundiced.   Neurological:      General: No focal deficit present.      Mental Status: He is alert and oriented to person, place, and time.      Cranial Nerves: No cranial nerve deficit.   Psychiatric:         Mood and Affect: Mood normal.         Behavior: Behavior normal.       Examination remains unchanged    Fluids    Intake/Output Summary (Last 24 hours) at 12/28/2019 1805  Last data filed at 12/28/2019 1245  Gross per 24 hour   Intake --   Output 1600 ml   Net -1600 ml       Laboratory  Recent Labs     12/26/19  0246 12/27/19  0240 12/28/19  0402   WBC 13.6* 12.2* 11.1*   RBC 2.59* 2.54* 2.51*   HEMOGLOBIN 8.2* 8.1* 8.0*   HEMATOCRIT 25.8* 25.3* 25.5*   MCV 99.6* 99.6* 101.6*   MCH 31.7 31.9 31.9   MCHC 31.8* 32.0* 31.4*   RDW 56.3* 55.4* 56.7*   PLATELETCT 333 277 310   MPV  10.1 11.0 9.6     Recent Labs     12/26/19  0246 12/27/19  0240 12/28/19  0402   SODIUM 139 141 137   POTASSIUM 3.7 3.4* 4.1   CHLORIDE 106 107 108   CO2 26 24 24   GLUCOSE 112* 63* 135*   BUN 13 12 12   CREATININE 0.72 0.65 0.74   CALCIUM 7.8* 7.6* 7.8*                   Imaging  DX-CHEST-LIMITED (1 VIEW)   Final Result      Stable chest with hyperinflation, aortic ectasia but no consolidation      IR-MIDLINE CATHETER INSERTION WO GUIDANCE > AGE 3   Final Result                  Ultrasound-guided midline placement performed by qualified nursing staff    as above.          EC-ECHOCARDIOGRAM LTD W/O CONT   Final Result      DX-CHEST-PORTABLE (1 VIEW)   Final Result         No acute cardiac or pulmonary abnormality is identified.           Assessment/Plan  Uncontrolled type 1 diabetes mellitus with hyperglycemia (HCC)- (present on admission)  Assessment & Plan  Patient presented with diabetic ketoacidosis which has resolved    Suspect that patient has type 1 diabetes, would be insulin-dependent  On presentation lactic acidosis which could be contributed by metformin    Sister concerned that with patient's underlying cognitive decline, he is unable to completely understand his disease process and manage it appropriately.  He hoards food in his room, or inappropriately use insulin when his blood sugars are high and use sugar packets when his blood sugars are low.  Previously, has been expressed to the sister that patient needs to be able to administer his own insulin for him to be in an assisted living facility/group home situation based on [place] state laws.    I believe that patient would benefit from being on Lantus with sliding scale insulin therapy    Continue diabetes education     Case management has requested consultation from psychiatry to assess patient's capacity with respect to self diabetic management following discharge, this consult has been requested by me - appreciate evaluations     Meanwhile we will  continue Lantus 10 units  We will continue sliding scale insulin therapy    Continue high intensity statin therapy  Aspirin 81  Lisinopril once blood pressure stable    Normocytic anemia- (present on admission)  Assessment & Plan  Component of iron deficiency anemia  GI bleeding, daily rule out as noted above  IV iron replacement initiated, ferric gluconate initiated, closely monitor  Monitor Hb / Restrictive transfusion strategy    Cognitive decline- (present on admission)  Assessment & Plan  Patient Sister Aniyah is patient's POA for both healthcare and financial concerns.    Moderate to severe cognitive decline from underlying Wernicke Korsakoff given profound history of alcoholism and history of multiple TBI's in the past.    Follow[facility] by [facility] in the outpatient setting  Previous MRI with atrophy    Speech therapy, cognitive evaluation completed  Avoid sedative, narcotics / BZDs as able  Avoid sedatives  Avoid sedative, narcotics / BZDs as able    Goals of care being discussed with patient, family    Melanotic stools- (present on admission)  Assessment & Plan  Colonoscopy could not be completed because of poor prep  EGD on December 27, 2019 with esophagitis  Continue omeprazole and sucralfate for 2 weeks per GI team  Outpatient follow-up with GI team    Leukocytosis- (present on admission)  Assessment & Plan  Improved initially after a dose of ceftriaxone in the emergency department  No infectious concerns apparent  Stop antibiotics, monitor clinically     Severe protein-calorie malnutrition (HCC)- (present on admission)  Assessment & Plan  Optimize nutrition  Multivitamin support, daily multivitamin and thiamine  Stop Marinol/Remeron as this is causing sedation    Dyslipidemia- (present on admission)  Assessment & Plan  Atorvastatin 40 mg daily with underlying risk factors    Essential hypertension- (present on admission)  Assessment & Plan  Holding anti hypertensive therapy  Resume as clinically  appropriate       VTE prophylaxis: SCDs for now

## 2019-12-30 ENCOUNTER — APPOINTMENT (OUTPATIENT)
Dept: RADIOLOGY | Facility: MEDICAL CENTER | Age: 59
DRG: 637 | End: 2019-12-30
Attending: INTERNAL MEDICINE
Payer: COMMERCIAL

## 2019-12-30 LAB
BACTERIA BLD CULT: NORMAL
BACTERIA BLD CULT: NORMAL
GLUCOSE BLD-MCNC: 152 MG/DL (ref 65–99)
GLUCOSE BLD-MCNC: 220 MG/DL (ref 65–99)
GLUCOSE BLD-MCNC: 274 MG/DL (ref 65–99)
GLUCOSE BLD-MCNC: 340 MG/DL (ref 65–99)
SIGNIFICANT IND 70042: NORMAL
SIGNIFICANT IND 70042: NORMAL
SITE SITE: NORMAL
SITE SITE: NORMAL
SOURCE SOURCE: NORMAL
SOURCE SOURCE: NORMAL

## 2019-12-30 PROCEDURE — A9270 NON-COVERED ITEM OR SERVICE: HCPCS | Performed by: INTERNAL MEDICINE

## 2019-12-30 PROCEDURE — 700105 HCHG RX REV CODE 258: Performed by: INTERNAL MEDICINE

## 2019-12-30 PROCEDURE — 700102 HCHG RX REV CODE 250 W/ 637 OVERRIDE(OP): Performed by: HOSPITALIST

## 2019-12-30 PROCEDURE — 700102 HCHG RX REV CODE 250 W/ 637 OVERRIDE(OP): Performed by: INTERNAL MEDICINE

## 2019-12-30 PROCEDURE — 770001 HCHG ROOM/CARE - MED/SURG/GYN PRIV*

## 2019-12-30 PROCEDURE — 770006 HCHG ROOM/CARE - MED/SURG/GYN SEMI*

## 2019-12-30 PROCEDURE — A9270 NON-COVERED ITEM OR SERVICE: HCPCS | Performed by: HOSPITALIST

## 2019-12-30 PROCEDURE — 99232 SBSQ HOSP IP/OBS MODERATE 35: CPT | Performed by: INTERNAL MEDICINE

## 2019-12-30 PROCEDURE — 700111 HCHG RX REV CODE 636 W/ 250 OVERRIDE (IP): Performed by: INTERNAL MEDICINE

## 2019-12-30 PROCEDURE — 82962 GLUCOSE BLOOD TEST: CPT | Mod: 91

## 2019-12-30 RX ADMIN — SUCRALFATE 1 G: 1 SUSPENSION ORAL at 10:51

## 2019-12-30 RX ADMIN — INSULIN HUMAN 5 UNITS: 100 INJECTION, SOLUTION PARENTERAL at 17:27

## 2019-12-30 RX ADMIN — OXYCODONE HYDROCHLORIDE AND ACETAMINOPHEN 500 MG: 500 TABLET ORAL at 06:38

## 2019-12-30 RX ADMIN — INSULIN HUMAN 6 UNITS: 100 INJECTION, SOLUTION PARENTERAL at 20:36

## 2019-12-30 RX ADMIN — PANCRELIPASE 48000 UNITS: 24000; 76000; 120000 CAPSULE, DELAYED RELEASE PELLETS ORAL at 08:54

## 2019-12-30 RX ADMIN — TAMSULOSIN HYDROCHLORIDE 0.4 MG: 0.4 CAPSULE ORAL at 06:37

## 2019-12-30 RX ADMIN — THERA TABS 1 TABLET: TAB at 06:37

## 2019-12-30 RX ADMIN — Medication 100 MG: at 06:37

## 2019-12-30 RX ADMIN — INSULIN GLARGINE 20 UNITS: 100 INJECTION, SOLUTION SUBCUTANEOUS at 17:28

## 2019-12-30 RX ADMIN — INSULIN HUMAN 3 UNITS: 100 INJECTION, SOLUTION PARENTERAL at 10:51

## 2019-12-30 RX ADMIN — ACETAMINOPHEN 650 MG: 325 TABLET, FILM COATED ORAL at 08:57

## 2019-12-30 RX ADMIN — OMEPRAZOLE 20 MG: 20 CAPSULE, DELAYED RELEASE ORAL at 06:37

## 2019-12-30 RX ADMIN — ATORVASTATIN CALCIUM 40 MG: 40 TABLET, FILM COATED ORAL at 17:27

## 2019-12-30 RX ADMIN — OMEPRAZOLE 20 MG: 20 CAPSULE, DELAYED RELEASE ORAL at 17:27

## 2019-12-30 RX ADMIN — ACETAMINOPHEN 650 MG: 325 TABLET, FILM COATED ORAL at 17:27

## 2019-12-30 RX ADMIN — SUCRALFATE 1 G: 1 SUSPENSION ORAL at 06:37

## 2019-12-30 RX ADMIN — OXYCODONE HYDROCHLORIDE AND ACETAMINOPHEN 500 MG: 500 TABLET ORAL at 17:27

## 2019-12-30 RX ADMIN — INSULIN HUMAN 2 UNITS: 100 INJECTION, SOLUTION PARENTERAL at 06:43

## 2019-12-30 RX ADMIN — SODIUM CHLORIDE 250 MG: 9 INJECTION, SOLUTION INTRAVENOUS at 06:52

## 2019-12-30 RX ADMIN — SUCRALFATE 1 G: 1 SUSPENSION ORAL at 17:27

## 2019-12-30 RX ADMIN — SUCRALFATE 1 G: 1 SUSPENSION ORAL at 23:43

## 2019-12-30 RX ADMIN — ASPIRIN 81 MG: 81 TABLET, COATED ORAL at 06:38

## 2019-12-30 ASSESSMENT — ENCOUNTER SYMPTOMS
CARDIOVASCULAR NEGATIVE: 1
RESPIRATORY NEGATIVE: 1
MYALGIAS: 1
MEMORY LOSS: 1
GASTROINTESTINAL NEGATIVE: 1
WEIGHT LOSS: 1
CHILLS: 0
HEADACHES: 1
WEAKNESS: 1
EYES NEGATIVE: 1
FEVER: 0

## 2019-12-30 NOTE — PROGRESS NOTES
2 RN skin check complete.  Small scab noted to right mid back. No drainage.  Scattered scabbing and bruising noted to BLE.

## 2019-12-30 NOTE — DISCHARGE PLANNING
Anticipated Discharge Disposition: TBD    Action: Discussed pt in IDT rounds. Per MD, pt has been deemed incompetent to make medical decisions. Pt unable to Dc to previous assisted living facility with self administration on insulin injections r/t incompetent state. MD to F/U with psych to determine pt ability to care for self.    Barriers to Discharge: placement r/t medical incompetent diagnosis     Plan: F/U with psych r/t DC recommendations      Length of Stay: 11

## 2019-12-30 NOTE — CARE PLAN
Problem: Safety  Goal: Will remain free from falls  Outcome: PROGRESSING AS EXPECTED  Intervention: Implement fall precautions  Flowsheets  Taken 12/29/2019 0855  Environmental Precautions: Treaded Slipper Socks on Patient;Personal Belongings, Wastebasket, Call Bell etc. in Easy Reach;Transferred to Stronger Side;Report Given to Other Health Care Providers Regarding Fall Risk;Bed in Low Position;Communication Sign for Patients & Families;Mobility Assessed & Appropriate Sign Placed  Taken 12/29/2019 1607  Chair/Bed Strip Alarm: Yes - Alarm On     Problem: Psychosocial Needs:  Goal: Level of anxiety will decrease  Outcome: PROGRESSING AS EXPECTED  Note:   Light turned low,noise minimized to relieve pt anxiety and promote rest. Pt calm and cooperative.

## 2019-12-30 NOTE — DISCHARGE PLANNING
Agency/Facility Name: [facility]  Spoke To: Correspondence  Outcome: Patient declined due to non contracted insurance provider.

## 2019-12-30 NOTE — PROGRESS NOTES
Pt arrived on unit at 2315 via [facility] with transport.  Ambulated from [facility] to hospital bed with SBA. Steady gait observed.  AA&Ox4. Denies any pain.   Pt oriented to unit, room, and introduced to staff.  All needs met at this time. Call light within reach.

## 2019-12-30 NOTE — PROGRESS NOTES
Assumed care at 1900, report received from day shift RN.  Pt sitting up in bed, awake. Pt is A&O x4, denies any pain at this time. Bed locked and in lowest position. Call light and belongings within reach. Plan of care discussed and whiteboard updated, Pt has no questions at this time

## 2019-12-30 NOTE — CARE PLAN
Problem: Safety  Goal: Will remain free from falls  Outcome: PROGRESSING AS EXPECTED  Pt reminded to call for assistance before ambulating. Bed is in the lowest/locked position, bed alarm on. Pt room is free of clutter     Problem: Venous Thromboembolism (VTW)/Deep Vein Thrombosis (DVT) Prevention:  Goal: Patient will participate in Venous Thrombosis (VTE)/Deep Vein Thrombosis (DVT)Prevention Measures  Outcome: PROGRESSING AS EXPECTED  SCD's in place for mechanical prophylaxis

## 2019-12-30 NOTE — PROGRESS NOTES
"Hospital Medicine Daily Progress Note    Date of Service  12/30/2019    Chief Complaint  59 y.o. male admitted 12/19/2019 with shortness of breath    Hospital Course    Mr. Tatum is a 59-year-old male with a PMH of HTN, DM 2, admitted on 12/19/2019 due to increasing shortness of breath.  Patient reports his been living in an [facility] and brought by EMS due to acute moderate shortness of breath which began earlier during admission.  While in ER, EKG was obtained and found to have borderline abnormalities, and sugar levels at 442.  Patient reports that his DM 2 medications were switched approximately a week ago prior to admission, and has been hyperglycemic ever since.  Assisted living staff reports patient having poor appetite, having some weakness, confusion, and looking pale which started approximately 1 week prior to admission.  There were no reports of nausea, no chest pain.  Initial history could not be obtained in ER due to patient's critical condition at that time.  Patient was then admitted into the critical care unit as patient was found to be in DKA with altered mental status.  During his course of stay, pulmonology/intensivist team was consulted to oversee patient's care in the ICU and management of DKA.  While in the ICU, patient's hemoglobin dropped to 6.9 and was transfused 1 unit of blood, along with GI MD consultation.  An upper endoscopy was eventually performed on 12/27 with Dr. Villarreal, with findings reported of severe erosive esophagitis, LA grade D from 30 cm to 41 cm along with a 4 cm hiatal hernia.  On 12/28, psychiatry was consulted with assessment of \"INCAPACITATED to manage his DM on his own\". Patient was later transferred to the surgical floor for further recovery and monitoring.      Interval Problem Update  Patient seen and examined this morning with complaints of severe right forearm pain secondary to IV infiltration of iron infusion.  Bedside RN aware and currently on none pharmacologic " intervention to alleviate pain.  Patient pending placement back to assisted living. Discussed plan of care with patient.  Fasting blood sugar this morning at 152. HgB at 8.1.  All other levels unremarkable with no significant changes from previous results.  VSS overnight.    Consultants/Specialty  Psychiatry  Pulmonology/intensivist -signed off  Gastroenterology -Dr. Villarreal    Code Status  Full    Disposition  TBD    Review of Systems  Review of Systems   Constitutional: Positive for malaise/fatigue and weight loss. Negative for chills and fever.   HENT: Negative.    Eyes: Negative.    Respiratory: Negative.    Cardiovascular: Negative.    Gastrointestinal: Negative.    Genitourinary: Negative.    Musculoskeletal: Positive for myalgias.   Skin: Negative.    Neurological: Positive for weakness and headaches.   Endo/Heme/Allergies: Negative.    Psychiatric/Behavioral: Positive for memory loss.        Physical Exam  Temp:  [36.8 °C (98.2 °F)-37.2 °C (99 °F)] 36.8 °C (98.2 °F)  Pulse:  [66-84] 66  Resp:  [16-18] 17  BP: (104-146)/(57-69) 146/68  SpO2:  [91 %-98 %] 98 %    Physical Exam  Vitals signs and nursing note reviewed.   HENT:      Head: Normocephalic.      Nose: Nose normal.      Mouth/Throat:      Mouth: Mucous membranes are moist.      Pharynx: Oropharynx is clear.   Eyes:      Pupils: Pupils are equal, round, and reactive to light.   Neck:      Musculoskeletal: Normal range of motion.   Cardiovascular:      Rate and Rhythm: Normal rate and regular rhythm.      Pulses: Normal pulses.      Heart sounds: Normal heart sounds.   Pulmonary:      Effort: Pulmonary effort is normal.      Breath sounds: Normal breath sounds.   Abdominal:      General: Abdomen is flat. Bowel sounds are normal.   Musculoskeletal:      Comments: RIGHT forearm pain d/t IV infiltration   Skin:     General: Skin is warm.      Capillary Refill: Capillary refill takes less than 2 seconds.      Findings: Erythema present.   Neurological:       Mental Status: He is alert. Mental status is at baseline.      Motor: Weakness present.         Fluids    Intake/Output Summary (Last 24 hours) at 12/30/2019 1043  Last data filed at 12/30/2019 0800  Gross per 24 hour   Intake 1500 ml   Output 2500 ml   Net -1000 ml       Laboratory  Recent Labs     12/28/19  0402 12/29/19  0103   WBC 11.1* 12.0*   RBC 2.51* 2.59*   HEMOGLOBIN 8.0* 8.1*   HEMATOCRIT 25.5* 26.2*   .6* 101.2*   MCH 31.9 31.3   MCHC 31.4* 30.9*   RDW 56.7* 57.1*   PLATELETCT 310 406   MPV 9.6 9.5     Recent Labs     12/28/19  0402 12/29/19  0103   SODIUM 137 138   POTASSIUM 4.1 4.1   CHLORIDE 108 107   CO2 24 25   GLUCOSE 135* 152*   BUN 12 14   CREATININE 0.74 0.87   CALCIUM 7.8* 7.9*                   Imaging  IR-US GUIDED PIV   Final Result    Ultrasound-guided PERIPHERAL IV INSERTION performed by    qualified nursing staff as above.            DX-CHEST-LIMITED (1 VIEW)   Final Result      Stable chest with hyperinflation, aortic ectasia but no consolidation      IR-MIDLINE CATHETER INSERTION WO GUIDANCE > AGE 3   Final Result                  Ultrasound-guided midline placement performed by qualified nursing staff    as above.          EC-ECHOCARDIOGRAM LTD W/O CONT   Final Result      DX-CHEST-PORTABLE (1 VIEW)   Final Result         No acute cardiac or pulmonary abnormality is identified.           Assessment/Plan  Uncontrolled type 1 diabetes mellitus with hyperglycemia (HCC)- (present on admission)  Assessment & Plan  -Patient presented with diabetic ketoacidosis which has resolved    -Suspect that patient has type 1 diabetes, would be insulin-dependent  On presentation lactic acidosis which could be contributed by metformin    -Sister concerned that with patient's underlying cognitive decline, he is unable to completely understand his disease process and manage it appropriately.  He hoards food in his room, or inappropriately use insulin when his blood sugars are high and use sugar  "packets when his blood sugars are low.  Previously, has been expressed to the sister that patient needs to be able to administer his own insulin for him to be in an assisted living facility/group home situation based on [place] state laws.    -Patient would benefit from being on Lantus with sliding scale insulin therapy    -Continue diabetes education     -Case management has requested consultation from psychiatry to assess patient's capacity with respect to self diabetic management following discharge, this consult has been requested - 12/28 per psychiatry: \"INCAPACITATED to manage his DM on his own\"    -Continue Lantus 20 units  We will continue sliding scale insulin therapy  Titration of insulin regimen continue at this time     Continue high intensity statin therapy  Aspirin 81  Lisinopril once blood pressure stable    Normocytic anemia- (present on admission)  Assessment & Plan  -Component of iron deficiency anemia - on IV Iron  -GI bleeding, daily rule out as noted above  -IV iron replacement initiated, ferric gluconate initiated, closely monitor  Monitor Hgb / Restrictive transfusion strategy    Cognitive decline- (present on admission)  Assessment & Plan  -Patient Sister Aniyah is patient's POA for both healthcare and financial concerns.    -Moderate to severe cognitive decline from underlying Wernicke Korsakoff given profound history of alcoholism and history of multiple TBI's in the past.    -Follow[facility] in the outpatient setting  Previous MRI with atrophy    -therapy, cognitive evaluation completed  Avoid sedative, narcotics / BZDs as able  Avoid sedatives  Avoid sedative, narcotics / BZDs as able    -Goals of care being discussed with patient, family    Possibly to go back to assisted living facility      Melanotic stools- (present on admission)  Assessment & Plan  -Colonoscopy could not be completed because of poor prep  -EGD on 12/27 with erosive esophogitis LA grade D  - Dr. " Phil  -Continue omeprazole and sucralfate for 2 weeks per GI team  -Outpatient follow-up with GI team    Leukocytosis- (present on admission)  Assessment & Plan  -Improved initially after a dose of ceftriaxone in the emergency department  -No infectious concerns apparent  -Stop antibiotics, monitor clinically     Severe protein-calorie malnutrition (HCC)- (present on admission)  Assessment & Plan  -Optimize nutrition  -Multivitamin support, daily multivitamin and thiamine  -Stop Marinol/Remeron as this is causing sedation    Dyslipidemia- (present on admission)  Assessment & Plan  -Atorvastatin 40 mg daily with underlying risk factors    Essential hypertension- (present on admission)  Assessment & Plan  -Holding anti hypertensive therapy  -Resume as clinically appropriate       VTE prophylaxis: SCDs  -------------------------------------------------------------------------------------------------------------------------------------------------------------------------------------------------------------------------------------------------  Electronically signed by:  CORAL Jon, MSN, APRN, FNP-C  Hospitalist Services  [facility]  [phone]  [email or web address]  12/30/19   0025

## 2019-12-30 NOTE — PROGRESS NOTES
Pt transferring to T412-2, report given to KELLEY Mijares. Pt is A&Ox4, on room air. Walked to [facility] and is being escorted by a member of transport staff. Chart, pt belongings and medications sent with transport staff.

## 2019-12-30 NOTE — PROGRESS NOTES
Patient transferred to [facility]/2 - verbal sign out given to assigned hospitalist.     Bao Pace M.D.  12/30/19  7:52 AM

## 2019-12-30 NOTE — DISCHARGE PLANNING
Received Choice form at 0900  Agency/Facility Name: [facility]  Referral sent per Choice form @ 0900

## 2019-12-31 PROBLEM — A04.8 HELICOBACTER PYLORI (H. PYLORI) INFECTION: Status: ACTIVE | Noted: 2019-12-31

## 2019-12-31 LAB
ANION GAP SERPL CALC-SCNC: 8 MMOL/L (ref 0–11.9)
BUN SERPL-MCNC: 14 MG/DL (ref 8–22)
CALCIUM SERPL-MCNC: 8.2 MG/DL (ref 8.5–10.5)
CHLORIDE SERPL-SCNC: 104 MMOL/L (ref 96–112)
CO2 SERPL-SCNC: 25 MMOL/L (ref 20–33)
CREAT SERPL-MCNC: 0.7 MG/DL (ref 0.5–1.4)
ERYTHROCYTE [DISTWIDTH] IN BLOOD BY AUTOMATED COUNT: 55.3 FL (ref 35.9–50)
GLUCOSE BLD-MCNC: 108 MG/DL (ref 65–99)
GLUCOSE BLD-MCNC: 230 MG/DL (ref 65–99)
GLUCOSE BLD-MCNC: 236 MG/DL (ref 65–99)
GLUCOSE SERPL-MCNC: 134 MG/DL (ref 65–99)
HCT VFR BLD AUTO: 27.4 % (ref 42–52)
HGB BLD-MCNC: 8.8 G/DL (ref 14–18)
MCH RBC QN AUTO: 32.1 PG (ref 27–33)
MCHC RBC AUTO-ENTMCNC: 32.1 G/DL (ref 33.7–35.3)
MCV RBC AUTO: 100 FL (ref 81.4–97.8)
PLATELET # BLD AUTO: 450 K/UL (ref 164–446)
PMV BLD AUTO: 9.1 FL (ref 9–12.9)
POTASSIUM SERPL-SCNC: 3.8 MMOL/L (ref 3.6–5.5)
RBC # BLD AUTO: 2.74 M/UL (ref 4.7–6.1)
SODIUM SERPL-SCNC: 137 MMOL/L (ref 135–145)
WBC # BLD AUTO: 24.9 K/UL (ref 4.8–10.8)

## 2019-12-31 PROCEDURE — 82962 GLUCOSE BLOOD TEST: CPT | Mod: 91

## 2019-12-31 PROCEDURE — 99233 SBSQ HOSP IP/OBS HIGH 50: CPT | Performed by: INTERNAL MEDICINE

## 2019-12-31 PROCEDURE — A9270 NON-COVERED ITEM OR SERVICE: HCPCS | Performed by: INTERNAL MEDICINE

## 2019-12-31 PROCEDURE — 85027 COMPLETE CBC AUTOMATED: CPT

## 2019-12-31 PROCEDURE — 700102 HCHG RX REV CODE 250 W/ 637 OVERRIDE(OP): Performed by: HOSPITALIST

## 2019-12-31 PROCEDURE — 770001 HCHG ROOM/CARE - MED/SURG/GYN PRIV*

## 2019-12-31 PROCEDURE — 770006 HCHG ROOM/CARE - MED/SURG/GYN SEMI*

## 2019-12-31 PROCEDURE — 36415 COLL VENOUS BLD VENIPUNCTURE: CPT

## 2019-12-31 PROCEDURE — 700102 HCHG RX REV CODE 250 W/ 637 OVERRIDE(OP): Performed by: NURSE PRACTITIONER

## 2019-12-31 PROCEDURE — 700105 HCHG RX REV CODE 258: Performed by: INTERNAL MEDICINE

## 2019-12-31 PROCEDURE — 700102 HCHG RX REV CODE 250 W/ 637 OVERRIDE(OP): Performed by: INTERNAL MEDICINE

## 2019-12-31 PROCEDURE — 80048 BASIC METABOLIC PNL TOTAL CA: CPT

## 2019-12-31 PROCEDURE — A9270 NON-COVERED ITEM OR SERVICE: HCPCS | Performed by: HOSPITALIST

## 2019-12-31 PROCEDURE — 700111 HCHG RX REV CODE 636 W/ 250 OVERRIDE (IP): Performed by: INTERNAL MEDICINE

## 2019-12-31 PROCEDURE — A9270 NON-COVERED ITEM OR SERVICE: HCPCS | Performed by: NURSE PRACTITIONER

## 2019-12-31 RX ORDER — DOXYCYCLINE 100 MG/1
100 TABLET ORAL EVERY 12 HOURS
Status: DISCONTINUED | OUTPATIENT
Start: 2019-12-31 | End: 2020-01-06 | Stop reason: HOSPADM

## 2019-12-31 RX ORDER — METRONIDAZOLE 500 MG/1
250 TABLET ORAL EVERY 8 HOURS
Status: DISCONTINUED | OUTPATIENT
Start: 2019-12-31 | End: 2020-01-06 | Stop reason: HOSPADM

## 2019-12-31 RX ORDER — SODIUM CHLORIDE 9 MG/ML
INJECTION, SOLUTION INTRAVENOUS
Status: ACTIVE
Start: 2019-12-31 | End: 2019-12-31

## 2019-12-31 RX ORDER — BISMUTH SUBSALICYLATE 262 MG/1
524 TABLET, CHEWABLE ORAL EVERY 6 HOURS
Status: DISCONTINUED | OUTPATIENT
Start: 2019-12-31 | End: 2020-01-06 | Stop reason: HOSPADM

## 2019-12-31 RX ADMIN — SUCRALFATE 1 G: 1 SUSPENSION ORAL at 23:43

## 2019-12-31 RX ADMIN — SUCRALFATE 1 G: 1 SUSPENSION ORAL at 06:17

## 2019-12-31 RX ADMIN — INSULIN GLARGINE 20 UNITS: 100 INJECTION, SOLUTION SUBCUTANEOUS at 17:48

## 2019-12-31 RX ADMIN — BISMUTH SUBSALICYLATE 524 MG: 262 TABLET, CHEWABLE ORAL at 20:44

## 2019-12-31 RX ADMIN — METRONIDAZOLE 250 MG: 500 TABLET ORAL at 14:33

## 2019-12-31 RX ADMIN — THERA TABS 1 TABLET: TAB at 06:18

## 2019-12-31 RX ADMIN — DOXYCYCLINE 100 MG: 100 TABLET, FILM COATED ORAL at 17:43

## 2019-12-31 RX ADMIN — PANCRELIPASE 48000 UNITS: 24000; 76000; 120000 CAPSULE, DELAYED RELEASE PELLETS ORAL at 08:11

## 2019-12-31 RX ADMIN — ATORVASTATIN CALCIUM 40 MG: 40 TABLET, FILM COATED ORAL at 17:43

## 2019-12-31 RX ADMIN — OXYCODONE HYDROCHLORIDE AND ACETAMINOPHEN 500 MG: 500 TABLET ORAL at 06:19

## 2019-12-31 RX ADMIN — INSULIN HUMAN 3 UNITS: 100 INJECTION, SOLUTION PARENTERAL at 11:59

## 2019-12-31 RX ADMIN — ACETAMINOPHEN 650 MG: 325 TABLET, FILM COATED ORAL at 13:12

## 2019-12-31 RX ADMIN — TAMSULOSIN HYDROCHLORIDE 0.4 MG: 0.4 CAPSULE ORAL at 06:19

## 2019-12-31 RX ADMIN — OMEPRAZOLE 20 MG: 20 CAPSULE, DELAYED RELEASE ORAL at 06:19

## 2019-12-31 RX ADMIN — ACETAMINOPHEN 650 MG: 325 TABLET, FILM COATED ORAL at 20:44

## 2019-12-31 RX ADMIN — Medication 100 MG: at 06:18

## 2019-12-31 RX ADMIN — INSULIN LISPRO 5 UNITS: 100 INJECTION, SOLUTION INTRAVENOUS; SUBCUTANEOUS at 17:47

## 2019-12-31 RX ADMIN — BISMUTH SUBSALICYLATE 524 MG: 262 TABLET, CHEWABLE ORAL at 14:53

## 2019-12-31 RX ADMIN — SODIUM CHLORIDE 250 MG: 9 INJECTION, SOLUTION INTRAVENOUS at 07:35

## 2019-12-31 RX ADMIN — SUCRALFATE 1 G: 1 SUSPENSION ORAL at 12:02

## 2019-12-31 RX ADMIN — METRONIDAZOLE 250 MG: 500 TABLET ORAL at 20:44

## 2019-12-31 RX ADMIN — ASPIRIN 81 MG: 81 TABLET, COATED ORAL at 06:18

## 2019-12-31 RX ADMIN — SUCRALFATE 1 G: 1 SUSPENSION ORAL at 17:44

## 2019-12-31 RX ADMIN — OMEPRAZOLE 20 MG: 20 CAPSULE, DELAYED RELEASE ORAL at 17:43

## 2019-12-31 ASSESSMENT — ENCOUNTER SYMPTOMS
FEVER: 0
COUGH: 0
VOMITING: 0
NAUSEA: 0
MYALGIAS: 0

## 2019-12-31 NOTE — CARE PLAN
Problem: Safety  Goal: Will remain free from falls  Outcome: PROGRESSING AS EXPECTED  Note:   Pt moderate fall risk per girma reece.  Bed alarm in place, pt calls appropriately at this time, call light within reach

## 2019-12-31 NOTE — PSYCHIATRY
"Patient not seen. Consult is being canceled at this time. See reason below.     Reason for consult: \"re-evaluate please; wanting to send back to [facility], but unable to go d/t incapacitation dx on 12/28\"     As per psychiatrist Dr Tong, who evaluated pt for capacity to manage his DM on his own, she deemed pt to be incapacitated to manage his DM. As per her note, she stated that  pt was placed in an assisted living because he was unable to manage his DM.  He was living at \"[facility]\" for the past 3 1/2 years, which means that this is a chronic issue for this patient. Dr Tong questions in her note if the patient has the ability to learn his DM.    I discussed the case with SHAYAN Landon on 12/30. Patient has not received education or training to see if he has the ability to manage his DM. Please ensure that patient receives education and training on management of DM, and re-consult if needed.    Thank you.             "

## 2019-12-31 NOTE — PROGRESS NOTES
Pt is A&O 4  Pain well controlled at this time, pt receiving heat packs and elevation to infiltration on R arm.  Arm has 2+ edema, and some areas of redness  Denies nausea  Tolerating Diet diabetic.  Pt medicated per MAR with insulin  + Urine Void   + Flatus  - BM Void  Up standby   Bed alarm on, pt moderate fall risk per girma reece  Reviewed plan of care with patient, bed in lowest position and locked, pt resting comfortably now, call light within reach, all needs met at this time

## 2019-12-31 NOTE — ASSESSMENT & PLAN NOTE
Discussed with GI and infectious disease  Based on unknown prevalence of clarithromycin resistance will proceed with quadruple therapy x14 days  Outpatient GI follow-up

## 2019-12-31 NOTE — PROGRESS NOTES
Bedside report received.  Assessment complete.  A&O x 4. Patient calls appropriately.  Patient mobilizes with stand by assist. Bed alarm on.   Patient has 0/10 pain. Declines pain and interventions at this time.  Denies N&V. Tolerating diabetic diet.  Scabbing to lower back  + void, + flatus, - BM.  Patient denies SOB.  SCD's off.  Review plan with of care with patient. Call light and personal belongings with in reach. Hourly rounding in place. All needs met at this time.

## 2019-12-31 NOTE — CARE PLAN
Problem: Safety  Goal: Will remain free from falls  Outcome: PROGRESSING AS EXPECTED   Fall precautions in place  Problem: Skin Integrity  Goal: Risk for impaired skin integrity will decrease  Outcome: PROGRESSING AS EXPECTED   Patient educated about turning frequently  Problem: Pain Management  Goal: Pain level will decrease to patient's comfort goal  Outcome: PROGRESSING AS EXPECTED   Pain is better controlled with heat packs and medication per MAR

## 2019-12-31 NOTE — CARE PLAN
Problem: Safety  Goal: Will remain free from falls  Outcome: PROGRESSING AS EXPECTED  Patient educated to call before mobilizing, call light and belongings within reach, bed locked and in lowest position.       Problem: Skin Integrity  Goal: Risk for impaired skin integrity will decrease  Outcome: PROGRESSING AS EXPECTED  Patient is turning himself in bed.

## 2019-12-31 NOTE — CARE PLAN
Problem: Bowel/Gastric:  Goal: Normal bowel function is maintained or improved  Outcome: PROGRESSING AS EXPECTED  Note:   Pt tolerating diabetic regular diet.  Hyperactive bowel sounds, +flatus, -BM, -nausea.  Last BM on 12/29

## 2019-12-31 NOTE — PROGRESS NOTES
Hospital Medicine Daily Progress Note    Date of Service  12/31/2019    Chief Complaint  59 y.o. male admitted 12/19/2019 with shortness of breath    Hospital Course    Mr. Tatum is a 59-year-old male with a PMH of HTN, DM 2, admitted on 12/19/2019 due to increasing shortness of breath.  Glucose was 442.  Patient reports that his DM 2 medications were altered recently and he has been hyperglycemic ever since.  [facility] staff report poor appetite, weakness, confusion, and palor which started approximately 1 week prior to admission. Patient was found to be in DKA with altered mental status.  While in the ICU, patient's hemoglobin dropped to 6.9 and was transfused. An upper endoscopy was eventually performed on 12/27 with Dr. Villarreal, with findings reported of severe erosive esophagitis, LA grade D from 30 cm to 41 cm along with a 4 cm hiatal hernia. Path suggested possible H. Pylori infection.  On 12/28, psychiatry was consulted and felt he was unable to manage his DM on his own. Patient was later transferred to the surgical floor for further recovery and monitoring.      Interval Problem Update  No events overnight  T-max 99.8, P 64, RR 17, 92% on RA, 128/67  Significant jump in WBCs to 24.9  H&H 8.8/27.4,   Platelets 450  Glucose 134  Patient AxO x4 but not able to verbalize risks and benefits of missing insulin therapy  Records indicate A1c was recently 6.9 prior to changing of his medication regimen  [facility] facility has a staff person to remind him to check and administer his own insulin      Consultants/Specialty  Psychiatry  Gastroenterology -Dr. Villarreal    Code Status  Full    Disposition  TBD    Review of Systems  Review of Systems   Constitutional: Negative for fever.   Respiratory: Negative for cough.    Cardiovascular: Negative for chest pain.   Gastrointestinal: Negative for nausea and vomiting.   Genitourinary: Negative for dysuria.   Musculoskeletal: Negative for myalgias.   Skin: Negative for rash.    All other systems reviewed and are negative.       Physical Exam  Temp:  [36.5 °C (97.7 °F)-37.7 °C (99.8 °F)] 37.1 °C (98.8 °F)  Pulse:  [64-94] 64  Resp:  [16-18] 17  BP: (116-128)/(58-70) 128/67  SpO2:  [92 %-95 %] 92 %    Physical Exam  Vitals signs and nursing note reviewed.   HENT:      Head: Normocephalic and atraumatic.      Nose: Nose normal.   Eyes:      Conjunctiva/sclera: Conjunctivae normal.      Pupils: Pupils are equal, round, and reactive to light.   Neck:      Musculoskeletal: Neck supple.   Cardiovascular:      Rate and Rhythm: Normal rate and regular rhythm.      Heart sounds: No murmur. No friction rub.   Pulmonary:      Effort: No respiratory distress.   Abdominal:      General: Bowel sounds are normal. There is no distension.      Palpations: Abdomen is soft.   Skin:     General: Skin is warm and dry.   Neurological:      Mental Status: He is alert and oriented to person, place, and time.      Comments: Struggled to verbalize risks of skipping insulin         Fluids    Intake/Output Summary (Last 24 hours) at 12/31/2019 1345  Last data filed at 12/31/2019 0920  Gross per 24 hour   Intake 420 ml   Output 2125 ml   Net -1705 ml       Laboratory  Recent Labs     12/29/19  0103 12/31/19  0436   WBC 12.0* 24.9*   RBC 2.59* 2.74*   HEMOGLOBIN 8.1* 8.8*   HEMATOCRIT 26.2* 27.4*   .2* 100.0*   MCH 31.3 32.1   MCHC 30.9* 32.1*   RDW 57.1* 55.3*   PLATELETCT 406 450*   MPV 9.5 9.1     Recent Labs     12/29/19  0103 12/31/19  0436   SODIUM 138 137   POTASSIUM 4.1 3.8   CHLORIDE 107 104   CO2 25 25   GLUCOSE 152* 134*   BUN 14 14   CREATININE 0.87 0.70   CALCIUM 7.9* 8.2*                   Imaging  IR-US GUIDED PIV   Final Result    Ultrasound-guided PERIPHERAL IV INSERTION performed by    qualified nursing staff as above.            DX-CHEST-LIMITED (1 VIEW)   Final Result      Stable chest with hyperinflation, aortic ectasia but no consolidation      IR-MIDLINE CATHETER INSERTION WO GUIDANCE >  "AGE 3   Final Result                  Ultrasound-guided midline placement performed by qualified nursing staff    as above.          EC-ECHOCARDIOGRAM LTD W/O CONT   Final Result      DX-CHEST-PORTABLE (1 VIEW)   Final Result         No acute cardiac or pulmonary abnormality is identified.           Assessment/Plan  Uncontrolled type 1 diabetes mellitus with hyperglycemia (HCC)- (present on admission)  Assessment & Plan  Largely resolved  Continue diabetes education   Case management has requested consultation from psychiatry to assess patient's capacity with respect to self diabetic management following discharge, this consult has been requested - 12/28 per psychiatry: \"INCAPACITATED to manage his DM on his own\"  Continue Lantus 20 units  DC sliding scale and give mealtime dose only to simplify regimen  Continue high intensity statin therapy  Aspirin 81  Lisinopril once blood pressure stable    Helicobacter pylori (H. pylori) infection  Assessment & Plan  Discussed with GI and infectious disease  Based on unknown prevalence of clarithromycin resistance will proceed with quadruple therapy x14 days  Outpatient GI follow-up    Normocytic anemia- (present on admission)  Assessment & Plan  -Component of iron deficiency anemia - on IV Iron  -GI bleeding, daily rule out as noted above  -IV iron replacement initiated, ferric gluconate initiated, closely monitor  Monitor Hgb / Restrictive transfusion strategy    Cognitive decline- (present on admission)  Assessment & Plan  -Patient Sister Aniyah is patient's POA for both healthcare and financial concerns.    -Moderate to severe cognitive decline from underlying Wernicke Korsakoff given profound history of alcoholism and history of multiple TBI's in the past.    -Followed [facility] in the outpatient setting  Previous MRI with atrophy    -therapy, cognitive evaluation completed  Avoid sedative, narcotics / BZDs as able  Avoid sedatives  Avoid sedative, narcotics / " BZDs as able    -Goals of care being discussed with patient, family    Possibly to go back to assisted living facility    -Sister concerned that with patient's underlying cognitive decline, he is unable to completely understand his disease process and manage it appropriately.  He hoards food in his room, or inappropriately use insulin when his blood sugars are high and use sugar packets when his blood sugars are low.  Previously, has been expressed to the sister that patient needs to be able to administer his own insulin for him to be in an assisted living facility/group home situation based on [place] state laws.        Melanotic stools- (present on admission)  Assessment & Plan  -Colonoscopy could not be completed because of poor prep  -EGD on 12/27 with erosive esophogitis LA grade D  - Dr. Villarreal  -Continue omeprazole and sucralfate for 2 weeks per GI team  -Outpatient follow-up with GI team    Leukocytosis- (present on admission)  Assessment & Plan  -Improved initially after a dose of ceftriaxone in the emergency department  -No infectious concerns apparent  -Stop antibiotics, monitor clinically     Severe protein-calorie malnutrition (HCC)- (present on admission)  Assessment & Plan  -Optimize nutrition  -Multivitamin support, daily multivitamin and thiamine  -Stop Marinol/Remeron as this is causing sedation    Dyslipidemia- (present on admission)  Assessment & Plan  -Atorvastatin 40 mg daily with underlying risk factors    Essential hypertension- (present on admission)  Assessment & Plan  -Holding anti hypertensive therapy  -Resume as clinically appropriate       VTE prophylaxis: SCDs

## 2020-01-01 ENCOUNTER — APPOINTMENT (OUTPATIENT)
Dept: RADIOLOGY | Facility: MEDICAL CENTER | Age: 60
DRG: 637 | End: 2020-01-01
Attending: NURSE PRACTITIONER
Payer: COMMERCIAL

## 2020-01-01 PROBLEM — L03.113 CELLULITIS OF RIGHT UPPER EXTREMITY: Status: ACTIVE | Noted: 2020-01-01

## 2020-01-01 LAB
ANION GAP SERPL CALC-SCNC: 5 MMOL/L (ref 0–11.9)
BUN SERPL-MCNC: 14 MG/DL (ref 8–22)
CALCIUM SERPL-MCNC: 8.5 MG/DL (ref 8.5–10.5)
CHLORIDE SERPL-SCNC: 102 MMOL/L (ref 96–112)
CO2 SERPL-SCNC: 27 MMOL/L (ref 20–33)
CREAT SERPL-MCNC: 0.81 MG/DL (ref 0.5–1.4)
ERYTHROCYTE [DISTWIDTH] IN BLOOD BY AUTOMATED COUNT: 57.8 FL (ref 35.9–50)
GLUCOSE BLD-MCNC: 100 MG/DL (ref 65–99)
GLUCOSE BLD-MCNC: 146 MG/DL (ref 65–99)
GLUCOSE BLD-MCNC: 155 MG/DL (ref 65–99)
GLUCOSE BLD-MCNC: 211 MG/DL (ref 65–99)
GLUCOSE SERPL-MCNC: 174 MG/DL (ref 65–99)
HCT VFR BLD AUTO: 28.4 % (ref 42–52)
HGB BLD-MCNC: 9 G/DL (ref 14–18)
MAGNESIUM SERPL-MCNC: 1.4 MG/DL (ref 1.5–2.5)
MCH RBC QN AUTO: 32.4 PG (ref 27–33)
MCHC RBC AUTO-ENTMCNC: 31.7 G/DL (ref 33.7–35.3)
MCV RBC AUTO: 102.2 FL (ref 81.4–97.8)
PHOSPHATE SERPL-MCNC: 2.7 MG/DL (ref 2.5–4.5)
PLATELET # BLD AUTO: 477 K/UL (ref 164–446)
PMV BLD AUTO: 9.3 FL (ref 9–12.9)
POTASSIUM SERPL-SCNC: 3.9 MMOL/L (ref 3.6–5.5)
PROCALCITONIN SERPL-MCNC: 0.08 NG/ML
RBC # BLD AUTO: 2.78 M/UL (ref 4.7–6.1)
SODIUM SERPL-SCNC: 134 MMOL/L (ref 135–145)
VIT B12 SERPL-MCNC: 468 PG/ML (ref 211–911)
WBC # BLD AUTO: 19.9 K/UL (ref 4.8–10.8)

## 2020-01-01 PROCEDURE — 700102 HCHG RX REV CODE 250 W/ 637 OVERRIDE(OP): Performed by: NURSE PRACTITIONER

## 2020-01-01 PROCEDURE — 93971 EXTREMITY STUDY: CPT | Mod: RT

## 2020-01-01 PROCEDURE — 700102 HCHG RX REV CODE 250 W/ 637 OVERRIDE(OP): Performed by: INTERNAL MEDICINE

## 2020-01-01 PROCEDURE — 83735 ASSAY OF MAGNESIUM: CPT

## 2020-01-01 PROCEDURE — 700111 HCHG RX REV CODE 636 W/ 250 OVERRIDE (IP): Performed by: NURSE PRACTITIONER

## 2020-01-01 PROCEDURE — A9270 NON-COVERED ITEM OR SERVICE: HCPCS | Performed by: NURSE PRACTITIONER

## 2020-01-01 PROCEDURE — 82962 GLUCOSE BLOOD TEST: CPT | Mod: 91

## 2020-01-01 PROCEDURE — 99232 SBSQ HOSP IP/OBS MODERATE 35: CPT | Performed by: INTERNAL MEDICINE

## 2020-01-01 PROCEDURE — A9270 NON-COVERED ITEM OR SERVICE: HCPCS | Performed by: HOSPITALIST

## 2020-01-01 PROCEDURE — 36415 COLL VENOUS BLD VENIPUNCTURE: CPT

## 2020-01-01 PROCEDURE — 700102 HCHG RX REV CODE 250 W/ 637 OVERRIDE(OP): Performed by: HOSPITALIST

## 2020-01-01 PROCEDURE — A9270 NON-COVERED ITEM OR SERVICE: HCPCS | Performed by: INTERNAL MEDICINE

## 2020-01-01 PROCEDURE — 84145 PROCALCITONIN (PCT): CPT

## 2020-01-01 PROCEDURE — 85027 COMPLETE CBC AUTOMATED: CPT

## 2020-01-01 PROCEDURE — 82607 VITAMIN B-12: CPT

## 2020-01-01 PROCEDURE — 84100 ASSAY OF PHOSPHORUS: CPT

## 2020-01-01 PROCEDURE — 770006 HCHG ROOM/CARE - MED/SURG/GYN SEMI*

## 2020-01-01 PROCEDURE — 93971 EXTREMITY STUDY: CPT | Mod: 26,RT | Performed by: INTERNAL MEDICINE

## 2020-01-01 PROCEDURE — 80048 BASIC METABOLIC PNL TOTAL CA: CPT

## 2020-01-01 PROCEDURE — 770001 HCHG ROOM/CARE - MED/SURG/GYN PRIV*

## 2020-01-01 RX ORDER — MAGNESIUM SULFATE HEPTAHYDRATE 40 MG/ML
2 INJECTION, SOLUTION INTRAVENOUS ONCE
Status: COMPLETED | OUTPATIENT
Start: 2020-01-01 | End: 2020-01-01

## 2020-01-01 RX ORDER — SULFAMETHOXAZOLE AND TRIMETHOPRIM 800; 160 MG/1; MG/1
1 TABLET ORAL EVERY 12 HOURS
Status: DISCONTINUED | OUTPATIENT
Start: 2020-01-01 | End: 2020-01-01

## 2020-01-01 RX ORDER — CEPHALEXIN 500 MG/1
500 CAPSULE ORAL EVERY 6 HOURS
Status: DISCONTINUED | OUTPATIENT
Start: 2020-01-01 | End: 2020-01-03

## 2020-01-01 RX ORDER — CYANOCOBALAMIN 1000 UG/ML
1000 INJECTION, SOLUTION INTRAMUSCULAR; SUBCUTANEOUS
Status: DISCONTINUED | OUTPATIENT
Start: 2020-01-01 | End: 2020-01-06 | Stop reason: HOSPADM

## 2020-01-01 RX ORDER — OXYCODONE HYDROCHLORIDE 5 MG/1
5 TABLET ORAL EVERY 6 HOURS PRN
Status: DISCONTINUED | OUTPATIENT
Start: 2020-01-01 | End: 2020-01-06 | Stop reason: HOSPADM

## 2020-01-01 RX ORDER — OXYCODONE HYDROCHLORIDE 10 MG/1
10 TABLET ORAL EVERY 6 HOURS PRN
Status: DISCONTINUED | OUTPATIENT
Start: 2020-01-01 | End: 2020-01-06 | Stop reason: HOSPADM

## 2020-01-01 RX ADMIN — PANCRELIPASE 48000 UNITS: 24000; 76000; 120000 CAPSULE, DELAYED RELEASE PELLETS ORAL at 07:24

## 2020-01-01 RX ADMIN — CEPHALEXIN 500 MG: 500 CAPSULE ORAL at 11:40

## 2020-01-01 RX ADMIN — Medication 100 MG: at 06:13

## 2020-01-01 RX ADMIN — BISMUTH SUBSALICYLATE 524 MG: 262 TABLET, CHEWABLE ORAL at 06:13

## 2020-01-01 RX ADMIN — TAMSULOSIN HYDROCHLORIDE 0.4 MG: 0.4 CAPSULE ORAL at 06:13

## 2020-01-01 RX ADMIN — MAGNESIUM SULFATE 2 G: 2 INJECTION INTRAVENOUS at 10:11

## 2020-01-01 RX ADMIN — INSULIN LISPRO 5 UNITS: 100 INJECTION, SOLUTION INTRAVENOUS; SUBCUTANEOUS at 11:42

## 2020-01-01 RX ADMIN — DOXYCYCLINE 100 MG: 100 TABLET, FILM COATED ORAL at 16:31

## 2020-01-01 RX ADMIN — SUCRALFATE 1 G: 1 SUSPENSION ORAL at 10:11

## 2020-01-01 RX ADMIN — BISMUTH SUBSALICYLATE 524 MG: 262 TABLET, CHEWABLE ORAL at 11:43

## 2020-01-01 RX ADMIN — OXYCODONE HYDROCHLORIDE 10 MG: 10 TABLET ORAL at 17:45

## 2020-01-01 RX ADMIN — INSULIN GLARGINE 20 UNITS: 100 INJECTION, SOLUTION SUBCUTANEOUS at 21:47

## 2020-01-01 RX ADMIN — THERA TABS 1 TABLET: TAB at 06:13

## 2020-01-01 RX ADMIN — METRONIDAZOLE 250 MG: 500 TABLET ORAL at 23:35

## 2020-01-01 RX ADMIN — METRONIDAZOLE 250 MG: 500 TABLET ORAL at 13:13

## 2020-01-01 RX ADMIN — OXYCODONE HYDROCHLORIDE 10 MG: 10 TABLET ORAL at 11:41

## 2020-01-01 RX ADMIN — METRONIDAZOLE 250 MG: 500 TABLET ORAL at 06:13

## 2020-01-01 RX ADMIN — ATORVASTATIN CALCIUM 40 MG: 40 TABLET, FILM COATED ORAL at 16:31

## 2020-01-01 RX ADMIN — BISMUTH SUBSALICYLATE 524 MG: 262 TABLET, CHEWABLE ORAL at 16:31

## 2020-01-01 RX ADMIN — OXYCODONE HYDROCHLORIDE 10 MG: 10 TABLET ORAL at 23:36

## 2020-01-01 RX ADMIN — BISMUTH SUBSALICYLATE 524 MG: 262 TABLET, CHEWABLE ORAL at 23:35

## 2020-01-01 RX ADMIN — CYANOCOBALAMIN 1000 MCG: 1000 INJECTION, SOLUTION INTRAMUSCULAR; SUBCUTANEOUS at 10:10

## 2020-01-01 RX ADMIN — OMEPRAZOLE 20 MG: 20 CAPSULE, DELAYED RELEASE ORAL at 06:13

## 2020-01-01 RX ADMIN — DOXYCYCLINE 100 MG: 100 TABLET, FILM COATED ORAL at 06:13

## 2020-01-01 RX ADMIN — ASPIRIN 81 MG: 81 TABLET, COATED ORAL at 06:13

## 2020-01-01 RX ADMIN — SUCRALFATE 1 G: 1 SUSPENSION ORAL at 16:31

## 2020-01-01 RX ADMIN — INSULIN LISPRO 5 UNITS: 100 INJECTION, SOLUTION INTRAVENOUS; SUBCUTANEOUS at 06:21

## 2020-01-01 RX ADMIN — SULFAMETHOXAZOLE AND TRIMETHOPRIM 1 TABLET: 800; 160 TABLET ORAL at 11:41

## 2020-01-01 RX ADMIN — CEPHALEXIN 500 MG: 500 CAPSULE ORAL at 23:36

## 2020-01-01 RX ADMIN — OMEPRAZOLE 20 MG: 20 CAPSULE, DELAYED RELEASE ORAL at 16:31

## 2020-01-01 RX ADMIN — SUCRALFATE 1 G: 1 SUSPENSION ORAL at 23:36

## 2020-01-01 RX ADMIN — SUCRALFATE 1 G: 1 SUSPENSION ORAL at 06:13

## 2020-01-01 RX ADMIN — CEPHALEXIN 500 MG: 500 CAPSULE ORAL at 16:31

## 2020-01-01 ASSESSMENT — ENCOUNTER SYMPTOMS
MYALGIAS: 0
FEVER: 0
COUGH: 0
NAUSEA: 0
VOMITING: 0

## 2020-01-01 NOTE — PROGRESS NOTES
Patient demonstrated taking his own blood sugar and administering his own insulin correctly under the supervision of this RN.

## 2020-01-01 NOTE — PROGRESS NOTES
Hospital Medicine Daily Progress Note    Date of Service  1/1/2020    Chief Complaint  59 y.o. male admitted 12/19/2019 with shortness of breath    Hospital Course    Mr. Tatum is a 59-year-old male with a PMH of HTN, DM 2, admitted on 12/19/2019 due to increasing shortness of breath.  Glucose was 442.  Patient reports that his DM 2 medications were altered recently and he has been hyperglycemic ever since.  [facility] staff report poor appetite, weakness, confusion, and palor which started approximately 1 week prior to admission. Patient was found to be in DKA with altered mental status.  While in the ICU, patient's hemoglobin dropped to 6.9 and was transfused. An upper endoscopy was eventually performed on 12/27 with Dr. Villarreal, with findings reported of severe erosive esophagitis, LA grade D from 30 cm to 41 cm along with a 4 cm hiatal hernia. Path suggested possible H. Pylori infection.  On 12/28, psychiatry was consulted and felt he was unable to manage his DM on his own. Patient was later transferred to the surgical floor for further recovery and monitoring.      Interval Problem Update  Patient complains of right upper extremity edema and pain  Well demarcated superficial erythemic rash lower forearm with a central punctum near a prior IV site consistent with erysipelas versus cellulitis  T-max overnight 99.8, P 81, 16, 95% on RA, 127/74  With antibiotics leukocytosis improved today at 19.9, down from 24.9 yesterday  Magnesium 1.4  PCT normal, B12 low normal  Stat right upper extremity venous Doppler negative for superficial or DVT  Glucose well controlled on set doses of long-acting and short acting insulin  Nursing documenting patient able to self assess glucose and administer insulin    Consultants/Specialty  Psychiatry  Gastroenterology -Dr. Villarreal    Code Status  Full    Disposition  TBD    Review of Systems  Review of Systems   Constitutional: Negative for fever.   Respiratory: Negative for cough.     Cardiovascular: Negative for chest pain.   Gastrointestinal: Negative for nausea and vomiting.   Genitourinary: Negative for dysuria.   Musculoskeletal: Negative for myalgias.   Skin: Negative for rash.   All other systems reviewed and are negative.       Physical Exam  Temp:  [36.6 °C (97.8 °F)-37.7 °C (99.8 °F)] 37.3 °C (99.2 °F)  Pulse:  [74-91] 81  Resp:  [16-17] 16  BP: (112-135)/(64-74) 127/74  SpO2:  [90 %-95 %] 95 %    Physical Exam  Vitals signs and nursing note reviewed.   HENT:      Head: Normocephalic and atraumatic.      Nose: Nose normal.   Eyes:      Conjunctiva/sclera: Conjunctivae normal.      Pupils: Pupils are equal, round, and reactive to light.   Neck:      Musculoskeletal: Neck supple.   Cardiovascular:      Rate and Rhythm: Normal rate and regular rhythm.      Heart sounds: No murmur. No friction rub.   Pulmonary:      Effort: No respiratory distress.   Abdominal:      General: Bowel sounds are normal. There is no distension.      Palpations: Abdomen is soft.   Musculoskeletal:      Comments: Right upper extremity edema   Skin:     General: Skin is warm and dry.      Comments: Well demarcated erythemic macule right upper extremity with greatest erythema surrounding IV puncture site; slight streaking to upper arm   Neurological:      Mental Status: He is alert and oriented to person, place, and time.      Comments: Struggled to verbalize risks of skipping insulin         Fluids    Intake/Output Summary (Last 24 hours) at 1/1/2020 1210  Last data filed at 1/1/2020 1126  Gross per 24 hour   Intake 720 ml   Output 1850 ml   Net -1130 ml       Laboratory  Recent Labs     12/31/19  0436 01/01/20  0429   WBC 24.9* 19.9*   RBC 2.74* 2.78*   HEMOGLOBIN 8.8* 9.0*   HEMATOCRIT 27.4* 28.4*   .0* 102.2*   MCH 32.1 32.4   MCHC 32.1* 31.7*   RDW 55.3* 57.8*   PLATELETCT 450* 477*   MPV 9.1 9.3     Recent Labs     12/31/19  0436 01/01/20  0429   SODIUM 137 134*   POTASSIUM 3.8 3.9   CHLORIDE 104 102  "  CO2 25 27   GLUCOSE 134* 174*   BUN 14 14   CREATININE 0.70 0.81   CALCIUM 8.2* 8.5                   Imaging  US-EXTREMITY VENOUS UPPER UNILAT RIGHT   Final Result      IR-US GUIDED PIV   Final Result    Ultrasound-guided PERIPHERAL IV INSERTION performed by    qualified nursing staff as above.            DX-CHEST-LIMITED (1 VIEW)   Final Result      Stable chest with hyperinflation, aortic ectasia but no consolidation      IR-MIDLINE CATHETER INSERTION WO GUIDANCE > AGE 3   Final Result                  Ultrasound-guided midline placement performed by qualified nursing staff    as above.          EC-ECHOCARDIOGRAM LTD W/O CONT   Final Result      DX-CHEST-PORTABLE (1 VIEW)   Final Result         No acute cardiac or pulmonary abnormality is identified.           Assessment/Plan  Cellulitis of right upper extremity  Assessment & Plan  Already on doxycycline and Flagyl for H. pylori  1/1/2020 add cephalexin for 5 to 10 days  PRN oxycodone  Doppler negative for DVT    Uncontrolled type 1 diabetes mellitus with hyperglycemia (HCC)- (present on admission)  Assessment & Plan  Largely resolved  Continue diabetes education   Case management has requested consultation from psychiatry to assess patient's capacity with respect to self diabetic management following discharge, this consult has been requested - 12/28 per psychiatry: \"INCAPACITATED to manage his DM on his own\"  Continue Lantus 20 units  DC sliding scale and give mealtime dose only to simplify regimen  Continue high intensity statin therapy  Aspirin 81  Lisinopril once blood pressure stable    Helicobacter pylori (H. pylori) infection  Assessment & Plan  Discussed with GI and infectious disease  Based on unknown prevalence of clarithromycin resistance will proceed with quadruple therapy x14 days  Outpatient GI follow-up    Normocytic anemia- (present on admission)  Assessment & Plan  -Component of iron deficiency anemia - on IV Iron  -GI bleeding, daily rule out " as noted above  -IV iron replacement initiated, ferric gluconate initiated, closely monitor  Monitor Hgb / Restrictive transfusion strategy    Cognitive decline- (present on admission)  Assessment & Plan  -Patient Sister Aniyah is patient's POA for both healthcare and financial concerns.    -Moderate to severe cognitive decline from underlying Wernicke Korsakoff given profound history of alcoholism and history of multiple TBI's in the past.    -Followed by [facility] in the outpatient setting  Previous MRI with atrophy    -therapy, cognitive evaluation completed  Avoid sedative, narcotics / BZDs as able  Avoid sedatives  Avoid sedative, narcotics / BZDs as able    -Goals of care being discussed with patient, family    Possibly to go back to assisted living facility    -Sister concerned that with patient's underlying cognitive decline, he is unable to completely understand his disease process and manage it appropriately.  He hoards food in his room, or inappropriately use insulin when his blood sugars are high and use sugar packets when his blood sugars are low.  Previously, has been expressed to the sister that patient needs to be able to administer his own insulin for him to be in an assisted living facility/group home situation based on [place] state laws.        Melanotic stools- (present on admission)  Assessment & Plan  -Colonoscopy could not be completed because of poor prep  -EGD on 12/27 with erosive esophogitis LA grade D  - Dr. Villarreal  -Continue omeprazole and sucralfate for 2 weeks per GI team  -Outpatient follow-up with GI team    Leukocytosis- (present on admission)  Assessment & Plan  Follow  Likely related to right upper extremity cellulitis    Severe protein-calorie malnutrition (HCC)- (present on admission)  Assessment & Plan  -Optimize nutrition  -Multivitamin support, daily multivitamin and thiamine  -Stop Marinol/Remeron as this is causing sedation    Dyslipidemia- (present on  admission)  Assessment & Plan  -Atorvastatin 40 mg daily with underlying risk factors    Essential hypertension- (present on admission)  Assessment & Plan  -Holding anti hypertensive therapy  -Resume as clinically appropriate       VTE prophylaxis: SCDs

## 2020-01-01 NOTE — PROGRESS NOTES
Bedside report received.  Assessment complete.  A&O x 4. Patient calls appropriately.  Patient mobilizes with one person assist. Bed alarm on.   Patient has 8/10 pain. Declines interventions at this time.  Denies N&V. Tolerating diabetic diet.  Scabbing to lower back  + void, + flatus, + BM.  Patient denies SOB.  SCD's off.  Review plan with of care with patient. Call light and personal belongings with in reach. Hourly rounding in place. All needs met at this time.

## 2020-01-01 NOTE — CARE PLAN
Call light within reach, Pt calls appropriately.      Problem: Safety  Goal: Will remain free from falls  Outcome: PROGRESSING AS EXPECTED     Problem: Infection  Goal: Will remain free from infection  Outcome: PROGRESSING AS EXPECTED

## 2020-01-01 NOTE — CARE PLAN
Problem: Safety  Goal: Will remain free from falls  Outcome: PROGRESSING AS EXPECTED  Patient educated to call before mobilizing, call light and belongings within reach, bed locked and in lowest position.         Problem: Pain Management  Goal: Pain level will decrease to patient's comfort goal  Outcome: PROGRESSING AS EXPECTED  Patient educated on pain medications, availability, and alternatives.

## 2020-01-01 NOTE — ASSESSMENT & PLAN NOTE
Already on doxycycline and Flagyl for H. pylori  DC Keflex and continue doxycycline  PRN oxycodone  Doppler negative for DVT

## 2020-01-02 LAB
ANION GAP SERPL CALC-SCNC: 7 MMOL/L (ref 0–11.9)
BUN SERPL-MCNC: 10 MG/DL (ref 8–22)
CALCIUM SERPL-MCNC: 8.2 MG/DL (ref 8.5–10.5)
CHLORIDE SERPL-SCNC: 103 MMOL/L (ref 96–112)
CO2 SERPL-SCNC: 26 MMOL/L (ref 20–33)
CREAT SERPL-MCNC: 0.68 MG/DL (ref 0.5–1.4)
ERYTHROCYTE [DISTWIDTH] IN BLOOD BY AUTOMATED COUNT: 61.3 FL (ref 35.9–50)
GLUCOSE BLD-MCNC: 157 MG/DL (ref 65–99)
GLUCOSE BLD-MCNC: 163 MG/DL (ref 65–99)
GLUCOSE BLD-MCNC: 234 MG/DL (ref 65–99)
GLUCOSE SERPL-MCNC: 179 MG/DL (ref 65–99)
HCT VFR BLD AUTO: 27.9 % (ref 42–52)
HGB BLD-MCNC: 8.8 G/DL (ref 14–18)
MAGNESIUM SERPL-MCNC: 1.6 MG/DL (ref 1.5–2.5)
MCH RBC QN AUTO: 32.2 PG (ref 27–33)
MCHC RBC AUTO-ENTMCNC: 31.5 G/DL (ref 33.7–35.3)
MCV RBC AUTO: 102.2 FL (ref 81.4–97.8)
PHOSPHATE SERPL-MCNC: 2.6 MG/DL (ref 2.5–4.5)
PLATELET # BLD AUTO: 437 K/UL (ref 164–446)
PMV BLD AUTO: 9.9 FL (ref 9–12.9)
POTASSIUM SERPL-SCNC: 3.9 MMOL/L (ref 3.6–5.5)
RBC # BLD AUTO: 2.73 M/UL (ref 4.7–6.1)
SODIUM SERPL-SCNC: 136 MMOL/L (ref 135–145)
WBC # BLD AUTO: 15.6 K/UL (ref 4.8–10.8)

## 2020-01-02 PROCEDURE — 700111 HCHG RX REV CODE 636 W/ 250 OVERRIDE (IP): Performed by: HOSPITALIST

## 2020-01-02 PROCEDURE — 99232 SBSQ HOSP IP/OBS MODERATE 35: CPT | Performed by: INTERNAL MEDICINE

## 2020-01-02 PROCEDURE — A9270 NON-COVERED ITEM OR SERVICE: HCPCS | Performed by: INTERNAL MEDICINE

## 2020-01-02 PROCEDURE — A9270 NON-COVERED ITEM OR SERVICE: HCPCS | Performed by: HOSPITALIST

## 2020-01-02 PROCEDURE — 700102 HCHG RX REV CODE 250 W/ 637 OVERRIDE(OP): Performed by: INTERNAL MEDICINE

## 2020-01-02 PROCEDURE — 82962 GLUCOSE BLOOD TEST: CPT

## 2020-01-02 PROCEDURE — 80048 BASIC METABOLIC PNL TOTAL CA: CPT

## 2020-01-02 PROCEDURE — 84100 ASSAY OF PHOSPHORUS: CPT

## 2020-01-02 PROCEDURE — A9270 NON-COVERED ITEM OR SERVICE: HCPCS | Performed by: NURSE PRACTITIONER

## 2020-01-02 PROCEDURE — 770006 HCHG ROOM/CARE - MED/SURG/GYN SEMI*

## 2020-01-02 PROCEDURE — 85027 COMPLETE CBC AUTOMATED: CPT

## 2020-01-02 PROCEDURE — 83735 ASSAY OF MAGNESIUM: CPT

## 2020-01-02 PROCEDURE — 700102 HCHG RX REV CODE 250 W/ 637 OVERRIDE(OP): Performed by: NURSE PRACTITIONER

## 2020-01-02 PROCEDURE — 770001 HCHG ROOM/CARE - MED/SURG/GYN PRIV*

## 2020-01-02 PROCEDURE — 700102 HCHG RX REV CODE 250 W/ 637 OVERRIDE(OP): Performed by: HOSPITALIST

## 2020-01-02 PROCEDURE — 36415 COLL VENOUS BLD VENIPUNCTURE: CPT

## 2020-01-02 RX ADMIN — OXYCODONE HYDROCHLORIDE 10 MG: 10 TABLET ORAL at 05:32

## 2020-01-02 RX ADMIN — DOXYCYCLINE 100 MG: 100 TABLET, FILM COATED ORAL at 05:32

## 2020-01-02 RX ADMIN — BISMUTH SUBSALICYLATE 524 MG: 262 TABLET, CHEWABLE ORAL at 17:50

## 2020-01-02 RX ADMIN — METRONIDAZOLE 250 MG: 500 TABLET ORAL at 13:24

## 2020-01-02 RX ADMIN — ATORVASTATIN CALCIUM 40 MG: 40 TABLET, FILM COATED ORAL at 17:49

## 2020-01-02 RX ADMIN — CEPHALEXIN 500 MG: 500 CAPSULE ORAL at 12:03

## 2020-01-02 RX ADMIN — INSULIN LISPRO 5 UNITS: 100 INJECTION, SOLUTION INTRAVENOUS; SUBCUTANEOUS at 12:06

## 2020-01-02 RX ADMIN — THERA TABS 1 TABLET: TAB at 05:31

## 2020-01-02 RX ADMIN — INSULIN LISPRO 5 UNITS: 100 INJECTION, SOLUTION INTRAVENOUS; SUBCUTANEOUS at 05:40

## 2020-01-02 RX ADMIN — OMEPRAZOLE 20 MG: 20 CAPSULE, DELAYED RELEASE ORAL at 17:49

## 2020-01-02 RX ADMIN — SUCRALFATE 1 G: 1 SUSPENSION ORAL at 17:51

## 2020-01-02 RX ADMIN — SUCRALFATE 1 G: 1 SUSPENSION ORAL at 05:31

## 2020-01-02 RX ADMIN — OXYCODONE HYDROCHLORIDE 10 MG: 10 TABLET ORAL at 17:48

## 2020-01-02 RX ADMIN — ONDANSETRON 4 MG: 2 INJECTION INTRAMUSCULAR; INTRAVENOUS at 09:05

## 2020-01-02 RX ADMIN — CEPHALEXIN 500 MG: 500 CAPSULE ORAL at 05:32

## 2020-01-02 RX ADMIN — TAMSULOSIN HYDROCHLORIDE 0.4 MG: 0.4 CAPSULE ORAL at 05:32

## 2020-01-02 RX ADMIN — INSULIN LISPRO 5 UNITS: 100 INJECTION, SOLUTION INTRAVENOUS; SUBCUTANEOUS at 17:56

## 2020-01-02 RX ADMIN — CEPHALEXIN 500 MG: 500 CAPSULE ORAL at 17:48

## 2020-01-02 RX ADMIN — BISMUTH SUBSALICYLATE 524 MG: 262 TABLET, CHEWABLE ORAL at 05:31

## 2020-01-02 RX ADMIN — DOXYCYCLINE 100 MG: 100 TABLET, FILM COATED ORAL at 17:48

## 2020-01-02 RX ADMIN — BISMUTH SUBSALICYLATE 524 MG: 262 TABLET, CHEWABLE ORAL at 12:02

## 2020-01-02 RX ADMIN — METRONIDAZOLE 250 MG: 500 TABLET ORAL at 05:31

## 2020-01-02 RX ADMIN — ASPIRIN 81 MG: 81 TABLET, COATED ORAL at 05:32

## 2020-01-02 RX ADMIN — SUCRALFATE 1 G: 1 SUSPENSION ORAL at 12:01

## 2020-01-02 RX ADMIN — Medication 100 MG: at 05:32

## 2020-01-02 RX ADMIN — INSULIN GLARGINE 20 UNITS: 100 INJECTION, SOLUTION SUBCUTANEOUS at 17:56

## 2020-01-02 RX ADMIN — OMEPRAZOLE 20 MG: 20 CAPSULE, DELAYED RELEASE ORAL at 05:32

## 2020-01-02 ASSESSMENT — ENCOUNTER SYMPTOMS
BLOOD IN STOOL: 0
FEVER: 0
DIARRHEA: 0
VOMITING: 1
ABDOMINAL PAIN: 0
COUGH: 0
MYALGIAS: 0
NAUSEA: 0

## 2020-01-02 ASSESSMENT — COGNITIVE AND FUNCTIONAL STATUS - GENERAL
SUGGESTED CMS G CODE MODIFIER MOBILITY: CH
SUGGESTED CMS G CODE MODIFIER DAILY ACTIVITY: CI
HELP NEEDED FOR BATHING: A LITTLE
MOBILITY SCORE: 24
DAILY ACTIVITIY SCORE: 23

## 2020-01-02 NOTE — DISCHARGE PLANNING
Anticipated Discharge Disposition: TBD, likely home to assisted living facility with additional help from HH    Action: Discussed pt in IDT rounds. Pt not medically clear at this time. PT/OT to be ordered.     Barriers to Discharge: medical clearance  Emesis  PT/OT consult and recommendations     Plan: pending therapy recommendations      Length of Stay: 14

## 2020-01-02 NOTE — PROGRESS NOTES
Bedside report received.  Assessment complete.  A&O x 4. Patient calls appropriately.  Patient up with stand by assist.   Patient has 0/10 pain. Declines pain at this time, PRN available  One episode of vomiting this AM, patient stated it was from taking too many morning meds at one time. Tolerating diabetic diet. Abdomen soft, normoactive  + void, + flatus, + BM.  Patient denies SOB.  SCD's on.  Patient in pleasant mood, no current complaints at this time.  Review plan with of care with patient. Call light and personal belongings with in reach. Hourly rounding in place. All needs met at this time.

## 2020-01-02 NOTE — PROGRESS NOTES
Hospital Medicine Daily Progress Note    Date of Service  1/2/2020    Chief Complaint  59 y.o. male admitted 12/19/2019 with shortness of breath    Hospital Course    Mr. Tatum is a 59-year-old male with a PMH of HTN, DM 2, admitted on 12/19/2019 due to increasing shortness of breath.  Glucose was 442.  Patient reports that his DM 2 medications were altered recently and he has been hyperglycemic ever since.  [facility] staff report poor appetite, weakness, confusion, and palor which started approximately 1 week prior to admission. Patient was found to be in DKA with altered mental status.  While in the ICU, patient's hemoglobin dropped to 6.9 and was transfused. An upper endoscopy was eventually performed on 12/27 with Dr. Villarreal, with findings reported of severe erosive esophagitis, LA grade D from 30 cm to 41 cm along with a 4 cm hiatal hernia. Path suggested possible H. Pylori infection.  On 12/28, psychiatry was consulted and felt he was unable to manage his DM on his own.     His insulin therapy has been set to fixed doses.  He has demonstrated to nursing the ability to check his own glucose level and administer his own insulin.  [facility] has confirmed they have paid an outside caregiver to come to the [facility] to assist with insulin.        Interval Problem Update  Pain less in RUE; erythema less  Emesis x2 this morning; otherwise feels well  No diarrhea  T-max 99.7, P 80, 18, 95% on RA, 112/63  Leukocytosis improving, but still elevated. Down to 15 from 19-day prior  PT and OT to comment on possible [facility] dispo versus skilled    Consultants/Specialty  Psychiatry  Gastroenterology -Dr. Villarreal    Code Status  Full    Disposition  Back to [facility] w  vs SNF - pending PT/OT recs    Review of Systems  Review of Systems   Constitutional: Negative for fever.   Respiratory: Negative for cough.    Cardiovascular: Negative for chest pain.   Gastrointestinal: Positive for vomiting. Negative for abdominal pain, blood in  stool, diarrhea and nausea.   Genitourinary: Negative for dysuria.   Musculoskeletal: Negative for myalgias.   Skin: Negative for rash.   All other systems reviewed and are negative.       Physical Exam  Temp:  [36.6 °C (97.8 °F)-37.6 °C (99.7 °F)] 36.6 °C (97.8 °F)  Pulse:  [68-82] 80  Resp:  [16-20] 18  BP: (110-122)/(60-68) 112/63  SpO2:  [90 %-95 %] 95 %    Physical Exam  Vitals signs and nursing note reviewed.   HENT:      Head: Normocephalic and atraumatic.      Nose: Nose normal.   Eyes:      Conjunctiva/sclera: Conjunctivae normal.      Pupils: Pupils are equal, round, and reactive to light.   Neck:      Musculoskeletal: Neck supple.   Cardiovascular:      Rate and Rhythm: Normal rate and regular rhythm.      Heart sounds: No murmur. No friction rub.   Pulmonary:      Effort: No respiratory distress.   Abdominal:      General: Bowel sounds are normal. There is no distension.      Palpations: Abdomen is soft.   Musculoskeletal:      Comments: Right upper extremity edema   Skin:     General: Skin is warm and dry.      Comments: Well demarcated erythemic macule right upper extremity with greatest erythema surrounding IV puncture site; improving   Neurological:      Mental Status: He is alert and oriented to person, place, and time.         Fluids    Intake/Output Summary (Last 24 hours) at 1/2/2020 1242  Last data filed at 1/2/2020 0810  Gross per 24 hour   Intake 640 ml   Output 1300 ml   Net -660 ml       Laboratory  Recent Labs     12/31/19 0436 01/01/20 0429 01/02/20  0352   WBC 24.9* 19.9* 15.6*   RBC 2.74* 2.78* 2.73*   HEMOGLOBIN 8.8* 9.0* 8.8*   HEMATOCRIT 27.4* 28.4* 27.9*   .0* 102.2* 102.2*   MCH 32.1 32.4 32.2   MCHC 32.1* 31.7* 31.5*   RDW 55.3* 57.8* 61.3*   PLATELETCT 450* 477* 437   MPV 9.1 9.3 9.9     Recent Labs     12/31/19  0436 01/01/20  0429 01/02/20  0352   SODIUM 137 134* 136   POTASSIUM 3.8 3.9 3.9   CHLORIDE 104 102 103   CO2 25 27 26   GLUCOSE 134* 174* 179*   BUN 14 14 10  "  CREATININE 0.70 0.81 0.68   CALCIUM 8.2* 8.5 8.2*                   Imaging  US-EXTREMITY VENOUS UPPER UNILAT RIGHT   Final Result      IR-US GUIDED PIV   Final Result    Ultrasound-guided PERIPHERAL IV INSERTION performed by    qualified nursing staff as above.            DX-CHEST-LIMITED (1 VIEW)   Final Result      Stable chest with hyperinflation, aortic ectasia but no consolidation      IR-MIDLINE CATHETER INSERTION WO GUIDANCE > AGE 3   Final Result                  Ultrasound-guided midline placement performed by qualified nursing staff    as above.          EC-ECHOCARDIOGRAM LTD W/O CONT   Final Result      DX-CHEST-PORTABLE (1 VIEW)   Final Result         No acute cardiac or pulmonary abnormality is identified.           Assessment/Plan  Cellulitis of right upper extremity  Assessment & Plan  Already on doxycycline and Flagyl for H. pylori  1/1/2020 add cephalexin for 5 to 10 days  PRN oxycodone  Doppler negative for DVT    Uncontrolled type 1 diabetes mellitus with hyperglycemia (HCC)- (present on admission)  Assessment & Plan  Case management has requested consultation from psychiatry to assess patient's capacity with respect to self diabetic management following discharge, this consult has been requested - 12/28 per psychiatry: \"INCAPACITATED to manage his DM on his own\"  DC sliding scale and give mealtime dose only to simplify regimen  Continue Lantus 20 units  Blood sugars controlled  Continue high intensity statin therapy  Aspirin 81      Helicobacter pylori (H. pylori) infection  Assessment & Plan  Discussed with GI and infectious disease  Based on unknown prevalence of clarithromycin resistance will proceed with quadruple therapy x14 days  Outpatient GI follow-up    Normocytic anemia- (present on admission)  Assessment & Plan  Component of iron deficiency anemia - received IV Iron  Monitor Hgb / Restrictive transfusion strategy    Cognitive decline- (present on admission)  Assessment & " Plan  Patient Sister Aniyah is patient's POA for both healthcare and financial concerns.  Moderate to severe cognitive decline from underlying Wernicke Korsakoff given profound history of alcoholism and history of multiple TBI's in the past.  Previous MRI with atrophy  Avoid sedative, narcotics / BZDs as able    Melanotic stools- (present on admission)  Assessment & Plan  -Colonoscopy could not be completed because of poor prep  -EGD on 12/27 with erosive esophogitis LA grade D  - Dr. Villarreal  -Continue omeprazole and sucralfate for 2 weeks per GI team  -Outpatient follow-up with GI team    Leukocytosis- (present on admission)  Assessment & Plan  Follow  Likely related to right upper extremity cellulitis  [place] PCR    Severe protein-calorie malnutrition (HCC)- (present on admission)  Assessment & Plan  -Optimize nutrition  -Multivitamin support, daily multivitamin and thiamine  -Stop Marinol/Remeron as this is causing sedation    Dyslipidemia- (present on admission)  Assessment & Plan  -Atorvastatin 40 mg daily with underlying risk factors    Essential hypertension- (present on admission)  Assessment & Plan  Holding anti hypertensive therapy  Resume as clinically appropriate       VTE prophylaxis: SCDs

## 2020-01-02 NOTE — TELEPHONE ENCOUNTER
Note from Nick:  I talked to Maxwell his Nurse patient was eating all kind of bad food he had been on his meds including 20 units of Levemir.  It looks like he has a bad UTI infection which will increase his BG.  Nurse states on his records that BG was below 200 all day.  On 18th woke up with BG below 70.     Then he treated that and went above 350

## 2020-01-03 LAB
ANION GAP SERPL CALC-SCNC: 9 MMOL/L (ref 0–11.9)
BUN SERPL-MCNC: 12 MG/DL (ref 8–22)
CALCIUM SERPL-MCNC: 7.9 MG/DL (ref 8.5–10.5)
CHLORIDE SERPL-SCNC: 102 MMOL/L (ref 96–112)
CO2 SERPL-SCNC: 23 MMOL/L (ref 20–33)
CREAT SERPL-MCNC: 0.9 MG/DL (ref 0.5–1.4)
ERYTHROCYTE [DISTWIDTH] IN BLOOD BY AUTOMATED COUNT: 61.1 FL (ref 35.9–50)
GLUCOSE BLD-MCNC: 253 MG/DL (ref 65–99)
GLUCOSE BLD-MCNC: 268 MG/DL (ref 65–99)
GLUCOSE BLD-MCNC: 287 MG/DL (ref 65–99)
GLUCOSE SERPL-MCNC: 336 MG/DL (ref 65–99)
HCT VFR BLD AUTO: 26.7 % (ref 42–52)
HGB BLD-MCNC: 8.6 G/DL (ref 14–18)
MAGNESIUM SERPL-MCNC: 1.6 MG/DL (ref 1.5–2.5)
MCH RBC QN AUTO: 32.6 PG (ref 27–33)
MCHC RBC AUTO-ENTMCNC: 32.2 G/DL (ref 33.7–35.3)
MCV RBC AUTO: 101.1 FL (ref 81.4–97.8)
PHOSPHATE SERPL-MCNC: 3.3 MG/DL (ref 2.5–4.5)
PLATELET # BLD AUTO: 452 K/UL (ref 164–446)
PMV BLD AUTO: 9.2 FL (ref 9–12.9)
POTASSIUM SERPL-SCNC: 4.5 MMOL/L (ref 3.6–5.5)
RBC # BLD AUTO: 2.64 M/UL (ref 4.7–6.1)
SODIUM SERPL-SCNC: 134 MMOL/L (ref 135–145)
WBC # BLD AUTO: 8.9 K/UL (ref 4.8–10.8)

## 2020-01-03 PROCEDURE — A9270 NON-COVERED ITEM OR SERVICE: HCPCS | Performed by: INTERNAL MEDICINE

## 2020-01-03 PROCEDURE — A9270 NON-COVERED ITEM OR SERVICE: HCPCS | Performed by: NURSE PRACTITIONER

## 2020-01-03 PROCEDURE — 84100 ASSAY OF PHOSPHORUS: CPT

## 2020-01-03 PROCEDURE — 700102 HCHG RX REV CODE 250 W/ 637 OVERRIDE(OP): Performed by: HOSPITALIST

## 2020-01-03 PROCEDURE — 99232 SBSQ HOSP IP/OBS MODERATE 35: CPT | Performed by: INTERNAL MEDICINE

## 2020-01-03 PROCEDURE — 700102 HCHG RX REV CODE 250 W/ 637 OVERRIDE(OP): Performed by: NURSE PRACTITIONER

## 2020-01-03 PROCEDURE — 700102 HCHG RX REV CODE 250 W/ 637 OVERRIDE(OP): Performed by: INTERNAL MEDICINE

## 2020-01-03 PROCEDURE — 83735 ASSAY OF MAGNESIUM: CPT

## 2020-01-03 PROCEDURE — 770006 HCHG ROOM/CARE - MED/SURG/GYN SEMI*

## 2020-01-03 PROCEDURE — 85027 COMPLETE CBC AUTOMATED: CPT

## 2020-01-03 PROCEDURE — A9270 NON-COVERED ITEM OR SERVICE: HCPCS | Performed by: HOSPITALIST

## 2020-01-03 PROCEDURE — 82962 GLUCOSE BLOOD TEST: CPT | Mod: 91

## 2020-01-03 PROCEDURE — 36415 COLL VENOUS BLD VENIPUNCTURE: CPT

## 2020-01-03 PROCEDURE — 80048 BASIC METABOLIC PNL TOTAL CA: CPT

## 2020-01-03 PROCEDURE — 770001 HCHG ROOM/CARE - MED/SURG/GYN PRIV*

## 2020-01-03 RX ORDER — INSULIN GLARGINE 100 [IU]/ML
25 INJECTION, SOLUTION SUBCUTANEOUS EVERY EVENING
Status: DISCONTINUED | OUTPATIENT
Start: 2020-01-03 | End: 2020-01-06 | Stop reason: HOSPADM

## 2020-01-03 RX ADMIN — SUCRALFATE 1 G: 1 SUSPENSION ORAL at 05:27

## 2020-01-03 RX ADMIN — PANCRELIPASE 48000 UNITS: 24000; 76000; 120000 CAPSULE, DELAYED RELEASE PELLETS ORAL at 08:19

## 2020-01-03 RX ADMIN — THERA TABS 1 TABLET: TAB at 06:24

## 2020-01-03 RX ADMIN — INSULIN LISPRO 5 UNITS: 100 INJECTION, SOLUTION INTRAVENOUS; SUBCUTANEOUS at 06:31

## 2020-01-03 RX ADMIN — INSULIN LISPRO 5 UNITS: 100 INJECTION, SOLUTION INTRAVENOUS; SUBCUTANEOUS at 12:28

## 2020-01-03 RX ADMIN — BISMUTH SUBSALICYLATE 524 MG: 262 TABLET, CHEWABLE ORAL at 22:54

## 2020-01-03 RX ADMIN — CEPHALEXIN 500 MG: 500 CAPSULE ORAL at 00:02

## 2020-01-03 RX ADMIN — SUCRALFATE 1 G: 1 SUSPENSION ORAL at 22:54

## 2020-01-03 RX ADMIN — ASPIRIN 81 MG: 81 TABLET, COATED ORAL at 06:25

## 2020-01-03 RX ADMIN — SUCRALFATE 1 G: 1 SUSPENSION ORAL at 17:31

## 2020-01-03 RX ADMIN — ATORVASTATIN CALCIUM 40 MG: 40 TABLET, FILM COATED ORAL at 17:31

## 2020-01-03 RX ADMIN — BISMUTH SUBSALICYLATE 524 MG: 262 TABLET, CHEWABLE ORAL at 12:19

## 2020-01-03 RX ADMIN — BISMUTH SUBSALICYLATE 262 MG: 262 TABLET, CHEWABLE ORAL at 00:02

## 2020-01-03 RX ADMIN — METRONIDAZOLE 250 MG: 500 TABLET ORAL at 00:02

## 2020-01-03 RX ADMIN — DOXYCYCLINE 100 MG: 100 TABLET, FILM COATED ORAL at 05:27

## 2020-01-03 RX ADMIN — SUCRALFATE 1 G: 1 SUSPENSION ORAL at 12:18

## 2020-01-03 RX ADMIN — OXYCODONE HYDROCHLORIDE 10 MG: 10 TABLET ORAL at 05:28

## 2020-01-03 RX ADMIN — METRONIDAZOLE 250 MG: 500 TABLET ORAL at 05:27

## 2020-01-03 RX ADMIN — CEPHALEXIN 500 MG: 500 CAPSULE ORAL at 05:28

## 2020-01-03 RX ADMIN — SUCRALFATE 1 G: 1 SUSPENSION ORAL at 00:02

## 2020-01-03 RX ADMIN — Medication 100 MG: at 06:24

## 2020-01-03 RX ADMIN — CEPHALEXIN 500 MG: 500 CAPSULE ORAL at 12:19

## 2020-01-03 RX ADMIN — OXYCODONE HYDROCHLORIDE 10 MG: 10 TABLET ORAL at 18:31

## 2020-01-03 RX ADMIN — OMEPRAZOLE 20 MG: 20 CAPSULE, DELAYED RELEASE ORAL at 17:32

## 2020-01-03 RX ADMIN — INSULIN GLARGINE 25 UNITS: 100 INJECTION, SOLUTION SUBCUTANEOUS at 18:03

## 2020-01-03 RX ADMIN — TAMSULOSIN HYDROCHLORIDE 0.4 MG: 0.4 CAPSULE ORAL at 06:24

## 2020-01-03 RX ADMIN — METRONIDAZOLE 250 MG: 500 TABLET ORAL at 22:54

## 2020-01-03 RX ADMIN — METRONIDAZOLE 250 MG: 500 TABLET ORAL at 13:27

## 2020-01-03 RX ADMIN — DOXYCYCLINE 100 MG: 100 TABLET, FILM COATED ORAL at 17:32

## 2020-01-03 RX ADMIN — OMEPRAZOLE 20 MG: 20 CAPSULE, DELAYED RELEASE ORAL at 06:25

## 2020-01-03 RX ADMIN — BISMUTH SUBSALICYLATE 524 MG: 262 TABLET, CHEWABLE ORAL at 17:42

## 2020-01-03 RX ADMIN — BISMUTH SUBSALICYLATE 524 MG: 262 TABLET, CHEWABLE ORAL at 05:27

## 2020-01-03 RX ADMIN — OXYCODONE HYDROCHLORIDE 10 MG: 10 TABLET ORAL at 12:31

## 2020-01-03 ASSESSMENT — ENCOUNTER SYMPTOMS
ABDOMINAL PAIN: 0
DIARRHEA: 0
NAUSEA: 0
VOMITING: 0
BLOOD IN STOOL: 0
FEVER: 0
MYALGIAS: 0
COUGH: 0

## 2020-01-03 NOTE — PROGRESS NOTES
Hospital Medicine Daily Progress Note    Date of Service  1/3/2020    Chief Complaint  59 y.o. male admitted 12/19/2019 with shortness of breath    Hospital Course    Mr. Tatum is a 59-year-old male with a PMH of HTN, DM 2, admitted on 12/19/2019 due to increasing shortness of breath.  Glucose was 442.  Patient reports that his DM 2 medications were altered recently and he has been hyperglycemic ever since.  [facility] staff report poor appetite, weakness, confusion, and palor which started approximately 1 week prior to admission. Patient was found to be in DKA with altered mental status.  While in the ICU, patient's hemoglobin dropped to 6.9 and was transfused. An upper endoscopy was eventually performed on 12/27 with Dr. Villarreal, with findings reported of severe erosive esophagitis, LA grade D from 30 cm to 41 cm along with a 4 cm hiatal hernia. Path suggested possible H. Pylori infection.  On 12/28, psychiatry was consulted and felt he was unable to manage his DM on his own.     His insulin therapy has been set to fixed doses.  He has demonstrated to nursing the ability to check his own glucose level and administer his own insulin.  [facility] has confirmed they have paid an outside caregiver to come to the [facility] to assist with insulin.        Interval Problem Update  No pain right upper extremity  No real complaints today  Ambulatory in the hallway with no assistive devices  T-max 98.4  Vital signs within normal limits  Leukocytosis resolved  Glucose quite a bit higher today  I had extensive conversation with Aniyah--KARINA H as well as the contracted RN who will assist with blood sugar management.  Patient sees an endocrinologist and can call for orders to changes with insulin therapy.  Patient to resume continuous glucose monitoring device next week    Consultants/Specialty  Psychiatry  Gastroenterology -Dr. Villarreal    Code Status  Full    Disposition  Back to [facility] w private RN checks 3 times per week;  likely 1/6/2020    Review of Systems  Review of Systems   Constitutional: Negative for fever.   Respiratory: Negative for cough.    Cardiovascular: Negative for chest pain.   Gastrointestinal: Negative for abdominal pain, blood in stool, diarrhea, nausea and vomiting.   Genitourinary: Negative for dysuria.   Musculoskeletal: Negative for myalgias.   Skin: Negative for rash.   All other systems reviewed and are negative.       Physical Exam  Temp:  [36.7 °C (98 °F)-36.9 °C (98.4 °F)] 36.7 °C (98 °F)  Pulse:  [75-91] 75  Resp:  [16-18] 16  BP: (100-128)/(60-71) 114/64  SpO2:  [92 %-96 %] 92 %    Physical Exam  Vitals signs and nursing note reviewed.   HENT:      Head: Normocephalic and atraumatic.      Nose: Nose normal.   Eyes:      Conjunctiva/sclera: Conjunctivae normal.      Pupils: Pupils are equal, round, and reactive to light.   Neck:      Musculoskeletal: Neck supple.   Cardiovascular:      Rate and Rhythm: Normal rate and regular rhythm.      Heart sounds: No murmur. No friction rub.   Pulmonary:      Effort: No respiratory distress.   Abdominal:      General: Bowel sounds are normal. There is no distension.      Palpations: Abdomen is soft.   Musculoskeletal:      Comments: Right upper extremity edema   Skin:     General: Skin is warm and dry.      Comments: Well demarcated erythemic macule right upper extremity   Neurological:      Mental Status: He is alert and oriented to person, place, and time.         Fluids    Intake/Output Summary (Last 24 hours) at 1/3/2020 1502  Last data filed at 1/3/2020 0821  Gross per 24 hour   Intake 540 ml   Output 700 ml   Net -160 ml       Laboratory  Recent Labs     01/01/20  0429 01/02/20  0352 01/03/20  0227   WBC 19.9* 15.6* 8.9   RBC 2.78* 2.73* 2.64*   HEMOGLOBIN 9.0* 8.8* 8.6*   HEMATOCRIT 28.4* 27.9* 26.7*   .2* 102.2* 101.1*   MCH 32.4 32.2 32.6   MCHC 31.7* 31.5* 32.2*   RDW 57.8* 61.3* 61.1*   PLATELETCT 477* 437 452*   MPV 9.3 9.9 9.2     Recent Labs      "01/01/20  0429 01/02/20  0352 01/03/20  0227   SODIUM 134* 136 134*   POTASSIUM 3.9 3.9 4.5   CHLORIDE 102 103 102   CO2 27 26 23   GLUCOSE 174* 179* 336*   BUN 14 10 12   CREATININE 0.81 0.68 0.90   CALCIUM 8.5 8.2* 7.9*                   Imaging  US-EXTREMITY VENOUS UPPER UNILAT RIGHT   Final Result      IR-US GUIDED PIV   Final Result    Ultrasound-guided PERIPHERAL IV INSERTION performed by    qualified nursing staff as above.            DX-CHEST-LIMITED (1 VIEW)   Final Result      Stable chest with hyperinflation, aortic ectasia but no consolidation      IR-MIDLINE CATHETER INSERTION WO GUIDANCE > AGE 3   Final Result                  Ultrasound-guided midline placement performed by qualified nursing staff    as above.          EC-ECHOCARDIOGRAM LTD W/O CONT   Final Result      DX-CHEST-PORTABLE (1 VIEW)   Final Result         No acute cardiac or pulmonary abnormality is identified.           Assessment/Plan  Cellulitis of right upper extremity  Assessment & Plan  Already on doxycycline and Flagyl for H. pylori  DC Keflex and resume doxycycline  PRN oxycodone  Doppler negative for DVT    Uncontrolled type 1 diabetes mellitus with hyperglycemia (HCC)- (present on admission)  Assessment & Plan  Case management has requested consultation from psychiatry to assess patient's capacity with respect to self diabetic management following discharge, this consult has been requested - 12/28 per psychiatry: \"INCAPACITATED to manage his DM on his own\"  DC sliding scale and give mealtime dose only to simplify regimen  Lantus 25 units  Blood sugars controlled  Continue high intensity statin therapy  Aspirin 81      Helicobacter pylori (H. pylori) infection  Assessment & Plan  Discussed with GI and infectious disease  Based on unknown prevalence of clarithromycin resistance will proceed with quadruple therapy x14 days  Outpatient GI follow-up    Normocytic anemia- (present on admission)  Assessment & Plan  Component of iron " deficiency anemia - received IV Iron  Monitor Hgb / Restrictive transfusion strategy    Cognitive decline- (present on admission)  Assessment & Plan  Patient Sister Aniyah is patient's POA for both healthcare and financial concerns.  Moderate to severe cognitive decline from underlying Wernicke Korsakoff given profound history of alcoholism and history of multiple TBI's in the past.  Previous MRI with atrophy  Avoid sedative, narcotics / BZDs as able    Melanotic stools- (present on admission)  Assessment & Plan  -Colonoscopy could not be completed because of poor prep  -EGD on 12/27 with erosive esophogitis LA grade D  - Dr. Villarreal  -Continue omeprazole and sucralfate for 2 weeks per GI team  -Outpatient follow-up with GI team    Leukocytosis- (present on admission)  Assessment & Plan  Follow  Likely related to right upper extremity cellulitis  [place] NIRAV    Severe protein-calorie malnutrition (HCC)- (present on admission)  Assessment & Plan  -Optimize nutrition  -Multivitamin support, daily multivitamin and thiamine  -Stop Marinol/Remeron as this is causing sedation    Dyslipidemia- (present on admission)  Assessment & Plan  -Atorvastatin 40 mg daily with underlying risk factors    Essential hypertension- (present on admission)  Assessment & Plan  Holding anti hypertensive therapy  Resume as clinically appropriate       VTE prophylaxis: SCDs

## 2020-01-03 NOTE — DISCHARGE PLANNING
Anticipated Discharge Disposition: return to assisted living with assistance and medical help from KELLEY Arreola 3x a week    Action: Discussed pt in IDT round. Pt [facility] states pt may return Monday, no intake on weekends. Per KELLEY Arreola pt diabetic supplies will be available next week. Pt demonstrating ability to manage diabetes per provider.    Barriers to Discharge: monitoring of diabetic management and supplies     Plan: tentative DC 1/6 per provider     Length of Stay: 15

## 2020-01-03 NOTE — PROGRESS NOTES
Bedside report received.  Assessment complete.  A&O x 4. Patient calls appropriately.  Patient up with stand by to no assist.   Patient has 0/10 pain. Pain in RUE at times, PRN available.  Denies N&V. Tolerating diabetic diet.  + void, + flatus, + BM 1/2. Formed stool, no diarrhea. Bowel sounds normoactive. No emesis.  Patient denies SOB.  Patient in pleasant mood. Assisted living representative stopped by and stated they can take the patient back on Monday 1/6 as long as medically cleared  Review plan with of care with patient. Call light and personal belongings with in reach. Hourly rounding in place. All needs met at this time.

## 2020-01-03 NOTE — PROGRESS NOTES
Diabetes education: Pt is not able to give his insulin independently. If discharged to assisted living, any plan for medication assistance/supervision? Please reorder diabetes education if needs change.

## 2020-01-03 NOTE — THERAPY
Physical Therapy Contact Note    Discussed new order with RN, per PT eval on 12/24 pt able to perform mobility at ind level and amb with SPV no AD required. Functionally capable of returning to [facility]     Sheeba Zamora, PT, DPT  [phone]

## 2020-01-04 PROBLEM — D72.829 LEUKOCYTOSIS: Status: RESOLVED | Noted: 2019-01-12 | Resolved: 2020-01-04

## 2020-01-04 LAB
ANION GAP SERPL CALC-SCNC: 6 MMOL/L (ref 0–11.9)
BUN SERPL-MCNC: 15 MG/DL (ref 8–22)
CALCIUM SERPL-MCNC: 8.4 MG/DL (ref 8.5–10.5)
CHLORIDE SERPL-SCNC: 103 MMOL/L (ref 96–112)
CO2 SERPL-SCNC: 26 MMOL/L (ref 20–33)
CREAT SERPL-MCNC: 0.82 MG/DL (ref 0.5–1.4)
ERYTHROCYTE [DISTWIDTH] IN BLOOD BY AUTOMATED COUNT: 61 FL (ref 35.9–50)
GLUCOSE BLD-MCNC: 137 MG/DL (ref 65–99)
GLUCOSE BLD-MCNC: 162 MG/DL (ref 65–99)
GLUCOSE BLD-MCNC: 205 MG/DL (ref 65–99)
GLUCOSE SERPL-MCNC: 234 MG/DL (ref 65–99)
HCT VFR BLD AUTO: 28.5 % (ref 42–52)
HGB BLD-MCNC: 9 G/DL (ref 14–18)
MAGNESIUM SERPL-MCNC: 1.4 MG/DL (ref 1.5–2.5)
MCH RBC QN AUTO: 32.4 PG (ref 27–33)
MCHC RBC AUTO-ENTMCNC: 31.6 G/DL (ref 33.7–35.3)
MCV RBC AUTO: 102.5 FL (ref 81.4–97.8)
PHOSPHATE SERPL-MCNC: 2.7 MG/DL (ref 2.5–4.5)
PLATELET # BLD AUTO: 469 K/UL (ref 164–446)
PMV BLD AUTO: 9 FL (ref 9–12.9)
POTASSIUM SERPL-SCNC: 4.5 MMOL/L (ref 3.6–5.5)
RBC # BLD AUTO: 2.78 M/UL (ref 4.7–6.1)
SODIUM SERPL-SCNC: 135 MMOL/L (ref 135–145)
WBC # BLD AUTO: 8.4 K/UL (ref 4.8–10.8)

## 2020-01-04 PROCEDURE — 302128 INFUSION PUMP W/POLE: Performed by: INTERNAL MEDICINE

## 2020-01-04 PROCEDURE — 85027 COMPLETE CBC AUTOMATED: CPT

## 2020-01-04 PROCEDURE — 82962 GLUCOSE BLOOD TEST: CPT

## 2020-01-04 PROCEDURE — 84100 ASSAY OF PHOSPHORUS: CPT

## 2020-01-04 PROCEDURE — A9270 NON-COVERED ITEM OR SERVICE: HCPCS | Performed by: INTERNAL MEDICINE

## 2020-01-04 PROCEDURE — 99232 SBSQ HOSP IP/OBS MODERATE 35: CPT | Performed by: INTERNAL MEDICINE

## 2020-01-04 PROCEDURE — A9270 NON-COVERED ITEM OR SERVICE: HCPCS | Performed by: NURSE PRACTITIONER

## 2020-01-04 PROCEDURE — 700111 HCHG RX REV CODE 636 W/ 250 OVERRIDE (IP): Performed by: HOSPITALIST

## 2020-01-04 PROCEDURE — 83735 ASSAY OF MAGNESIUM: CPT

## 2020-01-04 PROCEDURE — 700102 HCHG RX REV CODE 250 W/ 637 OVERRIDE(OP): Performed by: HOSPITALIST

## 2020-01-04 PROCEDURE — 770001 HCHG ROOM/CARE - MED/SURG/GYN PRIV*

## 2020-01-04 PROCEDURE — 770006 HCHG ROOM/CARE - MED/SURG/GYN SEMI*

## 2020-01-04 PROCEDURE — 700102 HCHG RX REV CODE 250 W/ 637 OVERRIDE(OP): Performed by: INTERNAL MEDICINE

## 2020-01-04 PROCEDURE — 700111 HCHG RX REV CODE 636 W/ 250 OVERRIDE (IP): Performed by: NURSE PRACTITIONER

## 2020-01-04 PROCEDURE — 36415 COLL VENOUS BLD VENIPUNCTURE: CPT

## 2020-01-04 PROCEDURE — 700105 HCHG RX REV CODE 258

## 2020-01-04 PROCEDURE — 80048 BASIC METABOLIC PNL TOTAL CA: CPT

## 2020-01-04 PROCEDURE — 700102 HCHG RX REV CODE 250 W/ 637 OVERRIDE(OP): Performed by: NURSE PRACTITIONER

## 2020-01-04 PROCEDURE — A9270 NON-COVERED ITEM OR SERVICE: HCPCS | Performed by: HOSPITALIST

## 2020-01-04 RX ORDER — MAGNESIUM SULFATE HEPTAHYDRATE 40 MG/ML
2 INJECTION, SOLUTION INTRAVENOUS ONCE
Status: COMPLETED | OUTPATIENT
Start: 2020-01-04 | End: 2020-01-04

## 2020-01-04 RX ORDER — SODIUM CHLORIDE 9 MG/ML
INJECTION, SOLUTION INTRAVENOUS
Status: COMPLETED
Start: 2020-01-04 | End: 2020-01-04

## 2020-01-04 RX ADMIN — PANCRELIPASE 24000 UNITS: 24000; 76000; 120000 CAPSULE, DELAYED RELEASE PELLETS ORAL at 09:48

## 2020-01-04 RX ADMIN — OXYCODONE HYDROCHLORIDE 10 MG: 10 TABLET ORAL at 06:31

## 2020-01-04 RX ADMIN — BISMUTH SUBSALICYLATE 524 MG: 262 TABLET, CHEWABLE ORAL at 17:21

## 2020-01-04 RX ADMIN — BISMUTH SUBSALICYLATE 524 MG: 262 TABLET, CHEWABLE ORAL at 23:09

## 2020-01-04 RX ADMIN — METRONIDAZOLE 250 MG: 500 TABLET ORAL at 21:23

## 2020-01-04 RX ADMIN — SUCRALFATE 1 G: 1 SUSPENSION ORAL at 15:41

## 2020-01-04 RX ADMIN — CYANOCOBALAMIN 1000 MCG: 1000 INJECTION, SOLUTION INTRAMUSCULAR; SUBCUTANEOUS at 21:23

## 2020-01-04 RX ADMIN — Medication 100 MG: at 05:24

## 2020-01-04 RX ADMIN — METRONIDAZOLE 250 MG: 500 TABLET ORAL at 05:24

## 2020-01-04 RX ADMIN — METRONIDAZOLE 250 MG: 500 TABLET ORAL at 15:41

## 2020-01-04 RX ADMIN — OMEPRAZOLE 20 MG: 20 CAPSULE, DELAYED RELEASE ORAL at 17:21

## 2020-01-04 RX ADMIN — ASPIRIN 81 MG: 81 TABLET, COATED ORAL at 06:32

## 2020-01-04 RX ADMIN — SUCRALFATE 1 G: 1 SUSPENSION ORAL at 05:24

## 2020-01-04 RX ADMIN — OMEPRAZOLE 20 MG: 20 CAPSULE, DELAYED RELEASE ORAL at 06:32

## 2020-01-04 RX ADMIN — BISMUTH SUBSALICYLATE 524 MG: 262 TABLET, CHEWABLE ORAL at 05:24

## 2020-01-04 RX ADMIN — MAGNESIUM SULFATE 2 G: 2 INJECTION INTRAVENOUS at 10:33

## 2020-01-04 RX ADMIN — TAMSULOSIN HYDROCHLORIDE 0.4 MG: 0.4 CAPSULE ORAL at 06:32

## 2020-01-04 RX ADMIN — SUCRALFATE 1 G: 1 SUSPENSION ORAL at 23:09

## 2020-01-04 RX ADMIN — OXYCODONE HYDROCHLORIDE 10 MG: 10 TABLET ORAL at 12:33

## 2020-01-04 RX ADMIN — DOXYCYCLINE 100 MG: 100 TABLET, FILM COATED ORAL at 05:24

## 2020-01-04 RX ADMIN — SUCRALFATE 1 G: 1 SUSPENSION ORAL at 12:31

## 2020-01-04 RX ADMIN — DOXYCYCLINE 100 MG: 100 TABLET, FILM COATED ORAL at 17:21

## 2020-01-04 RX ADMIN — ATORVASTATIN CALCIUM 40 MG: 40 TABLET, FILM COATED ORAL at 17:21

## 2020-01-04 RX ADMIN — INSULIN GLARGINE 25 UNITS: 100 INJECTION, SOLUTION SUBCUTANEOUS at 17:26

## 2020-01-04 RX ADMIN — SODIUM CHLORIDE: 9 INJECTION, SOLUTION INTRAVENOUS at 10:33

## 2020-01-04 RX ADMIN — THERA TABS 1 TABLET: TAB at 06:32

## 2020-01-04 RX ADMIN — ONDANSETRON 4 MG: 2 INJECTION INTRAMUSCULAR; INTRAVENOUS at 15:46

## 2020-01-04 RX ADMIN — BISMUTH SUBSALICYLATE 524 MG: 262 TABLET, CHEWABLE ORAL at 12:12

## 2020-01-04 ASSESSMENT — ENCOUNTER SYMPTOMS
VOMITING: 0
NAUSEA: 0
BLOOD IN STOOL: 0
DIARRHEA: 0
COUGH: 0
MYALGIAS: 0
ABDOMINAL PAIN: 0
FEVER: 0

## 2020-01-04 NOTE — CARE PLAN
Problem: Infection  Goal: Will remain free from infection  Outcome: PROGRESSING AS EXPECTED     Problem: Safety  Goal: Will remain free from falls  Outcome: PROGRESSING AS EXPECTED

## 2020-01-04 NOTE — PROGRESS NOTES
Hospital Medicine Daily Progress Note    Date of Service  1/4/2020    Chief Complaint  59 y.o. male admitted 12/19/2019 with shortness of breath    Hospital Course    Mr. Tatum is a 59-year-old male with a PMH of HTN, DM 2, admitted on 12/19/2019 due to increasing shortness of breath.  Glucose was 442.  Patient reports that his DM 2 medications were altered recently and he has been hyperglycemic ever since.  [facility] staff report poor appetite, weakness, confusion, and palor which started approximately 1 week prior to admission. Patient was found to be in DKA with altered mental status.  While in the ICU, patient's hemoglobin dropped to 6.9 and was transfused. An upper endoscopy was eventually performed on 12/27 with Dr. Villarreal, with findings reported of severe erosive esophagitis, LA grade D from 30 cm to 41 cm along with a 4 cm hiatal hernia. Path suggested possible H. Pylori infection.  On 12/28, psychiatry was consulted and felt he was unable to manage his DM on his own.     His insulin therapy has been set to fixed doses.  He has demonstrated to nursing the ability to check his own glucose level and administer his own insulin.  [employer] [facility] has confirmed they have paid an outside caregiver to come to the [facility] to assist with insulin.        Interval Problem Update  No complaints today  Independent with ambulation  Blood sugars under better control  Still with edema right upper extremity of unclear etiology given negative Doppler and resolution of erythema      Consultants/Specialty  Psychiatry  Gastroenterology -Dr. Villarreal    Code Status  Full    Disposition  Back to [facility] w private RN checks 3 times per week; likely 1/6/2020    Review of Systems  Review of Systems   Constitutional: Negative for fever.   Respiratory: Negative for cough.    Cardiovascular: Negative for chest pain.   Gastrointestinal: Negative for abdominal pain, blood in stool, diarrhea, nausea and vomiting.   Genitourinary: Negative for  dysuria.   Musculoskeletal: Negative for myalgias.   Skin: Negative for rash.   All other systems reviewed and are negative.       Physical Exam  Temp:  [36.6 °C (97.9 °F)-37.6 °C (99.7 °F)] 37 °C (98.6 °F)  Pulse:  [71-91] 71  Resp:  [16-17] 16  BP: (117-126)/(63-75) 121/75  SpO2:  [92 %-94 %] 93 %    Physical Exam  Vitals signs and nursing note reviewed.   HENT:      Head: Normocephalic and atraumatic.      Nose: Nose normal.   Eyes:      Conjunctiva/sclera: Conjunctivae normal.      Pupils: Pupils are equal, round, and reactive to light.   Neck:      Musculoskeletal: Neck supple.   Cardiovascular:      Rate and Rhythm: Normal rate and regular rhythm.      Heart sounds: No murmur. No friction rub.   Pulmonary:      Effort: No respiratory distress.   Abdominal:      General: Bowel sounds are normal. There is no distension.      Palpations: Abdomen is soft.   Musculoskeletal:      Comments: Right upper extremity edema   Skin:     General: Skin is warm and dry.      Comments: Macule variegation bilateral UEs   Neurological:      Mental Status: He is alert and oriented to person, place, and time.         Fluids    Intake/Output Summary (Last 24 hours) at 1/4/2020 1305  Last data filed at 1/4/2020 0855  Gross per 24 hour   Intake 820 ml   Output 850 ml   Net -30 ml       Laboratory  Recent Labs     01/02/20  0352 01/03/20 0227 01/04/20 0314   WBC 15.6* 8.9 8.4   RBC 2.73* 2.64* 2.78*   HEMOGLOBIN 8.8* 8.6* 9.0*   HEMATOCRIT 27.9* 26.7* 28.5*   .2* 101.1* 102.5*   MCH 32.2 32.6 32.4   MCHC 31.5* 32.2* 31.6*   RDW 61.3* 61.1* 61.0*   PLATELETCT 437 452* 469*   MPV 9.9 9.2 9.0     Recent Labs     01/02/20  0352 01/03/20 0227 01/04/20 0314   SODIUM 136 134* 135   POTASSIUM 3.9 4.5 4.5   CHLORIDE 103 102 103   CO2 26 23 26   GLUCOSE 179* 336* 234*   BUN 10 12 15   CREATININE 0.68 0.90 0.82   CALCIUM 8.2* 7.9* 8.4*                   Imaging  US-EXTREMITY VENOUS UPPER UNILAT RIGHT   Final Result      IR-US GUIDED  "PIV   Final Result    Ultrasound-guided PERIPHERAL IV INSERTION performed by    qualified nursing staff as above.            DX-CHEST-LIMITED (1 VIEW)   Final Result      Stable chest with hyperinflation, aortic ectasia but no consolidation      IR-MIDLINE CATHETER INSERTION WO GUIDANCE > AGE 3   Final Result                  Ultrasound-guided midline placement performed by qualified nursing staff    as above.          EC-ECHOCARDIOGRAM LTD W/O CONT   Final Result      DX-CHEST-PORTABLE (1 VIEW)   Final Result         No acute cardiac or pulmonary abnormality is identified.           Assessment/Plan  Uncontrolled type 1 diabetes mellitus with hyperglycemia (HCC)- (present on admission)  Assessment & Plan  Case management has requested consultation from psychiatry to assess patient's capacity with respect to self diabetic management following discharge, this consult has been requested - 12/28 per psychiatry: \"INCAPACITATED to manage his DM on his own\"  DC sliding scale and give mealtime dose only to simplify regimen  Lantus 25 units  Blood sugars controlled  Continue high intensity statin therapy  Aspirin 81      Cellulitis of right upper extremity  Assessment & Plan  Already on doxycycline and Flagyl for H. pylori  DC Keflex and continue doxycycline  PRN oxycodone  Doppler negative for DVT    Helicobacter pylori (H. pylori) infection- (present on admission)  Assessment & Plan  Discussed with GI and infectious disease  Based on unknown prevalence of clarithromycin resistance will proceed with quadruple therapy x14 days  Outpatient GI follow-up    Normocytic anemia- (present on admission)  Assessment & Plan  Component of iron deficiency anemia - received IV Iron  Monitor Hgb / Restrictive transfusion strategy    Cognitive decline- (present on admission)  Assessment & Plan  Patient Sister Aniyah is patient's POA for both healthcare and financial concerns.  Moderate to severe cognitive decline from underlying Wernicke " Mayra given profound history of alcoholism and history of multiple TBI's in the past.  Previous MRI with atrophy  Avoid sedative, narcotics / BZDs as able    Melanotic stools- (present on admission)  Assessment & Plan  -Colonoscopy could not be completed because of poor prep  -EGD on 12/27 with erosive esophogitis LA grade D  - Dr. Villarreal  -Continue omeprazole and sucralfate for 2 weeks per GI team  -Outpatient follow-up with GI team    Severe protein-calorie malnutrition (HCC)- (present on admission)  Assessment & Plan  -Optimize nutrition  -Multivitamin support, daily multivitamin and thiamine  -Stop Marinol/Remeron as this is causing sedation    Dyslipidemia- (present on admission)  Assessment & Plan  -Atorvastatin 40 mg daily with underlying risk factors    Essential hypertension- (present on admission)  Assessment & Plan  Holding anti hypertensive therapy  Resume as clinically appropriate       VTE prophylaxis: SCDs    I have performed a physical exam and reviewed and updated ROS and Plan today (1/4/2020). In review of yesterday's note (1/3/2020), there are no changes except as documented above.

## 2020-01-05 LAB
ANION GAP SERPL CALC-SCNC: 6 MMOL/L (ref 0–11.9)
BUN SERPL-MCNC: 15 MG/DL (ref 8–22)
CALCIUM SERPL-MCNC: 8.1 MG/DL (ref 8.5–10.5)
CHLORIDE SERPL-SCNC: 103 MMOL/L (ref 96–112)
CO2 SERPL-SCNC: 27 MMOL/L (ref 20–33)
CREAT SERPL-MCNC: 0.75 MG/DL (ref 0.5–1.4)
ERYTHROCYTE [DISTWIDTH] IN BLOOD BY AUTOMATED COUNT: 58.9 FL (ref 35.9–50)
GLUCOSE BLD-MCNC: 190 MG/DL (ref 65–99)
GLUCOSE BLD-MCNC: 203 MG/DL (ref 65–99)
GLUCOSE SERPL-MCNC: 199 MG/DL (ref 65–99)
HCT VFR BLD AUTO: 29.5 % (ref 42–52)
HGB BLD-MCNC: 9.3 G/DL (ref 14–18)
MAGNESIUM SERPL-MCNC: 1.6 MG/DL (ref 1.5–2.5)
MCH RBC QN AUTO: 32.3 PG (ref 27–33)
MCHC RBC AUTO-ENTMCNC: 31.5 G/DL (ref 33.7–35.3)
MCV RBC AUTO: 102.4 FL (ref 81.4–97.8)
PHOSPHATE SERPL-MCNC: 3 MG/DL (ref 2.5–4.5)
PLATELET # BLD AUTO: 420 K/UL (ref 164–446)
PMV BLD AUTO: 9.9 FL (ref 9–12.9)
POTASSIUM SERPL-SCNC: 4.2 MMOL/L (ref 3.6–5.5)
RBC # BLD AUTO: 2.88 M/UL (ref 4.7–6.1)
SODIUM SERPL-SCNC: 136 MMOL/L (ref 135–145)
WBC # BLD AUTO: 7.8 K/UL (ref 4.8–10.8)

## 2020-01-05 PROCEDURE — 770006 HCHG ROOM/CARE - MED/SURG/GYN SEMI*

## 2020-01-05 PROCEDURE — 700102 HCHG RX REV CODE 250 W/ 637 OVERRIDE(OP): Performed by: NURSE PRACTITIONER

## 2020-01-05 PROCEDURE — 85027 COMPLETE CBC AUTOMATED: CPT

## 2020-01-05 PROCEDURE — A9270 NON-COVERED ITEM OR SERVICE: HCPCS | Performed by: NURSE PRACTITIONER

## 2020-01-05 PROCEDURE — 700102 HCHG RX REV CODE 250 W/ 637 OVERRIDE(OP): Performed by: INTERNAL MEDICINE

## 2020-01-05 PROCEDURE — 99231 SBSQ HOSP IP/OBS SF/LOW 25: CPT | Performed by: INTERNAL MEDICINE

## 2020-01-05 PROCEDURE — 84100 ASSAY OF PHOSPHORUS: CPT

## 2020-01-05 PROCEDURE — A9270 NON-COVERED ITEM OR SERVICE: HCPCS | Performed by: INTERNAL MEDICINE

## 2020-01-05 PROCEDURE — 36415 COLL VENOUS BLD VENIPUNCTURE: CPT

## 2020-01-05 PROCEDURE — A9270 NON-COVERED ITEM OR SERVICE: HCPCS | Performed by: HOSPITALIST

## 2020-01-05 PROCEDURE — 80048 BASIC METABOLIC PNL TOTAL CA: CPT

## 2020-01-05 PROCEDURE — 82962 GLUCOSE BLOOD TEST: CPT | Mod: 91

## 2020-01-05 PROCEDURE — 83735 ASSAY OF MAGNESIUM: CPT

## 2020-01-05 PROCEDURE — 700102 HCHG RX REV CODE 250 W/ 637 OVERRIDE(OP): Performed by: HOSPITALIST

## 2020-01-05 RX ADMIN — TAMSULOSIN HYDROCHLORIDE 0.4 MG: 0.4 CAPSULE ORAL at 05:03

## 2020-01-05 RX ADMIN — ASPIRIN 81 MG: 81 TABLET, COATED ORAL at 05:03

## 2020-01-05 RX ADMIN — BISMUTH SUBSALICYLATE 524 MG: 262 TABLET, CHEWABLE ORAL at 05:02

## 2020-01-05 RX ADMIN — OMEPRAZOLE 20 MG: 20 CAPSULE, DELAYED RELEASE ORAL at 05:03

## 2020-01-05 RX ADMIN — OMEPRAZOLE 20 MG: 20 CAPSULE, DELAYED RELEASE ORAL at 17:30

## 2020-01-05 RX ADMIN — BISMUTH SUBSALICYLATE 524 MG: 262 TABLET, CHEWABLE ORAL at 17:29

## 2020-01-05 RX ADMIN — THERA TABS 1 TABLET: TAB at 05:03

## 2020-01-05 RX ADMIN — BISMUTH SUBSALICYLATE 524 MG: 262 TABLET, CHEWABLE ORAL at 13:24

## 2020-01-05 RX ADMIN — INSULIN GLARGINE 25 UNITS: 100 INJECTION, SOLUTION SUBCUTANEOUS at 17:37

## 2020-01-05 RX ADMIN — DOXYCYCLINE 100 MG: 100 TABLET, FILM COATED ORAL at 05:02

## 2020-01-05 RX ADMIN — OXYCODONE HYDROCHLORIDE 10 MG: 10 TABLET ORAL at 18:27

## 2020-01-05 RX ADMIN — METRONIDAZOLE 250 MG: 500 TABLET ORAL at 05:03

## 2020-01-05 RX ADMIN — METRONIDAZOLE 250 MG: 500 TABLET ORAL at 22:57

## 2020-01-05 RX ADMIN — METRONIDAZOLE 250 MG: 500 TABLET ORAL at 13:23

## 2020-01-05 RX ADMIN — SUCRALFATE 1 G: 1 SUSPENSION ORAL at 05:02

## 2020-01-05 RX ADMIN — ATORVASTATIN CALCIUM 40 MG: 40 TABLET, FILM COATED ORAL at 17:30

## 2020-01-05 RX ADMIN — DOXYCYCLINE 100 MG: 100 TABLET, FILM COATED ORAL at 17:30

## 2020-01-05 RX ADMIN — SUCRALFATE 1 G: 1 SUSPENSION ORAL at 22:57

## 2020-01-05 RX ADMIN — SUCRALFATE 1 G: 1 SUSPENSION ORAL at 11:55

## 2020-01-05 RX ADMIN — Medication 100 MG: at 05:03

## 2020-01-05 RX ADMIN — OXYCODONE HYDROCHLORIDE 10 MG: 10 TABLET ORAL at 12:10

## 2020-01-05 ASSESSMENT — ENCOUNTER SYMPTOMS
COUGH: 0
ABDOMINAL PAIN: 0
FEVER: 0
NAUSEA: 0
VOMITING: 0
BLOOD IN STOOL: 0
MYALGIAS: 0
DIARRHEA: 0

## 2020-01-05 NOTE — PROGRESS NOTES
Bedside report received.  Assessment complete.  A&O x 4. Patient calls appropriately.  Patient  up with self assist. Bed alarm off.   Patient has 0/10 pain. No interventions needed at this time  Denies N&V. Tolerating diabetic diet.  + void, + flatus, + BM.  Patient denies SOB.  Patient is pleasant resting in bed.  Review plan with of care with patient. Call light and personal belongings with in reach. Hourly rounding in place. All needs met at this time.

## 2020-01-05 NOTE — PROGRESS NOTES
Hospital Medicine Daily Progress Note    Date of Service  1/5/2020    Chief Complaint  59 y.o. male admitted 12/19/2019 with shortness of breath    Hospital Course    Mr. Tatum is a 59-year-old male with a PMH of HTN, DM 2, admitted on 12/19/2019 due to increasing shortness of breath.  Glucose was 442.  Patient reports that his DM 2 medications were altered recently and he has been hyperglycemic ever since.  [facility] staff report poor appetite, weakness, confusion, and palor which started approximately 1 week prior to admission. Patient was found to be in DKA with altered mental status.  While in the ICU, patient's hemoglobin dropped to 6.9 and was transfused. An upper endoscopy was eventually performed on 12/27 with Dr. Villarreal, with findings reported of severe erosive esophagitis, LA grade D from 30 cm to 41 cm along with a 4 cm hiatal hernia. Path suggested possible H. Pylori infection.  On 12/28, psychiatry was consulted and felt he was unable to manage his DM on his own.     His insulin therapy has been set to fixed doses.  He has demonstrated to nursing the ability to check his own glucose level and administer his own insulin.  [employer] [facility] [employer] has confirmed they have paid an outside caregiver to come to the [facility] to assist with insulin.        Interval Problem Update  No complaints   VSS and WNL  POC readings under better control  Back to [facility] tomorrow      Consultants/Specialty  Psychiatry  Gastroenterology -Dr. Villarreal    Code Status  Full    Disposition  Back to [facility] w private RN checks 3 times per week; likely 1/6/2020    Review of Systems  Review of Systems   Constitutional: Negative for fever.   Respiratory: Negative for cough.    Cardiovascular: Negative for chest pain.   Gastrointestinal: Negative for abdominal pain, blood in stool, diarrhea, nausea and vomiting.   Genitourinary: Negative for dysuria.   Musculoskeletal: Negative for myalgias.   Skin: Negative for rash.   All other systems reviewed and  are negative.       Physical Exam  Temp:  [36.2 °C (97.2 °F)-36.7 °C (98 °F)] 36.4 °C (97.6 °F)  Pulse:  [] 86  Resp:  [16-17] 16  BP: ()/(60-67) 119/67  SpO2:  [93 %-96 %] 94 %    Physical Exam  Vitals signs and nursing note reviewed.   HENT:      Head: Normocephalic and atraumatic.      Nose: Nose normal.   Eyes:      Conjunctiva/sclera: Conjunctivae normal.      Pupils: Pupils are equal, round, and reactive to light.   Neck:      Musculoskeletal: Neck supple.   Cardiovascular:      Rate and Rhythm: Normal rate and regular rhythm.      Heart sounds: No murmur. No friction rub.   Pulmonary:      Effort: No respiratory distress.   Abdominal:      General: Bowel sounds are normal. There is no distension.      Palpations: Abdomen is soft.   Musculoskeletal:      Comments: Right upper extremity edema   Skin:     General: Skin is warm and dry.      Comments: Macule variegation bilateral UEs   Neurological:      Mental Status: He is alert and oriented to person, place, and time.         Fluids    Intake/Output Summary (Last 24 hours) at 1/5/2020 1144  Last data filed at 1/5/2020 0806  Gross per 24 hour   Intake 640 ml   Output 300 ml   Net 340 ml       Laboratory  Recent Labs     01/03/20 0227 01/04/20 0314 01/05/20 0439   WBC 8.9 8.4 7.8   RBC 2.64* 2.78* 2.88*   HEMOGLOBIN 8.6* 9.0* 9.3*   HEMATOCRIT 26.7* 28.5* 29.5*   .1* 102.5* 102.4*   MCH 32.6 32.4 32.3   MCHC 32.2* 31.6* 31.5*   RDW 61.1* 61.0* 58.9*   PLATELETCT 452* 469* 420   MPV 9.2 9.0 9.9     Recent Labs     01/03/20 0227 01/04/20 0314 01/05/20 0439   SODIUM 134* 135 136   POTASSIUM 4.5 4.5 4.2   CHLORIDE 102 103 103   CO2 23 26 27   GLUCOSE 336* 234* 199*   BUN 12 15 15   CREATININE 0.90 0.82 0.75   CALCIUM 7.9* 8.4* 8.1*                   Imaging  US-EXTREMITY VENOUS UPPER UNILAT RIGHT   Final Result      IR-US GUIDED PIV   Final Result    Ultrasound-guided PERIPHERAL IV INSERTION performed by    qualified nursing staff as above.  "           DX-CHEST-LIMITED (1 VIEW)   Final Result      Stable chest with hyperinflation, aortic ectasia but no consolidation      IR-MIDLINE CATHETER INSERTION WO GUIDANCE > AGE 3   Final Result                  Ultrasound-guided midline placement performed by qualified nursing staff    as above.          EC-ECHOCARDIOGRAM LTD W/O CONT   Final Result      DX-CHEST-PORTABLE (1 VIEW)   Final Result         No acute cardiac or pulmonary abnormality is identified.           Assessment/Plan  Uncontrolled type 1 diabetes mellitus with hyperglycemia (HCC)- (present on admission)  Assessment & Plan  Case management has requested consultation from psychiatry to assess patient's capacity with respect to self diabetic management following discharge, this consult has been requested - 12/28 per psychiatry: \"INCAPACITATED to manage his DM on his own\"  DC sliding scale and give mealtime dose only to simplify regimen  Lantus 25 units  Blood sugars controlled  Continue high intensity statin therapy  Aspirin 81      Cellulitis of right upper extremity  Assessment & Plan  Already on doxycycline and Flagyl for H. pylori  DC Keflex and continue doxycycline  PRN oxycodone  Doppler negative for DVT    Helicobacter pylori (H. pylori) infection- (present on admission)  Assessment & Plan  Discussed with GI and infectious disease  Based on unknown prevalence of clarithromycin resistance will proceed with quadruple therapy x14 days  Outpatient GI follow-up    Normocytic anemia- (present on admission)  Assessment & Plan  Component of iron deficiency anemia - received IV Iron  Monitor Hgb / Restrictive transfusion strategy    Cognitive decline- (present on admission)  Assessment & Plan  Patient Sister Aniyah is patient's POA for both healthcare and financial concerns.  Moderate to severe cognitive decline from underlying Wernicke Korsakoff given profound history of alcoholism and history of multiple TBI's in the past.  Previous MRI with " atrophy  Avoid sedative, narcotics / BZDs as able    Melanotic stools- (present on admission)  Assessment & Plan  -Colonoscopy could not be completed because of poor prep  -EGD on 12/27 with erosive esophogitis LA grade D  - Dr. iVllarreal  -Continue omeprazole and sucralfate for 2 weeks per GI team  -Outpatient follow-up with GI team    Severe protein-calorie malnutrition (HCC)- (present on admission)  Assessment & Plan  -Optimize nutrition  -Multivitamin support, daily multivitamin and thiamine      Dyslipidemia- (present on admission)  Assessment & Plan  -Atorvastatin 40 mg daily with underlying risk factors    Essential hypertension- (present on admission)  Assessment & Plan  Holding anti hypertensive therapy  Resume as clinically appropriate       VTE prophylaxis: SCDs    I have performed a physical exam and reviewed and updated ROS and Plan today (1/5/2020). In review of yesterday's note (1/4/2020), there are no changes except as documented above.

## 2020-01-05 NOTE — PROGRESS NOTES
Bedside report received.  Assessment complete.  A&O x 4. Patient calls appropriately.  Patient up with no assist.   Patient has 0/10 pain. Declines PRn at this time  Denies N&V. Tolerating diabetic diet.  Sugar levels in better range, 190 this AM, continuing to monitor.  + void, + flatus, + BM, stated had 2 loose in last 12 hours.  Patient denies SOB.  Patient in pleasant mood, no current complaints hoping to DC tomorrow as planned.  Review plan with of care with patient. Call light and personal belongings with in reach. Hourly rounding in place. All needs met at this time.

## 2020-01-06 VITALS
BODY MASS INDEX: 16.86 KG/M2 | WEIGHT: 135.58 LBS | HEART RATE: 71 BPM | RESPIRATION RATE: 13 BRPM | SYSTOLIC BLOOD PRESSURE: 114 MMHG | TEMPERATURE: 98.6 F | HEIGHT: 75 IN | OXYGEN SATURATION: 94 % | DIASTOLIC BLOOD PRESSURE: 59 MMHG

## 2020-01-06 PROBLEM — L03.113 CELLULITIS OF RIGHT UPPER EXTREMITY: Status: RESOLVED | Noted: 2020-01-01 | Resolved: 2020-01-06

## 2020-01-06 PROBLEM — K92.1 MELANOTIC STOOLS: Status: RESOLVED | Noted: 2019-12-20 | Resolved: 2020-01-06

## 2020-01-06 LAB
ANION GAP SERPL CALC-SCNC: 7 MMOL/L (ref 0–11.9)
BUN SERPL-MCNC: 10 MG/DL (ref 8–22)
CALCIUM SERPL-MCNC: 8.4 MG/DL (ref 8.5–10.5)
CHLORIDE SERPL-SCNC: 104 MMOL/L (ref 96–112)
CO2 SERPL-SCNC: 26 MMOL/L (ref 20–33)
CREAT SERPL-MCNC: 0.73 MG/DL (ref 0.5–1.4)
ERYTHROCYTE [DISTWIDTH] IN BLOOD BY AUTOMATED COUNT: 60.7 FL (ref 35.9–50)
GLUCOSE BLD-MCNC: 147 MG/DL (ref 65–99)
GLUCOSE SERPL-MCNC: 177 MG/DL (ref 65–99)
HCT VFR BLD AUTO: 29.2 % (ref 42–52)
HGB BLD-MCNC: 9.1 G/DL (ref 14–18)
MAGNESIUM SERPL-MCNC: 1.4 MG/DL (ref 1.5–2.5)
MCH RBC QN AUTO: 32.6 PG (ref 27–33)
MCHC RBC AUTO-ENTMCNC: 31.2 G/DL (ref 33.7–35.3)
MCV RBC AUTO: 104.7 FL (ref 81.4–97.8)
PHOSPHATE SERPL-MCNC: 3.2 MG/DL (ref 2.5–4.5)
PLATELET # BLD AUTO: 457 K/UL (ref 164–446)
PMV BLD AUTO: 9.3 FL (ref 9–12.9)
POTASSIUM SERPL-SCNC: 4 MMOL/L (ref 3.6–5.5)
RBC # BLD AUTO: 2.79 M/UL (ref 4.7–6.1)
SODIUM SERPL-SCNC: 137 MMOL/L (ref 135–145)
WBC # BLD AUTO: 8.3 K/UL (ref 4.8–10.8)

## 2020-01-06 PROCEDURE — 700102 HCHG RX REV CODE 250 W/ 637 OVERRIDE(OP): Performed by: NURSE PRACTITIONER

## 2020-01-06 PROCEDURE — 99239 HOSP IP/OBS DSCHRG MGMT >30: CPT | Performed by: INTERNAL MEDICINE

## 2020-01-06 PROCEDURE — A9270 NON-COVERED ITEM OR SERVICE: HCPCS | Performed by: INTERNAL MEDICINE

## 2020-01-06 PROCEDURE — 80048 BASIC METABOLIC PNL TOTAL CA: CPT

## 2020-01-06 PROCEDURE — A9270 NON-COVERED ITEM OR SERVICE: HCPCS | Performed by: HOSPITALIST

## 2020-01-06 PROCEDURE — 700102 HCHG RX REV CODE 250 W/ 637 OVERRIDE(OP): Performed by: HOSPITALIST

## 2020-01-06 PROCEDURE — A9270 NON-COVERED ITEM OR SERVICE: HCPCS | Performed by: NURSE PRACTITIONER

## 2020-01-06 PROCEDURE — 85027 COMPLETE CBC AUTOMATED: CPT

## 2020-01-06 PROCEDURE — 83735 ASSAY OF MAGNESIUM: CPT

## 2020-01-06 PROCEDURE — 82962 GLUCOSE BLOOD TEST: CPT | Mod: 91

## 2020-01-06 PROCEDURE — 700102 HCHG RX REV CODE 250 W/ 637 OVERRIDE(OP): Performed by: INTERNAL MEDICINE

## 2020-01-06 PROCEDURE — 84100 ASSAY OF PHOSPHORUS: CPT

## 2020-01-06 PROCEDURE — 36415 COLL VENOUS BLD VENIPUNCTURE: CPT

## 2020-01-06 RX ORDER — LISINOPRIL 2.5 MG/1
TABLET ORAL
Qty: 30 TAB | Refills: 1 | Status: ON HOLD
Start: 2020-01-06 | End: 2020-03-23

## 2020-01-06 RX ORDER — SUCRALFATE 1 G/1
TABLET ORAL
Qty: 60 TAB | Refills: 0 | Status: SHIPPED | OUTPATIENT
Start: 2020-01-06 | End: 2020-03-24 | Stop reason: SDUPTHER

## 2020-01-06 RX ORDER — DOXYCYCLINE 100 MG/1
100 TABLET ORAL EVERY 12 HOURS
Qty: 16 TAB | Refills: 0 | Status: SHIPPED | OUTPATIENT
Start: 2020-01-06 | End: 2020-01-14

## 2020-01-06 RX ORDER — OXYCODONE HYDROCHLORIDE 5 MG/1
5 TABLET ORAL EVERY 12 HOURS PRN
Qty: 6 TAB | Refills: 0 | Status: SHIPPED | OUTPATIENT
Start: 2020-01-06 | End: 2020-01-09

## 2020-01-06 RX ORDER — LANOLIN ALCOHOL/MO/W.PET/CERES
100 CREAM (GRAM) TOPICAL DAILY
Qty: 30 TAB | Refills: 1 | Status: SHIPPED
Start: 2020-01-07 | End: 2020-03-24

## 2020-01-06 RX ORDER — METRONIDAZOLE 250 MG/1
250 TABLET ORAL EVERY 8 HOURS
Qty: 24 TAB | Refills: 0 | Status: ON HOLD
Start: 2020-01-06 | End: 2020-03-11

## 2020-01-06 RX ORDER — OMEPRAZOLE 20 MG/1
20 CAPSULE, DELAYED RELEASE ORAL 2 TIMES DAILY
Qty: 60 CAP | Refills: 0 | Status: SHIPPED | OUTPATIENT
Start: 2020-01-06 | End: 2020-02-04 | Stop reason: SDUPTHER

## 2020-01-06 RX ORDER — BISMUTH SUBSALICYLATE 262 MG/1
524 TABLET, CHEWABLE ORAL EVERY 6 HOURS
Qty: 45 TAB | Refills: 0 | Status: SHIPPED
Start: 2020-01-06 | End: 2020-05-08

## 2020-01-06 RX ORDER — ATORVASTATIN CALCIUM 40 MG/1
40 TABLET, FILM COATED ORAL EVERY EVENING
Qty: 30 TAB | Refills: 1 | Status: SHIPPED | OUTPATIENT
Start: 2020-01-06 | End: 2020-03-24 | Stop reason: SDUPTHER

## 2020-01-06 RX ADMIN — OXYCODONE HYDROCHLORIDE 5 MG: 5 TABLET ORAL at 06:44

## 2020-01-06 RX ADMIN — OXYCODONE HYDROCHLORIDE 10 MG: 10 TABLET ORAL at 13:10

## 2020-01-06 RX ADMIN — TAMSULOSIN HYDROCHLORIDE 0.4 MG: 0.4 CAPSULE ORAL at 05:38

## 2020-01-06 RX ADMIN — METRONIDAZOLE 250 MG: 500 TABLET ORAL at 13:11

## 2020-01-06 RX ADMIN — METRONIDAZOLE 250 MG: 500 TABLET ORAL at 05:39

## 2020-01-06 RX ADMIN — THERA TABS 1 TABLET: TAB at 05:39

## 2020-01-06 RX ADMIN — ASPIRIN 81 MG: 81 TABLET, COATED ORAL at 05:38

## 2020-01-06 RX ADMIN — OMEPRAZOLE 20 MG: 20 CAPSULE, DELAYED RELEASE ORAL at 05:38

## 2020-01-06 RX ADMIN — BISMUTH SUBSALICYLATE 524 MG: 262 TABLET, CHEWABLE ORAL at 05:38

## 2020-01-06 RX ADMIN — SUCRALFATE 1 G: 1 SUSPENSION ORAL at 05:38

## 2020-01-06 RX ADMIN — Medication 100 MG: at 05:38

## 2020-01-06 RX ADMIN — DOXYCYCLINE 100 MG: 100 TABLET, FILM COATED ORAL at 05:38

## 2020-01-06 NOTE — PROGRESS NOTES
Bedside report received.  Assessment complete.  A&O x 4. Patient calls appropriately.  Patient up with no assist.   Patient has 0/10 pain. Declines PRn at this time  Denies N&V. Tolerating diabetic diet.  Sugar levels in better range, 190 this AM, continuing to monitor.  + void, + flatus, + BM, stated had 2 loose in last 12 hours.  Patient denies SOB.  Patient in pleasant mood, no current complaints hoping to DC tomorrow as planned.  Review plan with of care with patient. Call light and personal belongings with in reach. Hourly rounding in place. All needs met at this time

## 2020-01-06 NOTE — DISCHARGE INSTRUCTIONS
Discharge Instructions per PAULINE Saleh.    1.  Review your discharge Medication Reconciliation form as you may be provided with new prescriptions or changes to previously prescribed medications.  Be sure to take all of your prescribed medications exactly as directed.  Further questions can be directed to your local pharmacist or primary care provider (PCP).    2. Follow-up with your PCP.  An appointment may have already been scheduled for you.  Please review your discharge paperwork and locate this information.  Please be sure to call your PCP to cancel if you are unable to make this appointment or require schedule changes.  It is critical to to follow-up with your PCP to review hospital records and ensure any further diagnostic work-up, prescription refills, or referrals.  Call your gastroenterologist to arrange an outpatient follow-up to review all medications.     3. ACTIVITIES: After discharge from the hospital, you may resume routine activities including walking and going u/down stairs. However, no strenuous activity. For further clarification, call your PCP     4. DRIVING: You may drive whenever you are off pain medications and are able to perform the activities needed to drive, i.e. turning, bending, twisting, etc.     5. BOWEL FUNCTION: A few patients, after hospitalizations, will develop bowel irregularity. You could also experience constipation due to pain medication and decreased activity level. If you feel this is occurring, please take an over-the-counter laxative (Milk of Magnesia, Ex-Lax, Senokot, etc.) until the problem has resolved.    Return to ER if you develop recurrent chest pain, shortness of breath, bloody sputum, fever of 101.5 or greater, intractable nausea or vomiting, blood in the vomit or urine, intractable pain, new onset inability to ambulate, focal motor weakness, acute vision disturbance, acute speech disturbance, intractable abdominal pain, or any other worrisome  symptoms.      Discharge Instructions    Discharged to other by medical transportation with escort. Discharged via wheelchair, hospital escort: Yes.  Special equipment needed: Not Applicable    Be sure to schedule a follow-up appointment with your primary care doctor or any specialists as instructed.     Discharge Plan:   Diet Plan: Discussed  Activity Level: Discussed  Smoking Cessation Offered: Patient Refused  Confirmed Follow up Appointment: Patient to Call and Schedule Appointment  Confirmed Symptoms Management: Discussed  Medication Reconciliation Updated: Yes  Influenza Vaccine Indication: Patient Refuses    I understand that a diet low in cholesterol, fat, and sodium is recommended for good health. Unless I have been given specific instructions below for another diet, I accept this instruction as my diet prescription.   Other diet: diabetic    Special Instructions: None    · Is patient discharged on Warfarin / Coumadin?   No     Diabetes Mellitus and Food  It is important for you to manage your blood sugar (glucose) level. Your blood glucose level can be greatly affected by what you eat. Eating healthier foods in the appropriate amounts throughout the day at about the same time each day will help you control your blood glucose level. It can also help slow or prevent worsening of your diabetes mellitus. Healthy eating may even help you improve the level of your blood pressure and reach or maintain a healthy weight.  General recommendations for healthful eating and cooking habits include:  · Eating meals and snacks regularly. Avoid going long periods of time without eating to lose weight.  · Eating a diet that consists mainly of plant-based foods, such as fruits, vegetables, nuts, legumes, and whole grains.  · Using low-heat cooking methods, such as baking, instead of high-heat cooking methods, such as deep frying.  Work with your dietitian to make sure you understand how to use the Nutrition Facts information  on food labels.  How can food affect me?  Carbohydrates   Carbohydrates affect your blood glucose level more than any other type of food. Your dietitian will help you determine how many carbohydrates to eat at each meal and teach you how to count carbohydrates. Counting carbohydrates is important to keep your blood glucose at a healthy level, especially if you are using insulin or taking certain medicines for diabetes mellitus.  Alcohol   Alcohol can cause sudden decreases in blood glucose (hypoglycemia), especially if you use insulin or take certain medicines for diabetes mellitus. Hypoglycemia can be a life-threatening condition. Symptoms of hypoglycemia (sleepiness, dizziness, and disorientation) are similar to symptoms of having too much alcohol.  If your health care provider has given you approval to drink alcohol, do so in moderation and use the following guidelines:  · Women should not have more than one drink per day, and men should not have more than two drinks per day. One drink is equal to:  ¨ 12 oz of beer.  ¨ 5 oz of wine.  ¨ 1½ oz of hard liquor.  · Do not drink on an empty stomach.  · Keep yourself hydrated. Have water, diet soda, or unsweetened iced tea.  · Regular soda, juice, and other mixers might contain a lot of carbohydrates and should be counted.  What foods are not recommended?  As you make food choices, it is important to remember that all foods are not the same. Some foods have fewer nutrients per serving than other foods, even though they might have the same number of calories or carbohydrates. It is difficult to get your body what it needs when you eat foods with fewer nutrients. Examples of foods that you should avoid that are high in calories and carbohydrates but low in nutrients include:  · Trans fats (most processed foods list trans fats on the Nutrition Facts label).  · Regular soda.  · Juice.  · Candy.  · Sweets, such as cake, pie, doughnuts, and cookies.  · Fried foods.  What  foods can I eat?  Eat nutrient-rich foods, which will nourish your body and keep you healthy. The food you should eat also will depend on several factors, including:  · The calories you need.  · The medicines you take.  · Your weight.  · Your blood glucose level.  · Your blood pressure level.  · Your cholesterol level.  You should eat a variety of foods, including:  · Protein.  ¨ Lean cuts of meat.  ¨ Proteins low in saturated fats, such as fish, egg whites, and beans. Avoid processed meats.  · Fruits and vegetables.  ¨ Fruits and vegetables that may help control blood glucose levels, such as apples, mangoes, and yams.  · Dairy products.  ¨ Choose fat-free or low-fat dairy products, such as milk, yogurt, and cheese.  · Grains, bread, pasta, and rice.  ¨ Choose whole grain products, such as multigrain bread, whole oats, and brown rice. These foods may help control blood pressure.  · Fats.  ¨ Foods containing healthful fats, such as nuts, avocado, olive oil, canola oil, and fish.  Does everyone with diabetes mellitus have the same meal plan?  Because every person with diabetes mellitus is different, there is not one meal plan that works for everyone. It is very important that you meet with a dietitian who will help you create a meal plan that is just right for you.  This information is not intended to replace advice given to you by your health care provider. Make sure you discuss any questions you have with your health care provider.  Document Released: 09/14/2006 Document Revised: 05/25/2017 Document Reviewed: 11/14/2014  [employer] Interactive Patient Education © 2017 [employer]      Hyperglycemia  Hyperglycemia is when the sugar (glucose) level in your blood is too high. It may not cause symptoms. If you do have symptoms, they may include warning signs, such as:  · Feeling more thirsty than normal.  · Hunger.  · Feeling tired.  · Needing to pee (urinate) more than normal.  · Blurry eyesight (vision).  You may get  other symptoms as it gets worse, such as:  · Dry mouth.  · Not being hungry (loss of appetite).  · Fruity-smelling breath.  · Weakness.  · Weight gain or loss that is not planned. Weight loss may be fast.  · A tingling or numb feeling in your hands or feet.  · Headache.  · Skin that does not bounce back quickly when it is lightly pinched and released (poor skin turgor).  · Pain in your belly (abdomen).  · Cuts or bruises that heal slowly.  High blood sugar can happen to people who do or do not have diabetes. High blood sugar can happen slowly or quickly, and it can be an emergency.  Follow these instructions at home:  General instructions  · Take over-the-counter and prescription medicines only as told by your doctor.  · Do not use products that contain nicotine or tobacco, such as cigarettes and e-cigarettes. If you need help quitting, ask your doctor.  · Limit alcohol intake to no more than 1 drink per day for nonpregnant women and 2 drinks per day for men. One drink equals 12 oz of beer, 5 oz of wine, or 1½ oz of hard liquor.  · Manage stress. If you need help with this, ask your doctor.  · Keep all follow-up visits as told by your doctor. This is important.  Eating and drinking  · Stay at a healthy weight.  · Exercise regularly, as told by your doctor.  · Drink enough fluid, especially when you:  ¨ Exercise.  ¨ Get sick.  ¨ Are in hot temperatures.  · Eat healthy foods, such as:  ¨ Low-fat (lean) proteins.  ¨ Complex carbs (complex carbohydrates), such as whole wheat bread or brown rice.  ¨ Fresh fruits and vegetables.  ¨ Low-fat dairy products.  ¨ Healthy fats.  · Drink enough fluid to keep your pee (urine) clear or pale yellow.  If you have diabetes:  · Make sure you know the symptoms of hyperglycemia.  · Follow your diabetes management plan, as told by your doctor. Make sure you:  ¨ Take insulin and medicines as told.  ¨ Follow your exercise plan.  ¨ Follow your meal plan. Eat on time. Do not skip  meals.  ¨ Check your blood sugar as often as told. Make sure to check before and after exercise. If you exercise longer or in a different way than you normally do, check your blood sugar more often.  ¨ Follow your sick day plan whenever you cannot eat or drink normally. Make this plan ahead of time with your doctor.  · Share your diabetes management plan with people in your workplace, school, and household.  · Check your urine for ketones when you are ill and as told by your doctor.  · Carry a card or wear jewelry that says that you have diabetes.  Contact a doctor if:  · Your blood sugar level is higher than 240 mg/dL (13.3 mmol/L) for 2 days in a row.  · You have problems keeping your blood sugar in your target range.  · High blood sugar happens often for you.  Get help right away if:  · You have trouble breathing.  · You have a change in how you think, feel, or act (mental status).  · You feel sick to your stomach (nauseous), and that feeling does not go away.  · You cannot stop throwing up (vomiting).  These symptoms may be an emergency. Do not wait to see if the symptoms will go away. Get medical help right away. Call your local emergency services (911 in the [place] Do not drive yourself to the hospital.   Summary  · Hyperglycemia is when the sugar (glucose) level in your blood is too high.  · High blood sugar can happen to people who do or do not have diabetes.  · Make sure you drink enough fluids, eat healthy foods, and exercise regularly.  · Contact your doctor if you have problems keeping your blood sugar in your target range.  This information is not intended to replace advice given to you by your health care provider. Make sure you discuss any questions you have with your health care provider.  Document Released: 10/15/2010 Document Revised: 09/04/2017 Document Reviewed: 09/04/2017  [employer] Interactive Patient Education © 2017 [employer]      Acute Kidney Injury, Adult  Acute kidney injury (DINESH)  occurs when there is sudden (acute) damage to the kidneys. A small amount of kidney damage may not cause problems, but a large amount of damage may make it difficult or impossible for the kidneys to work the way they should. DINESH may develop into long-lasting (chronic) kidney disease. Early detection and treatment of DINESH may prevent kidney damage from becoming permanent or getting worse.  What are the causes?  Common causes of this condition include:  · A problem with blood flow to the kidneys. This may be caused by:  ¨ Blood loss.  ¨ Heart and blood vessel (cardiovascular) disease.  ¨ Severe burns.  ¨ Liver disease.  · Direct damage to the kidneys. This may be caused by:  ¨ Some medicines.  ¨ A kidney infection.  ¨ Poisoning.  ¨ Being around or in contact with poisonous (toxic) substances.  ¨ A surgical wound.  ¨ A hard, direct force to the kidney area.  · A sudden blockage of urine flow. This may be caused by:  ¨ Cancer.  ¨ Kidney stones.  ¨ Enlarged prostate in males.  What are the signs or symptoms?  Symptoms develop slowly and may not be obvious until the kidney damage becomes severe. It is possible to have DINESH for years without showing any symptoms. Symptoms of this condition can include:  · Swelling (edema) of the face, legs, ankles, or feet.  · Numbness, tingling, or loss of feeling (sensation) in the hands or feet.  · Tiredness (lethargy).  · Nausea or vomiting.  · Confusion or trouble concentrating.  · Problems with urination, such as:  ¨ Painful or burning feeling during urination.  ¨ Decreased urine production.  ¨ Frequent urination, especially at night.  ¨ Bloody urine.  · Muscle twitches and cramps, especially in the legs.  · Shortness of breath.  · Weakness.  · Constant itchiness.  · Loss of appetite.  · Metallic taste in the mouth.  · Trouble sleeping.  · Pale lining of the eyelids and surface of the eye (conjunctiva).  How is this diagnosed?  This condition may be diagnosed with various tests. Tests  may include:  · Blood tests.  · Urine tests.  · Imaging tests.  · A test in which a sample of tissue is removed from the kidneys to be looked at under a microscope (kidney biopsy).  How is this treated?  Treatment of DINESH varies depending on the cause and severity of the kidney damage. In mild cases, treatment may not be needed. The kidneys may heal on their own.  If DINESH is more severe, your health care provider will treat the cause of the kidney damage, help the kidneys heal, and prevent problems from occurring. Severe cases may require a procedure to remove toxic wastes from the body (dialysis) or surgery to repair kidney damage. Surgery may involve:  · Repair of a torn kidney.  · Removal of a urine flow obstruction.  Follow these instructions at home:  · Follow your prescribed diet.  · Take over-the-counter and prescription medicines only as told by your health care provider.  ¨ Do not take any new medicines unless approved by your health care provider. Many medicines can worsen your kidney damage.  ¨ Do not take any vitamin and mineral supplements unless approved by your health care provider. Many nutritional supplements can worsen your kidney damage.  ¨ The dose of some medicines that you take may need to be adjusted.  · Do not use any tobacco products, such as cigarettes, chewing tobacco, and e-cigarettes. If you need help quitting, ask your health care provider.  · Keep all follow-up visits as told by your health care provider. This is important.  · Keep track of your blood pressure. Report changes in your blood pressure as told by your health care provider.  · Achieve and maintain a healthy weight. If you need help with this, ask your health care provider.  · Start or continue an exercise plan. Try to exercise at least 30 minutes a day, 5 days a week.  · Stay current with immunizations as told by your health care provider.  Where to find more information:  · American Association of Kidney Patients:  www.aakp.org  · National Kidney Foundation: www.kidney.org  · American Kidney Fund: www.akfinc.org  · [facility]: www.lifeoptions.org and www.kidneyschool.org  Contact a health care provider if:  · Your symptoms get worse.  · You develop new symptoms.  Get help right away if:  · You develop symptoms of end-stage kidney disease, which include:  ¨ Headaches.  ¨ Abnormally dark or light skin.  ¨ Numbness in the hands or feet.  ¨ Easy bruising.  ¨ Frequent hiccups.  ¨ Chest pain.  ¨ Shortness of breath.  ¨ End of menstruation in women.  · You have a fever.  · You have decreased urine production.  · You have pain or bleeding when you urinate.  This information is not intended to replace advice given to you by your health care provider. Make sure you discuss any questions you have with your health care provider.  Document Released: 07/02/2012 Document Revised: 07/27/2017 Document Reviewed: 08/16/2013  [employer] Interactive Patient Education © 2017 [employer]    Depression / Suicide Risk    As you are discharged from this [facility] facility, it is important to learn how to keep safe from harming yourself.    Recognize the warning signs:  · Abrupt changes in personality, positive or negative- including increase in energy   · Giving away possessions  · Change in eating patterns- significant weight changes-  positive or negative  · Change in sleeping patterns- unable to sleep or sleeping all the time   · Unwillingness or inability to communicate  · Depression  · Unusual sadness, discouragement and loneliness  · Talk of wanting to die  · Neglect of personal appearance   · Rebelliousness- reckless behavior  · Withdrawal from people/activities they love  · Confusion- inability to concentrate     If you or a loved one observes any of these behaviors or has concerns about self-harm, here's what you can do:  · Talk about it- your feelings and reasons for harming yourself  · Remove any means that you  might use to hurt yourself (examples: pills, rope, extension cords, firearm)  · Get professional help from the community (Mental Health, Substance Abuse, psychological counseling)  · Do not be alone:Call your Safe Contact- someone whom you trust who will be there for you.  · Call your local CRISIS HOTLINE 118-9789 or 244-233-1581  · Call your local Children's Mobile Crisis Response Team [place] (915) 060-7844 or www.[employer]  · Call the toll free National Suicide Prevention Hotlines   · National Suicide Prevention Lifeline 234-206-BQLS (8915)  · National Hope Line Network 800-SUICIDE (248-2751)

## 2020-01-06 NOTE — DISCHARGE PLANNING
Anticipated Discharge Disposition: Home ([facility])    Action: KELLEY CHRISTINA spoke with Zaynab at [facility]. Pt accepted back to his home; [facility] has no transport available, will set up [facility] at 1400. Pt's CARRI Star, sister Aniyah, and bedside RN Rosa aware.    Barriers to Discharge: None    Plan: Discharge home to assisted living facility at 1400 via [facility].

## 2020-01-06 NOTE — CARE PLAN
Problem: Safety  Goal: Will remain free from falls  Outcome: PROGRESSING AS EXPECTED     Problem: Bowel/Gastric:  Goal: Normal bowel function is maintained or improved  Outcome: PROGRESSING AS EXPECTED

## 2020-01-06 NOTE — DISCHARGE PLANNING
Received Transport Form @ 8327  Spoke to Abdirashid @ [facility]    Transport is scheduled for 1/6 @1400 going to [facility].      RN CM informed

## 2020-01-06 NOTE — CARE PLAN
Problem: Safety  Goal: Will remain free from falls  Outcome: PROGRESSING AS EXPECTED     Problem: Knowledge Deficit  Goal: Knowledge of the prescribed therapeutic regimen will improve  Outcome: PROGRESSING AS EXPECTED

## 2020-01-06 NOTE — PROGRESS NOTES
Bedside report received.  Assessment complete.  A&O x 4. Patient calls appropriately.  Patient up with no assist. Bed alarm off.   Patient has 0/10 pain. No interventions needed at this time.  Denies N&V. Tolerating diabetic diet.  + void, + flatus, + BM.  Patient denies SOB.  SCD's off.  Patient is resting in bed.  Review plan with of care with patient. Call light and personal belongings with in reach. Hourly rounding in place. All needs met at this time.

## 2020-01-06 NOTE — PROGRESS NOTES
Patient discharged to [facility] with medical escort and staff escort. IV discontinued. Prescriptions given to patient, verbalized understanding, consent signed and in chart. Discharge instructions given regarding medications, follow up, and pain.  Education provided, patient asked questions and verbalized understanding. Discharge paperwork signed and in chart. Patient is tolerating diet, stable when ambulating, and pain is well controlled. All belongings collected. No further questions or concerns at this time.    Sister updated on discharge. [facility] aware.

## 2020-01-06 NOTE — DISCHARGE SUMMARY
Discharge Summary    CHIEF COMPLAINT ON ADMISSION  Chief Complaint   Patient presents with   • Altered Mental Status   • Weakness   • Nausea   • Hyperglycemia       Reason for Admission  EMS      Admission Date  12/19/2019    CODE STATUS  Full Code    HPI & HOSPITAL COURSE     Mr. Tatum is a 59-year-old male with a PMH of HTN, DM 2, admitted on 12/19/2019 due to increasing shortness of breath.  Glucose was 442.  Patient reports that his DM 2 medications were altered recently and he has been hyperglycemic ever since.  [facility] staff report poor appetite, weakness, confusion, and palor which started approximately 1 week prior to admission. Patient was found to be in DKA with altered mental status.  While in the ICU, patient's hemoglobin dropped to 6.9 and was transfused. An upper endoscopy was eventually performed on 12/27 with Dr. Villarreal, with findings reported of severe erosive esophagitis, LA grade D from 30 cm to 41 cm along with a 4 cm hiatal hernia. Path suggested possible H. Pylori infection.  On 12/28, psychiatry was consulted and felt he was unable to manage his DM on his own.     His insulin therapy has been set to fixed doses.  He has demonstrated to nursing the ability to check his own glucose level and administer his own insulin.  [facility]PO[facility] for healthcare has confirmed they have paid an outside caregiver to come to the [facility] to assist with insulin.        I had extensive conversation with the hired RN who visits the patient 3 times weekly.  He has been assisting with blood glucose management in concert with his outpatient endocrinologist.  Normally, the patient wears a continuous glucose monitoring device and this will be reinstituted 1 to 2 days following discharge.  The nurse also informs me that the patient has been routine with recording his daily blood glucose checks.  Previously, his blood glucose readings were under excellent control on the combinations of insulins.  After his medications were discontinued  in favor of newer agents for type 2 diabetes his blood sugars became wildly unstable.  Here, they have been under excellent control with fixed doses.  However, as the patient has progressive cognitive decline and is unable to self administer insulin or record daily glucose readings, the transition to oral agents will be increasingly important.  The patient will need close monitoring of his glucose readings with his hired RN and outpatient endocrinologist to successfully transition to oral agents.  The patient is well aware of this as is his private RN.  Again, I had extensive conversations with his D POA and sister, private RN, and the patient regarding future management of his care.  At the present time I think his management is reasonable given limited immediate family assistance and the medical situation at hand.    He will need to continue his antibiotics and PPI and Pepto-Bismol for H. pylori.  He will need to follow-up with a gastroenterologist in 1 to 2 months for repeat testing.  He needs to follow-up with his PCP and endocrinologist in the next 1 to 2 months.  He can slowly transition off of Carafate and PPI per gastroenterology's recommendations.    Therefore, he is discharged in good and stable condition to home with close outpatient follow-up.    Discharge Date  1/6/2020    FOLLOW UP ITEMS POST DISCHARGE  PCP follow-up  Endocrinology follow-up  Gastroenterology follow-up    DISCHARGE DIAGNOSES  Active Problems:    Uncontrolled type 1 diabetes mellitus with hyperglycemia (HCC) POA: Yes    Cognitive decline POA: Yes    Normocytic anemia POA: Yes    Helicobacter pylori (H. pylori) infection POA: Yes    Essential hypertension POA: Yes    Dyslipidemia POA: Yes    Severe protein-calorie malnutrition (HCC) POA: Yes  Resolved Problems:    Leukocytosis POA: Yes    Melanotic stools POA: Yes    Cellulitis of right upper extremity POA: Yes      FOLLOW UP  Future Appointments   Date Time Provider Department Center    1/13/2020 10:30 AM MANOJ Romo [facility] [place]   2/20/2020  8:40 AM JAKE Armando 73MGRP ALEXY [place] M.D.  [address] [facility] [address]  [phone]    Schedule an appointment as soon as possible for a visit        MEDICATIONS ON DISCHARGE     Medication List      START taking these medications      Instructions   bismuth subsalicylate 262 MG Chew  Commonly known as:  PEPTO-BISMOL   Doctor's comments:  Last Dose 1/14 at 0600  Take 2 Tabs by mouth every 6 hours.  Dose:  524 mg     doxycycline monohydrate 100 MG tablet  Commonly known as:  ADOXA   Take 1 Tab by mouth every 12 hours for 8 days.  Dose:  100 mg     insulin lispro 100 UNIT/ML  Commonly known as:  HUMALOG   Doctor's comments:  Can Sub to Novolog or formulary equivalent  Inject 8 Units as instructed 3 times a day before meals. HOLD IF NOT PLANNING TO CONSUME AT LEAST HALF OF MEAL  Dose:  8 Units     metroNIDAZOLE 250 MG Tabs  Commonly known as:  FLAGYL   Doctor's comments:  Last dose 1/14 at 0600.  Take 1 Tab by mouth every 8 hours.  Dose:  250 mg     multivitamin Tabs  Start taking on:  January 7, 2020   Take 1 Tab by mouth every day.  Dose:  1 Tab     omeprazole 20 MG delayed-release capsule  Commonly known as:  PRILOSEC   Take 1 Cap by mouth 2 Times a Day.  Dose:  20 mg     oxyCODONE immediate-release 5 MG Tabs  Commonly known as:  ROXICODONE   Take 1 Tab by mouth every 12 hours as needed for Severe Pain for up to 3 days.  Dose:  5 mg     sucralfate 1 GM Tabs  Commonly known as:  CARAFATE   1 tab 30 minutes prior to meals and before bed x 10D, then BID PRN abd pain     thiamine 100 MG tablet  Start taking on:  January 7, 2020  Commonly known as:  THIAMINE   Take 1 Tab by mouth every day.  Dose:  100 mg        CHANGE how you take these medications      Instructions   atorvastatin 40 MG Tabs  What changed:    · medication strength  · how much to take  · how to take this  · when to take this  · additional  instructions  Commonly known as:  LIPITOR   Take 1 Tab by mouth every evening.  Dose:  40 mg     insulin detemir 100 UNIT/ML Soln  What changed:    · how much to take  · when to take this  Commonly known as:  LEVEMIR   Inject 25 Units as instructed every bedtime.  Dose:  25 Units     lisinopril 2.5 MG Tabs  What changed:  medication strength  Commonly known as:  PRINIVIL   TAKE 1 TABLET BY MOUTH ONCE DAILY.        CONTINUE taking these medications      Instructions   aspirin 81 MG Chew chewable tablet  Commonly known as:  ASA   CHEW 1 TABLET BY MOUTH ONCE DAILY.     [facility] DIGESTIVE HEALTH Caps   TAKE (1) CAPSULE BY MOUTH ONCE DAILY.     pancrelipase (Lip-Prot-Amyl) 82388-14264 units Cpep  Commonly known as:  CREON   Take 2 Caps by mouth every day.  Dose:  2 Cap     tamsulosin 0.4 MG capsule  Commonly known as:  FLOMAX   Take 1 Cap by mouth every day.  Dose:  0.4 mg     Vitamin D 2000 units Caps   Take 2 Caps by mouth every day.  Dose:  2 Cap        STOP taking these medications    Empagliflozin-metFORMIN HCl ER  MG Tb24     pantoprazole 40 MG Tbec  Commonly known as:  PROTONIX     Semaglutide (1 MG/DOSE) 2 MG/1.5ML Sopn  Commonly known as:  OZEMPIC (1 MG/DOSE)            Allergies  No Known Allergies    DIET  Orders Placed This Encounter   Procedures   • Diet Order Diabetic     Standing Status:   Standing     Number of Occurrences:   1     Order Specific Question:   Diet:     Answer:   Diabetic [3]       ACTIVITY  As tolerated.  Weight bearing as tolerated    CONSULTATIONS  Gastroenterology--Dr. Félix Wakefield; [facility]  Pulmonology  Palliative  Diabetes education    PROCEDURES  Upper endoscopy with biopsies; 12/27/2019; Dr. Hector Villarreal; [facility]    LABORATORY  Lab Results   Component Value Date    SODIUM 137 01/06/2020    POTASSIUM 4.0 01/06/2020    CHLORIDE 104 01/06/2020    CO2 26 01/06/2020    GLUCOSE 177 (H) 01/06/2020    BUN 10 01/06/2020    CREATININE 0.73 01/06/2020        Lab  Results   Component Value Date    WBC 8.3 01/06/2020    HEMOGLOBIN 9.1 (L) 01/06/2020    HEMATOCRIT 29.2 (L) 01/06/2020    PLATELETCT 457 (H) 01/06/2020        Total time of the discharge process exceeds 40 minutes.

## 2020-01-07 ENCOUNTER — TELEPHONE (OUTPATIENT)
Dept: MEDICAL GROUP | Facility: MEDICAL CENTER | Age: 60
End: 2020-01-07

## 2020-01-07 LAB
GLUCOSE BLD-MCNC: 209 MG/DL (ref 65–99)
GLUCOSE BLD-MCNC: 265 MG/DL (ref 65–99)

## 2020-01-07 NOTE — TELEPHONE ENCOUNTER
1. Caller Name: Flying Ester love                                         Call Back Number: [phone]    Pharmacy called [facility] stating they received order for D/C atorvastatin and jardiance and they would like to know if this correct? I did not find a letter on anything on pt chart          Patient approves a detailed voicemail message: N\A       84

## 2020-01-08 NOTE — TELEPHONE ENCOUNTER
He should take the statin but as far as Jardiance, they will need to check with endocrinology which is prescribing his diabetic medications now and he was recently in the hospital.

## 2020-01-13 ENCOUNTER — OFFICE VISIT (OUTPATIENT)
Dept: ENDOCRINOLOGY | Facility: MEDICAL CENTER | Age: 60
End: 2020-01-13
Payer: COMMERCIAL

## 2020-01-13 ENCOUNTER — TELEPHONE (OUTPATIENT)
Dept: ENDOCRINOLOGY | Facility: MEDICAL CENTER | Age: 60
End: 2020-01-13

## 2020-01-13 VITALS
WEIGHT: 149 LBS | OXYGEN SATURATION: 93 % | HEIGHT: 75 IN | BODY MASS INDEX: 18.53 KG/M2 | DIASTOLIC BLOOD PRESSURE: 80 MMHG | HEART RATE: 86 BPM | SYSTOLIC BLOOD PRESSURE: 122 MMHG

## 2020-01-13 DIAGNOSIS — E11.00 UNCONTROLLED TYPE 2 DIABETES MELLITUS WITH HYPEROSMOLARITY WITHOUT COMA (HCC): ICD-10-CM

## 2020-01-13 DIAGNOSIS — I10 ESSENTIAL HYPERTENSION: ICD-10-CM

## 2020-01-13 PROBLEM — E10.65 UNCONTROLLED TYPE 1 DIABETES MELLITUS WITH HYPERGLYCEMIA (HCC): Status: RESOLVED | Noted: 2019-12-19 | Resolved: 2020-01-13

## 2020-01-13 PROCEDURE — 99214 OFFICE O/P EST MOD 30 MIN: CPT | Performed by: PHYSICIAN ASSISTANT

## 2020-01-13 RX ORDER — METFORMIN HYDROCHLORIDE 500 MG/1
1000 TABLET, EXTENDED RELEASE ORAL 2 TIMES DAILY
Qty: 120 TAB | Refills: 12 | Status: SHIPPED | OUTPATIENT
Start: 2020-01-13 | End: 2020-02-11

## 2020-01-13 NOTE — PATIENT INSTRUCTIONS
Start:  1.  Metformin ER 500mg two twice a day  3.  Levemir 25 units at night before bed  (decrease To 20)  4.  Humalog 8 units with each meal (Increase to 10)

## 2020-01-13 NOTE — PROGRESS NOTES
Return to office Patient Consult Note  Referred by: JAKE Armando    Reason for consult: Diabetes Management Type 2    HPI:  Pawel Tatum is a 59 y.o. old patient who is seeing us today for diabetes care.  This is a pleasant patient with diabetes and I appreciate the opportunity to participate in the care of this patient.    Labs of 1/13/2020 HbA1c is   Labs of 10/1/2019 HbA1c is 6.9, GFR >60, c-peptide 1.0, KRISTY-65 negatvie    BG Diary:1/13/2020  In the AM:  No log    1. Uncontrolled type 2 diabetes mellitus with hyperosmolarity without coma (HCC)  This is a new patient on 11/25/19  They were on:  1.  Metformin ER 500mg two twice a day  2.  Levemir 14 units at night  3.  Novolog 5 before breakfast and lunch and 7 before dinner     STOP;  NOVOLOG  Metformin     Start:  3.  Levemir 25 units at night before bed   4.  Humalog 8 units with each meal    FAILED GLP-1 and SGLT2       2. Essential hypertension  This is stable today and no new changes are needed or required in today's visit         ROS:   Constitutional: No night sweats.  Eyes:  No visual changes.  Cardiac: No chest pain, No palpitations or racing heart rate.  Resp: No shortness of breath, No cough,   Gi: No Diarrhea    All other systems were reviewed and were/are negative.      Past Medical History:  Patient Active Problem List    Diagnosis Date Noted   • Type 2 diabetes mellitus with hyperglycemia, with long-term current use of insulin ([facility]) 11/26/2018     Priority: High   • Helicobacter pylori (H. pylori) infection 12/31/2019     Priority: Medium   • Cognitive decline 12/25/2019     Priority: Medium   • Normocytic anemia 12/25/2019     Priority: Medium   • Severe protein-calorie malnutrition (HCC) 12/25/2019     Priority: Low   • Peripheral neuropathy 01/11/2019     Priority: Low   • Chronic pancreatitis (HCC) 11/22/2018     Priority: Low   • Dyslipidemia 10/04/2018     Priority: Low   • Memory loss 10/04/2018     Priority: Low   •  Essential hypertension 08/16/2018     Priority: Low   • Vitamin D deficiency 08/16/2018     Priority: Low   • H/O Bell's palsy 08/20/2019   • Recurrent major depressive disorder, in full remission ([facility]) 10/04/2018   • Uncontrolled type 2 diabetes mellitus with hyperosmolarity without coma ([facility]) 08/16/2018   • Diabetic peripheral angiopathy ([facility]) 08/16/2018       Past Surgical History:  Past Surgical History:   Procedure Laterality Date   • PB UPPER GI ENDOSCOPY,BIOPSY N/A 12/27/2019    Procedure: GASTROSCOPY, WITH BIOPSY;  Surgeon: Hector Villarreal M.D.;  Location: ENDOSCOPY [facility];  Service: Gastroenterology   • TOE AMPUTATION Right 1/12/2019    Procedure: TOE AMPUTATION;  Surgeon: Nicanor Reddy M.D.;  Location: SURGERY [facility];  Service: Orthopedics   • GASTROSCOPY-ENDO N/A 8/25/2018    Procedure: GASTROSCOPY-ENDO;  Surgeon: Jaswant Frye M.D.;  Location: ENDOSCOPY [facility];  Service: Gastroenterology   • OTHER ABDOMINAL SURGERY         Allergies:  Patient has no known allergies.    Social History:  Social History     Socioeconomic History   • Marital status: Single     Spouse name: Not on file   • Number of children: Not on file   • Years of education: Not on file   • Highest education level: Not on file   Occupational History   • Not on file   Social Needs   • Financial resource strain: Not on file   • Food insecurity:     Worry: Not on file     Inability: Not on file   • Transportation needs:     Medical: Not on file     Non-medical: Not on file   Tobacco Use   • Smoking status: Current Every Day Smoker     Packs/day: 0.25     Years: 15.00     Pack years: 3.75   • Smokeless tobacco: Never Used   • Tobacco comment: Trying to quit   Substance and Sexual Activity   • Alcohol use: No   • Drug use: No   • Sexual activity: Not Currently   Lifestyle   • Physical activity:     Days per week: Not on file     Minutes per session: Not on file   • Stress: Not on file   Relationships   •  Social connections:     Talks on phone: Not on file     Gets together: Not on file     Attends Buddhist service: Not on file     Active member of club or organization: Not on file     Attends meetings of clubs or organizations: Not on file     Relationship status: Not on file   • Intimate partner violence:     Fear of current or ex partner: Not on file     Emotionally abused: Not on file     Physically abused: Not on file     Forced sexual activity: Not on file   Other Topics Concern   • Not on file   Social History Narrative   • Not on file       Family History:  Family History   Problem Relation Age of Onset   • No Known Problems Mother    • No Known Problems Father        Medications:    Current Outpatient Medications:   •  metFORMIN ER (GLUCOPHAGE XR) 500 MG TABLET SR 24 HR, Take 2 Tabs by mouth 2 times a day. Please give Generic XR Metformin, Disp: 120 Tab, Rfl: 12  •  Insulin Degludec (TRESIBA FLEXTOUCH) 200 UNIT/ML Solution Pen-injector, Inject 20 Units as instructed every bedtime., Disp: 3 PEN, Rfl: 11  •  insulin detemir (LEVEMIR) 100 UNIT/ML Solution, Inject 25 Units as instructed every bedtime., Disp: 10 mL, Rfl: 1  •  lisinopril (PRINIVIL) 2.5 MG Tab, TAKE 1 TABLET BY MOUTH ONCE DAILY., Disp: 30 Tab, Rfl: 1  •  atorvastatin (LIPITOR) 40 MG Tab, Take 1 Tab by mouth every evening., Disp: 30 Tab, Rfl: 1  •  bismuth subsalicylate (PEPTO-BISMOL) 262 MG Chew Tab, Take 2 Tabs by mouth every 6 hours., Disp: 45 Tab, Rfl: 0  •  doxycycline monohydrate (ADOXA) 100 MG tablet, Take 1 Tab by mouth every 12 hours for 8 days., Disp: 16 Tab, Rfl: 0  •  insulin lispro (HUMALOG) 100 UNIT/ML, Inject 8 Units as instructed 3 times a day before meals. HOLD IF NOT PLANNING TO CONSUME AT LEAST HALF OF MEAL, Disp: 10 mL, Rfl: 1  •  metroNIDAZOLE (FLAGYL) 250 MG Tab, Take 1 Tab by mouth every 8 hours., Disp: 24 Tab, Rfl: 0  •  multivitamin (THERAGRAN) Tab, Take 1 Tab by mouth every day., Disp: 30 Tab, Rfl: 1  •  omeprazole  "(PRILOSEC) 20 MG delayed-release capsule, Take 1 Cap by mouth 2 Times a Day., Disp: 60 Cap, Rfl: 0  •  thiamine (THIAMINE) 100 MG tablet, Take 1 Tab by mouth every day., Disp: 30 Tab, Rfl: 1  •  sucralfate (CARAFATE) 1 GM Tab, 1 tab 30 minutes prior to meals and before bed x 10D, then BID PRN abd pain, Disp: 60 Tab, Rfl: 0  •  aspirin (ASA) 81 MG Chew Tab chewable tablet, CHEW 1 TABLET BY MOUTH ONCE DAILY., Disp: 30 Tab, Rfl: 11  •  pancrelipase, Lip-Prot-Amyl, (CREON) 89693-09005 units Cap DR Particles, Take 2 Caps by mouth every day., Disp: 60 Cap, Rfl: 11  •  tamsulosin (FLOMAX) 0.4 MG capsule, Take 1 Cap by mouth every day., Disp: 30 Cap, Rfl: 11  •  Lactobacillus-Inulin ([facility] DIGESTIVE HEALTH) Cap, TAKE (1) CAPSULE BY MOUTH ONCE DAILY., Disp: 30 Cap, Rfl: 6  •  Cholecalciferol (VITAMIN D) 2000 units Cap, Take 2 Caps by mouth every day., Disp: 60 Cap, Rfl: 5        Physical Examination:   Vital signs: /80 (BP Location: Left arm, Patient Position: Sitting)   Pulse 86   Ht 1.905 m (6' 3\")   Wt 67.6 kg (149 lb)   SpO2 93%   BMI 18.62 kg/m²   General: No distress, cooperative, well dressed and well nourished.   Eyes: No scleral icterus or discharge, No hyposphagma  ENMT: Normal on external inspection of nose, lips, No nasal drainage   Neck: No abnormal masses on inspection  Resp: Normal effort, Bilateral clear to auscultation, No wheezing, No rales  CVS: Regular rate and rhythm, S1 S2 normal, No murmur. No gallop  Extremities: No edema bilateral extremities  Neuro: Alert and oriented  Skin: No rash, No Ulcers  Psych: Normal mood and affect      Assessment and Plan:    1. Uncontrolled type 2 diabetes mellitus with hyperosmolarity without coma (HCC)    Start:  1.  Metformin ER 500mg two twice a day  3.  Levemir 25 units at night before bed  (decrease To 20)  4.  Humalog 8 units with each meal (Increase to 10)    2. Essential hypertension  This is stable today and no new changes are needed or required " in today's visit    Return in about 2 weeks (around 1/27/2020).      Thank you kindly for allowing me to participate in the diabetes care plan for this patient.    Nick Herman PA-C, [facility]  Board Certified - Advanced Diabetes Management  01/13/20    CC:   JAKE Armando

## 2020-01-13 NOTE — TELEPHONE ENCOUNTER
1. Caller Name: Maxwell- RN care                                         Call Back Number: [phone] (home)         Patient approves a detailed voicemail message: N\A    Patient caretaker called saying that Tresiba was approved through insurance. He does have 6 months of refills left. Wondering if you want to continue patient on Levemire or switch to Tresiba?     Aslo after visit summary went to [phone]

## 2020-01-14 NOTE — TELEPHONE ENCOUNTER
Phone Number Called: [phone] (home)       Call outcome: left message for patient to call back regarding message below    Message: Lm for patient caretaker that we want to transition to Treba.

## 2020-01-21 NOTE — DOCUMENTATION QUERY
"                                                                         Former[facility]                                                                       Query Response Note      PATIENT:               SHAUN CORCORAN  ACCT #:                  [id number]  MRN:                     [id number]  :                      1960  ADMIT DATE:       2019 11:12 AM  DISCH DATE:        2020 2:27 PM  RESPONDING  PROVIDER #:        [id number]           QUERY TEXT:    Diabetes Type 1 and Type 2  are documented through out the Medical Record.  Please specify the type:     NOTE:  If an appropriate response is not listed below, please respond with a new note.    The patient's Clinical Indicators include:  -H&P: \"59 y.o. male who is a resident of a skilled nursing facility and has a history of type 2 diabetes.\"                                       \"Diabetic ketoacidosis without coma associated with type 1 diabetes mellitus \"    Home meds: Empagliflozin-metFORMIN HCl ER  MG TABLET SR 24 HR                        insulin detemir (LEVEMIR) 100 UNIT/ML Solution                        Semaglutide, 1 MG/DOSE, (OZEMPIC, 1 MG/DOSE,) 2 MG/1.5ML Solution Pen-injector     DCS:\"Mr. Corcoran is a 59-year-old male with a PMH of HTN, DM 2, admitted on 2019\"                   DISCHARGE DIAGNOSES  Active Problems:    Uncontrolled type 1 diabetes mellitus with hyperglycemia  Options provided:   -- Type I diabetes mellitus   -- Type 2 diabetes mellitus   -- Unable to determine      Query created by: Britni Mcconnell on 1/10/2020 10:05 AM    RESPONSE TEXT:    Type 2 diabetes mellitus          Electronically signed by:  SHANTELL COY DO 2020 4:34 PM              "

## 2020-01-28 ENCOUNTER — OFFICE VISIT (OUTPATIENT)
Dept: ENDOCRINOLOGY | Facility: MEDICAL CENTER | Age: 60
End: 2020-01-28
Payer: COMMERCIAL

## 2020-01-28 VITALS
WEIGHT: 144 LBS | SYSTOLIC BLOOD PRESSURE: 116 MMHG | DIASTOLIC BLOOD PRESSURE: 70 MMHG | BODY MASS INDEX: 17.91 KG/M2 | HEART RATE: 91 BPM | HEIGHT: 75 IN | OXYGEN SATURATION: 98 %

## 2020-01-28 DIAGNOSIS — E11.00 UNCONTROLLED TYPE 2 DIABETES MELLITUS WITH HYPEROSMOLARITY WITHOUT COMA (HCC): ICD-10-CM

## 2020-01-28 DIAGNOSIS — I10 ESSENTIAL HYPERTENSION: ICD-10-CM

## 2020-01-28 DIAGNOSIS — E11.65 TYPE 2 DIABETES MELLITUS WITH HYPERGLYCEMIA, WITH LONG-TERM CURRENT USE OF INSULIN (HCC): ICD-10-CM

## 2020-01-28 DIAGNOSIS — Z79.4 TYPE 2 DIABETES MELLITUS WITH HYPERGLYCEMIA, WITH LONG-TERM CURRENT USE OF INSULIN (HCC): ICD-10-CM

## 2020-01-28 PROCEDURE — 95251 CONT GLUC MNTR ANALYSIS I&R: CPT | Performed by: PHYSICIAN ASSISTANT

## 2020-01-28 PROCEDURE — 99214 OFFICE O/P EST MOD 30 MIN: CPT | Performed by: PHYSICIAN ASSISTANT

## 2020-01-28 NOTE — PATIENT INSTRUCTIONS
Start:  3.  Tresiba 16 units at night before bed (decrease to 16) stop Levemir  4.  Humalog 10 units with each meal

## 2020-01-28 NOTE — PROGRESS NOTES
Return to office Patient Consult Note  Referred by: JAKE Armando    Reason for consult: Diabetes Management Type 2    HPI:  Pawel Tatum is a 59 y.o. old patient who is seeing us today for diabetes care.  This is a pleasant patient with diabetes and I appreciate the opportunity to participate in the care of this patient.    Labs of 12/25/19 HbA1c is 7.0 HbA1c is   Labs of 10/1/2019 HbA1c is 6.9, GFR >60, c-peptide 1.0, KRISTY-65 negatvie       BG Diary:1/28/2020  In the AM:  Dexcom CGM  Patient is wearing personal, therapeutic CGM for the past 60 days        1. Uncontrolled type 2 diabetes mellitus with hyperosmolarity without coma (HCC)  This is a new patient on 11/25/19  They were on:  1.  Metformin ER 500mg two twice a day  2.  Levemir 14 units at night  3.  Novolog 5 before breakfast and lunch and 7 before dinner     STOP;  NOVOLOG  Metformin     Start:  3.  Levemir 20 units at night before bed   4.  Humalog 8 units with each meal     FAILED GLP-1 and SGLT2     2. Essential hypertension  This is stable today and no new changes are needed or required in today's visit      ROS:   Constitutional: No night sweats.  Eyes:  No visual changes.  Cardiac: No chest pain, No palpitations or racing heart rate.  Resp: No shortness of breath, No cough,   Gi: No Diarrhea    All other systems were reviewed and were/are negative.      Past Medical History:  Patient Active Problem List    Diagnosis Date Noted   • Type 2 diabetes mellitus with hyperglycemia, with long-term current use of insulin ([facility]) 11/26/2018     Priority: High   • Helicobacter pylori (H. pylori) infection 12/31/2019     Priority: Medium   • Cognitive decline 12/25/2019     Priority: Medium   • Normocytic anemia 12/25/2019     Priority: Medium   • Severe protein-calorie malnutrition (HCC) 12/25/2019     Priority: Low   • Peripheral neuropathy 01/11/2019     Priority: Low   • Chronic pancreatitis ([facility]) 11/22/2018     Priority: Low   • Dyslipidemia  10/04/2018     Priority: Low   • Memory loss 10/04/2018     Priority: Low   • Essential hypertension 08/16/2018     Priority: Low   • Vitamin D deficiency 08/16/2018     Priority: Low   • H/O Bell's palsy 08/20/2019   • Recurrent major depressive disorder, in full remission ([facility]) 10/04/2018   • Uncontrolled type 2 diabetes mellitus with hyperosmolarity without coma ([facility]) 08/16/2018   • Diabetic peripheral angiopathy ([facility]) 08/16/2018       Past Surgical History:  Past Surgical History:   Procedure Laterality Date   • PB UPPER GI ENDOSCOPY,BIOPSY N/A 12/27/2019    Procedure: GASTROSCOPY, WITH BIOPSY;  Surgeon: Hector Villarreal M.D.;  Location: ENDOSCOPY [facility];  Service: Gastroenterology   • TOE AMPUTATION Right 1/12/2019    Procedure: TOE AMPUTATION;  Surgeon: Nicanor Reddy M.D.;  Location: SURGERY [facility];  Service: Orthopedics   • GASTROSCOPY-ENDO N/A 8/25/2018    Procedure: GASTROSCOPY-ENDO;  Surgeon: Jaswant Frye M.D.;  Location: ENDOSCOPY [facility];  Service: Gastroenterology   • OTHER ABDOMINAL SURGERY         Allergies:  Patient has no known allergies.    Social History:  Social History     Socioeconomic History   • Marital status: Single     Spouse name: Not on file   • Number of children: Not on file   • Years of education: Not on file   • Highest education level: Not on file   Occupational History   • Not on file   Social Needs   • Financial resource strain: Not on file   • Food insecurity:     Worry: Not on file     Inability: Not on file   • Transportation needs:     Medical: Not on file     Non-medical: Not on file   Tobacco Use   • Smoking status: Current Every Day Smoker     Packs/day: 0.25     Years: 15.00     Pack years: 3.75   • Smokeless tobacco: Never Used   • Tobacco comment: Trying to quit   Substance and Sexual Activity   • Alcohol use: No   • Drug use: No   • Sexual activity: Not Currently   Lifestyle   • Physical activity:     Days per week: Not on file      Minutes per session: Not on file   • Stress: Not on file   Relationships   • Social connections:     Talks on phone: Not on file     Gets together: Not on file     Attends Pent[employer]al service: Not on file     Active member of club or organization: Not on file     Attends meetings of clubs or organizations: Not on file     Relationship status: Not on file   • Intimate partner violence:     Fear of current or ex partner: Not on file     Emotionally abused: Not on file     Physically abused: Not on file     Forced sexual activity: Not on file   Other Topics Concern   • Not on file   Social History Narrative   • Not on file       Family History:  Family History   Problem Relation Age of Onset   • No Known Problems Mother    • No Known Problems Father        Medications:    Current Outpatient Medications:   •  Insulin Degludec (TRESIBA FLEXTOUCH) 200 UNIT/ML Solution Pen-injector, Inject 20 Units as instructed every bedtime., Disp: 3 PEN, Rfl: 11  •  metFORMIN ER (GLUCOPHAGE XR) 500 MG TABLET SR 24 HR, Take 2 Tabs by mouth 2 times a day. Please give Generic XR Metformin, Disp: 120 Tab, Rfl: 12  •  lisinopril (PRINIVIL) 2.5 MG Tab, TAKE 1 TABLET BY MOUTH ONCE DAILY., Disp: 30 Tab, Rfl: 1  •  atorvastatin (LIPITOR) 40 MG Tab, Take 1 Tab by mouth every evening., Disp: 30 Tab, Rfl: 1  •  bismuth subsalicylate (PEPTO-BISMOL) 262 MG Chew Tab, Take 2 Tabs by mouth every 6 hours., Disp: 45 Tab, Rfl: 0  •  insulin lispro (HUMALOG) 100 UNIT/ML, Inject 8 Units as instructed 3 times a day before meals. HOLD IF NOT PLANNING TO CONSUME AT LEAST HALF OF MEAL, Disp: 10 mL, Rfl: 1  •  metroNIDAZOLE (FLAGYL) 250 MG Tab, Take 1 Tab by mouth every 8 hours., Disp: 24 Tab, Rfl: 0  •  multivitamin (THERAGRAN) Tab, Take 1 Tab by mouth every day., Disp: 30 Tab, Rfl: 1  •  omeprazole (PRILOSEC) 20 MG delayed-release capsule, Take 1 Cap by mouth 2 Times a Day., Disp: 60 Cap, Rfl: 0  •  thiamine (THIAMINE) 100 MG tablet, Take 1 Tab by mouth every  "day., Disp: 30 Tab, Rfl: 1  •  sucralfate (CARAFATE) 1 GM Tab, 1 tab 30 minutes prior to meals and before bed x 10D, then BID PRN abd pain, Disp: 60 Tab, Rfl: 0  •  aspirin (ASA) 81 MG Chew Tab chewable tablet, CHEW 1 TABLET BY MOUTH ONCE DAILY., Disp: 30 Tab, Rfl: 11  •  pancrelipase, Lip-Prot-Amyl, (CREON) 03935-53519 units Cap DR Particles, Take 2 Caps by mouth every day., Disp: 60 Cap, Rfl: 11  •  tamsulosin (FLOMAX) 0.4 MG capsule, Take 1 Cap by mouth every day., Disp: 30 Cap, Rfl: 11  •  Lactobacillus-Inulin ([facility] DIGESTIVE HEALTH) Cap, TAKE (1) CAPSULE BY MOUTH ONCE DAILY., Disp: 30 Cap, Rfl: 6  •  Cholecalciferol (VITAMIN D) 2000 units Cap, Take 2 Caps by mouth every day., Disp: 60 Cap, Rfl: 5        Physical Examination:   Vital signs: /70 (BP Location: Left arm, Patient Position: Sitting)   Pulse 91   Ht 1.905 m (6' 3\")   Wt 65.3 kg (144 lb)   SpO2 98%   BMI 18.00 kg/m²   General: No distress, cooperative, well dressed and well nourished.   Eyes: No scleral icterus or discharge, No hyposphagma  ENMT: Normal on external inspection of nose, lips, No nasal drainage   Neck: No abnormal masses on inspection  Resp: Normal effort, Bilateral clear to auscultation, No wheezing, No rales  CVS: Regular rate and rhythm, S1 S2 normal, No murmur. No gallop  Extremities: No edema bilateral extremities  Neuro: Alert and oriented  Skin: No rash, No Ulcers  Psych: Normal mood and affect      Assessment and Plan:    1. Uncontrolled type 2 diabetes mellitus with hyperosmolarity without coma (HCC)  Start:  3.  Tresiba 16 units at night before bed (decrease to 16) stop Levemir  4.  Humalog 10 units with each meal     FAILED GLP-1 and SGLT2    2. Essential hypertension  This is stable today and no new changes are needed or required in today's visit      3. Type 2 diabetes mellitus with hyperglycemia, with long-term current use of insulin (HCC)  This patient uses a continuous glucose monitor 24 hours a day to " measure there blood glucose levels.  Our office printed out the CGM data report today in the office and I discussed this report at length with the patient today.  Recommendations on the data were given to the patient today to implement in the A&P section.    Return in about 3 weeks (around 2/18/2020).      Thank you kindly for allowing me to participate in the diabetes care plan for this patient.    Nick Herman PA-C, BC-[facility]  Board Certified - Advanced Diabetes Management  01/28/20    CC:   JAKE Armando

## 2020-02-04 RX ORDER — OMEPRAZOLE 20 MG/1
20 CAPSULE, DELAYED RELEASE ORAL 2 TIMES DAILY
Qty: 60 CAP | Refills: 8 | Status: SHIPPED | OUTPATIENT
Start: 2020-02-04 | End: 2020-03-24 | Stop reason: SDUPTHER

## 2020-02-06 ENCOUNTER — TELEPHONE (OUTPATIENT)
Dept: ENDOCRINOLOGY | Facility: MEDICAL CENTER | Age: 60
End: 2020-02-06

## 2020-02-06 NOTE — TELEPHONE ENCOUNTER
Phone Number Called: [phone] ([facility] cell)    Call outcome: spoke to Maxwell    Message: informed him to have Pawel decrease to 12 units of Tresiba at night. He will go make sure that it is changed for him.

## 2020-02-06 NOTE — TELEPHONE ENCOUNTER
VOICEMAIL 02/03/2020  1. Caller Name: Mawxell (RN)                           Call Back Number: [phone] ([facility] cell)    2. Message: requesting that Nick take a look at the Pt's Dexcom Clarity report and see if Pawel needs to make any changes to his current regimen. Please call [facility] is any changes are advised.     3. Patient approves office to leave a detailed voicemail/MyChart message: yes    LOV 01/28/2020  Assessment and Plan:     1. Uncontrolled type 2 diabetes mellitus with hyperosmolarity without coma (HCC)  Start:  3.  Tresiba 16 units at night before bed (decrease to 16) stop Levemir  4.  Humalog 10 units with each meal     **Dexcom report in Media dated 02/06/2020

## 2020-02-11 ENCOUNTER — OFFICE VISIT (OUTPATIENT)
Dept: ENDOCRINOLOGY | Facility: MEDICAL CENTER | Age: 60
End: 2020-02-11
Payer: COMMERCIAL

## 2020-02-11 VITALS
OXYGEN SATURATION: 95 % | DIASTOLIC BLOOD PRESSURE: 58 MMHG | SYSTOLIC BLOOD PRESSURE: 110 MMHG | HEIGHT: 75 IN | WEIGHT: 141 LBS | HEART RATE: 100 BPM | BODY MASS INDEX: 17.53 KG/M2

## 2020-02-11 DIAGNOSIS — Z79.4 TYPE 2 DIABETES MELLITUS WITH HYPERGLYCEMIA, WITH LONG-TERM CURRENT USE OF INSULIN (HCC): ICD-10-CM

## 2020-02-11 DIAGNOSIS — I10 ESSENTIAL HYPERTENSION: ICD-10-CM

## 2020-02-11 DIAGNOSIS — E11.65 TYPE 2 DIABETES MELLITUS WITH HYPERGLYCEMIA, WITH LONG-TERM CURRENT USE OF INSULIN (HCC): ICD-10-CM

## 2020-02-11 PROCEDURE — 99214 OFFICE O/P EST MOD 30 MIN: CPT | Performed by: PHYSICIAN ASSISTANT

## 2020-02-11 PROCEDURE — 95251 CONT GLUC MNTR ANALYSIS I&R: CPT | Performed by: PHYSICIAN ASSISTANT

## 2020-02-11 RX ORDER — INSULIN LISPRO 100 [IU]/ML
10 INJECTION, SOLUTION INTRAVENOUS; SUBCUTANEOUS
Qty: 5 PEN | Refills: 6 | Status: ON HOLD | OUTPATIENT
Start: 2020-02-11 | End: 2020-03-11 | Stop reason: SDUPTHER

## 2020-02-11 NOTE — PROGRESS NOTES
Return to office Patient Consult Note  Referred by: JAKE Armando    Reason for consult: Diabetes Management Type 2    HPI:  Pawel Tatum is a 59 y.o. old patient who is seeing us today for diabetes care.  This is a pleasant patient with diabetes and I appreciate the opportunity to participate in the care of this patient.    Labs of 12/25/19 HbA1c is 7.0   Labs of 10/1/2019 HbA1c is 6.9, GFR >60, c-peptide 1.0, KRISTY-65 negatvie    BG Diary:2/11/2020  In the AM:  See Dexcom report CGM      1. Type 2 diabetes mellitus with hyperglycemia, with long-term current use of insulin ([facility])  This is a new patient on 11/25/19  They were on:  1.  Metformin ER 500mg two twice a day  2.  Levemir 14 units at night  3.  Novolog 5 before breakfast and lunch and 7 before dinner       Now on:  3.  Tresiba 12 units at night before bed   4.  Humalog 10 units with each meal     FAILED GLP-1 and SGLT2     2. Essential hypertension  This is stable today and no new changes are needed or required in today's visit    ROS:   Constitutional: No night sweats.  Eyes:  No visual changes.  Cardiac: No chest pain, No palpitations or racing heart rate.  Resp: No shortness of breath, No cough,   Gi: No Diarrhea    All other systems were reviewed and were/are negative.      Past Medical History:  Patient Active Problem List    Diagnosis Date Noted   • Type 2 diabetes mellitus with hyperglycemia, with long-term current use of insulin ([facility]) 11/26/2018     Priority: High   • Helicobacter pylori (H. pylori) infection 12/31/2019     Priority: Medium   • Cognitive decline 12/25/2019     Priority: Medium   • Normocytic anemia 12/25/2019     Priority: Medium   • Severe protein-calorie malnutrition (HCC) 12/25/2019     Priority: Low   • Peripheral neuropathy 01/11/2019     Priority: Low   • Chronic pancreatitis (HCC) 11/22/2018     Priority: Low   • Dyslipidemia 10/04/2018     Priority: Low   • Memory loss 10/04/2018     Priority: Low   • Essential  hypertension 08/16/2018     Priority: Low   • Vitamin D deficiency 08/16/2018     Priority: Low   • H/O Bell's palsy 08/20/2019   • Recurrent major depressive disorder, in full remission ([facility]) 10/04/2018   • Uncontrolled type 2 diabetes mellitus with hyperosmolarity without coma ([facility]) 08/16/2018   • Diabetic peripheral angiopathy ([facility]) 08/16/2018       Past Surgical History:  Past Surgical History:   Procedure Laterality Date   • PB UPPER GI ENDOSCOPY,BIOPSY N/A 12/27/2019    Procedure: GASTROSCOPY, WITH BIOPSY;  Surgeon: Hector Villarreal M.D.;  Location: ENDOSCOPY [facility]  Service: Gastroenterology   • TOE AMPUTATION Right 1/12/2019    Procedure: TOE AMPUTATION;  Surgeon: Nicanor Reddy M.D.;  Location: SURGERY [facility];  Service: Orthopedics   • GASTROSCOPY-ENDO N/A 8/25/2018    Procedure: GASTROSCOPY-ENDO;  Surgeon: Jaswant Frye M.D.;  Location: ENDOSCOPY [facility]  Service: Gastroenterology   • OTHER ABDOMINAL SURGERY         Allergies:  Patient has no known allergies.    Social History:  Social History     Socioeconomic History   • Marital status: Single     Spouse name: Not on file   • Number of children: Not on file   • Years of education: Not on file   • Highest education level: Not on file   Occupational History   • Not on file   Social Needs   • Financial resource strain: Not on file   • Food insecurity:     Worry: Not on file     Inability: Not on file   • Transportation needs:     Medical: Not on file     Non-medical: Not on file   Tobacco Use   • Smoking status: Current Every Day Smoker     Packs/day: 0.25     Years: 15.00     Pack years: 3.75   • Smokeless tobacco: Never Used   • Tobacco comment: Trying to quit   Substance and Sexual Activity   • Alcohol use: No   • Drug use: No   • Sexual activity: Not Currently   Lifestyle   • Physical activity:     Days per week: Not on file     Minutes per session: Not on file   • Stress: Not on file   Relationships   • Social  connections:     Talks on phone: Not on file     Gets together: Not on file     Attends Alevism service: Not on file     Active member of club or organization: Not on file     Attends meetings of clubs or organizations: Not on file     Relationship status: Not on file   • Intimate partner violence:     Fear of current or ex partner: Not on file     Emotionally abused: Not on file     Physically abused: Not on file     Forced sexual activity: Not on file   Other Topics Concern   • Not on file   Social History Narrative   • Not on file       Family History:  Family History   Problem Relation Age of Onset   • No Known Problems Mother    • No Known Problems Father        Medications:    Current Outpatient Medications:   •  Insulin Degludec (TRESIBA FLEXTOUCH) 200 UNIT/ML Solution Pen-injector, Inject 12 Units as instructed every bedtime., Disp: 3 PEN, Rfl: 11  •  insulin lispro (HUMALOG KWIKPEN) 100 UNIT/ML Solution Pen-injector injection PEN, Inject 10 Units as instructed 3 times a day before meals., Disp: 5 PEN, Rfl: 6  •  omeprazole (PRILOSEC) 20 MG delayed-release capsule, Take 1 Cap by mouth 2 Times a Day., Disp: 60 Cap, Rfl: 8  •  lisinopril (PRINIVIL) 2.5 MG Tab, TAKE 1 TABLET BY MOUTH ONCE DAILY., Disp: 30 Tab, Rfl: 1  •  atorvastatin (LIPITOR) 40 MG Tab, Take 1 Tab by mouth every evening., Disp: 30 Tab, Rfl: 1  •  bismuth subsalicylate (PEPTO-BISMOL) 262 MG Chew Tab, Take 2 Tabs by mouth every 6 hours., Disp: 45 Tab, Rfl: 0  •  metroNIDAZOLE (FLAGYL) 250 MG Tab, Take 1 Tab by mouth every 8 hours., Disp: 24 Tab, Rfl: 0  •  multivitamin (THERAGRAN) Tab, Take 1 Tab by mouth every day., Disp: 30 Tab, Rfl: 1  •  thiamine (THIAMINE) 100 MG tablet, Take 1 Tab by mouth every day., Disp: 30 Tab, Rfl: 1  •  sucralfate (CARAFATE) 1 GM Tab, 1 tab 30 minutes prior to meals and before bed x 10D, then BID PRN abd pain, Disp: 60 Tab, Rfl: 0  •  aspirin (ASA) 81 MG Chew Tab chewable tablet, CHEW 1 TABLET BY MOUTH ONCE DAILY.,  "Disp: 30 Tab, Rfl: 11  •  pancrelipase, Lip-Prot-Amyl, (CREON) 62413-80335 units Cap DR Particles, Take 2 Caps by mouth every day., Disp: 60 Cap, Rfl: 11  •  tamsulosin (FLOMAX) 0.4 MG capsule, Take 1 Cap by mouth every day., Disp: 30 Cap, Rfl: 11  •  Lactobacillus-Inulin ([facility] DIGESTIVE HEALTH) Cap, TAKE (1) CAPSULE BY MOUTH ONCE DAILY., Disp: 30 Cap, Rfl: 6  •  Cholecalciferol (VITAMIN D) 2000 units Cap, Take 2 Caps by mouth every day., Disp: 60 Cap, Rfl: 5        Physical Examination:   Vital signs: /58 (BP Location: Left arm, Patient Position: Sitting)   Pulse 100   Ht 1.905 m (6' 3\")   Wt 64 kg (141 lb)   SpO2 95%   BMI 17.62 kg/m²   General: No distress, cooperative, well dressed and well nourished.   Eyes: No scleral icterus or discharge, No hyposphagma  ENMT: Normal on external inspection of nose, lips, No nasal drainage   Neck: No abnormal masses on inspection  Resp: Normal effort, Bilateral clear to auscultation, No wheezing, No rales  CVS: Regular rate and rhythm, S1 S2 normal, No murmur. No gallop  Extremities: No edema bilateral extremities  Neuro: Alert and oriented  Skin: No rash, No Ulcers  Psych: Normal mood and affect      Assessment and Plan:    1. Type 2 diabetes mellitus with hyperglycemia, with long-term current use of insulin (HCC)    Now on:  3.  Tresiba 12 units at night before bed   4.  Humalog 10 units with each meal    2. Essential hypertension  This is stable today and no new changes are needed or required in today's visit    Return in about 3 months (around 5/11/2020).      Thank you kindly for allowing me to participate in the diabetes care plan for this patient.    Nick Herman PA-C, BC-ADM  Board Certified - Advanced Diabetes Management  02/11/20    CC:   JAKE Armando    "

## 2020-02-14 ENCOUNTER — HOSPITAL ENCOUNTER (INPATIENT)
Facility: MEDICAL CENTER | Age: 60
LOS: 39 days | DRG: 871 | End: 2020-03-24
Attending: EMERGENCY MEDICINE | Admitting: INTERNAL MEDICINE
Payer: COMMERCIAL

## 2020-02-14 ENCOUNTER — APPOINTMENT (OUTPATIENT)
Dept: RADIOLOGY | Facility: MEDICAL CENTER | Age: 60
DRG: 871 | End: 2020-02-14
Attending: EMERGENCY MEDICINE
Payer: COMMERCIAL

## 2020-02-14 DIAGNOSIS — K86.89 PANCREATIC MASS: ICD-10-CM

## 2020-02-14 DIAGNOSIS — R41.3 MEMORY LOSS: ICD-10-CM

## 2020-02-14 DIAGNOSIS — K86.1 IDIOPATHIC CHRONIC PANCREATITIS (HCC): ICD-10-CM

## 2020-02-14 DIAGNOSIS — E11.51 DIABETIC PERIPHERAL ANGIOPATHY (HCC): ICD-10-CM

## 2020-02-14 DIAGNOSIS — R10.84 GENERALIZED ABDOMINAL PAIN: ICD-10-CM

## 2020-02-14 DIAGNOSIS — A04.8 HELICOBACTER PYLORI (H. PYLORI) INFECTION: ICD-10-CM

## 2020-02-14 DIAGNOSIS — E11.00 UNCONTROLLED TYPE 2 DIABETES MELLITUS WITH HYPEROSMOLARITY WITHOUT COMA (HCC): ICD-10-CM

## 2020-02-14 DIAGNOSIS — E43 SEVERE PROTEIN-CALORIE MALNUTRITION (HCC): ICD-10-CM

## 2020-02-14 PROBLEM — K83.09 CHOLANGITIS: Status: ACTIVE | Noted: 2020-02-14

## 2020-02-14 PROBLEM — R74.8 ELEVATED ALKALINE PHOSPHATASE LEVEL: Status: ACTIVE | Noted: 2020-02-14

## 2020-02-14 LAB
ALBUMIN SERPL BCP-MCNC: 2.1 G/DL (ref 3.2–4.9)
ALBUMIN/GLOB SERPL: 0.6 G/DL
ALP SERPL-CCNC: 1362 U/L (ref 30–99)
ALT SERPL-CCNC: 30 U/L (ref 2–50)
ANION GAP SERPL CALC-SCNC: 20 MMOL/L (ref 0–11.9)
APPEARANCE UR: CLEAR
AST SERPL-CCNC: 40 U/L (ref 12–45)
BACTERIA #/AREA URNS HPF: ABNORMAL /HPF
BASOPHILS # BLD AUTO: 0 % (ref 0–1.8)
BASOPHILS # BLD: 0 K/UL (ref 0–0.12)
BILIRUB SERPL-MCNC: 1.7 MG/DL (ref 0.1–1.5)
BILIRUB UR QL STRIP.AUTO: ABNORMAL
BUN SERPL-MCNC: 49 MG/DL (ref 8–22)
CALCIUM SERPL-MCNC: 8.6 MG/DL (ref 8.4–10.2)
CASTS URNS QL MICRO: ABNORMAL /LPF
CHLORIDE SERPL-SCNC: 93 MMOL/L (ref 96–112)
CO2 SERPL-SCNC: 16 MMOL/L (ref 20–33)
COLOR UR: YELLOW
CREAT SERPL-MCNC: 0.94 MG/DL (ref 0.5–1.4)
EOSINOPHIL # BLD AUTO: 0 K/UL (ref 0–0.51)
EOSINOPHIL NFR BLD: 0 % (ref 0–6.9)
EPI CELLS #/AREA URNS HPF: ABNORMAL /HPF
ERYTHROCYTE [DISTWIDTH] IN BLOOD BY AUTOMATED COUNT: 45.1 FL (ref 35.9–50)
FLUAV RNA SPEC QL NAA+PROBE: NEGATIVE
FLUBV RNA SPEC QL NAA+PROBE: NEGATIVE
GLOBULIN SER CALC-MCNC: 3.6 G/DL (ref 1.9–3.5)
GLUCOSE BLD-MCNC: 144 MG/DL (ref 65–99)
GLUCOSE BLD-MCNC: 167 MG/DL (ref 65–99)
GLUCOSE SERPL-MCNC: 165 MG/DL (ref 65–99)
GLUCOSE UR STRIP.AUTO-MCNC: NEGATIVE MG/DL
HCT VFR BLD AUTO: 32.6 % (ref 42–52)
HGB BLD-MCNC: 11.4 G/DL (ref 14–18)
KETONES UR STRIP.AUTO-MCNC: ABNORMAL MG/DL
LACTATE BLD-SCNC: 1.4 MMOL/L (ref 0.5–2)
LACTATE BLD-SCNC: 2.1 MMOL/L (ref 0.5–2)
LACTATE BLD-SCNC: 2.2 MMOL/L (ref 0.5–2)
LEUKOCYTE ESTERASE UR QL STRIP.AUTO: ABNORMAL
LYMPHOCYTES # BLD AUTO: 2.62 K/UL (ref 1–4.8)
LYMPHOCYTES NFR BLD: 6 % (ref 22–41)
MANUAL DIFF BLD: NORMAL
MCH RBC QN AUTO: 29.9 PG (ref 27–33)
MCHC RBC AUTO-ENTMCNC: 35 G/DL (ref 33.7–35.3)
MCV RBC AUTO: 85.6 FL (ref 81.4–97.8)
MICRO URNS: ABNORMAL
MONOCYTES # BLD AUTO: 3.05 K/UL (ref 0–0.85)
MONOCYTES NFR BLD AUTO: 7 % (ref 0–13.4)
MUCOUS THREADS #/AREA URNS HPF: ABNORMAL /HPF
MYELOCYTES NFR BLD MANUAL: 2 %
NEUTROPHILS # BLD AUTO: 37.06 K/UL (ref 1.82–7.42)
NEUTROPHILS NFR BLD: 83 % (ref 44–72)
NEUTS BAND NFR BLD MANUAL: 2 % (ref 0–10)
NITRITE UR QL STRIP.AUTO: NEGATIVE
NRBC # BLD AUTO: 0.04 K/UL
NRBC BLD-RTO: 0.1 /100 WBC
NT-PROBNP SERPL IA-MCNC: 1166 PG/ML (ref 0–125)
PH UR STRIP.AUTO: 6 [PH] (ref 5–8)
PLATELET # BLD AUTO: 315 K/UL (ref 164–446)
PLATELET BLD QL SMEAR: NORMAL
PMV BLD AUTO: 11.3 FL (ref 9–12.9)
POIKILOCYTOSIS BLD QL SMEAR: NORMAL
POTASSIUM SERPL-SCNC: 3.4 MMOL/L (ref 3.6–5.5)
PROCALCITONIN SERPL-MCNC: 1.73 NG/ML
PROT SERPL-MCNC: 5.7 G/DL (ref 6–8.2)
PROT UR QL STRIP: 30 MG/DL
RBC # BLD AUTO: 3.81 M/UL (ref 4.7–6.1)
RBC BLD AUTO: PRESENT
RBC UR QL AUTO: NEGATIVE
SODIUM SERPL-SCNC: 129 MMOL/L (ref 135–145)
SP GR UR STRIP.AUTO: 1.01
TARGETS BLD QL SMEAR: NORMAL
WBC # BLD AUTO: 43.6 K/UL (ref 4.8–10.8)
WBC #/AREA URNS HPF: ABNORMAL /HPF

## 2020-02-14 PROCEDURE — 96367 TX/PROPH/DG ADDL SEQ IV INF: CPT

## 2020-02-14 PROCEDURE — 81001 URINALYSIS AUTO W/SCOPE: CPT

## 2020-02-14 PROCEDURE — 94760 N-INVAS EAR/PLS OXIMETRY 1: CPT

## 2020-02-14 PROCEDURE — 87502 INFLUENZA DNA AMP PROBE: CPT

## 2020-02-14 PROCEDURE — 76705 ECHO EXAM OF ABDOMEN: CPT

## 2020-02-14 PROCEDURE — 85007 BL SMEAR W/DIFF WBC COUNT: CPT

## 2020-02-14 PROCEDURE — 80053 COMPREHEN METABOLIC PANEL: CPT

## 2020-02-14 PROCEDURE — 83880 ASSAY OF NATRIURETIC PEPTIDE: CPT

## 2020-02-14 PROCEDURE — 700105 HCHG RX REV CODE 258: Performed by: EMERGENCY MEDICINE

## 2020-02-14 PROCEDURE — 87086 URINE CULTURE/COLONY COUNT: CPT

## 2020-02-14 PROCEDURE — 71045 X-RAY EXAM CHEST 1 VIEW: CPT

## 2020-02-14 PROCEDURE — 770020 HCHG ROOM/CARE - TELE (206)

## 2020-02-14 PROCEDURE — 99223 1ST HOSP IP/OBS HIGH 75: CPT | Performed by: INTERNAL MEDICINE

## 2020-02-14 PROCEDURE — 700105 HCHG RX REV CODE 258: Performed by: INTERNAL MEDICINE

## 2020-02-14 PROCEDURE — A9270 NON-COVERED ITEM OR SERVICE: HCPCS | Performed by: INTERNAL MEDICINE

## 2020-02-14 PROCEDURE — 84145 PROCALCITONIN (PCT): CPT

## 2020-02-14 PROCEDURE — 82962 GLUCOSE BLOOD TEST: CPT

## 2020-02-14 PROCEDURE — 99285 EMERGENCY DEPT VISIT HI MDM: CPT

## 2020-02-14 PROCEDURE — 96366 THER/PROPH/DIAG IV INF ADDON: CPT

## 2020-02-14 PROCEDURE — 83605 ASSAY OF LACTIC ACID: CPT | Mod: 91

## 2020-02-14 PROCEDURE — 87040 BLOOD CULTURE FOR BACTERIA: CPT

## 2020-02-14 PROCEDURE — 700111 HCHG RX REV CODE 636 W/ 250 OVERRIDE (IP): Performed by: EMERGENCY MEDICINE

## 2020-02-14 PROCEDURE — 700102 HCHG RX REV CODE 250 W/ 637 OVERRIDE(OP): Performed by: INTERNAL MEDICINE

## 2020-02-14 PROCEDURE — 96365 THER/PROPH/DIAG IV INF INIT: CPT

## 2020-02-14 PROCEDURE — 700111 HCHG RX REV CODE 636 W/ 250 OVERRIDE (IP): Performed by: HOSPITALIST

## 2020-02-14 PROCEDURE — 85027 COMPLETE CBC AUTOMATED: CPT

## 2020-02-14 RX ORDER — SUCRALFATE 1 G/1
1 TABLET ORAL 2 TIMES DAILY PRN
Status: DISCONTINUED | OUTPATIENT
Start: 2020-02-14 | End: 2020-03-15

## 2020-02-14 RX ORDER — POLYETHYLENE GLYCOL 3350 17 G/17G
1 POWDER, FOR SOLUTION ORAL
Status: DISCONTINUED | OUTPATIENT
Start: 2020-02-14 | End: 2020-03-24 | Stop reason: HOSPADM

## 2020-02-14 RX ORDER — ATORVASTATIN CALCIUM 40 MG/1
40 TABLET, FILM COATED ORAL EVERY EVENING
Status: DISCONTINUED | OUTPATIENT
Start: 2020-02-14 | End: 2020-03-24 | Stop reason: HOSPADM

## 2020-02-14 RX ORDER — SODIUM CHLORIDE, SODIUM LACTATE, POTASSIUM CHLORIDE, CALCIUM CHLORIDE 600; 310; 30; 20 MG/100ML; MG/100ML; MG/100ML; MG/100ML
1000 INJECTION, SOLUTION INTRAVENOUS ONCE
Status: COMPLETED | OUTPATIENT
Start: 2020-02-14 | End: 2020-02-14

## 2020-02-14 RX ORDER — THIAMINE MONONITRATE (VIT B1) 100 MG
100 TABLET ORAL DAILY
Status: DISCONTINUED | OUTPATIENT
Start: 2020-02-15 | End: 2020-03-24 | Stop reason: HOSPADM

## 2020-02-14 RX ORDER — ONDANSETRON 2 MG/ML
4 INJECTION INTRAMUSCULAR; INTRAVENOUS EVERY 4 HOURS PRN
Status: DISCONTINUED | OUTPATIENT
Start: 2020-02-14 | End: 2020-03-24 | Stop reason: HOSPADM

## 2020-02-14 RX ORDER — TAMSULOSIN HYDROCHLORIDE 0.4 MG/1
0.4 CAPSULE ORAL DAILY
Status: DISCONTINUED | OUTPATIENT
Start: 2020-02-15 | End: 2020-03-24 | Stop reason: HOSPADM

## 2020-02-14 RX ORDER — ACETAMINOPHEN 325 MG/1
650 TABLET ORAL EVERY 6 HOURS PRN
Status: DISCONTINUED | OUTPATIENT
Start: 2020-02-14 | End: 2020-03-24 | Stop reason: HOSPADM

## 2020-02-14 RX ORDER — ONDANSETRON 4 MG/1
4 TABLET, ORALLY DISINTEGRATING ORAL EVERY 4 HOURS PRN
Status: DISCONTINUED | OUTPATIENT
Start: 2020-02-14 | End: 2020-03-24 | Stop reason: HOSPADM

## 2020-02-14 RX ORDER — PROMETHAZINE HYDROCHLORIDE 25 MG/1
12.5-25 TABLET ORAL EVERY 4 HOURS PRN
Status: DISCONTINUED | OUTPATIENT
Start: 2020-02-14 | End: 2020-03-24 | Stop reason: HOSPADM

## 2020-02-14 RX ORDER — PROMETHAZINE HYDROCHLORIDE 25 MG/1
12.5-25 SUPPOSITORY RECTAL EVERY 4 HOURS PRN
Status: DISCONTINUED | OUTPATIENT
Start: 2020-02-14 | End: 2020-03-15

## 2020-02-14 RX ORDER — ASPIRIN 81 MG/1
81 TABLET, CHEWABLE ORAL DAILY
Status: DISCONTINUED | OUTPATIENT
Start: 2020-02-15 | End: 2020-02-23

## 2020-02-14 RX ORDER — BISACODYL 10 MG
10 SUPPOSITORY, RECTAL RECTAL
Status: DISCONTINUED | OUTPATIENT
Start: 2020-02-14 | End: 2020-03-24 | Stop reason: HOSPADM

## 2020-02-14 RX ORDER — POTASSIUM CHLORIDE 20 MEQ/1
40 TABLET, EXTENDED RELEASE ORAL ONCE
Status: COMPLETED | OUTPATIENT
Start: 2020-02-14 | End: 2020-02-14

## 2020-02-14 RX ORDER — AMOXICILLIN 250 MG
2 CAPSULE ORAL 2 TIMES DAILY
Status: DISCONTINUED | OUTPATIENT
Start: 2020-02-14 | End: 2020-03-24 | Stop reason: HOSPADM

## 2020-02-14 RX ORDER — SODIUM CHLORIDE 9 MG/ML
INJECTION, SOLUTION INTRAVENOUS CONTINUOUS
Status: DISCONTINUED | OUTPATIENT
Start: 2020-02-14 | End: 2020-02-22

## 2020-02-14 RX ORDER — OMEPRAZOLE 20 MG/1
20 CAPSULE, DELAYED RELEASE ORAL 2 TIMES DAILY
Status: DISCONTINUED | OUTPATIENT
Start: 2020-02-14 | End: 2020-03-24 | Stop reason: HOSPADM

## 2020-02-14 RX ORDER — HYDROMORPHONE HYDROCHLORIDE 1 MG/ML
0.5 INJECTION, SOLUTION INTRAMUSCULAR; INTRAVENOUS; SUBCUTANEOUS
Status: DISCONTINUED | OUTPATIENT
Start: 2020-02-14 | End: 2020-03-24 | Stop reason: HOSPADM

## 2020-02-14 RX ORDER — PROCHLORPERAZINE EDISYLATE 5 MG/ML
5-10 INJECTION INTRAMUSCULAR; INTRAVENOUS EVERY 4 HOURS PRN
Status: DISCONTINUED | OUTPATIENT
Start: 2020-02-14 | End: 2020-03-24 | Stop reason: HOSPADM

## 2020-02-14 RX ORDER — BISMUTH SUBSALICYLATE 262 MG/1
524 TABLET, CHEWABLE ORAL EVERY 6 HOURS
Status: DISCONTINUED | OUTPATIENT
Start: 2020-02-14 | End: 2020-02-15

## 2020-02-14 RX ADMIN — OMEPRAZOLE 20 MG: 20 CAPSULE, DELAYED RELEASE ORAL at 22:38

## 2020-02-14 RX ADMIN — CEFTRIAXONE SODIUM 2 G: 2 INJECTION, POWDER, FOR SOLUTION INTRAMUSCULAR; INTRAVENOUS at 18:45

## 2020-02-14 RX ADMIN — ACETAMINOPHEN 650 MG: 325 TABLET, FILM COATED ORAL at 21:54

## 2020-02-14 RX ADMIN — ATORVASTATIN CALCIUM 40 MG: 40 TABLET, FILM COATED ORAL at 22:38

## 2020-02-14 RX ADMIN — SODIUM CHLORIDE, POTASSIUM CHLORIDE, SODIUM LACTATE AND CALCIUM CHLORIDE 1000 ML: 600; 310; 30; 20 INJECTION, SOLUTION INTRAVENOUS at 17:00

## 2020-02-14 RX ADMIN — SODIUM CHLORIDE: 9 INJECTION, SOLUTION INTRAVENOUS at 22:38

## 2020-02-14 RX ADMIN — VANCOMYCIN HYDROCHLORIDE 1500 MG: 500 INJECTION, POWDER, LYOPHILIZED, FOR SOLUTION INTRAVENOUS at 19:40

## 2020-02-14 RX ADMIN — POTASSIUM CHLORIDE 40 MEQ: 1500 TABLET, EXTENDED RELEASE ORAL at 22:40

## 2020-02-14 RX ADMIN — HYDROMORPHONE HYDROCHLORIDE 0.5 MG: 1 INJECTION, SOLUTION INTRAMUSCULAR; INTRAVENOUS; SUBCUTANEOUS at 23:09

## 2020-02-14 ASSESSMENT — ENCOUNTER SYMPTOMS
HEARTBURN: 0
SHORTNESS OF BREATH: 0
HEADACHES: 0
WEAKNESS: 0
SORE THROAT: 0
FOCAL WEAKNESS: 0
CHILLS: 0
PALPITATIONS: 0
DIZZINESS: 0
DEPRESSION: 0
DIARRHEA: 0
BLOOD IN STOOL: 0
FEVER: 0
HALLUCINATIONS: 0
ROS GI COMMENTS: LOW APPETITE
COUGH: 0
MYALGIAS: 0
BACK PAIN: 1
ABDOMINAL PAIN: 0
VOMITING: 1
NAUSEA: 0

## 2020-02-14 ASSESSMENT — COGNITIVE AND FUNCTIONAL STATUS - GENERAL
SUGGESTED CMS G CODE MODIFIER DAILY ACTIVITY: CH
MOBILITY SCORE: 23
SUGGESTED CMS G CODE MODIFIER MOBILITY: CI
DAILY ACTIVITIY SCORE: 24
CLIMB 3 TO 5 STEPS WITH RAILING: A LITTLE

## 2020-02-14 ASSESSMENT — COPD QUESTIONNAIRES
COPD SCREENING SCORE: 2
HAVE YOU SMOKED AT LEAST 100 CIGARETTES IN YOUR ENTIRE LIFE: NO/DON'T KNOW
DO YOU EVER COUGH UP ANY MUCUS OR PHLEGM?: NO/ONLY WITH OCCASIONAL COLDS OR INFECTIONS
IN THE PAST 12 MONTHS DO YOU DO LESS THAN YOU USED TO BECAUSE OF YOUR BREATHING PROBLEMS: DISAGREE/UNSURE
DURING THE PAST 4 WEEKS HOW MUCH DID YOU FEEL SHORT OF BREATH: NONE/LITTLE OF THE TIME

## 2020-02-14 ASSESSMENT — LIFESTYLE VARIABLES
HAVE YOU EVER FELT YOU SHOULD CUT DOWN ON YOUR DRINKING: NO
ALCOHOL_USE: NO
CONSUMPTION TOTAL: NEGATIVE
TOTAL SCORE: 0
EVER HAD A DRINK FIRST THING IN THE MORNING TO STEADY YOUR NERVES TO GET RID OF A HANGOVER: NO
HOW MANY TIMES IN THE PAST YEAR HAVE YOU HAD 5 OR MORE DRINKS IN A DAY: 0
TOTAL SCORE: 0
TOTAL SCORE: 0
EVER FELT BAD OR GUILTY ABOUT YOUR DRINKING: NO
EVER_SMOKED: YES
HAVE PEOPLE ANNOYED YOU BY CRITICIZING YOUR DRINKING: NO
AVERAGE NUMBER OF DAYS PER WEEK YOU HAVE A DRINK CONTAINING ALCOHOL: 0
ON A TYPICAL DAY WHEN YOU DRINK ALCOHOL HOW MANY DRINKS DO YOU HAVE: 0

## 2020-02-14 NOTE — ED TRIAGE NOTES
Chief Complaint   Patient presents with   • Altered Mental Status     pt confused this morning per staff at [facility] assisted living.    • Tachycardia     122 bmp in triage.   • Hypotension     88/55 in triage.   • Cough     x 1 week   Pt tachypnic.   POC blood sugar 144 mg/dl in triage.   Pt denies complaint or pain.

## 2020-02-14 NOTE — ED PROVIDER NOTES
"ED Provider Note    CHIEF COMPLAINT  Chief Complaint   Patient presents with   • Altered Mental Status     pt confused this morning per staff at [facility] assisted living.    • Tachycardia     122 bmp in triage.   • Hypotension     88/55 in triage.   • Cough     x 1 week       HPI  Pawel Tatum is a 59 y.o. male who presents for evaluation of confusion generally not feeling well.  The patient lives in assisted living facility apparently due to history of prior and possible stroke diabetes seizure disorder.  He arrives here with 1 of his caregivers.  He apparently has been somewhat sluggish but denies headache high fever neck stiffness.  He denies any significant abdominal pain no skin rash.  He reports no productive cough or dysuria  REVIEW OF SYSTEMS  See HPI for further details.  No rash chest pain night sweats weight loss all other systems are negative.     PAST MEDICAL HISTORY  Past Medical History:   Diagnosis Date   • Diabetes (HCC)    • Hypertension    • Seizure disorder (HCC)     Pt had a seizure with \" unknown illness\" in recent months.        FAMILY HISTORY  Noncontributory    SOCIAL HISTORY  Social History     Socioeconomic History   • Marital status: Single     Spouse name: Not on file   • Number of children: Not on file   • Years of education: Not on file   • Highest education level: Not on file   Occupational History   • Not on file   Social Needs   • Financial resource strain: Not on file   • Food insecurity     Worry: Not on file     Inability: Not on file   • Transportation needs     Medical: Not on file     Non-medical: Not on file   Tobacco Use   • Smoking status: Current Every Day Smoker     Packs/day: 0.25     Years: 15.00     Pack years: 3.75   • Smokeless tobacco: Never Used   • Tobacco comment: Trying to quit   Substance and Sexual Activity   • Alcohol use: No   • Drug use: No   • Sexual activity: Not Currently   Lifestyle   • Physical activity     Days per week: Not on file     " Minutes per session: Not on file   • Stress: Not on file   Relationships   • Social connections     Talks on phone: Not on file     Gets together: Not on file     Attends [employer] service: Not on file     Active member of club or organization: Not on file     Attends meetings of clubs or organizations: Not on file     Relationship status: Not on file   • Intimate partner violence     Fear of current or ex partner: Not on file     Emotionally abused: Not on file     Physically abused: Not on file     Forced sexual activity: Not on file   Other Topics Concern   • Not on file   Social History Narrative   • Not on file       SURGICAL HISTORY  Past Surgical History:   Procedure Laterality Date   • PB UPPER GI ENDOSCOPY,BIOPSY N/A 12/27/2019    Procedure: GASTROSCOPY, WITH BIOPSY;  Surgeon: Hector Villarreal M.D.;  Location: ENDOSCOPY [facility];  Service: Gastroenterology   • TOE AMPUTATION Right 1/12/2019    Procedure: TOE AMPUTATION;  Surgeon: Nicanor Reddy M.D.;  Location: SURGERY [facility];  Service: Orthopedics   • GASTROSCOPY-ENDO N/A 8/25/2018    Procedure: GASTROSCOPY-ENDO;  Surgeon: Jaswant Frye M.D.;  Location: ENDOSCOPY [facility];  Service: Gastroenterology   • OTHER ABDOMINAL SURGERY         CURRENT MEDICATIONS    Current Facility-Administered Medications:   •  vancomycin (VANCOCIN) 1,500 mg in  mL IVPB, 25 mg/kg, Intravenous, Once, Milton Chawla M.D., Last Rate: 83.3 mL/hr at 02/14/20 1940, 1,500 mg at 02/14/20 1940  •  [START ON 2/15/2020] aspirin (ASA) chewable tab 81 mg, 81 mg, Oral, DAILY, Garce Patel D.O.  •  atorvastatin (LIPITOR) tablet 40 mg, 40 mg, Oral, Q EVENING, CHIARA Holguin.O.  •  bismuth subsalicylate (PEPTO-BISMOL) tablet 524 mg, 524 mg, Oral, Q6HRS, CHIARA Holguin.O.  •  omeprazole (PRILOSEC) capsule 20 mg, 20 mg, Oral, BID, CHIARA Holguin.O.  •  [START ON 2/15/2020] pancrelipase (Lip-Prot-Amyl) (CREON 17877) 31573-63476 units  capsule 48,000 Units, 2 Cap, Oral, DAILY, YULI HolguinO.  •  sucralfate (CARAFATE) tablet 1 g, 1 g, Oral, Q12HRS, Grace Patel D.O.  •  [START ON 2/15/2020] tamsulosin (FLOMAX) capsule 0.4 mg, 0.4 mg, Oral, DAILY, YULI HolguinO.  •  [START ON 2/15/2020] thiamine tablet 100 mg, 100 mg, Oral, DAILY, CHIARA Holguin.O.  •  senna-docusate (PERICOLACE or SENOKOT S) 8.6-50 MG per tablet 2 Tab, 2 Tab, Oral, BID **AND** polyethylene glycol/lytes (MIRALAX) PACKET 1 Packet, 1 Packet, Oral, QDAY PRN **AND** magnesium hydroxide (MILK OF MAGNESIA) suspension 30 mL, 30 mL, Oral, QDAY PRN **AND** bisacodyl (DULCOLAX) suppository 10 mg, 10 mg, Rectal, QDAY PRN, YULI HolguinO.  •  NS infusion, , Intravenous, Continuous, CHIARA Holguin.O.  •  [START ON 2/15/2020] enoxaparin (LOVENOX) inj 40 mg, 40 mg, Subcutaneous, DAILY, CHIARA Holguin.O.  •  acetaminophen (TYLENOL) tablet 650 mg, 650 mg, Oral, Q6HRS PRN, YULI HolguinO.  •  [START ON 2/15/2020] cefTRIAXone (ROCEPHIN) 2 g in  mL IVPB, 2 g, Intravenous, Q24HRS, CHIARA Holguin.O.  •  [START ON 2/15/2020] metroNIDAZOLE (FLAGYL) IVPB 500 mg, 500 mg, Intravenous, Q8HRS, CHIARA Holguin.O.  •  ondansetron (ZOFRAN) syringe/vial injection 4 mg, 4 mg, Intravenous, Q4HRS PRN, CHIARA Holguin.O.  •  ondansetron (ZOFRAN ODT) dispertab 4 mg, 4 mg, Oral, Q4HRS PRN, YULI HolguinO.  •  promethazine (PHENERGAN) tablet 12.5-25 mg, 12.5-25 mg, Oral, Q4HRS PRN, YULI HolguinO.  •  promethazine (PHENERGAN) suppository 12.5-25 mg, 12.5-25 mg, Rectal, Q4HRS PRN, YULI HolguinO.  •  prochlorperazine (COMPAZINE) injection 5-10 mg, 5-10 mg, Intravenous, Q4HRS PRN, CHIARA Holguin.O.  •  insulin regular (HUMULIN R) injection 1-6 Units, 1-6 Units, Subcutaneous, Q6HRS **AND** Accu-Chek Q6 if NPO, , , Q6H **AND** NOTIFY MD and PharmD, , , Once **AND** glucose 4 g chewable tablet 16 g, 16 g, Oral, Q15 MIN PRN **AND**  DEXTROSE 10% BOLUS 250 mL, 250 mL, Intravenous, Q15 MIN PRN, Grace Patel D.O.  •  potassium chloride SA (Kdur) tablet 40 mEq, 40 mEq, Oral, Once, Grace Patel D.O.    Current Outpatient Medications:   •  Urine Glucose-Ketones Test Strip, 1 Strip by In Vitro route every day., Disp: 100 Strip, Rfl: 2  •  Insulin Degludec (TRESIBA FLEXTOUCH) 200 UNIT/ML Solution Pen-injector, Inject 12 Units as instructed every bedtime., Disp: 3 PEN, Rfl: 11  •  insulin lispro (HUMALOG KWIKPEN) 100 UNIT/ML Solution Pen-injector injection PEN, Inject 10 Units as instructed 3 times a day before meals., Disp: 5 PEN, Rfl: 6  •  omeprazole (PRILOSEC) 20 MG delayed-release capsule, Take 1 Cap by mouth 2 Times a Day., Disp: 60 Cap, Rfl: 8  •  lisinopril (PRINIVIL) 2.5 MG Tab, TAKE 1 TABLET BY MOUTH ONCE DAILY., Disp: 30 Tab, Rfl: 1  •  atorvastatin (LIPITOR) 40 MG Tab, Take 1 Tab by mouth every evening., Disp: 30 Tab, Rfl: 1  •  bismuth subsalicylate (PEPTO-BISMOL) 262 MG Chew Tab, Take 2 Tabs by mouth every 6 hours., Disp: 45 Tab, Rfl: 0  •  metroNIDAZOLE (FLAGYL) 250 MG Tab, Take 1 Tab by mouth every 8 hours., Disp: 24 Tab, Rfl: 0  •  multivitamin (THERAGRAN) Tab, Take 1 Tab by mouth every day., Disp: 30 Tab, Rfl: 1  •  thiamine (THIAMINE) 100 MG tablet, Take 1 Tab by mouth every day., Disp: 30 Tab, Rfl: 1  •  sucralfate (CARAFATE) 1 GM Tab, 1 tab 30 minutes prior to meals and before bed x 10D, then BID PRN abd pain, Disp: 60 Tab, Rfl: 0  •  aspirin (ASA) 81 MG Chew Tab chewable tablet, CHEW 1 TABLET BY MOUTH ONCE DAILY., Disp: 30 Tab, Rfl: 11  •  pancrelipase, Lip-Prot-Amyl, (CREON) 50791-03616 units Cap DR Particles, Take 2 Caps by mouth every day., Disp: 60 Cap, Rfl: 11  •  tamsulosin (FLOMAX) 0.4 MG capsule, Take 1 Cap by mouth every day., Disp: 30 Cap, Rfl: 11  •  Lactobacillus-Inulin ([facility] DIGEST[facility] HE[facility]) Cap, TAKE (1) CAPSULE BY MOUTH ONCE DAILY., Disp: 30 Cap, Rfl: 6  •  Cholecalciferol (VITAMIN D) 2000 units Cap,  "Take 2 Caps by mouth every day., Disp: 60 Cap, Rfl: 5      ALLERGIES  No Known Allergies    PHYSICAL EXAM  VITAL SIGNS: BP (!) 88/55   Pulse (!) 122   Temp 37.2 °C (98.9 °F) (Temporal)   Resp (!) 28   Ht 1.905 m (6' 3\")   Wt 63.2 kg (139 lb 5.3 oz)   SpO2 91%   BMI 17.42 kg/m²       Constitutional: Patient appears chronically ill.   HENT: Normocephalic, Atraumatic, Bilateral external ears normal, dry mucous membranes, No oral exudates, Nose normal.   Eyes: PERRLA, EOMI, Conjunctiva normal, No discharge.   Neck: Normal range of motion, No tenderness, Supple, No stridor.   Cardiovascular: Normal heart rate, Normal rhythm, No murmurs, No rubs, No gallops.   Thorax & Lungs: Normal breath sounds, No respiratory distress, No wheezing, No chest tenderness.   Abdomen: Bowel sounds normal, Soft, no reproducible abdominal discomfort  Skin: Warm, Dry, No erythema, No rash.   Back: No tenderness, No CVA tenderness.   Extremities: Intact distal pulses, No edema, No tenderness, No cyanosis, No clubbing.   Neurologic: Alert & oriented x 3, Normal motor function, Normal sensory function, No focal deficits noted.   Psychiatric: Anxious      US-RUQ   Final Result      1.  Hepatomegaly.   2.  Hepatic steatosis.   3.  Gallbladder sludge. Mild intrahepatic ductal dilation. Dilation of the common duct up to 13 mm. No definite distal obstructing etiology is identified. MRCP could be obtained for further evaluation if indicated.   4.  Trace ascites.   5.  Pancreatic calcifications likely sequela of chronic pancreatitis.      DX-CHEST-PORTABLE (1 VIEW)   Final Result      Cardiomegaly.      [id number]    (Results Pending)     Results for orders placed or performed during the hospital encounter of 02/14/20   Lactic acid (lactate): Repeat if initial lactic acid result is greater than 2   Result Value Ref Range    Lactic Acid 2.1 (H) 0.5 - 2.0 mmol/L   CBC WITH DIFFERENTIAL   Result Value Ref Range    WBC 43.6 (HH) 4.8 - 10.8 K/uL "    RBC 3.81 (L) 4.70 - 6.10 M/uL    Hemoglobin 11.4 (L) 14.0 - 18.0 g/dL    Hematocrit 32.6 (L) 42.0 - 52.0 %    MCV 85.6 81.4 - 97.8 fL    MCH 29.9 27.0 - 33.0 pg    MCHC 35.0 33.7 - 35.3 g/dL    RDW 45.1 35.9 - 50.0 fL    Platelet Count 315 164 - 446 K/uL    MPV 11.3 9.0 - 12.9 fL    Neutrophils-Polys 83.00 (H) 44.00 - 72.00 %    Lymphocytes 6.00 (L) 22.00 - 41.00 %    Monocytes 7.00 0.00 - 13.40 %    Eosinophils 0.00 0.00 - 6.90 %    Basophils 0.00 0.00 - 1.80 %    Nucleated RBC 0.10 /100 WBC    Neutrophils (Absolute) 37.06 (H) 1.82 - 7.42 K/uL    Lymphs (Absolute) 2.62 1.00 - 4.80 K/uL    Monos (Absolute) 3.05 (H) 0.00 - 0.85 K/uL    Eos (Absolute) 0.00 0.00 - 0.51 K/uL    Baso (Absolute) 0.00 0.00 - 0.12 K/uL    NRBC (Absolute) 0.04 K/uL   COMP METABOLIC PANEL   Result Value Ref Range    Sodium 129 (L) 135 - 145 mmol/L    Potassium 3.4 (L) 3.6 - 5.5 mmol/L    Chloride 93 (L) 96 - 112 mmol/L    Co2 16 (L) 20 - 33 mmol/L    Anion Gap 20.0 (H) 0.0 - 11.9    Glucose 165 (H) 65 - 99 mg/dL    Bun 49 (H) 8 - 22 mg/dL    Creatinine 0.94 0.50 - 1.40 mg/dL    Calcium 8.6 8.4 - 10.2 mg/dL    AST(SGOT) 40 12 - 45 U/L    ALT(SGPT) 30 2 - 50 U/L    Alkaline Phosphatase 1362 (H) 30 - 99 U/L    Total Bilirubin 1.7 (H) 0.1 - 1.5 mg/dL    Albumin 2.1 (L) 3.2 - 4.9 g/dL    Total Protein 5.7 (L) 6.0 - 8.2 g/dL    Globulin 3.6 (H) 1.9 - 3.5 g/dL    A-G Ratio 0.6 g/dL   URINALYSIS   Result Value Ref Range    Color Yellow     Character Clear     Specific Gravity 1.010 <1.035    Ph 6.0 5.0 - 8.0    Glucose Negative Negative mg/dL    Ketones Trace (A) Negative mg/dL    Protein 30 (A) Negative mg/dL    Bilirubin Small (A) Negative    Nitrite Negative Negative    Leukocyte Esterase Trace (A) Negative    Occult Blood Negative Negative    Micro Urine Req Microscopic    Influenza A/B By PCR (Adult - Flu Only)   Result Value Ref Range    Influenza virus A RNA Negative Negative    Influenza virus B, PCR Negative Negative   proBrain  Natriuretic Peptide, NT   Result Value Ref Range    NT-proBNP 1166 (H) 0 - 125 pg/mL   PROCALCITONIN   Result Value Ref Range    Procalcitonin 1.73 (H) <0.25 ng/mL   LACTIC ACID   Result Value Ref Range    Lactic Acid 2.2 (H) 0.5 - 2.0 mmol/L   ESTIMATED GFR   Result Value Ref Range    GFR If African American >60 >60 mL/min/1.73 m 2    GFR If Non African American >60 >60 mL/min/1.73 m 2   URINE MICROSCOPIC (W/UA)   Result Value Ref Range    WBC 2-5 (A) /hpf    Bacteria Rare (A) None /hpf    Epithelial Cells Few Few /hpf    Mucous Threads Rare /hpf    Urine Casts 3-5 Granular (A) /lpf   PLATELET ESTIMATE   Result Value Ref Range    Plt Estimation Normal    MORPHOLOGY   Result Value Ref Range    RBC Morphology Present     Poikilocytosis 1+     Target Cells 1+    DIFFERENTIAL MANUAL   Result Value Ref Range    Bands-Stabs 2.00 0.00 - 10.00 %    Myelocytes 2.00 %    Manual Diff Status PERFORMED    ACCU-CHEK GLUCOSE   Result Value Ref Range    Glucose - Accu-Ck 144 (H) 65 - 99 mg/dL        COURSE & MEDICAL DECISION MAKING  Pertinent Labs & Imaging studies reviewed. (See chart for details)  Patient presents here with very vague symptoms his vital signs are very concerning initially had slight low blood pressure and tachycardia with no documented fever.  His lactic acid is marginally elevated his white blood cell count is profoundly elevated at 43,000 which apparently is new.  Reviewed his CBC and he has no history of blood dyscrasia.  Procalcitonin is also elevated.  We noted on his metabolic panel that his alk phos is elevated but his lipase is normal.  Ultrasound demonstrates some nonspecific findings but no clear evidence of acute cholecystitis.  He was given fluid resuscitation but needed to be cautiously done due to his cardiomegaly and elevated BNP.  Blood cultures are pending.  He was given empiric Rocephin and vancomycin until he can sort out etiology of his likely infection    FINAL IMPRESSION  1.   Leukocytosis  2.  Possible urinary tract infection  3.  SIRS without sepsis         Electronically signed by: Milton Chawla M.D., 2/14/2020 3:49 PM

## 2020-02-15 ENCOUNTER — APPOINTMENT (OUTPATIENT)
Dept: RADIOLOGY | Facility: MEDICAL CENTER | Age: 60
DRG: 871 | End: 2020-02-15
Attending: INTERNAL MEDICINE
Payer: COMMERCIAL

## 2020-02-15 PROBLEM — E87.20 METABOLIC ACIDOSIS: Status: ACTIVE | Noted: 2019-12-19

## 2020-02-15 LAB
ALBUMIN SERPL BCP-MCNC: 2.1 G/DL (ref 3.2–4.9)
ALBUMIN/GLOB SERPL: 0.6 G/DL
ALP SERPL-CCNC: 1159 U/L (ref 30–99)
ALT SERPL-CCNC: 25 U/L (ref 2–50)
ANION GAP SERPL CALC-SCNC: 22 MMOL/L (ref 7–16)
ANISOCYTOSIS BLD QL SMEAR: ABNORMAL
AST SERPL-CCNC: 36 U/L (ref 12–45)
BASOPHILS # BLD AUTO: 0 % (ref 0–1.8)
BASOPHILS # BLD: 0 K/UL (ref 0–0.12)
BILIRUB SERPL-MCNC: 1.4 MG/DL (ref 0.1–1.5)
BUN SERPL-MCNC: 46 MG/DL (ref 8–22)
CALCIUM SERPL-MCNC: 8.3 MG/DL (ref 8.4–10.2)
CHLORIDE SERPL-SCNC: 94 MMOL/L (ref 96–112)
CO2 SERPL-SCNC: 16 MMOL/L (ref 20–33)
CREAT SERPL-MCNC: 0.89 MG/DL (ref 0.5–1.4)
EOSINOPHIL # BLD AUTO: 0 K/UL (ref 0–0.51)
EOSINOPHIL NFR BLD: 0 % (ref 0–6.9)
ERYTHROCYTE [DISTWIDTH] IN BLOOD BY AUTOMATED COUNT: 46.3 FL (ref 35.9–50)
GLOBULIN SER CALC-MCNC: 3.4 G/DL (ref 1.9–3.5)
GLUCOSE BLD-MCNC: 185 MG/DL (ref 65–99)
GLUCOSE BLD-MCNC: 217 MG/DL (ref 65–99)
GLUCOSE BLD-MCNC: 217 MG/DL (ref 65–99)
GLUCOSE BLD-MCNC: 425 MG/DL (ref 65–99)
GLUCOSE SERPL-MCNC: 199 MG/DL (ref 65–99)
HCT VFR BLD AUTO: 31.3 % (ref 42–52)
HGB BLD-MCNC: 10.7 G/DL (ref 14–18)
LACTATE BLD-SCNC: 1.5 MMOL/L (ref 0.5–2)
LACTATE BLD-SCNC: 1.8 MMOL/L (ref 0.5–2)
LIPASE SERPL-CCNC: 8 U/L (ref 7–58)
LYMPHOCYTES # BLD AUTO: 4.44 K/UL (ref 1–4.8)
LYMPHOCYTES NFR BLD: 12 % (ref 22–41)
MAGNESIUM SERPL-MCNC: 1.6 MG/DL (ref 1.5–2.5)
MANUAL DIFF BLD: NORMAL
MCH RBC QN AUTO: 29.6 PG (ref 27–33)
MCHC RBC AUTO-ENTMCNC: 34.2 G/DL (ref 33.7–35.3)
MCV RBC AUTO: 86.7 FL (ref 81.4–97.8)
METAMYELOCYTES NFR BLD MANUAL: 1 %
MONOCYTES # BLD AUTO: 2.96 K/UL (ref 0–0.85)
MONOCYTES NFR BLD AUTO: 8 % (ref 0–13.4)
MYELOCYTES NFR BLD MANUAL: 3 %
NEUTROPHILS # BLD AUTO: 28.12 K/UL (ref 1.82–7.42)
NEUTROPHILS NFR BLD: 74 % (ref 44–72)
NEUTS BAND NFR BLD MANUAL: 2 % (ref 0–10)
NRBC # BLD AUTO: 0.03 K/UL
NRBC BLD-RTO: 0.1 /100 WBC
PHOSPHATE SERPL-MCNC: 3.8 MG/DL (ref 2.5–4.5)
PLATELET # BLD AUTO: 324 K/UL (ref 164–446)
PLATELET BLD QL SMEAR: NORMAL
PMV BLD AUTO: 11.3 FL (ref 9–12.9)
POTASSIUM SERPL-SCNC: 3.9 MMOL/L (ref 3.6–5.5)
PROT SERPL-MCNC: 5.5 G/DL (ref 6–8.2)
RBC # BLD AUTO: 3.61 M/UL (ref 4.7–6.1)
RBC BLD AUTO: PRESENT
SODIUM SERPL-SCNC: 132 MMOL/L (ref 135–145)
WBC # BLD AUTO: 37 K/UL (ref 4.8–10.8)

## 2020-02-15 PROCEDURE — A9270 NON-COVERED ITEM OR SERVICE: HCPCS | Performed by: INTERNAL MEDICINE

## 2020-02-15 PROCEDURE — 80053 COMPREHEN METABOLIC PANEL: CPT

## 2020-02-15 PROCEDURE — 700111 HCHG RX REV CODE 636 W/ 250 OVERRIDE (IP): Performed by: HOSPITALIST

## 2020-02-15 PROCEDURE — 97161 PT EVAL LOW COMPLEX 20 MIN: CPT

## 2020-02-15 PROCEDURE — 84100 ASSAY OF PHOSPHORUS: CPT

## 2020-02-15 PROCEDURE — 700101 HCHG RX REV CODE 250: Performed by: INTERNAL MEDICINE

## 2020-02-15 PROCEDURE — 99233 SBSQ HOSP IP/OBS HIGH 50: CPT | Performed by: HOSPITALIST

## 2020-02-15 PROCEDURE — 83735 ASSAY OF MAGNESIUM: CPT

## 2020-02-15 PROCEDURE — 700102 HCHG RX REV CODE 250 W/ 637 OVERRIDE(OP): Performed by: INTERNAL MEDICINE

## 2020-02-15 PROCEDURE — A9270 NON-COVERED ITEM OR SERVICE: HCPCS | Performed by: HOSPITALIST

## 2020-02-15 PROCEDURE — 97165 OT EVAL LOW COMPLEX 30 MIN: CPT

## 2020-02-15 PROCEDURE — 700105 HCHG RX REV CODE 258: Performed by: INTERNAL MEDICINE

## 2020-02-15 PROCEDURE — 85007 BL SMEAR W/DIFF WBC COUNT: CPT

## 2020-02-15 PROCEDURE — 700102 HCHG RX REV CODE 250 W/ 637 OVERRIDE(OP): Performed by: HOSPITALIST

## 2020-02-15 PROCEDURE — 700111 HCHG RX REV CODE 636 W/ 250 OVERRIDE (IP): Performed by: INTERNAL MEDICINE

## 2020-02-15 PROCEDURE — 83605 ASSAY OF LACTIC ACID: CPT | Mod: 91

## 2020-02-15 PROCEDURE — 82787 IGG 1 2 3 OR 4 EACH: CPT | Mod: 91

## 2020-02-15 PROCEDURE — 86038 ANTINUCLEAR ANTIBODIES: CPT

## 2020-02-15 PROCEDURE — 85027 COMPLETE CBC AUTOMATED: CPT

## 2020-02-15 PROCEDURE — 770020 HCHG ROOM/CARE - TELE (206)

## 2020-02-15 PROCEDURE — 83690 ASSAY OF LIPASE: CPT

## 2020-02-15 PROCEDURE — 82962 GLUCOSE BLOOD TEST: CPT

## 2020-02-15 PROCEDURE — 74181 MRI ABDOMEN W/O CONTRAST: CPT

## 2020-02-15 RX ORDER — METRONIDAZOLE 500 MG/1
500 TABLET ORAL EVERY 8 HOURS
Status: DISPENSED | OUTPATIENT
Start: 2020-02-15 | End: 2020-02-22

## 2020-02-15 RX ADMIN — OMEPRAZOLE 20 MG: 20 CAPSULE, DELAYED RELEASE ORAL at 17:34

## 2020-02-15 RX ADMIN — INSULIN HUMAN 2 UNITS: 100 INJECTION, SOLUTION PARENTERAL at 12:06

## 2020-02-15 RX ADMIN — SODIUM CHLORIDE: 9 INJECTION, SOLUTION INTRAVENOUS at 10:10

## 2020-02-15 RX ADMIN — INSULIN HUMAN 2 UNITS: 100 INJECTION, SOLUTION PARENTERAL at 17:36

## 2020-02-15 RX ADMIN — METRONIDAZOLE 500 MG: 500 INJECTION, SOLUTION INTRAVENOUS at 14:43

## 2020-02-15 RX ADMIN — METRONIDAZOLE 500 MG: 500 TABLET ORAL at 22:00

## 2020-02-15 RX ADMIN — ATORVASTATIN CALCIUM 40 MG: 40 TABLET, FILM COATED ORAL at 17:34

## 2020-02-15 RX ADMIN — HYDROMORPHONE HYDROCHLORIDE 0.5 MG: 1 INJECTION, SOLUTION INTRAMUSCULAR; INTRAVENOUS; SUBCUTANEOUS at 05:52

## 2020-02-15 RX ADMIN — CEFTRIAXONE SODIUM 2 G: 2 INJECTION, POWDER, FOR SOLUTION INTRAMUSCULAR; INTRAVENOUS at 19:39

## 2020-02-15 RX ADMIN — METRONIDAZOLE 500 MG: 500 INJECTION, SOLUTION INTRAVENOUS at 03:27

## 2020-02-15 RX ADMIN — INSULIN HUMAN 6 UNITS: 100 INJECTION, SOLUTION PARENTERAL at 22:18

## 2020-02-15 RX ADMIN — ACETAMINOPHEN 650 MG: 325 TABLET, FILM COATED ORAL at 18:39

## 2020-02-15 ASSESSMENT — ENCOUNTER SYMPTOMS
FEVER: 0
HEADACHES: 0
NECK PAIN: 0
ABDOMINAL PAIN: 1
PALPITATIONS: 0
INSOMNIA: 0
SORE THROAT: 0
WEIGHT LOSS: 1
TINGLING: 0
DIZZINESS: 0
SHORTNESS OF BREATH: 0
COUGH: 0
EYE PAIN: 0
VOMITING: 0
BACK PAIN: 0
NAUSEA: 0
DEPRESSION: 0
BLURRED VISION: 0
CHILLS: 0

## 2020-02-15 ASSESSMENT — COGNITIVE AND FUNCTIONAL STATUS - GENERAL
HELP NEEDED FOR BATHING: A LITTLE
MOBILITY SCORE: 23
DAILY ACTIVITIY SCORE: 21
SUGGESTED CMS G CODE MODIFIER MOBILITY: CI
SUGGESTED CMS G CODE MODIFIER DAILY ACTIVITY: CJ
DRESSING REGULAR LOWER BODY CLOTHING: A LITTLE
TOILETING: A LITTLE
CLIMB 3 TO 5 STEPS WITH RAILING: A LITTLE

## 2020-02-15 ASSESSMENT — ACTIVITIES OF DAILY LIVING (ADL): TOILETING: INDEPENDENT

## 2020-02-15 ASSESSMENT — GAIT ASSESSMENTS
DISTANCE (FEET): 200
GAIT LEVEL OF ASSIST: SUPERVISED

## 2020-02-15 NOTE — DIETARY
"Nutrition services: Day 1 of admit.  Pawel Tatum is a 59 y.o. male with admitting DX of Sepsis  Consult received for Low BMI    Visit with patient at bedside. Pt reported eating normal sized meals 3 times/day PTA. Pt reports he has always been thin,  lb. Pt said he sometimes drinks Ensure/Boost type drinks. Pt agreeable to nutrition supplements with meals for weight gain when diet advances.    Assessment:  Height: 190.5 cm (6' 3\")  Weight: 62 kg (136 lb 11 oz)  Body mass index is 17.08 kg/m²., BMI classification: Underweight  Diet/Intake: NPO for MRI    Evaluation:   1. Weight trend: 6.2% weight loss from UBW in <1 week (severe) per pt report. Confirmed weight loss in chart review: Wt: 144 lb (office visit 1/28/20).  2. NFPE: Noted squared shoulders, deep depressions around clavicle, depressed temples, depressed dorsal region between thumb and finger. Severe fat/muscle loss  3. PMH: Diabetes, HTN, Seizures  4. Labs: Na 132, Glu 199, BUN 46, Alk Phos 1159, Mg/Phos WNL  5. Meds: Lipitor, Humulin R (SSI), Prilosec, pancrelipase, Thiamine, Abx    Malnutrition Risk: Pt with severe malnutrition in the context of acute illness related to sepsis as evidenced by 6.2% weight loss in <1 week (severe), and severe fat and severe muscle loss noted on physical exam.    Recommendations/Plan:  1. Advance diet as medically able  2. Boost GC with meals when diet advances   3. Document intake of all meals as % taken in ADL's to provide interdisciplinary communication across all shifts.   4. Monitor weight.  5. Nutrition rep will see patient for ongoing meal and snack preferences.     RD following    "

## 2020-02-15 NOTE — CARE PLAN
Problem: Safety  Goal: Will remain free from falls  Outcome: PROGRESSING AS EXPECTED  Note: Remind patient to use call light and provide assistance. Bed in low position, bed locked, and appropriate alarms set. Patient wearing non-slip socks. Call light and personal belongings are within reach.     Problem: Knowledge Deficit  Goal: Knowledge of disease process/condition, treatment plan, diagnostic tests, and medications will improve  Outcome: PROGRESSING AS EXPECTED  Note: Encourage patient and family to ask questions and be involved in plan of care. Provide education on treatment plan, diagnostic testing, and medications; have patient and family verbalize understanding.

## 2020-02-15 NOTE — PROGRESS NOTES
[facility] from Lab called with critical result of WBC 37 at 0458. Critical lab result read back to [facility].   Dr. Encinas notified of critical lab result at 0540.  Critical lab result read back by Dr. Encinas.

## 2020-02-15 NOTE — PROGRESS NOTES
"Hospital Medicine Daily Progress Note    Date of Service  2/15/2020    Chief Complaint  59 y.o. male admitted 2/14/2020 with a poor appetite.     Hospital Course    He was found to have evidence for a marked new elevation in his alk phos and a profound leukocytosis. He was treated for abdominal sepsis and had       Interval Problem Update  I contacted GI for a consultation.   I ordered a HIDA scan  MRI pending  He says \" I'm Fine. Why am I here?\"  I reviewed his imaging below.     Ct abd 2018  Extensive calcification throughout the atrophic pancreas could relate to chronic pancreatitis.  Dilated pancreatic duct but no pancreatic head mass identified. This could be secondary to chronic pancreatitis. If there is continued clinical concern, further evaluation with US is recommended.  Moderate bilateral pleural effusions with patchy bibasilar opacities. Trace amount fluid in the right lower quadrant.    UD RUQ  1.  Hepatomegaly.  2.  Hepatic steatosis.  3.  Gallbladder sludge. Mild intrahepatic ductal dilation. Dilation of the common duct up to 13 mm. No definite distal obstructing etiology is identified. MRCP could be obtained for further evaluation if indicated.  4.  Trace ascites.  5.  Pancreatic calcifications likely sequela of chronic pancreatitis.    Cxr:  Cardiomegaly.    Echocardiography Laboratory  TDS. No prior study is available for comparison.   Left ventricular ejection fraction is visually estimated to be 65%.      Mri brain:  1.  No acute intracranial abnormality  2.  Moderate white matter changes and cerebral volume loss      Consultants/Specialty  GI- I contacted then for a consult today.     Code Status  full    Disposition  tbd    Review of Systems  Review of Systems   Constitutional: Negative for chills and fever.   HENT: Negative for sore throat.    Eyes: Negative for blurred vision and pain.   Respiratory: Negative for cough and shortness of breath.    Cardiovascular: Negative for chest pain and " palpitations.   Gastrointestinal: Positive for abdominal pain. Negative for nausea and vomiting.   Genitourinary: Negative for dysuria and urgency.   Musculoskeletal: Negative for back pain and neck pain.   Skin: Negative for itching and rash.   Neurological: Negative for dizziness, tingling and headaches.   Psychiatric/Behavioral: Negative for depression. The patient does not have insomnia.    All other systems reviewed and are negative.       Physical Exam  Temp:  [36.5 °C (97.7 °F)-37.6 °C (99.6 °F)] 36.7 °C (98 °F)  Pulse:  [100-122] 101  Resp:  [16-28] 16  BP: ()/(44-67) 105/44  SpO2:  [89 %-100 %] 93 %    Physical Exam  Vitals signs and nursing note reviewed.   Constitutional:       General: He is not in acute distress.     Appearance: He is well-developed. He is not diaphoretic.      Comments: Patient seen and examined at the bedside. Plan discussed at bedside with the RN.    HENT:      Right Ear: External ear normal.      Left Ear: External ear normal.      Nose: Nose normal.   Eyes:      General: No scleral icterus.        Right eye: No discharge.         Left eye: No discharge.   Neck:      Vascular: No JVD.      Trachea: No tracheal deviation.   Cardiovascular:      Rate and Rhythm: Normal rate.      Heart sounds: Normal heart sounds. No murmur.   Pulmonary:      Effort: Pulmonary effort is normal. No respiratory distress.      Breath sounds: Normal breath sounds. No wheezing or rales.   Abdominal:      General: Bowel sounds are normal. There is no distension.      Palpations: Abdomen is soft.      Tenderness: There is abdominal tenderness. There is no guarding.   Musculoskeletal:         General: No tenderness.   Skin:     General: Skin is warm and dry.      Findings: No erythema.   Neurological:      Mental Status: He is alert and oriented to person, place, and time.      Comments: Poor memory. Occasional delirium.    Psychiatric:         Behavior: Behavior normal.         Fluids    Intake/Output  Summary (Last 24 hours) at 2/15/2020 0924  Last data filed at 2/15/2020 0715  Gross per 24 hour   Intake 250 ml   Output 750 ml   Net -500 ml       Laboratory  Recent Labs     02/14/20  1639 02/15/20  0325   WBC 43.6* 37.0*   RBC 3.81* 3.61*   HEMOGLOBIN 11.4* 10.7*   HEMATOCRIT 32.6* 31.3*   MCV 85.6 86.7   MCH 29.9 29.6   MCHC 35.0 34.2   RDW 45.1 46.3   PLATELETCT 315 324   MPV 11.3 11.3     Recent Labs     02/14/20  1639 02/15/20  0325   SODIUM 129* 132*   POTASSIUM 3.4* 3.9   CHLORIDE 93* 94*   CO2 16* 16*   GLUCOSE 165* 199*   BUN 49* 46*   CREATININE 0.94 0.89   CALCIUM 8.6 8.3*                   Imaging  US-RUQ   Final Result      1.  Hepatomegaly.   2.  Hepatic steatosis.   3.  Gallbladder sludge. Mild intrahepatic ductal dilation. Dilation of the common duct up to 13 mm. No definite distal obstructing etiology is identified. MRCP could be obtained for further evaluation if indicated.   4.  Trace ascites.   5.  Pancreatic calcifications likely sequela of chronic pancreatitis.      DX-CHEST-PORTABLE (1 VIEW)   Final Result      Cardiomegaly.      [id number]    (Results Pending)   NM-BILIARY (HIDA) SCAN WITH CCK    (Results Pending)        Assessment/Plan  * Sepsis (HCC)  Assessment & Plan  This is Sepsis Present on admission  SIRS criteria identified on my evaluation include: Tachycardia, with heart rate greater than 90 BPM and Leukocyosis, with WBC greater than 12,000  Source is GI  Sepsis protocol initiated  Fluid resuscitation ordered per protocol  IV antibiotics as appropriate for source of sepsis  While organ dysfunction may be noted elsewhere in this problem list or in the chart, degree of organ dysfunction does not meet CMS criteria for severe sepsis  Ceftriaxone/Flagyl  Ultimately, he will need source control          Elevated alkaline phosphatase level  Assessment & Plan  New marked elevation with sepsis. RUQ source workup pending. MRI pending . Will get a HIDA as his bili is really not  elevated. Possible acalculous cholecystitis. Will fractionate her enzymes.     Type 2 diabetes mellitus with hyperglycemia, with long-term current use of insulin (HCC)- (present on admission)  Assessment & Plan  He has an insulin sensor and his right lower quadrant, uses long-acting insulin 12 units and sliding scale.  Due to being n.p.o. we will hold off on long-acting and will use sliding scale  Monitor Accu-Cheks every 6 while n.p.o.    Cognitive decline- (present on admission)  Assessment & Plan  Intermittent aloc. Likely dementia. High risk for worseneing delirium.     Metabolic acidosis  Assessment & Plan  IV fluids.     Hypokalemia  Assessment & Plan  Replace and trend.     Cholangitis  Assessment & Plan  I question this diagnosis but workup up and on antibiotics. GI consultation.     Recurrent major depressive disorder, in full remission ([facility])  Assessment & Plan  .    Hyponatremia  Assessment & Plan  Hypovolemic. IV fluids.     Chronic pancreatitis (HCC)- (present on admission)  Assessment & Plan  Continue pancreatic enzyme    Dyslipidemia- (present on admission)  Assessment & Plan  Continue statin, there is no evidence of hepatic dysfunction       VTE prophylaxis: scd

## 2020-02-15 NOTE — ASSESSMENT & PLAN NOTE
MRI with concern for pancreatic malignancy  ERCP with no evidence of malignancy, only calcifications of pancreas present   is elevated but less than 1000 so cannot rule in cancer, likely due chronic pancreatitis  Repeat CT showed chronic pancreatitis and loculated chest effusion    At this moment GI does not believe patient has significant evidence for periodic cancer  Due to rising WBC and alk phos, repeat MRCP was done and shows worsening obstruction  Repeat ERCP pending - likely will get stent

## 2020-02-15 NOTE — ASSESSMENT & PLAN NOTE
Home dose is: Lantus 12 units at night and regular insulin 10 units 3 times daily.  Has had labile sugars due to variable PO intake requiring insulin adjustments and infection  Group home does not want sliding scale.  Sugars are not at goal but improved again  Continue lantus and SSI

## 2020-02-15 NOTE — ED NOTES
Patient resting comfortably, denies needs at this time. Call light in reach. Bed in low position. A&O x 4. Caregiver at bedside.

## 2020-02-15 NOTE — PROGRESS NOTES
Received report from KELLEY Mckeon. Patient is sleeping, Pt resting comfortably in bed, Call light within reach and safety precautions in place.     08:30 Rounded on patient, patient sleeping respirations even and unlabored.    09:45 Assess patient, plan of care discussed with patient and upcoming MRI, pt declines any needs at this time and denies pain.

## 2020-02-15 NOTE — CARE PLAN
Problem: Venous Thromboembolism (VTW)/Deep Vein Thrombosis (DVT) Prevention:  Goal: Patient will participate in Venous Thrombosis (VTE)/Deep Vein Thrombosis (DVT)Prevention Measures  Outcome: PROGRESSING AS EXPECTED   Provided educations on SCDs and prophylactic VTE medication.   Problem: Mobility  Goal: Risk for activity intolerance will decrease  Outcome: PROGRESSING AS EXPECTED   Encouraged patient to sit in chair during meals and to ambulate hallway, patient refused at this time will offer later today.

## 2020-02-15 NOTE — THERAPY
"Physical Therapy Evaluation completed.   Bed Mobility:  Supine to Sit: Modified Independent  Transfers: Sit to Stand: Supervised  Gait: Level Of Assist: Supervised with No Equipment Needed       Plan of Care: Patient with no further skilled PT needs in the acute care setting at this time  Discharge Recommendations: Equipment: No Equipment Needed. Post-acute therapy Discharge to home with outpatient or home health for additional skilled therapy services.    Pt is a 60 y/o man admit with lethary and low appetite, Dx with Sepsis increased WBC.  c/o of LBP and Abd pn in ED since resolved. Pt willing to participate in eval.Pt noted to be very thin. Good balance without AD. Supervised mobility and gait 200 feet without AD.  Recommend pt return to PLF with nutrition F/U. Pt would benefit from overall increase in activity level. 1 time only eval and tx. LK     See \"Rehab Therapy-Acute\" Patient Summary Report for complete documentation.     "

## 2020-02-15 NOTE — THERAPY
"Occupational Therapy Evaluation completed.   Functional Status: Pt is a 60 y/o male, admit with increasing confusion, and lethargy. Pt is at baseline with ADLS and functional mobility- Supervised level. Pt reports not needing assistance- lives in a group home vs assisted living. PLOF- Had assist with medication, meals, housekeeping, rides to appts.  Pt will be seen for initial evaluation and treatment only   Plan of Care: Patient with no further skilled OT needs in the acute care setting at this time  Discharge Recommendations:  Equipment: No Equipment Needed. Post-acute therapy Currently anticipate no further skilled therapy needs once patient is discharged from the inpatient setting.    See \"Rehab Therapy-Acute\" Patient Summary Report for complete documentation.    "

## 2020-02-15 NOTE — PROGRESS NOTES
Tylenol administered at 2154. Patient still complaining of pain 8/10 back and abdomen. Dr. Encinas notified and order received for Dilaudid 0.5 mg every 2 hours PRN for pain. Order read back and verified.

## 2020-02-15 NOTE — PROGRESS NOTES
Telemetry Shift Summary    Rhythm SR/ST  HR Range 90s-110s  Ectopy rPVC  Measurements 0.20/0.08/0.36        Normal Values  Rhythm SR  HR Range    Measurements 0.12-0.20 / 0.06-0.10  / 0.30-0.52

## 2020-02-15 NOTE — H&P
Hospital Medicine History & Physical Note    Date of Service  2/14/2020    Primary Care Physician  JAKE Armando    Consultants  None, pending MRCP for GI    Code Status  Full    Chief Complaint  Low appetite    History of Presenting Illness  59 y.o. male with a history of depression, diabetes, history of CVA with mild cognitive impairment who lives in a group home who presented 2/14/2020 with lethargy and low appetite.    Patient is able to give a history and he states that overall, he feels relatively good.  His group home however, noted him to be lethargic this morning.  He did not eat his breakfast and overall, appeared to be responding differently than he normally does.  Patient was able to eat lunch however afterwards, again he was observed to be lethargic therefore his group home recommended he come to the ER for evaluation.  The patient states that he has chronic low back pain which is causing most of his discomfort during our conversation.  He also describes that he has a centrally located abdominal discomfort.  It does not radiate and he has no associated nausea or vomiting.  Denies any fever, shortness of breath or cough.  No chest pain.  Denies skin rashes, leg swelling, changes in urinary habits, diarrhea or constipation.    ER course: Upon his arrival, blood pressure was in the high 80s, he was started on IV fluids and his blood pressure improved to the high 90s to low 100s.  He had a fever of 99.6.  White count noted to be 43,000, sodium 129, potassium 3.4, chloride 93, glucose 165, alk phos 1362, T bili 1.7, albumin 2.1.  Abdominal ultrasound was performed and revealed biliary sludge and dilated common bile duct however no stone was seen.  Lactic acid measured to be 2.2, procalcitonin 1.73.    Review of Systems  Review of Systems   Constitutional: Positive for malaise/fatigue. Negative for chills and fever.   HENT: Negative for sore throat.    Respiratory: Negative for cough and shortness of  breath.    Cardiovascular: Negative for chest pain and palpitations.   Gastrointestinal: Positive for vomiting. Negative for abdominal pain, blood in stool, diarrhea, heartburn and nausea.        Low appetite   Genitourinary: Negative for dysuria and frequency.   Musculoskeletal: Positive for back pain. Negative for myalgias.   Neurological: Negative for dizziness, focal weakness, weakness and headaches.   Psychiatric/Behavioral: Negative for depression and hallucinations.   All other systems reviewed and are negative.      Past Medical History   has a past medical history of Diabetes ([facility]), Hypertension, and Seizure disorder (H[facility]).    Surgical History   has a past surgical history that includes gastroscopy-endo (N/A, 2018); other abdominal surgery; toe amputation (Right, 2019); and pr upper gi endoscopy,biopsy (N/A, 2019).     Family History  family history includes No Known Problems in his father and mother.     Social History   reports that he has been smoking. He has a 3.75 pack-year smoking history. He has never used smokeless tobacco. He reports that he does not drink alcohol or use drugs.    Allergies  No Known Allergies    Medications  Prior to Admission Medications   Prescriptions Last Dose Informant Patient Reported? Taking?   Cholecalciferol (VITAMIN D) 2000 units Cap  MAR from Other Facility No No   Sig: Take 2 Caps by mouth every day.   Insulin Degludec (TRESIBA FLEXTOUCH) 200 UNIT/ML Solution Pen-injector   No No   Sig: Inject 12 Units as instructed every bedtime.   Lactobacillus-Inulin ([facility] DIGESTIVE [facility]) Cap  MAR from Other Facility No No   Sig: TAKE (1) CAPSULE BY MOUTH ONCE DAILY.   Urine Glucose-Ketones Test Strip   No No   Si Strip by In Vitro route every day.   aspirin (ASA) 81 MG Chew Tab chewable tablet  MAR from Other Facility No No   Sig: CHEW 1 TABLET BY MOUTH ONCE DAILY.   atorvastatin (LIPITOR) 40 MG Tab   No No   Sig: Take 1 Tab by mouth every evening.    bismuth subsalicylate (PEPTO-BISMOL) 262 MG Chew Tab   No No   Sig: Take 2 Tabs by mouth every 6 hours.   insulin lispro (HUMALOG KWIKPEN) 100 UNIT/ML Solution Pen-injector injection PEN   No No   Sig: Inject 10 Units as instructed 3 times a day before meals.   lisinopril (PRINIVIL) 2.5 MG Tab   No No   Sig: TAKE 1 TABLET BY MOUTH ONCE DAILY.   metroNIDAZOLE (FLAGYL) 250 MG Tab   No No   Sig: Take 1 Tab by mouth every 8 hours.   multivitamin (THERAGRAN) Tab   No No   Sig: Take 1 Tab by mouth every day.   omeprazole (PRILOSEC) 20 MG delayed-release capsule   No No   Sig: Take 1 Cap by mouth 2 Times a Day.   pancrelipase, Lip-Prot-Amyl, (CREON) 04161-90962 units Cap DR Particles  MAR from Other Facility No No   Sig: Take 2 Caps by mouth every day.   sucralfate (CARAFATE) 1 GM Tab   No No   Si tab 30 minutes prior to meals and before bed x 10D, then BID PRN abd pain   tamsulosin (FLOMAX) 0.4 MG capsule  MAR from Other Facility No No   Sig: Take 1 Cap by mouth every day.   thiamine (THIAMINE) 100 MG tablet   No No   Sig: Take 1 Tab by mouth every day.      Facility-Administered Medications: None       Physical Exam  Temp:  [37.2 °C (98.9 °F)-37.6 °C (99.6 °F)] 37.6 °C (99.6 °F)  Pulse:  [105-122] 105  Resp:  [16-28] 16  BP: ()/(52-67) 114/61  SpO2:  [89 %-100 %] 92 %    Physical Exam  Constitutional:       Appearance: Normal appearance.      Comments: Thin, comfortable   HENT:      Head: Normocephalic and atraumatic.      Nose: Nose normal.      Mouth/Throat:      Mouth: Mucous membranes are moist.   Eyes:      Extraocular Movements: Extraocular movements intact.      Pupils: Pupils are equal, round, and reactive to light.   Neck:      Musculoskeletal: Normal range of motion and neck supple.   Cardiovascular:      Rate and Rhythm: Normal rate and regular rhythm.      Heart sounds: No murmur.   Pulmonary:      Effort: Pulmonary effort is normal. No respiratory distress.      Breath sounds: Normal breath  sounds. No stridor.   Abdominal:      General: Abdomen is flat. There is no distension.      Palpations: Abdomen is soft.      Tenderness: There is no abdominal tenderness.      Comments: Reduced bowel sounds   Musculoskeletal:         General: No swelling, tenderness or deformity.   Skin:     General: Skin is warm and dry.      Coloration: Skin is not pale.   Neurological:      General: No focal deficit present.      Mental Status: He is alert and oriented to person, place, and time. Mental status is at baseline.   Psychiatric:         Mood and Affect: Mood normal.         Behavior: Behavior normal.         Thought Content: Thought content normal.         Laboratory:  Recent Labs     02/14/20  1639   WBC 43.6*   RBC 3.81*   HEMOGLOBIN 11.4*   HEMATOCRIT 32.6*   MCV 85.6   MCH 29.9   MCHC 35.0   RDW 45.1   PLATELETCT 315   MPV 11.3     Recent Labs     02/14/20  1639   SODIUM 129*   POTASSIUM 3.4*   CHLORIDE 93*   CO2 16*   GLUCOSE 165*   BUN 49*   CREATININE 0.94   CALCIUM 8.6     Recent Labs     02/14/20  1639   ALTSGPT 30   ASTSGOT 40   ALKPHOSPHAT 1362*   TBILIRUBIN 1.7*   GLUCOSE 165*         Recent Labs     02/14/20  1639   NTPROBNP 1166*         No results for input(s): TROPONINT in the last 72 hours.    Urinalysis:    Recent Labs     02/14/20  1823   SPECGRAVITY 1.010   GLUCOSEUR Negative   KETONES Trace*   NITRITE Negative   LEUKESTERAS Trace*   WBCURINE 2-5*   BACTERIA Rare*   EPITHELCELL Few        Imaging:  US-RUQ   Final Result      1.  Hepatomegaly.   2.  Hepatic steatosis.   3.  Gallbladder sludge. Mild intrahepatic ductal dilation. Dilation of the common duct up to 13 mm. No definite distal obstructing etiology is identified. MRCP could be obtained for further evaluation if indicated.   4.  Trace ascites.   5.  Pancreatic calcifications likely sequela of chronic pancreatitis.      DX-CHEST-PORTABLE (1 VIEW)   Final Result      Cardiomegaly.      [id number]/O    (Results Pending)          Assessment/Plan:  I anticipate this patient will require at least two midnights for appropriate medical management, necessitating inpatient admission.    * Sepsis ([facility])  Assessment & Plan  This is Sepsis Present on admission  SIRS criteria identified on my evaluation include: Tachycardia, with heart rate greater than 90 BPM and Leukocyosis, with WBC greater than 12,000  Source is GI, biliary obstruction  Sepsis protocol initiated  Fluid resuscitation ordered per protocol  IV antibiotics as appropriate for source of sepsis  While organ dysfunction may be noted elsewhere in this problem list or in the chart, degree of organ dysfunction does not meet CMS criteria for severe sepsis  Ceftriaxone/Flagyl  Ultimately, he will need source control          Elevated alkaline phosphatase level  Assessment & Plan  Alk phos is markedly elevated and was previously normal back in December.  I suspect a CBD stone may be the source of his sepsis  RUQ US showed gallbladder sludge and dilated CBD, no obvious obstructing stone  MRCP  If CBD stone is present he will need ERCP and stone removal  Ceftriaxone Flagyl  His pain is controlled    Type 2 diabetes mellitus with hyperglycemia, with long-term current use of insulin ([facility])- (present on admission)  Assessment & Plan  He has an insulin sensor and his right lower quadrant, uses long-acting insulin 12 units and sliding scale.  Due to being n.p.o. we will hold off on long-acting and will use sliding scale  Monitor Accu-Cheks every 6 while n.p.o.    Cognitive decline- (present on admission)  Assessment & Plan  He is showing no evidence of altered mental status at this time    Cholangitis  Assessment & Plan  MRCP is pending  Ceftriaxone Flagyl    Recurrent major depressive disorder, in full remission ([facility])- (present on admission)  Assessment & Plan  .    Hyponatremia  Assessment & Plan  Due to infection  Normal saline  Repeat in the morning    Chronic pancreatitis ([facility])- (present on  admission)  Assessment & Plan  Continue pancreatic enzyme    Dyslipidemia- (present on admission)  Assessment & Plan  Continue statin, there is no evidence of hepatic dysfunction      VTE prophylaxis: Lovenox

## 2020-02-15 NOTE — ASSESSMENT & PLAN NOTE
Biliary obstruction suggested on imaging, GI consulted and they performed an ERCP on 2/24 with sphincterotomy  Swelling was too significant to place ampullary duct stent.  He improved without stent placement therefore this was deferred, but Alk P and WBC are rising again  Endoscopy done showing pancreatic calcifications but no stones, no area to biopsy  Repeat MRCP shows worsening dilation of the common bile duct favoring persistent sludge, stricture or stone  Repeat ERCP/stent/EUS

## 2020-02-15 NOTE — CARE PLAN
Problem: Nutritional:  Goal: Achieve adequate nutritional intake  Description: Patient will consume 50% of meals   Outcome: NOT MET

## 2020-02-15 NOTE — ASSESSMENT & PLAN NOTE
Patient has had chronic cognitive decline.  His sister is his financial decision-maker however is considering guardianship for medical decisions  Mental status at his baseline  He was seen by Psych for decisional capacity and at this time does NOT have capacity for medical decisions

## 2020-02-15 NOTE — ASSESSMENT & PLAN NOTE
Continue pancreatic enzymes with meals  Celiac nerve block for pain control, per GI  He is tolerating a diet

## 2020-02-15 NOTE — PROGRESS NOTES
2 RN Skin Check    2 RN skin check complete.   Devices in place: N/A.  Skin assessed under devices: N\A.  Confirmed pressure ulcers found on: N/A.  New potential pressure ulcers noted on N/A. Wound consult placed N/A.  The following interventions in place remind patient to make frequnet positional changes and turns.

## 2020-02-15 NOTE — ASSESSMENT & PLAN NOTE
This was Sepsis Present on admission, improved but WBC rising again.  Source: Ascending cholangitis  Abdomen is still soft, tolerating diet  Currently, he has finished antibiotics  Repeat MRCP showed increased CBD dilation from previously seen favoring persistent obstruction/stone or sludge  Repeat ERCP

## 2020-02-15 NOTE — PROGRESS NOTES
Patient resting in bed, check  and gave 2 units of insulin pre sliding scale. Patient declines pain and has no needs at this time. Safety precautions in place

## 2020-02-16 ENCOUNTER — APPOINTMENT (OUTPATIENT)
Dept: RADIOLOGY | Facility: MEDICAL CENTER | Age: 60
DRG: 871 | End: 2020-02-16
Attending: INTERNAL MEDICINE
Payer: COMMERCIAL

## 2020-02-16 ENCOUNTER — APPOINTMENT (OUTPATIENT)
Dept: RADIOLOGY | Facility: MEDICAL CENTER | Age: 60
DRG: 871 | End: 2020-02-16
Attending: HOSPITALIST
Payer: COMMERCIAL

## 2020-02-16 LAB
ALBUMIN SERPL BCP-MCNC: 2.1 G/DL (ref 3.2–4.9)
ALBUMIN/GLOB SERPL: 0.6 G/DL
ALP SERPL-CCNC: 1060 U/L (ref 30–99)
ALT SERPL-CCNC: 23 U/L (ref 2–50)
ANION GAP SERPL CALC-SCNC: 14 MMOL/L (ref 7–16)
AST SERPL-CCNC: 33 U/L (ref 12–45)
BASOPHILS # BLD AUTO: 0 % (ref 0–1.8)
BASOPHILS # BLD: 0 K/UL (ref 0–0.12)
BILIRUB SERPL-MCNC: 1.1 MG/DL (ref 0.1–1.5)
BUN SERPL-MCNC: 28 MG/DL (ref 8–22)
BURR CELLS BLD QL SMEAR: NORMAL
CALCIUM SERPL-MCNC: 8.3 MG/DL (ref 8.4–10.2)
CANCER AG19-9 SERPL-ACNC: 776.5 U/ML (ref 0–35)
CHLORIDE SERPL-SCNC: 98 MMOL/L (ref 96–112)
CO2 SERPL-SCNC: 20 MMOL/L (ref 20–33)
CREAT SERPL-MCNC: 0.66 MG/DL (ref 0.5–1.4)
DACRYOCYTES BLD QL SMEAR: NORMAL
EOSINOPHIL # BLD AUTO: 0.54 K/UL (ref 0–0.51)
EOSINOPHIL NFR BLD: 2 % (ref 0–6.9)
ERYTHROCYTE [DISTWIDTH] IN BLOOD BY AUTOMATED COUNT: 45.3 FL (ref 35.9–50)
GIANT PLATELETS BLD QL SMEAR: NORMAL
GLOBULIN SER CALC-MCNC: 3.3 G/DL (ref 1.9–3.5)
GLUCOSE BLD-MCNC: 319 MG/DL (ref 65–99)
GLUCOSE BLD-MCNC: 372 MG/DL (ref 65–99)
GLUCOSE BLD-MCNC: 372 MG/DL (ref 65–99)
GLUCOSE BLD-MCNC: 377 MG/DL (ref 65–99)
GLUCOSE SERPL-MCNC: 384 MG/DL (ref 65–99)
HCT VFR BLD AUTO: 27.5 % (ref 42–52)
HGB BLD-MCNC: 9.6 G/DL (ref 14–18)
HOWELL-JOLLY BOD BLD QL SMEAR: NORMAL
HYPOCHROMIA BLD QL SMEAR: ABNORMAL
LG PLATELETS BLD QL SMEAR: NORMAL
LYMPHOCYTES # BLD AUTO: 2.43 K/UL (ref 1–4.8)
LYMPHOCYTES NFR BLD: 9 % (ref 22–41)
MANUAL DIFF BLD: NORMAL
MCH RBC QN AUTO: 29.4 PG (ref 27–33)
MCHC RBC AUTO-ENTMCNC: 34.9 G/DL (ref 33.7–35.3)
MCV RBC AUTO: 84.4 FL (ref 81.4–97.8)
METAMYELOCYTES NFR BLD MANUAL: 1 %
MONOCYTES # BLD AUTO: 4.05 K/UL (ref 0–0.85)
MONOCYTES NFR BLD AUTO: 15 % (ref 0–13.4)
MYELOCYTES NFR BLD MANUAL: 2 %
NEUTROPHILS # BLD AUTO: 18.9 K/UL (ref 1.82–7.42)
NEUTROPHILS NFR BLD: 69 % (ref 44–72)
NEUTS BAND NFR BLD MANUAL: 1 % (ref 0–10)
NRBC # BLD AUTO: 0.04 K/UL
NRBC BLD-RTO: 0.1 /100 WBC
OVALOCYTES BLD QL SMEAR: NORMAL
PLATELET # BLD AUTO: 374 K/UL (ref 164–446)
PLATELET BLD QL SMEAR: NORMAL
PMV BLD AUTO: 11.2 FL (ref 9–12.9)
POIKILOCYTOSIS BLD QL SMEAR: NORMAL
POLYCHROMASIA BLD QL SMEAR: NORMAL
POTASSIUM SERPL-SCNC: 3.7 MMOL/L (ref 3.6–5.5)
PROMYELOCYTES NFR BLD MANUAL: 1 %
PROT SERPL-MCNC: 5.4 G/DL (ref 6–8.2)
RBC # BLD AUTO: 3.26 M/UL (ref 4.7–6.1)
RBC BLD AUTO: PRESENT
SCHISTOCYTES BLD QL SMEAR: NORMAL
SODIUM SERPL-SCNC: 132 MMOL/L (ref 135–145)
TARGETS BLD QL SMEAR: NORMAL
TOXIC GRANULES BLD QL SMEAR: SLIGHT
WBC # BLD AUTO: 27 K/UL (ref 4.8–10.8)

## 2020-02-16 PROCEDURE — A9537 TC99M MEBROFENIN: HCPCS

## 2020-02-16 PROCEDURE — 82962 GLUCOSE BLOOD TEST: CPT

## 2020-02-16 PROCEDURE — 84080 ASSAY ALKALINE PHOSPHATASES: CPT

## 2020-02-16 PROCEDURE — 86301 IMMUNOASSAY TUMOR CA 19-9: CPT

## 2020-02-16 PROCEDURE — 80500 HCHG CLINICAL PATH CONSULT-LIMITED: CPT

## 2020-02-16 PROCEDURE — 770020 HCHG ROOM/CARE - TELE (206)

## 2020-02-16 PROCEDURE — 99233 SBSQ HOSP IP/OBS HIGH 50: CPT | Mod: 25 | Performed by: HOSPITALIST

## 2020-02-16 PROCEDURE — 700105 HCHG RX REV CODE 258: Performed by: INTERNAL MEDICINE

## 2020-02-16 PROCEDURE — 700111 HCHG RX REV CODE 636 W/ 250 OVERRIDE (IP): Performed by: INTERNAL MEDICINE

## 2020-02-16 PROCEDURE — 85027 COMPLETE CBC AUTOMATED: CPT

## 2020-02-16 PROCEDURE — 700102 HCHG RX REV CODE 250 W/ 637 OVERRIDE(OP): Performed by: HOSPITALIST

## 2020-02-16 PROCEDURE — 700117 HCHG RX CONTRAST REV CODE 255: Performed by: HOSPITALIST

## 2020-02-16 PROCEDURE — 700102 HCHG RX REV CODE 250 W/ 637 OVERRIDE(OP): Performed by: INTERNAL MEDICINE

## 2020-02-16 PROCEDURE — 99497 ADVNCD CARE PLAN 30 MIN: CPT | Performed by: HOSPITALIST

## 2020-02-16 PROCEDURE — 85007 BL SMEAR W/DIFF WBC COUNT: CPT

## 2020-02-16 PROCEDURE — 80053 COMPREHEN METABOLIC PANEL: CPT

## 2020-02-16 PROCEDURE — 74177 CT ABD & PELVIS W/CONTRAST: CPT

## 2020-02-16 PROCEDURE — A9270 NON-COVERED ITEM OR SERVICE: HCPCS | Performed by: HOSPITALIST

## 2020-02-16 PROCEDURE — 84075 ASSAY ALKALINE PHOSPHATASE: CPT

## 2020-02-16 PROCEDURE — A9270 NON-COVERED ITEM OR SERVICE: HCPCS | Performed by: INTERNAL MEDICINE

## 2020-02-16 RX ORDER — MORPHINE SULFATE 4 MG/ML
INJECTION, SOLUTION INTRAMUSCULAR; INTRAVENOUS
Status: DISCONTINUED
Start: 2020-02-16 | End: 2020-02-16

## 2020-02-16 RX ORDER — HALOPERIDOL 5 MG/ML
2-5 INJECTION INTRAMUSCULAR EVERY 6 HOURS PRN
Status: DISCONTINUED | OUTPATIENT
Start: 2020-02-16 | End: 2020-03-17

## 2020-02-16 RX ADMIN — ASPIRIN 81 MG CHEWABLE TABLET 81 MG: 81 TABLET CHEWABLE at 08:35

## 2020-02-16 RX ADMIN — ATORVASTATIN CALCIUM 40 MG: 40 TABLET, FILM COATED ORAL at 17:19

## 2020-02-16 RX ADMIN — INSULIN HUMAN 5 UNITS: 100 INJECTION, SOLUTION PARENTERAL at 08:28

## 2020-02-16 RX ADMIN — INSULIN HUMAN 12 UNITS: 100 INJECTION, SOLUTION PARENTERAL at 21:31

## 2020-02-16 RX ADMIN — INSULIN HUMAN 12 UNITS: 100 INJECTION, SOLUTION PARENTERAL at 17:16

## 2020-02-16 RX ADMIN — ACETAMINOPHEN 650 MG: 325 TABLET, FILM COATED ORAL at 17:24

## 2020-02-16 RX ADMIN — METRONIDAZOLE 500 MG: 500 TABLET ORAL at 21:25

## 2020-02-16 RX ADMIN — METRONIDAZOLE 500 MG: 500 TABLET ORAL at 08:34

## 2020-02-16 RX ADMIN — IOHEXOL 80 ML: 350 INJECTION, SOLUTION INTRAVENOUS at 14:46

## 2020-02-16 RX ADMIN — INSULIN HUMAN 4 UNITS: 100 INJECTION, SOLUTION PARENTERAL at 11:31

## 2020-02-16 RX ADMIN — CEFTRIAXONE SODIUM 2 G: 2 INJECTION, POWDER, FOR SOLUTION INTRAMUSCULAR; INTRAVENOUS at 19:03

## 2020-02-16 RX ADMIN — TAMSULOSIN HYDROCHLORIDE 0.4 MG: 0.4 CAPSULE ORAL at 08:33

## 2020-02-16 RX ADMIN — Medication 100 MG: at 08:34

## 2020-02-16 RX ADMIN — OMEPRAZOLE 20 MG: 20 CAPSULE, DELAYED RELEASE ORAL at 08:35

## 2020-02-16 RX ADMIN — OMEPRAZOLE 20 MG: 20 CAPSULE, DELAYED RELEASE ORAL at 17:19

## 2020-02-16 RX ADMIN — SODIUM CHLORIDE: 9 INJECTION, SOLUTION INTRAVENOUS at 21:27

## 2020-02-16 RX ADMIN — SODIUM CHLORIDE: 9 INJECTION, SOLUTION INTRAVENOUS at 08:24

## 2020-02-16 RX ADMIN — METRONIDAZOLE 500 MG: 500 TABLET ORAL at 15:00

## 2020-02-16 RX ADMIN — PANCRELIPASE 48000 UNITS: 24000; 76000; 120000 CAPSULE, DELAYED RELEASE PELLETS ORAL at 08:34

## 2020-02-16 ASSESSMENT — ENCOUNTER SYMPTOMS
VOMITING: 0
INSOMNIA: 0
DIARRHEA: 0
TINGLING: 0
BLOOD IN STOOL: 0
DIZZINESS: 0
ABDOMINAL PAIN: 1
COUGH: 0
HEARTBURN: 0
CONSTIPATION: 0
DEPRESSION: 0
BACK PAIN: 0
FEVER: 0
NAUSEA: 0
WEIGHT LOSS: 1
PALPITATIONS: 0
EYE PAIN: 0
BLURRED VISION: 0
SHORTNESS OF BREATH: 0
CHILLS: 0
SORE THROAT: 0
ABDOMINAL PAIN: 0
NECK PAIN: 0

## 2020-02-16 NOTE — PROGRESS NOTES
Patient rounded. Patient sleeping. Comfortable in bed. Appropriate safety precautions in place. Refusing Bed alarm. Will continue to monitor patient.

## 2020-02-16 NOTE — PROGRESS NOTES
"Hospital Medicine Daily Progress Note    Date of Service  2/16/2020    Chief Complaint  59 y.o. male admitted 2/14/2020 with a poor appetite.     Hospital Course    He was found to have evidence for a marked new elevation in his alk phos and a profound leukocytosis. He was treated for abdominal sepsis and had an MRI and HIDA ordered. His MRi showed evidence for biliary obstruction and a possible pancreatic neoplasm. GI was consulted for intervention.       Interval Problem Update  2/15- I contacted GI for a consultation. I ordered a HIDA scan. MRI pending  He says \" I'm Fine. Why am I here?\". I reviewed his imaging below.   2/16- MRI with possible pancreatic mass. I contacted his POA and left a message. I contacted his guardian and discussed his case with them.       MRI:  1.  Severe dilatation of the intrahepatic biliary ducts, common bile duct and pancreatic duct. The distal common bile duct and the proximal pancreatic duct is not visualized at the pancreatic head. The pancreatic head appears to be diffusely enlarged and   demonstrates multiple small cysts. These findings are concerning for pancreatic neoplasm such as intraductal papillary mucinous cystoadenoma. The other differential diagnosis includes periampullary carcinoma. MR examination of the abdomen with contrast   is recommended for further evaluation.  2.  Mixed signal intensity in the right perinephric space likely represents perinephric hematoma.  3.  Large hiatal hernia.  4.  Diffuse gastric wall thickening.      Ct abd 2018  Extensive calcification throughout the atrophic pancreas could relate to chronic pancreatitis.  Dilated pancreatic duct but no pancreatic head mass identified. This could be secondary to chronic pancreatitis. If there is continued clinical concern, further evaluation with US is recommended.  Moderate bilateral pleural effusions with patchy bibasilar opacities. Trace amount fluid in the right lower quadrant.    UD RUQ  1.  " Hepatomegaly.  2.  Hepatic steatosis.  3.  Gallbladder sludge. Mild intrahepatic ductal dilation. Dilation of the common duct up to 13 mm. No definite distal obstructing etiology is identified. MRCP could be obtained for further evaluation if indicated.  4.  Trace ascites.  5.  Pancreatic calcifications likely sequela of chronic pancreatitis.    Cxr:  Cardiomegaly.    Echocardiography Laboratory  TDS. No prior study is available for comparison.   Left ventricular ejection fraction is visually estimated to be 65%.      Mri brain:  1.  No acute intracranial abnormality  2.  Moderate white matter changes and cerebral volume loss      Consultants/Specialty  GI- I contacted then for a consult today.     Code Status  full    Disposition  tbd    Review of Systems  Review of Systems   Constitutional: Negative for chills and fever.   HENT: Negative for sore throat.    Eyes: Negative for blurred vision and pain.   Respiratory: Negative for cough and shortness of breath.    Cardiovascular: Negative for chest pain and palpitations.   Gastrointestinal: Positive for abdominal pain. Negative for vomiting.   Genitourinary: Negative for dysuria and urgency.   Musculoskeletal: Negative for back pain and neck pain.   Skin: Negative for itching and rash.   Neurological: Negative for dizziness and tingling.   Psychiatric/Behavioral: Negative for depression. The patient does not have insomnia.    All other systems reviewed and are negative.       Physical Exam  Temp:  [36.4 °C (97.6 °F)-37 °C (98.6 °F)] 37 °C (98.6 °F)  Pulse:  [] 104  Resp:  [16-18] 18  BP: (124-132)/(60-70) 125/60  SpO2:  [89 %-93 %] 89 %    Physical Exam  Vitals signs and nursing note reviewed.   Constitutional:       General: He is not in acute distress.     Appearance: He is well-developed. He is not diaphoretic.      Comments: Patient seen and examined at the bedside. Plan discussed at bedside with the RN.    HENT:      Right Ear: External ear normal.       Left Ear: External ear normal.      Nose: Nose normal.   Eyes:      General: No scleral icterus.        Right eye: No discharge.         Left eye: No discharge.   Neck:      Vascular: No JVD.      Trachea: No tracheal deviation.   Cardiovascular:      Rate and Rhythm: Normal rate.      Heart sounds: Normal heart sounds. No murmur.   Pulmonary:      Effort: Pulmonary effort is normal. No respiratory distress.      Breath sounds: Normal breath sounds. No wheezing or rales.   Abdominal:      General: Bowel sounds are normal. There is no distension.      Palpations: Abdomen is soft.      Tenderness: There is abdominal tenderness. There is no guarding.   Musculoskeletal:         General: No tenderness.   Skin:     General: Skin is warm and dry.      Findings: No erythema.   Neurological:      Mental Status: He is alert and oriented to person, place, and time.      Comments: Poor memory. Occasional delirium.    Psychiatric:         Behavior: Behavior normal.         Fluids    Intake/Output Summary (Last 24 hours) at 2/16/2020 0907  Last data filed at 2/15/2020 2200  Gross per 24 hour   Intake 2235 ml   Output 600 ml   Net 1635 ml       Laboratory  Recent Labs     02/14/20  1639 02/15/20  0325 02/16/20  0400   WBC 43.6* 37.0* 27.0*   RBC 3.81* 3.61* 3.26*   HEMOGLOBIN 11.4* 10.7* 9.6*   HEMATOCRIT 32.6* 31.3* 27.5*   MCV 85.6 86.7 84.4   MCH 29.9 29.6 29.4   MCHC 35.0 34.2 34.9   RDW 45.1 46.3 45.3   PLATELETCT 315 324 374   MPV 11.3 11.3 11.2     Recent Labs     02/14/20  1639 02/15/20  0325 02/16/20  0400   SODIUM 129* 132* 132*   POTASSIUM 3.4* 3.9 3.7   CHLORIDE 93* 94* 98   CO2 16* 16* 20   GLUCOSE 165* 199* 384*   BUN 49* 46* 28*   CREATININE 0.94 0.89 0.66   CALCIUM 8.6 8.3* 8.3*                   Imaging  [id number]   Final Result      1.  Severe dilatation of the intrahepatic biliary ducts, common bile duct and pancreatic duct. The distal common bile duct and the proximal pancreatic duct is not visualized at  the pancreatic head. The pancreatic head appears to be diffusely enlarged and    demonstrates multiple small cysts. These findings are concerning for pancreatic neoplasm such as intraductal papillary mucinous cystoadenoma. The other differential diagnosis includes periampullary carcinoma. MR examination of the abdomen with contrast    is recommended for further evaluation.   2.  Mixed signal intensity in the right perinephric space likely represents perinephric hematoma.   3.  Large hiatal hernia.   4.  Diffuse gastric wall thickening.      US-RUQ   Final Result      1.  Hepatomegaly.   2.  Hepatic steatosis.   3.  Gallbladder sludge. Mild intrahepatic ductal dilation. Dilation of the common duct up to 13 mm. No definite distal obstructing etiology is identified. MRCP could be obtained for further evaluation if indicated.   4.  Trace ascites.   5.  Pancreatic calcifications likely sequela of chronic pancreatitis.      DX-CHEST-PORTABLE (1 VIEW)   Final Result      Cardiomegaly.      NM-BILIARY (HIDA) SCAN WITH CCK    (Results Pending)   CT-ABDOMEN-PELVIS WITH    (Results Pending)        Assessment/Plan  * Elevated alkaline phosphatase level  Assessment & Plan  MRI with likely pancreatic malignancy. GI for possible ERCP.     Type 2 diabetes mellitus with hyperglycemia, with long-term current use of insulin (HCC)- (present on admission)  Assessment & Plan  He has an insulin sensor and his right lower quadrant, uses long-acting insulin 12 units and sliding scale.  Due to being n.p.o. we will hold off on long-acting and will use sliding scale  Monitor Accu-Cheks every 6 while n.p.o.    Cognitive decline- (present on admission)  Assessment & Plan  Intermittent aloc. Likely dementia. High risk for worseneing delirium.     Metabolic acidosis  Assessment & Plan  IV fluids.     Hypokalemia  Assessment & Plan  Replace and trend.     Sepsis (HCC)  Assessment & Plan  This is Sepsis Present on admission  SIRS criteria identified  on my evaluation include: Tachycardia, with heart rate greater than 90 BPM and Leukocyosis, with WBC greater than 12,000  Source is GI  Sepsis protocol initiated  Fluid resuscitation ordered per protocol  IV antibiotics as appropriate for source of sepsis  While organ dysfunction may be noted elsewhere in this problem list or in the chart, degree of organ dysfunction does not meet CMS criteria for severe sepsis  Ceftriaxone/Flagyl  Ultimately, he will need source control          Cholangitis  Assessment & Plan  I question this diagnosis but workup up and on antibiotics. GI consultation.     Recurrent major depressive disorder, in full remission (HCC)  Assessment & Plan  .    Hyponatremia  Assessment & Plan  Hypovolemic. IV fluids.     Chronic pancreatitis (HCC)- (present on admission)  Assessment & Plan  Continue pancreatic enzyme    Dyslipidemia- (present on admission)  Assessment & Plan  Continue statin, there is no evidence of hepatic dysfunction       VTE prophylaxis: scd

## 2020-02-16 NOTE — CARE PLAN
Problem: Safety  Goal: Will remain free from injury  Outcome: PROGRESSING AS EXPECTED   Provided education about using call light when getting out of bed, tread socks on and personal belongings are inreach.  Problem: Fluid Volume:  Goal: Will maintain balanced intake and output  Outcome: PROGRESSING AS EXPECTED  Verify correct IV fluids are running and patient is staying hydrated orally

## 2020-02-16 NOTE — PROGRESS NOTES
Patient rounded. Patient sleeping. Comfortable in bed. Appropriate safety precautions in place. Bed alarm ON. Will continue to monitor patient.

## 2020-02-16 NOTE — PROGRESS NOTES
Patient is alert and oriented x 4. Irritable and agitated. Consent for IV CONTRAST INJECTION discussed with patient, signed at 2/15/2020 2200, and filed. Unable to complete CT-ABDOMEN-PELVIS due to lack of IV access. Patient only allowed this writer one attempt to start an IV. Unable to successfully establish access. Patient has history of having to use US-Guided IV to gain IV access. Patient requesting rest and sleep at this time. Will coordinate with charge RN for US-Guided IV. Will continue to monitor patient.

## 2020-02-16 NOTE — PROGRESS NOTES
Telemetry Shift Summary     Rhythm SR/ST  HR Range 91 - 110  Ectopy R PVCs  Measurements 0.18 / 0.08 / 0.34           Normal Values  Rhythm SR  HR Range    Measurements 0.12-0.20 / 0.06-0.10  / 0.30-0.52

## 2020-02-16 NOTE — PROGRESS NOTES
Gastroenterology Progress Note     Author: Hector Villarreal M.D.   Date & Time Created: 2/16/2020 7:06 AM    Chief Complaint:  Elevated liver tests, abnormal MRI    Interval History:  2/16/2020: Continues to deny any symptoms including abdominal pain, nausea, vomiting, fevers.  Was refusing IV placement and vitals overnight.  Says he is agreeable to IV placement today    Review of Systems:  Review of Systems   Constitutional: Positive for weight loss. Negative for chills, fever and malaise/fatigue.   Respiratory: Negative for shortness of breath.    Cardiovascular: Negative for chest pain.   Gastrointestinal: Negative for abdominal pain, blood in stool, constipation, diarrhea, heartburn, melena, nausea and vomiting.       Physical Exam:  Physical Exam  Vitals signs and nursing note reviewed.   Constitutional:       Appearance: Normal appearance.   Eyes:      General: No scleral icterus.  Cardiovascular:      Rate and Rhythm: Normal rate and regular rhythm.      Pulses: Normal pulses.      Heart sounds: Normal heart sounds.   Pulmonary:      Effort: Pulmonary effort is normal. No respiratory distress.      Breath sounds: Normal breath sounds. No stridor. No wheezing, rhonchi or rales.   Abdominal:      General: Abdomen is flat. Bowel sounds are normal. There is no distension.      Palpations: Abdomen is soft. There is no mass.      Tenderness: There is no abdominal tenderness. There is no guarding or rebound.      Hernia: No hernia is present.   Skin:     General: Skin is warm and dry.      Coloration: Skin is not jaundiced.   Neurological:      General: No focal deficit present.      Mental Status: He is alert and oriented to person, place, and time.         Labs:          Recent Labs     02/14/20  1639 02/15/20  0325 02/16/20  0400   SODIUM 129* 132* 132*   POTASSIUM 3.4* 3.9 3.7   CHLORIDE 93* 94* 98   CO2 16* 16* 20   BUN 49* 46* 28*   CREATININE 0.94 0.89 0.66   MAGNESIUM  --  1.6  --    PHOSPHORUS  --   3.8  --    CALCIUM 8.6 8.3* 8.3*     Recent Labs     02/14/20  1639 02/15/20  0325 02/15/20  0722 20  0400   ALTSGPT 30 25  --  23   ASTSGOT 40 36  --  33   ALKPHOSPHAT 1362* 1159*  --  1060*   TBILIRUBIN 1.7* 1.4  --  1.1   LIPASE  --   --  8  --    GLUCOSE 165* 199*  --  384*     Recent Labs     02/14/20  1639 02/15/20  0325 02/16/20  0400   RBC 3.81* 3.61* 3.26*   HEMOGLOBIN 11.4* 10.7* 9.6*   HEMATOCRIT 32.6* 31.3* 27.5*   PLATELETCT 315 324 374     Recent Labs     02/14/20  1639 02/15/20  0325 02/16/20  0400   WBC 43.6* 37.0* 27.0*   NEUTSPOLYS 83.00* 74.00* 69.00   LYMPHOCYTES 6.00* 12.00* 9.00*   MONOCYTES 7.00 8.00 15.00*   EOSINOPHILS 0.00 0.00 2.00   BASOPHILS 0.00 0.00 0.00   ASTSGOT 40 36 33   ALTSGPT 30 25 23   ALKPHOSPHAT 1362* 1159* 1060*   TBILIRUBIN 1.7* 1.4 1.1     Hemodynamics:  Temp (24hrs), Av.5 °C (97.7 °F), Min:36.4 °C (97.6 °F), Max:36.5 °C (97.7 °F)  Temperature: (patient refused rn aware)  Pulse  Av  Min: 50  Max: 122   Blood Pressure: (patient refused rn aware)     Respiratory:    Respiration: (patient refused rn aware), Pulse Oximetry: (patient refused rn aware)        RUL Breath Sounds: Clear, RML Breath Sounds: Clear, RLL Breath Sounds: Clear, LEONARD Breath Sounds: Clear, LLL Breath Sounds: Clear  Fluids:    Intake/Output Summary (Last 24 hours) at 2020 0706  Last data filed at 2/15/2020 2200  Gross per 24 hour   Intake 2485 ml   Output 600 ml   Net 1885 ml        GI/Nutrition:  Orders Placed This Encounter   Procedures   • Diet Order Regular     Standing Status:   Standing     Number of Occurrences:   1     Order Specific Question:   Diet:     Answer:   Regular [1]     Medical Decision Making, by Problem:  Active Hospital Problems    Diagnosis   • *Sepsis (HCC) [A41.9]   • Elevated alkaline phosphatase level [R74.8]   • Type 2 diabetes mellitus with hyperglycemia, with long-term current use of insulin (HCC) [E11.65, Z79.4]   • Cognitive decline [R41.89]   • Metabolic  acidosis [E87.2]   • Hypokalemia [E87.6]   • Hyponatremia [E87.1]   • Chronic pancreatitis (HCC) [K86.1]   • Dyslipidemia [E78.5]   • Cholangitis [K83.09]     Assessment/Plan  58 y/o with chronic pancreatitis, IDDM, iron deficiency anemia, GERD with esophagitis that presented for lethargy and found to have significant leukocytosis and newly elevated liver tests.  Concern for sepsis from biliary source.  Imaging suggests dilation of biliary and pancreatic ducts but no obvious stones although could have ampullary lesion of main duct IPMN causing obstruction.  Recommend CT ab/pelvis to rule out other source of sepsis.  May need EUS and possible ERCP depending on clinical course.  Continue antibiotics and IVF.     PROBLEMS:  1. Elevated liver tests, predominantly alk phos but also bilirubin.  Suspect biliary obstruction.  Question ampullary lesion versus main duct IPMN versus CBD stricture from chronic pancreatitis versus less likely CBD stone  2. Sepsis, unclear source, suspect biliary, although currently no pain or fevers  3. Leukocytosis. Improving  4. Abnormal imaging of abdomen with dilated bile ducts and pancreatic duct and questionable mass head of pancreas  5. Chronic pancreatitis  6. IDDM  7. Iron deficiency anemia  8. GERD with esophagitis  9. Hypertension  10. Seizure disorder  11. Aggressive behavior to healthcare workers  12. Cognitive decline     PLAN:  1. CT ab/pelvis with IV contrast  2. Continue IV antibiotics and IVF  3. Likely will need EUS +/- ERCP depending on CT results.  4. Check ELOISA and IgG 4 for possible autoimmune hepatitis      Quality-Core Measures   Reviewed items::  Radiology images reviewed, Labs reviewed and Medications reviewed

## 2020-02-16 NOTE — PROGRESS NOTES
Patient refusing IV access. Patient being verbally abusive to staff. Patient refused 0300 vitals; educated and continues to refuse.

## 2020-02-16 NOTE — PROGRESS NOTES
Received bedside patient report from KELLEY Yanez. Patient resting comfortably in bed, no complaints at this time. Safety precautions in place. Will continue to monitor.

## 2020-02-16 NOTE — PROGRESS NOTES
Telemetry Shift Summary    Rhythm:  ST  HR Range: 100-103  Ectopy: r PVC, r Trip  Measurements: .16/.08/.34          Normal Values  Rhythm SR  HR Range   Measurements 0.12 / 0.20 / 0.06-0.10 / 0.30-0.52

## 2020-02-16 NOTE — CARE PLAN
Problem: Communication  Goal: The ability to communicate needs accurately and effectively will improve  Outcome: PROGRESSING AS EXPECTED  Note: Patient updated on the plan of care. Encouraged to voice feelings and to ask questions. Answered all questions and concerns. Agrees with the plan of care.      Problem: Safety  Goal: Will remain free from injury  Outcome: PROGRESSING AS EXPECTED  Note: Safety precautions in place. Bed alarm ON. Will continue to monitor patient.   Goal: Will remain free from falls  Outcome: PROGRESSING AS EXPECTED  Note: Safety precautions in place. Bed alarm ON. Will continue to monitor patient.      Problem: Infection  Goal: Will remain free from infection  Outcome: PROGRESSING AS EXPECTED  Note: Discussed the importance of infection prevention and hand hygiene.      Problem: Mobility  Goal: Risk for activity intolerance will decrease  Outcome: PROGRESSING AS EXPECTED

## 2020-02-16 NOTE — CONSULTS
"Gastroenterology Consultation    Date of Service  2/15/2020    Referring Physician  Rosendo Encinas M.D.    Consulting Physician  Hector Villarreal M.D.    Reason for Consultation  Elevated liver tests    History of Presenting Illness  59 y.o. male with IDDM, chronic pancreatitis seizure disorder, iron deficiency anemia who presented 2/14/2020 with lethargy and poor appetite per assisted living facility.    He is difficult historian as does not want to answer questions.  He states \"I feel fine and always have.\"    He was evaluated 12/2019 for iron deficiency anemia and DKA.  Had EGD with severe erosive esophagitis.  He refused to drink prep for colonoscopy.    He apparently was brought by assisted living facility after they noted some lethargy and poor appetite.  He has weight loss of 7 pounds over the past month or so.  He denies abdominal pain and states he never had abdominal pain and his appetite is great.  Denies fevers, chills, nausea, vomiting, diarrhea, constipation, melena, hematochezia.    In ED, he had significant leukocytosis with newly elevated alkaline phosphatase and bilirubin with fairly normal transaminases.  He was started on IVF and antibiotics.  He had RUQ US followed by MRCP showing dilated bile and pancreatic ducts, distended GB, chronic pancreatitis and possible abnormal lesions head of pancreas.    Review of Systems  Review of Systems   Constitutional: Positive for malaise/fatigue and weight loss.   All other systems reviewed and are negative.      Past Medical History   has a past medical history of Diabetes (HCC), Hypertension, and Seizure disorder (HCC).  Chronic pancreatitis.    Surgical History   has a past surgical history that includes gastroscopy-endo (N/A, 8/25/2018); other abdominal surgery; toe amputation (Right, 1/12/2019); and pr upper gi endoscopy,biopsy (N/A, 12/27/2019).    Family History  family history includes No Known Problems in his father and mother.    Social " History   reports that he has been smoking. He has a 3.75 pack-year smoking history. He has never used smokeless tobacco. He reports that he does not drink alcohol or use drugs.    Medications    Current Facility-Administered Medications:   •  metroNIDAZOLE  •  aspirin  •  atorvastatin  •  omeprazole  •  pancrelipase (Lip-Prot-Amyl)  •  sucralfate  •  tamsulosin  •  thiamine  •  senna-docusate **AND** polyethylene glycol/lytes **AND** magnesium hydroxide **AND** bisacodyl  •  NS  •  enoxaparin  •  acetaminophen  •  cefTRIAXone (ROCEPHIN) IV  •  ondansetron  •  ondansetron  •  promethazine  •  promethazine  •  prochlorperazine  •  insulin regular **AND** Accu-Chek Q6 if NPO **AND** NOTIFY MD and PharmD **AND** glucose **AND** dextrose 10% bolus  •  HYDROmorphone    Medications Prior to Admission   Medication Sig Dispense Refill Last Dose   • Urine Glucose-Ketones Test Strip 1 Strip by In Vitro route every day. 100 Strip 2    • Insulin Degludec (TRESIBA FLEXTOUCH) 200 UNIT/ML Solution Pen-injector Inject 12 Units as instructed every bedtime. 3 PEN 11    • insulin lispro (HUMALOG KWIKPEN) 100 UNIT/ML Solution Pen-injector injection PEN Inject 10 Units as instructed 3 times a day before meals. 5 PEN 6    • omeprazole (PRILOSEC) 20 MG delayed-release capsule Take 1 Cap by mouth 2 Times a Day. 60 Cap 8    • lisinopril (PRINIVIL) 2.5 MG Tab TAKE 1 TABLET BY MOUTH ONCE DAILY. 30 Tab 1    • atorvastatin (LIPITOR) 40 MG Tab Take 1 Tab by mouth every evening. 30 Tab 1    • bismuth subsalicylate (PEPTO-BISMOL) 262 MG Chew Tab Take 2 Tabs by mouth every 6 hours. 45 Tab 0    • metroNIDAZOLE (FLAGYL) 250 MG Tab Take 1 Tab by mouth every 8 hours. 24 Tab 0    • multivitamin (THERAGRAN) Tab Take 1 Tab by mouth every day. 30 Tab 1    • thiamine (THIAMINE) 100 MG tablet Take 1 Tab by mouth every day. 30 Tab 1    • sucralfate (CARAFATE) 1 GM Tab 1 tab 30 minutes prior to meals and before bed x 10D, then BID PRN abd pain 60 Tab 0    •  aspirin (ASA) 81 MG Chew Tab chewable tablet CHEW 1 TABLET BY MOUTH ONCE DAILY. 30 Tab 11 12/19/2019 at AM   • pancrelipase, Lip-Prot-Amyl, (CREON) 05366-77348 units Cap DR Particles Take 2 Caps by mouth every day. 60 Cap 11 12/19/2019 at AM   • tamsulosin (FLOMAX) 0.4 MG capsule Take 1 Cap by mouth every day. 30 Cap 11 12/19/2019 at AM   • Lactobacillus-Inulin (CULTURELLE DIGESTIVE HEALTH) Cap TAKE (1) CAPSULE BY MOUTH ONCE DAILY. 30 Cap 6 12/19/2019 at AM   • Cholecalciferol (VITAMIN D) 2000 units Cap Take 2 Caps by mouth every day. 60 Cap 5 12/19/2019 at AM         Allergies  No Known Allergies    Physical Exam  Temp:  [36.5 °C (97.7 °F)-37.6 °C (99.6 °F)] 36.5 °C (97.7 °F)  Pulse:  [100-114] 104  Resp:  [16] 16  BP: ()/(44-67) 124/64  SpO2:  [89 %-93 %] 91 %    Physical Exam  Vitals signs and nursing note reviewed.   Constitutional:       General: He is not in acute distress.     Appearance: Normal appearance. He is normal weight. He is not ill-appearing or toxic-appearing.   HENT:      Head: Normocephalic and atraumatic.      Mouth/Throat:      Mouth: Mucous membranes are moist.      Pharynx: Oropharynx is clear. No oropharyngeal exudate or posterior oropharyngeal erythema.   Eyes:      General: No scleral icterus.     Extraocular Movements: Extraocular movements intact.      Pupils: Pupils are equal, round, and reactive to light.   Neck:      Musculoskeletal: Normal range of motion and neck supple.   Cardiovascular:      Rate and Rhythm: Regular rhythm. Tachycardia present.      Pulses: Normal pulses.      Heart sounds: Normal heart sounds. No murmur.   Pulmonary:      Effort: Pulmonary effort is normal. No respiratory distress.      Breath sounds: Normal breath sounds. No stridor. No wheezing, rhonchi or rales.   Abdominal:      General: Abdomen is flat. Bowel sounds are normal. There is no distension.      Palpations: Abdomen is soft. There is no mass.      Tenderness: There is no abdominal tenderness.  There is no guarding or rebound.      Hernia: No hernia is present.   Musculoskeletal:      Right lower leg: No edema.      Left lower leg: No edema.   Skin:     General: Skin is warm and dry.      Coloration: Skin is not jaundiced.   Neurological:      General: No focal deficit present.      Mental Status: He is alert and oriented to person, place, and time.   Psychiatric:         Mood and Affect: Affect is angry.         Fluids  Date 02/15/20 0700 - 02/16/20 0659   Shift 4444-6053 2774-3544 3436-3508 24 Hour Total   INTAKE   I.V. 250   250   Shift Total 250   250   OUTPUT   Urine 600   600   Shift Total 600   600   Weight (kg) 62 62 62 62       Laboratory  Recent Labs     02/14/20  1639 02/15/20  0325   WBC 43.6* 37.0*   RBC 3.81* 3.61*   HEMOGLOBIN 11.4* 10.7*   HEMATOCRIT 32.6* 31.3*   MCV 85.6 86.7   MCH 29.9 29.6   MCHC 35.0 34.2   RDW 45.1 46.3   PLATELETCT 315 324   MPV 11.3 11.3     Recent Labs     02/14/20  1639 02/15/20  0325   SODIUM 129* 132*   POTASSIUM 3.4* 3.9   CHLORIDE 93* 94*   CO2 16* 16*   GLUCOSE 165* 199*   BUN 49* 46*   CREATININE 0.94 0.89   CALCIUM 8.6 8.3*                     Imaging  [id number]   Final Result      1.  Severe dilatation of the intrahepatic biliary ducts, common bile duct and pancreatic duct. The distal common bile duct and the proximal pancreatic duct is not visualized at the pancreatic head. The pancreatic head appears to be diffusely enlarged and    demonstrates multiple small cysts. These findings are concerning for pancreatic neoplasm such as intraductal papillary mucinous cystoadenoma. The other differential diagnosis includes periampullary carcinoma. MR examination of the abdomen with contrast    is recommended for further evaluation.   2.  Mixed signal intensity in the right perinephric space likely represents perinephric hematoma.   3.  Large hiatal hernia.   4.  Diffuse gastric wall thickening.      US-RUQ   Final Result      1.  Hepatomegaly.   2.  Hepatic  steatosis.   3.  Gallbladder sludge. Mild intrahepatic ductal dilation. Dilation of the common duct up to 13 mm. No definite distal obstructing etiology is identified. MRCP could be obtained for further evaluation if indicated.   4.  Trace ascites.   5.  Pancreatic calcifications likely sequela of chronic pancreatitis.      DX-CHEST-PORTABLE (1 VIEW)   Final Result      Cardiomegaly.      NM-BILIARY (HIDA) SCAN WITH CCK    (Results Pending)         Assessment/Plan  60 y/o with chronic pancreatitis, IDDM, iron deficiency anemia, GERD with esophagitis that presented for lethargy and found to have significant leukocytosis and newly elevated liver tests.  Concern for sepsis from biliary source.  Imaging suggests dilation of biliary and pancreatic ducts but no obvious stones although could have ampullary lesion of main duct IPMN causing obstruction.  Recommend CT ab/pelvis to rule out other source of sepsis.  May need EUS and possible ERCP depending on clinical course.  Continue antibiotics and IVF.    PROBLEMS:  1. Elevated liver tests  2. Sepsis, unclear source, suspect biliary  3. Leukocytosis  4. Abnormal imaging of abdomen  5. Chronic pancreatitis  6. IDDM  7. Iron deficiency anemia  8. GERD with esophagitis  9. Hypertension  10. Seizure disorder  11. Aggressive behavior to healthcare workers    PLAN:  1. CT ab/pelvis with IV contrast  2. Continue IV antibiotics and IVF  3. Repeat labs in AM  4. May need EUS +/- ERCP depending on CT and HIDA results

## 2020-02-16 NOTE — PROGRESS NOTES
Discussed Sequential Compression Device (SCD), educated, refused at this time. Ambulates to the bathroom. Will continue to monitor.

## 2020-02-16 NOTE — ACP (ADVANCE CARE PLANNING)
Advance Care Planning Note    Discussion date:  2/16/2020  Discussion participants:  patient and Durable Power of     The patient wishes to discuss Advanced Care Planning today and the following is a brief summary of our discussion.    Patient does not have  capacity to make their own medical decisions.  Health Care Agent/Surrogate Decision Maker documented in chart.    Documents of Advance Directives:  Durable Power of  for Healthcare    Communication of relevant diagnoses: yes    Communication of prognosis: poor    Communication of treatment goals/options: change of code status    Treatment decisions:  DNR    Code status:  do not attempt resuscitation (DNAR)    The DPOA would like:   Life-sustaining antibiotics: antibiotics by IV as necessary   Artificially administered fluids: long-term IV fluids   Artificially administered nutrition: no feeding tube   Other limitations of medical interventions: none     Time statement:  I discussed advance care planning with the patient's DPOA for at least 17 minutes, including diagnosis, prognosis, plan of care, risks and benefits of any therapies that could be offered, as well as alternatives including palliation and hospice, as appropriate, exclusive of evaluation and management or other separately billable procedures.    Rosendo Encinas M.D.

## 2020-02-17 PROBLEM — K86.89 PANCREATIC MASS: Status: ACTIVE | Noted: 2020-02-14

## 2020-02-17 LAB
ALBUMIN SERPL BCP-MCNC: 2 G/DL (ref 3.2–4.9)
ALBUMIN/GLOB SERPL: 0.6 G/DL
ALP SERPL-CCNC: 945 U/L (ref 30–99)
ALT SERPL-CCNC: 21 U/L (ref 2–50)
ANION GAP SERPL CALC-SCNC: 12 MMOL/L (ref 7–16)
AST SERPL-CCNC: 29 U/L (ref 12–45)
BACTERIA UR CULT: NORMAL
BASOPHILS # BLD AUTO: 0 % (ref 0–1.8)
BASOPHILS # BLD: 0 K/UL (ref 0–0.12)
BILIRUB SERPL-MCNC: 0.9 MG/DL (ref 0.1–1.5)
BUN SERPL-MCNC: 17 MG/DL (ref 8–22)
BURR CELLS BLD QL SMEAR: NORMAL
CALCIUM SERPL-MCNC: 7.9 MG/DL (ref 8.4–10.2)
CANCER AG19-9 SERPL-ACNC: 732.6 U/ML (ref 0–35)
CHLORIDE SERPL-SCNC: 96 MMOL/L (ref 96–112)
CO2 SERPL-SCNC: 22 MMOL/L (ref 20–33)
CREAT SERPL-MCNC: 0.59 MG/DL (ref 0.5–1.4)
DACRYOCYTES BLD QL SMEAR: NORMAL
EOSINOPHIL # BLD AUTO: 0.62 K/UL (ref 0–0.51)
EOSINOPHIL NFR BLD: 2 % (ref 0–6.9)
ERYTHROCYTE [DISTWIDTH] IN BLOOD BY AUTOMATED COUNT: 45.5 FL (ref 35.9–50)
GIANT PLATELETS BLD QL SMEAR: NORMAL
GLOBULIN SER CALC-MCNC: 3.1 G/DL (ref 1.9–3.5)
GLUCOSE BLD-MCNC: 231 MG/DL (ref 65–99)
GLUCOSE BLD-MCNC: 273 MG/DL (ref 65–99)
GLUCOSE BLD-MCNC: 312 MG/DL (ref 65–99)
GLUCOSE BLD-MCNC: 370 MG/DL (ref 65–99)
GLUCOSE SERPL-MCNC: 244 MG/DL (ref 65–99)
HCT VFR BLD AUTO: 27.8 % (ref 42–52)
HGB BLD-MCNC: 9.6 G/DL (ref 14–18)
HOWELL-JOLLY BOD BLD QL SMEAR: NORMAL
HYPOCHROMIA BLD QL SMEAR: ABNORMAL
IGG1 SER-MCNC: 235 MG/DL (ref 240–1118)
IGG2 SER-MCNC: 257 MG/DL (ref 124–549)
IGG3 SER-MCNC: 57 MG/DL (ref 21–134)
IGG4 SER-MCNC: 41 MG/DL (ref 1–123)
LG PLATELETS BLD QL SMEAR: NORMAL
LYMPHOCYTES # BLD AUTO: 3.08 K/UL (ref 1–4.8)
LYMPHOCYTES NFR BLD: 10 % (ref 22–41)
MANUAL DIFF BLD: NORMAL
MCH RBC QN AUTO: 29.4 PG (ref 27–33)
MCHC RBC AUTO-ENTMCNC: 34.5 G/DL (ref 33.7–35.3)
MCV RBC AUTO: 85 FL (ref 81.4–97.8)
METAMYELOCYTES NFR BLD MANUAL: 2 %
MONOCYTES # BLD AUTO: 4.93 K/UL (ref 0–0.85)
MONOCYTES NFR BLD AUTO: 16 % (ref 0–13.4)
MYELOCYTES NFR BLD MANUAL: 2 %
NEUTROPHILS # BLD AUTO: 20.94 K/UL (ref 1.82–7.42)
NEUTROPHILS NFR BLD: 66 % (ref 44–72)
NEUTS BAND NFR BLD MANUAL: 2 % (ref 0–10)
NRBC # BLD AUTO: 0.06 K/UL
NRBC BLD-RTO: 0.2 /100 WBC
NUCLEAR IGG SER QL IA: NORMAL
OVALOCYTES BLD QL SMEAR: NORMAL
PATH REV: NORMAL
PATH REV: NORMAL
PLATELET # BLD AUTO: 345 K/UL (ref 164–446)
PLATELET BLD QL SMEAR: NORMAL
PMV BLD AUTO: 11.5 FL (ref 9–12.9)
POIKILOCYTOSIS BLD QL SMEAR: NORMAL
POLYCHROMASIA BLD QL SMEAR: NORMAL
POTASSIUM SERPL-SCNC: 3.1 MMOL/L (ref 3.6–5.5)
PROT SERPL-MCNC: 5.1 G/DL (ref 6–8.2)
RBC # BLD AUTO: 3.27 M/UL (ref 4.7–6.1)
RBC BLD AUTO: PRESENT
SCHISTOCYTES BLD QL SMEAR: NORMAL
SIGNIFICANT IND 70042: NORMAL
SITE SITE: NORMAL
SODIUM SERPL-SCNC: 130 MMOL/L (ref 135–145)
SOURCE SOURCE: NORMAL
TARGETS BLD QL SMEAR: NORMAL
TOXIC GRANULES BLD QL SMEAR: SLIGHT
WBC # BLD AUTO: 30.8 K/UL (ref 4.8–10.8)

## 2020-02-17 PROCEDURE — A9270 NON-COVERED ITEM OR SERVICE: HCPCS | Performed by: HOSPITALIST

## 2020-02-17 PROCEDURE — 80053 COMPREHEN METABOLIC PANEL: CPT

## 2020-02-17 PROCEDURE — 700111 HCHG RX REV CODE 636 W/ 250 OVERRIDE (IP): Performed by: INTERNAL MEDICINE

## 2020-02-17 PROCEDURE — 99233 SBSQ HOSP IP/OBS HIGH 50: CPT | Performed by: HOSPITALIST

## 2020-02-17 PROCEDURE — 86301 IMMUNOASSAY TUMOR CA 19-9: CPT

## 2020-02-17 PROCEDURE — A9270 NON-COVERED ITEM OR SERVICE: HCPCS | Performed by: INTERNAL MEDICINE

## 2020-02-17 PROCEDURE — 85007 BL SMEAR W/DIFF WBC COUNT: CPT

## 2020-02-17 PROCEDURE — 85027 COMPLETE CBC AUTOMATED: CPT

## 2020-02-17 PROCEDURE — 700102 HCHG RX REV CODE 250 W/ 637 OVERRIDE(OP): Performed by: INTERNAL MEDICINE

## 2020-02-17 PROCEDURE — 82962 GLUCOSE BLOOD TEST: CPT

## 2020-02-17 PROCEDURE — 99406 BEHAV CHNG SMOKING 3-10 MIN: CPT

## 2020-02-17 PROCEDURE — 700105 HCHG RX REV CODE 258: Performed by: INTERNAL MEDICINE

## 2020-02-17 PROCEDURE — 700102 HCHG RX REV CODE 250 W/ 637 OVERRIDE(OP): Performed by: HOSPITALIST

## 2020-02-17 PROCEDURE — 770020 HCHG ROOM/CARE - TELE (206)

## 2020-02-17 RX ORDER — POTASSIUM CHLORIDE 20 MEQ/1
40 TABLET, EXTENDED RELEASE ORAL ONCE
Status: COMPLETED | OUTPATIENT
Start: 2020-02-17 | End: 2020-02-17

## 2020-02-17 RX ADMIN — INSULIN HUMAN 10 UNITS: 100 INJECTION, SOLUTION PARENTERAL at 17:57

## 2020-02-17 RX ADMIN — Medication 100 MG: at 05:35

## 2020-02-17 RX ADMIN — METRONIDAZOLE 500 MG: 500 TABLET ORAL at 14:08

## 2020-02-17 RX ADMIN — INSULIN HUMAN 5 UNITS: 100 INJECTION, SUSPENSION SUBCUTANEOUS at 17:57

## 2020-02-17 RX ADMIN — ASPIRIN 81 MG CHEWABLE TABLET 81 MG: 81 TABLET CHEWABLE at 05:35

## 2020-02-17 RX ADMIN — SODIUM CHLORIDE: 9 INJECTION, SOLUTION INTRAVENOUS at 16:11

## 2020-02-17 RX ADMIN — INSULIN HUMAN 7 UNITS: 100 INJECTION, SOLUTION PARENTERAL at 05:43

## 2020-02-17 RX ADMIN — PANCRELIPASE 48000 UNITS: 24000; 76000; 120000 CAPSULE, DELAYED RELEASE PELLETS ORAL at 05:35

## 2020-02-17 RX ADMIN — ATORVASTATIN CALCIUM 40 MG: 40 TABLET, FILM COATED ORAL at 18:01

## 2020-02-17 RX ADMIN — INSULIN HUMAN 4 UNITS: 100 INJECTION, SOLUTION PARENTERAL at 11:44

## 2020-02-17 RX ADMIN — SODIUM CHLORIDE: 9 INJECTION, SOLUTION INTRAVENOUS at 05:37

## 2020-02-17 RX ADMIN — INSULIN HUMAN 12 UNITS: 100 INJECTION, SOLUTION PARENTERAL at 21:07

## 2020-02-17 RX ADMIN — OMEPRAZOLE 20 MG: 20 CAPSULE, DELAYED RELEASE ORAL at 18:01

## 2020-02-17 RX ADMIN — OMEPRAZOLE 20 MG: 20 CAPSULE, DELAYED RELEASE ORAL at 05:35

## 2020-02-17 RX ADMIN — METRONIDAZOLE 500 MG: 500 TABLET ORAL at 05:36

## 2020-02-17 RX ADMIN — CEFTRIAXONE SODIUM 2 G: 2 INJECTION, POWDER, FOR SOLUTION INTRAMUSCULAR; INTRAVENOUS at 19:56

## 2020-02-17 RX ADMIN — TAMSULOSIN HYDROCHLORIDE 0.4 MG: 0.4 CAPSULE ORAL at 05:35

## 2020-02-17 RX ADMIN — METRONIDAZOLE 500 MG: 500 TABLET ORAL at 23:14

## 2020-02-17 RX ADMIN — POTASSIUM CHLORIDE 40 MEQ: 1500 TABLET, EXTENDED RELEASE ORAL at 12:04

## 2020-02-17 RX ADMIN — ACETAMINOPHEN 650 MG: 325 TABLET, FILM COATED ORAL at 19:56

## 2020-02-17 ASSESSMENT — ENCOUNTER SYMPTOMS
EYE PAIN: 0
ABDOMINAL PAIN: 0
DIZZINESS: 0
BACK PAIN: 0
SHORTNESS OF BREATH: 0
INSOMNIA: 0
CONSTIPATION: 0
NECK PAIN: 0
PALPITATIONS: 0
WEIGHT LOSS: 1
ABDOMINAL PAIN: 1
COUGH: 0
DEPRESSION: 0
CHILLS: 0
VOMITING: 0
TINGLING: 0
NAUSEA: 0
DIARRHEA: 0
BLOOD IN STOOL: 0
FEVER: 0
BLURRED VISION: 0
HEARTBURN: 0
SORE THROAT: 0

## 2020-02-17 NOTE — PROGRESS NOTES
"  Gastroenterology Progress Note     Author: Carlos Alberto Ansari M.D.  Date & Time Created: 2/17/2020 7:23 AM    Chief Complaint:  Elevated liver tests, abnormal MRI    Interval History:  Initial history (Dr. Villarreal):  59 y.o. male with IDDM, chronic pancreatitis seizure disorder, iron deficiency anemia who presented 2/14/2020 with lethargy and poor appetite per assisted living facility.  He is difficult historian as does not want to answer questions.  He states \"I feel fine and always have.\"  He was evaluated 12/2019 for iron deficiency anemia and DKA.  Had EGD with severe erosive esophagitis.  He refused to drink prep for colonoscopy.  He apparently was brought by assisted living facility after they noted some lethargy and poor appetite.  He has weight loss of 7 pounds over the past month or so.  He denies abdominal pain and states he never had abdominal pain and his appetite is great.  Denies fevers, chills, nausea, vomiting, diarrhea, constipation, melena, hematochezia.  In ED, he had significant leukocytosis with newly elevated alkaline phosphatase and bilirubin with fairly normal transaminases.  He was started on IVF and antibiotics.  He had RUQ US followed by MRCP showing dilated bile and pancreatic ducts, distended GB, chronic pancreatitis and possible abnormal lesions head of pancreas.    2/16/2020: Continues to deny any symptoms including abdominal pain, nausea, vomiting, fevers.  Was refusing IV placement and vitals overnight.  Says he is agreeable to IV placement today  02/17/2020 - No complaints.  Says he feels fine, but does report urine is darker.  I explained imaging findings at length.    Review of Systems:  Review of Systems   Constitutional: Positive for weight loss. Negative for chills, fever and malaise/fatigue.   Respiratory: Negative for shortness of breath.    Cardiovascular: Negative for chest pain.   Gastrointestinal: Negative for abdominal pain, blood in stool, constipation, diarrhea, " heartburn, melena, nausea and vomiting.       Physical Exam:  Physical Exam  Vitals signs and nursing note reviewed.   Constitutional:       Appearance: Normal appearance.   Eyes:      General: No scleral icterus.  Cardiovascular:      Rate and Rhythm: Normal rate and regular rhythm.      Pulses: Normal pulses.      Heart sounds: Normal heart sounds.   Pulmonary:      Effort: Pulmonary effort is normal. No respiratory distress.      Breath sounds: Normal breath sounds. No stridor. No wheezing, rhonchi or rales.   Abdominal:      General: Abdomen is flat. Bowel sounds are normal. There is no distension.      Palpations: Abdomen is soft. There is no mass.      Tenderness: There is no abdominal tenderness. There is no guarding or rebound.      Hernia: No hernia is present.   Skin:     General: Skin is warm and dry.      Coloration: Skin is not jaundiced.   Neurological:      General: No focal deficit present.      Mental Status: He is alert and oriented to person, place, and time.         Labs:          Recent Labs     02/15/20  0325 02/16/20  0400 02/17/20  0335   SODIUM 132* 132* 130*   POTASSIUM 3.9 3.7 3.1*   CHLORIDE 94* 98 96   CO2 16* 20 22   BUN 46* 28* 17   CREATININE 0.89 0.66 0.59   MAGNESIUM 1.6  --   --    PHOSPHORUS 3.8  --   --    CALCIUM 8.3* 8.3* 7.9*     Recent Labs     02/15/20  0325 02/15/20  0722 02/16/20 0400 02/17/20  0335   ALTSGPT 25  --  23 21   ASTSGOT 36  --  33 29   ALKPHOSPHAT 1159*  --  1060* 945*   TBILIRUBIN 1.4  --  1.1 0.9   LIPASE  --  8  --   --    GLUCOSE 199*  --  384* 244*     Recent Labs     02/15/20  0325 02/16/20  0400 02/17/20  0335   RBC 3.61* 3.26* 3.27*   HEMOGLOBIN 10.7* 9.6* 9.6*   HEMATOCRIT 31.3* 27.5* 27.8*   PLATELETCT 324 374 345     Recent Labs     02/15/20  0325 02/16/20  0400 02/17/20  0335   WBC 37.0* 27.0* 30.8*   NEUTSPOLYS 74.00* 69.00 66.00   LYMPHOCYTES 12.00* 9.00* 10.00*   MONOCYTES 8.00 15.00* 16.00*   EOSINOPHILS 0.00 2.00 2.00   BASOPHILS 0.00 0.00  0.00   ASTSGOT 36 33 29   ALTSGPT 25 23 21   ALKPHOSPHAT 1159* 1060* 945*   TBILIRUBIN 1.4 1.1 0.9     Hemodynamics:  Temp (24hrs), Av.8 °C (98.3 °F), Min:36.5 °C (97.7 °F), Max:37.1 °C (98.8 °F)  Temperature: 36.8 °C (98.3 °F)  Pulse  Av.3  Min: 50  Max: 122   Blood Pressure: 124/58     Respiratory:    Respiration: 18, Pulse Oximetry: 90 %        RUL Breath Sounds: Clear, RML Breath Sounds: Clear, RLL Breath Sounds: Clear, LEONARD Breath Sounds: Clear, LLL Breath Sounds: Clear  Fluids:    Intake/Output Summary (Last 24 hours) at 2020 0706  Last data filed at 2/15/2020 2200  Gross per 24 hour   Intake 2485 ml   Output 600 ml   Net 1885 ml     Weight: 62.2 kg (137 lb 2 oz)  GI/Nutrition:  Orders Placed This Encounter   Procedures   • Diet Order Diabetic     Standing Status:   Standing     Number of Occurrences:   1     Order Specific Question:   Diet:     Answer:   Diabetic [3]     Medical Decision Making, by Problem:  Active Hospital Problems    Diagnosis   • *Sepsis (HCC) [A41.9]   • Elevated alkaline phosphatase level [R74.8]   • Type 2 diabetes mellitus with hyperglycemia, with long-term current use of insulin (HCC) [E11.65, Z79.4]   • Cognitive decline [R41.89]   • Metabolic acidosis [E87.2]   • Hypokalemia [E87.6]   • Hyponatremia [E87.1]   • Chronic pancreatitis (HCC) [K86.1]   • Dyslipidemia [E78.5]   • Cholangitis [K83.09]     Impression  / Plan  58 y/o with chronic pancreatitis, IDDM, iron deficiency anemia, GERD with esophagitis that presented for lethargy and found to have significant leukocytosis and newly elevated liver tests.  Concern for sepsis from biliary source.  Imaging suggests dilation of biliary and pancreatic ducts but no obvious stones although could have ampullary lesion of main duct IPMN causing obstruction.      1. Elevated liver tests, predominantly alk phos but also bilirubin.  Suspect biliary obstruction.  Question ampullary lesion versus main duct IPMN versus CBD stricture  from chronic pancreatitis versus less likely CBD stone.  Elevated   2. Sepsis, unclear source, suspect biliary, although currently no pain or fevers  3. Leukocytosis - Improving  4. Abnormal imaging of abdomen with dilated bile ducts and pancreatic duct and questionable mass head of pancreas  5. Chronic pancreatitis  6. IDDM  7. Iron deficiency anemia  8. GERD with esophagitis  9. Hypertension  10. Seizure disorder  11. Aggressive behavior to healthcare workers  12. Cognitive decline    1. EGD, EUS, ERCP - will try to schedule for tomorrow  2. Continue IV antibiotics and IVF  3. Follow liver enzymes  4. NPO post midnight      Quality-Core Measures   Reviewed items::  Radiology images reviewed, Labs reviewed and Medications reviewed

## 2020-02-17 NOTE — PROGRESS NOTES
Patient rounded. POC discussed. Medications administered as ordered. Comfortable in bed. Appropriate safety precautions in place. Will continue to monitor patient.

## 2020-02-17 NOTE — RESPIRATORY CARE
" COPD EDUCATION by COPD CLINICAL EDUCATOR  2/17/2020 at 11:45 AM by Nisreen Fagan, RRT     Patient reviewed by COPD education team. Patient does not have a history or diagnosis of COPD, patient is a smoker, smoking cessation education done. A comprehensive packet with tips to quit and information on outpatient classes given to patient.  Smoking Cessation Intervention and education completed, 15 minutes spent on smoking cessation education with patient    Provided smoking cessation packet with \"Tips to Quit\" and flyer for \"Free Smoking Cessation Classes\".     "

## 2020-02-17 NOTE — PROGRESS NOTES
Per patient's sister and POA Uriel Tatum ([phone]), patient's guardian is Shannon Garcia from [employer] [facility] ([phone]).Also, Patient resides at [facility] and being followed by private [facility]

## 2020-02-17 NOTE — PROGRESS NOTES
Telemetry Shift Summary    Rhythm:  SR  HR Range: 81-97   Ectopy: rPVC  Measurements: .16/.08/.34          Normal Values  Rhythm SR  HR Range   Measurements 0.12 / 0.20 / 0.06-0.10 / 0.30-0.52

## 2020-02-17 NOTE — PROGRESS NOTES
"Hospital Medicine Daily Progress Note    Date of Service  2/17/2020    Chief Complaint  59 y.o. male admitted 2/14/2020 with a poor appetite.     Hospital Course    He was found to have evidence for a marked new elevation in his alk phos and a profound leukocytosis. He was treated for abdominal sepsis and had an MRI and HIDA ordered. His MRi showed evidence for biliary obstruction and a possible pancreatic neoplasm. GI was consulted for intervention.       Interval Problem Update  2/15- I contacted GI for a consultation. I ordered a HIDA scan. MRI pending  He says \" I'm Fine. Why am I here?\". I reviewed his imaging below.   2/16- MRI with possible pancreatic mass. I contacted his POA and left a message. I contacted his guardian and discussed his case with them.   2/17- GI planning ercp. ca19-9 up. Worsening leukocytosis, hyponatremia and hypokalemia. I replaced his K. Still very hyperglycemic. I added NPH. I reviewed his HIDA    HIDA  1.  Biliary dilatation without evidence of high-grade biliary obstruction.  2.  Gallbladder visualization after morphine administration. No acute cholecystitis.    MRI:  1.  Severe dilatation of the intrahepatic biliary ducts, common bile duct and pancreatic duct. The distal common bile duct and the proximal pancreatic duct is not visualized at the pancreatic head. The pancreatic head appears to be diffusely enlarged and   demonstrates multiple small cysts. These findings are concerning for pancreatic neoplasm such as intraductal papillary mucinous cystoadenoma. The other differential diagnosis includes periampullary carcinoma. MR examination of the abdomen with contrast   is recommended for further evaluation.  2.  Mixed signal intensity in the right perinephric space likely represents perinephric hematoma.  3.  Large hiatal hernia.  4.  Diffuse gastric wall thickening.      Ct abd 2018  Extensive calcification throughout the atrophic pancreas could relate to chronic " pancreatitis.  Dilated pancreatic duct but no pancreatic head mass identified. This could be secondary to chronic pancreatitis. If there is continued clinical concern, further evaluation with US is recommended.  Moderate bilateral pleural effusions with patchy bibasilar opacities. Trace amount fluid in the right lower quadrant.    UD RUQ  1.  Hepatomegaly.  2.  Hepatic steatosis.  3.  Gallbladder sludge. Mild intrahepatic ductal dilation. Dilation of the common duct up to 13 mm. No definite distal obstructing etiology is identified. MRCP could be obtained for further evaluation if indicated.  4.  Trace ascites.  5.  Pancreatic calcifications likely sequela of chronic pancreatitis.    Cxr:  Cardiomegaly.    Echocardiography Laboratory  TDS. No prior study is available for comparison.   Left ventricular ejection fraction is visually estimated to be 65%.      Mri brain:  1.  No acute intracranial abnormality  2.  Moderate white matter changes and cerebral volume loss      Consultants/Specialty  GI- I contacted then for a consult today.     Code Status  full    Disposition  Family(sister/poa) would like to get a diagnosis. If it is pancreatic cancer then likely hospice    Review of Systems  Review of Systems   Constitutional: Negative for chills and fever.   HENT: Negative for sore throat.    Eyes: Negative for blurred vision and pain.   Respiratory: Negative for cough and shortness of breath.    Cardiovascular: Negative for chest pain and palpitations.   Gastrointestinal: Positive for abdominal pain. Negative for vomiting.   Genitourinary: Negative for dysuria and urgency.   Musculoskeletal: Negative for back pain and neck pain.   Skin: Negative for itching and rash.   Neurological: Negative for dizziness and tingling.   Psychiatric/Behavioral: Negative for depression. The patient does not have insomnia.    All other systems reviewed and are negative.       Physical Exam  Temp:  [36.5 °C (97.7 °F)-37.1 °C (98.8 °F)]  37.1 °C (98.8 °F)  Pulse:  [84-99] 87  Resp:  [18] 18  BP: (107-140)/(54-68) 107/54  SpO2:  [88 %-93 %] 88 %    Physical Exam  Vitals signs and nursing note reviewed.   Constitutional:       General: He is not in acute distress.     Appearance: He is well-developed. He is not diaphoretic.      Comments: Patient seen and examined at the bedside. Plan discussed at bedside with the RN.    HENT:      Right Ear: External ear normal.      Left Ear: External ear normal.      Nose: Nose normal.   Eyes:      General: No scleral icterus.        Right eye: No discharge.         Left eye: No discharge.   Neck:      Vascular: No JVD.      Trachea: No tracheal deviation.   Cardiovascular:      Rate and Rhythm: Normal rate.      Heart sounds: Normal heart sounds. No murmur.   Pulmonary:      Effort: Pulmonary effort is normal. No respiratory distress.      Breath sounds: Normal breath sounds. No wheezing or rales.   Abdominal:      General: Bowel sounds are normal. There is no distension.      Palpations: Abdomen is soft.      Tenderness: There is abdominal tenderness. There is no guarding.   Musculoskeletal:         General: No tenderness.   Skin:     General: Skin is warm and dry.      Findings: No erythema.   Neurological:      Mental Status: He is alert and oriented to person, place, and time.      Comments: Poor memory. Occasional delirium.    Psychiatric:         Behavior: Behavior normal.         Fluids    Intake/Output Summary (Last 24 hours) at 2/17/2020 1043  Last data filed at 2/17/2020 0537  Gross per 24 hour   Intake 2241.67 ml   Output 300 ml   Net 1941.67 ml       Laboratory  Recent Labs     02/15/20  0325 02/16/20  0400 02/17/20  0335   WBC 37.0* 27.0* 30.8*   RBC 3.61* 3.26* 3.27*   HEMOGLOBIN 10.7* 9.6* 9.6*   HEMATOCRIT 31.3* 27.5* 27.8*   MCV 86.7 84.4 85.0   MCH 29.6 29.4 29.4   MCHC 34.2 34.9 34.5   RDW 46.3 45.3 45.5   PLATELETCT 324 374 345   MPV 11.3 11.2 11.5     Recent Labs     02/15/20  0325  02/16/20  0400 02/17/20  0335   SODIUM 132* 132* 130*   POTASSIUM 3.9 3.7 3.1*   CHLORIDE 94* 98 96   CO2 16* 20 22   GLUCOSE 199* 384* 244*   BUN 46* 28* 17   CREATININE 0.89 0.66 0.59   CALCIUM 8.3* 8.3* 7.9*                   Imaging  NM-HEPATOBILIARY SCAN   Final Result      1.  Biliary dilatation without evidence of high-grade biliary obstruction.   2.  Gallbladder visualization after morphine administration. No acute cholecystitis.      CT-ABDOMEN-PELVIS WITH   Final Result      1.  New rim enhancing fluid collection tracks along the right heart margin, azygoesophageal recess and possibly communicates with Morison's pouch tracking along the right aspect of the inferior vena cava. This is nonspecific and could indicate abscess,    dissecting pseudocyst, mucinous cystic neoplasm related fluid      2.  New extra and intrahepatic biliary ductal dilatation with 6 cm dilatation of the gallbladder also seen. This indicates distal common bile duct/ampullary level obstruction. Stricture, mass effect from adjacent pancreas calcifications or small    ampullary mass are differential possibilities      3.  Decreased small pleural effusions. Some loculation on the right is possible      4.  Slightly decreased diffuse pancreatic ductal dilatation now measuring 11 mm. Evidence of chronic pancreatitis. New 3 cm cystic structure anterior to the pancreas could be a pseudocyst. Abscess or necrotic/cystic neoplasm are in the differential.      5.  Multiple chronic findings including status post aorto bifemoral bypass with approaching 70% stenosis of the left superficial femoral artery, nonobstructive right 3 mm nephrolithiasis, large volume rectal vault stool, dilated bladder which could    indicate outlet obstruction or neurogenic bladder, splenosis      [id number]   Final Result      1.  Severe dilatation of the intrahepatic biliary ducts, common bile duct and pancreatic duct. The distal common bile duct and the proximal  pancreatic duct is not visualized at the pancreatic head. The pancreatic head appears to be diffusely enlarged and    demonstrates multiple small cysts. These findings are concerning for pancreatic neoplasm such as intraductal papillary mucinous cystoadenoma. The other differential diagnosis includes periampullary carcinoma. MR examination of the abdomen with contrast    is recommended for further evaluation.   2.  Mixed signal intensity in the right perinephric space likely represents perinephric hematoma.   3.  Large hiatal hernia.   4.  Diffuse gastric wall thickening.      US-RUQ   Final Result      1.  Hepatomegaly.   2.  Hepatic steatosis.   3.  Gallbladder sludge. Mild intrahepatic ductal dilation. Dilation of the common duct up to 13 mm. No definite distal obstructing etiology is identified. MRCP could be obtained for further evaluation if indicated.   4.  Trace ascites.   5.  Pancreatic calcifications likely sequela of chronic pancreatitis.      DX-CHEST-PORTABLE (1 VIEW)   Final Result      Cardiomegaly.           Assessment/Plan  * Pancreatic mass  Assessment & Plan  MRI with likely pancreatic malignancy. GI scheduling ERCP. elevated ca19-9    Type 2 diabetes mellitus with hyperglycemia, with long-term current use of insulin (HCC)- (present on admission)  Assessment & Plan  He has an insulin sensor and his right lower quadrant, uses long-acting insulin 12 units and sliding scale.  Due to being n.p.o. we will hold off on long-acting and will use sliding scale  Monitor Accu-Cheks every 6 while n.p.o.    Cognitive decline- (present on admission)  Assessment & Plan  Intermittent aloc. Likely dementia. High risk for worseneing delirium.     Metabolic acidosis  Assessment & Plan  IV fluids.     Hypokalemia  Assessment & Plan  Replace and trend.     Sepsis (HCC)  Assessment & Plan  This is Sepsis Present on admission  SIRS criteria identified on my evaluation include: Tachycardia, with heart rate greater than 90  BPM and Leukocyosis, with WBC greater than 12,000  Source is GI  Sepsis protocol initiated  Fluid resuscitation ordered per protocol  IV antibiotics as appropriate for source of sepsis  While organ dysfunction may be noted elsewhere in this problem list or in the chart, degree of organ dysfunction does not meet CMS criteria for severe sepsis  Ceftriaxone/Flagyl  Ultimately, he will need source control          Cholangitis  Assessment & Plan  I question this diagnosis but workup up and on antibiotics. GI consultation.     Recurrent major depressive disorder, in full remission (HCC)  Assessment & Plan  .    Hyponatremia  Assessment & Plan  Hypovolemic. IV fluids.     Chronic pancreatitis (HCC)- (present on admission)  Assessment & Plan  Continue pancreatic enzyme    Dyslipidemia- (present on admission)  Assessment & Plan  Continue statin, there is no evidence of hepatic dysfunction       VTE prophylaxis: scd

## 2020-02-17 NOTE — PROGRESS NOTES
Patient rounded. Patient sleeping. Comfortable in bed. Appropriate safety precautions in place. Will continue to monitor patient.

## 2020-02-17 NOTE — CARE PLAN
Problem: Communication  Goal: The ability to communicate needs accurately and effectively will improve  Outcome: PROGRESSING AS EXPECTED  Note: Patient and sister (POA) updated on the plan of care. Encouraged to voice feelings and to ask questions. Answered all questions and concerns. Agrees with the plan of care.      Problem: Safety  Goal: Will remain free from injury  Outcome: PROGRESSING AS EXPECTED  Note: Safety precautions in place. Will continue to monitor patient.   Goal: Will remain free from falls  Outcome: PROGRESSING AS EXPECTED  Note: Safety precautions in place. Will continue to monitor patient.      Problem: Infection  Goal: Will remain free from infection  Outcome: PROGRESSING AS EXPECTED  Note: Discussed the importance of infection prevention and hand hygiene.      Problem: Fluid Volume:  Goal: Will maintain balanced intake and output  Outcome: PROGRESSING AS EXPECTED     Problem: Psychosocial Needs:  Goal: Level of anxiety will decrease  Outcome: PROGRESSING AS EXPECTED

## 2020-02-17 NOTE — PROGRESS NOTES
Telemetry Shift Summary     Rhythm SR/ST  HR Range 83 - 101  Ectopy R PVCs; R PACs  Measurements 0.18 / 0.08 / 0.36           Normal Values  Rhythm SR  HR Range    Measurements 0.12-0.20 / 0.06-0.10  / 0.30-0.52

## 2020-02-17 NOTE — CARE PLAN
Problem: Safety  Goal: Will remain free from injury  Outcome: PROGRESSING AS EXPECTED   Patient calls when getting out of bed, tread socks on  Problem: Bowel/Gastric:  Goal: Normal bowel function is maintained or improved  Outcome: PROGRESSING AS EXPECTED   Patient using BSC because of urgency

## 2020-02-18 ENCOUNTER — ANESTHESIA (OUTPATIENT)
Dept: SURGERY | Facility: MEDICAL CENTER | Age: 60
DRG: 871 | End: 2020-02-18
Payer: COMMERCIAL

## 2020-02-18 ENCOUNTER — ANESTHESIA EVENT (OUTPATIENT)
Dept: SURGERY | Facility: MEDICAL CENTER | Age: 60
DRG: 871 | End: 2020-02-18
Payer: COMMERCIAL

## 2020-02-18 LAB
ALBUMIN SERPL BCP-MCNC: 1.9 G/DL (ref 3.2–4.9)
ALBUMIN/GLOB SERPL: 0.6 G/DL
ALP BONE SERPL-CCNC: 149 U/L (ref 0–55)
ALP ISOS SERPL HS-CCNC: 0 U/L
ALP LIVER SERPL-CCNC: 913 U/L (ref 0–94)
ALP SERPL-CCNC: 1062 U/L (ref 40–120)
ALP SERPL-CCNC: 824 U/L (ref 30–99)
ALT SERPL-CCNC: 18 U/L (ref 2–50)
ANION GAP SERPL CALC-SCNC: 10 MMOL/L (ref 7–16)
AST SERPL-CCNC: 29 U/L (ref 12–45)
BASOPHILS # BLD AUTO: 0 % (ref 0–1.8)
BASOPHILS # BLD: 0 K/UL (ref 0–0.12)
BILIRUB SERPL-MCNC: 0.8 MG/DL (ref 0.1–1.5)
BUN SERPL-MCNC: 12 MG/DL (ref 8–22)
BURR CELLS BLD QL SMEAR: NORMAL
CALCIUM SERPL-MCNC: 7.7 MG/DL (ref 8.4–10.2)
CHLORIDE SERPL-SCNC: 101 MMOL/L (ref 96–112)
CO2 SERPL-SCNC: 22 MMOL/L (ref 20–33)
CREAT SERPL-MCNC: 0.53 MG/DL (ref 0.5–1.4)
DACRYOCYTES BLD QL SMEAR: NORMAL
EOSINOPHIL # BLD AUTO: 0.63 K/UL (ref 0–0.51)
EOSINOPHIL NFR BLD: 2 % (ref 0–6.9)
ERYTHROCYTE [DISTWIDTH] IN BLOOD BY AUTOMATED COUNT: 46.6 FL (ref 35.9–50)
GIANT PLATELETS BLD QL SMEAR: NORMAL
GLOBULIN SER CALC-MCNC: 3 G/DL (ref 1.9–3.5)
GLUCOSE BLD-MCNC: 120 MG/DL (ref 65–99)
GLUCOSE BLD-MCNC: 152 MG/DL (ref 65–99)
GLUCOSE BLD-MCNC: 171 MG/DL (ref 65–99)
GLUCOSE BLD-MCNC: 217 MG/DL (ref 65–99)
GLUCOSE SERPL-MCNC: 174 MG/DL (ref 65–99)
HCT VFR BLD AUTO: 27.2 % (ref 42–52)
HGB BLD-MCNC: 9.5 G/DL (ref 14–18)
HOWELL-JOLLY BOD BLD QL SMEAR: NORMAL
HYPOCHROMIA BLD QL SMEAR: ABNORMAL
LG PLATELETS BLD QL SMEAR: NORMAL
LYMPHOCYTES # BLD AUTO: 3.15 K/UL (ref 1–4.8)
LYMPHOCYTES NFR BLD: 10 % (ref 22–41)
MAGNESIUM SERPL-MCNC: 1.2 MG/DL (ref 1.5–2.5)
MANUAL DIFF BLD: NORMAL
MCH RBC QN AUTO: 29.9 PG (ref 27–33)
MCHC RBC AUTO-ENTMCNC: 34.9 G/DL (ref 33.7–35.3)
MCV RBC AUTO: 85.5 FL (ref 81.4–97.8)
METAMYELOCYTES NFR BLD MANUAL: 2 %
MONOCYTES # BLD AUTO: 5.67 K/UL (ref 0–0.85)
MONOCYTES NFR BLD AUTO: 18 % (ref 0–13.4)
MYELOCYTES NFR BLD MANUAL: 2 %
NEUTROPHILS # BLD AUTO: 20.79 K/UL (ref 1.82–7.42)
NEUTROPHILS NFR BLD: 64 % (ref 44–72)
NEUTS BAND NFR BLD MANUAL: 2 % (ref 0–10)
NRBC # BLD AUTO: 0.05 K/UL
NRBC BLD-RTO: 0.2 /100 WBC
OVALOCYTES BLD QL SMEAR: NORMAL
PATHOLOGY CONSULT NOTE: NORMAL
PLATELET # BLD AUTO: 388 K/UL (ref 164–446)
PLATELET BLD QL SMEAR: NORMAL
PMV BLD AUTO: 11.7 FL (ref 9–12.9)
POIKILOCYTOSIS BLD QL SMEAR: NORMAL
POLYCHROMASIA BLD QL SMEAR: NORMAL
POTASSIUM SERPL-SCNC: 3.2 MMOL/L (ref 3.6–5.5)
PROT SERPL-MCNC: 4.9 G/DL (ref 6–8.2)
RBC # BLD AUTO: 3.18 M/UL (ref 4.7–6.1)
RBC BLD AUTO: PRESENT
SCHISTOCYTES BLD QL SMEAR: NORMAL
SODIUM SERPL-SCNC: 133 MMOL/L (ref 135–145)
TARGETS BLD QL SMEAR: NORMAL
TOXIC GRANULES BLD QL SMEAR: SLIGHT
WBC # BLD AUTO: 31.5 K/UL (ref 4.8–10.8)

## 2020-02-18 PROCEDURE — 88305 TISSUE EXAM BY PATHOLOGIST: CPT

## 2020-02-18 PROCEDURE — A9270 NON-COVERED ITEM OR SERVICE: HCPCS | Performed by: INTERNAL MEDICINE

## 2020-02-18 PROCEDURE — 160002 HCHG RECOVERY MINUTES (STAT): Performed by: INTERNAL MEDICINE

## 2020-02-18 PROCEDURE — 160048 HCHG OR STATISTICAL LEVEL 1-5: Performed by: INTERNAL MEDICINE

## 2020-02-18 PROCEDURE — 99233 SBSQ HOSP IP/OBS HIGH 50: CPT | Performed by: INTERNAL MEDICINE

## 2020-02-18 PROCEDURE — 700111 HCHG RX REV CODE 636 W/ 250 OVERRIDE (IP): Performed by: INTERNAL MEDICINE

## 2020-02-18 PROCEDURE — 700105 HCHG RX REV CODE 258: Performed by: ANESTHESIOLOGY

## 2020-02-18 PROCEDURE — 700105 HCHG RX REV CODE 258: Performed by: INTERNAL MEDICINE

## 2020-02-18 PROCEDURE — 160208 HCHG ENDO MINUTES - EA ADDL 1 MIN LEVEL 4: Performed by: INTERNAL MEDICINE

## 2020-02-18 PROCEDURE — 700102 HCHG RX REV CODE 250 W/ 637 OVERRIDE(OP): Performed by: HOSPITALIST

## 2020-02-18 PROCEDURE — 85007 BL SMEAR W/DIFF WBC COUNT: CPT

## 2020-02-18 PROCEDURE — 160035 HCHG PACU - 1ST 60 MINS PHASE I: Performed by: INTERNAL MEDICINE

## 2020-02-18 PROCEDURE — 700101 HCHG RX REV CODE 250: Performed by: ANESTHESIOLOGY

## 2020-02-18 PROCEDURE — 160009 HCHG ANES TIME/MIN: Performed by: INTERNAL MEDICINE

## 2020-02-18 PROCEDURE — 700102 HCHG RX REV CODE 250 W/ 637 OVERRIDE(OP): Performed by: INTERNAL MEDICINE

## 2020-02-18 PROCEDURE — 500066 HCHG BITE BLOCK, ECT: Performed by: INTERNAL MEDICINE

## 2020-02-18 PROCEDURE — 700111 HCHG RX REV CODE 636 W/ 250 OVERRIDE (IP): Performed by: ANESTHESIOLOGY

## 2020-02-18 PROCEDURE — 82962 GLUCOSE BLOOD TEST: CPT | Mod: 91

## 2020-02-18 PROCEDURE — 770020 HCHG ROOM/CARE - TELE (206)

## 2020-02-18 PROCEDURE — 88312 SPECIAL STAINS GROUP 1: CPT

## 2020-02-18 PROCEDURE — A9270 NON-COVERED ITEM OR SERVICE: HCPCS | Performed by: HOSPITALIST

## 2020-02-18 PROCEDURE — 0FJD8ZZ INSPECTION OF PANCREATIC DUCT, VIA NATURAL OR ARTIFICIAL OPENING ENDOSCOPIC: ICD-10-PCS | Performed by: INTERNAL MEDICINE

## 2020-02-18 PROCEDURE — 83735 ASSAY OF MAGNESIUM: CPT

## 2020-02-18 PROCEDURE — 0DB68ZX EXCISION OF STOMACH, VIA NATURAL OR ARTIFICIAL OPENING ENDOSCOPIC, DIAGNOSTIC: ICD-10-PCS | Performed by: INTERNAL MEDICINE

## 2020-02-18 PROCEDURE — 85027 COMPLETE CBC AUTOMATED: CPT

## 2020-02-18 PROCEDURE — 160203 HCHG ENDO MINUTES - 1ST 30 MINS LEVEL 4: Performed by: INTERNAL MEDICINE

## 2020-02-18 PROCEDURE — 80053 COMPREHEN METABOLIC PANEL: CPT

## 2020-02-18 RX ORDER — CYCLOBENZAPRINE HCL 10 MG
10 TABLET ORAL 3 TIMES DAILY PRN
Status: DISCONTINUED | OUTPATIENT
Start: 2020-02-18 | End: 2020-03-24 | Stop reason: HOSPADM

## 2020-02-18 RX ORDER — MEPERIDINE HYDROCHLORIDE 25 MG/ML
12.5 INJECTION INTRAMUSCULAR; INTRAVENOUS; SUBCUTANEOUS
Status: DISCONTINUED | OUTPATIENT
Start: 2020-02-18 | End: 2020-02-18 | Stop reason: HOSPADM

## 2020-02-18 RX ORDER — HYDRALAZINE HYDROCHLORIDE 20 MG/ML
5 INJECTION INTRAMUSCULAR; INTRAVENOUS
Status: DISCONTINUED | OUTPATIENT
Start: 2020-02-18 | End: 2020-02-18 | Stop reason: HOSPADM

## 2020-02-18 RX ORDER — ONDANSETRON 2 MG/ML
INJECTION INTRAMUSCULAR; INTRAVENOUS PRN
Status: DISCONTINUED | OUTPATIENT
Start: 2020-02-18 | End: 2020-02-18 | Stop reason: SURG

## 2020-02-18 RX ORDER — SODIUM CHLORIDE, SODIUM LACTATE, POTASSIUM CHLORIDE, CALCIUM CHLORIDE 600; 310; 30; 20 MG/100ML; MG/100ML; MG/100ML; MG/100ML
INJECTION, SOLUTION INTRAVENOUS
Status: DISCONTINUED | OUTPATIENT
Start: 2020-02-18 | End: 2020-02-18 | Stop reason: SURG

## 2020-02-18 RX ORDER — ONDANSETRON 2 MG/ML
4 INJECTION INTRAMUSCULAR; INTRAVENOUS
Status: DISCONTINUED | OUTPATIENT
Start: 2020-02-18 | End: 2020-02-18 | Stop reason: HOSPADM

## 2020-02-18 RX ORDER — ROCURONIUM BROMIDE 10 MG/ML
INJECTION, SOLUTION INTRAVENOUS PRN
Status: DISCONTINUED | OUTPATIENT
Start: 2020-02-18 | End: 2020-02-18 | Stop reason: SURG

## 2020-02-18 RX ORDER — PIPERACILLIN SODIUM, TAZOBACTAM SODIUM 3; .375 G/15ML; G/15ML
INJECTION, POWDER, LYOPHILIZED, FOR SOLUTION INTRAVENOUS
Status: COMPLETED
Start: 2020-02-18 | End: 2020-02-18

## 2020-02-18 RX ORDER — LIDOCAINE HYDROCHLORIDE 20 MG/ML
INJECTION, SOLUTION EPIDURAL; INFILTRATION; INTRACAUDAL; PERINEURAL PRN
Status: DISCONTINUED | OUTPATIENT
Start: 2020-02-18 | End: 2020-02-18 | Stop reason: SURG

## 2020-02-18 RX ORDER — MAGNESIUM SULFATE HEPTAHYDRATE 40 MG/ML
4 INJECTION, SOLUTION INTRAVENOUS ONCE
Status: COMPLETED | OUTPATIENT
Start: 2020-02-18 | End: 2020-02-18

## 2020-02-18 RX ORDER — DIPHENHYDRAMINE HYDROCHLORIDE 50 MG/ML
12.5 INJECTION INTRAMUSCULAR; INTRAVENOUS
Status: DISCONTINUED | OUTPATIENT
Start: 2020-02-18 | End: 2020-02-18 | Stop reason: HOSPADM

## 2020-02-18 RX ORDER — SODIUM CHLORIDE, SODIUM LACTATE, POTASSIUM CHLORIDE, CALCIUM CHLORIDE 600; 310; 30; 20 MG/100ML; MG/100ML; MG/100ML; MG/100ML
1000 INJECTION, SOLUTION INTRAVENOUS CONTINUOUS
Status: DISCONTINUED | OUTPATIENT
Start: 2020-02-18 | End: 2020-02-18 | Stop reason: HOSPADM

## 2020-02-18 RX ORDER — PIPERACILLIN SODIUM, TAZOBACTAM SODIUM 3; .375 G/15ML; G/15ML
INJECTION, POWDER, LYOPHILIZED, FOR SOLUTION INTRAVENOUS PRN
Status: DISCONTINUED | OUTPATIENT
Start: 2020-02-18 | End: 2020-02-18 | Stop reason: SURG

## 2020-02-18 RX ORDER — PHENYLEPHRINE HCL IN 0.9% NACL 0.5 MG/5ML
SYRINGE (ML) INTRAVENOUS PRN
Status: DISCONTINUED | OUTPATIENT
Start: 2020-02-18 | End: 2020-02-18 | Stop reason: SURG

## 2020-02-18 RX ADMIN — Medication 100 MCG: at 13:30

## 2020-02-18 RX ADMIN — SUGAMMADEX 120 MG: 100 INJECTION, SOLUTION INTRAVENOUS at 13:54

## 2020-02-18 RX ADMIN — ENOXAPARIN SODIUM 40 MG: 100 INJECTION SUBCUTANEOUS at 06:37

## 2020-02-18 RX ADMIN — CEFTRIAXONE SODIUM 2 G: 2 INJECTION, POWDER, FOR SOLUTION INTRAMUSCULAR; INTRAVENOUS at 20:34

## 2020-02-18 RX ADMIN — CYCLOBENZAPRINE HYDROCHLORIDE 10 MG: 10 TABLET, FILM COATED ORAL at 15:45

## 2020-02-18 RX ADMIN — TAMSULOSIN HYDROCHLORIDE 0.4 MG: 0.4 CAPSULE ORAL at 06:37

## 2020-02-18 RX ADMIN — INSULIN HUMAN 3 UNITS: 100 INJECTION, SOLUTION PARENTERAL at 17:54

## 2020-02-18 RX ADMIN — OMEPRAZOLE 20 MG: 20 CAPSULE, DELAYED RELEASE ORAL at 06:37

## 2020-02-18 RX ADMIN — FENTANYL CITRATE 50 MCG: 50 INJECTION, SOLUTION INTRAMUSCULAR; INTRAVENOUS at 13:22

## 2020-02-18 RX ADMIN — MAGNESIUM SULFATE IN WATER 4 G: 40 INJECTION, SOLUTION INTRAVENOUS at 09:43

## 2020-02-18 RX ADMIN — INSULIN HUMAN 4 UNITS: 100 INJECTION, SOLUTION PARENTERAL at 20:34

## 2020-02-18 RX ADMIN — SODIUM CHLORIDE: 9 INJECTION, SOLUTION INTRAVENOUS at 06:40

## 2020-02-18 RX ADMIN — INSULIN HUMAN 5 UNITS: 100 INJECTION, SUSPENSION SUBCUTANEOUS at 06:45

## 2020-02-18 RX ADMIN — PANCRELIPASE 48000 UNITS: 24000; 76000; 120000 CAPSULE, DELAYED RELEASE PELLETS ORAL at 06:37

## 2020-02-18 RX ADMIN — SODIUM CHLORIDE, POTASSIUM CHLORIDE, SODIUM LACTATE AND CALCIUM CHLORIDE: 600; 310; 30; 20 INJECTION, SOLUTION INTRAVENOUS at 13:16

## 2020-02-18 RX ADMIN — PROPOFOL 150 MG: 10 INJECTION, EMULSION INTRAVENOUS at 13:22

## 2020-02-18 RX ADMIN — ACETAMINOPHEN 650 MG: 325 TABLET, FILM COATED ORAL at 15:45

## 2020-02-18 RX ADMIN — PIPERACILLIN AND TAZOBACTAM 3.38 G: 3; .375 INJECTION, POWDER, LYOPHILIZED, FOR SOLUTION INTRAVENOUS; PARENTERAL at 13:39

## 2020-02-18 RX ADMIN — SODIUM CHLORIDE: 9 INJECTION, SOLUTION INTRAVENOUS at 15:12

## 2020-02-18 RX ADMIN — ROCURONIUM BROMIDE 30 MG: 10 INJECTION, SOLUTION INTRAVENOUS at 13:22

## 2020-02-18 RX ADMIN — Medication 100 MG: at 06:37

## 2020-02-18 RX ADMIN — INSULIN HUMAN 3 UNITS: 100 INJECTION, SOLUTION PARENTERAL at 06:46

## 2020-02-18 RX ADMIN — Medication 100 MCG: at 13:40

## 2020-02-18 RX ADMIN — INSULIN HUMAN 5 UNITS: 100 INJECTION, SUSPENSION SUBCUTANEOUS at 17:54

## 2020-02-18 RX ADMIN — ONDANSETRON 4 MG: 2 INJECTION INTRAMUSCULAR; INTRAVENOUS at 13:54

## 2020-02-18 RX ADMIN — ATORVASTATIN CALCIUM 40 MG: 40 TABLET, FILM COATED ORAL at 17:56

## 2020-02-18 RX ADMIN — METRONIDAZOLE 500 MG: 500 TABLET ORAL at 06:37

## 2020-02-18 RX ADMIN — METRONIDAZOLE 500 MG: 500 TABLET ORAL at 14:00

## 2020-02-18 RX ADMIN — ASPIRIN 81 MG CHEWABLE TABLET 81 MG: 81 TABLET CHEWABLE at 06:37

## 2020-02-18 RX ADMIN — LIDOCAINE HYDROCHLORIDE 60 MG: 20 INJECTION, SOLUTION EPIDURAL; INFILTRATION; INTRACAUDAL; PERINEURAL at 13:22

## 2020-02-18 RX ADMIN — METRONIDAZOLE 500 MG: 500 TABLET ORAL at 21:08

## 2020-02-18 RX ADMIN — OMEPRAZOLE 20 MG: 20 CAPSULE, DELAYED RELEASE ORAL at 17:56

## 2020-02-18 ASSESSMENT — ENCOUNTER SYMPTOMS
SHORTNESS OF BREATH: 0
SPUTUM PRODUCTION: 0
EYE PAIN: 0
SORE THROAT: 0
NECK PAIN: 0
BLURRED VISION: 0
BACK PAIN: 0
DEPRESSION: 0
DIZZINESS: 0
VOMITING: 0
FEVER: 0
COUGH: 0
DIARRHEA: 0
INSOMNIA: 0
ABDOMINAL PAIN: 1
TINGLING: 0
HEADACHES: 0
CHILLS: 0
HEARTBURN: 0
CONSTIPATION: 0
DIAPHORESIS: 0

## 2020-02-18 ASSESSMENT — PAIN SCALES - GENERAL: PAIN_LEVEL: 0

## 2020-02-18 NOTE — PROGRESS NOTES
Telemetry Strip     Strip printed: 1842  Measurements from am strip were as follows:  Rhythm: SR  HR: 94  Measurements: 0.16/0.08/0.34        Telemetry Shift Summary:    Rhythm: SR  HR: 70-80's  Ectopy: Rare PVC, rare couplet        Normal Values  Rhythm SR  HR Range    Measurements 0.12-0.20 / 0.06-0.10  / 0.30-0.52

## 2020-02-18 NOTE — PROGRESS NOTES
Received a call from GI. Patient is scheduled to have ERCP and EUS on 2/20. Patient will be NPO for procedure.

## 2020-02-18 NOTE — ANESTHESIA QCDR
2019 [facility] Clinical Data Registry (for Quality Improvement)     Postoperative nausea/vomiting risk protocol (Adult = 18 yrs and Pediatric 3-17 yrs)- (430 and 463)  General inhalation anesthetic (NOT TIVA) with PONV risk factors: No  Provision of anti-emetic therapy with at least 2 different classes of agents: N/A  Patient DID NOT receive anti-emetic therapy and reason is documented in Medical Record: N/A    Multimodal Pain Management- (477)  Non-emergent surgery AND patient age >= 18: No  Use of Multimodal Pain Management, two or more drugs and/or interventions, NOT including systemic opioids:   Exception: Documented allergy to multiple classes of analgesics:     Smoking Abstinence (404)  Patient is current smoker (cigarette, pipe, e-cig, marijuanna): Yes  Elective Surgery: No  Abstinence instructions provided prior to day of surgery:   Patient abstained from smoking on day of surgery:     Pre-Op Beta-Blocker in Isolated CABG (44)  Isolated CABG AND patient age >= 18: No  Beta-blocker admin within 24 hours of surgical incision:   Exception:of medical reason(s) for not administering beta blocker within 24 hours prior to surgical incision (e.g., not  indicated,other medical reason):     PACU assessment of acute postoperative pain prior to Anesthesia Care End- Applies to Patients Age = 18- (ABG7)  Initial PACU pain score is which of the following: < 7/10  Patient unable to report pain score: N/A    Post-anesthetic transfer of care checklist/protocol to PACU/ICU- (426 and 427)  Upon conclusion of case, patient transferred to which of the following locations: PACU/Non-ICU  Use of transfer checklist/protocol: Yes  Exclusion: Service Performed in Patient Hospital Room (and thus did not require transfer): N/A  Unplanned admission to ICU related to anesthesia service up through end of PACU care- (MD51)  Unplanned admission to ICU (not initially anticipated at anesthesia start time): No

## 2020-02-18 NOTE — CARE PLAN
Problem: Safety  Goal: Will remain free from injury  Outcome: PROGRESSING AS EXPECTED  Note: Bed locked and in lowest position. Call bell within reach.   Goal: Will remain free from falls  Outcome: PROGRESSING AS EXPECTED  Note: Bed locked and in lowest position. Call bell within reach. Patient wearing hospital gown and treaded slipper socks.

## 2020-02-18 NOTE — PROGRESS NOTES
Patient sitting up in bed resting, patient denies pain at this time. Will continue to monitor. Safety precautions in place.

## 2020-02-18 NOTE — OR SURGEON
Immediate Post OP Note    PreOp Diagnosis: Bile duct obstruction     Chronic pancreatitis    PostOp Diagnosis: Same    Procedure(s):  ERCP (ENDOSCOPIC RETROGRADE CHOLANGIOPANCREATOGRAPHY) - Wound Class: Clean Contaminated  EGD, WITH ENDOSCOPIC US - Wound Class: Clean Contaminated    Surgeon(s):  Carlos Alberto Ansari M.D.    Anesthesiologist/Type of Anesthesia:  Anesthesiologist: Yolanda Graham M.D./* No anesthesia type entered *    Surgical Staff:  Circulator: Da Carroll R.N.  Endoscopy Technician: Nydia Moser  Radiology Technologist: Ann Marie Soto    Specimens removed if any:  ID Type Source Tests Collected by Time Destination   A : fundus bx Tissue Gastric PATHOLOGY SPECIMEN Carlos Alberto Ansari M.D. 2/18/2020  1:41 PM        Dr. Ansari  [facility]  [facility]  [phone]    EGD with: Biopsy    EUS upper    Indication: Bile duct obstruction, abnormal CT    Sedation: GA (Gladys)    Findings:   EGD    Esophagus     Unremarkable mucosa    Stomach     Diffuse edematous folds     Mildly dusky discoloration of fundus - biopsied    Duodenum     Unremarkable mucosa     Good bile flow     EUS    Celiac axis     No adenopathy    Pancreas body/tail     Heterogeneous, hypoechoic     Severe calcifications     Extensive shadowing obscuring view    Pancreatic duct     Normal course     Massive dilation - 10.0mm in tail, 12.4mm in body    Head of pancreas     Heterogeneous, hypoechoic     Severe calcifications    Ampulla     Prominent but otherwise unremarkable    CBD     Obscured by calcifications in head of pancreas     ERCP - not performed due to improving enzymes    Plan:  Follow liver enzymes    Agree with continued antibiotics    Consider repeat imaging with CT in 2-6 weeks      2/18/2020 2:02 PM Carlos Alberto Ansari M.D.

## 2020-02-18 NOTE — ANESTHESIA TIME REPORT
Anesthesia Start and Stop Event Times     Date Time Event    2/18/2020 01:07 PM Ready for Procedure    2/18/2020 01:16 PM Anesthesia Start    2/18/2020 02:05 PM Anesthesia Stop        Responsible Staff  02/18/20    Name Role Begin End    Yolanda Graham M.D. Anesthesiologist 02/18/20 01:16 PM 02/18/20 02:05 PM        Preop Diagnosis (Free Text):  Pre-op Diagnosis     dialated bile duct        Preop Diagnosis (Codes):  Diagnosis Information     Diagnosis Code(s): Chronic pancreatitis (HCC) [K86.1]        Post op Diagnosis  Chronic pancreatitis (HCC)      Premium Reason  Non-Premium    Comments: EGD, EUS

## 2020-02-18 NOTE — PROGRESS NOTES
Received report from KELLEY Spain. Patient is awake, Pt resting comfortably in bed, plan of care discussed with patient, declines any needs at this time and denies pain. Call light within reach and safety precautions in place.

## 2020-02-18 NOTE — OR NURSING
1244- Discussion with Marie family member of patient. Marie stated that she currently does not have legal guardianship of the patient and cannot make decisions for the patient. Family member stated she will contact patient's sister POA and tell her to be available by phone.   1302- Dr. Graham discussing anesthesia to Aniyah PIPER by telephone  1306- Dr. Eaton discussing procedure to Aniyah PIPER by telephone.   1310- Discussion with Aniyah PIPER regarding 2 RN telephone consent obtained for anesthesia and procedure. All questions and concerns answered prior to consent.

## 2020-02-18 NOTE — PROCEDURES
DATE OF SERVICE:  02/18/2020    PROCEDURES:    1.  Esophagogastroduodenoscopy with biopsy.    2.  Endoscopic ultrasound, upper.      INDICATION:  Bile duct obstruction, abnormal CT scan.    FINAL IMPRESSION:    ESOPHAGOGASTRODUODENOSCOPY FINDINGS:    1.  Esophagus, unremarkable mucosa.  2.  Stomach, diffuse edematous folds.  Mildly dusky discoloration of the   fundus, biopsied.  3.  Duodenum, unremarkable mucosa with good bile flow.     ENDOSCOPIC ULTRASOUND FINDINGS:    1.  Celiac axis, no adenopathy.  2.  Pancreatic body and tail and surrounding structures, heterogeneous,   hypoechoic, severe calcifications within the pancreatic body and tail with   extensive shadowing obscuring surrounding view.   3.  Pancreatic duct, normal course.  Massive dilation measuring 10 mm in the   tail and 12.4 mm in the body.  4.  Head of the pancreas, heterogeneous, hypoechoic with severe calcifications   obscuring view.    5.  Biliary ampulla prominent, but otherwise unremarkable.    6.  Common bile duct obscured by calcifications in the head of the pancreas.      ERCP was planned, but not performed due to the patient's improving enzymes and   findings of severe chronic pancreatitis without ongoing biliary obstruction.        RECOMMENDATIONS:    1.  Follow liver enzymes.    2.  Agree with continued antibiotics.    3.  Consider repeat imaging with CT scan in 2-6 weeks.    4.  Concerns for persistent fluid collections, consider interventional   radiology fine needle aspiration.     PROCEDURE:  Prior to the procedure, physical exam was stable.  During   procedure, vital signs remained within normal limits.  Prior to sedation,   informed consent was obtained.  Risks, benefits, alternatives including but   not limited to risk of bleeding, infection, perforation, adverse reaction to   medication, failure to identify pathology, pancreatitis and death explained to   the patient and his sister via the phone, they accepted all risks.  Patient    prepped in the prone position after intubation and sedation by anesthesia.      EGD:  Scope tip of the Olympus flexible gastroscope passed in the proximal   esophagus.  Proximal, middle and distal thirds of the esophagus were well   visualized and were unremarkable.  Stomach was entered.  Gastric pool was   suctioned.  Of note, there was extensive clear thick mucus material throughout   the stomach, which made it difficult to visualize.  The gastric folds were   thickened and edematous also making insufflation challenging.  Air was   insufflated, scope retroflexed to view the body, fundus, and cardia and the   fundus, there was a slightly dusky discoloration to the mucosa between the   folds of unclear significance.  Biopsies were taken.  Other than thickened   folds throughout the body, the distal stomach was unremarkable.  The stomach   could not adequately be insufflated partially due to position, but also due to   edema.  Pylorus was patent.  Duodenal bulb, sweep, second and third portion   were visualized.  The mucosa was unremarkable, but there was edema.  There was   excellent bile flow seen.  The biliary ampulla was seen tangentially and   there was no mass appreciated, but it was slightly bulbous.  The gastroscope   was withdrawn and we proceeded with endoscopic ultrasound.      EUS:  The flexible radial echoendoscope passed in the proximal stomach.    Imaging of the celiac axis was unremarkable with no adenopathy.  Imaging of   the pancreatic body and tail demonstrated a hypoechoic, heterogeneous gland   that was filled with severe calcifications, which had extensive shadowing   obscuring view of deeper structures and surrounding structures.  No abscess or   fluid collection could be appreciated.  The pancreatic duct was visualized   and had a normal course through to the tail, but was massively dilated up to   12.4 mm in the body and 10.0 mm in the tail.  No masses or fluid collection as   mentioned  were seen.  Scope advanced in the duodenum.  The common bile duct   was obscured by calcifications in the head of the pancreas and was generous in   size upstream, but no filling defects seen.  The biliary ampulla was   prominent, but no mass was seen or other pathology.  The head of the pancreas   showed severe calcifications similar to the body of the pancreas with   extensive shadowing making visualization of the head of the pancreas   impossible.  The visualized portions of the head of the pancreas were severely   diseased, hypoechoic and heterogeneous.  It was felt that given that no mass   was appreciated, the radial echoendoscope was withdrawn.  It was felt that due   to the dense calcifications, the improving liver enzymes and the lack of   symptoms that the better part of valor would be to not perform the ERCP at   this time and hold off for a later date in case it is necessary.  Once this   was complete, the procedure was deemed complete.  Patient tolerated the   procedure well and was sent to recovery without immediate complications.       ____________________________________     MD ROGELIO VELA / SALINAS    DD:  02/18/2020 14:17:36  DT:  02/18/2020 14:42:23    D#:  [id number]  Job#:  [id number]

## 2020-02-18 NOTE — PROGRESS NOTES
Aranza from Lab called with critical result of 31.5 WBC at 0527. Critical lab result read back to Aranza.   This critical lab result is within parameters established by  for this patient

## 2020-02-18 NOTE — PROGRESS NOTES
Received report from KELLEY Yanez. Patient resting in bed watching TV. POC discussed. Patient has complaints of 8/10 back pain, medication administered (see MAR). Patient denies any further needs at this time. Safety precautions in place. Call bell within reach. Will continue to monitor.

## 2020-02-18 NOTE — PROGRESS NOTES
Rounded on patient, patient denies pain. Patient asked questions about when procedure will be done. Gave patient a estimated timeline for pre-op, surgery, and post-op and when he will be back on unit. Patient verbalized understanding.

## 2020-02-18 NOTE — CARE PLAN
Problem: Nutritional:  Goal: Achieve adequate nutritional intake  Description: Patient will consume 50% of meals   Outcome: PROGRESSING SLOWER THAN EXPECTED   Per RN, diet held today for ERCP.  Only one meal recorded past 2 days: lunch 2/16 = <25%.  Per RN, pt does drink Boost.  Continue to encourage PO intake once diet resumes.

## 2020-02-18 NOTE — PROGRESS NOTES
[facility] Medicine Daily Progress Note    Date of Service  2/18/2020    Chief Complaint  59 y.o. male admitted 2/14/2020 with a poor appetite.     Hospital Course    He was found to have evidence for a marked new elevation in his alk phos and a profound leukocytosis. He was treated for abdominal sepsis and had an MRI and HIDA ordered. His MRi showed evidence for biliary obstruction and a possible pancreatic neoplasm. His CA 19-9 level was elevated and GI was consulted for ERCP       Interval Problem Update  Blood sugars are better today but the patient has variable intake due to being npo for ERCP      Consultants/Specialty  GI Dr. Ansari    Code Status  full    Disposition  Hospice if cancer is detected per sister    Review of Systems  Review of Systems   Constitutional: Negative for chills, diaphoresis, fever and malaise/fatigue.   HENT: Negative for sore throat.    Eyes: Negative for blurred vision and pain.   Respiratory: Negative for cough, sputum production and shortness of breath.    Cardiovascular: Negative for chest pain and leg swelling.   Gastrointestinal: Positive for abdominal pain. Negative for constipation, diarrhea, heartburn and vomiting.   Genitourinary: Negative for frequency and urgency.   Musculoskeletal: Negative for back pain and neck pain.   Skin: Negative for itching.   Neurological: Negative for dizziness, tingling and headaches.   Psychiatric/Behavioral: Negative for depression. The patient does not have insomnia.         Physical Exam  Temp:  [36.3 °C (97.4 °F)-37.3 °C (99.1 °F)] 36.7 °C (98.1 °F)  Pulse:  [79-90] 89  Resp:  [18-19] 18  BP: (116-132)/(60-67) 128/63  SpO2:  [90 %-92 %] 91 %    Physical Exam  Vitals signs and nursing note reviewed.   Constitutional:       General: He is not in acute distress.     Appearance: He is well-developed. He is not ill-appearing or diaphoretic.   HENT:      Nose: No congestion or rhinorrhea.      Mouth/Throat:      Mouth: Mucous membranes are  moist.      Pharynx: Oropharynx is clear.   Eyes:      General: No scleral icterus.     Conjunctiva/sclera: Conjunctivae normal.   Neck:      Musculoskeletal: Neck supple. No muscular tenderness.      Vascular: No JVD.      Trachea: No tracheal deviation.   Cardiovascular:      Rate and Rhythm: Normal rate and regular rhythm.      Pulses: Normal pulses.      Heart sounds: Normal heart sounds. No murmur.   Pulmonary:      Effort: No respiratory distress.      Breath sounds: Normal breath sounds. No stridor. No wheezing, rhonchi or rales.   Abdominal:      General: There is no distension.      Palpations: Abdomen is soft.      Tenderness: There is abdominal tenderness. There is no guarding or rebound.   Musculoskeletal:         General: No tenderness.   Skin:     General: Skin is warm and dry.      Coloration: Skin is not jaundiced.      Findings: No bruising or erythema.   Neurological:      Mental Status: He is alert and oriented to person, place, and time.      Motor: No weakness.   Psychiatric:         Behavior: Behavior normal.         Cognition and Memory: Cognition is impaired. Memory is impaired.         Fluids    Intake/Output Summary (Last 24 hours) at 2/18/2020 0839  Last data filed at 2/17/2020 2350  Gross per 24 hour   Intake 100 ml   Output 650 ml   Net -550 ml       Laboratory  Recent Labs     02/16/20  0400 02/17/20  0335 02/18/20  0235   WBC 27.0* 30.8* 31.5*   RBC 3.26* 3.27* 3.18*   HEMOGLOBIN 9.6* 9.6* 9.5*   HEMATOCRIT 27.5* 27.8* 27.2*   MCV 84.4 85.0 85.5   MCH 29.4 29.4 29.9   MCHC 34.9 34.5 34.9   RDW 45.3 45.5 46.6   PLATELETCT 374 345 388   MPV 11.2 11.5 11.7     Recent Labs     02/16/20  0400 02/17/20  0335 02/18/20  0235   SODIUM 132* 130* 133*   POTASSIUM 3.7 3.1* 3.2*   CHLORIDE 98 96 101   CO2 20 22 22   GLUCOSE 384* 244* 174*   BUN 28* 17 12   CREATININE 0.66 0.59 0.53   CALCIUM 8.3* 7.9* 7.7*                   Imaging  NM-HEPATOBILIARY SCAN   Final Result      1.  Biliary dilatation  without evidence of high-grade biliary obstruction.   2.  Gallbladder visualization after morphine administration. No acute cholecystitis.      CT-ABDOMEN-PELVIS WITH   Final Result      1.  New rim enhancing fluid collection tracks along the right heart margin, azygoesophageal recess and possibly communicates with Morison's pouch tracking along the right aspect of the inferior vena cava. This is nonspecific and could indicate abscess,    dissecting pseudocyst, mucinous cystic neoplasm related fluid      2.  New extra and intrahepatic biliary ductal dilatation with 6 cm dilatation of the gallbladder also seen. This indicates distal common bile duct/ampullary level obstruction. Stricture, mass effect from adjacent pancreas calcifications or small    ampullary mass are differential possibilities      3.  Decreased small pleural effusions. Some loculation on the right is possible      4.  Slightly decreased diffuse pancreatic ductal dilatation now measuring 11 mm. Evidence of chronic pancreatitis. New 3 cm cystic structure anterior to the pancreas could be a pseudocyst. Abscess or necrotic/cystic neoplasm are in the differential.      5.  Multiple chronic findings including status post aorto bifemoral bypass with approaching 70% stenosis of the left superficial femoral artery, nonobstructive right 3 mm nephrolithiasis, large volume rectal vault stool, dilated bladder which could    indicate outlet obstruction or neurogenic bladder, splenosis      [id number]   Final Result      1.  Severe dilatation of the intrahepatic biliary ducts, common bile duct and pancreatic duct. The distal common bile duct and the proximal pancreatic duct is not visualized at the pancreatic head. The pancreatic head appears to be diffusely enlarged and    demonstrates multiple small cysts. These findings are concerning for pancreatic neoplasm such as intraductal papillary mucinous cystoadenoma. The other differential diagnosis includes  periampullary carcinoma. MR examination of the abdomen with contrast    is recommended for further evaluation.   2.  Mixed signal intensity in the right perinephric space likely represents perinephric hematoma.   3.  Large hiatal hernia.   4.  Diffuse gastric wall thickening.      US-RUQ   Final Result      1.  Hepatomegaly.   2.  Hepatic steatosis.   3.  Gallbladder sludge. Mild intrahepatic ductal dilation. Dilation of the common duct up to 13 mm. No definite distal obstructing etiology is identified. MRCP could be obtained for further evaluation if indicated.   4.  Trace ascites.   5.  Pancreatic calcifications likely sequela of chronic pancreatitis.      DX-CHEST-PORTABLE (1 VIEW)   Final Result      Cardiomegaly.           Assessment/Plan  * Pancreatic mass  Assessment & Plan  MRI with likely pancreatic malignancy. GI scheduling ERCP. elevated ca19-9  Possible procedure today    Type 2 diabetes mellitus with hyperglycemia, with long-term current use of insulin (HCC)- (present on admission)  Assessment & Plan  He has an insulin sensor and his right lower quadrant, uses long-acting insulin 12 units and sliding scale.  Sliding scale insulin while npo for procedures    Cognitive decline- (present on admission)  Assessment & Plan  Patient likely has a chronic component to his cognitive decline as well as acute worsening  Will monitor mentation, patient has a legal guardian    Metabolic acidosis  Assessment & Plan  IV fluids with improvement    Hypokalemia  Assessment & Plan  Replace potassium as needed  Replace mag iv today    Sepsis (HCC)  Assessment & Plan  This is Sepsis Present on admission  SIRS criteria identified on my evaluation include: Tachycardia, with heart rate greater than 90 BPM and Leukocyosis, with WBC greater than 12,000  Source is GI  Sepsis protocol initiated  Continue antibiotics          Cholangitis  Assessment & Plan  Biliary obstruction suggested on imaging, GI consulted  Alk phos still  elevated, continue rocephin and flagyl    Recurrent major depressive disorder, in full remission (HCC)  Assessment & Plan  .    Hyponatremia  Assessment & Plan  Fluids to treat    Chronic pancreatitis (HCC)- (present on admission)  Assessment & Plan  Continue pancreatic enzyme    Dyslipidemia- (present on admission)  Assessment & Plan  Continue statin       VTE prophylaxis: scd

## 2020-02-18 NOTE — PROGRESS NOTES
GI ATTENDING:    Patient seen and examined.  Chart reviewed.  EGD, EUS, FNA, ERCP explained at length.    Patient alert, oriented asking appropriate questions regarding procedures and seems to have good insight.  He requests to proceed with endoscopy, but I will attempt to obtain consent from his sister/guardian given his questionable history of not being able to consent for himself.    Consenting person was given an opportunity to ask questions and discuss other options.  Risks including but not limited to pancreatitis, contrast reaction, stent migration and/or occlusion, perforation, infection, bleeding, missed lesion(s), cardiac and/or pulmonary event, aspiration, stroke, possible need for surgery and/or interventional radiology, hospitalization possibly prolonged, discomfort, unsuccessful and/or incomplete procedure, indefinite diagnosis, ineffective therapy and/or persistent symptoms, possible need for repeat procedures and/or additional testings, damage to adjacent organs and/or vascular structures, medication reaction, disability, death, and other adverse events possibly life-threatening.  Discussion was undertaken with Layman's terms.  Consenting person stated understanding and acceptance of these risks, and wished to proceed.  Informed consent was given in clear state of mind.

## 2020-02-18 NOTE — ANESTHESIA POSTPROCEDURE EVALUATION
Patient: Pawel Tatum    Procedure Summary     Date:  02/18/20 Room / Location:   ENDOSCOPIC ULTRASOUND ROOM / SURGERY [facility]    Anesthesia Start:  1316 Anesthesia Stop:  1405    Procedures:       ERCP (ENDOSCOPIC RETROGRADE CHOLANGIOPANCREATOGRAPHY)      EGD, WITH ENDOSCOPIC US Diagnosis:       Chronic pancreatitis (HCC)      (Chronic pancreatitis (HCC) [577.1.ICD-9-CM])    Surgeon:  Carlos Alberto Ansari M.D. Responsible Provider:  Yolanda Graham M.D.    Anesthesia Type:  general ASA Status:  2          Final Anesthesia Type: general  Last vitals  BP   Blood Pressure: 110/59    Temp   36.9 °C (98.4 °F)    Pulse   Pulse: 82   Resp   20    SpO2   95 %      Anesthesia Post Evaluation    Patient location during evaluation: PACU  Patient participation: complete - patient participated  Level of consciousness: awake and alert  Pain score: 0    Airway patency: patent  Anesthetic complications: no  Cardiovascular status: hemodynamically stable  Respiratory status: acceptable  Hydration status: euvolemic    PONV: none           Nurse Pain Score: 0 (NPRS)

## 2020-02-18 NOTE — OR NURSING
1403- Pt to pacu via gurney with side rails up.  VSS.  Pt arouses to voice, denies pain/ nausea.  1418- VSS. Pt resting quietly.  1433- VSS.  Pt's sister updated via phone. Upper denture back in pt's mouth.  1440- Report to Renata BENSON on [employer], pt meets transfer criteria.

## 2020-02-18 NOTE — PROGRESS NOTES
Telemetry Shift Summary    Rhythm:  SR  HR Range: 80-93  Ectopy: r PVC  Measurements: .18/.08/.36          Normal Values  Rhythm SR  HR Range   Measurements 0.12 / 0.20 / 0.06-0.10 / 0.30-0.52

## 2020-02-18 NOTE — DISCHARGE PLANNING
LSW spoke with Shannon, pending legal guardianship, who was given permission by daughter to assist with keeping informed with pt's hospital stay while [place] is [place] of [place]. Shannon stated she would like pt to go on hospice with [facility]. PATRICIAW informed MD.

## 2020-02-18 NOTE — PROGRESS NOTES
Patient refusing assessment. Patient has head under blankets and is agitated. Safety precautions in place. Call bell within reach. Will continue to monitor.

## 2020-02-18 NOTE — ANESTHESIA PROCEDURE NOTES
Airway  Date/Time: 2/18/2020 1:24 PM  Performed by: Yolanda Graham M.D.  Authorized by: Yolanda Graham M.D.     Location:  OR  Urgency:  Elective  Difficult Airway: No    Indications for Airway Management:  Anesthesia  Spontaneous Ventilation: absent    Sedation Level:  Deep  Preoxygenated: Yes    Patient Position:  Sniffing  Mask Difficulty Assessment:  2 - vent by mask + OA or adjuvant +/- NMBA  Final Airway Type:  Endotracheal airway  Final Endotracheal Airway:  ETT  Cuffed: Yes    Technique Used for Successful ETT Placement:  Direct laryngoscopy  Insertion Site:  Oral  Blade Type:  Gilberto  Laryngoscope Blade/Videolaryngoscope Blade Size:  4  ETT Size (mm):  7.0  Measured from:  Lips  ETT to Lips (cm):  22  Placement Verified by: auscultation and capnometry    Cormack-Lehane Classification:  Grade I - full view of glottis  Number of Attempts at Approach:  1  Number of Other Approaches Attempted:  0

## 2020-02-19 LAB
ALBUMIN SERPL BCP-MCNC: 2 G/DL (ref 3.2–4.9)
ALBUMIN/GLOB SERPL: 0.6 G/DL
ALP SERPL-CCNC: 790 U/L (ref 30–99)
ALT SERPL-CCNC: 18 U/L (ref 2–50)
ANION GAP SERPL CALC-SCNC: 10 MMOL/L (ref 7–16)
AST SERPL-CCNC: 34 U/L (ref 12–45)
BACTERIA BLD CULT: NORMAL
BACTERIA BLD CULT: NORMAL
BASOPHILS # BLD AUTO: 0 % (ref 0–1.8)
BASOPHILS # BLD: 0 K/UL (ref 0–0.12)
BILIRUB SERPL-MCNC: 0.8 MG/DL (ref 0.1–1.5)
BUN SERPL-MCNC: 10 MG/DL (ref 8–22)
CALCIUM SERPL-MCNC: 7.8 MG/DL (ref 8.4–10.2)
CHLORIDE SERPL-SCNC: 103 MMOL/L (ref 96–112)
CO2 SERPL-SCNC: 21 MMOL/L (ref 20–33)
CREAT SERPL-MCNC: 0.64 MG/DL (ref 0.5–1.4)
EOSINOPHIL # BLD AUTO: 0 K/UL (ref 0–0.51)
EOSINOPHIL NFR BLD: 0 % (ref 0–6.9)
ERYTHROCYTE [DISTWIDTH] IN BLOOD BY AUTOMATED COUNT: 44.7 FL (ref 35.9–50)
GLOBULIN SER CALC-MCNC: 3.1 G/DL (ref 1.9–3.5)
GLUCOSE BLD-MCNC: 122 MG/DL (ref 65–99)
GLUCOSE BLD-MCNC: 145 MG/DL (ref 65–99)
GLUCOSE BLD-MCNC: 179 MG/DL (ref 65–99)
GLUCOSE SERPL-MCNC: 164 MG/DL (ref 65–99)
HCT VFR BLD AUTO: 26.4 % (ref 42–52)
HGB BLD-MCNC: 9.2 G/DL (ref 14–18)
HYPOCHROMIA BLD QL SMEAR: ABNORMAL
LG PLATELETS BLD QL SMEAR: NORMAL
LYMPHOCYTES # BLD AUTO: 1.5 K/UL (ref 1–4.8)
LYMPHOCYTES NFR BLD: 4 % (ref 22–41)
MANUAL DIFF BLD: NORMAL
MCH RBC QN AUTO: 29.7 PG (ref 27–33)
MCHC RBC AUTO-ENTMCNC: 34.8 G/DL (ref 33.7–35.3)
MCV RBC AUTO: 85.2 FL (ref 81.4–97.8)
METAMYELOCYTES NFR BLD MANUAL: 1 %
MONOCYTES # BLD AUTO: 0.75 K/UL (ref 0–0.85)
MONOCYTES NFR BLD AUTO: 2 % (ref 0–13.4)
MYELOCYTES NFR BLD MANUAL: 1 %
NEUTROPHILS # BLD AUTO: 34.41 K/UL (ref 1.82–7.42)
NEUTROPHILS NFR BLD: 85 % (ref 44–72)
NEUTS BAND NFR BLD MANUAL: 7 % (ref 0–10)
NRBC # BLD AUTO: 0.05 K/UL
NRBC BLD-RTO: 0.1 /100 WBC
PLATELET # BLD AUTO: 418 K/UL (ref 164–446)
PLATELET BLD QL SMEAR: NORMAL
PMV BLD AUTO: 11.6 FL (ref 9–12.9)
POLYCHROMASIA BLD QL SMEAR: NORMAL
POTASSIUM SERPL-SCNC: 3.4 MMOL/L (ref 3.6–5.5)
PROT SERPL-MCNC: 5.1 G/DL (ref 6–8.2)
RBC # BLD AUTO: 3.1 M/UL (ref 4.7–6.1)
RBC BLD AUTO: PRESENT
SIGNIFICANT IND 70042: NORMAL
SIGNIFICANT IND 70042: NORMAL
SITE SITE: NORMAL
SITE SITE: NORMAL
SODIUM SERPL-SCNC: 134 MMOL/L (ref 135–145)
SOURCE SOURCE: NORMAL
SOURCE SOURCE: NORMAL
TARGETS BLD QL SMEAR: NORMAL
TOXIC GRANULES BLD QL SMEAR: NORMAL
WBC # BLD AUTO: 37.4 K/UL (ref 4.8–10.8)

## 2020-02-19 PROCEDURE — 85007 BL SMEAR W/DIFF WBC COUNT: CPT

## 2020-02-19 PROCEDURE — 82962 GLUCOSE BLOOD TEST: CPT | Mod: 91

## 2020-02-19 PROCEDURE — A9270 NON-COVERED ITEM OR SERVICE: HCPCS | Performed by: INTERNAL MEDICINE

## 2020-02-19 PROCEDURE — 770020 HCHG ROOM/CARE - TELE (206)

## 2020-02-19 PROCEDURE — 700102 HCHG RX REV CODE 250 W/ 637 OVERRIDE(OP): Performed by: HOSPITALIST

## 2020-02-19 PROCEDURE — A9270 NON-COVERED ITEM OR SERVICE: HCPCS | Performed by: HOSPITALIST

## 2020-02-19 PROCEDURE — 80053 COMPREHEN METABOLIC PANEL: CPT

## 2020-02-19 PROCEDURE — 700111 HCHG RX REV CODE 636 W/ 250 OVERRIDE (IP): Performed by: INTERNAL MEDICINE

## 2020-02-19 PROCEDURE — 700102 HCHG RX REV CODE 250 W/ 637 OVERRIDE(OP): Performed by: INTERNAL MEDICINE

## 2020-02-19 PROCEDURE — 700105 HCHG RX REV CODE 258: Performed by: INTERNAL MEDICINE

## 2020-02-19 PROCEDURE — 85027 COMPLETE CBC AUTOMATED: CPT

## 2020-02-19 PROCEDURE — 99232 SBSQ HOSP IP/OBS MODERATE 35: CPT | Performed by: INTERNAL MEDICINE

## 2020-02-19 RX ADMIN — SODIUM CHLORIDE: 9 INJECTION, SOLUTION INTRAVENOUS at 03:00

## 2020-02-19 RX ADMIN — METRONIDAZOLE 500 MG: 500 TABLET ORAL at 06:42

## 2020-02-19 RX ADMIN — INSULIN HUMAN 5 UNITS: 100 INJECTION, SUSPENSION SUBCUTANEOUS at 17:13

## 2020-02-19 RX ADMIN — OMEPRAZOLE 20 MG: 20 CAPSULE, DELAYED RELEASE ORAL at 17:14

## 2020-02-19 RX ADMIN — Medication 100 MG: at 06:41

## 2020-02-19 RX ADMIN — TAMSULOSIN HYDROCHLORIDE 0.4 MG: 0.4 CAPSULE ORAL at 06:41

## 2020-02-19 RX ADMIN — PANCRELIPASE 48000 UNITS: 24000; 76000; 120000 CAPSULE, DELAYED RELEASE PELLETS ORAL at 06:41

## 2020-02-19 RX ADMIN — INSULIN HUMAN 5 UNITS: 100 INJECTION, SUSPENSION SUBCUTANEOUS at 06:47

## 2020-02-19 RX ADMIN — CYCLOBENZAPRINE HYDROCHLORIDE 10 MG: 10 TABLET, FILM COATED ORAL at 17:14

## 2020-02-19 RX ADMIN — ASPIRIN 81 MG CHEWABLE TABLET 81 MG: 81 TABLET CHEWABLE at 06:41

## 2020-02-19 RX ADMIN — OMEPRAZOLE 20 MG: 20 CAPSULE, DELAYED RELEASE ORAL at 06:41

## 2020-02-19 RX ADMIN — METRONIDAZOLE 500 MG: 500 TABLET ORAL at 13:30

## 2020-02-19 RX ADMIN — ENOXAPARIN SODIUM 40 MG: 100 INJECTION SUBCUTANEOUS at 06:42

## 2020-02-19 RX ADMIN — ATORVASTATIN CALCIUM 40 MG: 40 TABLET, FILM COATED ORAL at 17:14

## 2020-02-19 RX ADMIN — INSULIN HUMAN 3 UNITS: 100 INJECTION, SOLUTION PARENTERAL at 06:48

## 2020-02-19 RX ADMIN — ACETAMINOPHEN 650 MG: 325 TABLET, FILM COATED ORAL at 17:14

## 2020-02-19 ASSESSMENT — ENCOUNTER SYMPTOMS
TINGLING: 0
SINUS PAIN: 0
SORE THROAT: 0
INSOMNIA: 0
NAUSEA: 0
FEVER: 0
CHILLS: 0
DEPRESSION: 0
ABDOMINAL PAIN: 0
NECK PAIN: 0
VOMITING: 0
DIZZINESS: 0
DIARRHEA: 0
BACK PAIN: 0
HEARTBURN: 0
WEIGHT LOSS: 1
BLOOD IN STOOL: 0
ROS GI COMMENTS: PAIN IMPROVED
CONSTIPATION: 0
SHORTNESS OF BREATH: 0
WHEEZING: 0
PALPITATIONS: 0
COUGH: 0
DIAPHORESIS: 0

## 2020-02-19 NOTE — CARE PLAN
Problem: Safety  Goal: Will remain free from injury  Outcome: PROGRESSING AS EXPECTED  Goal: Will remain free from falls  Outcome: PROGRESSING AS EXPECTED  Note: Bed in low locked position, call bell within reach,  socks on

## 2020-02-19 NOTE — PROGRESS NOTES
Lab called with critcal lab, WBC elevated at 37.4. Page out to hospitalist to notify. Vitals stable and currently on antibiotics for sepsis protocol.

## 2020-02-19 NOTE — PROGRESS NOTES
"  Gastroenterology Progress Note     Author: Carlos Alberto Ansari M.D.  Date & Time Created: 2/19/2020 6:24 AM    Chief Complaint:  Elevated liver tests, abnormal MRI    Interval History:  Initial history (Dr. Villarreal):  59 y.o. male with IDDM, chronic pancreatitis seizure disorder, iron deficiency anemia who presented 2/14/2020 with lethargy and poor appetite per assisted living facility.  He is difficult historian as does not want to answer questions.  He states \"I feel fine and always have.\"  He was evaluated 12/2019 for iron deficiency anemia and DKA.  Had EGD with severe erosive esophagitis.  He refused to drink prep for colonoscopy.  He apparently was brought by assisted living facility after they noted some lethargy and poor appetite.  He has weight loss of 7 pounds over the past month or so.  He denies abdominal pain and states he never had abdominal pain and his appetite is great.  Denies fevers, chills, nausea, vomiting, diarrhea, constipation, melena, hematochezia.  In ED, he had significant leukocytosis with newly elevated alkaline phosphatase and bilirubin with fairly normal transaminases.  He was started on IVF and antibiotics.  He had RUQ US followed by MRCP showing dilated bile and pancreatic ducts, distended GB, chronic pancreatitis and possible abnormal lesions head of pancreas.    2/16/2020: Continues to deny any symptoms including abdominal pain, nausea, vomiting, fevers.  Was refusing IV placement and vitals overnight.  Says he is agreeable to IV placement today  02/17/2020 - No complaints.  Says he feels fine, but does report urine is darker.  I explained imaging findings at length.  02/18/2020 - EUS showed extensive, severe pancreatic calcifications making identification of any pancreatic mass or fluid collection impossible.  Mediastinum was not assessed.  ERCP not done due to normalizing liver enzymes and added risk to patient.  02/19/2020 - No events overnight.  Patient denies " "fevers/chills/sweats and states he \"feels fine\".  Labs show worsening leukocytosis with improving liver enzymes.    Review of Systems:  Review of Systems   Constitutional: Positive for weight loss. Negative for chills, fever and malaise/fatigue.   Respiratory: Negative for shortness of breath.    Cardiovascular: Negative for chest pain.   Gastrointestinal: Negative for abdominal pain, blood in stool, constipation, diarrhea, heartburn, melena, nausea and vomiting.       Physical Exam:  Physical Exam  Vitals signs and nursing note reviewed.   Constitutional:       Appearance: Normal appearance.   Eyes:      General: No scleral icterus.  Cardiovascular:      Rate and Rhythm: Normal rate and regular rhythm.      Pulses: Normal pulses.      Heart sounds: Normal heart sounds.   Pulmonary:      Effort: Pulmonary effort is normal. No respiratory distress.      Breath sounds: Normal breath sounds. No stridor. No wheezing, rhonchi or rales.   Abdominal:      General: Abdomen is flat. Bowel sounds are normal. There is no distension.      Palpations: Abdomen is soft. There is no mass.      Tenderness: There is no abdominal tenderness. There is no guarding or rebound.      Hernia: No hernia is present.   Skin:     General: Skin is warm and dry.      Coloration: Skin is not jaundiced.   Neurological:      General: No focal deficit present.      Mental Status: He is alert and oriented to person, place, and time.         Labs:          Recent Labs     02/17/20 0335 02/18/20 0235 02/19/20 0211   SODIUM 130* 133* 134*   POTASSIUM 3.1* 3.2* 3.4*   CHLORIDE 96 101 103   CO2 22 22 21   BUN 17 12 10   CREATININE 0.59 0.53 0.64   MAGNESIUM  --  1.2*  --    CALCIUM 7.9* 7.7* 7.8*     Recent Labs     02/17/20 0335 02/18/20  0235 02/19/20  0211   ALTSGPT 21 18 18   ASTSGOT 29 29 34   ALKPHOSPHAT 945* 824* 790*   TBILIRUBIN 0.9 0.8 0.8   GLUCOSE 244* 174* 164*     Recent Labs     02/17/20 0335 02/18/20  0235 02/19/20  0211   RBC 3.27* " 3.18* 3.10*   HEMOGLOBIN 9.6* 9.5* 9.2*   HEMATOCRIT 27.8* 27.2* 26.4*   PLATELETCT 345 388 418     Recent Labs     20  0335 20  0235 20  0211   WBC 30.8* 31.5* 37.4*   NEUTSPOLYS 66.00 64.00 85.00*   LYMPHOCYTES 10.00* 10.00* 4.00*   MONOCYTES 16.00* 18.00* 2.00   EOSINOPHILS 2.00 2.00 0.00   BASOPHILS 0.00 0.00 0.00   ASTSGOT 29 29 34   ALTSGPT 21 18 18   ALKPHOSPHAT 945* 824* 790*   TBILIRUBIN 0.9 0.8 0.8     Hemodynamics:  Temp (24hrs), Av.9 °C (98.5 °F), Min:36.7 °C (98.1 °F), Max:37.2 °C (99 °F)  Temperature: (pt refusing )  Pulse  Av.5  Min: 50  Max: 122   Blood Pressure: 123/57     Respiratory:    Respiration: 18, Pulse Oximetry: (pt refused)        RUL Breath Sounds: Clear, RML Breath Sounds: Clear, RLL Breath Sounds: Clear, LEONARD Breath Sounds: Clear, LLL Breath Sounds: Clear  Fluids:    Intake/Output Summary (Last 24 hours) at 2020 0706  Last data filed at 2/15/2020 2200  Gross per 24 hour   Intake 2485 ml   Output 600 ml   Net 1885 ml        GI/Nutrition:  Orders Placed This Encounter   Procedures   • Diet Order Diabetic     Standing Status:   Standing     Number of Occurrences:   1     Order Specific Question:   Diet:     Answer:   Diabetic [3]     Medical Decision Making, by Problem:  Active Hospital Problems    Diagnosis   • *Sepsis (HCC) [A41.9]   • Elevated alkaline phosphatase level [R74.8]   • Type 2 diabetes mellitus with hyperglycemia, with long-term current use of insulin (HCC) [E11.65, Z79.4]   • Cognitive decline [R41.89]   • Metabolic acidosis [E87.2]   • Hypokalemia [E87.6]   • Hyponatremia [E87.1]   • Chronic pancreatitis (HCC) [K86.1]   • Dyslipidemia [E78.5]   • Cholangitis [K83.09]     Impression  / Plan  58 y/o with chronic pancreatitis, IDDM, iron deficiency anemia, GERD with esophagitis that presented for lethargy and found to have significant leukocytosis and newly elevated liver tests.  Concern for sepsis from biliary source.  Imaging suggests dilation  of biliary and pancreatic ducts but no obvious stones although could have ampullary lesion of main duct IPMN causing obstruction.      1. Elevated liver tests, predominantly alk phos but also bilirubin - improving, suspect downstream stricture due to chronic calcific pancreatitis with transient obstruction by acute inflammation vs sludge/stone which passed  2. Sepsis, unclear source - Mediastinum was not assessed at EUS due to focus being on pancreas and biliary tree  3. Leukocytosis - Worsening  4. Abnormal imaging of abdomen with dilated bile ducts and pancreatic duct and questionable mass head of pancreas - Only severe chronic calcific pancreatitis identified on EUS  5. Chronic pancreatitis  6. IDDM  7. Iron deficiency anemia  8. GERD with esophagitis  9. Hypertension  10. Seizure disorder  11. Aggressive behavior to healthcare workers  12. Cognitive decline    1. Follow liver enzymes  2. Continue IV antibiotics and IVF  3. EUS could be repeated to focus on mediastinum fluid collection if need be vs IR      Quality-Core Measures   Reviewed items::  Radiology images reviewed, Labs reviewed and Medications reviewed

## 2020-02-19 NOTE — PROGRESS NOTES
US-guided for insertion of PIV procedure explained to patient. Vein visualized for access. Aseptic technique was used for cannulation of 20 gauge PIV catheter placed in Right Upper Arm. Patency observed with blood return and flushed with NS without difficulty. PIV dressing applied. Patient tolerated procedure.

## 2020-02-19 NOTE — CARE PLAN
Problem: Safety  Goal: Will remain free from injury  Outcome: PROGRESSING AS EXPECTED  Goal: Will remain free from falls  Outcome: PROGRESSING AS EXPECTED     Problem: Bowel/Gastric:  Goal: Normal bowel function is maintained or improved  Outcome: PROGRESSING AS EXPECTED  Goal: Will not experience complications related to bowel motility  Outcome: PROGRESSING AS EXPECTED     Problem: Pain Management  Goal: Pain level will decrease to patient's comfort goal  Outcome: PROGRESSING AS EXPECTED     Problem: Knowledge Deficit  Goal: Knowledge of disease process/condition, treatment plan, diagnostic tests, and medications will improve  Outcome: PROGRESSING SLOWER THAN EXPECTED  Goal: Knowledge of the prescribed therapeutic regimen will improve  Outcome: PROGRESSING SLOWER THAN EXPECTED     Problem: Infection  Goal: Will remain free from infection  Note: IV and PO abx as ordered. ID following     Problem: Venous Thromboembolism (VTW)/Deep Vein Thrombosis (DVT) Prevention:  Goal: Patient will participate in Venous Thrombosis (VTE)/Deep Vein Thrombosis (DVT)Prevention Measures  Note: REFUSING SCDs       Problem: Discharge Barriers/Planning  Goal: Patient's continuum of care needs will be met  Note: Awaiting biopsy results. Likely discharge with hospice. Sister and guardian Marie involved in care/decision making

## 2020-02-19 NOTE — PROGRESS NOTES
Telemetry Strip     Strip printed: 0705  Measurements from am strip were as follows:  Rhythm:sr  HR:101  Measurements:.18/.08/.34          Telemetry Shift Summary:    Rhythm:sr-st  HR: 90-100s  Ectopy:        Normal Values  Rhythm SR  HR Range    Measurements 0.12-0.20 / 0.06-0.10  / 0.30-0.52

## 2020-02-19 NOTE — PROGRESS NOTES
Star from [employer] called to see if patient needs anything. Patient aware and does not request anything.

## 2020-02-19 NOTE — PROGRESS NOTES
[facility] Medicine Daily Progress Note    Date of Service  2/19/2020    Chief Complaint  59 y.o. male admitted 2/14/2020 with a poor appetite.     Hospital Course    He was found to have evidence for a marked new elevation in his alk phos and a profound leukocytosis. He was treated for abdominal sepsis and had an MRI and HIDA ordered. His MRi showed evidence for biliary obstruction and a possible pancreatic neoplasm. His CA 19-9 level was elevated and GI was consulted for ERCP       Interval Problem Update  The patient tolerated his ercp well  He has chronic back pain and flexeril is helping with this      Consultants/Specialty  GI Dr. Ansari    Code Status  DNR    Disposition  Hospice if cancer is detected per sister    Review of Systems  Review of Systems   Constitutional: Negative for diaphoresis, fever and malaise/fatigue.   HENT: Negative for sinus pain and sore throat.    Respiratory: Negative for cough, shortness of breath and wheezing.    Cardiovascular: Negative for chest pain, palpitations and leg swelling.   Gastrointestinal: Negative for abdominal pain, constipation, diarrhea, heartburn, nausea and vomiting.        Pain improved   Genitourinary: Negative for dysuria and frequency.   Musculoskeletal: Negative for back pain and neck pain.   Skin: Negative for itching.   Neurological: Negative for dizziness and tingling.   Psychiatric/Behavioral: Negative for depression. The patient does not have insomnia.         Physical Exam  Temp:  [36.6 °C (97.9 °F)-37.1 °C (98.7 °F)] 36.6 °C (97.9 °F)  Pulse:  [] 91  Resp:  [18-20] 18  BP: ()/(46-68) 123/67  SpO2:  [90 %-96 %] 90 %    Physical Exam  Vitals signs and nursing note reviewed.   Constitutional:       Appearance: He is well-developed. He is not ill-appearing or diaphoretic.   HENT:      Nose: No congestion or rhinorrhea.      Mouth/Throat:      Mouth: Mucous membranes are moist.      Pharynx: No oropharyngeal exudate or posterior oropharyngeal  erythema.   Eyes:      General: No scleral icterus.        Right eye: No discharge.         Left eye: No discharge.   Neck:      Musculoskeletal: Neck supple. No muscular tenderness.      Vascular: No JVD.      Trachea: No tracheal deviation.   Cardiovascular:      Rate and Rhythm: Normal rate and regular rhythm.      Pulses: Normal pulses.      Heart sounds: Normal heart sounds. No murmur.   Pulmonary:      Effort: No respiratory distress.      Breath sounds: Normal breath sounds. No wheezing, rhonchi or rales.   Abdominal:      General: Bowel sounds are normal. There is no distension.      Palpations: Abdomen is soft.      Tenderness: There is no abdominal tenderness. There is no guarding or rebound.   Musculoskeletal:         General: No swelling or tenderness.   Skin:     General: Skin is warm and dry.      Coloration: Skin is not jaundiced.      Findings: No bruising or erythema.   Neurological:      Mental Status: He is alert and oriented to person, place, and time.      Motor: No weakness.   Psychiatric:         Mood and Affect: Mood normal.         Behavior: Behavior normal.         Cognition and Memory: Cognition is impaired. Memory is impaired.         Fluids    Intake/Output Summary (Last 24 hours) at 2/19/2020 1331  Last data filed at 2/19/2020 0103  Gross per 24 hour   Intake 200 ml   Output 500 ml   Net -300 ml       Laboratory  Recent Labs     02/17/20  0335 02/18/20  0235 02/19/20  0211   WBC 30.8* 31.5* 37.4*   RBC 3.27* 3.18* 3.10*   HEMOGLOBIN 9.6* 9.5* 9.2*   HEMATOCRIT 27.8* 27.2* 26.4*   MCV 85.0 85.5 85.2   MCH 29.4 29.9 29.7   MCHC 34.5 34.9 34.8   RDW 45.5 46.6 44.7   PLATELETCT 345 388 418   MPV 11.5 11.7 11.6     Recent Labs     02/17/20  0335 02/18/20  0235 02/19/20  0211   SODIUM 130* 133* 134*   POTASSIUM 3.1* 3.2* 3.4*   CHLORIDE 96 101 103   CO2 22 22 21   GLUCOSE 244* 174* 164*   BUN 17 12 10   CREATININE 0.59 0.53 0.64   CALCIUM 7.9* 7.7* 7.8*                    Imaging  NM-HEPATOBILIARY SCAN   Final Result      1.  Biliary dilatation without evidence of high-grade biliary obstruction.   2.  Gallbladder visualization after morphine administration. No acute cholecystitis.      CT-ABDOMEN-PELVIS WITH   Final Result      1.  New rim enhancing fluid collection tracks along the right heart margin, azygoesophageal recess and possibly communicates with Morison's pouch tracking along the right aspect of the inferior vena cava. This is nonspecific and could indicate abscess,    dissecting pseudocyst, mucinous cystic neoplasm related fluid      2.  New extra and intrahepatic biliary ductal dilatation with 6 cm dilatation of the gallbladder also seen. This indicates distal common bile duct/ampullary level obstruction. Stricture, mass effect from adjacent pancreas calcifications or small    ampullary mass are differential possibilities      3.  Decreased small pleural effusions. Some loculation on the right is possible      4.  Slightly decreased diffuse pancreatic ductal dilatation now measuring 11 mm. Evidence of chronic pancreatitis. New 3 cm cystic structure anterior to the pancreas could be a pseudocyst. Abscess or necrotic/cystic neoplasm are in the differential.      5.  Multiple chronic findings including status post aorto bifemoral bypass with approaching 70% stenosis of the left superficial femoral artery, nonobstructive right 3 mm nephrolithiasis, large volume rectal vault stool, dilated bladder which could    indicate outlet obstruction or neurogenic bladder, splenosis      [id number]   Final Result      1.  Severe dilatation of the intrahepatic biliary ducts, common bile duct and pancreatic duct. The distal common bile duct and the proximal pancreatic duct is not visualized at the pancreatic head. The pancreatic head appears to be diffusely enlarged and    demonstrates multiple small cysts. These findings are concerning for pancreatic neoplasm such as intraductal  papillary mucinous cystoadenoma. The other differential diagnosis includes periampullary carcinoma. MR examination of the abdomen with contrast    is recommended for further evaluation.   2.  Mixed signal intensity in the right perinephric space likely represents perinephric hematoma.   3.  Large hiatal hernia.   4.  Diffuse gastric wall thickening.      US-RUQ   Final Result      1.  Hepatomegaly.   2.  Hepatic steatosis.   3.  Gallbladder sludge. Mild intrahepatic ductal dilation. Dilation of the common duct up to 13 mm. No definite distal obstructing etiology is identified. MRCP could be obtained for further evaluation if indicated.   4.  Trace ascites.   5.  Pancreatic calcifications likely sequela of chronic pancreatitis.      DX-CHEST-PORTABLE (1 VIEW)   Final Result      Cardiomegaly.           Assessment/Plan  * Pancreatic mass  Assessment & Plan  MRI with likely pancreatic malignancy  ERCP with no evidence of malignancy, calcifications of pancreas present    Type 2 diabetes mellitus with hyperglycemia, with long-term current use of insulin (HCC)- (present on admission)  Assessment & Plan  He has an insulin sensor and his right lower quadrant, uses long-acting insulin 12 units and sliding scale.  Monitor blood sugar levels    Cognitive decline- (present on admission)  Assessment & Plan  Patient likely has a chronic component to his cognitive decline as well as acute worsening  Mental status now at his baseline, patient has a legal guardian    Metabolic acidosis  Assessment & Plan  IV fluids with improvement    Hypokalemia  Assessment & Plan  Replace potassium     Sepsis (HCC)  Assessment & Plan  This is Sepsis Present on admission  SIRS criteria identified on my evaluation include: Tachycardia, with heart rate greater than 90 BPM and Leukocyosis, with WBC greater than 12,000  Source is GI  Sepsis protocol initiated  Continue antibiotics          Cholangitis  Assessment & Plan  Biliary obstruction suggested  on imaging, GI consulted  Labs are improving  Endoscopy done showing pancreatic calcifications but no stones  Continue antibiotics, follow lab trend    Recurrent major depressive disorder, in full remission (HCC)  Assessment & Plan  .    Hyponatremia  Assessment & Plan  Fluids to treat    Chronic pancreatitis (HCC)- (present on admission)  Assessment & Plan  Continue pancreatic enzyme    Dyslipidemia- (present on admission)  Assessment & Plan  Continue statin       VTE prophylaxis: scd

## 2020-02-19 NOTE — PROGRESS NOTES
Report received from Onofre BENSON. Patient resting in bed. No complaints. patient refused temperature to be taken overnight. Was slightly tachy. WBC 37.4 this am. K+ 3.4. bed in low locked position, call bell within reach. Continue to monitor.

## 2020-02-19 NOTE — PROGRESS NOTES
15:00 patient back on unit, IV fluids started   patient c/o back pain and a headache. Called MD for different medication for back pain, received orders for Flexeril. Will continue to monitor pain.

## 2020-02-20 LAB
ALBUMIN SERPL BCP-MCNC: 2 G/DL (ref 3.2–4.9)
ALBUMIN/GLOB SERPL: 0.6 G/DL
ALP SERPL-CCNC: 790 U/L (ref 30–99)
ALT SERPL-CCNC: 17 U/L (ref 2–50)
ANION GAP SERPL CALC-SCNC: 10 MMOL/L (ref 7–16)
AST SERPL-CCNC: 40 U/L (ref 12–45)
BASOPHILS # BLD AUTO: 0 % (ref 0–1.8)
BASOPHILS # BLD: 0 K/UL (ref 0–0.12)
BILIRUB SERPL-MCNC: 0.8 MG/DL (ref 0.1–1.5)
BUN SERPL-MCNC: 11 MG/DL (ref 8–22)
CALCIUM SERPL-MCNC: 8 MG/DL (ref 8.4–10.2)
CHLORIDE SERPL-SCNC: 103 MMOL/L (ref 96–112)
CO2 SERPL-SCNC: 22 MMOL/L (ref 20–33)
CREAT SERPL-MCNC: 0.6 MG/DL (ref 0.5–1.4)
EOSINOPHIL # BLD AUTO: 0.28 K/UL (ref 0–0.51)
EOSINOPHIL NFR BLD: 1 % (ref 0–6.9)
ERYTHROCYTE [DISTWIDTH] IN BLOOD BY AUTOMATED COUNT: 46.3 FL (ref 35.9–50)
GIANT PLATELETS BLD QL SMEAR: NORMAL
GLOBULIN SER CALC-MCNC: 3.3 G/DL (ref 1.9–3.5)
GLUCOSE BLD-MCNC: 138 MG/DL (ref 65–99)
GLUCOSE BLD-MCNC: 144 MG/DL (ref 65–99)
GLUCOSE BLD-MCNC: 164 MG/DL (ref 65–99)
GLUCOSE BLD-MCNC: 192 MG/DL (ref 65–99)
GLUCOSE SERPL-MCNC: 164 MG/DL (ref 65–99)
HCT VFR BLD AUTO: 26.5 % (ref 42–52)
HGB BLD-MCNC: 9.3 G/DL (ref 14–18)
LYMPHOCYTES # BLD AUTO: 0.85 K/UL (ref 1–4.8)
LYMPHOCYTES NFR BLD: 3 % (ref 22–41)
MANUAL DIFF BLD: NORMAL
MCH RBC QN AUTO: 29.8 PG (ref 27–33)
MCHC RBC AUTO-ENTMCNC: 35.1 G/DL (ref 33.7–35.3)
MCV RBC AUTO: 84.9 FL (ref 81.4–97.8)
MONOCYTES # BLD AUTO: 2.26 K/UL (ref 0–0.85)
MONOCYTES NFR BLD AUTO: 8 % (ref 0–13.4)
NEUTROPHILS # BLD AUTO: 24.9 K/UL (ref 1.82–7.42)
NEUTROPHILS NFR BLD: 85 % (ref 44–72)
NEUTS BAND NFR BLD MANUAL: 3 % (ref 0–10)
NRBC # BLD AUTO: 0.05 K/UL
NRBC BLD-RTO: 0.2 /100 WBC
PLATELET # BLD AUTO: 521 K/UL (ref 164–446)
PLATELET BLD QL SMEAR: NORMAL
PMV BLD AUTO: 11.5 FL (ref 9–12.9)
POLYCHROMASIA BLD QL SMEAR: NORMAL
POTASSIUM SERPL-SCNC: 3.6 MMOL/L (ref 3.6–5.5)
PROT SERPL-MCNC: 5.3 G/DL (ref 6–8.2)
RBC # BLD AUTO: 3.12 M/UL (ref 4.7–6.1)
RBC BLD AUTO: PRESENT
SODIUM SERPL-SCNC: 135 MMOL/L (ref 135–145)
TARGETS BLD QL SMEAR: NORMAL
TOXIC GRANULES BLD QL SMEAR: NORMAL
WBC # BLD AUTO: 28.3 K/UL (ref 4.8–10.8)

## 2020-02-20 PROCEDURE — 80053 COMPREHEN METABOLIC PANEL: CPT

## 2020-02-20 PROCEDURE — 700111 HCHG RX REV CODE 636 W/ 250 OVERRIDE (IP): Performed by: INTERNAL MEDICINE

## 2020-02-20 PROCEDURE — A9270 NON-COVERED ITEM OR SERVICE: HCPCS | Performed by: HOSPITALIST

## 2020-02-20 PROCEDURE — 700105 HCHG RX REV CODE 258: Performed by: INTERNAL MEDICINE

## 2020-02-20 PROCEDURE — 99232 SBSQ HOSP IP/OBS MODERATE 35: CPT | Performed by: INTERNAL MEDICINE

## 2020-02-20 PROCEDURE — 700102 HCHG RX REV CODE 250 W/ 637 OVERRIDE(OP): Performed by: INTERNAL MEDICINE

## 2020-02-20 PROCEDURE — 85027 COMPLETE CBC AUTOMATED: CPT

## 2020-02-20 PROCEDURE — A9270 NON-COVERED ITEM OR SERVICE: HCPCS | Performed by: INTERNAL MEDICINE

## 2020-02-20 PROCEDURE — 82962 GLUCOSE BLOOD TEST: CPT | Mod: 91

## 2020-02-20 PROCEDURE — 700102 HCHG RX REV CODE 250 W/ 637 OVERRIDE(OP): Performed by: HOSPITALIST

## 2020-02-20 PROCEDURE — 770020 HCHG ROOM/CARE - TELE (206)

## 2020-02-20 PROCEDURE — 85007 BL SMEAR W/DIFF WBC COUNT: CPT

## 2020-02-20 RX ADMIN — SODIUM CHLORIDE: 9 INJECTION, SOLUTION INTRAVENOUS at 21:24

## 2020-02-20 RX ADMIN — METRONIDAZOLE 500 MG: 500 TABLET ORAL at 06:23

## 2020-02-20 RX ADMIN — OMEPRAZOLE 20 MG: 20 CAPSULE, DELAYED RELEASE ORAL at 06:23

## 2020-02-20 RX ADMIN — INSULIN HUMAN 3 UNITS: 100 INJECTION, SOLUTION PARENTERAL at 17:39

## 2020-02-20 RX ADMIN — SODIUM CHLORIDE: 9 INJECTION, SOLUTION INTRAVENOUS at 09:51

## 2020-02-20 RX ADMIN — CEFTRIAXONE SODIUM 2 G: 2 INJECTION, POWDER, FOR SOLUTION INTRAMUSCULAR; INTRAVENOUS at 09:51

## 2020-02-20 RX ADMIN — CYCLOBENZAPRINE HYDROCHLORIDE 10 MG: 10 TABLET, FILM COATED ORAL at 19:57

## 2020-02-20 RX ADMIN — INSULIN HUMAN 3 UNITS: 100 INJECTION, SOLUTION PARENTERAL at 11:13

## 2020-02-20 RX ADMIN — TAMSULOSIN HYDROCHLORIDE 0.4 MG: 0.4 CAPSULE ORAL at 06:23

## 2020-02-20 RX ADMIN — PANCRELIPASE 48000 UNITS: 24000; 76000; 120000 CAPSULE, DELAYED RELEASE PELLETS ORAL at 06:22

## 2020-02-20 RX ADMIN — ATORVASTATIN CALCIUM 40 MG: 40 TABLET, FILM COATED ORAL at 17:38

## 2020-02-20 RX ADMIN — ASPIRIN 81 MG CHEWABLE TABLET 81 MG: 81 TABLET CHEWABLE at 06:23

## 2020-02-20 RX ADMIN — Medication 100 MG: at 06:23

## 2020-02-20 RX ADMIN — INSULIN HUMAN 5 UNITS: 100 INJECTION, SUSPENSION SUBCUTANEOUS at 17:38

## 2020-02-20 RX ADMIN — METRONIDAZOLE 500 MG: 500 TABLET ORAL at 13:54

## 2020-02-20 RX ADMIN — ACETAMINOPHEN 650 MG: 325 TABLET, FILM COATED ORAL at 19:57

## 2020-02-20 RX ADMIN — METRONIDAZOLE 500 MG: 500 TABLET ORAL at 21:24

## 2020-02-20 RX ADMIN — OMEPRAZOLE 20 MG: 20 CAPSULE, DELAYED RELEASE ORAL at 17:38

## 2020-02-20 ASSESSMENT — COGNITIVE AND FUNCTIONAL STATUS - GENERAL
HELP NEEDED FOR BATHING: A LITTLE
DAILY ACTIVITIY SCORE: 21
MOBILITY SCORE: 20
SUGGESTED CMS G CODE MODIFIER MOBILITY: CJ
DRESSING REGULAR LOWER BODY CLOTHING: A LITTLE
CLIMB 3 TO 5 STEPS WITH RAILING: A LOT
DRESSING REGULAR UPPER BODY CLOTHING: A LITTLE
STANDING UP FROM CHAIR USING ARMS: A LITTLE
SUGGESTED CMS G CODE MODIFIER DAILY ACTIVITY: CJ
WALKING IN HOSPITAL ROOM: A LITTLE

## 2020-02-20 ASSESSMENT — ENCOUNTER SYMPTOMS
SINUS PAIN: 0
DIAPHORESIS: 0
NECK PAIN: 0
FEVER: 0
SHORTNESS OF BREATH: 0
PALPITATIONS: 0
DIZZINESS: 0
VOMITING: 0
ROS GI COMMENTS: PAIN IMPROVED
COUGH: 0
HEARTBURN: 0
NAUSEA: 0
DEPRESSION: 0
DIARRHEA: 0
TINGLING: 0
INSOMNIA: 0
BLOOD IN STOOL: 0
CHILLS: 0
WEIGHT LOSS: 1
ABDOMINAL PAIN: 0
BACK PAIN: 1
CONSTIPATION: 0

## 2020-02-20 NOTE — CARE PLAN
Problem: Communication  Goal: The ability to communicate needs accurately and effectively will improve  Outcome: PROGRESSING AS EXPECTED   Pt verbalizes needs    Problem: Safety  Goal: Will remain free from injury  Outcome: PROGRESSING AS EXPECTED  Goal: Will remain free from falls  Outcome: PROGRESSING AS EXPECTED   Safety precautions in place

## 2020-02-20 NOTE — PROGRESS NOTES
New IV placed on left hand by RN. Pt sitting in bed eating lunch. Bed in low, locked position with call bell in reach.

## 2020-02-20 NOTE — PROGRESS NOTES
Telemetry Shift Summary     Rhythm SR  HR Range 84 - 97  Ectopy R PVCs  Measurements 0.20 / 0.08 / 0.36           Normal Values  Rhythm SR  HR Range    Measurements 0.12-0.20 / 0.06-0.10  / 0.30-0.52

## 2020-02-20 NOTE — PROGRESS NOTES
Received bedside patient report from KELLEY Workman. Patient resting comfortably in bed, no complaints at this time. Safety precautions in place. Will continue to monitor.

## 2020-02-20 NOTE — PROGRESS NOTES
Patient rounded. POC discussed. Medications administered as ordered. Refused POC glucose check. Refused insulin. Comfortable in bed. Appropriate safety precautions in place. Will continue to monitor patient.

## 2020-02-20 NOTE — PROGRESS NOTES
Hospital Medicine Daily Progress Note    Date of Service  2/20/2020    Chief Complaint  59 y.o. male admitted 2/14/2020 with a poor appetite.     Hospital Course    He was found to have evidence for a marked new elevation in his alk phos and a profound leukocytosis. He was treated for abdominal sepsis and had an MRI and HIDA ordered. His MRi showed evidence for biliary obstruction and a possible pancreatic neoplasm. His CA 19-9 level was elevated and GI was consulted for ERCP       Interval Problem Update  The patient has persistent mid back pain      Consultants/Specialty  GI Dr. Ansari    Code Status  DNR    Disposition  Hospice if cancer is detected per sister    Review of Systems  Review of Systems   Constitutional: Negative for chills, diaphoresis, fever and malaise/fatigue.   HENT: Negative for congestion and sinus pain.    Respiratory: Negative for cough and shortness of breath.    Cardiovascular: Negative for chest pain, palpitations and leg swelling.   Gastrointestinal: Negative for abdominal pain, constipation, heartburn and nausea.        Pain improved   Genitourinary: Negative for dysuria and frequency.   Musculoskeletal: Positive for back pain. Negative for neck pain.        Chronic back pain   Skin: Negative for itching.   Neurological: Negative for dizziness and tingling.   Psychiatric/Behavioral: Negative for depression. The patient does not have insomnia.         Physical Exam  Temp:  [36.6 °C (97.9 °F)-38 °C (100.4 °F)] 37.8 °C (100.1 °F)  Pulse:  [] 110  Resp:  [18] 18  BP: (120-137)/(58-67) 133/66  SpO2:  [85 %-99 %] 85 %    Physical Exam  Vitals signs and nursing note reviewed.   Constitutional:       Appearance: He is well-developed. He is not ill-appearing.   HENT:      Nose: No congestion or rhinorrhea.      Mouth/Throat:      Mouth: Mucous membranes are moist.      Pharynx: Oropharynx is clear.   Eyes:      General: No scleral icterus.     Conjunctiva/sclera: Conjunctivae normal.    Neck:      Musculoskeletal: Neck supple. No muscular tenderness.      Vascular: No JVD.      Trachea: No tracheal deviation.   Cardiovascular:      Rate and Rhythm: Normal rate and regular rhythm.      Pulses: Normal pulses.      Heart sounds: Normal heart sounds. No murmur.   Pulmonary:      Effort: No respiratory distress.      Breath sounds: Normal breath sounds. No wheezing, rhonchi or rales.   Abdominal:      General: Bowel sounds are normal. There is no distension.      Tenderness: There is no guarding or rebound.   Musculoskeletal:         General: No swelling or tenderness.   Skin:     General: Skin is warm.      Coloration: Skin is pale. Skin is not jaundiced.      Findings: No bruising or erythema.   Neurological:      Mental Status: He is alert and oriented to person, place, and time.      Motor: No weakness.   Psychiatric:         Mood and Affect: Mood normal.         Behavior: Behavior normal.         Cognition and Memory: Cognition is impaired. Memory is impaired.         Fluids  No intake or output data in the 24 hours ending 02/20/20 0836    Laboratory  Recent Labs     02/18/20 0235 02/19/20 0211 02/20/20 0319   WBC 31.5* 37.4* 28.3*   RBC 3.18* 3.10* 3.12*   HEMOGLOBIN 9.5* 9.2* 9.3*   HEMATOCRIT 27.2* 26.4* 26.5*   MCV 85.5 85.2 84.9   MCH 29.9 29.7 29.8   MCHC 34.9 34.8 35.1   RDW 46.6 44.7 46.3   PLATELETCT 388 418 521*   MPV 11.7 11.6 11.5     Recent Labs     02/18/20 0235 02/19/20  0211 02/20/20  0319   SODIUM 133* 134* 135   POTASSIUM 3.2* 3.4* 3.6   CHLORIDE 101 103 103   CO2 22 21 22   GLUCOSE 174* 164* 164*   BUN 12 10 11   CREATININE 0.53 0.64 0.60   CALCIUM 7.7* 7.8* 8.0*                   Imaging  NM-HEPATOBILIARY SCAN   Final Result      1.  Biliary dilatation without evidence of high-grade biliary obstruction.   2.  Gallbladder visualization after morphine administration. No acute cholecystitis.      CT-ABDOMEN-PELVIS WITH   Final Result      1.  New rim enhancing fluid  collection tracks along the right heart margin, azygoesophageal recess and possibly communicates with Morison's pouch tracking along the right aspect of the inferior vena cava. This is nonspecific and could indicate abscess,    dissecting pseudocyst, mucinous cystic neoplasm related fluid      2.  New extra and intrahepatic biliary ductal dilatation with 6 cm dilatation of the gallbladder also seen. This indicates distal common bile duct/ampullary level obstruction. Stricture, mass effect from adjacent pancreas calcifications or small    ampullary mass are differential possibilities      3.  Decreased small pleural effusions. Some loculation on the right is possible      4.  Slightly decreased diffuse pancreatic ductal dilatation now measuring 11 mm. Evidence of chronic pancreatitis. New 3 cm cystic structure anterior to the pancreas could be a pseudocyst. Abscess or necrotic/cystic neoplasm are in the differential.      5.  Multiple chronic findings including status post aorto bifemoral bypass with approaching 70% stenosis of the left superficial femoral artery, nonobstructive right 3 mm nephrolithiasis, large volume rectal vault stool, dilated bladder which could    indicate outlet obstruction or neurogenic bladder, splenosis      [id number]   Final Result      1.  Severe dilatation of the intrahepatic biliary ducts, common bile duct and pancreatic duct. The distal common bile duct and the proximal pancreatic duct is not visualized at the pancreatic head. The pancreatic head appears to be diffusely enlarged and    demonstrates multiple small cysts. These findings are concerning for pancreatic neoplasm such as intraductal papillary mucinous cystoadenoma. The other differential diagnosis includes periampullary carcinoma. MR examination of the abdomen with contrast    is recommended for further evaluation.   2.  Mixed signal intensity in the right perinephric space likely represents perinephric hematoma.   3.   Large hiatal hernia.   4.  Diffuse gastric wall thickening.      US-RUQ   Final Result      1.  Hepatomegaly.   2.  Hepatic steatosis.   3.  Gallbladder sludge. Mild intrahepatic ductal dilation. Dilation of the common duct up to 13 mm. No definite distal obstructing etiology is identified. MRCP could be obtained for further evaluation if indicated.   4.  Trace ascites.   5.  Pancreatic calcifications likely sequela of chronic pancreatitis.      DX-CHEST-PORTABLE (1 VIEW)   Final Result      Cardiomegaly.           Assessment/Plan  * Pancreatic mass  Assessment & Plan  MRI with likely pancreatic malignancy  ERCP with no evidence of malignancy, calcifications of pancreas present   is elevated but less than 1000 so cannot rule in cancer, could be chronic pancreatitis    Type 2 diabetes mellitus with hyperglycemia, with long-term current use of insulin (HCC)- (present on admission)  Assessment & Plan  He has an insulin sensor and his right lower quadrant, uses long-acting insulin 12 units and sliding scale.  Monitor blood sugar levels    Cognitive decline- (present on admission)  Assessment & Plan  Patient likely has a chronic component to his cognitive decline as well as acute worsening  Mental status now at his baseline, patient has a legal guardian    Metabolic acidosis  Assessment & Plan  IV fluids with improvement    Hypokalemia  Assessment & Plan  Replace potassium as needed    Sepsis (HCC)  Assessment & Plan  This is Sepsis Present on admission  SIRS criteria identified on my evaluation include: Tachycardia, with heart rate greater than 90 BPM and Leukocyosis, with WBC greater than 12,000  Source is GI  Sepsis protocol initiated  Continue antibiotics  Monitor leukocytosis          Cholangitis  Assessment & Plan  Biliary obstruction suggested on imaging, GI consulted  Labs are improved some but still elevated  Endoscopy done showing pancreatic calcifications but no stones, no area to biopsy  Continue  antibiotics, follow lab trend  Discussed with GI today    Recurrent major depressive disorder, in full remission (HCC)  Assessment & Plan  .    Hyponatremia  Assessment & Plan  Resolved with fluids    Chronic pancreatitis (HCC)- (present on admission)  Assessment & Plan  Continue pancreatic enzymes with meals    Dyslipidemia- (present on admission)  Assessment & Plan  Continue statin     Consider celiac block for back pain, discussed with Dr. Eaton  VTE prophylaxis: scd

## 2020-02-20 NOTE — CARE PLAN
Problem: Communication  Goal: The ability to communicate needs accurately and effectively will improve  Outcome: PROGRESSING AS EXPECTED  Note: Patient updated on the plan of care. Encouraged to voice feelings and to ask questions. Answered all questions and concerns. Agrees with the plan of care.      Problem: Safety  Goal: Will remain free from injury  Outcome: PROGRESSING AS EXPECTED  Note: Safety precautions in place. Will continue to monitor patient.   Goal: Will remain free from falls  Outcome: PROGRESSING AS EXPECTED  Note: Safety precautions in place. Will continue to monitor patient.      Problem: Infection  Goal: Will remain free from infection  Outcome: PROGRESSING AS EXPECTED  Note: Discussed the importance of infection prevention and hand hygiene.      Problem: Fluid Volume:  Goal: Will maintain balanced intake and output  Outcome: PROGRESSING AS EXPECTED     Problem: Psychosocial Needs:  Goal: Level of anxiety will decrease  Outcome: PROGRESSING AS EXPECTED

## 2020-02-20 NOTE — PROGRESS NOTES
Received report from Freddie BENSON. Discussed POC. Pt asleep in bed. Bed in low, locked position with call bell in reach.

## 2020-02-21 LAB
GLUCOSE BLD-MCNC: 130 MG/DL (ref 65–99)
GLUCOSE BLD-MCNC: 138 MG/DL (ref 65–99)
GLUCOSE BLD-MCNC: 151 MG/DL (ref 65–99)

## 2020-02-21 PROCEDURE — 700105 HCHG RX REV CODE 258: Performed by: INTERNAL MEDICINE

## 2020-02-21 PROCEDURE — 700102 HCHG RX REV CODE 250 W/ 637 OVERRIDE(OP): Performed by: INTERNAL MEDICINE

## 2020-02-21 PROCEDURE — A9270 NON-COVERED ITEM OR SERVICE: HCPCS | Performed by: INTERNAL MEDICINE

## 2020-02-21 PROCEDURE — 82962 GLUCOSE BLOOD TEST: CPT | Mod: 91

## 2020-02-21 PROCEDURE — 700111 HCHG RX REV CODE 636 W/ 250 OVERRIDE (IP): Performed by: INTERNAL MEDICINE

## 2020-02-21 PROCEDURE — 770020 HCHG ROOM/CARE - TELE (206)

## 2020-02-21 PROCEDURE — 99232 SBSQ HOSP IP/OBS MODERATE 35: CPT | Performed by: INTERNAL MEDICINE

## 2020-02-21 PROCEDURE — A9270 NON-COVERED ITEM OR SERVICE: HCPCS | Performed by: HOSPITALIST

## 2020-02-21 PROCEDURE — 700102 HCHG RX REV CODE 250 W/ 637 OVERRIDE(OP): Performed by: HOSPITALIST

## 2020-02-21 RX ADMIN — Medication 1 LOZENGE: at 11:52

## 2020-02-21 RX ADMIN — CYCLOBENZAPRINE HYDROCHLORIDE 10 MG: 10 TABLET, FILM COATED ORAL at 07:05

## 2020-02-21 RX ADMIN — ACETAMINOPHEN 650 MG: 325 TABLET, FILM COATED ORAL at 07:04

## 2020-02-21 RX ADMIN — OMEPRAZOLE 20 MG: 20 CAPSULE, DELAYED RELEASE ORAL at 18:27

## 2020-02-21 RX ADMIN — Medication 1 LOZENGE: at 18:33

## 2020-02-21 RX ADMIN — METRONIDAZOLE 500 MG: 500 TABLET ORAL at 21:02

## 2020-02-21 RX ADMIN — Medication 100 MG: at 06:53

## 2020-02-21 RX ADMIN — ATORVASTATIN CALCIUM 40 MG: 40 TABLET, FILM COATED ORAL at 18:27

## 2020-02-21 RX ADMIN — INSULIN HUMAN 5 UNITS: 100 INJECTION, SUSPENSION SUBCUTANEOUS at 18:23

## 2020-02-21 RX ADMIN — ACETAMINOPHEN 650 MG: 325 TABLET, FILM COATED ORAL at 16:36

## 2020-02-21 RX ADMIN — INSULIN HUMAN 4 UNITS: 100 INJECTION, SOLUTION PARENTERAL at 21:06

## 2020-02-21 RX ADMIN — INSULIN HUMAN 5 UNITS: 100 INJECTION, SUSPENSION SUBCUTANEOUS at 07:07

## 2020-02-21 RX ADMIN — CYCLOBENZAPRINE HYDROCHLORIDE 10 MG: 10 TABLET, FILM COATED ORAL at 16:35

## 2020-02-21 RX ADMIN — SODIUM CHLORIDE: 9 INJECTION, SOLUTION INTRAVENOUS at 07:04

## 2020-02-21 RX ADMIN — CEFTRIAXONE SODIUM 2 G: 2 INJECTION, POWDER, FOR SOLUTION INTRAMUSCULAR; INTRAVENOUS at 06:51

## 2020-02-21 RX ADMIN — SENNOSIDES AND DOCUSATE SODIUM 2 TABLET: 8.6; 5 TABLET ORAL at 06:52

## 2020-02-21 RX ADMIN — METRONIDAZOLE 500 MG: 500 TABLET ORAL at 14:40

## 2020-02-21 RX ADMIN — TAMSULOSIN HYDROCHLORIDE 0.4 MG: 0.4 CAPSULE ORAL at 06:52

## 2020-02-21 RX ADMIN — ASPIRIN 81 MG CHEWABLE TABLET 81 MG: 81 TABLET CHEWABLE at 06:52

## 2020-02-21 RX ADMIN — OMEPRAZOLE 20 MG: 20 CAPSULE, DELAYED RELEASE ORAL at 06:52

## 2020-02-21 RX ADMIN — INSULIN HUMAN 3 UNITS: 100 INJECTION, SOLUTION PARENTERAL at 18:22

## 2020-02-21 RX ADMIN — METRONIDAZOLE 500 MG: 500 TABLET ORAL at 06:52

## 2020-02-21 RX ADMIN — PANCRELIPASE 48000 UNITS: 24000; 76000; 120000 CAPSULE, DELAYED RELEASE PELLETS ORAL at 06:52

## 2020-02-21 RX ADMIN — ENOXAPARIN SODIUM 40 MG: 100 INJECTION SUBCUTANEOUS at 06:52

## 2020-02-21 ASSESSMENT — ENCOUNTER SYMPTOMS
NERVOUS/ANXIOUS: 0
BACK PAIN: 1
NECK PAIN: 0
SHORTNESS OF BREATH: 0
NAUSEA: 0
CHILLS: 0
ABDOMINAL PAIN: 0
HEARTBURN: 0
SINUS PAIN: 0
WHEEZING: 0
WEIGHT LOSS: 1
DIARRHEA: 0
VOMITING: 0
TINGLING: 0
DIZZINESS: 0
CONSTIPATION: 0
DEPRESSION: 0
BLOOD IN STOOL: 0
ROS GI COMMENTS: PAIN IMPROVED
FEVER: 0
HEADACHES: 0
INSOMNIA: 0

## 2020-02-21 NOTE — PROGRESS NOTES
Pt refused VS all night, cooperative with AM medications and VS. Oxygen noted to be 82% on RA, pt denies SOB and states he does not want to wear the oxygen, became irritable. Education provided and pt then agreeable with wearing, oxygen quickly recovered. Pain managed per MAR, report given to oncoming RN.

## 2020-02-21 NOTE — PROGRESS NOTES
Assessment as documented, medicated per MAR. Pt irritable with care at times, not engaged in patient education. Resting in bed, currently denies needs.

## 2020-02-21 NOTE — CARE PLAN
Problem: Nutritional:  Goal: Achieve adequate nutritional intake  Description: Patient will consume 50% of meals   Outcome: PROGRESSING AS EXPECTED   See RD note.

## 2020-02-21 NOTE — PROGRESS NOTES
"  Gastroenterology Progress Note     Author: Carlos Alberto Ansari M.D.  Date & Time Created: 2/21/2020 1:01 PM    Chief Complaint:  Elevated liver tests, abnormal MRI    Interval History:  Initial history (Dr. Villarreal):  59 y.o. male with IDDM, chronic pancreatitis seizure disorder, iron deficiency anemia who presented 2/14/2020 with lethargy and poor appetite per assisted living facility.  He is difficult historian as does not want to answer questions.  He states \"I feel fine and always have.\"  He was evaluated 12/2019 for iron deficiency anemia and DKA.  Had EGD with severe erosive esophagitis.  He refused to drink prep for colonoscopy.  He apparently was brought by assisted living facility after they noted some lethargy and poor appetite.  He has weight loss of 7 pounds over the past month or so.  He denies abdominal pain and states he never had abdominal pain and his appetite is great.  Denies fevers, chills, nausea, vomiting, diarrhea, constipation, melena, hematochezia.  In ED, he had significant leukocytosis with newly elevated alkaline phosphatase and bilirubin with fairly normal transaminases.  He was started on IVF and antibiotics.  He had RUQ US followed by MRCP showing dilated bile and pancreatic ducts, distended GB, chronic pancreatitis and possible abnormal lesions head of pancreas.    2/16/2020: Continues to deny any symptoms including abdominal pain, nausea, vomiting, fevers.  Was refusing IV placement and vitals overnight.  Says he is agreeable to IV placement today  02/17/2020 - No complaints.  Says he feels fine, but does report urine is darker.  I explained imaging findings at length.  02/18/2020 - EUS showed extensive, severe pancreatic calcifications making identification of any pancreatic mass or fluid collection impossible.  Mediastinum was not assessed.  ERCP not done due to normalizing liver enzymes and added risk to patient.  02/19/2020 - No events overnight.  Patient denies " "fevers/chills/sweats and states he \"feels fine\".  Labs show worsening leukocytosis with improving liver enzymes.  02/20/2020 - Tolerating regular diet well.  Denies any abdominal pain.  He does report chronic mid-back pain, but this is not severe or worsening.  No nausea, vomiting, fevers, chills or sweats.  Leukocytosis slightly better.  Alkaline phosphatase still elevated, but remainder of liver enzymes normal.  02/21/2020 - No abdominal pain.  Tolerating diet.  Mild decrease appetite.  No CBC today.  Eager to be discharged.    Review of Systems:  Review of Systems   Constitutional: Positive for weight loss. Negative for chills, fever and malaise/fatigue.   Respiratory: Negative for shortness of breath.    Cardiovascular: Negative for chest pain.   Gastrointestinal: Negative for abdominal pain, blood in stool, constipation, diarrhea, heartburn, melena, nausea and vomiting.       Physical Exam:  Physical Exam  Vitals signs and nursing note reviewed.   Constitutional:       Appearance: Normal appearance.   Eyes:      General: No scleral icterus.  Cardiovascular:      Rate and Rhythm: Normal rate and regular rhythm.      Pulses: Normal pulses.      Heart sounds: Normal heart sounds.   Pulmonary:      Effort: Pulmonary effort is normal. No respiratory distress.      Breath sounds: Normal breath sounds. No stridor. No wheezing, rhonchi or rales.   Abdominal:      General: Abdomen is flat. Bowel sounds are normal. There is no distension.      Palpations: Abdomen is soft. There is no mass.      Tenderness: There is no abdominal tenderness. There is no guarding or rebound.      Hernia: No hernia is present.   Skin:     General: Skin is warm and dry.      Coloration: Skin is not jaundiced.   Neurological:      General: No focal deficit present.      Mental Status: He is alert and oriented to person, place, and time.         Labs:          Recent Labs     02/19/20  0211 02/20/20  0319   SODIUM 134* 135   POTASSIUM 3.4* 3.6 "   CHLORIDE 103 103   CO2 21 22   BUN 10 11   CREATININE 0.64 0.60   CALCIUM 7.8* 8.0*     Recent Labs     20  0319   ALTSGPT 18 17   ASTSGOT 34 40   ALKPHOSPHAT 790* 790*   TBILIRUBIN 0.8 0.8   GLUCOSE 164* 164*     Recent Labs     20  0319   RBC 3.10* 3.12*   HEMOGLOBIN 9.2* 9.3*   HEMATOCRIT 26.4* 26.5*   PLATELETCT 418 521*     Recent Labs     20  0319   WBC 37.4* 28.3*   NEUTSPOLYS 85.00* 85.00*   LYMPHOCYTES 4.00* 3.00*   MONOCYTES 2.00 8.00   EOSINOPHILS 0.00 1.00   BASOPHILS 0.00 0.00   ASTSGOT 34 40   ALTSGPT 18 17   ALKPHOSPHAT 790* 790*   TBILIRUBIN 0.8 0.8     Hemodynamics:  Temp (24hrs), Av.9 °C (98.5 °F), Min:36.7 °C (98 °F), Max:37.5 °C (99.5 °F)  Temperature: 36.7 °C (98 °F)  Pulse  Av  Min: 50  Max: 122   Blood Pressure: 127/65     Respiratory:    Respiration: 18, Pulse Oximetry: 92 %        RUL Breath Sounds: Clear, RML Breath Sounds: Clear, RLL Breath Sounds: Clear, LEONARD Breath Sounds: Clear, LLL Breath Sounds: Clear  Fluids:    Intake/Output Summary (Last 24 hours) at 2020 0706  Last data filed at 2/15/2020 2200  Gross per 24 hour   Intake 2485 ml   Output 600 ml   Net 1885 ml     Weight: 62.2 kg (137 lb 2 oz)  GI/Nutrition:  Orders Placed This Encounter   Procedures   • Diet Order Diabetic     Standing Status:   Standing     Number of Occurrences:   1     Order Specific Question:   Diet:     Answer:   Diabetic [3]     Medical Decision Making, by Problem:  Active Hospital Problems    Diagnosis   • *Sepsis (HCC) [A41.9]   • Elevated alkaline phosphatase level [R74.8]   • Type 2 diabetes mellitus with hyperglycemia, with long-term current use of insulin (HCC) [E11.65, Z79.4]   • Cognitive decline [R41.89]   • Metabolic acidosis [E87.2]   • Hypokalemia [E87.6]   • Hyponatremia [E87.1]   • Chronic pancreatitis (HCC) [K86.1]   • Dyslipidemia [E78.5]   • Cholangitis [K83.09]     Impression  / Plan  60 y/o with chronic  pancreatitis, IDDM, iron deficiency anemia, GERD with esophagitis that presented for lethargy and found to have significant leukocytosis and newly elevated liver tests.  Concern for sepsis from biliary source.  Imaging suggests dilation of biliary and pancreatic ducts but no obvious stones although could have ampullary lesion of main duct IPMN causing obstruction.      1. Elevated liver tests, alk phos remains high but other liver enzymes normalized - Suspect downstream stricture due to chronic calcific pancreatitis with transient obstruction by acute inflammation vs sludge/stone which passed  2. Sepsis, unclear source - Mediastinum was not assessed at EUS due to focus being on pancreas and biliary tree  3. Leukocytosis - No CBC today  4. Abnormal imaging of abdomen with dilated bile ducts and pancreatic duct and questionable mass head of pancreas - Only severe chronic calcific pancreatitis identified on EUS  5. Chronic pancreatitis  6. IDDM  7. Iron deficiency anemia  8. GERD with esophagitis  9. Hypertension  10. Seizure disorder  11. Aggressive behavior to healthcare workers  12. Back pain    1. Follow liver enzymes  2. Continue IV antibiotics and IVF  3. Repeat CT with dedicated pancreatic protocol to better delineate pancreas anatomy and cystic structures  4. EUS +/- celiac block could be repeated to focus on mediastinum fluid collection if need be vs IR - no availability on endoscopy schedule tomorrow      Quality-Core Measures   Reviewed items::  Radiology images reviewed, Labs reviewed and Medications reviewed

## 2020-02-21 NOTE — CARE PLAN
Problem: Safety  Goal: Will remain free from injury  Outcome: PROGRESSING AS EXPECTED  Goal: Will remain free from falls  Outcome: PROGRESSING AS EXPECTED  Intervention: Assess risk factors for falls  Note: Pt educated of fall risk precautions, not engaged in education.      Problem: Infection  Goal: Will remain free from infection  Outcome: PROGRESSING AS EXPECTED  Intervention: Assess signs and symptoms of infection  Note: Educated on s/s of infection and infection prevention, pt not engaged.      Problem: Communication  Goal: The ability to communicate needs accurately and effectively will improve  Outcome: PROGRESSING SLOWER THAN EXPECTED  Intervention: Educate patient and significant other/support system about the plan of care, procedures, treatments, medications and allow for questions  Note: Pt offered education on POC, flat affect, not engaged in education.

## 2020-02-21 NOTE — PROGRESS NOTES
Telemetry Strip     Strip printed: 0649  Measurements from am strip were as follows:  Rhythm:sr  HR:99  Measurements:.18/.08/.34          Telemetry Shift Summary:    Rhythm:sr-st  HR:   Ectopy:rpvc         Normal Values  Rhythm SR  HR Range    Measurements 0.12-0.20 / 0.06-0.10  / 0.30-0.52

## 2020-02-21 NOTE — DIETARY
Nutrition Services: Update   Day 7 of admit.  Pawel Tatum is a 59 y.o. male with admitting DX of Sepsis (HCC)    Pt is currently on Diabetic diet. Providing Boost Glucose Control with meals.  Little PO intake recorded.  Breakfast today <25%.  Visited pt at lunch and he stated he ate ok.  Observed pt did drink Boost.  Pt states he likes the Boost.  Adjusted flavors to vanilla and strawberry per pt preference.   Wt 2/20: 62.2 kg via bed scale - Wt stable.    Malnutrition Risk: Per RD note 2/15: Pt with severe malnutrition in the context of acute illness related to sepsis as evidenced by 6.2% weight loss in <1 week (severe), and severe fat and severe muscle loss noted on physical exam.    Recommendations/Plan:  1. Continue Boost Glucose Control with meals, vanilla and strawberry flavors per pt preference.   2. Encourage intake of meals and supplements.  3. Document intake of all meals and supplements as % taken in ADL's to provide interdisciplinary communication across all shifts.   4. Monitor weight.  5. Nutrition rep will continue to see patient for ongoing meal and snack preferences.    RD following

## 2020-02-21 NOTE — PROGRESS NOTES
[facility] Medicine Daily Progress Note    Date of Service  2/21/2020    Chief Complaint  59 y.o. male admitted 2/14/2020 with a poor appetite.     Hospital Course    He was found to have evidence for a marked new elevation in his alk phos and a profound leukocytosis. He was treated for abdominal sepsis and had an MRI and HIDA ordered. His MRi showed evidence for biliary obstruction and a possible pancreatic neoplasm. His CA 19-9 level was elevated and GI was consulted for ERCP       Interval Problem Update  Back pain is at his usual level  He remains afebrile    Consultants/Specialty  GI Dr. Ansari    Code Status  DNR    Disposition  Hospice if cancer is detected per sister    Review of Systems  Review of Systems   Constitutional: Negative for fever and malaise/fatigue.   HENT: Negative for congestion and sinus pain.    Respiratory: Negative for shortness of breath and wheezing.    Cardiovascular: Negative for chest pain and leg swelling.   Gastrointestinal: Negative for abdominal pain, constipation, heartburn, nausea and vomiting.        Pain improved   Genitourinary: Negative for dysuria, frequency and urgency.   Musculoskeletal: Positive for back pain. Negative for neck pain.        Chronic back pain   Skin: Negative for itching and rash.   Neurological: Negative for dizziness, tingling and headaches.   Psychiatric/Behavioral: Negative for depression. The patient is not nervous/anxious and does not have insomnia.         Physical Exam  Temp:  [36.3 °C (97.4 °F)-37.5 °C (99.5 °F)] 36.9 °C (98.4 °F)  Pulse:  [] 98  Resp:  [18-20] 20  BP: (128-139)/(68-71) 135/71  SpO2:  [90 %-93 %] 90 %    Physical Exam  Vitals signs and nursing note reviewed.   Constitutional:       Appearance: He is well-developed. He is not ill-appearing or toxic-appearing.   HENT:      Nose: No rhinorrhea.      Mouth/Throat:      Mouth: Mucous membranes are moist.      Pharynx: Oropharynx is clear.   Eyes:      General: No scleral  icterus.        Right eye: No discharge.         Left eye: No discharge.      Conjunctiva/sclera: Conjunctivae normal.   Neck:      Musculoskeletal: Neck supple. No muscular tenderness.      Vascular: No JVD.      Trachea: No tracheal deviation.   Cardiovascular:      Rate and Rhythm: Normal rate and regular rhythm.      Pulses: Normal pulses.      Heart sounds: Normal heart sounds. No murmur.   Pulmonary:      Effort: No respiratory distress.      Breath sounds: Normal breath sounds. No stridor. No wheezing, rhonchi or rales.   Abdominal:      General: Bowel sounds are normal. There is no distension.      Tenderness: There is no guarding or rebound.   Musculoskeletal:         General: No swelling or tenderness.   Skin:     General: Skin is warm.      Coloration: Skin is pale. Skin is not jaundiced.      Findings: No bruising or erythema.   Neurological:      Mental Status: He is alert and oriented to person, place, and time. Mental status is at baseline.      Motor: No weakness.   Psychiatric:         Mood and Affect: Mood normal.         Behavior: Behavior normal.         Cognition and Memory: Cognition is impaired. Memory is impaired.         Fluids    Intake/Output Summary (Last 24 hours) at 2/21/2020 1051  Last data filed at 2/21/2020 0800  Gross per 24 hour   Intake 360 ml   Output 1750 ml   Net -1390 ml       Laboratory  Recent Labs     02/19/20  0211 02/20/20 0319   WBC 37.4* 28.3*   RBC 3.10* 3.12*   HEMOGLOBIN 9.2* 9.3*   HEMATOCRIT 26.4* 26.5*   MCV 85.2 84.9   MCH 29.7 29.8   MCHC 34.8 35.1   RDW 44.7 46.3   PLATELETCT 418 521*   MPV 11.6 11.5     Recent Labs     02/19/20  0211 02/20/20 0319   SODIUM 134* 135   POTASSIUM 3.4* 3.6   CHLORIDE 103 103   CO2 21 22   GLUCOSE 164* 164*   BUN 10 11   CREATININE 0.64 0.60   CALCIUM 7.8* 8.0*                   Imaging  NM-HEPATOBILIARY SCAN   Final Result      1.  Biliary dilatation without evidence of high-grade biliary obstruction.   2.  Gallbladder  visualization after morphine administration. No acute cholecystitis.      CT-ABDOMEN-PELVIS WITH   Final Result      1.  New rim enhancing fluid collection tracks along the right heart margin, azygoesophageal recess and possibly communicates with Morison's pouch tracking along the right aspect of the inferior vena cava. This is nonspecific and could indicate abscess,    dissecting pseudocyst, mucinous cystic neoplasm related fluid      2.  New extra and intrahepatic biliary ductal dilatation with 6 cm dilatation of the gallbladder also seen. This indicates distal common bile duct/ampullary level obstruction. Stricture, mass effect from adjacent pancreas calcifications or small    ampullary mass are differential possibilities      3.  Decreased small pleural effusions. Some loculation on the right is possible      4.  Slightly decreased diffuse pancreatic ductal dilatation now measuring 11 mm. Evidence of chronic pancreatitis. New 3 cm cystic structure anterior to the pancreas could be a pseudocyst. Abscess or necrotic/cystic neoplasm are in the differential.      5.  Multiple chronic findings including status post aorto bifemoral bypass with approaching 70% stenosis of the left superficial femoral artery, nonobstructive right 3 mm nephrolithiasis, large volume rectal vault stool, dilated bladder which could    indicate outlet obstruction or neurogenic bladder, splenosis      [id number]   Final Result      1.  Severe dilatation of the intrahepatic biliary ducts, common bile duct and pancreatic duct. The distal common bile duct and the proximal pancreatic duct is not visualized at the pancreatic head. The pancreatic head appears to be diffusely enlarged and    demonstrates multiple small cysts. These findings are concerning for pancreatic neoplasm such as intraductal papillary mucinous cystoadenoma. The other differential diagnosis includes periampullary carcinoma. MR examination of the abdomen with contrast    is  recommended for further evaluation.   2.  Mixed signal intensity in the right perinephric space likely represents perinephric hematoma.   3.  Large hiatal hernia.   4.  Diffuse gastric wall thickening.      US-RUQ   Final Result      1.  Hepatomegaly.   2.  Hepatic steatosis.   3.  Gallbladder sludge. Mild intrahepatic ductal dilation. Dilation of the common duct up to 13 mm. No definite distal obstructing etiology is identified. MRCP could be obtained for further evaluation if indicated.   4.  Trace ascites.   5.  Pancreatic calcifications likely sequela of chronic pancreatitis.      DX-CHEST-PORTABLE (1 VIEW)   Final Result      Cardiomegaly.           Assessment/Plan  * Pancreatic mass  Assessment & Plan  MRI with concern for pancreatic malignancy  ERCP with no evidence of malignancy, calcifications of pancreas present   is elevated but less than 1000 so cannot rule in cancer, likely due chronic pancreatitis    Type 2 diabetes mellitus with hyperglycemia, with long-term current use of insulin (HCC)- (present on admission)  Assessment & Plan  He has an insulin sensor and his right lower quadrant, uses long-acting insulin 12 units and sliding scale.  Monitor blood sugar levels    Cognitive decline- (present on admission)  Assessment & Plan  Patient likely has a chronic component to his cognitive decline as well as acute worsening  Mental status now at his baseline, patient has a legal guardian    Metabolic acidosis  Assessment & Plan  IV fluids with improvement    Hypokalemia  Assessment & Plan  Replace potassium as needed    Sepsis (HCC)  Assessment & Plan  This is Sepsis Present on admission  SIRS criteria identified on my evaluation include: Tachycardia, with heart rate greater than 90 BPM and Leukocyosis, with WBC greater than 12,000  Source is GI  Sepsis protocol initiated  Continue antibiotics  Monitor leukocytosis          Cholangitis  Assessment & Plan  Biliary obstruction suggested on imaging, GI  consulted  Labs are improved some but still elevated  Endoscopy done showing pancreatic calcifications but no stones, no area to biopsy  Continue antibiotics, white cell count still elevated, will follow level  Discussed with GI today    Recurrent major depressive disorder, in full remission (HCC)  Assessment & Plan  .    Hyponatremia  Assessment & Plan  Resolved with fluids    Chronic pancreatitis (HCC)- (present on admission)  Assessment & Plan  Continue pancreatic enzymes with meals    Dyslipidemia- (present on admission)  Assessment & Plan  Continue statin     Will attempt to schedule tomorrow celiac block for back pain and mediastinal fluid aspiration, discussed with Dr. Eaton    I called and gave updates to legal guardian Perez [phone]  VTE prophylaxis: scd

## 2020-02-21 NOTE — PROGRESS NOTES
Report received from Lyubov BENSON. No new changes. Patient was non compliant overnight with his oxygen and refused vitals to be taken. Compliant this am. Star from guardianship called this am and is requesting MD to speak with her with results from biiopsy. Her number is [phone]. Will f/u with Dr. Gentile. Bed in low locked position, call bell within reach. Continue to monitor.

## 2020-02-21 NOTE — PROGRESS NOTES
Lozenges given for cough and sore throat. GI at the bedside. Bed in low locked position, call bell within reach. Continue to monitor.

## 2020-02-22 ENCOUNTER — APPOINTMENT (OUTPATIENT)
Dept: RADIOLOGY | Facility: MEDICAL CENTER | Age: 60
DRG: 871 | End: 2020-02-22
Attending: INTERNAL MEDICINE
Payer: COMMERCIAL

## 2020-02-22 LAB
ALBUMIN SERPL BCP-MCNC: 1.8 G/DL (ref 3.2–4.9)
ALBUMIN/GLOB SERPL: 0.5 G/DL
ALP SERPL-CCNC: 1141 U/L (ref 30–99)
ALT SERPL-CCNC: 18 U/L (ref 2–50)
ANION GAP SERPL CALC-SCNC: 9 MMOL/L (ref 7–16)
AST SERPL-CCNC: 60 U/L (ref 12–45)
BASOPHILS # BLD AUTO: 0.6 % (ref 0–1.8)
BASOPHILS # BLD: 0.14 K/UL (ref 0–0.12)
BILIRUB SERPL-MCNC: 0.7 MG/DL (ref 0.1–1.5)
BUN SERPL-MCNC: 11 MG/DL (ref 8–22)
CALCIUM SERPL-MCNC: 7.9 MG/DL (ref 8.4–10.2)
CHLORIDE SERPL-SCNC: 105 MMOL/L (ref 96–112)
CO2 SERPL-SCNC: 23 MMOL/L (ref 20–33)
CREAT SERPL-MCNC: 0.54 MG/DL (ref 0.5–1.4)
EOSINOPHIL # BLD AUTO: 0.14 K/UL (ref 0–0.51)
EOSINOPHIL NFR BLD: 0.6 % (ref 0–6.9)
ERYTHROCYTE [DISTWIDTH] IN BLOOD BY AUTOMATED COUNT: 50.2 FL (ref 35.9–50)
GLOBULIN SER CALC-MCNC: 3.3 G/DL (ref 1.9–3.5)
GLUCOSE BLD-MCNC: 105 MG/DL (ref 65–99)
GLUCOSE BLD-MCNC: 133 MG/DL (ref 65–99)
GLUCOSE BLD-MCNC: 167 MG/DL (ref 65–99)
GLUCOSE BLD-MCNC: 169 MG/DL (ref 65–99)
GLUCOSE BLD-MCNC: 215 MG/DL (ref 65–99)
GLUCOSE SERPL-MCNC: 104 MG/DL (ref 65–99)
HCT VFR BLD AUTO: 27.6 % (ref 42–52)
HGB BLD-MCNC: 9 G/DL (ref 14–18)
IMM GRANULOCYTES # BLD AUTO: 0.81 K/UL (ref 0–0.11)
IMM GRANULOCYTES NFR BLD AUTO: 3.3 % (ref 0–0.9)
LYMPHOCYTES # BLD AUTO: 2.37 K/UL (ref 1–4.8)
LYMPHOCYTES NFR BLD: 9.7 % (ref 22–41)
MCH RBC QN AUTO: 28.7 PG (ref 27–33)
MCHC RBC AUTO-ENTMCNC: 32.6 G/DL (ref 33.7–35.3)
MCV RBC AUTO: 87.9 FL (ref 81.4–97.8)
MONOCYTES # BLD AUTO: 1.96 K/UL (ref 0–0.85)
MONOCYTES NFR BLD AUTO: 8 % (ref 0–13.4)
NEUTROPHILS # BLD AUTO: 19 K/UL (ref 1.82–7.42)
NEUTROPHILS NFR BLD: 77.8 % (ref 44–72)
NRBC # BLD AUTO: 0.03 K/UL
NRBC BLD-RTO: 0.1 /100 WBC
PLATELET # BLD AUTO: 499 K/UL (ref 164–446)
PMV BLD AUTO: 11.9 FL (ref 9–12.9)
POTASSIUM SERPL-SCNC: 3.5 MMOL/L (ref 3.6–5.5)
PROT SERPL-MCNC: 5.1 G/DL (ref 6–8.2)
RBC # BLD AUTO: 3.14 M/UL (ref 4.7–6.1)
SODIUM SERPL-SCNC: 137 MMOL/L (ref 135–145)
WBC # BLD AUTO: 24.4 K/UL (ref 4.8–10.8)

## 2020-02-22 PROCEDURE — 700102 HCHG RX REV CODE 250 W/ 637 OVERRIDE(OP): Performed by: INTERNAL MEDICINE

## 2020-02-22 PROCEDURE — A9270 NON-COVERED ITEM OR SERVICE: HCPCS | Performed by: INTERNAL MEDICINE

## 2020-02-22 PROCEDURE — 85025 COMPLETE CBC W/AUTO DIFF WBC: CPT

## 2020-02-22 PROCEDURE — 80053 COMPREHEN METABOLIC PANEL: CPT

## 2020-02-22 PROCEDURE — 99233 SBSQ HOSP IP/OBS HIGH 50: CPT | Performed by: INTERNAL MEDICINE

## 2020-02-22 PROCEDURE — 700111 HCHG RX REV CODE 636 W/ 250 OVERRIDE (IP): Performed by: INTERNAL MEDICINE

## 2020-02-22 PROCEDURE — 700111 HCHG RX REV CODE 636 W/ 250 OVERRIDE (IP): Performed by: HOSPITALIST

## 2020-02-22 PROCEDURE — 82962 GLUCOSE BLOOD TEST: CPT

## 2020-02-22 PROCEDURE — 74170 CT ABD WO CNTRST FLWD CNTRST: CPT

## 2020-02-22 PROCEDURE — 700105 HCHG RX REV CODE 258: Performed by: INTERNAL MEDICINE

## 2020-02-22 PROCEDURE — 700117 HCHG RX CONTRAST REV CODE 255: Performed by: ANESTHESIOLOGY

## 2020-02-22 PROCEDURE — 770020 HCHG ROOM/CARE - TELE (206)

## 2020-02-22 RX ADMIN — INSULIN HUMAN 3 UNITS: 100 INJECTION, SOLUTION PARENTERAL at 17:13

## 2020-02-22 RX ADMIN — SODIUM CHLORIDE: 9 INJECTION, SOLUTION INTRAVENOUS at 11:23

## 2020-02-22 RX ADMIN — CYCLOBENZAPRINE HYDROCHLORIDE 10 MG: 10 TABLET, FILM COATED ORAL at 06:16

## 2020-02-22 RX ADMIN — HYDROMORPHONE HYDROCHLORIDE 0.5 MG: 1 INJECTION, SOLUTION INTRAMUSCULAR; INTRAVENOUS; SUBCUTANEOUS at 17:11

## 2020-02-22 RX ADMIN — ASPIRIN 81 MG CHEWABLE TABLET 81 MG: 81 TABLET CHEWABLE at 06:15

## 2020-02-22 RX ADMIN — SENNOSIDES AND DOCUSATE SODIUM 2 TABLET: 8.6; 5 TABLET ORAL at 06:16

## 2020-02-22 RX ADMIN — CYCLOBENZAPRINE HYDROCHLORIDE 10 MG: 10 TABLET, FILM COATED ORAL at 17:21

## 2020-02-22 RX ADMIN — INSULIN HUMAN 5 UNITS: 100 INJECTION, SUSPENSION SUBCUTANEOUS at 17:14

## 2020-02-22 RX ADMIN — INSULIN HUMAN 5 UNITS: 100 INJECTION, SUSPENSION SUBCUTANEOUS at 06:13

## 2020-02-22 RX ADMIN — IOHEXOL 100 ML: 350 INJECTION, SOLUTION INTRAVENOUS at 15:11

## 2020-02-22 RX ADMIN — Medication 100 MG: at 06:16

## 2020-02-22 RX ADMIN — ACETAMINOPHEN 650 MG: 325 TABLET, FILM COATED ORAL at 17:11

## 2020-02-22 RX ADMIN — OMEPRAZOLE 20 MG: 20 CAPSULE, DELAYED RELEASE ORAL at 06:15

## 2020-02-22 RX ADMIN — OMEPRAZOLE 20 MG: 20 CAPSULE, DELAYED RELEASE ORAL at 17:12

## 2020-02-22 RX ADMIN — PANCRELIPASE 48000 UNITS: 24000; 76000; 120000 CAPSULE, DELAYED RELEASE PELLETS ORAL at 06:16

## 2020-02-22 RX ADMIN — ENOXAPARIN SODIUM 40 MG: 100 INJECTION SUBCUTANEOUS at 06:16

## 2020-02-22 RX ADMIN — ATORVASTATIN CALCIUM 40 MG: 40 TABLET, FILM COATED ORAL at 17:12

## 2020-02-22 RX ADMIN — TAMSULOSIN HYDROCHLORIDE 0.4 MG: 0.4 CAPSULE ORAL at 06:16

## 2020-02-22 RX ADMIN — ACETAMINOPHEN 650 MG: 325 TABLET, FILM COATED ORAL at 06:15

## 2020-02-22 RX ADMIN — INSULIN HUMAN 3 UNITS: 100 INJECTION, SOLUTION PARENTERAL at 11:26

## 2020-02-22 ASSESSMENT — ENCOUNTER SYMPTOMS
PALPITATIONS: 0
DEPRESSION: 0
BACK PAIN: 1
HEADACHES: 0
VOMITING: 0
CHILLS: 0
DIARRHEA: 0
ABDOMINAL PAIN: 0
HEARTBURN: 0
CONSTIPATION: 0
NAUSEA: 0
TINGLING: 0
FEVER: 0
DIAPHORESIS: 0
BLOOD IN STOOL: 0
INSOMNIA: 0
NERVOUS/ANXIOUS: 0
ROS GI COMMENTS: ABDOMINAL PAIN IMPROVED
WEIGHT LOSS: 1
SHORTNESS OF BREATH: 0
NECK PAIN: 0
SINUS PAIN: 0
DIZZINESS: 0

## 2020-02-22 NOTE — PROGRESS NOTES
Report received from Lyubov BENSON. Plan of care discussed. Patient resting comfortably in bed. Safety precautions in place.

## 2020-02-22 NOTE — CARE PLAN
Problem: Communication  Goal: The ability to communicate needs accurately and effectively will improve  Outcome: PROGRESSING AS EXPECTED  Intervention: Educate patient and significant other/support system about the plan of care, procedures, treatments, medications and allow for questions  Flowsheets (Taken 2/22/2020 0242)  Pt & Family Have Been Educated on Methods Available to Report Concerns Related to Care, Treatment, Services, and Patient Safety Issues: Yes  Note: Pt updated on POC     Problem: Safety  Goal: Will remain free from injury  Outcome: PROGRESSING AS EXPECTED  Goal: Will remain free from falls  Outcome: PROGRESSING AS EXPECTED  Note: Pt refusing bed alarm but has been adhering to safety precautions      Problem: Infection  Goal: Will remain free from infection  Outcome: PROGRESSING AS EXPECTED  Intervention: Assess signs and symptoms of infection  Note: Educated on s/s of infection and infection prevention

## 2020-02-22 NOTE — PROGRESS NOTES
Telemetry Shift Summary     Rhythm SR  HR Range 88- 98  Ectopy r PVCs  Measurements 0.18 / 0.10 / 0.34           Normal Values  Rhythm SR  HR Range    Measurements 0.12-0.20 / 0.06-0.10  / 0.30-0.52

## 2020-02-22 NOTE — PROGRESS NOTES
Assumed care of pt @ 1900 from offgoing RN, pt resting in bed at this time. Pt often irritable and uncooperative with care, not receptive to education this shift. Assessment as documented, medicated per MAR. Report given to oncoming RN.

## 2020-02-22 NOTE — PROGRESS NOTES
"  Gastroenterology Progress Note     Author: Carlos Alberto Ansari M.D.  Date & Time Created: 2/22/2020 7:24 AM    Chief Complaint:  Elevated liver tests, abnormal MRI    Interval History:  Initial history (Dr. Villarreal):  59 y.o. male with IDDM, chronic pancreatitis seizure disorder, iron deficiency anemia who presented 2/14/2020 with lethargy and poor appetite per assisted living facility.  He is difficult historian as does not want to answer questions.  He states \"I feel fine and always have.\"  He was evaluated 12/2019 for iron deficiency anemia and DKA.  Had EGD with severe erosive esophagitis.  He refused to drink prep for colonoscopy.  He apparently was brought by assisted living facility after they noted some lethargy and poor appetite.  He has weight loss of 7 pounds over the past month or so.  He denies abdominal pain and states he never had abdominal pain and his appetite is great.  Denies fevers, chills, nausea, vomiting, diarrhea, constipation, melena, hematochezia.  In ED, he had significant leukocytosis with newly elevated alkaline phosphatase and bilirubin with fairly normal transaminases.  He was started on IVF and antibiotics.  He had RUQ US followed by MRCP showing dilated bile and pancreatic ducts, distended GB, chronic pancreatitis and possible abnormal lesions head of pancreas.    2/16/2020: Continues to deny any symptoms including abdominal pain, nausea, vomiting, fevers.  Was refusing IV placement and vitals overnight.  Says he is agreeable to IV placement today  02/17/2020 - No complaints.  Says he feels fine, but does report urine is darker.  I explained imaging findings at length.  02/18/2020 - EUS showed extensive, severe pancreatic calcifications making identification of any pancreatic mass or fluid collection impossible.  Mediastinum was not assessed.  ERCP not done due to normalizing liver enzymes and added risk to patient.  02/19/2020 - No events overnight.  Patient denies " "fevers/chills/sweats and states he \"feels fine\".  Labs show worsening leukocytosis with improving liver enzymes.  02/20/2020 - Tolerating regular diet well.  Denies any abdominal pain.  He does report chronic mid-back pain, but this is not severe or worsening.  No nausea, vomiting, fevers, chills or sweats.  Leukocytosis slightly better.  Alkaline phosphatase still elevated, but remainder of liver enzymes normal.  02/21/2020 - No abdominal pain.  Tolerating diet.  Mild decrease appetite.  No CBC today.  Eager to be discharged.  02/22/2020 - Continues to \"feel fine\".  No abdominal pain, nausea or vomiting.  Tolerating diet.  Leukocytosis improved, but alkaline phosphatase abruptly higher.  CT not done due to IV issues and patient's difficult access.    Review of Systems:  Review of Systems   Constitutional: Positive for weight loss. Negative for chills, fever and malaise/fatigue.   Respiratory: Negative for shortness of breath.    Cardiovascular: Negative for chest pain.   Gastrointestinal: Negative for abdominal pain, blood in stool, constipation, diarrhea, heartburn, melena, nausea and vomiting.       Physical Exam:  Physical Exam  Vitals signs and nursing note reviewed.   Constitutional:       Appearance: Normal appearance.   Eyes:      General: No scleral icterus.  Cardiovascular:      Rate and Rhythm: Normal rate and regular rhythm.      Pulses: Normal pulses.      Heart sounds: Normal heart sounds.   Pulmonary:      Effort: Pulmonary effort is normal. No respiratory distress.      Breath sounds: Normal breath sounds. No stridor. No wheezing, rhonchi or rales.   Abdominal:      General: Abdomen is flat. Bowel sounds are normal. There is no distension.      Palpations: Abdomen is soft. There is no mass.      Tenderness: There is no abdominal tenderness. There is no guarding or rebound.      Hernia: No hernia is present.   Skin:     General: Skin is warm and dry.      Coloration: Skin is not jaundiced. "   Neurological:      General: No focal deficit present.      Mental Status: He is alert and oriented to person, place, and time.         Labs:          Recent Labs     20  041   SODIUM 135 137   POTASSIUM 3.6 3.5*   CHLORIDE 103 105   CO2 22 23   BUN 11 11   CREATININE 0.60 0.54   CALCIUM 8.0* 7.9*     Recent Labs     20  0416   ALTSGPT 17 18   ASTSGOT 40 60*   ALKPHOSPHAT 790* 1141*   TBILIRUBIN 0.8 0.7   GLUCOSE 164* 104*     Recent Labs     206   RBC 3.12* 3.14*   HEMOGLOBIN 9.3* 9.0*   HEMATOCRIT 26.5* 27.6*   PLATELETCT 521* 499*     Recent Labs     20   WBC 28.3* 24.4*   NEUTSPOLYS 85.00* 77.80*   LYMPHOCYTES 3.00* 9.70*   MONOCYTES 8.00 8.00   EOSINOPHILS 1.00 0.60   BASOPHILS 0.00 0.60   ASTSGOT 40 60*   ALTSGPT 17 18   ALKPHOSPHAT 790* 1141*   TBILIRUBIN 0.8 0.7     Hemodynamics:  Temp (24hrs), Av.7 °C (98.1 °F), Min:36.4 °C (97.5 °F), Max:37.1 °C (98.8 °F)  Temperature: 36.4 °C (97.5 °F)  Pulse  Av.1  Min: 50  Max: 122   Blood Pressure: 123/59     Respiratory:    Respiration: 18, Pulse Oximetry: 92 %        RUL Breath Sounds: Diminished, RML Breath Sounds: Diminished, RLL Breath Sounds: Diminished;Rhonchi, LEONARD Breath Sounds: Diminished, LLL Breath Sounds: Rhonchi;Diminished  Fluids:    Intake/Output Summary (Last 24 hours) at 2020 0706  Last data filed at 2/15/2020 2200  Gross per 24 hour   Intake 2485 ml   Output 600 ml   Net 1885 ml        GI/Nutrition:  Orders Placed This Encounter   Procedures   • Diet Order Diabetic     Standing Status:   Standing     Number of Occurrences:   1     Order Specific Question:   Diet:     Answer:   Diabetic [3]     Medical Decision Making, by Problem:  Active Hospital Problems    Diagnosis   • *Sepsis (HCC) [A41.9]   • Elevated alkaline phosphatase level [R74.8]   • Type 2 diabetes mellitus with hyperglycemia, with long-term current use of insulin (HCC) [E11.65,  Z79.4]   • Cognitive decline [R41.89]   • Metabolic acidosis [E87.2]   • Hypokalemia [E87.6]   • Hyponatremia [E87.1]   • Chronic pancreatitis (HCC) [K86.1]   • Dyslipidemia [E78.5]   • Cholangitis [K83.09]     Impression  / Plan  58 y/o with chronic pancreatitis, IDDM, iron deficiency anemia, GERD with esophagitis that presented for lethargy and found to have significant leukocytosis and newly elevated liver tests.  Concern for sepsis from biliary source.  Imaging suggests dilation of biliary and pancreatic ducts but no obvious stones although could have ampullary lesion of main duct IPMN causing obstruction.      1. Elevated liver tests, alk phos remains high and rising today, but other liver enzymes normalized - Suspect downstream stricture due to chronic calcific pancreatitis with transient obstruction by acute inflammation vs sludge/stone which passed  2. Sepsis, unclear source - Mediastinum was not assessed at EUS due to focus being on pancreas and biliary tree  3. Leukocytosis - No CBC today  4. Abnormal imaging of abdomen with dilated bile ducts and pancreatic duct and questionable mass head of pancreas - Only severe chronic calcific pancreatitis identified on EUS  5. Chronic pancreatitis  6. IDDM  7. Iron deficiency anemia  8. GERD with esophagitis  9. Hypertension  10. Seizure disorder  11. Aggressive behavior to healthcare workers  12. Back pain  13. Pancreatic fluid collection tracking adjacent to distal esophagus on imaging     1. Follow liver enzymes  2. Continue IV antibiotics and IVF  3. I will discuss with my office partner if he is willing to do ERCP and repeat EUS +/- celiac block could be repeated to focus on mediastinum fluid collection if need be vs IR - no availability on endoscopy schedule today      Quality-Core Measures   Reviewed items::  Radiology images reviewed, Labs reviewed and Medications reviewed

## 2020-02-22 NOTE — PROGRESS NOTES
[facility] Medicine Daily Progress Note    Date of Service  2/22/2020    Chief Complaint  59 y.o. male admitted 2/14/2020 with a poor appetite.     Hospital Course    He was found to have evidence for a marked new elevation in his alk phos and a profound leukocytosis. He was treated for abdominal sepsis and had an MRI and HIDA ordered. His MRi showed evidence for biliary obstruction and a possible pancreatic neoplasm. His CA 19-9 level was elevated and GI was consulted for ERCP       Interval Problem Update  The patient has an increase in his alkaline phosphatase today    Consultants/Specialty  GI Dr. Ansari    Code Status  DNR    Disposition  Hospice if cancer is detected per sister    Review of Systems  Review of Systems   Constitutional: Negative for chills, diaphoresis, fever and malaise/fatigue.   HENT: Negative for congestion and sinus pain.    Respiratory: Negative for shortness of breath.    Cardiovascular: Negative for chest pain, palpitations and leg swelling.   Gastrointestinal: Negative for abdominal pain, constipation, diarrhea, heartburn and vomiting.        Abdominal Pain improved   Genitourinary: Negative for dysuria and frequency.   Musculoskeletal: Positive for back pain. Negative for neck pain.        Chronic back pain   Skin: Negative for itching.   Neurological: Negative for dizziness, tingling and headaches.   Psychiatric/Behavioral: Negative for depression. The patient is not nervous/anxious and does not have insomnia.         Physical Exam  Temp:  [36.4 °C (97.5 °F)-37.1 °C (98.8 °F)] 36.7 °C (98 °F)  Pulse:  [89-96] 89  Resp:  [18] 18  BP: (123-147)/(59-76) 128/71  SpO2:  [92 %-93 %] 93 %    Physical Exam  Vitals signs and nursing note reviewed.   Constitutional:       Appearance: He is well-developed. He is not ill-appearing or toxic-appearing.   HENT:      Nose: No congestion or rhinorrhea.      Mouth/Throat:      Mouth: Mucous membranes are moist.      Pharynx: No oropharyngeal exudate.    Eyes:      General: No scleral icterus.        Right eye: No discharge.         Left eye: No discharge.      Conjunctiva/sclera: Conjunctivae normal.   Neck:      Musculoskeletal: Neck supple. No muscular tenderness.      Vascular: No JVD.      Trachea: No tracheal deviation.   Cardiovascular:      Rate and Rhythm: Normal rate and regular rhythm.      Pulses: Normal pulses.      Heart sounds: Normal heart sounds. No murmur.   Pulmonary:      Effort: Pulmonary effort is normal. No respiratory distress.      Breath sounds: Normal breath sounds. No rhonchi or rales.   Abdominal:      General: Bowel sounds are normal.      Tenderness: There is no abdominal tenderness. There is no guarding or rebound.   Musculoskeletal:         General: No swelling or tenderness.   Skin:     General: Skin is warm.      Coloration: Skin is pale. Skin is not jaundiced.      Findings: No bruising.   Neurological:      Mental Status: He is alert and oriented to person, place, and time. Mental status is at baseline.      Motor: No weakness.      Coordination: Coordination normal.   Psychiatric:         Mood and Affect: Mood normal.         Behavior: Behavior normal.         Cognition and Memory: Cognition is impaired. Memory is impaired.         Fluids    Intake/Output Summary (Last 24 hours) at 2/22/2020 1347  Last data filed at 2/22/2020 0900  Gross per 24 hour   Intake 240 ml   Output 1400 ml   Net -1160 ml       Laboratory  Recent Labs     02/20/20 0319 02/22/20 0416   WBC 28.3* 24.4*   RBC 3.12* 3.14*   HEMOGLOBIN 9.3* 9.0*   HEMATOCRIT 26.5* 27.6*   MCV 84.9 87.9   MCH 29.8 28.7   MCHC 35.1 32.6*   RDW 46.3 50.2*   PLATELETCT 521* 499*   MPV 11.5 11.9     Recent Labs     02/20/20 0319 02/22/20  0416   SODIUM 135 137   POTASSIUM 3.6 3.5*   CHLORIDE 103 105   CO2 22 23   GLUCOSE 164* 104*   BUN 11 11   CREATININE 0.60 0.54   CALCIUM 8.0* 7.9*                   Imaging  NM-HEPATOBILIARY SCAN   Final Result      1.  Biliary dilatation  without evidence of high-grade biliary obstruction.   2.  Gallbladder visualization after morphine administration. No acute cholecystitis.      CT-ABDOMEN-PELVIS WITH   Final Result      1.  New rim enhancing fluid collection tracks along the right heart margin, azygoesophageal recess and possibly communicates with Morison's pouch tracking along the right aspect of the inferior vena cava. This is nonspecific and could indicate abscess,    dissecting pseudocyst, mucinous cystic neoplasm related fluid      2.  New extra and intrahepatic biliary ductal dilatation with 6 cm dilatation of the gallbladder also seen. This indicates distal common bile duct/ampullary level obstruction. Stricture, mass effect from adjacent pancreas calcifications or small    ampullary mass are differential possibilities      3.  Decreased small pleural effusions. Some loculation on the right is possible      4.  Slightly decreased diffuse pancreatic ductal dilatation now measuring 11 mm. Evidence of chronic pancreatitis. New 3 cm cystic structure anterior to the pancreas could be a pseudocyst. Abscess or necrotic/cystic neoplasm are in the differential.      5.  Multiple chronic findings including status post aorto bifemoral bypass with approaching 70% stenosis of the left superficial femoral artery, nonobstructive right 3 mm nephrolithiasis, large volume rectal vault stool, dilated bladder which could    indicate outlet obstruction or neurogenic bladder, splenosis      [id number]   Final Result      1.  Severe dilatation of the intrahepatic biliary ducts, common bile duct and pancreatic duct. The distal common bile duct and the proximal pancreatic duct is not visualized at the pancreatic head. The pancreatic head appears to be diffusely enlarged and    demonstrates multiple small cysts. These findings are concerning for pancreatic neoplasm such as intraductal papillary mucinous cystoadenoma. The other differential diagnosis includes  periampullary carcinoma. MR examination of the abdomen with contrast    is recommended for further evaluation.   2.  Mixed signal intensity in the right perinephric space likely represents perinephric hematoma.   3.  Large hiatal hernia.   4.  Diffuse gastric wall thickening.      US-RUQ   Final Result      1.  Hepatomegaly.   2.  Hepatic steatosis.   3.  Gallbladder sludge. Mild intrahepatic ductal dilation. Dilation of the common duct up to 13 mm. No definite distal obstructing etiology is identified. MRCP could be obtained for further evaluation if indicated.   4.  Trace ascites.   5.  Pancreatic calcifications likely sequela of chronic pancreatitis.      DX-CHEST-PORTABLE (1 VIEW)   Final Result      Cardiomegaly.      CT-ABDOMEN WITH & W/O    (Results Pending)        Assessment/Plan  * Pancreatic mass  Assessment & Plan  MRI with concern for pancreatic malignancy  ERCP with no evidence of malignancy, calcifications of pancreas present   is elevated but less than 1000 so cannot rule in cancer, likely due chronic pancreatitis  Repeat CT today    Type 2 diabetes mellitus with hyperglycemia, with long-term current use of insulin (HCC)- (present on admission)  Assessment & Plan  He has an insulin sensor and his right lower quadrant, uses long-acting insulin 12 units and sliding scale.      Cognitive decline- (present on admission)  Assessment & Plan  Patient likely has a chronic component to his cognitive decline as well as acute worsening  Mental status now at his baseline, patient has a legal guardian  Plan discussed with guardian    Metabolic acidosis  Assessment & Plan  IV fluids with improvement    Hypokalemia  Assessment & Plan  Replace as needed    Sepsis (HCC)  Assessment & Plan  This is Sepsis Present on admission  SIRS criteria identified on my evaluation include: Tachycardia, with heart rate greater than 90 BPM and Leukocyosis, with WBC greater than 12,000  Source is GI  Sepsis protocol  initiated  Continue antibiotics  Monitor leukocytosis          Cholangitis  Assessment & Plan  Biliary obstruction suggested on imaging, GI consulted  Alkaline phosphatase is much higher today  Endoscopy done showing pancreatic calcifications but no stones, no area to biopsy  Continue antibiotics, white cell count still elevated  Will obtain CT scan today    Recurrent major depressive disorder, in full remission (HCC)  Assessment & Plan  .    Hyponatremia  Assessment & Plan  Resolved with fluids    Chronic pancreatitis (HCC)- (present on admission)  Assessment & Plan  Continue pancreatic enzymes with meals  Celiac nerve block for pain control planned with GI    Dyslipidemia- (present on admission)  Assessment & Plan  Continue statin      legal guardian Perez [phone]  VTE prophylaxis: scd

## 2020-02-22 NOTE — PROGRESS NOTES
Report given to Lyubov BENSON. Plan of care discussed. Patient resting comfortably in bed. Safety precautions in place.

## 2020-02-22 NOTE — PROGRESS NOTES
Aniyah (sister) was updated on patient. Star from guardianship was updated by Dr. Gentile. GI saw patient and ordered CT scan. Multiple IV sticks to get a 20 in the AC or above but was unsuccessful. Patient is refusing to be stuck anymore at the moment, but will try again later.

## 2020-02-23 ENCOUNTER — APPOINTMENT (OUTPATIENT)
Dept: RADIOLOGY | Facility: MEDICAL CENTER | Age: 60
DRG: 871 | End: 2020-02-23
Attending: INTERNAL MEDICINE
Payer: COMMERCIAL

## 2020-02-23 PROBLEM — J90 LOCULATED PLEURAL EFFUSION: Status: ACTIVE | Noted: 2020-02-23

## 2020-02-23 LAB
ALBUMIN SERPL BCP-MCNC: 2 G/DL (ref 3.2–4.9)
ALBUMIN/GLOB SERPL: 0.6 G/DL
ALP SERPL-CCNC: 1147 U/L (ref 30–99)
ALT SERPL-CCNC: 17 U/L (ref 2–50)
ANION GAP SERPL CALC-SCNC: 11 MMOL/L (ref 7–16)
AST SERPL-CCNC: 64 U/L (ref 12–45)
BILIRUB SERPL-MCNC: 0.8 MG/DL (ref 0.1–1.5)
BUN SERPL-MCNC: 11 MG/DL (ref 8–22)
CALCIUM SERPL-MCNC: 7.9 MG/DL (ref 8.4–10.2)
CHLORIDE SERPL-SCNC: 101 MMOL/L (ref 96–112)
CO2 SERPL-SCNC: 24 MMOL/L (ref 20–33)
CREAT SERPL-MCNC: 0.52 MG/DL (ref 0.5–1.4)
ERYTHROCYTE [DISTWIDTH] IN BLOOD BY AUTOMATED COUNT: 47.9 FL (ref 35.9–50)
GLOBULIN SER CALC-MCNC: 3.3 G/DL (ref 1.9–3.5)
GLUCOSE BLD-MCNC: 125 MG/DL (ref 65–99)
GLUCOSE BLD-MCNC: 127 MG/DL (ref 65–99)
GLUCOSE BLD-MCNC: 153 MG/DL (ref 65–99)
GLUCOSE BLD-MCNC: 209 MG/DL (ref 65–99)
GLUCOSE SERPL-MCNC: 163 MG/DL (ref 65–99)
HCT VFR BLD AUTO: 28.6 % (ref 42–52)
HGB BLD-MCNC: 9.7 G/DL (ref 14–18)
INR PPP: 1.18 (ref 0.87–1.13)
MCH RBC QN AUTO: 28.8 PG (ref 27–33)
MCHC RBC AUTO-ENTMCNC: 33.9 G/DL (ref 33.7–35.3)
MCV RBC AUTO: 84.9 FL (ref 81.4–97.8)
PLATELET # BLD AUTO: 653 K/UL (ref 164–446)
PMV BLD AUTO: 11.4 FL (ref 9–12.9)
POTASSIUM SERPL-SCNC: 3.8 MMOL/L (ref 3.6–5.5)
PROT SERPL-MCNC: 5.3 G/DL (ref 6–8.2)
PROTHROMBIN TIME: 15.2 SEC (ref 12–14.6)
RBC # BLD AUTO: 3.37 M/UL (ref 4.7–6.1)
SODIUM SERPL-SCNC: 136 MMOL/L (ref 135–145)
WBC # BLD AUTO: 27.6 K/UL (ref 4.8–10.8)

## 2020-02-23 PROCEDURE — 700102 HCHG RX REV CODE 250 W/ 637 OVERRIDE(OP): Performed by: INTERNAL MEDICINE

## 2020-02-23 PROCEDURE — 80053 COMPREHEN METABOLIC PANEL: CPT

## 2020-02-23 PROCEDURE — 99233 SBSQ HOSP IP/OBS HIGH 50: CPT | Performed by: INTERNAL MEDICINE

## 2020-02-23 PROCEDURE — A9270 NON-COVERED ITEM OR SERVICE: HCPCS | Performed by: INTERNAL MEDICINE

## 2020-02-23 PROCEDURE — 700111 HCHG RX REV CODE 636 W/ 250 OVERRIDE (IP): Performed by: INTERNAL MEDICINE

## 2020-02-23 PROCEDURE — 82962 GLUCOSE BLOOD TEST: CPT | Mod: 91

## 2020-02-23 PROCEDURE — 700105 HCHG RX REV CODE 258: Performed by: INTERNAL MEDICINE

## 2020-02-23 PROCEDURE — 700111 HCHG RX REV CODE 636 W/ 250 OVERRIDE (IP): Performed by: HOSPITALIST

## 2020-02-23 PROCEDURE — 85027 COMPLETE CBC AUTOMATED: CPT

## 2020-02-23 PROCEDURE — 770020 HCHG ROOM/CARE - TELE (206)

## 2020-02-23 PROCEDURE — 85610 PROTHROMBIN TIME: CPT

## 2020-02-23 RX ORDER — SODIUM CHLORIDE, SODIUM LACTATE, POTASSIUM CHLORIDE, CALCIUM CHLORIDE 600; 310; 30; 20 MG/100ML; MG/100ML; MG/100ML; MG/100ML
1000 INJECTION, SOLUTION INTRAVENOUS
Status: COMPLETED | OUTPATIENT
Start: 2020-02-23 | End: 2020-02-23

## 2020-02-23 RX ADMIN — OMEPRAZOLE 20 MG: 20 CAPSULE, DELAYED RELEASE ORAL at 17:57

## 2020-02-23 RX ADMIN — SODIUM CHLORIDE, POTASSIUM CHLORIDE, SODIUM LACTATE AND CALCIUM CHLORIDE 1000 ML: 600; 310; 30; 20 INJECTION, SOLUTION INTRAVENOUS at 08:55

## 2020-02-23 RX ADMIN — TAMSULOSIN HYDROCHLORIDE 0.4 MG: 0.4 CAPSULE ORAL at 06:31

## 2020-02-23 RX ADMIN — Medication 1 LOZENGE: at 06:30

## 2020-02-23 RX ADMIN — ATORVASTATIN CALCIUM 40 MG: 40 TABLET, FILM COATED ORAL at 17:57

## 2020-02-23 RX ADMIN — HYDROMORPHONE HYDROCHLORIDE 0.5 MG: 1 INJECTION, SOLUTION INTRAMUSCULAR; INTRAVENOUS; SUBCUTANEOUS at 18:06

## 2020-02-23 RX ADMIN — OMEPRAZOLE 20 MG: 20 CAPSULE, DELAYED RELEASE ORAL at 06:30

## 2020-02-23 RX ADMIN — PANCRELIPASE 48000 UNITS: 24000; 76000; 120000 CAPSULE, DELAYED RELEASE PELLETS ORAL at 06:30

## 2020-02-23 RX ADMIN — ENOXAPARIN SODIUM 40 MG: 100 INJECTION SUBCUTANEOUS at 06:30

## 2020-02-23 RX ADMIN — ACETAMINOPHEN 650 MG: 325 TABLET, FILM COATED ORAL at 18:10

## 2020-02-23 RX ADMIN — INSULIN HUMAN 4 UNITS: 100 INJECTION, SOLUTION PARENTERAL at 20:20

## 2020-02-23 RX ADMIN — ASPIRIN 81 MG CHEWABLE TABLET 81 MG: 81 TABLET CHEWABLE at 06:30

## 2020-02-23 RX ADMIN — INSULIN HUMAN 5 UNITS: 100 INJECTION, SUSPENSION SUBCUTANEOUS at 06:29

## 2020-02-23 RX ADMIN — Medication 100 MG: at 06:30

## 2020-02-23 RX ADMIN — INSULIN HUMAN 3 UNITS: 100 INJECTION, SOLUTION PARENTERAL at 18:00

## 2020-02-23 ASSESSMENT — ENCOUNTER SYMPTOMS
NECK PAIN: 0
SHORTNESS OF BREATH: 0
SINUS PAIN: 0
FEVER: 0
ROS GI COMMENTS: ABDOMINAL PAIN IMPROVED
TINGLING: 0
BLOOD IN STOOL: 0
DEPRESSION: 0
TREMORS: 0
NAUSEA: 0
ABDOMINAL PAIN: 0
CONSTIPATION: 0
DIARRHEA: 0
CHILLS: 0
BACK PAIN: 1
HEARTBURN: 0
VOMITING: 0
NERVOUS/ANXIOUS: 0
WEIGHT LOSS: 1
DIZZINESS: 0
INSOMNIA: 0

## 2020-02-23 ASSESSMENT — COGNITIVE AND FUNCTIONAL STATUS - GENERAL
SUGGESTED CMS G CODE MODIFIER MOBILITY: CJ
HELP NEEDED FOR BATHING: A LITTLE
MOBILITY SCORE: 20
DAILY ACTIVITIY SCORE: 22
DRESSING REGULAR LOWER BODY CLOTHING: A LITTLE
STANDING UP FROM CHAIR USING ARMS: A LITTLE
CLIMB 3 TO 5 STEPS WITH RAILING: A LOT
SUGGESTED CMS G CODE MODIFIER DAILY ACTIVITY: CJ
WALKING IN HOSPITAL ROOM: A LITTLE

## 2020-02-23 NOTE — PROGRESS NOTES
"  Gastroenterology Progress Note     Author: Larry Obrien MD.  Date & Time Created: 2/23/2020 9:27 AM    Chief Complaint:  Elevated liver tests, abnormal MRI    Interval History:  Initial history (Dr. Villarreal):  59 y.o. male with IDDM, chronic pancreatitis seizure disorder, iron deficiency anemia who presented 2/14/2020 with lethargy and poor appetite per assisted living facility.  He is difficult historian as does not want to answer questions.  He states \"I feel fine and always have.\"  He was evaluated 12/2019 for iron deficiency anemia and DKA.  Had EGD with severe erosive esophagitis.  He refused to drink prep for colonoscopy.  He apparently was brought by assisted living facility after they noted some lethargy and poor appetite.  He has weight loss of 7 pounds over the past month or so.  He denies abdominal pain and states he never had abdominal pain and his appetite is great.  Denies fevers, chills, nausea, vomiting, diarrhea, constipation, melena, hematochezia.  In ED, he had significant leukocytosis with newly elevated alkaline phosphatase and bilirubin with fairly normal transaminases.  He was started on IVF and antibiotics.  He had RUQ US followed by MRCP showing dilated bile and pancreatic ducts, distended GB, chronic pancreatitis and possible abnormal lesions head of pancreas.    2/16/2020: Continues to deny any symptoms including abdominal pain, nausea, vomiting, fevers.  Was refusing IV placement and vitals overnight.  Says he is agreeable to IV placement today  02/17/2020 - No complaints.  Says he feels fine, but does report urine is darker.  I explained imaging findings at length.  02/18/2020 - EUS showed extensive, severe pancreatic calcifications making identification of any pancreatic mass or fluid collection impossible.  Mediastinum was not assessed.  ERCP not done due to normalizing liver enzymes and added risk to patient.  02/19/2020 - No events overnight.  Patient denies fevers/chills/sweats " "and states he \"feels fine\".  Labs show worsening leukocytosis with improving liver enzymes.  02/20/2020 - Tolerating regular diet well.  Denies any abdominal pain.  He does report chronic mid-back pain, but this is not severe or worsening.  No nausea, vomiting, fevers, chills or sweats.  Leukocytosis slightly better.  Alkaline phosphatase still elevated, but remainder of liver enzymes normal.  02/21/2020 - No abdominal pain.  Tolerating diet.  Mild decrease appetite.  No CBC today.  Eager to be discharged.  02/22/2020 - Continues to \"feel fine\".  No abdominal pain, nausea or vomiting.  Tolerating diet.  Leukocytosis improved, but alkaline phosphatase abruptly higher.  CT not done due to IV issues and patient's difficult access.  2/23/20 - No new complaints. He ha lovenox s/c this AM at 6.30. ERCP scheduled for 9 AM. Labs pending     Review of Systems:  Review of Systems   Constitutional: Positive for weight loss. Negative for chills, fever and malaise/fatigue.   Respiratory: Negative for shortness of breath.    Cardiovascular: Negative for chest pain.   Gastrointestinal: Negative for abdominal pain, blood in stool, constipation, diarrhea, heartburn, melena, nausea and vomiting.       Physical Exam:  Physical Exam  Vitals signs and nursing note reviewed.   Constitutional:       Appearance: Normal appearance.   Eyes:      General: No scleral icterus.  Cardiovascular:      Rate and Rhythm: Normal rate and regular rhythm.      Pulses: Normal pulses.      Heart sounds: Normal heart sounds.   Pulmonary:      Effort: Pulmonary effort is normal. No respiratory distress.      Breath sounds: Normal breath sounds. No stridor. No wheezing, rhonchi or rales.   Abdominal:      General: Abdomen is flat. Bowel sounds are normal. There is no distension.      Palpations: Abdomen is soft. There is no mass.      Tenderness: There is no abdominal tenderness. There is no guarding or rebound.      Hernia: No hernia is present.   Skin:     " General: Skin is warm and dry.      Coloration: Skin is not jaundiced.   Neurological:      General: No focal deficit present.      Mental Status: He is alert and oriented to person, place, and time.         Labs:          Recent Labs     20   SODIUM 137   POTASSIUM 3.5*   CHLORIDE 105   CO2 23   BUN 11   CREATININE 0.54   CALCIUM 7.9*     Recent Labs     20  041   ALTSGPT 18   ASTSGOT 60*   ALKPHOSPHAT 1141*   TBILIRUBIN 0.7   GLUCOSE 104*     Recent Labs     20   RBC 3.14*   HEMOGLOBIN 9.0*   HEMATOCRIT 27.6*   PLATELETCT 499*     Recent Labs     20   WBC 24.4*   NEUTSPOLYS 77.80*   LYMPHOCYTES 9.70*   MONOCYTES 8.00   EOSINOPHILS 0.60   BASOPHILS 0.60   ASTSGOT 60*   ALTSGPT 18   ALKPHOSPHAT 1141*   TBILIRUBIN 0.7     Hemodynamics:  Temp (24hrs), Av.8 °C (98.3 °F), Min:36.6 °C (97.9 °F), Max:37.1 °C (98.8 °F)  Temperature: 36.6 °C (97.9 °F)  Pulse  Av.2  Min: 50  Max: 122   Blood Pressure: 138/69     Respiratory:    Respiration: 16, Pulse Oximetry: 92 %        RUL Breath Sounds: Expiratory Wheezes, RML Breath Sounds: Diminished, RLL Breath Sounds: Diminished, LEONARD Breath Sounds: Diminished, LLL Breath Sounds: Diminished  Fluids:    Intake/Output Summary (Last 24 hours) at 2020 0706  Last data filed at 2/15/2020 2200  Gross per 24 hour   Intake 2485 ml   Output 600 ml   Net 1885 ml        GI/Nutrition:  Orders Placed This Encounter   Procedures   • Diet Order Diabetic     Standing Status:   Standing     Number of Occurrences:   1     Order Specific Question:   Diet:     Answer:   Diabetic [3]   • Diet NPO     Standing Status:   Standing     Number of Occurrences:   8     Order Specific Question:   Restrict to:     Answer:   Sips with Medications [3]     Medical Decision Making, by Problem:  Active Hospital Problems    Diagnosis   • *Sepsis (HCC) [A41.9]   • Elevated alkaline phosphatase level [R74.8]   • Type 2 diabetes mellitus with hyperglycemia, with  long-term current use of insulin (HCC) [E11.65, Z79.4]   • Cognitive decline [R41.89]   • Metabolic acidosis [E87.2]   • Hypokalemia [E87.6]   • Hyponatremia [E87.1]   • Chronic pancreatitis (HCC) [K86.1]   • Dyslipidemia [E78.5]   • Cholangitis [K83.09]     Impression  / Plan  58 y/o with chronic pancreatitis, IDDM, iron deficiency anemia, GERD with esophagitis that presented for lethargy and found to have significant leukocytosis and newly elevated liver tests.  Concern for sepsis from biliary source.  Imaging suggests dilation of biliary and pancreatic ducts but no obvious stones although could have ampullary lesion of main duct IPMN causing obstruction.      1. Elevated liver tests, alk phos remains high and rising today, but other liver enzymes normalized - Suspect downstream stricture due to chronic calcific pancreatitis with transient obstruction by acute inflammation vs sludge/stone which passed  2. Sepsis, unclear source - Mediastinum was not assessed at EUS due to focus being on pancreas and biliary tree  3. Leukocytosis - No CBC today  4. Abnormal imaging of abdomen with dilated bile ducts and pancreatic duct and questionable mass head of pancreas - Only severe chronic calcific pancreatitis identified on EUS  5. Chronic pancreatitis  6. IDDM  7. Iron deficiency anemia  8. GERD with esophagitis  9. Hypertension  10. Seizure disorder  11. Aggressive behavior to healthcare workers  12. Back pain  13. Pancreatic fluid collection tracking adjacent to distal esophagus on imaging       PLAN:   1. Follow liver enzymes  2. Continue IV antibiotics and IVF  3. ERCP cancelled for today   4.Hold aspirin and lovenox for Now  5.Ok to resume prior diet   6.Will plan for ERCP+ EUS with FNA for tomorrow based on availability per scheduling   7.Keep NPO after midnight   8.Adjust insulin regimens based on diet status per primary team       Quality-Core Measures   Reviewed items::  Radiology images reviewed, Labs reviewed and  Medications reviewed

## 2020-02-23 NOTE — PROGRESS NOTES
Telemetry Shift Summary    Rhythm SR  HR Range 80s-90s  Ectopy rare PVCs  Measurements 0.18/0.08/0.34        Normal Values  Rhythm SR  HR Range    Measurements 0.12-0.20 / 0.06-0.10  / 0.30-0.52

## 2020-02-23 NOTE — PROGRESS NOTES
Telemetry Shift Summary    Rhythm SR  HR Range 80-90s, up to 175  Ectopy O PVCs, R quad  Measurements .20/.08/.36        Normal Values  Rhythm SR  HR Range    Measurements 0.12-0.20 / 0.06-0.10  / 0.30-0.52

## 2020-02-23 NOTE — OR NURSING
0830 in pacu on gurney rails up x 2 o2 nasal cannula 3 L. Pt with slightly edematous arms thin skin. Hair unkept 'greasy' pt denies pain or nausea. Iv lr connected. Pt alert to PPT .   0845  talking with pt and noted that pt has had lovonox this am, discussing cx the case , awaiting decision.

## 2020-02-23 NOTE — CARE PLAN
Problem: Safety  Goal: Will remain free from injury  Outcome: PROGRESSING AS EXPECTED  Note: Patient will remain free from falls/injuries. Safety measures in place. Pt calls appropriately.      Problem: Pain Management  Goal: Pain level will decrease to patient's comfort goal  Outcome: PROGRESSING AS EXPECTED  Note: Patient denies any abd pain. Will continue to monitor.

## 2020-02-23 NOTE — PROGRESS NOTES
[facility] Medicine Daily Progress Note    Date of Service  2/23/2020    Chief Complaint  59 y.o. male admitted 2/14/2020 with a poor appetite.     Hospital Course    He was found to have evidence for a marked new elevation in his alk phos and a profound leukocytosis. He was treated for abdominal sepsis and had an MRI and HIDA ordered. His MRi showed evidence for biliary obstruction and a possible pancreatic neoplasm. His CA 19-9 level was elevated and GI was consulted for ERCP       Interval Problem Update  The patient has a loculated medial right chest effusion on CT scan    Consultants/Specialty  GI Dr. Ansari    Code Status  DNR    Disposition  Hospice if cancer is detected per [facility]    Review of Systems  Review of Systems   Constitutional: Negative for fever and malaise/fatigue.   HENT: Negative for congestion and sinus pain.    Respiratory: Negative for shortness of breath.    Cardiovascular: Negative for chest pain and leg swelling.   Gastrointestinal: Negative for abdominal pain, constipation, heartburn and vomiting.        Abdominal Pain improved   Genitourinary: Negative for dysuria, frequency and urgency.   Musculoskeletal: Positive for back pain. Negative for neck pain.        Chronic back pain   Skin: Negative for itching and rash.   Neurological: Negative for dizziness, tingling and tremors.   Psychiatric/Behavioral: Negative for depression. The patient is not nervous/anxious and does not have insomnia.         Physical Exam  Temp:  [36.6 °C (97.9 °F)-37.1 °C (98.8 °F)] 36.8 °C (98.2 °F)  Pulse:  [81-96] 96  Resp:  [16-18] 16  BP: (112-138)/(59-71) 133/68  SpO2:  [88 %-97 %] 91 %    Physical Exam  Vitals signs and nursing note reviewed.   Constitutional:       Appearance: He is well-developed. He is not ill-appearing or toxic-appearing.   HENT:      Nose: No rhinorrhea.      Mouth/Throat:      Mouth: Mucous membranes are moist.      Pharynx: Oropharynx is clear.   Eyes:      General: No scleral  icterus.     Extraocular Movements: Extraocular movements intact.      Conjunctiva/sclera: Conjunctivae normal.   Neck:      Musculoskeletal: Neck supple. No muscular tenderness.      Vascular: No JVD.      Trachea: No tracheal deviation.   Cardiovascular:      Rate and Rhythm: Normal rate and regular rhythm.      Pulses: Normal pulses.      Heart sounds: Normal heart sounds. No murmur.   Pulmonary:      Effort: Pulmonary effort is normal. No respiratory distress.      Breath sounds: Normal breath sounds. No rhonchi or rales.   Abdominal:      General: Bowel sounds are normal. There is no distension.      Tenderness: There is no abdominal tenderness. There is no guarding or rebound.   Musculoskeletal:         General: No swelling or tenderness.   Skin:     General: Skin is warm.      Coloration: Skin is pale. Skin is not jaundiced.      Findings: No bruising.   Neurological:      Mental Status: He is alert and oriented to person, place, and time. Mental status is at baseline.      Motor: No weakness.      Coordination: Coordination normal.   Psychiatric:         Mood and Affect: Mood normal.         Behavior: Behavior normal.         Thought Content: Thought content normal.         Cognition and Memory: Cognition is impaired. Memory is impaired.         Fluids  No intake or output data in the 24 hours ending 02/23/20 1358    Laboratory  Recent Labs     02/22/20  0416 02/23/20  1225   WBC 24.4* 27.6*   RBC 3.14* 3.37*   HEMOGLOBIN 9.0* 9.7*   HEMATOCRIT 27.6* 28.6*   MCV 87.9 84.9   MCH 28.7 28.8   MCHC 32.6* 33.9   RDW 50.2* 47.9   PLATELETCT 499* 653*   MPV 11.9 11.4     Recent Labs     02/22/20  0416 02/23/20  1225   SODIUM 137 136   POTASSIUM 3.5* 3.8   CHLORIDE 105 101   CO2 23 24   GLUCOSE 104* 163*   BUN 11 11   CREATININE 0.54 0.52   CALCIUM 7.9* 7.9*     Recent Labs     02/23/20  1225   INR 1.18*               Imaging  CT-ABDOMEN WITH & W/O   Final Result      1.  No significant change since CT abdomen and  pelvis obtained 2/16/2020      2.  Findings consistent with high-grade biliary obstruction with dilation of the common bile duct measuring 17 mm, dilated gallbladder, dilated pancreatic duct with change in caliber of the pancreatic duct at the level pancreatic head      3.  Findings could be either malignancy the pancreatic head versus scarring from chronic pancreatitis.      4.  Sequela of chronic pancreatitis      5.  Large right medial chest rim-enhancing loculated fluid collection probably within the pleural space measuring 10.5 x 3.1 centimeter transversely and 7 cm craniocaudal      6.  Rim-enhancing fluid collection in the hepatorenal space measuring 11.2 cm craniocaudal and 2-3 cm in thickness.      7.  The rim-enhancing fluid collection are suspicious for infection/abscess      8.  Bilateral atelectasis and small-moderate-sized pleural effusion after      9.  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  Biliary dilatation without evidence of high-grade biliary obstruction.   2.  Gallbladder visualization after morphine administration. No acute cholecystitis.      CT-ABDOMEN-PELVIS WITH   Final Result      1.  New rim enhancing fluid collection tracks along the right heart margin, azygoesophageal recess and possibly communicates with Morison's pouch tracking along the right aspect of the inferior vena cava. This is nonspecific and could indicate abscess,    dissecting pseudocyst, mucinous cystic neoplasm related fluid      2.  New extra and intrahepatic biliary ductal dilatation with 6 cm dilatation of the gallbladder also seen. This indicates distal common bile duct/ampullary level obstruction. Stricture, mass effect from adjacent pancreas calcifications or small    ampullary mass are differential possibilities      3.  Decreased small pleural effusions. Some loculation on the right is  possible      4.  Slightly decreased diffuse pancreatic ductal dilatation now measuring 11 mm. Evidence of chronic pancreatitis. New 3 cm cystic structure anterior to the pancreas could be a pseudocyst. Abscess or necrotic/cystic neoplasm are in the differential.      5.  Multiple chronic findings including status post aorto bifemoral bypass with approaching 70% stenosis of the left superficial femoral artery, nonobstructive right 3 mm nephrolithiasis, large volume rectal vault stool, dilated bladder which could    indicate outlet obstruction or neurogenic bladder, splenosis      AC-AJTSYNA-D/O   Final Result      1.  Severe dilatation of the intrahepatic biliary ducts, common bile duct and pancreatic duct. The distal common bile duct and the proximal pancreatic duct is not visualized at the pancreatic head. The pancreatic head appears to be diffusely enlarged and    demonstrates multiple small cysts. These findings are concerning for pancreatic neoplasm such as intraductal papillary mucinous cystoadenoma. The other differential diagnosis includes periampullary carcinoma. MR examination of the abdomen with contrast    is recommended for further evaluation.   2.  Mixed signal intensity in the right perinephric space likely represents perinephric hematoma.   3.  Large hiatal hernia.   4.  Diffuse gastric wall thickening.      US-RUQ   Final Result      1.  Hepatomegaly.   2.  Hepatic steatosis.   3.  Gallbladder sludge. Mild intrahepatic ductal dilation. Dilation of the common duct up to 13 mm. No definite distal obstructing etiology is identified. MRCP could be obtained for further evaluation if indicated.   4.  Trace ascites.   5.  Pancreatic calcifications likely sequela of chronic pancreatitis.      DX-CHEST-PORTABLE (1 VIEW)   Final Result      Cardiomegaly.           Assessment/Plan  * Pancreatic mass  Assessment & Plan  MRI with concern for pancreatic malignancy  ERCP with no evidence of malignancy,  calcifications of pancreas present   is elevated but less than 1000 so cannot rule in cancer, likely due chronic pancreatitis  Repeat CT showed chronic pancreatitis and loculated chest effusion    Type 2 diabetes mellitus with hyperglycemia, with long-term current use of insulin ([facility])- (present on admission)  Assessment & Plan  He has an insulin sensor and his right lower quadrant, uses long-acting insulin 12 units and sliding scale.      Cognitive decline- (present on admission)  Assessment & Plan  Patient likely has a chronic component to his cognitive decline as well as acute worsening  Mental status now at his baseline, patient has a legal guardian  Plan discussed with guardian    Metabolic acidosis  Assessment & Plan  IV fluids with improvement    Hypokalemia  Assessment & Plan  Replace as needed    Sepsis (HCC)  Assessment & Plan  This is Sepsis Present on admission  SIRS criteria identified on my evaluation include: Tachycardia, with heart rate greater than 90 BPM and Leukocyosis, with WBC greater than 12,000  Source is GI  Sepsis protocol initiated  Continue antibiotics  Monitor leukocytosis          Loculated pleural effusion  Assessment & Plan  In medial right chest, will attempt aspiration with GI endoscopy, discussed with Dr. Wakefield today    Cholangitis  Assessment & Plan  Biliary obstruction suggested on imaging, GI consulted  Alkaline phosphatase is stable over 1100  Endoscopy done showing pancreatic calcifications but no stones, no area to biopsy  Continue antibiotics, white cell count still elevated  Repeat CT scan done    Recurrent major depressive disorder, in full remission ([facility])  Assessment & Plan  .    Hyponatremia  Assessment & Plan  Resolved with fluids    Chronic pancreatitis (HCC)- (present on admission)  Assessment & Plan  Continue pancreatic enzymes with meals  Celiac nerve block for pain control planned with GI    Dyslipidemia- (present on admission)  Assessment & Plan  Continue  statin      legal guardian Perez [phone]  VTE prophylaxis: scd

## 2020-02-23 NOTE — PROGRESS NOTES
"  Gastroenterology Progress Note     Author: Félix Wakefield M.D.  Date & Time Created: 2/23/2020 7:21 AM    Chief Complaint:  Elevated liver tests, abnormal MRI    Interval History:  Initial history (Dr. Villarreal):  59 y.o. male with IDDM, chronic pancreatitis seizure disorder, iron deficiency anemia who presented 2/14/2020 with lethargy and poor appetite per assisted living facility.  He is difficult historian as does not want to answer questions.  He states \"I feel fine and always have.\"  He was evaluated 12/2019 for iron deficiency anemia and DKA.  Had EGD with severe erosive esophagitis.  He refused to drink prep for colonoscopy.  He apparently was brought by assisted living facility after they noted some lethargy and poor appetite.  He has weight loss of 7 pounds over the past month or so.  He denies abdominal pain and states he never had abdominal pain and his appetite is great.  Denies fevers, chills, nausea, vomiting, diarrhea, constipation, melena, hematochezia.  In ED, he had significant leukocytosis with newly elevated alkaline phosphatase and bilirubin with fairly normal transaminases.  He was started on IVF and antibiotics.  He had RUQ US followed by MRCP showing dilated bile and pancreatic ducts, distended GB, chronic pancreatitis and possible abnormal lesions head of pancreas.    2/16/2020: Continues to deny any symptoms including abdominal pain, nausea, vomiting, fevers.  Was refusing IV placement and vitals overnight.  Says he is agreeable to IV placement today  02/17/2020 - No complaints.  Says he feels fine, but does report urine is darker.  I explained imaging findings at length.  02/18/2020 - EUS showed extensive, severe pancreatic calcifications making identification of any pancreatic mass or fluid collection impossible.  Mediastinum was not assessed.  ERCP not done due to normalizing liver enzymes and added risk to patient.  02/19/2020 - No events overnight.  Patient denies fevers/chills/sweats " "and states he \"feels fine\".  Labs show worsening leukocytosis with improving liver enzymes.  02/20/2020 - Tolerating regular diet well.  Denies any abdominal pain.  He does report chronic mid-back pain, but this is not severe or worsening.  No nausea, vomiting, fevers, chills or sweats.  Leukocytosis slightly better.  Alkaline phosphatase still elevated, but remainder of liver enzymes normal.  02/21/2020 - No abdominal pain.  Tolerating diet.  Mild decrease appetite.  No CBC today.  Eager to be discharged.  02/22/2020 - Continues to \"feel fine\".  No abdominal pain, nausea or vomiting.  Tolerating diet.  Leukocytosis improved, but alkaline phosphatase abruptly higher.  CT not done due to IV issues and patient's difficult access.  2/23/2020- feels ok, tolerated diet. Reports back pain 7/10 but no abdominal pain. No fever last night.    Review of Systems:  Review of Systems   Constitutional: Positive for weight loss. Negative for chills, fever and malaise/fatigue.   Respiratory: Negative for shortness of breath.    Cardiovascular: Negative for chest pain.   Gastrointestinal: Negative for abdominal pain, blood in stool, constipation, diarrhea, heartburn, melena, nausea and vomiting.       Physical Exam:  Physical Exam  Vitals signs and nursing note reviewed.   Constitutional:       Appearance: Normal appearance.   Eyes:      General: No scleral icterus.  Cardiovascular:      Rate and Rhythm: Normal rate and regular rhythm.      Pulses: Normal pulses.      Heart sounds: Normal heart sounds.   Pulmonary:      Effort: Pulmonary effort is normal. No respiratory distress.      Breath sounds: Normal breath sounds. No stridor. No wheezing, rhonchi or rales.   Abdominal:      General: Abdomen is flat. Bowel sounds are normal. There is no distension.      Palpations: Abdomen is soft. There is no mass.      Tenderness: There is no abdominal tenderness. There is no guarding or rebound.      Hernia: No hernia is present.   Skin:    "  General: Skin is warm and dry.      Coloration: Skin is not jaundiced.   Neurological:      General: No focal deficit present.      Mental Status: He is alert and oriented to person, place, and time.         Labs:          Recent Labs     20  041   SODIUM 137   POTASSIUM 3.5*   CHLORIDE 105   CO2 23   BUN 11   CREATININE 0.54   CALCIUM 7.9*     Recent Labs     20  0416   ALTSGPT 18   ASTSGOT 60*   ALKPHOSPHAT 1141*   TBILIRUBIN 0.7   GLUCOSE 104*     Recent Labs     20  0416   RBC 3.14*   HEMOGLOBIN 9.0*   HEMATOCRIT 27.6*   PLATELETCT 499*     Recent Labs     20   WBC 24.4*   NEUTSPOLYS 77.80*   LYMPHOCYTES 9.70*   MONOCYTES 8.00   EOSINOPHILS 0.60   BASOPHILS 0.60   ASTSGOT 60*   ALTSGPT 18   ALKPHOSPHAT 1141*   TBILIRUBIN 0.7     Hemodynamics:  Temp (24hrs), Av.9 °C (98.4 °F), Min:36.7 °C (98 °F), Max:37.1 °C (98.8 °F)  Temperature: 37 °C (98.6 °F)  Pulse  Av.2  Min: 50  Max: 122   Blood Pressure: 112/59     Respiratory:    Respiration: 16, Pulse Oximetry: 92 %        RUL Breath Sounds: Expiratory Wheezes, RML Breath Sounds: Diminished, RLL Breath Sounds: Diminished, LEONARD Breath Sounds: Diminished, LLL Breath Sounds: Diminished  Fluids:    Intake/Output Summary (Last 24 hours) at 2020 0706  Last data filed at 2/15/2020 2200  Gross per 24 hour   Intake 2485 ml   Output 600 ml   Net 1885 ml        GI/Nutrition:  Orders Placed This Encounter   Procedures   • Diet Order Diabetic     Standing Status:   Standing     Number of Occurrences:   1     Order Specific Question:   Diet:     Answer:   Diabetic [3]     Medical Decision Making, by Problem:  Active Hospital Problems    Diagnosis   • *Sepsis (HCC) [A41.9]   • Elevated alkaline phosphatase level [R74.8]   • Type 2 diabetes mellitus with hyperglycemia, with long-term current use of insulin (HCC) [E11.65, Z79.4]   • Cognitive decline [R41.89]   • Metabolic acidosis [E87.2]   • Hypokalemia [E87.6]   • Hyponatremia [E87.1]    • Chronic pancreatitis (HCC) [K86.1]   • Dyslipidemia [E78.5]   • Cholangitis [K83.09]       CT scan 2/22/2020  IMPRESSION:     1.  No significant change since CT abdomen and pelvis obtained 2/16/2020  2.  Findings consistent with high-grade biliary obstruction with dilation of the common bile duct measuring 17 mm, dilated gallbladder, dilated pancreatic duct with change in caliber of the pancreatic duct at the level pancreatic head  3.  Findings could be either malignancy the pancreatic head versus scarring from chronic pancreatitis.  4.  Sequela of chronic pancreatitis  5.  Large right medial chest rim-enhancing loculated fluid collection probably within the pleural space measuring 10.5 x 3.1 centimeter transversely and 7 cm craniocaudal  6.  Rim-enhancing fluid collection in the hepatorenal space measuring 11.2 cm craniocaudal and 2-3 cm in thickness.  7.  The rim-enhancing fluid collection are suspicious for infection/abscess  8.  Bilateral atelectasis and small-moderate-sized pleural effusion after  9.  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  Esophagus, unremarkable mucosa.  2.  Stomach, diffuse edematous folds.  Mildly dusky discoloration of the   fundus, biopsied.  3.  Duodenum, unremarkable mucosa with good bile flow.      ENDOSCOPIC ULTRASOUND FINDINGS:    1.  Celiac axis, no adenopathy.  2.  Pancreatic body and tail and surrounding structures, heterogeneous,   hypoechoic, severe calcifications within the pancreatic body and tail with   extensive shadowing obscuring surrounding view.   3.  Pancreatic duct, normal course.  Massive dilation measuring 10 mm in the   tail and 12.4 mm in the body.  4.  Head of the pancreas, heterogeneous, hypoechoic with severe calcifications   obscuring view.    5.  Biliary ampulla prominent, but otherwise unremarkable.    6.  Common  bile duct obscured by calcifications in the head of the pancreas.       ERCP was planned, but not performed due to the patient's improving enzymes and   findings of severe chronic pancreatitis without ongoing biliary obstruction.         Impression:  60 y/o with chronic pancreatitis, IDDM, iron deficiency anemia, GERD with esophagitis that presented for lethargy and found to have significant leukocytosis and newly elevated liver tests.  Concern for sepsis from biliary source.  Imaging suggests dilation of biliary and pancreatic ducts but no obvious stones although could have ampullary lesion of main duct IPMN causing obstruction.      1. Elevated liver tests, alk phos remains high but improving, but other liver enzymes normalized - Suspect downstream stricture due to chronic calcific pancreatitis with transient obstruction by acute inflammation vs sludge/stone which passed  2. Sepsis, unclear source - Mediastinum was not assessed at EUS due to focus being on pancreas and biliary tree  3. Leukocytosis - No CBC today  4. Abnormal imaging of abdomen with dilated bile ducts and pancreatic duct and questionable mass head of pancreas - Only severe chronic calcific pancreatitis identified on EUS  5. Chronic pancreatitis  6. IDDM  7. Iron deficiency anemia  8. GERD with esophagitis  9. Hypertension  10. Seizure disorder  11. Aggressive behavior to healthcare workers  12. Back pain  13. Pancreatic fluid collection tracking adjacent to distal esophagus on imaging       Plan:  1. Follow liver enzymes  2. Continue IV antibiotics and IVF  3. EUS/EGD/ERCP today at 9AM. NPO.  4. Check AM CBC/CMP/INR      Quality-Core Measures   Reviewed items::  Radiology images reviewed, Labs reviewed and Medications reviewed

## 2020-02-23 NOTE — ASSESSMENT & PLAN NOTE
In medial right chest, continue antibiotics, pus drained from ampulla of Vater with GI 2/24  Pulmonary edema appreciated on CXR 2/27 - diurese to get oxygen demand down, anticipate another attempt at ERCP in a few days.

## 2020-02-23 NOTE — CARE PLAN
Problem: Communication  Goal: The ability to communicate needs accurately and effectively will improve  Outcome: PROGRESSING AS EXPECTED  Intervention: [place] patient and significant other/support system to call light to alert staff of needs  Flowsheets (Taken 2/22/2020 5935)  Oriented to:: All of the Following : Location of Bathroom, Visiting Policy, Unit Routine, Call Light and Bedside Controls, Bedside Rail Policy, Smoking Policy, Rights and Responsibilities, Bedside Report, and Patient Education Notebook     Problem: Infection  Goal: Will remain free from infection  Outcome: PROGRESSING AS EXPECTED  Note: Discussed hand and oral hygiene and their roll in infection prevention.  Standard precautions used in pt care.

## 2020-02-23 NOTE — PROGRESS NOTES
Pt returned from CT scan with Rick from transport. Pt back in bed. Bed in low, locked position with call bell in reach

## 2020-02-23 NOTE — PROGRESS NOTES
Report given to Maxwell BENSON. Plan of care discussed. Patient resting comfortably in bed. Safety precautions in place.

## 2020-02-23 NOTE — OR NURSING
0856 accidentally closed my previous note prematurely.   0905 pt case cx due to his receiving lovonox this am.

## 2020-02-24 ENCOUNTER — ANESTHESIA (OUTPATIENT)
Dept: SURGERY | Facility: MEDICAL CENTER | Age: 60
DRG: 871 | End: 2020-02-24
Payer: COMMERCIAL

## 2020-02-24 ENCOUNTER — APPOINTMENT (OUTPATIENT)
Dept: RADIOLOGY | Facility: MEDICAL CENTER | Age: 60
DRG: 871 | End: 2020-02-24
Attending: INTERNAL MEDICINE
Payer: COMMERCIAL

## 2020-02-24 ENCOUNTER — ANESTHESIA EVENT (OUTPATIENT)
Dept: SURGERY | Facility: MEDICAL CENTER | Age: 60
DRG: 871 | End: 2020-02-24
Payer: COMMERCIAL

## 2020-02-24 LAB
GLUCOSE BLD-MCNC: 136 MG/DL (ref 65–99)
GLUCOSE BLD-MCNC: 141 MG/DL (ref 65–99)
GLUCOSE BLD-MCNC: 148 MG/DL (ref 65–99)

## 2020-02-24 PROCEDURE — 160035 HCHG PACU - 1ST 60 MINS PHASE I: Performed by: INTERNAL MEDICINE

## 2020-02-24 PROCEDURE — 0F9D8ZZ DRAINAGE OF PANCREATIC DUCT, VIA NATURAL OR ARTIFICIAL OPENING ENDOSCOPIC: ICD-10-PCS | Performed by: INTERNAL MEDICINE

## 2020-02-24 PROCEDURE — 700111 HCHG RX REV CODE 636 W/ 250 OVERRIDE (IP): Performed by: INTERNAL MEDICINE

## 2020-02-24 PROCEDURE — 160009 HCHG ANES TIME/MIN: Performed by: INTERNAL MEDICINE

## 2020-02-24 PROCEDURE — 770020 HCHG ROOM/CARE - TELE (206)

## 2020-02-24 PROCEDURE — A9270 NON-COVERED ITEM OR SERVICE: HCPCS | Performed by: INTERNAL MEDICINE

## 2020-02-24 PROCEDURE — 700105 HCHG RX REV CODE 258: Performed by: INTERNAL MEDICINE

## 2020-02-24 PROCEDURE — 700102 HCHG RX REV CODE 250 W/ 637 OVERRIDE(OP): Performed by: INTERNAL MEDICINE

## 2020-02-24 PROCEDURE — 82962 GLUCOSE BLOOD TEST: CPT

## 2020-02-24 PROCEDURE — 500066 HCHG BITE BLOCK, ECT: Performed by: INTERNAL MEDICINE

## 2020-02-24 PROCEDURE — 700111 HCHG RX REV CODE 636 W/ 250 OVERRIDE (IP): Performed by: ANESTHESIOLOGY

## 2020-02-24 PROCEDURE — 160208 HCHG ENDO MINUTES - EA ADDL 1 MIN LEVEL 4: Performed by: INTERNAL MEDICINE

## 2020-02-24 PROCEDURE — 160002 HCHG RECOVERY MINUTES (STAT): Performed by: INTERNAL MEDICINE

## 2020-02-24 PROCEDURE — 99232 SBSQ HOSP IP/OBS MODERATE 35: CPT | Performed by: INTERNAL MEDICINE

## 2020-02-24 PROCEDURE — 700111 HCHG RX REV CODE 636 W/ 250 OVERRIDE (IP): Performed by: HOSPITALIST

## 2020-02-24 PROCEDURE — 160203 HCHG ENDO MINUTES - 1ST 30 MINS LEVEL 4: Performed by: INTERNAL MEDICINE

## 2020-02-24 PROCEDURE — 74328 X-RAY BILE DUCT ENDOSCOPY: CPT

## 2020-02-24 PROCEDURE — 700101 HCHG RX REV CODE 250: Performed by: INTERNAL MEDICINE

## 2020-02-24 PROCEDURE — 160048 HCHG OR STATISTICAL LEVEL 1-5: Performed by: INTERNAL MEDICINE

## 2020-02-24 PROCEDURE — 160036 HCHG PACU - EA ADDL 30 MINS PHASE I: Performed by: INTERNAL MEDICINE

## 2020-02-24 RX ORDER — CEFAZOLIN SODIUM 1 G/3ML
INJECTION, POWDER, FOR SOLUTION INTRAMUSCULAR; INTRAVENOUS PRN
Status: DISCONTINUED | OUTPATIENT
Start: 2020-02-24 | End: 2020-02-24 | Stop reason: SURG

## 2020-02-24 RX ORDER — OXYCODONE HCL 5 MG/5 ML
5 SOLUTION, ORAL ORAL
Status: DISCONTINUED | OUTPATIENT
Start: 2020-02-24 | End: 2020-02-24 | Stop reason: HOSPADM

## 2020-02-24 RX ORDER — PHENYLEPHRINE HYDROCHLORIDE 10 MG/ML
INJECTION, SOLUTION INTRAMUSCULAR; INTRAVENOUS; SUBCUTANEOUS PRN
Status: DISCONTINUED | OUTPATIENT
Start: 2020-02-24 | End: 2020-02-24 | Stop reason: SURG

## 2020-02-24 RX ORDER — CEFAZOLIN SODIUM 1 G/3ML
2 INJECTION, POWDER, FOR SOLUTION INTRAMUSCULAR; INTRAVENOUS DAILY
Status: DISCONTINUED | OUTPATIENT
Start: 2020-02-25 | End: 2020-02-24

## 2020-02-24 RX ORDER — ONDANSETRON 2 MG/ML
4 INJECTION INTRAMUSCULAR; INTRAVENOUS
Status: DISCONTINUED | OUTPATIENT
Start: 2020-02-24 | End: 2020-02-24 | Stop reason: HOSPADM

## 2020-02-24 RX ORDER — OXYCODONE HCL 5 MG/5 ML
10 SOLUTION, ORAL ORAL
Status: DISCONTINUED | OUTPATIENT
Start: 2020-02-24 | End: 2020-02-24 | Stop reason: HOSPADM

## 2020-02-24 RX ORDER — SODIUM CHLORIDE, SODIUM LACTATE, POTASSIUM CHLORIDE, CALCIUM CHLORIDE 600; 310; 30; 20 MG/100ML; MG/100ML; MG/100ML; MG/100ML
INJECTION, SOLUTION INTRAVENOUS CONTINUOUS
Status: DISCONTINUED | OUTPATIENT
Start: 2020-02-24 | End: 2020-02-24 | Stop reason: HOSPADM

## 2020-02-24 RX ORDER — MEPERIDINE HYDROCHLORIDE 25 MG/ML
6.25 INJECTION INTRAMUSCULAR; INTRAVENOUS; SUBCUTANEOUS
Status: DISCONTINUED | OUTPATIENT
Start: 2020-02-24 | End: 2020-02-24 | Stop reason: HOSPADM

## 2020-02-24 RX ORDER — HYDROMORPHONE HYDROCHLORIDE 1 MG/ML
0.4 INJECTION, SOLUTION INTRAMUSCULAR; INTRAVENOUS; SUBCUTANEOUS
Status: DISCONTINUED | OUTPATIENT
Start: 2020-02-24 | End: 2020-02-24 | Stop reason: HOSPADM

## 2020-02-24 RX ORDER — HALOPERIDOL 5 MG/ML
1 INJECTION INTRAMUSCULAR
Status: DISCONTINUED | OUTPATIENT
Start: 2020-02-24 | End: 2020-02-24 | Stop reason: HOSPADM

## 2020-02-24 RX ORDER — HYDROMORPHONE HYDROCHLORIDE 1 MG/ML
0.1 INJECTION, SOLUTION INTRAMUSCULAR; INTRAVENOUS; SUBCUTANEOUS
Status: DISCONTINUED | OUTPATIENT
Start: 2020-02-24 | End: 2020-02-24 | Stop reason: HOSPADM

## 2020-02-24 RX ORDER — SODIUM CHLORIDE, SODIUM LACTATE, POTASSIUM CHLORIDE, CALCIUM CHLORIDE 600; 310; 30; 20 MG/100ML; MG/100ML; MG/100ML; MG/100ML
INJECTION, SOLUTION INTRAVENOUS CONTINUOUS
Status: ACTIVE | OUTPATIENT
Start: 2020-02-24 | End: 2020-02-24

## 2020-02-24 RX ORDER — HYDROMORPHONE HYDROCHLORIDE 1 MG/ML
0.2 INJECTION, SOLUTION INTRAMUSCULAR; INTRAVENOUS; SUBCUTANEOUS
Status: DISCONTINUED | OUTPATIENT
Start: 2020-02-24 | End: 2020-02-24 | Stop reason: HOSPADM

## 2020-02-24 RX ORDER — DIPHENHYDRAMINE HYDROCHLORIDE 50 MG/ML
12.5 INJECTION INTRAMUSCULAR; INTRAVENOUS
Status: DISCONTINUED | OUTPATIENT
Start: 2020-02-24 | End: 2020-02-24 | Stop reason: HOSPADM

## 2020-02-24 RX ADMIN — METRONIDAZOLE 500 MG: 500 INJECTION, SOLUTION INTRAVENOUS at 10:33

## 2020-02-24 RX ADMIN — CEFAZOLIN 2 G: 10 INJECTION, POWDER, FOR SOLUTION INTRAVENOUS; PARENTERAL at 11:48

## 2020-02-24 RX ADMIN — MIDAZOLAM HYDROCHLORIDE 2 MG: 1 INJECTION, SOLUTION INTRAMUSCULAR; INTRAVENOUS at 06:57

## 2020-02-24 RX ADMIN — PROPOFOL 200 MG: 10 INJECTION, EMULSION INTRAVENOUS at 07:03

## 2020-02-24 RX ADMIN — SODIUM CHLORIDE, POTASSIUM CHLORIDE, SODIUM LACTATE AND CALCIUM CHLORIDE: 600; 310; 30; 20 INJECTION, SOLUTION INTRAVENOUS at 06:57

## 2020-02-24 RX ADMIN — METRONIDAZOLE 500 MG: 500 INJECTION, SOLUTION INTRAVENOUS at 23:19

## 2020-02-24 RX ADMIN — Medication 100 MG: at 06:03

## 2020-02-24 RX ADMIN — ACETAMINOPHEN 650 MG: 325 TABLET, FILM COATED ORAL at 18:35

## 2020-02-24 RX ADMIN — HYDROMORPHONE HYDROCHLORIDE 0.5 MG: 1 INJECTION, SOLUTION INTRAMUSCULAR; INTRAVENOUS; SUBCUTANEOUS at 18:36

## 2020-02-24 RX ADMIN — PHENYLEPHRINE HYDROCHLORIDE 100 MCG: 10 INJECTION INTRAVENOUS at 07:22

## 2020-02-24 RX ADMIN — TAMSULOSIN HYDROCHLORIDE 0.4 MG: 0.4 CAPSULE ORAL at 06:03

## 2020-02-24 RX ADMIN — CEFAZOLIN 2 G: 1 INJECTION, POWDER, FOR SOLUTION INTRAVENOUS at 06:57

## 2020-02-24 RX ADMIN — ATORVASTATIN CALCIUM 40 MG: 40 TABLET, FILM COATED ORAL at 18:04

## 2020-02-24 RX ADMIN — INSULIN HUMAN 5 UNITS: 100 INJECTION, SUSPENSION SUBCUTANEOUS at 06:08

## 2020-02-24 RX ADMIN — INSULIN HUMAN 5 UNITS: 100 INJECTION, SUSPENSION SUBCUTANEOUS at 18:01

## 2020-02-24 RX ADMIN — METRONIDAZOLE 500 MG: 500 INJECTION, SOLUTION INTRAVENOUS at 18:38

## 2020-02-24 RX ADMIN — OMEPRAZOLE 20 MG: 20 CAPSULE, DELAYED RELEASE ORAL at 06:02

## 2020-02-24 RX ADMIN — SUCCINYLCHOLINE CHLORIDE 100 MG: 20 INJECTION, SOLUTION INTRAMUSCULAR; INTRAVENOUS at 07:03

## 2020-02-24 RX ADMIN — CEFTRIAXONE SODIUM 1 G: 1 INJECTION, POWDER, FOR SOLUTION INTRAMUSCULAR; INTRAVENOUS at 17:58

## 2020-02-24 RX ADMIN — ACETAMINOPHEN 650 MG: 325 TABLET, FILM COATED ORAL at 12:02

## 2020-02-24 RX ADMIN — PANCRELIPASE 48000 UNITS: 24000; 76000; 120000 CAPSULE, DELAYED RELEASE PELLETS ORAL at 06:02

## 2020-02-24 RX ADMIN — OMEPRAZOLE 20 MG: 20 CAPSULE, DELAYED RELEASE ORAL at 18:04

## 2020-02-24 ASSESSMENT — ENCOUNTER SYMPTOMS
ABDOMINAL PAIN: 0
DIZZINESS: 0
INSOMNIA: 0
VOMITING: 0
SINUS PAIN: 0
TINGLING: 0
TREMORS: 0
FEVER: 0
BACK PAIN: 1
NAUSEA: 0
MYALGIAS: 0
SHORTNESS OF BREATH: 0
ROS GI COMMENTS: ABDOMINAL PAIN IMPROVED
HEARTBURN: 0
WHEEZING: 0
DEPRESSION: 0
NECK PAIN: 0
NERVOUS/ANXIOUS: 0
PALPITATIONS: 0
CONSTIPATION: 0
CHILLS: 0

## 2020-02-24 NOTE — DISCHARGE PLANNING
Agency/Facility Name: [facility]  Spoke To: fax  Outcome: Pt declined. Not contracted with insurance provider.    RN CARRI informed

## 2020-02-24 NOTE — OR NURSING
0755: To PACU from ENDO via gurney, sleeping, respirations spontaneous via oral airway.  0757: Pt awake, able to lift head and open mouth, oral airway removed. O2 saturations low, pt's mouth and nose suctioned, and placed on a non-rebreather mask at 15 LPM.  0805: Respirations shallow and fast, but no pain or nausea.  0815: Still having increased respiratory rate. No pain or nausea.  0830: Able to decrease supplemental O2 requirements, but still no non-rebreather mask. Still no pain or nausea, sleepy, but arouses to voice.  0845: Still no pain or nausea, still sleepy, but arousing to voice and answering questions appropriately, still needing the non-rebreather mask, but able to titrate supplemental O2 down.  0900: Able to switch to an Oxymask and again decrease supplemental O2. No pain or nausea, still sleepy.  0915: Picture taken of skin tear on L hand that occurred in the Endo room. No pain or nausea.  0930: Still no pain or nausea, occassional spontaneous arousal, arouses to voice and touch. Pt helped roll to side for cleaning.   0940: Meets criteria to transfer back to the floor, no pain or nausea, on 6 LPM via oxymask, report called to KELLEY Miles. Transport is not available, CNA will transfer via gurney. O2 tank > 1/2 full at 1000 psi.

## 2020-02-24 NOTE — DISCHARGE PLANNING
Anticipated Discharge Disposition: Hospice    Action: [facility] referral sent per legal POA. LSW faxed to [facility].     Barriers to Discharge: None    Plan: LSW will continue to assess pt for discharge needs.

## 2020-02-24 NOTE — OR NURSING
0650: Patient allergies and NPO status verified. Pt on telemetry.  Pt flat affect, limited answers to questions and no eye contact. Patient verbalizes understanding of pain scale, expected course of stay and plan of care. Surgical site verified with patient. IV fluids hung.

## 2020-02-24 NOTE — PROGRESS NOTES
Telemetry Shift Summary    Rhythm SR/ST  HR Range 80s-100s  Ectopy occ PVCs  Measurements 0.16/0.08/0.34        Normal Values  Rhythm SR  HR Range    Measurements 0.12-0.20 / 0.06-0.10  / 0.30-0.52

## 2020-02-24 NOTE — PROGRESS NOTES
Telemetry Shift Summary     Rhythm SR/ST  HR Range   Ectopy rare PVCs  Measurements 0.16/0.08/0.32    Per [place], [place]           Normal Values  Rhythm SR  HR Range    Measurements 0.12-0.20 / 0.06-0.10  / 0.30-0.52

## 2020-02-24 NOTE — ANESTHESIA PREPROCEDURE EVALUATION
Relevant Problems   NEURO   (+) H/O Bell's palsy      CARDIAC   (+) Diabetic peripheral angiopathy (HCC)   (+) Essential hypertension      ENDO   (+) Type 2 diabetes mellitus with hyperglycemia, with long-term current use of insulin (HCC)   (+) Uncontrolled type 2 diabetes mellitus with hyperosmolarity without coma (HCC)       Physical Exam    Airway   Mallampati: II  TM distance: >3 FB  Neck ROM: full       Cardiovascular - normal exam  Rhythm: regular  Rate: normal  (-) murmur     Dental - normal exam         Pulmonary - normal exam  Breath sounds clear to auscultation     Abdominal    Neurological - normal exam                 Anesthesia Plan    ASA 3   ASA physical status 3 criteria: diabetes - poorly controlled    Plan - MAC and general       Airway plan will be ETT        Induction: intravenous    Postoperative Plan: Postoperative administration of opioids is intended.    Pertinent diagnostic labs and testing reviewed    Informed Consent:    Anesthetic plan and risks discussed with patient.    Use of blood products discussed with: patient whom consented to blood products.

## 2020-02-24 NOTE — PROGRESS NOTES
Report received from KELLEY Miles. Plan of care discussed. Patient resting comfortably in bed, declines any further needs at this time. Safety precautions in place.

## 2020-02-24 NOTE — PROGRESS NOTES
Pt refused AM labs blood draw, stated that he just wants to be left alone to sleep.  Pt not receptive to education on the importance of the labs.

## 2020-02-24 NOTE — PROGRESS NOTES
Patient seen and examined before proceeding with ERCP sphincterotomy and common bile duct stones extraction, possible temporary stent(s) placement.  Risks, benefits, and alternatives of aforementioned procedures were discussed with patient.  Consenting person was given opportunities to ask questions and discuss other options.  Risks including but not limited to pancreatitis, contrast reaction, radiation exposure, perforation, infection, bleeding, missed lesion(s), possible need for surgery(ies) and/or interventional radiology, possible need for repeat procedure(s) and/or additional testing, hospitalization possibly prolonged, cardiac and/or pulmonary event, aspiration, hypoxia, stroke, medication and/or anesthesia reaction, indefinite diagnosis, discomfort, unsuccessful and/or incomplete procedure, ineffective therapy and/or persistent symptoms, damage to adjacent organs and/or vascular structures, and other adverse events possibly life-threatening.  Interactive discussion was undertaken with Layman's terms. I answered questions in full and to satisfaction.  Consenting person stated understanding and acceptance of these risks, and wished to proceed.  Informed consent was given in clear state of mind.

## 2020-02-24 NOTE — ANESTHESIA PROCEDURE NOTES
Airway  Date/Time: 2/24/2020 7:24 AM  Performed by: Pawel Morataya M.D.  Authorized by: Pawel Morataya M.D.     Location:  OR  Urgency:  Elective  Indications for Airway Management:  Anesthesia  Spontaneous Ventilation: absent    Sedation Level:  Deep  Preoxygenated: Yes    Patient Position:  Sniffing  Final Airway Type:  Endotracheal airway  Final Endotracheal Airway:  ETT  Cuffed: Yes    Technique Used for Successful ETT Placement:  Direct laryngoscopy  Insertion Site:  Oral  Blade Type:  Gilberto  Laryngoscope Blade/Videolaryngoscope Blade Size:  3  ETT Size (mm):  8.0  Measured from:  Teeth  ETT to Teeth (cm):  24  Placement Verified by: auscultation and capnometry    Cormack-Lehane Classification:  Grade I - full view of glottis  Number of Attempts at Approach:  1

## 2020-02-24 NOTE — CARE PLAN
Problem: Safety  Goal: Will remain free from injury  Outcome: PROGRESSING AS EXPECTED  Note: Patient will remain free from falls/injury. Safety measures in place. Pt calls appropriately.     Problem: Bowel/Gastric:  Goal: Normal bowel function is maintained or improved  Outcome: PROGRESSING AS EXPECTED  Note: Denies abd pain. Will advance diet to regular food until night before procedure.

## 2020-02-24 NOTE — CARE PLAN
Problem: Safety  Goal: Will remain free from falls  Outcome: PROGRESSING AS EXPECTED  Intervention: Implement fall precautions  Flowsheets (Taken 2/23/2020 2000)  Environmental Precautions:   Treaded Slipper Socks on Patient   Personal Belongings, Wastebasket, Call Bell etc. in Easy Reach   Transferred to Stronger Side   Report Given to Other Health Care Providers Regarding Fall Risk   Bed in Low Position   Communication Sign for Patients & Families   Mobility Assessed & Appropriate Sign Placed     Problem: Knowledge Deficit  Goal: Knowledge of disease process/condition, treatment plan, diagnostic tests, and medications will improve  Outcome: PROGRESSING AS EXPECTED  Note: Discussed plan of care with pt, including preparation for upcoming procedure.

## 2020-02-24 NOTE — PROGRESS NOTES
Hospital Medicine Daily Progress Note    Date of Service  2/24/2020    Chief Complaint  59 y.o. male admitted 2/14/2020 with a poor appetite.     Hospital Course    He was found to have evidence for a marked new elevation in his alk phos and a profound leukocytosis. He was treated for abdominal sepsis and had an MRI and HIDA ordered. His MRi showed evidence for biliary obstruction and a possible pancreatic neoplasm. His CA 19-9 level was elevated and GI was consulted for ERCP       Interval Problem Update  The patient had ERCP today showing swelling and pus draining from the ampulla of Vater    Consultants/Specialty  GI Dr. Ansari    Code Status  DNR    Disposition  Hospice if cancer is detected per sister    Review of Systems  Review of Systems   Constitutional: Negative for chills and fever.   HENT: Negative for congestion and sinus pain.    Respiratory: Negative for shortness of breath and wheezing.    Cardiovascular: Negative for chest pain, palpitations and leg swelling.   Gastrointestinal: Negative for abdominal pain, constipation, heartburn, nausea and vomiting.        Abdominal Pain improved   Genitourinary: Negative for dysuria, frequency and urgency.   Musculoskeletal: Positive for back pain. Negative for myalgias and neck pain.        Chronic back pain   Skin: Negative for itching.   Neurological: Negative for dizziness, tingling and tremors.   Psychiatric/Behavioral: Negative for depression. The patient is not nervous/anxious and does not have insomnia.         Physical Exam  Temp:  [36.7 °C (98.1 °F)-37.3 °C (99.1 °F)] 37.2 °C (99 °F)  Pulse:  [] 111  Resp:  [16-30] 18  BP: (102-148)/(58-75) 136/61  SpO2:  [89 %-97 %] 94 %    Physical Exam  Vitals signs and nursing note reviewed.   Constitutional:       Appearance: He is well-developed. He is not ill-appearing or toxic-appearing.   HENT:      Nose: No rhinorrhea.      Mouth/Throat:      Mouth: Mucous membranes are dry.      Pharynx: Oropharynx  is clear. No oropharyngeal exudate or posterior oropharyngeal erythema.   Eyes:      General: No scleral icterus.     Extraocular Movements: Extraocular movements intact.      Conjunctiva/sclera: Conjunctivae normal.   Neck:      Musculoskeletal: Neck supple. No muscular tenderness.      Vascular: No JVD.      Trachea: No tracheal deviation.   Cardiovascular:      Rate and Rhythm: Normal rate and regular rhythm.      Heart sounds: Normal heart sounds. No murmur. No friction rub.   Pulmonary:      Effort: Pulmonary effort is normal. No respiratory distress.      Breath sounds: No stridor. No rhonchi or rales.   Abdominal:      General: Bowel sounds are normal. There is no distension.      Tenderness: There is no abdominal tenderness. There is no guarding or rebound.   Musculoskeletal:         General: No swelling or tenderness.   Skin:     General: Skin is dry.      Coloration: Skin is pale. Skin is not jaundiced.      Findings: No bruising.   Neurological:      Mental Status: He is alert and oriented to person, place, and time. Mental status is at baseline.      Motor: No weakness.      Coordination: Coordination normal.   Psychiatric:         Mood and Affect: Mood normal.         Behavior: Behavior normal.         Thought Content: Thought content normal.         Cognition and Memory: Cognition is impaired. Memory is impaired.         Fluids    Intake/Output Summary (Last 24 hours) at 2/24/2020 1249  Last data filed at 2/24/2020 0756  Gross per 24 hour   Intake 440 ml   Output --   Net 440 ml       Laboratory  Recent Labs     02/22/20  0416 02/23/20  1225   WBC 24.4* 27.6*   RBC 3.14* 3.37*   HEMOGLOBIN 9.0* 9.7*   HEMATOCRIT 27.6* 28.6*   MCV 87.9 84.9   MCH 28.7 28.8   MCHC 32.6* 33.9   RDW 50.2* 47.9   PLATELETCT 499* 653*   MPV 11.9 11.4     Recent Labs     02/22/20  0416 02/23/20  1225   SODIUM 137 136   POTASSIUM 3.5* 3.8   CHLORIDE 105 101   CO2 23 24   GLUCOSE 104* 163*   BUN 11 11   CREATININE 0.54 0.52    CALCIUM 7.9* 7.9*     Recent Labs     02/23/20  1225   INR 1.18*               Imaging  DX-PORTABLE FLUOROSCOPY < 1 HOUR   Preliminary Result      Portable fluoroscopy utilized for 1.1 minute.         INTERPRETING LOCATION: [address] [place] [address], [place], [id number]      MR-OKNU-CIOLNAI SPHINCTEROTOMY   Final Result      Intraoperative fluoroscopy spot images as described above.      CT-ABDOMEN WITH & W/O   Final Result      1.  No significant change since CT abdomen and pelvis obtained 2/16/2020      2.  Findings consistent with high-grade biliary obstruction with dilation of the common bile duct measuring 17 mm, dilated gallbladder, dilated pancreatic duct with change in caliber of the pancreatic duct at the level pancreatic head      3.  Findings could be either malignancy the pancreatic head versus scarring from chronic pancreatitis.      4.  Sequela of chronic pancreatitis      5.  Large right medial chest rim-enhancing loculated fluid collection probably within the pleural space measuring 10.5 x 3.1 centimeter transversely and 7 cm craniocaudal      6.  Rim-enhancing fluid collection in the hepatorenal space measuring 11.2 cm craniocaudal and 2-3 cm in thickness.      7.  The rim-enhancing fluid collection are suspicious for infection/abscess      8.  Bilateral atelectasis and small-moderate-sized pleural effusion after      9.  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  Biliary dilatation without evidence of high-grade biliary obstruction.   2.  Gallbladder visualization after morphine administration. No acute cholecystitis.      CT-ABDOMEN-PELVIS WITH   Final Result      1.  New rim enhancing fluid collection tracks along the right heart margin, azygoesophageal recess and possibly communicates with Morison's pouch tracking along the right aspect of the inferior vena cava. This is nonspecific and could  indicate abscess,    dissecting pseudocyst, mucinous cystic neoplasm related fluid      2.  New extra and intrahepatic biliary ductal dilatation with 6 cm dilatation of the gallbladder also seen. This indicates distal common bile duct/ampullary level obstruction. Stricture, mass effect from adjacent pancreas calcifications or small    ampullary mass are differential possibilities      3.  Decreased small pleural effusions. Some loculation on the right is possible      4.  Slightly decreased diffuse pancreatic ductal dilatation now measuring 11 mm. Evidence of chronic pancreatitis. New 3 cm cystic structure anterior to the pancreas could be a pseudocyst. Abscess or necrotic/cystic neoplasm are in the differential.      5.  Multiple chronic findings including status post aorto bifemoral bypass with approaching 70% stenosis of the left superficial femoral artery, nonobstructive right 3 mm nephrolithiasis, large volume rectal vault stool, dilated bladder which could    indicate outlet obstruction or neurogenic bladder, splenosis      BB-[id number]-W/O   Final Result      1.  Severe dilatation of the intrahepatic biliary ducts, common bile duct and pancreatic duct. The distal common bile duct and the proximal pancreatic duct is not visualized at the pancreatic head. The pancreatic head appears to be diffusely enlarged and    demonstrates multiple small cysts. These findings are concerning for pancreatic neoplasm such as intraductal papillary mucinous cystoadenoma. The other differential diagnosis includes periampullary carcinoma. MR examination of the abdomen with contrast    is recommended for further evaluation.   2.  Mixed signal intensity in the right perinephric space likely represents perinephric hematoma.   3.  Large hiatal hernia.   4.  Diffuse gastric wall thickening.      US-RUQ   Final Result      1.  Hepatomegaly.   2.  Hepatic steatosis.   3.  Gallbladder sludge. Mild intrahepatic ductal dilation. Dilation of  the common duct up to 13 mm. No definite distal obstructing etiology is identified. MRCP could be obtained for further evaluation if indicated.   4.  Trace ascites.   5.  Pancreatic calcifications likely sequela of chronic pancreatitis.      DX-CHEST-PORTABLE (1 VIEW)   Final Result      Cardiomegaly.           Assessment/Plan  * Pancreatic mass  Assessment & Plan  MRI with concern for pancreatic malignancy  ERCP with no evidence of malignancy, calcifications of pancreas present   is elevated but less than 1000 so cannot rule in cancer, likely due chronic pancreatitis  Repeat CT showed chronic pancreatitis and loculated chest effusion    Type 2 diabetes mellitus with hyperglycemia, with long-term current use of insulin (HCC)- (present on admission)  Assessment & Plan  He has an insulin sensor and his right lower quadrant, uses long-acting insulin 12 units and sliding scale.      Cognitive decline- (present on admission)  Assessment & Plan  Patient likely has a chronic component to his cognitive decline as well as acute worsening  Mental status now at his baseline, patient has a legal guardian      Metabolic acidosis  Assessment & Plan  IV fluids with improvement    Hypokalemia  Assessment & Plan  Replace as needed    Sepsis (HCC)  Assessment & Plan  This is Sepsis Present on admission  SIRS criteria identified on my evaluation include: Tachycardia, with heart rate greater than 90 BPM and Leukocyosis, with WBC greater than 12,000  Source is GI  Sepsis protocol initiated  Continue antibiotics  Trend white cell count          Loculated pleural effusion  Assessment & Plan  In medial right chest, continue antibiotics, pus drained from ampulla of Vater with GI today    Cholangitis  Assessment & Plan  Biliary obstruction suggested on imaging, GI consulted  Alkaline phosphatase is stable over 1100  Endoscopy done showing pancreatic calcifications but no stones, no area to biopsy  Continue antibiotics, white cell count  still elevated  Repeat CT scan done  ERCP repeated 2/24, today and swelling so that a duct stent could not be placed  Repeat planned for wednesday    Recurrent major depressive disorder, in full remission (HCC)  Assessment & Plan  .    Hyponatremia  Assessment & Plan  Resolved with fluids    Chronic pancreatitis (HCC)- (present on admission)  Assessment & Plan  Continue pancreatic enzymes with meals  Celiac nerve block for pain control, per GI    Dyslipidemia- (present on admission)  Assessment & Plan  Continue statin      legal guardian Perez [phone]  VTE prophylaxis: scd

## 2020-02-24 NOTE — PROCEDURES
Pre-procedure Diagnoses   Chronic biliary pancreatitis (HCC) [K86.1]   Abnormal liver enzymes [R74.8]   Abnormal contrast radiography of biliary tree [R93.2]   Post-procedure Diagnoses   Calculus of bile duct with chronic cholangitis with biliary obstruction [K80.35]   Biliary stricture [K83.1]   Procedures   ENDOSCOPIC RETROGRADE CHOLANGIOPANCREATOGRAPHY (ERCP) BILIARY SPHINCTEROTOMY [82973 (CPT®)]   CHOLANGIOGRAM X-RAYS BILE/PANCREAS ENDOSCOPY [39028 (CPT®)]     Endoscopist: Hemant Lorenzo MD, [facility], [facility] [place]    General anesthesia: Dr. Pawel Morataya    Premedications: Ancef IV    Consent: Risks, benefits, and alternatives of aforementioned procedures were discussed with patient in detail before proceeding.  Consenting person was given opportunities to ask questions and discuss other options.  Risks including but not limited to pancreatitis, contrast reaction, radiation exposure, perforation, infection, bleeding, missed lesion(s), possible need for surgery(ies) and/or interventional radiology, possible need for repeat procedure(s) and/or additional testing, hospitalization possibly prolonged, cardiac and/or pulmonary event, aspiration, hypoxia, stroke, medication and/or anesthesia reaction, indefinite diagnosis, discomfort, unsuccessful and/or incomplete procedure, ineffective therapy and/or persistent symptoms, damage to adjacent organs and/or vascular structures, and other adverse events possibly life-threatening.  Interactive discussion was undertaken with Layman's terms.  I answered questions in full and to satisfaction.  Consenting person stated understanding and acceptance of these risks, and wished to proceed.  Informed consent was given in clear state of mind.    Endoscopic procedures in detail: ERCP scope was inserted from mouth to 2nd portion of the duodenum.  Pancreatic duct was initially cannulated preferentially, pancreatogram was completed, thus needle-knife access biliary sphincterotomy was performed with  "relieve of pus and drainage.  Biliary duct was injected for cholangiogram but the duct was could not be selectively cannulated with wire due to submucosal tract from the needle-knife sphincterotomy.  There was too much edema for further attempted to selectively cannulate the bile duct thus ERCP was aborted.  The C-arm was moved and rotated on rainbow axis to optimize imaging of intrahepatic biliary systems in different angles to avoid missing lesions/strictures with ductal overlap and/or image angulation. The ERCP scope was removed from duodenum to also image the common bile duct \"behind\" the ERCP scope.  Of note, no radiologist was present to help obtain nor read ERCP images.  I was the sole physician who obtained and performed interpretation of static and dynamic ERCP fluoroscopic x-ray images, no over-read was requested from the radiologist nor was it necessary.  I suctioned insufflated air and stomach fluid contents upon removal.    Procedure times:  - In-room 07:08  - Start 07:18  - Completed 07:45  - Out of room per nursing records    Endoscopic Retrograde CholangioPancreatography Findings:  - Ampulla of Vater: located in 2nd portion of duodenum, edematous papilla with pus drainage from os.  - Cholangiogram: distal biliary duct stricture.  - Pancreatogram: dilated with evidence of chronic pancreatitis with calcifications, but not stricture.  - Therapy: biliary needle-knife sphincterotomy with relief of purulent drainage.    Impression: Chronic cholangitis from chronic pancreatitis induced distal biliary stricture    Recommendations:   1.  Routine post-endoscopy anesthesia recovery care. Transfer patient back to prior hospital room when he is awake, alert and comfortable, when recovery criteria are met.  Aspiration and fall precautions x 24 hours.   2.  Start on Ancef and flagyl IV antibiotics.  3.  Clear liquid diet, NPO tomorrow for repeat ERCP Wednesday by Dr. Wakefield.  4. I spoke to patient and recovery nurse " about impression, diagnosis and recommendations.  I answered questions in full and to satisfaction

## 2020-02-24 NOTE — PROGRESS NOTES
Patient just arrived back to unit. Report received from Kasandra RICE. Patient lethargic but oriented.

## 2020-02-24 NOTE — ANESTHESIA POSTPROCEDURE EVALUATION
Patient: Pawel Tatum    Procedure Summary     Date:  02/24/20 Room / Location:   ENDOSCOPIC ULTRASOUND ROOM / SURGERY [facility]    Anesthesia Start:  0657 Anesthesia Stop:  0757    Procedure:  ERCP (ENDOSCOPIC RETROGRADE CHOLANGIOPANCREATOGRAPHY) Diagnosis:  (abnormal labs)    Surgeon:  Hemant Lorenzo M.D. Responsible Provider:  Pawel Morataya M.D.    Anesthesia Type:  MAC, general ASA Status:  3          Final Anesthesia Type: MAC, general  Last vitals  BP   Blood Pressure: 137/67    Temp   36.8 °C (98.2 °F)    Pulse   Pulse: 85   Resp   16    SpO2   90 %      Anesthesia Post Evaluation    Patient location during evaluation: PACU  Patient participation: complete - patient participated  Level of consciousness: awake and alert    Airway patency: patent  Anesthetic complications: no  Cardiovascular status: hemodynamically stable  Respiratory status: acceptable  Hydration status: euvolemic    PONV: none           Nurse Pain Score: 0 (NPRS)

## 2020-02-24 NOTE — ANESTHESIA TIME REPORT
Anesthesia Start and Stop Event Times     Date Time Event    2/24/2020 0655 Ready for Procedure     0657 Anesthesia Start     0757 Anesthesia Stop        Responsible Staff  02/24/20    Name Role Begin End    Pawel Morataya M.D. Anesth 0657 0757        Preop Diagnosis (Free Text):  Pre-op Diagnosis     abnormal labs        Preop Diagnosis (Codes):    Post op Diagnosis  Pancreatitis      Premium Reason  A. 3PM - 7AM    Comments:

## 2020-02-24 NOTE — DISCHARGE PLANNING
Received Choice form at 1100  Agency/Facility Name: [facility]  Referral sent [phone]       RNCM informed

## 2020-02-25 ENCOUNTER — ANESTHESIA EVENT (OUTPATIENT)
Dept: SURGERY | Facility: MEDICAL CENTER | Age: 60
DRG: 871 | End: 2020-02-25
Payer: COMMERCIAL

## 2020-02-25 LAB
ALBUMIN SERPL BCP-MCNC: 1.6 G/DL (ref 3.2–4.9)
ALBUMIN/GLOB SERPL: 0.4 G/DL
ALP SERPL-CCNC: 1813 U/L (ref 30–99)
ALT SERPL-CCNC: 19 U/L (ref 2–50)
ANION GAP SERPL CALC-SCNC: 9 MMOL/L (ref 7–16)
AST SERPL-CCNC: 78 U/L (ref 12–45)
BASOPHILS # BLD AUTO: 0.3 % (ref 0–1.8)
BASOPHILS # BLD: 0.06 K/UL (ref 0–0.12)
BILIRUB SERPL-MCNC: 0.9 MG/DL (ref 0.1–1.5)
BUN SERPL-MCNC: 13 MG/DL (ref 8–22)
CALCIUM SERPL-MCNC: 8.1 MG/DL (ref 8.4–10.2)
CHLORIDE SERPL-SCNC: 104 MMOL/L (ref 96–112)
CO2 SERPL-SCNC: 23 MMOL/L (ref 20–33)
CREAT SERPL-MCNC: 0.51 MG/DL (ref 0.5–1.4)
EOSINOPHIL # BLD AUTO: 0.07 K/UL (ref 0–0.51)
EOSINOPHIL NFR BLD: 0.4 % (ref 0–6.9)
ERYTHROCYTE [DISTWIDTH] IN BLOOD BY AUTOMATED COUNT: 51.9 FL (ref 35.9–50)
GLOBULIN SER CALC-MCNC: 3.6 G/DL (ref 1.9–3.5)
GLUCOSE BLD-MCNC: 115 MG/DL (ref 65–99)
GLUCOSE BLD-MCNC: 170 MG/DL (ref 65–99)
GLUCOSE BLD-MCNC: 212 MG/DL (ref 65–99)
GLUCOSE SERPL-MCNC: 227 MG/DL (ref 65–99)
HCT VFR BLD AUTO: 26.4 % (ref 42–52)
HGB BLD-MCNC: 8.8 G/DL (ref 14–18)
IMM GRANULOCYTES # BLD AUTO: 0.3 K/UL (ref 0–0.11)
IMM GRANULOCYTES NFR BLD AUTO: 1.7 % (ref 0–0.9)
LYMPHOCYTES # BLD AUTO: 1.9 K/UL (ref 1–4.8)
LYMPHOCYTES NFR BLD: 10.5 % (ref 22–41)
MCH RBC QN AUTO: 28.6 PG (ref 27–33)
MCHC RBC AUTO-ENTMCNC: 33.3 G/DL (ref 33.7–35.3)
MCV RBC AUTO: 85.7 FL (ref 81.4–97.8)
MONOCYTES # BLD AUTO: 1.6 K/UL (ref 0–0.85)
MONOCYTES NFR BLD AUTO: 8.9 % (ref 0–13.4)
NEUTROPHILS # BLD AUTO: 14.13 K/UL (ref 1.82–7.42)
NEUTROPHILS NFR BLD: 78.2 % (ref 44–72)
NRBC # BLD AUTO: 0.05 K/UL
NRBC BLD-RTO: 0.3 /100 WBC
PLATELET # BLD AUTO: 616 K/UL (ref 164–446)
PMV BLD AUTO: 11.2 FL (ref 9–12.9)
POTASSIUM SERPL-SCNC: 4 MMOL/L (ref 3.6–5.5)
PROT SERPL-MCNC: 5.2 G/DL (ref 6–8.2)
RBC # BLD AUTO: 3.08 M/UL (ref 4.7–6.1)
SODIUM SERPL-SCNC: 136 MMOL/L (ref 135–145)
WBC # BLD AUTO: 18.1 K/UL (ref 4.8–10.8)

## 2020-02-25 PROCEDURE — A9270 NON-COVERED ITEM OR SERVICE: HCPCS | Performed by: INTERNAL MEDICINE

## 2020-02-25 PROCEDURE — 700101 HCHG RX REV CODE 250: Performed by: INTERNAL MEDICINE

## 2020-02-25 PROCEDURE — 700111 HCHG RX REV CODE 636 W/ 250 OVERRIDE (IP): Performed by: HOSPITALIST

## 2020-02-25 PROCEDURE — 770020 HCHG ROOM/CARE - TELE (206)

## 2020-02-25 PROCEDURE — 700102 HCHG RX REV CODE 250 W/ 637 OVERRIDE(OP): Performed by: HOSPITALIST

## 2020-02-25 PROCEDURE — 85025 COMPLETE CBC W/AUTO DIFF WBC: CPT

## 2020-02-25 PROCEDURE — 80053 COMPREHEN METABOLIC PANEL: CPT

## 2020-02-25 PROCEDURE — 82962 GLUCOSE BLOOD TEST: CPT

## 2020-02-25 PROCEDURE — 700102 HCHG RX REV CODE 250 W/ 637 OVERRIDE(OP): Performed by: INTERNAL MEDICINE

## 2020-02-25 PROCEDURE — 99232 SBSQ HOSP IP/OBS MODERATE 35: CPT | Performed by: INTERNAL MEDICINE

## 2020-02-25 PROCEDURE — 700111 HCHG RX REV CODE 636 W/ 250 OVERRIDE (IP): Performed by: INTERNAL MEDICINE

## 2020-02-25 PROCEDURE — 700105 HCHG RX REV CODE 258: Performed by: INTERNAL MEDICINE

## 2020-02-25 RX ORDER — METRONIDAZOLE 500 MG/1
500 TABLET ORAL EVERY 6 HOURS
Status: COMPLETED | OUTPATIENT
Start: 2020-02-25 | End: 2020-03-04

## 2020-02-25 RX ADMIN — METRONIDAZOLE 500 MG: 500 INJECTION, SOLUTION INTRAVENOUS at 06:00

## 2020-02-25 RX ADMIN — OMEPRAZOLE 20 MG: 20 CAPSULE, DELAYED RELEASE ORAL at 06:00

## 2020-02-25 RX ADMIN — METRONIDAZOLE 500 MG: 500 TABLET ORAL at 17:49

## 2020-02-25 RX ADMIN — HYDROMORPHONE HYDROCHLORIDE 0.5 MG: 1 INJECTION, SOLUTION INTRAMUSCULAR; INTRAVENOUS; SUBCUTANEOUS at 17:49

## 2020-02-25 RX ADMIN — TAMSULOSIN HYDROCHLORIDE 0.4 MG: 0.4 CAPSULE ORAL at 06:00

## 2020-02-25 RX ADMIN — ACETAMINOPHEN 650 MG: 325 TABLET, FILM COATED ORAL at 17:49

## 2020-02-25 RX ADMIN — INSULIN HUMAN 5 UNITS: 100 INJECTION, SUSPENSION SUBCUTANEOUS at 08:31

## 2020-02-25 RX ADMIN — CEFTRIAXONE SODIUM 1 G: 1 INJECTION, POWDER, FOR SOLUTION INTRAMUSCULAR; INTRAVENOUS at 17:50

## 2020-02-25 RX ADMIN — METRONIDAZOLE 500 MG: 500 TABLET ORAL at 11:51

## 2020-02-25 RX ADMIN — ATORVASTATIN CALCIUM 40 MG: 40 TABLET, FILM COATED ORAL at 17:49

## 2020-02-25 RX ADMIN — INSULIN HUMAN 3 UNITS: 100 INJECTION, SOLUTION PARENTERAL at 11:45

## 2020-02-25 RX ADMIN — PANCRELIPASE 48000 UNITS: 24000; 76000; 120000 CAPSULE, DELAYED RELEASE PELLETS ORAL at 06:00

## 2020-02-25 RX ADMIN — OMEPRAZOLE 20 MG: 20 CAPSULE, DELAYED RELEASE ORAL at 17:49

## 2020-02-25 RX ADMIN — Medication 100 MG: at 06:00

## 2020-02-25 RX ADMIN — INSULIN HUMAN 4 UNITS: 100 INJECTION, SOLUTION PARENTERAL at 08:40

## 2020-02-25 ASSESSMENT — ENCOUNTER SYMPTOMS
INSOMNIA: 0
SHORTNESS OF BREATH: 0
DIARRHEA: 0
ABDOMINAL PAIN: 0
HEARTBURN: 0
SENSORY CHANGE: 0
COUGH: 0
DIZZINESS: 0
BLURRED VISION: 0
CHILLS: 0
MYALGIAS: 0
HEADACHES: 0
PHOTOPHOBIA: 0
BLOOD IN STOOL: 0
NAUSEA: 0
DEPRESSION: 0
WEIGHT LOSS: 1
CLAUDICATION: 0
FEVER: 0
NERVOUS/ANXIOUS: 0
SPEECH CHANGE: 0
WEAKNESS: 0
VOMITING: 0
CONSTIPATION: 0

## 2020-02-25 NOTE — PROGRESS NOTES
Telemetry Shift Summary     Rhythm SR/ST  HR Range 83 - 103  Ectopy O PVCs  Measurements 0.18 / 0.08 / 0.32           Normal Values  Rhythm SR  HR Range    Measurements 0.12-0.20 / 0.06-0.10  / 0.30-0.52

## 2020-02-25 NOTE — PROGRESS NOTES
"  Gastroenterology Progress Note     Author: Hector Villarreal MD Date & Time Created: 2/25/2020 7:49 AM    Chief Complaint:  Elevated liver tests, abnormal MRI    Interval History:  Initial history (Dr. Villarreal):  59 y.o. male with IDDM, chronic pancreatitis seizure disorder, iron deficiency anemia who presented 2/14/2020 with lethargy and poor appetite per assisted living facility.  He is difficult historian as does not want to answer questions.  He states \"I feel fine and always have.\"  He was evaluated 12/2019 for iron deficiency anemia and DKA.  Had EGD with severe erosive esophagitis.  He refused to drink prep for colonoscopy.  He apparently was brought by assisted living facility after they noted some lethargy and poor appetite.  He has weight loss of 7 pounds over the past month or so.  He denies abdominal pain and states he never had abdominal pain and his appetite is great.  Denies fevers, chills, nausea, vomiting, diarrhea, constipation, melena, hematochezia.  In ED, he had significant leukocytosis with newly elevated alkaline phosphatase and bilirubin with fairly normal transaminases.  He was started on IVF and antibiotics.  He had RUQ US followed by MRCP showing dilated bile and pancreatic ducts, distended GB, chronic pancreatitis and possible abnormal lesions head of pancreas.    2/16/2020: Continues to deny any symptoms including abdominal pain, nausea, vomiting, fevers.  Was refusing IV placement and vitals overnight.  Says he is agreeable to IV placement today  02/17/2020 - No complaints.  Says he feels fine, but does report urine is darker.  I explained imaging findings at length.  02/18/2020 - EUS showed extensive, severe pancreatic calcifications making identification of any pancreatic mass or fluid collection impossible.  Mediastinum was not assessed.  ERCP not done due to normalizing liver enzymes and added risk to patient.  02/19/2020 - No events overnight.  Patient denies " "fevers/chills/sweats and states he \"feels fine\".  Labs show worsening leukocytosis with improving liver enzymes.  02/20/2020 - Tolerating regular diet well.  Denies any abdominal pain.  He does report chronic mid-back pain, but this is not severe or worsening.  No nausea, vomiting, fevers, chills or sweats.  Leukocytosis slightly better.  Alkaline phosphatase still elevated, but remainder of liver enzymes normal.  02/21/2020 - No abdominal pain.  Tolerating diet.  Mild decrease appetite.  No CBC today.  Eager to be discharged.  02/22/2020 - Continues to \"feel fine\".  No abdominal pain, nausea or vomiting.  Tolerating diet.  Leukocytosis improved, but alkaline phosphatase abruptly higher.  CT not done due to IV issues and patient's difficult access.  2/23/20 - No new complaints. He ha lovenox s/c this AM at 6.30. ERCP scheduled for 9 AM. Labs pending   2/25/2020: Denies any complaints.  No pain.  Had ERCP yesterday showing pus from bile duct and distal bile duct stricture likely due to chronic pancreatitis.  Unable to stent  Due to loss of access and amount of ampullary inflammation.  WBC better.  Alk phos higher.  No fevers.    Review of Systems:  Review of Systems   Constitutional: Positive for weight loss. Negative for chills, fever and malaise/fatigue.   Respiratory: Negative for shortness of breath.    Cardiovascular: Negative for chest pain.   Gastrointestinal: Negative for abdominal pain, blood in stool, constipation, diarrhea, heartburn, melena, nausea and vomiting.       Physical Exam:  Physical Exam  Vitals signs and nursing note reviewed.   Constitutional:       Appearance: Normal appearance.   Eyes:      General: No scleral icterus.  Cardiovascular:      Rate and Rhythm: Normal rate and regular rhythm.      Pulses: Normal pulses.      Heart sounds: Normal heart sounds.   Pulmonary:      Effort: Pulmonary effort is normal. No respiratory distress.      Breath sounds: Normal breath sounds. No stridor. No " wheezing, rhonchi or rales.   Abdominal:      General: Abdomen is flat. Bowel sounds are normal. There is no distension.      Palpations: Abdomen is soft. There is no mass.      Tenderness: There is no abdominal tenderness. There is no guarding or rebound.      Hernia: No hernia is present.   Skin:     General: Skin is warm and dry.      Coloration: Skin is not jaundiced.   Neurological:      General: No focal deficit present.      Mental Status: He is alert and oriented to person, place, and time.         Labs:          Recent Labs     20  0423   SODIUM 136 136   POTASSIUM 3.8 4.0   CHLORIDE 101 104   CO2 24 23   BUN 11 13   CREATININE 0.52 0.51   CALCIUM 7.9* 8.1*     Recent Labs     20  0423   ALTSGPT 17 19   ASTSGOT 64* 78*   ALKPHOSPHAT 1147* 1813*   TBILIRUBIN 0.8 0.9   GLUCOSE 163* 227*     Recent Labs     20  0423   RBC 3.37* 3.08*   HEMOGLOBIN 9.7* 8.8*   HEMATOCRIT 28.6* 26.4*   PLATELETCT 653* 616*   PROTHROMBTM 15.2*  --    INR 1.18*  --      Recent Labs     205 20  0423   WBC 27.6* 18.1*   NEUTSPOLYS  --  78.20*   LYMPHOCYTES  --  10.50*   MONOCYTES  --  8.90   EOSINOPHILS  --  0.40   BASOPHILS  --  0.30   ASTSGOT 64* 78*   ALTSGPT 17 19   ALKPHOSPHAT 1147* 1813*   TBILIRUBIN 0.8 0.9     Hemodynamics:  Temp (24hrs), Av.9 °C (98.5 °F), Min:36.3 °C (97.4 °F), Max:37.3 °C (99.1 °F)  Temperature: 36.7 °C (98 °F)  Pulse  Av.4  Min: 50  Max: 122   Blood Pressure: (refused vitals(RN notified))     Respiratory:    Respiration: 18, Pulse Oximetry: 95 %     Work Of Breathing / Effort: Increased Work of Breathing  RUL Breath Sounds: Clear After Suction, RML Breath Sounds: Diminished, RLL Breath Sounds: Diminished, LEONARD Breath Sounds: Clear After Suction, LLL Breath Sounds: Diminished  Fluids:    Intake/Output Summary (Last 24 hours) at 2020 0706  Last data filed at 2/15/2020 2200  Gross per 24 hour   Intake 2485 ml    Output 600 ml   Net 1885 ml        GI/Nutrition:  Orders Placed This Encounter   Procedures   • Diet Order Clear Liquids - No Red Foods, Regular     Standing Status:   Standing     Number of Occurrences:   1     Order Specific Question:   Diet:     Answer:   Clear Liquids - No Red Foods [12]     Order Specific Question:   Diet:     Answer:   Regular [1]     Medical Decision Making, by Problem:  Active Hospital Problems    Diagnosis   • *Sepsis (HCC) [A41.9]   • Elevated alkaline phosphatase level [R74.8]   • Type 2 diabetes mellitus with hyperglycemia, with long-term current use of insulin (HCC) [E11.65, Z79.4]   • Cognitive decline [R41.89]   • Metabolic acidosis [E87.2]   • Hypokalemia [E87.6]   • Hyponatremia [E87.1]   • Chronic pancreatitis (HCC) [K86.1]   • Dyslipidemia [E78.5]   • Cholangitis [K83.09]     Impression  / Plan  58 y/o with chronic pancreatitis, IDDM, iron deficiency anemia, GERD with esophagitis that presented for lethargy and found to have significant leukocytosis and newly elevated liver tests.  Concern for sepsis from biliary source.  Imaging suggests dilation of biliary and pancreatic ducts but no obvious stones although could have ampullary lesion of main duct IPMN causing obstruction.      1. Elevated liver tests, alk phos remains high and rising today, but other liver enzymes normalized - Suspect downstream stricture due to chronic calcific pancreatitis with obstruction due to pus seen on ERCP 2/24.  Unable to place biliary stent for more definitive drainage and plan for reattempt 2/26  2. Sepsis, unclear source - Mediastinum was not assessed at EUS due to focus being on pancreas and biliary tree  3. Leukocytosis - Slightly better post ERCP with removal of some pus from bile duct  4. Abnormal imaging of abdomen with dilated bile ducts and pancreatic duct and questionable mass head of pancreas - Only severe chronic calcific pancreatitis identified on EUS  5. Chronic pancreatitis  6.  IDDM  7. Iron deficiency anemia  8. GERD with esophagitis  9. Hypertension  10. Seizure disorder  11. Aggressive behavior to healthcare workers  12. Back pain  13. Pancreatic fluid collection tracking adjacent to distal esophagus on imaging       PLAN:   1. Continue antibiotics  2. Plan for repeat ERCP with biliary stenting 2/26 by Dr. Wakefield.  Keep NPO at midnight       Quality-Core Measures   Reviewed items::  Radiology images reviewed, Labs reviewed and Medications reviewed

## 2020-02-25 NOTE — CARE PLAN
Problem: Communication  Goal: The ability to communicate needs accurately and effectively will improve  Outcome: PROGRESSING AS EXPECTED  Note: Patient updated on the plan of care. Encouraged to voice feelings and to ask questions. Answered all questions and concerns. Agrees with the plan of care.      Problem: Safety  Goal: Will remain free from injury  Outcome: PROGRESSING AS EXPECTED  Note: Safety precautions in place. Bed alarm ON. Will continue to monitor patient.   Goal: Will remain free from falls  Outcome: PROGRESSING AS EXPECTED  Note: Safety precautions in place. Bed alarm ON. Will continue to monitor patient.      Problem: Infection  Goal: Will remain free from infection  Outcome: PROGRESSING AS EXPECTED  Note: Discussed the importance of infection prevention and hand hygiene.      Problem: Pain Management  Goal: Pain level will decrease to patient's comfort goal  Outcome: PROGRESSING AS EXPECTED     Problem: Respiratory:  Goal: Respiratory status will improve  Outcome: PROGRESSING AS EXPECTED

## 2020-02-25 NOTE — PROGRESS NOTES
Hospital Medicine Daily Progress Note    Date of Service  2/25/2020    Chief Complaint  59 y.o. male admitted 2/14/2020 with poor appetite.    Hospital Course    Patient with cognitive deficit presented from group home with lethargy and poor appetite.  He had elevation in alk phos and leukocytosis and treated for concern of sepsis with abdominal source.  HIDA was performed which showed no evidence of obstruction nor cholecystitis.    MRCP showed dilation of biliary ducts and concern of pancreatic head mass.   GI  EUS on 2/18 and showed significant calcifications likely related to chronic pancreatitis.  He underwent ERCP on 2/24 and purulent drainage from the ampulla of vater os and he had biliary needle-knife sphincterotomy to relieve the purulent drainage.          Interval Problem Update  Patient states he is hungry and wants solid food, still on a liquid diet.  Plan to move forward with repeat ERCP with Dr Wakefield tomorrow - on schedule at 1500 with anticipated stenting of the ampulla.  Patient denies any current abdominal pain.    Consultants/Specialty  GI - isabel    Code Status  DNR/DNI, has guardian    Disposition  Group home vs SNF    Review of Systems  Review of Systems   Constitutional: Negative for chills and fever.   HENT: Negative for congestion.    Eyes: Negative for blurred vision and photophobia.   Respiratory: Negative for cough and shortness of breath.    Cardiovascular: Negative for chest pain, claudication and leg swelling.   Gastrointestinal: Negative for abdominal pain, constipation, diarrhea, heartburn, nausea and vomiting.   Genitourinary: Negative for dysuria and hematuria.   Musculoskeletal: Negative for joint pain and myalgias.   Skin: Negative for itching and rash.   Neurological: Negative for dizziness, sensory change, speech change, weakness and headaches.   Psychiatric/Behavioral: Negative for depression. The patient is not nervous/anxious and does not have insomnia.         Physical  Exam  Temp:  [36.3 °C (97.4 °F)-36.9 °C (98.4 °F)] 36.8 °C (98.2 °F)  Pulse:  [] 89  Resp:  [15-18] 18  BP: (111-142)/(57-73) 128/68  SpO2:  [90 %-95 %] 90 %    Physical Exam  Vitals signs and nursing note reviewed.   Constitutional:       General: He is not in acute distress.     Appearance: Normal appearance.   HENT:      Head: Normocephalic and atraumatic.   Eyes:      General: No scleral icterus.     Extraocular Movements: Extraocular movements intact.   Neck:      Musculoskeletal: Normal range of motion and neck supple.   Cardiovascular:      Rate and Rhythm: Normal rate and regular rhythm.      Pulses: Normal pulses.      Heart sounds: Normal heart sounds. No murmur.   Pulmonary:      Effort: Pulmonary effort is normal. No respiratory distress.      Breath sounds: Normal breath sounds. No wheezing, rhonchi or rales.   Abdominal:      General: Abdomen is flat. Bowel sounds are normal. There is no distension.      Palpations: Abdomen is soft.      Tenderness: There is no rebound.   Musculoskeletal:         General: No swelling or tenderness.   Lymphadenopathy:      Cervical: No cervical adenopathy.   Skin:     Coloration: Skin is not jaundiced.      Findings: No erythema.   Neurological:      General: No focal deficit present.      Mental Status: He is alert and oriented to person, place, and time. Mental status is at baseline.      Cranial Nerves: No cranial nerve deficit.   Psychiatric:         Mood and Affect: Mood normal.         Behavior: Behavior normal.         Fluids    Intake/Output Summary (Last 24 hours) at 2/25/2020 1228  Last data filed at 2/25/2020 0800  Gross per 24 hour   Intake 340 ml   Output 550 ml   Net -210 ml       Laboratory  Recent Labs     02/23/20  1225 02/25/20  0423   WBC 27.6* 18.1*   RBC 3.37* 3.08*   HEMOGLOBIN 9.7* 8.8*   HEMATOCRIT 28.6* 26.4*   MCV 84.9 85.7   MCH 28.8 28.6   MCHC 33.9 33.3*   RDW 47.9 51.9*   PLATELETCT 653* 616*   MPV 11.4 11.2     Recent Labs      02/23/20  1225 02/25/20  0423   SODIUM 136 136   POTASSIUM 3.8 4.0   CHLORIDE 101 104   CO2 24 23   GLUCOSE 163* 227*   BUN 11 13   CREATININE 0.52 0.51   CALCIUM 7.9* 8.1*     Recent Labs     02/23/20  1225   INR 1.18*               Imaging  DX-PORTABLE FLUOROSCOPY < 1 HOUR   Final Result      Portable fluoroscopy utilized for 1.1 minute.         INTERPRETING LOCATION: [address] [place] [address], [facility], [id number]      [id number] SPHINCTEROTOMY   Final Result      Intraoperative fluoroscopy spot images as described above.      CT-ABDOMEN WITH & W/O   Final Result      1.  No significant change since CT abdomen and pelvis obtained 2/16/2020      2.  Findings consistent with high-grade biliary obstruction with dilation of the common bile duct measuring 17 mm, dilated gallbladder, dilated pancreatic duct with change in caliber of the pancreatic duct at the level pancreatic head      3.  Findings could be either malignancy the pancreatic head versus scarring from chronic pancreatitis.      4.  Sequela of chronic pancreatitis      5.  Large right medial chest rim-enhancing loculated fluid collection probably within the pleural space measuring 10.5 x 3.1 centimeter transversely and 7 cm craniocaudal      6.  Rim-enhancing fluid collection in the hepatorenal space measuring 11.2 cm craniocaudal and 2-3 cm in thickness.      7.  The rim-enhancing fluid collection are suspicious for infection/abscess      8.  Bilateral atelectasis and small-moderate-sized pleural effusion after      9.  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  Biliary dilatation without evidence of high-grade biliary obstruction.   2.  Gallbladder visualization after morphine administration. No acute cholecystitis.      CT-ABDOMEN-PELVIS WITH   Final Result      1.  New rim enhancing fluid collection tracks along the right heart margin,  azygoesophageal recess and possibly communicates with Morison's pouch tracking along the right aspect of the inferior vena cava. This is nonspecific and could indicate abscess,    dissecting pseudocyst, mucinous cystic neoplasm related fluid      2.  New extra and intrahepatic biliary ductal dilatation with 6 cm dilatation of the gallbladder also seen. This indicates distal common bile duct/ampullary level obstruction. Stricture, mass effect from adjacent pancreas calcifications or small    ampullary mass are differential possibilities      3.  Decreased small pleural effusions. Some loculation on the right is possible      4.  Slightly decreased diffuse pancreatic ductal dilatation now measuring 11 mm. Evidence of chronic pancreatitis. New 3 cm cystic structure anterior to the pancreas could be a pseudocyst. Abscess or necrotic/cystic neoplasm are in the differential.      5.  Multiple chronic findings including status post aorto bifemoral bypass with approaching 70% stenosis of the left superficial femoral artery, nonobstructive right 3 mm nephrolithiasis, large volume rectal vault stool, dilated bladder which could    indicate outlet obstruction or neurogenic bladder, splenosis      [id number]-E/O   Final Result      1.  Severe dilatation of the intrahepatic biliary ducts, common bile duct and pancreatic duct. The distal common bile duct and the proximal pancreatic duct is not visualized at the pancreatic head. The pancreatic head appears to be diffusely enlarged and    demonstrates multiple small cysts. These findings are concerning for pancreatic neoplasm such as intraductal papillary mucinous cystoadenoma. The other differential diagnosis includes periampullary carcinoma. MR examination of the abdomen with contrast    is recommended for further evaluation.   2.  Mixed signal intensity in the right perinephric space likely represents perinephric hematoma.   3.  Large hiatal hernia.   4.  Diffuse gastric wall  thickening.      US-RUQ   Final Result      1.  Hepatomegaly.   2.  Hepatic steatosis.   3.  Gallbladder sludge. Mild intrahepatic ductal dilation. Dilation of the common duct up to 13 mm. No definite distal obstructing etiology is identified. MRCP could be obtained for further evaluation if indicated.   4.  Trace ascites.   5.  Pancreatic calcifications likely sequela of chronic pancreatitis.      DX-CHEST-PORTABLE (1 VIEW)   Final Result      Cardiomegaly.           Assessment/Plan  * Pancreatic mass  Assessment & Plan  MRI with concern for pancreatic malignancy  ERCP with no evidence of malignancy, calcifications of pancreas present   is elevated but less than 1000 so cannot rule in cancer, likely due chronic pancreatitis  Repeat CT showed chronic pancreatitis and loculated chest effusion    Type 2 diabetes mellitus with hyperglycemia, with long-term current use of insulin (HCC)- (present on admission)  Assessment & Plan  He has an insulin sensor in his right lower quadrant, uses long-acting insulin 12 units and sliding scale.      Cognitive decline- (present on admission)  Assessment & Plan  Patient likely has a chronic component to his cognitive decline as well as acute worsening  Mental status now at his baseline, patient has a legal guardian      Metabolic acidosis  Assessment & Plan  IV fluids with improvement    Hypokalemia  Assessment & Plan  Replace as needed    Sepsis (HCC)  Assessment & Plan  This is Sepsis Present on admission  SIRS criteria identified on my evaluation include: Tachycardia, with heart rate greater than 90 BPM and Leukocyosis, with WBC greater than 12,000  Source is GI  Sepsis protocol complete  Continue antibiotics  White cell count trending down  Repeat ERCP tomorrow          Loculated pleural effusion  Assessment & Plan  In medial right chest, continue antibiotics, pus drained from ampulla of Vater with GI 2/24    Cholangitis  Assessment & Plan  Biliary obstruction suggested on  imaging, GI consulted  Alkaline phosphatase is stable over 1100  Endoscopy done showing pancreatic calcifications but no stones, no area to biopsy  Continue antibiotics, white cell count still elevated  Repeat CT scan done  ERCP repeated 2/24, given swelling so that a ampullary duct stent could not be placed  Repeat planned for Wednesday (2/26)    Recurrent major depressive disorder, in full remission (HCC)  Assessment & Plan  .    Hyponatremia  Assessment & Plan  Resolved with fluids    Chronic pancreatitis (HCC)- (present on admission)  Assessment & Plan  Continue pancreatic enzymes with meals  Celiac nerve block for pain control, per GI    Dyslipidemia- (present on admission)  Assessment & Plan  Continue statin         VTE prophylaxis: scds

## 2020-02-25 NOTE — PROGRESS NOTES
Telemetry Shift Summary     Rhythm SR/ST  HR Range   Ectopy rare PVCs  Measurements 0.16/0.08/0.36    Per [place], [place]           Normal Values  Rhythm SR  HR Range    Measurements 0.12-0.20 / 0.06-0.10  / 0.30-0.52

## 2020-02-25 NOTE — PROGRESS NOTES
Informed patient of MD order to check POC glucose at bedtime; informed patient that previous check was completed at 1745 today; patient refusing POC glucose check.

## 2020-02-25 NOTE — CARE PLAN
Problem: Safety  Goal: Will remain free from injury  Outcome: PROGRESSING AS EXPECTED  Note: Patient will remain free from injuries/falls. Safety measures in place. Pt calls appropriately for assistance.      Problem: Bowel/Gastric:  Goal: Normal bowel function is maintained or improved  Outcome: PROGRESSING AS EXPECTED  Note: Denies abd pain. Had BM today. Reports better appetite. Diet advanced to encourage po intake.

## 2020-02-25 NOTE — PROGRESS NOTES
Patient rounded. POC discussed. Medications administered as ordered. Comfortable in bed. Appropriate safety precautions in place. Bed alarm ON. Patient refused POC Glucose check at 0600, will try again at a later time. Will continue to monitor patient.

## 2020-02-25 NOTE — CARE PLAN
Problem: Nutritional:  Goal: Achieve adequate nutritional intake  Description: Patient will consume 50% of meals   Outcome: PROGRESSING AS EXPECTED    Recorded PO intake has improved with more intake of % noted. Pt also continues to drink his Boost supplements with 50-75% recorded x 1. Pt with order for clear liquids or NPO recently due to procedure (ERCP). RD will continue to monitor.

## 2020-02-25 NOTE — PROGRESS NOTES
Received bedside patient report from KELLEY Miles. Patient resting comfortably in bed, no complaints at this time. Safety precautions in place. Will continue to monitor.

## 2020-02-25 NOTE — PROGRESS NOTES
Patient rounded. POC discussed. Medications administered as ordered. Comfortable in bed. Appropriate safety precautions in place. Bed alarm ON. Will continue to monitor patient.

## 2020-02-26 ENCOUNTER — ANESTHESIA (OUTPATIENT)
Dept: SURGERY | Facility: MEDICAL CENTER | Age: 60
DRG: 871 | End: 2020-02-26
Payer: COMMERCIAL

## 2020-02-26 ENCOUNTER — APPOINTMENT (OUTPATIENT)
Dept: RADIOLOGY | Facility: MEDICAL CENTER | Age: 60
DRG: 871 | End: 2020-02-26
Attending: INTERNAL MEDICINE
Payer: COMMERCIAL

## 2020-02-26 LAB
ALBUMIN SERPL BCP-MCNC: 1.8 G/DL (ref 3.2–4.9)
ALBUMIN/GLOB SERPL: 0.5 G/DL
ALP SERPL-CCNC: 1506 U/L (ref 30–99)
ALT SERPL-CCNC: 17 U/L (ref 2–50)
ANION GAP SERPL CALC-SCNC: 12 MMOL/L (ref 7–16)
AST SERPL-CCNC: 53 U/L (ref 12–45)
BASOPHILS # BLD AUTO: 0.5 % (ref 0–1.8)
BASOPHILS # BLD: 0.09 K/UL (ref 0–0.12)
BILIRUB SERPL-MCNC: 1 MG/DL (ref 0.1–1.5)
BUN SERPL-MCNC: 9 MG/DL (ref 8–22)
CALCIUM SERPL-MCNC: 7.9 MG/DL (ref 8.4–10.2)
CHLORIDE SERPL-SCNC: 100 MMOL/L (ref 96–112)
CO2 SERPL-SCNC: 23 MMOL/L (ref 20–33)
CREAT SERPL-MCNC: 0.45 MG/DL (ref 0.5–1.4)
EOSINOPHIL # BLD AUTO: 0.05 K/UL (ref 0–0.51)
EOSINOPHIL NFR BLD: 0.3 % (ref 0–6.9)
ERYTHROCYTE [DISTWIDTH] IN BLOOD BY AUTOMATED COUNT: 51.4 FL (ref 35.9–50)
GLOBULIN SER CALC-MCNC: 3.3 G/DL (ref 1.9–3.5)
GLUCOSE BLD-MCNC: 163 MG/DL (ref 65–99)
GLUCOSE BLD-MCNC: 219 MG/DL (ref 65–99)
GLUCOSE BLD-MCNC: 223 MG/DL (ref 65–99)
GLUCOSE SERPL-MCNC: 176 MG/DL (ref 65–99)
HCT VFR BLD AUTO: 26.7 % (ref 42–52)
HGB BLD-MCNC: 9.2 G/DL (ref 14–18)
IMM GRANULOCYTES # BLD AUTO: 0.35 K/UL (ref 0–0.11)
IMM GRANULOCYTES NFR BLD AUTO: 2.1 % (ref 0–0.9)
LYMPHOCYTES # BLD AUTO: 1.73 K/UL (ref 1–4.8)
LYMPHOCYTES NFR BLD: 10.3 % (ref 22–41)
MCH RBC QN AUTO: 29.5 PG (ref 27–33)
MCHC RBC AUTO-ENTMCNC: 34.5 G/DL (ref 33.7–35.3)
MCV RBC AUTO: 85.6 FL (ref 81.4–97.8)
MONOCYTES # BLD AUTO: 1.32 K/UL (ref 0–0.85)
MONOCYTES NFR BLD AUTO: 7.9 % (ref 0–13.4)
NEUTROPHILS # BLD AUTO: 13.22 K/UL (ref 1.82–7.42)
NEUTROPHILS NFR BLD: 78.9 % (ref 44–72)
NRBC # BLD AUTO: 0 K/UL
NRBC BLD-RTO: 0 /100 WBC
PLATELET # BLD AUTO: 632 K/UL (ref 164–446)
PMV BLD AUTO: 10.5 FL (ref 9–12.9)
POTASSIUM SERPL-SCNC: 4.1 MMOL/L (ref 3.6–5.5)
PROT SERPL-MCNC: 5.1 G/DL (ref 6–8.2)
RBC # BLD AUTO: 3.12 M/UL (ref 4.7–6.1)
SODIUM SERPL-SCNC: 135 MMOL/L (ref 135–145)
WBC # BLD AUTO: 16.8 K/UL (ref 4.8–10.8)

## 2020-02-26 PROCEDURE — A9270 NON-COVERED ITEM OR SERVICE: HCPCS | Performed by: INTERNAL MEDICINE

## 2020-02-26 PROCEDURE — 85025 COMPLETE CBC W/AUTO DIFF WBC: CPT

## 2020-02-26 PROCEDURE — 99232 SBSQ HOSP IP/OBS MODERATE 35: CPT | Performed by: INTERNAL MEDICINE

## 2020-02-26 PROCEDURE — 80053 COMPREHEN METABOLIC PANEL: CPT

## 2020-02-26 PROCEDURE — 700102 HCHG RX REV CODE 250 W/ 637 OVERRIDE(OP): Performed by: INTERNAL MEDICINE

## 2020-02-26 PROCEDURE — 82962 GLUCOSE BLOOD TEST: CPT | Mod: 91

## 2020-02-26 PROCEDURE — 700105 HCHG RX REV CODE 258: Performed by: INTERNAL MEDICINE

## 2020-02-26 PROCEDURE — 700101 HCHG RX REV CODE 250

## 2020-02-26 PROCEDURE — 700111 HCHG RX REV CODE 636 W/ 250 OVERRIDE (IP): Performed by: INTERNAL MEDICINE

## 2020-02-26 PROCEDURE — 700111 HCHG RX REV CODE 636 W/ 250 OVERRIDE (IP): Performed by: HOSPITALIST

## 2020-02-26 PROCEDURE — 770020 HCHG ROOM/CARE - TELE (206)

## 2020-02-26 RX ORDER — SODIUM CHLORIDE, SODIUM LACTATE, POTASSIUM CHLORIDE, CALCIUM CHLORIDE 600; 310; 30; 20 MG/100ML; MG/100ML; MG/100ML; MG/100ML
INJECTION, SOLUTION INTRAVENOUS CONTINUOUS
Status: ACTIVE | OUTPATIENT
Start: 2020-02-26 | End: 2020-02-27

## 2020-02-26 RX ADMIN — OMEPRAZOLE 20 MG: 20 CAPSULE, DELAYED RELEASE ORAL at 07:57

## 2020-02-26 RX ADMIN — METRONIDAZOLE 500 MG: 500 TABLET ORAL at 11:31

## 2020-02-26 RX ADMIN — METRONIDAZOLE 500 MG: 500 TABLET ORAL at 07:57

## 2020-02-26 RX ADMIN — INSULIN HUMAN 4 UNITS: 100 INJECTION, SOLUTION PARENTERAL at 17:49

## 2020-02-26 RX ADMIN — ALBUTEROL SULFATE 2.5 MG: 2.5 SOLUTION RESPIRATORY (INHALATION) at 15:28

## 2020-02-26 RX ADMIN — Medication 2.5 MG: at 15:28

## 2020-02-26 RX ADMIN — ACETAMINOPHEN 650 MG: 325 TABLET, FILM COATED ORAL at 18:07

## 2020-02-26 RX ADMIN — METRONIDAZOLE 500 MG: 500 TABLET ORAL at 03:13

## 2020-02-26 RX ADMIN — ACETAMINOPHEN 650 MG: 325 TABLET, FILM COATED ORAL at 08:00

## 2020-02-26 RX ADMIN — HYDROMORPHONE HYDROCHLORIDE 0.5 MG: 1 INJECTION, SOLUTION INTRAMUSCULAR; INTRAVENOUS; SUBCUTANEOUS at 18:07

## 2020-02-26 RX ADMIN — METRONIDAZOLE 500 MG: 500 TABLET ORAL at 18:00

## 2020-02-26 RX ADMIN — Medication 100 MG: at 07:57

## 2020-02-26 RX ADMIN — ATORVASTATIN CALCIUM 40 MG: 40 TABLET, FILM COATED ORAL at 18:01

## 2020-02-26 RX ADMIN — INSULIN HUMAN 4 UNITS: 100 INJECTION, SOLUTION PARENTERAL at 20:49

## 2020-02-26 RX ADMIN — SODIUM CHLORIDE, POTASSIUM CHLORIDE, SODIUM LACTATE AND CALCIUM CHLORIDE: 600; 310; 30; 20 INJECTION, SOLUTION INTRAVENOUS at 15:28

## 2020-02-26 RX ADMIN — TAMSULOSIN HYDROCHLORIDE 0.4 MG: 0.4 CAPSULE ORAL at 07:56

## 2020-02-26 RX ADMIN — PANCRELIPASE 48000 UNITS: 24000; 76000; 120000 CAPSULE, DELAYED RELEASE PELLETS ORAL at 07:56

## 2020-02-26 RX ADMIN — INSULIN HUMAN 5 UNITS: 100 INJECTION, SUSPENSION SUBCUTANEOUS at 17:51

## 2020-02-26 RX ADMIN — OMEPRAZOLE 20 MG: 20 CAPSULE, DELAYED RELEASE ORAL at 18:01

## 2020-02-26 RX ADMIN — CEFTRIAXONE SODIUM 1 G: 1 INJECTION, POWDER, FOR SOLUTION INTRAMUSCULAR; INTRAVENOUS at 17:57

## 2020-02-26 ASSESSMENT — ENCOUNTER SYMPTOMS
NAUSEA: 0
PHOTOPHOBIA: 0
CLAUDICATION: 0
ABDOMINAL PAIN: 0
FEVER: 0
CHILLS: 0
CONSTIPATION: 0
WEAKNESS: 0
COUGH: 0
BLURRED VISION: 0
VOMITING: 0
SHORTNESS OF BREATH: 0
NERVOUS/ANXIOUS: 0
DIARRHEA: 0
DIZZINESS: 0
DEPRESSION: 0
INSOMNIA: 0
MYALGIAS: 0
HEARTBURN: 0
HEADACHES: 0
SPEECH CHANGE: 0
SENSORY CHANGE: 0

## 2020-02-26 NOTE — PROGRESS NOTES
Patient rounded. POC discussed. Refused at 0600 medications; NPO since midnight for ERCP per MD order. Comfortable in bed. Appropriate safety precautions in place. Bed alarm ON. Will continue to monitor patient.

## 2020-02-26 NOTE — PROGRESS NOTES
Telemetry Shift Summary     Rhythm SR  HR Range 77 - 87  Ectopy R PVCs  Measurements 0.16 / 0.06 / 0.36           Normal Values  Rhythm SR  HR Range    Measurements 0.12-0.20 / 0.06-0.10  / 0.30-0.52

## 2020-02-26 NOTE — CARE PLAN
Problem: Communication  Goal: The ability to communicate needs accurately and effectively will improve  Outcome: PROGRESSING AS EXPECTED  Note: Patient updated on the plan of care. Encouraged to voice feelings and to ask questions. Answered all questions and concerns. Agrees with the plan of care.      Problem: Safety  Goal: Will remain free from injury  Outcome: PROGRESSING AS EXPECTED  Note: Safety precautions in place. Bed alarm ON. Will continue to monitor patient.   Goal: Will remain free from falls  Outcome: PROGRESSING AS EXPECTED  Note: Safety precautions in place. Bed alarm ON. Will continue to monitor patient.      Problem: Infection  Goal: Will remain free from infection  Outcome: PROGRESSING AS EXPECTED  Note: Discussed the importance of infection prevention and hand hygiene.      Problem: Respiratory:  Goal: Respiratory status will improve  Outcome: PROGRESSING AS EXPECTED

## 2020-02-26 NOTE — PROGRESS NOTES
Indication: Biliary stricture, pus, chronic pancreatitis.   Risks, benefits, and alternatives were discussed with with consenting person(s). Consenting person(s) were given an opportunity to ask questions and discuss other options. Risks including but not limited to failed or incomplete ERCP, stent migration, ineffective therapy, radiation exposure, pancreatitis (with potential future complications), contrast reaction, perforation, infection, bleeding, missed lesion(s), cardiac and/or pulmonary event, aspiration, stroke, possible need for surgery, hospitalization possibly prolonged, discomfort, unsuccessful and/or incomplete procedure, possible need for repeat procedures and/or additional testings, damage to adjacent organs and/or vascular structures, medication reaction, disability, death, and other adverse events possibly life-threatening. Discussion was undertaken with Layman's terms. Consenting persons stated understanding and acceptance of these risks, and wished to proceed. Consent was given in clear state of mind.

## 2020-02-26 NOTE — DISCHARGE PLANNING
LSW spoke with Marie. PATRICIAW informed that pt was discussed in IDT rounds and was determined to not be appropriate for hospice at this time and that pt was denied from hospice services, anyway. Marie stated she would call [facility], facility pt was staying at prior to admission, and inform them that the pt would be returning.

## 2020-02-26 NOTE — PROGRESS NOTES
Pre-op saturation 88% on 4L. No reports of worsening SOB. Denies any chest pain, abdominal pain. Evaluated by Anesthesia, breathing treatment performed with mild improvement. Anesthesia discussed possibility of inability to extubate after procedure. Procedure canceled per request by patient as he wants to optimize breathing before sedation attempts. SOB/Low o2 saturation management per team. Continue ABX

## 2020-02-26 NOTE — PROGRESS NOTES
Per Lab, patient refused AM lab draws. Educated the patient regarding the importance of the lab draw prior to test in AM. Continues to refuse. Lab will try again at a later time.

## 2020-02-26 NOTE — PROGRESS NOTES
[facility] Medicine Daily Progress Note    Date of Service  2/26/2020    Chief Complaint  59 y.o. male admitted 2/14/2020 with poor appetite.    Hospital Course    Patient with cognitive deficit presented from group home with lethargy and poor appetite.  He had elevation in alk phos and leukocytosis and treated for concern of sepsis with abdominal source.  HIDA was performed which showed no evidence of obstruction nor cholecystitis.    MRCP showed dilation of biliary ducts and concern of pancreatic head mass.   GI  EUS on 2/18 and showed significant calcifications likely related to chronic pancreatitis.  He underwent ERCP on 2/24 and purulent drainage from the ampulla of vater os and he had biliary needle-knife sphincterotomy to relieve the purulent drainage.          Interval Problem Update  2/25 Patient states he is hungry and wants solid food, still on a liquid diet.  Plan to move forward with repeat ERCP with Dr Wakefield tomorrow - on schedule at 1500 with anticipated stenting of the ampulla.  Patient denies any current abdominal pain.  2/26 Patient doing about same today, denies pain, refused labs this am.  ERCP scheduled for today for stenting of the ampulla.  Patient voicing frustration regarding NPO.    Consultants/Specialty  GI - isabel    Code Status  DNR/DNI, has guardian    Disposition  Group home likely tomorrow.    Review of Systems  Review of Systems   Constitutional: Negative for chills and fever.   HENT: Negative for congestion.    Eyes: Negative for blurred vision and photophobia.   Respiratory: Negative for cough and shortness of breath.    Cardiovascular: Negative for chest pain, claudication and leg swelling.   Gastrointestinal: Negative for abdominal pain, constipation, diarrhea, heartburn, nausea and vomiting.   Genitourinary: Negative for dysuria and hematuria.   Musculoskeletal: Negative for joint pain and myalgias.   Skin: Negative for itching and rash.   Neurological: Negative for dizziness,  sensory change, speech change, weakness and headaches.   Psychiatric/Behavioral: Negative for depression. The patient is not nervous/anxious and does not have insomnia.         Physical Exam  Temp:  [36.5 °C (97.7 °F)-36.8 °C (98.3 °F)] 36.6 °C (97.8 °F)  Pulse:  [89-92] 92  Resp:  [17-18] 18  BP: (127-146)/(67-75) 146/75  SpO2:  [89 %-92 %] 92 %    Physical Exam  Vitals signs and nursing note reviewed.   Constitutional:       General: He is not in acute distress.     Appearance: Normal appearance.   HENT:      Head: Normocephalic and atraumatic.   Eyes:      General: No scleral icterus.     Extraocular Movements: Extraocular movements intact.   Neck:      Musculoskeletal: Normal range of motion and neck supple.   Cardiovascular:      Rate and Rhythm: Normal rate and regular rhythm.      Pulses: Normal pulses.      Heart sounds: Normal heart sounds. No murmur.   Pulmonary:      Effort: Pulmonary effort is normal. No respiratory distress.      Breath sounds: Normal breath sounds. No wheezing, rhonchi or rales.   Abdominal:      General: Abdomen is flat. Bowel sounds are normal. There is no distension.      Palpations: Abdomen is soft.      Tenderness: There is no rebound.   Musculoskeletal:         General: No swelling or tenderness.   Lymphadenopathy:      Cervical: No cervical adenopathy.   Skin:     Coloration: Skin is not jaundiced.      Findings: No erythema.   Neurological:      General: No focal deficit present.      Mental Status: He is alert and oriented to person, place, and time. Mental status is at baseline.      Cranial Nerves: No cranial nerve deficit.   Psychiatric:         Mood and Affect: Mood normal.         Behavior: Behavior normal.         Fluids    Intake/Output Summary (Last 24 hours) at 2/26/2020 1231  Last data filed at 2/26/2020 0400  Gross per 24 hour   Intake 940 ml   Output 700 ml   Net 240 ml       Laboratory  Recent Labs     02/25/20  0423 02/26/20  0904   WBC 18.1* 16.8*   RBC 3.08*  3.12*   HEMOGLOBIN 8.8* 9.2*   HEMATOCRIT 26.4* 26.7*   MCV 85.7 85.6   MCH 28.6 29.5   MCHC 33.3* 34.5   RDW 51.9* 51.4*   PLATELETCT 616* 632*   MPV 11.2 10.5     Recent Labs     02/25/20  0423 02/26/20  0904   SODIUM 136 135   POTASSIUM 4.0 4.1   CHLORIDE 104 100   CO2 23 23   GLUCOSE 227* 176*   BUN 13 9   CREATININE 0.51 0.45*   CALCIUM 8.1* 7.9*                   Imaging  DX-PORTABLE FLUOROSCOPY < 1 HOUR   Final Result      Portable fluoroscopy utilized for 1.1 minute.         INTERPRETING LOCATION: [address] [place] [address], [facility], [id number]      [id number] SPHINCTEROTOMY   Final Result      Intraoperative fluoroscopy spot images as described above.      CT-ABDOMEN WITH & W/O   Final Result      1.  No significant change since CT abdomen and pelvis obtained 2/16/2020      2.  Findings consistent with high-grade biliary obstruction with dilation of the common bile duct measuring 17 mm, dilated gallbladder, dilated pancreatic duct with change in caliber of the pancreatic duct at the level pancreatic head      3.  Findings could be either malignancy the pancreatic head versus scarring from chronic pancreatitis.      4.  Sequela of chronic pancreatitis      5.  Large right medial chest rim-enhancing loculated fluid collection probably within the pleural space measuring 10.5 x 3.1 centimeter transversely and 7 cm craniocaudal      6.  Rim-enhancing fluid collection in the hepatorenal space measuring 11.2 cm craniocaudal and 2-3 cm in thickness.      7.  The rim-enhancing fluid collection are suspicious for infection/abscess      8.  Bilateral atelectasis and small-moderate-sized pleural effusion after      9.  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  Biliary dilatation without evidence of high-grade biliary obstruction.   2.  Gallbladder visualization after morphine administration. No acute cholecystitis.       CT-ABDOMEN-PELVIS WITH   Final Result      1.  New rim enhancing fluid collection tracks along the right heart margin, azygoesophageal recess and possibly communicates with Morison's pouch tracking along the right aspect of the inferior vena cava. This is nonspecific and could indicate abscess,    dissecting pseudocyst, mucinous cystic neoplasm related fluid      2.  New extra and intrahepatic biliary ductal dilatation with 6 cm dilatation of the gallbladder also seen. This indicates distal common bile duct/ampullary level obstruction. Stricture, mass effect from adjacent pancreas calcifications or small    ampullary mass are differential possibilities      3.  Decreased small pleural effusions. Some loculation on the right is possible      4.  Slightly decreased diffuse pancreatic ductal dilatation now measuring 11 mm. Evidence of chronic pancreatitis. New 3 cm cystic structure anterior to the pancreas could be a pseudocyst. Abscess or necrotic/cystic neoplasm are in the differential.      5.  Multiple chronic findings including status post aorto bifemoral bypass with approaching 70% stenosis of the left superficial femoral artery, nonobstructive right 3 mm nephrolithiasis, large volume rectal vault stool, dilated bladder which could    indicate outlet obstruction or neurogenic bladder, splenosis      IT-[id number]-H/O   Final Result      1.  Severe dilatation of the intrahepatic biliary ducts, common bile duct and pancreatic duct. The distal common bile duct and the proximal pancreatic duct is not visualized at the pancreatic head. The pancreatic head appears to be diffusely enlarged and    demonstrates multiple small cysts. These findings are concerning for pancreatic neoplasm such as intraductal papillary mucinous cystoadenoma. The other differential diagnosis includes periampullary carcinoma. MR examination of the abdomen with contrast    is recommended for further evaluation.   2.  Mixed signal intensity in  the right perinephric space likely represents perinephric hematoma.   3.  Large hiatal hernia.   4.  Diffuse gastric wall thickening.      US-RUQ   Final Result      1.  Hepatomegaly.   2.  Hepatic steatosis.   3.  Gallbladder sludge. Mild intrahepatic ductal dilation. Dilation of the common duct up to 13 mm. No definite distal obstructing etiology is identified. MRCP could be obtained for further evaluation if indicated.   4.  Trace ascites.   5.  Pancreatic calcifications likely sequela of chronic pancreatitis.      DX-CHEST-PORTABLE (1 VIEW)   Final Result      Cardiomegaly.           Assessment/Plan  * Pancreatic mass  Assessment & Plan  MRI with concern for pancreatic malignancy  ERCP with no evidence of malignancy, calcifications of pancreas present   is elevated but less than 1000 so cannot rule in cancer, likely due chronic pancreatitis  Repeat CT showed chronic pancreatitis and loculated chest effusion    Type 2 diabetes mellitus with hyperglycemia, with long-term current use of insulin (HCC)- (present on admission)  Assessment & Plan  He has an insulin sensor in his right lower quadrant, uses long-acting insulin 12 units and sliding scale.      Cognitive decline- (present on admission)  Assessment & Plan  Patient likely has a chronic component to his cognitive decline as well as acute worsening  Mental status now at his baseline, patient has a legal guardian      Metabolic acidosis  Assessment & Plan  IV fluids with improvement    Hypokalemia  Assessment & Plan  Replace as needed    Sepsis (HCC)  Assessment & Plan  This is Sepsis Present on admission  SIRS criteria identified on my evaluation include: Tachycardia, with heart rate greater than 90 BPM and Leukocyosis, with WBC greater than 12,000  Source is GI  Sepsis protocol complete  Continue antibiotics  White cell count trending down  Repeat ERCP today          Loculated pleural effusion  Assessment & Plan  In medial right chest, continue  antibiotics, pus drained from ampulla of Vater with GI 2/24    Cholangitis  Assessment & Plan  Biliary obstruction suggested on imaging, GI consulted  Alkaline phosphatase is stable over 1100  Endoscopy done showing pancreatic calcifications but no stones, no area to biopsy  Continue antibiotics, white cell count still elevated  Repeat CT scan done  ERCP repeated 2/24, given swelling so that a ampullary duct stent could not be placed  Repeat planned for Wednesday (2/26)    Recurrent major depressive disorder, in full remission (HCC)  Assessment & Plan  .    Hyponatremia  Assessment & Plan  Resolved with fluids    Chronic pancreatitis (HCC)- (present on admission)  Assessment & Plan  Continue pancreatic enzymes with meals  Celiac nerve block for pain control, per GI    Dyslipidemia- (present on admission)  Assessment & Plan  Continue statin       VTE prophylaxis: scds

## 2020-02-26 NOTE — PROGRESS NOTES
Received patient from NOC RN. Assessment complete. A&Ox4. C/o flank pain. Medicated per MAR. POC discussed. Call light within reach. Bed in low, locked position. All needs attended to at this time.     Pt has been NPO for ERCP this afternoon

## 2020-02-26 NOTE — ANESTHESIA PREPROCEDURE EVALUATION
Relevant Problems   NEURO   (+) H/O Bell's palsy      CARDIAC   (+) Diabetic peripheral angiopathy (HCC)   (+) Essential hypertension      ENDO   (+) Type 2 diabetes mellitus with hyperglycemia, with long-term current use of insulin (HCC)   (+) Uncontrolled type 2 diabetes mellitus with hyperosmolarity without coma (HCC)      Other   (+) Chronic pancreatitis (HCC)   (+) Dyslipidemia   (+) Pancreatic mass   (+) Peripheral neuropathy   (+) Sepsis (HCC)       Physical Exam    Airway   Mallampati: II  TM distance: >3 FB  Neck ROM: full       Cardiovascular - normal exam  Rhythm: regular  Rate: normal  (-) murmur     Dental - normal exam         Pulmonary - normal exam  Breath sounds clear to auscultation     Abdominal    Neurological - normal exam                 Anesthesia Plan    ASA 3       Plan - general       Airway plan will be ETT        Induction: intravenous    Postoperative Plan: Postoperative administration of opioids is intended.    Pertinent diagnostic labs and testing reviewed    Informed Consent:    Anesthetic plan and risks discussed with patient.    Use of blood products discussed with: patient whom consented to blood products.

## 2020-02-26 NOTE — CARE PLAN
Problem: Safety  Goal: Will remain free from injury  Outcome: PROGRESSING AS EXPECTED     Problem: Knowledge Deficit  Goal: Knowledge of disease process/condition, treatment plan, diagnostic tests, and medications will improve  Outcome: PROGRESSING AS EXPECTED    Educated pt on safety precautions and pt has verbalized understanding. Pt has demonstrated proper use of the call light and calls appropriately. Pt is wearing non slip socks, in bed in the low locked position and the call light is within reach Pt will learn about disease process and all questions addressed and answered. Pt will learn about disease process and all questions addressed and answered.

## 2020-02-27 ENCOUNTER — APPOINTMENT (OUTPATIENT)
Dept: RADIOLOGY | Facility: MEDICAL CENTER | Age: 60
DRG: 871 | End: 2020-02-27
Attending: INTERNAL MEDICINE
Payer: COMMERCIAL

## 2020-02-27 LAB
GLUCOSE BLD-MCNC: 128 MG/DL (ref 65–99)
GLUCOSE BLD-MCNC: 139 MG/DL (ref 65–99)
GLUCOSE BLD-MCNC: 168 MG/DL (ref 65–99)
GLUCOSE BLD-MCNC: 176 MG/DL (ref 65–99)

## 2020-02-27 PROCEDURE — 71045 X-RAY EXAM CHEST 1 VIEW: CPT

## 2020-02-27 PROCEDURE — 700102 HCHG RX REV CODE 250 W/ 637 OVERRIDE(OP): Performed by: INTERNAL MEDICINE

## 2020-02-27 PROCEDURE — 770020 HCHG ROOM/CARE - TELE (206)

## 2020-02-27 PROCEDURE — A9270 NON-COVERED ITEM OR SERVICE: HCPCS | Performed by: INTERNAL MEDICINE

## 2020-02-27 PROCEDURE — 82962 GLUCOSE BLOOD TEST: CPT | Mod: 91

## 2020-02-27 PROCEDURE — 700111 HCHG RX REV CODE 636 W/ 250 OVERRIDE (IP): Performed by: INTERNAL MEDICINE

## 2020-02-27 PROCEDURE — 700105 HCHG RX REV CODE 258: Performed by: INTERNAL MEDICINE

## 2020-02-27 PROCEDURE — 700111 HCHG RX REV CODE 636 W/ 250 OVERRIDE (IP): Performed by: HOSPITALIST

## 2020-02-27 PROCEDURE — 99232 SBSQ HOSP IP/OBS MODERATE 35: CPT | Performed by: INTERNAL MEDICINE

## 2020-02-27 RX ORDER — FUROSEMIDE 10 MG/ML
40 INJECTION INTRAMUSCULAR; INTRAVENOUS
Status: DISCONTINUED | OUTPATIENT
Start: 2020-02-27 | End: 2020-03-04

## 2020-02-27 RX ADMIN — ATORVASTATIN CALCIUM 40 MG: 40 TABLET, FILM COATED ORAL at 18:11

## 2020-02-27 RX ADMIN — INSULIN HUMAN 3 UNITS: 100 INJECTION, SOLUTION PARENTERAL at 12:02

## 2020-02-27 RX ADMIN — FUROSEMIDE 40 MG: 10 INJECTION, SOLUTION INTRAVENOUS at 12:04

## 2020-02-27 RX ADMIN — INSULIN HUMAN 5 UNITS: 100 INJECTION, SUSPENSION SUBCUTANEOUS at 06:40

## 2020-02-27 RX ADMIN — Medication 100 MG: at 06:36

## 2020-02-27 RX ADMIN — OMEPRAZOLE 20 MG: 20 CAPSULE, DELAYED RELEASE ORAL at 18:11

## 2020-02-27 RX ADMIN — METRONIDAZOLE 500 MG: 500 TABLET ORAL at 00:08

## 2020-02-27 RX ADMIN — METRONIDAZOLE 500 MG: 500 TABLET ORAL at 12:05

## 2020-02-27 RX ADMIN — CYCLOBENZAPRINE HYDROCHLORIDE 10 MG: 10 TABLET, FILM COATED ORAL at 21:18

## 2020-02-27 RX ADMIN — METRONIDAZOLE 500 MG: 500 TABLET ORAL at 06:36

## 2020-02-27 RX ADMIN — PANCRELIPASE 48000 UNITS: 24000; 76000; 120000 CAPSULE, DELAYED RELEASE PELLETS ORAL at 06:36

## 2020-02-27 RX ADMIN — HYDROMORPHONE HYDROCHLORIDE 0.5 MG: 1 INJECTION, SOLUTION INTRAMUSCULAR; INTRAVENOUS; SUBCUTANEOUS at 18:19

## 2020-02-27 RX ADMIN — HYDROMORPHONE HYDROCHLORIDE 0.5 MG: 1 INJECTION, SOLUTION INTRAMUSCULAR; INTRAVENOUS; SUBCUTANEOUS at 21:17

## 2020-02-27 RX ADMIN — ACETAMINOPHEN 650 MG: 325 TABLET, FILM COATED ORAL at 18:11

## 2020-02-27 RX ADMIN — OMEPRAZOLE 20 MG: 20 CAPSULE, DELAYED RELEASE ORAL at 06:36

## 2020-02-27 RX ADMIN — INSULIN HUMAN 5 UNITS: 100 INJECTION, SUSPENSION SUBCUTANEOUS at 18:00

## 2020-02-27 RX ADMIN — TAMSULOSIN HYDROCHLORIDE 0.4 MG: 0.4 CAPSULE ORAL at 06:36

## 2020-02-27 RX ADMIN — METRONIDAZOLE 500 MG: 500 TABLET ORAL at 18:11

## 2020-02-27 RX ADMIN — INSULIN HUMAN 3 UNITS: 100 INJECTION, SOLUTION PARENTERAL at 21:22

## 2020-02-27 RX ADMIN — CEFTRIAXONE SODIUM 1 G: 1 INJECTION, POWDER, FOR SOLUTION INTRAMUSCULAR; INTRAVENOUS at 18:11

## 2020-02-27 RX ADMIN — FUROSEMIDE 40 MG: 10 INJECTION, SOLUTION INTRAVENOUS at 18:20

## 2020-02-27 RX ADMIN — HYDROMORPHONE HYDROCHLORIDE 0.5 MG: 1 INJECTION, SOLUTION INTRAMUSCULAR; INTRAVENOUS; SUBCUTANEOUS at 06:46

## 2020-02-27 ASSESSMENT — ENCOUNTER SYMPTOMS
PHOTOPHOBIA: 0
CONSTIPATION: 0
BLURRED VISION: 0
COUGH: 0
INSOMNIA: 0
SORE THROAT: 0
FEVER: 0
DIZZINESS: 0
SPUTUM PRODUCTION: 0
NAUSEA: 0
VOMITING: 0
WEIGHT LOSS: 1
ABDOMINAL PAIN: 0
SHORTNESS OF BREATH: 0
MYALGIAS: 0
DEPRESSION: 0
CHILLS: 0
SENSORY CHANGE: 0
HEARTBURN: 0
CLAUDICATION: 0
BLOOD IN STOOL: 0
SPEECH CHANGE: 0
WEAKNESS: 0
NERVOUS/ANXIOUS: 0
HEADACHES: 0
DIARRHEA: 0

## 2020-02-27 NOTE — PROGRESS NOTES
Received report from KELLEY Garcia. Patient sitting up in bed eating dinner. POC discussed. Safety precautions in place. Call bell within reach. Will continue to monitor.

## 2020-02-27 NOTE — PROGRESS NOTES
Due medications administered. Patient is refusing this RN to preform an assessment. Patient also stated that he would not like his vitals taken any more this evening and that he does not want have his blood drawn for lab. Patient did state he would be okay with receiving his medication at 0000. Education was provided.

## 2020-02-27 NOTE — PROGRESS NOTES
Telemetry Strip     Strip printed: 1947  Measurements from am strip were as follows:  Rhythm: SR  HR: 98  Measurements: 0.18/0.08/0.38        Telemetry Shift Summary:    Rhythm: SR  HR: 78-90  Ectopy: Occasional PVC, rare bigeminy  Per Mer MT        Normal Values  Rhythm SR  HR Range    Measurements 0.12-0.20 / 0.06-0.10  / 0.30-0.52

## 2020-02-27 NOTE — CARE PLAN
Problem: Communication  Goal: The ability to communicate needs accurately and effectively will improve  2/27/2020 1450 by Inga Roa R.N.  Outcome: PROGRESSING AS EXPECTED  2/27/2020 1449 by Inga Roa R.N.  Outcome: PROGRESSING AS EXPECTED     Problem: Safety  Goal: Will remain free from injury  2/27/2020 1450 by Inga Roa R.N.  Outcome: PROGRESSING AS EXPECTED  2/27/2020 1449 by Inag Roa R.N.  Outcome: PROGRESSING AS EXPECTED  Goal: Will remain free from falls  2/27/2020 1450 by Inga Roa R.N.  Outcome: PROGRESSING AS EXPECTED  2/27/2020 1449 by Inga Roa R.N.  Outcome: PROGRESSING AS EXPECTED     Problem: Infection  Goal: Will remain free from infection  2/27/2020 1450 by Inga Roa R.N.  Outcome: PROGRESSING AS EXPECTED  2/27/2020 1449 by Inga Roa R.N.  Outcome: PROGRESSING AS EXPECTED     Problem: Venous Thromboembolism (VTW)/Deep Vein Thrombosis (DVT) Prevention:  Goal: Patient will participate in Venous Thrombosis (VTE)/Deep Vein Thrombosis (DVT)Prevention Measures  2/27/2020 1450 by Inga Roa R.N.  Outcome: PROGRESSING AS EXPECTED  2/27/2020 1449 by Inga Roa R.N.  Outcome: PROGRESSING AS EXPECTED     Problem: Bowel/Gastric:  Goal: Normal bowel function is maintained or improved  2/27/2020 1450 by Inga Roa R.N.  Outcome: PROGRESSING AS EXPECTED  2/27/2020 1449 by Inga Roa R.N.  Outcome: PROGRESSING AS EXPECTED  Goal: Will not experience complications related to bowel motility  2/27/2020 1450 by Inga Roa R.N.  Outcome: PROGRESSING AS EXPECTED  2/27/2020 1449 by Inga Roa R.N.  Outcome: PROGRESSING AS EXPECTED     Problem: Knowledge Deficit  Goal: Knowledge of disease process/condition, treatment plan, diagnostic tests, and medications will improve  2/27/2020  1450 by Inga Roa R.N.  Outcome: PROGRESSING AS EXPECTED  2/27/2020 1449 by Inga Roa R.N.  Outcome: PROGRESSING AS EXPECTED  Goal: Knowledge of the prescribed therapeutic regimen will improve  2/27/2020 1450 by Inga Roa R.N.  Outcome: PROGRESSING AS EXPECTED  2/27/2020 1449 by Inga Roa R.N.  Outcome: PROGRESSING AS EXPECTED     Problem: Fluid Volume:  Goal: Will maintain balanced intake and output  2/27/2020 1450 by MIGUEL EscuderoN.  Outcome: PROGRESSING AS EXPECTED  2/27/2020 1449 by Inga Roa R.N.  Outcome: PROGRESSING AS EXPECTED     Problem: Pain Management  Goal: Pain level will decrease to patient's comfort goal  2/27/2020 1450 by Inga Roa R.N.  Outcome: PROGRESSING AS EXPECTED  2/27/2020 1449 by MIGUEL EscuderoN.  Outcome: PROGRESSING AS EXPECTED     Problem: Psychosocial Needs:  Goal: Level of anxiety will decrease  2/27/2020 1450 by Inga Roa R.N.  Outcome: PROGRESSING AS EXPECTED  2/27/2020 1449 by Inga Roa RJULIUS.  Outcome: PROGRESSING AS EXPECTED     Problem: Mobility  Goal: Risk for activity intolerance will decrease  2/27/2020 1450 by Inga Roa R.N.  Outcome: PROGRESSING AS EXPECTED  2/27/2020 1449 by Inga Roa RYuridiaN.  Outcome: PROGRESSING AS EXPECTED     Problem: Discharge Barriers/Planning  Goal: Patient's continuum of care needs will be met  2/27/2020 1450 by Inga Roa R.N.  Outcome: PROGRESSING SLOWER THAN EXPECTED  2/27/2020 1449 by Inga Roa R.N.  Outcome: PROGRESSING AS EXPECTED     Problem: Respiratory:  Goal: Respiratory status will improve  2/27/2020 1450 by Inga Roa R.N.  Outcome: PROGRESSING SLOWER THAN EXPECTED  2/27/2020 1449 by Inga Roa R.N.  Outcome: PROGRESSING AS EXPECTED   Pt diuresing.

## 2020-02-27 NOTE — PROGRESS NOTES
Report received. Assumed care of pt. Assessment complete. Pt A&Ox4. Pt in no apparent signs of distress. POC discussed. Call light within reach. Bed in low position. All needs are met at this time.

## 2020-02-27 NOTE — OR NURSING
Patient arrived to pre-op on 3L O2 via NC. Oxygen saturations 86-88% on 3L. Dr. NEGAR Hancock at bedside; orders for albuterol treatment received. Oxygen saturations 90% on 4L O2 via NC post albuterol treatment. Dr. Wakefield and Dr. NEGAR Hancock aware. Case cancelled. Floor RN notified and patient transferred back to inpatient room.

## 2020-02-27 NOTE — PROGRESS NOTES
"  Gastroenterology Progress Note     Author: Hector Villarreal MD Date & Time Created: 2/27/2020 11:19 AM    Chief Complaint:  Elevated liver tests, abnormal MRI    Interval History:  Initial history (Dr. Villarreal):  59 y.o. male with IDDM, chronic pancreatitis seizure disorder, iron deficiency anemia who presented 2/14/2020 with lethargy and poor appetite per assisted living facility.  He is difficult historian as does not want to answer questions.  He states \"I feel fine and always have.\"  He was evaluated 12/2019 for iron deficiency anemia and DKA.  Had EGD with severe erosive esophagitis.  He refused to drink prep for colonoscopy.  He apparently was brought by assisted living facility after they noted some lethargy and poor appetite.  He has weight loss of 7 pounds over the past month or so.  He denies abdominal pain and states he never had abdominal pain and his appetite is great.  Denies fevers, chills, nausea, vomiting, diarrhea, constipation, melena, hematochezia.  In ED, he had significant leukocytosis with newly elevated alkaline phosphatase and bilirubin with fairly normal transaminases.  He was started on IVF and antibiotics.  He had RUQ US followed by MRCP showing dilated bile and pancreatic ducts, distended GB, chronic pancreatitis and possible abnormal lesions head of pancreas.    2/16/2020: Continues to deny any symptoms including abdominal pain, nausea, vomiting, fevers.  Was refusing IV placement and vitals overnight.  Says he is agreeable to IV placement today  02/17/2020 - No complaints.  Says he feels fine, but does report urine is darker.  I explained imaging findings at length.  02/18/2020 - EUS showed extensive, severe pancreatic calcifications making identification of any pancreatic mass or fluid collection impossible.  Mediastinum was not assessed.  ERCP not done due to normalizing liver enzymes and added risk to patient.  02/19/2020 - No events overnight.  Patient denies " "fevers/chills/sweats and states he \"feels fine\".  Labs show worsening leukocytosis with improving liver enzymes.  02/20/2020 - Tolerating regular diet well.  Denies any abdominal pain.  He does report chronic mid-back pain, but this is not severe or worsening.  No nausea, vomiting, fevers, chills or sweats.  Leukocytosis slightly better.  Alkaline phosphatase still elevated, but remainder of liver enzymes normal.  02/21/2020 - No abdominal pain.  Tolerating diet.  Mild decrease appetite.  No CBC today.  Eager to be discharged.  02/22/2020 - Continues to \"feel fine\".  No abdominal pain, nausea or vomiting.  Tolerating diet.  Leukocytosis improved, but alkaline phosphatase abruptly higher.  CT not done due to IV issues and patient's difficult access.  2/23/20 - No new complaints. He ha lovenox s/c this AM at 6.30. ERCP scheduled for 9 AM. Labs pending   2/25/2020: Denies any complaints.  No pain.  Had ERCP yesterday showing pus from bile duct and distal bile duct stricture likely due to chronic pancreatitis.  Unable to stent  Due to loss of access and amount of ampullary inflammation.  WBC better.  Alk phos higher.  No fevers.  2/27/2020: ERCp not performed yesterday due to oxygen saturation.  Remains on 4L oxygen with large bilateral pleural effusions.  Denies dyspnea or abdominal pain.  WBc trending down.  No fevers.    Review of Systems:  Review of Systems   Constitutional: Positive for weight loss. Negative for chills, fever and malaise/fatigue.   Respiratory: Negative for shortness of breath.    Cardiovascular: Negative for chest pain.   Gastrointestinal: Negative for abdominal pain, blood in stool, constipation, diarrhea, heartburn, melena, nausea and vomiting.       Physical Exam:  Physical Exam  Vitals signs and nursing note reviewed.   Constitutional:       Appearance: Normal appearance.   Eyes:      General: No scleral icterus.  Cardiovascular:      Rate and Rhythm: Normal rate and regular rhythm.      " Pulses: Normal pulses.      Heart sounds: Normal heart sounds.   Pulmonary:      Effort: Pulmonary effort is normal. No respiratory distress.      Breath sounds: No stridor. Decreased breath sounds present. No wheezing, rhonchi or rales.   Abdominal:      General: Abdomen is flat. Bowel sounds are normal. There is no distension.      Palpations: Abdomen is soft. There is no mass.      Tenderness: There is no abdominal tenderness. There is no guarding or rebound.      Hernia: No hernia is present.   Skin:     General: Skin is warm and dry.      Coloration: Skin is not jaundiced.   Neurological:      General: No focal deficit present.      Mental Status: He is alert and oriented to person, place, and time.         Labs:          Recent Labs     20  0904   SODIUM 136 135   POTASSIUM 4.0 4.1   CHLORIDE 104 100   CO2 23 23   BUN 13 9   CREATININE 0.51 0.45*   CALCIUM 8.1* 7.9*     Recent Labs     20  0904   ALTSGPT 19 17   ASTSGOT 78* 53*   ALKPHOSPHAT 1813* 1506*   TBILIRUBIN 0.9 1.0   GLUCOSE 227* 176*     Recent Labs     20  0904   RBC 3.08* 3.12*   HEMOGLOBIN 8.8* 9.2*   HEMATOCRIT 26.4* 26.7*   PLATELETCT 616* 632*     Recent Labs     20  0904   WBC 18.1* 16.8*   NEUTSPOLYS 78.20* 78.90*   LYMPHOCYTES 10.50* 10.30*   MONOCYTES 8.90 7.90   EOSINOPHILS 0.40 0.30   BASOPHILS 0.30 0.50   ASTSGOT 78* 53*   ALTSGPT 19 17   ALKPHOSPHAT 1813* 1506*   TBILIRUBIN 0.9 1.0     Hemodynamics:  Temp (24hrs), Av.7 °C (98.1 °F), Min:36.7 °C (98.1 °F), Max:36.7 °C (98.1 °F)  Temperature: 36.7 °C (98.1 °F)  Pulse  Av.5  Min: 50  Max: 122   Blood Pressure: 124/66     Respiratory:    Respiration: 18, Pulse Oximetry: 93 %        RLL Breath Sounds: Diminished, LLL Breath Sounds: Diminished  Fluids:    Intake/Output Summary (Last 24 hours) at 2020 0706  Last data filed at 2/15/2020 2200  Gross per 24 hour   Intake 2485 ml   Output 600 ml   Net  1885 ml        GI/Nutrition:  Orders Placed This Encounter   Procedures   • Diet Order Diabetic     Standing Status:   Standing     Number of Occurrences:   1     Order Specific Question:   Diet:     Answer:   Diabetic [3]     Medical Decision Making, by Problem:  Active Hospital Problems    Diagnosis   • *Sepsis (HCC) [A41.9]   • Elevated alkaline phosphatase level [R74.8]   • Type 2 diabetes mellitus with hyperglycemia, with long-term current use of insulin (HCC) [E11.65, Z79.4]   • Cognitive decline [R41.89]   • Metabolic acidosis [E87.2]   • Hypokalemia [E87.6]   • Hyponatremia [E87.1]   • Chronic pancreatitis (HCC) [K86.1]   • Dyslipidemia [E78.5]   • Cholangitis [K83.09]     Impression  / Plan  58 y/o with chronic pancreatitis, IDDM, iron deficiency anemia, GERD with esophagitis that presented for lethargy and found to have significant leukocytosis and newly elevated liver tests.  Concern for sepsis from biliary source.  Imaging suggests dilation of biliary and pancreatic ducts but no obvious stones although could have ampullary lesion of main duct IPMN causing obstruction.      1. Elevated liver tests, alk phos remains high and rising today, but other liver enzymes normalized - Suspect downstream stricture due to chronic calcific pancreatitis with obstruction due to pus seen on ERCP 2/24.  Unable to place biliary stent for more definitive drainage and plan for reattempt 2/26  2. Sepsis, unclear source - Mediastinum was not assessed at EUS due to focus being on pancreas and biliary tree  3. Leukocytosis - Slightly better post ERCP with removal of some pus from bile duct  4. Abnormal imaging of abdomen with dilated bile ducts and pancreatic duct and questionable mass head of pancreas - Only severe chronic calcific pancreatitis identified on EUS  5. Chronic pancreatitis  6. IDDM  7. Iron deficiency anemia  8. GERD with esophagitis  9. Hypertension  10. Seizure disorder  11. Aggressive behavior to healthcare  workers  12. Back pain  13. Pancreatic fluid collection tracking adjacent to distal esophagus on imaging, ?pseudocyst versus infected fluid from prolonged biliary obstruction       PLAN:   1. Continue antibiotics  2. Recommend diuresing with IV Lasix to improve effusions and hypoxemia  3. Consider reattempt at ERCP with biliary stenting following diuresis      Quality-Core Measures   Reviewed items::  Radiology images reviewed, Labs reviewed and Medications reviewed

## 2020-02-27 NOTE — PROGRESS NOTES
Hospital Medicine Daily Progress Note    Date of Service  2/27/2020    Chief Complaint  59 y.o. male admitted 2/14/2020 with poor appetite.    Hospital Course    Patient with cognitive deficit presented from group home with lethargy and poor appetite.  He had elevation in alk phos and leukocytosis and treated for concern of sepsis with abdominal source.  HIDA was performed which showed no evidence of obstruction nor cholecystitis.    MRCP showed dilation of biliary ducts and concern of pancreatic head mass.   GI  EUS on 2/18 and showed significant calcifications likely related to chronic pancreatitis.  He underwent ERCP on 2/24 and purulent drainage from the ampulla of vater os and he had biliary needle-knife sphincterotomy to relieve the purulent drainage.          Interval Problem Update  2/25 Patient states he is hungry and wants solid food, still on a liquid diet.  Plan to move forward with repeat ERCP with Dr Wakefield tomorrow - on schedule at 1500 with anticipated stenting of the ampulla.  Patient denies any current abdominal pain.  2/26 Patient doing about same today, denies pain, refused labs this am.  ERCP scheduled for today for stenting of the ampulla.  Patient voicing frustration regarding NPO.  2/27 Patient without complaint today as he had breakfast in front of him when I saw him.  ERCP was cancelled due to increasing hypoxia to where he was requiring 4L.  CXR completed with again shows dulling of the bases consistent with known pleural effusions but also shows pulmonary congestion - will diurese to see if this improves.    Consultants/Specialty  GI - isabel    Code Status  DNR/DNI, has guardian    Disposition  Group home likely tomorrow.    Review of Systems  Review of Systems   Constitutional: Negative for chills and fever.   HENT: Negative for congestion and sore throat.    Eyes: Negative for blurred vision and photophobia.   Respiratory: Negative for cough, sputum production and shortness of breath.     Cardiovascular: Negative for chest pain, claudication and leg swelling.   Gastrointestinal: Negative for abdominal pain, constipation, diarrhea, heartburn, nausea and vomiting.   Genitourinary: Negative for dysuria and hematuria.   Musculoskeletal: Negative for joint pain and myalgias.   Skin: Negative for itching and rash.   Neurological: Negative for dizziness, sensory change, speech change, weakness and headaches.   Psychiatric/Behavioral: Negative for depression. The patient is not nervous/anxious and does not have insomnia.         Physical Exam  Temp:  [36.7 °C (98.1 °F)-36.8 °C (98.2 °F)] 36.8 °C (98.2 °F)  Pulse:  [83-94] 83  Resp:  [18] 18  BP: (124-143)/(66-76) 143/76  SpO2:  [89 %-97 %] 97 %    Physical Exam  Vitals signs and nursing note reviewed.   Constitutional:       General: He is not in acute distress.     Appearance: Normal appearance.   HENT:      Head: Normocephalic and atraumatic.   Eyes:      General: No scleral icterus.     Extraocular Movements: Extraocular movements intact.   Neck:      Musculoskeletal: Normal range of motion and neck supple.   Cardiovascular:      Rate and Rhythm: Normal rate and regular rhythm.      Pulses: Normal pulses.      Heart sounds: Normal heart sounds. No murmur.   Pulmonary:      Effort: Pulmonary effort is normal. No respiratory distress.      Breath sounds: Normal breath sounds. No wheezing, rhonchi or rales.   Abdominal:      General: Abdomen is flat. Bowel sounds are normal. There is no distension.      Palpations: Abdomen is soft.      Tenderness: There is no rebound.   Musculoskeletal:         General: No swelling or tenderness.   Lymphadenopathy:      Cervical: No cervical adenopathy.   Skin:     Coloration: Skin is not jaundiced.      Findings: No erythema.   Neurological:      General: No focal deficit present.      Mental Status: He is alert and oriented to person, place, and time. Mental status is at baseline.      Cranial Nerves: No cranial nerve  deficit.   Psychiatric:         Mood and Affect: Mood normal.         Behavior: Behavior normal.         Fluids    Intake/Output Summary (Last 24 hours) at 2/27/2020 1147  Last data filed at 2/26/2020 1957  Gross per 24 hour   Intake 995 ml   Output 550 ml   Net 445 ml       Laboratory  Recent Labs     02/25/20  0423 02/26/20  0904   WBC 18.1* 16.8*   RBC 3.08* 3.12*   HEMOGLOBIN 8.8* 9.2*   HEMATOCRIT 26.4* 26.7*   MCV 85.7 85.6   MCH 28.6 29.5   MCHC 33.3* 34.5   RDW 51.9* 51.4*   PLATELETCT 616* 632*   MPV 11.2 10.5     Recent Labs     02/25/20 0423 02/26/20  0904   SODIUM 136 135   POTASSIUM 4.0 4.1   CHLORIDE 104 100   CO2 23 23   GLUCOSE 227* 176*   BUN 13 9   CREATININE 0.51 0.45*   CALCIUM 8.1* 7.9*                   Imaging  DX-PORTABLE FLUOROSCOPY < 1 HOUR   Final Result      Portable fluoroscopy utilized for 1.1 minute.         INTERPRETING LOCATION: [address] [place] [address], [facility]      [id number] SPHINCTEROTOMY   Final Result      Intraoperative fluoroscopy spot images as described above.      CT-ABDOMEN WITH & W/O   Final Result      1.  No significant change since CT abdomen and pelvis obtained 2/16/2020      2.  Findings consistent with high-grade biliary obstruction with dilation of the common bile duct measuring 17 mm, dilated gallbladder, dilated pancreatic duct with change in caliber of the pancreatic duct at the level pancreatic head      3.  Findings could be either malignancy the pancreatic head versus scarring from chronic pancreatitis.      4.  Sequela of chronic pancreatitis      5.  Large right medial chest rim-enhancing loculated fluid collection probably within the pleural space measuring 10.5 x 3.1 centimeter transversely and 7 cm craniocaudal      6.  Rim-enhancing fluid collection in the hepatorenal space measuring 11.2 cm craniocaudal and 2-3 cm in thickness.      7.  The rim-enhancing fluid collection are suspicious for infection/abscess      8.  Bilateral atelectasis and  small-moderate-sized pleural effusion after      9.  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  Biliary dilatation without evidence of high-grade biliary obstruction.   2.  Gallbladder visualization after morphine administration. No acute cholecystitis.      CT-ABDOMEN-PELVIS WITH   Final Result      1.  New rim enhancing fluid collection tracks along the right heart margin, azygoesophageal recess and possibly communicates with Morison's pouch tracking along the right aspect of the inferior vena cava. This is nonspecific and could indicate abscess,    dissecting pseudocyst, mucinous cystic neoplasm related fluid      2.  New extra and intrahepatic biliary ductal dilatation with 6 cm dilatation of the gallbladder also seen. This indicates distal common bile duct/ampullary level obstruction. Stricture, mass effect from adjacent pancreas calcifications or small    ampullary mass are differential possibilities      3.  Decreased small pleural effusions. Some loculation on the right is possible      4.  Slightly decreased diffuse pancreatic ductal dilatation now measuring 11 mm. Evidence of chronic pancreatitis. New 3 cm cystic structure anterior to the pancreas could be a pseudocyst. Abscess or necrotic/cystic neoplasm are in the differential.      5.  Multiple chronic findings including status post aorto bifemoral bypass with approaching 70% stenosis of the left superficial femoral artery, nonobstructive right 3 mm nephrolithiasis, large volume rectal vault stool, dilated bladder which could    indicate outlet obstruction or neurogenic bladder, splenosis      [id number]   Final Result      1.  Severe dilatation of the intrahepatic biliary ducts, common bile duct and pancreatic duct. The distal common bile duct and the proximal pancreatic duct is not visualized at the pancreatic head. The pancreatic  head appears to be diffusely enlarged and    demonstrates multiple small cysts. These findings are concerning for pancreatic neoplasm such as intraductal papillary mucinous cystoadenoma. The other differential diagnosis includes periampullary carcinoma. MR examination of the abdomen with contrast    is recommended for further evaluation.   2.  Mixed signal intensity in the right perinephric space likely represents perinephric hematoma.   3.  Large hiatal hernia.   4.  Diffuse gastric wall thickening.      US-RUQ   Final Result      1.  Hepatomegaly.   2.  Hepatic steatosis.   3.  Gallbladder sludge. Mild intrahepatic ductal dilation. Dilation of the common duct up to 13 mm. No definite distal obstructing etiology is identified. MRCP could be obtained for further evaluation if indicated.   4.  Trace ascites.   5.  Pancreatic calcifications likely sequela of chronic pancreatitis.      DX-CHEST-PORTABLE (1 VIEW)   Final Result      Cardiomegaly.      DX-CHEST-PORTABLE (1 VIEW)    (Results Pending)        Assessment/Plan  * Pancreatic mass  Assessment & Plan  MRI with concern for pancreatic malignancy  ERCP with no evidence of malignancy, calcifications of pancreas present   is elevated but less than 1000 so cannot rule in cancer, likely due chronic pancreatitis  Repeat CT showed chronic pancreatitis and loculated chest effusion    Type 2 diabetes mellitus with hyperglycemia, with long-term current use of insulin (HCC)- (present on admission)  Assessment & Plan  He has an insulin sensor in his right lower quadrant, uses long-acting insulin 12 units and sliding scale.      Cognitive decline- (present on admission)  Assessment & Plan  Patient likely has a chronic component to his cognitive decline as well as acute worsening  Mental status now at his baseline, patient has a legal guardian      Metabolic acidosis  Assessment & Plan  IV fluids with improvement    Hypokalemia  Assessment & Plan  Replace as  needed    Sepsis (HCC)  Assessment & Plan  This is Sepsis Present on admission  SIRS criteria identified on my evaluation include: Tachycardia, with heart rate greater than 90 BPM and Leukocyosis, with WBC greater than 12,000  Source is GI  Sepsis protocol complete  Continue antibiotics  White cell count trending down  Repeat ERCP cancelled due to hypoxia.            Loculated pleural effusion  Assessment & Plan  In medial right chest, continue antibiotics, pus drained from ampulla of Vater with GI 2/24  Pulmonary edema appreciated on CXR 2/27 - diurese to get oxygen demand down, anticipate another attempt at ERCP in a few days.    Cholangitis  Assessment & Plan  Biliary obstruction suggested on imaging, GI consulted  Alkaline phosphatase is stable over 1100  Endoscopy done showing pancreatic calcifications but no stones, no area to biopsy  Continue antibiotics, white cell count still elevated  Repeat CT scan done  ERCP repeated 2/24, given swelling so that a ampullary duct stent could not be placed  Repeat planned for Wednesday (2/26)    Recurrent major depressive disorder, in full remission (HCC)  Assessment & Plan  .    Hyponatremia  Assessment & Plan  Resolved with fluids    Chronic pancreatitis (HCC)- (present on admission)  Assessment & Plan  Continue pancreatic enzymes with meals  Celiac nerve block for pain control, per GI    Dyslipidemia- (present on admission)  Assessment & Plan  Continue statin       VTE prophylaxis: scds

## 2020-02-27 NOTE — CARE PLAN
Problem: Safety  Goal: Will remain free from injury  Outcome: PROGRESSING AS EXPECTED  Note: Bed locked and in lowest position. Call bell within reach. Patient calls appropriately.   Goal: Will remain free from falls  Outcome: PROGRESSING AS EXPECTED  Note: Bed locked and in lowest position. Call bell within reach. Patient calls appropriately.

## 2020-02-28 LAB
ANION GAP SERPL CALC-SCNC: 10 MMOL/L (ref 7–16)
BUN SERPL-MCNC: 8 MG/DL (ref 8–22)
CALCIUM SERPL-MCNC: 8.5 MG/DL (ref 8.4–10.2)
CHLORIDE SERPL-SCNC: 98 MMOL/L (ref 96–112)
CO2 SERPL-SCNC: 29 MMOL/L (ref 20–33)
CREAT SERPL-MCNC: 0.49 MG/DL (ref 0.5–1.4)
ERYTHROCYTE [DISTWIDTH] IN BLOOD BY AUTOMATED COUNT: 66.1 FL (ref 35.9–50)
GLUCOSE BLD-MCNC: 112 MG/DL (ref 65–99)
GLUCOSE BLD-MCNC: 134 MG/DL (ref 65–99)
GLUCOSE BLD-MCNC: 141 MG/DL (ref 65–99)
GLUCOSE BLD-MCNC: 290 MG/DL (ref 65–99)
GLUCOSE BLD-MCNC: 72 MG/DL (ref 65–99)
GLUCOSE SERPL-MCNC: 83 MG/DL (ref 65–99)
HCT VFR BLD AUTO: 31.4 % (ref 42–52)
HGB BLD-MCNC: 10.7 G/DL (ref 14–18)
MCH RBC QN AUTO: 29.4 PG (ref 27–33)
MCHC RBC AUTO-ENTMCNC: 34.1 G/DL (ref 33.7–35.3)
MCV RBC AUTO: 86.3 FL (ref 81.4–97.8)
PLATELET # BLD AUTO: 572 K/UL (ref 164–446)
PMV BLD AUTO: 10.7 FL (ref 9–12.9)
POTASSIUM SERPL-SCNC: 3.3 MMOL/L (ref 3.6–5.5)
RBC # BLD AUTO: 3.64 M/UL (ref 4.7–6.1)
SODIUM SERPL-SCNC: 137 MMOL/L (ref 135–145)
WBC # BLD AUTO: 15.5 K/UL (ref 4.8–10.8)

## 2020-02-28 PROCEDURE — A9270 NON-COVERED ITEM OR SERVICE: HCPCS | Performed by: INTERNAL MEDICINE

## 2020-02-28 PROCEDURE — 700111 HCHG RX REV CODE 636 W/ 250 OVERRIDE (IP): Performed by: INTERNAL MEDICINE

## 2020-02-28 PROCEDURE — 85027 COMPLETE CBC AUTOMATED: CPT

## 2020-02-28 PROCEDURE — 770020 HCHG ROOM/CARE - TELE (206)

## 2020-02-28 PROCEDURE — 99232 SBSQ HOSP IP/OBS MODERATE 35: CPT | Performed by: INTERNAL MEDICINE

## 2020-02-28 PROCEDURE — 700111 HCHG RX REV CODE 636 W/ 250 OVERRIDE (IP): Performed by: HOSPITALIST

## 2020-02-28 PROCEDURE — 82962 GLUCOSE BLOOD TEST: CPT | Mod: 91

## 2020-02-28 PROCEDURE — 700102 HCHG RX REV CODE 250 W/ 637 OVERRIDE(OP): Performed by: INTERNAL MEDICINE

## 2020-02-28 PROCEDURE — 700105 HCHG RX REV CODE 258: Performed by: INTERNAL MEDICINE

## 2020-02-28 PROCEDURE — 80048 BASIC METABOLIC PNL TOTAL CA: CPT

## 2020-02-28 RX ORDER — POTASSIUM CHLORIDE 20 MEQ/1
20 TABLET, EXTENDED RELEASE ORAL 2 TIMES DAILY
Status: DISCONTINUED | OUTPATIENT
Start: 2020-02-28 | End: 2020-02-29

## 2020-02-28 RX ADMIN — INSULIN HUMAN 5 UNITS: 100 INJECTION, SUSPENSION SUBCUTANEOUS at 17:36

## 2020-02-28 RX ADMIN — SENNOSIDES AND DOCUSATE SODIUM 2 TABLET: 8.6; 5 TABLET ORAL at 06:00

## 2020-02-28 RX ADMIN — HYDROMORPHONE HYDROCHLORIDE 0.5 MG: 1 INJECTION, SOLUTION INTRAMUSCULAR; INTRAVENOUS; SUBCUTANEOUS at 16:39

## 2020-02-28 RX ADMIN — ACETAMINOPHEN 650 MG: 325 TABLET, FILM COATED ORAL at 16:39

## 2020-02-28 RX ADMIN — METRONIDAZOLE 500 MG: 500 TABLET ORAL at 00:46

## 2020-02-28 RX ADMIN — METRONIDAZOLE 500 MG: 500 TABLET ORAL at 23:07

## 2020-02-28 RX ADMIN — OMEPRAZOLE 20 MG: 20 CAPSULE, DELAYED RELEASE ORAL at 05:58

## 2020-02-28 RX ADMIN — CEFTRIAXONE SODIUM 1 G: 1 INJECTION, POWDER, FOR SOLUTION INTRAMUSCULAR; INTRAVENOUS at 17:53

## 2020-02-28 RX ADMIN — METRONIDAZOLE 500 MG: 500 TABLET ORAL at 13:06

## 2020-02-28 RX ADMIN — PANCRELIPASE 48000 UNITS: 24000; 76000; 120000 CAPSULE, DELAYED RELEASE PELLETS ORAL at 05:58

## 2020-02-28 RX ADMIN — METRONIDAZOLE 500 MG: 500 TABLET ORAL at 05:59

## 2020-02-28 RX ADMIN — ATORVASTATIN CALCIUM 40 MG: 40 TABLET, FILM COATED ORAL at 17:42

## 2020-02-28 RX ADMIN — FUROSEMIDE 40 MG: 10 INJECTION, SOLUTION INTRAVENOUS at 05:58

## 2020-02-28 RX ADMIN — METRONIDAZOLE 500 MG: 500 TABLET ORAL at 17:42

## 2020-02-28 RX ADMIN — Medication 100 MG: at 05:58

## 2020-02-28 RX ADMIN — OMEPRAZOLE 20 MG: 20 CAPSULE, DELAYED RELEASE ORAL at 17:42

## 2020-02-28 RX ADMIN — TAMSULOSIN HYDROCHLORIDE 0.4 MG: 0.4 CAPSULE ORAL at 05:58

## 2020-02-28 RX ADMIN — INSULIN HUMAN 7 UNITS: 100 INJECTION, SOLUTION PARENTERAL at 20:37

## 2020-02-28 RX ADMIN — HYDROMORPHONE HYDROCHLORIDE 0.5 MG: 1 INJECTION, SOLUTION INTRAMUSCULAR; INTRAVENOUS; SUBCUTANEOUS at 08:56

## 2020-02-28 RX ADMIN — POTASSIUM CHLORIDE 20 MEQ: 1500 TABLET, EXTENDED RELEASE ORAL at 17:41

## 2020-02-28 RX ADMIN — FUROSEMIDE 40 MG: 10 INJECTION, SOLUTION INTRAVENOUS at 16:24

## 2020-02-28 RX ADMIN — POTASSIUM CHLORIDE 20 MEQ: 1500 TABLET, EXTENDED RELEASE ORAL at 08:55

## 2020-02-28 ASSESSMENT — COGNITIVE AND FUNCTIONAL STATUS - GENERAL
DRESSING REGULAR LOWER BODY CLOTHING: A LITTLE
WALKING IN HOSPITAL ROOM: A LITTLE
DAILY ACTIVITIY SCORE: 22
HELP NEEDED FOR BATHING: A LITTLE
SUGGESTED CMS G CODE MODIFIER DAILY ACTIVITY: CJ
MOBILITY SCORE: 20
SUGGESTED CMS G CODE MODIFIER MOBILITY: CJ
CLIMB 3 TO 5 STEPS WITH RAILING: A LOT
STANDING UP FROM CHAIR USING ARMS: A LITTLE

## 2020-02-28 ASSESSMENT — ENCOUNTER SYMPTOMS
HEADACHES: 0
BACK PAIN: 1
DIZZINESS: 0
HEARTBURN: 0
MYALGIAS: 0
SORE THROAT: 0
SPEECH CHANGE: 0
COUGH: 0
SHORTNESS OF BREATH: 0
SPUTUM PRODUCTION: 0
CONSTIPATION: 0
PHOTOPHOBIA: 0
ABDOMINAL PAIN: 0
NERVOUS/ANXIOUS: 0
BLURRED VISION: 0
INSOMNIA: 0
DEPRESSION: 0
NAUSEA: 0
CHILLS: 0
SENSORY CHANGE: 0
VOMITING: 0
DIARRHEA: 0
FEVER: 0
CLAUDICATION: 0
WEAKNESS: 0

## 2020-02-28 NOTE — PROGRESS NOTES
"Hospital Medicine Daily Progress Note    Date of Service  2/28/2020    Chief Complaint  59 y.o. male admitted 2/14/2020 with poor appetite.    Hospital Course    Patient with cognitive deficit presented from group home with lethargy and poor appetite.  He had elevation in alk phos and leukocytosis and treated for concern of sepsis with abdominal source.  HIDA was performed which showed no evidence of obstruction nor cholecystitis.    MRCP showed dilation of biliary ducts and concern of pancreatic head mass.   GI  EUS on 2/18 and showed significant calcifications likely related to chronic pancreatitis.  He underwent ERCP on 2/24 and purulent drainage from the ampulla of vater os and he had biliary needle-knife sphincterotomy to relieve the purulent drainage.          Interval Problem Update  2/25 Patient states he is hungry and wants solid food, still on a liquid diet.  Plan to move forward with repeat ERCP with Dr Wakefield tomorrow - on schedule at 1500 with anticipated stenting of the ampulla.  Patient denies any current abdominal pain.  2/26 Patient doing about same today, denies pain, refused labs this am.  ERCP scheduled for today for stenting of the ampulla.  Patient voicing frustration regarding NPO.  2/27 Patient without complaint today as he had breakfast in front of him when I saw him.  ERCP was cancelled due to increasing hypoxia to where he was requiring 4L.  CXR completed with again shows dulling of the bases consistent with known pleural effusions but also shows pulmonary congestion - will diurese to see if this improves.  2/28 Patient feeling okay today, looks better than on previous days, responding well to diuresis though lungs sound about the same.  He states his back is aching \"all over\" but is not severely painful and no focal tenderness.  He states he ambulated in the hallway about \"a quarter of the way down\" yesterday.  Will continue with diuresis and work toward less oxygen requirement so he can " have repeat ERCP for stent placement.    Consultants/Specialty  GI - isabel    Code Status  DNR/DNI, has guardian    Disposition  Group home likely tomorrow.    Review of Systems  Review of Systems   Constitutional: Negative for chills and fever.   HENT: Negative for congestion and sore throat.    Eyes: Negative for blurred vision and photophobia.   Respiratory: Negative for cough, sputum production and shortness of breath.    Cardiovascular: Negative for chest pain, claudication and leg swelling.   Gastrointestinal: Negative for abdominal pain, constipation, diarrhea, heartburn, nausea and vomiting.   Genitourinary: Negative for dysuria and hematuria.   Musculoskeletal: Positive for back pain (generalized achiness). Negative for joint pain and myalgias.   Skin: Negative for itching and rash.   Neurological: Negative for dizziness, sensory change, speech change, weakness and headaches.   Psychiatric/Behavioral: Negative for depression. The patient is not nervous/anxious and does not have insomnia.         Physical Exam  Temp:  [36.4 °C (97.6 °F)-36.9 °C (98.4 °F)] 36.4 °C (97.6 °F)  Pulse:  [73-94] 94  Resp:  [14-18] 18  BP: (125-145)/(67-78) 132/68  SpO2:  [93 %-98 %] 93 %    Physical Exam  Vitals signs and nursing note reviewed.   Constitutional:       General: He is not in acute distress.     Appearance: Normal appearance.   HENT:      Head: Normocephalic and atraumatic.   Eyes:      General: No scleral icterus.     Extraocular Movements: Extraocular movements intact.   Neck:      Musculoskeletal: Normal range of motion and neck supple.   Cardiovascular:      Rate and Rhythm: Normal rate and regular rhythm.      Pulses: Normal pulses.      Heart sounds: Normal heart sounds. No murmur.   Pulmonary:      Effort: Pulmonary effort is normal. No respiratory distress.      Breath sounds: Normal breath sounds. No wheezing, rhonchi or rales.   Abdominal:      General: Abdomen is flat. Bowel sounds are normal. There is  no distension.      Palpations: Abdomen is soft.      Tenderness: There is no rebound.   Musculoskeletal:         General: No swelling or tenderness.   Lymphadenopathy:      Cervical: No cervical adenopathy.   Skin:     Coloration: Skin is not jaundiced.      Findings: No erythema.   Neurological:      General: No focal deficit present.      Mental Status: He is alert and oriented to person, place, and time. Mental status is at baseline.      Cranial Nerves: No cranial nerve deficit.   Psychiatric:         Mood and Affect: Mood normal.         Behavior: Behavior normal.         Fluids    Intake/Output Summary (Last 24 hours) at 2/28/2020 1025  Last data filed at 2/28/2020 0855  Gross per 24 hour   Intake 390 ml   Output 3400 ml   Net -3010 ml       Laboratory  Recent Labs     02/26/20  0904 02/28/20  0625   WBC 16.8* 15.5*   RBC 3.12* 3.64*   HEMOGLOBIN 9.2* 10.7*   HEMATOCRIT 26.7* 31.4*   MCV 85.6 86.3   MCH 29.5 29.4   MCHC 34.5 34.1   RDW 51.4* 66.1*   PLATELETCT 632* 572*   MPV 10.5 10.7     Recent Labs     02/26/20  0904 02/28/20  0625   SODIUM 135 137   POTASSIUM 4.1 3.3*   CHLORIDE 100 98   CO2 23 29   GLUCOSE 176* 83   BUN 9 8   CREATININE 0.45* 0.49*   CALCIUM 7.9* 8.5                   Imaging  DX-CHEST-PORTABLE (1 VIEW)   Final Result      New subpulmonic effusions, bibasilar atelectasis and edema      DX-PORTABLE FLUOROSCOPY < 1 HOUR   Final Result      Portable fluoroscopy utilized for 1.1 minute.         INTERPRETING LOCATION: [address] [place] [address], [facility]      NC-AFOA-REIEPTE SPHINCTEROTOMY   Final Result      Intraoperative fluoroscopy spot images as described above.      CT-ABDOMEN WITH & W/O   Final Result      1.  No significant change since CT abdomen and pelvis obtained 2/16/2020      2.  Findings consistent with high-grade biliary obstruction with dilation of the common bile duct measuring 17 mm, dilated gallbladder, dilated pancreatic duct with change in caliber of the pancreatic duct at the  level pancreatic head      3.  Findings could be either malignancy the pancreatic head versus scarring from chronic pancreatitis.      4.  Sequela of chronic pancreatitis      5.  Large right medial chest rim-enhancing loculated fluid collection probably within the pleural space measuring 10.5 x 3.1 centimeter transversely and 7 cm craniocaudal      6.  Rim-enhancing fluid collection in the hepatorenal space measuring 11.2 cm craniocaudal and 2-3 cm in thickness.      7.  The rim-enhancing fluid collection are suspicious for infection/abscess      8.  Bilateral atelectasis and small-moderate-sized pleural effusion after      9.  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  Biliary dilatation without evidence of high-grade biliary obstruction.   2.  Gallbladder visualization after morphine administration. No acute cholecystitis.      CT-ABDOMEN-PELVIS WITH   Final Result      1.  New rim enhancing fluid collection tracks along the right heart margin, azygoesophageal recess and possibly communicates with Morison's pouch tracking along the right aspect of the inferior vena cava. This is nonspecific and could indicate abscess,    dissecting pseudocyst, mucinous cystic neoplasm related fluid      2.  New extra and intrahepatic biliary ductal dilatation with 6 cm dilatation of the gallbladder also seen. This indicates distal common bile duct/ampullary level obstruction. Stricture, mass effect from adjacent pancreas calcifications or small    ampullary mass are differential possibilities      3.  Decreased small pleural effusions. Some loculation on the right is possible      4.  Slightly decreased diffuse pancreatic ductal dilatation now measuring 11 mm. Evidence of chronic pancreatitis. New 3 cm cystic structure anterior to the pancreas could be a pseudocyst. Abscess or necrotic/cystic neoplasm are in the  differential.      5.  Multiple chronic findings including status post aorto bifemoral bypass with approaching 70% stenosis of the left superficial femoral artery, nonobstructive right 3 mm nephrolithiasis, large volume rectal vault stool, dilated bladder which could    indicate outlet obstruction or neurogenic bladder, splenosis      [id number]   Final Result      1.  Severe dilatation of the intrahepatic biliary ducts, common bile duct and pancreatic duct. The distal common bile duct and the proximal pancreatic duct is not visualized at the pancreatic head. The pancreatic head appears to be diffusely enlarged and    demonstrates multiple small cysts. These findings are concerning for pancreatic neoplasm such as intraductal papillary mucinous cystoadenoma. The other differential diagnosis includes periampullary carcinoma. MR examination of the abdomen with contrast    is recommended for further evaluation.   2.  Mixed signal intensity in the right perinephric space likely represents perinephric hematoma.   3.  Large hiatal hernia.   4.  Diffuse gastric wall thickening.      US-RUQ   Final Result      1.  Hepatomegaly.   2.  Hepatic steatosis.   3.  Gallbladder sludge. Mild intrahepatic ductal dilation. Dilation of the common duct up to 13 mm. No definite distal obstructing etiology is identified. MRCP could be obtained for further evaluation if indicated.   4.  Trace ascites.   5.  Pancreatic calcifications likely sequela of chronic pancreatitis.      DX-CHEST-PORTABLE (1 VIEW)   Final Result      Cardiomegaly.           Assessment/Plan  * Pancreatic mass  Assessment & Plan  MRI with concern for pancreatic malignancy  ERCP with no evidence of malignancy, calcifications of pancreas present   is elevated but less than 1000 so cannot rule in cancer, likely due chronic pancreatitis  Repeat CT showed chronic pancreatitis and loculated chest effusion    Type 2 diabetes mellitus with hyperglycemia, with  long-term current use of insulin (HCC)- (present on admission)  Assessment & Plan  He has an insulin sensor in his right lower quadrant, uses long-acting insulin 12 units and sliding scale.      Cognitive decline- (present on admission)  Assessment & Plan  Patient likely has a chronic component to his cognitive decline as well as acute worsening  Mental status now at his baseline, patient has a legal guardian      Metabolic acidosis  Assessment & Plan  IV fluids with improvement    Hypokalemia  Assessment & Plan  Replace as needed    Sepsis (HCC)  Assessment & Plan  This is Sepsis Present on admission  SIRS criteria identified on my evaluation include: Tachycardia, with heart rate greater than 90 BPM and Leukocyosis, with WBC greater than 12,000  Source is GI  Sepsis protocol complete  Continue antibiotics  White cell count trending down  Repeat ERCP will be rescheduled once oxygenation improves.              Loculated pleural effusion  Assessment & Plan  In medial right chest, continue antibiotics, pus drained from ampulla of Vater with GI 2/24  Pulmonary edema appreciated on CXR 2/27 - diurese to get oxygen demand down, anticipate another attempt at ERCP in a few days.    Cholangitis  Assessment & Plan  Biliary obstruction suggested on imaging, GI consulted  Alkaline phosphatase is stable over 1100  Endoscopy done showing pancreatic calcifications but no stones, no area to biopsy  Continue antibiotics, white cell count still elevated  Repeat CT scan done  ERCP repeated 2/24, given swelling so that a ampullary duct stent could not be placed  Repeat planned for 2/26 but cancelled because of hypoxia.    Recurrent major depressive disorder, in full remission (HCC)  Assessment & Plan  .    Hyponatremia  Assessment & Plan  Resolved with fluids    Chronic pancreatitis (HCC)- (present on admission)  Assessment & Plan  Continue pancreatic enzymes with meals  Celiac nerve block for pain control, per GI    Dyslipidemia-  (present on admission)  Assessment & Plan  Continue statin       VTE prophylaxis: scds

## 2020-02-28 NOTE — CARE PLAN
Problem: Nutritional:  Goal: Achieve adequate nutritional intake  Description: Patient will consume 50% of meals   Outcome: MET    Pt now on diabetic diet with PO intake of 50-75% x 5 and % x 1 since 2/25/20. Pt continues to receive Boost supplements. PO intake is adequate. RD will monitor.

## 2020-02-28 NOTE — CARE PLAN
Problem: Venous Thromboembolism (VTW)/Deep Vein Thrombosis (DVT) Prevention:  Goal: Patient will participate in Venous Thrombosis (VTE)/Deep Vein Thrombosis (DVT)Prevention Measures  Outcome: PROGRESSING SLOWER THAN EXPECTED  Patient refuses SCDs    Problem: Knowledge Deficit  Goal: Knowledge of disease process/condition, treatment plan, diagnostic tests, and medications will improve  Outcome: PROGRESSING AS EXPECTED  Discussed plan of care with patient including blood sugar monitoring and medications administered. Allowed time for questions, patient agreed and verbalized understanding.

## 2020-02-28 NOTE — PROGRESS NOTES
Monitor Summary     Rhythm:SR 80-90  Measurements: 0.18/0.08/0.30  ECTOPIES: R PVC        Normal Values  Rhythm SR  HR Range    Measurements 0.12-0.20 / 0.06-0.10  / 0.30-0.52

## 2020-02-28 NOTE — PROGRESS NOTES
2863 Report received from Nannette BENSON. Plan of care discussed. Patient resting comfortably in bed. Safety precautions in place.     0855 Assessment completed, patient is alert and oriented x 4, patient states he is having 8/10 back pain and is requesting dilaudid, see MAR. Patient's insulin was held this am due to BG of 72, BG rechecked this and is 112, patient refused NPH. Safety precautions in place.     1000 Rounded with Dr. ePrez at bedside.    1300 Patient resting in bed comfortably, no needs at this time. Safety precautions in place, will continue to monitor.

## 2020-02-29 LAB
ANION GAP SERPL CALC-SCNC: 9 MMOL/L (ref 7–16)
BASOPHILS # BLD AUTO: 1.1 % (ref 0–1.8)
BASOPHILS # BLD: 0.12 K/UL (ref 0–0.12)
BUN SERPL-MCNC: 8 MG/DL (ref 8–22)
CALCIUM SERPL-MCNC: 8 MG/DL (ref 8.4–10.2)
CHLORIDE SERPL-SCNC: 94 MMOL/L (ref 96–112)
CO2 SERPL-SCNC: 32 MMOL/L (ref 20–33)
CREAT SERPL-MCNC: 0.53 MG/DL (ref 0.5–1.4)
EOSINOPHIL # BLD AUTO: 0.16 K/UL (ref 0–0.51)
EOSINOPHIL NFR BLD: 1.5 % (ref 0–6.9)
ERYTHROCYTE [DISTWIDTH] IN BLOOD BY AUTOMATED COUNT: 63.9 FL (ref 35.9–50)
GLUCOSE BLD-MCNC: 143 MG/DL (ref 65–99)
GLUCOSE BLD-MCNC: 164 MG/DL (ref 65–99)
GLUCOSE BLD-MCNC: 172 MG/DL (ref 65–99)
GLUCOSE BLD-MCNC: 219 MG/DL (ref 65–99)
GLUCOSE SERPL-MCNC: 156 MG/DL (ref 65–99)
HCT VFR BLD AUTO: 26.6 % (ref 42–52)
HGB BLD-MCNC: 9.1 G/DL (ref 14–18)
IMM GRANULOCYTES # BLD AUTO: 0.41 K/UL (ref 0–0.11)
IMM GRANULOCYTES NFR BLD AUTO: 3.8 % (ref 0–0.9)
LYMPHOCYTES # BLD AUTO: 2.5 K/UL (ref 1–4.8)
LYMPHOCYTES NFR BLD: 23 % (ref 22–41)
MAGNESIUM SERPL-MCNC: 1.2 MG/DL (ref 1.5–2.5)
MCH RBC QN AUTO: 29.2 PG (ref 27–33)
MCHC RBC AUTO-ENTMCNC: 34.2 G/DL (ref 33.7–35.3)
MCV RBC AUTO: 85.3 FL (ref 81.4–97.8)
MONOCYTES # BLD AUTO: 1.41 K/UL (ref 0–0.85)
MONOCYTES NFR BLD AUTO: 13 % (ref 0–13.4)
NEUTROPHILS # BLD AUTO: 6.27 K/UL (ref 1.82–7.42)
NEUTROPHILS NFR BLD: 57.6 % (ref 44–72)
NRBC # BLD AUTO: 0.02 K/UL
NRBC BLD-RTO: 0.2 /100 WBC
PLATELET # BLD AUTO: 487 K/UL (ref 164–446)
PMV BLD AUTO: 10.5 FL (ref 9–12.9)
POTASSIUM SERPL-SCNC: 3.2 MMOL/L (ref 3.6–5.5)
RBC # BLD AUTO: 3.12 M/UL (ref 4.7–6.1)
SODIUM SERPL-SCNC: 135 MMOL/L (ref 135–145)
WBC # BLD AUTO: 10.9 K/UL (ref 4.8–10.8)

## 2020-02-29 PROCEDURE — 700111 HCHG RX REV CODE 636 W/ 250 OVERRIDE (IP): Performed by: HOSPITALIST

## 2020-02-29 PROCEDURE — 700102 HCHG RX REV CODE 250 W/ 637 OVERRIDE(OP): Performed by: INTERNAL MEDICINE

## 2020-02-29 PROCEDURE — A9270 NON-COVERED ITEM OR SERVICE: HCPCS | Performed by: INTERNAL MEDICINE

## 2020-02-29 PROCEDURE — 770020 HCHG ROOM/CARE - TELE (206)

## 2020-02-29 PROCEDURE — 99232 SBSQ HOSP IP/OBS MODERATE 35: CPT | Performed by: INTERNAL MEDICINE

## 2020-02-29 PROCEDURE — 82962 GLUCOSE BLOOD TEST: CPT | Mod: 91

## 2020-02-29 PROCEDURE — 80048 BASIC METABOLIC PNL TOTAL CA: CPT

## 2020-02-29 PROCEDURE — 85025 COMPLETE CBC W/AUTO DIFF WBC: CPT

## 2020-02-29 PROCEDURE — 700111 HCHG RX REV CODE 636 W/ 250 OVERRIDE (IP): Performed by: INTERNAL MEDICINE

## 2020-02-29 PROCEDURE — 700105 HCHG RX REV CODE 258: Performed by: INTERNAL MEDICINE

## 2020-02-29 PROCEDURE — 83735 ASSAY OF MAGNESIUM: CPT

## 2020-02-29 RX ORDER — POTASSIUM CHLORIDE 20 MEQ/1
40 TABLET, EXTENDED RELEASE ORAL 2 TIMES DAILY
Status: DISCONTINUED | OUTPATIENT
Start: 2020-02-29 | End: 2020-03-04

## 2020-02-29 RX ORDER — MAGNESIUM SULFATE HEPTAHYDRATE 40 MG/ML
2 INJECTION, SOLUTION INTRAVENOUS ONCE
Status: COMPLETED | OUTPATIENT
Start: 2020-02-29 | End: 2020-02-29

## 2020-02-29 RX ADMIN — ATORVASTATIN CALCIUM 40 MG: 40 TABLET, FILM COATED ORAL at 17:48

## 2020-02-29 RX ADMIN — METRONIDAZOLE 500 MG: 500 TABLET ORAL at 06:19

## 2020-02-29 RX ADMIN — POTASSIUM CHLORIDE 20 MEQ: 1500 TABLET, EXTENDED RELEASE ORAL at 06:17

## 2020-02-29 RX ADMIN — METRONIDAZOLE 500 MG: 500 TABLET ORAL at 11:54

## 2020-02-29 RX ADMIN — INSULIN HUMAN 5 UNITS: 100 INJECTION, SUSPENSION SUBCUTANEOUS at 17:43

## 2020-02-29 RX ADMIN — MAGNESIUM SULFATE 2 G: 2 INJECTION INTRAVENOUS at 10:05

## 2020-02-29 RX ADMIN — INSULIN HUMAN 5 UNITS: 100 INJECTION, SUSPENSION SUBCUTANEOUS at 06:31

## 2020-02-29 RX ADMIN — Medication 100 MG: at 06:19

## 2020-02-29 RX ADMIN — METRONIDAZOLE 500 MG: 500 TABLET ORAL at 17:48

## 2020-02-29 RX ADMIN — HYDROMORPHONE HYDROCHLORIDE 0.5 MG: 1 INJECTION, SOLUTION INTRAMUSCULAR; INTRAVENOUS; SUBCUTANEOUS at 20:30

## 2020-02-29 RX ADMIN — OMEPRAZOLE 20 MG: 20 CAPSULE, DELAYED RELEASE ORAL at 06:17

## 2020-02-29 RX ADMIN — INSULIN HUMAN 3 UNITS: 100 INJECTION, SOLUTION PARENTERAL at 06:29

## 2020-02-29 RX ADMIN — FUROSEMIDE 40 MG: 10 INJECTION, SOLUTION INTRAVENOUS at 06:20

## 2020-02-29 RX ADMIN — OMEPRAZOLE 20 MG: 20 CAPSULE, DELAYED RELEASE ORAL at 17:48

## 2020-02-29 RX ADMIN — POTASSIUM CHLORIDE 40 MEQ: 1500 TABLET, EXTENDED RELEASE ORAL at 17:48

## 2020-02-29 RX ADMIN — CEFTRIAXONE SODIUM 1 G: 1 INJECTION, POWDER, FOR SOLUTION INTRAMUSCULAR; INTRAVENOUS at 17:48

## 2020-02-29 RX ADMIN — TAMSULOSIN HYDROCHLORIDE 0.4 MG: 0.4 CAPSULE ORAL at 06:19

## 2020-02-29 RX ADMIN — PANCRELIPASE 48000 UNITS: 24000; 76000; 120000 CAPSULE, DELAYED RELEASE PELLETS ORAL at 06:17

## 2020-02-29 RX ADMIN — ACETAMINOPHEN 650 MG: 325 TABLET, FILM COATED ORAL at 20:36

## 2020-02-29 RX ADMIN — INSULIN HUMAN 3 UNITS: 100 INJECTION, SOLUTION PARENTERAL at 11:50

## 2020-02-29 RX ADMIN — ACETAMINOPHEN 650 MG: 325 TABLET, FILM COATED ORAL at 08:06

## 2020-02-29 RX ADMIN — HYDROMORPHONE HYDROCHLORIDE 0.5 MG: 1 INJECTION, SOLUTION INTRAMUSCULAR; INTRAVENOUS; SUBCUTANEOUS at 08:01

## 2020-02-29 RX ADMIN — INSULIN HUMAN 4 UNITS: 100 INJECTION, SOLUTION PARENTERAL at 17:38

## 2020-02-29 RX ADMIN — FUROSEMIDE 40 MG: 10 INJECTION, SOLUTION INTRAVENOUS at 16:21

## 2020-02-29 ASSESSMENT — ENCOUNTER SYMPTOMS
WEIGHT LOSS: 1
SPUTUM PRODUCTION: 0
INSOMNIA: 0
NAUSEA: 0
BLOOD IN STOOL: 0
COUGH: 0
DIARRHEA: 0
BACK PAIN: 0
NERVOUS/ANXIOUS: 0
DIZZINESS: 0
CLAUDICATION: 0
HEARTBURN: 0
SENSORY CHANGE: 0
CHILLS: 0
SPEECH CHANGE: 0
WEAKNESS: 0
VOMITING: 0
HEADACHES: 0
SORE THROAT: 0
DEPRESSION: 0
SHORTNESS OF BREATH: 0
MYALGIAS: 0
CONSTIPATION: 0
PHOTOPHOBIA: 0
FEVER: 0
ABDOMINAL PAIN: 0
BLURRED VISION: 0

## 2020-02-29 NOTE — PROGRESS NOTES
Report received from Joi BENSON. Pt resting comfortably, denies further needs at this time. Will continue to monitor. Fall and safety measures in place, call bell within reach.

## 2020-02-29 NOTE — PROGRESS NOTES
Telemetry Shift Summary     Rhythm: SR, STach  HR Range:   Ectopy: rare PVC  Measurements: .18/.08/.36     Normal Values  Rhythm SR  HR Range    Measurements 0.12-0.20 / 0.06-0.10  / 0.30-0.52

## 2020-02-29 NOTE — PROGRESS NOTES
Report received from Cathie BENSON. Plan of care discussed. Patient resting comfortably in bed. Safety precautions in place.

## 2020-02-29 NOTE — CARE PLAN
Problem: Knowledge Deficit  Goal: Knowledge of disease process/condition, treatment plan, diagnostic tests, and medications will improve  Outcome: PROGRESSING AS EXPECTED   Discussed plan of care with patient including magnesium replacement, and medications administered. Allowed time for questions, patient agreed and verbalized understanding.     Problem: Pain Management  Goal: Pain level will decrease to patient's comfort goal  Outcome: PROGRESSING SLOWER THAN EXPECTED   Patient continues to have pain on his back. Patient medicated per MAR, will continue to monitor.

## 2020-02-29 NOTE — PROGRESS NOTES
"  Gastroenterology Progress Note     Author: Hector Villarreal MD Date & Time Created: 2/29/2020 11:37 AM    Chief Complaint:  Elevated liver tests, abnormal MRI    Interval History:  Initial history (Dr. Villarreal):  59 y.o. male with IDDM, chronic pancreatitis seizure disorder, iron deficiency anemia who presented 2/14/2020 with lethargy and poor appetite per assisted living facility.  He is difficult historian as does not want to answer questions.  He states \"I feel fine and always have.\"  He was evaluated 12/2019 for iron deficiency anemia and DKA.  Had EGD with severe erosive esophagitis.  He refused to drink prep for colonoscopy.  He apparently was brought by assisted living facility after they noted some lethargy and poor appetite.  He has weight loss of 7 pounds over the past month or so.  He denies abdominal pain and states he never had abdominal pain and his appetite is great.  Denies fevers, chills, nausea, vomiting, diarrhea, constipation, melena, hematochezia.  In ED, he had significant leukocytosis with newly elevated alkaline phosphatase and bilirubin with fairly normal transaminases.  He was started on IVF and antibiotics.  He had RUQ US followed by MRCP showing dilated bile and pancreatic ducts, distended GB, chronic pancreatitis and possible abnormal lesions head of pancreas.    2/16/2020: Continues to deny any symptoms including abdominal pain, nausea, vomiting, fevers.  Was refusing IV placement and vitals overnight.  Says he is agreeable to IV placement today  02/17/2020 - No complaints.  Says he feels fine, but does report urine is darker.  I explained imaging findings at length.  02/18/2020 - EUS showed extensive, severe pancreatic calcifications making identification of any pancreatic mass or fluid collection impossible.  Mediastinum was not assessed.  ERCP not done due to normalizing liver enzymes and added risk to patient.  02/19/2020 - No events overnight.  Patient denies " "fevers/chills/sweats and states he \"feels fine\".  Labs show worsening leukocytosis with improving liver enzymes.  02/20/2020 - Tolerating regular diet well.  Denies any abdominal pain.  He does report chronic mid-back pain, but this is not severe or worsening.  No nausea, vomiting, fevers, chills or sweats.  Leukocytosis slightly better.  Alkaline phosphatase still elevated, but remainder of liver enzymes normal.  02/21/2020 - No abdominal pain.  Tolerating diet.  Mild decrease appetite.  No CBC today.  Eager to be discharged.  02/22/2020 - Continues to \"feel fine\".  No abdominal pain, nausea or vomiting.  Tolerating diet.  Leukocytosis improved, but alkaline phosphatase abruptly higher.  CT not done due to IV issues and patient's difficult access.  2/23/20 - No new complaints. He ha lovenox s/c this AM at 6.30. ERCP scheduled for 9 AM. Labs pending   2/25/2020: Denies any complaints.  No pain.  Had ERCP yesterday showing pus from bile duct and distal bile duct stricture likely due to chronic pancreatitis.  Unable to stent  Due to loss of access and amount of ampullary inflammation.  WBC better.  Alk phos higher.  No fevers.  2/27/2020: ERCp not performed yesterday due to oxygen saturation.  Remains on 4L oxygen with large bilateral pleural effusions.  Denies dyspnea or abdominal pain.  WBc trending down.  No fevers.  2/29/2020: No changes.  Still on 4L but denies dyspnea.  WBC down to 10.  No other complaints.  Diuresing well.    Review of Systems:  Review of Systems   Constitutional: Positive for weight loss. Negative for chills, fever and malaise/fatigue.   Respiratory: Negative for shortness of breath.    Cardiovascular: Negative for chest pain.   Gastrointestinal: Negative for abdominal pain, blood in stool, constipation, diarrhea, heartburn, melena, nausea and vomiting.       Physical Exam:  Physical Exam  Vitals signs and nursing note reviewed.   Constitutional:       Appearance: Normal appearance.   Eyes:    "   General: No scleral icterus.  Cardiovascular:      Rate and Rhythm: Normal rate and regular rhythm.      Pulses: Normal pulses.      Heart sounds: Normal heart sounds.   Pulmonary:      Effort: Pulmonary effort is normal. No respiratory distress.      Breath sounds: No stridor. Decreased breath sounds present. No wheezing, rhonchi or rales.   Abdominal:      General: Abdomen is flat. Bowel sounds are normal. There is no distension.      Palpations: Abdomen is soft. There is no mass.      Tenderness: There is no abdominal tenderness. There is no guarding or rebound.      Hernia: No hernia is present.   Skin:     General: Skin is warm and dry.      Coloration: Skin is not jaundiced.   Neurological:      General: No focal deficit present.      Mental Status: He is alert and oriented to person, place, and time.         Labs:          Recent Labs     20   SODIUM 137 135   POTASSIUM 3.3* 3.2*   CHLORIDE 98 94*   CO2 29 32   BUN 8 8   CREATININE 0.49* 0.53   MAGNESIUM  --  1.2*   CALCIUM 8.5 8.0*     Recent Labs     20   GLUCOSE 83 156*     Recent Labs     205   RBC 3.64* 3.12*   HEMOGLOBIN 10.7* 9.1*   HEMATOCRIT 31.4* 26.6*   PLATELETCT 572* 487*     Recent Labs     205   WBC 15.5* 10.9*   NEUTSPOLYS  --  57.60   LYMPHOCYTES  --  23.00   MONOCYTES  --  13.00   EOSINOPHILS  --  1.50   BASOPHILS  --  1.10     Hemodynamics:  Temp (24hrs), Av.4 °C (97.5 °F), Min:36.2 °C (97.1 °F), Max:36.7 °C (98.1 °F)  Temperature: 36.2 °C (97.1 °F)  Pulse  Av.7  Min: 50  Max: 122   Blood Pressure: 115/65     Respiratory:    Respiration: 18, Pulse Oximetry: 93 %        RUL Breath Sounds: Clear, RML Breath Sounds: Clear, RLL Breath Sounds: Diminished;Clear, LEONARD Breath Sounds: Clear, LLL Breath Sounds: Diminished;Clear  Fluids:    Intake/Output Summary (Last 24 hours) at 2020 0706  Last data filed at 2/15/2020 2200  Gross  per 24 hour   Intake 2485 ml   Output 600 ml   Net 1885 ml        GI/Nutrition:  Orders Placed This Encounter   Procedures   • Diet Order Diabetic     Standing Status:   Standing     Number of Occurrences:   1     Order Specific Question:   Diet:     Answer:   Diabetic [3]     Medical Decision Making, by Problem:  Active Hospital Problems    Diagnosis   • *Sepsis (HCC) [A41.9]   • Elevated alkaline phosphatase level [R74.8]   • Type 2 diabetes mellitus with hyperglycemia, with long-term current use of insulin (HCC) [E11.65, Z79.4]   • Cognitive decline [R41.89]   • Metabolic acidosis [E87.2]   • Hypokalemia [E87.6]   • Hyponatremia [E87.1]   • Chronic pancreatitis (HCC) [K86.1]   • Dyslipidemia [E78.5]   • Cholangitis [K83.09]     Impression  / Plan  60 y/o with chronic pancreatitis, IDDM, iron deficiency anemia, GERD with esophagitis that presented for lethargy and found to have significant leukocytosis and newly elevated liver tests.  Concern for sepsis from biliary source.  Imaging suggests dilation of biliary and pancreatic ducts but no obvious stones although could have ampullary lesion of main duct IPMN causing obstruction.      1. Elevated liver tests, alk phos remains high and rising today, but other liver enzymes normalized - Suspect downstream stricture due to chronic calcific pancreatitis with obstruction due to pus seen on ERCP 2/24.  Unable to place biliary stent for more definitive drainage and plan for reattempt 2/26  2. Sepsis, unclear source - Mediastinum was not assessed at EUS due to focus being on pancreas and biliary tree  3. Leukocytosis - Slightly better post ERCP with removal of some pus from bile duct  4. Abnormal imaging of abdomen with dilated bile ducts and pancreatic duct and questionable mass head of pancreas - Only severe chronic calcific pancreatitis identified on EUS  5. Chronic pancreatitis  6. IDDM  7. Iron deficiency anemia  8. GERD with esophagitis  9. Hypertension  10. Seizure  disorder  11. Aggressive behavior to healthcare workers  12. Back pain  13. Pancreatic fluid collection tracking adjacent to distal esophagus on imaging, ?pseudocyst versus infected fluid from prolonged biliary obstruction       PLAN:   1. Continue antibiotics  2. Recommend diuresing with IV Lasix to improve effusions and hypoxemia  3. Consider reattempt at ERCP with biliary stenting following diuresis.  Will reassess on Monday      Quality-Core Measures   Reviewed items::  Radiology images reviewed, Labs reviewed and Medications reviewed

## 2020-02-29 NOTE — PROGRESS NOTES
"Hospital Medicine Daily Progress Note    Date of Service  2/29/2020    Chief Complaint  59 y.o. male admitted 2/14/2020 with poor appetite.    Hospital Course    Patient with cognitive deficit presented from group home with lethargy and poor appetite.  He had elevation in alk phos and leukocytosis and treated for concern of sepsis with abdominal source.  HIDA was performed which showed no evidence of obstruction nor cholecystitis.    MRCP showed dilation of biliary ducts and concern of pancreatic head mass.   GI  EUS on 2/18 and showed significant calcifications likely related to chronic pancreatitis.  He underwent ERCP on 2/24 and purulent drainage from the ampulla of vater os and he had biliary needle-knife sphincterotomy to relieve the purulent drainage.          Interval Problem Update  2/25 Patient states he is hungry and wants solid food, still on a liquid diet.  Plan to move forward with repeat ERCP with Dr Wakefield tomorrow - on schedule at 1500 with anticipated stenting of the ampulla.  Patient denies any current abdominal pain.  2/26 Patient doing about same today, denies pain, refused labs this am.  ERCP scheduled for today for stenting of the ampulla.  Patient voicing frustration regarding NPO.  2/27 Patient without complaint today as he had breakfast in front of him when I saw him.  ERCP was cancelled due to increasing hypoxia to where he was requiring 4L.  CXR completed with again shows dulling of the bases consistent with known pleural effusions but also shows pulmonary congestion - will diurese to see if this improves.  2/28 Patient feeling okay today, looks better than on previous days, responding well to diuresis though lungs sound about the same.  He states his back is aching \"all over\" but is not severely painful and no focal tenderness.  He states he ambulated in the hallway about \"a quarter of the way down\" yesterday.  Will continue with diuresis and work toward less oxygen requirement so he can " have repeat ERCP for stent placement.  2/29 Patient feeling well today, states he will walk in the hallways with the staff today.  Oxygen demand is about the same on 4L but lungs are sounding clear.  K is 3.2 and mag 1.2, will replete both.  WBC continues to drop.    Consultants/Specialty  GI - isabel    Code Status  DNR/DNI, has guardian    Disposition  Group home once stent placed in ERCP.    Review of Systems  Review of Systems   Constitutional: Negative for chills and fever.   HENT: Negative for congestion and sore throat.    Eyes: Negative for blurred vision and photophobia.   Respiratory: Negative for cough, sputum production and shortness of breath.    Cardiovascular: Negative for chest pain, claudication and leg swelling.   Gastrointestinal: Negative for abdominal pain, constipation, diarrhea, heartburn, nausea and vomiting.   Genitourinary: Negative for dysuria and hematuria.   Musculoskeletal: Negative for back pain, joint pain and myalgias.   Skin: Negative for itching and rash.   Neurological: Negative for dizziness, sensory change, speech change, weakness and headaches.   Psychiatric/Behavioral: Negative for depression. The patient is not nervous/anxious and does not have insomnia.         Physical Exam  Temp:  [36.2 °C (97.1 °F)-36.8 °C (98.3 °F)] 36.2 °C (97.1 °F)  Pulse:  [84-99] 93  Resp:  [18] 18  BP: (113-133)/(65-69) 115/65  SpO2:  [91 %-95 %] 93 %    Physical Exam  Vitals signs and nursing note reviewed.   Constitutional:       General: He is not in acute distress.     Appearance: Normal appearance.   HENT:      Head: Normocephalic and atraumatic.   Eyes:      General: No scleral icterus.     Extraocular Movements: Extraocular movements intact.   Neck:      Musculoskeletal: Normal range of motion and neck supple.   Cardiovascular:      Rate and Rhythm: Normal rate and regular rhythm.      Pulses: Normal pulses.      Heart sounds: Normal heart sounds. No murmur.   Pulmonary:      Effort:  Pulmonary effort is normal. No respiratory distress.      Breath sounds: Normal breath sounds. No wheezing, rhonchi or rales.   Abdominal:      General: Abdomen is flat. Bowel sounds are normal. There is no distension.      Palpations: Abdomen is soft.      Tenderness: There is no rebound.   Musculoskeletal:         General: No swelling or tenderness.   Lymphadenopathy:      Cervical: No cervical adenopathy.   Skin:     Coloration: Skin is not jaundiced.      Findings: No erythema.   Neurological:      General: No focal deficit present.      Mental Status: He is alert and oriented to person, place, and time. Mental status is at baseline.      Cranial Nerves: No cranial nerve deficit.   Psychiatric:         Mood and Affect: Mood normal.         Behavior: Behavior normal.         Fluids    Intake/Output Summary (Last 24 hours) at 2/29/2020 1009  Last data filed at 2/29/2020 0800  Gross per 24 hour   Intake 700 ml   Output 3600 ml   Net -2900 ml       Laboratory  Recent Labs     02/28/20  0625 02/29/20  0415   WBC 15.5* 10.9*   RBC 3.64* 3.12*   HEMOGLOBIN 10.7* 9.1*   HEMATOCRIT 31.4* 26.6*   MCV 86.3 85.3   MCH 29.4 29.2   MCHC 34.1 34.2   RDW 66.1* 63.9*   PLATELETCT 572* 487*   MPV 10.7 10.5     Recent Labs     02/28/20  0625 02/29/20  0415   SODIUM 137 135   POTASSIUM 3.3* 3.2*   CHLORIDE 98 94*   CO2 29 32   GLUCOSE 83 156*   BUN 8 8   CREATININE 0.49* 0.53   CALCIUM 8.5 8.0*                   Imaging  DX-CHEST-PORTABLE (1 VIEW)   Final Result      New subpulmonic effusions, bibasilar atelectasis and edema      DX-PORTABLE FLUOROSCOPY < 1 HOUR   Final Result      Portable fluoroscopy utilized for 1.1 minute.         INTERPRETING LOCATION: [address] [place] [address], [id number]      [id number] SPHINCTEROTOMY   Final Result      Intraoperative fluoroscopy spot images as described above.      CT-ABDOMEN WITH & W/O   Final Result      1.  No significant change since CT abdomen and pelvis obtained 2/16/2020      2.   Findings consistent with high-grade biliary obstruction with dilation of the common bile duct measuring 17 mm, dilated gallbladder, dilated pancreatic duct with change in caliber of the pancreatic duct at the level pancreatic head      3.  Findings could be either malignancy the pancreatic head versus scarring from chronic pancreatitis.      4.  Sequela of chronic pancreatitis      5.  Large right medial chest rim-enhancing loculated fluid collection probably within the pleural space measuring 10.5 x 3.1 centimeter transversely and 7 cm craniocaudal      6.  Rim-enhancing fluid collection in the hepatorenal space measuring 11.2 cm craniocaudal and 2-3 cm in thickness.      7.  The rim-enhancing fluid collection are suspicious for infection/abscess      8.  Bilateral atelectasis and small-moderate-sized pleural effusion after      9.  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  Biliary dilatation without evidence of high-grade biliary obstruction.   2.  Gallbladder visualization after morphine administration. No acute cholecystitis.      CT-ABDOMEN-PELVIS WITH   Final Result      1.  New rim enhancing fluid collection tracks along the right heart margin, azygoesophageal recess and possibly communicates with Morison's pouch tracking along the right aspect of the inferior vena cava. This is nonspecific and could indicate abscess,    dissecting pseudocyst, mucinous cystic neoplasm related fluid      2.  New extra and intrahepatic biliary ductal dilatation with 6 cm dilatation of the gallbladder also seen. This indicates distal common bile duct/ampullary level obstruction. Stricture, mass effect from adjacent pancreas calcifications or small    ampullary mass are differential possibilities      3.  Decreased small pleural effusions. Some loculation on the right is possible      4.  Slightly decreased  diffuse pancreatic ductal dilatation now measuring 11 mm. Evidence of chronic pancreatitis. New 3 cm cystic structure anterior to the pancreas could be a pseudocyst. Abscess or necrotic/cystic neoplasm are in the differential.      5.  Multiple chronic findings including status post aorto bifemoral bypass with approaching 70% stenosis of the left superficial femoral artery, nonobstructive right 3 mm nephrolithiasis, large volume rectal vault stool, dilated bladder which could    indicate outlet obstruction or neurogenic bladder, splenosis      [id number]   Final Result      1.  Severe dilatation of the intrahepatic biliary ducts, common bile duct and pancreatic duct. The distal common bile duct and the proximal pancreatic duct is not visualized at the pancreatic head. The pancreatic head appears to be diffusely enlarged and    demonstrates multiple small cysts. These findings are concerning for pancreatic neoplasm such as intraductal papillary mucinous cystoadenoma. The other differential diagnosis includes periampullary carcinoma. MR examination of the abdomen with contrast    is recommended for further evaluation.   2.  Mixed signal intensity in the right perinephric space likely represents perinephric hematoma.   3.  Large hiatal hernia.   4.  Diffuse gastric wall thickening.      US-RUQ   Final Result      1.  Hepatomegaly.   2.  Hepatic steatosis.   3.  Gallbladder sludge. Mild intrahepatic ductal dilation. Dilation of the common duct up to 13 mm. No definite distal obstructing etiology is identified. MRCP could be obtained for further evaluation if indicated.   4.  Trace ascites.   5.  Pancreatic calcifications likely sequela of chronic pancreatitis.      DX-CHEST-PORTABLE (1 VIEW)   Final Result      Cardiomegaly.           Assessment/Plan  * Pancreatic mass  Assessment & Plan  MRI with concern for pancreatic malignancy  ERCP with no evidence of malignancy, calcifications of pancreas present   is  elevated but less than 1000 so cannot rule in cancer, likely due chronic pancreatitis  Repeat CT showed chronic pancreatitis and loculated chest effusion    Type 2 diabetes mellitus with hyperglycemia, with long-term current use of insulin ([facility])- (present on admission)  Assessment & Plan  He has an insulin sensor in his right lower quadrant, uses long-acting insulin 12 units and sliding scale.      Cognitive decline- (present on admission)  Assessment & Plan  Patient likely has a chronic component to his cognitive decline as well as acute worsening  Mental status now at his baseline, patient has a legal guardian      Metabolic acidosis  Assessment & Plan  IV fluids with improvement    Hypokalemia  Assessment & Plan  Increase replacement  Replace magnesium      Hypomagnesemia  Assessment & Plan  1.2  Mag rider 2g    Sepsis (HCC)  Assessment & Plan  This is Sepsis Present on admission  SIRS criteria identified on my evaluation include: Tachycardia, with heart rate greater than 90 BPM and Leukocyosis, with WBC greater than 12,000  Source is GI  Sepsis protocol complete  Continue antibiotics  White cell count trending down  Repeat ERCP will be rescheduled once oxygenation improves.              Loculated pleural effusion  Assessment & Plan  In medial right chest, continue antibiotics, pus drained from ampulla of Vater with GI 2/24  Pulmonary edema appreciated on CXR 2/27 - diurese to get oxygen demand down, anticipate another attempt at ERCP in a few days.    Cholangitis  Assessment & Plan  Biliary obstruction suggested on imaging, GI consulted  Alkaline phosphatase is stable over 1100  Endoscopy done showing pancreatic calcifications but no stones, no area to biopsy  Continue antibiotics, white cell count still elevated  Repeat CT scan done  ERCP repeated 2/24, given swelling so that a ampullary duct stent could not be placed  Repeat planned for 2/26 but cancelled because of hypoxia.    Recurrent major depressive  disorder, in full remission (HCC)  Assessment & Plan  .    Hyponatremia  Assessment & Plan  Resolved with fluids    Chronic pancreatitis (HCC)- (present on admission)  Assessment & Plan  Continue pancreatic enzymes with meals  Celiac nerve block for pain control, per GI    Dyslipidemia- (present on admission)  Assessment & Plan  Continue statin       VTE prophylaxis: scds

## 2020-02-29 NOTE — CARE PLAN
Problem: Communication  Goal: The ability to communicate needs accurately and effectively will improve  Outcome: PROGRESSING AS EXPECTED  Note: Pt effectively communicates needs. Call bell within reach.     Problem: Safety  Goal: Will remain free from injury  Outcome: PROGRESSING AS EXPECTED  Goal: Will remain free from falls  Outcome: PROGRESSING AS EXPECTED  Note: Bed alarmed and in locked and lowest position. Fall and safety measures in place.     Problem: Respiratory:  Goal: Respiratory status will improve  Outcome: PROGRESSING AS EXPECTED  Note: Pt encouraged to turn, cough, deep breathe and use I/S. Pt verbalized understanding.

## 2020-02-29 NOTE — PROGRESS NOTES
Telemetry Shift Summary    Rhythm SR  HR Range 80-90s  Ectopy R-O PVC, R couplets, R bigem  Measurements .20/.08/.36        Normal Values  Rhythm SR  HR Range    Measurements 0.12-0.20 / 0.06-0.10  / 0.30-0.52

## 2020-03-01 ENCOUNTER — APPOINTMENT (OUTPATIENT)
Dept: RADIOLOGY | Facility: MEDICAL CENTER | Age: 60
DRG: 871 | End: 2020-03-01
Attending: INTERNAL MEDICINE
Payer: COMMERCIAL

## 2020-03-01 PROBLEM — J96.01 ACUTE RESPIRATORY FAILURE WITH HYPOXIA (HCC): Status: ACTIVE | Noted: 2020-03-01

## 2020-03-01 LAB
ANION GAP SERPL CALC-SCNC: 9 MMOL/L (ref 7–16)
BUN SERPL-MCNC: 8 MG/DL (ref 8–22)
CALCIUM SERPL-MCNC: 8.2 MG/DL (ref 8.4–10.2)
CHLORIDE SERPL-SCNC: 96 MMOL/L (ref 96–112)
CO2 SERPL-SCNC: 31 MMOL/L (ref 20–33)
CREAT SERPL-MCNC: 0.56 MG/DL (ref 0.5–1.4)
ERYTHROCYTE [DISTWIDTH] IN BLOOD BY AUTOMATED COUNT: 70.8 FL (ref 35.9–50)
GLUCOSE BLD-MCNC: 139 MG/DL (ref 65–99)
GLUCOSE BLD-MCNC: 210 MG/DL (ref 65–99)
GLUCOSE BLD-MCNC: 237 MG/DL (ref 65–99)
GLUCOSE BLD-MCNC: 258 MG/DL (ref 65–99)
GLUCOSE SERPL-MCNC: 156 MG/DL (ref 65–99)
HCT VFR BLD AUTO: 28.6 % (ref 42–52)
HGB BLD-MCNC: 9.7 G/DL (ref 14–18)
MAGNESIUM SERPL-MCNC: 1.6 MG/DL (ref 1.5–2.5)
MCH RBC QN AUTO: 29.4 PG (ref 27–33)
MCHC RBC AUTO-ENTMCNC: 33.9 G/DL (ref 33.7–35.3)
MCV RBC AUTO: 86.7 FL (ref 81.4–97.8)
PLATELET # BLD AUTO: 467 K/UL (ref 164–446)
PMV BLD AUTO: 10.1 FL (ref 9–12.9)
POTASSIUM SERPL-SCNC: 4 MMOL/L (ref 3.6–5.5)
RBC # BLD AUTO: 3.3 M/UL (ref 4.7–6.1)
SODIUM SERPL-SCNC: 136 MMOL/L (ref 135–145)
WBC # BLD AUTO: 12.1 K/UL (ref 4.8–10.8)

## 2020-03-01 PROCEDURE — 700105 HCHG RX REV CODE 258: Performed by: INTERNAL MEDICINE

## 2020-03-01 PROCEDURE — 85027 COMPLETE CBC AUTOMATED: CPT

## 2020-03-01 PROCEDURE — 770020 HCHG ROOM/CARE - TELE (206)

## 2020-03-01 PROCEDURE — 82962 GLUCOSE BLOOD TEST: CPT | Mod: 91

## 2020-03-01 PROCEDURE — 700111 HCHG RX REV CODE 636 W/ 250 OVERRIDE (IP): Performed by: INTERNAL MEDICINE

## 2020-03-01 PROCEDURE — 700111 HCHG RX REV CODE 636 W/ 250 OVERRIDE (IP): Performed by: HOSPITALIST

## 2020-03-01 PROCEDURE — 700102 HCHG RX REV CODE 250 W/ 637 OVERRIDE(OP): Performed by: INTERNAL MEDICINE

## 2020-03-01 PROCEDURE — A9270 NON-COVERED ITEM OR SERVICE: HCPCS | Performed by: INTERNAL MEDICINE

## 2020-03-01 PROCEDURE — 71045 X-RAY EXAM CHEST 1 VIEW: CPT

## 2020-03-01 PROCEDURE — 83735 ASSAY OF MAGNESIUM: CPT

## 2020-03-01 PROCEDURE — 80048 BASIC METABOLIC PNL TOTAL CA: CPT

## 2020-03-01 PROCEDURE — 99233 SBSQ HOSP IP/OBS HIGH 50: CPT | Performed by: INTERNAL MEDICINE

## 2020-03-01 RX ADMIN — ATORVASTATIN CALCIUM 40 MG: 40 TABLET, FILM COATED ORAL at 17:27

## 2020-03-01 RX ADMIN — INSULIN HUMAN 5 UNITS: 100 INJECTION, SUSPENSION SUBCUTANEOUS at 17:37

## 2020-03-01 RX ADMIN — FUROSEMIDE 40 MG: 10 INJECTION, SOLUTION INTRAVENOUS at 06:56

## 2020-03-01 RX ADMIN — TAMSULOSIN HYDROCHLORIDE 0.4 MG: 0.4 CAPSULE ORAL at 06:43

## 2020-03-01 RX ADMIN — HYDROMORPHONE HYDROCHLORIDE 0.5 MG: 1 INJECTION, SOLUTION INTRAMUSCULAR; INTRAVENOUS; SUBCUTANEOUS at 23:45

## 2020-03-01 RX ADMIN — OMEPRAZOLE 20 MG: 20 CAPSULE, DELAYED RELEASE ORAL at 17:27

## 2020-03-01 RX ADMIN — INSULIN HUMAN 4 UNITS: 100 INJECTION, SOLUTION PARENTERAL at 17:39

## 2020-03-01 RX ADMIN — FUROSEMIDE 40 MG: 10 INJECTION, SOLUTION INTRAVENOUS at 17:29

## 2020-03-01 RX ADMIN — PANCRELIPASE 48000 UNITS: 24000; 76000; 120000 CAPSULE, DELAYED RELEASE PELLETS ORAL at 06:43

## 2020-03-01 RX ADMIN — METRONIDAZOLE 500 MG: 500 TABLET ORAL at 06:43

## 2020-03-01 RX ADMIN — POTASSIUM CHLORIDE 40 MEQ: 1500 TABLET, EXTENDED RELEASE ORAL at 06:43

## 2020-03-01 RX ADMIN — HYDROMORPHONE HYDROCHLORIDE 0.5 MG: 1 INJECTION, SOLUTION INTRAMUSCULAR; INTRAVENOUS; SUBCUTANEOUS at 06:51

## 2020-03-01 RX ADMIN — POTASSIUM CHLORIDE 40 MEQ: 1500 TABLET, EXTENDED RELEASE ORAL at 17:28

## 2020-03-01 RX ADMIN — ACETAMINOPHEN 650 MG: 325 TABLET, FILM COATED ORAL at 06:50

## 2020-03-01 RX ADMIN — Medication 100 MG: at 06:43

## 2020-03-01 RX ADMIN — METRONIDAZOLE 500 MG: 500 TABLET ORAL at 17:27

## 2020-03-01 RX ADMIN — METRONIDAZOLE 500 MG: 500 TABLET ORAL at 23:44

## 2020-03-01 RX ADMIN — METRONIDAZOLE 500 MG: 500 TABLET ORAL at 00:44

## 2020-03-01 RX ADMIN — ACETAMINOPHEN 650 MG: 325 TABLET, FILM COATED ORAL at 23:44

## 2020-03-01 RX ADMIN — ACETAMINOPHEN 650 MG: 325 TABLET, FILM COATED ORAL at 17:27

## 2020-03-01 RX ADMIN — INSULIN HUMAN 4 UNITS: 100 INJECTION, SOLUTION PARENTERAL at 11:14

## 2020-03-01 RX ADMIN — INSULIN HUMAN 7 UNITS: 100 INJECTION, SOLUTION PARENTERAL at 20:18

## 2020-03-01 RX ADMIN — CEFTRIAXONE SODIUM 1 G: 1 INJECTION, POWDER, FOR SOLUTION INTRAMUSCULAR; INTRAVENOUS at 18:15

## 2020-03-01 RX ADMIN — METRONIDAZOLE 500 MG: 500 TABLET ORAL at 11:11

## 2020-03-01 RX ADMIN — HYDROMORPHONE HYDROCHLORIDE 0.5 MG: 1 INJECTION, SOLUTION INTRAMUSCULAR; INTRAVENOUS; SUBCUTANEOUS at 18:17

## 2020-03-01 RX ADMIN — INSULIN HUMAN 5 UNITS: 100 INJECTION, SUSPENSION SUBCUTANEOUS at 06:49

## 2020-03-01 RX ADMIN — OMEPRAZOLE 20 MG: 20 CAPSULE, DELAYED RELEASE ORAL at 06:43

## 2020-03-01 ASSESSMENT — ENCOUNTER SYMPTOMS
MYALGIAS: 0
SPUTUM PRODUCTION: 0
HEARTBURN: 0
SENSORY CHANGE: 0
SPEECH CHANGE: 0
DIARRHEA: 0
INSOMNIA: 0
VOMITING: 0
BACK PAIN: 0
CHILLS: 0
ABDOMINAL PAIN: 0
NAUSEA: 0
DEPRESSION: 0
DIZZINESS: 0
WEAKNESS: 0
COUGH: 0
HEADACHES: 0
BLURRED VISION: 0
CONSTIPATION: 0
CLAUDICATION: 0
PHOTOPHOBIA: 0
SHORTNESS OF BREATH: 0
SORE THROAT: 0
FEVER: 0
NERVOUS/ANXIOUS: 0

## 2020-03-01 NOTE — PROGRESS NOTES
"Hospital Medicine Daily Progress Note    Date of Service  3/1/2020    Chief Complaint  59 y.o. male admitted 2/14/2020 with poor appetite.    Hospital Course    Patient with cognitive deficit presented from group home with lethargy and poor appetite.  He had elevation in alk phos and leukocytosis and treated for concern of sepsis with abdominal source.  HIDA was performed which showed no evidence of obstruction nor cholecystitis.    MRCP showed dilation of biliary ducts and concern of pancreatic head mass.   GI  EUS on 2/18 and showed significant calcifications likely related to chronic pancreatitis.  He underwent ERCP on 2/24 and purulent drainage from the ampulla of vater os and he had biliary needle-knife sphincterotomy to relieve the purulent drainage.          Interval Problem Update  2/25 Patient states he is hungry and wants solid food, still on a liquid diet.  Plan to move forward with repeat ERCP with Dr Wakefield tomorrow - on schedule at 1500 with anticipated stenting of the ampulla.  Patient denies any current abdominal pain.  2/26 Patient doing about same today, denies pain, refused labs this am.  ERCP scheduled for today for stenting of the ampulla.  Patient voicing frustration regarding NPO.  2/27 Patient without complaint today as he had breakfast in front of him when I saw him.  ERCP was cancelled due to increasing hypoxia to where he was requiring 4L.  CXR completed with again shows dulling of the bases consistent with known pleural effusions but also shows pulmonary congestion - will diurese to see if this improves.  2/28 Patient feeling okay today, looks better than on previous days, responding well to diuresis though lungs sound about the same.  He states his back is aching \"all over\" but is not severely painful and no focal tenderness.  He states he ambulated in the hallway about \"a quarter of the way down\" yesterday.  Will continue with diuresis and work toward less oxygen requirement so he can " have repeat ERCP for stent placement.  2/29 Patient feeling well today, states he will walk in the hallways with the staff today.  Oxygen demand is about the same on 4L but lungs are sounding clear.  K is 3.2 and mag 1.2, will replete both.  WBC continues to drop.  3/1 Patient feeling well today, states he has no pain and doesn't feel short of breath.  Oxygen overnight documented at 4.5L nasal cannula despite continued aggressive diuresis with 10 L down since admission.  CXR pending for further evaluation.    Consultants/Specialty  GI - isabel    Code Status  DNR/DNI, has guardian    Disposition  Group home once stent placed in ERCP.    Review of Systems  Review of Systems   Constitutional: Negative for chills and fever.   HENT: Negative for congestion and sore throat.    Eyes: Negative for blurred vision and photophobia.   Respiratory: Negative for cough, sputum production and shortness of breath.    Cardiovascular: Negative for chest pain, claudication and leg swelling.   Gastrointestinal: Negative for abdominal pain, constipation, diarrhea, heartburn, nausea and vomiting.   Genitourinary: Negative for dysuria and hematuria.   Musculoskeletal: Negative for back pain, joint pain and myalgias.   Skin: Negative for itching and rash.   Neurological: Negative for dizziness, sensory change, speech change, weakness and headaches.   Psychiatric/Behavioral: Negative for depression. The patient is not nervous/anxious and does not have insomnia.         Physical Exam  Temp:  [36.3 °C (97.3 °F)-36.4 °C (97.6 °F)] 36.4 °C (97.6 °F)  Pulse:  [] 70  Resp:  [16-20] 20  BP: (104-124)/(63-72) 120/70  SpO2:  [87 %-94 %] 94 %    Physical Exam  Vitals signs and nursing note reviewed.   Constitutional:       General: He is not in acute distress.     Appearance: Normal appearance.   HENT:      Head: Normocephalic and atraumatic.   Eyes:      General: No scleral icterus.     Extraocular Movements: Extraocular movements intact.    Neck:      Musculoskeletal: Normal range of motion and neck supple.   Cardiovascular:      Rate and Rhythm: Normal rate and regular rhythm.      Pulses: Normal pulses.      Heart sounds: Normal heart sounds. No murmur.   Pulmonary:      Effort: Pulmonary effort is normal. No respiratory distress.      Breath sounds: Normal breath sounds. No wheezing, rhonchi or rales.   Abdominal:      General: Abdomen is flat. Bowel sounds are normal. There is no distension.      Palpations: Abdomen is soft.      Tenderness: There is no rebound.   Musculoskeletal:         General: No swelling or tenderness.   Lymphadenopathy:      Cervical: No cervical adenopathy.   Skin:     Coloration: Skin is not jaundiced.      Findings: No erythema.   Neurological:      General: No focal deficit present.      Mental Status: He is alert and oriented to person, place, and time. Mental status is at baseline.      Cranial Nerves: No cranial nerve deficit.   Psychiatric:         Mood and Affect: Mood normal.         Behavior: Behavior normal.         Fluids    Intake/Output Summary (Last 24 hours) at 3/1/2020 1011  Last data filed at 3/1/2020 0746  Gross per 24 hour   Intake --   Output 2300 ml   Net -2300 ml       Laboratory  Recent Labs     02/28/20 0625 02/29/20  0415 03/01/20  0618   WBC 15.5* 10.9* 12.1*   RBC 3.64* 3.12* 3.30*   HEMOGLOBIN 10.7* 9.1* 9.7*   HEMATOCRIT 31.4* 26.6* 28.6*   MCV 86.3 85.3 86.7   MCH 29.4 29.2 29.4   MCHC 34.1 34.2 33.9   RDW 66.1* 63.9* 70.8*   PLATELETCT 572* 487* 467*   MPV 10.7 10.5 10.1     Recent Labs     02/28/20  0625 02/29/20  0415 03/01/20  0618   SODIUM 137 135 136   POTASSIUM 3.3* 3.2* 4.0   CHLORIDE 98 94* 96   CO2 29 32 31   GLUCOSE 83 156* 156*   BUN 8 8 8   CREATININE 0.49* 0.53 0.56   CALCIUM 8.5 8.0* 8.2*                   Imaging  DX-CHEST-PORTABLE (1 VIEW)   Final Result         1. No significant interval change.      DX-CHEST-PORTABLE (1 VIEW)   Final Result      New subpulmonic effusions,  bibasilar atelectasis and edema      DX-PORTABLE FLUOROSCOPY < 1 HOUR   Final Result      Portable fluoroscopy utilized for 1.1 minute.         INTERPRETING LOCATION: [facility] [facility], [place], [address]      [id number] SPHINCTEROTOMY   Final Result      Intraoperative fluoroscopy spot images as described above.      CT-ABDOMEN WITH & W/O   Final Result      1.  No significant change since CT abdomen and pelvis obtained 2/16/2020      2.  Findings consistent with high-grade biliary obstruction with dilation of the common bile duct measuring 17 mm, dilated gallbladder, dilated pancreatic duct with change in caliber of the pancreatic duct at the level pancreatic head      3.  Findings could be either malignancy the pancreatic head versus scarring from chronic pancreatitis.      4.  Sequela of chronic pancreatitis      5.  Large right medial chest rim-enhancing loculated fluid collection probably within the pleural space measuring 10.5 x 3.1 centimeter transversely and 7 cm craniocaudal      6.  Rim-enhancing fluid collection in the hepatorenal space measuring 11.2 cm craniocaudal and 2-3 cm in thickness.      7.  The rim-enhancing fluid collection are suspicious for infection/abscess      8.  Bilateral atelectasis and small-moderate-sized pleural effusion after      9.  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  Biliary dilatation without evidence of high-grade biliary obstruction.   2.  Gallbladder visualization after morphine administration. No acute cholecystitis.      CT-ABDOMEN-PELVIS WITH   Final Result      1.  New rim enhancing fluid collection tracks along the right heart margin, azygoesophageal recess and possibly communicates with Morison's pouch tracking along the right aspect of the inferior vena cava. This is nonspecific and could indicate abscess,    dissecting pseudocyst, mucinous cystic  neoplasm related fluid      2.  New extra and intrahepatic biliary ductal dilatation with 6 cm dilatation of the gallbladder also seen. This indicates distal common bile duct/ampullary level obstruction. Stricture, mass effect from adjacent pancreas calcifications or small    ampullary mass are differential possibilities      3.  Decreased small pleural effusions. Some loculation on the right is possible      4.  Slightly decreased diffuse pancreatic ductal dilatation now measuring 11 mm. Evidence of chronic pancreatitis. New 3 cm cystic structure anterior to the pancreas could be a pseudocyst. Abscess or necrotic/cystic neoplasm are in the differential.      5.  Multiple chronic findings including status post aorto bifemoral bypass with approaching 70% stenosis of the left superficial femoral artery, nonobstructive right 3 mm nephrolithiasis, large volume rectal vault stool, dilated bladder which could    indicate outlet obstruction or neurogenic bladder, splenosis      [id number]/O   Final Result      1.  Severe dilatation of the intrahepatic biliary ducts, common bile duct and pancreatic duct. The distal common bile duct and the proximal pancreatic duct is not visualized at the pancreatic head. The pancreatic head appears to be diffusely enlarged and    demonstrates multiple small cysts. These findings are concerning for pancreatic neoplasm such as intraductal papillary mucinous cystoadenoma. The other differential diagnosis includes periampullary carcinoma. MR examination of the abdomen with contrast    is recommended for further evaluation.   2.  Mixed signal intensity in the right perinephric space likely represents perinephric hematoma.   3.  Large hiatal hernia.   4.  Diffuse gastric wall thickening.      US-RUQ   Final Result      1.  Hepatomegaly.   2.  Hepatic steatosis.   3.  Gallbladder sludge. Mild intrahepatic ductal dilation. Dilation of the common duct up to 13 mm. No definite distal obstructing  etiology is identified. MRCP could be obtained for further evaluation if indicated.   4.  Trace ascites.   5.  Pancreatic calcifications likely sequela of chronic pancreatitis.      DX-CHEST-PORTABLE (1 VIEW)   Final Result      Cardiomegaly.           Assessment/Plan  * Pancreatic mass  Assessment & Plan  MRI with concern for pancreatic malignancy  ERCP with no evidence of malignancy, calcifications of pancreas present   is elevated but less than 1000 so cannot rule in cancer, likely due chronic pancreatitis  Repeat CT showed chronic pancreatitis and loculated chest effusion    Type 2 diabetes mellitus with hyperglycemia, with long-term current use of insulin ([facility])- (present on admission)  Assessment & Plan  He has an insulin sensor in his right lower quadrant, uses long-acting insulin 12 units and sliding scale.      Cognitive decline- (present on admission)  Assessment & Plan  Patient likely has a chronic component to his cognitive decline as well as acute worsening  Mental status now at his baseline, patient has a legal guardian      Metabolic acidosis  Assessment & Plan  IV fluids with improvement    Hypokalemia  Assessment & Plan  Increase replacement  Replace magnesium      Hypomagnesemia  Assessment & Plan  1.2  Mag rider 2g    Sepsis ([facility])  Assessment & Plan  This is Sepsis Present on admission  SIRS criteria identified on my evaluation include: Tachycardia, with heart rate greater than 90 BPM and Leukocyosis, with WBC greater than 12,000  Source is GI  Sepsis protocol complete  Continue antibiotics  White cell count trending down  Repeat ERCP will be rescheduled once oxygenation improves.              Acute respiratory failure with hypoxia ([facility])  Assessment & Plan  Diuresing effectively with lasix -10 L since admission and 1-2 liters down daily past 2 days.   CXR pending for further evaluation.      Loculated pleural effusion  Assessment & Plan  In medial right chest, continue antibiotics, pus  drained from ampulla of Vater with GI 2/24  Pulmonary edema appreciated on CXR 2/27 - diurese to get oxygen demand down, anticipate another attempt at ERCP in a few days.    Cholangitis  Assessment & Plan  Biliary obstruction suggested on imaging, GI consulted  Alkaline phosphatase is stable over 1100  Endoscopy done showing pancreatic calcifications but no stones, no area to biopsy  Continue antibiotics, white cell count still elevated  Repeat CT scan done  ERCP repeated 2/24, given swelling so that a ampullary duct stent could not be placed  Repeat planned for 2/26 but cancelled because of hypoxia.    Recurrent major depressive disorder, in full remission (HCC)  Assessment & Plan  .    Hyponatremia  Assessment & Plan  Resolved with fluids    Chronic pancreatitis (HCC)- (present on admission)  Assessment & Plan  Continue pancreatic enzymes with meals  Celiac nerve block for pain control, per GI    Dyslipidemia- (present on admission)  Assessment & Plan  Continue statin       VTE prophylaxis: scds

## 2020-03-01 NOTE — PROGRESS NOTES
Received bedside report from KELLEY Tamez. Plan of care discussed. Safety precautions in place. Call light and personal belongings within reach. Patient has no needs at this time.

## 2020-03-01 NOTE — CARE PLAN
Problem: Safety  Goal: Will remain free from falls  Outcome: PROGRESSING AS EXPECTED  Note: Remind patient to use call light and provide assistance. Bed in low position, bed locked, and appropriate alarms set. Patient wearing non-slip socks. Call light and personal belongings are within reach.     Problem: Knowledge Deficit  Goal: Knowledge of the prescribed therapeutic regimen will improve  Outcome: PROGRESSING AS EXPECTED  Note: Encourage patient to ask questions and be involved in plan of care. Provide education on treatment plan, diagnostic testing, and medications; have patient verbalize understanding.

## 2020-03-01 NOTE — PROGRESS NOTES
Received bedside report from KELLEY Tamez. Plan of care discussed. Safety precautions in place. Call light and personal belongings within reach.

## 2020-03-01 NOTE — PROGRESS NOTES
Report given to Mike BENSON. Plan of care discussed. Patient resting comfortably in bed. Safety precautions in place.

## 2020-03-01 NOTE — PROGRESS NOTES
Completed assessment and administered scheduled medications. Bed in low position, locked, and appropriate alarms set. Personal belongings and call light are within reach. Patient has no additional needs at this time. Patient does not want vitals taken during the night. Education provided. Patient agreeable to vitals when lab come in to take blood.

## 2020-03-01 NOTE — PROGRESS NOTES
Telemetry Shift Summary     Rhythm SR  HR Range 83-94  Ectopy Occ PVCs  Measurements .12/.08/.40    Per [place], [place]           Normal Values  Rhythm SR  HR Range    Measurements 0.12-0.20 / 0.06-0.10  / 0.30-0.52

## 2020-03-01 NOTE — CARE PLAN
Problem: Safety  Goal: Will remain free from injury  Outcome: PROGRESSING AS EXPECTED  Note: Pt will remain free from falls/injuries. Safety measures in place. Pt calls appropriately.      Problem: Pain Management  Goal: Pain level will decrease to patient's comfort goal  Outcome: PROGRESSING AS EXPECTED  Note: Patient denies pain at this time. Has pain well managed with prn meds.

## 2020-03-01 NOTE — PROGRESS NOTES
Telemetry Shift Summary    Rhythm SR-ST  HR Range 80-100s  Ectopy O-F PVCs, R couplets  Measurements .20/.08/.38        Normal Values  Rhythm SR  HR Range    Measurements 0.12-0.20 / 0.06-0.10  / 0.30-0.52

## 2020-03-02 LAB
ALBUMIN SERPL BCP-MCNC: 2.6 G/DL (ref 3.2–4.9)
ALBUMIN/GLOB SERPL: 0.7 G/DL
ALP SERPL-CCNC: 1199 U/L (ref 30–99)
ALT SERPL-CCNC: 17 U/L (ref 2–50)
ANION GAP SERPL CALC-SCNC: 11 MMOL/L (ref 7–16)
AST SERPL-CCNC: 53 U/L (ref 12–45)
BASOPHILS # BLD AUTO: 1.1 % (ref 0–1.8)
BASOPHILS # BLD: 0.15 K/UL (ref 0–0.12)
BILIRUB SERPL-MCNC: 0.8 MG/DL (ref 0.1–1.5)
BUN SERPL-MCNC: 9 MG/DL (ref 8–22)
CALCIUM SERPL-MCNC: 8.8 MG/DL (ref 8.4–10.2)
CHLORIDE SERPL-SCNC: 90 MMOL/L (ref 96–112)
CO2 SERPL-SCNC: 33 MMOL/L (ref 20–33)
COMMENT 1642: NORMAL
CREAT SERPL-MCNC: 0.66 MG/DL (ref 0.5–1.4)
EOSINOPHIL # BLD AUTO: 0.27 K/UL (ref 0–0.51)
EOSINOPHIL NFR BLD: 2 % (ref 0–6.9)
ERYTHROCYTE [DISTWIDTH] IN BLOOD BY AUTOMATED COUNT: 79.6 FL (ref 35.9–50)
GLOBULIN SER CALC-MCNC: 4 G/DL (ref 1.9–3.5)
GLUCOSE BLD-MCNC: 106 MG/DL (ref 65–99)
GLUCOSE BLD-MCNC: 188 MG/DL (ref 65–99)
GLUCOSE BLD-MCNC: 202 MG/DL (ref 65–99)
GLUCOSE SERPL-MCNC: 167 MG/DL (ref 65–99)
HCT VFR BLD AUTO: 34.9 % (ref 42–52)
HGB BLD-MCNC: 11.5 G/DL (ref 14–18)
IMM GRANULOCYTES # BLD AUTO: 0.33 K/UL (ref 0–0.11)
IMM GRANULOCYTES NFR BLD AUTO: 2.5 % (ref 0–0.9)
LYMPHOCYTES # BLD AUTO: 2.42 K/UL (ref 1–4.8)
LYMPHOCYTES NFR BLD: 18.1 % (ref 22–41)
MCH RBC QN AUTO: 29.6 PG (ref 27–33)
MCHC RBC AUTO-ENTMCNC: 33 G/DL (ref 33.7–35.3)
MCV RBC AUTO: 89.7 FL (ref 81.4–97.8)
MONOCYTES # BLD AUTO: 1.35 K/UL (ref 0–0.85)
MONOCYTES NFR BLD AUTO: 10.1 % (ref 0–13.4)
NEUTROPHILS # BLD AUTO: 8.87 K/UL (ref 1.82–7.42)
NEUTROPHILS NFR BLD: 66.2 % (ref 44–72)
NRBC # BLD AUTO: 0 K/UL
NRBC BLD-RTO: 0 /100 WBC
PLATELET # BLD AUTO: 486 K/UL (ref 164–446)
PMV BLD AUTO: 10.4 FL (ref 9–12.9)
POTASSIUM SERPL-SCNC: 4.2 MMOL/L (ref 3.6–5.5)
PROT SERPL-MCNC: 6.6 G/DL (ref 6–8.2)
RBC # BLD AUTO: 3.89 M/UL (ref 4.7–6.1)
SODIUM SERPL-SCNC: 134 MMOL/L (ref 135–145)
WBC # BLD AUTO: 13.4 K/UL (ref 4.8–10.8)

## 2020-03-02 PROCEDURE — 700102 HCHG RX REV CODE 250 W/ 637 OVERRIDE(OP): Performed by: INTERNAL MEDICINE

## 2020-03-02 PROCEDURE — 82962 GLUCOSE BLOOD TEST: CPT | Mod: 91

## 2020-03-02 PROCEDURE — 700105 HCHG RX REV CODE 258: Performed by: INTERNAL MEDICINE

## 2020-03-02 PROCEDURE — 700111 HCHG RX REV CODE 636 W/ 250 OVERRIDE (IP): Performed by: INTERNAL MEDICINE

## 2020-03-02 PROCEDURE — 700111 HCHG RX REV CODE 636 W/ 250 OVERRIDE (IP): Performed by: HOSPITALIST

## 2020-03-02 PROCEDURE — 770020 HCHG ROOM/CARE - TELE (206)

## 2020-03-02 PROCEDURE — 99233 SBSQ HOSP IP/OBS HIGH 50: CPT | Performed by: INTERNAL MEDICINE

## 2020-03-02 PROCEDURE — 85025 COMPLETE CBC W/AUTO DIFF WBC: CPT

## 2020-03-02 PROCEDURE — A9270 NON-COVERED ITEM OR SERVICE: HCPCS | Performed by: INTERNAL MEDICINE

## 2020-03-02 PROCEDURE — 80053 COMPREHEN METABOLIC PANEL: CPT

## 2020-03-02 RX ADMIN — HYDROMORPHONE HYDROCHLORIDE 0.5 MG: 1 INJECTION, SOLUTION INTRAMUSCULAR; INTRAVENOUS; SUBCUTANEOUS at 06:45

## 2020-03-02 RX ADMIN — POTASSIUM CHLORIDE 40 MEQ: 1500 TABLET, EXTENDED RELEASE ORAL at 06:46

## 2020-03-02 RX ADMIN — METRONIDAZOLE 500 MG: 500 TABLET ORAL at 11:15

## 2020-03-02 RX ADMIN — OMEPRAZOLE 20 MG: 20 CAPSULE, DELAYED RELEASE ORAL at 17:44

## 2020-03-02 RX ADMIN — POTASSIUM CHLORIDE 40 MEQ: 1500 TABLET, EXTENDED RELEASE ORAL at 17:45

## 2020-03-02 RX ADMIN — OMEPRAZOLE 20 MG: 20 CAPSULE, DELAYED RELEASE ORAL at 06:45

## 2020-03-02 RX ADMIN — INSULIN HUMAN 5 UNITS: 100 INJECTION, SUSPENSION SUBCUTANEOUS at 06:41

## 2020-03-02 RX ADMIN — FUROSEMIDE 40 MG: 10 INJECTION, SOLUTION INTRAVENOUS at 17:42

## 2020-03-02 RX ADMIN — ACETAMINOPHEN 650 MG: 325 TABLET, FILM COATED ORAL at 17:45

## 2020-03-02 RX ADMIN — TAMSULOSIN HYDROCHLORIDE 0.4 MG: 0.4 CAPSULE ORAL at 06:46

## 2020-03-02 RX ADMIN — FUROSEMIDE 40 MG: 10 INJECTION, SOLUTION INTRAVENOUS at 06:46

## 2020-03-02 RX ADMIN — INSULIN HUMAN 4 UNITS: 100 INJECTION, SOLUTION PARENTERAL at 17:40

## 2020-03-02 RX ADMIN — HYDROMORPHONE HYDROCHLORIDE 0.5 MG: 1 INJECTION, SOLUTION INTRAMUSCULAR; INTRAVENOUS; SUBCUTANEOUS at 17:47

## 2020-03-02 RX ADMIN — METRONIDAZOLE 500 MG: 500 TABLET ORAL at 06:45

## 2020-03-02 RX ADMIN — CEFTRIAXONE SODIUM 1 G: 1 INJECTION, POWDER, FOR SOLUTION INTRAMUSCULAR; INTRAVENOUS at 17:47

## 2020-03-02 RX ADMIN — INSULIN HUMAN 5 UNITS: 100 INJECTION, SUSPENSION SUBCUTANEOUS at 17:40

## 2020-03-02 RX ADMIN — INSULIN HUMAN 3 UNITS: 100 INJECTION, SOLUTION PARENTERAL at 11:14

## 2020-03-02 RX ADMIN — ATORVASTATIN CALCIUM 40 MG: 40 TABLET, FILM COATED ORAL at 17:42

## 2020-03-02 RX ADMIN — HYDROMORPHONE HYDROCHLORIDE 0.5 MG: 1 INJECTION, SOLUTION INTRAMUSCULAR; INTRAVENOUS; SUBCUTANEOUS at 11:59

## 2020-03-02 RX ADMIN — Medication 100 MG: at 06:45

## 2020-03-02 RX ADMIN — PANCRELIPASE 48000 UNITS: 24000; 76000; 120000 CAPSULE, DELAYED RELEASE PELLETS ORAL at 06:45

## 2020-03-02 RX ADMIN — METRONIDAZOLE 500 MG: 500 TABLET ORAL at 17:44

## 2020-03-02 RX ADMIN — ACETAMINOPHEN 650 MG: 325 TABLET, FILM COATED ORAL at 06:45

## 2020-03-02 ASSESSMENT — ENCOUNTER SYMPTOMS
CONSTIPATION: 0
SENSORY CHANGE: 0
PALPITATIONS: 0
MYALGIAS: 0
CHILLS: 0
CLAUDICATION: 0
PHOTOPHOBIA: 0
SPUTUM PRODUCTION: 0
WEIGHT LOSS: 1
COUGH: 0
NERVOUS/ANXIOUS: 0
ABDOMINAL PAIN: 0
HEADACHES: 0
VOMITING: 0
DIARRHEA: 0
SHORTNESS OF BREATH: 0
NAUSEA: 0
BLOOD IN STOOL: 0
WEAKNESS: 0
DIZZINESS: 0
SORE THROAT: 0
DEPRESSION: 0
INSOMNIA: 0
BLURRED VISION: 0
SPEECH CHANGE: 0
BACK PAIN: 0
HEARTBURN: 0
FEVER: 0

## 2020-03-02 NOTE — PROGRESS NOTES
Gave bedside report to KELLEY Miles. Plan of care discussed. Safety precautions in place. Personal belongings and call light are with in reach.

## 2020-03-02 NOTE — DISCHARGE PLANNING
"LSW spoke with Marie Jose. Marie stated that she does not feel as though the MD's and RN's  have been consistent in explaining the pt's reasoning for still being admitted in the hospital. LSW explained that LSW cannot speak about pt's medical condition due to not being a medical professional but explained that a medical professional would give her a call if she has medical concerns. Marie stated that per pt's sister that there is a disconnect and they would like Dr. Gentile on the case and no longer Dr. Perez due to Dr. Gentile \"knowing Pawel's complex history\". LSW informed Marie that the pt would be discussed in rounds and that if a pt is not considered medically cleared with a safe discharge plan, the pt will not be discharged. Marie requested a care conference for tomorrow afternoon.   "

## 2020-03-02 NOTE — CARE PLAN
Problem: Communication  Goal: The ability to communicate needs accurately and effectively will improve  Outcome: PROGRESSING AS EXPECTED  Note: Encourage patient to verbalize concerns/needs. Provide active listening and assistance.     Problem: Infection  Goal: Will remain free from infection  Outcome: PROGRESSING AS EXPECTED  Note: Assess and monitor for signs and symptoms of infection. Perform hand hygiene before and after patient contact and entering/exiting the room.

## 2020-03-02 NOTE — PROGRESS NOTES
"Hospital Medicine Daily Progress Note    Date of Service  3/2/2020    Chief Complaint  59 y.o. male admitted 2/14/2020 with poor appetite.    Hospital Course    Patient with cognitive deficit presented from group home with lethargy and poor appetite.  He had elevation in alk phos and leukocytosis and treated for concern of sepsis with abdominal source.  HIDA was performed which showed no evidence of obstruction nor cholecystitis.    MRCP showed dilation of biliary ducts and concern of pancreatic head mass.   GI  EUS on 2/18 and showed significant calcifications likely related to chronic pancreatitis.  He underwent ERCP on 2/24 and purulent drainage from the ampulla of vater os and he had biliary needle-knife sphincterotomy to relieve the purulent drainage.          Interval Problem Update  2/25 Patient states he is hungry and wants solid food, still on a liquid diet.  Plan to move forward with repeat ERCP with Dr Wakefield tomorrow - on schedule at 1500 with anticipated stenting of the ampulla.  Patient denies any current abdominal pain.  2/26 Patient doing about same today, denies pain, refused labs this am.  ERCP scheduled for today for stenting of the ampulla.  Patient voicing frustration regarding NPO.  2/27 Patient without complaint today as he had breakfast in front of him when I saw him.  ERCP was cancelled due to increasing hypoxia to where he was requiring 4L.  CXR completed with again shows dulling of the bases consistent with known pleural effusions but also shows pulmonary congestion - will diurese to see if this improves.  2/28 Patient feeling okay today, looks better than on previous days, responding well to diuresis though lungs sound about the same.  He states his back is aching \"all over\" but is not severely painful and no focal tenderness.  He states he ambulated in the hallway about \"a quarter of the way down\" yesterday.  Will continue with diuresis and work toward less oxygen requirement so he can " have repeat ERCP for stent placement.  2/29 Patient feeling well today, states he will walk in the hallways with the staff today.  Oxygen demand is about the same on 4L but lungs are sounding clear.  K is 3.2 and mag 1.2, will replete both.  WBC continues to drop.  3/1 Patient feeling well today, states he has no pain and doesn't feel short of breath.  Oxygen overnight documented at 4.5L nasal cannula despite continued aggressive diuresis with 10 L down since admission.  CXR pending for further evaluation.  3/2 patient states he feels same.  He continues to diurese well and now is 13L down.  He is saturating well on 3L and if he can get back to 2L, will move forward with ERCP stenting.  Discussed with Dr Burkett.    Consultants/Specialty  GI - [facility]    Code Status  DNR/DNI, has guardian    Disposition  Group home once stent placed in ERCP.    Review of Systems  Review of Systems   Constitutional: Negative for chills and fever.   HENT: Negative for congestion and sore throat.    Eyes: Negative for blurred vision and photophobia.   Respiratory: Negative for cough, sputum production and shortness of breath.    Cardiovascular: Negative for chest pain, claudication and leg swelling.   Gastrointestinal: Negative for abdominal pain, constipation, diarrhea, heartburn, nausea and vomiting.   Genitourinary: Negative for dysuria and hematuria.   Musculoskeletal: Negative for back pain, joint pain and myalgias.   Skin: Negative for itching and rash.   Neurological: Negative for dizziness, sensory change, speech change, weakness and headaches.   Psychiatric/Behavioral: Negative for depression. The patient is not nervous/anxious and does not have insomnia.         Physical Exam  Temp:  [36.4 °C (97.5 °F)-36.7 °C (98 °F)] 36.4 °C (97.5 °F)  Pulse:  [77-93] 89  Resp:  [17-20] 19  BP: (112-127)/(65-72) 125/72  SpO2:  [90 %-98 %] 93 %    Physical Exam  Vitals signs and nursing note reviewed.   Constitutional:       General: He is not  in acute distress.     Appearance: Normal appearance.   HENT:      Head: Normocephalic and atraumatic.   Eyes:      General: No scleral icterus.     Extraocular Movements: Extraocular movements intact.   Neck:      Musculoskeletal: Normal range of motion and neck supple.   Cardiovascular:      Rate and Rhythm: Normal rate and regular rhythm.      Pulses: Normal pulses.      Heart sounds: Normal heart sounds. No murmur.   Pulmonary:      Effort: Pulmonary effort is normal. No respiratory distress.      Breath sounds: Normal breath sounds. No wheezing, rhonchi or rales.   Abdominal:      General: Abdomen is flat. Bowel sounds are normal. There is no distension.      Palpations: Abdomen is soft.      Tenderness: There is no rebound.   Musculoskeletal:         General: No swelling or tenderness.   Lymphadenopathy:      Cervical: No cervical adenopathy.   Skin:     Coloration: Skin is not jaundiced.      Findings: No erythema.   Neurological:      General: No focal deficit present.      Mental Status: He is alert and oriented to person, place, and time. Mental status is at baseline.      Cranial Nerves: No cranial nerve deficit.   Psychiatric:         Mood and Affect: Mood normal.         Behavior: Behavior normal.         Fluids    Intake/Output Summary (Last 24 hours) at 3/2/2020 1050  Last data filed at 3/2/2020 1034  Gross per 24 hour   Intake 920 ml   Output 2455 ml   Net -1535 ml       Laboratory  Recent Labs     02/29/20 0415 03/01/20 0618 03/02/20  0851   WBC 10.9* 12.1* 13.4*   RBC 3.12* 3.30* 3.89*   HEMOGLOBIN 9.1* 9.7* 11.5*   HEMATOCRIT 26.6* 28.6* 34.9*   MCV 85.3 86.7 89.7   MCH 29.2 29.4 29.6   MCHC 34.2 33.9 33.0*   RDW 63.9* 70.8* 79.6*   PLATELETCT 487* 467* 486*   MPV 10.5 10.1 10.4     Recent Labs     02/29/20 0415 03/01/20 0618 03/02/20  0851   SODIUM 135 136 134*   POTASSIUM 3.2* 4.0 4.2   CHLORIDE 94* 96 90*   CO2 32 31 33   GLUCOSE 156* 156* 167*   BUN 8 8 9   CREATININE 0.53 0.56 0.66    CALCIUM 8.0* 8.2* 8.8                   Imaging  DX-CHEST-PORTABLE (1 VIEW)   Final Result         1. No significant interval change.      DX-CHEST-PORTABLE (1 VIEW)   Final Result      New subpulmonic effusions, bibasilar atelectasis and edema      DX-PORTABLE FLUOROSCOPY < 1 HOUR   Final Result      Portable fluoroscopy utilized for 1.1 minute.         INTERPRETING LOCATION: [address] [place] [address], [facility], [id number]      [id number] SPHINCTEROTOMY   Final Result      Intraoperative fluoroscopy spot images as described above.      CT-ABDOMEN WITH & W/O   Final Result      1.  No significant change since CT abdomen and pelvis obtained 2/16/2020      2.  Findings consistent with high-grade biliary obstruction with dilation of the common bile duct measuring 17 mm, dilated gallbladder, dilated pancreatic duct with change in caliber of the pancreatic duct at the level pancreatic head      3.  Findings could be either malignancy the pancreatic head versus scarring from chronic pancreatitis.      4.  Sequela of chronic pancreatitis      5.  Large right medial chest rim-enhancing loculated fluid collection probably within the pleural space measuring 10.5 x 3.1 centimeter transversely and 7 cm craniocaudal      6.  Rim-enhancing fluid collection in the hepatorenal space measuring 11.2 cm craniocaudal and 2-3 cm in thickness.      7.  The rim-enhancing fluid collection are suspicious for infection/abscess      8.  Bilateral atelectasis and small-moderate-sized pleural effusion after      9.  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  Biliary dilatation without evidence of high-grade biliary obstruction.   2.  Gallbladder visualization after morphine administration. No acute cholecystitis.      CT-ABDOMEN-PELVIS WITH   Final Result      1.  New rim enhancing fluid collection tracks along the right heart margin,  azygoesophageal recess and possibly communicates with Morison's pouch tracking along the right aspect of the inferior vena cava. This is nonspecific and could indicate abscess,    dissecting pseudocyst, mucinous cystic neoplasm related fluid      2.  New extra and intrahepatic biliary ductal dilatation with 6 cm dilatation of the gallbladder also seen. This indicates distal common bile duct/ampullary level obstruction. Stricture, mass effect from adjacent pancreas calcifications or small    ampullary mass are differential possibilities      3.  Decreased small pleural effusions. Some loculation on the right is possible      4.  Slightly decreased diffuse pancreatic ductal dilatation now measuring 11 mm. Evidence of chronic pancreatitis. New 3 cm cystic structure anterior to the pancreas could be a pseudocyst. Abscess or necrotic/cystic neoplasm are in the differential.      5.  Multiple chronic findings including status post aorto bifemoral bypass with approaching 70% stenosis of the left superficial femoral artery, nonobstructive right 3 mm nephrolithiasis, large volume rectal vault stool, dilated bladder which could    indicate outlet obstruction or neurogenic bladder, splenosis      [id number]-D/O   Final Result      1.  Severe dilatation of the intrahepatic biliary ducts, common bile duct and pancreatic duct. The distal common bile duct and the proximal pancreatic duct is not visualized at the pancreatic head. The pancreatic head appears to be diffusely enlarged and    demonstrates multiple small cysts. These findings are concerning for pancreatic neoplasm such as intraductal papillary mucinous cystoadenoma. The other differential diagnosis includes periampullary carcinoma. MR examination of the abdomen with contrast    is recommended for further evaluation.   2.  Mixed signal intensity in the right perinephric space likely represents perinephric hematoma.   3.  Large hiatal hernia.   4.  Diffuse gastric wall  thickening.      US-RUQ   Final Result      1.  Hepatomegaly.   2.  Hepatic steatosis.   3.  Gallbladder sludge. Mild intrahepatic ductal dilation. Dilation of the common duct up to 13 mm. No definite distal obstructing etiology is identified. MRCP could be obtained for further evaluation if indicated.   4.  Trace ascites.   5.  Pancreatic calcifications likely sequela of chronic pancreatitis.      DX-CHEST-PORTABLE (1 VIEW)   Final Result      Cardiomegaly.           Assessment/Plan  * Pancreatic mass  Assessment & Plan  MRI with concern for pancreatic malignancy  ERCP with no evidence of malignancy, calcifications of pancreas present   is elevated but less than 1000 so cannot rule in cancer, likely due chronic pancreatitis  Repeat CT showed chronic pancreatitis and loculated chest effusion    Type 2 diabetes mellitus with hyperglycemia, with long-term current use of insulin ([facility])- (present on admission)  Assessment & Plan  He has an insulin sensor in his right lower quadrant, uses long-acting insulin 12 units and sliding scale.      Cognitive decline- (present on admission)  Assessment & Plan  Patient likely has a chronic component to his cognitive decline as well as acute worsening  Mental status now at his baseline, patient has a legal guardian      Metabolic acidosis  Assessment & Plan  IV fluids with improvement    Hypokalemia  Assessment & Plan  Resolved, replete with diuresis.  Replaced magnesium      Hypomagnesemia  Assessment & Plan  1.6  Mag rider 2 g given 2/29    Sepsis ([facility])  Assessment & Plan  This is Sepsis Present on admission  SIRS criteria identified on my evaluation include: Tachycardia, with heart rate greater than 90 BPM and Leukocyosis, with WBC greater than 12,000  Source is GI  Sepsis protocol complete  Continue antibiotics  White cell count trending down  Repeat ERCP will be rescheduled once oxygenation improves.              Acute respiratory failure with hypoxia ([facility])  Assessment &  Plan  Diuresing effectively with lasix -10 L since admission and 1-2 liters down daily with increased diuresis  CXR same  Oxygen demand decreasing      Loculated pleural effusion  Assessment & Plan  In medial right chest, continue antibiotics, pus drained from ampulla of Vater with GI 2/24  Pulmonary edema appreciated on CXR 2/27 - diurese to get oxygen demand down, anticipate another attempt at ERCP in a few days.    Cholangitis  Assessment & Plan  Biliary obstruction suggested on imaging, GI consulted  Alkaline phosphatase is stable over 1100  Endoscopy done showing pancreatic calcifications but no stones, no area to biopsy  Continue antibiotics, white cell count still elevated  Repeat CT scan done  ERCP repeated 2/24, given swelling so that a ampullary duct stent could not be placed  Repeat planned for 2/26 but cancelled because of hypoxia.    Recurrent major depressive disorder, in full remission (HCC)  Assessment & Plan  .    Hyponatremia  Assessment & Plan  Resolved with fluids    Chronic pancreatitis (HCC)- (present on admission)  Assessment & Plan  Continue pancreatic enzymes with meals  Celiac nerve block for pain control, per GI    Dyslipidemia- (present on admission)  Assessment & Plan  Continue statin       VTE prophylaxis: scds

## 2020-03-02 NOTE — PROGRESS NOTES
Telemetry Shift Summary    Rhythm SR  HR Range 67-93  Ectopy oPVC  Measurements 0.18/0.08/0.32        Normal Values  Rhythm SR  HR Range    Measurements 0.12-0.20 / 0.06-0.10  / 0.30-0.52

## 2020-03-02 NOTE — PROGRESS NOTES
"  Gastroenterology Progress Note     Author: Luca Mantilla MD Date & Time Created: 3/2/2020 7:23 AM    Chief Complaint:  Elevated liver tests, abnormal MRI    Interval History:  Initial history (Dr. Villarreal):  59 y.o. male with IDDM, chronic pancreatitis seizure disorder, iron deficiency anemia who presented 2/14/2020 with lethargy and poor appetite per assisted living facility.  He is difficult historian as does not want to answer questions.  He states \"I feel fine and always have.\"  He was evaluated 12/2019 for iron deficiency anemia and DKA.  Had EGD with severe erosive esophagitis.  He refused to drink prep for colonoscopy.  He apparently was brought by assisted living facility after they noted some lethargy and poor appetite.  He has weight loss of 7 pounds over the past month or so.  He denies abdominal pain and states he never had abdominal pain and his appetite is great.  Denies fevers, chills, nausea, vomiting, diarrhea, constipation, melena, hematochezia.  In ED, he had significant leukocytosis with newly elevated alkaline phosphatase and bilirubin with fairly normal transaminases.  He was started on IVF and antibiotics.  He had RUQ US followed by MRCP showing dilated bile and pancreatic ducts, distended GB, chronic pancreatitis and possible abnormal lesions head of pancreas.    2/16/2020: Continues to deny any symptoms including abdominal pain, nausea, vomiting, fevers.  Was refusing IV placement and vitals overnight.  Says he is agreeable to IV placement today  02/17/2020 - No complaints.  Says he feels fine, but does report urine is darker.  I explained imaging findings at length.  02/18/2020 - EUS showed extensive, severe pancreatic calcifications making identification of any pancreatic mass or fluid collection impossible.  Mediastinum was not assessed.  ERCP not done due to normalizing liver enzymes and added risk to patient.  02/19/2020 - No events overnight.  Patient denies fevers/chills/sweats " "and states he \"feels fine\".  Labs show worsening leukocytosis with improving liver enzymes.  02/20/2020 - Tolerating regular diet well.  Denies any abdominal pain.  He does report chronic mid-back pain, but this is not severe or worsening.  No nausea, vomiting, fevers, chills or sweats.  Leukocytosis slightly better.  Alkaline phosphatase still elevated, but remainder of liver enzymes normal.  02/21/2020 - No abdominal pain.  Tolerating diet.  Mild decrease appetite.  No CBC today.  Eager to be discharged.  02/22/2020 - Continues to \"feel fine\".  No abdominal pain, nausea or vomiting.  Tolerating diet.  Leukocytosis improved, but alkaline phosphatase abruptly higher.  CT not done due to IV issues and patient's difficult access.  2/23/20 - No new complaints. He ha lovenox s/c this AM at 6.30. ERCP scheduled for 9 AM. Labs pending   2/25/2020: Denies any complaints.  No pain.  Had ERCP yesterday showing pus from bile duct and distal bile duct stricture likely due to chronic pancreatitis.  Unable to stent  Due to loss of access and amount of ampullary inflammation.  WBC better.  Alk phos higher.  No fevers.  2/27/2020: ERCp not performed yesterday due to oxygen saturation.  Remains on 4L oxygen with large bilateral pleural effusions.  Denies dyspnea or abdominal pain.  WBc trending down.  No fevers.  2/29/2020: No changes.  Still on 4L but denies dyspnea.  WBC down to 10.  No other complaints.  Diuresing well.  3/2/2020: Patient examined and interviewed, course reviewed, discussed with Dr Villarreal.  Course discussed with nursing. No new labs yet for today.  Still requiring 4 L oxygen per NC.  He is resting well, without complaint.      Review of Systems:  Review of Systems   Constitutional: Positive for weight loss. Negative for chills, fever and malaise/fatigue.   Respiratory: Negative for cough and shortness of breath.    Cardiovascular: Negative for chest pain and palpitations.   Gastrointestinal: Negative for " abdominal pain, blood in stool, constipation, diarrhea, heartburn, melena, nausea and vomiting.   Genitourinary: Negative for dysuria and urgency.       Physical Exam:  Physical Exam  Vitals signs and nursing note reviewed.   Constitutional:       Appearance: Normal appearance. He is normal weight. He is not ill-appearing or diaphoretic.   HENT:      Head: Normocephalic and atraumatic.   Eyes:      General: No scleral icterus.  Cardiovascular:      Rate and Rhythm: Normal rate and regular rhythm.      Heart sounds: Normal heart sounds. No murmur.   Pulmonary:      Effort: Pulmonary effort is normal. No respiratory distress.      Breath sounds: No stridor. Decreased breath sounds present. No wheezing, rhonchi or rales.   Abdominal:      General: Abdomen is flat. Bowel sounds are normal. There is no distension.      Palpations: Abdomen is soft. There is no mass.      Tenderness: There is no abdominal tenderness. There is no guarding or rebound.      Hernia: No hernia is present.   Skin:     General: Skin is warm and dry.      Coloration: Skin is not jaundiced.   Neurological:      General: No focal deficit present.      Mental Status: He is alert and oriented to person, place, and time.         Labs:          Recent Labs     20  0618   SODIUM 135 136   POTASSIUM 3.2* 4.0   CHLORIDE 94* 96   CO2 32 31   BUN 8 8   CREATININE 0.53 0.56   MAGNESIUM 1.2* 1.6   CALCIUM 8.0* 8.2*     Recent Labs     20  0618   GLUCOSE 156* 156*     Recent Labs     20  0618   RBC 3.12* 3.30*   HEMOGLOBIN 9.1* 9.7*   HEMATOCRIT 26.6* 28.6*   PLATELETCT 487* 467*     Recent Labs     20  0618   WBC 10.9* 12.1*   NEUTSPOLYS 57.60  --    LYMPHOCYTES 23.00  --    MONOCYTES 13.00  --    EOSINOPHILS 1.50  --    BASOPHILS 1.10  --      Hemodynamics:  Temp (24hrs), Av.5 °C (97.7 °F), Min:36.4 °C (97.5 °F), Max:36.7 °C (98 °F)  Temperature: 36.4 °C (97.6 °F)  Pulse   Av.7  Min: 50  Max: 122   Blood Pressure: 123/67     Respiratory:    Respiration: 18, Pulse Oximetry: 98 %        RUL Breath Sounds: Clear, RML Breath Sounds: Clear, RLL Breath Sounds: Diminished, LEONARD Breath Sounds: Clear, LLL Breath Sounds: Diminished  Fluids:    Intake/Output Summary (Last 24 hours) at 2020 0706  Last data filed at 2/15/2020 2200  Gross per 24 hour   Intake 2485 ml   Output 600 ml   Net 1885 ml        GI/Nutrition:  Orders Placed This Encounter   Procedures   • Diet Order Diabetic     Standing Status:   Standing     Number of Occurrences:   1     Order Specific Question:   Diet:     Answer:   Diabetic [3]     Medical Decision Making, by Problem:  Active Hospital Problems    Diagnosis   • *Sepsis (HCC) [A41.9]   • Elevated alkaline phosphatase level [R74.8]   • Type 2 diabetes mellitus with hyperglycemia, with long-term current use of insulin (HCC) [E11.65, Z79.4]   • Cognitive decline [R41.89]   • Metabolic acidosis [E87.2]   • Hypokalemia [E87.6]   • Hyponatremia [E87.1]   • Chronic pancreatitis (HCC) [K86.1]   • Dyslipidemia [E78.5]   • Cholangitis [K83.09]     Impression  / Plan  60 y/o with chronic pancreatitis, IDDM, iron deficiency anemia, GERD with esophagitis that presented for lethargy and found to have significant leukocytosis and newly elevated liver tests.  Concern for sepsis from biliary source.  Imaging suggests dilation of biliary and pancreatic ducts but no obvious stones although could have ampullary lesion of main duct IPMN causing obstruction.      1. Elevated liver tests, alk phos remains high and rising today, but other liver enzymes normalized - Suspect downstream stricture due to chronic calcific pancreatitis with obstruction due to pus seen on ERCP .  Unable to place biliary stent for more definitive drainage and plan for reattempt   2. Sepsis, unclear source - Mediastinum was not assessed at EUS due to focus being on pancreas and biliary tree  3.  Leukocytosis - Slightly better post ERCP with removal of some pus from bile duct  4. Abnormal imaging of abdomen with dilated bile ducts and pancreatic duct and questionable mass head of pancreas - Only severe chronic calcific pancreatitis identified on EUS  5. Chronic pancreatitis  6. IDDM  7. Iron deficiency anemia  8. GERD with esophagitis  9. Hypertension  10. Seizure disorder  11. Aggressive behavior to healthcare workers  12. Back pain  13. Pancreatic fluid collection tracking adjacent to distal esophagus on imaging, ?pseudocyst versus infected fluid from prolonged biliary obstruction       PLAN:   1. Continue antibiotics  2. Recommend diuresing with IV Lasix to improve effusions and hypoxemia  3. Consider reattempt at ERCP with biliary stenting following diuresis.  He is still with high oxygen requirement  4. Morning labs tomorrow       Quality-Core Measures   Reviewed items::  Radiology images reviewed, Labs reviewed and Medications reviewed

## 2020-03-02 NOTE — PROGRESS NOTES
Completed assessment and administered scheduled medications. Bed in low position, locked, and appropriate alarms set. Personal belongings and call light are within reach. Patient has no additional needs at this time. Patient does not 0000 vitals. Education provided; patient understands importance.

## 2020-03-02 NOTE — PROGRESS NOTES
Telemetry Shift Summary    Rhythm SR  HR Range 69-91  Ectopy r-oPVC  Measurements 0.20/0.08/0.38        Normal Values  Rhythm SR  HR Range    Measurements 0.12-0.20 / 0.06-0.10  / 0.30-0.52

## 2020-03-02 NOTE — CARE PLAN
Problem: Safety  Goal: Will remain free from injury  Outcome: PROGRESSING AS EXPECTED  Note: Patient will remain free from falls/injury. Safety measures in place. Pt calls for assistance.      Problem: Respiratory:  Goal: Respiratory status will improve  Outcome: PROGRESSING AS EXPECTED  Note: Oxygen will be titrated this shift. Patient down from 4L to 3L. Denies SOB.

## 2020-03-02 NOTE — PROGRESS NOTES
Received bedside report from KELLEY Miles. Plan of care discussed. Safety precautions in place. Call light and personal belongings within reach. Patient asking for sandwiches that were ordered. Will follow up.

## 2020-03-03 LAB
ALBUMIN SERPL BCP-MCNC: 2.3 G/DL (ref 3.2–4.9)
ALBUMIN/GLOB SERPL: 0.5 G/DL
ALP SERPL-CCNC: 1147 U/L (ref 30–99)
ALT SERPL-CCNC: 16 U/L (ref 2–50)
ANION GAP SERPL CALC-SCNC: 9 MMOL/L (ref 7–16)
ANISOCYTOSIS BLD QL SMEAR: ABNORMAL
AST SERPL-CCNC: 49 U/L (ref 12–45)
BASOPHILS # BLD AUTO: 1.4 % (ref 0–1.8)
BASOPHILS # BLD: 0.16 K/UL (ref 0–0.12)
BILIRUB SERPL-MCNC: 0.6 MG/DL (ref 0.1–1.5)
BUN SERPL-MCNC: 9 MG/DL (ref 8–22)
CALCIUM SERPL-MCNC: 9 MG/DL (ref 8.4–10.2)
CHLORIDE SERPL-SCNC: 91 MMOL/L (ref 96–112)
CO2 SERPL-SCNC: 30 MMOL/L (ref 20–33)
COMMENT 1642: NORMAL
CREAT SERPL-MCNC: 0.64 MG/DL (ref 0.5–1.4)
EOSINOPHIL # BLD AUTO: 0.26 K/UL (ref 0–0.51)
EOSINOPHIL NFR BLD: 2.3 % (ref 0–6.9)
ERYTHROCYTE [DISTWIDTH] IN BLOOD BY AUTOMATED COUNT: 78.6 FL (ref 35.9–50)
GLOBULIN SER CALC-MCNC: 4.3 G/DL (ref 1.9–3.5)
GLUCOSE BLD-MCNC: 137 MG/DL (ref 65–99)
GLUCOSE BLD-MCNC: 143 MG/DL (ref 65–99)
GLUCOSE BLD-MCNC: 176 MG/DL (ref 65–99)
GLUCOSE BLD-MCNC: 230 MG/DL (ref 65–99)
GLUCOSE BLD-MCNC: 296 MG/DL (ref 65–99)
GLUCOSE SERPL-MCNC: 190 MG/DL (ref 65–99)
HCT VFR BLD AUTO: 34.8 % (ref 42–52)
HGB BLD-MCNC: 11.8 G/DL (ref 14–18)
IMM GRANULOCYTES # BLD AUTO: 0.29 K/UL (ref 0–0.11)
IMM GRANULOCYTES NFR BLD AUTO: 2.6 % (ref 0–0.9)
INR PPP: 0.98 (ref 0.87–1.13)
LYMPHOCYTES # BLD AUTO: 2.43 K/UL (ref 1–4.8)
LYMPHOCYTES NFR BLD: 21.4 % (ref 22–41)
MACROCYTES BLD QL SMEAR: ABNORMAL
MCH RBC QN AUTO: 30.2 PG (ref 27–33)
MCHC RBC AUTO-ENTMCNC: 33.9 G/DL (ref 33.7–35.3)
MCV RBC AUTO: 89 FL (ref 81.4–97.8)
MONOCYTES # BLD AUTO: 1.29 K/UL (ref 0–0.85)
MONOCYTES NFR BLD AUTO: 11.4 % (ref 0–13.4)
NEUTROPHILS # BLD AUTO: 6.91 K/UL (ref 1.82–7.42)
NEUTROPHILS NFR BLD: 60.9 % (ref 44–72)
NRBC # BLD AUTO: 0 K/UL
NRBC BLD-RTO: 0 /100 WBC
PLATELET # BLD AUTO: 370 K/UL (ref 164–446)
PLATELET BLD QL SMEAR: NORMAL
PMV BLD AUTO: 10.8 FL (ref 9–12.9)
POIKILOCYTOSIS BLD QL SMEAR: NORMAL
POLYCHROMASIA BLD QL SMEAR: NORMAL
POTASSIUM SERPL-SCNC: 4.4 MMOL/L (ref 3.6–5.5)
PROT SERPL-MCNC: 6.6 G/DL (ref 6–8.2)
PROTHROMBIN TIME: 13.1 SEC (ref 12–14.6)
RBC # BLD AUTO: 3.91 M/UL (ref 4.7–6.1)
RBC BLD AUTO: PRESENT
SODIUM SERPL-SCNC: 130 MMOL/L (ref 135–145)
TARGETS BLD QL SMEAR: NORMAL
WBC # BLD AUTO: 11.3 K/UL (ref 4.8–10.8)

## 2020-03-03 PROCEDURE — 700111 HCHG RX REV CODE 636 W/ 250 OVERRIDE (IP): Performed by: INTERNAL MEDICINE

## 2020-03-03 PROCEDURE — 700105 HCHG RX REV CODE 258: Performed by: INTERNAL MEDICINE

## 2020-03-03 PROCEDURE — 85025 COMPLETE CBC W/AUTO DIFF WBC: CPT

## 2020-03-03 PROCEDURE — 85610 PROTHROMBIN TIME: CPT

## 2020-03-03 PROCEDURE — 80053 COMPREHEN METABOLIC PANEL: CPT

## 2020-03-03 PROCEDURE — 82962 GLUCOSE BLOOD TEST: CPT | Mod: 91

## 2020-03-03 PROCEDURE — A9270 NON-COVERED ITEM OR SERVICE: HCPCS | Performed by: INTERNAL MEDICINE

## 2020-03-03 PROCEDURE — 700102 HCHG RX REV CODE 250 W/ 637 OVERRIDE(OP): Performed by: INTERNAL MEDICINE

## 2020-03-03 PROCEDURE — 770006 HCHG ROOM/CARE - MED/SURG/GYN SEMI*

## 2020-03-03 PROCEDURE — 700111 HCHG RX REV CODE 636 W/ 250 OVERRIDE (IP): Performed by: HOSPITALIST

## 2020-03-03 PROCEDURE — 99497 ADVNCD CARE PLAN 30 MIN: CPT | Performed by: INTERNAL MEDICINE

## 2020-03-03 PROCEDURE — 99232 SBSQ HOSP IP/OBS MODERATE 35: CPT | Mod: 25 | Performed by: INTERNAL MEDICINE

## 2020-03-03 RX ADMIN — HYDROMORPHONE HYDROCHLORIDE 0.5 MG: 1 INJECTION, SOLUTION INTRAMUSCULAR; INTRAVENOUS; SUBCUTANEOUS at 16:45

## 2020-03-03 RX ADMIN — ATORVASTATIN CALCIUM 40 MG: 40 TABLET, FILM COATED ORAL at 17:53

## 2020-03-03 RX ADMIN — HYDROMORPHONE HYDROCHLORIDE 0.5 MG: 1 INJECTION, SOLUTION INTRAMUSCULAR; INTRAVENOUS; SUBCUTANEOUS at 00:05

## 2020-03-03 RX ADMIN — METRONIDAZOLE 500 MG: 500 TABLET ORAL at 06:34

## 2020-03-03 RX ADMIN — HYDROMORPHONE HYDROCHLORIDE 0.5 MG: 1 INJECTION, SOLUTION INTRAMUSCULAR; INTRAVENOUS; SUBCUTANEOUS at 20:31

## 2020-03-03 RX ADMIN — METRONIDAZOLE 500 MG: 500 TABLET ORAL at 12:26

## 2020-03-03 RX ADMIN — OMEPRAZOLE 20 MG: 20 CAPSULE, DELAYED RELEASE ORAL at 17:54

## 2020-03-03 RX ADMIN — INSULIN HUMAN 5 UNITS: 100 INJECTION, SUSPENSION SUBCUTANEOUS at 17:58

## 2020-03-03 RX ADMIN — INSULIN HUMAN 4 UNITS: 100 INJECTION, SOLUTION PARENTERAL at 20:29

## 2020-03-03 RX ADMIN — POTASSIUM CHLORIDE 40 MEQ: 1500 TABLET, EXTENDED RELEASE ORAL at 17:53

## 2020-03-03 RX ADMIN — OMEPRAZOLE 20 MG: 20 CAPSULE, DELAYED RELEASE ORAL at 06:34

## 2020-03-03 RX ADMIN — METRONIDAZOLE 500 MG: 500 TABLET ORAL at 17:54

## 2020-03-03 RX ADMIN — FUROSEMIDE 40 MG: 10 INJECTION, SOLUTION INTRAVENOUS at 16:45

## 2020-03-03 RX ADMIN — METRONIDAZOLE 500 MG: 500 TABLET ORAL at 00:05

## 2020-03-03 RX ADMIN — ACETAMINOPHEN 650 MG: 325 TABLET, FILM COATED ORAL at 06:34

## 2020-03-03 RX ADMIN — METRONIDAZOLE 500 MG: 500 TABLET ORAL at 23:34

## 2020-03-03 RX ADMIN — Medication 100 MG: at 06:34

## 2020-03-03 RX ADMIN — TAMSULOSIN HYDROCHLORIDE 0.4 MG: 0.4 CAPSULE ORAL at 06:34

## 2020-03-03 RX ADMIN — INSULIN HUMAN 7 UNITS: 100 INJECTION, SOLUTION PARENTERAL at 17:58

## 2020-03-03 RX ADMIN — ACETAMINOPHEN 650 MG: 325 TABLET, FILM COATED ORAL at 20:31

## 2020-03-03 RX ADMIN — HYDROMORPHONE HYDROCHLORIDE 0.5 MG: 1 INJECTION, SOLUTION INTRAMUSCULAR; INTRAVENOUS; SUBCUTANEOUS at 12:26

## 2020-03-03 RX ADMIN — ACETAMINOPHEN 650 MG: 325 TABLET, FILM COATED ORAL at 13:23

## 2020-03-03 RX ADMIN — INSULIN HUMAN 3 UNITS: 100 INJECTION, SOLUTION PARENTERAL at 06:41

## 2020-03-03 RX ADMIN — ACETAMINOPHEN 650 MG: 325 TABLET, FILM COATED ORAL at 00:05

## 2020-03-03 RX ADMIN — CEFTRIAXONE SODIUM 1 G: 1 INJECTION, POWDER, FOR SOLUTION INTRAMUSCULAR; INTRAVENOUS at 17:55

## 2020-03-03 RX ADMIN — FUROSEMIDE 40 MG: 10 INJECTION, SOLUTION INTRAVENOUS at 06:34

## 2020-03-03 RX ADMIN — POTASSIUM CHLORIDE 40 MEQ: 1500 TABLET, EXTENDED RELEASE ORAL at 06:34

## 2020-03-03 RX ADMIN — INSULIN HUMAN 5 UNITS: 100 INJECTION, SUSPENSION SUBCUTANEOUS at 06:42

## 2020-03-03 RX ADMIN — HYDROMORPHONE HYDROCHLORIDE 0.5 MG: 1 INJECTION, SOLUTION INTRAMUSCULAR; INTRAVENOUS; SUBCUTANEOUS at 06:34

## 2020-03-03 RX ADMIN — PANCRELIPASE 48000 UNITS: 24000; 76000; 120000 CAPSULE, DELAYED RELEASE PELLETS ORAL at 06:34

## 2020-03-03 ASSESSMENT — ENCOUNTER SYMPTOMS
BLOOD IN STOOL: 0
WEAKNESS: 0
VOMITING: 0
PALPITATIONS: 0
DIZZINESS: 0
ABDOMINAL PAIN: 0
DIARRHEA: 0
WEIGHT LOSS: 1
DEPRESSION: 0
SHORTNESS OF BREATH: 0
WHEEZING: 0
NERVOUS/ANXIOUS: 0
SORE THROAT: 0
PHOTOPHOBIA: 0
SPEECH CHANGE: 0
CHILLS: 0
FEVER: 0
BLURRED VISION: 0
HEADACHES: 0
CLAUDICATION: 0
NAUSEA: 0
HEARTBURN: 0
MYALGIAS: 0
SPUTUM PRODUCTION: 0
SENSORY CHANGE: 0
COUGH: 0
INSOMNIA: 0
BACK PAIN: 0
CONSTIPATION: 0

## 2020-03-03 ASSESSMENT — FIBROSIS 4 INDEX: FIB4 SCORE: 1.61

## 2020-03-03 NOTE — PROGRESS NOTES
"  Gastroenterology Progress Note     Author: Luca Mantilla MD Date & Time Created: 3/3/2020 7:35 AM    Chief Complaint:  Elevated liver tests, abnormal MRI    Interval History:  Initial history (Dr. Villarreal):  59 y.o. male with IDDM, chronic pancreatitis seizure disorder, iron deficiency anemia who presented 2/14/2020 with lethargy and poor appetite per assisted living facility.  He is difficult historian as does not want to answer questions.  He states \"I feel fine and always have.\"  He was evaluated 12/2019 for iron deficiency anemia and DKA.  Had EGD with severe erosive esophagitis.  He refused to drink prep for colonoscopy.  He apparently was brought by assisted living facility after they noted some lethargy and poor appetite.  He has weight loss of 7 pounds over the past month or so.  He denies abdominal pain and states he never had abdominal pain and his appetite is great.  Denies fevers, chills, nausea, vomiting, diarrhea, constipation, melena, hematochezia.  In ED, he had significant leukocytosis with newly elevated alkaline phosphatase and bilirubin with fairly normal transaminases.  He was started on IVF and antibiotics.  He had RUQ US followed by MRCP showing dilated bile and pancreatic ducts, distended GB, chronic pancreatitis and possible abnormal lesions head of pancreas.    2/16/2020: Continues to deny any symptoms including abdominal pain, nausea, vomiting, fevers.  Was refusing IV placement and vitals overnight.  Says he is agreeable to IV placement today  02/17/2020 - No complaints.  Says he feels fine, but does report urine is darker.  I explained imaging findings at length.  02/18/2020 - EUS showed extensive, severe pancreatic calcifications making identification of any pancreatic mass or fluid collection impossible.  Mediastinum was not assessed.  ERCP not done due to normalizing liver enzymes and added risk to patient.  02/19/2020 - No events overnight.  Patient denies fevers/chills/sweats " "and states he \"feels fine\".  Labs show worsening leukocytosis with improving liver enzymes.  02/20/2020 - Tolerating regular diet well.  Denies any abdominal pain.  He does report chronic mid-back pain, but this is not severe or worsening.  No nausea, vomiting, fevers, chills or sweats.  Leukocytosis slightly better.  Alkaline phosphatase still elevated, but remainder of liver enzymes normal.  02/21/2020 - No abdominal pain.  Tolerating diet.  Mild decrease appetite.  No CBC today.  Eager to be discharged.  02/22/2020 - Continues to \"feel fine\".  No abdominal pain, nausea or vomiting.  Tolerating diet.  Leukocytosis improved, but alkaline phosphatase abruptly higher.  CT not done due to IV issues and patient's difficult access.  2/23/20 - No new complaints. He ha lovenox s/c this AM at 6.30. ERCP scheduled for 9 AM. Labs pending   2/25/2020: Denies any complaints.  No pain.  Had ERCP yesterday showing pus from bile duct and distal bile duct stricture likely due to chronic pancreatitis.  Unable to stent  Due to loss of access and amount of ampullary inflammation.  WBC better.  Alk phos higher.  No fevers.  2/27/2020: ERCp not performed yesterday due to oxygen saturation.  Remains on 4L oxygen with large bilateral pleural effusions.  Denies dyspnea or abdominal pain.  WBc trending down.  No fevers.  2/29/2020: No changes.  Still on 4L but denies dyspnea.  WBC down to 10.  No other complaints.  Diuresing well.  3/2/2020: Patient examined and interviewed, course reviewed, discussed with Dr Villarreal.  Course discussed with nursing. No new labs yet for today.  Still requiring 4 L oxygen per NC.  He is resting well, without complaint.    3/3/20: Patient examined and interviewed, course reviewed.   Discussed with RN and with Dr Lorenzo, therapeutic endoscopist.  No morning labs available yet (patient  Refused earlier but agrees to labs now).  Pt denies abd pain, fevers, chills.  Some nausea with emesis last hs.  No black or " tarry stools, rectal bleeding, difficulty swallowing.  His oxygen saturaiton is low 90s on one liter of oxygen now, improved.      Review of Systems:  Review of Systems   Constitutional: Positive for weight loss. Negative for chills, fever and malaise/fatigue.   Respiratory: Negative for cough, shortness of breath and wheezing.    Cardiovascular: Negative for chest pain, palpitations and leg swelling.   Gastrointestinal: Negative for abdominal pain, blood in stool, constipation, diarrhea, heartburn, melena, nausea and vomiting.   Genitourinary: Negative for dysuria and urgency.   Skin: Negative for itching and rash.   Neurological: Negative for dizziness and headaches.       Physical Exam:  Physical Exam  Vitals signs and nursing note reviewed.   Constitutional:       Appearance: Normal appearance. He is normal weight. He is not ill-appearing or diaphoretic.   HENT:      Head: Normocephalic and atraumatic.      Nose: Nose normal.   Eyes:      General: No scleral icterus.        Right eye: No discharge.         Left eye: No discharge.      Pupils: Pupils are equal, round, and reactive to light.   Cardiovascular:      Rate and Rhythm: Normal rate and regular rhythm.      Heart sounds: Normal heart sounds. No murmur.   Pulmonary:      Effort: Pulmonary effort is normal. No respiratory distress.      Breath sounds: No stridor. Decreased breath sounds present. No wheezing, rhonchi or rales.   Abdominal:      General: Abdomen is flat. Bowel sounds are normal. There is no distension.      Palpations: Abdomen is soft. There is no mass.      Tenderness: There is no abdominal tenderness. There is no guarding or rebound.      Hernia: No hernia is present.   Musculoskeletal:         General: No swelling or tenderness.   Skin:     General: Skin is warm and dry.      Coloration: Skin is not jaundiced.      Findings: No bruising or rash.   Neurological:      General: No focal deficit present.      Mental Status: He is alert and  oriented to person, place, and time.   Psychiatric:         Mood and Affect: Mood normal.         Labs:          Recent Labs     2018 20  0851   SODIUM 136 134*   POTASSIUM 4.0 4.2   CHLORIDE 96 90*   CO2 31 33   BUN 8 9   CREATININE 0.56 0.66   MAGNESIUM 1.6  --    CALCIUM 8.2* 8.8     Recent Labs     2018 20  0851   ALTSGPT  --  17   ASTSGOT  --  53*   ALKPHOSPHAT  --  1199*   TBILIRUBIN  --  0.8   GLUCOSE 156* 167*     Recent Labs     2018 20  0851   RBC 3.30* 3.89*   HEMOGLOBIN 9.7* 11.5*   HEMATOCRIT 28.6* 34.9*   PLATELETCT 467* 486*     Recent Labs     2018 20  0851   WBC 12.1* 13.4*   NEUTSPOLYS  --  66.20   LYMPHOCYTES  --  18.10*   MONOCYTES  --  10.10   EOSINOPHILS  --  2.00   BASOPHILS  --  1.10   ASTSGOT  --  53*   ALTSGPT  --  17   ALKPHOSPHAT  --  1199*   TBILIRUBIN  --  0.8     Hemodynamics:  Temp (24hrs), Av.6 °C (97.8 °F), Min:36.4 °C (97.6 °F), Max:36.7 °C (98.1 °F)  Temperature: (pt refused)  Pulse  Av.7  Min: 50  Max: 122   Blood Pressure: (pt refused)     Respiratory:    Respiration: (pt refused), Pulse Oximetry: (pt refused)        RUL Breath Sounds: Clear, RML Breath Sounds: Clear, RLL Breath Sounds: Diminished, LEONARD Breath Sounds: Clear, LLL Breath Sounds: Diminished  Fluids:    Intake/Output Summary (Last 24 hours) at 2020 0706  Last data filed at 2/15/2020 2200  Gross per 24 hour   Intake 2485 ml   Output 600 ml   Net 1885 ml     Weight: 62.2 kg (137 lb 2 oz)  GI/Nutrition:  Orders Placed This Encounter   Procedures   • Diet Order Diabetic     Standing Status:   Standing     Number of Occurrences:   1     Order Specific Question:   Diet:     Answer:   Diabetic [3]     Medical Decision Making, by Problem:  Active Hospital Problems    Diagnosis   • *Sepsis (HCC) [A41.9]   • Elevated alkaline phosphatase level [R74.8]   • Type 2 diabetes mellitus with hyperglycemia, with long-term current use of insulin (HCC)  [E11.65, Z79.4]   • Cognitive decline [R41.89]   • Metabolic acidosis [E87.2]   • Hypokalemia [E87.6]   • Hyponatremia [E87.1]   • Chronic pancreatitis (HCC) [K86.1]   • Dyslipidemia [E78.5]   • Cholangitis [K83.09]     Impression  / Plan  60 y/o with chronic pancreatitis, IDDM, iron deficiency anemia, GERD with esophagitis that presented for lethargy and found to have significant leukocytosis and newly elevated liver tests.  Concern for sepsis from biliary source.  Imaging suggests dilation of biliary and pancreatic ducts but no obvious stones although could have ampullary lesion of main duct IPMN causing obstruction.      1. Elevated liver tests- likely due to combination of choledocholithiasis (status post extraction) and biliary stricture in setting of chronic pancreatitis.  Awaiting his labs today.  If liver panel improving he may not need repeat ERCP at this time, per discussions with therapeutic endoscopist.    2. Sepsis, unclear source - Mediastinum was not assessed at EUS due to focus being on pancreas and biliary tree.  Better now.  3. Leukocytosis - WBC trending up some yesterday.  Awaiting repeat labs today.   4. Abnormal imaging of abdomen with dilated bile ducts and pancreatic duct and questionable mass head of pancreas - Only severe chronic calcific pancreatitis identified on EUS  5. Chronic pancreatitis- as above   6. IDDM  7. Iron deficiency anemia  8. GERD with esophagitis  9. Hypertension  10. Seizure disorder  11. Aggressive behavior to healthcare workers  12. Back pain  13. Pancreatic fluid collection tracking adjacent to distal esophagus on imaging, ?pseudocyst versus infected fluid from prolonged biliary obstruction       PLAN:   1. Continue antibiotics, as you are   2. Diuresis, as you are  3. Consider reattempt at ERCP with biliary stenting following diuresis.    4. Morning labs from today and then re-assess for potential repeat ERCP tomorrow or later this week.  Will discuss with RN later  today, and with Dr SAADIA Lorenzo.        Quality-Core Measures   Reviewed items::  Radiology images reviewed, Labs reviewed and Medications reviewed

## 2020-03-03 NOTE — PROGRESS NOTES
Received bedside report from KELLEY Miles. Plan of care discussed. Safety precautions in place. Call light and personal belongings within reach. Patient has no needs at this time.

## 2020-03-03 NOTE — PROGRESS NOTES
Patient's oxygen has been weaned to 1.5L but sats at 91%. Dr Mantilla notified and ok' to continue monitoring him for one more day before attempting ERCP again.

## 2020-03-03 NOTE — PROGRESS NOTES
Completed assessment and administered scheduled medications. Bed in low position, locked, and appropriate alarms set. Personal belongings and call light are within reach. Patient has no additional needs at this time. Patient refusing 0000 and 0400 vitals.

## 2020-03-03 NOTE — DISCHARGE PLANNING
LSW left a message for Kristen at [facility] to call LSW with required documents for referral.

## 2020-03-03 NOTE — CARE PLAN
Problem: Knowledge Deficit  Goal: Knowledge of the prescribed therapeutic regimen will improve  Outcome: PROGRESSING AS EXPECTED  Note: Encourage patient to ask questions and be involved in plan of care. Provide education on treatment plan, diagnostic testing, and medications; have patient verbalize understanding.       Problem: Respiratory:  Goal: Respiratory status will improve  Outcome: PROGRESSING AS EXPECTED  Note: Assess and monitor pulmonary status and titrate oxygen to maintain SPo2 above 90%. Encourage incentive spirometer, deep breathing, turning, and coughing.

## 2020-03-03 NOTE — PROGRESS NOTES
"Hospital Medicine Daily Progress Note    Date of Service  3/3/2020    Chief Complaint  59 y.o. male admitted 2/14/2020 with poor appetite.    Hospital Course    Patient with cognitive deficit presented from group home with lethargy and poor appetite.  He had elevation in alk phos and leukocytosis and treated for concern of sepsis with abdominal source.  HIDA was performed which showed no evidence of obstruction nor cholecystitis.    MRCP showed dilation of biliary ducts and concern of pancreatic head mass.   GI  EUS on 2/18 and showed significant calcifications likely related to chronic pancreatitis.  He underwent ERCP on 2/24 and purulent drainage from the ampulla of vater os and he had biliary needle-knife sphincterotomy to relieve the purulent drainage.          Interval Problem Update  2/25 Patient states he is hungry and wants solid food, still on a liquid diet.  Plan to move forward with repeat ERCP with Dr Wakefield tomorrow - on schedule at 1500 with anticipated stenting of the ampulla.  Patient denies any current abdominal pain.  2/26 Patient doing about same today, denies pain, refused labs this am.  ERCP scheduled for today for stenting of the ampulla.  Patient voicing frustration regarding NPO.  2/27 Patient without complaint today as he had breakfast in front of him when I saw him.  ERCP was cancelled due to increasing hypoxia to where he was requiring 4L.  CXR completed with again shows dulling of the bases consistent with known pleural effusions but also shows pulmonary congestion - will diurese to see if this improves.  2/28 Patient feeling okay today, looks better than on previous days, responding well to diuresis though lungs sound about the same.  He states his back is aching \"all over\" but is not severely painful and no focal tenderness.  He states he ambulated in the hallway about \"a quarter of the way down\" yesterday.  Will continue with diuresis and work toward less oxygen requirement so he can " have repeat ERCP for stent placement.  2/29 Patient feeling well today, states he will walk in the hallways with the staff today.  Oxygen demand is about the same on 4L but lungs are sounding clear.  K is 3.2 and mag 1.2, will replete both.  WBC continues to drop.  3/1 Patient feeling well today, states he has no pain and doesn't feel short of breath.  Oxygen overnight documented at 4.5L nasal cannula despite continued aggressive diuresis with 10 L down since admission.  CXR pending for further evaluation.  3/2 patient states he feels same.  He continues to diurese well and now is 13L down.  He is saturating well on 3L and if he can get back to 2L, will move forward with ERCP stenting.  Discussed with Dr Burkett.  3/3.  Patient feels pleasant today.  No significant adverse event.  Oxygen level down to 1 L.  Continue monitor.  Pending GI to perform ERCP.  Patient is oriented however very poor insight of his health condition.  Meet with patient's POA today and care group.  Continue DNR and pursuing for possible hospice. Patient's pain is general, 2-3/10, intermittent and does not radiate to other location, sharp and with some tingling. Can be controlled by pain meds.       Consultants/Specialty  GI - [facility]    Code Status  DNR/DNI, has guardian    Disposition  Group home once stent placed in ERCP.    Review of Systems  Review of Systems   Constitutional: Negative for chills, fever and malaise/fatigue.   HENT: Negative for congestion and sore throat.    Eyes: Negative for blurred vision and photophobia.   Respiratory: Negative for cough, sputum production and shortness of breath.    Cardiovascular: Negative for chest pain, claudication and leg swelling.   Gastrointestinal: Negative for abdominal pain, constipation, heartburn and nausea.   Genitourinary: Negative for dysuria and hematuria.   Musculoskeletal: Negative for back pain, joint pain and myalgias.   Skin: Negative for itching and rash.   Neurological: Negative  for dizziness, sensory change, speech change, weakness and headaches.   Psychiatric/Behavioral: Negative for depression. The patient is not nervous/anxious and does not have insomnia.         Physical Exam  Temp:  [36.4 °C (97.6 °F)-36.6 °C (97.8 °F)] 36.6 °C (97.8 °F)  Pulse:  [75-97] 97  Resp:  [16-18] 16  BP: (105-123)/(62-77) 111/62  SpO2:  [90 %-95 %] 95 %    Physical Exam  Vitals signs and nursing note reviewed.   Constitutional:       General: He is not in acute distress.     Appearance: Normal appearance. He is not ill-appearing.   HENT:      Head: Normocephalic and atraumatic.      Right Ear: Tympanic membrane and external ear normal. There is no impacted cerumen.      Left Ear: Tympanic membrane and external ear normal. There is no impacted cerumen.      Nose: No congestion or rhinorrhea.      Mouth/Throat:      Mouth: Mucous membranes are moist.      Pharynx: Oropharynx is clear. No oropharyngeal exudate.   Eyes:      General: No scleral icterus.        Right eye: No discharge.         Left eye: No discharge.      Extraocular Movements: Extraocular movements intact.      Conjunctiva/sclera: Conjunctivae normal.      Pupils: Pupils are equal, round, and reactive to light.   Neck:      Musculoskeletal: Normal range of motion and neck supple. No neck rigidity or muscular tenderness.   Cardiovascular:      Rate and Rhythm: Normal rate and regular rhythm.      Pulses: Normal pulses.      Heart sounds: Normal heart sounds. No murmur. No friction rub.   Pulmonary:      Effort: Pulmonary effort is normal. No respiratory distress.      Breath sounds: Normal breath sounds. No stridor. No wheezing, rhonchi or rales.   Chest:      Chest wall: No tenderness.   Abdominal:      General: Abdomen is flat. Bowel sounds are normal. There is no distension.      Palpations: Abdomen is soft. There is no mass.      Tenderness: There is no guarding or rebound.   Musculoskeletal: Normal range of motion.         General: No  swelling, tenderness or deformity.   Lymphadenopathy:      Cervical: No cervical adenopathy.   Skin:     General: Skin is warm and dry.      Capillary Refill: Capillary refill takes more than 3 seconds.      Coloration: Skin is not jaundiced or pale.      Findings: No erythema.   Neurological:      General: No focal deficit present.      Mental Status: He is alert and oriented to person, place, and time. Mental status is at baseline.      Cranial Nerves: No cranial nerve deficit.      Motor: No weakness.   Psychiatric:         Mood and Affect: Mood normal.         Behavior: Behavior normal.         Fluids    Intake/Output Summary (Last 24 hours) at 3/3/2020 1537  Last data filed at 3/3/2020 1059  Gross per 24 hour   Intake 150 ml   Output 2200 ml   Net -2050 ml       Laboratory  Recent Labs     03/01/20 0618 03/02/20  0851 03/03/20  0815   WBC 12.1* 13.4* 11.3*   RBC 3.30* 3.89* 3.91*   HEMOGLOBIN 9.7* 11.5* 11.8*   HEMATOCRIT 28.6* 34.9* 34.8*   MCV 86.7 89.7 89.0   MCH 29.4 29.6 30.2   MCHC 33.9 33.0* 33.9   RDW 70.8* 79.6* 78.6*   PLATELETCT 467* 486* 370   MPV 10.1 10.4 10.8     Recent Labs     03/01/20 0618 03/02/20  0851 03/03/20  0815   SODIUM 136 134* 130*   POTASSIUM 4.0 4.2 4.4   CHLORIDE 96 90* 91*   CO2 31 33 30   GLUCOSE 156* 167* 190*   BUN 8 9 9   CREATININE 0.56 0.66 0.64   CALCIUM 8.2* 8.8 9.0     Recent Labs     03/03/20  0815   INR 0.98               Imaging  DX-CHEST-PORTABLE (1 VIEW)   Final Result         1. No significant interval change.      DX-CHEST-PORTABLE (1 VIEW)   Final Result      New subpulmonic effusions, bibasilar atelectasis and edema      DX-PORTABLE FLUOROSCOPY < 1 HOUR   Final Result      Portable fluoroscopy utilized for 1.1 minute.         INTERPRETING LOCATION: [address] [place] [address], [facility]      [id number] SPHINCTEROTOMY   Final Result      Intraoperative fluoroscopy spot images as described above.      CT-ABDOMEN WITH & W/O   Final Result      1.  No significant  change since CT abdomen and pelvis obtained 2/16/2020      2.  Findings consistent with high-grade biliary obstruction with dilation of the common bile duct measuring 17 mm, dilated gallbladder, dilated pancreatic duct with change in caliber of the pancreatic duct at the level pancreatic head      3.  Findings could be either malignancy the pancreatic head versus scarring from chronic pancreatitis.      4.  Sequela of chronic pancreatitis      5.  Large right medial chest rim-enhancing loculated fluid collection probably within the pleural space measuring 10.5 x 3.1 centimeter transversely and 7 cm craniocaudal      6.  Rim-enhancing fluid collection in the hepatorenal space measuring 11.2 cm craniocaudal and 2-3 cm in thickness.      7.  The rim-enhancing fluid collection are suspicious for infection/abscess      8.  Bilateral atelectasis and small-moderate-sized pleural effusion after      9.  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  Biliary dilatation without evidence of high-grade biliary obstruction.   2.  Gallbladder visualization after morphine administration. No acute cholecystitis.      CT-ABDOMEN-PELVIS WITH   Final Result      1.  New rim enhancing fluid collection tracks along the right heart margin, azygoesophageal recess and possibly communicates with Morison's pouch tracking along the right aspect of the inferior vena cava. This is nonspecific and could indicate abscess,    dissecting pseudocyst, mucinous cystic neoplasm related fluid      2.  New extra and intrahepatic biliary ductal dilatation with 6 cm dilatation of the gallbladder also seen. This indicates distal common bile duct/ampullary level obstruction. Stricture, mass effect from adjacent pancreas calcifications or small    ampullary mass are differential possibilities      3.  Decreased small pleural effusions. Some  loculation on the right is possible      4.  Slightly decreased diffuse pancreatic ductal dilatation now measuring 11 mm. Evidence of chronic pancreatitis. New 3 cm cystic structure anterior to the pancreas could be a pseudocyst. Abscess or necrotic/cystic neoplasm are in the differential.      5.  Multiple chronic findings including status post aorto bifemoral bypass with approaching 70% stenosis of the left superficial femoral artery, nonobstructive right 3 mm nephrolithiasis, large volume rectal vault stool, dilated bladder which could    indicate outlet obstruction or neurogenic bladder, splenosis      [id number]/O   Final Result      1.  Severe dilatation of the intrahepatic biliary ducts, common bile duct and pancreatic duct. The distal common bile duct and the proximal pancreatic duct is not visualized at the pancreatic head. The pancreatic head appears to be diffusely enlarged and    demonstrates multiple small cysts. These findings are concerning for pancreatic neoplasm such as intraductal papillary mucinous cystoadenoma. The other differential diagnosis includes periampullary carcinoma. MR examination of the abdomen with contrast    is recommended for further evaluation.   2.  Mixed signal intensity in the right perinephric space likely represents perinephric hematoma.   3.  Large hiatal hernia.   4.  Diffuse gastric wall thickening.      US-RUQ   Final Result      1.  Hepatomegaly.   2.  Hepatic steatosis.   3.  Gallbladder sludge. Mild intrahepatic ductal dilation. Dilation of the common duct up to 13 mm. No definite distal obstructing etiology is identified. MRCP could be obtained for further evaluation if indicated.   4.  Trace ascites.   5.  Pancreatic calcifications likely sequela of chronic pancreatitis.      DX-CHEST-PORTABLE (1 VIEW)   Final Result      Cardiomegaly.           Assessment/Plan  * Pancreatic mass  Assessment & Plan  MRI with concern for pancreatic malignancy  ERCP with no evidence  of malignancy, calcifications of pancreas present   is elevated but less than 1000 so cannot rule in cancer, likely due chronic pancreatitis  Repeat CT showed chronic pancreatitis and loculated chest effusion    Pending ERCP again    Type 2 diabetes mellitus with hyperglycemia, with long-term current use of insulin ([facility])- (present on admission)  Assessment & Plan  He has an insulin sensor in his right lower quadrant, uses long-acting insulin 12 units and sliding scale.      Cognitive decline- (present on admission)  Assessment & Plan  Patient likely has a chronic component to his cognitive decline as well as acute worsening  Mental status now at his baseline, patient has a legal guardian  Continue pursuing guardianship by caregiver and POA    Metabolic acidosis  Assessment & Plan  IV fluids with improvement    Hypokalemia  Assessment & Plan  Resolved, replete with diuresis.  Replaced magnesium      Hypomagnesemia  Assessment & Plan  1.6  Mag rider 2 g given 2/29    Sepsis ([facility])  Assessment & Plan  This is Sepsis Present on admission  SIRS criteria identified on my evaluation include: Tachycardia, with heart rate greater than 90 BPM and Leukocyosis, with WBC greater than 12,000  Source is GI  Sepsis protocol complete  Continue antibiotics  White cell count trending down  Repeat ERCP will be rescheduled once oxygenation improves.    Currently resolved    Acute respiratory failure with hypoxia ([facility])  Assessment & Plan  Diuresing effectively with lasix -10 L since admission and 1-2 liters down daily with increased diuresis  CXR same  Oxygen demand decreasing    Significant improving, continue diuretic    Loculated pleural effusion  Assessment & Plan  In medial right chest, continue antibiotics, pus drained from ampulla of Vater with GI 2/24  Pulmonary edema appreciated on CXR 2/27 - diurese to get oxygen demand down, anticipate another attempt at ERCP in a few days.    Cholangitis  Assessment & Plan  Biliary  obstruction suggested on imaging, GI consulted  Alkaline phosphatase is stable over 1100  Endoscopy done showing pancreatic calcifications but no stones, no area to biopsy  Continue antibiotics, white cell count still elevated  Repeat CT scan done  ERCP repeated 2/24, given swelling so that a ampullary duct stent could not be placed  Repeat planned for 2/26 but cancelled because of hypoxia.    Recurrent major depressive disorder, in full remission (HCC)  Assessment & Plan  .    Hyponatremia  Assessment & Plan  Resolved with fluids    Chronic pancreatitis (HCC)- (present on admission)  Assessment & Plan  Continue pancreatic enzymes with meals  Celiac nerve block for pain control, per GI    Dyslipidemia- (present on admission)  Assessment & Plan  Continue statin     ACP:  Total time spent on advanced care planning, excluding time spent on daily care: 16 minutes.    1st 30 minutes [id number]       I discussed extensively with patient's care team, POA, is regarding code status and plan of care. We also discussed advanced care planning including diagnosis, prognosis, plan of care, risks and benefits of any therapies that could be offered, as well as alternatives including palliation and hospice, as appropriate. My discussion is summarized above in detail. Confirmed with DNR and possible hospice candidate    VTE prophylaxis: scds    I have seen and examined patient on 3/3/2020. I have reviewed vitals, new labs and imaging. I have discussed POC with RN. There are no changes from (3/2/2020) except for what is mentioned above.       Current Facility-Administered Medications:   •  potassium chloride SA (Kdur) tablet 40 mEq, 40 mEq, Oral, BID, Nahomy Perez, D.O., 40 mEq at 03/03/20 0634  •  furosemide (LASIX) injection 40 mg, 40 mg, Intravenous, BID DIURETIC, Nahomy Perez, D.O., 40 mg at 03/03/20 0634  •  metroNIDAZOLE (FLAGYL) tablet 500 mg, 500 mg, Oral, Q6HRS, Nahomy Perez, D.O., 500 mg at 03/03/20 1226  •  cefTRIAXone  (ROCEPHIN) 1 g in  mL IVPB, 1 g, Intravenous, Q24HRS, Hemant Lorenzo M.D., Stopped at 03/02/20 1803  •  menthol (HALLS) lozenge 1 Lozenge, 1 Lozenge, Oral, Q2HRS PRN, Betsey Gentile M.D., 1 Lozenge at 02/23/20 0630  •  cyclobenzaprine (FLEXERIL) tablet 10 mg, 10 mg, Oral, TID PRN, Betsey Gentile M.D., 10 mg at 02/27/20 2118  •  insulin NPH (HUMULIN/NOVOLIN) injection 5 Units, 5 Units, Subcutaneous, BID INSULIN, Rosendo Encinas M.D., 5 Units at 03/03/20 0642  •  haloperidol lactate (HALDOL) injection 2-5 mg, 2-5 mg, Intravenous, Q6HRS PRN, Rosendo Encinas M.D.  •  insulin regular (HUMULIN R) injection 3-14 Units, 3-14 Units, Subcutaneous, 4X/DAY ACHS, Stopped at 03/03/20 1100 **AND** Accu-Chek ACHS, , , Q AC AND BEDTIME(S) **AND** NOTIFY MD and PharmD, , , Once **AND** glucose 4 g chewable tablet 16 g, 16 g, Oral, Q15 MIN PRN **AND** DEXTROSE 10% BOLUS 250 mL, 250 mL, Intravenous, Q15 MIN PRN, Rosendo Encinas M.D.  •  atorvastatin (LIPITOR) tablet 40 mg, 40 mg, Oral, Q EVENING, Grace Patel, D.O., 40 mg at 03/02/20 1742  •  omeprazole (PRILOSEC) capsule 20 mg, 20 mg, Oral, BID, Grace Patel D.O., 20 mg at 03/03/20 0634  •  pancrelipase (Lip-Prot-Amyl) (CREON 64315) 84615-56716 units capsule 48,000 Units, 2 Cap, Oral, DAILY, Grace Patel D.O., 48,000 Units at 03/03/20 0634  •  sucralfate (CARAFATE) tablet 1 g, 1 g, Oral, BID PRN, Grace Patel D.O.  •  tamsulosin (FLOMAX) capsule 0.4 mg, 0.4 mg, Oral, DAILY, Grace Patel D.O., 0.4 mg at 03/03/20 0634  •  thiamine tablet 100 mg, 100 mg, Oral, DAILY, YULI HolguinOYuridia, 100 mg at 03/03/20 0634  •  senna-docusate (PERICOLACE or SENOKOT S) 8.6-50 MG per tablet 2 Tab, 2 Tab, Oral, BID, Stopped at 03/02/20 0651 **AND** polyethylene glycol/lytes (MIRALAX) PACKET 1 Packet, 1 Packet, Oral, QDAY PRN **AND** magnesium hydroxide (MILK OF MAGNESIA) suspension 30 mL, 30 mL, Oral, QDAY PRN **AND** bisacodyl (DULCOLAX) suppository 10 mg, 10 mg,  Rectal, QDAY PRN, Grace Patel D.O.  •  acetaminophen (TYLENOL) tablet 650 mg, 650 mg, Oral, Q6HRS PRN, Grace Patel D.O., 650 mg at 03/03/20 1323  •  ondansetron (ZOFRAN) syringe/vial injection 4 mg, 4 mg, Intravenous, Q4HRS PRN, YULI HolguinO.  •  ondansetron (ZOFRAN ODT) dispertab 4 mg, 4 mg, Oral, Q4HRS PRN, YULI HolguinO.  •  promethazine (PHENERGAN) tablet 12.5-25 mg, 12.5-25 mg, Oral, Q4HRS PRN, Grace Patel D.O.  •  promethazine (PHENERGAN) suppository 12.5-25 mg, 12.5-25 mg, Rectal, Q4HRS PRN, YULI HolguinO.  •  prochlorperazine (COMPAZINE) injection 5-10 mg, 5-10 mg, Intravenous, Q4HRS PRN, YULI HolguinO.  •  HYDROmorphone pf (DILAUDID) injection 0.5 mg, 0.5 mg, Intravenous, Q2HRS PRN, Raquel Encinas M.D., 0.5 mg at 03/03/20 1226

## 2020-03-03 NOTE — DISCHARGE PLANNING
Spoke to Marie Garcia about pt. Marie is requesting a care conference with the physician.  Marie is requesting to speak to Dr. Gentile. Marie informed that Dr Gentile is not on schedule this week and Dr. Christopher will be his physician for the next week.     Dr. Christopher is agreeable to do a care conference today at 1400. Marie informed of care conference and stated she will be at the hospital. Uriel informed of care conference and stated she will be at the hospital.

## 2020-03-03 NOTE — PROGRESS NOTES
Telemetry Shift Summary    Rhythm SR  HR Range 68-90  Ectopy r/oPVC  Measurements 0.16/0.08/0.36        Normal Values  Rhythm SR  HR Range    Measurements 0.12-0.20 / 0.06-0.10  / 0.30-0.52

## 2020-03-03 NOTE — PROGRESS NOTES
Uriel called this morning for update on Pawel, these updates provided explaining we were waiting on GI to make decision on if ERCP still needed. Uriel requested that Marie be called and care conference scheduled. Marie was called and reported to RN that she had been trying to set up a care conference but that Uriel was difficult to get a hold of. Dr Christopher notified of situation. He was agreeable to conference and asked that social work set it up. Birdie from  made appt for 2pm today.

## 2020-03-03 NOTE — DISCHARGE PLANNING
LSW attended care conference with MD, bedside RN, pending-guardian Marie (not legally guardian, yet), pt's sister Uriel, and Maxwell pt's personal nurse. Per conversation, the family collectively wants a clear answer on the pt's diagnosis. MD, LSW, and bedside RN explained GI has not been able to give one at this time and advised family to consult with GI. LSW emphasized the pt's right to self-determination due to family wanting staff to force pt to shower and participate in ADLs. LSW expressed understanding for their concern but wanted to also remind family that the pt has the right to refuse PT/OT and showers. Hospice consult as well as choice for [facility] was also discussed during care conference.

## 2020-03-03 NOTE — CARE PLAN
Problem: Safety  Goal: Will remain free from injury  Outcome: PROGRESSING AS EXPECTED  Note: Patient will remain free from falls/injury. Safety measures in place. Pt calls appropriately for assistance.      Problem: Psychosocial Needs:  Goal: Level of anxiety will decrease  Outcome: PROGRESSING AS EXPECTED  Note: Patient's mood will continue to improve this shift. Cooperative with cares this morning. Agreeable to plan of care today.

## 2020-03-03 NOTE — DISCHARGE PLANNING
Received Choice form at 1500  Agency/Facility Name: [facility]   Referral sent per Choice form @ [id number]

## 2020-03-03 NOTE — PROGRESS NOTES
Telemetry Shift Summary     Rhythm SR  HR Range 72-96  Ectopy oPVC  Measurements 0.16/0.08/0.36    Per [place], [place]           Normal Values  Rhythm SR  HR Range    Measurements 0.12-0.20 / 0.06-0.10  / 0.30-0.52

## 2020-03-04 LAB
ALBUMIN SERPL BCP-MCNC: 2.7 G/DL (ref 3.2–4.9)
ALBUMIN/GLOB SERPL: 0.7 G/DL
ALP SERPL-CCNC: 1060 U/L (ref 30–99)
ALT SERPL-CCNC: 16 U/L (ref 2–50)
ANION GAP SERPL CALC-SCNC: 10 MMOL/L (ref 7–16)
AST SERPL-CCNC: 46 U/L (ref 12–45)
BASOPHILS # BLD AUTO: 1.2 % (ref 0–1.8)
BASOPHILS # BLD: 0.16 K/UL (ref 0–0.12)
BILIRUB SERPL-MCNC: 0.7 MG/DL (ref 0.1–1.5)
BUN SERPL-MCNC: 11 MG/DL (ref 8–22)
CALCIUM SERPL-MCNC: 9.1 MG/DL (ref 8.4–10.2)
CHLORIDE SERPL-SCNC: 92 MMOL/L (ref 96–112)
CO2 SERPL-SCNC: 31 MMOL/L (ref 20–33)
CREAT SERPL-MCNC: 0.77 MG/DL (ref 0.5–1.4)
EOSINOPHIL # BLD AUTO: 0.2 K/UL (ref 0–0.51)
EOSINOPHIL NFR BLD: 1.5 % (ref 0–6.9)
ERYTHROCYTE [DISTWIDTH] IN BLOOD BY AUTOMATED COUNT: 80.2 FL (ref 35.9–50)
GLOBULIN SER CALC-MCNC: 3.9 G/DL (ref 1.9–3.5)
GLUCOSE BLD-MCNC: 132 MG/DL (ref 65–99)
GLUCOSE BLD-MCNC: 144 MG/DL (ref 65–99)
GLUCOSE BLD-MCNC: 277 MG/DL (ref 65–99)
GLUCOSE BLD-MCNC: 367 MG/DL (ref 65–99)
GLUCOSE SERPL-MCNC: 258 MG/DL (ref 65–99)
HCT VFR BLD AUTO: 37.4 % (ref 42–52)
HGB BLD-MCNC: 12.2 G/DL (ref 14–18)
IMM GRANULOCYTES # BLD AUTO: 0.33 K/UL (ref 0–0.11)
IMM GRANULOCYTES NFR BLD AUTO: 2.5 % (ref 0–0.9)
LYMPHOCYTES # BLD AUTO: 2.35 K/UL (ref 1–4.8)
LYMPHOCYTES NFR BLD: 17.6 % (ref 22–41)
MCH RBC QN AUTO: 29.6 PG (ref 27–33)
MCHC RBC AUTO-ENTMCNC: 32.6 G/DL (ref 33.7–35.3)
MCV RBC AUTO: 90.8 FL (ref 81.4–97.8)
MONOCYTES # BLD AUTO: 1.3 K/UL (ref 0–0.85)
MONOCYTES NFR BLD AUTO: 9.7 % (ref 0–13.4)
NEUTROPHILS # BLD AUTO: 9.02 K/UL (ref 1.82–7.42)
NEUTROPHILS NFR BLD: 67.5 % (ref 44–72)
NRBC # BLD AUTO: 0.03 K/UL
NRBC BLD-RTO: 0.2 /100 WBC
PLATELET # BLD AUTO: 418 K/UL (ref 164–446)
PMV BLD AUTO: 10.6 FL (ref 9–12.9)
POTASSIUM SERPL-SCNC: 5.3 MMOL/L (ref 3.6–5.5)
PROT SERPL-MCNC: 6.6 G/DL (ref 6–8.2)
RBC # BLD AUTO: 4.12 M/UL (ref 4.7–6.1)
SODIUM SERPL-SCNC: 133 MMOL/L (ref 135–145)
WBC # BLD AUTO: 13.4 K/UL (ref 4.8–10.8)

## 2020-03-04 PROCEDURE — 700102 HCHG RX REV CODE 250 W/ 637 OVERRIDE(OP): Performed by: INTERNAL MEDICINE

## 2020-03-04 PROCEDURE — 82962 GLUCOSE BLOOD TEST: CPT | Mod: 91

## 2020-03-04 PROCEDURE — 700111 HCHG RX REV CODE 636 W/ 250 OVERRIDE (IP): Performed by: HOSPITALIST

## 2020-03-04 PROCEDURE — A9270 NON-COVERED ITEM OR SERVICE: HCPCS | Performed by: INTERNAL MEDICINE

## 2020-03-04 PROCEDURE — 700111 HCHG RX REV CODE 636 W/ 250 OVERRIDE (IP): Performed by: INTERNAL MEDICINE

## 2020-03-04 PROCEDURE — 36415 COLL VENOUS BLD VENIPUNCTURE: CPT

## 2020-03-04 PROCEDURE — 80053 COMPREHEN METABOLIC PANEL: CPT

## 2020-03-04 PROCEDURE — 770006 HCHG ROOM/CARE - MED/SURG/GYN SEMI*

## 2020-03-04 PROCEDURE — 700105 HCHG RX REV CODE 258: Performed by: INTERNAL MEDICINE

## 2020-03-04 PROCEDURE — 85025 COMPLETE CBC W/AUTO DIFF WBC: CPT

## 2020-03-04 PROCEDURE — 99232 SBSQ HOSP IP/OBS MODERATE 35: CPT | Performed by: INTERNAL MEDICINE

## 2020-03-04 RX ORDER — FUROSEMIDE 40 MG/1
40 TABLET ORAL
Status: DISCONTINUED | OUTPATIENT
Start: 2020-03-05 | End: 2020-03-24 | Stop reason: HOSPADM

## 2020-03-04 RX ORDER — AMOXICILLIN AND CLAVULANATE POTASSIUM 875; 125 MG/1; MG/1
1 TABLET, FILM COATED ORAL EVERY 12 HOURS
Status: COMPLETED | OUTPATIENT
Start: 2020-03-05 | End: 2020-03-08

## 2020-03-04 RX ADMIN — POTASSIUM CHLORIDE 40 MEQ: 1500 TABLET, EXTENDED RELEASE ORAL at 06:37

## 2020-03-04 RX ADMIN — INSULIN HUMAN 7 UNITS: 100 INJECTION, SOLUTION PARENTERAL at 20:18

## 2020-03-04 RX ADMIN — METRONIDAZOLE 500 MG: 500 TABLET ORAL at 06:37

## 2020-03-04 RX ADMIN — INSULIN HUMAN 12 UNITS: 100 INJECTION, SOLUTION PARENTERAL at 11:56

## 2020-03-04 RX ADMIN — FUROSEMIDE 40 MG: 10 INJECTION, SOLUTION INTRAVENOUS at 06:30

## 2020-03-04 RX ADMIN — METRONIDAZOLE 500 MG: 500 TABLET ORAL at 17:31

## 2020-03-04 RX ADMIN — METRONIDAZOLE 500 MG: 500 TABLET ORAL at 12:00

## 2020-03-04 RX ADMIN — OMEPRAZOLE 20 MG: 20 CAPSULE, DELAYED RELEASE ORAL at 06:37

## 2020-03-04 RX ADMIN — ACETAMINOPHEN 650 MG: 325 TABLET, FILM COATED ORAL at 06:36

## 2020-03-04 RX ADMIN — Medication 100 MG: at 06:36

## 2020-03-04 RX ADMIN — ACETAMINOPHEN 650 MG: 325 TABLET, FILM COATED ORAL at 17:31

## 2020-03-04 RX ADMIN — Medication 1 LOZENGE: at 12:01

## 2020-03-04 RX ADMIN — HYDROMORPHONE HYDROCHLORIDE 0.5 MG: 1 INJECTION, SOLUTION INTRAMUSCULAR; INTRAVENOUS; SUBCUTANEOUS at 06:29

## 2020-03-04 RX ADMIN — ATORVASTATIN CALCIUM 40 MG: 40 TABLET, FILM COATED ORAL at 17:31

## 2020-03-04 RX ADMIN — INSULIN HUMAN 5 UNITS: 100 INJECTION, SUSPENSION SUBCUTANEOUS at 17:38

## 2020-03-04 RX ADMIN — HYDROMORPHONE HYDROCHLORIDE 0.5 MG: 1 INJECTION, SOLUTION INTRAMUSCULAR; INTRAVENOUS; SUBCUTANEOUS at 12:00

## 2020-03-04 RX ADMIN — INSULIN HUMAN 5 UNITS: 100 INJECTION, SUSPENSION SUBCUTANEOUS at 06:28

## 2020-03-04 RX ADMIN — CEFTRIAXONE SODIUM 1 G: 1 INJECTION, POWDER, FOR SOLUTION INTRAMUSCULAR; INTRAVENOUS at 17:33

## 2020-03-04 RX ADMIN — CYCLOBENZAPRINE HYDROCHLORIDE 10 MG: 10 TABLET, FILM COATED ORAL at 17:33

## 2020-03-04 RX ADMIN — TAMSULOSIN HYDROCHLORIDE 0.4 MG: 0.4 CAPSULE ORAL at 06:36

## 2020-03-04 RX ADMIN — OMEPRAZOLE 20 MG: 20 CAPSULE, DELAYED RELEASE ORAL at 17:32

## 2020-03-04 RX ADMIN — PANCRELIPASE 48000 UNITS: 24000; 76000; 120000 CAPSULE, DELAYED RELEASE PELLETS ORAL at 06:37

## 2020-03-04 ASSESSMENT — ENCOUNTER SYMPTOMS
NAUSEA: 0
PHOTOPHOBIA: 0
SPUTUM PRODUCTION: 0
DIARRHEA: 0
SPEECH CHANGE: 0
INSOMNIA: 0
DEPRESSION: 0
FEVER: 0
ORTHOPNEA: 0
MYALGIAS: 0
COUGH: 0
WHEEZING: 0
SENSORY CHANGE: 0
PALPITATIONS: 0
SHORTNESS OF BREATH: 0
TINGLING: 0
NERVOUS/ANXIOUS: 0
HEARTBURN: 0
CONSTIPATION: 0
BACK PAIN: 0
CHILLS: 0
VOMITING: 0
WEIGHT LOSS: 1
BLOOD IN STOOL: 0
WEAKNESS: 0
ABDOMINAL PAIN: 0
BLURRED VISION: 0
DIZZINESS: 0
SORE THROAT: 0
HEADACHES: 0
CLAUDICATION: 0

## 2020-03-04 NOTE — DISCHARGE PLANNING
Agency/Facility Name: [facility]   Spoke To: intake  Outcome: referral pending. CCA updated LSW

## 2020-03-04 NOTE — PROGRESS NOTES
Spoke with alysa this morning after Zaynab from [facility] and Ginger from [facility] visited [facility]el. She asked that it be communicated to Dr Christopher that patient not able to return to [facility]. Requesting [facility] as first choice for rehab.

## 2020-03-04 NOTE — DISCHARGE PLANNING
PATRICIAW obtained choice from POA and guardian for [place]. Choice sent to [facility].

## 2020-03-04 NOTE — CARE PLAN
Problem: Safety  Goal: Will remain free from injury  Outcome: PROGRESSING AS EXPECTED  Note: Patient will remain free from injuries/falls. Safety measures in place. Patient calling appropriately for assistance.      Problem: Bowel/Gastric:  Goal: Normal bowel function is maintained or improved  Outcome: PROGRESSING AS EXPECTED  Note: Patient will remain free from abdominal pain. Had bowel movement overnight.

## 2020-03-04 NOTE — PROGRESS NOTES
"  Gastroenterology Progress Note     Author: Luca Mantilla MD Date & Time Created: 3/4/2020 8:08 AM    Chief Complaint:  Elevated liver tests, abnormal MRI    Interval History:  Initial history (Dr. Villarreal):  59 y.o. male with IDDM, chronic pancreatitis seizure disorder, iron deficiency anemia who presented 2/14/2020 with lethargy and poor appetite per assisted living facility.  He is difficult historian as does not want to answer questions.  He states \"I feel fine and always have.\"  He was evaluated 12/2019 for iron deficiency anemia and DKA.  Had EGD with severe erosive esophagitis.  He refused to drink prep for colonoscopy.  He apparently was brought by assisted living facility after they noted some lethargy and poor appetite.  He has weight loss of 7 pounds over the past month or so.  He denies abdominal pain and states he never had abdominal pain and his appetite is great.  Denies fevers, chills, nausea, vomiting, diarrhea, constipation, melena, hematochezia.  In ED, he had significant leukocytosis with newly elevated alkaline phosphatase and bilirubin with fairly normal transaminases.  He was started on IVF and antibiotics.  He had RUQ US followed by MRCP showing dilated bile and pancreatic ducts, distended GB, chronic pancreatitis and possible abnormal lesions head of pancreas.    2/16/2020: Continues to deny any symptoms including abdominal pain, nausea, vomiting, fevers.  Was refusing IV placement and vitals overnight.  Says he is agreeable to IV placement today  02/17/2020 - No complaints.  Says he feels fine, but does report urine is darker.  I explained imaging findings at length.  02/18/2020 - EUS showed extensive, severe pancreatic calcifications making identification of any pancreatic mass or fluid collection impossible.  Mediastinum was not assessed.  ERCP not done due to normalizing liver enzymes and added risk to patient.  02/19/2020 - No events overnight.  Patient denies fevers/chills/sweats " "and states he \"feels fine\".  Labs show worsening leukocytosis with improving liver enzymes.  02/20/2020 - Tolerating regular diet well.  Denies any abdominal pain.  He does report chronic mid-back pain, but this is not severe or worsening.  No nausea, vomiting, fevers, chills or sweats.  Leukocytosis slightly better.  Alkaline phosphatase still elevated, but remainder of liver enzymes normal.  02/21/2020 - No abdominal pain.  Tolerating diet.  Mild decrease appetite.  No CBC today.  Eager to be discharged.  02/22/2020 - Continues to \"feel fine\".  No abdominal pain, nausea or vomiting.  Tolerating diet.  Leukocytosis improved, but alkaline phosphatase abruptly higher.  CT not done due to IV issues and patient's difficult access.  2/23/20 - No new complaints. He ha lovenox s/c this AM at 6.30. ERCP scheduled for 9 AM. Labs pending   2/25/2020: Denies any complaints.  No pain.  Had ERCP yesterday showing pus from bile duct and distal bile duct stricture likely due to chronic pancreatitis.  Unable to stent  Due to loss of access and amount of ampullary inflammation.  WBC better.  Alk phos higher.  No fevers.  2/27/2020: ERCp not performed yesterday due to oxygen saturation.  Remains on 4L oxygen with large bilateral pleural effusions.  Denies dyspnea or abdominal pain.  WBc trending down.  No fevers.  2/29/2020: No changes.  Still on 4L but denies dyspnea.  WBC down to 10.  No other complaints.  Diuresing well.  3/2/2020: Patient examined and interviewed, course reviewed, discussed with Dr Villarreal.  Course discussed with nursing. No new labs yet for today.  Still requiring 4 L oxygen per NC.  He is resting well, without complaint.    3/3/20: Patient examined and interviewed, course reviewed.   Discussed with RN and with Dr Lorenzo, therapeutic endoscopist.  No morning labs available yet (patient  Refused earlier but agrees to labs now).  Pt denies abd pain, fevers, chills.  Some nausea with emesis last hs.  No black or " tarry stools, rectal bleeding, difficulty swallowing.  His oxygen saturaiton is low 90s on one liter of oxygen now, improved.    3/4/20: Patient examined and interviewed, course reviewed.   Discussed with Dr Lorenzo yesterday, and RN today.  I phoned guardian, Marie and discussed plan.  Pt with some mild intermittent nausea.  Denies fevers, chills, vomiting, cp, palpitations.   Oxygenation better now.     Review of Systems:  Review of Systems   Constitutional: Positive for weight loss. Negative for chills, fever and malaise/fatigue.   Respiratory: Negative for cough, shortness of breath and wheezing.    Cardiovascular: Negative for chest pain, palpitations and leg swelling.   Gastrointestinal: Negative for abdominal pain, blood in stool, constipation, diarrhea, heartburn, melena, nausea and vomiting.   Genitourinary: Negative for dysuria and urgency.   Skin: Negative for itching and rash.   Neurological: Negative for dizziness and headaches.       Physical Exam:  Physical Exam  Vitals signs and nursing note reviewed.   Constitutional:       General: He is not in acute distress.     Appearance: Normal appearance. He is normal weight. He is not ill-appearing or toxic-appearing.   HENT:      Head: Normocephalic and atraumatic.      Nose: Nose normal. No congestion.   Eyes:      General: No scleral icterus.        Right eye: No discharge.         Left eye: No discharge.      Pupils: Pupils are equal, round, and reactive to light.   Cardiovascular:      Rate and Rhythm: Normal rate and regular rhythm.      Heart sounds: Normal heart sounds. No murmur.   Pulmonary:      Effort: Pulmonary effort is normal. No respiratory distress.      Breath sounds: No stridor. Decreased breath sounds present. No wheezing, rhonchi or rales.   Abdominal:      General: Abdomen is flat. Bowel sounds are normal. There is no distension.      Palpations: Abdomen is soft. There is no mass.      Tenderness: There is no abdominal tenderness. There  is no guarding or rebound.      Hernia: No hernia is present.   Musculoskeletal:         General: No swelling or tenderness.   Skin:     General: Skin is warm and dry.      Coloration: Skin is not jaundiced.      Findings: No bruising, erythema or rash.   Neurological:      General: No focal deficit present.      Mental Status: He is alert and oriented to person, place, and time.   Psychiatric:         Mood and Affect: Mood normal.         Labs:          Recent Labs     20  0851 20  0815   SODIUM 134* 130*   POTASSIUM 4.2 4.4   CHLORIDE 90* 91*   CO2 33 30   BUN 9 9   CREATININE 0.66 0.64   CALCIUM 8.8 9.0     Recent Labs     20  0851 20  0815   ALTSGPT 17 16   ASTSGOT 53* 49*   ALKPHOSPHAT 1199* 1147*   TBILIRUBIN 0.8 0.6   GLUCOSE 167* 190*     Recent Labs     20  0851 20  0815   RBC 3.89* 3.91*   HEMOGLOBIN 11.5* 11.8*   HEMATOCRIT 34.9* 34.8*   PLATELETCT 486* 370   PROTHROMBTM  --  13.1   INR  --  0.98     Recent Labs     20  0851 20  0815   WBC 13.4* 11.3*   NEUTSPOLYS 66.20 60.90   LYMPHOCYTES 18.10* 21.40*   MONOCYTES 10.10 11.40   EOSINOPHILS 2.00 2.30   BASOPHILS 1.10 1.40   ASTSGOT 53* 49*   ALTSGPT 17 16   ALKPHOSPHAT 1199* 1147*   TBILIRUBIN 0.8 0.6     Hemodynamics:  Temp (24hrs), Av.4 °C (97.6 °F), Min:36.3 °C (97.4 °F), Max:36.6 °C (97.8 °F)  Temperature: (pt refused)  Pulse  Av.8  Min: 50  Max: 122   Blood Pressure: (pt refused)     Respiratory:    Respiration: (pt refused), Pulse Oximetry: (pt refused)        RUL Breath Sounds: Clear, RML Breath Sounds: Clear, RLL Breath Sounds: Diminished, LEONARD Breath Sounds: Clear, LLL Breath Sounds: Diminished  Fluids:    Intake/Output Summary (Last 24 hours) at 2020 0706  Last data filed at 2/15/2020 2200  Gross per 24 hour   Intake 2485 ml   Output 600 ml   Net 1885 ml        GI/Nutrition:  Orders Placed This Encounter   Procedures   • Diet Order Diabetic     Standing Status:   Standing     Number of  Occurrences:   1     Order Specific Question:   Diet:     Answer:   Diabetic [3]     Medical Decision Making, by Problem:  Active Hospital Problems    Diagnosis   • *Sepsis (HCC) [A41.9]   • Elevated alkaline phosphatase level [R74.8]   • Type 2 diabetes mellitus with hyperglycemia, with long-term current use of insulin (HCC) [E11.65, Z79.4]   • Cognitive decline [R41.89]   • Metabolic acidosis [E87.2]   • Hypokalemia [E87.6]   • Hyponatremia [E87.1]   • Chronic pancreatitis (HCC) [K86.1]   • Dyslipidemia [E78.5]   • Cholangitis [K83.09]     Impression  / Plan  60 y/o with chronic pancreatitis, IDDM, iron deficiency anemia, GERD with esophagitis that presented for lethargy and found to have significant leukocytosis and newly elevated liver tests.  Concern for sepsis from biliary source.  Imaging suggests dilation of biliary and pancreatic ducts but no obvious stones although could have ampullary lesion of main duct IPMN causing obstruction.      1. Elevated liver tests-  Total bili normal, alk phos remains elevated.  Per my detailed communications with Dr Lorenzo, therapeutic endoscopist (he performed the initial EUS/ERCP here) there is no urgent indication.  No evidence of pancreatic cancer on the eus.  I spoke with Marie at request of nursing (No answer when I called FEL[facility] Dill).  I explained the plan as outlined here and Marie expressed understanding.   2. Nausea  4. Abnormal imaging of abdomen with dilated bile ducts and pancreatic duct and questionable mass head of pancreas - Only severe chronic calcific pancreatitis identified on EUS, without mass.   5. Chronic pancreatitis- as above   6. IDDM  7. Iron deficiency anemia  8. GERD with esophagitis  9. Hypertension  10. Seizure disorder  11. Aggressive behavior to healthcare workers  12. Back pain  13. Pancreatic fluid collection tracking adjacent to distal esophagus on imaging, ?pseudocyst versus infected fluid from prolonged biliary obstruction       PLAN:   1.  Continue antibiotics, as you are .  Will need to transition to oral route to complete course for presumed cholangitis.   2. Diuresis, as you are.  Approaching euvolemic state   3. I will arrange for follow up in our GI clinic in six weeks, with labs just prior   4. Anti emetics as needed for some nausea     Please call with questions during remainder of current hospital stay.       Quality-Core Measures   Reviewed items::  Radiology images reviewed, Labs reviewed and Medications reviewed

## 2020-03-04 NOTE — PROGRESS NOTES
"Hospital Medicine Daily Progress Note    Date of Service  3/4/2020    Chief Complaint  59 y.o. male admitted 2/14/2020 with poor appetite.    Hospital Course    Patient with cognitive deficit presented from group home with lethargy and poor appetite.  He had elevation in alk phos and leukocytosis and treated for concern of sepsis with abdominal source.  HIDA was performed which showed no evidence of obstruction nor cholecystitis.    MRCP showed dilation of biliary ducts and concern of pancreatic head mass.   GI  EUS on 2/18 and showed significant calcifications likely related to chronic pancreatitis.  He underwent ERCP on 2/24 and purulent drainage from the ampulla of vater os and he had biliary needle-knife sphincterotomy to relieve the purulent drainage.          Interval Problem Update  2/25 Patient states he is hungry and wants solid food, still on a liquid diet.  Plan to move forward with repeat ERCP with Dr Wakefield tomorrow - on schedule at 1500 with anticipated stenting of the ampulla.  Patient denies any current abdominal pain.  2/26 Patient doing about same today, denies pain, refused labs this am.  ERCP scheduled for today for stenting of the ampulla.  Patient voicing frustration regarding NPO.  2/27 Patient without complaint today as he had breakfast in front of him when I saw him.  ERCP was cancelled due to increasing hypoxia to where he was requiring 4L.  CXR completed with again shows dulling of the bases consistent with known pleural effusions but also shows pulmonary congestion - will diurese to see if this improves.  2/28 Patient feeling okay today, looks better than on previous days, responding well to diuresis though lungs sound about the same.  He states his back is aching \"all over\" but is not severely painful and no focal tenderness.  He states he ambulated in the hallway about \"a quarter of the way down\" yesterday.  Will continue with diuresis and work toward less oxygen requirement so he can " have repeat ERCP for stent placement.  2/29 Patient feeling well today, states he will walk in the hallways with the staff today.  Oxygen demand is about the same on 4L but lungs are sounding clear.  K is 3.2 and mag 1.2, will replete both.  WBC continues to drop.  3/1 Patient feeling well today, states he has no pain and doesn't feel short of breath.  Oxygen overnight documented at 4.5L nasal cannula despite continued aggressive diuresis with 10 L down since admission.  CXR pending for further evaluation.  3/2 patient states he feels same.  He continues to diurese well and now is 13L down.  He is saturating well on 3L and if he can get back to 2L, will move forward with ERCP stenting.  Discussed with Dr Burkett.  3/3.  Patient feels pleasant today.  No significant adverse event.  Oxygen level down to 1 L.  Continue monitor.  Pending GI to perform ERCP.  Patient is oriented however very poor insight of his health condition.  Meet with patient's POA today and care group.  Continue DNR and pursuing for possible hospice. Patient's pain is general, 2-3/10, intermittent and does not radiate to other location, sharp and with some tingling. Can be controlled by pain meds.   3/4.  Patient is pleasant overnight no acute adverse event.  Overall remained stable.  GI recommend outpatient follow-up.  Patient's condition likely due to ascending cholangitis with biliary obstruction.  After antibiotics patient's condition stabilized.  Pending nursing home placement recommended by PT OT.Patient otherwise denies fever, chills, nausea, vomiting, adb pain, SOB, CP, headache, constipation, diarrhea, cough, or sputum.        Consultants/Specialty  GI - GIC    Code Status  DNR/DNI, has guardian    Disposition  SNF    Review of Systems  Review of Systems   Constitutional: Positive for malaise/fatigue. Negative for chills and fever.   HENT: Negative for congestion and sore throat.    Eyes: Negative for blurred vision and photophobia.    Respiratory: Negative for cough, sputum production and shortness of breath.    Cardiovascular: Negative for chest pain, orthopnea, claudication and leg swelling.   Gastrointestinal: Negative for abdominal pain, constipation, diarrhea and nausea.   Genitourinary: Negative for dysuria and hematuria.   Musculoskeletal: Negative for back pain, joint pain and myalgias.   Skin: Negative for itching and rash.   Neurological: Negative for dizziness, tingling, sensory change, speech change and weakness.   Psychiatric/Behavioral: Negative for depression. The patient is not nervous/anxious and does not have insomnia.         Physical Exam  Temp:  [36.3 °C (97.4 °F)-36.7 °C (98.1 °F)] 36.7 °C (98.1 °F)  Pulse:  [] 102  Resp:  [16-18] 16  BP: ()/(59-67) 103/60  SpO2:  [92 %-95 %] 95 %    Physical Exam  Vitals signs and nursing note reviewed.   Constitutional:       General: He is not in acute distress.     Appearance: Normal appearance. He is not ill-appearing.   HENT:      Head: Normocephalic and atraumatic.      Right Ear: Tympanic membrane and external ear normal.      Left Ear: Tympanic membrane and external ear normal.      Nose: No congestion or rhinorrhea.      Mouth/Throat:      Mouth: Mucous membranes are moist.      Pharynx: Oropharynx is clear. No oropharyngeal exudate.   Eyes:      General: No scleral icterus.        Right eye: No discharge.         Left eye: No discharge.      Extraocular Movements: Extraocular movements intact.      Conjunctiva/sclera: Conjunctivae normal.      Pupils: Pupils are equal, round, and reactive to light.   Neck:      Musculoskeletal: Normal range of motion and neck supple. No neck rigidity or muscular tenderness.   Cardiovascular:      Rate and Rhythm: Normal rate and regular rhythm.      Pulses: Normal pulses.      Heart sounds: Normal heart sounds. No friction rub. No gallop.    Pulmonary:      Effort: Pulmonary effort is normal. No respiratory distress.      Breath  sounds: Normal breath sounds. No stridor. No rhonchi or rales.   Chest:      Chest wall: No tenderness.   Abdominal:      General: Abdomen is flat. Bowel sounds are normal.      Palpations: Abdomen is soft. There is no mass.      Tenderness: There is no abdominal tenderness. There is no guarding or rebound.   Musculoskeletal: Normal range of motion.         General: No swelling, tenderness or deformity.   Lymphadenopathy:      Cervical: No cervical adenopathy.   Skin:     General: Skin is warm and dry.      Capillary Refill: Capillary refill takes more than 3 seconds.      Coloration: Skin is not jaundiced or pale.      Findings: No erythema.   Neurological:      General: No focal deficit present.      Mental Status: He is alert and oriented to person, place, and time. Mental status is at baseline.      Cranial Nerves: No cranial nerve deficit.      Motor: No weakness.   Psychiatric:         Mood and Affect: Mood normal.         Behavior: Behavior normal.         Fluids    Intake/Output Summary (Last 24 hours) at 3/4/2020 1458  Last data filed at 3/4/2020 1204  Gross per 24 hour   Intake 1090 ml   Output 1255 ml   Net -165 ml       Laboratory  Recent Labs     03/02/20  0851 03/03/20  0815 03/04/20  0813   WBC 13.4* 11.3* 13.4*   RBC 3.89* 3.91* 4.12*   HEMOGLOBIN 11.5* 11.8* 12.2*   HEMATOCRIT 34.9* 34.8* 37.4*   MCV 89.7 89.0 90.8   MCH 29.6 30.2 29.6   MCHC 33.0* 33.9 32.6*   RDW 79.6* 78.6* 80.2*   PLATELETCT 486* 370 418   MPV 10.4 10.8 10.6     Recent Labs     03/02/20  0851 03/03/20  0815 03/04/20  0813   SODIUM 134* 130* 133*   POTASSIUM 4.2 4.4 5.3   CHLORIDE 90* 91* 92*   CO2 33 30 31   GLUCOSE 167* 190* 258*   BUN 9 9 11   CREATININE 0.66 0.64 0.77   CALCIUM 8.8 9.0 9.1     Recent Labs     03/03/20  0815   INR 0.98               Imaging  DX-CHEST-PORTABLE (1 VIEW)   Final Result         1. No significant interval change.      DX-CHEST-PORTABLE (1 VIEW)   Final Result      New subpulmonic effusions,  bibasilar atelectasis and edema      DX-PORTABLE FLUOROSCOPY < 1 HOUR   Final Result      Portable fluoroscopy utilized for 1.1 minute.         INTERPRETING LOCATION: [facility], [place], [address]      [id number] SPHINCTEROTOMY   Final Result      Intraoperative fluoroscopy spot images as described above.      CT-ABDOMEN WITH & W/O   Final Result      1.  No significant change since CT abdomen and pelvis obtained 2/16/2020      2.  Findings consistent with high-grade biliary obstruction with dilation of the common bile duct measuring 17 mm, dilated gallbladder, dilated pancreatic duct with change in caliber of the pancreatic duct at the level pancreatic head      3.  Findings could be either malignancy the pancreatic head versus scarring from chronic pancreatitis.      4.  Sequela of chronic pancreatitis      5.  Large right medial chest rim-enhancing loculated fluid collection probably within the pleural space measuring 10.5 x 3.1 centimeter transversely and 7 cm craniocaudal      6.  Rim-enhancing fluid collection in the hepatorenal space measuring 11.2 cm craniocaudal and 2-3 cm in thickness.      7.  The rim-enhancing fluid collection are suspicious for infection/abscess      8.  Bilateral atelectasis and small-moderate-sized pleural effusion after      9.  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  Biliary dilatation without evidence of high-grade biliary obstruction.   2.  Gallbladder visualization after morphine administration. No acute cholecystitis.      CT-ABDOMEN-PELVIS WITH   Final Result      1.  New rim enhancing fluid collection tracks along the right heart margin, azygoesophageal recess and possibly communicates with Morison's pouch tracking along the right aspect of the inferior vena cava. This is nonspecific and could indicate abscess,    dissecting pseudocyst, mucinous cystic  neoplasm related fluid      2.  New extra and intrahepatic biliary ductal dilatation with 6 cm dilatation of the gallbladder also seen. This indicates distal common bile duct/ampullary level obstruction. Stricture, mass effect from adjacent pancreas calcifications or small    ampullary mass are differential possibilities      3.  Decreased small pleural effusions. Some loculation on the right is possible      4.  Slightly decreased diffuse pancreatic ductal dilatation now measuring 11 mm. Evidence of chronic pancreatitis. New 3 cm cystic structure anterior to the pancreas could be a pseudocyst. Abscess or necrotic/cystic neoplasm are in the differential.      5.  Multiple chronic findings including status post aorto bifemoral bypass with approaching 70% stenosis of the left superficial femoral artery, nonobstructive right 3 mm nephrolithiasis, large volume rectal vault stool, dilated bladder which could    indicate outlet obstruction or neurogenic bladder, splenosis      [id number]   Final Result      1.  Severe dilatation of the intrahepatic biliary ducts, common bile duct and pancreatic duct. The distal common bile duct and the proximal pancreatic duct is not visualized at the pancreatic head. The pancreatic head appears to be diffusely enlarged and    demonstrates multiple small cysts. These findings are concerning for pancreatic neoplasm such as intraductal papillary mucinous cystoadenoma. The other differential diagnosis includes periampullary carcinoma. MR examination of the abdomen with contrast    is recommended for further evaluation.   2.  Mixed signal intensity in the right perinephric space likely represents perinephric hematoma.   3.  Large hiatal hernia.   4.  Diffuse gastric wall thickening.      US-RUQ   Final Result      1.  Hepatomegaly.   2.  Hepatic steatosis.   3.  Gallbladder sludge. Mild intrahepatic ductal dilation. Dilation of the common duct up to 13 mm. No definite distal obstructing  etiology is identified. MRCP could be obtained for further evaluation if indicated.   4.  Trace ascites.   5.  Pancreatic calcifications likely sequela of chronic pancreatitis.      DX-CHEST-PORTABLE (1 VIEW)   Final Result      Cardiomegaly.           Assessment/Plan  * Pancreatic mass  Assessment & Plan  MRI with concern for pancreatic malignancy  ERCP with no evidence of malignancy, calcifications of pancreas present   is elevated but less than 1000 so cannot rule in cancer, likely due chronic pancreatitis  Repeat CT showed chronic pancreatitis and loculated chest effusion    At this moment GI does not believe patient has significant evidence for periodic cancer.  Recommend outpatient follow-up with GI.  Family agreed    Type 2 diabetes mellitus with hyperglycemia, with long-term current use of insulin ([facility])- (present on admission)  Assessment & Plan  He has an insulin sensor in his right lower quadrant, uses long-acting insulin 12 units and sliding scale.  Continue monitor blood sugar    Cognitive decline- (present on admission)  Assessment & Plan  Patient likely has a chronic component to his cognitive decline as well as acute worsening  Mental status now at his baseline, patient has a legal guardian  Continue pursuing guardianship by caregiver and POA    Metabolic acidosis  Assessment & Plan  IV fluids with improvement    Hypokalemia  Assessment & Plan  Resolved, replete with diuresis.  Replaced magnesium      Hypomagnesemia  Assessment & Plan  1.6  Mag rider 2 g given 2/29    Sepsis ([facility])  Assessment & Plan  This is Sepsis Present on admission  SIRS criteria identified on my evaluation include: Tachycardia, with heart rate greater than 90 BPM and Leukocyosis, with WBC greater than 12,000  Source is GI  Sepsis protocol complete  Continue antibiotics  White cell count trending down  Repeat ERCP will be rescheduled once oxygenation improves.    Currently resolved, continue oral antibiotics Augmentin to  finish a total course of 14 days    Acute respiratory failure with hypoxia (HCC)  Assessment & Plan  Diuresing effectively with lasix -10 L since admission and 1-2 liters down daily with increased diuresis  CXR same  Oxygen demand decreasing    Significant improving, continue diuretic    Loculated pleural effusion  Assessment & Plan  In medial right chest, continue antibiotics, pus drained from ampulla of Vater with GI 2/24  Pulmonary edema appreciated on CXR 2/27 - diurese to get oxygen demand down, anticipate another attempt at ERCP in a few days.    Cholangitis  Assessment & Plan  Biliary obstruction suggested on imaging, GI consulted  Alkaline phosphatase is stable over 1100  Endoscopy done showing pancreatic calcifications but no stones, no area to biopsy  Continue antibiotics, white cell count still elevated  Repeat CT scan done  ERCP repeated 2/24, given swelling so that a ampullary duct stent could not be placed  Repeat planned for 2/26 but cancelled because of hypoxia.    Recurrent major depressive disorder, in full remission (HCC)  Assessment & Plan  .    Hyponatremia  Assessment & Plan  Resolved with fluids    Chronic pancreatitis (HCC)- (present on admission)  Assessment & Plan  Continue pancreatic enzymes with meals  Celiac nerve block for pain control, per GI    Dyslipidemia- (present on admission)  Assessment & Plan  Continue statin     ACP:  Total time spent on advanced care planning, excluding time spent on daily care: 16 minutes.    1st 30 minutes [id number]       I discussed extensively with patient's care team, POA, is regarding code status and plan of care. We also discussed advanced care planning including diagnosis, prognosis, plan of care, risks and benefits of any therapies that could be offered, as well as alternatives including palliation and hospice, as appropriate. My discussion is summarized above in detail. Confirmed with DNR and possible hospice candidate    VTE prophylaxis: scds    I have  seen and examined patient on 3/4/2020. I have reviewed vitals, new labs and imaging. I have discussed POC with RN. There are no changes from (3/3/2020) except for what is mentioned above.       Current Facility-Administered Medications:   •  [START ON 3/5/2020] furosemide (LASIX) tablet 40 mg, 40 mg, Oral, Q DAY, Ruma Christopher M.D.  •  [START ON 3/5/2020] amoxicillin-clavulanate (AUGMENTIN) 875-125 MG per tablet 1 Tab, 1 Tab, Oral, Q12HRS, Ruma Christopher M.D.  •  metroNIDAZOLE (FLAGYL) tablet 500 mg, 500 mg, Oral, Q6HRS, Ruma Christopher M.D., 500 mg at 03/04/20 1200  •  cefTRIAXone (ROCEPHIN) 1 g in  mL IVPB, 1 g, Intravenous, Q24HRS, Hemant Lorenzo M.D., Stopped at 03/03/20 1811  •  menthol (HALLS) lozenge 1 Lozenge, 1 Lozenge, Oral, Q2HRS PRN, Betsey Gentile M.D., 1 Lozenge at 03/04/20 1201  •  cyclobenzaprine (FLEXERIL) tablet 10 mg, 10 mg, Oral, TID PRN, Betsey Gentile M.D., 10 mg at 02/27/20 2118  •  insulin NPH (HUMULIN/NOVOLIN) injection 5 Units, 5 Units, Subcutaneous, BID INSULIN, Rosendo Encinas M.D., 5 Units at 03/04/20 0628  •  haloperidol lactate (HALDOL) injection 2-5 mg, 2-5 mg, Intravenous, Q6HRS PRN, Rosendo Encinas M.D.  •  insulin regular (HUMULIN R) injection 3-14 Units, 3-14 Units, Subcutaneous, 4X/DAY ACHS, 12 Units at 03/04/20 1156 **AND** Accu-Chek ACHS, , , Q AC AND BEDTIME(S) **AND** NOTIFY MD and PharmD, , , Once **AND** glucose 4 g chewable tablet 16 g, 16 g, Oral, Q15 MIN PRN **AND** DEXTROSE 10% BOLUS 250 mL, 250 mL, Intravenous, Q15 MIN PRN, Rosendo Encinas M.D.  •  atorvastatin (LIPITOR) tablet 40 mg, 40 mg, Oral, Q EVENING, Grace Patel D.O., 40 mg at 03/03/20 1753  •  omeprazole (PRILOSEC) capsule 20 mg, 20 mg, Oral, BID, Grace Patel D.O., 20 mg at 03/04/20 0637  •  pancrelipase (Lip-Prot-Amyl) (CREON 16025) 45574-63785 units capsule 48,000 Units, 2 Cap, Oral, DAILY, YULI HolguinO., 48,000 Units at 03/04/20 0637  •  sucralfate (CARAFATE) tablet 1 g, 1 g, Oral, BID PRN,  Grace Patel D.O.  •  tamsulosin (FLOMAX) capsule 0.4 mg, 0.4 mg, Oral, DAILY, Grace Patel D.O., 0.4 mg at 03/04/20 0636  •  thiamine tablet 100 mg, 100 mg, Oral, DAILY, Grace Patel D.O., 100 mg at 03/04/20 0636  •  senna-docusate (PERICOLACE or SENOKOT S) 8.6-50 MG per tablet 2 Tab, 2 Tab, Oral, BID, Stopped at 03/02/20 0651 **AND** polyethylene glycol/lytes (MIRALAX) PACKET 1 Packet, 1 Packet, Oral, QDAY PRN **AND** magnesium hydroxide (MILK OF MAGNESIA) suspension 30 mL, 30 mL, Oral, QDAY PRN **AND** bisacodyl (DULCOLAX) suppository 10 mg, 10 mg, Rectal, QDAY PRN, Grace Patel D.O.  •  acetaminophen (TYLENOL) tablet 650 mg, 650 mg, Oral, Q6HRS PRN, Grace Patel D.O., 650 mg at 03/04/20 0636  •  ondansetron (ZOFRAN) syringe/vial injection 4 mg, 4 mg, Intravenous, Q4HRS PRN, Grace Patel D.O.  •  ondansetron (ZOFRAN ODT) dispertab 4 mg, 4 mg, Oral, Q4HRS PRN, Grace Patel D.O.  •  promethazine (PHENERGAN) tablet 12.5-25 mg, 12.5-25 mg, Oral, Q4HRS PRN, Grace Patel D.O.  •  promethazine (PHENERGAN) suppository 12.5-25 mg, 12.5-25 mg, Rectal, Q4HRS PRN, Grace Patel D.O.  •  prochlorperazine (COMPAZINE) injection 5-10 mg, 5-10 mg, Intravenous, Q4HRS PRN, Grace Patel D.O.  •  HYDROmorphone pf (DILAUDID) injection 0.5 mg, 0.5 mg, Intravenous, Q2HRS PRN, Raquel Encinas M.D., 0.5 mg at 03/04/20 1200

## 2020-03-04 NOTE — PROGRESS NOTES
Discussed with patient when vitals and labs were going to be done. Patient again as previous shift is still refusing. Patient stated that his vitals do not change and therefore he should not have them done so frequently. Education provided. Patient still refusing and does not want labs done until 0800.    Completed assessment and administered scheduled medications. Bed in low position, locked, and appropriate alarms set. Personal belongings and call light are within reach. Patient provided with egg-salad sandwiches that he ordered for 2100. Patient has no additional needs at this time.

## 2020-03-04 NOTE — CARE PLAN
Problem: Safety  Goal: Will remain free from falls  Outcome: PROGRESSING AS EXPECTED  Note: Remind patient to use call light and provide assistance. Bed in low position, bed locked, and appropriate alarms set. Patient wearing non-slip socks. Call light and personal belongings are within reach.     Problem: Knowledge Deficit  Goal: Knowledge of the prescribed therapeutic regimen will improve  Outcome: PROGRESSING SLOWER THAN EXPECTED  Note: Encourage patient to ask questions and be involved in plan of care. Provide education on treatment plan, diagnostic testing, and medications; have patient verbalize understanding.

## 2020-03-04 NOTE — PROGRESS NOTES
Dr Mantilla rounded on patient this morning. Updated RN on plan of care. He was asked to update sister and POA due to frustrations expressed at care conference yesterday.     Potassium increased to 5.3 this morning from 4.4, Dr Christopher aware and discontinued potassium replacement.

## 2020-03-04 NOTE — PROGRESS NOTES
Telemetry Shift Summary     Rhythm SR  HR Range 81-99  Ectopy oPVC  Measurements 0.14/0.08/0.34     Per Sidra, [place]           Normal Values  Rhythm SR  HR Range    Measurements 0.12-0.20 / 0.06-0.10  / 0.30-0.52

## 2020-03-04 NOTE — DISCHARGE PLANNING
Received Choice form at 8840  Agency/Facility Name: [facility]  Referral sent per Choice form @ 2862

## 2020-03-04 NOTE — PROGRESS NOTES
Pulled into patient care conference this afternoon with  Nupur, sister Aroldo, guardian Marie, and contracted RN Maxwell. Dr Christopher had left and updated all participants with plan of care. Care team updated on patient's refusals of care ex: vitals, labs, bathing, grooming, walking, repositioning, skin assessments & interventions. Care team was also told that patient had received education on importance of each item multiple times per day. Patient still refusing most cares. Explained that patient is at risk for skin breakdown but is difficult to help d/t refusals and not wanting to participate in cares. Patient verbalizes understanding when educated but escalates in anger when the issue is pressed. Heart monitor was DC'd in hopes of motivating patient to physically get into shower but was denied by patient. Patient did ambulate in hallway to go visit with sister in [facility]. Per Maxwell these refusals are patient's baseline at [facility] and that patient is only motivated to ambulate to go outside and smoke. Maxwell was educated that smoking is prohibited on this campus and will not be encouraged. Waiting on GI recommendations to make further plan of care.

## 2020-03-05 LAB
ALBUMIN SERPL BCP-MCNC: 2.5 G/DL (ref 3.2–4.9)
ALBUMIN/GLOB SERPL: 0.7 G/DL
ALP SERPL-CCNC: 915 U/L (ref 30–99)
ALT SERPL-CCNC: 14 U/L (ref 2–50)
ANION GAP SERPL CALC-SCNC: 11 MMOL/L (ref 7–16)
AST SERPL-CCNC: 43 U/L (ref 12–45)
BASOPHILS # BLD AUTO: 0.8 % (ref 0–1.8)
BASOPHILS # BLD: 0.12 K/UL (ref 0–0.12)
BILIRUB SERPL-MCNC: 0.6 MG/DL (ref 0.1–1.5)
BUN SERPL-MCNC: 13 MG/DL (ref 8–22)
CALCIUM SERPL-MCNC: 8.8 MG/DL (ref 8.4–10.2)
CHLORIDE SERPL-SCNC: 97 MMOL/L (ref 96–112)
CO2 SERPL-SCNC: 28 MMOL/L (ref 20–33)
CREAT SERPL-MCNC: 0.68 MG/DL (ref 0.5–1.4)
EOSINOPHIL # BLD AUTO: 0.32 K/UL (ref 0–0.51)
EOSINOPHIL NFR BLD: 2.1 % (ref 0–6.9)
ERYTHROCYTE [DISTWIDTH] IN BLOOD BY AUTOMATED COUNT: 78.9 FL (ref 35.9–50)
GLOBULIN SER CALC-MCNC: 3.7 G/DL (ref 1.9–3.5)
GLUCOSE BLD-MCNC: 150 MG/DL (ref 65–99)
GLUCOSE BLD-MCNC: 280 MG/DL (ref 65–99)
GLUCOSE BLD-MCNC: 325 MG/DL (ref 65–99)
GLUCOSE BLD-MCNC: 377 MG/DL (ref 65–99)
GLUCOSE SERPL-MCNC: 118 MG/DL (ref 65–99)
HCT VFR BLD AUTO: 33.1 % (ref 42–52)
HGB BLD-MCNC: 11.1 G/DL (ref 14–18)
IMM GRANULOCYTES # BLD AUTO: 0.23 K/UL (ref 0–0.11)
IMM GRANULOCYTES NFR BLD AUTO: 1.5 % (ref 0–0.9)
LYMPHOCYTES # BLD AUTO: 2.66 K/UL (ref 1–4.8)
LYMPHOCYTES NFR BLD: 17.7 % (ref 22–41)
MCH RBC QN AUTO: 30.1 PG (ref 27–33)
MCHC RBC AUTO-ENTMCNC: 33.5 G/DL (ref 33.7–35.3)
MCV RBC AUTO: 89.7 FL (ref 81.4–97.8)
MONOCYTES # BLD AUTO: 1.76 K/UL (ref 0–0.85)
MONOCYTES NFR BLD AUTO: 11.7 % (ref 0–13.4)
NEUTROPHILS # BLD AUTO: 9.94 K/UL (ref 1.82–7.42)
NEUTROPHILS NFR BLD: 66.2 % (ref 44–72)
NRBC # BLD AUTO: 0.02 K/UL
NRBC BLD-RTO: 0.1 /100 WBC
PLATELET # BLD AUTO: 414 K/UL (ref 164–446)
PMV BLD AUTO: 11.3 FL (ref 9–12.9)
POTASSIUM SERPL-SCNC: 4.3 MMOL/L (ref 3.6–5.5)
PROT SERPL-MCNC: 6.2 G/DL (ref 6–8.2)
RBC # BLD AUTO: 3.69 M/UL (ref 4.7–6.1)
SODIUM SERPL-SCNC: 136 MMOL/L (ref 135–145)
WBC # BLD AUTO: 15 K/UL (ref 4.8–10.8)

## 2020-03-05 PROCEDURE — 36415 COLL VENOUS BLD VENIPUNCTURE: CPT

## 2020-03-05 PROCEDURE — 80053 COMPREHEN METABOLIC PANEL: CPT

## 2020-03-05 PROCEDURE — A9270 NON-COVERED ITEM OR SERVICE: HCPCS | Performed by: INTERNAL MEDICINE

## 2020-03-05 PROCEDURE — 82962 GLUCOSE BLOOD TEST: CPT

## 2020-03-05 PROCEDURE — 700102 HCHG RX REV CODE 250 W/ 637 OVERRIDE(OP): Performed by: INTERNAL MEDICINE

## 2020-03-05 PROCEDURE — 85025 COMPLETE CBC W/AUTO DIFF WBC: CPT

## 2020-03-05 PROCEDURE — 770006 HCHG ROOM/CARE - MED/SURG/GYN SEMI*

## 2020-03-05 PROCEDURE — 99232 SBSQ HOSP IP/OBS MODERATE 35: CPT | Performed by: INTERNAL MEDICINE

## 2020-03-05 RX ADMIN — INSULIN HUMAN 12 UNITS: 100 INJECTION, SOLUTION PARENTERAL at 21:20

## 2020-03-05 RX ADMIN — INSULIN HUMAN 5 UNITS: 100 INJECTION, SUSPENSION SUBCUTANEOUS at 16:42

## 2020-03-05 RX ADMIN — TAMSULOSIN HYDROCHLORIDE 0.4 MG: 0.4 CAPSULE ORAL at 06:26

## 2020-03-05 RX ADMIN — CYCLOBENZAPRINE HYDROCHLORIDE 10 MG: 10 TABLET, FILM COATED ORAL at 21:33

## 2020-03-05 RX ADMIN — ACETAMINOPHEN 650 MG: 325 TABLET, FILM COATED ORAL at 12:36

## 2020-03-05 RX ADMIN — INSULIN HUMAN 10 UNITS: 100 INJECTION, SOLUTION PARENTERAL at 16:42

## 2020-03-05 RX ADMIN — INSULIN HUMAN 5 UNITS: 100 INJECTION, SUSPENSION SUBCUTANEOUS at 06:27

## 2020-03-05 RX ADMIN — Medication 1 LOZENGE: at 11:19

## 2020-03-05 RX ADMIN — CYCLOBENZAPRINE HYDROCHLORIDE 10 MG: 10 TABLET, FILM COATED ORAL at 14:33

## 2020-03-05 RX ADMIN — PANCRELIPASE 48000 UNITS: 24000; 76000; 120000 CAPSULE, DELAYED RELEASE PELLETS ORAL at 06:26

## 2020-03-05 RX ADMIN — AMOXICILLIN AND CLAVULANATE POTASSIUM 1 TABLET: 875; 125 TABLET, FILM COATED ORAL at 06:26

## 2020-03-05 RX ADMIN — AMOXICILLIN AND CLAVULANATE POTASSIUM 1 TABLET: 875; 125 TABLET, FILM COATED ORAL at 17:38

## 2020-03-05 RX ADMIN — ATORVASTATIN CALCIUM 40 MG: 40 TABLET, FILM COATED ORAL at 17:38

## 2020-03-05 RX ADMIN — OMEPRAZOLE 20 MG: 20 CAPSULE, DELAYED RELEASE ORAL at 17:38

## 2020-03-05 RX ADMIN — ACETAMINOPHEN 650 MG: 325 TABLET, FILM COATED ORAL at 17:37

## 2020-03-05 RX ADMIN — CYCLOBENZAPRINE HYDROCHLORIDE 10 MG: 10 TABLET, FILM COATED ORAL at 06:30

## 2020-03-05 RX ADMIN — Medication 100 MG: at 06:26

## 2020-03-05 RX ADMIN — INSULIN HUMAN 7 UNITS: 100 INJECTION, SOLUTION PARENTERAL at 11:19

## 2020-03-05 RX ADMIN — FUROSEMIDE 40 MG: 40 TABLET ORAL at 06:26

## 2020-03-05 RX ADMIN — ACETAMINOPHEN 650 MG: 325 TABLET, FILM COATED ORAL at 06:30

## 2020-03-05 RX ADMIN — OMEPRAZOLE 20 MG: 20 CAPSULE, DELAYED RELEASE ORAL at 06:26

## 2020-03-05 ASSESSMENT — ENCOUNTER SYMPTOMS
BACK PAIN: 0
COUGH: 0
SPEECH CHANGE: 0
NERVOUS/ANXIOUS: 0
CONSTIPATION: 0
FEVER: 0
DEPRESSION: 0
CLAUDICATION: 0
SPUTUM PRODUCTION: 0
NAUSEA: 0
MYALGIAS: 0
SENSORY CHANGE: 0
PHOTOPHOBIA: 0
TINGLING: 0
SORE THROAT: 0
DIARRHEA: 0
ORTHOPNEA: 0
BLURRED VISION: 0
WEAKNESS: 0
SHORTNESS OF BREATH: 0
CHILLS: 0
INSOMNIA: 0
ABDOMINAL PAIN: 0
DIZZINESS: 0

## 2020-03-05 ASSESSMENT — FIBROSIS 4 INDEX: FIB4 SCORE: 1.68

## 2020-03-05 NOTE — PROGRESS NOTES
Patient resting in bed. Does c/o back pain. flexeril and tylenol not due yet but will admin when available. Patient has a good appetite today. Bed in low locked position, call bell within reach. continue to monitor.

## 2020-03-05 NOTE — PROGRESS NOTES
Pt arrived to floor from Med/Tele. Assumed care of patient. Discussed daily plan of care, oriented patient to floor. VSS. Pt awake and alert, denies complaints or concerns at this time. Hourly rounding in place.

## 2020-03-05 NOTE — CARE PLAN
Problem: Communication  Goal: The ability to communicate needs accurately and effectively will improve  Outcome: PROGRESSING AS EXPECTED     Problem: Safety  Goal: Will remain free from injury  Outcome: PROGRESSING AS EXPECTED     Ongoing care. No acute issues at this time. Patient resting without c/o anything. Patient refusing most of care and wont let nurse or CNA educate on better ways to manage ADLs and self care.     0400 No acute changes over night. Patient rested well without c/o anything. Will continue care and monitoring.

## 2020-03-06 LAB
ALBUMIN SERPL BCP-MCNC: 2.4 G/DL (ref 3.2–4.9)
ALBUMIN/GLOB SERPL: 0.7 G/DL
ALP SERPL-CCNC: 792 U/L (ref 30–99)
ALT SERPL-CCNC: 14 U/L (ref 2–50)
ANION GAP SERPL CALC-SCNC: 11 MMOL/L (ref 7–16)
AST SERPL-CCNC: 45 U/L (ref 12–45)
BASOPHILS # BLD AUTO: 1.2 % (ref 0–1.8)
BASOPHILS # BLD: 0.16 K/UL (ref 0–0.12)
BILIRUB SERPL-MCNC: 0.6 MG/DL (ref 0.1–1.5)
BUN SERPL-MCNC: 15 MG/DL (ref 8–22)
CALCIUM SERPL-MCNC: 8.4 MG/DL (ref 8.4–10.2)
CHLORIDE SERPL-SCNC: 99 MMOL/L (ref 96–112)
CO2 SERPL-SCNC: 27 MMOL/L (ref 20–33)
CREAT SERPL-MCNC: 0.63 MG/DL (ref 0.5–1.4)
EOSINOPHIL # BLD AUTO: 0.29 K/UL (ref 0–0.51)
EOSINOPHIL NFR BLD: 2.1 % (ref 0–6.9)
ERYTHROCYTE [DISTWIDTH] IN BLOOD BY AUTOMATED COUNT: 78.3 FL (ref 35.9–50)
GLOBULIN SER CALC-MCNC: 3.3 G/DL (ref 1.9–3.5)
GLUCOSE BLD-MCNC: 188 MG/DL (ref 65–99)
GLUCOSE BLD-MCNC: 247 MG/DL (ref 65–99)
GLUCOSE BLD-MCNC: 287 MG/DL (ref 65–99)
GLUCOSE BLD-MCNC: 371 MG/DL (ref 65–99)
GLUCOSE SERPL-MCNC: 134 MG/DL (ref 65–99)
HCT VFR BLD AUTO: 31.1 % (ref 42–52)
HGB BLD-MCNC: 10.4 G/DL (ref 14–18)
IMM GRANULOCYTES # BLD AUTO: 0.3 K/UL (ref 0–0.11)
IMM GRANULOCYTES NFR BLD AUTO: 2.2 % (ref 0–0.9)
LYMPHOCYTES # BLD AUTO: 2.9 K/UL (ref 1–4.8)
LYMPHOCYTES NFR BLD: 21.1 % (ref 22–41)
MCH RBC QN AUTO: 30.1 PG (ref 27–33)
MCHC RBC AUTO-ENTMCNC: 33.4 G/DL (ref 33.7–35.3)
MCV RBC AUTO: 90.1 FL (ref 81.4–97.8)
MONOCYTES # BLD AUTO: 1.77 K/UL (ref 0–0.85)
MONOCYTES NFR BLD AUTO: 12.9 % (ref 0–13.4)
NEUTROPHILS # BLD AUTO: 8.31 K/UL (ref 1.82–7.42)
NEUTROPHILS NFR BLD: 60.5 % (ref 44–72)
NRBC # BLD AUTO: 0 K/UL
NRBC BLD-RTO: 0 /100 WBC
PLATELET # BLD AUTO: 381 K/UL (ref 164–446)
PMV BLD AUTO: 10.9 FL (ref 9–12.9)
POTASSIUM SERPL-SCNC: 4.5 MMOL/L (ref 3.6–5.5)
PROT SERPL-MCNC: 5.7 G/DL (ref 6–8.2)
RBC # BLD AUTO: 3.45 M/UL (ref 4.7–6.1)
SODIUM SERPL-SCNC: 137 MMOL/L (ref 135–145)
WBC # BLD AUTO: 13.7 K/UL (ref 4.8–10.8)

## 2020-03-06 PROCEDURE — A9270 NON-COVERED ITEM OR SERVICE: HCPCS | Performed by: INTERNAL MEDICINE

## 2020-03-06 PROCEDURE — 700102 HCHG RX REV CODE 250 W/ 637 OVERRIDE(OP): Performed by: INTERNAL MEDICINE

## 2020-03-06 PROCEDURE — 97168 OT RE-EVAL EST PLAN CARE: CPT

## 2020-03-06 PROCEDURE — 36415 COLL VENOUS BLD VENIPUNCTURE: CPT

## 2020-03-06 PROCEDURE — 99232 SBSQ HOSP IP/OBS MODERATE 35: CPT | Performed by: INTERNAL MEDICINE

## 2020-03-06 PROCEDURE — 85025 COMPLETE CBC W/AUTO DIFF WBC: CPT

## 2020-03-06 PROCEDURE — 82962 GLUCOSE BLOOD TEST: CPT | Mod: 91

## 2020-03-06 PROCEDURE — 80053 COMPREHEN METABOLIC PANEL: CPT

## 2020-03-06 PROCEDURE — 97164 PT RE-EVAL EST PLAN CARE: CPT

## 2020-03-06 PROCEDURE — 770006 HCHG ROOM/CARE - MED/SURG/GYN SEMI*

## 2020-03-06 RX ADMIN — ACETAMINOPHEN 650 MG: 325 TABLET, FILM COATED ORAL at 07:23

## 2020-03-06 RX ADMIN — INSULIN HUMAN 7 UNITS: 100 INJECTION, SOLUTION PARENTERAL at 17:25

## 2020-03-06 RX ADMIN — Medication 100 MG: at 06:53

## 2020-03-06 RX ADMIN — CYCLOBENZAPRINE HYDROCHLORIDE 10 MG: 10 TABLET, FILM COATED ORAL at 13:17

## 2020-03-06 RX ADMIN — ACETAMINOPHEN 650 MG: 325 TABLET, FILM COATED ORAL at 13:17

## 2020-03-06 RX ADMIN — OMEPRAZOLE 20 MG: 20 CAPSULE, DELAYED RELEASE ORAL at 06:53

## 2020-03-06 RX ADMIN — INSULIN HUMAN 3 UNITS: 100 INJECTION, SOLUTION PARENTERAL at 07:03

## 2020-03-06 RX ADMIN — CYCLOBENZAPRINE HYDROCHLORIDE 10 MG: 10 TABLET, FILM COATED ORAL at 07:23

## 2020-03-06 RX ADMIN — ATORVASTATIN CALCIUM 40 MG: 40 TABLET, FILM COATED ORAL at 18:35

## 2020-03-06 RX ADMIN — ACETAMINOPHEN 650 MG: 325 TABLET, FILM COATED ORAL at 18:34

## 2020-03-06 RX ADMIN — AMOXICILLIN AND CLAVULANATE POTASSIUM 1 TABLET: 875; 125 TABLET, FILM COATED ORAL at 18:35

## 2020-03-06 RX ADMIN — INSULIN HUMAN 4 UNITS: 100 INJECTION, SOLUTION PARENTERAL at 11:05

## 2020-03-06 RX ADMIN — INSULIN HUMAN 12 UNITS: 100 INJECTION, SOLUTION PARENTERAL at 21:51

## 2020-03-06 RX ADMIN — PANCRELIPASE 48000 UNITS: 24000; 76000; 120000 CAPSULE, DELAYED RELEASE PELLETS ORAL at 06:52

## 2020-03-06 RX ADMIN — CYCLOBENZAPRINE HYDROCHLORIDE 10 MG: 10 TABLET, FILM COATED ORAL at 18:34

## 2020-03-06 RX ADMIN — AMOXICILLIN AND CLAVULANATE POTASSIUM 1 TABLET: 875; 125 TABLET, FILM COATED ORAL at 06:51

## 2020-03-06 RX ADMIN — OMEPRAZOLE 20 MG: 20 CAPSULE, DELAYED RELEASE ORAL at 18:35

## 2020-03-06 RX ADMIN — TAMSULOSIN HYDROCHLORIDE 0.4 MG: 0.4 CAPSULE ORAL at 06:52

## 2020-03-06 RX ADMIN — INSULIN HUMAN 5 UNITS: 100 INJECTION, SUSPENSION SUBCUTANEOUS at 17:24

## 2020-03-06 RX ADMIN — FUROSEMIDE 40 MG: 40 TABLET ORAL at 06:52

## 2020-03-06 RX ADMIN — INSULIN HUMAN 5 UNITS: 100 INJECTION, SUSPENSION SUBCUTANEOUS at 07:02

## 2020-03-06 ASSESSMENT — ENCOUNTER SYMPTOMS
CONSTIPATION: 0
FEVER: 0
SPUTUM PRODUCTION: 0
ABDOMINAL PAIN: 0
WEAKNESS: 0
MYALGIAS: 0
BLURRED VISION: 0
DIZZINESS: 0
CHILLS: 0
INSOMNIA: 0
SENSORY CHANGE: 0
NAUSEA: 0
CLAUDICATION: 0
BACK PAIN: 0
PHOTOPHOBIA: 0
ORTHOPNEA: 0
SORE THROAT: 0
SPEECH CHANGE: 0
TINGLING: 0
VOMITING: 0
DEPRESSION: 0
SHORTNESS OF BREATH: 0
COUGH: 0
NERVOUS/ANXIOUS: 0

## 2020-03-06 ASSESSMENT — COGNITIVE AND FUNCTIONAL STATUS - GENERAL
SUGGESTED CMS G CODE MODIFIER MOBILITY: CH
DAILY ACTIVITIY SCORE: 21
DRESSING REGULAR LOWER BODY CLOTHING: A LITTLE
HELP NEEDED FOR BATHING: A LITTLE
SUGGESTED CMS G CODE MODIFIER DAILY ACTIVITY: CJ
TOILETING: A LITTLE
MOBILITY SCORE: 24

## 2020-03-06 ASSESSMENT — ACTIVITIES OF DAILY LIVING (ADL): TOILETING: INDEPENDENT

## 2020-03-06 ASSESSMENT — GAIT ASSESSMENTS
GAIT LEVEL OF ASSIST: SUPERVISED
DISTANCE (FEET): 230

## 2020-03-06 NOTE — DISCHARGE PLANNING
LSW spoke with PT/OT and they consistently state that pt is not SNF appropriate. LSW updated Roni Salazar, and Uriel. Marie and Roni would like to contest PT/OT decision and have the attending MD call the MD at [facility]. Marie and Roni also requested that LSW send out another referral to SNF ([facility]) stating they spoke with the owner. LSW has escalated case to .

## 2020-03-06 NOTE — THERAPY
"Occupational Therapy Evaluation completed.   Functional Status: This 58 y/o male, was seen for a re evaluation , due to d/c planning needs, to determine current level, safety and ability to return to assisted living. Pt was with noted increase in Level of alertness, was less confused- able to participate and follow directions for eval. Pt is at the modified independent to Supervised level in ADLS and functional mobility. Pt is safe to return to assisted living and will not need continued skilled OT services while in house.    Plan of Care: Patient with no further skilled OT needs in the acute care setting at this time  Discharge Recommendations:  Equipment: No Equipment Needed. Post-acute therapy Anticipate that the patient will have no further occupational therapy needs after discharge from the hospital.     See \"Rehab Therapy-Acute\" Patient Summary Report for complete documentation.    "

## 2020-03-06 NOTE — DISCHARGE PLANNING
LSW, per request of pt's sister, Marie and Roni, asked [facility] to send SNF referral. LSW also informed Uriel, Marie, and Star that pt might be denied due to documented no SNF needs but they would like to proceed. [facility] updated.

## 2020-03-06 NOTE — DISCHARGE PLANNING
"DIPAK spoke with Roni from [facility] and gave Star an update on pt's discharge plan. DIPAK informed Roni that pt was denied at SNF due to no skilled need. PT/OT reevaluated this morning and DIPAK informed Roni that new referral would be sent out with hopeful acceptance but that pt has been discussed in rounds and has been determined to not be SNF appropriate but we are however exhausting all resources to ensure all avenues are being exhausted. DIPAK explained that we want the pt to be successful with a safe discharge plan in which Star stated it would be \"[facility] fault he would be homeless\". DIPAK informed Roni that we cannot force the pt or the MD's to make pt skilled appropriate if he is not by PT/OT determinations or MD determinations. Star then stated that pt has been \"sitting for 3 weeks straight\". DIPAK informed Roni that LSW spoke with Maxwell at [facility] and was informed that this is within pt's personality and baseline and he has a habit of not wanting to participate in hygiene or walking. DIPAK also informed Roni that pt was ambulating according to RN reports but very limited ambulation which was already brought up as a concern and discussed during care conference. Star accused [facility] of creating a decline in pt's health. DIPAK informed Roni that we want the pt to be successful and doing all we can to ensure this is met.   "

## 2020-03-06 NOTE — THERAPY
"Physical Therapy Evaluation completed.   Bed Mobility:  Supine to Sit: Modified Independent  Transfers: Sit to Stand: Supervised  Gait: Level Of Assist: Supervised with No Equipment Needed       Plan of Care: Patient with no further skilled PT needs in the acute care setting at this time  Discharge Recommendations: Equipment: No Equipment Needed. Anticipate that the patient will have no further physical therapy needs after discharge from the hospital.    See \"Rehab Therapy-Acute\" Patient Summary Report for complete documentation.     Pt was recently seen on 2/15 by PT and was deemed to require no therapy needs. Pt is now being requested to be re-evaluated for therapy needs. Pt was able to demonstrate SPV to Mod I for all functional mobility with no AD use. Pt was able to ambulate for 230ft with no AD use. Pt did not demonstrate with any LOB or risk for falling during functional assessment. Pt was able to complete romberg testing with no apparent LOB with eyes open and closed. Pt demonstrates with a cachetic appearence, however, he was able to complete all functional mobility with no adverse events. Pt is in no acute skilled PT needs at this time. The patient is demonstrating safe mechanics for d/c back to [facility]. Pt is in no acute skilled PT needs at this time.   "

## 2020-03-06 NOTE — DISCHARGE PLANNING
"LSW spoke with Marie and Roni from [facility] and informed them of PT/OT notes which continue to stated that pt does not have a skilled need. Roni and Marie voice extreme frustrations. Roni and Marie also requested that pt have insulin dose change to oral or a consistent injectable dose. [place] also requested that OT/PT specify the length that they walked with pt due to \"not believing\" he was able to walk far.   "

## 2020-03-06 NOTE — CARE PLAN
Problem: Safety  Goal: Will remain free from injury  Outcome: PROGRESSING AS EXPECTED   Pt educated to call when in need. Call light and personal belongings w/n reach. Room free of clutters. Bed locked and in lowest position. Answer call light immediately. Pt tends to forget to call. Bed alarm on.       Problem: Knowledge Deficit  Goal: Knowledge of disease process/condition, treatment plan, diagnostic tests, and medications will improve  Outcome: PROGRESSING SLOWER THAN EXPECTED   Pt educated and informed on the treatment plans, the need to follow prescribed medications, diet, and routine labs. Educated and encouraged on the importance of mobilization/ambulation w/ assistance. Educated factors that contribute fatigue or reduced alertness. Encouraged to voice feelings.

## 2020-03-06 NOTE — PROGRESS NOTES
Pt AAOx4. Denies any pain, SOB, nausea, chills. Insulin coverage given. POC discussed w/ pt and educated on the importance of diet, oral care and following MD orders. PT verbalized understanding. Pt finished his dinner tray 100% and asking for more food to eat. Snack provided. No additional needs at this time. Safety and comfort measures in place.

## 2020-03-06 NOTE — DISCHARGE PLANNING
April 17, 2019      Rich Valdivia DPM  [address] [facility] [address] [place] [address]           [facility]  [address] [facility] [address] [place] [address]  Phone: [phone]  Fax: [phone]          Patient: Yolie Brambila   MR Number: [id number]   YOB: 1950   Date of Visit: 4/16/2019       Dear Dr. Rich Valdivia:    Thank you for referring Yolie Brambila to me for evaluation. Attached you will find relevant portions of my assessment and plan of care.    If you have questions, please do not hesitate to call me. I look forward to following Yolie Brambila along with you.    Sincerely,    Patrick Goyal MD    Enclosure  CC:  No Recipients    If you would like to receive this communication electronically, please contact [email or web address] or [phone] to request more information on [employer] access.    For providers and/or their staff who would like to refer a patient to [facility], please contact us through our one-stop-shop provider referral line, [facility], at [phone].    If you feel you have received this communication in error or would no longer like to receive these types of communications, please e-mail [email or web address]          [facility] Transitional Care Coordination     Referral from:  Dr. Christopher   Facesheet indicates: [facility]   Potential Rehab Diagnosis: Sepsis debility     Chart review indicates patient does have on going medical management and does not have therapy needs to possibly meet inpatient rehab facility criteria with the goal of returning to community.    D/C support: Return to group home      Physiatry consultation penied per protocol.        Current documentation does not support therapy need for IRF level of care. Anticipate skilled nursing versus HH when medically cleared. No physiatry consult ordered per  Protocol.      Thank you for the referral.

## 2020-03-06 NOTE — PROGRESS NOTES
"Hospital Medicine Daily Progress Note    Date of Service  3/5/2020    Chief Complaint  59 y.o. male admitted 2/14/2020 with poor appetite.    Hospital Course    Patient with cognitive deficit presented from group home with lethargy and poor appetite.  He had elevation in alk phos and leukocytosis and treated for concern of sepsis with abdominal source.  HIDA was performed which showed no evidence of obstruction nor cholecystitis.    MRCP showed dilation of biliary ducts and concern of pancreatic head mass.   GI  EUS on 2/18 and showed significant calcifications likely related to chronic pancreatitis.  He underwent ERCP on 2/24 and purulent drainage from the ampulla of vater os and he had biliary needle-knife sphincterotomy to relieve the purulent drainage.          Interval Problem Update  2/25 Patient states he is hungry and wants solid food, still on a liquid diet.  Plan to move forward with repeat ERCP with Dr Wakefield tomorrow - on schedule at 1500 with anticipated stenting of the ampulla.  Patient denies any current abdominal pain.  2/26 Patient doing about same today, denies pain, refused labs this am.  ERCP scheduled for today for stenting of the ampulla.  Patient voicing frustration regarding NPO.  2/27 Patient without complaint today as he had breakfast in front of him when I saw him.  ERCP was cancelled due to increasing hypoxia to where he was requiring 4L.  CXR completed with again shows dulling of the bases consistent with known pleural effusions but also shows pulmonary congestion - will diurese to see if this improves.  2/28 Patient feeling okay today, looks better than on previous days, responding well to diuresis though lungs sound about the same.  He states his back is aching \"all over\" but is not severely painful and no focal tenderness.  He states he ambulated in the hallway about \"a quarter of the way down\" yesterday.  Will continue with diuresis and work toward less oxygen requirement so he can " have repeat ERCP for stent placement.  2/29 Patient feeling well today, states he will walk in the hallways with the staff today.  Oxygen demand is about the same on 4L but lungs are sounding clear.  K is 3.2 and mag 1.2, will replete both.  WBC continues to drop.  3/1 Patient feeling well today, states he has no pain and doesn't feel short of breath.  Oxygen overnight documented at 4.5L nasal cannula despite continued aggressive diuresis with 10 L down since admission.  CXR pending for further evaluation.  3/2 patient states he feels same.  He continues to diurese well and now is 13L down.  He is saturating well on 3L and if he can get back to 2L, will move forward with ERCP stenting.  Discussed with Dr Burkett.  3/3.  Patient feels pleasant today.  No significant adverse event.  Oxygen level down to 1 L.  Continue monitor.  Pending GI to perform ERCP.  Patient is oriented however very poor insight of his health condition.  Meet with patient's POA today and care group.  Continue DNR and pursuing for possible hospice. Patient's pain is general, 2-3/10, intermittent and does not radiate to other location, sharp and with some tingling. Can be controlled by pain meds.   3/4.  Patient is pleasant overnight no acute adverse event.  Overall remained stable.  GI recommend outpatient follow-up.  Patient's condition likely due to ascending cholangitis with biliary obstruction.  After antibiotics patient's condition stabilized.  Pending nursing home placement recommended by PT OT.Patient otherwise denies fever, chills, nausea, vomiting, adb pain, SOB, CP, headache, constipation, diarrhea, cough, or sputum.  3/5.  Patient has been stable and comfortable.  Continue to improve mentation.  Sepsis resolved.  Pending placement.    Consultants/Specialty  GI - [facility]    Code Status  DNR/DNI, has guardian    Disposition  SNF versus group home    Review of Systems  Review of Systems   Constitutional: Positive for malaise/fatigue.  Negative for chills and fever.   HENT: Negative for congestion and sore throat.    Eyes: Negative for blurred vision and photophobia.   Respiratory: Negative for cough, sputum production and shortness of breath.    Cardiovascular: Negative for chest pain, orthopnea, claudication and leg swelling.   Gastrointestinal: Negative for abdominal pain, constipation, diarrhea and nausea.   Genitourinary: Negative for dysuria and hematuria.   Musculoskeletal: Negative for back pain, joint pain and myalgias.   Skin: Negative for itching and rash.   Neurological: Negative for dizziness, tingling, sensory change, speech change and weakness.   Psychiatric/Behavioral: Negative for depression. The patient is not nervous/anxious and does not have insomnia.         Physical Exam  Temp:  [36.3 °C (97.4 °F)-36.8 °C (98.3 °F)] 36.6 °C (97.8 °F)  Pulse:  [] 96  Resp:  [17-18] 18  BP: (101-115)/(61-66) 115/63  SpO2:  [93 %-95 %] 94 %    Physical Exam  Vitals signs and nursing note reviewed.   Constitutional:       General: He is not in acute distress.     Appearance: Normal appearance. He is not ill-appearing.   HENT:      Head: Normocephalic and atraumatic.      Right Ear: Tympanic membrane and external ear normal.      Left Ear: Tympanic membrane and external ear normal.      Nose: No congestion or rhinorrhea.      Mouth/Throat:      Mouth: Mucous membranes are moist.      Pharynx: Oropharynx is clear. No oropharyngeal exudate.   Eyes:      General: No scleral icterus.        Right eye: No discharge.         Left eye: No discharge.      Extraocular Movements: Extraocular movements intact.      Conjunctiva/sclera: Conjunctivae normal.      Pupils: Pupils are equal, round, and reactive to light.   Neck:      Musculoskeletal: Normal range of motion and neck supple. No neck rigidity or muscular tenderness.   Cardiovascular:      Rate and Rhythm: Normal rate and regular rhythm.      Pulses: Normal pulses.      Heart sounds: Normal  heart sounds. No friction rub. No gallop.    Pulmonary:      Effort: Pulmonary effort is normal. No respiratory distress.      Breath sounds: Normal breath sounds. No stridor. No rhonchi or rales.   Chest:      Chest wall: No tenderness.   Abdominal:      General: Abdomen is flat. Bowel sounds are normal.      Palpations: Abdomen is soft. There is no mass.      Tenderness: There is no abdominal tenderness. There is no guarding or rebound.   Musculoskeletal: Normal range of motion.         General: No swelling, tenderness or deformity.   Lymphadenopathy:      Cervical: No cervical adenopathy.   Skin:     General: Skin is warm and dry.      Capillary Refill: Capillary refill takes more than 3 seconds.      Coloration: Skin is not jaundiced or pale.      Findings: No erythema.   Neurological:      General: No focal deficit present.      Mental Status: He is alert and oriented to person, place, and time. Mental status is at baseline.      Cranial Nerves: No cranial nerve deficit.      Motor: No weakness.   Psychiatric:         Mood and Affect: Mood normal.         Behavior: Behavior normal.         Fluids    Intake/Output Summary (Last 24 hours) at 3/5/2020 1613  Last data filed at 3/5/2020 1300  Gross per 24 hour   Intake 1210 ml   Output 600 ml   Net 610 ml       Laboratory  Recent Labs     03/03/20  0815 03/04/20  0813 03/05/20  0319   WBC 11.3* 13.4* 15.0*   RBC 3.91* 4.12* 3.69*   HEMOGLOBIN 11.8* 12.2* 11.1*   HEMATOCRIT 34.8* 37.4* 33.1*   MCV 89.0 90.8 89.7   MCH 30.2 29.6 30.1   MCHC 33.9 32.6* 33.5*   RDW 78.6* 80.2* 78.9*   PLATELETCT 370 418 414   MPV 10.8 10.6 11.3     Recent Labs     03/03/20  0815 03/04/20  0813 03/05/20  0319   SODIUM 130* 133* 136   POTASSIUM 4.4 5.3 4.3   CHLORIDE 91* 92* 97   CO2 30 31 28   GLUCOSE 190* 258* 118*   BUN 9 11 13   CREATININE 0.64 0.77 0.68   CALCIUM 9.0 9.1 8.8     Recent Labs     03/03/20  0815   INR 0.98               Imaging  DX-CHEST-PORTABLE (1 VIEW)   Final Result          1. No significant interval change.      DX-CHEST-PORTABLE (1 VIEW)   Final Result      New subpulmonic effusions, bibasilar atelectasis and edema      DX-PORTABLE FLUOROSCOPY < 1 HOUR   Final Result      Portable fluoroscopy utilized for 1.1 minute.         INTERPRETING LOCATION: [address] [place] [address], [place], [id number]      [id number] SPHINCTEROTOMY   Final Result      Intraoperative fluoroscopy spot images as described above.      CT-ABDOMEN WITH & W/O   Final Result      1.  No significant change since CT abdomen and pelvis obtained 2/16/2020      2.  Findings consistent with high-grade biliary obstruction with dilation of the common bile duct measuring 17 mm, dilated gallbladder, dilated pancreatic duct with change in caliber of the pancreatic duct at the level pancreatic head      3.  Findings could be either malignancy the pancreatic head versus scarring from chronic pancreatitis.      4.  Sequela of chronic pancreatitis      5.  Large right medial chest rim-enhancing loculated fluid collection probably within the pleural space measuring 10.5 x 3.1 centimeter transversely and 7 cm craniocaudal      6.  Rim-enhancing fluid collection in the hepatorenal space measuring 11.2 cm craniocaudal and 2-3 cm in thickness.      7.  The rim-enhancing fluid collection are suspicious for infection/abscess      8.  Bilateral atelectasis and small-moderate-sized pleural effusion after      9.  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  Biliary dilatation without evidence of high-grade biliary obstruction.   2.  Gallbladder visualization after morphine administration. No acute cholecystitis.      CT-ABDOMEN-PELVIS WITH   Final Result      1.  New rim enhancing fluid collection tracks along the right heart margin, azygoesophageal recess and possibly communicates with Morison's pouch tracking along the right  aspect of the inferior vena cava. This is nonspecific and could indicate abscess,    dissecting pseudocyst, mucinous cystic neoplasm related fluid      2.  New extra and intrahepatic biliary ductal dilatation with 6 cm dilatation of the gallbladder also seen. This indicates distal common bile duct/ampullary level obstruction. Stricture, mass effect from adjacent pancreas calcifications or small    ampullary mass are differential possibilities      3.  Decreased small pleural effusions. Some loculation on the right is possible      4.  Slightly decreased diffuse pancreatic ductal dilatation now measuring 11 mm. Evidence of chronic pancreatitis. New 3 cm cystic structure anterior to the pancreas could be a pseudocyst. Abscess or necrotic/cystic neoplasm are in the differential.      5.  Multiple chronic findings including status post aorto bifemoral bypass with approaching 70% stenosis of the left superficial femoral artery, nonobstructive right 3 mm nephrolithiasis, large volume rectal vault stool, dilated bladder which could    indicate outlet obstruction or neurogenic bladder, splenosis      [id number]   Final Result      1.  Severe dilatation of the intrahepatic biliary ducts, common bile duct and pancreatic duct. The distal common bile duct and the proximal pancreatic duct is not visualized at the pancreatic head. The pancreatic head appears to be diffusely enlarged and    demonstrates multiple small cysts. These findings are concerning for pancreatic neoplasm such as intraductal papillary mucinous cystoadenoma. The other differential diagnosis includes periampullary carcinoma. MR examination of the abdomen with contrast    is recommended for further evaluation.   2.  Mixed signal intensity in the right perinephric space likely represents perinephric hematoma.   3.  Large hiatal hernia.   4.  Diffuse gastric wall thickening.      US-RUQ   Final Result      1.  Hepatomegaly.   2.  Hepatic steatosis.   3.   Gallbladder sludge. Mild intrahepatic ductal dilation. Dilation of the common duct up to 13 mm. No definite distal obstructing etiology is identified. MRCP could be obtained for further evaluation if indicated.   4.  Trace ascites.   5.  Pancreatic calcifications likely sequela of chronic pancreatitis.      DX-CHEST-PORTABLE (1 VIEW)   Final Result      Cardiomegaly.           Assessment/Plan  * Pancreatic mass  Assessment & Plan  MRI with concern for pancreatic malignancy  ERCP with no evidence of malignancy, calcifications of pancreas present   is elevated but less than 1000 so cannot rule in cancer, likely due chronic pancreatitis  Repeat CT showed chronic pancreatitis and loculated chest effusion    At this moment GI does not believe patient has significant evidence for periodic cancer.  Recommend outpatient follow-up with GI.  Family agreed  Pending nursing care facility versus group home placement.   to help    Type 2 diabetes mellitus with hyperglycemia, with long-term current use of insulin ([facility])- (present on admission)  Assessment & Plan  He has an insulin sensor in his right lower quadrant, uses long-acting insulin 12 units and sliding scale.  Continue monitor blood sugar    Cognitive decline- (present on admission)  Assessment & Plan  Patient likely has a chronic component to his cognitive decline as well as acute worsening  Mental status now at his baseline, patient has a legal guardian  Continue pursuing guardianship by caregiver and POA  Pending group home versus SNF versus rehab.  Pending evaluation    Metabolic acidosis  Assessment & Plan  IV fluids with improvement    Hypokalemia  Assessment & Plan  Resolved, replete with diuresis.  Replaced magnesium      Hypomagnesemia  Assessment & Plan  1.6  Mag rider 2 g given 2/29    Sepsis ([facility])  Assessment & Plan  This is Sepsis Present on admission  SIRS criteria identified on my evaluation include: Tachycardia, with heart rate greater  than 90 BPM and Leukocyosis, with WBC greater than 12,000  Source is GI  Sepsis protocol complete  Continue antibiotics  White cell count trending down  Repeat ERCP will be rescheduled once oxygenation improves.    Currently resolved, continue oral antibiotics Augmentin to finish a total course of 14 days    Acute respiratory failure with hypoxia (HCC)  Assessment & Plan  Diuresing effectively with lasix -10 L since admission and 1-2 liters down daily with increased diuresis  CXR same  Oxygen demand decreasing    Significant improving, continue diuretic    Loculated pleural effusion  Assessment & Plan  In medial right chest, continue antibiotics, pus drained from ampulla of Vater with GI 2/24  Pulmonary edema appreciated on CXR 2/27 - diurese to get oxygen demand down, anticipate another attempt at ERCP in a few days.    Cholangitis  Assessment & Plan  Biliary obstruction suggested on imaging, GI consulted  Alkaline phosphatase is stable over 1100  Endoscopy done showing pancreatic calcifications but no stones, no area to biopsy  Continue antibiotics, white cell count still elevated  Repeat CT scan done  ERCP repeated 2/24, given swelling so that a ampullary duct stent could not be placed  Repeat planned for 2/26 but cancelled because of hypoxia.    Recurrent major depressive disorder, in full remission (HCC)  Assessment & Plan  .    Hyponatremia  Assessment & Plan  Resolved with fluids    Chronic pancreatitis (HCC)- (present on admission)  Assessment & Plan  Continue pancreatic enzymes with meals  Celiac nerve block for pain control, per GI    Dyslipidemia- (present on admission)  Assessment & Plan  Continue statin     ACP:  Total time spent on advanced care planning, excluding time spent on daily care: 16 minutes.    1st 30 minutes [id number]       I discussed extensively with patient's care team, POA, is regarding code status and plan of care. We also discussed advanced care planning including diagnosis, prognosis,  plan of care, risks and benefits of any therapies that could be offered, as well as alternatives including palliation and hospice, as appropriate. My discussion is summarized above in detail. Confirmed with DNR and possible hospice candidate    VTE prophylaxis: scds    I have seen and examined patient on 3/5/2020. I have reviewed vitals, new labs and imaging. I have discussed POC with RN. There are no changes from (3/4/2020) except for what is mentioned above.       Current Facility-Administered Medications:   •  furosemide (LASIX) tablet 40 mg, 40 mg, Oral, Q DAY, Ruma Christopher M.D., 40 mg at 03/05/20 0626  •  amoxicillin-clavulanate (AUGMENTIN) 875-125 MG per tablet 1 Tab, 1 Tab, Oral, Q12HRS, Ruma Christopher M.D., 1 Tab at 03/05/20 0626  •  menthol (HALLS) lozenge 1 Lozenge, 1 Lozenge, Oral, Q2HRS PRN, Betsey Gentile M.D., 1 Lozenge at 03/05/20 1119  •  cyclobenzaprine (FLEXERIL) tablet 10 mg, 10 mg, Oral, TID PRN, Betsey Gentile M.D., 10 mg at 03/05/20 1433  •  insulin NPH (HUMULIN/NOVOLIN) injection 5 Units, 5 Units, Subcutaneous, BID INSULIN, Rosendo Encinas M.D., 5 Units at 03/05/20 0627  •  haloperidol lactate (HALDOL) injection 2-5 mg, 2-5 mg, Intravenous, Q6HRS PRN, Rosendo Encinas M.D.  •  insulin regular (HUMULIN R) injection 3-14 Units, 3-14 Units, Subcutaneous, 4X/DAY ACHS, 7 Units at 03/05/20 1119 **AND** Accu-Chek ACHS, , , Q AC AND BEDTIME(S) **AND** NOTIFY MD and PharmD, , , Once **AND** glucose 4 g chewable tablet 16 g, 16 g, Oral, Q15 MIN PRN **AND** DEXTROSE 10% BOLUS 250 mL, 250 mL, Intravenous, Q15 MIN PRN, Rosendo Encinas M.D.  •  atorvastatin (LIPITOR) tablet 40 mg, 40 mg, Oral, Q EVENING, Grace Patel D.O., 40 mg at 03/04/20 1731  •  omeprazole (PRILOSEC) capsule 20 mg, 20 mg, Oral, BID, Grace Patel D.O., 20 mg at 03/05/20 0626  •  pancrelipase (Lip-Prot-Amyl) (CREON 11495) 51831-94650 units capsule 48,000 Units, 2 Cap, Oral, DAILY, Grace Patel D.O., 48,000 Units at 03/05/20  0626  •  sucralfate (CARAFATE) tablet 1 g, 1 g, Oral, BID PRN, Grace Patel D.O.  •  tamsulosin (FLOMAX) capsule 0.4 mg, 0.4 mg, Oral, DAILY, Grace Patel D.O., 0.4 mg at 03/05/20 0626  •  thiamine tablet 100 mg, 100 mg, Oral, DAILY, Grace Patel D.O., 100 mg at 03/05/20 0626  •  senna-docusate (PERICOLACE or SENOKOT S) 8.6-50 MG per tablet 2 Tab, 2 Tab, Oral, BID, Stopped at 03/02/20 0651 **AND** polyethylene glycol/lytes (MIRALAX) PACKET 1 Packet, 1 Packet, Oral, QDAY PRN **AND** magnesium hydroxide (MILK OF MAGNESIA) suspension 30 mL, 30 mL, Oral, QDAY PRN **AND** bisacodyl (DULCOLAX) suppository 10 mg, 10 mg, Rectal, QDAY PRN, CHIARA Holguin.O.  •  acetaminophen (TYLENOL) tablet 650 mg, 650 mg, Oral, Q6HRS PRN, YULI HolguinOYuridia, 650 mg at 03/05/20 1236  •  ondansetron (ZOFRAN) syringe/vial injection 4 mg, 4 mg, Intravenous, Q4HRS PRN, YULI HolguinO.  •  ondansetron (ZOFRAN ODT) dispertab 4 mg, 4 mg, Oral, Q4HRS PRN, YULI HolguinO.  •  promethazine (PHENERGAN) tablet 12.5-25 mg, 12.5-25 mg, Oral, Q4HRS PRN, YULI HolguinO.  •  promethazine (PHENERGAN) suppository 12.5-25 mg, 12.5-25 mg, Rectal, Q4HRS PRN, CHIARA Holguin.O.  •  prochlorperazine (COMPAZINE) injection 5-10 mg, 5-10 mg, Intravenous, Q4HRS PRN, Grace M Lewman, D.O.  •  HYDROmorphone pf (DILAUDID) injection 0.5 mg, 0.5 mg, Intravenous, Q2HRS PRN, Raquel Encinas M.D., 0.5 mg at 03/04/20 1200

## 2020-03-06 NOTE — CARE PLAN
Problem: Safety  Goal: Will remain free from falls  Outcome: PROGRESSING AS EXPECTED  Intervention: Implement fall precautions  Flowsheets (Taken 3/6/2020 0800)  Environmental Precautions:   Treaded Slipper Socks on Patient   Personal Belongings, Wastebasket, Call Bell etc. in Easy Reach   Report Given to Other Health Care Providers Regarding Fall Risk   Bed in Low Position   Communication Sign for Patients & Families   Mobility Assessed & Appropriate Sign Placed  Note: Environmental fall precautions and hourly rounding in place. Pt instructed to call for assistance before attempting to ambulate. Pt verbalized understanding but does not always call appropriately. Bed alarm on at all times.        Problem: Discharge Barriers/Planning  Goal: Patient's continuum of care needs will be met  Outcome: PROGRESSING AS EXPECTED  Intervention: Collaborate with Transitional Care Team and Interdisciplinary Team to meet discharge needs  Note: MD, CM, SW, shift RN, pt guardian, and POA involved in discharge planning. Discharge back to group home/assisted living anticipated at this time.

## 2020-03-06 NOTE — PROGRESS NOTES
Bedside report received from night RN. Assumed care of patient. Daily plan of care discussed. Pt awake and alert, complaining of headache and back ache currently. Prn medication given by night RN per MAR. WCTM. Hourly rounding in place.

## 2020-03-06 NOTE — DISCHARGE PLANNING
LSW received call from pt's sister and pt's sister requested that pt be re-evaluated, again, by PT/OT due to not sure that pt was truly able to ambulate and would also like to know the distance. Pt's sister would also like for pt's insulin to be taken off of a sliding scale on a regimented routine. LSW informed pt's sister that pt would be discussed in rounds and all needs would be addressed and LSW would call with results of conversation.

## 2020-03-07 LAB
GLUCOSE BLD-MCNC: 132 MG/DL (ref 65–99)
GLUCOSE BLD-MCNC: 280 MG/DL (ref 65–99)
GLUCOSE BLD-MCNC: 433 MG/DL (ref 65–99)
GLUCOSE BLD-MCNC: 467 MG/DL (ref 65–99)
GLUCOSE BLD-MCNC: 476 MG/DL (ref 65–99)

## 2020-03-07 PROCEDURE — 82962 GLUCOSE BLOOD TEST: CPT | Mod: 91

## 2020-03-07 PROCEDURE — 700102 HCHG RX REV CODE 250 W/ 637 OVERRIDE(OP): Performed by: INTERNAL MEDICINE

## 2020-03-07 PROCEDURE — A9270 NON-COVERED ITEM OR SERVICE: HCPCS | Performed by: INTERNAL MEDICINE

## 2020-03-07 PROCEDURE — 770006 HCHG ROOM/CARE - MED/SURG/GYN SEMI*

## 2020-03-07 PROCEDURE — 99232 SBSQ HOSP IP/OBS MODERATE 35: CPT | Performed by: INTERNAL MEDICINE

## 2020-03-07 RX ADMIN — AMOXICILLIN AND CLAVULANATE POTASSIUM 1 TABLET: 875; 125 TABLET, FILM COATED ORAL at 17:51

## 2020-03-07 RX ADMIN — TAMSULOSIN HYDROCHLORIDE 0.4 MG: 0.4 CAPSULE ORAL at 05:52

## 2020-03-07 RX ADMIN — AMOXICILLIN AND CLAVULANATE POTASSIUM 1 TABLET: 875; 125 TABLET, FILM COATED ORAL at 05:52

## 2020-03-07 RX ADMIN — OMEPRAZOLE 20 MG: 20 CAPSULE, DELAYED RELEASE ORAL at 05:52

## 2020-03-07 RX ADMIN — ATORVASTATIN CALCIUM 40 MG: 40 TABLET, FILM COATED ORAL at 17:51

## 2020-03-07 RX ADMIN — INSULIN HUMAN 14 UNITS: 100 INJECTION, SOLUTION PARENTERAL at 16:46

## 2020-03-07 RX ADMIN — INSULIN HUMAN 5 UNITS: 100 INJECTION, SUSPENSION SUBCUTANEOUS at 05:57

## 2020-03-07 RX ADMIN — Medication 100 MG: at 05:52

## 2020-03-07 RX ADMIN — FUROSEMIDE 40 MG: 40 TABLET ORAL at 05:52

## 2020-03-07 RX ADMIN — INSULIN HUMAN 14 UNITS: 100 INJECTION, SOLUTION PARENTERAL at 21:01

## 2020-03-07 RX ADMIN — CYCLOBENZAPRINE HYDROCHLORIDE 10 MG: 10 TABLET, FILM COATED ORAL at 08:16

## 2020-03-07 RX ADMIN — CYCLOBENZAPRINE HYDROCHLORIDE 10 MG: 10 TABLET, FILM COATED ORAL at 17:50

## 2020-03-07 RX ADMIN — INSULIN HUMAN 5 UNITS: 100 INJECTION, SUSPENSION SUBCUTANEOUS at 16:46

## 2020-03-07 RX ADMIN — ACETAMINOPHEN 650 MG: 325 TABLET, FILM COATED ORAL at 08:16

## 2020-03-07 RX ADMIN — OMEPRAZOLE 20 MG: 20 CAPSULE, DELAYED RELEASE ORAL at 17:51

## 2020-03-07 RX ADMIN — PANCRELIPASE 48000 UNITS: 24000; 76000; 120000 CAPSULE, DELAYED RELEASE PELLETS ORAL at 05:52

## 2020-03-07 RX ADMIN — INSULIN HUMAN 14 UNITS: 100 INJECTION, SOLUTION PARENTERAL at 10:52

## 2020-03-07 RX ADMIN — ACETAMINOPHEN 650 MG: 325 TABLET, FILM COATED ORAL at 17:50

## 2020-03-07 ASSESSMENT — ENCOUNTER SYMPTOMS
SHORTNESS OF BREATH: 0
ABDOMINAL PAIN: 0
SORE THROAT: 0
BLURRED VISION: 0
MYALGIAS: 0
DIARRHEA: 0
SENSORY CHANGE: 0
SPUTUM PRODUCTION: 0
TINGLING: 0
HEADACHES: 0
HEARTBURN: 0
DIZZINESS: 0
PHOTOPHOBIA: 0
NAUSEA: 0
COUGH: 0
NERVOUS/ANXIOUS: 0
INSOMNIA: 0
CONSTIPATION: 0
CHILLS: 0
BACK PAIN: 0
ORTHOPNEA: 0
FEVER: 0

## 2020-03-07 NOTE — PROGRESS NOTES
Pt AAOx4. No complaints of discomfort at the moment. POC discussed w/ pt. Mepilex to both heels observed. Tea and sandwhich  provided per pt request. Safety and comfort measures in place. No additional needs at this time.

## 2020-03-07 NOTE — PROGRESS NOTES
"Hospital Medicine Daily Progress Note    Date of Service  3/6/2020    Chief Complaint  59 y.o. male admitted 2/14/2020 with poor appetite.    Hospital Course    Patient with cognitive deficit presented from group home with lethargy and poor appetite.  He had elevation in alk phos and leukocytosis and treated for concern of sepsis with abdominal source.  HIDA was performed which showed no evidence of obstruction nor cholecystitis.    MRCP showed dilation of biliary ducts and concern of pancreatic head mass.   GI  EUS on 2/18 and showed significant calcifications likely related to chronic pancreatitis.  He underwent ERCP on 2/24 and purulent drainage from the ampulla of vater os and he had biliary needle-knife sphincterotomy to relieve the purulent drainage.          Interval Problem Update  2/25 Patient states he is hungry and wants solid food, still on a liquid diet.  Plan to move forward with repeat ERCP with Dr Wakefield tomorrow - on schedule at 1500 with anticipated stenting of the ampulla.  Patient denies any current abdominal pain.  2/26 Patient doing about same today, denies pain, refused labs this am.  ERCP scheduled for today for stenting of the ampulla.  Patient voicing frustration regarding NPO.  2/27 Patient without complaint today as he had breakfast in front of him when I saw him.  ERCP was cancelled due to increasing hypoxia to where he was requiring 4L.  CXR completed with again shows dulling of the bases consistent with known pleural effusions but also shows pulmonary congestion - will diurese to see if this improves.  2/28 Patient feeling okay today, looks better than on previous days, responding well to diuresis though lungs sound about the same.  He states his back is aching \"all over\" but is not severely painful and no focal tenderness.  He states he ambulated in the hallway about \"a quarter of the way down\" yesterday.  Will continue with diuresis and work toward less oxygen requirement so he can " have repeat ERCP for stent placement.  2/29 Patient feeling well today, states he will walk in the hallways with the staff today.  Oxygen demand is about the same on 4L but lungs are sounding clear.  K is 3.2 and mag 1.2, will replete both.  WBC continues to drop.  3/1 Patient feeling well today, states he has no pain and doesn't feel short of breath.  Oxygen overnight documented at 4.5L nasal cannula despite continued aggressive diuresis with 10 L down since admission.  CXR pending for further evaluation.  3/2 patient states he feels same.  He continues to diurese well and now is 13L down.  He is saturating well on 3L and if he can get back to 2L, will move forward with ERCP stenting.  Discussed with Dr Burkett.  3/3.  Patient feels pleasant today.  No significant adverse event.  Oxygen level down to 1 L.  Continue monitor.  Pending GI to perform ERCP.  Patient is oriented however very poor insight of his health condition.  Meet with patient's POA today and care group.  Continue DNR and pursuing for possible hospice. Patient's pain is general, 2-3/10, intermittent and does not radiate to other location, sharp and with some tingling. Can be controlled by pain meds.   3/4.  Patient is pleasant overnight no acute adverse event.  Overall remained stable.  GI recommend outpatient follow-up.  Patient's condition likely due to ascending cholangitis with biliary obstruction.  After antibiotics patient's condition stabilized.  Pending nursing home placement recommended by PT OT.Patient otherwise denies fever, chills, nausea, vomiting, adb pain, SOB, CP, headache, constipation, diarrhea, cough, or sputum.  3/5.  Patient has been stable and comfortable.  Continue to improve mentation.  Sepsis resolved.  Pending placement.  3/6.  Patient is comfortable.  Denies overnight adverse event.  Still waiting for placement.  Tolerated diet.    Consultants/Specialty  GI - [facility]    Code Status  DNR/DNI, has guardian    Disposition    group home    Review of Systems  Review of Systems   Constitutional: Positive for malaise/fatigue. Negative for chills and fever.   HENT: Negative for congestion and sore throat.    Eyes: Negative for blurred vision and photophobia.   Respiratory: Negative for cough, sputum production and shortness of breath.    Cardiovascular: Negative for chest pain, orthopnea, claudication and leg swelling.   Gastrointestinal: Negative for abdominal pain, constipation, nausea and vomiting.   Genitourinary: Negative for dysuria, hematuria and urgency.   Musculoskeletal: Negative for back pain, joint pain and myalgias.   Skin: Negative for itching and rash.   Neurological: Negative for dizziness, tingling, sensory change, speech change and weakness.   Psychiatric/Behavioral: Negative for depression. The patient is not nervous/anxious and does not have insomnia.         Physical Exam  Temp:  [36.5 °C (97.7 °F)-36.7 °C (98 °F)] 36.5 °C (97.7 °F)  Pulse:  [] 111  Resp:  [18] 18  BP: (106-116)/(58-66) 115/59  SpO2:  [93 %-96 %] 93 %    Physical Exam  Vitals signs and nursing note reviewed.   Constitutional:       General: He is not in acute distress.     Appearance: Normal appearance. He is not toxic-appearing.   HENT:      Head: Normocephalic and atraumatic.      Right Ear: Tympanic membrane and external ear normal.      Left Ear: Tympanic membrane and external ear normal.      Nose: No congestion or rhinorrhea.      Mouth/Throat:      Mouth: Mucous membranes are moist.      Pharynx: Oropharynx is clear. No oropharyngeal exudate.   Eyes:      General: No scleral icterus.        Right eye: No discharge.         Left eye: No discharge.      Extraocular Movements: Extraocular movements intact.      Conjunctiva/sclera: Conjunctivae normal.      Pupils: Pupils are equal, round, and reactive to light.   Neck:      Musculoskeletal: Normal range of motion and neck supple. No neck rigidity or muscular tenderness.   Cardiovascular:       Rate and Rhythm: Normal rate and regular rhythm.      Pulses: Normal pulses.      Heart sounds: Normal heart sounds. No murmur. No friction rub.   Pulmonary:      Effort: Pulmonary effort is normal. No respiratory distress.      Breath sounds: Normal breath sounds. No stridor. No rales.   Chest:      Chest wall: No tenderness.   Abdominal:      General: Abdomen is flat. Bowel sounds are normal.      Palpations: Abdomen is soft.      Tenderness: There is no abdominal tenderness. There is no guarding or rebound.      Hernia: No hernia is present.   Musculoskeletal: Normal range of motion.         General: No swelling, tenderness or deformity.   Lymphadenopathy:      Cervical: No cervical adenopathy.   Skin:     General: Skin is warm and dry.      Capillary Refill: Capillary refill takes more than 3 seconds.      Coloration: Skin is not jaundiced or pale.      Findings: No erythema.   Neurological:      General: No focal deficit present.      Mental Status: He is alert and oriented to person, place, and time. Mental status is at baseline.      Cranial Nerves: No cranial nerve deficit.      Motor: No weakness.   Psychiatric:         Mood and Affect: Mood normal.         Behavior: Behavior normal.         Fluids    Intake/Output Summary (Last 24 hours) at 3/6/2020 1705  Last data filed at 3/6/2020 0800  Gross per 24 hour   Intake 720 ml   Output --   Net 720 ml       Laboratory  Recent Labs     03/04/20  0813 03/05/20 0319 03/06/20 0459   WBC 13.4* 15.0* 13.7*   RBC 4.12* 3.69* 3.45*   HEMOGLOBIN 12.2* 11.1* 10.4*   HEMATOCRIT 37.4* 33.1* 31.1*   MCV 90.8 89.7 90.1   MCH 29.6 30.1 30.1   MCHC 32.6* 33.5* 33.4*   RDW 80.2* 78.9* 78.3*   PLATELETCT 418 414 381   MPV 10.6 11.3 10.9     Recent Labs     03/04/20  0813 03/05/20 0319 03/06/20 0459   SODIUM 133* 136 137   POTASSIUM 5.3 4.3 4.5   CHLORIDE 92* 97 99   CO2 31 28 27   GLUCOSE 258* 118* 134*   BUN 11 13 15   CREATININE 0.77 0.68 0.63   CALCIUM 9.1 8.8 8.4                    Imaging  DX-CHEST-PORTABLE (1 VIEW)   Final Result         1. No significant interval change.      DX-CHEST-PORTABLE (1 VIEW)   Final Result      New subpulmonic effusions, bibasilar atelectasis and edema      DX-PORTABLE FLUOROSCOPY < 1 HOUR   Final Result      Portable fluoroscopy utilized for 1.1 minute.         INTERPRETING LOCATION: [address] [place] [address] [place], [id number]      [id number] SPHINCTEROTOMY   Final Result      Intraoperative fluoroscopy spot images as described above.      CT-ABDOMEN WITH & W/O   Final Result      1.  No significant change since CT abdomen and pelvis obtained 2/16/2020      2.  Findings consistent with high-grade biliary obstruction with dilation of the common bile duct measuring 17 mm, dilated gallbladder, dilated pancreatic duct with change in caliber of the pancreatic duct at the level pancreatic head      3.  Findings could be either malignancy the pancreatic head versus scarring from chronic pancreatitis.      4.  Sequela of chronic pancreatitis      5.  Large right medial chest rim-enhancing loculated fluid collection probably within the pleural space measuring 10.5 x 3.1 centimeter transversely and 7 cm craniocaudal      6.  Rim-enhancing fluid collection in the hepatorenal space measuring 11.2 cm craniocaudal and 2-3 cm in thickness.      7.  The rim-enhancing fluid collection are suspicious for infection/abscess      8.  Bilateral atelectasis and small-moderate-sized pleural effusion after      9.  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  Biliary dilatation without evidence of high-grade biliary obstruction.   2.  Gallbladder visualization after morphine administration. No acute cholecystitis.      CT-ABDOMEN-PELVIS WITH   Final Result      1.  New rim enhancing fluid collection tracks along the right heart margin, azygoesophageal recess and  possibly communicates with Morison's pouch tracking along the right aspect of the inferior vena cava. This is nonspecific and could indicate abscess,    dissecting pseudocyst, mucinous cystic neoplasm related fluid      2.  New extra and intrahepatic biliary ductal dilatation with 6 cm dilatation of the gallbladder also seen. This indicates distal common bile duct/ampullary level obstruction. Stricture, mass effect from adjacent pancreas calcifications or small    ampullary mass are differential possibilities      3.  Decreased small pleural effusions. Some loculation on the right is possible      4.  Slightly decreased diffuse pancreatic ductal dilatation now measuring 11 mm. Evidence of chronic pancreatitis. New 3 cm cystic structure anterior to the pancreas could be a pseudocyst. Abscess or necrotic/cystic neoplasm are in the differential.      5.  Multiple chronic findings including status post aorto bifemoral bypass with approaching 70% stenosis of the left superficial femoral artery, nonobstructive right 3 mm nephrolithiasis, large volume rectal vault stool, dilated bladder which could    indicate outlet obstruction or neurogenic bladder, splenosis      [id number]   Final Result      1.  Severe dilatation of the intrahepatic biliary ducts, common bile duct and pancreatic duct. The distal common bile duct and the proximal pancreatic duct is not visualized at the pancreatic head. The pancreatic head appears to be diffusely enlarged and    demonstrates multiple small cysts. These findings are concerning for pancreatic neoplasm such as intraductal papillary mucinous cystoadenoma. The other differential diagnosis includes periampullary carcinoma. MR examination of the abdomen with contrast    is recommended for further evaluation.   2.  Mixed signal intensity in the right perinephric space likely represents perinephric hematoma.   3.  Large hiatal hernia.   4.  Diffuse gastric wall thickening.      US-RUQ    Final Result      1.  Hepatomegaly.   2.  Hepatic steatosis.   3.  Gallbladder sludge. Mild intrahepatic ductal dilation. Dilation of the common duct up to 13 mm. No definite distal obstructing etiology is identified. MRCP could be obtained for further evaluation if indicated.   4.  Trace ascites.   5.  Pancreatic calcifications likely sequela of chronic pancreatitis.      DX-CHEST-PORTABLE (1 VIEW)   Final Result      Cardiomegaly.           Assessment/Plan  * Pancreatic mass  Assessment & Plan  MRI with concern for pancreatic malignancy  ERCP with no evidence of malignancy, calcifications of pancreas present   is elevated but less than 1000 so cannot rule in cancer, likely due chronic pancreatitis  Repeat CT showed chronic pancreatitis and loculated chest effusion    At this moment GI does not believe patient has significant evidence for periodic cancer.  Recommend outpatient follow-up with GI.  Family agreed  Pending group home placement.   to help, awaiting for acceptance from group home  Patient currently medically cleared for transfer to group home.    Type 2 diabetes mellitus with hyperglycemia, with long-term current use of insulin (HCC)- (present on admission)  Assessment & Plan  He has an insulin sensor in his right lower quadrant, uses long-acting insulin 12 units and sliding scale.  Continue monitor blood sugar  Group home require change to oral medication which is not a good option for patient.  Unless patient become comfort care, I would recommend continue insulin    Cognitive decline- (present on admission)  Assessment & Plan  Patient likely has a chronic component to his cognitive decline as well as acute worsening  Mental status now at his baseline, patient has a legal guardian  Continue pursuing guardianship by caregiver and POA  Pending group home versus SNF versus rehab.  Pending evaluation    Metabolic acidosis  Assessment & Plan  IV fluids with  improvement    Hypokalemia  Assessment & Plan  Resolved, replete with diuresis.  Replaced magnesium      Sepsis (HCC)  Assessment & Plan  This is Sepsis Present on admission  SIRS criteria identified on my evaluation include: Tachycardia, with heart rate greater than 90 BPM and Leukocyosis, with WBC greater than 12,000  Source is GI  Sepsis protocol complete  Continue antibiotics  White cell count trending down  Repeat ERCP will be rescheduled once oxygenation improves.    Currently resolved, continue oral antibiotics Augmentin to finish a total course of 14 days    Acute respiratory failure with hypoxia (HCC)  Assessment & Plan  Diuresing effectively with lasix -10 L since admission and 1-2 liters down daily with increased diuresis  CXR same  Oxygen demand decreasing    Significant improving, continue diuretic    Loculated pleural effusion  Assessment & Plan  In medial right chest, continue antibiotics, pus drained from ampulla of Vater with GI 2/24  Pulmonary edema appreciated on CXR 2/27 - diurese to get oxygen demand down, anticipate another attempt at ERCP in a few days.    Cholangitis  Assessment & Plan  Biliary obstruction suggested on imaging, GI consulted  Alkaline phosphatase is stable over 1100  Endoscopy done showing pancreatic calcifications but no stones, no area to biopsy  Continue antibiotics, white cell count still elevated  Repeat CT scan done  ERCP repeated 2/24, given swelling so that a ampullary duct stent could not be placed  Repeat planned for 2/26 but cancelled because of hypoxia.    Recurrent major depressive disorder, in full remission ([facility])  Assessment & Plan  .    Hyponatremia  Assessment & Plan  Resolved with fluids    Chronic pancreatitis (HCC)- (present on admission)  Assessment & Plan  Continue pancreatic enzymes with meals  Celiac nerve block for pain control, per GI    Dyslipidemia- (present on admission)  Assessment & Plan  Continue statin     ACP:  Total time spent on advanced  care planning, excluding time spent on daily care: 16 minutes.    1st 30 minutes [id number]       I discussed extensively with patient's care team, POA, is regarding code status and plan of care. We also discussed advanced care planning including diagnosis, prognosis, plan of care, risks and benefits of any therapies that could be offered, as well as alternatives including palliation and hospice, as appropriate. My discussion is summarized above in detail. Confirmed with DNR and possible hospice candidate    VTE prophylaxis: scds    I have seen and examined patient on 3/6/2020. I have reviewed vitals, new labs and imaging. I have discussed POC with RN. There are no changes from (3/5/2020) except for what is mentioned above.       Current Facility-Administered Medications:   •  furosemide (LASIX) tablet 40 mg, 40 mg, Oral, Q DAY, Ruma Christopher M.D., 40 mg at 03/06/20 0652  •  amoxicillin-clavulanate (AUGMENTIN) 875-125 MG per tablet 1 Tab, 1 Tab, Oral, Q12HRS, Ruma Christopher M.D., 1 Tab at 03/06/20 0651  •  menthol (HALLS) lozenge 1 Lozenge, 1 Lozenge, Oral, Q2HRS PRN, Betsey Gentile M.D., 1 Lozenge at 03/05/20 1119  •  cyclobenzaprine (FLEXERIL) tablet 10 mg, 10 mg, Oral, TID PRN, Betsey Gentile M.D., 10 mg at 03/06/20 1317  •  insulin NPH (HUMULIN/NOVOLIN) injection 5 Units, 5 Units, Subcutaneous, BID INSULIN, Rosendo Encinas M.D., 5 Units at 03/06/20 0702  •  haloperidol lactate (HALDOL) injection 2-5 mg, 2-5 mg, Intravenous, Q6HRS PRN, Rosendo Encinas M.D.  •  insulin regular (HUMULIN R) injection 3-14 Units, 3-14 Units, Subcutaneous, 4X/DAY ACHS, 4 Units at 03/06/20 1105 **AND** Accu-Chek ACHS, , , Q AC AND BEDTIME(S) **AND** NOTIFY MD and PharmD, , , Once **AND** glucose 4 g chewable tablet 16 g, 16 g, Oral, Q15 MIN PRN **AND** DEXTROSE 10% BOLUS 250 mL, 250 mL, Intravenous, Q15 MIN PRN, Rosendo Encinas M.D.  •  atorvastatin (LIPITOR) tablet 40 mg, 40 mg, Oral, Q EVENING, Grace Patel D.O., 40 mg at 03/05/20  1738  •  omeprazole (PRILOSEC) capsule 20 mg, 20 mg, Oral, BID, CHIARA Holguin.O., 20 mg at 03/06/20 0653  •  pancrelipase (Lip-Prot-Amyl) (CREON 44970) [address]30-38[address] units capsule 48,000 Units, 2 Cap, Oral, DAILY, YULI HolguinO., 48,000 Units at 03/06/20 0652  •  sucralfate (CARAFATE) tablet 1 g, 1 g, Oral, BID PRN, YULI HolguinO.  •  tamsulosin (FLOMAX) capsule 0.4 mg, 0.4 mg, Oral, DAILY, YULI HogluinO., 0.4 mg at 03/06/20 0652  •  thiamine tablet 100 mg, 100 mg, Oral, DAILY, YULI HolguinOYuridia, 100 mg at 03/06/20 0653  •  senna-docusate (PERICOLACE or SENOKOT S) 8.6-50 MG per tablet 2 Tab, 2 Tab, Oral, BID, Stopped at 03/02/20 0651 **AND** polyethylene glycol/lytes (MIRALAX) PACKET 1 Packet, 1 Packet, Oral, QDAY PRN **AND** magnesium hydroxide (MILK OF MAGNESIA) suspension 30 mL, 30 mL, Oral, QDAY PRN **AND** bisacodyl (DULCOLAX) suppository 10 mg, 10 mg, Rectal, QDAY PRN, YULI HolguinO.  •  acetaminophen (TYLENOL) tablet 650 mg, 650 mg, Oral, Q6HRS PRN, YULI HolguinO., 650 mg at 03/06/20 1317  •  ondansetron (ZOFRAN) syringe/vial injection 4 mg, 4 mg, Intravenous, Q4HRS PRN, Grace Patel D.O.  •  ondansetron (ZOFRAN ODT) dispertab 4 mg, 4 mg, Oral, Q4HRS PRN, YULI HolguinO.  •  promethazine (PHENERGAN) tablet 12.5-25 mg, 12.5-25 mg, Oral, Q4HRS PRN, Grace Patel D.O.  •  promethazine (PHENERGAN) suppository 12.5-25 mg, 12.5-25 mg, Rectal, Q4HRS PRN, Grace Patel D.O.  •  prochlorperazine (COMPAZINE) injection 5-10 mg, 5-10 mg, Intravenous, Q4HRS PRN, Grace Patel D.O.  •  HYDROmorphone pf (DILAUDID) injection 0.5 mg, 0.5 mg, Intravenous, Q2HRS PRN, Raquel Encinas M.D., 0.5 mg at 03/04/20 1200

## 2020-03-07 NOTE — PROGRESS NOTES
Bedside report received from night RN. Assumed care of patient. Daily plan of care discussed. Pt resting comfortably in bed at this time, wakes easily to voice. Pt denies significant complaints or concerns. Hourly rounding in place.

## 2020-03-07 NOTE — CARE PLAN
Problem: Bowel/Gastric:  Goal: Will not experience complications related to bowel motility  Outcome: PROGRESSING AS EXPECTED  Intervention: Implement interventions to promote bowel evacuation if inadequate bowel movements in past 48 hours  Note: Pt reports having regular bowel movements daily with no difficulty. Pt denies abdominal pain or cramping, no signs of constipation observed.     Problem: Fluid Volume:  Goal: Will maintain balanced intake and output  Outcome: PROGRESSING AS EXPECTED  Intervention: Monitor, educate, and encourage compliance with therapeutic intake of liquids  Note: Pt encouraged to continue to consume adequate amounts of supplied Boost. Pt verbalized understanding and regularly consumes 50-75% of Boost provided with meals.

## 2020-03-07 NOTE — CARE PLAN
Problem: Knowledge Deficit  Goal: Knowledge of disease process/condition, treatment plan, diagnostic tests, and medications will improve  Outcome: PROGRESSING SLOWER THAN EXPECTED  Pt needs to be constantly reminded for diet to control elevated glucose level.       Problem: Discharge Barriers/Planning  Goal: Patient's continuum of care needs will be met  Outcome: PROGRESSING AS EXPECTED   Pt's DC plan to SNF on process. SW and pt's family working pt's DC plans.         Problem: Pain Management  Goal: Pain level will decrease to patient's comfort goal  Outcome: PROGRESSING AS EXPECTED   Pt takes tylenol and flexeril for pain management. Encouraged to inform RN of pain is greater than comfort level.

## 2020-03-08 LAB
GLUCOSE BLD-MCNC: 119 MG/DL (ref 65–99)
GLUCOSE BLD-MCNC: 390 MG/DL (ref 65–99)
GLUCOSE BLD-MCNC: 416 MG/DL (ref 65–99)
GLUCOSE BLD-MCNC: 439 MG/DL (ref 65–99)
GLUCOSE BLD-MCNC: 483 MG/DL (ref 65–99)
GLUCOSE BLD-MCNC: 509 MG/DL (ref 65–99)
GLUCOSE BLD-MCNC: 58 MG/DL (ref 65–99)

## 2020-03-08 PROCEDURE — 770006 HCHG ROOM/CARE - MED/SURG/GYN SEMI*

## 2020-03-08 PROCEDURE — 700102 HCHG RX REV CODE 250 W/ 637 OVERRIDE(OP): Performed by: INTERNAL MEDICINE

## 2020-03-08 PROCEDURE — A9270 NON-COVERED ITEM OR SERVICE: HCPCS | Performed by: INTERNAL MEDICINE

## 2020-03-08 PROCEDURE — 99232 SBSQ HOSP IP/OBS MODERATE 35: CPT | Performed by: INTERNAL MEDICINE

## 2020-03-08 PROCEDURE — 82962 GLUCOSE BLOOD TEST: CPT

## 2020-03-08 PROCEDURE — 700102 HCHG RX REV CODE 250 W/ 637 OVERRIDE(OP)

## 2020-03-08 RX ADMIN — AMOXICILLIN AND CLAVULANATE POTASSIUM 1 TABLET: 875; 125 TABLET, FILM COATED ORAL at 06:17

## 2020-03-08 RX ADMIN — INSULIN LISPRO 10 UNITS: 100 INJECTION, SOLUTION INTRAVENOUS; SUBCUTANEOUS at 21:01

## 2020-03-08 RX ADMIN — AMOXICILLIN AND CLAVULANATE POTASSIUM 1 TABLET: 875; 125 TABLET, FILM COATED ORAL at 17:10

## 2020-03-08 RX ADMIN — TAMSULOSIN HYDROCHLORIDE 0.4 MG: 0.4 CAPSULE ORAL at 06:17

## 2020-03-08 RX ADMIN — INSULIN HUMAN 10 UNITS: 100 INJECTION, SUSPENSION SUBCUTANEOUS at 17:11

## 2020-03-08 RX ADMIN — FUROSEMIDE 40 MG: 40 TABLET ORAL at 06:17

## 2020-03-08 RX ADMIN — OMEPRAZOLE 20 MG: 20 CAPSULE, DELAYED RELEASE ORAL at 17:10

## 2020-03-08 RX ADMIN — CYCLOBENZAPRINE HYDROCHLORIDE 10 MG: 10 TABLET, FILM COATED ORAL at 20:01

## 2020-03-08 RX ADMIN — OMEPRAZOLE 20 MG: 20 CAPSULE, DELAYED RELEASE ORAL at 06:18

## 2020-03-08 RX ADMIN — Medication 100 MG: at 06:17

## 2020-03-08 RX ADMIN — INSULIN LISPRO 15 UNITS: 100 INJECTION, SOLUTION INTRAVENOUS; SUBCUTANEOUS at 23:24

## 2020-03-08 RX ADMIN — ATORVASTATIN CALCIUM 40 MG: 40 TABLET, FILM COATED ORAL at 17:10

## 2020-03-08 RX ADMIN — ACETAMINOPHEN 650 MG: 325 TABLET, FILM COATED ORAL at 20:01

## 2020-03-08 RX ADMIN — PANCRELIPASE 48000 UNITS: 24000; 76000; 120000 CAPSULE, DELAYED RELEASE PELLETS ORAL at 06:17

## 2020-03-08 RX ADMIN — CYCLOBENZAPRINE HYDROCHLORIDE 10 MG: 10 TABLET, FILM COATED ORAL at 06:20

## 2020-03-08 RX ADMIN — ACETAMINOPHEN 650 MG: 325 TABLET, FILM COATED ORAL at 06:20

## 2020-03-08 RX ADMIN — INSULIN HUMAN 14 UNITS: 100 INJECTION, SOLUTION PARENTERAL at 10:58

## 2020-03-08 ASSESSMENT — ENCOUNTER SYMPTOMS
TREMORS: 0
PHOTOPHOBIA: 0
DOUBLE VISION: 0
SENSORY CHANGE: 0
HEARTBURN: 0
ABDOMINAL PAIN: 0
BACK PAIN: 0
MYALGIAS: 0
SPUTUM PRODUCTION: 0
INSOMNIA: 0
TINGLING: 0
CHILLS: 0
COUGH: 0
NAUSEA: 0
BLURRED VISION: 0
VOMITING: 0
DIZZINESS: 0
SORE THROAT: 0
ORTHOPNEA: 0
CONSTIPATION: 0
SHORTNESS OF BREATH: 0
NERVOUS/ANXIOUS: 0

## 2020-03-08 NOTE — CARE PLAN
Problem: Communication  Goal: The ability to communicate needs accurately and effectively will improve  Outcome: PROGRESSING AS EXPECTED     Problem: Safety  Goal: Will remain free from injury  Outcome: PROGRESSING AS EXPECTED  Goal: Will remain free from falls  Outcome: PROGRESSING AS EXPECTED     Problem: Infection  Goal: Will remain free from infection  Outcome: PROGRESSING AS EXPECTED     Problem: Venous Thromboembolism (VTW)/Deep Vein Thrombosis (DVT) Prevention:  Goal: Patient will participate in Venous Thrombosis (VTE)/Deep Vein Thrombosis (DVT)Prevention Measures  Outcome: PROGRESSING AS EXPECTED     Problem: Bowel/Gastric:  Goal: Normal bowel function is maintained or improved  Outcome: PROGRESSING AS EXPECTED     Problem: Knowledge Deficit  Goal: Knowledge of the prescribed therapeutic regimen will improve  Outcome: PROGRESSING AS EXPECTED     Problem: Psychosocial Needs:  Goal: Level of anxiety will decrease  Outcome: PROGRESSING AS EXPECTED

## 2020-03-08 NOTE — PROGRESS NOTES
2 consecutive fsbs > 400 noted. MD notified. Pt noted to be continually drinking soda over this shift. No new orders received.

## 2020-03-08 NOTE — PROGRESS NOTES
"Hospital Medicine Daily Progress Note    Date of Service  3/8/2020    Chief Complaint  59 y.o. male admitted 2/14/2020 with poor appetite.    Hospital Course    Patient with cognitive deficit presented from group home with lethargy and poor appetite.  He had elevation in alk phos and leukocytosis and treated for concern of sepsis with abdominal source.  HIDA was performed which showed no evidence of obstruction nor cholecystitis.    MRCP showed dilation of biliary ducts and concern of pancreatic head mass.   GI  EUS on 2/18 and showed significant calcifications likely related to chronic pancreatitis.  He underwent ERCP on 2/24 and purulent drainage from the ampulla of vater os and he had biliary needle-knife sphincterotomy to relieve the purulent drainage.          Interval Problem Update  2/25 Patient states he is hungry and wants solid food, still on a liquid diet.  Plan to move forward with repeat ERCP with Dr Wakefield tomorrow - on schedule at 1500 with anticipated stenting of the ampulla.  Patient denies any current abdominal pain.  2/26 Patient doing about same today, denies pain, refused labs this am.  ERCP scheduled for today for stenting of the ampulla.  Patient voicing frustration regarding NPO.  2/27 Patient without complaint today as he had breakfast in front of him when I saw him.  ERCP was cancelled due to increasing hypoxia to where he was requiring 4L.  CXR completed with again shows dulling of the bases consistent with known pleural effusions but also shows pulmonary congestion - will diurese to see if this improves.  2/28 Patient feeling okay today, looks better than on previous days, responding well to diuresis though lungs sound about the same.  He states his back is aching \"all over\" but is not severely painful and no focal tenderness.  He states he ambulated in the hallway about \"a quarter of the way down\" yesterday.  Will continue with diuresis and work toward less oxygen requirement so he can " have repeat ERCP for stent placement.  2/29 Patient feeling well today, states he will walk in the hallways with the staff today.  Oxygen demand is about the same on 4L but lungs are sounding clear.  K is 3.2 and mag 1.2, will replete both.  WBC continues to drop.  3/1 Patient feeling well today, states he has no pain and doesn't feel short of breath.  Oxygen overnight documented at 4.5L nasal cannula despite continued aggressive diuresis with 10 L down since admission.  CXR pending for further evaluation.  3/2 patient states he feels same.  He continues to diurese well and now is 13L down.  He is saturating well on 3L and if he can get back to 2L, will move forward with ERCP stenting.  Discussed with Dr Burkett.  3/3.  Patient feels pleasant today.  No significant adverse event.  Oxygen level down to 1 L.  Continue monitor.  Pending GI to perform ERCP.  Patient is oriented however very poor insight of his health condition.  Meet with patient's POA today and care group.  Continue DNR and pursuing for possible hospice. Patient's pain is general, 2-3/10, intermittent and does not radiate to other location, sharp and with some tingling. Can be controlled by pain meds.   3/4.  Patient is pleasant overnight no acute adverse event.  Overall remained stable.  GI recommend outpatient follow-up.  Patient's condition likely due to ascending cholangitis with biliary obstruction.  After antibiotics patient's condition stabilized.  Pending nursing home placement recommended by PT OT.Patient otherwise denies fever, chills, nausea, vomiting, adb pain, SOB, CP, headache, constipation, diarrhea, cough, or sputum.  3/5.  Patient has been stable and comfortable.  Continue to improve mentation.  Sepsis resolved.  Pending placement.  3/6.  Patient is comfortable.  Denies overnight adverse event.  Still waiting for placement.  Tolerated diet.  3/7.  Patient overall remained stable.  Blood sugar not controlled however due to patient  keep drinking soda.  Otherwise patient has no adverse overnight event.  3/8.  Apparently patient's group home is unable to perform sliding scale or frequent check of blood sugar.  I change patient's insulin to NPH 10 units twice daily.  Discontinue every Accu-Cheks 3 times daily given patient's goal of care is comfort.  Patient is stable.  And comfortable    Consultants/Specialty  GI - [facility]    Code Status  DNR/DNI, has guardian    Disposition   group home    Review of Systems  Review of Systems   Constitutional: Positive for malaise/fatigue. Negative for chills.   HENT: Negative for congestion and sore throat.    Eyes: Negative for blurred vision, double vision and photophobia.   Respiratory: Negative for cough, sputum production and shortness of breath.    Cardiovascular: Negative for chest pain, orthopnea and leg swelling.   Gastrointestinal: Negative for abdominal pain, constipation, heartburn, nausea and vomiting.   Genitourinary: Negative for dysuria, hematuria and urgency.   Musculoskeletal: Negative for back pain, joint pain and myalgias.   Skin: Negative for itching.   Neurological: Negative for dizziness, tingling, tremors and sensory change.   Psychiatric/Behavioral: Negative for suicidal ideas. The patient is not nervous/anxious and does not have insomnia.         Physical Exam  Temp:  [36.4 °C (97.6 °F)-36.6 °C (97.9 °F)] 36.4 °C (97.6 °F)  Pulse:  [] 99  Resp:  [18] 18  BP: (123-132)/(56-68) 123/68  SpO2:  [90 %-94 %] 90 %    Physical Exam  Vitals signs and nursing note reviewed.   Constitutional:       Appearance: Normal appearance. He is not ill-appearing or diaphoretic.   HENT:      Head: Normocephalic and atraumatic.      Right Ear: Tympanic membrane and external ear normal.      Left Ear: Tympanic membrane and external ear normal.      Nose: No congestion or rhinorrhea.      Mouth/Throat:      Mouth: Mucous membranes are moist.      Pharynx: Oropharynx is clear. No oropharyngeal exudate.    Eyes:      General: No scleral icterus.        Right eye: No discharge.         Left eye: No discharge.      Extraocular Movements: Extraocular movements intact.      Conjunctiva/sclera: Conjunctivae normal.      Pupils: Pupils are equal, round, and reactive to light.   Neck:      Musculoskeletal: Normal range of motion and neck supple. No neck rigidity or muscular tenderness.   Cardiovascular:      Rate and Rhythm: Normal rate and regular rhythm.      Heart sounds: Normal heart sounds. No murmur. No friction rub. No gallop.    Pulmonary:      Effort: Pulmonary effort is normal. No respiratory distress.      Breath sounds: Normal breath sounds. No stridor. No wheezing.   Chest:      Chest wall: No tenderness.   Abdominal:      General: Abdomen is flat. Bowel sounds are normal. There is no distension.      Palpations: Abdomen is soft. There is no mass.      Tenderness: There is no guarding.      Hernia: No hernia is present.   Musculoskeletal: Normal range of motion.         General: No swelling, tenderness or deformity.   Lymphadenopathy:      Cervical: No cervical adenopathy.   Skin:     General: Skin is warm and dry.      Capillary Refill: Capillary refill takes more than 3 seconds.      Coloration: Skin is not jaundiced or pale.      Findings: No erythema.   Neurological:      General: No focal deficit present.      Mental Status: He is alert and oriented to person, place, and time. Mental status is at baseline.      Motor: No weakness.   Psychiatric:         Mood and Affect: Mood normal.         Behavior: Behavior normal.         Fluids    Intake/Output Summary (Last 24 hours) at 3/8/2020 1657  Last data filed at 3/8/2020 1230  Gross per 24 hour   Intake 480 ml   Output 975 ml   Net -495 ml       Laboratory  Recent Labs     03/06/20  2506   WBC 13.7*   RBC 3.45*   HEMOGLOBIN 10.4*   HEMATOCRIT 31.1*   MCV 90.1   MCH 30.1   MCHC 33.4*   RDW 78.3*   PLATELETCT 381   MPV 10.9     Recent Labs     03/06/20  4917    SODIUM 137   POTASSIUM 4.5   CHLORIDE 99   CO2 27   GLUCOSE 134*   BUN 15   CREATININE 0.63   CALCIUM 8.4                   Imaging  DX-CHEST-PORTABLE (1 VIEW)   Final Result         1. No significant interval change.      DX-CHEST-PORTABLE (1 VIEW)   Final Result      New subpulmonic effusions, bibasilar atelectasis and edema      DX-PORTABLE FLUOROSCOPY < 1 HOUR   Final Result      Portable fluoroscopy utilized for 1.1 minute.         INTERPRETING LOCATION: [address] [place] [address], [facility], [id number]      [id number] SPHINCTEROTOMY   Final Result      Intraoperative fluoroscopy spot images as described above.      CT-ABDOMEN WITH & W/O   Final Result      1.  No significant change since CT abdomen and pelvis obtained 2/16/2020      2.  Findings consistent with high-grade biliary obstruction with dilation of the common bile duct measuring 17 mm, dilated gallbladder, dilated pancreatic duct with change in caliber of the pancreatic duct at the level pancreatic head      3.  Findings could be either malignancy the pancreatic head versus scarring from chronic pancreatitis.      4.  Sequela of chronic pancreatitis      5.  Large right medial chest rim-enhancing loculated fluid collection probably within the pleural space measuring 10.5 x 3.1 centimeter transversely and 7 cm craniocaudal      6.  Rim-enhancing fluid collection in the hepatorenal space measuring 11.2 cm craniocaudal and 2-3 cm in thickness.      7.  The rim-enhancing fluid collection are suspicious for infection/abscess      8.  Bilateral atelectasis and small-moderate-sized pleural effusion after      9.  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  Biliary dilatation without evidence of high-grade biliary obstruction.   2.  Gallbladder visualization after morphine administration. No acute cholecystitis.      CT-ABDOMEN-PELVIS WITH   Final Result       1.  New rim enhancing fluid collection tracks along the right heart margin, azygoesophageal recess and possibly communicates with Morison's pouch tracking along the right aspect of the inferior vena cava. This is nonspecific and could indicate abscess,    dissecting pseudocyst, mucinous cystic neoplasm related fluid      2.  New extra and intrahepatic biliary ductal dilatation with 6 cm dilatation of the gallbladder also seen. This indicates distal common bile duct/ampullary level obstruction. Stricture, mass effect from adjacent pancreas calcifications or small    ampullary mass are differential possibilities      3.  Decreased small pleural effusions. Some loculation on the right is possible      4.  Slightly decreased diffuse pancreatic ductal dilatation now measuring 11 mm. Evidence of chronic pancreatitis. New 3 cm cystic structure anterior to the pancreas could be a pseudocyst. Abscess or necrotic/cystic neoplasm are in the differential.      5.  Multiple chronic findings including status post aorto bifemoral bypass with approaching 70% stenosis of the left superficial femoral artery, nonobstructive right 3 mm nephrolithiasis, large volume rectal vault stool, dilated bladder which could    indicate outlet obstruction or neurogenic bladder, splenosis      [id number]   Final Result      1.  Severe dilatation of the intrahepatic biliary ducts, common bile duct and pancreatic duct. The distal common bile duct and the proximal pancreatic duct is not visualized at the pancreatic head. The pancreatic head appears to be diffusely enlarged and    demonstrates multiple small cysts. These findings are concerning for pancreatic neoplasm such as intraductal papillary mucinous cystoadenoma. The other differential diagnosis includes periampullary carcinoma. MR examination of the abdomen with contrast    is recommended for further evaluation.   2.  Mixed signal intensity in the right perinephric space likely  represents perinephric hematoma.   3.  Large hiatal hernia.   4.  Diffuse gastric wall thickening.      US-RUQ   Final Result      1.  Hepatomegaly.   2.  Hepatic steatosis.   3.  Gallbladder sludge. Mild intrahepatic ductal dilation. Dilation of the common duct up to 13 mm. No definite distal obstructing etiology is identified. MRCP could be obtained for further evaluation if indicated.   4.  Trace ascites.   5.  Pancreatic calcifications likely sequela of chronic pancreatitis.      DX-CHEST-PORTABLE (1 VIEW)   Final Result      Cardiomegaly.           Assessment/Plan  * Pancreatic mass  Assessment & Plan  MRI with concern for pancreatic malignancy  ERCP with no evidence of malignancy, calcifications of pancreas present   is elevated but less than 1000 so cannot rule in cancer, likely due chronic pancreatitis  Repeat CT showed chronic pancreatitis and loculated chest effusion    At this moment GI does not believe patient has significant evidence for periodic cancer.  Recommend outpatient follow-up with GI.  Family agreed    Patient currently medically cleared for transfer to group home.  Pending acceptance by group home    Type 2 diabetes mellitus with hyperglycemia, with long-term current use of insulin (HCC)- (present on admission)  Assessment & Plan  He has an insulin sensor in his right lower quadrant, uses long-acting insulin 12 units and sliding scale.  Continue monitor blood sugar  Group home require change to oral medication which is not a good option for patient.  Unless patient become comfort care, I would recommend continue insulin  According to patient's family group home is unable to monitor blood sugar 4 times a day.  We will change insulin to NPH 10 units twice daily and discontinue sliding scale.  Will check with group home for this plan.    Continue to drink soda, education provided patient said he will comply    Cognitive decline- (present on admission)  Assessment & Plan  Patient likely has  a chronic component to his cognitive decline as well as acute worsening  Mental status now at his baseline, patient has a legal guardian  Continue pursuing guardianship by caregiver and POA  Pending group home versus SNF versus rehab.  Pending evaluation    Metabolic acidosis  Assessment & Plan  IV fluids with improvement    Hypokalemia  Assessment & Plan  Resolved, replete with diuresis.  Replaced magnesium      Sepsis (HCC)  Assessment & Plan  This is Sepsis Present on admission  SIRS criteria identified on my evaluation include: Tachycardia, with heart rate greater than 90 BPM and Leukocyosis, with WBC greater than 12,000  Source is GI  Sepsis protocol complete  Continue antibiotics  White cell count trending down  Repeat ERCP will be rescheduled once oxygenation improves.    Currently resolved, continue oral antibiotics Augmentin to finish a total course of 14 days    Acute respiratory failure with hypoxia ([facility])  Assessment & Plan  Diuresing effectively with lasix -10 L since admission and 1-2 liters down daily with increased diuresis  CXR same  Oxygen demand decreasing    Significant improving, continue diuretic    Loculated pleural effusion  Assessment & Plan  In medial right chest, continue antibiotics, pus drained from ampulla of Vater with GI 2/24  Pulmonary edema appreciated on CXR 2/27 - diurese to get oxygen demand down, anticipate another attempt at ERCP in a few days.    Cholangitis  Assessment & Plan  Biliary obstruction suggested on imaging, GI consulted  Alkaline phosphatase is stable over 1100  Endoscopy done showing pancreatic calcifications but no stones, no area to biopsy  Continue antibiotics, white cell count still elevated  Repeat CT scan done  ERCP repeated 2/24, given swelling so that a ampullary duct stent could not be placed  Repeat planned for 2/26 but cancelled because of hypoxia.    Recurrent major depressive disorder, in full remission ([facility])  Assessment &  Plan  .    Hyponatremia  Assessment & Plan  Resolved with fluids    Chronic pancreatitis (HCC)- (present on admission)  Assessment & Plan  Continue pancreatic enzymes with meals  Celiac nerve block for pain control, per GI    Dyslipidemia- (present on admission)  Assessment & Plan  Continue statin     ACP:  Total time spent on advanced care planning, excluding time spent on daily care: 16 minutes.    1st 30 minutes [id number]       I discussed extensively with patient's care team, POA, is regarding code status and plan of care. We also discussed advanced care planning including diagnosis, prognosis, plan of care, risks and benefits of any therapies that could be offered, as well as alternatives including palliation and hospice, as appropriate. My discussion is summarized above in detail. Confirmed with DNR and possible hospice candidate    VTE prophylaxis: scds    I have seen and examined patient on 3/8/2020. I have reviewed vitals, new labs and imaging. I have discussed POC with RN. There are no changes from (3/7/2020) except for what is mentioned above.       Current Facility-Administered Medications:   •  insulin NPH (HUMULIN/NOVOLIN) injection 10 Units, 10 Units, Subcutaneous, BID INSULIN, Ruma Chritsopher M.D.  •  furosemide (LASIX) tablet 40 mg, 40 mg, Oral, Q DAY, Ruma Christopher M.D., 40 mg at 03/08/20 0617  •  amoxicillin-clavulanate (AUGMENTIN) 875-125 MG per tablet 1 Tab, 1 Tab, Oral, Q12HRS, Ruma Christopher M.D., 1 Tab at 03/08/20 0617  •  menthol (HALLS) lozenge 1 Lozenge, 1 Lozenge, Oral, Q2HRS PRN, Btesey Gentile M.D., 1 Lozenge at 03/05/20 1119  •  cyclobenzaprine (FLEXERIL) tablet 10 mg, 10 mg, Oral, TID PRN, Betsey Gentile M.D., 10 mg at 03/08/20 0620  •  haloperidol lactate (HALDOL) injection 2-5 mg, 2-5 mg, Intravenous, Q6HRS PRN, Rosendo Encinas M.D.  •  atorvastatin (LIPITOR) tablet 40 mg, 40 mg, Oral, Q EVENING, Grace Patel DSAWYER, 40 mg at 03/07/20 1751  •  omeprazole (PRILOSEC) capsule 20 mg, 20 mg, Oral,  BID, YULI HolguinOYuridia, 20 mg at 03/08/20 0618  •  pancrelipase (Lip-Prot-Amyl) (CREON 77255) [address]180-29[address] units capsule 48,000 Units, 2 Cap, Oral, DAILY, Grace Patel D.O., 48,000 Units at 03/08/20 0617  •  sucralfate (CARAFATE) tablet 1 g, 1 g, Oral, BID PRN, Grace Patel D.O.  •  tamsulosin (FLOMAX) capsule 0.4 mg, 0.4 mg, Oral, DAILY, Grace Patel D.O., 0.4 mg at 03/08/20 0617  •  thiamine tablet 100 mg, 100 mg, Oral, DAILY, Grace Patel D.O., 100 mg at 03/08/20 0617  •  senna-docusate (PERICOLACE or SENOKOT S) 8.6-50 MG per tablet 2 Tab, 2 Tab, Oral, BID, Stopped at 03/02/20 0651 **AND** polyethylene glycol/lytes (MIRALAX) PACKET 1 Packet, 1 Packet, Oral, QDAY PRN **AND** magnesium hydroxide (MILK OF MAGNESIA) suspension 30 mL, 30 mL, Oral, QDAY PRN **AND** bisacodyl (DULCOLAX) suppository 10 mg, 10 mg, Rectal, QDAY PRN, Grace Patel D.O.  •  acetaminophen (TYLENOL) tablet 650 mg, 650 mg, Oral, Q6HRS PRN, Grace Patel D.O., 650 mg at 03/08/20 0620  •  ondansetron (ZOFRAN) syringe/vial injection 4 mg, 4 mg, Intravenous, Q4HRS PRN, Grace Patel D.O.  •  ondansetron (ZOFRAN ODT) dispertab 4 mg, 4 mg, Oral, Q4HRS PRN, Grace Patel D.O.  •  promethazine (PHENERGAN) tablet 12.5-25 mg, 12.5-25 mg, Oral, Q4HRS PRN, Grace Patel D.O.  •  promethazine (PHENERGAN) suppository 12.5-25 mg, 12.5-25 mg, Rectal, Q4HRS PRN, Grace Patel D.O.  •  prochlorperazine (COMPAZINE) injection 5-10 mg, 5-10 mg, Intravenous, Q4HRS PRN, Grace Patel D.O.  •  HYDROmorphone pf (DILAUDID) injection 0.5 mg, 0.5 mg, Intravenous, Q2HRS PRN, Raquel Encinas M.D., 0.5 mg at 03/04/20 1200

## 2020-03-08 NOTE — PROGRESS NOTES
"Hospital Medicine Daily Progress Note    Date of Service  3/7/2020    Chief Complaint  59 y.o. male admitted 2/14/2020 with poor appetite.    Hospital Course    Patient with cognitive deficit presented from group home with lethargy and poor appetite.  He had elevation in alk phos and leukocytosis and treated for concern of sepsis with abdominal source.  HIDA was performed which showed no evidence of obstruction nor cholecystitis.    MRCP showed dilation of biliary ducts and concern of pancreatic head mass.   GI  EUS on 2/18 and showed significant calcifications likely related to chronic pancreatitis.  He underwent ERCP on 2/24 and purulent drainage from the ampulla of vater os and he had biliary needle-knife sphincterotomy to relieve the purulent drainage.          Interval Problem Update  2/25 Patient states he is hungry and wants solid food, still on a liquid diet.  Plan to move forward with repeat ERCP with Dr Wakefield tomorrow - on schedule at 1500 with anticipated stenting of the ampulla.  Patient denies any current abdominal pain.  2/26 Patient doing about same today, denies pain, refused labs this am.  ERCP scheduled for today for stenting of the ampulla.  Patient voicing frustration regarding NPO.  2/27 Patient without complaint today as he had breakfast in front of him when I saw him.  ERCP was cancelled due to increasing hypoxia to where he was requiring 4L.  CXR completed with again shows dulling of the bases consistent with known pleural effusions but also shows pulmonary congestion - will diurese to see if this improves.  2/28 Patient feeling okay today, looks better than on previous days, responding well to diuresis though lungs sound about the same.  He states his back is aching \"all over\" but is not severely painful and no focal tenderness.  He states he ambulated in the hallway about \"a quarter of the way down\" yesterday.  Will continue with diuresis and work toward less oxygen requirement so he can " have repeat ERCP for stent placement.  2/29 Patient feeling well today, states he will walk in the hallways with the staff today.  Oxygen demand is about the same on 4L but lungs are sounding clear.  K is 3.2 and mag 1.2, will replete both.  WBC continues to drop.  3/1 Patient feeling well today, states he has no pain and doesn't feel short of breath.  Oxygen overnight documented at 4.5L nasal cannula despite continued aggressive diuresis with 10 L down since admission.  CXR pending for further evaluation.  3/2 patient states he feels same.  He continues to diurese well and now is 13L down.  He is saturating well on 3L and if he can get back to 2L, will move forward with ERCP stenting.  Discussed with Dr Burkett.  3/3.  Patient feels pleasant today.  No significant adverse event.  Oxygen level down to 1 L.  Continue monitor.  Pending GI to perform ERCP.  Patient is oriented however very poor insight of his health condition.  Meet with patient's POA today and care group.  Continue DNR and pursuing for possible hospice. Patient's pain is general, 2-3/10, intermittent and does not radiate to other location, sharp and with some tingling. Can be controlled by pain meds.   3/4.  Patient is pleasant overnight no acute adverse event.  Overall remained stable.  GI recommend outpatient follow-up.  Patient's condition likely due to ascending cholangitis with biliary obstruction.  After antibiotics patient's condition stabilized.  Pending nursing home placement recommended by PT OT.Patient otherwise denies fever, chills, nausea, vomiting, adb pain, SOB, CP, headache, constipation, diarrhea, cough, or sputum.  3/5.  Patient has been stable and comfortable.  Continue to improve mentation.  Sepsis resolved.  Pending placement.  3/6.  Patient is comfortable.  Denies overnight adverse event.  Still waiting for placement.  Tolerated diet.  3/7.  Patient overall remained stable.  Blood sugar not controlled however due to patient  keep drinking soda.  Otherwise patient has no adverse overnight event.    Consultants/Specialty  GI - [facility]    Code Status  DNR/DNI, has guardian    Disposition   group home    Review of Systems  Review of Systems   Constitutional: Positive for malaise/fatigue. Negative for chills and fever.   HENT: Negative for congestion and sore throat.    Eyes: Negative for blurred vision and photophobia.   Respiratory: Negative for cough, sputum production and shortness of breath.    Cardiovascular: Negative for chest pain, orthopnea and leg swelling.   Gastrointestinal: Negative for abdominal pain, constipation, diarrhea, heartburn and nausea.   Genitourinary: Negative for dysuria, hematuria and urgency.   Musculoskeletal: Negative for back pain, joint pain and myalgias.   Skin: Negative for itching.   Neurological: Negative for dizziness, tingling, sensory change and headaches.   Psychiatric/Behavioral: Negative for suicidal ideas. The patient is not nervous/anxious and does not have insomnia.         Physical Exam  Temp:  [36.1 °C (97 °F)-36.7 °C (98.1 °F)] 36.7 °C (98.1 °F)  Pulse:  [91-97] 97  Resp:  [18] 18  BP: (114-134)/(59-63) 114/59  SpO2:  [90 %-96 %] 91 %    Physical Exam  Vitals signs and nursing note reviewed.   Constitutional:       General: He is not in acute distress.     Appearance: Normal appearance. He is not toxic-appearing.   HENT:      Head: Normocephalic and atraumatic.      Right Ear: Tympanic membrane and external ear normal.      Left Ear: Tympanic membrane and external ear normal.      Nose: No congestion or rhinorrhea.      Mouth/Throat:      Mouth: Mucous membranes are moist.      Pharynx: Oropharynx is clear. No oropharyngeal exudate.   Eyes:      General: No scleral icterus.        Right eye: No discharge.         Left eye: No discharge.      Extraocular Movements: Extraocular movements intact.      Conjunctiva/sclera: Conjunctivae normal.      Pupils: Pupils are equal, round, and reactive to  light.   Neck:      Musculoskeletal: Normal range of motion and neck supple. No neck rigidity or muscular tenderness.   Cardiovascular:      Rate and Rhythm: Normal rate and regular rhythm.      Pulses: Normal pulses.      Heart sounds: Normal heart sounds. No friction rub. No gallop.    Pulmonary:      Effort: Pulmonary effort is normal. No respiratory distress.      Breath sounds: Normal breath sounds. No stridor. No wheezing.   Chest:      Chest wall: No tenderness.   Abdominal:      General: Abdomen is flat. Bowel sounds are normal.      Palpations: Abdomen is soft. There is no mass.      Tenderness: There is no abdominal tenderness. There is no guarding.      Hernia: No hernia is present.   Musculoskeletal: Normal range of motion.         General: No swelling, tenderness or deformity.   Lymphadenopathy:      Cervical: No cervical adenopathy.   Skin:     General: Skin is warm and dry.      Capillary Refill: Capillary refill takes more than 3 seconds.      Coloration: Skin is not jaundiced or pale.      Findings: No erythema.   Neurological:      General: No focal deficit present.      Mental Status: He is alert and oriented to person, place, and time. Mental status is at baseline.      Motor: No weakness.   Psychiatric:         Mood and Affect: Mood normal.         Behavior: Behavior normal.         Fluids    Intake/Output Summary (Last 24 hours) at 3/7/2020 1652  Last data filed at 3/7/2020 0800  Gross per 24 hour   Intake 1320 ml   Output 1100 ml   Net 220 ml       Laboratory  Recent Labs     03/05/20 0319 03/06/20 0459   WBC 15.0* 13.7*   RBC 3.69* 3.45*   HEMOGLOBIN 11.1* 10.4*   HEMATOCRIT 33.1* 31.1*   MCV 89.7 90.1   MCH 30.1 30.1   MCHC 33.5* 33.4*   RDW 78.9* 78.3*   PLATELETCT 414 381   MPV 11.3 10.9     Recent Labs     03/05/20 0319 03/06/20 0459   SODIUM 136 137   POTASSIUM 4.3 4.5   CHLORIDE 97 99   CO2 28 27   GLUCOSE 118* 134*   BUN 13 15   CREATININE 0.68 0.63   CALCIUM 8.8 8.4                    Imaging  DX-CHEST-PORTABLE (1 VIEW)   Final Result         1. No significant interval change.      DX-CHEST-PORTABLE (1 VIEW)   Final Result      New subpulmonic effusions, bibasilar atelectasis and edema      DX-PORTABLE FLUOROSCOPY < 1 HOUR   Final Result      Portable fluoroscopy utilized for 1.1 minute.         INTERPRETING LOCATION: [address] [place] [address] [place], [id number]      [id number] SPHINCTEROTOMY   Final Result      Intraoperative fluoroscopy spot images as described above.      CT-ABDOMEN WITH & W/O   Final Result      1.  No significant change since CT abdomen and pelvis obtained 2/16/2020      2.  Findings consistent with high-grade biliary obstruction with dilation of the common bile duct measuring 17 mm, dilated gallbladder, dilated pancreatic duct with change in caliber of the pancreatic duct at the level pancreatic head      3.  Findings could be either malignancy the pancreatic head versus scarring from chronic pancreatitis.      4.  Sequela of chronic pancreatitis      5.  Large right medial chest rim-enhancing loculated fluid collection probably within the pleural space measuring 10.5 x 3.1 centimeter transversely and 7 cm craniocaudal      6.  Rim-enhancing fluid collection in the hepatorenal space measuring 11.2 cm craniocaudal and 2-3 cm in thickness.      7.  The rim-enhancing fluid collection are suspicious for infection/abscess      8.  Bilateral atelectasis and small-moderate-sized pleural effusion after      9.  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  Biliary dilatation without evidence of high-grade biliary obstruction.   2.  Gallbladder visualization after morphine administration. No acute cholecystitis.      CT-ABDOMEN-PELVIS WITH   Final Result      1.  New rim enhancing fluid collection tracks along the right heart margin, azygoesophageal recess and possibly  communicates with Morison's pouch tracking along the right aspect of the inferior vena cava. This is nonspecific and could indicate abscess,    dissecting pseudocyst, mucinous cystic neoplasm related fluid      2.  New extra and intrahepatic biliary ductal dilatation with 6 cm dilatation of the gallbladder also seen. This indicates distal common bile duct/ampullary level obstruction. Stricture, mass effect from adjacent pancreas calcifications or small    ampullary mass are differential possibilities      3.  Decreased small pleural effusions. Some loculation on the right is possible      4.  Slightly decreased diffuse pancreatic ductal dilatation now measuring 11 mm. Evidence of chronic pancreatitis. New 3 cm cystic structure anterior to the pancreas could be a pseudocyst. Abscess or necrotic/cystic neoplasm are in the differential.      5.  Multiple chronic findings including status post aorto bifemoral bypass with approaching 70% stenosis of the left superficial femoral artery, nonobstructive right 3 mm nephrolithiasis, large volume rectal vault stool, dilated bladder which could    indicate outlet obstruction or neurogenic bladder, splenosis      [id number]   Final Result      1.  Severe dilatation of the intrahepatic biliary ducts, common bile duct and pancreatic duct. The distal common bile duct and the proximal pancreatic duct is not visualized at the pancreatic head. The pancreatic head appears to be diffusely enlarged and    demonstrates multiple small cysts. These findings are concerning for pancreatic neoplasm such as intraductal papillary mucinous cystoadenoma. The other differential diagnosis includes periampullary carcinoma. MR examination of the abdomen with contrast    is recommended for further evaluation.   2.  Mixed signal intensity in the right perinephric space likely represents perinephric hematoma.   3.  Large hiatal hernia.   4.  Diffuse gastric wall thickening.      US-RUQ   Final Result       1.  Hepatomegaly.   2.  Hepatic steatosis.   3.  Gallbladder sludge. Mild intrahepatic ductal dilation. Dilation of the common duct up to 13 mm. No definite distal obstructing etiology is identified. MRCP could be obtained for further evaluation if indicated.   4.  Trace ascites.   5.  Pancreatic calcifications likely sequela of chronic pancreatitis.      DX-CHEST-PORTABLE (1 VIEW)   Final Result      Cardiomegaly.           Assessment/Plan  * Pancreatic mass  Assessment & Plan  MRI with concern for pancreatic malignancy  ERCP with no evidence of malignancy, calcifications of pancreas present   is elevated but less than 1000 so cannot rule in cancer, likely due chronic pancreatitis  Repeat CT showed chronic pancreatitis and loculated chest effusion    At this moment GI does not believe patient has significant evidence for periodic cancer.  Recommend outpatient follow-up with GI.  Family agreed    Patient currently medically cleared for transfer to group home.  Pending acceptance by group home    Type 2 diabetes mellitus with hyperglycemia, with long-term current use of insulin ([facility])- (present on admission)  Assessment & Plan  He has an insulin sensor in his right lower quadrant, uses long-acting insulin 12 units and sliding scale.  Continue monitor blood sugar  Group home require change to oral medication which is not a good option for patient.  Unless patient become comfort care, I would recommend continue insulin    Continue to drink soda, education provided patient said he will comply    Cognitive decline- (present on admission)  Assessment & Plan  Patient likely has a chronic component to his cognitive decline as well as acute worsening  Mental status now at his baseline, patient has a legal guardian  Continue pursuing guardianship by caregiver and POA  Pending group home versus SNF versus rehab.  Pending evaluation    Metabolic acidosis  Assessment & Plan  IV fluids with  improvement    Hypokalemia  Assessment & Plan  Resolved, replete with diuresis.  Replaced magnesium      Sepsis (HCC)  Assessment & Plan  This is Sepsis Present on admission  SIRS criteria identified on my evaluation include: Tachycardia, with heart rate greater than 90 BPM and Leukocyosis, with WBC greater than 12,000  Source is GI  Sepsis protocol complete  Continue antibiotics  White cell count trending down  Repeat ERCP will be rescheduled once oxygenation improves.    Currently resolved, continue oral antibiotics Augmentin to finish a total course of 14 days    Acute respiratory failure with hypoxia (HCC)  Assessment & Plan  Diuresing effectively with lasix -10 L since admission and 1-2 liters down daily with increased diuresis  CXR same  Oxygen demand decreasing    Significant improving, continue diuretic    Loculated pleural effusion  Assessment & Plan  In medial right chest, continue antibiotics, pus drained from ampulla of Vater with GI 2/24  Pulmonary edema appreciated on CXR 2/27 - diurese to get oxygen demand down, anticipate another attempt at ERCP in a few days.    Cholangitis  Assessment & Plan  Biliary obstruction suggested on imaging, GI consulted  Alkaline phosphatase is stable over 1100  Endoscopy done showing pancreatic calcifications but no stones, no area to biopsy  Continue antibiotics, white cell count still elevated  Repeat CT scan done  ERCP repeated 2/24, given swelling so that a ampullary duct stent could not be placed  Repeat planned for 2/26 but cancelled because of hypoxia.    Recurrent major depressive disorder, in full remission ([facility])  Assessment & Plan  .    Hyponatremia  Assessment & Plan  Resolved with fluids    Chronic pancreatitis (HCC)- (present on admission)  Assessment & Plan  Continue pancreatic enzymes with meals  Celiac nerve block for pain control, per GI    Dyslipidemia- (present on admission)  Assessment & Plan  Continue statin     ACP:  Total time spent on advanced  care planning, excluding time spent on daily care: 16 minutes.    1st 30 minutes [id number]       I discussed extensively with patient's care team, POA, is regarding code status and plan of care. We also discussed advanced care planning including diagnosis, prognosis, plan of care, risks and benefits of any therapies that could be offered, as well as alternatives including palliation and hospice, as appropriate. My discussion is summarized above in detail. Confirmed with DNR and possible hospice candidate    VTE prophylaxis: scds    I have seen and examined patient on 3/7/2020. I have reviewed vitals, new labs and imaging. I have discussed POC with RN. There are no changes from (3/6/2020) except for what is mentioned above.       Current Facility-Administered Medications:   •  furosemide (LASIX) tablet 40 mg, 40 mg, Oral, Q DAY, Ruma Christopher M.D., 40 mg at 03/07/20 0552  •  amoxicillin-clavulanate (AUGMENTIN) 875-125 MG per tablet 1 Tab, 1 Tab, Oral, Q12HRS, Ruma Christopher M.D., 1 Tab at 03/07/20 0552  •  menthol (HALLS) lozenge 1 Lozenge, 1 Lozenge, Oral, Q2HRS PRN, Betsey Gentile M.D., 1 Lozenge at 03/05/20 1119  •  cyclobenzaprine (FLEXERIL) tablet 10 mg, 10 mg, Oral, TID PRN, Betsey Gentile M.D., 10 mg at 03/07/20 0816  •  insulin NPH (HUMULIN/NOVOLIN) injection 5 Units, 5 Units, Subcutaneous, BID INSULIN, Rosendo Encinas M.D., 5 Units at 03/07/20 1646  •  haloperidol lactate (HALDOL) injection 2-5 mg, 2-5 mg, Intravenous, Q6HRS PRN, Rosendo Encinas M.D.  •  insulin regular (HUMULIN R) injection 3-14 Units, 3-14 Units, Subcutaneous, 4X/DAY ACHS, 14 Units at 03/07/20 1646 **AND** Accu-Chek ACHS, , , Q AC AND BEDTIME(S) **AND** NOTIFY MD and PharmD, , , Once **AND** glucose 4 g chewable tablet 16 g, 16 g, Oral, Q15 MIN PRN **AND** DEXTROSE 10% BOLUS 250 mL, 250 mL, Intravenous, Q15 MIN PRN, Rosendo Encinas M.D.  •  atorvastatin (LIPITOR) tablet 40 mg, 40 mg, Oral, Q EVENING, Grace Patel D.O., 40 mg at 03/06/20  1835  •  omeprazole (PRILOSEC) capsule 20 mg, 20 mg, Oral, BID, CHIARA Hogluin.O., 20 mg at 03/07/20 0552  •  pancrelipase (Lip-Prot-Amyl) (CREON 55049) 18253-58648 units capsule 48,000 Units, 2 Cap, Oral, DAILY, YULI HolguinO., 48,000 Units at 03/07/20 0552  •  sucralfate (CARAFATE) tablet 1 g, 1 g, Oral, BID PRN, YULI HolguinO.  •  tamsulosin (FLOMAX) capsule 0.4 mg, 0.4 mg, Oral, DAILY, YULI HolguinO., 0.4 mg at 03/07/20 0552  •  thiamine tablet 100 mg, 100 mg, Oral, DAILY, YULI HolguinOYuridia, 100 mg at 03/07/20 0552  •  senna-docusate (PERICOLACE or SENOKOT S) 8.6-50 MG per tablet 2 Tab, 2 Tab, Oral, BID, Stopped at 03/02/20 0651 **AND** polyethylene glycol/lytes (MIRALAX) PACKET 1 Packet, 1 Packet, Oral, QDAY PRN **AND** magnesium hydroxide (MILK OF MAGNESIA) suspension 30 mL, 30 mL, Oral, QDAY PRN **AND** bisacodyl (DULCOLAX) suppository 10 mg, 10 mg, Rectal, QDAY PRN, YULI HolguinO.  •  acetaminophen (TYLENOL) tablet 650 mg, 650 mg, Oral, Q6HRS PRN, YULI HolguinO., 650 mg at 03/07/20 0816  •  ondansetron (ZOFRAN) syringe/vial injection 4 mg, 4 mg, Intravenous, Q4HRS PRN, Grace Patel D.O.  •  ondansetron (ZOFRAN ODT) dispertab 4 mg, 4 mg, Oral, Q4HRS PRN, YULI HolguinO.  •  promethazine (PHENERGAN) tablet 12.5-25 mg, 12.5-25 mg, Oral, Q4HRS PRN, Grace Patel D.O.  •  promethazine (PHENERGAN) suppository 12.5-25 mg, 12.5-25 mg, Rectal, Q4HRS PRN, Grace Patel D.O.  •  prochlorperazine (COMPAZINE) injection 5-10 mg, 5-10 mg, Intravenous, Q4HRS PRN, Grace Patel D.O.  •  HYDROmorphone pf (DILAUDID) injection 0.5 mg, 0.5 mg, Intravenous, Q2HRS PRN, Raquel Encinas M.D., 0.5 mg at 03/04/20 1200

## 2020-03-08 NOTE — PROGRESS NOTES
"Pt's sister (Aniyah), per sister she is upset that pt is still on a sliding scale. Per sister, pt will not be accepted back to his facility if he is on a sliding scale. Educated sister that pt's sugars are closely monitored here in hospital and needs to be on a sliding scale based on blood sugars. Sister still complaining that pt needs to go back to \"his normal way of insulin,\" prior to discharge. Sister would like Dr. Christopher to give her a call today regarding pt's POC. Will relay message to Dr. Christopher.   "

## 2020-03-08 NOTE — CARE PLAN
Problem: Safety  Goal: Will remain free from injury  Outcome: PROGRESSING AS EXPECTED    Pt uses call light for assistance. No unsafe behaviors noted. Call light and belongings are within reach. Will continue with hourly rounds.      Problem: Pain Management  Goal: Pain level will decrease to patient's comfort goal  Outcome: PROGRESSING AS EXPECTED     Pt denies any pain or discomfort at this time. No facial grimacing or s/s of distress.

## 2020-03-08 NOTE — PROGRESS NOTES
Pt's blood sugar prior to lunch was 483 administered 14 units as ordered. Pt was very defensive when this RN asked if pt ate anything else that may spike up bs. Again re-educated about diet. Rechecked bs 30 mins later and when down t 390. Pt asymptomatic.

## 2020-03-09 LAB
GLUCOSE BLD-MCNC: 159 MG/DL (ref 65–99)
GLUCOSE BLD-MCNC: 286 MG/DL (ref 65–99)
GLUCOSE BLD-MCNC: 370 MG/DL (ref 65–99)
GLUCOSE BLD-MCNC: 450 MG/DL (ref 65–99)
GLUCOSE BLD-MCNC: 511 MG/DL (ref 65–99)

## 2020-03-09 PROCEDURE — 700102 HCHG RX REV CODE 250 W/ 637 OVERRIDE(OP): Performed by: INTERNAL MEDICINE

## 2020-03-09 PROCEDURE — A9270 NON-COVERED ITEM OR SERVICE: HCPCS | Performed by: INTERNAL MEDICINE

## 2020-03-09 PROCEDURE — 770006 HCHG ROOM/CARE - MED/SURG/GYN SEMI*

## 2020-03-09 PROCEDURE — 99232 SBSQ HOSP IP/OBS MODERATE 35: CPT | Performed by: INTERNAL MEDICINE

## 2020-03-09 PROCEDURE — 82962 GLUCOSE BLOOD TEST: CPT

## 2020-03-09 PROCEDURE — 700111 HCHG RX REV CODE 636 W/ 250 OVERRIDE (IP): Performed by: HOSPITALIST

## 2020-03-09 RX ORDER — INSULIN GLARGINE 100 [IU]/ML
12 INJECTION, SOLUTION SUBCUTANEOUS EVERY EVENING
Status: DISCONTINUED | OUTPATIENT
Start: 2020-03-09 | End: 2020-03-10

## 2020-03-09 RX ORDER — INSULIN GLARGINE 100 [IU]/ML
10 INJECTION, SOLUTION SUBCUTANEOUS EVERY EVENING
Status: DISCONTINUED | OUTPATIENT
Start: 2020-03-09 | End: 2020-03-09

## 2020-03-09 RX ADMIN — HYDROMORPHONE HYDROCHLORIDE 0.5 MG: 1 INJECTION, SOLUTION INTRAMUSCULAR; INTRAVENOUS; SUBCUTANEOUS at 15:12

## 2020-03-09 RX ADMIN — ATORVASTATIN CALCIUM 40 MG: 40 TABLET, FILM COATED ORAL at 17:23

## 2020-03-09 RX ADMIN — FUROSEMIDE 40 MG: 40 TABLET ORAL at 06:16

## 2020-03-09 RX ADMIN — OMEPRAZOLE 20 MG: 20 CAPSULE, DELAYED RELEASE ORAL at 06:15

## 2020-03-09 RX ADMIN — PANCRELIPASE 48000 UNITS: 24000; 76000; 120000 CAPSULE, DELAYED RELEASE PELLETS ORAL at 06:16

## 2020-03-09 RX ADMIN — CYCLOBENZAPRINE HYDROCHLORIDE 10 MG: 10 TABLET, FILM COATED ORAL at 06:16

## 2020-03-09 RX ADMIN — Medication 100 MG: at 06:16

## 2020-03-09 RX ADMIN — OMEPRAZOLE 20 MG: 20 CAPSULE, DELAYED RELEASE ORAL at 17:23

## 2020-03-09 RX ADMIN — TAMSULOSIN HYDROCHLORIDE 0.4 MG: 0.4 CAPSULE ORAL at 06:16

## 2020-03-09 RX ADMIN — ACETAMINOPHEN 650 MG: 325 TABLET, FILM COATED ORAL at 06:16

## 2020-03-09 RX ADMIN — INSULIN GLARGINE 12 UNITS: 100 INJECTION, SOLUTION SUBCUTANEOUS at 17:24

## 2020-03-09 RX ADMIN — INSULIN HUMAN 5 UNITS: 100 INJECTION, SOLUTION PARENTERAL at 12:10

## 2020-03-09 RX ADMIN — ACETAMINOPHEN 650 MG: 325 TABLET, FILM COATED ORAL at 15:07

## 2020-03-09 RX ADMIN — INSULIN HUMAN 10 UNITS: 100 INJECTION, SUSPENSION SUBCUTANEOUS at 06:21

## 2020-03-09 ASSESSMENT — ENCOUNTER SYMPTOMS
MYALGIAS: 0
HEARTBURN: 0
WEAKNESS: 0
CHILLS: 0
TINGLING: 0
SPUTUM PRODUCTION: 0
NAUSEA: 0
DOUBLE VISION: 0
TREMORS: 0
NECK PAIN: 0
DIARRHEA: 0
CONSTIPATION: 0
INSOMNIA: 0
ORTHOPNEA: 0
BACK PAIN: 0
BLURRED VISION: 0
VOMITING: 0
SENSORY CHANGE: 0
COUGH: 0
HEADACHES: 0
EYE PAIN: 0
FEVER: 0
PHOTOPHOBIA: 0
NERVOUS/ANXIOUS: 0
ABDOMINAL PAIN: 0
SPEECH CHANGE: 0
SORE THROAT: 0
WEIGHT LOSS: 0
DIZZINESS: 0
DEPRESSION: 0
SHORTNESS OF BREATH: 0
SEIZURES: 0
FALLS: 0
PALPITATIONS: 0
WHEEZING: 0
PND: 0

## 2020-03-09 ASSESSMENT — LIFESTYLE VARIABLES: SUBSTANCE_ABUSE: 0

## 2020-03-09 NOTE — PROGRESS NOTES
"Sister approached nursing station, and asked \"why does he have a urinal next to the bed\". Stated he should be getting up to use the restroom, will continue to time void patient and continue to encourage use of call light to use the restroom.   "

## 2020-03-09 NOTE — PROGRESS NOTES
Received report from night shift RNNahomy. Assumed care. Patient awake and eating in bed. This pt is AOx4,   no pain, Patient and RN discussed plan of care including blood sugar test: questions answered. Chart reviewed. Call light in place, fall precautions in place, patient educated on importance of calling for assistance. No additional needs at this time.

## 2020-03-09 NOTE — CARE PLAN
Problem: Communication  Goal: The ability to communicate needs accurately and effectively will improve  Outcome: PROGRESSING AS EXPECTED     Problem: Safety  Goal: Will remain free from injury  Outcome: PROGRESSING AS EXPECTED  Goal: Will remain free from falls  Outcome: PROGRESSING AS EXPECTED     Problem: Infection  Goal: Will remain free from infection  Outcome: PROGRESSING AS EXPECTED     Problem: Venous Thromboembolism (VTW)/Deep Vein Thrombosis (DVT) Prevention:  Goal: Patient will participate in Venous Thrombosis (VTE)/Deep Vein Thrombosis (DVT)Prevention Measures  Outcome: PROGRESSING AS EXPECTED     Problem: Bowel/Gastric:  Goal: Normal bowel function is maintained or improved  Outcome: PROGRESSING AS EXPECTED     Problem: Knowledge Deficit  Goal: Knowledge of the prescribed therapeutic regimen will improve  Outcome: PROGRESSING AS EXPECTED     Problem: Pain Management  Goal: Pain level will decrease to patient's comfort goal  Outcome: PROGRESSING AS EXPECTED     Problem: Psychosocial Needs:  Goal: Level of anxiety will decrease  Outcome: PROGRESSING AS EXPECTED

## 2020-03-09 NOTE — DISCHARGE PLANNING
Anticipated Discharge Disposition: possibly self-pay SNF vs [facility] w/ HH    Action: Spoke with insurance [employer] and he does not meet criteria for SNF so this would not be authorized by insurance. [facility]TH escalated and they may consider approving HH temporarily to assist pt with transition back to [facility].    Call to sister, Aniyah [phone].  Her concern is that pt needs to be independent in order to stay at [facility]. She states he presents more lethargic and not as alert. Sister states [facility] will not take pt back unless he is able to perform ADL's.    Talked with sister about option of HH or self-pay at [facility]. Sister agreed to allow [facility] to submit for auth, if denied she would consider self-pay.     Call to Chiara at [facility] [phone] to get quote and discuss plan. They are willing to consider self-pay but did ask about dispo if [facility] decides to not take him back after SNF. Chiara will discuss this with sister. Confirmed there is bed availability at [facility].    Call to sister to provide quote. Informed her that [facility] would be in touch with her at this point to inform her of the auth status and arrange a self-pay agreement.     Updated Unit LSW.    Barriers to Discharge: POA and previous living situation agreement with d/c plan    Plan: F/U with sister Aniyah tomorrow

## 2020-03-09 NOTE — PROGRESS NOTES
2023 Pt requested his blood sugar to be checked, stating he normally checks it 3 times per day at his group home.  FSBS 509.    2035 Paged hospitalist regarding elevated blood glucose    2038 Dr Gallagher ordered Humalog 10 units x1 and recheck blood glucose at 2230.    2230 fsbs 416    8697 Spoke with Dr. Gonzáles regarding elevated blood glucose. Humalog 15 units x1 ordered with no recheck unless symptomatic for hypoglycemia.

## 2020-03-09 NOTE — PROGRESS NOTES
Patient questioned why he was only receiving 5 units, after blood sugar was recorded at 511, he was upset he was not receiving 12 units. Patient is asymptomatic with increased blood sugar. This was prescribed by Dr. Christopher after meeting with family. Will again follow up with Dr. Christopher during rounds.

## 2020-03-09 NOTE — CARE PLAN
Problem: Safety  Goal: Will remain free from injury  Outcome: PROGRESSING AS EXPECTED  Note: Patient calls for assistance, fall precautions in place. Stable with stand by assist.   Goal: Will remain free from falls  Outcome: PROGRESSING AS EXPECTED  Note: No falls during stay     Problem: Pain Management  Goal: Pain level will decrease to patient's comfort goal  Outcome: PROGRESSING AS EXPECTED  Flowsheets (Taken 3/9/2020 0256)  Pain Rating Scale (NPRS): 0  Note: Patient is free of pain during morning assessment. Will continue to monitor.

## 2020-03-10 ENCOUNTER — APPOINTMENT (OUTPATIENT)
Dept: MEDICAL GROUP | Facility: MEDICAL CENTER | Age: 60
End: 2020-03-10
Payer: COMMERCIAL

## 2020-03-10 PROBLEM — E87.6 HYPOKALEMIA: Status: RESOLVED | Noted: 2018-11-24 | Resolved: 2020-03-10

## 2020-03-10 PROBLEM — E87.20 METABOLIC ACIDOSIS: Status: RESOLVED | Noted: 2019-12-19 | Resolved: 2020-03-10

## 2020-03-10 PROBLEM — J90 LOCULATED PLEURAL EFFUSION: Status: RESOLVED | Noted: 2020-02-23 | Resolved: 2020-03-10

## 2020-03-10 LAB
GLUCOSE BLD-MCNC: 149 MG/DL (ref 65–99)
GLUCOSE BLD-MCNC: 179 MG/DL (ref 65–99)
GLUCOSE BLD-MCNC: 215 MG/DL (ref 65–99)
GLUCOSE BLD-MCNC: 56 MG/DL (ref 65–99)
GLUCOSE BLD-MCNC: 89 MG/DL (ref 65–99)

## 2020-03-10 PROCEDURE — 700111 HCHG RX REV CODE 636 W/ 250 OVERRIDE (IP): Performed by: HOSPITALIST

## 2020-03-10 PROCEDURE — 82962 GLUCOSE BLOOD TEST: CPT | Mod: 91

## 2020-03-10 PROCEDURE — 700102 HCHG RX REV CODE 250 W/ 637 OVERRIDE(OP): Performed by: INTERNAL MEDICINE

## 2020-03-10 PROCEDURE — A9270 NON-COVERED ITEM OR SERVICE: HCPCS | Performed by: INTERNAL MEDICINE

## 2020-03-10 PROCEDURE — 770006 HCHG ROOM/CARE - MED/SURG/GYN SEMI*

## 2020-03-10 PROCEDURE — 99232 SBSQ HOSP IP/OBS MODERATE 35: CPT | Performed by: INTERNAL MEDICINE

## 2020-03-10 RX ORDER — INSULIN GLARGINE 100 [IU]/ML
8 INJECTION, SOLUTION SUBCUTANEOUS EVERY EVENING
Status: DISCONTINUED | OUTPATIENT
Start: 2020-03-10 | End: 2020-03-11

## 2020-03-10 RX ADMIN — FUROSEMIDE 40 MG: 40 TABLET ORAL at 06:19

## 2020-03-10 RX ADMIN — OMEPRAZOLE 20 MG: 20 CAPSULE, DELAYED RELEASE ORAL at 16:53

## 2020-03-10 RX ADMIN — ATORVASTATIN CALCIUM 40 MG: 40 TABLET, FILM COATED ORAL at 16:53

## 2020-03-10 RX ADMIN — CYCLOBENZAPRINE HYDROCHLORIDE 10 MG: 10 TABLET, FILM COATED ORAL at 06:19

## 2020-03-10 RX ADMIN — TAMSULOSIN HYDROCHLORIDE 0.4 MG: 0.4 CAPSULE ORAL at 06:19

## 2020-03-10 RX ADMIN — OMEPRAZOLE 20 MG: 20 CAPSULE, DELAYED RELEASE ORAL at 06:19

## 2020-03-10 RX ADMIN — Medication 100 MG: at 06:19

## 2020-03-10 RX ADMIN — PANCRELIPASE 48000 UNITS: 24000; 76000; 120000 CAPSULE, DELAYED RELEASE PELLETS ORAL at 06:19

## 2020-03-10 RX ADMIN — INSULIN GLARGINE 8 UNITS: 100 INJECTION, SOLUTION SUBCUTANEOUS at 16:55

## 2020-03-10 RX ADMIN — ACETAMINOPHEN 650 MG: 325 TABLET, FILM COATED ORAL at 16:53

## 2020-03-10 RX ADMIN — HYDROMORPHONE HYDROCHLORIDE 0.5 MG: 1 INJECTION, SOLUTION INTRAMUSCULAR; INTRAVENOUS; SUBCUTANEOUS at 09:14

## 2020-03-10 RX ADMIN — ACETAMINOPHEN 650 MG: 325 TABLET, FILM COATED ORAL at 06:19

## 2020-03-10 RX ADMIN — HYDROMORPHONE HYDROCHLORIDE 0.5 MG: 1 INJECTION, SOLUTION INTRAMUSCULAR; INTRAVENOUS; SUBCUTANEOUS at 16:53

## 2020-03-10 ASSESSMENT — ENCOUNTER SYMPTOMS
PALPITATIONS: 0
HEARTBURN: 0
FEVER: 0
COUGH: 0
ROS GI COMMENTS: LOW APPETITE
DEPRESSION: 0
NAUSEA: 0
SPUTUM PRODUCTION: 0
BACK PAIN: 0
HALLUCINATIONS: 0
SHORTNESS OF BREATH: 0
WHEEZING: 0
SORE THROAT: 0
BLOOD IN STOOL: 0
CHILLS: 0
HEADACHES: 0
FOCAL WEAKNESS: 0
WEAKNESS: 0
DIZZINESS: 0
MYALGIAS: 0
DIARRHEA: 0
VOMITING: 0
ABDOMINAL PAIN: 0

## 2020-03-10 NOTE — DISCHARGE PLANNING
"Anticipated Discharge Disposition: snf vs intermediate    Action: phone call from Zaynab at [facility]. She stated pt cannot return to her [facility] unless he is assessed/bridged through SNF. Zaynab requested that \"orders be changed\" to allow pt to go to SNF. She stated pt was in hospital for 1 month and then back at halfway and then now is back in hospital. She confirmed he returned to hospital due to illness, not functioning or rehab needs.     Explained it is about pt qualifying for skilled. Zaynab expressed concern re: pt lifestyle choices- diet, smoking, not caring for himself (hygiene, etc). Explained that, while these concerns are valid, lifestyle choices do not indicate need for acute/skilled care. Informed Zaynab that possibly HH could come and support transition back to [facility]. Suggested further discussion with frandy/CARRI's re long term plan for pt if [facility] is not appropriate. She said she would talk to sister.     Barriers to Discharge: POA/halfway agreement to d/c plan    Plan: F/U with sister re: plans for self-pay at [facility]    "

## 2020-03-10 NOTE — PROGRESS NOTES
Hospital Medicine Daily Progress Note    Date of Service  3/10/2020    Chief Complaint  59 y.o. male admitted 2/14/2020 with abdominal pain    Hospital Course    History of type 2 diabetes, cognitive impairment who lives at a group home who was admitted for abdominal pain found to have sepsis secondary to ascending cholangitis.  He underwent ERCP with sphincterotomy to relieve obstruction.  No stent was placed due to significant edema.  Repeat ERCP was not performed due to acute respiratory failure with hypoxia which has now resolved.  He has improved with antibiotics and is almost ready to return to his group home however, blood sugars have been variable and they are not allowed to do sliding scale      Interval Problem Update  3/10: Has been hyperglycemic, today dropped to 59.  Lantus and mealtime insulin decreased.  Pain is controlled.  Appetite is improving but remains low.    Consultants/Specialty  Dr. Maura LANDIN    Code Status  DNR/DNI    Disposition  Group home when sugars are controlled    Review of Systems  Review of Systems   Constitutional: Negative for chills, fever and malaise/fatigue.   HENT: Negative for sore throat.    Respiratory: Negative for cough, sputum production, shortness of breath and wheezing.    Cardiovascular: Negative for chest pain and palpitations.   Gastrointestinal: Negative for abdominal pain (Resolved), blood in stool, diarrhea, heartburn, nausea and vomiting.        Low appetite   Genitourinary: Negative for dysuria and frequency.   Musculoskeletal: Negative for back pain and myalgias.   Neurological: Negative for dizziness, focal weakness, weakness and headaches.   Psychiatric/Behavioral: Negative for depression and hallucinations.   All other systems reviewed and are negative.       Physical Exam  Temp:  [36.7 °C (98 °F)-36.8 °C (98.3 °F)] 36.8 °C (98.2 °F)  Pulse:  [] 104  Resp:  [18] 18  BP: (120-129)/(63-75) 122/68  SpO2:  [91 %-94 %] 91 %    Physical  Exam  Constitutional:       Appearance: Normal appearance. He is underweight.   HENT:      Head: Normocephalic and atraumatic.      Nose: Nose normal.      Mouth/Throat:      Mouth: Mucous membranes are moist.   Eyes:      Extraocular Movements: Extraocular movements intact.      Pupils: Pupils are equal, round, and reactive to light.   Neck:      Musculoskeletal: Normal range of motion and neck supple.   Cardiovascular:      Rate and Rhythm: Normal rate and regular rhythm.      Heart sounds: No murmur.   Pulmonary:      Effort: Pulmonary effort is normal. No respiratory distress.      Breath sounds: Normal breath sounds. No stridor.   Abdominal:      General: Abdomen is flat. Bowel sounds are normal. There is no distension.      Palpations: Abdomen is soft.      Tenderness: There is no abdominal tenderness.   Musculoskeletal:         General: No swelling, tenderness or deformity.   Skin:     General: Skin is warm and dry.      Coloration: Skin is not jaundiced or pale.   Neurological:      General: No focal deficit present.      Mental Status: He is alert and oriented to person, place, and time. Mental status is at baseline.   Psychiatric:         Mood and Affect: Mood normal.         Behavior: Behavior normal.      Comments: Very pleasant, makes eye contact and answers questions appropriately         Fluids    Intake/Output Summary (Last 24 hours) at 3/10/2020 1440  Last data filed at 3/10/2020 0840  Gross per 24 hour   Intake 600 ml   Output 600 ml   Net 0 ml       Laboratory                        Imaging  DX-CHEST-PORTABLE (1 VIEW)   Final Result         1. No significant interval change.      DX-CHEST-PORTABLE (1 VIEW)   Final Result      New subpulmonic effusions, bibasilar atelectasis and edema      DX-PORTABLE FLUOROSCOPY < 1 HOUR   Final Result      Portable fluoroscopy utilized for 1.1 minute.         INTERPRETING LOCATION: [address] [place] [address], [id number]      [id number] SPHINCTEROTOMY   Final Result       Intraoperative fluoroscopy spot images as described above.      CT-ABDOMEN WITH & W/O   Final Result      1.  No significant change since CT abdomen and pelvis obtained 2/16/2020      2.  Findings consistent with high-grade biliary obstruction with dilation of the common bile duct measuring 17 mm, dilated gallbladder, dilated pancreatic duct with change in caliber of the pancreatic duct at the level pancreatic head      3.  Findings could be either malignancy the pancreatic head versus scarring from chronic pancreatitis.      4.  Sequela of chronic pancreatitis      5.  Large right medial chest rim-enhancing loculated fluid collection probably within the pleural space measuring 10.5 x 3.1 centimeter transversely and 7 cm craniocaudal      6.  Rim-enhancing fluid collection in the hepatorenal space measuring 11.2 cm craniocaudal and 2-3 cm in thickness.      7.  The rim-enhancing fluid collection are suspicious for infection/abscess      8.  Bilateral atelectasis and small-moderate-sized pleural effusion after      9.  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  Biliary dilatation without evidence of high-grade biliary obstruction.   2.  Gallbladder visualization after morphine administration. No acute cholecystitis.      CT-ABDOMEN-PELVIS WITH   Final Result      1.  New rim enhancing fluid collection tracks along the right heart margin, azygoesophageal recess and possibly communicates with Morison's pouch tracking along the right aspect of the inferior vena cava. This is nonspecific and could indicate abscess,    dissecting pseudocyst, mucinous cystic neoplasm related fluid      2.  New extra and intrahepatic biliary ductal dilatation with 6 cm dilatation of the gallbladder also seen. This indicates distal common bile duct/ampullary level obstruction. Stricture, mass effect from adjacent pancreas  calcifications or small    ampullary mass are differential possibilities      3.  Decreased small pleural effusions. Some loculation on the right is possible      4.  Slightly decreased diffuse pancreatic ductal dilatation now measuring 11 mm. Evidence of chronic pancreatitis. New 3 cm cystic structure anterior to the pancreas could be a pseudocyst. Abscess or necrotic/cystic neoplasm are in the differential.      5.  Multiple chronic findings including status post aorto bifemoral bypass with approaching 70% stenosis of the left superficial femoral artery, nonobstructive right 3 mm nephrolithiasis, large volume rectal vault stool, dilated bladder which could    indicate outlet obstruction or neurogenic bladder, splenosis      [id number]   Final Result      1.  Severe dilatation of the intrahepatic biliary ducts, common bile duct and pancreatic duct. The distal common bile duct and the proximal pancreatic duct is not visualized at the pancreatic head. The pancreatic head appears to be diffusely enlarged and    demonstrates multiple small cysts. These findings are concerning for pancreatic neoplasm such as intraductal papillary mucinous cystoadenoma. The other differential diagnosis includes periampullary carcinoma. MR examination of the abdomen with contrast    is recommended for further evaluation.   2.  Mixed signal intensity in the right perinephric space likely represents perinephric hematoma.   3.  Large hiatal hernia.   4.  Diffuse gastric wall thickening.      US-RUQ   Final Result      1.  Hepatomegaly.   2.  Hepatic steatosis.   3.  Gallbladder sludge. Mild intrahepatic ductal dilation. Dilation of the common duct up to 13 mm. No definite distal obstructing etiology is identified. MRCP could be obtained for further evaluation if indicated.   4.  Trace ascites.   5.  Pancreatic calcifications likely sequela of chronic pancreatitis.      DX-CHEST-PORTABLE (1 VIEW)   Final Result      Cardiomegaly.            Assessment/Plan  * Type 2 diabetes mellitus with hyperglycemia, with long-term current use of insulin (HCC)- (present on admission)  Assessment & Plan  Start patient states he is on on Lantus 12 units at night and regular insulin 10 units 3 times daily, however sugars dropped to 59 with this regimen  Group home does not want sliding scale.  Decrease Lantus to 8 units nightly, prandial to 4 units 3 times daily  Group home requiring stable pattern to be established before discharge back to group home      Pancreatic mass  Assessment & Plan  MRI with concern for pancreatic malignancy  ERCP with no evidence of malignancy, calcifications of pancreas present   is elevated but less than 1000 so cannot rule in cancer, likely due chronic pancreatitis  Repeat CT showed chronic pancreatitis and loculated chest effusion    At this moment GI does not believe patient has significant evidence for periodic cancer.  Recommend outpatient follow-up with GI.  Family agreed    Patient currently medically cleared for transfer to group home.  Pending blood sugar control    Cognitive decline- (present on admission)  Assessment & Plan  Patient likely has a chronic component to his cognitive decline as well as acute worsening  Mental status now at his baseline, patient has a legal guardian  He will return back to his group home      Hypomagnesemia  Assessment & Plan  Improved    Sepsis (HCC)  Assessment & Plan  This wass Sepsis Present on admission, now resolved  Ascending cholangitis  Currently resolved and he has finished antibiotics          Acute respiratory failure with hypoxia (HCC)  Assessment & Plan  Currently resolved      Cholangitis  Assessment & Plan  Biliary obstruction suggested on imaging, GI consulted and they performed an ERCP on 2/24.  Purulent drainage was noticed however swelling was too significant to place ampullary duct stent.  He improved without stent placement therefore this will be deferred  He has  completed antibiotics and alk phos has been steadily decreasing  Endoscopy done showing pancreatic calcifications but no stones, no area to biopsy  Outpatient follow-up recommended by GI.  Currently resolved    Recurrent major depressive disorder, in full remission (HCC)  Assessment & Plan  .    Severe protein-calorie malnutrition (HCC)- (present on admission)  Assessment & Plan  Encourage p.o. intake  No pancreatic mass identified    Hyponatremia  Assessment & Plan  Resolved with fluids    Chronic pancreatitis (HCC)- (present on admission)  Assessment & Plan  Continue pancreatic enzymes with meals  Celiac nerve block for pain control, per GI  He is tolerating a diet    Dyslipidemia- (present on admission)  Assessment & Plan  Continue statin         VTE prophylaxis: SCDs

## 2020-03-10 NOTE — CARE PLAN
Problem: Mobility  Goal: Risk for activity intolerance will decrease  Outcome: PROGRESSING SLOWER THAN EXPECTED  Intervention: Encourage patient to increase activity level in collaboration with Interdisciplinary Team  Note: We continue to encourage patient to ambulate more frequently, despite education, he continues to only want to ambulate once a day.     Problem: Pain Management  Goal: Pain level will decrease to patient's comfort goal  Flowsheets  Taken 3/10/2020 0839 by Jennifer Gilligan, RMAYO  Comfort Goal:   Comfort at Rest   Sleep Comfortably  Pain Rating Scale (NPRS): 2  Taken 3/7/2020 0000 by Agueda Carrion RMAYO  Non Verbal Scale:   Calm   Sleeping   Unlabored Breathing  Taken 3/6/2020 1100 by Susy Lim, PT  Therapist Pain Assessment:   Nurse Notified   0  Note: Patient reports headache and back pain, but declines any intervention at this time.

## 2020-03-10 NOTE — PROGRESS NOTES
0655: Bedside report completed w/ Nahomy/RN.  Assumed pt care. Patient in bed, sleeping, in no apparent distress.  Safety precautions in place. Call light & personal belongings within reach.  Plan of care discussed.    0820:  Patient awake and eating breakfast at this time, no needs, reports mild back pain and mild headache, but declines intervention at this time.    0910:  Charge RN rounded with patient and stated that his pain had increased in his head and back to an 8/10, medicated per MAR, patient resting and watching TV with no signs of distress.    1100:  BS56, held insulin, given OJ, will recheck

## 2020-03-10 NOTE — PROGRESS NOTES
"Hospital Medicine Daily Progress Note    Date of Service  3/9/2020    Chief Complaint  59 y.o. male admitted 2/14/2020 with poor appetite.    Hospital Course    Patient with cognitive deficit presented from group home with lethargy and poor appetite.  He had elevation in alk phos and leukocytosis and treated for concern of sepsis with abdominal source.  HIDA was performed which showed no evidence of obstruction nor cholecystitis.    MRCP showed dilation of biliary ducts and concern of pancreatic head mass.   GI  EUS on 2/18 and showed significant calcifications likely related to chronic pancreatitis.  He underwent ERCP on 2/24 and purulent drainage from the ampulla of vater os and he had biliary needle-knife sphincterotomy to relieve the purulent drainage.          Interval Problem Update  2/25 Patient states he is hungry and wants solid food, still on a liquid diet.  Plan to move forward with repeat ERCP with Dr Wakefield tomorrow - on schedule at 1500 with anticipated stenting of the ampulla.  Patient denies any current abdominal pain.  2/26 Patient doing about same today, denies pain, refused labs this am.  ERCP scheduled for today for stenting of the ampulla.  Patient voicing frustration regarding NPO.  2/27 Patient without complaint today as he had breakfast in front of him when I saw him.  ERCP was cancelled due to increasing hypoxia to where he was requiring 4L.  CXR completed with again shows dulling of the bases consistent with known pleural effusions but also shows pulmonary congestion - will diurese to see if this improves.  2/28 Patient feeling okay today, looks better than on previous days, responding well to diuresis though lungs sound about the same.  He states his back is aching \"all over\" but is not severely painful and no focal tenderness.  He states he ambulated in the hallway about \"a quarter of the way down\" yesterday.  Will continue with diuresis and work toward less oxygen requirement so he can " have repeat ERCP for stent placement.  2/29 Patient feeling well today, states he will walk in the hallways with the staff today.  Oxygen demand is about the same on 4L but lungs are sounding clear.  K is 3.2 and mag 1.2, will replete both.  WBC continues to drop.  3/1 Patient feeling well today, states he has no pain and doesn't feel short of breath.  Oxygen overnight documented at 4.5L nasal cannula despite continued aggressive diuresis with 10 L down since admission.  CXR pending for further evaluation.  3/2 patient states he feels same.  He continues to diurese well and now is 13L down.  He is saturating well on 3L and if he can get back to 2L, will move forward with ERCP stenting.  Discussed with Dr Burkett.  3/3.  Patient feels pleasant today.  No significant adverse event.  Oxygen level down to 1 L.  Continue monitor.  Pending GI to perform ERCP.  Patient is oriented however very poor insight of his health condition.  Meet with patient's POA today and care group.  Continue DNR and pursuing for possible hospice. Patient's pain is general, 2-3/10, intermittent and does not radiate to other location, sharp and with some tingling. Can be controlled by pain meds.   3/4.  Patient is pleasant overnight no acute adverse event.  Overall remained stable.  GI recommend outpatient follow-up.  Patient's condition likely due to ascending cholangitis with biliary obstruction.  After antibiotics patient's condition stabilized.  Pending nursing home placement recommended by PT OT.Patient otherwise denies fever, chills, nausea, vomiting, adb pain, SOB, CP, headache, constipation, diarrhea, cough, or sputum.  3/5.  Patient has been stable and comfortable.  Continue to improve mentation.  Sepsis resolved.  Pending placement.  3/6.  Patient is comfortable.  Denies overnight adverse event.  Still waiting for placement.  Tolerated diet.  3/7.  Patient overall remained stable.  Blood sugar not controlled however due to patient  keep drinking soda.  Otherwise patient has no adverse overnight event.  3/8.  Apparently patient's group home is unable to perform sliding scale or frequent check of blood sugar.  I change patient's insulin to NPH 10 units twice daily.  Discontinue every Accu-Cheks 3 times daily given patient's goal of care is comfort.  Patient is stable.  And comfortable  3/9.  Patient is pleasant and competent.  All remained stable overnight no adverse event.  Blood sugar not controlled.  Change NPH to Lantus and 3 times daily insulin.  This is requested by group home.    Consultants/Specialty  GI - GIC    Code Status  DNR/DNI, has guardian    Disposition   group home    Review of Systems  Review of Systems   Constitutional: Positive for malaise/fatigue. Negative for chills, fever and weight loss.   HENT: Negative for congestion, ear discharge, ear pain, hearing loss, nosebleeds and sore throat.    Eyes: Negative for blurred vision, double vision, photophobia and pain.   Respiratory: Negative for cough, sputum production, shortness of breath and wheezing.    Cardiovascular: Negative for chest pain, palpitations, orthopnea, leg swelling and PND.   Gastrointestinal: Negative for abdominal pain, constipation, diarrhea, heartburn, nausea and vomiting.   Genitourinary: Negative for dysuria, frequency, hematuria and urgency.   Musculoskeletal: Negative for back pain, falls, joint pain, myalgias and neck pain.   Skin: Negative for itching and rash.   Neurological: Negative for dizziness, tingling, tremors, sensory change, speech change, seizures, weakness and headaches.   Psychiatric/Behavioral: Negative for depression, substance abuse and suicidal ideas. The patient is not nervous/anxious and does not have insomnia.         Physical Exam  Temp:  [36.6 °C (97.9 °F)-36.7 °C (98.1 °F)] 36.7 °C (98 °F)  Pulse:  [] 98  Resp:  [18] 18  BP: (107-120)/(59-70) 120/63  SpO2:  [90 %-95 %] 92 %    Physical Exam  Vitals signs and nursing note  reviewed.   Constitutional:       General: He is not in acute distress.     Appearance: Normal appearance. He is not ill-appearing, toxic-appearing or diaphoretic.   HENT:      Head: Normocephalic and atraumatic.      Right Ear: Tympanic membrane and external ear normal.      Left Ear: Tympanic membrane and external ear normal.      Nose: No congestion or rhinorrhea.      Mouth/Throat:      Mouth: Mucous membranes are moist.      Pharynx: Oropharynx is clear. No oropharyngeal exudate.   Eyes:      General: No scleral icterus.        Right eye: No discharge.         Left eye: No discharge.      Extraocular Movements: Extraocular movements intact.      Conjunctiva/sclera: Conjunctivae normal.      Pupils: Pupils are equal, round, and reactive to light.   Neck:      Musculoskeletal: Normal range of motion and neck supple. No neck rigidity or muscular tenderness.   Cardiovascular:      Rate and Rhythm: Normal rate and regular rhythm.      Pulses: Normal pulses.      Heart sounds: Normal heart sounds. No murmur. No friction rub. No gallop.    Pulmonary:      Effort: Pulmonary effort is normal. No respiratory distress.      Breath sounds: Normal breath sounds. No stridor. No wheezing, rhonchi or rales.   Chest:      Chest wall: No tenderness.   Abdominal:      General: Abdomen is flat. Bowel sounds are normal. There is no distension.      Palpations: Abdomen is soft. There is no mass.      Tenderness: There is no abdominal tenderness. There is no guarding or rebound.      Hernia: No hernia is present.   Musculoskeletal: Normal range of motion.         General: No swelling, tenderness or deformity.   Lymphadenopathy:      Cervical: No cervical adenopathy.   Skin:     General: Skin is warm and dry.      Capillary Refill: Capillary refill takes more than 3 seconds.      Coloration: Skin is not jaundiced or pale.      Findings: No erythema.   Neurological:      General: No focal deficit present.      Mental Status: He is  alert and oriented to person, place, and time. Mental status is at baseline.      Cranial Nerves: No cranial nerve deficit.      Motor: No weakness.   Psychiatric:         Mood and Affect: Mood normal.         Behavior: Behavior normal.         Fluids    Intake/Output Summary (Last 24 hours) at 3/9/2020 1808  Last data filed at 3/9/2020 1400  Gross per 24 hour   Intake 480 ml   Output 2400 ml   Net -1920 ml       Laboratory                        Imaging  DX-CHEST-PORTABLE (1 VIEW)   Final Result         1. No significant interval change.      DX-CHEST-PORTABLE (1 VIEW)   Final Result      New subpulmonic effusions, bibasilar atelectasis and edema      DX-PORTABLE FLUOROSCOPY < 1 HOUR   Final Result      Portable fluoroscopy utilized for 1.1 minute.         INTERPRETING LOCATION: [address] [place] [address], [facility], [address]      [id number] SPHINCTEROTOMY   Final Result      Intraoperative fluoroscopy spot images as described above.      CT-ABDOMEN WITH & W/O   Final Result      1.  No significant change since CT abdomen and pelvis obtained 2/16/2020      2.  Findings consistent with high-grade biliary obstruction with dilation of the common bile duct measuring 17 mm, dilated gallbladder, dilated pancreatic duct with change in caliber of the pancreatic duct at the level pancreatic head      3.  Findings could be either malignancy the pancreatic head versus scarring from chronic pancreatitis.      4.  Sequela of chronic pancreatitis      5.  Large right medial chest rim-enhancing loculated fluid collection probably within the pleural space measuring 10.5 x 3.1 centimeter transversely and 7 cm craniocaudal      6.  Rim-enhancing fluid collection in the hepatorenal space measuring 11.2 cm craniocaudal and 2-3 cm in thickness.      7.  The rim-enhancing fluid collection are suspicious for infection/abscess      8.  Bilateral atelectasis and small-moderate-sized pleural effusion after      9.  Probable aortic bifemoral graft present  as the native aorta and both common iliac artery appear occluded            NM-HEPATOBILIARY SCAN   Final Result      1.  Biliary dilatation without evidence of high-grade biliary obstruction.   2.  Gallbladder visualization after morphine administration. No acute cholecystitis.      CT-ABDOMEN-PELVIS WITH   Final Result      1.  New rim enhancing fluid collection tracks along the right heart margin, azygoesophageal recess and possibly communicates with Morison's pouch tracking along the right aspect of the inferior vena cava. This is nonspecific and could indicate abscess,    dissecting pseudocyst, mucinous cystic neoplasm related fluid      2.  New extra and intrahepatic biliary ductal dilatation with 6 cm dilatation of the gallbladder also seen. This indicates distal common bile duct/ampullary level obstruction. Stricture, mass effect from adjacent pancreas calcifications or small    ampullary mass are differential possibilities      3.  Decreased small pleural effusions. Some loculation on the right is possible      4.  Slightly decreased diffuse pancreatic ductal dilatation now measuring 11 mm. Evidence of chronic pancreatitis. New 3 cm cystic structure anterior to the pancreas could be a pseudocyst. Abscess or necrotic/cystic neoplasm are in the differential.      5.  Multiple chronic findings including status post aorto bifemoral bypass with approaching 70% stenosis of the left superficial femoral artery, nonobstructive right 3 mm nephrolithiasis, large volume rectal vault stool, dilated bladder which could    indicate outlet obstruction or neurogenic bladder, splenosis      [id number]   Final Result      1.  Severe dilatation of the intrahepatic biliary ducts, common bile duct and pancreatic duct. The distal common bile duct and the proximal pancreatic duct is not visualized at the pancreatic head. The pancreatic head appears to be diffusely enlarged and    demonstrates multiple small cysts. These  findings are concerning for pancreatic neoplasm such as intraductal papillary mucinous cystoadenoma. The other differential diagnosis includes periampullary carcinoma. MR examination of the abdomen with contrast    is recommended for further evaluation.   2.  Mixed signal intensity in the right perinephric space likely represents perinephric hematoma.   3.  Large hiatal hernia.   4.  Diffuse gastric wall thickening.      US-RUQ   Final Result      1.  Hepatomegaly.   2.  Hepatic steatosis.   3.  Gallbladder sludge. Mild intrahepatic ductal dilation. Dilation of the common duct up to 13 mm. No definite distal obstructing etiology is identified. MRCP could be obtained for further evaluation if indicated.   4.  Trace ascites.   5.  Pancreatic calcifications likely sequela of chronic pancreatitis.      DX-CHEST-PORTABLE (1 VIEW)   Final Result      Cardiomegaly.           Assessment/Plan  * Type 2 diabetes mellitus with hyperglycemia, with long-term current use of insulin (HCC)- (present on admission)  Assessment & Plan  Start patient on Lantus 12 units at night and regular insulin 10 units 3 times daily.  Group home does not want sliding scale.  Blood sugar not controlled currently need to adjust insulin dosage prior to be discharged to home  Group home require stable pattern to be established before discharge back to group home      Pancreatic mass  Assessment & Plan  MRI with concern for pancreatic malignancy  ERCP with no evidence of malignancy, calcifications of pancreas present   is elevated but less than 1000 so cannot rule in cancer, likely due chronic pancreatitis  Repeat CT showed chronic pancreatitis and loculated chest effusion    At this moment GI does not believe patient has significant evidence for periodic cancer.  Recommend outpatient follow-up with GI.  Family agreed    Patient currently medically cleared for transfer to group home.  Pending acceptance by group home    Cognitive decline- (present  on admission)  Assessment & Plan  Patient likely has a chronic component to his cognitive decline as well as acute worsening  Mental status now at his baseline, patient has a legal guardian      Metabolic acidosis  Assessment & Plan  IV fluids with improvement    Hypokalemia  Assessment & Plan  Resolved, replete with diuresis.  Replaced magnesium      Hypomagnesemia  Assessment & Plan  1.6  Mag rider 2 g given 2/29    Sepsis ([facility])  Assessment & Plan  This is Sepsis Present on admission  SIRS criteria identified on my evaluation include: Tachycardia, with heart rate greater than 90 BPM and Leukocyosis, with WBC greater than 12,000  Source is GI  Sepsis protocol complete  Continue antibiotics  White cell count trending down  Currently resolved finished antibiotics          Acute respiratory failure with hypoxia ([facility])  Assessment & Plan  Currently resolved      Loculated pleural effusion  Assessment & Plan  In medial right chest, continue antibiotics, pus drained from ampulla of Vater with GI 2/24  Pulmonary edema appreciated on CXR 2/27 - diurese to get oxygen demand down, anticipate another attempt at ERCP in a few days.    Cholangitis  Assessment & Plan  Biliary obstruction suggested on imaging, GI consulted  Alkaline phosphatase is stable over 1100  Endoscopy done showing pancreatic calcifications but no stones, no area to biopsy  Continue antibiotics, white cell count still elevated  Repeat CT scan done  ERCP repeated 2/24, given swelling so that a ampullary duct stent could not be placed  Repeat planned for 2/26 but cancelled because of hypoxia.    Outpatient follow-up recommended by GI.  Currently resolved    Recurrent major depressive disorder, in full remission ([facility])  Assessment & Plan  .    Hyponatremia  Assessment & Plan  Resolved with fluids    Chronic pancreatitis ([facility])- (present on admission)  Assessment & Plan  Continue pancreatic enzymes with meals  Celiac nerve block for pain control, per  GI    Dyslipidemia- (present on admission)  Assessment & Plan  Continue statin        VTE prophylaxis: scds    I have seen and examined patient on 3/9/2020. I have reviewed vitals, new labs and imaging. I have discussed POC with RN. There are no changes from (3/8/2020) except for what is mentioned above.       Current Facility-Administered Medications:   •  insulin glargine (LANTUS) injection 12 Units, 12 Units, Subcutaneous, Q EVENING, Ruma Christopher M.D., 12 Units at 03/09/20 1724  •  insulin regular (HUMULIN R) injection 10 Units, 10 Units, Subcutaneous, TID AC, Ruma Christopher M.D., 10 Units at 03/09/20 1723  •  furosemide (LASIX) tablet 40 mg, 40 mg, Oral, Q DAY, Ruma Christopher M.D., 40 mg at 03/09/20 0616  •  menthol (HALLS) lozenge 1 Lozenge, 1 Lozenge, Oral, Q2HRS PRN, Betsey Gentile M.D., 1 Lozenge at 03/05/20 1119  •  cyclobenzaprine (FLEXERIL) tablet 10 mg, 10 mg, Oral, TID PRN, Betsey Gentile M.D., 10 mg at 03/09/20 0616  •  haloperidol lactate (HALDOL) injection 2-5 mg, 2-5 mg, Intravenous, Q6HRS PRN, Rosendo Encinas M.D.  •  atorvastatin (LIPITOR) tablet 40 mg, 40 mg, Oral, Q EVENING, Grace Patel D.OYuridia, 40 mg at 03/09/20 1723  •  omeprazole (PRILOSEC) capsule 20 mg, 20 mg, Oral, BID, CHIARA Holguin.O., 20 mg at 03/09/20 1723  •  pancrelipase (Lip-Prot-Amyl) (CREON 48448) 57801-63270 units capsule 48,000 Units, 2 Cap, Oral, DAILY, CHIARA Holguin.O., 48,000 Units at 03/09/20 0616  •  sucralfate (CARAFATE) tablet 1 g, 1 g, Oral, BID PRN, CHIARA Holguin.RAPHAEL.  •  tamsulosin (FLOMAX) capsule 0.4 mg, 0.4 mg, Oral, DAILY, YULI HolguinO., 0.4 mg at 03/09/20 0616  •  thiamine tablet 100 mg, 100 mg, Oral, DAILY, YULI HolguinO., 100 mg at 03/09/20 0616  •  senna-docusate (PERICOLACE or SENOKOT S) 8.6-50 MG per tablet 2 Tab, 2 Tab, Oral, BID, Stopped at 03/02/20 0651 **AND** polyethylene glycol/lytes (MIRALAX) PACKET 1 Packet, 1 Packet, Oral, QDAY PRN **AND** magnesium hydroxide (MILK OF  MAGNESIA) suspension 30 mL, 30 mL, Oral, QDAY PRN **AND** bisacodyl (DULCOLAX) suppository 10 mg, 10 mg, Rectal, QDAY PRN, YULI HolguinO.  •  acetaminophen (TYLENOL) tablet 650 mg, 650 mg, Oral, Q6HRS PRN, YULI HolguinO., 650 mg at 03/09/20 1507  •  ondansetron (ZOFRAN) syringe/vial injection 4 mg, 4 mg, Intravenous, Q4HRS PRN, CHIARA Holguin.O.  •  ondansetron (ZOFRAN ODT) dispertab 4 mg, 4 mg, Oral, Q4HRS PRN, CHIARA Holguin.O.  •  promethazine (PHENERGAN) tablet 12.5-25 mg, 12.5-25 mg, Oral, Q4HRS PRN, CHIARA Holguin.O.  •  promethazine (PHENERGAN) suppository 12.5-25 mg, 12.5-25 mg, Rectal, Q4HRS PRN, CHIARA Holguin.O.  •  prochlorperazine (COMPAZINE) injection 5-10 mg, 5-10 mg, Intravenous, Q4HRS PRN, YULI HolguinO.  •  HYDROmorphone pf (DILAUDID) injection 0.5 mg, 0.5 mg, Intravenous, Q2HRS PRN, Raquel Encinas M.D., 0.5 mg at 03/09/20 1512

## 2020-03-11 ENCOUNTER — APPOINTMENT (OUTPATIENT)
Dept: RADIOLOGY | Facility: MEDICAL CENTER | Age: 60
DRG: 871 | End: 2020-03-11
Attending: INTERNAL MEDICINE
Payer: COMMERCIAL

## 2020-03-11 LAB
ALBUMIN SERPL BCP-MCNC: 2.5 G/DL (ref 3.2–4.9)
ALBUMIN/GLOB SERPL: 0.7 G/DL
ALP SERPL-CCNC: 1118 U/L (ref 30–99)
ALT SERPL-CCNC: 27 U/L (ref 2–50)
ANION GAP SERPL CALC-SCNC: 11 MMOL/L (ref 7–16)
AST SERPL-CCNC: 75 U/L (ref 12–45)
BASOPHILS # BLD AUTO: 0.7 % (ref 0–1.8)
BASOPHILS # BLD: 0.13 K/UL (ref 0–0.12)
BILIRUB SERPL-MCNC: 1.2 MG/DL (ref 0.1–1.5)
BUN SERPL-MCNC: 14 MG/DL (ref 8–22)
CALCIUM SERPL-MCNC: 8.9 MG/DL (ref 8.4–10.2)
CHLORIDE SERPL-SCNC: 94 MMOL/L (ref 96–112)
CO2 SERPL-SCNC: 25 MMOL/L (ref 20–33)
CREAT SERPL-MCNC: 0.67 MG/DL (ref 0.5–1.4)
EOSINOPHIL # BLD AUTO: 0.09 K/UL (ref 0–0.51)
EOSINOPHIL NFR BLD: 0.5 % (ref 0–6.9)
ERYTHROCYTE [DISTWIDTH] IN BLOOD BY AUTOMATED COUNT: 78.2 FL (ref 35.9–50)
GLOBULIN SER CALC-MCNC: 3.6 G/DL (ref 1.9–3.5)
GLUCOSE BLD-MCNC: 135 MG/DL (ref 65–99)
GLUCOSE BLD-MCNC: 287 MG/DL (ref 65–99)
GLUCOSE BLD-MCNC: 381 MG/DL (ref 65–99)
GLUCOSE BLD-MCNC: 452 MG/DL (ref 65–99)
GLUCOSE BLD-MCNC: 474 MG/DL (ref 65–99)
GLUCOSE SERPL-MCNC: 343 MG/DL (ref 65–99)
HCT VFR BLD AUTO: 32.7 % (ref 42–52)
HGB BLD-MCNC: 10.9 G/DL (ref 14–18)
IMM GRANULOCYTES # BLD AUTO: 0.3 K/UL (ref 0–0.11)
IMM GRANULOCYTES NFR BLD AUTO: 1.6 % (ref 0–0.9)
LYMPHOCYTES # BLD AUTO: 2.3 K/UL (ref 1–4.8)
LYMPHOCYTES NFR BLD: 12.1 % (ref 22–41)
MAGNESIUM SERPL-MCNC: 1.5 MG/DL (ref 1.5–2.5)
MCH RBC QN AUTO: 30.1 PG (ref 27–33)
MCHC RBC AUTO-ENTMCNC: 33.3 G/DL (ref 33.7–35.3)
MCV RBC AUTO: 90.3 FL (ref 81.4–97.8)
MONOCYTES # BLD AUTO: 2.46 K/UL (ref 0–0.85)
MONOCYTES NFR BLD AUTO: 12.9 % (ref 0–13.4)
NEUTROPHILS # BLD AUTO: 13.72 K/UL (ref 1.82–7.42)
NEUTROPHILS NFR BLD: 72.2 % (ref 44–72)
NRBC # BLD AUTO: 0 K/UL
NRBC BLD-RTO: 0 /100 WBC
PLATELET # BLD AUTO: 486 K/UL (ref 164–446)
PMV BLD AUTO: 10.1 FL (ref 9–12.9)
POTASSIUM SERPL-SCNC: 4.1 MMOL/L (ref 3.6–5.5)
PROT SERPL-MCNC: 6.1 G/DL (ref 6–8.2)
RBC # BLD AUTO: 3.62 M/UL (ref 4.7–6.1)
SODIUM SERPL-SCNC: 130 MMOL/L (ref 135–145)
WBC # BLD AUTO: 19 K/UL (ref 4.8–10.8)

## 2020-03-11 PROCEDURE — 82962 GLUCOSE BLOOD TEST: CPT | Mod: 91

## 2020-03-11 PROCEDURE — A9270 NON-COVERED ITEM OR SERVICE: HCPCS | Performed by: INTERNAL MEDICINE

## 2020-03-11 PROCEDURE — 770006 HCHG ROOM/CARE - MED/SURG/GYN SEMI*

## 2020-03-11 PROCEDURE — 700102 HCHG RX REV CODE 250 W/ 637 OVERRIDE(OP): Performed by: INTERNAL MEDICINE

## 2020-03-11 PROCEDURE — 36415 COLL VENOUS BLD VENIPUNCTURE: CPT

## 2020-03-11 PROCEDURE — 85025 COMPLETE CBC W/AUTO DIFF WBC: CPT

## 2020-03-11 PROCEDURE — 83735 ASSAY OF MAGNESIUM: CPT

## 2020-03-11 PROCEDURE — 80053 COMPREHEN METABOLIC PANEL: CPT

## 2020-03-11 PROCEDURE — 99233 SBSQ HOSP IP/OBS HIGH 50: CPT | Performed by: INTERNAL MEDICINE

## 2020-03-11 PROCEDURE — 71045 X-RAY EXAM CHEST 1 VIEW: CPT

## 2020-03-11 RX ORDER — INSULIN DEGLUDEC 200 U/ML
10 INJECTION, SOLUTION SUBCUTANEOUS
Qty: 3 PEN | Refills: 11 | Status: SHIPPED | OUTPATIENT
Start: 2020-03-11 | End: 2020-03-11 | Stop reason: SDUPTHER

## 2020-03-11 RX ORDER — INSULIN LISPRO 100 [IU]/ML
5 INJECTION, SOLUTION INTRAVENOUS; SUBCUTANEOUS
Qty: 5 PEN | Refills: 6 | Status: SHIPPED | OUTPATIENT
Start: 2020-03-11 | End: 2020-03-11 | Stop reason: SDUPTHER

## 2020-03-11 RX ORDER — INSULIN DEGLUDEC 200 U/ML
12 INJECTION, SOLUTION SUBCUTANEOUS
Qty: 3 PEN | Refills: 11 | Status: SHIPPED | OUTPATIENT
Start: 2020-03-11 | End: 2020-03-23 | Stop reason: SDUPTHER

## 2020-03-11 RX ORDER — FUROSEMIDE 20 MG/1
20 TABLET ORAL DAILY
Qty: 30 TAB | Refills: 1 | Status: SHIPPED
Start: 2020-03-12 | End: 2020-03-24

## 2020-03-11 RX ORDER — INSULIN GLARGINE 100 [IU]/ML
12 INJECTION, SOLUTION SUBCUTANEOUS EVERY EVENING
Status: DISCONTINUED | OUTPATIENT
Start: 2020-03-11 | End: 2020-03-14

## 2020-03-11 RX ORDER — INSULIN LISPRO 100 [IU]/ML
10 INJECTION, SOLUTION INTRAVENOUS; SUBCUTANEOUS
Qty: 5 PEN | Refills: 6 | Status: SHIPPED | OUTPATIENT
Start: 2020-03-11 | End: 2020-03-23 | Stop reason: SDUPTHER

## 2020-03-11 RX ADMIN — PANCRELIPASE 48000 UNITS: 24000; 76000; 120000 CAPSULE, DELAYED RELEASE PELLETS ORAL at 06:28

## 2020-03-11 RX ADMIN — INSULIN HUMAN 10 UNITS: 100 INJECTION, SOLUTION PARENTERAL at 16:49

## 2020-03-11 RX ADMIN — CYCLOBENZAPRINE HYDROCHLORIDE 10 MG: 10 TABLET, FILM COATED ORAL at 06:28

## 2020-03-11 RX ADMIN — OMEPRAZOLE 20 MG: 20 CAPSULE, DELAYED RELEASE ORAL at 18:07

## 2020-03-11 RX ADMIN — ATORVASTATIN CALCIUM 40 MG: 40 TABLET, FILM COATED ORAL at 18:07

## 2020-03-11 RX ADMIN — FUROSEMIDE 40 MG: 40 TABLET ORAL at 06:28

## 2020-03-11 RX ADMIN — OMEPRAZOLE 20 MG: 20 CAPSULE, DELAYED RELEASE ORAL at 06:28

## 2020-03-11 RX ADMIN — TAMSULOSIN HYDROCHLORIDE 0.4 MG: 0.4 CAPSULE ORAL at 06:28

## 2020-03-11 RX ADMIN — Medication 100 MG: at 06:28

## 2020-03-11 RX ADMIN — INSULIN GLARGINE 12 UNITS: 100 INJECTION, SOLUTION SUBCUTANEOUS at 18:44

## 2020-03-11 RX ADMIN — ACETAMINOPHEN 650 MG: 325 TABLET, FILM COATED ORAL at 06:28

## 2020-03-11 RX ADMIN — ACETAMINOPHEN 650 MG: 325 TABLET, FILM COATED ORAL at 18:17

## 2020-03-11 RX ADMIN — CYCLOBENZAPRINE HYDROCHLORIDE 10 MG: 10 TABLET, FILM COATED ORAL at 18:17

## 2020-03-11 ASSESSMENT — ENCOUNTER SYMPTOMS
SHORTNESS OF BREATH: 0
SORE THROAT: 0
DIZZINESS: 0
HEADACHES: 0
WEAKNESS: 0
FEVER: 0
COUGH: 0
FOCAL WEAKNESS: 0
DEPRESSION: 0
NAUSEA: 0
VOMITING: 0
CHILLS: 0
SPUTUM PRODUCTION: 0
BACK PAIN: 0
ABDOMINAL PAIN: 0
DIARRHEA: 0
HALLUCINATIONS: 0
HEARTBURN: 0
PALPITATIONS: 0
BLOOD IN STOOL: 0

## 2020-03-11 NOTE — CARE PLAN
Problem: Communication  Goal: The ability to communicate needs accurately and effectively will improve  Outcome: PROGRESSING AS EXPECTED     Problem: Safety  Goal: Will remain free from injury  Outcome: PROGRESSING AS EXPECTED  Goal: Will remain free from falls  Outcome: PROGRESSING AS EXPECTED     Problem: Infection  Goal: Will remain free from infection  Outcome: PROGRESSING AS EXPECTED     Problem: Venous Thromboembolism (VTW)/Deep Vein Thrombosis (DVT) Prevention:  Goal: Patient will participate in Venous Thrombosis (VTE)/Deep Vein Thrombosis (DVT)Prevention Measures  Outcome: PROGRESSING AS EXPECTED     Problem: Bowel/Gastric:  Goal: Normal bowel function is maintained or improved  Outcome: PROGRESSING AS EXPECTED     Problem: Pain Management  Goal: Pain level will decrease to patient's comfort goal  Outcome: PROGRESSING AS EXPECTED

## 2020-03-11 NOTE — DISCHARGE PLANNING
"Anticipated Discharge Disposition: HH at [facility]    Action: Message received from sister saying she was following up as she thought pt was discharging yesterday.     Phone call to Chiara at [facility] who said they have not set up a self-pay agreement and transferred this LCSW to [facility]. He said their business office said \"it would be unfair\" for pt to self-pay. He said that they told him they would be going with another SNF.     Phone call to sister, Aniyah. She said she thought pt could d/c to [facility] yesterday and they would self-pay.  Asked what d/c plan would be after SNF if [facility] still refused to take pt back. Aniyah said that if pt did not show motivation or changes then they would have pt go on hospice at [facility]. Explained that pt would need to meet hospice criteria. Also explained that the concerns Zaynab ([facility]) shared were not things that skilled/acute setting can assist with- motivation, lifestyle choice, etc.     Aniyah asked for guidance and ideas. Explored different ideas. Aniyah suggested looking into HH at [facility] to give Zaynab ([facility]) the support/second opinion she needs to feel comfortable taking pt back. Aniyah said if  does not work in getting pt to functioning level that [facility] would like, she will work with [employer] CM's on finding him an appropriate level of care.     Aniyah provided Choice for [facility].   Marianna Text sent to attending requesting HH order.       Barriers to Discharge: [facility] agreeing to take patient back    Plan: Submit referral for [facility] and call HH to discuss case.     "

## 2020-03-11 NOTE — RESPIRATORY CARE
COPD EDUCATION by COPD CLINICAL EDUCATOR  3/11/2020 at 7:39 AM by Nisreen Fagan, RRT     Patient reviewed by COPD education team. Patient does not qualify for the COPD program. Cognitive impairment who lives at a group home

## 2020-03-11 NOTE — PROGRESS NOTES
Pt resting in bed, wakens easily to voice. POC discussed, denies further needs at this time. Safety measures and hourly rounding in place.

## 2020-03-11 NOTE — PROGRESS NOTES
Hospital Medicine Daily Progress Note    Date of Service  3/11/2020    Chief Complaint  59 y.o. male admitted 2/14/2020 with abdominal pain    Hospital Course    History of type 2 diabetes, cognitive impairment who lives at a group home who was admitted for abdominal pain found to have sepsis secondary to ascending cholangitis.  He underwent ERCP with sphincterotomy to relieve obstruction.  No stent was placed due to significant edema.  Repeat ERCP was not performed due to acute respiratory failure with hypoxia which has now resolved.  He has improved with antibiotics and is almost ready to return to his group home however, blood sugars have been variable and they are not allowed to do sliding scale      Interval Problem Update  3/10: Has been hyperglycemic, today dropped to 59.  Lantus and mealtime insulin decreased.  Pain is controlled.  Appetite is improving but remains low.  3/11: Sugars are not at goal, back up to 385.  Insulin increased.  Alk phos and WBC rising, concern for recurrent obstructive process.  Repeat cxr ordered (previous effusion w loculation)    Consultants/Specialty  Dr. Lorenzo - MIKKI    Code Status  DNR/DNI    Disposition  Group home when sugars are controlled    Review of Systems  Review of Systems   Constitutional: Negative for chills, fever and malaise/fatigue.   HENT: Negative for sore throat.    Respiratory: Negative for cough, sputum production and shortness of breath.    Cardiovascular: Negative for chest pain and palpitations.   Gastrointestinal: Negative for abdominal pain (Resolved), blood in stool, diarrhea, heartburn, nausea and vomiting (Appetite improved).   Genitourinary: Negative for frequency and urgency.   Musculoskeletal: Negative for back pain and joint pain.   Skin: Negative for itching and rash.   Neurological: Negative for dizziness, focal weakness, weakness and headaches.   Psychiatric/Behavioral: Negative for depression and hallucinations.   All other systems reviewed and  are negative.       Physical Exam  Temp:  [36.7 °C (98 °F)-36.8 °C (98.2 °F)] 36.7 °C (98.1 °F)  Pulse:  [100-107] 101  Resp:  [18] 18  BP: (108-130)/(61-73) 108/61  SpO2:  [90 %-93 %] 91 %    Physical Exam  Constitutional:       Appearance: Normal appearance. He is underweight.   HENT:      Head: Normocephalic and atraumatic.      Nose: Nose normal.      Mouth/Throat:      Mouth: Mucous membranes are moist.   Eyes:      Extraocular Movements: Extraocular movements intact.      Pupils: Pupils are equal, round, and reactive to light.   Neck:      Musculoskeletal: Normal range of motion and neck supple.   Cardiovascular:      Rate and Rhythm: Normal rate and regular rhythm.      Heart sounds: No murmur.   Pulmonary:      Effort: Pulmonary effort is normal. No respiratory distress.      Breath sounds: Normal breath sounds. No stridor.   Abdominal:      General: Abdomen is flat. Bowel sounds are normal. There is no distension.      Palpations: Abdomen is soft.      Tenderness: There is no abdominal tenderness.   Musculoskeletal:         General: No swelling, tenderness or deformity.   Skin:     General: Skin is warm and dry.      Coloration: Skin is not jaundiced or pale.   Neurological:      General: No focal deficit present.      Mental Status: He is alert and oriented to person, place, and time. Mental status is at baseline.   Psychiatric:         Mood and Affect: Mood normal.         Behavior: Behavior normal.      Comments: Very pleasant, makes eye contact and answers questions appropriately         Fluids    Intake/Output Summary (Last 24 hours) at 3/11/2020 1325  Last data filed at 3/11/2020 1200  Gross per 24 hour   Intake --   Output 2000 ml   Net -2000 ml       Laboratory  Recent Labs     03/11/20  0948   WBC 19.0*   RBC 3.62*   HEMOGLOBIN 10.9*   HEMATOCRIT 32.7*   MCV 90.3   MCH 30.1   MCHC 33.3*   RDW 78.2*   PLATELETCT 486*   MPV 10.1     Recent Labs     03/11/20  0948   SODIUM 130*   POTASSIUM 4.1    CHLORIDE 94*   CO2 25   GLUCOSE 343*   BUN 14   CREATININE 0.67   CALCIUM 8.9                   Imaging  DX-CHEST-PORTABLE (1 VIEW)   Final Result         1. No significant interval change.      DX-CHEST-PORTABLE (1 VIEW)   Final Result      New subpulmonic effusions, bibasilar atelectasis and edema      DX-PORTABLE FLUOROSCOPY < 1 HOUR   Final Result      Portable fluoroscopy utilized for 1.1 minute.         INTERPRETING LOCATION: [address] [place] [address], [facility], [id number]      [id number] SPHINCTEROTOMY   Final Result      Intraoperative fluoroscopy spot images as described above.      CT-ABDOMEN WITH & W/O   Final Result      1.  No significant change since CT abdomen and pelvis obtained 2/16/2020      2.  Findings consistent with high-grade biliary obstruction with dilation of the common bile duct measuring 17 mm, dilated gallbladder, dilated pancreatic duct with change in caliber of the pancreatic duct at the level pancreatic head      3.  Findings could be either malignancy the pancreatic head versus scarring from chronic pancreatitis.      4.  Sequela of chronic pancreatitis      5.  Large right medial chest rim-enhancing loculated fluid collection probably within the pleural space measuring 10.5 x 3.1 centimeter transversely and 7 cm craniocaudal      6.  Rim-enhancing fluid collection in the hepatorenal space measuring 11.2 cm craniocaudal and 2-3 cm in thickness.      7.  The rim-enhancing fluid collection are suspicious for infection/abscess      8.  Bilateral atelectasis and small-moderate-sized pleural effusion after      9.  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  Biliary dilatation without evidence of high-grade biliary obstruction.   2.  Gallbladder visualization after morphine administration. No acute cholecystitis.      CT-ABDOMEN-PELVIS WITH   Final Result      1.  New rim enhancing  fluid collection tracks along the right heart margin, azygoesophageal recess and possibly communicates with Morison's pouch tracking along the right aspect of the inferior vena cava. This is nonspecific and could indicate abscess,    dissecting pseudocyst, mucinous cystic neoplasm related fluid      2.  New extra and intrahepatic biliary ductal dilatation with 6 cm dilatation of the gallbladder also seen. This indicates distal common bile duct/ampullary level obstruction. Stricture, mass effect from adjacent pancreas calcifications or small    ampullary mass are differential possibilities      3.  Decreased small pleural effusions. Some loculation on the right is possible      4.  Slightly decreased diffuse pancreatic ductal dilatation now measuring 11 mm. Evidence of chronic pancreatitis. New 3 cm cystic structure anterior to the pancreas could be a pseudocyst. Abscess or necrotic/cystic neoplasm are in the differential.      5.  Multiple chronic findings including status post aorto bifemoral bypass with approaching 70% stenosis of the left superficial femoral artery, nonobstructive right 3 mm nephrolithiasis, large volume rectal vault stool, dilated bladder which could    indicate outlet obstruction or neurogenic bladder, splenosis      [id number]-T/O   Final Result      1.  Severe dilatation of the intrahepatic biliary ducts, common bile duct and pancreatic duct. The distal common bile duct and the proximal pancreatic duct is not visualized at the pancreatic head. The pancreatic head appears to be diffusely enlarged and    demonstrates multiple small cysts. These findings are concerning for pancreatic neoplasm such as intraductal papillary mucinous cystoadenoma. The other differential diagnosis includes periampullary carcinoma. MR examination of the abdomen with contrast    is recommended for further evaluation.   2.  Mixed signal intensity in the right perinephric space likely represents perinephric hematoma.    3.  Large hiatal hernia.   4.  Diffuse gastric wall thickening.      US-RUQ   Final Result      1.  Hepatomegaly.   2.  Hepatic steatosis.   3.  Gallbladder sludge. Mild intrahepatic ductal dilation. Dilation of the common duct up to 13 mm. No definite distal obstructing etiology is identified. MRCP could be obtained for further evaluation if indicated.   4.  Trace ascites.   5.  Pancreatic calcifications likely sequela of chronic pancreatitis.      DX-CHEST-PORTABLE (1 VIEW)   Final Result      Cardiomegaly.      DX-CHEST-PORTABLE (1 VIEW)    (Results Pending)        Assessment/Plan  * Type 2 diabetes mellitus with hyperglycemia, with long-term current use of insulin (HCC)- (present on admission)  Assessment & Plan  Home dose is: Lantus 12 units at night and regular insulin 10 units 3 times daily.  Has had labile sugars due to variable PO intake  Group home does not want sliding scale.  Sugars are not at goal  Increase lantus and meal time insulin      Pancreatic mass  Assessment & Plan  MRI with concern for pancreatic malignancy  ERCP with no evidence of malignancy, only calcifications of pancreas present   is elevated but less than 1000 so cannot rule in cancer, likely due chronic pancreatitis  Repeat CT showed chronic pancreatitis and loculated chest effusion    At this moment GI does not believe patient has significant evidence for periodic cancer.  Recommend outpatient follow-up with GI.  Family agreed    Cognitive decline- (present on admission)  Assessment & Plan  Patient likely has a chronic component to his cognitive decline as well as acute worsening  Mental status now at his baseline, patient has a legal guardian (sister)  He will return back to his group home      Hypomagnesemia  Assessment & Plan  Improved  Repeat in AM    Sepsis (HCC)  Assessment & Plan  This was Sepsis Present on admission, improved but WBC rising again today  Source: Ascending cholangitis  Currently, he has finished  antibiotics  If WBC higher in AM, may need repeat eval by GI for possible stent (was unable to be placed before)          Acute respiratory failure with hypoxia (HCC)  Assessment & Plan  Due to loculated effusion  Completed abx  Repeat cxr due to rising wbc  Currently resolved      Cholangitis  Assessment & Plan  Biliary obstruction suggested on imaging, GI consulted and they performed an ERCP on 2/24.  Purulent drainage was noticed however swelling was too significant to place ampullary duct stent.  He improved without stent placement therefore this was deferred  Endoscopy done showing pancreatic calcifications but no stones, no area to biopsy  Alk phos and white count are rising again    Recurrent major depressive disorder, in full remission (HCC)  Assessment & Plan  .    Severe protein-calorie malnutrition (HCC)- (present on admission)  Assessment & Plan  Encourage p.o. intake  No pancreatic mass identified    Hyponatremia  Assessment & Plan  Due to hyperglycemia and +/- infection?    Chronic pancreatitis (HCC)- (present on admission)  Assessment & Plan  Continue pancreatic enzymes with meals  Celiac nerve block for pain control, per GI  He is tolerating a diet    Dyslipidemia- (present on admission)  Assessment & Plan  Continue statin       VTE prophylaxis: SCDs    Total time:  40 minutes.  I spent greater than 50% of the time for patient care, counseling, and coordination on this date, including unit/floor time, and face-to-face time with the patient as per interval events and assessment and plan above

## 2020-03-11 NOTE — DISCHARGE PLANNING
Anticipated Discharge Disposition: TBD    Action: LSW spoke with Rommel from [facility] and was informed that [facility] has decided to go with a different skilled nursing facility due to an unknown reason. Pt is not skilled appropriate and was only accepted into [facility].     Barriers to Discharge: D/C planning due to indecision from family, guardians, and KINGS.     Plan: LSW will continue to assess pt for discharge needs.

## 2020-03-11 NOTE — DISCHARGE PLANNING
Anticipated Discharge Disposition: TBD    Action: LSW spoke with pt's sister, Uriel. Uriel upset that she does not understand what is going on medically with the pt and wants to know why he is still in the hospital. LSW informed beside RN that pt's sister would like a medical update on pt's status in regards to blood sugar and food intake.     Barriers to Discharge: D/C planning and agreement from family, long term, and SNF needs/.    Plan: LSW will continue to assess pt for discharge needs.

## 2020-03-11 NOTE — DISCHARGE PLANNING
"Anticipated Discharge Disposition: TBD    Action: LSW received a call from Zaynab from [facility] and was informed that she \"blew up\" at [facility] from [facility] and would not be accepting his services for SNF. LSW explained that [facility] was the only accepting facility due to the fact that pt does not have any skilled needs. This information upset Zaynab. Zaynab stated she would like to speak with the MD and social .     Barriers to Discharge: D/C planning with Family, guardians, and KINGS.    Plan: LSW will continue to assess pt for discharge needs.     "

## 2020-03-11 NOTE — CARE PLAN
Problem: Knowledge Deficit  Goal: Knowledge of disease process/condition, treatment plan, diagnostic tests, and medications will improve  Note: POC discussed. Pt understands the plan is to control sugars before able to DC. All questions and concerns addressed.      Problem: Mobility  Goal: Risk for activity intolerance will decrease  Note: Pt encouraged to ambulate frequently today.

## 2020-03-11 NOTE — PROGRESS NOTES
Pt is AAO x4.  Denies pain or discomfort at this time.  VS WNL.  PIV patent, saline locked.   Pt resting comfortably at this time.   POC discussed.  All needs met at this time.  Bed in low position.  Call light within reach.  Rounding in place.

## 2020-03-12 ENCOUNTER — APPOINTMENT (OUTPATIENT)
Dept: RADIOLOGY | Facility: MEDICAL CENTER | Age: 60
DRG: 871 | End: 2020-03-12
Attending: INTERNAL MEDICINE
Payer: COMMERCIAL

## 2020-03-12 LAB
ALBUMIN SERPL BCP-MCNC: 2.3 G/DL (ref 3.2–4.9)
ALBUMIN/GLOB SERPL: 0.6 G/DL
ALP SERPL-CCNC: 1173 U/L (ref 30–99)
ALT SERPL-CCNC: 30 U/L (ref 2–50)
ANION GAP SERPL CALC-SCNC: 11 MMOL/L (ref 7–16)
ANISOCYTOSIS BLD QL SMEAR: ABNORMAL
AST SERPL-CCNC: 75 U/L (ref 12–45)
BASOPHILS # BLD AUTO: 0.8 % (ref 0–1.8)
BASOPHILS # BLD: 0.16 K/UL (ref 0–0.12)
BILIRUB SERPL-MCNC: 1.2 MG/DL (ref 0.1–1.5)
BUN SERPL-MCNC: 13 MG/DL (ref 8–22)
CALCIUM SERPL-MCNC: 8.8 MG/DL (ref 8.4–10.2)
CHLORIDE SERPL-SCNC: 95 MMOL/L (ref 96–112)
CO2 SERPL-SCNC: 24 MMOL/L (ref 20–33)
COMMENT 1642: NORMAL
CREAT SERPL-MCNC: 0.46 MG/DL (ref 0.5–1.4)
EOSINOPHIL # BLD AUTO: 0.09 K/UL (ref 0–0.51)
EOSINOPHIL NFR BLD: 0.4 % (ref 0–6.9)
ERYTHROCYTE [DISTWIDTH] IN BLOOD BY AUTOMATED COUNT: 75.1 FL (ref 35.9–50)
GLOBULIN SER CALC-MCNC: 3.7 G/DL (ref 1.9–3.5)
GLUCOSE BLD-MCNC: 157 MG/DL (ref 65–99)
GLUCOSE BLD-MCNC: 191 MG/DL (ref 65–99)
GLUCOSE BLD-MCNC: 337 MG/DL (ref 65–99)
GLUCOSE BLD-MCNC: 370 MG/DL (ref 65–99)
GLUCOSE SERPL-MCNC: 223 MG/DL (ref 65–99)
HCT VFR BLD AUTO: 31 % (ref 42–52)
HGB BLD-MCNC: 10.6 G/DL (ref 14–18)
IMM GRANULOCYTES # BLD AUTO: 0.35 K/UL (ref 0–0.11)
IMM GRANULOCYTES NFR BLD AUTO: 1.7 % (ref 0–0.9)
LYMPHOCYTES # BLD AUTO: 3.43 K/UL (ref 1–4.8)
LYMPHOCYTES NFR BLD: 16.7 % (ref 22–41)
MACROCYTES BLD QL SMEAR: ABNORMAL
MAGNESIUM SERPL-MCNC: 1.4 MG/DL (ref 1.5–2.5)
MCH RBC QN AUTO: 30.4 PG (ref 27–33)
MCHC RBC AUTO-ENTMCNC: 34.2 G/DL (ref 33.7–35.3)
MCV RBC AUTO: 88.8 FL (ref 81.4–97.8)
MONOCYTES # BLD AUTO: 2.17 K/UL (ref 0–0.85)
MONOCYTES NFR BLD AUTO: 10.6 % (ref 0–13.4)
NEUTROPHILS # BLD AUTO: 14.3 K/UL (ref 1.82–7.42)
NEUTROPHILS NFR BLD: 69.8 % (ref 44–72)
NRBC # BLD AUTO: 0 K/UL
NRBC BLD-RTO: 0 /100 WBC
PHOSPHATE SERPL-MCNC: 2.2 MG/DL (ref 2.5–4.5)
PLATELET # BLD AUTO: 511 K/UL (ref 164–446)
PLATELET BLD QL SMEAR: NORMAL
PMV BLD AUTO: 10.7 FL (ref 9–12.9)
POIKILOCYTOSIS BLD QL SMEAR: NORMAL
POLYCHROMASIA BLD QL SMEAR: NORMAL
POTASSIUM SERPL-SCNC: 3.7 MMOL/L (ref 3.6–5.5)
PROT SERPL-MCNC: 6 G/DL (ref 6–8.2)
RBC # BLD AUTO: 3.49 M/UL (ref 4.7–6.1)
RBC BLD AUTO: PRESENT
SODIUM SERPL-SCNC: 130 MMOL/L (ref 135–145)
TARGETS BLD QL SMEAR: NORMAL
WBC # BLD AUTO: 20.5 K/UL (ref 4.8–10.8)

## 2020-03-12 PROCEDURE — 99233 SBSQ HOSP IP/OBS HIGH 50: CPT | Performed by: INTERNAL MEDICINE

## 2020-03-12 PROCEDURE — 700102 HCHG RX REV CODE 250 W/ 637 OVERRIDE(OP): Performed by: INTERNAL MEDICINE

## 2020-03-12 PROCEDURE — A9270 NON-COVERED ITEM OR SERVICE: HCPCS | Performed by: INTERNAL MEDICINE

## 2020-03-12 PROCEDURE — 80053 COMPREHEN METABOLIC PANEL: CPT

## 2020-03-12 PROCEDURE — 36415 COLL VENOUS BLD VENIPUNCTURE: CPT

## 2020-03-12 PROCEDURE — 700111 HCHG RX REV CODE 636 W/ 250 OVERRIDE (IP): Performed by: HOSPITALIST

## 2020-03-12 PROCEDURE — 74181 MRI ABDOMEN W/O CONTRAST: CPT

## 2020-03-12 PROCEDURE — 84100 ASSAY OF PHOSPHORUS: CPT

## 2020-03-12 PROCEDURE — 85025 COMPLETE CBC W/AUTO DIFF WBC: CPT

## 2020-03-12 PROCEDURE — 82962 GLUCOSE BLOOD TEST: CPT | Mod: 91

## 2020-03-12 PROCEDURE — 700111 HCHG RX REV CODE 636 W/ 250 OVERRIDE (IP): Performed by: INTERNAL MEDICINE

## 2020-03-12 PROCEDURE — 83735 ASSAY OF MAGNESIUM: CPT

## 2020-03-12 PROCEDURE — 770006 HCHG ROOM/CARE - MED/SURG/GYN SEMI*

## 2020-03-12 RX ORDER — MAGNESIUM SULFATE HEPTAHYDRATE 40 MG/ML
2 INJECTION, SOLUTION INTRAVENOUS ONCE
Status: COMPLETED | OUTPATIENT
Start: 2020-03-12 | End: 2020-03-12

## 2020-03-12 RX ADMIN — MAGNESIUM SULFATE 2 G: 2 INJECTION INTRAVENOUS at 08:38

## 2020-03-12 RX ADMIN — PANCRELIPASE 48000 UNITS: 24000; 76000; 120000 CAPSULE, DELAYED RELEASE PELLETS ORAL at 05:38

## 2020-03-12 RX ADMIN — INSULIN HUMAN 10 UNITS: 100 INJECTION, SOLUTION PARENTERAL at 11:51

## 2020-03-12 RX ADMIN — CYCLOBENZAPRINE HYDROCHLORIDE 10 MG: 10 TABLET, FILM COATED ORAL at 17:25

## 2020-03-12 RX ADMIN — ATORVASTATIN CALCIUM 40 MG: 40 TABLET, FILM COATED ORAL at 17:26

## 2020-03-12 RX ADMIN — OMEPRAZOLE 20 MG: 20 CAPSULE, DELAYED RELEASE ORAL at 17:26

## 2020-03-12 RX ADMIN — OMEPRAZOLE 20 MG: 20 CAPSULE, DELAYED RELEASE ORAL at 05:38

## 2020-03-12 RX ADMIN — Medication 100 MG: at 05:38

## 2020-03-12 RX ADMIN — HYDROMORPHONE HYDROCHLORIDE 0.5 MG: 1 INJECTION, SOLUTION INTRAMUSCULAR; INTRAVENOUS; SUBCUTANEOUS at 17:25

## 2020-03-12 RX ADMIN — INSULIN HUMAN 10 UNITS: 100 INJECTION, SOLUTION PARENTERAL at 08:57

## 2020-03-12 RX ADMIN — ACETAMINOPHEN 650 MG: 325 TABLET, FILM COATED ORAL at 17:25

## 2020-03-12 RX ADMIN — ACETAMINOPHEN 650 MG: 325 TABLET, FILM COATED ORAL at 05:42

## 2020-03-12 RX ADMIN — HYDROMORPHONE HYDROCHLORIDE 0.5 MG: 1 INJECTION, SOLUTION INTRAMUSCULAR; INTRAVENOUS; SUBCUTANEOUS at 08:44

## 2020-03-12 RX ADMIN — INSULIN HUMAN 10 UNITS: 100 INJECTION, SOLUTION PARENTERAL at 17:41

## 2020-03-12 RX ADMIN — CYCLOBENZAPRINE HYDROCHLORIDE 10 MG: 10 TABLET, FILM COATED ORAL at 05:42

## 2020-03-12 RX ADMIN — FUROSEMIDE 40 MG: 40 TABLET ORAL at 05:37

## 2020-03-12 RX ADMIN — TAMSULOSIN HYDROCHLORIDE 0.4 MG: 0.4 CAPSULE ORAL at 05:38

## 2020-03-12 RX ADMIN — INSULIN GLARGINE 12 UNITS: 100 INJECTION, SOLUTION SUBCUTANEOUS at 17:42

## 2020-03-12 ASSESSMENT — ENCOUNTER SYMPTOMS
DEPRESSION: 0
HALLUCINATIONS: 0
SORE THROAT: 0
FOCAL WEAKNESS: 0
COUGH: 0
NAUSEA: 0
DIARRHEA: 0
PALPITATIONS: 0
WHEEZING: 0
CHILLS: 0
WEAKNESS: 0
BACK PAIN: 0
HEARTBURN: 0
CONSTIPATION: 0
SHORTNESS OF BREATH: 0
WEIGHT LOSS: 1
BLOOD IN STOOL: 0
FEVER: 0
ABDOMINAL PAIN: 0
SPUTUM PRODUCTION: 0
HEADACHES: 0
DIZZINESS: 0
VOMITING: 0

## 2020-03-12 NOTE — PROGRESS NOTES
"  Gastroenterology Progress Note     Author: Yao Suh MD Date & Time Created: 3/12/2020 3:27 PM    Chief Complaint:  Elevated liver tests, abnormal MRI    Interval History:  Initial history (Dr. Villarreal):  59 y.o. male with IDDM, chronic pancreatitis seizure disorder, iron deficiency anemia who presented 2/14/2020 with lethargy and poor appetite per assisted living facility.  He is difficult historian as does not want to answer questions.  He states \"I feel fine and always have.\"  He was evaluated 12/2019 for iron deficiency anemia and DKA.  Had EGD with severe erosive esophagitis.  He refused to drink prep for colonoscopy.  He apparently was brought by assisted living facility after they noted some lethargy and poor appetite.  He has weight loss of 7 pounds over the past month or so.  He denies abdominal pain and states he never had abdominal pain and his appetite is great.  Denies fevers, chills, nausea, vomiting, diarrhea, constipation, melena, hematochezia.  In ED, he had significant leukocytosis with newly elevated alkaline phosphatase and bilirubin with fairly normal transaminases.  He was started on IVF and antibiotics.  He had RUQ US followed by MRCP showing dilated bile and pancreatic ducts, distended GB, chronic pancreatitis and possible abnormal lesions head of pancreas.    2/16/2020: Continues to deny any symptoms including abdominal pain, nausea, vomiting, fevers.  Was refusing IV placement and vitals overnight.  Says he is agreeable to IV placement today  02/17/2020 - No complaints.  Says he feels fine, but does report urine is darker.  I explained imaging findings at length.  02/18/2020 - EUS showed extensive, severe pancreatic calcifications making identification of any pancreatic mass or fluid collection impossible.  Mediastinum was not assessed.  ERCP not done due to normalizing liver enzymes and added risk to patient.  02/19/2020 - No events overnight.  Patient denies fevers/chills/sweats " "and states he \"feels fine\".  Labs show worsening leukocytosis with improving liver enzymes.  02/20/2020 - Tolerating regular diet well.  Denies any abdominal pain.  He does report chronic mid-back pain, but this is not severe or worsening.  No nausea, vomiting, fevers, chills or sweats.  Leukocytosis slightly better.  Alkaline phosphatase still elevated, but remainder of liver enzymes normal.  02/21/2020 - No abdominal pain.  Tolerating diet.  Mild decrease appetite.  No CBC today.  Eager to be discharged.  02/22/2020 - Continues to \"feel fine\".  No abdominal pain, nausea or vomiting.  Tolerating diet.  Leukocytosis improved, but alkaline phosphatase abruptly higher.  CT not done due to IV issues and patient's difficult access.  2/23/20 - No new complaints. He ha lovenox s/c this AM at 6.30. ERCP scheduled for 9 AM. Labs pending   2/25/2020: Denies any complaints.  No pain.  Had ERCP yesterday showing pus from bile duct and distal bile duct stricture likely due to chronic pancreatitis.  Unable to stent  Due to loss of access and amount of ampullary inflammation.  WBC better.  Alk phos higher.  No fevers.  2/27/2020: ERCP not performed yesterday due to oxygen saturation.  Remains on 4L oxygen with large bilateral pleural effusions.  Denies dyspnea or abdominal pain.  WBc trending down.  No fevers.  2/29/2020: No changes.  Still on 4L but denies dyspnea.  WBC down to 10.  No other complaints.  Diuresing well.  3/2/2020: Patient examined and interviewed, course reviewed, discussed with Dr Villarreal.  Course discussed with nursing. No new labs yet for today.  Still requiring 4 L oxygen per NC.  He is resting well, without complaint.    3/3/20: Patient examined and interviewed, course reviewed. Discussed with RN and with Dr Lorenzo, therapeutic endoscopist.  No morning labs available yet (patient  Refused earlier but agrees to labs now).  Pt denies abd pain, fevers, chills.  Some nausea with emesis last hs.  No black or " tarry stools, rectal bleeding, difficulty swallowing.  His oxygen saturaiton is low 90s on one liter of oxygen now, improved.      3/12/2020 Still elevated Alk-P, with leukocytosis. No RUQ pain. No fever. MRI done, pending result    Review of Systems:  Review of Systems   Constitutional: Positive for weight loss. Negative for chills, fever and malaise/fatigue.   Respiratory: Negative for cough, shortness of breath and wheezing.    Cardiovascular: Negative for chest pain, palpitations and leg swelling.   Gastrointestinal: Negative for abdominal pain, blood in stool, constipation, diarrhea, heartburn, melena, nausea and vomiting.   Genitourinary: Negative for dysuria and urgency.   Skin: Negative for itching and rash.   Neurological: Negative for dizziness and headaches.       Physical Exam:  Physical Exam  Vitals signs and nursing note reviewed.   Constitutional:       Appearance: Normal appearance. He is normal weight. He is not ill-appearing or diaphoretic.   HENT:      Head: Normocephalic and atraumatic.      Nose: Nose normal.   Eyes:      General: No scleral icterus.        Right eye: No discharge.         Left eye: No discharge.      Pupils: Pupils are equal, round, and reactive to light.   Cardiovascular:      Rate and Rhythm: Normal rate and regular rhythm.      Heart sounds: Normal heart sounds. No murmur.   Pulmonary:      Effort: Pulmonary effort is normal. No respiratory distress.      Breath sounds: No stridor. Decreased breath sounds present. No wheezing, rhonchi or rales.   Abdominal:      General: Abdomen is flat. Bowel sounds are normal. There is no distension.      Palpations: Abdomen is soft. There is no mass.      Tenderness: There is no abdominal tenderness. There is no guarding or rebound.      Hernia: No hernia is present.   Musculoskeletal:         General: No swelling or tenderness.   Skin:     General: Skin is warm and dry.      Coloration: Skin is not jaundiced.      Findings: No bruising  or rash.   Neurological:      General: No focal deficit present.      Mental Status: He is alert and oriented to person, place, and time.   Psychiatric:         Mood and Affect: Mood normal.         Labs:          Recent Labs     20  0948 20  0506   SODIUM 130* 130*   POTASSIUM 4.1 3.7   CHLORIDE 94* 95*   CO2 25 24   BUN 14 13   CREATININE 0.67 0.46*   MAGNESIUM 1.5 1.4*   PHOSPHORUS  --  2.2*   CALCIUM 8.9 8.8     Recent Labs     20  0948 20  0506   ALTSGPT 27 30   ASTSGOT 75* 75*   ALKPHOSPHAT 1118* 1173*   TBILIRUBIN 1.2 1.2   GLUCOSE 343* 223*     Recent Labs     20  0948 20  0506   RBC 3.62* 3.49*   HEMOGLOBIN 10.9* 10.6*   HEMATOCRIT 32.7* 31.0*   PLATELETCT 486* 511*     Recent Labs     20  0948 20  0506   WBC 19.0* 20.5*   NEUTSPOLYS 72.20* 69.80   LYMPHOCYTES 12.10* 16.70*   MONOCYTES 12.90 10.60   EOSINOPHILS 0.50 0.40   BASOPHILS 0.70 0.80   ASTSGOT 75* 75*   ALTSGPT 27 30   ALKPHOSPHAT 1118* 1173*   TBILIRUBIN 1.2 1.2     Hemodynamics:  Temp (24hrs), Av.8 °C (98.3 °F), Min:36.4 °C (97.5 °F), Max:37.2 °C (98.9 °F)  Temperature: 36.4 °C (97.5 °F)  Pulse  Av.1  Min: 70  Max: 122   Blood Pressure: 127/74     Respiratory:    Respiration: 18, Pulse Oximetry: 93 %        RUL Breath Sounds: Clear, RML Breath Sounds: Clear, RLL Breath Sounds: Clear, LEONARD Breath Sounds: Clear, LLL Breath Sounds: Clear  Fluids:    Intake/Output Summary (Last 24 hours) at 2020 0706  Last data filed at 2/15/2020 2200  Gross per 24 hour   Intake 2485 ml   Output 600 ml   Net 1885 ml        GI/Nutrition:  Orders Placed This Encounter   Procedures   • Diet Order Consistent Carbohydrate     Standing Status:   Standing     Number of Occurrences:   1     Order Specific Question:   Diet:     Answer:   Consistent Carbohydrate [4]     Medical Decision Making, by Problem:  Active Hospital Problems    Diagnosis   • *Sepsis (HCC) [A41.9]   • Elevated alkaline phosphatase level [R74.8]    • Type 2 diabetes mellitus with hyperglycemia, with long-term current use of insulin (HCC) [E11.65, Z79.4]   • Cognitive decline [R41.89]   • Metabolic acidosis [E87.2]   • Hypokalemia [E87.6]   • Hyponatremia [E87.1]   • Chronic pancreatitis (HCC) [K86.1]   • Dyslipidemia [E78.5]   • Cholangitis [K83.09]     Impression  / Plan  60 y/o with chronic pancreatitis, IDDM, iron deficiency anemia, GERD with esophagitis that presented for lethargy and found to have significant leukocytosis and newly elevated liver tests.  Concern for sepsis from biliary source.  Imaging suggests dilation of biliary and pancreatic ducts but no obvious stones although could have ampullary lesion of main duct IPMN causing obstruction.      - Elevated liver tests- likely due to combination of choledocholithiasis (status post extraction) and biliary stricture in setting of chronic pancreatitis. Waiting form MRI/MRCP. May not need repeat ERCP at this time, per discussions with therapeutic endoscopist.    - Sepsis, cholangitis? - Mediastinum was not assessed at EUS due to focus being on pancreas and biliary tree.  Better now.  - Leukocytosis   - Abnormal imaging of abdomen with dilated bile ducts and pancreatic duct and questionable mass head of pancreas - Only severe chronic calcific pancreatitis identified on EUS  - Chronic pancreatitis  - IDDM  - Iron deficiency anemia  - GERD with esophagitis  - Hypertension  - Seizure disorder  - Aggressive behavior to healthcare workers  - Back pain  - Pancreatic fluid collection tracking adjacent to distal esophagus on imaging, ?pseudocyst versus infected fluid from prolonged biliary obstruction     PLAN:   - Pending MRCP result  - May need to reattempt at ERCP with biliary stenting     Quality-Core Measures   Reviewed items::  Radiology images reviewed, Labs reviewed and Medications reviewed

## 2020-03-12 NOTE — CARE PLAN
Problem: Communication  Goal: The ability to communicate needs accurately and effectively will improve  Outcome: PROGRESSING AS EXPECTED   Patient A&Ox4 and uses the call light appropriately when needs arise. Plan of care reviewed with the patient in regards to watching blood sugar trends and in reviewing labs in the AM. Patient verbalized understanding.     Problem: Venous Thromboembolism (VTW)/Deep Vein Thrombosis (DVT) Prevention:  Goal: Patient will participate in Venous Thrombosis (VTE)/Deep Vein Thrombosis (DVT)Prevention Measures  Intervention: Ensure patient wears graduated elastic stockings (ANITA hose) and/or SCDs, if ordered, when in bed or chair (Remove at least once per shift for skin check)  Flowsheets (Taken 3/12/2020 0042)  SCDs, Sequential Compression Device: Off     Problem: Pain Management  Goal: Pain level will decrease to patient's comfort goal  Outcome: PROGRESSING SLOWER THAN EXPECTED   Patient reporting no pain at this time. Will continue to promote comfort at this time and will promote a quiet environment conducive for rest. Patient fatigued.

## 2020-03-12 NOTE — PROGRESS NOTES
Received report from KELLEY Thacker. Assumed care of patient. Patient is currently laying in bed and is comfortable at this time. Patient heart rate mildly elevated but all other VSS at this time. A&Ox4. Last blood sugar 452 this evening. Blood sugar to be taken before bed as a precaution. IV patent and saline locked at this time. Patient standby assist. Plan of care reviewed with patient. Patient verbalized understanding. Hourly rounding in place. Bed locked and in lowest position with call light within reach.

## 2020-03-12 NOTE — PROGRESS NOTES
Hospital Medicine Daily Progress Note    Date of Service  3/12/2020    Chief Complaint  59 y.o. male admitted 2/14/2020 with abdominal pain    Hospital Course    History of type 2 diabetes, cognitive impairment who lives at a group home who was admitted for abdominal pain found to have sepsis secondary to ascending cholangitis.  He underwent ERCP with sphincterotomy to relieve obstruction.  No stent was placed due to significant edema.  Repeat ERCP was not performed due to acute respiratory failure with hypoxia which has now resolved.  He has improved with antibiotics and is almost ready to return to his group home however, blood sugars have been variable and they are not allowed to do sliding scale      Interval Problem Update  3/10: Has been hyperglycemic, today dropped to 59.  Lantus and mealtime insulin decreased.  Pain is controlled.  Appetite is improving but remains low.  3/11: Sugars are not at goal, back up to 385.  Insulin increased.  Alk phos and WBC rising, concern for recurrent obstructive process.  Repeat cxr ordered (previous effusion w loculation)  3/12: Sugars improved, not at goal.  WBC and alk phos still rising.  I discussed with GI, repeat MRCP.    Consultants/Specialty  Dr. Lorenzo - [facility]    Code Status  DNR/DNI    Disposition  Group home when sugars are controlled and medically stable    Review of Systems  Review of Systems   Constitutional: Negative for chills, fever and malaise/fatigue.   HENT: Negative for sore throat.    Respiratory: Negative for cough, sputum production and shortness of breath.    Cardiovascular: Negative for chest pain and palpitations.   Gastrointestinal: Negative for abdominal pain (Resolved), blood in stool, diarrhea, heartburn, nausea and vomiting (Appetite improved).   Genitourinary: Negative for frequency and urgency.   Musculoskeletal: Negative for back pain and joint pain.   Skin: Negative for itching and rash.   Neurological: Negative for dizziness, focal weakness,  weakness and headaches.   Psychiatric/Behavioral: Negative for depression and hallucinations.   All other systems reviewed and are negative.       Physical Exam  Temp:  [36.4 °C (97.5 °F)-37.2 °C (98.9 °F)] 36.4 °C (97.5 °F)  Pulse:  [] 95  Resp:  [18] 18  BP: (125-132)/(64-74) 127/74  SpO2:  [93 %] 93 %    Physical Exam  Constitutional:       Appearance: Normal appearance. He is underweight.      Comments: Thin   HENT:      Head: Normocephalic and atraumatic.      Nose: Nose normal.      Mouth/Throat:      Mouth: Mucous membranes are moist.   Eyes:      Extraocular Movements: Extraocular movements intact.      Pupils: Pupils are equal, round, and reactive to light.   Neck:      Musculoskeletal: Normal range of motion and neck supple.   Cardiovascular:      Rate and Rhythm: Normal rate and regular rhythm.      Heart sounds: No murmur.   Pulmonary:      Effort: Pulmonary effort is normal. No respiratory distress.      Breath sounds: Normal breath sounds. No stridor.   Abdominal:      General: Abdomen is flat. Bowel sounds are normal. There is no distension.      Palpations: Abdomen is soft. There is no mass.      Tenderness: There is no abdominal tenderness. There is no guarding.   Musculoskeletal:         General: No swelling, tenderness or deformity.   Skin:     General: Skin is warm and dry.      Coloration: Skin is not jaundiced or pale.   Neurological:      General: No focal deficit present.      Mental Status: He is alert and oriented to person, place, and time. Mental status is at baseline.   Psychiatric:         Mood and Affect: Mood normal.         Behavior: Behavior normal.         Fluids    Intake/Output Summary (Last 24 hours) at 3/12/2020 1254  Last data filed at 3/12/2020 0904  Gross per 24 hour   Intake 480 ml   Output 2400 ml   Net -1920 ml       Laboratory  Recent Labs     03/11/20  0948 03/12/20  0506   WBC 19.0* 20.5*   RBC 3.62* 3.49*   HEMOGLOBIN 10.9* 10.6*   HEMATOCRIT 32.7* 31.0*   MCV  90.3 88.8   MCH 30.1 30.4   MCHC 33.3* 34.2   RDW 78.2* 75.1*   PLATELETCT 486* 511*   MPV 10.1 10.7     Recent Labs     03/11/20  0948 03/12/20  0506   SODIUM 130* 130*   POTASSIUM 4.1 3.7   CHLORIDE 94* 95*   CO2 25 24   GLUCOSE 343* 223*   BUN 14 13   CREATININE 0.67 0.46*   CALCIUM 8.9 8.8                   Imaging  DX-CHEST-PORTABLE (1 VIEW)   Final Result      1.  Cardiomegaly with bilateral interstitial opacities.      2.  Small bilateral pleural effusions.         DX-CHEST-PORTABLE (1 VIEW)   Final Result         1. No significant interval change.      DX-CHEST-PORTABLE (1 VIEW)   Final Result      New subpulmonic effusions, bibasilar atelectasis and edema      DX-PORTABLE FLUOROSCOPY < 1 HOUR   Final Result      Portable fluoroscopy utilized for 1.1 minute.         INTERPRETING LOCATION: [address] [place] [address], [facility]      [id number] SPHINCTEROTOMY   Final Result      Intraoperative fluoroscopy spot images as described above.      CT-ABDOMEN WITH & W/O   Final Result      1.  No significant change since CT abdomen and pelvis obtained 2/16/2020      2.  Findings consistent with high-grade biliary obstruction with dilation of the common bile duct measuring 17 mm, dilated gallbladder, dilated pancreatic duct with change in caliber of the pancreatic duct at the level pancreatic head      3.  Findings could be either malignancy the pancreatic head versus scarring from chronic pancreatitis.      4.  Sequela of chronic pancreatitis      5.  Large right medial chest rim-enhancing loculated fluid collection probably within the pleural space measuring 10.5 x 3.1 centimeter transversely and 7 cm craniocaudal      6.  Rim-enhancing fluid collection in the hepatorenal space measuring 11.2 cm craniocaudal and 2-3 cm in thickness.      7.  The rim-enhancing fluid collection are suspicious for infection/abscess      8.  Bilateral atelectasis and small-moderate-sized pleural effusion after      9.  Probable aortic  bifemoral graft present as the native aorta and both common iliac artery appear occluded            NM-HEPATOBILIARY SCAN   Final Result      1.  Biliary dilatation without evidence of high-grade biliary obstruction.   2.  Gallbladder visualization after morphine administration. No acute cholecystitis.      CT-ABDOMEN-PELVIS WITH   Final Result      1.  New rim enhancing fluid collection tracks along the right heart margin, azygoesophageal recess and possibly communicates with Morison's pouch tracking along the right aspect of the inferior vena cava. This is nonspecific and could indicate abscess,    dissecting pseudocyst, mucinous cystic neoplasm related fluid      2.  New extra and intrahepatic biliary ductal dilatation with 6 cm dilatation of the gallbladder also seen. This indicates distal common bile duct/ampullary level obstruction. Stricture, mass effect from adjacent pancreas calcifications or small    ampullary mass are differential possibilities      3.  Decreased small pleural effusions. Some loculation on the right is possible      4.  Slightly decreased diffuse pancreatic ductal dilatation now measuring 11 mm. Evidence of chronic pancreatitis. New 3 cm cystic structure anterior to the pancreas could be a pseudocyst. Abscess or necrotic/cystic neoplasm are in the differential.      5.  Multiple chronic findings including status post aorto bifemoral bypass with approaching 70% stenosis of the left superficial femoral artery, nonobstructive right 3 mm nephrolithiasis, large volume rectal vault stool, dilated bladder which could    indicate outlet obstruction or neurogenic bladder, splenosis      [id number]-D/O   Final Result      1.  Severe dilatation of the intrahepatic biliary ducts, common bile duct and pancreatic duct. The distal common bile duct and the proximal pancreatic duct is not visualized at the pancreatic head. The pancreatic head appears to be diffusely enlarged and    demonstrates multiple  small cysts. These findings are concerning for pancreatic neoplasm such as intraductal papillary mucinous cystoadenoma. The other differential diagnosis includes periampullary carcinoma. MR examination of the abdomen with contrast    is recommended for further evaluation.   2.  Mixed signal intensity in the right perinephric space likely represents perinephric hematoma.   3.  Large hiatal hernia.   4.  Diffuse gastric wall thickening.      US-RUQ   Final Result      1.  Hepatomegaly.   2.  Hepatic steatosis.   3.  Gallbladder sludge. Mild intrahepatic ductal dilation. Dilation of the common duct up to 13 mm. No definite distal obstructing etiology is identified. MRCP could be obtained for further evaluation if indicated.   4.  Trace ascites.   5.  Pancreatic calcifications likely sequela of chronic pancreatitis.      DX-CHEST-PORTABLE (1 VIEW)   Final Result      Cardiomegaly.      [id number]/O    (Results Pending)        Assessment/Plan  * Type 2 diabetes mellitus with hyperglycemia, with long-term current use of insulin (HCC)- (present on admission)  Assessment & Plan  Home dose is: Lantus 12 units at night and regular insulin 10 units 3 times daily.  Has had labile sugars due to variable PO intake requiring insulin adjustments and infection  Group home does not want sliding scale.  Sugars are not at goal but improved today  Lantus 12, humulin 10 tid      Pancreatic mass  Assessment & Plan  MRI with concern for pancreatic malignancy  ERCP with no evidence of malignancy, only calcifications of pancreas present   is elevated but less than 1000 so cannot rule in cancer, likely due chronic pancreatitis  Repeat CT showed chronic pancreatitis and loculated chest effusion    At this moment GI does not believe patient has significant evidence for periodic cancer  Due to rising WBC and alk phos, repeat MRCP today  I discussed with GI and they will re-eval the patient    Normocytic anemia- (present on  admission)  Assessment & Plan  Stable, no e/o blood loss    Cognitive decline- (present on admission)  Assessment & Plan  Patient likely has a chronic component to his cognitive decline as well as acute worsening  Mental status now at his baseline, patient has a legal guardian (sister)  He will return back to his group home      Hypomagnesemia  Assessment & Plan  Order 2g IV  Repeat in AM    Sepsis ([facility])  Assessment & Plan  This was Sepsis Present on admission, improved but WBC rising again.  Source: Ascending cholangitis  Abdomen is still soft, tolerating diet  Currently, he has finished antibiotics  I discussed with GI  Repeat MRCP          Acute respiratory failure with hypoxia ([facility])  Assessment & Plan  Due to loculated effusion  Completed abx  Currently resolved      Cholangitis  Assessment & Plan  Biliary obstruction suggested on imaging, GI consulted and they performed an ERCP on 2/24 with sphincterotomy  Swelling was too significant to place ampullary duct stent.  He improved without stent placement therefore this was deferred, but Alk P and WBC are rising again  Endoscopy done showing pancreatic calcifications but no stones, no area to biopsy  I discussed with GI, repeat MRCP  Possible repeat ERCP/stent based on those findings    Recurrent major depressive disorder, in full remission ([facility])  Assessment & Plan  .    Severe protein-calorie malnutrition (HCC)- (present on admission)  Assessment & Plan  Encourage p.o. intake  No pancreatic mass identified    Hyponatremia  Assessment & Plan  Due to hyperglycemia and +/- infection?    Chronic pancreatitis (HCC)- (present on admission)  Assessment & Plan  Continue pancreatic enzymes with meals  Celiac nerve block for pain control, per GI  He is tolerating a diet    Dyslipidemia- (present on admission)  Assessment & Plan  Continue statin       VTE prophylaxis: SCDs    Total time:  40 minutes.  I spent greater than 50% of the time for patient care, counseling, and  coordination on this date, including unit/floor time, and face-to-face time with the patient as per interval events and assessment and plan above

## 2020-03-12 NOTE — DISCHARGE PLANNING
Anticipated Discharge Disposition: TBD    Action: LSW received a call from Roni from [facility]. Star instructed LSW send referral to [facility]. LSW informed [facility] of choice.     Barriers to Discharge: D/C planning with family, guardians, and [facility].    Plan: LSW will continue to assess pt for discharge needs.

## 2020-03-13 LAB
ALBUMIN SERPL BCP-MCNC: 2.3 G/DL (ref 3.2–4.9)
ALBUMIN/GLOB SERPL: 0.6 G/DL
ALP SERPL-CCNC: 1120 U/L (ref 30–99)
ALT SERPL-CCNC: 30 U/L (ref 2–50)
ANION GAP SERPL CALC-SCNC: 15 MMOL/L (ref 7–16)
AST SERPL-CCNC: 81 U/L (ref 12–45)
BASOPHILS # BLD AUTO: 0.6 % (ref 0–1.8)
BASOPHILS # BLD: 0.12 K/UL (ref 0–0.12)
BILIRUB SERPL-MCNC: 1 MG/DL (ref 0.1–1.5)
BUN SERPL-MCNC: 15 MG/DL (ref 8–22)
CALCIUM SERPL-MCNC: 8.8 MG/DL (ref 8.4–10.2)
CHLORIDE SERPL-SCNC: 91 MMOL/L (ref 96–112)
CO2 SERPL-SCNC: 25 MMOL/L (ref 20–33)
CREAT SERPL-MCNC: 0.54 MG/DL (ref 0.5–1.4)
EOSINOPHIL # BLD AUTO: 0.14 K/UL (ref 0–0.51)
EOSINOPHIL NFR BLD: 0.7 % (ref 0–6.9)
ERYTHROCYTE [DISTWIDTH] IN BLOOD BY AUTOMATED COUNT: 77 FL (ref 35.9–50)
GLOBULIN SER CALC-MCNC: 3.8 G/DL (ref 1.9–3.5)
GLUCOSE BLD-MCNC: 132 MG/DL (ref 65–99)
GLUCOSE BLD-MCNC: 262 MG/DL (ref 65–99)
GLUCOSE BLD-MCNC: 315 MG/DL (ref 65–99)
GLUCOSE SERPL-MCNC: 130 MG/DL (ref 65–99)
HCT VFR BLD AUTO: 32.7 % (ref 42–52)
HGB BLD-MCNC: 10.9 G/DL (ref 14–18)
IMM GRANULOCYTES # BLD AUTO: 0.3 K/UL (ref 0–0.11)
IMM GRANULOCYTES NFR BLD AUTO: 1.6 % (ref 0–0.9)
LYMPHOCYTES # BLD AUTO: 2.78 K/UL (ref 1–4.8)
LYMPHOCYTES NFR BLD: 14.8 % (ref 22–41)
MAGNESIUM SERPL-MCNC: 1.6 MG/DL (ref 1.5–2.5)
MCH RBC QN AUTO: 30.1 PG (ref 27–33)
MCHC RBC AUTO-ENTMCNC: 33.3 G/DL (ref 33.7–35.3)
MCV RBC AUTO: 90.3 FL (ref 81.4–97.8)
MONOCYTES # BLD AUTO: 2.82 K/UL (ref 0–0.85)
MONOCYTES NFR BLD AUTO: 15 % (ref 0–13.4)
NEUTROPHILS # BLD AUTO: 12.68 K/UL (ref 1.82–7.42)
NEUTROPHILS NFR BLD: 67.3 % (ref 44–72)
NRBC # BLD AUTO: 0 K/UL
NRBC BLD-RTO: 0 /100 WBC
PLATELET # BLD AUTO: 570 K/UL (ref 164–446)
PMV BLD AUTO: 10.3 FL (ref 9–12.9)
POTASSIUM SERPL-SCNC: 3.8 MMOL/L (ref 3.6–5.5)
PROT SERPL-MCNC: 6.1 G/DL (ref 6–8.2)
RBC # BLD AUTO: 3.62 M/UL (ref 4.7–6.1)
SODIUM SERPL-SCNC: 131 MMOL/L (ref 135–145)
WBC # BLD AUTO: 18.8 K/UL (ref 4.8–10.8)

## 2020-03-13 PROCEDURE — 700102 HCHG RX REV CODE 250 W/ 637 OVERRIDE(OP): Performed by: INTERNAL MEDICINE

## 2020-03-13 PROCEDURE — 82962 GLUCOSE BLOOD TEST: CPT | Mod: 91

## 2020-03-13 PROCEDURE — 770006 HCHG ROOM/CARE - MED/SURG/GYN SEMI*

## 2020-03-13 PROCEDURE — 99233 SBSQ HOSP IP/OBS HIGH 50: CPT | Performed by: INTERNAL MEDICINE

## 2020-03-13 PROCEDURE — A9270 NON-COVERED ITEM OR SERVICE: HCPCS | Performed by: INTERNAL MEDICINE

## 2020-03-13 PROCEDURE — 36415 COLL VENOUS BLD VENIPUNCTURE: CPT

## 2020-03-13 PROCEDURE — 80053 COMPREHEN METABOLIC PANEL: CPT

## 2020-03-13 PROCEDURE — 85025 COMPLETE CBC W/AUTO DIFF WBC: CPT

## 2020-03-13 PROCEDURE — 83735 ASSAY OF MAGNESIUM: CPT

## 2020-03-13 PROCEDURE — 700111 HCHG RX REV CODE 636 W/ 250 OVERRIDE (IP): Performed by: HOSPITALIST

## 2020-03-13 PROCEDURE — 92523 SPEECH SOUND LANG COMPREHEN: CPT

## 2020-03-13 RX ADMIN — HYDROMORPHONE HYDROCHLORIDE 0.5 MG: 1 INJECTION, SOLUTION INTRAMUSCULAR; INTRAVENOUS; SUBCUTANEOUS at 06:41

## 2020-03-13 RX ADMIN — ACETAMINOPHEN 650 MG: 325 TABLET, FILM COATED ORAL at 18:16

## 2020-03-13 RX ADMIN — INSULIN HUMAN 10 UNITS: 100 INJECTION, SOLUTION PARENTERAL at 06:37

## 2020-03-13 RX ADMIN — INSULIN GLARGINE 12 UNITS: 100 INJECTION, SOLUTION SUBCUTANEOUS at 18:04

## 2020-03-13 RX ADMIN — Medication 100 MG: at 06:43

## 2020-03-13 RX ADMIN — OMEPRAZOLE 20 MG: 20 CAPSULE, DELAYED RELEASE ORAL at 18:00

## 2020-03-13 RX ADMIN — CYCLOBENZAPRINE HYDROCHLORIDE 10 MG: 10 TABLET, FILM COATED ORAL at 22:19

## 2020-03-13 RX ADMIN — ATORVASTATIN CALCIUM 40 MG: 40 TABLET, FILM COATED ORAL at 18:00

## 2020-03-13 RX ADMIN — FUROSEMIDE 40 MG: 40 TABLET ORAL at 06:43

## 2020-03-13 RX ADMIN — OMEPRAZOLE 20 MG: 20 CAPSULE, DELAYED RELEASE ORAL at 06:43

## 2020-03-13 RX ADMIN — HYDROMORPHONE HYDROCHLORIDE 0.5 MG: 1 INJECTION, SOLUTION INTRAMUSCULAR; INTRAVENOUS; SUBCUTANEOUS at 18:01

## 2020-03-13 RX ADMIN — ACETAMINOPHEN 650 MG: 325 TABLET, FILM COATED ORAL at 06:42

## 2020-03-13 RX ADMIN — TAMSULOSIN HYDROCHLORIDE 0.4 MG: 0.4 CAPSULE ORAL at 06:42

## 2020-03-13 RX ADMIN — INSULIN HUMAN 10 UNITS: 100 INJECTION, SOLUTION PARENTERAL at 18:06

## 2020-03-13 RX ADMIN — PANCRELIPASE 48000 UNITS: 24000; 76000; 120000 CAPSULE, DELAYED RELEASE PELLETS ORAL at 06:42

## 2020-03-13 ASSESSMENT — ENCOUNTER SYMPTOMS
VOMITING: 0
WEIGHT LOSS: 1
HALLUCINATIONS: 0
DIZZINESS: 0
DIARRHEA: 0
BACK PAIN: 0
HEADACHES: 0
FOCAL WEAKNESS: 0
COUGH: 0
FEVER: 0
CHILLS: 0
NAUSEA: 0
DEPRESSION: 0
SPUTUM PRODUCTION: 0
WEAKNESS: 0
SHORTNESS OF BREATH: 0
SORE THROAT: 0
ABDOMINAL PAIN: 0
PALPITATIONS: 0
BLOOD IN STOOL: 0
CONSTIPATION: 0
WHEEZING: 0
HEARTBURN: 0

## 2020-03-13 ASSESSMENT — COGNITIVE AND FUNCTIONAL STATUS - GENERAL
HELP NEEDED FOR BATHING: A LITTLE
DAILY ACTIVITIY SCORE: 23
SUGGESTED CMS G CODE MODIFIER DAILY ACTIVITY: CI

## 2020-03-13 NOTE — THERAPY
PT was requested to screen for gait mechanics and ambulation distance. Pt was recently re-evaluated and demosntrated with no therapy needs as the patient was able to demonstrate SPV level of functional mobility. Pt was witnessed ambulating with nsg for 350ft and going up/down 5 steps with L railing use all with SPV and no physical assist. Pt has been ambulating with nsg staff while in house and has been able to demonstrate safe functional mobility. Pt continues to not require therpay needs at this time. Pt and nsg staff encouraged to continue ambulating patient to prevent deconditioning secondary to immobilization.

## 2020-03-13 NOTE — THERAPY
"Speech Language Therapy Evaluation completed to address cognition    Functional Status:  Cognitive-linguistic evaluation performed using standardized testing (Cognistat) as well as nonstandardized testing. Domains assessed included orientation, attention, language comprehension and expression, memory, calculations, and reasoning. Though testing was not completed without error, pt scored in the average range for all areas assessed. Pt does not manage own medications or cook in his assisted living unit. His sister assists him with managing his finances. Pt does not drive. Pt does perform all ADLs independently. Pt is safe to return to ADLs at the independent level from a cognitive-linguistic standpoint. SLP will no longer actively follow, but will be available upon request.     Recommendations:  SLP will no longer actively follow, but will be available upon request.    Plan of Care: SLP will no longer actively follow, but will be available upon request.    Post-Acute Therapy: Anticipate that the patient will have no further speech therapy needs after discharge from the hospital.    See \"Rehab Therapy-Acute\" Patient Summary Report for complete documentation.   "

## 2020-03-13 NOTE — DIETARY
Nutrition Services: Update   Day 28 of admit.  Pawel Tatum is a 59 y.o. male with admitting DX of Sepsis    Visit with pt in bedside chair. Variable PO intake, emesis 3/11. No changes to diet/snacks/supplements per pt - likes Boost.    Malnutrition Risk: Per RD note 2/15: Pt with severe malnutrition in the context of acute illness related to sepsis as evidenced by 6.2% weight loss in <1 week (severe), and severe fat and severe muscle loss noted on physical exam.    Recommendations/Plan:  1. Continue Boost Glucose Control with meals, vanilla and strawberry flavors per pt preference.   2. Encourage intake of meals  3. Document intake of all meals as % taken in ADL's to provide interdisciplinary communication across all shifts.   4. Monitor weight.  5. Nutrition rep will continue to see patient for ongoing meal and snack preferences.    RD following

## 2020-03-13 NOTE — DISCHARGE PLANNING
LSW received a call from Mable from [facility] and was informed they would be denying pt due to no skilled needs and no long-term payor source.

## 2020-03-13 NOTE — DISCHARGE PLANNING
Anticipated Discharge Disposition: possibly return to [facility] w/ HH    Action: phone call to sister, Aniyah, informed her about results of PT/OT. She knew plan and is waiting for psych and cog eval.     Informed her [facility] declined due to no skilled need.     Aniyah was in agreement that once medically cleared and results of all evals are in, maybe [facility] can receive updated referral for pt and [facility] would take him back.     Barriers to Discharge: [facility] taking pt back    Plan: F/U once psych/cog eval completed; possible HH referral

## 2020-03-13 NOTE — PROGRESS NOTES
Hospital Medicine Daily Progress Note    Date of Service  3/13/2020    Chief Complaint  59 y.o. male admitted 2/14/2020 with abdominal pain    Hospital Course    History of type 2 diabetes, cognitive impairment who lives at a group home who was admitted for abdominal pain found to have sepsis secondary to ascending cholangitis.  He underwent ERCP with sphincterotomy to relieve obstruction.  No stent was placed due to significant edema.  Repeat ERCP was not performed due to acute respiratory failure with hypoxia which has now resolved.  He has improved with antibiotics and is almost ready to return to his group home however, blood sugars have been variable and they are not allowed to do sliding scale      Interval Problem Update  3/10: Has been hyperglycemic, today dropped to 59.  Lantus and mealtime insulin decreased.  Pain is controlled.  Appetite is improving but remains low.  3/11: Sugars are not at goal, back up to 385.  Insulin increased.  Alk phos and WBC rising, concern for recurrent obstructive process.  Repeat cxr ordered (previous effusion w loculation)  3/12: Sugars improved, not at goal.  WBC and alk phos still rising.  I discussed with GI, repeat MRCP.  3/13: Sugar control improved.  WBC still elevated however stable.  Alk phos stable.  Repeat MRCP shows worsening dilation of the CBD, await GI recommendations regarding need for repeat ERCP/EUS/stent    Consultants/Specialty  Dr. Lorenzo - [facility]    Code Status  DNR/DNI    Disposition  TBD - pending placement / capacity and medical stability    Review of Systems  Review of Systems   Constitutional: Negative for chills, fever and malaise/fatigue.   HENT: Negative for sore throat.    Respiratory: Negative for cough, sputum production and shortness of breath.    Cardiovascular: Negative for chest pain and palpitations.   Gastrointestinal: Negative for abdominal pain (Resolved), blood in stool, diarrhea, heartburn, nausea and vomiting (Appetite improved).    Genitourinary: Negative for frequency and urgency.   Musculoskeletal: Negative for back pain and joint pain.   Skin: Negative for itching and rash.   Neurological: Negative for dizziness, focal weakness, weakness and headaches.   Psychiatric/Behavioral: Negative for depression and hallucinations.   All other systems reviewed and are negative.       Physical Exam  Temp:  [36.6 °C (97.8 °F)-36.7 °C (98 °F)] 36.7 °C (98 °F)  Pulse:  [] 99  Resp:  [18] 18  BP: (101-115)/(60-73) 101/61  SpO2:  [93 %-97 %] 93 %    Physical Exam  Constitutional:       Appearance: Normal appearance. He is underweight.      Comments: Thin   HENT:      Head: Normocephalic and atraumatic.      Nose: Nose normal.      Mouth/Throat:      Mouth: Mucous membranes are moist.   Eyes:      Extraocular Movements: Extraocular movements intact.      Pupils: Pupils are equal, round, and reactive to light.   Neck:      Musculoskeletal: Normal range of motion and neck supple.   Cardiovascular:      Rate and Rhythm: Normal rate and regular rhythm.      Heart sounds: No murmur.   Pulmonary:      Effort: Pulmonary effort is normal. No respiratory distress.      Breath sounds: Normal breath sounds. No stridor.   Abdominal:      General: Abdomen is flat. Bowel sounds are normal. There is no distension.      Palpations: Abdomen is soft. There is no mass.      Tenderness: There is no abdominal tenderness. There is no guarding.   Musculoskeletal:         General: No swelling, tenderness or deformity.   Skin:     General: Skin is warm and dry.      Coloration: Skin is not jaundiced or pale.   Neurological:      General: No focal deficit present.      Mental Status: He is alert and oriented to person, place, and time. Mental status is at baseline.   Psychiatric:         Mood and Affect: Mood normal.         Behavior: Behavior normal.         Fluids    Intake/Output Summary (Last 24 hours) at 3/13/2020 1328  Last data filed at 3/13/2020 1100  Gross per 24  hour   Intake 720 ml   Output 2000 ml   Net -1280 ml       Laboratory  Recent Labs     03/11/20  0948 03/12/20  0506 03/13/20  0429   WBC 19.0* 20.5* 18.8*   RBC 3.62* 3.49* 3.62*   HEMOGLOBIN 10.9* 10.6* 10.9*   HEMATOCRIT 32.7* 31.0* 32.7*   MCV 90.3 88.8 90.3   MCH 30.1 30.4 30.1   MCHC 33.3* 34.2 33.3*   RDW 78.2* 75.1* 77.0*   PLATELETCT 486* 511* 570*   MPV 10.1 10.7 10.3     Recent Labs     03/11/20  0948 03/12/20  0506 03/13/20  0429   SODIUM 130* 130* 131*   POTASSIUM 4.1 3.7 3.8   CHLORIDE 94* 95* 91*   CO2 25 24 25   GLUCOSE 343* 223* 130*   BUN 14 13 15   CREATININE 0.67 0.46* 0.54   CALCIUM 8.9 8.8 8.8                   Imaging  [id number]   Final Result      1.  Interval increase in common bile duct dilatation and intrahepatic biliary ductal dilatation since the prior examination. Filling defects in the common bile duct just before the stricture at the level of the pancreatic head have developed since the    prior exam. Differential diagnosis includes sludge as well as common duct stones.      2.  Pancreatic ductal dilatation is again identified which is slightly less than on the prior exam. Debris within the pancreatic duct is now present that appears similar to the debris within the common bile duct.      3.  Enlarged pancreatic head containing small cystic structures is again identified. The 2 larger cysts in the posterior pancreatic head have nearly completely resolved. Enlargement of the pancreatic head could be related to pancreatitis. Neoplasm is    also possible.      4.  Bright T2 signal and enlargement of the right adrenal gland is again noted.      5.  Sludge again noted in the gallbladder.      6.  Small right pleural effusion is identified.      DX-CHEST-PORTABLE (1 VIEW)   Final Result      1.  Cardiomegaly with bilateral interstitial opacities.      2.  Small bilateral pleural effusions.         DX-CHEST-PORTABLE (1 VIEW)   Final Result         1. No significant interval change.       DX-CHEST-PORTABLE (1 VIEW)   Final Result      New subpulmonic effusions, bibasilar atelectasis and edema      DX-PORTABLE FLUOROSCOPY < 1 HOUR   Final Result      Portable fluoroscopy utilized for 1.1 minute.         INTERPRETING LOCATION: [address] [place] [address], [place], [id number]      GL-NONI-UNROGIA SPHINCTEROTOMY   Final Result      Intraoperative fluoroscopy spot images as described above.      CT-ABDOMEN WITH & W/O   Final Result      1.  No significant change since CT abdomen and pelvis obtained 2/16/2020      2.  Findings consistent with high-grade biliary obstruction with dilation of the common bile duct measuring 17 mm, dilated gallbladder, dilated pancreatic duct with change in caliber of the pancreatic duct at the level pancreatic head      3.  Findings could be either malignancy the pancreatic head versus scarring from chronic pancreatitis.      4.  Sequela of chronic pancreatitis      5.  Large right medial chest rim-enhancing loculated fluid collection probably within the pleural space measuring 10.5 x 3.1 centimeter transversely and 7 cm craniocaudal      6.  Rim-enhancing fluid collection in the hepatorenal space measuring 11.2 cm craniocaudal and 2-3 cm in thickness.      7.  The rim-enhancing fluid collection are suspicious for infection/abscess      8.  Bilateral atelectasis and small-moderate-sized pleural effusion after      9.  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  Biliary dilatation without evidence of high-grade biliary obstruction.   2.  Gallbladder visualization after morphine administration. No acute cholecystitis.      CT-ABDOMEN-PELVIS WITH   Final Result      1.  New rim enhancing fluid collection tracks along the right heart margin, azygoesophageal recess and possibly communicates with Morison's pouch tracking along the right aspect of the inferior vena cava. This is  nonspecific and could indicate abscess,    dissecting pseudocyst, mucinous cystic neoplasm related fluid      2.  New extra and intrahepatic biliary ductal dilatation with 6 cm dilatation of the gallbladder also seen. This indicates distal common bile duct/ampullary level obstruction. Stricture, mass effect from adjacent pancreas calcifications or small    ampullary mass are differential possibilities      3.  Decreased small pleural effusions. Some loculation on the right is possible      4.  Slightly decreased diffuse pancreatic ductal dilatation now measuring 11 mm. Evidence of chronic pancreatitis. New 3 cm cystic structure anterior to the pancreas could be a pseudocyst. Abscess or necrotic/cystic neoplasm are in the differential.      5.  Multiple chronic findings including status post aorto bifemoral bypass with approaching 70% stenosis of the left superficial femoral artery, nonobstructive right 3 mm nephrolithiasis, large volume rectal vault stool, dilated bladder which could    indicate outlet obstruction or neurogenic bladder, splenosis      [id number]-L/O   Final Result      1.  Severe dilatation of the intrahepatic biliary ducts, common bile duct and pancreatic duct. The distal common bile duct and the proximal pancreatic duct is not visualized at the pancreatic head. The pancreatic head appears to be diffusely enlarged and    demonstrates multiple small cysts. These findings are concerning for pancreatic neoplasm such as intraductal papillary mucinous cystoadenoma. The other differential diagnosis includes periampullary carcinoma. MR examination of the abdomen with contrast    is recommended for further evaluation.   2.  Mixed signal intensity in the right perinephric space likely represents perinephric hematoma.   3.  Large hiatal hernia.   4.  Diffuse gastric wall thickening.      US-RUQ   Final Result      1.  Hepatomegaly.   2.  Hepatic steatosis.   3.  Gallbladder sludge. Mild intrahepatic ductal  dilation. Dilation of the common duct up to 13 mm. No definite distal obstructing etiology is identified. MRCP could be obtained for further evaluation if indicated.   4.  Trace ascites.   5.  Pancreatic calcifications likely sequela of chronic pancreatitis.      DX-CHEST-PORTABLE (1 VIEW)   Final Result      Cardiomegaly.           Assessment/Plan  * Type 2 diabetes mellitus with hyperglycemia, with long-term current use of insulin ([facility])- (present on admission)  Assessment & Plan  Home dose is: Lantus 12 units at night and regular insulin 10 units 3 times daily.  Has had labile sugars due to variable PO intake requiring insulin adjustments and infection  Group home does not want sliding scale.  Sugars are not at goal but improved today  Lantus 12, humulin 10 tid      Pancreatic mass  Assessment & Plan  MRI with concern for pancreatic malignancy  ERCP with no evidence of malignancy, only calcifications of pancreas present   is elevated but less than 1000 so cannot rule in cancer, likely due chronic pancreatitis  Repeat CT showed chronic pancreatitis and loculated chest effusion    At this moment GI does not believe patient has significant evidence for periodic cancer  Due to rising WBC and alk phos, repeat MRCP today  I discussed with GI and they will re-eval the patient    Normocytic anemia- (present on admission)  Assessment & Plan  Stable, no e/o blood loss    Cognitive decline- (present on admission)  Assessment & Plan  Patient likely has a chronic component to his cognitive decline as well as acute worsening  Mental status now at his baseline, patient has a legal guardian (sister)  Psych consult for decisional capacity      Hypomagnesemia  Assessment & Plan  Replaced  Repeat in AM    Sepsis ([facility])  Assessment & Plan  This was Sepsis Present on admission, improved but WBC rising again.  Source: Ascending cholangitis  Abdomen is still soft, tolerating diet  Currently, he has finished antibiotics  Repeat MRCP  shows increased CBD dilation from previously seen favoring persistent obstruction/stone or sludge  Await GI recs        Acute respiratory failure with hypoxia (HCC)  Assessment & Plan  Due to loculated effusion  Completed abx  Currently resolved      Cholangitis  Assessment & Plan  Biliary obstruction suggested on imaging, GI consulted and they performed an ERCP on 2/24 with sphincterotomy  Swelling was too significant to place ampullary duct stent.  He improved without stent placement therefore this was deferred, but Alk P and WBC are rising again  Endoscopy done showing pancreatic calcifications but no stones, no area to biopsy  I discussed with GI, repeat MRCP shows worsening dilation of the common bile duct favoring persistent sludge, stricture or stone  Possible repeat ERCP/stent/EUS based on those findings, await for official recommendations from GI    Recurrent major depressive disorder, in full remission (HCC)  Assessment & Plan  .    Severe protein-calorie malnutrition (HCC)- (present on admission)  Assessment & Plan  Encourage p.o. intake  No pancreatic mass identified    Hyponatremia  Assessment & Plan  Due to hyperglycemia and +/- infection?    Chronic pancreatitis (HCC)- (present on admission)  Assessment & Plan  Continue pancreatic enzymes with meals  Celiac nerve block for pain control, per GI  He is tolerating a diet    Dyslipidemia- (present on admission)  Assessment & Plan  Continue statin       VTE prophylaxis: SCDs    Total time:  40 minutes.  I spent greater than 50% of the time for patient care, counseling, and coordination on this date, including unit/floor time, and face-to-face time with the patient as per interval events and assessment and plan above

## 2020-03-13 NOTE — THERAPY
"Occupational Therapy Treatment completed with focus on patient education.  Functional Status:  OT order for update/ new eval received. Pt is at baseline with ADLS and functional transfers, is at the independent to Supervised level- no Acute OT skilled needs. Spoke to Pt regarding AMB with [facility] staff and continued I with ADLS.   Plan of Care: Patient with no further skilled OT needs in the acute care setting at this time  Discharge Recommendations:  Equipment No Equipment Needed. Post-acute therapy Currently anticipate no further skilled therapy needs once patient is discharged from the inpatient setting.    See \"Rehab Therapy-Acute\" Patient Summary Report for complete documentation.   "

## 2020-03-13 NOTE — CARE PLAN
Problem: Communication  Goal: The ability to communicate needs accurately and effectively will improve  3/12/2020 1831 by Juan Romero R.N.  Outcome: PROGRESSING AS EXPECTED  Intervention: [facility] patient and significant other/support system to call light to alert staff of needs  Note: Pt encouraged to use call light for needs.       Problem: Safety  Goal: Will remain free from injury  Outcome: PROGRESSING AS EXPECTED     Problem: Pain Management  Goal: Pain level will decrease to patient's comfort goal  Outcome: PROGRESSING SLOWER THAN EXPECTED

## 2020-03-14 LAB
ALBUMIN SERPL BCP-MCNC: 2.3 G/DL (ref 3.2–4.9)
ALBUMIN/GLOB SERPL: 0.6 G/DL
ALP SERPL-CCNC: 1161 U/L (ref 30–99)
ALT SERPL-CCNC: 28 U/L (ref 2–50)
ANION GAP SERPL CALC-SCNC: 13 MMOL/L (ref 7–16)
ANISOCYTOSIS BLD QL SMEAR: ABNORMAL
AST SERPL-CCNC: 66 U/L (ref 12–45)
BASOPHILS # BLD AUTO: 0.9 % (ref 0–1.8)
BASOPHILS # BLD: 0.14 K/UL (ref 0–0.12)
BILIRUB SERPL-MCNC: 0.9 MG/DL (ref 0.1–1.5)
BUN SERPL-MCNC: 14 MG/DL (ref 8–22)
CALCIUM SERPL-MCNC: 8.9 MG/DL (ref 8.4–10.2)
CHLORIDE SERPL-SCNC: 93 MMOL/L (ref 96–112)
CO2 SERPL-SCNC: 24 MMOL/L (ref 20–33)
COMMENT 1642: NORMAL
CREAT SERPL-MCNC: 0.5 MG/DL (ref 0.5–1.4)
EOSINOPHIL # BLD AUTO: 0.15 K/UL (ref 0–0.51)
EOSINOPHIL NFR BLD: 0.9 % (ref 0–6.9)
GLOBULIN SER CALC-MCNC: 3.8 G/DL (ref 1.9–3.5)
GLUCOSE BLD-MCNC: 156 MG/DL (ref 65–99)
GLUCOSE BLD-MCNC: 196 MG/DL (ref 65–99)
GLUCOSE BLD-MCNC: 283 MG/DL (ref 65–99)
GLUCOSE SERPL-MCNC: 207 MG/DL (ref 65–99)
HCT VFR BLD AUTO: 32.1 % (ref 42–52)
HGB BLD-MCNC: 10.6 G/DL (ref 14–18)
IMM GRANULOCYTES # BLD AUTO: 0.3 K/UL (ref 0–0.11)
IMM GRANULOCYTES NFR BLD AUTO: 1.9 % (ref 0–0.9)
LYMPHOCYTES # BLD AUTO: 2.42 K/UL (ref 1–4.8)
LYMPHOCYTES NFR BLD: 15.1 % (ref 22–41)
MACROCYTES BLD QL SMEAR: ABNORMAL
MCH RBC QN AUTO: 29.8 PG (ref 27–33)
MCHC RBC AUTO-ENTMCNC: 33 G/DL (ref 33.7–35.3)
MCV RBC AUTO: 90.2 FL (ref 81.4–97.8)
MONOCYTES # BLD AUTO: 2.17 K/UL (ref 0–0.85)
MONOCYTES NFR BLD AUTO: 13.5 % (ref 0–13.4)
NEUTROPHILS # BLD AUTO: 10.86 K/UL (ref 1.82–7.42)
NEUTROPHILS NFR BLD: 67.7 % (ref 44–72)
NRBC # BLD AUTO: 0 K/UL
NRBC BLD-RTO: 0 /100 WBC
PLATELET # BLD AUTO: 547 K/UL (ref 164–446)
PLATELET BLD QL SMEAR: NORMAL
PMV BLD AUTO: 10.3 FL (ref 9–12.9)
POTASSIUM SERPL-SCNC: 3.8 MMOL/L (ref 3.6–5.5)
PROT SERPL-MCNC: 6.1 G/DL (ref 6–8.2)
RBC # BLD AUTO: 3.56 M/UL (ref 4.7–6.1)
RBC BLD AUTO: PRESENT
SODIUM SERPL-SCNC: 130 MMOL/L (ref 135–145)
WBC # BLD AUTO: 16 K/UL (ref 4.8–10.8)

## 2020-03-14 PROCEDURE — 700111 HCHG RX REV CODE 636 W/ 250 OVERRIDE (IP): Performed by: HOSPITALIST

## 2020-03-14 PROCEDURE — 99232 SBSQ HOSP IP/OBS MODERATE 35: CPT | Performed by: INTERNAL MEDICINE

## 2020-03-14 PROCEDURE — 36415 COLL VENOUS BLD VENIPUNCTURE: CPT

## 2020-03-14 PROCEDURE — 80053 COMPREHEN METABOLIC PANEL: CPT

## 2020-03-14 PROCEDURE — A9270 NON-COVERED ITEM OR SERVICE: HCPCS | Performed by: INTERNAL MEDICINE

## 2020-03-14 PROCEDURE — 82962 GLUCOSE BLOOD TEST: CPT | Mod: 91

## 2020-03-14 PROCEDURE — 85025 COMPLETE CBC W/AUTO DIFF WBC: CPT

## 2020-03-14 PROCEDURE — 700102 HCHG RX REV CODE 250 W/ 637 OVERRIDE(OP): Performed by: INTERNAL MEDICINE

## 2020-03-14 PROCEDURE — 770006 HCHG ROOM/CARE - MED/SURG/GYN SEMI*

## 2020-03-14 RX ORDER — INSULIN GLARGINE 100 [IU]/ML
13 INJECTION, SOLUTION SUBCUTANEOUS EVERY EVENING
Status: DISCONTINUED | OUTPATIENT
Start: 2020-03-14 | End: 2020-03-15

## 2020-03-14 RX ADMIN — INSULIN HUMAN 10 UNITS: 100 INJECTION, SOLUTION PARENTERAL at 06:46

## 2020-03-14 RX ADMIN — PANCRELIPASE 48000 UNITS: 24000; 76000; 120000 CAPSULE, DELAYED RELEASE PELLETS ORAL at 06:42

## 2020-03-14 RX ADMIN — FUROSEMIDE 40 MG: 40 TABLET ORAL at 06:42

## 2020-03-14 RX ADMIN — ACETAMINOPHEN 650 MG: 325 TABLET, FILM COATED ORAL at 17:14

## 2020-03-14 RX ADMIN — INSULIN GLARGINE 13 UNITS: 100 INJECTION, SOLUTION SUBCUTANEOUS at 17:16

## 2020-03-14 RX ADMIN — TAMSULOSIN HYDROCHLORIDE 0.4 MG: 0.4 CAPSULE ORAL at 06:42

## 2020-03-14 RX ADMIN — Medication 100 MG: at 06:43

## 2020-03-14 RX ADMIN — ACETAMINOPHEN 650 MG: 325 TABLET, FILM COATED ORAL at 06:42

## 2020-03-14 RX ADMIN — HYDROMORPHONE HYDROCHLORIDE 0.5 MG: 1 INJECTION, SOLUTION INTRAMUSCULAR; INTRAVENOUS; SUBCUTANEOUS at 06:41

## 2020-03-14 RX ADMIN — INSULIN HUMAN 10 UNITS: 100 INJECTION, SOLUTION PARENTERAL at 17:16

## 2020-03-14 RX ADMIN — INSULIN HUMAN 10 UNITS: 100 INJECTION, SOLUTION PARENTERAL at 11:16

## 2020-03-14 RX ADMIN — OMEPRAZOLE 20 MG: 20 CAPSULE, DELAYED RELEASE ORAL at 06:43

## 2020-03-14 RX ADMIN — HYDROMORPHONE HYDROCHLORIDE 0.5 MG: 1 INJECTION, SOLUTION INTRAMUSCULAR; INTRAVENOUS; SUBCUTANEOUS at 17:15

## 2020-03-14 RX ADMIN — OMEPRAZOLE 20 MG: 20 CAPSULE, DELAYED RELEASE ORAL at 17:14

## 2020-03-14 RX ADMIN — ATORVASTATIN CALCIUM 40 MG: 40 TABLET, FILM COATED ORAL at 17:14

## 2020-03-14 ASSESSMENT — ENCOUNTER SYMPTOMS
HEADACHES: 0
NAUSEA: 0
CHILLS: 0
SPUTUM PRODUCTION: 0
BACK PAIN: 0
WEAKNESS: 0
DEPRESSION: 0
BLOOD IN STOOL: 0
HEARTBURN: 0
WEIGHT LOSS: 1
HALLUCINATIONS: 0
FOCAL WEAKNESS: 0
DIZZINESS: 0
VOMITING: 0
CONSTIPATION: 0
DIARRHEA: 0
WHEEZING: 0
FEVER: 0
SORE THROAT: 0
SHORTNESS OF BREATH: 0
PALPITATIONS: 0
ABDOMINAL PAIN: 0
COUGH: 0

## 2020-03-14 NOTE — PROGRESS NOTES
"  Gastroenterology Progress Note     Author: Yao Suh MD Date & Time Created: 3/14/2020 10:09 AM    Chief Complaint:  Elevated liver tests, abnormal MRI    Interval History:  Initial history (Dr. Villarreal):  59 y.o. male with IDDM, chronic pancreatitis seizure disorder, iron deficiency anemia who presented 2/14/2020 with lethargy and poor appetite per assisted living facility.  He is difficult historian as does not want to answer questions.  He states \"I feel fine and always have.\"  He was evaluated 12/2019 for iron deficiency anemia and DKA.  Had EGD with severe erosive esophagitis.  He refused to drink prep for colonoscopy.  He apparently was brought by assisted living facility after they noted some lethargy and poor appetite.  He has weight loss of 7 pounds over the past month or so.  He denies abdominal pain and states he never had abdominal pain and his appetite is great.  Denies fevers, chills, nausea, vomiting, diarrhea, constipation, melena, hematochezia.  In ED, he had significant leukocytosis with newly elevated alkaline phosphatase and bilirubin with fairly normal transaminases.  He was started on IVF and antibiotics.  He had RUQ US followed by MRCP showing dilated bile and pancreatic ducts, distended GB, chronic pancreatitis and possible abnormal lesions head of pancreas.    2/16/2020: Continues to deny any symptoms including abdominal pain, nausea, vomiting, fevers.  Was refusing IV placement and vitals overnight.  Says he is agreeable to IV placement today  02/17/2020 - No complaints.  Says he feels fine, but does report urine is darker.  I explained imaging findings at length.  02/18/2020 - EUS showed extensive, severe pancreatic calcifications making identification of any pancreatic mass or fluid collection impossible.  Mediastinum was not assessed.  ERCP not done due to normalizing liver enzymes and added risk to patient.  02/19/2020 - No events overnight.  Patient denies " "fevers/chills/sweats and states he \"feels fine\".  Labs show worsening leukocytosis with improving liver enzymes.  02/20/2020 - Tolerating regular diet well.  Denies any abdominal pain.  He does report chronic mid-back pain, but this is not severe or worsening.  No nausea, vomiting, fevers, chills or sweats.  Leukocytosis slightly better.  Alkaline phosphatase still elevated, but remainder of liver enzymes normal.  02/21/2020 - No abdominal pain.  Tolerating diet.  Mild decrease appetite.  No CBC today.  Eager to be discharged.  02/22/2020 - Continues to \"feel fine\".  No abdominal pain, nausea or vomiting.  Tolerating diet.  Leukocytosis improved, but alkaline phosphatase abruptly higher.  CT not done due to IV issues and patient's difficult access.  2/23/20 - No new complaints. He ha lovenox s/c this AM at 6.30. ERCP scheduled for 9 AM. Labs pending   2/25/2020: Denies any complaints.  No pain.  Had ERCP yesterday showing pus from bile duct and distal bile duct stricture likely due to chronic pancreatitis.  Unable to stent  Due to loss of access and amount of ampullary inflammation.  WBC better.  Alk phos higher.  No fevers.  2/27/2020: ERCP not performed yesterday due to oxygen saturation.  Remains on 4L oxygen with large bilateral pleural effusions.  Denies dyspnea or abdominal pain.  WBc trending down.  No fevers.  2/29/2020: No changes.  Still on 4L but denies dyspnea.  WBC down to 10.  No other complaints.  Diuresing well.  3/2/2020: Patient examined and interviewed, course reviewed, discussed with Dr Villarreal.  Course discussed with nursing. No new labs yet for today.  Still requiring 4 L oxygen per NC.  He is resting well, without complaint.    3/3/20: Patient examined and interviewed, course reviewed. Discussed with RN and with Dr Lorenzo, therapeutic endoscopist.  No morning labs available yet (patient  Refused earlier but agrees to labs now).  Pt denies abd pain, fevers, chills.  Some nausea with emesis last " hs.  No black or tarry stools, rectal bleeding, difficulty swallowing.  His oxygen saturaiton is low 90s on one liter of oxygen now, improved.      3/12/2020 Still elevated Alk-P, with leukocytosis. No RUQ pain. No fever. MRI done, pending result  3/13/2020 Doing ok. No RUQ pain. MRI showed CBD And p ducts dilation with debris.   3/14/2020 Feeling ok. No RUQ pain. No fever.    Review of Systems:  Review of Systems   Constitutional: Positive for weight loss. Negative for chills, fever and malaise/fatigue.   Respiratory: Negative for cough, shortness of breath and wheezing.    Cardiovascular: Negative for chest pain, palpitations and leg swelling.   Gastrointestinal: Negative for abdominal pain, blood in stool, constipation, diarrhea, heartburn, melena, nausea and vomiting.   Genitourinary: Negative for dysuria and urgency.   Skin: Negative for itching and rash.   Neurological: Negative for dizziness and headaches.     Physical Exam:  Physical Exam  Vitals signs and nursing note reviewed.   Constitutional:       Appearance: Normal appearance. He is normal weight. He is not ill-appearing or diaphoretic.   HENT:      Head: Normocephalic and atraumatic.      Nose: Nose normal.   Eyes:      General: No scleral icterus.        Right eye: No discharge.         Left eye: No discharge.      Pupils: Pupils are equal, round, and reactive to light.   Cardiovascular:      Rate and Rhythm: Normal rate and regular rhythm.      Heart sounds: Normal heart sounds. No murmur.   Pulmonary:      Effort: Pulmonary effort is normal. No respiratory distress.      Breath sounds: No stridor. Decreased breath sounds present. No wheezing, rhonchi or rales.   Abdominal:      General: Abdomen is flat. Bowel sounds are normal. There is no distension.      Palpations: Abdomen is soft. There is no mass.      Tenderness: There is no abdominal tenderness. There is no guarding or rebound.      Hernia: No hernia is present.   Musculoskeletal:          General: No swelling or tenderness.   Skin:     General: Skin is warm and dry.      Coloration: Skin is not jaundiced.      Findings: No bruising or rash.   Neurological:      General: No focal deficit present.      Mental Status: He is alert and oriented to person, place, and time.   Psychiatric:         Mood and Affect: Mood normal.       Labs:          Recent Labs     20  05020  0658   SODIUM 130* 131* 130*   POTASSIUM 3.7 3.8 3.8   CHLORIDE 95* 91* 93*   CO2 24 25 24   BUN 13 15 14   CREATININE 0.46* 0.54 0.50   MAGNESIUM 1.4* 1.6  --    PHOSPHORUS 2.2*  --   --    CALCIUM 8.8 8.8 8.9     Recent Labs     20  05020  06   ALTSGPT 30 30 28   ASTSGOT 75* 81* 66*   ALKPHOSPHAT 1173* 1120* 1161*   TBILIRUBIN 1.2 1.0 0.9   GLUCOSE 223* 130* 207*     Recent Labs     20  05020  0658   RBC 3.49* 3.62* 3.56*   HEMOGLOBIN 10.6* 10.9* 10.6*   HEMATOCRIT 31.0* 32.7* 32.1*   PLATELETCT 511* 570* 547*     Recent Labs     20  05020  0658   WBC 20.5* 18.8* 16.0*   NEUTSPOLYS 69.80 67.30 67.70   LYMPHOCYTES 16.70* 14.80* 15.10*   MONOCYTES 10.60 15.00* 13.50*   EOSINOPHILS 0.40 0.70 0.90   BASOPHILS 0.80 0.60 0.90   ASTSGOT 75* 81* 66*   ALTSGPT 30 30 28   ALKPHOSPHAT 1173* 1120* 1161*   TBILIRUBIN 1.2 1.0 0.9     Hemodynamics:  Temp (24hrs), Av.6 °C (97.8 °F), Min:36.4 °C (97.5 °F), Max:36.7 °C (98 °F)  Temperature: 36.7 °C (98 °F)  Pulse  Av.2  Min: 70  Max: 122   Blood Pressure: 112/68     Respiratory:    Respiration: 18, Pulse Oximetry: 93 %        RUL Breath Sounds: Diminished, RML Breath Sounds: Diminished, RLL Breath Sounds: Diminished, LEONARD Breath Sounds: Diminished, LLL Breath Sounds: Diminished  Fluids:    Intake/Output Summary (Last 24 hours) at 2020 0706  Last data filed at 2/15/2020 2200  Gross per 24 hour   Intake 2485 ml   Output 600 ml   Net 1885 ml        GI/Nutrition:  Orders Placed  This Encounter   Procedures   • Diet Order Consistent Carbohydrate     Standing Status:   Standing     Number of Occurrences:   1     Order Specific Question:   Diet:     Answer:   Consistent Carbohydrate [4]     Medical Decision Making, by Problem:  Active Hospital Problems    Diagnosis   • *Sepsis (HCC) [A41.9]   • Elevated alkaline phosphatase level [R74.8]   • Type 2 diabetes mellitus with hyperglycemia, with long-term current use of insulin (HCC) [E11.65, Z79.4]   • Cognitive decline [R41.89]   • Metabolic acidosis [E87.2]   • Hypokalemia [E87.6]   • Hyponatremia [E87.1]   • Chronic pancreatitis (HCC) [K86.1]   • Dyslipidemia [E78.5]   • Cholangitis [K83.09]     3/12/2020 MRI:  1.  Interval increase in common bile duct dilatation and intrahepatic biliary ductal dilatation since the prior examination. Filling defects in the common bile duct just before the stricture at the level of the pancreatic head have developed since the prior exam. Differential diagnosis includes sludge as well as common duct stones.  2.  Pancreatic ductal dilatation is again identified which is slightly less than on the prior exam. Debris within the pancreatic duct is now present that appears similar to the debris within the common bile duct.  3.  Enlarged pancreatic head containing small cystic structures is again identified. The 2 larger cysts in the posterior pancreatic head have nearly completely resolved. Enlargement of the pancreatic head could be related to pancreatitis. Neoplasm is also possible.  4.  Bright T2 signal and enlargement of the right adrenal gland is again noted.  5.  Sludge again noted in the gallbladder.  6.  Small right pleural effusion is identified.    Impression  / Plan  58 y/o with chronic pancreatitis, IDDM, iron deficiency anemia, GERD with esophagitis that presented for lethargy and found to have significant leukocytosis and newly elevated liver tests.  Concern for sepsis from biliary source.  Imaging  suggests dilation of biliary and pancreatic ducts but no obvious stones although could have ampullary lesion of main duct IPMN causing obstruction.      - Elevated liver tests- likely due to combination of choledocholithiasis (status post prior extraction) and biliary stricture in setting of chronic pancreatitis.     - Sepsis, cholangitis? Better now.  - Leukocytosis, improving  - Abnormal imaging of abdomen with dilated bile ducts and pancreatic duct and questionable mass head of pancreas - Only severe chronic calcific pancreatitis identified on EUS  - Chronic pancreatitis  - IDDM  - Iron deficiency anemia  - GERD with esophagitis  - Hypertension  - Seizure disorder  - Back pain  - Pancreatic fluid collection tracking adjacent to distal esophagus on imaging, ?pseudocyst versus infected fluid from prolonged biliary obstruction     PLAN:   - Plan on reattempt ERCP with biliary stenting, +/- P-duct stenting, on Monday 3/16/2020 10 AM, with Dr. Ansari  - Risks, benefits, and alternatives were discussed with patient. Consenting persons were given an opportunity to ask questions and discuss other options. Risks including but not limited to failed or incomplete endoscopy, ineffective therapy, perforation, infection, bleeding, missed lesion(s), cardiac and/or pulmonary event, aspiration, stroke, possible need for surgery, hospitalization possibly prolonged, discomfort, unsuccessful and/or incomplete procedure, possible need for repeat procedures and/or additional testings, damage to adjacent organs and/or vascular structures, medication reaction, disability, death, and other adverse events possibly life-threatening. Discussion was undertaken with Layman's terms. Consenting persons stated understanding and acceptance of these risks, and wished to proceed. Consent was given in clear state of mind. All questions were answered.  - I used google images to help explain the procedure to the patient  - Dr. Eaton will see the  patient and perform the procedure on Monday, and Dr. Bynum will cover the patient next week.  - Please contact us at [phone] if we can be of further assistance on Sunday    Quality-Core Measures   Reviewed items::  Radiology images reviewed, Labs reviewed and Medications reviewed

## 2020-03-14 NOTE — PROGRESS NOTES
Hospital Medicine Daily Progress Note    Date of Service  3/14/2020    Chief Complaint  59 y.o. male admitted 2/14/2020 with abdominal pain    Hospital Course    History of type 2 diabetes, cognitive impairment who lives at a group home who was admitted for abdominal pain found to have sepsis secondary to ascending cholangitis.  He underwent ERCP with sphincterotomy to relieve obstruction.  No stent was placed due to significant edema.  Repeat ERCP was not performed due to acute respiratory failure with hypoxia which has now resolved.  He has improved with antibiotics and is almost ready to return to his group home however, blood sugars have been variable and they are not allowed to do sliding scale      Interval Problem Update  3/10: Has been hyperglycemic, today dropped to 59.  Lantus and mealtime insulin decreased.  Pain is controlled.  Appetite is improving but remains low.  3/11: Sugars are not at goal, back up to 385.  Insulin increased.  Alk phos and WBC rising, concern for recurrent obstructive process.  Repeat cxr ordered (previous effusion w loculation)  3/12: Sugars improved, not at goal.  WBC and alk phos still rising.  I discussed with GI, repeat MRCP.  3/13: Sugar control improved.  WBC still elevated however stable.  Alk phos stable.  Repeat MRCP shows worsening dilation of the CBD, await GI recommendations regarding need for repeat ERCP/EUS/stent  3/14: Planning for repeat ERCP Monday, Sugar control has improved    Consultants/Specialty  Dr. Lorenzo - [facility]    Code Status  DNR/DNI    Disposition  TBD - pending placement / capacity and medical stability    Review of Systems  Review of Systems   Constitutional: Negative for chills, fever and malaise/fatigue.   HENT: Negative for sore throat.    Respiratory: Negative for cough, sputum production and shortness of breath.    Cardiovascular: Negative for chest pain and palpitations.   Gastrointestinal: Negative for abdominal pain (Resolved), blood in stool,  diarrhea, heartburn, nausea and vomiting (Appetite improved).   Genitourinary: Negative for frequency and urgency.   Musculoskeletal: Negative for back pain and joint pain.   Skin: Negative for itching and rash.   Neurological: Negative for dizziness, focal weakness, weakness and headaches.   Psychiatric/Behavioral: Negative for depression and hallucinations.   All other systems reviewed and are negative.       Physical Exam  Temp:  [36.4 °C (97.5 °F)-36.7 °C (98 °F)] 36.7 °C (98 °F)  Pulse:  [96-99] 96  Resp:  [18] 18  BP: (101-120)/(61-77) 112/68  SpO2:  [93 %-94 %] 93 %    Physical Exam  Constitutional:       Appearance: Normal appearance. He is underweight.      Comments: Thin extremities   HENT:      Head: Normocephalic and atraumatic.      Nose: Nose normal.      Mouth/Throat:      Mouth: Mucous membranes are moist.   Eyes:      Extraocular Movements: Extraocular movements intact.      Pupils: Pupils are equal, round, and reactive to light.   Neck:      Musculoskeletal: Normal range of motion and neck supple.   Cardiovascular:      Rate and Rhythm: Normal rate and regular rhythm.      Heart sounds: No murmur.   Pulmonary:      Effort: Pulmonary effort is normal. No respiratory distress.      Breath sounds: Normal breath sounds. No stridor.   Abdominal:      General: Abdomen is flat. Bowel sounds are normal. There is no distension.      Palpations: Abdomen is soft. There is no mass.      Tenderness: There is no abdominal tenderness. There is no guarding.   Musculoskeletal:         General: No swelling, tenderness or deformity.   Skin:     General: Skin is warm and dry.      Coloration: Skin is not jaundiced or pale.   Neurological:      General: No focal deficit present.      Mental Status: He is alert and oriented to person, place, and time. Mental status is at baseline.   Psychiatric:         Mood and Affect: Mood normal.         Behavior: Behavior normal.         Fluids    Intake/Output Summary (Last 24  hours) at 3/14/2020 1007  Last data filed at 3/14/2020 0700  Gross per 24 hour   Intake 240 ml   Output 1900 ml   Net -1660 ml       Laboratory  Recent Labs     03/12/20  0506 03/13/20  0429 03/14/20  0658   WBC 20.5* 18.8* 16.0*   RBC 3.49* 3.62* 3.56*   HEMOGLOBIN 10.6* 10.9* 10.6*   HEMATOCRIT 31.0* 32.7* 32.1*   MCV 88.8 90.3 90.2   MCH 30.4 30.1 29.8   MCHC 34.2 33.3* 33.0*   RDW 75.1* 77.0*  --    PLATELETCT 511* 570* 547*   MPV 10.7 10.3 10.3     Recent Labs     03/12/20  0506 03/13/20 0429 03/14/20  0658   SODIUM 130* 131* 130*   POTASSIUM 3.7 3.8 3.8   CHLORIDE 95* 91* 93*   CO2 24 25 24   GLUCOSE 223* 130* 207*   BUN 13 15 14   CREATININE 0.46* 0.54 0.50   CALCIUM 8.8 8.8 8.9                   Imaging  [id number]   Final Result      1.  Interval increase in common bile duct dilatation and intrahepatic biliary ductal dilatation since the prior examination. Filling defects in the common bile duct just before the stricture at the level of the pancreatic head have developed since the    prior exam. Differential diagnosis includes sludge as well as common duct stones.      2.  Pancreatic ductal dilatation is again identified which is slightly less than on the prior exam. Debris within the pancreatic duct is now present that appears similar to the debris within the common bile duct.      3.  Enlarged pancreatic head containing small cystic structures is again identified. The 2 larger cysts in the posterior pancreatic head have nearly completely resolved. Enlargement of the pancreatic head could be related to pancreatitis. Neoplasm is    also possible.      4.  Bright T2 signal and enlargement of the right adrenal gland is again noted.      5.  Sludge again noted in the gallbladder.      6.  Small right pleural effusion is identified.      DX-CHEST-PORTABLE (1 VIEW)   Final Result      1.  Cardiomegaly with bilateral interstitial opacities.      2.  Small bilateral pleural effusions.          DX-CHEST-PORTABLE (1 VIEW)   Final Result         1. No significant interval change.      DX-CHEST-PORTABLE (1 VIEW)   Final Result      New subpulmonic effusions, bibasilar atelectasis and edema      DX-PORTABLE FLUOROSCOPY < 1 HOUR   Final Result      Portable fluoroscopy utilized for 1.1 minute.         INTERPRETING LOCATION: [address] [place] [address] [place], [id number]      [id number] SPHINCTEROTOMY   Final Result      Intraoperative fluoroscopy spot images as described above.      CT-ABDOMEN WITH & W/O   Final Result      1.  No significant change since CT abdomen and pelvis obtained 2/16/2020      2.  Findings consistent with high-grade biliary obstruction with dilation of the common bile duct measuring 17 mm, dilated gallbladder, dilated pancreatic duct with change in caliber of the pancreatic duct at the level pancreatic head      3.  Findings could be either malignancy the pancreatic head versus scarring from chronic pancreatitis.      4.  Sequela of chronic pancreatitis      5.  Large right medial chest rim-enhancing loculated fluid collection probably within the pleural space measuring 10.5 x 3.1 centimeter transversely and 7 cm craniocaudal      6.  Rim-enhancing fluid collection in the hepatorenal space measuring 11.2 cm craniocaudal and 2-3 cm in thickness.      7.  The rim-enhancing fluid collection are suspicious for infection/abscess      8.  Bilateral atelectasis and small-moderate-sized pleural effusion after      9.  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  Biliary dilatation without evidence of high-grade biliary obstruction.   2.  Gallbladder visualization after morphine administration. No acute cholecystitis.      CT-ABDOMEN-PELVIS WITH   Final Result      1.  New rim enhancing fluid collection tracks along the right heart margin, azygoesophageal recess and possibly communicates with  Morison's pouch tracking along the right aspect of the inferior vena cava. This is nonspecific and could indicate abscess,    dissecting pseudocyst, mucinous cystic neoplasm related fluid      2.  New extra and intrahepatic biliary ductal dilatation with 6 cm dilatation of the gallbladder also seen. This indicates distal common bile duct/ampullary level obstruction. Stricture, mass effect from adjacent pancreas calcifications or small    ampullary mass are differential possibilities      3.  Decreased small pleural effusions. Some loculation on the right is possible      4.  Slightly decreased diffuse pancreatic ductal dilatation now measuring 11 mm. Evidence of chronic pancreatitis. New 3 cm cystic structure anterior to the pancreas could be a pseudocyst. Abscess or necrotic/cystic neoplasm are in the differential.      5.  Multiple chronic findings including status post aorto bifemoral bypass with approaching 70% stenosis of the left superficial femoral artery, nonobstructive right 3 mm nephrolithiasis, large volume rectal vault stool, dilated bladder which could    indicate outlet obstruction or neurogenic bladder, splenosis      [id number]/O   Final Result      1.  Severe dilatation of the intrahepatic biliary ducts, common bile duct and pancreatic duct. The distal common bile duct and the proximal pancreatic duct is not visualized at the pancreatic head. The pancreatic head appears to be diffusely enlarged and    demonstrates multiple small cysts. These findings are concerning for pancreatic neoplasm such as intraductal papillary mucinous cystoadenoma. The other differential diagnosis includes periampullary carcinoma. MR examination of the abdomen with contrast    is recommended for further evaluation.   2.  Mixed signal intensity in the right perinephric space likely represents perinephric hematoma.   3.  Large hiatal hernia.   4.  Diffuse gastric wall thickening.      US-RUQ   Final Result      1.   Hepatomegaly.   2.  Hepatic steatosis.   3.  Gallbladder sludge. Mild intrahepatic ductal dilation. Dilation of the common duct up to 13 mm. No definite distal obstructing etiology is identified. MRCP could be obtained for further evaluation if indicated.   4.  Trace ascites.   5.  Pancreatic calcifications likely sequela of chronic pancreatitis.      DX-CHEST-PORTABLE (1 VIEW)   Final Result      Cardiomegaly.           Assessment/Plan  * Type 2 diabetes mellitus with hyperglycemia, with long-term current use of insulin (HCC)- (present on admission)  Assessment & Plan  Home dose is: Lantus 12 units at night and regular insulin 10 units 3 times daily.  Has had labile sugars due to variable PO intake requiring insulin adjustments and infection  Group home does not want sliding scale.  Sugars are not at goal but improved today  Lantus 12, humulin 10 tid      Pancreatic mass  Assessment & Plan  MRI with concern for pancreatic malignancy  ERCP with no evidence of malignancy, only calcifications of pancreas present   is elevated but less than 1000 so cannot rule in cancer, likely due chronic pancreatitis  Repeat CT showed chronic pancreatitis and loculated chest effusion    At this moment GI does not believe patient has significant evidence for periodic cancer  Due to rising WBC and alk phos, repeat MRCP was done and shows worsening obstruction  I discussed with GI  They will repeat ERCP on Monday    Normocytic anemia- (present on admission)  Assessment & Plan  Stable, no e/o blood loss    Cognitive decline- (present on admission)  Assessment & Plan  Patient likely has a chronic component to his cognitive decline as well as acute worsening  Mental status now at his baseline, patient has a legal guardian (sister)  Psych consult for decisional capacity      Hypomagnesemia  Assessment & Plan  Replaced  Repeat in AM    Sepsis (HCC)  Assessment & Plan  This was Sepsis Present on admission, improved but WBC rising  again.  Source: Ascending cholangitis  Abdomen is still soft, tolerating diet  Currently, he has finished antibiotics  Repeat MRCP shows increased CBD dilation from previously seen favoring persistent obstruction/stone or sludge  Repeat ERCP Monday        Acute respiratory failure with hypoxia (HCC)  Assessment & Plan  Due to loculated effusion  Completed abx  Currently resolved      Cholangitis  Assessment & Plan  Biliary obstruction suggested on imaging, GI consulted and they performed an ERCP on 2/24 with sphincterotomy  Swelling was too significant to place ampullary duct stent.  He improved without stent placement therefore this was deferred, but Alk P and WBC are rising again  Endoscopy done showing pancreatic calcifications but no stones, no area to biopsy  I discussed with GI, repeat MRCP shows worsening dilation of the common bile duct favoring persistent sludge, stricture or stone  Repeat ERCP/stent/EUS Monday    Recurrent major depressive disorder, in full remission (HCC)  Assessment & Plan  .    Severe protein-calorie malnutrition (HCC)- (present on admission)  Assessment & Plan  Encourage p.o. intake  No pancreatic mass identified    Hyponatremia  Assessment & Plan  Due to hyperglycemia and +/- infection?    Chronic pancreatitis (HCC)- (present on admission)  Assessment & Plan  Continue pancreatic enzymes with meals  Celiac nerve block for pain control, per GI  He is tolerating a diet    Dyslipidemia- (present on admission)  Assessment & Plan  Continue statin       VTE prophylaxis: SCDs

## 2020-03-14 NOTE — CARE PLAN
Problem: Communication  Goal: The ability to communicate needs accurately and effectively will improve  Outcome: PROGRESSING AS EXPECTED     Problem: Safety  Goal: Will remain free from injury  Outcome: PROGRESSING AS EXPECTED     Problem: Bowel/Gastric:  Goal: Will not experience complications related to bowel motility  Outcome: PROGRESSING AS EXPECTED     Problem: Pain Management  Goal: Pain level will decrease to patient's comfort goal  Outcome: PROGRESSING AS EXPECTED

## 2020-03-14 NOTE — CARE PLAN
Dilaudid given with pain relief expressed, requires much coaxing to sit in chair and ambulate in rosa.

## 2020-03-14 NOTE — PROGRESS NOTES
Pt resting in bed. VSS. No signs of distress noted. Eating breakfast. Discussed POC. Will monitor.

## 2020-03-14 NOTE — PROGRESS NOTES
"  Gastroenterology Progress Note     Author: Yao Suh MD Date & Time Created: 3/13/2020 5:08 PM    Chief Complaint:  Elevated liver tests, abnormal MRI    Interval History:  Initial history (Dr. Villarreal):  59 y.o. male with IDDM, chronic pancreatitis seizure disorder, iron deficiency anemia who presented 2/14/2020 with lethargy and poor appetite per assisted living facility.  He is difficult historian as does not want to answer questions.  He states \"I feel fine and always have.\"  He was evaluated 12/2019 for iron deficiency anemia and DKA.  Had EGD with severe erosive esophagitis.  He refused to drink prep for colonoscopy.  He apparently was brought by assisted living facility after they noted some lethargy and poor appetite.  He has weight loss of 7 pounds over the past month or so.  He denies abdominal pain and states he never had abdominal pain and his appetite is great.  Denies fevers, chills, nausea, vomiting, diarrhea, constipation, melena, hematochezia.  In ED, he had significant leukocytosis with newly elevated alkaline phosphatase and bilirubin with fairly normal transaminases.  He was started on IVF and antibiotics.  He had RUQ US followed by MRCP showing dilated bile and pancreatic ducts, distended GB, chronic pancreatitis and possible abnormal lesions head of pancreas.    2/16/2020: Continues to deny any symptoms including abdominal pain, nausea, vomiting, fevers.  Was refusing IV placement and vitals overnight.  Says he is agreeable to IV placement today  02/17/2020 - No complaints.  Says he feels fine, but does report urine is darker.  I explained imaging findings at length.  02/18/2020 - EUS showed extensive, severe pancreatic calcifications making identification of any pancreatic mass or fluid collection impossible.  Mediastinum was not assessed.  ERCP not done due to normalizing liver enzymes and added risk to patient.  02/19/2020 - No events overnight.  Patient denies fevers/chills/sweats " "and states he \"feels fine\".  Labs show worsening leukocytosis with improving liver enzymes.  02/20/2020 - Tolerating regular diet well.  Denies any abdominal pain.  He does report chronic mid-back pain, but this is not severe or worsening.  No nausea, vomiting, fevers, chills or sweats.  Leukocytosis slightly better.  Alkaline phosphatase still elevated, but remainder of liver enzymes normal.  02/21/2020 - No abdominal pain.  Tolerating diet.  Mild decrease appetite.  No CBC today.  Eager to be discharged.  02/22/2020 - Continues to \"feel fine\".  No abdominal pain, nausea or vomiting.  Tolerating diet.  Leukocytosis improved, but alkaline phosphatase abruptly higher.  CT not done due to IV issues and patient's difficult access.  2/23/20 - No new complaints. He ha lovenox s/c this AM at 6.30. ERCP scheduled for 9 AM. Labs pending   2/25/2020: Denies any complaints.  No pain.  Had ERCP yesterday showing pus from bile duct and distal bile duct stricture likely due to chronic pancreatitis.  Unable to stent  Due to loss of access and amount of ampullary inflammation.  WBC better.  Alk phos higher.  No fevers.  2/27/2020: ERCP not performed yesterday due to oxygen saturation.  Remains on 4L oxygen with large bilateral pleural effusions.  Denies dyspnea or abdominal pain.  WBc trending down.  No fevers.  2/29/2020: No changes.  Still on 4L but denies dyspnea.  WBC down to 10.  No other complaints.  Diuresing well.  3/2/2020: Patient examined and interviewed, course reviewed, discussed with Dr Villarreal.  Course discussed with nursing. No new labs yet for today.  Still requiring 4 L oxygen per NC.  He is resting well, without complaint.    3/3/20: Patient examined and interviewed, course reviewed. Discussed with RN and with Dr Lorenzo, therapeutic endoscopist.  No morning labs available yet (patient  Refused earlier but agrees to labs now).  Pt denies abd pain, fevers, chills.  Some nausea with emesis last hs.  No black or " tarry stools, rectal bleeding, difficulty swallowing.  His oxygen saturaiton is low 90s on one liter of oxygen now, improved.      3/12/2020 Still elevated Alk-P, with leukocytosis. No RUQ pain. No fever. MRI done, pending result  3/13/2020 Doing ok. No RUQ pain. MRI showed CBD And p ducts dilation with debris.     Review of Systems:  Review of Systems   Constitutional: Positive for weight loss. Negative for chills, fever and malaise/fatigue.   Respiratory: Negative for cough, shortness of breath and wheezing.    Cardiovascular: Negative for chest pain, palpitations and leg swelling.   Gastrointestinal: Negative for abdominal pain, blood in stool, constipation, diarrhea, heartburn, melena, nausea and vomiting.   Genitourinary: Negative for dysuria and urgency.   Skin: Negative for itching and rash.   Neurological: Negative for dizziness and headaches.     Physical Exam:  Physical Exam  Vitals signs and nursing note reviewed.   Constitutional:       Appearance: Normal appearance. He is normal weight. He is not ill-appearing or diaphoretic.   HENT:      Head: Normocephalic and atraumatic.      Nose: Nose normal.   Eyes:      General: No scleral icterus.        Right eye: No discharge.         Left eye: No discharge.      Pupils: Pupils are equal, round, and reactive to light.   Cardiovascular:      Rate and Rhythm: Normal rate and regular rhythm.      Heart sounds: Normal heart sounds. No murmur.   Pulmonary:      Effort: Pulmonary effort is normal. No respiratory distress.      Breath sounds: No stridor. Decreased breath sounds present. No wheezing, rhonchi or rales.   Abdominal:      General: Abdomen is flat. Bowel sounds are normal. There is no distension.      Palpations: Abdomen is soft. There is no mass.      Tenderness: There is no abdominal tenderness. There is no guarding or rebound.      Hernia: No hernia is present.   Musculoskeletal:         General: No swelling or tenderness.   Skin:     General: Skin is  warm and dry.      Coloration: Skin is not jaundiced.      Findings: No bruising or rash.   Neurological:      General: No focal deficit present.      Mental Status: He is alert and oriented to person, place, and time.   Psychiatric:         Mood and Affect: Mood normal.       Labs:          Recent Labs     20   SODIUM 130* 130* 131*   POTASSIUM 4.1 3.7 3.8   CHLORIDE 94* 95* 91*   CO2 25 24 25   BUN 14 13 15   CREATININE 0.67 0.46* 0.54   MAGNESIUM 1.5 1.4* 1.6   PHOSPHORUS  --  2.2*  --    CALCIUM 8.9 8.8 8.8     Recent Labs     20   ALTSGPT 27 30 30   ASTSGOT 75* 75* 81*   ALKPHOSPHAT 1118* 1173* 1120*   TBILIRUBIN 1.2 1.2 1.0   GLUCOSE 343* 223* 130*     Recent Labs     2048 20  05020   RBC 3.62* 3.49* 3.62*   HEMOGLOBIN 10.9* 10.6* 10.9*   HEMATOCRIT 32.7* 31.0* 32.7*   PLATELETCT 486* 511* 570*     Recent Labs     20   WBC 19.0* 20.5* 18.8*   NEUTSPOLYS 72.20* 69.80 67.30   LYMPHOCYTES 12.10* 16.70* 14.80*   MONOCYTES 12.90 10.60 15.00*   EOSINOPHILS 0.50 0.40 0.70   BASOPHILS 0.70 0.80 0.60   ASTSGOT 75* 75* 81*   ALTSGPT 27 30 30   ALKPHOSPHAT 1118* 1173* 1120*   TBILIRUBIN 1.2 1.2 1.0     Hemodynamics:  Temp (24hrs), Av.6 °C (97.9 °F), Min:36.6 °C (97.8 °F), Max:36.7 °C (98 °F)  Temperature: 36.7 °C (98 °F)  Pulse  Av.2  Min: 70  Max: 122   Blood Pressure: 101/61     Respiratory:    Respiration: 18, Pulse Oximetry: 93 %        RUL Breath Sounds: Diminished, RML Breath Sounds: Diminished, RLL Breath Sounds: Diminished, LEONARD Breath Sounds: Diminished, LLL Breath Sounds: Diminished  Fluids:    Intake/Output Summary (Last 24 hours) at 2020 0706  Last data filed at 2/15/2020 2200  Gross per 24 hour   Intake 2485 ml   Output 600 ml   Net 1885 ml        GI/Nutrition:  Orders Placed This Encounter   Procedures   • Diet Order Consistent Carbohydrate      Standing Status:   Standing     Number of Occurrences:   1     Order Specific Question:   Diet:     Answer:   Consistent Carbohydrate [4]     Medical Decision Making, by Problem:  Active Hospital Problems    Diagnosis   • *Sepsis (HCC) [A41.9]   • Elevated alkaline phosphatase level [R74.8]   • Type 2 diabetes mellitus with hyperglycemia, with long-term current use of insulin (HCC) [E11.65, Z79.4]   • Cognitive decline [R41.89]   • Metabolic acidosis [E87.2]   • Hypokalemia [E87.6]   • Hyponatremia [E87.1]   • Chronic pancreatitis (HCC) [K86.1]   • Dyslipidemia [E78.5]   • Cholangitis [K83.09]     3/12/2020 MRI:  1.  Interval increase in common bile duct dilatation and intrahepatic biliary ductal dilatation since the prior examination. Filling defects in the common bile duct just before the stricture at the level of the pancreatic head have developed since the prior exam. Differential diagnosis includes sludge as well as common duct stones.  2.  Pancreatic ductal dilatation is again identified which is slightly less than on the prior exam. Debris within the pancreatic duct is now present that appears similar to the debris within the common bile duct.  3.  Enlarged pancreatic head containing small cystic structures is again identified. The 2 larger cysts in the posterior pancreatic head have nearly completely resolved. Enlargement of the pancreatic head could be related to pancreatitis. Neoplasm is also possible.  4.  Bright T2 signal and enlargement of the right adrenal gland is again noted.  5.  Sludge again noted in the gallbladder.  6.  Small right pleural effusion is identified.      Impression  / Plan  58 y/o with chronic pancreatitis, IDDM, iron deficiency anemia, GERD with esophagitis that presented for lethargy and found to have significant leukocytosis and newly elevated liver tests.  Concern for sepsis from biliary source.  Imaging suggests dilation of biliary and pancreatic ducts but no obvious stones  although could have ampullary lesion of main duct IPMN causing obstruction.      - Elevated liver tests- likely due to combination of choledocholithiasis (status post prior extraction) and biliary stricture in setting of chronic pancreatitis.     - Sepsis, cholangitis? Better now.  - Leukocytosis   - Abnormal imaging of abdomen with dilated bile ducts and pancreatic duct and questionable mass head of pancreas - Only severe chronic calcific pancreatitis identified on EUS  - Chronic pancreatitis  - IDDM  - Iron deficiency anemia  - GERD with esophagitis  - Hypertension  - Seizure disorder  - Aggressive behavior to healthcare workers  - Back pain  - Pancreatic fluid collection tracking adjacent to distal esophagus on imaging, ?pseudocyst versus infected fluid from prolonged biliary obstruction     PLAN:   - Plan on reattempt ERCP with biliary stenting, +/- P-duct stenting, on Monday 3/16/2020 10 AM, with Dr. Ansari  - Risks, benefits, and alternatives were discussed with patient. Consenting persons were given an opportunity to ask questions and discuss other options. Risks including but not limited to failed or incomplete endoscopy, ineffective therapy, perforation, infection, bleeding, missed lesion(s), cardiac and/or pulmonary event, aspiration, stroke, possible need for surgery, hospitalization possibly prolonged, discomfort, unsuccessful and/or incomplete procedure, possible need for repeat procedures and/or additional testings, damage to adjacent organs and/or vascular structures, medication reaction, disability, death, and other adverse events possibly life-threatening. Discussion was undertaken with Layman's terms. Consenting persons stated understanding and acceptance of these risks, and wished to proceed. Consent was given in clear state of mind. All questions were answered.  - I used google images to help explain the procedure to the patient       Quality-Core Measures   Reviewed items::  Radiology images  reviewed, Labs reviewed and Medications reviewed

## 2020-03-15 LAB
ALBUMIN SERPL BCP-MCNC: 2.4 G/DL (ref 3.2–4.9)
ALBUMIN/GLOB SERPL: 0.6 G/DL
ALP SERPL-CCNC: 1491 U/L (ref 30–99)
ALT SERPL-CCNC: 32 U/L (ref 2–50)
ANION GAP SERPL CALC-SCNC: 12 MMOL/L (ref 7–16)
ANISOCYTOSIS BLD QL SMEAR: ABNORMAL
AST SERPL-CCNC: 77 U/L (ref 12–45)
BASOPHILS # BLD AUTO: 1.1 % (ref 0–1.8)
BASOPHILS # BLD: 0.18 K/UL (ref 0–0.12)
BILIRUB SERPL-MCNC: 0.9 MG/DL (ref 0.1–1.5)
BUN SERPL-MCNC: 16 MG/DL (ref 8–22)
CALCIUM SERPL-MCNC: 8.9 MG/DL (ref 8.4–10.2)
CHLORIDE SERPL-SCNC: 92 MMOL/L (ref 96–112)
CO2 SERPL-SCNC: 25 MMOL/L (ref 20–33)
COMMENT 1642: NORMAL
CREAT SERPL-MCNC: 0.45 MG/DL (ref 0.5–1.4)
EOSINOPHIL # BLD AUTO: 0.14 K/UL (ref 0–0.51)
EOSINOPHIL NFR BLD: 0.9 % (ref 0–6.9)
GLOBULIN SER CALC-MCNC: 3.8 G/DL (ref 1.9–3.5)
GLUCOSE BLD-MCNC: 173 MG/DL (ref 65–99)
GLUCOSE BLD-MCNC: 204 MG/DL (ref 65–99)
GLUCOSE BLD-MCNC: 253 MG/DL (ref 65–99)
GLUCOSE BLD-MCNC: 84 MG/DL (ref 65–99)
GLUCOSE SERPL-MCNC: 226 MG/DL (ref 65–99)
HCT VFR BLD AUTO: 35.3 % (ref 42–52)
HGB BLD-MCNC: 11.7 G/DL (ref 14–18)
IMM GRANULOCYTES # BLD AUTO: 0.29 K/UL (ref 0–0.11)
IMM GRANULOCYTES NFR BLD AUTO: 1.8 % (ref 0–0.9)
LYMPHOCYTES # BLD AUTO: 2.54 K/UL (ref 1–4.8)
LYMPHOCYTES NFR BLD: 15.7 % (ref 22–41)
MACROCYTES BLD QL SMEAR: ABNORMAL
MCH RBC QN AUTO: 29.8 PG (ref 27–33)
MCHC RBC AUTO-ENTMCNC: 33.1 G/DL (ref 33.7–35.3)
MCV RBC AUTO: 89.8 FL (ref 81.4–97.8)
MONOCYTES # BLD AUTO: 2 K/UL (ref 0–0.85)
MONOCYTES NFR BLD AUTO: 12.4 % (ref 0–13.4)
NEUTROPHILS # BLD AUTO: 10.98 K/UL (ref 1.82–7.42)
NEUTROPHILS NFR BLD: 68.1 % (ref 44–72)
NRBC # BLD AUTO: 0 K/UL
NRBC BLD-RTO: 0 /100 WBC
PLATELET # BLD AUTO: 426 K/UL (ref 164–446)
PLATELET BLD QL SMEAR: NORMAL
PMV BLD AUTO: 11.7 FL (ref 9–12.9)
POIKILOCYTOSIS BLD QL SMEAR: NORMAL
POLYCHROMASIA BLD QL SMEAR: NORMAL
POTASSIUM SERPL-SCNC: 3.8 MMOL/L (ref 3.6–5.5)
PROT SERPL-MCNC: 6.2 G/DL (ref 6–8.2)
RBC # BLD AUTO: 3.93 M/UL (ref 4.7–6.1)
RBC BLD AUTO: PRESENT
SODIUM SERPL-SCNC: 129 MMOL/L (ref 135–145)
TARGETS BLD QL SMEAR: NORMAL
WBC # BLD AUTO: 16.1 K/UL (ref 4.8–10.8)

## 2020-03-15 PROCEDURE — 99232 SBSQ HOSP IP/OBS MODERATE 35: CPT | Performed by: INTERNAL MEDICINE

## 2020-03-15 PROCEDURE — 770006 HCHG ROOM/CARE - MED/SURG/GYN SEMI*

## 2020-03-15 PROCEDURE — 700102 HCHG RX REV CODE 250 W/ 637 OVERRIDE(OP): Performed by: INTERNAL MEDICINE

## 2020-03-15 PROCEDURE — 36415 COLL VENOUS BLD VENIPUNCTURE: CPT

## 2020-03-15 PROCEDURE — A9270 NON-COVERED ITEM OR SERVICE: HCPCS | Performed by: INTERNAL MEDICINE

## 2020-03-15 PROCEDURE — 80053 COMPREHEN METABOLIC PANEL: CPT

## 2020-03-15 PROCEDURE — 85025 COMPLETE CBC W/AUTO DIFF WBC: CPT

## 2020-03-15 PROCEDURE — 82962 GLUCOSE BLOOD TEST: CPT

## 2020-03-15 PROCEDURE — 700111 HCHG RX REV CODE 636 W/ 250 OVERRIDE (IP): Performed by: HOSPITALIST

## 2020-03-15 RX ORDER — INSULIN GLARGINE 100 [IU]/ML
14 INJECTION, SOLUTION SUBCUTANEOUS EVERY EVENING
Status: DISCONTINUED | OUTPATIENT
Start: 2020-03-15 | End: 2020-03-17

## 2020-03-15 RX ADMIN — TAMSULOSIN HYDROCHLORIDE 0.4 MG: 0.4 CAPSULE ORAL at 05:22

## 2020-03-15 RX ADMIN — FUROSEMIDE 40 MG: 40 TABLET ORAL at 05:22

## 2020-03-15 RX ADMIN — Medication 100 MG: at 05:22

## 2020-03-15 RX ADMIN — PANCRELIPASE 48000 UNITS: 24000; 76000; 120000 CAPSULE, DELAYED RELEASE PELLETS ORAL at 05:21

## 2020-03-15 RX ADMIN — OMEPRAZOLE 20 MG: 20 CAPSULE, DELAYED RELEASE ORAL at 05:22

## 2020-03-15 RX ADMIN — SENNOSIDES AND DOCUSATE SODIUM 2 TABLET: 8.6; 5 TABLET ORAL at 05:22

## 2020-03-15 RX ADMIN — INSULIN GLARGINE 14 UNITS: 100 INJECTION, SOLUTION SUBCUTANEOUS at 20:21

## 2020-03-15 RX ADMIN — INSULIN HUMAN 10 UNITS: 100 INJECTION, SOLUTION PARENTERAL at 05:25

## 2020-03-15 RX ADMIN — OMEPRAZOLE 20 MG: 20 CAPSULE, DELAYED RELEASE ORAL at 17:15

## 2020-03-15 RX ADMIN — HYDROMORPHONE HYDROCHLORIDE 0.5 MG: 1 INJECTION, SOLUTION INTRAMUSCULAR; INTRAVENOUS; SUBCUTANEOUS at 17:15

## 2020-03-15 RX ADMIN — ATORVASTATIN CALCIUM 40 MG: 40 TABLET, FILM COATED ORAL at 17:15

## 2020-03-15 ASSESSMENT — ENCOUNTER SYMPTOMS
ABDOMINAL PAIN: 0
VOMITING: 0
HALLUCINATIONS: 0
FOCAL WEAKNESS: 0
SPUTUM PRODUCTION: 0
COUGH: 0
SORE THROAT: 0
BACK PAIN: 0
DIARRHEA: 0
BLOOD IN STOOL: 0
DEPRESSION: 0
FEVER: 0
HEARTBURN: 0
CHILLS: 0
WEAKNESS: 0
NAUSEA: 0
SHORTNESS OF BREATH: 0
PALPITATIONS: 0
HEADACHES: 0
DIZZINESS: 0

## 2020-03-15 NOTE — PROGRESS NOTES
0700: Bedside report completed w/ Khari/KELLEY.  Assumed pt care. Patient in bed, sleeping, in no apparent distress.  Safety precautions in place. Call light & personal belongings within reach.  Plan of care discussed.    1900:  Bedside report w/ Rosendo/KELLEY

## 2020-03-15 NOTE — CARE PLAN
Problem: Discharge Barriers/Planning  Goal: Patient's continuum of care needs will be met  Outcome: PROGRESSING SLOWER THAN EXPECTED     Problem: Pain Management  Goal: Pain level will decrease to patient's comfort goal  Outcome: PROGRESSING SLOWER THAN EXPECTED     Problem: Psychosocial Needs:  Goal: Level of anxiety will decrease  Outcome: PROGRESSING SLOWER THAN EXPECTED

## 2020-03-15 NOTE — PROGRESS NOTES
Hospital Medicine Daily Progress Note    Date of Service  3/15/2020    Chief Complaint  59 y.o. male admitted 2/14/2020 with abdominal pain    Hospital Course    History of type 2 diabetes, cognitive impairment who lives at a group home who was admitted for abdominal pain found to have sepsis secondary to ascending cholangitis.  He underwent ERCP with sphincterotomy to relieve obstruction.  No stent was placed due to significant edema.  Repeat ERCP was not performed due to acute respiratory failure with hypoxia which has now resolved.  He has improved with antibiotics and is almost ready to return to his group home however, blood sugars have been variable and they are not allowed to do sliding scale      Interval Problem Update  3/10: Has been hyperglycemic, today dropped to 59.  Lantus and mealtime insulin decreased.  Pain is controlled.  Appetite is improving but remains low.  3/11: Sugars are not at goal, back up to 385.  Insulin increased.  Alk phos and WBC rising, concern for recurrent obstructive process.  Repeat cxr ordered (previous effusion w loculation)  3/12: Sugars improved, not at goal.  WBC and alk phos still rising.  I discussed with GI, repeat MRCP.  3/13: Sugar control improved.  WBC still elevated however stable.  Alk phos stable.  Repeat MRCP shows worsening dilation of the CBD, await GI recommendations regarding need for repeat ERCP/EUS/stent  3/14: Planning for repeat ERCP Monday, Sugar control has improved  3/15: Sugars are slightly up, Lantus and prandial increased.  I discussed the case with psychiatry, they will evaluate his current decisional capacity    Consultants/Specialty  Dr. Lorenzo - [facility]    Code Status  DNR/DNI    Disposition  TBD - pending placement / capacity and medical stability    Review of Systems  Review of Systems   Constitutional: Positive for malaise/fatigue (Drowsy, tired). Negative for chills and fever.   HENT: Negative for sore throat.    Respiratory: Negative for cough,  sputum production and shortness of breath.    Cardiovascular: Negative for chest pain and palpitations.   Gastrointestinal: Negative for abdominal pain (Resolved), blood in stool, diarrhea, heartburn, nausea and vomiting (Appetite improved).   Genitourinary: Negative for frequency and urgency.   Musculoskeletal: Negative for back pain and joint pain.   Skin: Negative for itching and rash.   Neurological: Negative for dizziness, focal weakness, weakness and headaches.   Psychiatric/Behavioral: Negative for depression and hallucinations.   All other systems reviewed and are negative.       Physical Exam  Temp:  [36.6 °C (97.9 °F)-36.8 °C (98.3 °F)] 36.7 °C (98.1 °F)  Pulse:  [] 117  Resp:  [17-20] 20  BP: (105-130)/(53-71) 105/60  SpO2:  [93 %-96 %] 93 %    Physical Exam  Constitutional:       Appearance: Normal appearance. He is underweight.      Comments: Thin, underweight   HENT:      Head: Normocephalic and atraumatic.      Nose: Nose normal.      Mouth/Throat:      Mouth: Mucous membranes are moist.   Eyes:      Extraocular Movements: Extraocular movements intact.      Pupils: Pupils are equal, round, and reactive to light.   Neck:      Musculoskeletal: Normal range of motion and neck supple.   Cardiovascular:      Rate and Rhythm: Normal rate and regular rhythm.      Heart sounds: No murmur.   Pulmonary:      Effort: Pulmonary effort is normal. No respiratory distress.      Breath sounds: Normal breath sounds. No stridor. No wheezing.   Abdominal:      General: Abdomen is flat. Bowel sounds are normal. There is no distension.      Palpations: Abdomen is soft. There is no mass.      Tenderness: There is no abdominal tenderness. There is no guarding.   Musculoskeletal:         General: No swelling, tenderness or deformity.      Comments: Very thin extremities   Skin:     General: Skin is warm and dry.      Coloration: Skin is not jaundiced or pale.   Neurological:      General: No focal deficit present.       Mental Status: He is alert and oriented to person, place, and time. Mental status is at baseline.   Psychiatric:         Mood and Affect: Mood normal.         Behavior: Behavior normal.      Comments: Very pleasant, cooperative         Fluids    Intake/Output Summary (Last 24 hours) at 3/15/2020 1315  Last data filed at 3/15/2020 1130  Gross per 24 hour   Intake 840 ml   Output 700 ml   Net 140 ml       Laboratory  Recent Labs     03/13/20 0429 03/14/20 0658 03/15/20  0647   WBC 18.8* 16.0* 16.1*   RBC 3.62* 3.56* 3.93*   HEMOGLOBIN 10.9* 10.6* 11.7*   HEMATOCRIT 32.7* 32.1* 35.3*   MCV 90.3 90.2 89.8   MCH 30.1 29.8 29.8   MCHC 33.3* 33.0* 33.1*   RDW 77.0*  --   --    PLATELETCT 570* 547* 426   MPV 10.3 10.3 11.7     Recent Labs     03/13/20 0429 03/14/20  0658 03/15/20  0647   SODIUM 131* 130* 129*   POTASSIUM 3.8 3.8 3.8   CHLORIDE 91* 93* 92*   CO2 25 24 25   GLUCOSE 130* 207* 226*   BUN 15 14 16   CREATININE 0.54 0.50 0.45*   CALCIUM 8.8 8.9 8.9                   Imaging  [id number]   Final Result      1.  Interval increase in common bile duct dilatation and intrahepatic biliary ductal dilatation since the prior examination. Filling defects in the common bile duct just before the stricture at the level of the pancreatic head have developed since the    prior exam. Differential diagnosis includes sludge as well as common duct stones.      2.  Pancreatic ductal dilatation is again identified which is slightly less than on the prior exam. Debris within the pancreatic duct is now present that appears similar to the debris within the common bile duct.      3.  Enlarged pancreatic head containing small cystic structures is again identified. The 2 larger cysts in the posterior pancreatic head have nearly completely resolved. Enlargement of the pancreatic head could be related to pancreatitis. Neoplasm is    also possible.      4.  Bright T2 signal and enlargement of the right adrenal gland is again noted.       5.  Sludge again noted in the gallbladder.      6.  Small right pleural effusion is identified.      DX-CHEST-PORTABLE (1 VIEW)   Final Result      1.  Cardiomegaly with bilateral interstitial opacities.      2.  Small bilateral pleural effusions.         DX-CHEST-PORTABLE (1 VIEW)   Final Result         1. No significant interval change.      DX-CHEST-PORTABLE (1 VIEW)   Final Result      New subpulmonic effusions, bibasilar atelectasis and edema      DX-PORTABLE FLUOROSCOPY < 1 HOUR   Final Result      Portable fluoroscopy utilized for 1.1 minute.         INTERPRETING LOCATION: [address] [place] [address], [place], [address]      [id number] SPHINCTEROTOMY   Final Result      Intraoperative fluoroscopy spot images as described above.      CT-ABDOMEN WITH & W/O   Final Result      1.  No significant change since CT abdomen and pelvis obtained 2/16/2020      2.  Findings consistent with high-grade biliary obstruction with dilation of the common bile duct measuring 17 mm, dilated gallbladder, dilated pancreatic duct with change in caliber of the pancreatic duct at the level pancreatic head      3.  Findings could be either malignancy the pancreatic head versus scarring from chronic pancreatitis.      4.  Sequela of chronic pancreatitis      5.  Large right medial chest rim-enhancing loculated fluid collection probably within the pleural space measuring 10.5 x 3.1 centimeter transversely and 7 cm craniocaudal      6.  Rim-enhancing fluid collection in the hepatorenal space measuring 11.2 cm craniocaudal and 2-3 cm in thickness.      7.  The rim-enhancing fluid collection are suspicious for infection/abscess      8.  Bilateral atelectasis and small-moderate-sized pleural effusion after      9.  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  Biliary dilatation without evidence of high-grade biliary obstruction.   2.   Gallbladder visualization after morphine administration. No acute cholecystitis.      CT-ABDOMEN-PELVIS WITH   Final Result      1.  New rim enhancing fluid collection tracks along the right heart margin, azygoesophageal recess and possibly communicates with Morison's pouch tracking along the right aspect of the inferior vena cava. This is nonspecific and could indicate abscess,    dissecting pseudocyst, mucinous cystic neoplasm related fluid      2.  New extra and intrahepatic biliary ductal dilatation with 6 cm dilatation of the gallbladder also seen. This indicates distal common bile duct/ampullary level obstruction. Stricture, mass effect from adjacent pancreas calcifications or small    ampullary mass are differential possibilities      3.  Decreased small pleural effusions. Some loculation on the right is possible      4.  Slightly decreased diffuse pancreatic ductal dilatation now measuring 11 mm. Evidence of chronic pancreatitis. New 3 cm cystic structure anterior to the pancreas could be a pseudocyst. Abscess or necrotic/cystic neoplasm are in the differential.      5.  Multiple chronic findings including status post aorto bifemoral bypass with approaching 70% stenosis of the left superficial femoral artery, nonobstructive right 3 mm nephrolithiasis, large volume rectal vault stool, dilated bladder which could    indicate outlet obstruction or neurogenic bladder, splenosis      [id number]/[id number]   Final Result      1.  Severe dilatation of the intrahepatic biliary ducts, common bile duct and pancreatic duct. The distal common bile duct and the proximal pancreatic duct is not visualized at the pancreatic head. The pancreatic head appears to be diffusely enlarged and    demonstrates multiple small cysts. These findings are concerning for pancreatic neoplasm such as intraductal papillary mucinous cystoadenoma. The other differential diagnosis includes periampullary carcinoma. MR examination of the abdomen with  contrast    is recommended for further evaluation.   2.  Mixed signal intensity in the right perinephric space likely represents perinephric hematoma.   3.  Large hiatal hernia.   4.  Diffuse gastric wall thickening.      US-RUQ   Final Result      1.  Hepatomegaly.   2.  Hepatic steatosis.   3.  Gallbladder sludge. Mild intrahepatic ductal dilation. Dilation of the common duct up to 13 mm. No definite distal obstructing etiology is identified. MRCP could be obtained for further evaluation if indicated.   4.  Trace ascites.   5.  Pancreatic calcifications likely sequela of chronic pancreatitis.      DX-CHEST-PORTABLE (1 VIEW)   Final Result      Cardiomegaly.           Assessment/Plan  * Type 2 diabetes mellitus with hyperglycemia, with long-term current use of insulin (HCC)- (present on admission)  Assessment & Plan  Home dose is: Lantus 12 units at night and regular insulin 10 units 3 times daily.  Has had labile sugars due to variable PO intake requiring insulin adjustments and infection  Group home does not want sliding scale.  Sugars are not at goal but improved again  Increase Lantus to 14, humulin 11 tid      Pancreatic mass  Assessment & Plan  MRI with concern for pancreatic malignancy  ERCP with no evidence of malignancy, only calcifications of pancreas present   is elevated but less than 1000 so cannot rule in cancer, likely due chronic pancreatitis  Repeat CT showed chronic pancreatitis and loculated chest effusion    At this moment GI does not believe patient has significant evidence for periodic cancer  Due to rising WBC and alk phos, repeat MRCP was done and shows worsening obstruction  I discussed with GI  They will repeat ERCP on Monday    Normocytic anemia- (present on admission)  Assessment & Plan  Stable, no e/o blood loss    Cognitive decline- (present on admission)  Assessment & Plan  Patient has had chronic cognitive decline.  His sister is his financial decision-maker however is  considering guardianship.  Mental status now at his baseline  Psych consult for decisional capacity to determine if he currently has capacity to make decisions which would help the sister make a decision regarding guardianship moving forward      Hypomagnesemia  Assessment & Plan  Replaced  Repeat in AM    Sepsis (HCC)  Assessment & Plan  This was Sepsis Present on admission, improved but WBC rising again.  Source: Ascending cholangitis  Abdomen is still soft, tolerating diet  Currently, he has finished antibiotics  Repeat MRCP shows increased CBD dilation from previously seen favoring persistent obstruction/stone or sludge  Repeat ERCP Monday        Acute respiratory failure with hypoxia (HCC)  Assessment & Plan  Due to loculated effusion  Completed abx  Currently resolved      Cholangitis  Assessment & Plan  Biliary obstruction suggested on imaging, GI consulted and they performed an ERCP on 2/24 with sphincterotomy  Swelling was too significant to place ampullary duct stent.  He improved without stent placement therefore this was deferred, but Alk P and WBC are rising again  Endoscopy done showing pancreatic calcifications but no stones, no area to biopsy  Repeat MRCP shows worsening dilation of the common bile duct favoring persistent sludge, stricture or stone  Repeat ERCP/stent/EUS Monday    Recurrent major depressive disorder, in full remission (HCC)  Assessment & Plan  .    Severe protein-calorie malnutrition (HCC)- (present on admission)  Assessment & Plan  Encourage p.o. intake  No pancreatic mass identified    Hyponatremia  Assessment & Plan  Due to hyperglycemia and +/- infection?    Chronic pancreatitis (HCC)- (present on admission)  Assessment & Plan  Continue pancreatic enzymes with meals  Celiac nerve block for pain control, per GI  He is tolerating a diet    Dyslipidemia- (present on admission)  Assessment & Plan  Continue statin       VTE prophylaxis: SCDs

## 2020-03-15 NOTE — PSYCHIATRY
"BRIEF PSYCHIATRIC CONSULT NOTE:  Note seen: have spent 2 days trying to clarify with  whether or not this pt has a guardian. Unfortunately this has led to a lot of confusion in the matter.. Per  today, who is not on the case, guardianship is being pursued by sister. In other words, there is no guardian. It is unclear if she is POA. If she reports she is, strongly recommend that  get confirmatory paperwork.     The reason for the consult was also unclear:  was wanting a \"capacity\". Capacity is specific not general.    If there is no guardian for medical decision making then whatever the the family member wants, has no legal standing in the matter. ''    Spoke with hospitalist. Pt is not refusing meds or any treatment. At this time, capacity evaluation for his understanding of his medical situation can be done but is not urgent. It appears he has improved and sister is thinking he may not need a guardian in the future for medical decisions making. However a capacity eval does not address the future. So if he returns to the group home, we cannot say for sure he will be capacitated to manage meds there.     I will see this patient tomorrow, and hospitalist agreed, to assess his level of understanding at this time.     A note to : if pt has a POA or a guardian, paperwork should be obtained to confirm whether they do or not and if they have a guardian, they only way for the pt to make decisions ( medical, financial or both)  is to get a new hearing in front of a .  "

## 2020-03-15 NOTE — DISCHARGE PLANNING
Received phone call from Dr Celaya. MD asking RN CM if pt has a guardian. RN CARRI explained that this pt is not normally this RN CMs pt so specifics can not be provided but as far as RN CARRI was aware, pt is in the process of getting a guardian. MD asking why psych consult is being requested. RN CM explained that guardian wanted pt to go on hospice but pt is not hospice appropriate and DC planning since then has been very difficult due to guardian requests for DC placement that is not appropriate. MD states that a psych consult is not appropriate on this pt and she would be contacting the hospitalist.

## 2020-03-15 NOTE — PROGRESS NOTES
Received patient, resting comfortably in bed, safety precautions in place, call light within reach. Will continue to monitor.

## 2020-03-16 ENCOUNTER — APPOINTMENT (OUTPATIENT)
Dept: RADIOLOGY | Facility: MEDICAL CENTER | Age: 60
DRG: 871 | End: 2020-03-16
Attending: INTERNAL MEDICINE
Payer: COMMERCIAL

## 2020-03-16 ENCOUNTER — ANESTHESIA EVENT (OUTPATIENT)
Dept: SURGERY | Facility: MEDICAL CENTER | Age: 60
DRG: 871 | End: 2020-03-16
Payer: COMMERCIAL

## 2020-03-16 ENCOUNTER — ANESTHESIA (OUTPATIENT)
Dept: SURGERY | Facility: MEDICAL CENTER | Age: 60
DRG: 871 | End: 2020-03-16
Payer: COMMERCIAL

## 2020-03-16 LAB
ALBUMIN SERPL BCP-MCNC: 2.2 G/DL (ref 3.2–4.9)
ALBUMIN/GLOB SERPL: 0.6 G/DL
ALP SERPL-CCNC: 1733 U/L (ref 30–99)
ALT SERPL-CCNC: 34 U/L (ref 2–50)
ANION GAP SERPL CALC-SCNC: 12 MMOL/L (ref 7–16)
AST SERPL-CCNC: 78 U/L (ref 12–45)
BASOPHILS # BLD AUTO: 0.9 % (ref 0–1.8)
BASOPHILS # BLD: 0.15 K/UL (ref 0–0.12)
BILIRUB SERPL-MCNC: 0.9 MG/DL (ref 0.1–1.5)
BUN SERPL-MCNC: 15 MG/DL (ref 8–22)
CALCIUM SERPL-MCNC: 8.9 MG/DL (ref 8.4–10.2)
CHLORIDE SERPL-SCNC: 95 MMOL/L (ref 96–112)
CO2 SERPL-SCNC: 25 MMOL/L (ref 20–33)
CREAT SERPL-MCNC: 0.44 MG/DL (ref 0.5–1.4)
CYTOLOGY REG CYTOL: NORMAL
EOSINOPHIL # BLD AUTO: 0.19 K/UL (ref 0–0.51)
EOSINOPHIL NFR BLD: 1.2 % (ref 0–6.9)
ERYTHROCYTE [DISTWIDTH] IN BLOOD BY AUTOMATED COUNT: 73.6 FL (ref 35.9–50)
GLOBULIN SER CALC-MCNC: 3.9 G/DL (ref 1.9–3.5)
GLUCOSE BLD-MCNC: 180 MG/DL (ref 65–99)
GLUCOSE BLD-MCNC: 491 MG/DL (ref 65–99)
GLUCOSE BLD-MCNC: 500 MG/DL (ref 65–99)
GLUCOSE BLD-MCNC: 570 MG/DL (ref 65–99)
GLUCOSE SERPL-MCNC: 182 MG/DL (ref 65–99)
HCT VFR BLD AUTO: 33.4 % (ref 42–52)
HGB BLD-MCNC: 11.3 G/DL (ref 14–18)
IMM GRANULOCYTES # BLD AUTO: 0.39 K/UL (ref 0–0.11)
IMM GRANULOCYTES NFR BLD AUTO: 2.4 % (ref 0–0.9)
LYMPHOCYTES # BLD AUTO: 3.13 K/UL (ref 1–4.8)
LYMPHOCYTES NFR BLD: 19.1 % (ref 22–41)
MCH RBC QN AUTO: 30.1 PG (ref 27–33)
MCHC RBC AUTO-ENTMCNC: 33.8 G/DL (ref 33.7–35.3)
MCV RBC AUTO: 89.1 FL (ref 81.4–97.8)
MONOCYTES # BLD AUTO: 2.07 K/UL (ref 0–0.85)
MONOCYTES NFR BLD AUTO: 12.6 % (ref 0–13.4)
NEUTROPHILS # BLD AUTO: 10.46 K/UL (ref 1.82–7.42)
NEUTROPHILS NFR BLD: 63.8 % (ref 44–72)
NRBC # BLD AUTO: 0 K/UL
NRBC BLD-RTO: 0 /100 WBC
PATHOLOGY CONSULT NOTE: NORMAL
PLATELET # BLD AUTO: 471 K/UL (ref 164–446)
PMV BLD AUTO: 10.9 FL (ref 9–12.9)
POTASSIUM SERPL-SCNC: 3.8 MMOL/L (ref 3.6–5.5)
PROT SERPL-MCNC: 6.1 G/DL (ref 6–8.2)
RBC # BLD AUTO: 3.75 M/UL (ref 4.7–6.1)
SODIUM SERPL-SCNC: 132 MMOL/L (ref 135–145)
WBC # BLD AUTO: 16.4 K/UL (ref 4.8–10.8)

## 2020-03-16 PROCEDURE — 80053 COMPREHEN METABOLIC PANEL: CPT

## 2020-03-16 PROCEDURE — 36415 COLL VENOUS BLD VENIPUNCTURE: CPT

## 2020-03-16 PROCEDURE — 160035 HCHG PACU - 1ST 60 MINS PHASE I: Performed by: INTERNAL MEDICINE

## 2020-03-16 PROCEDURE — 99254 IP/OBS CNSLTJ NEW/EST MOD 60: CPT | Performed by: PSYCHIATRY & NEUROLOGY

## 2020-03-16 PROCEDURE — 160203 HCHG ENDO MINUTES - 1ST 30 MINS LEVEL 4: Performed by: INTERNAL MEDICINE

## 2020-03-16 PROCEDURE — 88104 CYTOPATH FL NONGYN SMEARS: CPT

## 2020-03-16 PROCEDURE — 700105 HCHG RX REV CODE 258: Performed by: INTERNAL MEDICINE

## 2020-03-16 PROCEDURE — A9270 NON-COVERED ITEM OR SERVICE: HCPCS | Performed by: INTERNAL MEDICINE

## 2020-03-16 PROCEDURE — 0F798DZ DILATION OF COMMON BILE DUCT WITH INTRALUMINAL DEVICE, VIA NATURAL OR ARTIFICIAL OPENING ENDOSCOPIC: ICD-10-PCS | Performed by: INTERNAL MEDICINE

## 2020-03-16 PROCEDURE — 700111 HCHG RX REV CODE 636 W/ 250 OVERRIDE (IP): Performed by: ANESTHESIOLOGY

## 2020-03-16 PROCEDURE — 503190 HCHG INFLATOR, ENCORE DEVICE: Performed by: INTERNAL MEDICINE

## 2020-03-16 PROCEDURE — C2617 STENT, NON-COR, TEM W/O DEL: HCPCS | Performed by: INTERNAL MEDICINE

## 2020-03-16 PROCEDURE — 770006 HCHG ROOM/CARE - MED/SURG/GYN SEMI*

## 2020-03-16 PROCEDURE — 502240 HCHG MISC OR SUPPLY RC 0272: Performed by: INTERNAL MEDICINE

## 2020-03-16 PROCEDURE — 160048 HCHG OR STATISTICAL LEVEL 1-5: Performed by: INTERNAL MEDICINE

## 2020-03-16 PROCEDURE — 160009 HCHG ANES TIME/MIN: Performed by: INTERNAL MEDICINE

## 2020-03-16 PROCEDURE — 500066 HCHG BITE BLOCK, ECT: Performed by: INTERNAL MEDICINE

## 2020-03-16 PROCEDURE — C1726 CATH, BAL DIL, NON-VASCULAR: HCPCS | Performed by: INTERNAL MEDICINE

## 2020-03-16 PROCEDURE — 99232 SBSQ HOSP IP/OBS MODERATE 35: CPT | Performed by: INTERNAL MEDICINE

## 2020-03-16 PROCEDURE — 0FD98ZX EXTRACTION OF COMMON BILE DUCT, VIA NATURAL OR ARTIFICIAL OPENING ENDOSCOPIC, DIAGNOSTIC: ICD-10-PCS | Performed by: INTERNAL MEDICINE

## 2020-03-16 PROCEDURE — 700111 HCHG RX REV CODE 636 W/ 250 OVERRIDE (IP): Performed by: HOSPITALIST

## 2020-03-16 PROCEDURE — 85025 COMPLETE CBC W/AUTO DIFF WBC: CPT

## 2020-03-16 PROCEDURE — 160002 HCHG RECOVERY MINUTES (STAT): Performed by: INTERNAL MEDICINE

## 2020-03-16 PROCEDURE — 700102 HCHG RX REV CODE 250 W/ 637 OVERRIDE(OP): Performed by: INTERNAL MEDICINE

## 2020-03-16 PROCEDURE — 700101 HCHG RX REV CODE 250: Performed by: ANESTHESIOLOGY

## 2020-03-16 PROCEDURE — BF101ZZ FLUOROSCOPY OF BILE DUCTS USING LOW OSMOLAR CONTRAST: ICD-10-PCS | Performed by: INTERNAL MEDICINE

## 2020-03-16 PROCEDURE — 503093 HCHG BRUSH, CYTOLOGY: Performed by: INTERNAL MEDICINE

## 2020-03-16 PROCEDURE — 82962 GLUCOSE BLOOD TEST: CPT | Mod: 91

## 2020-03-16 PROCEDURE — 160208 HCHG ENDO MINUTES - EA ADDL 1 MIN LEVEL 4: Performed by: INTERNAL MEDICINE

## 2020-03-16 DEVICE — STENT BILIARY ADVANTIX 10FR X 7CM: Type: IMPLANTABLE DEVICE | Status: FUNCTIONAL

## 2020-03-16 RX ORDER — ONDANSETRON 2 MG/ML
INJECTION INTRAMUSCULAR; INTRAVENOUS PRN
Status: DISCONTINUED | OUTPATIENT
Start: 2020-03-16 | End: 2020-03-16 | Stop reason: SURG

## 2020-03-16 RX ORDER — SODIUM CHLORIDE, SODIUM LACTATE, POTASSIUM CHLORIDE, CALCIUM CHLORIDE 600; 310; 30; 20 MG/100ML; MG/100ML; MG/100ML; MG/100ML
1000 INJECTION, SOLUTION INTRAVENOUS
Status: DISCONTINUED | OUTPATIENT
Start: 2020-03-16 | End: 2020-03-24 | Stop reason: HOSPADM

## 2020-03-16 RX ORDER — LIDOCAINE HYDROCHLORIDE 20 MG/ML
INJECTION, SOLUTION EPIDURAL; INFILTRATION; INTRACAUDAL; PERINEURAL PRN
Status: DISCONTINUED | OUTPATIENT
Start: 2020-03-16 | End: 2020-03-16 | Stop reason: SURG

## 2020-03-16 RX ORDER — SODIUM CHLORIDE, SODIUM LACTATE, POTASSIUM CHLORIDE, CALCIUM CHLORIDE 600; 310; 30; 20 MG/100ML; MG/100ML; MG/100ML; MG/100ML
INJECTION, SOLUTION INTRAVENOUS CONTINUOUS
Status: DISCONTINUED | OUTPATIENT
Start: 2020-03-16 | End: 2020-03-16 | Stop reason: HOSPADM

## 2020-03-16 RX ORDER — ONDANSETRON 2 MG/ML
4 INJECTION INTRAMUSCULAR; INTRAVENOUS
Status: DISCONTINUED | OUTPATIENT
Start: 2020-03-16 | End: 2020-03-16 | Stop reason: HOSPADM

## 2020-03-16 RX ORDER — HYDROMORPHONE HYDROCHLORIDE 1 MG/ML
0.1 INJECTION, SOLUTION INTRAMUSCULAR; INTRAVENOUS; SUBCUTANEOUS
Status: DISCONTINUED | OUTPATIENT
Start: 2020-03-16 | End: 2020-03-16 | Stop reason: HOSPADM

## 2020-03-16 RX ORDER — OXYCODONE HCL 5 MG/5 ML
10 SOLUTION, ORAL ORAL
Status: DISCONTINUED | OUTPATIENT
Start: 2020-03-16 | End: 2020-03-16 | Stop reason: HOSPADM

## 2020-03-16 RX ORDER — HYDROMORPHONE HYDROCHLORIDE 1 MG/ML
0.4 INJECTION, SOLUTION INTRAMUSCULAR; INTRAVENOUS; SUBCUTANEOUS
Status: DISCONTINUED | OUTPATIENT
Start: 2020-03-16 | End: 2020-03-16 | Stop reason: HOSPADM

## 2020-03-16 RX ORDER — LABETALOL HYDROCHLORIDE 5 MG/ML
5 INJECTION, SOLUTION INTRAVENOUS
Status: DISCONTINUED | OUTPATIENT
Start: 2020-03-16 | End: 2020-03-16 | Stop reason: HOSPADM

## 2020-03-16 RX ORDER — KETOROLAC TROMETHAMINE 30 MG/ML
INJECTION, SOLUTION INTRAMUSCULAR; INTRAVENOUS PRN
Status: DISCONTINUED | OUTPATIENT
Start: 2020-03-16 | End: 2020-03-16 | Stop reason: SURG

## 2020-03-16 RX ORDER — ROCURONIUM BROMIDE 10 MG/ML
INJECTION, SOLUTION INTRAVENOUS PRN
Status: DISCONTINUED | OUTPATIENT
Start: 2020-03-16 | End: 2020-03-16 | Stop reason: SURG

## 2020-03-16 RX ORDER — LORAZEPAM 2 MG/ML
0.5 INJECTION INTRAMUSCULAR
Status: DISCONTINUED | OUTPATIENT
Start: 2020-03-16 | End: 2020-03-16 | Stop reason: HOSPADM

## 2020-03-16 RX ORDER — DEXAMETHASONE SODIUM PHOSPHATE 4 MG/ML
INJECTION, SOLUTION INTRA-ARTICULAR; INTRALESIONAL; INTRAMUSCULAR; INTRAVENOUS; SOFT TISSUE PRN
Status: DISCONTINUED | OUTPATIENT
Start: 2020-03-16 | End: 2020-03-16 | Stop reason: SURG

## 2020-03-16 RX ORDER — HYDRALAZINE HYDROCHLORIDE 20 MG/ML
5 INJECTION INTRAMUSCULAR; INTRAVENOUS
Status: DISCONTINUED | OUTPATIENT
Start: 2020-03-16 | End: 2020-03-16 | Stop reason: HOSPADM

## 2020-03-16 RX ORDER — MEPERIDINE HYDROCHLORIDE 25 MG/ML
12.5 INJECTION INTRAMUSCULAR; INTRAVENOUS; SUBCUTANEOUS
Status: DISCONTINUED | OUTPATIENT
Start: 2020-03-16 | End: 2020-03-16 | Stop reason: HOSPADM

## 2020-03-16 RX ORDER — MEPERIDINE HYDROCHLORIDE 25 MG/ML
25 INJECTION INTRAMUSCULAR; INTRAVENOUS; SUBCUTANEOUS
Status: DISCONTINUED | OUTPATIENT
Start: 2020-03-16 | End: 2020-03-16 | Stop reason: HOSPADM

## 2020-03-16 RX ORDER — PHENYLEPHRINE HCL IN 0.9% NACL 0.5 MG/5ML
SYRINGE (ML) INTRAVENOUS PRN
Status: DISCONTINUED | OUTPATIENT
Start: 2020-03-16 | End: 2020-03-16 | Stop reason: SURG

## 2020-03-16 RX ORDER — DIPHENHYDRAMINE HYDROCHLORIDE 50 MG/ML
12.5 INJECTION INTRAMUSCULAR; INTRAVENOUS
Status: DISCONTINUED | OUTPATIENT
Start: 2020-03-16 | End: 2020-03-16 | Stop reason: HOSPADM

## 2020-03-16 RX ORDER — OXYCODONE HCL 5 MG/5 ML
5 SOLUTION, ORAL ORAL
Status: DISCONTINUED | OUTPATIENT
Start: 2020-03-16 | End: 2020-03-16 | Stop reason: HOSPADM

## 2020-03-16 RX ORDER — HALOPERIDOL 5 MG/ML
1 INJECTION INTRAMUSCULAR
Status: DISCONTINUED | OUTPATIENT
Start: 2020-03-16 | End: 2020-03-16 | Stop reason: HOSPADM

## 2020-03-16 RX ORDER — SODIUM CHLORIDE, SODIUM LACTATE, POTASSIUM CHLORIDE, CALCIUM CHLORIDE 600; 310; 30; 20 MG/100ML; MG/100ML; MG/100ML; MG/100ML
1000 INJECTION, SOLUTION INTRAVENOUS
Status: DISCONTINUED | OUTPATIENT
Start: 2020-03-16 | End: 2020-03-16 | Stop reason: HOSPADM

## 2020-03-16 RX ORDER — HYDROMORPHONE HYDROCHLORIDE 1 MG/ML
0.2 INJECTION, SOLUTION INTRAMUSCULAR; INTRAVENOUS; SUBCUTANEOUS
Status: DISCONTINUED | OUTPATIENT
Start: 2020-03-16 | End: 2020-03-16 | Stop reason: HOSPADM

## 2020-03-16 RX ORDER — MIDAZOLAM HYDROCHLORIDE 1 MG/ML
INJECTION INTRAMUSCULAR; INTRAVENOUS PRN
Status: DISCONTINUED | OUTPATIENT
Start: 2020-03-16 | End: 2020-03-16 | Stop reason: SURG

## 2020-03-16 RX ADMIN — PROPOFOL 150 MG: 10 INJECTION, EMULSION INTRAVENOUS at 10:12

## 2020-03-16 RX ADMIN — Medication 100 MCG: at 10:20

## 2020-03-16 RX ADMIN — Medication 100 MCG: at 10:57

## 2020-03-16 RX ADMIN — FENTANYL CITRATE 100 MCG: 50 INJECTION, SOLUTION INTRAMUSCULAR; INTRAVENOUS at 10:12

## 2020-03-16 RX ADMIN — INSULIN LISPRO 10 UNITS: 100 INJECTION, SOLUTION INTRAVENOUS; SUBCUTANEOUS at 19:44

## 2020-03-16 RX ADMIN — SODIUM CHLORIDE, POTASSIUM CHLORIDE, SODIUM LACTATE AND CALCIUM CHLORIDE 1000 ML: 600; 310; 30; 20 INJECTION, SOLUTION INTRAVENOUS at 09:00

## 2020-03-16 RX ADMIN — MIDAZOLAM HYDROCHLORIDE 2 MG: 1 INJECTION, SOLUTION INTRAMUSCULAR; INTRAVENOUS at 10:09

## 2020-03-16 RX ADMIN — OMEPRAZOLE 20 MG: 20 CAPSULE, DELAYED RELEASE ORAL at 18:37

## 2020-03-16 RX ADMIN — ATORVASTATIN CALCIUM 40 MG: 40 TABLET, FILM COATED ORAL at 18:37

## 2020-03-16 RX ADMIN — Medication 100 MCG: at 10:15

## 2020-03-16 RX ADMIN — INSULIN GLARGINE 14 UNITS: 100 INJECTION, SOLUTION SUBCUTANEOUS at 18:38

## 2020-03-16 RX ADMIN — ROCURONIUM BROMIDE 30 MG: 10 INJECTION, SOLUTION INTRAVENOUS at 10:12

## 2020-03-16 RX ADMIN — ACETAMINOPHEN 650 MG: 325 TABLET, FILM COATED ORAL at 12:05

## 2020-03-16 RX ADMIN — SUGAMMADEX 200 MG: 100 INJECTION, SOLUTION INTRAVENOUS at 10:47

## 2020-03-16 RX ADMIN — LIDOCAINE HYDROCHLORIDE 60 MG: 20 INJECTION, SOLUTION EPIDURAL; INFILTRATION; INTRACAUDAL; PERINEURAL at 10:12

## 2020-03-16 RX ADMIN — KETOROLAC TROMETHAMINE 30 MG: 30 INJECTION, SOLUTION INTRAMUSCULAR at 10:30

## 2020-03-16 RX ADMIN — ONDANSETRON 4 MG: 2 INJECTION INTRAMUSCULAR; INTRAVENOUS at 10:15

## 2020-03-16 RX ADMIN — DEXAMETHASONE SODIUM PHOSPHATE 8 MG: 4 INJECTION, SOLUTION INTRAMUSCULAR; INTRAVENOUS at 10:15

## 2020-03-16 RX ADMIN — HYDROMORPHONE HYDROCHLORIDE 0.5 MG: 1 INJECTION, SOLUTION INTRAMUSCULAR; INTRAVENOUS; SUBCUTANEOUS at 12:11

## 2020-03-16 ASSESSMENT — ENCOUNTER SYMPTOMS
DIZZINESS: 0
BLOOD IN STOOL: 0
SHORTNESS OF BREATH: 0
EYES NEGATIVE: 1
COUGH: 0
RESPIRATORY NEGATIVE: 1
NAUSEA: 0
WEAKNESS: 0
HALLUCINATIONS: 0
BACK PAIN: 1
CONSTITUTIONAL NEGATIVE: 1
HEADACHES: 0
FEVER: 0
DIARRHEA: 0
CARDIOVASCULAR NEGATIVE: 1
DEPRESSION: 0
CHILLS: 0
PALPITATIONS: 0
FOCAL WEAKNESS: 0
ABDOMINAL PAIN: 0
SPUTUM PRODUCTION: 0
HEADACHES: 1
SORE THROAT: 0
VOMITING: 0
HEARTBURN: 0
BACK PAIN: 0

## 2020-03-16 ASSESSMENT — PAIN SCALES - GENERAL: PAIN_LEVEL: 1

## 2020-03-16 NOTE — PROGRESS NOTES
Pt sitting in bed. Pt denies pain or n/v at this time. Discussed POC with pt. Fall precautions in place.

## 2020-03-16 NOTE — PSYCHIATRY
"PSYCHIATRIC INTAKE EVALUATION    *Reason for admission:                    *Reason for consult: capacity to make medical decisions       *Requesting Physician/APN:   MARLI Patel MD           Legal Hold status:  NA: has a legal guardian but might only be for financial. Check with  for confirmation         *Chief Complaint: none in particular    *HPI (includes Psychiatric ROS):   60 yo male who is very thin and frail looking but is willing to participate in the interview.     He is not sure why he was admitted or what he is getting \"a medical test\" for later on today though with prompting (asked him how his liver, colon and pancreas were doing), he said \"that's what the test is for\".    He doesn't know his meds stating he has DM and HTN and takes \"something for them\".     He reports he is able to walk and has always been thin (looks very frail and I doubt he could walk even a block on his own). He feels he could live on his own though he does not indicate one way or the other his preference. He says he would walk to the grocery store,cook and if he fell he would call 911. He also admits that his sister managers his bills and \"I pay her to do that\" but isn't sure why she does. He does not have concerns about her doing so.    Depression: denies, not SI, not worried. Future oriented though doesn't give or seem to have any initiative: asked what he wants to do when he leaves, what he likes to do, where he might want to live and so on. He has no preference for anything.      Anxiety: denies.   Psychosis: denies  Saima: has never heard of it (odd as he reports he has a BA in English) and has never been told he has bipolar disorder and denies symptoms of same    PTSD: denies. No trauma.     Speech Eval:\"...pt scored in the average range for all areas assessed. Pt does not manage own medications or cook in his assisted living unit. His sister assists him with managing his finances. Pt does not drive. Pt does " "perform all ADLs independently. Pt is safe to return to ADLs at the independent level from a cognitive-linguistic standpoint\"    *Medical Review Of Symptoms (not dx conditions):   Review of Systems   Constitutional: Negative.    HENT: Negative.    Eyes: Negative.    Respiratory: Negative.    Cardiovascular: Negative.    Musculoskeletal: Positive for back pain.   Skin: Negative.    Neurological: Positive for headaches.      *Psychiatric Examination:   Vitals: Blood pressure (!) 96/60, pulse 77, temperature 36.7 °C (98.1 °F), temperature source Temporal, resp. rate 18, height 1.905 m (6' 3\"), weight 62.2 kg (137 lb 2 oz), SpO2 97 %.    General Appearance: very thin, frail, good eye contact  Abnormal Movements:none but has less spontaneous movements  Gait and Posture:lying in bed, raised arm on my request  Speech:soft  Thought Process: slowed rate  Associations: linear  Abnormal or Psychotic Thoughts: none but as asked to interpret a story in which the correct response would be amusement and that the action could not be done: pt failed on both aspects.  Judgement and Insight: good in that he will follow his treatment team and sisters recomendation  Orientation: x 3  Recent and Remote Memory: 1/3  Attention Span and Concentration: grossly intact but had trouble with addtion  Language: intact  Fund of Knowledge: current events: did not know about the corona virus though TV on  Mood and Affect: euthymic  SI/HI: denies  MMSE score and/or clock drawing: clock good able to abstract some things.      *PAST MEDICAL/PSYCH/FAMILY/SOCIAL(as reported by patient):       *medical hx:        TBI:denies  SZ:denies  Stroke: denies: pt has palsy of L side of face. He says \"Coral been told that before\" denies strokes or Bell's palsy hx  Past Medical History:   Diagnosis Date   • Diabetes (HCC)    • Hypertension    • Seizure disorder (HCC)     Pt had a seizure with \" unknown illness\" in recent months.      Past Surgical History:   Procedure " "Laterality Date   • PB ERCP,DIAGNOSTIC  2/24/2020    Procedure: ERCP (ENDOSCOPIC RETROGRADE CHOLANGIOPANCREATOGRAPHY);  Surgeon: Hemant Lorenzo M.D.;  Location: [facility];  Service: Gastroenterology   • PB ERCP,DIAGNOSTIC  2/18/2020    Procedure: ERCP (ENDOSCOPIC RETROGRADE CHOLANGIOPANCREATOGRAPHY);  Surgeon: Carlos Alberto Ansari M.D.;  Location: [facility];  Service: Gastroenterology   • PB ENDOSCOPIC US EXAM, ESOPH  2/18/2020    Procedure: EGD, WITH ENDOSCOPIC US;  Surgeon: Carlos Alberto Ansari M.D.;  Location: [facility];  Service: Gastroenterology   • PB UPPER GI ENDOSCOPY,BIOPSY N/A 12/27/2019    Procedure: GASTROSCOPY, WITH BIOPSY;  Surgeon: Hector Villarreal M.D.;  Location: [facility];  Service: Gastroenterology   • TOE AMPUTATION Right 1/12/2019    Procedure: TOE AMPUTATION;  Surgeon: Nicanor Reddy M.D.;  Location: SURGERY [facility];  Service: Orthopedics   • GASTROSCOPY-ENDO N/A 8/25/2018    Procedure: GASTROSCOPY-ENDO;  Surgeon: Jaswant Frye M.D.;  Location: ENDOSCOPY [facility];  Service: Gastroenterology   • OTHER ABDOMINAL SURGERY       *psychiatric hx:   SAs:denies  Hospitalizations:denies  Med Hx:denies  Other: says he has seen psychiatrists before as arranged by his sister but does not know why    *family Psych hx:  denies     *social hx: denies abuse. Was living in assisted living. Doesn't have an opinion about it.   Alcohol: denies for about \"7-10 years\"  Drugs: THC remotely     *MEDICAL HX: labs, MARS, medications, etc were reviewed. Only those findings of potential interest to psychiatry are noted below:    *Current Medical issues:   See below     *Allergies:  No Known Allergies  *Current Medications:    Current Facility-Administered Medications:   •  insulin regular (HUMULIN R) injection 10 Units, 10 Units, Subcutaneous, TID Grace VARGHESE D.O.  •  lactated ringers infusion, 1,000 mL, Intravenous, " Intra-Op Once PRN, Carlos Alberto Ansari M.D.  •  insulin glargine (LANTUS) injection 14 Units, 14 Units, Subcutaneous, Q EVENING, YULI HolguinO., 14 Units at 03/15/20 2021  •  furosemide (LASIX) tablet 40 mg, 40 mg, Oral, Q DAY, Ruma Christopher M.D., Stopped at 03/16/20 0600  •  menthol (HALLS) lozenge 1 Lozenge, 1 Lozenge, Oral, Q2HRS PRN, Betsey Gentile M.D., 1 Lozenge at 03/05/20 1119  •  cyclobenzaprine (FLEXERIL) tablet 10 mg, 10 mg, Oral, TID PRN, Betsey Gentile M.D., 10 mg at 03/13/20 2219  •  haloperidol lactate (HALDOL) injection 2-5 mg, 2-5 mg, Intravenous, Q6HRS PRN, Rosendo Encinas M.D.  •  atorvastatin (LIPITOR) tablet 40 mg, 40 mg, Oral, Q EVENING, YULI HolguinO., 40 mg at 03/15/20 1715  •  omeprazole (PRILOSEC) capsule 20 mg, 20 mg, Oral, BID, YULI HolguinO., Stopped at 03/16/20 0600  •  pancrelipase (Lip-Prot-Amyl) (CREON 83424) 37828-45655 units capsule 48,000 Units, 2 Cap, Oral, DAILY, CHIARA Holguin.O., Stopped at 03/16/20 0600  •  tamsulosin (FLOMAX) capsule 0.4 mg, 0.4 mg, Oral, DAILY, YULI HolguinO., Stopped at 03/16/20 0600  •  thiamine tablet 100 mg, 100 mg, Oral, DAILY, CHIARA Holguin.O., Stopped at 03/16/20 0600  •  senna-docusate (PERICOLACE or SENOKOT S) 8.6-50 MG per tablet 2 Tab, 2 Tab, Oral, BID, 2 Tab at 03/15/20 0522 **AND** polyethylene glycol/lytes (MIRALAX) PACKET 1 Packet, 1 Packet, Oral, QDAY PRN **AND** magnesium hydroxide (MILK OF MAGNESIA) suspension 30 mL, 30 mL, Oral, QDAY PRN **AND** bisacodyl (DULCOLAX) suppository 10 mg, 10 mg, Rectal, QDAY PRN, Grace Patel, CHIARA.O.  •  acetaminophen (TYLENOL) tablet 650 mg, 650 mg, Oral, Q6HRS PRN, Grace Patel, D.O., 650 mg at 03/16/20 1205  •  ondansetron (ZOFRAN) syringe/vial injection 4 mg, 4 mg, Intravenous, Q4HRS PRN, Graec Patel, D.O.  •  ondansetron (ZOFRAN ODT) dispertab 4 mg, 4 mg, Oral, Q4HRS PRN, Grace Patel D.O.  •  promethazine (PHENERGAN) tablet 12.5-25 mg, 12.5-25  mg, Oral, Q4HRS PRN, Grace Patel D.O.  •  prochlorperazine (COMPAZINE) injection 5-10 mg, 5-10 mg, Intravenous, Q4HRS PRN, Grace Patel D.O.  •  HYDROmorphone pf (DILAUDID) injection 0.5 mg, 0.5 mg, Intravenous, Q2HRS PRN, Raquel Encinas M.D., 0.5 mg at 03/16/20 1211  *EKG: none  *Imaging: personally reviewed MRI: for Bell's palsy. No significant findings.d     *Labs:  Recent Labs     03/14/20  0658 03/15/20  0647 03/16/20  0429   WBC 16.0* 16.1* 16.4*   RBC 3.56* 3.93* 3.75*   HEMOGLOBIN 10.6* 11.7* 11.3*   HEMATOCRIT 32.1* 35.3* 33.4*   MCV 90.2 89.8 89.1   MCH 29.8 29.8 30.1   RDW  --   --  73.6*   PLATELETCT 547* 426 471*   MPV 10.3 11.7 10.9   NEUTSPOLYS 67.70 68.10 63.80   LYMPHOCYTES 15.10* 15.70* 19.10*   MONOCYTES 13.50* 12.40 12.60   EOSINOPHILS 0.90 0.90 1.20   BASOPHILS 0.90 1.10 0.90   RBCMORPHOLO Present Present  --      Lab Results   Component Value Date/Time    SODIUM 132 (L) 03/16/2020 04:29 AM    POTASSIUM 3.8 03/16/2020 04:29 AM    CHLORIDE 95 (L) 03/16/2020 04:29 AM    CO2 25 03/16/2020 04:29 AM    GLUCOSE 182 (H) 03/16/2020 04:29 AM    BUN 15 03/16/2020 04:29 AM    CREATININE 0.44 (L) 03/16/2020 04:29 AM         No results found for: BREATHALIZER  No components found for: BLOODALCOHOL   Lab Results   Component Value Date/Time    AMPHUR Negative 11/21/2018 2130    BARBSURINE Negative 11/21/2018 2130    BENZODIAZU Positive (A) 11/21/2018 2130    COCAINEMET Negative 11/21/2018 2130    METHADONE Negative 11/21/2018 2130    OPIATES Negative 11/21/2018 2130    OXYCODN Negative 11/21/2018 2130    PCPURINE Negative 11/21/2018 2130    PROPOXY Negative 11/21/2018 2130    CANNABINOID Negative 11/21/2018 2130     Lab Results   Component Value Date/Time    FREET4 0.80 05/29/2019 0938         *ASSESSMENT/PLAN:    1. Neurocognitive disorder unsepc  - per notes has experienced deterioration in cognition. This could be in part to malnutrition as well.       2. Medical:  -DMII     -cholangitis, septic  "on admit but WBC rising again.  -pancreatic mass: \"At this moment GI does not believe patient has significant evidence for periodic cancer\"   -acute respiratory failure with hypoxia: resolved  -severe protein calorie malnutrition  -chronic pancreatitis  -L shift  -Bell's Palsy L    Legal hold: NA. Pt is NOT CAPACITATED to make medical decisions as he does not know what his medical needs are for this admit. He knows he has DM and when prompted, HTN (which is not an issue now). However he is willing to follow whatever is recommended by the treatment team. He also seems to follow his sister's recommendations as well and trusts her.    Pt is apathetic though he smiles and is pleasant and this may be simply from his malnutrition. This is concerning regarding his adls: would he get up to wash, to cook,  take meds?     Consider a PT consult to see what he can manage on his own physically ( if required) as pt believes he can walk even to a grocery store if needed and he appears to frail to do so.    No medications indicated for now and he agrees.     *Signing off  *Thank you for the consult    "

## 2020-03-16 NOTE — PROGRESS NOTES
Received report from night shift. Assumed care of patient. Performed initial assessment. No further needs at this time.

## 2020-03-16 NOTE — PROGRESS NOTES
GI ATTENDING:    Patient seen and examined.  Chart reviewed.  Therapeutic ERCP explained at length.    Patient requests to pro    Consenting person was given an opportunity to ask questions and discuss other options.  Risks including but not limited to pancreatitis, contrast reaction, stent migration and/or occlusion, perforation, infection, bleeding, missed lesion(s), cardiac and/or pulmonary event, aspiration, stroke, possible need for surgery and/or interventional radiology, hospitalization possibly prolonged, discomfort, unsuccessful and/or incomplete procedure, indefinite diagnosis, ineffective therapy and/or persistent symptoms, possible need for repeat procedures and/or additional testings, damage to adjacent organs and/or vascular structures, medication reaction, disability, death, and other adverse events possibly life-threatening.  Discussion was undertaken with Layman's terms.  Consenting person stated understanding and acceptance of these risks, and wished to proceed.  Informed consent was given in clear state of mind.

## 2020-03-16 NOTE — ANESTHESIA POSTPROCEDURE EVALUATION
Patient: Pawel Tatum    Procedure Summary     Date:  03/16/20 Room / Location:   ENDOSCOPIC ULTRASOUND ROOM / SURGERY [facility]    Anesthesia Start:  1009 Anesthesia Stop:  1108    Procedure:  ERCP, DIAGNOSTIC Diagnosis:       Bile duct stricture      (bile duct stricture)    Surgeon:  Carlos Alberto Ansari M.D. Responsible Provider:  Raz Pisano M.D.    Anesthesia Type:  general ASA Status:  4          Final Anesthesia Type: general  Last vitals  BP   Blood Pressure: (!) 85/57    Temp   36.8 °C (98.2 °F)    Pulse   Pulse: 83   Resp   16    SpO2   96 %      Anesthesia Post Evaluation    Patient location during evaluation: PACU  Patient participation: complete - patient participated  Level of consciousness: awake and alert  Pain score: 1    Airway patency: patent  Anesthetic complications: no  Cardiovascular status: hemodynamically stable  Respiratory status: acceptable  Hydration status: euvolemic    PONV: none           Nurse Pain Score: 0 (NPRS)

## 2020-03-16 NOTE — PROGRESS NOTES
Patient returned to unit from procedure. 9/10 pain reported. Gave tylenol and dilaudid. Will continue to monitor.

## 2020-03-16 NOTE — PROGRESS NOTES
Hospital Medicine Daily Progress Note    Date of Service  3/16/2020    Chief Complaint  59 y.o. male admitted 2/14/2020 with abdominal pain    Hospital Course    History of type 2 diabetes, cognitive impairment who lives at a group home who was admitted for abdominal pain found to have sepsis secondary to ascending cholangitis.  He underwent ERCP with sphincterotomy to relieve obstruction.  No stent was placed due to significant edema.  Repeat ERCP was not performed due to acute respiratory failure with hypoxia which has now resolved.  He has improved with antibiotics and is almost ready to return to his group home however, blood sugars have been variable and they are not allowed to do sliding scale      Interval Problem Update  3/10: Has been hyperglycemic, today dropped to 59.  Lantus and mealtime insulin decreased.  Pain is controlled.  Appetite is improving but remains low.  3/11: Sugars are not at goal, back up to 385.  Insulin increased.  Alk phos and WBC rising, concern for recurrent obstructive process.  Repeat cxr ordered (previous effusion w loculation)  3/12: Sugars improved, not at goal.  WBC and alk phos still rising.  I discussed with GI, repeat MRCP.  3/13: Sugar control improved.  WBC still elevated however stable.  Alk phos stable.  Repeat MRCP shows worsening dilation of the CBD, await GI recommendations regarding need for repeat ERCP/EUS/stent  3/14: Planning for repeat ERCP Monday, Sugar control has improved  3/15: Sugars are slightly up, Lantus and prandial increased.  I discussed the case with psychiatry, they will evaluate his current decisional capacity  3/16: Seen by psych, lacks decisional capacity at this time.  ERCP repeated, pending results    Consultants/Specialty  Dr. Lorenzo - [facility]    Code Status  DNR/DNI    Disposition  TBD - pending placement / medical stability    Review of Systems  Review of Systems   Constitutional: Positive for malaise/fatigue. Negative for chills and fever.   HENT:  Negative for sore throat.    Respiratory: Negative for cough, sputum production and shortness of breath.    Cardiovascular: Negative for chest pain and palpitations.   Gastrointestinal: Negative for abdominal pain (Resolved), blood in stool, diarrhea, heartburn, nausea and vomiting (Appetite improved).   Genitourinary: Negative for frequency and urgency.   Musculoskeletal: Negative for back pain and joint pain.   Skin: Negative for itching and rash.   Neurological: Negative for dizziness, focal weakness, weakness and headaches.   Psychiatric/Behavioral: Negative for depression and hallucinations.   All other systems reviewed and are negative.       Physical Exam  Temp:  [36.7 °C (98 °F)-36.9 °C (98.4 °F)] 36.9 °C (98.4 °F)  Pulse:  [] 99  Resp:  [18-20] 20  BP: (105-127)/(57-78) 109/57  SpO2:  [93 %-95 %] 95 %    Physical Exam  Constitutional:       General: He is awake.      Appearance: He is underweight.   HENT:      Head: Normocephalic and atraumatic.      Nose: Nose normal.      Mouth/Throat:      Mouth: Mucous membranes are moist.   Eyes:      Extraocular Movements: Extraocular movements intact.      Pupils: Pupils are equal, round, and reactive to light.   Neck:      Musculoskeletal: Normal range of motion and neck supple.   Cardiovascular:      Rate and Rhythm: Normal rate and regular rhythm.      Heart sounds: No murmur.   Pulmonary:      Effort: Pulmonary effort is normal. No respiratory distress.      Breath sounds: Normal breath sounds. No stridor. No wheezing.   Abdominal:      General: Abdomen is flat. Bowel sounds are normal. There is no distension.      Palpations: Abdomen is soft. There is no mass.      Tenderness: There is no abdominal tenderness. There is no guarding.   Musculoskeletal:         General: No swelling, tenderness or deformity.      Comments: Very thin extremities   Skin:     General: Skin is warm and dry.      Coloration: Skin is not jaundiced or pale.   Neurological:       General: No focal deficit present.      Mental Status: He is alert and oriented to person, place, and time.   Psychiatric:         Mood and Affect: Mood normal.         Behavior: Behavior normal.      Comments: Very pleasant mood         Fluids    Intake/Output Summary (Last 24 hours) at 3/16/2020 1043  Last data filed at 3/16/2020 0400  Gross per 24 hour   Intake 1440 ml   Output 900 ml   Net 540 ml       Laboratory  Recent Labs     03/14/20 0658 03/15/20  0647 03/16/20  0429   WBC 16.0* 16.1* 16.4*   RBC 3.56* 3.93* 3.75*   HEMOGLOBIN 10.6* 11.7* 11.3*   HEMATOCRIT 32.1* 35.3* 33.4*   MCV 90.2 89.8 89.1   MCH 29.8 29.8 30.1   MCHC 33.0* 33.1* 33.8   RDW  --   --  73.6*   PLATELETCT 547* 426 471*   MPV 10.3 11.7 10.9     Recent Labs     03/14/20 0658 03/15/20  0647 03/16/20  0429   SODIUM 130* 129* 132*   POTASSIUM 3.8 3.8 3.8   CHLORIDE 93* 92* 95*   CO2 24 25 25   GLUCOSE 207* 226* 182*   BUN 14 16 15   CREATININE 0.50 0.45* 0.44*   CALCIUM 8.9 8.9 8.9                   Imaging  [id number]   Final Result      1.  Interval increase in common bile duct dilatation and intrahepatic biliary ductal dilatation since the prior examination. Filling defects in the common bile duct just before the stricture at the level of the pancreatic head have developed since the    prior exam. Differential diagnosis includes sludge as well as common duct stones.      2.  Pancreatic ductal dilatation is again identified which is slightly less than on the prior exam. Debris within the pancreatic duct is now present that appears similar to the debris within the common bile duct.      3.  Enlarged pancreatic head containing small cystic structures is again identified. The 2 larger cysts in the posterior pancreatic head have nearly completely resolved. Enlargement of the pancreatic head could be related to pancreatitis. Neoplasm is    also possible.      4.  Bright T2 signal and enlargement of the right adrenal gland is again noted.       5.  Sludge again noted in the gallbladder.      6.  Small right pleural effusion is identified.      DX-CHEST-PORTABLE (1 VIEW)   Final Result      1.  Cardiomegaly with bilateral interstitial opacities.      2.  Small bilateral pleural effusions.         DX-CHEST-PORTABLE (1 VIEW)   Final Result         1. No significant interval change.      DX-CHEST-PORTABLE (1 VIEW)   Final Result      New subpulmonic effusions, bibasilar atelectasis and edema      DX-PORTABLE FLUOROSCOPY < 1 HOUR   Final Result      Portable fluoroscopy utilized for 1.1 minute.         INTERPRETING LOCATION: [address] [place] [address], [place], [id number]      [id number] SPHINCTEROTOMY   Final Result      Intraoperative fluoroscopy spot images as described above.      CT-ABDOMEN WITH & W/O   Final Result      1.  No significant change since CT abdomen and pelvis obtained 2/16/2020      2.  Findings consistent with high-grade biliary obstruction with dilation of the common bile duct measuring 17 mm, dilated gallbladder, dilated pancreatic duct with change in caliber of the pancreatic duct at the level pancreatic head      3.  Findings could be either malignancy the pancreatic head versus scarring from chronic pancreatitis.      4.  Sequela of chronic pancreatitis      5.  Large right medial chest rim-enhancing loculated fluid collection probably within the pleural space measuring 10.5 x 3.1 centimeter transversely and 7 cm craniocaudal      6.  Rim-enhancing fluid collection in the hepatorenal space measuring 11.2 cm craniocaudal and 2-3 cm in thickness.      7.  The rim-enhancing fluid collection are suspicious for infection/abscess      8.  Bilateral atelectasis and small-moderate-sized pleural effusion after      9.  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  Biliary dilatation without evidence of high-grade biliary obstruction.   2.   Gallbladder visualization after morphine administration. No acute cholecystitis.      CT-ABDOMEN-PELVIS WITH   Final Result      1.  New rim enhancing fluid collection tracks along the right heart margin, azygoesophageal recess and possibly communicates with Morison's pouch tracking along the right aspect of the inferior vena cava. This is nonspecific and could indicate abscess,    dissecting pseudocyst, mucinous cystic neoplasm related fluid      2.  New extra and intrahepatic biliary ductal dilatation with 6 cm dilatation of the gallbladder also seen. This indicates distal common bile duct/ampullary level obstruction. Stricture, mass effect from adjacent pancreas calcifications or small    ampullary mass are differential possibilities      3.  Decreased small pleural effusions. Some loculation on the right is possible      4.  Slightly decreased diffuse pancreatic ductal dilatation now measuring 11 mm. Evidence of chronic pancreatitis. New 3 cm cystic structure anterior to the pancreas could be a pseudocyst. Abscess or necrotic/cystic neoplasm are in the differential.      5.  Multiple chronic findings including status post aorto bifemoral bypass with approaching 70% stenosis of the left superficial femoral artery, nonobstructive right 3 mm nephrolithiasis, large volume rectal vault stool, dilated bladder which could    indicate outlet obstruction or neurogenic bladder, splenosis      [id number]-P/O   Final Result      1.  Severe dilatation of the intrahepatic biliary ducts, common bile duct and pancreatic duct. The distal common bile duct and the proximal pancreatic duct is not visualized at the pancreatic head. The pancreatic head appears to be diffusely enlarged and    demonstrates multiple small cysts. These findings are concerning for pancreatic neoplasm such as intraductal papillary mucinous cystoadenoma. The other differential diagnosis includes periampullary carcinoma. MR examination of the abdomen with  contrast    is recommended for further evaluation.   2.  Mixed signal intensity in the right perinephric space likely represents perinephric hematoma.   3.  Large hiatal hernia.   4.  Diffuse gastric wall thickening.      US-RUQ   Final Result      1.  Hepatomegaly.   2.  Hepatic steatosis.   3.  Gallbladder sludge. Mild intrahepatic ductal dilation. Dilation of the common duct up to 13 mm. No definite distal obstructing etiology is identified. MRCP could be obtained for further evaluation if indicated.   4.  Trace ascites.   5.  Pancreatic calcifications likely sequela of chronic pancreatitis.      DX-CHEST-PORTABLE (1 VIEW)   Final Result      Cardiomegaly.           Assessment/Plan  * Cholangitis  Assessment & Plan  Biliary obstruction suggested on imaging, GI consulted and they performed an ERCP on 2/24 with sphincterotomy  Swelling was too significant to place ampullary duct stent.  He improved without stent placement therefore this was deferred, but Alk P and WBC are rising again  Endoscopy done showing pancreatic calcifications but no stones, no area to biopsy  Repeat MRCP shows worsening dilation of the common bile duct favoring persistent sludge, stricture or stone  Repeat ERCP/stent/EUS    Pancreatic mass  Assessment & Plan  MRI with concern for pancreatic malignancy  ERCP with no evidence of malignancy, only calcifications of pancreas present   is elevated but less than 1000 so cannot rule in cancer, likely due chronic pancreatitis  Repeat CT showed chronic pancreatitis and loculated chest effusion    At this moment GI does not believe patient has significant evidence for periodic cancer  Due to rising WBC and alk phos, repeat MRCP was done and shows worsening obstruction  Repeat ERCP pending - likely will get stent    Type 2 diabetes mellitus with hyperglycemia, with long-term current use of insulin (HCC)- (present on admission)  Assessment & Plan  Home dose is: Lantus 12 units at night and regular  insulin 10 units 3 times daily.  Has had labile sugars due to variable PO intake requiring insulin adjustments and infection  Group home does not want sliding scale.  Sugars are not at goal but improved again  Continue lantus and SSI      Normocytic anemia- (present on admission)  Assessment & Plan  Stable, no e/o blood loss    Cognitive decline- (present on admission)  Assessment & Plan  Patient has had chronic cognitive decline.  His sister is his financial decision-maker however is considering guardianship for medical decisions  Mental status at his baseline  He was seen by Psych for decisional capacity and at this time does NOT have capacity for medical decisions    Hypomagnesemia  Assessment & Plan  Replaced  Repeat in AM    Sepsis ([facility])  Assessment & Plan  This was Sepsis Present on admission, improved but WBC rising again.  Source: Ascending cholangitis  Abdomen is still soft, tolerating diet  Currently, he has finished antibiotics  Repeat MRCP showed increased CBD dilation from previously seen favoring persistent obstruction/stone or sludge  Repeat ERCP        Acute respiratory failure with hypoxia ([facility])  Assessment & Plan  Due to loculated effusion  Completed abx  Currently resolved      Recurrent major depressive disorder, in full remission ([facility])  Assessment & Plan  .    Severe protein-calorie malnutrition (HCC)- (present on admission)  Assessment & Plan  Encourage p.o. intake  No pancreatic mass identified    Hyponatremia  Assessment & Plan  Due to hyperglycemia and +/- infection?    Chronic pancreatitis (HCC)- (present on admission)  Assessment & Plan  Continue pancreatic enzymes with meals  Celiac nerve block for pain control, per GI  He is tolerating a diet    Dyslipidemia- (present on admission)  Assessment & Plan  Continue statin       VTE prophylaxis: SCDs

## 2020-03-16 NOTE — PROCEDURES
DATE OF SERVICE:  03/16/2020    PROCEDURES:  Endoscopic retrograde cholangiopancreatography with:    1.  Biliary sphincterotomy.    2.  Bile duct hydraulic dilation of stricture.    3.  Bile duct brush biopsy.    4.  Bile duct plastic stent placement.    5.  Radiologic interpretation of static and dynamic fluoroscopic images by   myself at no time was a radiologist present in the room.      INDICATION:  Obstructive jaundice, bile duct stricture, chronic pancreatitis.        FINAL IMPRESSION:    1.  Biliary ampulla post-needle knife sphincterotomy appearance.  Surrounded   by small saddle diverticulum, which did not limit the procedure.    2.  Pancreatic duct neither injected nor cannulated.    3.  Common bile duct tight stricture through the head of the pancreas.    Proximal bile duct dilated up to 15 mm.    4.  Intrahepatic ducts dilated.    5.  Gallbladder not filled.      THERAPEUTICS:    1.  An 8 mm biliary sphincterotomy with bow papillotome over covered wire.    2.  Bile duct hydraulic dilation of stricture with 8 mm x 40 mm balloon.    3.  Bile duct brush biopsy.    4.  Bile duct plastic stent placement with 10-Swazi 7 cm plastic stent.      RECOMMENDATIONS:    1.  Follow liver enzymes.    2.  Follow up final pathology.    3.  Repeat ERCP with stent revision in 3 months.      DESCRIPTION OF PROCEDURE:  Prior to the procedure, physical exam was stable.    During procedure, vital signs remained within normal limits.  Prior to   sedation, informed consent was obtained.  Risks, benefits, alternatives   including but not limited to risk of bleeding, infection, perforation, adverse   reaction to medication, failure to identify pathology, pancreatitis and death   explained to the patient at length.  He accepted all risks.  Patient prepped   in the prone position after intubation and sedation by anesthesia.  Scope tip   of the Olympus side viewing flexible duodenoscope passed to level of the   biliary ampulla in the  short position.  The biliary ampulla was visualized.    There was post-needle knife sphincterotomy anatomy seen and this was   surrounded by a small saddle diverticulum.  [place] papillotome with a 0.035   covered wire was utilized for selective cannulation of the common bile duct.    This was achieved on the very first attempt and at no time was the pancreatic   duct injected, cannulated, or otherwise manipulated.  With the wire along the   expected course of the bile duct, this was followed with a cannula and   injection of contrast was made.  This showed a tight stricture throughout the   entire head of the pancreas approximately 3 cm in length.  The common bile   duct proximal to this was massively dilated up to 15 mm.  The intrahepatic   biliary tree was also dilated.  The wire was left in place and the tome was   removed back to the ampulla and an 8 mm biliary sphincterotomy was performed   with the bow papillotome over the covered wire.  The wire was left in place   and the tome was removed off of this, and an 8 mm x 40 mm hydraulic dilation   balloon catheter was exchanged over the wire across the strictured area and   slowly inflated up to 8 mm and held in place for 1 minute.  Upon deflation of   the balloon, there was a gush of dark bile seen.  The wire was left in place   and a brush biopsy catheter was exchanged over the wire and brushings were   made through the strictured in the head of the pancreas.  This was then   removed and a 10-Macedonian 7 cm plastic biliary stent was chosen and loaded on   the deployment device and deployed across the strictured area with excellent   bile flow.  Procedure was deemed complete at this time.  Air and liquid were   suctioned.  The scope was withdrawn.  Patient tolerated the procedure well and   was sent to recovery without immediate complications.       ____________________________________     MD ROGELIO VELA / SALINAS    DD:  03/16/2020 11:06:42  DT:   03/16/2020 12:43:33    D#:  [id number]  Job#:  [id number]

## 2020-03-16 NOTE — ANESTHESIA PROCEDURE NOTES
Airway  Date/Time: 3/16/2020 10:13 AM  Performed by: Raz Pisano M.D.  Authorized by: Raz Pisano M.D.     Location:  OR  Urgency:  Elective  Indications for Airway Management:  Anesthesia  Spontaneous Ventilation: absent    Sedation Level:  Deep  Preoxygenated: Yes    Patient Position:  Sniffing  Final Airway Type:  Endotracheal airway  Final Endotracheal Airway:  ETT  Cuffed: Yes    Technique Used for Successful ETT Placement:  Direct laryngoscopy  Insertion Site:  Oral  Blade Type:  Gilberto  Laryngoscope Blade/Videolaryngoscope Blade Size:  3  ETT Size (mm):  7.5  Measured from:  Teeth  ETT to Teeth (cm):  24  Placement Verified by: auscultation and capnometry    Cormack-Lehane Classification:  Grade I - full view of glottis  Number of Attempts at Approach:  1

## 2020-03-16 NOTE — ANESTHESIA TIME REPORT
Anesthesia Start and Stop Event Times     Date Time Event    3/16/2020 0859 Ready for Procedure     1009 Anesthesia Start     1108 Anesthesia Stop        Responsible Staff  03/16/20    Name Role Begin End    Raz Pisano M.D. Anesth 1009 1108        Preop Diagnosis (Free Text):  Pre-op Diagnosis     abnormal labs        Preop Diagnosis (Codes):  Diagnosis Information     Diagnosis Code(s): Bile duct stricture [K83.1]        Post op Diagnosis  Pancreatic mass      Premium Reason  Non-Premium    Comments:

## 2020-03-16 NOTE — ANESTHESIA PREPROCEDURE EVALUATION
Relevant Problems   NEURO   (+) H/O Bell's palsy      CARDIAC   (+) Diabetic peripheral angiopathy (HCC)   (+) Essential hypertension      ENDO   (+) Type 2 diabetes mellitus with hyperglycemia, with long-term current use of insulin (HCC)   (+) Uncontrolled type 2 diabetes mellitus with hyperosmolarity without coma (HCC)       Physical Exam    Airway   Mallampati: II  TM distance: >3 FB  Neck ROM: full       Cardiovascular - normal exam  Rhythm: regular  Rate: normal  (-) murmur     Dental - normal exam         Pulmonary - normal exam  Breath sounds clear to auscultation     Abdominal    Neurological - normal exam                 Anesthesia Plan    ASA 4   ASA physical status 4 criteria: other (comment)    Plan - general       Airway plan will be ETT  (Severe PCM)      Induction: intravenous    Postoperative Plan: Postoperative administration of opioids is intended.    Pertinent diagnostic labs and testing reviewed    Informed Consent:    Anesthetic plan and risks discussed with patient.    Use of blood products discussed with: patient whom consented to blood products.

## 2020-03-16 NOTE — OR SURGEON
Immediate Post OP Note    PreOp Diagnosis: Obstructive jaundice    PostOp Diagnosis: Same    Procedure(s):  ERCP, DIAGNOSTIC - Wound Class: Clean Contaminated    Surgeon(s):  Carlos Alberto Ansari M.D.    Anesthesiologist/Type of Anesthesia:  Anesthesiologist: Raz Pisano M.D./* No anesthesia type entered *    Surgical Staff:  Circulator: Da Carroll R.N.  Endoscopy Technician: Nydia Moser    Specimens removed if any:  ID Type Source Tests Collected by Time Destination   A : cytology- common bile duct stricture Other Other PATHOLOGY SPECIMEN Carlos Alberto Ansari M.D. 3/16/2020 10:42 AM    B : brush head Other Other PATHOLOGY SPECIMEN Carlos Alberto Ansari M.D. 3/16/2020 10:44 AM        Dr. Ansari  [facility]  [facility]  [phone]    ERCP with: Biliary sphincterotomy    Bile duct hydraulic dilation of stricture    Bile duct brush biopsy    Bile duct plastic stent placement    Radiologic interpretation of static and dynamic fluoroscopic images    Indication: Obstructive jaundice    Sedation: GA ([place])    Findings:    Ampulla     Post needle knife sphincterotomy appearance     Surrounded by small saddle diverticulum    Pancreatic duct     Neither injected nor cannulated    CBD     Tight stricture through head of pancreas     Dilated proximal bile duct - 15mm    Intrahepatic ducts     Dilated    Gallbladder     Not filled     Therapeutics    Biliary sphincterotomy - 8mm with bow papillatome over covered wire    Bile duct hydraulic dilation of stricture - 8mm x 40mm    Bile duct brush biopsy    Bile duct plastic stent placement - 10F 7cm    Plan:  Follow liver enzymes    Follow up final pathology    Repeat ERCP with stent revision in 3 months      3/16/2020 10:52 AM Carlos Alberto Ansari M.D.

## 2020-03-16 NOTE — PROGRESS NOTES
"  Gastroenterology Progress Note     Author: Wade Bynum M.D.   Date & Time Created: 3/16/2020 8:19 AM    Chief Complaint:  Abnormal liver tests and dilated biliary tree    This is a 59 year old male with history of chronic pancreatitis, IDDM, seizure disorder, iron deficiency anemia, GERD with esophagitis who was seen in consultation by Dr. Villarreal 2/14/2020 for abnormal liver tests and dilated biliary tree.    He had RUQ US followed by MRCP showing dilated bile and pancreatic ducts, distended GB, chronic pancreatitis and possible abnormal lesions head of pancreas.    Interval History:  2/16/2020: Continues to deny any symptoms including abdominal pain, nausea, vomiting, fevers.  Was refusing IV placement and vitals overnight.  Says he is agreeable to IV placement today  02/17/2020 - No complaints.  Says he feels fine, but does report urine is darker.  I explained imaging findings at length.  02/18/2020 - EUS showed extensive, severe pancreatic calcifications making identification of any pancreatic mass or fluid collection impossible.  Mediastinum was not assessed.  ERCP not done due to normalizing liver enzymes and added risk to patient.  02/19/2020 - No events overnight.  Patient denies fevers/chills/sweats and states he \"feels fine\".  Labs show worsening leukocytosis with improving liver enzymes.  02/20/2020 - Tolerating regular diet well.  Denies any abdominal pain.  He does report chronic mid-back pain, but this is not severe or worsening.  No nausea, vomiting, fevers, chills or sweats.  Leukocytosis slightly better.  Alkaline phosphatase still elevated, but remainder of liver enzymes normal.  02/21/2020 - No abdominal pain.  Tolerating diet.  Mild decrease appetite.  No CBC today.  Eager to be discharged.  02/22/2020 - Continues to \"feel fine\".  No abdominal pain, nausea or vomiting.  Tolerating diet.  Leukocytosis improved, but alkaline phosphatase abruptly higher.  CT not done due to IV issues and " patient's difficult access.  2/23/20 - No new complaints. He ha lovenox s/c this AM at 6.30. ERCP scheduled for 9 AM. Labs pending   2/25/2020: Denies any complaints.  No pain.  Had ERCP yesterday showing pus from bile duct and distal bile duct stricture likely due to chronic pancreatitis.  Unable to stent  Due to loss of access and amount of ampullary inflammation.  WBC better.  Alk phos higher.  No fevers.  2/27/2020: ERCP not performed yesterday due to oxygen saturation.  Remains on 4L oxygen with large bilateral pleural effusions.  Denies dyspnea or abdominal pain.  WBc trending down.  No fevers.  2/29/2020: No changes.  Still on 4L but denies dyspnea.  WBC down to 10.  No other complaints.  Diuresing well.  3/2/2020: Patient examined and interviewed, course reviewed, discussed with Dr Villarreal.  Course discussed with nursing. No new labs yet for today.  Still requiring 4 L oxygen per NC.  He is resting well, without complaint.    3/3/20: Patient examined and interviewed, course reviewed. Discussed with RN and with Dr Lorenzo, therapeutic endoscopist.  No morning labs available yet (patient  Refused earlier but agrees to labs now).  Pt denies abd pain, fevers, chills.  Some nausea with emesis last hs.  No black or tarry stools, rectal bleeding, difficulty swallowing.  His oxygen saturaiton is low 90s on one liter of oxygen now, improved.   3/12/2020 Still elevated Alk-P, with leukocytosis. No RUQ pain. No fever. MRI done, pending result  3/13/2020 Doing ok. No RUQ pain. MRI showed CBD And p ducts dilation with debris.   3/14/2020 Feeling ok. No RUQ pain. No fever.  3/16/2020  NPO for planned ERCP.  Denies pain, nausea, vomiting, cough, SOB.      Review of Systems:  ROS    Physical Exam:  Physical Exam  Constitutional:       Comments: Cachectic appearing.   Cardiovascular:      Rate and Rhythm: Normal rate and regular rhythm.   Pulmonary:      Effort: Pulmonary effort is normal.      Breath sounds: Normal breath  sounds. No wheezing.   Abdominal:      General: Abdomen is flat. Bowel sounds are normal.      Palpations: There is no mass.      Tenderness: There is no abdominal tenderness.   Psychiatric:         Mood and Affect: Mood normal.         Labs:          Recent Labs     03/14/20  0658 03/15/20  0647 20  0429   SODIUM 130* 129* 132*   POTASSIUM 3.8 3.8 3.8   CHLORIDE 93* 92* 95*   CO2 24 25 25   BUN 14 16 15   CREATININE 0.50 0.45* 0.44*   CALCIUM 8.9 8.9 8.9     Recent Labs     20  0658 03/15/20  0647 20  0429   ALTSGPT 28 32 34   ASTSGOT 66* 77* 78*   ALKPHOSPHAT 1161* 1491* 1733*   TBILIRUBIN 0.9 0.9 0.9   GLUCOSE 207* 226* 182*     Recent Labs     20  0658 03/15/20  0647 20  0429   RBC 3.56* 3.93* 3.75*   HEMOGLOBIN 10.6* 11.7* 11.3*   HEMATOCRIT 32.1* 35.3* 33.4*   PLATELETCT 547* 426 471*     Recent Labs     03/14/20  0658 03/15/20  0647 20  0429   WBC 16.0* 16.1* 16.4*   NEUTSPOLYS 67.70 68.10 63.80   LYMPHOCYTES 15.10* 15.70* 19.10*   MONOCYTES 13.50* 12.40 12.60   EOSINOPHILS 0.90 0.90 1.20   BASOPHILS 0.90 1.10 0.90   ASTSGOT 66* 77* 78*   ALTSGPT 28 32 34   ALKPHOSPHAT 1161* 1491* 1733*   TBILIRUBIN 0.9 0.9 0.9     Hemodynamics:  Temp (24hrs), Av.8 °C (98.2 °F), Min:36.7 °C (98 °F), Max:36.9 °C (98.4 °F)  Temperature: 36.9 °C (98.4 °F)  Pulse  Av.5  Min: 70  Max: 122   Blood Pressure: 109/57     Respiratory:    Respiration: 20, Pulse Oximetry: 95 %     Work Of Breathing / Effort: Mild  RUL Breath Sounds: Diminished, RML Breath Sounds: Diminished, RLL Breath Sounds: Diminished, LEONARD Breath Sounds: Diminished, LLL Breath Sounds: Diminished  Fluids:    Intake/Output Summary (Last 24 hours) at 3/16/2020 0819  Last data filed at 3/16/2020 0400  Gross per 24 hour   Intake 1440 ml   Output 900 ml   Net 540 ml        GI/Nutrition:  Orders Placed This Encounter   Procedures   • Diet NPO     Standing Status:   Standing     Number of Occurrences:   8     Order Specific  Question:   Restrict to:     Answer:   Strict [1]     Medical Decision Making, by Problem:  Active Hospital Problems    Diagnosis   • *Type 2 diabetes mellitus with hyperglycemia, with long-term current use of insulin (HCC) [E11.65, Z79.4]   • Pancreatic mass [K86.89]   • Cognitive decline [R41.89]   • Normocytic anemia [D64.9]   • Hypomagnesemia [E83.42]   • Sepsis (HCC) [A41.9]   • Severe protein-calorie malnutrition (HCC) [E43]   • Hyponatremia [E87.1]   • Chronic pancreatitis (HCC) [K86.1]   • Dyslipidemia [E78.5]   • Acute respiratory failure with hypoxia (HCC) [J96.01]   • Cholangitis [K83.09]     Impression:  1.  Dilated biliary tree and cholestatic liver tests.       - s/p EUS 2/18/2020 showing heterogeneous pancreatic body and tail with severe calcifications, massive PD dilatation, and obscured CBD.       - s/p ERCP with needle knife sphincterotomy 2/24/2020 revealed purulent drainage post sphincterotomy, dilated but otherwise normal PD; biliary stricture; unable to cannulate due to submucosal tract and edema.  2.  Cystic mass in head of pancreas - presumably due to chronic pancreatitis..  3.  Elevated CA 19-9.  4.  GERD with esophagitis.  5.  Anemia.  6.  Multiple comorbidities.    Plan:  ERCP with stenting today by Dr. Valenzuela.    Quality-Core Measures

## 2020-03-17 LAB
ALBUMIN SERPL BCP-MCNC: 2.3 G/DL (ref 3.2–4.9)
ALBUMIN/GLOB SERPL: 0.6 G/DL
ALP SERPL-CCNC: 1435 U/L (ref 30–99)
ALT SERPL-CCNC: 29 U/L (ref 2–50)
ANION GAP SERPL CALC-SCNC: 14 MMOL/L (ref 7–16)
AST SERPL-CCNC: 43 U/L (ref 12–45)
BASOPHILS # BLD AUTO: 0.5 % (ref 0–1.8)
BASOPHILS # BLD: 0.09 K/UL (ref 0–0.12)
BILIRUB SERPL-MCNC: 0.6 MG/DL (ref 0.1–1.5)
BUN SERPL-MCNC: 24 MG/DL (ref 8–22)
CALCIUM SERPL-MCNC: 8.7 MG/DL (ref 8.4–10.2)
CHLORIDE SERPL-SCNC: 90 MMOL/L (ref 96–112)
CO2 SERPL-SCNC: 22 MMOL/L (ref 20–33)
CREAT SERPL-MCNC: 0.91 MG/DL (ref 0.5–1.4)
EOSINOPHIL # BLD AUTO: 0 K/UL (ref 0–0.51)
EOSINOPHIL NFR BLD: 0 % (ref 0–6.9)
ERYTHROCYTE [DISTWIDTH] IN BLOOD BY AUTOMATED COUNT: 73 FL (ref 35.9–50)
GLOBULIN SER CALC-MCNC: 3.6 G/DL (ref 1.9–3.5)
GLUCOSE BLD-MCNC: 377 MG/DL (ref 65–99)
GLUCOSE BLD-MCNC: 391 MG/DL (ref 65–99)
GLUCOSE BLD-MCNC: 396 MG/DL (ref 65–99)
GLUCOSE BLD-MCNC: 453 MG/DL (ref 65–99)
GLUCOSE BLD-MCNC: 587 MG/DL (ref 65–99)
GLUCOSE SERPL-MCNC: 469 MG/DL (ref 65–99)
HCT VFR BLD AUTO: 32.4 % (ref 42–52)
HGB BLD-MCNC: 10.9 G/DL (ref 14–18)
IMM GRANULOCYTES # BLD AUTO: 0.55 K/UL (ref 0–0.11)
IMM GRANULOCYTES NFR BLD AUTO: 3 % (ref 0–0.9)
LYMPHOCYTES # BLD AUTO: 2.17 K/UL (ref 1–4.8)
LYMPHOCYTES NFR BLD: 11.7 % (ref 22–41)
MCH RBC QN AUTO: 30.4 PG (ref 27–33)
MCHC RBC AUTO-ENTMCNC: 33.6 G/DL (ref 33.7–35.3)
MCV RBC AUTO: 90.3 FL (ref 81.4–97.8)
MONOCYTES # BLD AUTO: 1.4 K/UL (ref 0–0.85)
MONOCYTES NFR BLD AUTO: 7.6 % (ref 0–13.4)
NEUTROPHILS # BLD AUTO: 14.29 K/UL (ref 1.82–7.42)
NEUTROPHILS NFR BLD: 77.2 % (ref 44–72)
NRBC # BLD AUTO: 0 K/UL
NRBC BLD-RTO: 0 /100 WBC
PLATELET # BLD AUTO: 572 K/UL (ref 164–446)
PMV BLD AUTO: 9.8 FL (ref 9–12.9)
POTASSIUM SERPL-SCNC: 4.6 MMOL/L (ref 3.6–5.5)
PROT SERPL-MCNC: 5.9 G/DL (ref 6–8.2)
RBC # BLD AUTO: 3.59 M/UL (ref 4.7–6.1)
SODIUM SERPL-SCNC: 126 MMOL/L (ref 135–145)
WBC # BLD AUTO: 18.5 K/UL (ref 4.8–10.8)

## 2020-03-17 PROCEDURE — 700102 HCHG RX REV CODE 250 W/ 637 OVERRIDE(OP): Performed by: INTERNAL MEDICINE

## 2020-03-17 PROCEDURE — 80053 COMPREHEN METABOLIC PANEL: CPT

## 2020-03-17 PROCEDURE — 700111 HCHG RX REV CODE 636 W/ 250 OVERRIDE (IP): Performed by: HOSPITALIST

## 2020-03-17 PROCEDURE — A9270 NON-COVERED ITEM OR SERVICE: HCPCS | Performed by: INTERNAL MEDICINE

## 2020-03-17 PROCEDURE — 99232 SBSQ HOSP IP/OBS MODERATE 35: CPT | Performed by: INTERNAL MEDICINE

## 2020-03-17 PROCEDURE — 770006 HCHG ROOM/CARE - MED/SURG/GYN SEMI*

## 2020-03-17 PROCEDURE — 85025 COMPLETE CBC W/AUTO DIFF WBC: CPT

## 2020-03-17 PROCEDURE — 82962 GLUCOSE BLOOD TEST: CPT

## 2020-03-17 PROCEDURE — 36415 COLL VENOUS BLD VENIPUNCTURE: CPT

## 2020-03-17 RX ORDER — INSULIN GLARGINE 100 [IU]/ML
18 INJECTION, SOLUTION SUBCUTANEOUS EVERY EVENING
Status: DISCONTINUED | OUTPATIENT
Start: 2020-03-17 | End: 2020-03-18

## 2020-03-17 RX ADMIN — CYCLOBENZAPRINE HYDROCHLORIDE 10 MG: 10 TABLET, FILM COATED ORAL at 04:55

## 2020-03-17 RX ADMIN — TAMSULOSIN HYDROCHLORIDE 0.4 MG: 0.4 CAPSULE ORAL at 04:55

## 2020-03-17 RX ADMIN — OMEPRAZOLE 20 MG: 20 CAPSULE, DELAYED RELEASE ORAL at 18:01

## 2020-03-17 RX ADMIN — ACETAMINOPHEN 650 MG: 325 TABLET, FILM COATED ORAL at 04:55

## 2020-03-17 RX ADMIN — Medication 100 MG: at 04:55

## 2020-03-17 RX ADMIN — HYDROMORPHONE HYDROCHLORIDE 0.5 MG: 1 INJECTION, SOLUTION INTRAMUSCULAR; INTRAVENOUS; SUBCUTANEOUS at 15:30

## 2020-03-17 RX ADMIN — ACETAMINOPHEN 650 MG: 325 TABLET, FILM COATED ORAL at 15:30

## 2020-03-17 RX ADMIN — FUROSEMIDE 40 MG: 40 TABLET ORAL at 04:55

## 2020-03-17 RX ADMIN — PANCRELIPASE 48000 UNITS: 24000; 76000; 120000 CAPSULE, DELAYED RELEASE PELLETS ORAL at 04:55

## 2020-03-17 RX ADMIN — OMEPRAZOLE 20 MG: 20 CAPSULE, DELAYED RELEASE ORAL at 04:55

## 2020-03-17 RX ADMIN — INSULIN GLARGINE 18 UNITS: 100 INJECTION, SOLUTION SUBCUTANEOUS at 17:13

## 2020-03-17 RX ADMIN — ATORVASTATIN CALCIUM 40 MG: 40 TABLET, FILM COATED ORAL at 18:00

## 2020-03-17 ASSESSMENT — ENCOUNTER SYMPTOMS
WEAKNESS: 1
SORE THROAT: 0
COUGH: 0
WHEEZING: 0
FALLS: 0
SPEECH CHANGE: 0
ABDOMINAL PAIN: 0
NECK PAIN: 0
DEPRESSION: 0
DIZZINESS: 0
VOMITING: 0
SPUTUM PRODUCTION: 0
BLOOD IN STOOL: 0
DIARRHEA: 0
TREMORS: 0
PND: 0
PALPITATIONS: 0
HEARTBURN: 0
FEVER: 0
NAUSEA: 0
HALLUCINATIONS: 0
WEIGHT LOSS: 0
HEADACHES: 0
EYE PAIN: 0
BLURRED VISION: 0
CHILLS: 0

## 2020-03-17 NOTE — DISCHARGE PLANNING
Anticipated Discharge Disposition: TBD    Action: LSW spoke with Zaynab at [facility]. Zaynab stated she cannot accept pt unless he is transferred to a SNF and taught how to control his diabetes. Zaynab stated if pt's personal caregiver gets sick, they cannot accommodate this need. LSW updated pt's POA and social .     Barriers to Discharge: [facility] acceptance    Plan: LSW will continue to assess pt for discharge needs and provide support for pt's family.

## 2020-03-17 NOTE — CARE PLAN
Problem: Communication  Goal: The ability to communicate needs accurately and effectively will improve  Outcome: PROGRESSING AS EXPECTED     Problem: Safety  Goal: Will remain free from injury  Outcome: PROGRESSING AS EXPECTED  Goal: Will remain free from falls  Outcome: PROGRESSING AS EXPECTED     Problem: Infection  Goal: Will remain free from infection  Outcome: PROGRESSING AS EXPECTED     Problem: Venous Thromboembolism (VTW)/Deep Vein Thrombosis (DVT) Prevention:  Goal: Patient will participate in Venous Thrombosis (VTE)/Deep Vein Thrombosis (DVT)Prevention Measures  Outcome: PROGRESSING AS EXPECTED     Problem: Bowel/Gastric:  Goal: Normal bowel function is maintained or improved  Outcome: PROGRESSING AS EXPECTED     Problem: Knowledge Deficit  Goal: Knowledge of the prescribed therapeutic regimen will improve  Outcome: PROGRESSING AS EXPECTED     Problem: Pain Management  Goal: Pain level will decrease to patient's comfort goal  Outcome: PROGRESSING AS EXPECTED     Problem: Respiratory:  Goal: Respiratory status will improve  Outcome: PROGRESSING AS EXPECTED

## 2020-03-17 NOTE — DISCHARGE PLANNING
LSW informed social  that Zaynab from [facility] has not been returning LSWs calls but instead has been calling bedside RN and pt's Uriel PIPER.

## 2020-03-17 NOTE — PROGRESS NOTES
Spoke with Dr. Gallagher regarding elevated blood glucose. Ordered to give 10 units humalog and recheck in 2 hours.

## 2020-03-17 NOTE — PROGRESS NOTES
Spoke with Dr. Raquel Encinas regarding elevated blood glucose. Orders given to recheck at 0030, no insulin at this time.

## 2020-03-17 NOTE — PROGRESS NOTES
Spoke with Uriel Tatum and was asking if Zaynab, the Director of [facility] could come in to see Seoku

## 2020-03-17 NOTE — PROGRESS NOTES
Hospital Medicine Daily Progress Note    Date of Service  3/17/2020    Chief Complaint  59 y.o. male admitted 2/14/2020 with abdominal pain    Hospital Course    History of type 2 diabetes, cognitive impairment who lives at a group home who was admitted for abdominal pain found to have sepsis secondary to ascending cholangitis.  He underwent ERCP with sphincterotomy to relieve obstruction.  No stent was placed due to significant edema.  Repeat ERCP was not performed due to acute respiratory failure with hypoxia which has now resolved.  He has improved with antibiotics and is almost ready to return to his group home however, blood sugars have been variable and they are not allowed to do sliding scale      Interval Problem Update  3/10: Has been hyperglycemic, today dropped to 59.  Lantus and mealtime insulin decreased.  Pain is controlled.  Appetite is improving but remains low.  3/11: Sugars are not at goal, back up to 385.  Insulin increased.  Alk phos and WBC rising, concern for recurrent obstructive process.  Repeat cxr ordered (previous effusion w loculation)  3/12: Sugars improved, not at goal.  WBC and alk phos still rising.  I discussed with GI, repeat MRCP.  3/13: Sugar control improved.  WBC still elevated however stable.  Alk phos stable.  Repeat MRCP shows worsening dilation of the CBD, await GI recommendations regarding need for repeat ERCP/EUS/stent  3/14: Planning for repeat ERCP Monday, Sugar control has improved  3/15: Sugars are slightly up, Lantus and prandial increased.  I discussed the case with psychiatry, they will evaluate his current decisional capacity  3/16: Seen by psych, lacks decisional capacity at this time.  ERCP repeated, pending results    3/17.  Patient overall remained stable.  No significant overnight event.  Still complains of back pain and body achiness. Patient's pain is general and local, 3-4/10, intermittent and does not radiate to other location, sharp and with some  tingling. Can be controlled by pain meds.   Tolerated procedure yesterday.  No fever tonight.    Consultants/Specialty  Dr. Lorenzo - [facility]    Code Status  DNR/DNI    Disposition  TBD - pending placement / medical stability    Review of Systems  Review of Systems   Constitutional: Positive for malaise/fatigue. Negative for chills, fever and weight loss.   HENT: Negative for ear discharge, ear pain, hearing loss and sore throat.    Eyes: Negative for blurred vision and pain.   Respiratory: Negative for cough, sputum production and wheezing.    Cardiovascular: Negative for chest pain, palpitations, leg swelling and PND.   Gastrointestinal: Negative for abdominal pain (Resolved), blood in stool, diarrhea, heartburn and nausea. Vomiting: Appetite improved.   Genitourinary: Negative for dysuria, frequency, hematuria and urgency.   Musculoskeletal: Negative for falls and neck pain.   Skin: Negative for itching and rash.   Neurological: Positive for weakness. Negative for dizziness, tremors, speech change and headaches.   Psychiatric/Behavioral: Negative for depression, hallucinations and suicidal ideas.   All other systems reviewed and are negative.       Physical Exam  Temp:  [36.4 °C (97.5 °F)-36.6 °C (97.9 °F)] 36.4 °C (97.5 °F)  Pulse:  [74-98] 79  Resp:  [18] 18  BP: (100-136)/(59-88) 136/73  SpO2:  [93 %-100 %] 93 %    Physical Exam  Constitutional:       General: He is awake.      Appearance: He is underweight. He is not toxic-appearing or diaphoretic.   HENT:      Head: Normocephalic and atraumatic.      Right Ear: Tympanic membrane normal.      Left Ear: Tympanic membrane normal.      Nose: Nose normal.      Mouth/Throat:      Mouth: Mucous membranes are moist.   Eyes:      Extraocular Movements: Extraocular movements intact.      Pupils: Pupils are equal, round, and reactive to light.   Neck:      Musculoskeletal: Normal range of motion and neck supple.   Cardiovascular:      Rate and Rhythm: Normal rate and regular  rhythm.      Heart sounds: No murmur.   Pulmonary:      Effort: Pulmonary effort is normal. No respiratory distress.      Breath sounds: Normal breath sounds. No stridor. No wheezing or rales.   Chest:      Chest wall: No tenderness.   Abdominal:      General: Abdomen is flat. Bowel sounds are normal. There is no distension.      Palpations: Abdomen is soft. There is no mass.      Tenderness: There is no abdominal tenderness. There is no guarding or rebound.   Musculoskeletal:         General: No swelling, tenderness or deformity.      Comments: Very thin extremities   Skin:     General: Skin is warm and dry.      Coloration: Skin is not jaundiced or pale.   Neurological:      General: No focal deficit present.      Mental Status: He is alert and oriented to person, place, and time.      Cranial Nerves: No cranial nerve deficit.      Motor: No weakness.   Psychiatric:         Mood and Affect: Mood normal.         Behavior: Behavior normal.      Comments: Very pleasant mood         Fluids  No intake or output data in the 24 hours ending 03/17/20 1404    Laboratory  Recent Labs     03/15/20  0647 03/16/20 0429 03/17/20  0623   WBC 16.1* 16.4* 18.5*   RBC 3.93* 3.75* 3.59*   HEMOGLOBIN 11.7* 11.3* 10.9*   HEMATOCRIT 35.3* 33.4* 32.4*   MCV 89.8 89.1 90.3   MCH 29.8 30.1 30.4   MCHC 33.1* 33.8 33.6*   RDW  --  73.6* 73.0*   PLATELETCT 426 471* 572*   MPV 11.7 10.9 9.8     Recent Labs     03/15/20  0647 03/16/20  0429 03/17/20  0623   SODIUM 129* 132* 126*   POTASSIUM 3.8 3.8 4.6   CHLORIDE 92* 95* 90*   CO2 25 25 22   GLUCOSE 226* 182* 469*   BUN 16 15 24*   CREATININE 0.45* 0.44* 0.91   CALCIUM 8.9 8.9 8.7                   Imaging  DX-PORTABLE FLUOROSCOPY < 1 HOUR   Final Result      Intraoperative fluoroscopy spot images as described above.      [id number]/[id number]   Final Result      1.  Interval increase in common bile duct dilatation and intrahepatic biliary ductal dilatation since the prior examination. Filling  defects in the common bile duct just before the stricture at the level of the pancreatic head have developed since the    prior exam. Differential diagnosis includes sludge as well as common duct stones.      2.  Pancreatic ductal dilatation is again identified which is slightly less than on the prior exam. Debris within the pancreatic duct is now present that appears similar to the debris within the common bile duct.      3.  Enlarged pancreatic head containing small cystic structures is again identified. The 2 larger cysts in the posterior pancreatic head have nearly completely resolved. Enlargement of the pancreatic head could be related to pancreatitis. Neoplasm is    also possible.      4.  Bright T2 signal and enlargement of the right adrenal gland is again noted.      5.  Sludge again noted in the gallbladder.      6.  Small right pleural effusion is identified.      DX-CHEST-PORTABLE (1 VIEW)   Final Result      1.  Cardiomegaly with bilateral interstitial opacities.      2.  Small bilateral pleural effusions.         DX-CHEST-PORTABLE (1 VIEW)   Final Result         1. No significant interval change.      DX-CHEST-PORTABLE (1 VIEW)   Final Result      New subpulmonic effusions, bibasilar atelectasis and edema      DX-PORTABLE FLUOROSCOPY < 1 HOUR   Final Result      Portable fluoroscopy utilized for 1.1 minute.         INTERPRETING LOCATION: [address] [place] [address], [facility]      [id number] SPHINCTEROTOMY   Final Result      Intraoperative fluoroscopy spot images as described above.      CT-ABDOMEN WITH & W/O   Final Result      1.  No significant change since CT abdomen and pelvis obtained 2/16/2020      2.  Findings consistent with high-grade biliary obstruction with dilation of the common bile duct measuring 17 mm, dilated gallbladder, dilated pancreatic duct with change in caliber of the pancreatic duct at the level pancreatic head      3.  Findings could be either malignancy the pancreatic head versus  scarring from chronic pancreatitis.      4.  Sequela of chronic pancreatitis      5.  Large right medial chest rim-enhancing loculated fluid collection probably within the pleural space measuring 10.5 x 3.1 centimeter transversely and 7 cm craniocaudal      6.  Rim-enhancing fluid collection in the hepatorenal space measuring 11.2 cm craniocaudal and 2-3 cm in thickness.      7.  The rim-enhancing fluid collection are suspicious for infection/abscess      8.  Bilateral atelectasis and small-moderate-sized pleural effusion after      9.  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  Biliary dilatation without evidence of high-grade biliary obstruction.   2.  Gallbladder visualization after morphine administration. No acute cholecystitis.      CT-ABDOMEN-PELVIS WITH   Final Result      1.  New rim enhancing fluid collection tracks along the right heart margin, azygoesophageal recess and possibly communicates with Morison's pouch tracking along the right aspect of the inferior vena cava. This is nonspecific and could indicate abscess,    dissecting pseudocyst, mucinous cystic neoplasm related fluid      2.  New extra and intrahepatic biliary ductal dilatation with 6 cm dilatation of the gallbladder also seen. This indicates distal common bile duct/ampullary level obstruction. Stricture, mass effect from adjacent pancreas calcifications or small    ampullary mass are differential possibilities      3.  Decreased small pleural effusions. Some loculation on the right is possible      4.  Slightly decreased diffuse pancreatic ductal dilatation now measuring 11 mm. Evidence of chronic pancreatitis. New 3 cm cystic structure anterior to the pancreas could be a pseudocyst. Abscess or necrotic/cystic neoplasm are in the differential.      5.  Multiple chronic findings including status post aorto bifemoral bypass  with approaching 70% stenosis of the left superficial femoral artery, nonobstructive right 3 mm nephrolithiasis, large volume rectal vault stool, dilated bladder which could    indicate outlet obstruction or neurogenic bladder, splenosis      ON-[id number]-X/O   Final Result      1.  Severe dilatation of the intrahepatic biliary ducts, common bile duct and pancreatic duct. The distal common bile duct and the proximal pancreatic duct is not visualized at the pancreatic head. The pancreatic head appears to be diffusely enlarged and    demonstrates multiple small cysts. These findings are concerning for pancreatic neoplasm such as intraductal papillary mucinous cystoadenoma. The other differential diagnosis includes periampullary carcinoma. MR examination of the abdomen with contrast    is recommended for further evaluation.   2.  Mixed signal intensity in the right perinephric space likely represents perinephric hematoma.   3.  Large hiatal hernia.   4.  Diffuse gastric wall thickening.      US-RUQ   Final Result      1.  Hepatomegaly.   2.  Hepatic steatosis.   3.  Gallbladder sludge. Mild intrahepatic ductal dilation. Dilation of the common duct up to 13 mm. No definite distal obstructing etiology is identified. MRCP could be obtained for further evaluation if indicated.   4.  Trace ascites.   5.  Pancreatic calcifications likely sequela of chronic pancreatitis.      DX-CHEST-PORTABLE (1 VIEW)   Final Result      Cardiomegaly.           Assessment/Plan  * Cholangitis  Assessment & Plan  Biliary obstruction suggested on imaging, GI consulted and they performed an ERCP on 2/24 with sphincterotomy  Swelling was too significant to place ampullary duct stent.  He improved without stent placement therefore this was deferred, but Alk P and WBC are rising again  Endoscopy done showing pancreatic calcifications but no stones, no area to biopsy  Repeat MRCP shows worsening dilation of the common bile duct favoring persistent  sludge, stricture or stone  Repeat ERCP/stent/EUS 3/17.    Pending pathology  Continue monitor    Pancreatic mass  Assessment & Plan  MRI with concern for pancreatic malignancy  ERCP with no evidence of malignancy, only calcifications of pancreas present   is elevated but less than 1000 so cannot rule in cancer, likely due chronic pancreatitis  Repeat CT showed chronic pancreatitis and loculated chest effusion    At this moment GI does not believe patient has significant evidence for periodic cancer  Due to rising WBC and alk phos, repeat MRCP was done and shows worsening obstruction  Repeat ERCP 3/17 status post stent    Type 2 diabetes mellitus with hyperglycemia, with long-term current use of insulin ([facility])- (present on admission)  Assessment & Plan  Home dose is: Lantus 12 units at night and regular insulin 10 units 3 times daily.  Has had labile sugars due to variable PO intake requiring insulin adjustments and infection  Group home does not want sliding scale.  Sugars are not at goal   Increase Lantus to 18 units and 3 times daily 10 units      Normocytic anemia- (present on admission)  Assessment & Plan  Stable, no e/o blood loss    Cognitive decline- (present on admission)  Assessment & Plan  Patient has had chronic cognitive decline.  His sister is his financial decision-maker however is considering guardianship for medical decisions  Mental status at his baseline  He was seen by Psych for decisional capacity and at this time does NOT have capacity for medical decisions    Hypomagnesemia  Assessment & Plan  Replaced  Repeat in AM    Sepsis ([facility])  Assessment & Plan  This was Sepsis Present on admission, improved but WBC rising again.  Source: Ascending cholangitis  Abdomen is still soft, tolerating diet  Currently, he has finished antibiotics  Repeat MRCP showed increased CBD dilation from previously seen favoring persistent obstruction/stone or sludge  Repeat ERCP        Acute respiratory failure with  hypoxia (HCC)  Assessment & Plan  Due to loculated effusion  Completed abx  Currently resolved      Recurrent major depressive disorder, in full remission (HCC)  Assessment & Plan  .    Severe protein-calorie malnutrition (HCC)- (present on admission)  Assessment & Plan  Encourage p.o. intake  No pancreatic mass identified    Hyponatremia  Assessment & Plan  Due to hyperglycemia and +/- infection?    Chronic pancreatitis (HCC)- (present on admission)  Assessment & Plan  Continue pancreatic enzymes with meals  Celiac nerve block for pain control, per GI  He is tolerating a diet    Dyslipidemia- (present on admission)  Assessment & Plan  Continue statin       VTE prophylaxis: SCDs    I have seen and examined patient on 3/17/2020. I have reviewed vitals, new labs and imaging. I have discussed POC with RN. There are no changes from (3/16/2020) except for what is mentioned above.         Current Facility-Administered Medications:   •  NOTIFY MD and PharmD, , , Once **AND** glucose 4 g chewable tablet 16 g, 16 g, Oral, Q15 MIN PRN **AND** DEXTROSE 10% BOLUS 250 mL, 250 mL, Intravenous, Q15 MIN PRN, Raquel Encinas M.D.  •  insulin glargine (LANTUS) injection 18 Units, 18 Units, Subcutaneous, Q EVENING, Ruma Christopher M.D.  •  insulin regular (HUMULIN R) injection 10 Units, 10 Units, Subcutaneous, TID AC, Grace Patel, D.O., 10 Units at 03/17/20 1105  •  lactated ringers infusion, 1,000 mL, Intravenous, Intra-Op Once PRN, Carlos Alberto Ansari M.D.  •  furosemide (LASIX) tablet 40 mg, 40 mg, Oral, Q DAY, Ruma Christopher M.D., 40 mg at 03/17/20 0455  •  menthol (HALLS) lozenge 1 Lozenge, 1 Lozenge, Oral, Q2HRS PRN, Betsey Gentile M.D., 1 Lozenge at 03/05/20 1119  •  cyclobenzaprine (FLEXERIL) tablet 10 mg, 10 mg, Oral, TID PRN, Betsey eGntile M.D., 10 mg at 03/17/20 0455  •  atorvastatin (LIPITOR) tablet 40 mg, 40 mg, Oral, Q EVENING, Grace Patel D.O., 40 mg at 03/16/20 1837  •  omeprazole (PRILOSEC) capsule 20 mg, 20 mg,  Oral, BID, CHIARA Holguin.O., 20 mg at 03/17/20 0455  •  pancrelipase (Lip-Prot-Amyl) (CREON 23662) 64941-52060 units capsule 48,000 Units, 2 Cap, Oral, DAILY, YULI HolguinO., 48,000 Units at 03/17/20 0455  •  tamsulosin (FLOMAX) capsule 0.4 mg, 0.4 mg, Oral, DAILY, YULI HolguinO., 0.4 mg at 03/17/20 0455  •  thiamine tablet 100 mg, 100 mg, Oral, DAILY, YULI HolguinO., 100 mg at 03/17/20 0455  •  senna-docusate (PERICOLACE or SENOKOT S) 8.6-50 MG per tablet 2 Tab, 2 Tab, Oral, BID, 2 Tab at 03/15/20 0522 **AND** polyethylene glycol/lytes (MIRALAX) PACKET 1 Packet, 1 Packet, Oral, QDAY PRN **AND** magnesium hydroxide (MILK OF MAGNESIA) suspension 30 mL, 30 mL, Oral, QDAY PRN **AND** bisacodyl (DULCOLAX) suppository 10 mg, 10 mg, Rectal, QDAY PRN, Grace Patel D.O.  •  acetaminophen (TYLENOL) tablet 650 mg, 650 mg, Oral, Q6HRS PRN, YULI HolguinOYuridia, 650 mg at 03/17/20 0455  •  ondansetron (ZOFRAN) syringe/vial injection 4 mg, 4 mg, Intravenous, Q4HRS PRN, Grace Patel D.O.  •  ondansetron (ZOFRAN ODT) dispertab 4 mg, 4 mg, Oral, Q4HRS PRN, Grace Patel D.O.  •  promethazine (PHENERGAN) tablet 12.5-25 mg, 12.5-25 mg, Oral, Q4HRS PRN, YULI HolguinO.  •  prochlorperazine (COMPAZINE) injection 5-10 mg, 5-10 mg, Intravenous, Q4HRS PRN, Grace Patel D.O.  •  HYDROmorphone pf (DILAUDID) injection 0.5 mg, 0.5 mg, Intravenous, Q2HRS PRN, Raquel Encinas M.D., 0.5 mg at 03/16/20 121

## 2020-03-17 NOTE — PROGRESS NOTES
Left a page for Dr. Patel, however got directed to Dr. Perez. Was asking for a sliding scale of insulin due to sugars being high at 570 even after Insulin Regular. Waiting for response.

## 2020-03-17 NOTE — PROGRESS NOTES
"  Gastroenterology Progress Note     Author: Wade Bynum M.D.   Date & Time Created: 3/17/2020 9:31 AM    Chief Complaint:  Abnormal liver tests and dilated biliary tree     This is a 59 year old male with history of chronic pancreatitis, IDDM, seizure disorder, iron deficiency anemia, GERD with esophagitis who was seen in consultation by Dr. Villarreal 2/14/2020 for abnormal liver tests and dilated biliary tree.     He had RUQ US followed by MRCP showing dilated bile and pancreatic ducts, distended GB, chronic pancreatitis and possible abnormal lesions head of pancreas.     Interval History:  2/16/2020: Continues to deny any symptoms including abdominal pain, nausea, vomiting, fevers.  Was refusing IV placement and vitals overnight.  Says he is agreeable to IV placement today  02/17/2020 - No complaints.  Says he feels fine, but does report urine is darker.  I explained imaging findings at length.  02/18/2020 - EUS showed extensive, severe pancreatic calcifications making identification of any pancreatic mass or fluid collection impossible.  Mediastinum was not assessed.  ERCP not done due to normalizing liver enzymes and added risk to patient.  02/19/2020 - No events overnight.  Patient denies fevers/chills/sweats and states he \"feels fine\".  Labs show worsening leukocytosis with improving liver enzymes.  02/20/2020 - Tolerating regular diet well.  Denies any abdominal pain.  He does report chronic mid-back pain, but this is not severe or worsening.  No nausea, vomiting, fevers, chills or sweats.  Leukocytosis slightly better.  Alkaline phosphatase still elevated, but remainder of liver enzymes normal.  02/21/2020 - No abdominal pain.  Tolerating diet.  Mild decrease appetite.  No CBC today.  Eager to be discharged.  02/22/2020 - Continues to \"feel fine\".  No abdominal pain, nausea or vomiting.  Tolerating diet.  Leukocytosis improved, but alkaline phosphatase abruptly higher.  CT not done due to IV issues and " patient's difficult access.  2/23/20 - No new complaints. He ha lovenox s/c this AM at 6.30. ERCP scheduled for 9 AM. Labs pending   2/25/2020: Denies any complaints.  No pain.  Had ERCP yesterday showing pus from bile duct and distal bile duct stricture likely due to chronic pancreatitis.  Unable to stent  Due to loss of access and amount of ampullary inflammation.  WBC better.  Alk phos higher.  No fevers.  2/27/2020: ERCP not performed yesterday due to oxygen saturation.  Remains on 4L oxygen with large bilateral pleural effusions.  Denies dyspnea or abdominal pain.  WBc trending down.  No fevers.  2/29/2020: No changes.  Still on 4L but denies dyspnea.  WBC down to 10.  No other complaints.  Diuresing well.  3/2/2020: Patient examined and interviewed, course reviewed, discussed with Dr Villarreal.  Course discussed with nursing. No new labs yet for today.  Still requiring 4 L oxygen per NC.  He is resting well, without complaint.    3/3/20: Patient examined and interviewed, course reviewed. Discussed with RN and with Dr Lorenzo, therapeutic endoscopist.  No morning labs available yet (patient  Refused earlier but agrees to labs now).  Pt denies abd pain, fevers, chills.  Some nausea with emesis last hs.  No black or tarry stools, rectal bleeding, difficulty swallowing.  His oxygen saturaiton is low 90s on one liter of oxygen now, improved.   3/12/2020 Still elevated Alk-P, with leukocytosis. No RUQ pain. No fever. MRI done, pending result  3/13/2020 Doing ok. No RUQ pain. MRI showed CBD And p ducts dilation with debris.   3/14/2020 Feeling ok. No RUQ pain. No fever.  3/16/2020  NPO for planned ERCP.  Denies pain, nausea, vomiting, cough, SOB.  3/17/2020  ERCP yesterday revealed a tight biliary stricture of the intrahepatic portion with proximal dilation to 15 mm.  It was dilated, brushed and stented with a 10 Fr x 7 cm pastic stent.  Today patient denies abdominal pain, but just has his ongoing low grade upper back  pain.      Review of Systems:  Review of Systems   Constitutional: Negative for chills and fever.   Gastrointestinal: Negative for abdominal pain, nausea and vomiting.       Physical Exam:  Physical Exam  Constitutional:       General: He is not in acute distress.  Cardiovascular:      Rate and Rhythm: Normal rate and regular rhythm.      Heart sounds: No murmur.   Pulmonary:      Effort: Pulmonary effort is normal.      Breath sounds: Normal breath sounds.   Abdominal:      General: Abdomen is flat. Bowel sounds are normal.      Palpations: Abdomen is soft.      Tenderness: There is abdominal tenderness.   Neurological:      Mental Status: He is alert.         Labs:          Recent Labs     03/15/20  0647 20  0623   SODIUM 129* 132* 126*   POTASSIUM 3.8 3.8 4.6   CHLORIDE 92* 95* 90*   CO2 25 25 22   BUN 16 15 24*   CREATININE 0.45* 0.44* 0.91   CALCIUM 8.9 8.9 8.7     Recent Labs     03/15/20  0647 20  0623   ALTSGPT 32 34 29   ASTSGOT 77* 78* 43   ALKPHOSPHAT 1491* 1733* 1435*   TBILIRUBIN 0.9 0.9 0.6   GLUCOSE 226* 182* 469*     Recent Labs     03/15/20  0647 20  0623   RBC 3.93* 3.75* 3.59*   HEMOGLOBIN 11.7* 11.3* 10.9*   HEMATOCRIT 35.3* 33.4* 32.4*   PLATELETCT 426 471* 572*     Recent Labs     03/15/20  0647 20  0623   WBC 16.1* 16.4* 18.5*   NEUTSPOLYS 68.10 63.80 77.20*   LYMPHOCYTES 15.70* 19.10* 11.70*   MONOCYTES 12.40 12.60 7.60   EOSINOPHILS 0.90 1.20 0.00   BASOPHILS 1.10 0.90 0.50   ASTSGOT 77* 78* 43   ALTSGPT 32 34 29   ALKPHOSPHAT 1491* 1733* 1435*   TBILIRUBIN 0.9 0.9 0.6     Hemodynamics:  Temp (24hrs), Av.6 °C (97.9 °F), Min:36.4 °C (97.5 °F), Max:36.8 °C (98.2 °F)  Temperature: 36.4 °C (97.5 °F)  Pulse  Av.2  Min: 57  Max: 122   Blood Pressure: 123/59     Respiratory:    Respiration: 18, Pulse Oximetry: 95 %        RUL Breath Sounds: Diminished, RML Breath Sounds: Diminished, RLL Breath Sounds:  Diminished, LEONARD Breath Sounds: Diminished, LLL Breath Sounds: Diminished  Fluids:    Intake/Output Summary (Last 24 hours) at 3/17/2020 0931  Last data filed at 3/16/2020 1107  Gross per 24 hour   Intake 450 ml   Output --   Net 450 ml        GI/Nutrition:  Orders Placed This Encounter   Procedures   • Diet Order Consistent Carbohydrate     Standing Status:   Standing     Number of Occurrences:   1     Order Specific Question:   Diet:     Answer:   Consistent Carbohydrate [4]     Medical Decision Making, by Problem:  Active Hospital Problems    Diagnosis   • *Cholangitis [K83.09]   • Pancreatic mass [K86.89]   • Type 2 diabetes mellitus with hyperglycemia, with long-term current use of insulin (HCC) [E11.65, Z79.4]   • Cognitive decline [R41.89]   • Normocytic anemia [D64.9]   • Hypomagnesemia [E83.42]   • Sepsis (HCC) [A41.9]   • Severe protein-calorie malnutrition (HCC) [E43]   • Hyponatremia [E87.1]   • Chronic pancreatitis (HCC) [K86.1]   • Dyslipidemia [E78.5]   • Acute respiratory failure with hypoxia (HCC) [J96.01]     Impression:  1.  Bile duct stricture with obstruction, now stented.  Etiology chronic pancreatitis vs neoplasm (prior workup favors former).       - s/p EUS 2/18/2020 showing heterogeneous pancreatic body and tail with severe calcifications, massive PD dilatation, and obscured CBD.       - s/p ERCP with needle knife sphincterotomy 2/24/2020 revealed purulent drainage post sphincterotomy, dilated but otherwise normal PD; biliary stricture; unable to cannulate due to submucosal tract and edema.        - s/p ERCP 3/16/2020 revealing tight bile duct stricture which was dilated, brushed and stented.  Needs stent exchange around 6/2020.  Awaiting brushing results.  2.  Cystic mass in head of pancreas - presumably due to chronic pancreatitis..  3.  Elevated CA 19-9.  4.  GERD with esophagitis.  5.  Anemia.  6.  Multiple comorbidities.    Plan:  - Encourage nutrition.  - Awaiting brushings.  - Stent  exchange in 3 months.    Quality-Core Measures

## 2020-03-17 NOTE — DISCHARGE PLANNING
PATRICIAW faxed requested medical information to Zaynab at [facility]. DIPAK obtained approval from social .

## 2020-03-17 NOTE — PROGRESS NOTES
Spoke with Dr. Encinas regarding blood glucose. Dr. García ordered to recheck the blood glucose at 0700 and give 20 units at that time instead of the scheduled 10 units.

## 2020-03-17 NOTE — PROGRESS NOTES
Received report from night shift. Assumed care of patient. POC discussed with patient. No further needs at this time.

## 2020-03-17 NOTE — PROGRESS NOTES
Spoke with Dr. Encinas regarding elevated blood glucose. 20 units regular insulin ordered, with recheck in 4 hours.

## 2020-03-17 NOTE — CARE PLAN
Problem: Communication  Goal: The ability to communicate needs accurately and effectively will improve  Outcome: PROGRESSING AS EXPECTED  Intervention: [place] patient and significant other/support system to call light to alert staff of needs  Note: Encourage patient to discuss their needs and ask for assistance.     Problem: Venous Thromboembolism (VTW)/Deep Vein Thrombosis (DVT) Prevention:  Goal: Patient will participate in Venous Thrombosis (VTE)/Deep Vein Thrombosis (DVT)Prevention Measures  Outcome: PROGRESSING AS EXPECTED  Intervention: Assess and monitor for anticoagulation complications  Note: Encourage patient is moving their legs.

## 2020-03-18 LAB
ALBUMIN SERPL BCP-MCNC: 2.5 G/DL (ref 3.2–4.9)
ALBUMIN/GLOB SERPL: 0.8 G/DL
ALP SERPL-CCNC: 1137 U/L (ref 30–99)
ALT SERPL-CCNC: 30 U/L (ref 2–50)
ANION GAP SERPL CALC-SCNC: 11 MMOL/L (ref 7–16)
AST SERPL-CCNC: 53 U/L (ref 12–45)
BASOPHILS # BLD AUTO: 0.5 % (ref 0–1.8)
BASOPHILS # BLD: 0.1 K/UL (ref 0–0.12)
BILIRUB SERPL-MCNC: 0.5 MG/DL (ref 0.1–1.5)
BUN SERPL-MCNC: 22 MG/DL (ref 8–22)
CALCIUM SERPL-MCNC: 9.1 MG/DL (ref 8.4–10.2)
CHLORIDE SERPL-SCNC: 96 MMOL/L (ref 96–112)
CO2 SERPL-SCNC: 25 MMOL/L (ref 20–33)
CREAT SERPL-MCNC: 0.72 MG/DL (ref 0.5–1.4)
EOSINOPHIL # BLD AUTO: 0.05 K/UL (ref 0–0.51)
EOSINOPHIL NFR BLD: 0.3 % (ref 0–6.9)
ERYTHROCYTE [DISTWIDTH] IN BLOOD BY AUTOMATED COUNT: 74.5 FL (ref 35.9–50)
GLOBULIN SER CALC-MCNC: 3.2 G/DL (ref 1.9–3.5)
GLUCOSE BLD-MCNC: 200 MG/DL (ref 65–99)
GLUCOSE BLD-MCNC: 380 MG/DL (ref 65–99)
GLUCOSE BLD-MCNC: 415 MG/DL (ref 65–99)
GLUCOSE SERPL-MCNC: 450 MG/DL (ref 65–99)
HCT VFR BLD AUTO: 34.6 % (ref 42–52)
HGB BLD-MCNC: 11.4 G/DL (ref 14–18)
IMM GRANULOCYTES # BLD AUTO: 0.56 K/UL (ref 0–0.11)
IMM GRANULOCYTES NFR BLD AUTO: 3 % (ref 0–0.9)
LYMPHOCYTES # BLD AUTO: 3.5 K/UL (ref 1–4.8)
LYMPHOCYTES NFR BLD: 18.8 % (ref 22–41)
MCH RBC QN AUTO: 30.6 PG (ref 27–33)
MCHC RBC AUTO-ENTMCNC: 32.9 G/DL (ref 33.7–35.3)
MCV RBC AUTO: 92.8 FL (ref 81.4–97.8)
MONOCYTES # BLD AUTO: 1.34 K/UL (ref 0–0.85)
MONOCYTES NFR BLD AUTO: 7.2 % (ref 0–13.4)
NEUTROPHILS # BLD AUTO: 13.1 K/UL (ref 1.82–7.42)
NEUTROPHILS NFR BLD: 70.2 % (ref 44–72)
NRBC # BLD AUTO: 0 K/UL
NRBC BLD-RTO: 0 /100 WBC
PLATELET # BLD AUTO: 593 K/UL (ref 164–446)
PMV BLD AUTO: 10 FL (ref 9–12.9)
POTASSIUM SERPL-SCNC: 4.6 MMOL/L (ref 3.6–5.5)
PROT SERPL-MCNC: 5.7 G/DL (ref 6–8.2)
RBC # BLD AUTO: 3.73 M/UL (ref 4.7–6.1)
SODIUM SERPL-SCNC: 132 MMOL/L (ref 135–145)
WBC # BLD AUTO: 18.7 K/UL (ref 4.8–10.8)

## 2020-03-18 PROCEDURE — 700102 HCHG RX REV CODE 250 W/ 637 OVERRIDE(OP): Performed by: INTERNAL MEDICINE

## 2020-03-18 PROCEDURE — A9270 NON-COVERED ITEM OR SERVICE: HCPCS | Performed by: INTERNAL MEDICINE

## 2020-03-18 PROCEDURE — 700111 HCHG RX REV CODE 636 W/ 250 OVERRIDE (IP): Performed by: HOSPITALIST

## 2020-03-18 PROCEDURE — 85025 COMPLETE CBC W/AUTO DIFF WBC: CPT

## 2020-03-18 PROCEDURE — 80053 COMPREHEN METABOLIC PANEL: CPT

## 2020-03-18 PROCEDURE — 770006 HCHG ROOM/CARE - MED/SURG/GYN SEMI*

## 2020-03-18 PROCEDURE — 82962 GLUCOSE BLOOD TEST: CPT | Mod: 91

## 2020-03-18 PROCEDURE — 99232 SBSQ HOSP IP/OBS MODERATE 35: CPT | Performed by: INTERNAL MEDICINE

## 2020-03-18 PROCEDURE — 36415 COLL VENOUS BLD VENIPUNCTURE: CPT

## 2020-03-18 RX ORDER — INSULIN GLARGINE 100 [IU]/ML
20 INJECTION, SOLUTION SUBCUTANEOUS EVERY EVENING
Status: DISCONTINUED | OUTPATIENT
Start: 2020-03-18 | End: 2020-03-22

## 2020-03-18 RX ADMIN — OMEPRAZOLE 20 MG: 20 CAPSULE, DELAYED RELEASE ORAL at 05:53

## 2020-03-18 RX ADMIN — TAMSULOSIN HYDROCHLORIDE 0.4 MG: 0.4 CAPSULE ORAL at 05:55

## 2020-03-18 RX ADMIN — Medication 100 MG: at 05:53

## 2020-03-18 RX ADMIN — INSULIN GLARGINE 20 UNITS: 100 INJECTION, SOLUTION SUBCUTANEOUS at 17:31

## 2020-03-18 RX ADMIN — FUROSEMIDE 40 MG: 40 TABLET ORAL at 05:53

## 2020-03-18 RX ADMIN — ATORVASTATIN CALCIUM 40 MG: 40 TABLET, FILM COATED ORAL at 18:07

## 2020-03-18 RX ADMIN — ACETAMINOPHEN 650 MG: 325 TABLET, FILM COATED ORAL at 12:23

## 2020-03-18 RX ADMIN — HYDROMORPHONE HYDROCHLORIDE 0.5 MG: 1 INJECTION, SOLUTION INTRAMUSCULAR; INTRAVENOUS; SUBCUTANEOUS at 06:04

## 2020-03-18 RX ADMIN — PANCRELIPASE 48000 UNITS: 24000; 76000; 120000 CAPSULE, DELAYED RELEASE PELLETS ORAL at 05:53

## 2020-03-18 RX ADMIN — OMEPRAZOLE 20 MG: 20 CAPSULE, DELAYED RELEASE ORAL at 18:07

## 2020-03-18 RX ADMIN — ACETAMINOPHEN 650 MG: 325 TABLET, FILM COATED ORAL at 06:04

## 2020-03-18 RX ADMIN — HYDROMORPHONE HYDROCHLORIDE 0.5 MG: 1 INJECTION, SOLUTION INTRAMUSCULAR; INTRAVENOUS; SUBCUTANEOUS at 12:23

## 2020-03-18 ASSESSMENT — ENCOUNTER SYMPTOMS
SPEECH CHANGE: 0
HALLUCINATIONS: 0
WEIGHT LOSS: 0
SHORTNESS OF BREATH: 0
NECK PAIN: 0
SORE THROAT: 0
HEMOPTYSIS: 0
FEVER: 0
FALLS: 0
BLURRED VISION: 0
COUGH: 0
BACK PAIN: 0
HEADACHES: 0
SPUTUM PRODUCTION: 0
CONSTIPATION: 0
WEAKNESS: 1
TINGLING: 0
WHEEZING: 0
ORTHOPNEA: 0
ABDOMINAL PAIN: 0
CHILLS: 0
HEARTBURN: 0
DIZZINESS: 0
NAUSEA: 0
BLOOD IN STOOL: 0
PND: 0
VOMITING: 0
EYE PAIN: 0

## 2020-03-18 NOTE — CARE PLAN
Problem: Safety  Goal: Will remain free from injury  Outcome: PROGRESSING AS EXPECTED  Intervention: Provide assistance with mobility  Note: Encourage patient to get assistance when ambulating to go to the bathroom.     Problem: Infection  Goal: Will remain free from infection  Outcome: PROGRESSING AS EXPECTED  Intervention: Assess signs and symptoms of infection  Note: Monitor patients WBC     Problem: Knowledge Deficit  Goal: Knowledge of disease process/condition, treatment plan, diagnostic tests, and medications will improve  Outcome: PROGRESSING AS EXPECTED  Intervention: Explain information regarding disease process/condition, treatment plan, diagnostic tests, and medications and document in education  Note: Reorient patient to treatment plan and ensure understanding. Communicate with the doctor about insulin regimen and bring up patients concerns.

## 2020-03-18 NOTE — PROGRESS NOTES
Hospital Medicine Daily Progress Note    Date of Service  3/18/2020    Chief Complaint  59 y.o. male admitted 2/14/2020 with abdominal pain    Hospital Course    History of type 2 diabetes, cognitive impairment who lives at a group home who was admitted for abdominal pain found to have sepsis secondary to ascending cholangitis.  He underwent ERCP with sphincterotomy to relieve obstruction.  No stent was placed due to significant edema.  Repeat ERCP was not performed due to acute respiratory failure with hypoxia which has now resolved.  He has improved with antibiotics and is almost ready to return to his group home however, blood sugars have been variable and they are not allowed to do sliding scale      Interval Problem Update  3/10: Has been hyperglycemic, today dropped to 59.  Lantus and mealtime insulin decreased.  Pain is controlled.  Appetite is improving but remains low.  3/11: Sugars are not at goal, back up to 385.  Insulin increased.  Alk phos and WBC rising, concern for recurrent obstructive process.  Repeat cxr ordered (previous effusion w loculation)  3/12: Sugars improved, not at goal.  WBC and alk phos still rising.  I discussed with GI, repeat MRCP.  3/13: Sugar control improved.  WBC still elevated however stable.  Alk phos stable.  Repeat MRCP shows worsening dilation of the CBD, await GI recommendations regarding need for repeat ERCP/EUS/stent  3/14: Planning for repeat ERCP Monday, Sugar control has improved  3/15: Sugars are slightly up, Lantus and prandial increased.  I discussed the case with psychiatry, they will evaluate his current decisional capacity  3/16: Seen by psych, lacks decisional capacity at this time.  ERCP repeated, pending results    3/17.  Patient overall remained stable.  No significant overnight event.  Still complains of back pain and body achiness. Patient's pain is general and local, 3-4/10, intermittent and does not radiate to other location, sharp and with some  tingling. Can be controlled by pain meds.   Tolerated procedure yesterday.  No fever tonight.  3/18.  Patient has been stable and pleasant.  Patient blood sugar still not controlled.  Patient wants to eat regular food however due to diabetes and not controlled blood sugar patient's diet remain to be diabetic.  Complains of general body achiness. Patient's pain is general, 2 to/10, intermittent and does not radiate to other location, sharp and with some tingling. Can be controlled by pain meds.     Consultants/Specialty  Dr. Lorenzo - [facility]    Code Status  DNR/DNI    Disposition  TBD - pending placement / medical stability    Review of Systems  Review of Systems   Constitutional: Positive for malaise/fatigue. Negative for chills, fever and weight loss.   HENT: Negative for ear discharge, hearing loss, sore throat and tinnitus.    Eyes: Negative for blurred vision and pain.   Respiratory: Negative for hemoptysis, sputum production and wheezing.    Cardiovascular: Negative for chest pain, orthopnea, leg swelling and PND.   Gastrointestinal: Negative for abdominal pain (Resolved), blood in stool, constipation, heartburn and vomiting (Appetite improved).   Genitourinary: Negative for dysuria, frequency, hematuria and urgency.   Musculoskeletal: Negative for back pain, falls and neck pain.   Skin: Negative for itching.   Neurological: Positive for weakness. Negative for dizziness, tingling, speech change and headaches.   Psychiatric/Behavioral: Negative for hallucinations and suicidal ideas.   All other systems reviewed and are negative.       Physical Exam  Temp:  [36.4 °C (97.5 °F)-36.6 °C (97.9 °F)] 36.6 °C (97.9 °F)  Pulse:  [68-78] 78  Resp:  [16-18] 18  BP: (119-127)/(63-77) 119/77  SpO2:  [93 %-95 %] 95 %    Physical Exam  Constitutional:       General: He is awake.      Appearance: He is underweight. He is not ill-appearing or toxic-appearing.   HENT:      Head: Normocephalic and atraumatic.      Right Ear: Tympanic  membrane normal.      Left Ear: Tympanic membrane normal.      Nose: Nose normal.      Mouth/Throat:      Mouth: Mucous membranes are moist.   Eyes:      Extraocular Movements: Extraocular movements intact.      Pupils: Pupils are equal, round, and reactive to light.   Neck:      Musculoskeletal: Normal range of motion and neck supple.   Cardiovascular:      Rate and Rhythm: Normal rate and regular rhythm.      Heart sounds: No gallop.    Pulmonary:      Effort: Pulmonary effort is normal. No respiratory distress.      Breath sounds: Normal breath sounds. No stridor. No wheezing or rales.   Chest:      Chest wall: No tenderness.   Abdominal:      General: Abdomen is flat. Bowel sounds are normal. There is no distension.      Palpations: Abdomen is soft. There is no mass.      Tenderness: There is no abdominal tenderness. There is no guarding or rebound.   Musculoskeletal:         General: No swelling or tenderness.      Comments: Very thin extremities   Skin:     General: Skin is warm and dry.      Coloration: Skin is not jaundiced or pale.   Neurological:      General: No focal deficit present.      Mental Status: He is alert and oriented to person, place, and time.      Cranial Nerves: No cranial nerve deficit.      Motor: No weakness.   Psychiatric:         Mood and Affect: Mood normal.         Behavior: Behavior normal.      Comments: Very pleasant mood         Fluids    Intake/Output Summary (Last 24 hours) at 3/18/2020 1159  Last data filed at 3/18/2020 1121  Gross per 24 hour   Intake 800 ml   Output 1950 ml   Net -1150 ml       Laboratory  Recent Labs     03/16/20 0429 03/17/20  0623 03/18/20  0633   WBC 16.4* 18.5* 18.7*   RBC 3.75* 3.59* 3.73*   HEMOGLOBIN 11.3* 10.9* 11.4*   HEMATOCRIT 33.4* 32.4* 34.6*   MCV 89.1 90.3 92.8   MCH 30.1 30.4 30.6   MCHC 33.8 33.6* 32.9*   RDW 73.6* 73.0* 74.5*   PLATELETCT 471* 572* 593*   MPV 10.9 9.8 10.0     Recent Labs     03/16/20 0429 03/17/20  0623 03/18/20  0633    SODIUM 132* 126* 132*   POTASSIUM 3.8 4.6 4.6   CHLORIDE 95* 90* 96   CO2 25 22 25   GLUCOSE 182* 469* 450*   BUN 15 24* 22   CREATININE 0.44* 0.91 0.72   CALCIUM 8.9 8.7 9.1                   Imaging  DX-PORTABLE FLUOROSCOPY < 1 HOUR   Final Result      Intraoperative fluoroscopy spot images as described above.      [id number]   Final Result      1.  Interval increase in common bile duct dilatation and intrahepatic biliary ductal dilatation since the prior examination. Filling defects in the common bile duct just before the stricture at the level of the pancreatic head have developed since the    prior exam. Differential diagnosis includes sludge as well as common duct stones.      2.  Pancreatic ductal dilatation is again identified which is slightly less than on the prior exam. Debris within the pancreatic duct is now present that appears similar to the debris within the common bile duct.      3.  Enlarged pancreatic head containing small cystic structures is again identified. The 2 larger cysts in the posterior pancreatic head have nearly completely resolved. Enlargement of the pancreatic head could be related to pancreatitis. Neoplasm is    also possible.      4.  Bright T2 signal and enlargement of the right adrenal gland is again noted.      5.  Sludge again noted in the gallbladder.      6.  Small right pleural effusion is identified.      DX-CHEST-PORTABLE (1 VIEW)   Final Result      1.  Cardiomegaly with bilateral interstitial opacities.      2.  Small bilateral pleural effusions.         DX-CHEST-PORTABLE (1 VIEW)   Final Result         1. No significant interval change.      DX-CHEST-PORTABLE (1 VIEW)   Final Result      New subpulmonic effusions, bibasilar atelectasis and edema      DX-PORTABLE FLUOROSCOPY < 1 HOUR   Final Result      Portable fluoroscopy utilized for 1.1 minute.         INTERPRETING LOCATION: [address] [place] [address], [facility]      [id number] SPHINCTEROTOMY   Final Result       Intraoperative fluoroscopy spot images as described above.      CT-ABDOMEN WITH & W/O   Final Result      1.  No significant change since CT abdomen and pelvis obtained 2/16/2020      2.  Findings consistent with high-grade biliary obstruction with dilation of the common bile duct measuring 17 mm, dilated gallbladder, dilated pancreatic duct with change in caliber of the pancreatic duct at the level pancreatic head      3.  Findings could be either malignancy the pancreatic head versus scarring from chronic pancreatitis.      4.  Sequela of chronic pancreatitis      5.  Large right medial chest rim-enhancing loculated fluid collection probably within the pleural space measuring 10.5 x 3.1 centimeter transversely and 7 cm craniocaudal      6.  Rim-enhancing fluid collection in the hepatorenal space measuring 11.2 cm craniocaudal and 2-3 cm in thickness.      7.  The rim-enhancing fluid collection are suspicious for infection/abscess      8.  Bilateral atelectasis and small-moderate-sized pleural effusion after      9.  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  Biliary dilatation without evidence of high-grade biliary obstruction.   2.  Gallbladder visualization after morphine administration. No acute cholecystitis.      CT-ABDOMEN-PELVIS WITH   Final Result      1.  New rim enhancing fluid collection tracks along the right heart margin, azygoesophageal recess and possibly communicates with Morison's pouch tracking along the right aspect of the inferior vena cava. This is nonspecific and could indicate abscess,    dissecting pseudocyst, mucinous cystic neoplasm related fluid      2.  New extra and intrahepatic biliary ductal dilatation with 6 cm dilatation of the gallbladder also seen. This indicates distal common bile duct/ampullary level obstruction. Stricture, mass effect from adjacent pancreas  calcifications or small    ampullary mass are differential possibilities      3.  Decreased small pleural effusions. Some loculation on the right is possible      4.  Slightly decreased diffuse pancreatic ductal dilatation now measuring 11 mm. Evidence of chronic pancreatitis. New 3 cm cystic structure anterior to the pancreas could be a pseudocyst. Abscess or necrotic/cystic neoplasm are in the differential.      5.  Multiple chronic findings including status post aorto bifemoral bypass with approaching 70% stenosis of the left superficial femoral artery, nonobstructive right 3 mm nephrolithiasis, large volume rectal vault stool, dilated bladder which could    indicate outlet obstruction or neurogenic bladder, splenosis      [id number]   Final Result      1.  Severe dilatation of the intrahepatic biliary ducts, common bile duct and pancreatic duct. The distal common bile duct and the proximal pancreatic duct is not visualized at the pancreatic head. The pancreatic head appears to be diffusely enlarged and    demonstrates multiple small cysts. These findings are concerning for pancreatic neoplasm such as intraductal papillary mucinous cystoadenoma. The other differential diagnosis includes periampullary carcinoma. MR examination of the abdomen with contrast    is recommended for further evaluation.   2.  Mixed signal intensity in the right perinephric space likely represents perinephric hematoma.   3.  Large hiatal hernia.   4.  Diffuse gastric wall thickening.      US-RUQ   Final Result      1.  Hepatomegaly.   2.  Hepatic steatosis.   3.  Gallbladder sludge. Mild intrahepatic ductal dilation. Dilation of the common duct up to 13 mm. No definite distal obstructing etiology is identified. MRCP could be obtained for further evaluation if indicated.   4.  Trace ascites.   5.  Pancreatic calcifications likely sequela of chronic pancreatitis.      DX-CHEST-PORTABLE (1 VIEW)   Final Result      Cardiomegaly.            Assessment/Plan      Type 2 diabetes mellitus with hyperglycemia, with long-term current use of insulin (HCC)- (present on admission)  Assessment & Plan  Home dose is: Lantus 12 units at night and regular insulin 10 units 3 times daily.  Has had labile sugars due to variable PO intake requiring insulin adjustments and infection  Group home does not want sliding scale.  Sugars are not at goal, average 300 or above  Increase Lantus to 20 units and insulin short acting 12 units 3 times a day  Gradually titrate up insulin and avoid hypoglycemic event    * Cholangitis  Assessment & Plan  Biliary obstruction suggested on imaging, GI consulted and they performed an ERCP on 2/24 with sphincterotomy  Swelling was too significant to place ampullary duct stent.  He improved without stent placement therefore this was deferred, but Alk P and WBC are rising again  Endoscopy done showing pancreatic calcifications but no stones, no area to biopsy  Repeat MRCP shows worsening dilation of the common bile duct favoring persistent sludge, stricture or stone  Repeat ERCP/stent/EUS 3/17.    Pending pathology  Continue monitor, GI continue to follow, stent need to be changed in 3 months.    Pancreatic mass  Assessment & Plan  MRI with concern for pancreatic malignancy  ERCP with no evidence of malignancy, only calcifications of pancreas present   is elevated but less than 1000 so cannot rule in cancer, likely due chronic pancreatitis  Repeat CT showed chronic pancreatitis and loculated chest effusion    At this moment GI does not believe patient has significant evidence for periodic cancer  Due to rising WBC and alk phos, repeat MRCP was done and shows worsening obstruction  Repeat ERCP 3/17 status post stent      Normocytic anemia- (present on admission)  Assessment & Plan  Stable, no e/o blood loss    Cognitive decline- (present on admission)  Assessment & Plan  Patient has had chronic cognitive decline.  His sister is his  financial decision-maker however is considering guardianship for medical decisions  Mental status at his baseline  He was seen by Psych for decisional capacity and at this time does NOT have capacity for medical decisions    Hypomagnesemia  Assessment & Plan  Replaced  Repeat in AM    Sepsis ([facility])  Assessment & Plan  This was Sepsis Present on admission, improved but WBC rising again.  Source: Ascending cholangitis  Abdomen is still soft, tolerating diet  Currently, he has finished antibiotics  Repeat MRCP showed increased CBD dilation from previously seen favoring persistent obstruction/stone or sludge  Repeat ERCP        Acute respiratory failure with hypoxia ([facility])  Assessment & Plan  Due to loculated effusion  Completed abx  Currently resolved      Recurrent major depressive disorder, in full remission ([facility])  Assessment & Plan  .    Severe protein-calorie malnutrition ([facility])- (present on admission)  Assessment & Plan  Encourage p.o. intake  No pancreatic mass identified    Hyponatremia  Assessment & Plan  Due to hyperglycemia and +/- infection?    Chronic pancreatitis ([facility])- (present on admission)  Assessment & Plan  Continue pancreatic enzymes with meals  Celiac nerve block for pain control, per GI  He is tolerating a diet    Dyslipidemia- (present on admission)  Assessment & Plan  Continue statin       VTE prophylaxis: SCDs    I have seen and examined patient on 3/18/2020. I have reviewed vitals, new labs and imaging. I have discussed POC with RN. There are no changes from (3/17/2020) except for what is mentioned above.         Current Facility-Administered Medications:   •  insulin glargine (LANTUS) injection 20 Units, 20 Units, Subcutaneous, Q EVENING, Ruma Crhistopher M.D.  •  insulin regular (HUMULIN R) injection 12 Units, 12 Units, Subcutaneous, TID AC, Ruma Christopher M.D., 12 Units at 03/18/20 1115  •  NOTIFY MD and PharmD, , , Once **AND** glucose 4 g chewable tablet 16 g, 16 g, Oral, Q15 MIN PRN **AND** DEXTROSE 10%  BOLUS 250 mL, 250 mL, Intravenous, Q15 MIN PRN, Raquel Encinas M.D.  •  lactated ringers infusion, 1,000 mL, Intravenous, Intra-Op Once PRN, Carlos Alberto Ansari M.D.  •  furosemide (LASIX) tablet 40 mg, 40 mg, Oral, Q DAY, Ruma Christopher M.D., 40 mg at 03/18/20 0553  •  menthol (HALLS) lozenge 1 Lozenge, 1 Lozenge, Oral, Q2HRS PRN, Betsey Gentile M.D., 1 Lozenge at 03/05/20 1119  •  cyclobenzaprine (FLEXERIL) tablet 10 mg, 10 mg, Oral, TID PRN, Betsey Gentile M.D., 10 mg at 03/17/20 0455  •  atorvastatin (LIPITOR) tablet 40 mg, 40 mg, Oral, Q EVENING, Grace Patel D.O., 40 mg at 03/17/20 1800  •  omeprazole (PRILOSEC) capsule 20 mg, 20 mg, Oral, BID, Grace Patel D.O., 20 mg at 03/18/20 0553  •  pancrelipase (Lip-Prot-Amyl) (CREON 40585) 67220-93292 units capsule 48,000 Units, 2 Cap, Oral, DAILY, Grace Patel D.O., 48,000 Units at 03/18/20 0553  •  tamsulosin (FLOMAX) capsule 0.4 mg, 0.4 mg, Oral, DAILY, CHIARA Holguin.O., 0.4 mg at 03/18/20 0555  •  thiamine tablet 100 mg, 100 mg, Oral, DAILY, Grace Patel D.O., 100 mg at 03/18/20 0553  •  senna-docusate (PERICOLACE or SENOKOT S) 8.6-50 MG per tablet 2 Tab, 2 Tab, Oral, BID, 2 Tab at 03/15/20 0522 **AND** polyethylene glycol/lytes (MIRALAX) PACKET 1 Packet, 1 Packet, Oral, QDAY PRN **AND** magnesium hydroxide (MILK OF MAGNESIA) suspension 30 mL, 30 mL, Oral, QDAY PRN **AND** bisacodyl (DULCOLAX) suppository 10 mg, 10 mg, Rectal, QDAY PRN, YULI HolguinO.  •  acetaminophen (TYLENOL) tablet 650 mg, 650 mg, Oral, Q6HRS PRN, YULI HolguinO., 650 mg at 03/18/20 0604  •  ondansetron (ZOFRAN) syringe/vial injection 4 mg, 4 mg, Intravenous, Q4HRS PRN, CHIARA Holguin.O.  •  ondansetron (ZOFRAN ODT) dispertab 4 mg, 4 mg, Oral, Q4HRS PRN, CHIARA Holguin.O.  •  promethazine (PHENERGAN) tablet 12.5-25 mg, 12.5-25 mg, Oral, Q4HRS PRN, CHIARA Holguin.O.  •  prochlorperazine (COMPAZINE) injection 5-10 mg, 5-10 mg, Intravenous,  Q4HRS PRN, Grace Patel D.O.  •  HYDROmorphone pf (DILAUDID) injection 0.5 mg, 0.5 mg, Intravenous, Q2HRS PRN, Raquel Encinas M.D., 0.5 mg at 03/18/20 0604

## 2020-03-18 NOTE — DISCHARGE PLANNING
Anticipated Discharge Disposition: [facility]    Action: LSW spoke with Zaynab and Zaynab agreed to take pt back but only next Monday. LSW discussed pt with social .     Barriers to Discharge: None    Plan: LSW will continue to assess pt for discharge needs.      Febrile  Leukocytosis to 16k  Blood cultures no growth  UA with no pyuria  Urine culture no growth  Since patient is hypoxic will do CTA Chest to r/o pneumonia and PE  Do Duplex US B/L LE to r/o DVT  If spikes fever again repeat fever work up  Continue Zosyn for now

## 2020-03-18 NOTE — PROGRESS NOTES
Bedside report received from day RN. Pt is AAO x 4.Evening assessesment done. Pt report no pain.POC discussed.All needs met at this time.Bed in low position.Call light within reach.Rounding in place.

## 2020-03-18 NOTE — PROGRESS NOTES
Received report from night shift nurse. Performed initial assessment. Discussed plan of care with patient. No pain reported by patient. No further needs at this time. Patient wants nurses to discuss blood sugar and insulin situation with the hospitalist during rounds.

## 2020-03-18 NOTE — PROGRESS NOTES
"  Gastroenterology Progress Note     Author: Wade Bynum M.D.   Date & Time Created: 3/18/2020 8:30 AM    Chief Complaint:  Abnormal liver tests and dilated biliary tree     This is a 59 year old male with history of chronic pancreatitis, IDDM, seizure disorder, iron deficiency anemia, GERD with esophagitis who was seen in consultation by Dr. Villarreal 2/14/2020 for abnormal liver tests and dilated biliary tree.     He had RUQ US followed by MRCP showing dilated bile and pancreatic ducts, distended GB, chronic pancreatitis and possible abnormal lesions head of pancreas.     Interval History:  2/16/2020: Continues to deny any symptoms including abdominal pain, nausea, vomiting, fevers.  Was refusing IV placement and vitals overnight.  Says he is agreeable to IV placement today  02/17/2020 - No complaints.  Says he feels fine, but does report urine is darker.  I explained imaging findings at length.  02/18/2020 - EUS showed extensive, severe pancreatic calcifications making identification of any pancreatic mass or fluid collection impossible.  Mediastinum was not assessed.  ERCP not done due to normalizing liver enzymes and added risk to patient.  02/19/2020 - No events overnight.  Patient denies fevers/chills/sweats and states he \"feels fine\".  Labs show worsening leukocytosis with improving liver enzymes.  02/20/2020 - Tolerating regular diet well.  Denies any abdominal pain.  He does report chronic mid-back pain, but this is not severe or worsening.  No nausea, vomiting, fevers, chills or sweats.  Leukocytosis slightly better.  Alkaline phosphatase still elevated, but remainder of liver enzymes normal.  02/21/2020 - No abdominal pain.  Tolerating diet.  Mild decrease appetite.  No CBC today.  Eager to be discharged.  02/22/2020 - Continues to \"feel fine\".  No abdominal pain, nausea or vomiting.  Tolerating diet.  Leukocytosis improved, but alkaline phosphatase abruptly higher.  CT not done due to IV issues and " patient's difficult access.  2/23/20 - No new complaints. He ha lovenox s/c this AM at 6.30. ERCP scheduled for 9 AM. Labs pending   2/25/2020: Denies any complaints.  No pain.  Had ERCP yesterday showing pus from bile duct and distal bile duct stricture likely due to chronic pancreatitis.  Unable to stent  Due to loss of access and amount of ampullary inflammation.  WBC better.  Alk phos higher.  No fevers.  2/27/2020: ERCP not performed yesterday due to oxygen saturation.  Remains on 4L oxygen with large bilateral pleural effusions.  Denies dyspnea or abdominal pain.  WBc trending down.  No fevers.  2/29/2020: No changes.  Still on 4L but denies dyspnea.  WBC down to 10.  No other complaints.  Diuresing well.  3/2/2020: Patient examined and interviewed, course reviewed, discussed with Dr Villarreal.  Course discussed with nursing. No new labs yet for today.  Still requiring 4 L oxygen per NC.  He is resting well, without complaint.    3/3/20: Patient examined and interviewed, course reviewed. Discussed with RN and with Dr Lorenzo, therapeutic endoscopist.  No morning labs available yet (patient  Refused earlier but agrees to labs now).  Pt denies abd pain, fevers, chills.  Some nausea with emesis last hs.  No black or tarry stools, rectal bleeding, difficulty swallowing.  His oxygen saturaiton is low 90s on one liter of oxygen now, improved.   3/12/2020 Still elevated Alk-P, with leukocytosis. No RUQ pain. No fever. MRI done, pending result  3/13/2020 Doing ok. No RUQ pain. MRI showed CBD And p ducts dilation with debris.   3/14/2020 Feeling ok. No RUQ pain. No fever.  3/16/2020  NPO for planned ERCP.  Denies pain, nausea, vomiting, cough, SOB.  3/17/2020  ERCP yesterday revealed a tight biliary stricture of the intrahepatic portion with proximal dilation to 15 mm.  It was dilated, brushed and stented with a 10 Fr x 7 cm pastic stent.  Today patient denies abdominal pain, but just has his ongoing low grade upper back  pain.  3/18/2020: Denies any abdominal discomfort and is tolerating PO well.  Cytology pending.      Review of Systems:  Review of Systems   Constitutional: Negative for chills and fever.   Respiratory: Negative for cough and shortness of breath.    Gastrointestinal: Negative for abdominal pain, nausea and vomiting.       Physical Exam:  Physical Exam  Constitutional:       Comments: Cachectic, but in no distress.   Cardiovascular:      Rate and Rhythm: Normal rate and regular rhythm.      Heart sounds: No murmur.   Pulmonary:      Effort: Pulmonary effort is normal.      Breath sounds: Normal breath sounds. No wheezing.   Abdominal:      General: Abdomen is flat. Bowel sounds are normal. There is no distension.      Palpations: Abdomen is soft. There is no mass.      Tenderness: There is no abdominal tenderness.   Neurological:      General: No focal deficit present.      Mental Status: He is oriented to person, place, and time.         Labs:          Recent Labs     20  0623   SODIUM 132* 126*   POTASSIUM 3.8 4.6   CHLORIDE 95* 90*   CO2 25 22   BUN 15 24*   CREATININE 0.44* 0.91   CALCIUM 8.9 8.7     Recent Labs     20  0623   ALTSGPT 34 29   ASTSGOT 78* 43   ALKPHOSPHAT 1733* 1435*   TBILIRUBIN 0.9 0.6   GLUCOSE 182* 469*     Recent Labs     20  0623 20  0633   RBC 3.75* 3.59* 3.73*   HEMOGLOBIN 11.3* 10.9* 11.4*   HEMATOCRIT 33.4* 32.4* 34.6*   PLATELETCT 471* 572* 593*     Recent Labs     20  0623 20  0633   WBC 16.4* 18.5* 18.7*   NEUTSPOLYS 63.80 77.20* 70.20   LYMPHOCYTES 19.10* 11.70* 18.80*   MONOCYTES 12.60 7.60 7.20   EOSINOPHILS 1.20 0.00 0.30   BASOPHILS 0.90 0.50 0.50   ASTSGOT 78* 43  --    ALTSGPT 34 29  --    ALKPHOSPHAT 1733* 1435*  --    TBILIRUBIN 0.9 0.6  --      Hemodynamics:  Temp (24hrs), Av.5 °C (97.7 °F), Min:36.4 °C (97.5 °F), Max:36.6 °C (97.9 °F)  Temperature: 36.6 °C (97.9 °F)  Pulse   Av.8  Min: 57  Max: 122   Blood Pressure: 119/77     Respiratory:    Respiration: 18, Pulse Oximetry: 95 %     Work Of Breathing / Effort: Shallow  RUL Breath Sounds: Diminished, RML Breath Sounds: Diminished, RLL Breath Sounds: Diminished, LEONARD Breath Sounds: Diminished, LLL Breath Sounds: Diminished  Fluids:    Intake/Output Summary (Last 24 hours) at 3/18/2020 0830  Last data filed at 3/18/2020 0500  Gross per 24 hour   Intake 800 ml   Output 1100 ml   Net -300 ml        GI/Nutrition:  Orders Placed This Encounter   Procedures   • Diet Order Consistent Carbohydrate, Diabetic     Standing Status:   Standing     Number of Occurrences:   1     Order Specific Question:   Diet:     Answer:   Consistent Carbohydrate [4]     Order Specific Question:   Diet:     Answer:   Diabetic [3]     Medical Decision Making, by Problem:  Active Hospital Problems    Diagnosis   • *Cholangitis [K83.09]   • Pancreatic mass [K86.89]   • Type 2 diabetes mellitus with hyperglycemia, with long-term current use of insulin (HCC) [E11.65, Z79.4]   • Cognitive decline [R41.89]   • Normocytic anemia [D64.9]   • Hypomagnesemia [E83.42]   • Sepsis (HCC) [A41.9]   • Severe protein-calorie malnutrition (HCC) [E43]   • Hyponatremia [E87.1]   • Chronic pancreatitis (HCC) [K86.1]   • Dyslipidemia [E78.5]   • Acute respiratory failure with hypoxia (HCC) [J96.01]     Impression:  1.  Bile duct stricture with obstruction, now stented.  Etiology chronic pancreatitis vs neoplasm (prior workup favors former).   Stable and asymptomatic presently.       - s/p EUS 2020 showing heterogeneous pancreatic body and tail with severe calcifications, massive PD dilatation, and obscured CBD.       - s/p ERCP with needle knife sphincterotomy 2020 revealed purulent drainage post sphincterotomy, dilated but otherwise normal PD; biliary stricture; unable to cannulate due to submucosal tract and edema.        - s/p ERCP 3/16/2020 revealing tight bile duct  stricture which was dilated, brushed and stented.  Needs stent exchange around 6/2020.  Awaiting brushing results.  2.  Cystic mass in head of pancreas - presumably due to chronic pancreatitis..  3.  Cholestatic LFTs - improved after stent placement.  4.  Elevated CA 19-9.  5.  GERD with esophagitis.  6.  Anemia.  7.  Multiple comorbidities.     Plan:  - Awaiting brushings.  - Stent exchange in 3 months.    Quality-Core Measures

## 2020-03-19 LAB
ALBUMIN SERPL BCP-MCNC: 2.3 G/DL (ref 3.2–4.9)
ALBUMIN/GLOB SERPL: 0.6 G/DL
ALP SERPL-CCNC: 1076 U/L (ref 30–99)
ALT SERPL-CCNC: 30 U/L (ref 2–50)
ANION GAP SERPL CALC-SCNC: 12 MMOL/L (ref 7–16)
AST SERPL-CCNC: 48 U/L (ref 12–45)
BASOPHILS # BLD AUTO: 1.3 % (ref 0–1.8)
BASOPHILS # BLD: 0.22 K/UL (ref 0–0.12)
BILIRUB SERPL-MCNC: 0.5 MG/DL (ref 0.1–1.5)
BUN SERPL-MCNC: 20 MG/DL (ref 8–22)
CALCIUM SERPL-MCNC: 9 MG/DL (ref 8.4–10.2)
CHLORIDE SERPL-SCNC: 98 MMOL/L (ref 96–112)
CO2 SERPL-SCNC: 24 MMOL/L (ref 20–33)
CREAT SERPL-MCNC: 0.52 MG/DL (ref 0.5–1.4)
EOSINOPHIL # BLD AUTO: 0.19 K/UL (ref 0–0.51)
EOSINOPHIL NFR BLD: 1.1 % (ref 0–6.9)
ERYTHROCYTE [DISTWIDTH] IN BLOOD BY AUTOMATED COUNT: 73.9 FL (ref 35.9–50)
GLOBULIN SER CALC-MCNC: 3.6 G/DL (ref 1.9–3.5)
GLUCOSE BLD-MCNC: 229 MG/DL (ref 65–99)
GLUCOSE BLD-MCNC: 371 MG/DL (ref 65–99)
GLUCOSE BLD-MCNC: 77 MG/DL (ref 65–99)
GLUCOSE SERPL-MCNC: 74 MG/DL (ref 65–99)
HCT VFR BLD AUTO: 35.3 % (ref 42–52)
HGB BLD-MCNC: 11.7 G/DL (ref 14–18)
IMM GRANULOCYTES # BLD AUTO: 0.55 K/UL (ref 0–0.11)
IMM GRANULOCYTES NFR BLD AUTO: 3.2 % (ref 0–0.9)
LYMPHOCYTES # BLD AUTO: 3.66 K/UL (ref 1–4.8)
LYMPHOCYTES NFR BLD: 21 % (ref 22–41)
MCH RBC QN AUTO: 30.3 PG (ref 27–33)
MCHC RBC AUTO-ENTMCNC: 33.1 G/DL (ref 33.7–35.3)
MCV RBC AUTO: 91.5 FL (ref 81.4–97.8)
MONOCYTES # BLD AUTO: 1.8 K/UL (ref 0–0.85)
MONOCYTES NFR BLD AUTO: 10.3 % (ref 0–13.4)
NEUTROPHILS # BLD AUTO: 11.03 K/UL (ref 1.82–7.42)
NEUTROPHILS NFR BLD: 63.1 % (ref 44–72)
NRBC # BLD AUTO: 0 K/UL
NRBC BLD-RTO: 0 /100 WBC
PLATELET # BLD AUTO: 443 K/UL (ref 164–446)
PMV BLD AUTO: 10.8 FL (ref 9–12.9)
POTASSIUM SERPL-SCNC: 4.4 MMOL/L (ref 3.6–5.5)
PROT SERPL-MCNC: 5.9 G/DL (ref 6–8.2)
RBC # BLD AUTO: 3.86 M/UL (ref 4.7–6.1)
SODIUM SERPL-SCNC: 134 MMOL/L (ref 135–145)
WBC # BLD AUTO: 17.5 K/UL (ref 4.8–10.8)

## 2020-03-19 PROCEDURE — A9270 NON-COVERED ITEM OR SERVICE: HCPCS | Performed by: INTERNAL MEDICINE

## 2020-03-19 PROCEDURE — 700111 HCHG RX REV CODE 636 W/ 250 OVERRIDE (IP): Performed by: HOSPITALIST

## 2020-03-19 PROCEDURE — 700102 HCHG RX REV CODE 250 W/ 637 OVERRIDE(OP): Performed by: INTERNAL MEDICINE

## 2020-03-19 PROCEDURE — 770006 HCHG ROOM/CARE - MED/SURG/GYN SEMI*

## 2020-03-19 PROCEDURE — 82962 GLUCOSE BLOOD TEST: CPT

## 2020-03-19 PROCEDURE — 85025 COMPLETE CBC W/AUTO DIFF WBC: CPT

## 2020-03-19 PROCEDURE — 80053 COMPREHEN METABOLIC PANEL: CPT

## 2020-03-19 PROCEDURE — 99232 SBSQ HOSP IP/OBS MODERATE 35: CPT | Performed by: INTERNAL MEDICINE

## 2020-03-19 PROCEDURE — 36415 COLL VENOUS BLD VENIPUNCTURE: CPT

## 2020-03-19 RX ADMIN — CYCLOBENZAPRINE HYDROCHLORIDE 10 MG: 10 TABLET, FILM COATED ORAL at 17:50

## 2020-03-19 RX ADMIN — ACETAMINOPHEN 650 MG: 325 TABLET, FILM COATED ORAL at 17:50

## 2020-03-19 RX ADMIN — FUROSEMIDE 40 MG: 40 TABLET ORAL at 05:28

## 2020-03-19 RX ADMIN — ACETAMINOPHEN 650 MG: 325 TABLET, FILM COATED ORAL at 10:27

## 2020-03-19 RX ADMIN — TAMSULOSIN HYDROCHLORIDE 0.4 MG: 0.4 CAPSULE ORAL at 05:28

## 2020-03-19 RX ADMIN — PANCRELIPASE 48000 UNITS: 24000; 76000; 120000 CAPSULE, DELAYED RELEASE PELLETS ORAL at 05:28

## 2020-03-19 RX ADMIN — OMEPRAZOLE 20 MG: 20 CAPSULE, DELAYED RELEASE ORAL at 05:28

## 2020-03-19 RX ADMIN — OMEPRAZOLE 20 MG: 20 CAPSULE, DELAYED RELEASE ORAL at 17:50

## 2020-03-19 RX ADMIN — HYDROMORPHONE HYDROCHLORIDE 0.5 MG: 1 INJECTION, SOLUTION INTRAMUSCULAR; INTRAVENOUS; SUBCUTANEOUS at 20:55

## 2020-03-19 RX ADMIN — INSULIN GLARGINE 20 UNITS: 100 INJECTION, SOLUTION SUBCUTANEOUS at 16:55

## 2020-03-19 RX ADMIN — Medication 100 MG: at 05:28

## 2020-03-19 RX ADMIN — CYCLOBENZAPRINE HYDROCHLORIDE 10 MG: 10 TABLET, FILM COATED ORAL at 10:27

## 2020-03-19 RX ADMIN — ATORVASTATIN CALCIUM 40 MG: 40 TABLET, FILM COATED ORAL at 17:50

## 2020-03-19 ASSESSMENT — ENCOUNTER SYMPTOMS
SPEECH CHANGE: 0
FEVER: 0
CHILLS: 0
HALLUCINATIONS: 0
BLOOD IN STOOL: 0
HEADACHES: 0
NECK PAIN: 0
SPUTUM PRODUCTION: 0
FALLS: 0
BLURRED VISION: 0
ORTHOPNEA: 0
SORE THROAT: 0
ABDOMINAL PAIN: 0
PND: 0
TREMORS: 0
DIZZINESS: 0
CONSTIPATION: 0
VOMITING: 0
WEAKNESS: 1
HEARTBURN: 0
WEIGHT LOSS: 0
EYE PAIN: 0
BACK PAIN: 0
WHEEZING: 0
HEMOPTYSIS: 0

## 2020-03-19 NOTE — PROGRESS NOTES
"  Gastroenterology Progress Note     Author: Wade Bynum M.D.   Date & Time Created: 3/19/2020 9:28 AM    Chief Complaint:  Abnormal liver tests and dilated biliary tree     This is a 59 year old male with history of chronic pancreatitis, IDDM, seizure disorder, iron deficiency anemia, GERD with esophagitis who was seen in consultation by Dr. Villarreal 2/14/2020 for abnormal liver tests and dilated biliary tree.     He had RUQ US followed by MRCP showing dilated bile and pancreatic ducts, distended GB, chronic pancreatitis and possible abnormal lesions head of pancreas.     Interval History:  2/16/2020: Continues to deny any symptoms including abdominal pain, nausea, vomiting, fevers.  Was refusing IV placement and vitals overnight.  Says he is agreeable to IV placement today  02/17/2020 - No complaints.  Says he feels fine, but does report urine is darker.  I explained imaging findings at length.  02/18/2020 - EUS showed extensive, severe pancreatic calcifications making identification of any pancreatic mass or fluid collection impossible.  Mediastinum was not assessed.  ERCP not done due to normalizing liver enzymes and added risk to patient.  02/19/2020 - No events overnight.  Patient denies fevers/chills/sweats and states he \"feels fine\".  Labs show worsening leukocytosis with improving liver enzymes.  02/20/2020 - Tolerating regular diet well.  Denies any abdominal pain.  He does report chronic mid-back pain, but this is not severe or worsening.  No nausea, vomiting, fevers, chills or sweats.  Leukocytosis slightly better.  Alkaline phosphatase still elevated, but remainder of liver enzymes normal.  02/21/2020 - No abdominal pain.  Tolerating diet.  Mild decrease appetite.  No CBC today.  Eager to be discharged.  02/22/2020 - Continues to \"feel fine\".  No abdominal pain, nausea or vomiting.  Tolerating diet.  Leukocytosis improved, but alkaline phosphatase abruptly higher.  CT not done due to IV issues and " patient's difficult access.  2/23/20 - No new complaints. He ha lovenox s/c this AM at 6.30. ERCP scheduled for 9 AM. Labs pending   2/25/2020: Denies any complaints.  No pain.  Had ERCP yesterday showing pus from bile duct and distal bile duct stricture likely due to chronic pancreatitis.  Unable to stent  Due to loss of access and amount of ampullary inflammation.  WBC better.  Alk phos higher.  No fevers.  2/27/2020: ERCP not performed yesterday due to oxygen saturation.  Remains on 4L oxygen with large bilateral pleural effusions.  Denies dyspnea or abdominal pain.  WBc trending down.  No fevers.  2/29/2020: No changes.  Still on 4L but denies dyspnea.  WBC down to 10.  No other complaints.  Diuresing well.  3/2/2020: Patient examined and interviewed, course reviewed, discussed with Dr Villarreal.  Course discussed with nursing. No new labs yet for today.  Still requiring 4 L oxygen per NC.  He is resting well, without complaint.    3/3/20: Patient examined and interviewed, course reviewed. Discussed with RN and with Dr Lorenzo, therapeutic endoscopist.  No morning labs available yet (patient  Refused earlier but agrees to labs now).  Pt denies abd pain, fevers, chills.  Some nausea with emesis last hs.  No black or tarry stools, rectal bleeding, difficulty swallowing.  His oxygen saturaiton is low 90s on one liter of oxygen now, improved.   3/12/2020 Still elevated Alk-P, with leukocytosis. No RUQ pain. No fever. MRI done, pending result  3/13/2020 Doing ok. No RUQ pain. MRI showed CBD And p ducts dilation with debris.   3/14/2020 Feeling ok. No RUQ pain. No fever.  3/16/2020  NPO for planned ERCP.  Denies pain, nausea, vomiting, cough, SOB.  3/17/2020  ERCP yesterday revealed a tight biliary stricture of the intrahepatic portion with proximal dilation to 15 mm.  It was dilated, brushed and stented with a 10 Fr x 7 cm pastic stent.  Today patient denies abdominal pain, but just has his ongoing low grade upper back  pain.  3/18/2020: Denies any abdominal discomfort and is tolerating PO well.  Cytology pending.  3/19/2020: Remains asymptomatic.  Cytology negative for malignant cells.    Review of Systems:  ROS    Physical Exam:  Physical Exam    Labs:          Recent Labs     20  0352   SODIUM 126* 132* 134*   POTASSIUM 4.6 4.6 4.4   CHLORIDE 90* 96 98   CO2 22 25 24   BUN 24* 22 20   CREATININE 0.91 0.72 0.52   CALCIUM 8.7 9.1 9.0     Recent Labs     2033 20  0352   ALTSGPT 29 30 30   ASTSGOT 43 53* 48*   ALKPHOSPHAT 1435* 1137* 1076*   TBILIRUBIN 0.6 0.5 0.5   GLUCOSE 469* 450* 74     Recent Labs     2033 20  0352   RBC 3.59* 3.73* 3.86*   HEMOGLOBIN 10.9* 11.4* 11.7*   HEMATOCRIT 32.4* 34.6* 35.3*   PLATELETCT 572* 593* 443     Recent Labs     2033 20  0352   WBC 18.5* 18.7* 17.5*   NEUTSPOLYS 77.20* 70.20 63.10   LYMPHOCYTES 11.70* 18.80* 21.00*   MONOCYTES 7.60 7.20 10.30   EOSINOPHILS 0.00 0.30 1.10   BASOPHILS 0.50 0.50 1.30   ASTSGOT 43 53* 48*   ALTSGPT 29 30 30   ALKPHOSPHAT 1435* 1137* 1076*   TBILIRUBIN 0.6 0.5 0.5     Hemodynamics:  Temp (24hrs), Av.6 °C (97.9 °F), Min:36.5 °C (97.7 °F), Max:36.7 °C (98.1 °F)  Temperature: 36.7 °C (98 °F)  Pulse  Av.6  Min: 57  Max: 122   Blood Pressure: 118/74     Respiratory:    Respiration: 18, Pulse Oximetry: 96 %     Work Of Breathing / Effort: Shallow  RUL Breath Sounds: Diminished, RML Breath Sounds: Diminished, RLL Breath Sounds: Diminished, LEONARD Breath Sounds: Diminished, LLL Breath Sounds: Diminished  Fluids:    Intake/Output Summary (Last 24 hours) at 3/19/2020 0928  Last data filed at 3/19/2020 0720  Gross per 24 hour   Intake 480 ml   Output 2550 ml   Net -2070 ml        GI/Nutrition:  Orders Placed This Encounter   Procedures   • Diet Order Consistent Carbohydrate, Diabetic     Standing Status:   Standing     Number of Occurrences:    1     Order Specific Question:   Diet:     Answer:   Consistent Carbohydrate [4]     Order Specific Question:   Diet:     Answer:   Diabetic [3]     Medical Decision Making, by Problem:  Active Hospital Problems    Diagnosis   • *Cholangitis [K83.09]   • Pancreatic mass [K86.89]   • Type 2 diabetes mellitus with hyperglycemia, with long-term current use of insulin (HCC) [E11.65, Z79.4]   • Cognitive decline [R41.89]   • Normocytic anemia [D64.9]   • Hypomagnesemia [E83.42]   • Sepsis (HCC) [A41.9]   • Severe protein-calorie malnutrition (HCC) [E43]   • Hyponatremia [E87.1]   • Chronic pancreatitis (HCC) [K86.1]   • Dyslipidemia [E78.5]   • Acute respiratory failure with hypoxia (HCC) [J96.01]       Impression:  1.  Bile duct stricture with obstruction, now stented.  Etiology chronic pancreatitis vs neoplasm (prior workup favors former).   Stable and asymptomatic presently.       - s/p EUS 2/18/2020 showing heterogeneous pancreatic body and tail with severe calcifications, massive PD dilatation, and obscured CBD.       - s/p ERCP with needle knife sphincterotomy 2/24/2020 revealed purulent drainage post sphincterotomy, dilated but otherwise normal PD; biliary stricture; unable to cannulate due to submucosal tract and edema.        - s/p ERCP 3/16/2020 revealing tight bile duct stricture which was dilated, brushed and stented.  Needs stent exchange around 6/2020.  Brushings negative.  2.  Cystic mass in head of pancreas - presumably due to chronic pancreatitis..  3.  Cholestatic LFTs - improving after stent placement.  4.  Elevated CA 19-9.  5.  GERD with esophagitis.  6.  Anemia.  7.  Multiple comorbidities.     Plan:  - I will sign off today.  - Arrangements will be made for:  A) Office visit or telemedicine visit in 6-8 weeks.  B) Stent exchange in 3 months.      Quality-Core Measures

## 2020-03-19 NOTE — CARE PLAN
Problem: Safety  Goal: Will remain free from falls  Outcome: PROGRESSING AS EXPECTED  Intervention: Implement fall precautions  Flowsheets (Taken 3/19/2020 0800)  Environmental Precautions:   Treaded Slipper Socks on Patient   Personal Belongings, Wastebasket, Call Bell etc. in Easy Reach   Report Given to Other Health Care Providers Regarding Fall Risk   Bed in Low Position   Communication Sign for Patients & Families   Mobility Assessed & Appropriate Sign Placed  Note: Environmental fall precautions and hourly rounding in place. Pt instructed to call for assistance before ambulating. Pt verbalized understanding and calls appropriately.        Problem: Fluid Volume:  Goal: Will maintain balanced intake and output  Outcome: PROGRESSING AS EXPECTED  Intervention: Monitor, educate, and encourage compliance with therapeutic intake of liquids  Note: Pt encouraged to continue maintaining adequate intake of fluids and nutrition. Pt denies any difficulty with voiding or having bowel movements.

## 2020-03-19 NOTE — PROGRESS NOTES
Hospital Medicine Daily Progress Note    Date of Service  3/19/2020    Chief Complaint  59 y.o. male admitted 2/14/2020 with abdominal pain    Hospital Course    History of type 2 diabetes, cognitive impairment who lives at a group home who was admitted for abdominal pain found to have sepsis secondary to ascending cholangitis.  He underwent ERCP with sphincterotomy to relieve obstruction.  No stent was placed due to significant edema.  Repeat ERCP was not performed due to acute respiratory failure with hypoxia which has now resolved.  He has improved with antibiotics and is almost ready to return to his group home however, blood sugars have been variable and they are not allowed to do sliding scale      Interval Problem Update  3/10: Has been hyperglycemic, today dropped to 59.  Lantus and mealtime insulin decreased.  Pain is controlled.  Appetite is improving but remains low.  3/11: Sugars are not at goal, back up to 385.  Insulin increased.  Alk phos and WBC rising, concern for recurrent obstructive process.  Repeat cxr ordered (previous effusion w loculation)  3/12: Sugars improved, not at goal.  WBC and alk phos still rising.  I discussed with GI, repeat MRCP.  3/13: Sugar control improved.  WBC still elevated however stable.  Alk phos stable.  Repeat MRCP shows worsening dilation of the CBD, await GI recommendations regarding need for repeat ERCP/EUS/stent  3/14: Planning for repeat ERCP Monday, Sugar control has improved  3/15: Sugars are slightly up, Lantus and prandial increased.  I discussed the case with psychiatry, they will evaluate his current decisional capacity  3/16: Seen by psych, lacks decisional capacity at this time.  ERCP repeated, pending results    3/17.  Patient overall remained stable.  No significant overnight event.  Still complains of back pain and body achiness. Patient's pain is general and local, 3-4/10, intermittent and does not radiate to other location, sharp and with some  tingling. Can be controlled by pain meds.   Tolerated procedure yesterday.  No fever tonight.  3/18.  Patient has been stable and pleasant.  Patient blood sugar still not controlled.  Patient wants to eat regular food however due to diabetes and not controlled blood sugar patient's diet remain to be diabetic.  Complains of general body achiness. Patient's pain is general, 2 to/10, intermittent and does not radiate to other location, sharp and with some tingling. Can be controlled by pain meds.   3/19.  Patient has been stable overnight no significant overnight event.  Cytology resolved negative.  Patient blood sugar better controlled this morning but still high during the day.  We will continue current insulin dosage. Patient otherwise denies fever, chills, nausea, vomiting, adb pain, SOB, CP, headache, constipation, diarrhea, cough, or sputum.      Consultants/Specialty  Dr. Lorenzo - [facility]    Code Status  DNR/DNI    Disposition  TBD - pending placement / medical stability    Review of Systems  Review of Systems   Constitutional: Positive for malaise/fatigue. Negative for chills, fever and weight loss.   HENT: Negative for ear discharge, hearing loss, sore throat and tinnitus.    Eyes: Negative for blurred vision and pain.   Respiratory: Negative for hemoptysis, sputum production and wheezing.    Cardiovascular: Negative for chest pain, orthopnea, leg swelling and PND.   Gastrointestinal: Negative for abdominal pain (Resolved), blood in stool, constipation, heartburn and vomiting (Appetite improved).   Genitourinary: Negative for dysuria, frequency and hematuria.   Musculoskeletal: Negative for back pain, falls and neck pain.   Skin: Negative for itching.   Neurological: Positive for weakness. Negative for dizziness, tremors, speech change and headaches.   Psychiatric/Behavioral: Negative for hallucinations and suicidal ideas.   All other systems reviewed and are negative.       Physical Exam  Temp:  [36.5 °C (97.7  °F)-36.9 °C (98.5 °F)] 36.9 °C (98.5 °F)  Pulse:  [63-92] 92  Resp:  [16-18] 18  BP: (111-130)/(64-74) 130/69  SpO2:  [93 %-96 %] 93 %    Physical Exam  Constitutional:       General: He is awake.      Appearance: He is underweight. He is not ill-appearing or toxic-appearing.   HENT:      Head: Normocephalic and atraumatic.      Right Ear: Tympanic membrane normal.      Left Ear: Tympanic membrane normal.      Nose: Nose normal.      Mouth/Throat:      Mouth: Mucous membranes are moist.   Eyes:      Extraocular Movements: Extraocular movements intact.      Pupils: Pupils are equal, round, and reactive to light.   Neck:      Musculoskeletal: Normal range of motion and neck supple.   Cardiovascular:      Rate and Rhythm: Normal rate and regular rhythm.      Heart sounds: No friction rub. No gallop.    Pulmonary:      Effort: Pulmonary effort is normal. No respiratory distress.      Breath sounds: Normal breath sounds. No stridor. No wheezing, rhonchi or rales.   Abdominal:      General: Abdomen is flat. Bowel sounds are normal. There is no distension.      Palpations: Abdomen is soft. There is no mass.      Tenderness: There is no guarding or rebound.   Musculoskeletal:         General: No swelling or tenderness.      Comments: Very thin extremities   Skin:     General: Skin is warm and dry.      Coloration: Skin is not jaundiced or pale.   Neurological:      General: No focal deficit present.      Mental Status: He is alert and oriented to person, place, and time.      Cranial Nerves: No cranial nerve deficit.      Motor: No weakness.   Psychiatric:         Mood and Affect: Mood normal.         Behavior: Behavior normal.      Comments: Very pleasant mood         Fluids    Intake/Output Summary (Last 24 hours) at 3/19/2020 1200  Last data filed at 3/19/2020 0720  Gross per 24 hour   Intake 480 ml   Output 1700 ml   Net -1220 ml       Laboratory  Recent Labs     03/17/20  0623 03/18/20  0633 03/19/20  0352   WBC 18.5*  18.7* 17.5*   RBC 3.59* 3.73* 3.86*   HEMOGLOBIN 10.9* 11.4* 11.7*   HEMATOCRIT 32.4* 34.6* 35.3*   MCV 90.3 92.8 91.5   MCH 30.4 30.6 30.3   MCHC 33.6* 32.9* 33.1*   RDW 73.0* 74.5* 73.9*   PLATELETCT 572* 593* 443   MPV 9.8 10.0 10.8     Recent Labs     03/17/20  0623 03/18/20  0633 03/19/20  0352   SODIUM 126* 132* 134*   POTASSIUM 4.6 4.6 4.4   CHLORIDE 90* 96 98   CO2 22 25 24   GLUCOSE 469* 450* 74   BUN 24* 22 20   CREATININE 0.91 0.72 0.52   CALCIUM 8.7 9.1 9.0                   Imaging  DX-PORTABLE FLUOROSCOPY < 1 HOUR   Final Result      Intraoperative fluoroscopy spot images as described above.      [id number]   Final Result      1.  Interval increase in common bile duct dilatation and intrahepatic biliary ductal dilatation since the prior examination. Filling defects in the common bile duct just before the stricture at the level of the pancreatic head have developed since the    prior exam. Differential diagnosis includes sludge as well as common duct stones.      2.  Pancreatic ductal dilatation is again identified which is slightly less than on the prior exam. Debris within the pancreatic duct is now present that appears similar to the debris within the common bile duct.      3.  Enlarged pancreatic head containing small cystic structures is again identified. The 2 larger cysts in the posterior pancreatic head have nearly completely resolved. Enlargement of the pancreatic head could be related to pancreatitis. Neoplasm is    also possible.      4.  Bright T2 signal and enlargement of the right adrenal gland is again noted.      5.  Sludge again noted in the gallbladder.      6.  Small right pleural effusion is identified.      DX-CHEST-PORTABLE (1 VIEW)   Final Result      1.  Cardiomegaly with bilateral interstitial opacities.      2.  Small bilateral pleural effusions.         DX-CHEST-PORTABLE (1 VIEW)   Final Result         1. No significant interval change.      DX-CHEST-PORTABLE (1 VIEW)    Final Result      New subpulmonic effusions, bibasilar atelectasis and edema      DX-PORTABLE FLUOROSCOPY < 1 HOUR   Final Result      Portable fluoroscopy utilized for 1.1 minute.         INTERPRETING LOCATION: [facility] [facility], [place], [id number]      [id number] SPHINCTEROTOMY   Final Result      Intraoperative fluoroscopy spot images as described above.      CT-ABDOMEN WITH & W/O   Final Result      1.  No significant change since CT abdomen and pelvis obtained 2/16/2020      2.  Findings consistent with high-grade biliary obstruction with dilation of the common bile duct measuring 17 mm, dilated gallbladder, dilated pancreatic duct with change in caliber of the pancreatic duct at the level pancreatic head      3.  Findings could be either malignancy the pancreatic head versus scarring from chronic pancreatitis.      4.  Sequela of chronic pancreatitis      5.  Large right medial chest rim-enhancing loculated fluid collection probably within the pleural space measuring 10.5 x 3.1 centimeter transversely and 7 cm craniocaudal      6.  Rim-enhancing fluid collection in the hepatorenal space measuring 11.2 cm craniocaudal and 2-3 cm in thickness.      7.  The rim-enhancing fluid collection are suspicious for infection/abscess      8.  Bilateral atelectasis and small-moderate-sized pleural effusion after      9.  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  Biliary dilatation without evidence of high-grade biliary obstruction.   2.  Gallbladder visualization after morphine administration. No acute cholecystitis.      CT-ABDOMEN-PELVIS WITH   Final Result      1.  New rim enhancing fluid collection tracks along the right heart margin, azygoesophageal recess and possibly communicates with Morison's pouch tracking along the right aspect of the inferior vena cava. This is nonspecific and could indicate  abscess,    dissecting pseudocyst, mucinous cystic neoplasm related fluid      2.  New extra and intrahepatic biliary ductal dilatation with 6 cm dilatation of the gallbladder also seen. This indicates distal common bile duct/ampullary level obstruction. Stricture, mass effect from adjacent pancreas calcifications or small    ampullary mass are differential possibilities      3.  Decreased small pleural effusions. Some loculation on the right is possible      4.  Slightly decreased diffuse pancreatic ductal dilatation now measuring 11 mm. Evidence of chronic pancreatitis. New 3 cm cystic structure anterior to the pancreas could be a pseudocyst. Abscess or necrotic/cystic neoplasm are in the differential.      5.  Multiple chronic findings including status post aorto bifemoral bypass with approaching 70% stenosis of the left superficial femoral artery, nonobstructive right 3 mm nephrolithiasis, large volume rectal vault stool, dilated bladder which could    indicate outlet obstruction or neurogenic bladder, splenosis      [id number]-M/O   Final Result      1.  Severe dilatation of the intrahepatic biliary ducts, common bile duct and pancreatic duct. The distal common bile duct and the proximal pancreatic duct is not visualized at the pancreatic head. The pancreatic head appears to be diffusely enlarged and    demonstrates multiple small cysts. These findings are concerning for pancreatic neoplasm such as intraductal papillary mucinous cystoadenoma. The other differential diagnosis includes periampullary carcinoma. MR examination of the abdomen with contrast    is recommended for further evaluation.   2.  Mixed signal intensity in the right perinephric space likely represents perinephric hematoma.   3.  Large hiatal hernia.   4.  Diffuse gastric wall thickening.      US-RUQ   Final Result      1.  Hepatomegaly.   2.  Hepatic steatosis.   3.  Gallbladder sludge. Mild intrahepatic ductal dilation. Dilation of the common  duct up to 13 mm. No definite distal obstructing etiology is identified. MRCP could be obtained for further evaluation if indicated.   4.  Trace ascites.   5.  Pancreatic calcifications likely sequela of chronic pancreatitis.      DX-CHEST-PORTABLE (1 VIEW)   Final Result      Cardiomegaly.           Assessment/Plan      Type 2 diabetes mellitus with hyperglycemia, with long-term current use of insulin (HCC)- (present on admission)  Assessment & Plan  Home dose is: Lantus 12 units at night and regular insulin 10 units 3 times daily.  Has had labile sugars due to variable PO intake requiring insulin adjustments and infection  Group home does not want sliding scale.  Sugars are not at goal, average 300 or above  Increase Lantus to 20 units and insulin short acting 12 units 3 times a day  Gradually titrate up insulin and avoid hypoglycemic event, patient's blood sugar better controlled this morning but still elevated during the day, will continue current dosage for another day and adjusted accordingly.    * Cholangitis  Assessment & Plan  Biliary obstruction suggested on imaging, GI consulted and they performed an ERCP on 2/24 with sphincterotomy  Swelling was too significant to place ampullary duct stent.  He improved without stent placement therefore this was deferred, but Alk P and WBC are rising again  Endoscopy done showing pancreatic calcifications but no stones, no area to biopsy  Repeat MRCP shows worsening dilation of the common bile duct favoring persistent sludge, stricture or stone  Repeat ERCP/stent/EUS 3/17.    Cytology negative.  Follow-up as outpatient with GI  Continue monitor, GI continue to follow, stent need to be changed in 3 months.    Pancreatic mass  Assessment & Plan  MRI with concern for pancreatic malignancy  ERCP with no evidence of malignancy, only calcifications of pancreas present   is elevated but less than 1000 so cannot rule in cancer, likely due chronic pancreatitis  Repeat CT  showed chronic pancreatitis and loculated chest effusion    At this moment GI does not believe patient has significant evidence for periodic cancer  Due to rising WBC and alk phos, repeat MRCP was done and shows worsening obstruction  Repeat ERCP 3/17 status post stent  Cytology negative for malignancy      Normocytic anemia- (present on admission)  Assessment & Plan  Stable, no e/o blood loss    Cognitive decline- (present on admission)  Assessment & Plan  Patient has had chronic cognitive decline.  His sister is his financial decision-maker however is considering guardianship for medical decisions  Mental status at his baseline  He was seen by Psych for decisional capacity and at this time does NOT have capacity for medical decisions    Hypomagnesemia  Assessment & Plan  Replaced  Repeat in AM    Sepsis (HCC)  Assessment & Plan  This was Sepsis Present on admission, improved but WBC rising again.  Source: Ascending cholangitis  Abdomen is still soft, tolerating diet  Currently, he has finished antibiotics  Repeat MRCP showed increased CBD dilation from previously seen favoring persistent obstruction/stone or sludge  Repeat ERCP        Acute respiratory failure with hypoxia ([facility])  Assessment & Plan  Due to loculated effusion  Completed abx  Currently resolved      Recurrent major depressive disorder, in full remission ([facility])  Assessment & Plan  .    Severe protein-calorie malnutrition (HCC)- (present on admission)  Assessment & Plan  Encourage p.o. intake  No pancreatic mass identified    Hyponatremia  Assessment & Plan  Due to hyperglycemia and +/- infection?    Chronic pancreatitis (HCC)- (present on admission)  Assessment & Plan  Continue pancreatic enzymes with meals  Celiac nerve block for pain control, per GI  He is tolerating a diet    Dyslipidemia- (present on admission)  Assessment & Plan  Continue statin       VTE prophylaxis: SCDs    I have seen and examined patient on 3/19/2020. I have reviewed vitals,  new labs and imaging. I have discussed POC with RN. There are no changes from (3/18/2020) except for what is mentioned above.         Current Facility-Administered Medications:   •  insulin glargine (LANTUS) injection 20 Units, 20 Units, Subcutaneous, Q EVENING, Ruma Christopher M.D., 20 Units at 03/18/20 1731  •  insulin regular (HUMULIN R) injection 12 Units, 12 Units, Subcutaneous, TID AC, Ruma Christopher M.D., 12 Units at 03/19/20 1131  •  NOTIFY MD and PharmD, , , Once **AND** glucose 4 g chewable tablet 16 g, 16 g, Oral, Q15 MIN PRN **AND** DEXTROSE 10% BOLUS 250 mL, 250 mL, Intravenous, Q15 MIN PRN, Raquel Encinas M.D.  •  lactated ringers infusion, 1,000 mL, Intravenous, Intra-Op Once PRN, Carlos Alberto Ansari M.D.  •  furosemide (LASIX) tablet 40 mg, 40 mg, Oral, Q DAY, Ruma Christopher M.D., 40 mg at 03/19/20 0528  •  menthol (HALLS) lozenge 1 Lozenge, 1 Lozenge, Oral, Q2HRS PRN, Betsey Gentile M.D., 1 Lozenge at 03/05/20 1119  •  cyclobenzaprine (FLEXERIL) tablet 10 mg, 10 mg, Oral, TID PRN, Betsey Gentile M.D., 10 mg at 03/19/20 1027  •  atorvastatin (LIPITOR) tablet 40 mg, 40 mg, Oral, Q EVENING, Grace Patel D.O., 40 mg at 03/18/20 1807  •  omeprazole (PRILOSEC) capsule 20 mg, 20 mg, Oral, BID, Grace Patel, D.O., 20 mg at 03/19/20 0528  •  pancrelipase (Lip-Prot-Amyl) (CREON 29717) 60963-62486 units capsule 48,000 Units, 2 Cap, Oral, DAILY, Grace Patel D.O., 48,000 Units at 03/19/20 0528  •  tamsulosin (FLOMAX) capsule 0.4 mg, 0.4 mg, Oral, DAILY, Grace Patel D.O., 0.4 mg at 03/19/20 0528  •  thiamine tablet 100 mg, 100 mg, Oral, DAILY, Grace Patel D.O., 100 mg at 03/19/20 0528  •  senna-docusate (PERICOLACE or SENOKOT S) 8.6-50 MG per tablet 2 Tab, 2 Tab, Oral, BID, 2 Tab at 03/15/20 0522 **AND** polyethylene glycol/lytes (MIRALAX) PACKET 1 Packet, 1 Packet, Oral, QDAY PRN **AND** magnesium hydroxide (MILK OF MAGNESIA) suspension 30 mL, 30 mL, Oral, QDAY PRN **AND** bisacodyl (DULCOLAX)  suppository 10 mg, 10 mg, Rectal, QDAY PRN, Grace Patel D.O.  •  acetaminophen (TYLENOL) tablet 650 mg, 650 mg, Oral, Q6HRS PRN, Grace Patel D.O., 650 mg at 03/19/20 1027  •  ondansetron (ZOFRAN) syringe/vial injection 4 mg, 4 mg, Intravenous, Q4HRS PRN, Grace Patel D.O.  •  ondansetron (ZOFRAN ODT) dispertab 4 mg, 4 mg, Oral, Q4HRS PRN, Grace Patel D.O.  •  promethazine (PHENERGAN) tablet 12.5-25 mg, 12.5-25 mg, Oral, Q4HRS PRN, Grace Patel D.O.  •  prochlorperazine (COMPAZINE) injection 5-10 mg, 5-10 mg, Intravenous, Q4HRS PRN, Grace Patel D.O.  •  HYDROmorphone pf (DILAUDID) injection 0.5 mg, 0.5 mg, Intravenous, Q2HRS PRN, Raquel Encinas M.D., 0.5 mg at 03/18/20 1226

## 2020-03-19 NOTE — PROGRESS NOTES
Bedside report received from night RN. Assumed care of patient. Daily plan of care discussed. Pt resting comfortably in bed at this time, wakes easily to voice. Pt denies any complaints or concerns this AM. Hourly rounding in place.

## 2020-03-20 LAB
GLUCOSE BLD-MCNC: 110 MG/DL (ref 65–99)
GLUCOSE BLD-MCNC: 132 MG/DL (ref 65–99)
GLUCOSE BLD-MCNC: 201 MG/DL (ref 65–99)
GLUCOSE BLD-MCNC: 276 MG/DL (ref 65–99)

## 2020-03-20 PROCEDURE — 99232 SBSQ HOSP IP/OBS MODERATE 35: CPT | Performed by: INTERNAL MEDICINE

## 2020-03-20 PROCEDURE — 700102 HCHG RX REV CODE 250 W/ 637 OVERRIDE(OP): Performed by: INTERNAL MEDICINE

## 2020-03-20 PROCEDURE — A9270 NON-COVERED ITEM OR SERVICE: HCPCS | Performed by: INTERNAL MEDICINE

## 2020-03-20 PROCEDURE — 82962 GLUCOSE BLOOD TEST: CPT | Mod: 91

## 2020-03-20 PROCEDURE — 770006 HCHG ROOM/CARE - MED/SURG/GYN SEMI*

## 2020-03-20 RX ADMIN — PANCRELIPASE 48000 UNITS: 24000; 76000; 120000 CAPSULE, DELAYED RELEASE PELLETS ORAL at 05:58

## 2020-03-20 RX ADMIN — ATORVASTATIN CALCIUM 40 MG: 40 TABLET, FILM COATED ORAL at 17:03

## 2020-03-20 RX ADMIN — CYCLOBENZAPRINE HYDROCHLORIDE 10 MG: 10 TABLET, FILM COATED ORAL at 06:12

## 2020-03-20 RX ADMIN — INSULIN HUMAN 13 UNITS: 100 INJECTION, SOLUTION PARENTERAL at 16:56

## 2020-03-20 RX ADMIN — ACETAMINOPHEN 650 MG: 325 TABLET, FILM COATED ORAL at 17:02

## 2020-03-20 RX ADMIN — OMEPRAZOLE 20 MG: 20 CAPSULE, DELAYED RELEASE ORAL at 17:02

## 2020-03-20 RX ADMIN — INSULIN HUMAN 13 UNITS: 100 INJECTION, SOLUTION PARENTERAL at 11:14

## 2020-03-20 RX ADMIN — ACETAMINOPHEN 650 MG: 325 TABLET, FILM COATED ORAL at 06:12

## 2020-03-20 RX ADMIN — INSULIN GLARGINE 20 UNITS: 100 INJECTION, SOLUTION SUBCUTANEOUS at 16:57

## 2020-03-20 RX ADMIN — FUROSEMIDE 40 MG: 40 TABLET ORAL at 05:58

## 2020-03-20 RX ADMIN — TAMSULOSIN HYDROCHLORIDE 0.4 MG: 0.4 CAPSULE ORAL at 05:58

## 2020-03-20 RX ADMIN — OMEPRAZOLE 20 MG: 20 CAPSULE, DELAYED RELEASE ORAL at 05:54

## 2020-03-20 RX ADMIN — CYCLOBENZAPRINE HYDROCHLORIDE 10 MG: 10 TABLET, FILM COATED ORAL at 17:02

## 2020-03-20 RX ADMIN — Medication 100 MG: at 05:58

## 2020-03-20 ASSESSMENT — ENCOUNTER SYMPTOMS
WEAKNESS: 1
SPEECH CHANGE: 0
PND: 0
SORE THROAT: 0
FALLS: 0
WEIGHT LOSS: 0
WHEEZING: 0
NAUSEA: 0
SPUTUM PRODUCTION: 0
BACK PAIN: 0
EYE PAIN: 0
DIARRHEA: 0
TINGLING: 0
HEMOPTYSIS: 0
FEVER: 0
PHOTOPHOBIA: 0
ORTHOPNEA: 0
CONSTIPATION: 0
BLURRED VISION: 0
HEARTBURN: 0
TREMORS: 0
HALLUCINATIONS: 0

## 2020-03-20 ASSESSMENT — COGNITIVE AND FUNCTIONAL STATUS - GENERAL
SUGGESTED CMS G CODE MODIFIER DAILY ACTIVITY: CH
TURNING FROM BACK TO SIDE WHILE IN FLAT BAD: A LITTLE
MOBILITY SCORE: 22
DAILY ACTIVITIY SCORE: 24
MOVING FROM LYING ON BACK TO SITTING ON SIDE OF FLAT BED: A LITTLE
SUGGESTED CMS G CODE MODIFIER MOBILITY: CJ

## 2020-03-20 NOTE — CARE PLAN
Problem: Safety  Goal: Will remain free from injury  Outcome: PROGRESSING AS EXPECTED  Intervention: Provide assistance with mobility  Flowsheets (Taken 3/19/2020 2320)  Assistance: No Assistance Required  Ambulation Tolerance: Tolerates Well  Intervention: Collaborate with Interdisciplinary Team for safe transfer and mobilization techniques  Flowsheets (Taken 3/19/2020 2320)  Assistive Devices: None  Goal: Will remain free from falls  Outcome: PROGRESSING AS EXPECTED  Intervention: Assess risk factors for falls  Flowsheets  Taken 3/19/2020 2320  Pt Calls for Assistance: Yes  Taken 3/20/2020 0008  Mobility Status Assessment: 0-Ambulates & Transfers Independently. No Assistance Required  Intervention: Implement fall precautions  Flowsheets (Taken 3/19/2020 2034)  Environmental Precautions:   Treaded Slipper Socks on Patient   Personal Belongings, Wastebasket, Call [employer] etc. in Easy Reach   Transferred to Stronger Side   Report Given to Other Health Care Providers Regarding Fall Risk   Bed in Low Position   Communication Sign for Patients & Families   Mobility Assessed & Appropriate Sign Placed     Problem: Pain Management  Goal: Pain level will decrease to patient's comfort goal  Outcome: PROGRESSING AS EXPECTED     Problem: Mobility  Goal: Risk for activity intolerance will decrease  Outcome: PROGRESSING AS EXPECTED

## 2020-03-20 NOTE — CARE PLAN
Problem: Discharge Barriers/Planning  Goal: Patient's continuum of care needs will be met  Outcome: PROGRESSING AS EXPECTED  Intervention: Collaborate with Transitional Care Team and Interdisciplinary Team to meet discharge needs  Note: MD, bedside RN, CM, SW, guardian, and pt's family involved in discharge process.     Problem: Fluid Volume:  Goal: Will maintain balanced intake and output  Outcome: PROGRESSING AS EXPECTED  Intervention: Monitor, educate, and encourage compliance with therapeutic intake of liquids  Note: Pt encouraged to continue drinking adequate amounts of liquids. Pt verbalized understanding. Pt continues to void without difficulty.

## 2020-03-20 NOTE — PROGRESS NOTES
Hospital Medicine Daily Progress Note    Date of Service  3/20/2020    Chief Complaint  59 y.o. male admitted 2/14/2020 with abdominal pain    Hospital Course    History of type 2 diabetes, cognitive impairment who lives at a group home who was admitted for abdominal pain found to have sepsis secondary to ascending cholangitis.  He underwent ERCP with sphincterotomy to relieve obstruction.  No stent was placed due to significant edema.  Repeat ERCP was not performed due to acute respiratory failure with hypoxia which has now resolved.  He has improved with antibiotics and is almost ready to return to his group home however, blood sugars have been variable and they are not allowed to do sliding scale      Interval Problem Update  3/10: Has been hyperglycemic, today dropped to 59.  Lantus and mealtime insulin decreased.  Pain is controlled.  Appetite is improving but remains low.  3/11: Sugars are not at goal, back up to 385.  Insulin increased.  Alk phos and WBC rising, concern for recurrent obstructive process.  Repeat cxr ordered (previous effusion w loculation)  3/12: Sugars improved, not at goal.  WBC and alk phos still rising.  I discussed with GI, repeat MRCP.  3/13: Sugar control improved.  WBC still elevated however stable.  Alk phos stable.  Repeat MRCP shows worsening dilation of the CBD, await GI recommendations regarding need for repeat ERCP/EUS/stent  3/14: Planning for repeat ERCP Monday, Sugar control has improved  3/15: Sugars are slightly up, Lantus and prandial increased.  I discussed the case with psychiatry, they will evaluate his current decisional capacity  3/16: Seen by psych, lacks decisional capacity at this time.  ERCP repeated, pending results    3/17.  Patient overall remained stable.  No significant overnight event.  Still complains of back pain and body achiness. Patient's pain is general and local, 3-4/10, intermittent and does not radiate to other location, sharp and with some  tingling. Can be controlled by pain meds.   Tolerated procedure yesterday.  No fever tonight.  3/18.  Patient has been stable and pleasant.  Patient blood sugar still not controlled.  Patient wants to eat regular food however due to diabetes and not controlled blood sugar patient's diet remain to be diabetic.  Complains of general body achiness. Patient's pain is general, 2 to/10, intermittent and does not radiate to other location, sharp and with some tingling. Can be controlled by pain meds.   3/19.  Patient has been stable overnight no significant overnight event.  Cytology resolved negative.  Patient blood sugar better controlled this morning but still high during the day.  We will continue current insulin dosage. Patient otherwise denies fever, chills, nausea, vomiting, adb pain, SOB, CP, headache, constipation, diarrhea, cough, or sputum.  3/20.  Patient has been stable overnight.  Blood sugar better controlled today.  Still hyperglycemic pre-meal.  We will increase short acting insulin. Patient's pain is general 2-3/10, intermittent and does not radiate to other location, sharp and with some tingling. Can be controlled by pain meds.     Consultants/Specialty  Dr. Lorenzo - [facility]    Code Status  DNR/DNI    Disposition  TBD - pending placement / medical stability    Review of Systems  Review of Systems   Constitutional: Positive for malaise/fatigue. Negative for fever and weight loss.   HENT: Negative for ear pain, hearing loss, sore throat and tinnitus.    Eyes: Negative for blurred vision, photophobia and pain.   Respiratory: Negative for hemoptysis, sputum production and wheezing.    Cardiovascular: Negative for chest pain, orthopnea, leg swelling and PND.   Gastrointestinal: Negative for constipation, diarrhea, heartburn and nausea. Abdominal pain: Resolved. Vomiting: Appetite improved.   Genitourinary: Negative for dysuria, frequency and hematuria.   Musculoskeletal: Negative for back pain and falls.   Skin:  Negative for itching.   Neurological: Positive for weakness. Negative for tingling, tremors and speech change.   Psychiatric/Behavioral: Negative for hallucinations and suicidal ideas.   All other systems reviewed and are negative.       Physical Exam  Temp:  [36.6 °C (97.9 °F)-37.1 °C (98.7 °F)] 36.9 °C (98.4 °F)  Pulse:  [84-95] 95  Resp:  [16-18] 17  BP: (117-127)/(63-78) 117/63  SpO2:  [94 %-98 %] 97 %    Physical Exam  Constitutional:       General: He is awake.      Appearance: He is underweight. He is not ill-appearing or toxic-appearing.   HENT:      Head: Normocephalic and atraumatic.      Right Ear: Tympanic membrane normal.      Left Ear: Tympanic membrane normal.      Nose: Nose normal. No congestion or rhinorrhea.      Mouth/Throat:      Mouth: Mucous membranes are moist.   Eyes:      Extraocular Movements: Extraocular movements intact.      Pupils: Pupils are equal, round, and reactive to light.   Neck:      Musculoskeletal: Normal range of motion and neck supple.   Cardiovascular:      Rate and Rhythm: Normal rate and regular rhythm.      Heart sounds: No murmur. No friction rub.   Pulmonary:      Effort: Pulmonary effort is normal. No respiratory distress.      Breath sounds: Normal breath sounds. No rhonchi or rales.   Abdominal:      General: Abdomen is flat. Bowel sounds are normal.      Palpations: Abdomen is soft. There is no mass.      Tenderness: There is no abdominal tenderness. There is no guarding or rebound.   Musculoskeletal:         General: No swelling or tenderness.      Comments: Very thin extremities   Skin:     General: Skin is warm and dry.      Coloration: Skin is not jaundiced or pale.   Neurological:      General: No focal deficit present.      Mental Status: He is alert and oriented to person, place, and time.      Cranial Nerves: No cranial nerve deficit.      Motor: No weakness.   Psychiatric:         Mood and Affect: Mood normal.         Behavior: Behavior normal.       Comments: Very pleasant mood         Fluids    Intake/Output Summary (Last 24 hours) at 3/20/2020 1233  Last data filed at 3/20/2020 1041  Gross per 24 hour   Intake 600 ml   Output 1700 ml   Net -1100 ml       Laboratory  Recent Labs     03/18/20  0633 03/19/20  0352   WBC 18.7* 17.5*   RBC 3.73* 3.86*   HEMOGLOBIN 11.4* 11.7*   HEMATOCRIT 34.6* 35.3*   MCV 92.8 91.5   MCH 30.6 30.3   MCHC 32.9* 33.1*   RDW 74.5* 73.9*   PLATELETCT 593* 443   MPV 10.0 10.8     Recent Labs     03/18/20  0633 03/19/20  0352   SODIUM 132* 134*   POTASSIUM 4.6 4.4   CHLORIDE 96 98   CO2 25 24   GLUCOSE 450* 74   BUN 22 20   CREATININE 0.72 0.52   CALCIUM 9.1 9.0                   Imaging  DX-PORTABLE FLUOROSCOPY < 1 HOUR   Final Result      Intraoperative fluoroscopy spot images as described above.      [id number]   Final Result      1.  Interval increase in common bile duct dilatation and intrahepatic biliary ductal dilatation since the prior examination. Filling defects in the common bile duct just before the stricture at the level of the pancreatic head have developed since the    prior exam. Differential diagnosis includes sludge as well as common duct stones.      2.  Pancreatic ductal dilatation is again identified which is slightly less than on the prior exam. Debris within the pancreatic duct is now present that appears similar to the debris within the common bile duct.      3.  Enlarged pancreatic head containing small cystic structures is again identified. The 2 larger cysts in the posterior pancreatic head have nearly completely resolved. Enlargement of the pancreatic head could be related to pancreatitis. Neoplasm is    also possible.      4.  Bright T2 signal and enlargement of the right adrenal gland is again noted.      5.  Sludge again noted in the gallbladder.      6.  Small right pleural effusion is identified.      DX-CHEST-PORTABLE (1 VIEW)   Final Result      1.  Cardiomegaly with bilateral interstitial  opacities.      2.  Small bilateral pleural effusions.         DX-CHEST-PORTABLE (1 VIEW)   Final Result         1. No significant interval change.      DX-CHEST-PORTABLE (1 VIEW)   Final Result      New subpulmonic effusions, bibasilar atelectasis and edema      DX-PORTABLE FLUOROSCOPY < 1 HOUR   Final Result      Portable fluoroscopy utilized for 1.1 minute.         INTERPRETING LOCATION: [address] [place] [address], [facility], [id number]      [id number] SPHINCTEROTOMY   Final Result      Intraoperative fluoroscopy spot images as described above.      CT-ABDOMEN WITH & W/O   Final Result      1.  No significant change since CT abdomen and pelvis obtained 2/16/2020      2.  Findings consistent with high-grade biliary obstruction with dilation of the common bile duct measuring 17 mm, dilated gallbladder, dilated pancreatic duct with change in caliber of the pancreatic duct at the level pancreatic head      3.  Findings could be either malignancy the pancreatic head versus scarring from chronic pancreatitis.      4.  Sequela of chronic pancreatitis      5.  Large right medial chest rim-enhancing loculated fluid collection probably within the pleural space measuring 10.5 x 3.1 centimeter transversely and 7 cm craniocaudal      6.  Rim-enhancing fluid collection in the hepatorenal space measuring 11.2 cm craniocaudal and 2-3 cm in thickness.      7.  The rim-enhancing fluid collection are suspicious for infection/abscess      8.  Bilateral atelectasis and small-moderate-sized pleural effusion after      9.  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  Biliary dilatation without evidence of high-grade biliary obstruction.   2.  Gallbladder visualization after morphine administration. No acute cholecystitis.      CT-ABDOMEN-PELVIS WITH   Final Result      1.  New rim enhancing fluid collection tracks along the right heart  margin, azygoesophageal recess and possibly communicates with Morison's pouch tracking along the right aspect of the inferior vena cava. This is nonspecific and could indicate abscess,    dissecting pseudocyst, mucinous cystic neoplasm related fluid      2.  New extra and intrahepatic biliary ductal dilatation with 6 cm dilatation of the gallbladder also seen. This indicates distal common bile duct/ampullary level obstruction. Stricture, mass effect from adjacent pancreas calcifications or small    ampullary mass are differential possibilities      3.  Decreased small pleural effusions. Some loculation on the right is possible      4.  Slightly decreased diffuse pancreatic ductal dilatation now measuring 11 mm. Evidence of chronic pancreatitis. New 3 cm cystic structure anterior to the pancreas could be a pseudocyst. Abscess or necrotic/cystic neoplasm are in the differential.      5.  Multiple chronic findings including status post aorto bifemoral bypass with approaching 70% stenosis of the left superficial femoral artery, nonobstructive right 3 mm nephrolithiasis, large volume rectal vault stool, dilated bladder which could    indicate outlet obstruction or neurogenic bladder, splenosis      [id number]/O   Final Result      1.  Severe dilatation of the intrahepatic biliary ducts, common bile duct and pancreatic duct. The distal common bile duct and the proximal pancreatic duct is not visualized at the pancreatic head. The pancreatic head appears to be diffusely enlarged and    demonstrates multiple small cysts. These findings are concerning for pancreatic neoplasm such as intraductal papillary mucinous cystoadenoma. The other differential diagnosis includes periampullary carcinoma. MR examination of the abdomen with contrast    is recommended for further evaluation.   2.  Mixed signal intensity in the right perinephric space likely represents perinephric hematoma.   3.  Large hiatal hernia.   4.  Diffuse gastric  wall thickening.      US-RUQ   Final Result      1.  Hepatomegaly.   2.  Hepatic steatosis.   3.  Gallbladder sludge. Mild intrahepatic ductal dilation. Dilation of the common duct up to 13 mm. No definite distal obstructing etiology is identified. MRCP could be obtained for further evaluation if indicated.   4.  Trace ascites.   5.  Pancreatic calcifications likely sequela of chronic pancreatitis.      DX-CHEST-PORTABLE (1 VIEW)   Final Result      Cardiomegaly.           Assessment/Plan      Type 2 diabetes mellitus with hyperglycemia, with long-term current use of insulin (HCC)- (present on admission)  Assessment & Plan  Home dose is: Lantus 12 units at night and regular insulin 10 units 3 times daily.  Has had labile sugars due to variable PO intake requiring insulin adjustments and infection  Group home does not want sliding scale.  Sugars are not at goal, average 300 or above  Increase Lantus to 20 units and insulin short acting 13 units 3 times a day  Gradually titrate up insulin and avoid hypoglycemic event, patient's blood sugar better controlled this morning but still elevated during the day, will continue current dosage for another day and adjusted accordingly.  Slightly better.  Continue monitor blood sugar    * Cholangitis  Assessment & Plan  Biliary obstruction suggested on imaging, GI consulted and they performed an ERCP on 2/24 with sphincterotomy  Swelling was too significant to place ampullary duct stent.  He improved without stent placement therefore this was deferred, but Alk P and WBC are rising again  Endoscopy done showing pancreatic calcifications but no stones, no area to biopsy  Repeat MRCP shows worsening dilation of the common bile duct favoring persistent sludge, stricture or stone  Repeat ERCP/stent/EUS 3/17.    Cytology negative.  Follow-up as outpatient with GI  Continue monitor, GI continue to follow, stent need to be changed in 3 months.    Pancreatic mass  Assessment & Plan  MRI  with concern for pancreatic malignancy  ERCP with no evidence of malignancy, only calcifications of pancreas present   is elevated but less than 1000 so cannot rule in cancer, likely due chronic pancreatitis  Repeat CT showed chronic pancreatitis and loculated chest effusion    At this moment GI does not believe patient has significant evidence for periodic cancer  Due to rising WBC and alk phos, repeat MRCP was done and shows worsening obstruction  Repeat ERCP 3/17 status post stent  Cytology negative for malignancy, GI signed off      Normocytic anemia- (present on admission)  Assessment & Plan  Stable, no e/o blood loss    Cognitive decline- (present on admission)  Assessment & Plan  Patient has had chronic cognitive decline.  His sister is his financial decision-maker however is considering guardianship for medical decisions  Mental status at his baseline  He was seen by Psych for decisional capacity and at this time does NOT have capacity for medical decisions  Need group home    Hypomagnesemia  Assessment & Plan  Replaced  Repeat in AM    Sepsis (HCC)  Assessment & Plan  This was Sepsis Present on admission, improved but WBC rising again.  Source: Ascending cholangitis  Abdomen is still soft, tolerating diet  Currently, he has finished antibiotics  Repeat MRCP showed increased CBD dilation from previously seen favoring persistent obstruction/stone or sludge  Repeat ERCP        Acute respiratory failure with hypoxia ([facility])  Assessment & Plan  Due to loculated effusion  Completed abx  Currently resolved      Recurrent major depressive disorder, in full remission ([facility])  Assessment & Plan  .    Severe protein-calorie malnutrition (HCC)- (present on admission)  Assessment & Plan  Encourage p.o. intake  No pancreatic mass identified    Hyponatremia  Assessment & Plan  Due to hyperglycemia and +/- infection?    Chronic pancreatitis (HCC)- (present on admission)  Assessment & Plan  Continue pancreatic enzymes  with meals  Celiac nerve block for pain control, per GI  He is tolerating a diet    Dyslipidemia- (present on admission)  Assessment & Plan  Continue statin       VTE prophylaxis: SCDs    I have seen and examined patient on 3/20/2020. I have reviewed vitals, new labs and imaging. I have discussed POC with RN. There are no changes from (3/19/2020) except for what is mentioned above.         Current Facility-Administered Medications:   •  insulin regular (HUMULIN R) injection 13 Units, 13 Units, Subcutaneous, TID AC, Ruma Christopher M.D., 13 Units at 03/20/20 1114  •  insulin glargine (LANTUS) injection 20 Units, 20 Units, Subcutaneous, Q EVENING, Ruma Christopher M.D., 20 Units at 03/19/20 1655  •  NOTIFY MD and PharmD, , , Once **AND** glucose 4 g chewable tablet 16 g, 16 g, Oral, Q15 MIN PRN **AND** DEXTROSE 10% BOLUS 250 mL, 250 mL, Intravenous, Q15 MIN PRN, Raquel Encinas M.D.  •  lactated ringers infusion, 1,000 mL, Intravenous, Intra-Op Once PRN, Carlos Alberto Ansari M.D.  •  furosemide (LASIX) tablet 40 mg, 40 mg, Oral, Q DAY, Ruma Christopher M.D., 40 mg at 03/20/20 0558  •  menthol (HALLS) lozenge 1 Lozenge, 1 Lozenge, Oral, Q2HRS PRN, Betsey Gentile M.D., 1 Lozenge at 03/05/20 1119  •  cyclobenzaprine (FLEXERIL) tablet 10 mg, 10 mg, Oral, TID PRN, Betsey Gentile M.D., 10 mg at 03/20/20 0612  •  atorvastatin (LIPITOR) tablet 40 mg, 40 mg, Oral, Q EVENING, Grace Patel D.O., 40 mg at 03/19/20 1750  •  omeprazole (PRILOSEC) capsule 20 mg, 20 mg, Oral, BID, Grace Patel D.O., 20 mg at 03/20/20 0554  •  pancrelipase (Lip-Prot-Amyl) (CREON 16745) 80270-24437 units capsule 48,000 Units, 2 Cap, Oral, DAILY, CHIARA Holguin.O., 48,000 Units at 03/20/20 0558  •  tamsulosin (FLOMAX) capsule 0.4 mg, 0.4 mg, Oral, DAILY, YULI HolguinO., 0.4 mg at 03/20/20 0558  •  thiamine tablet 100 mg, 100 mg, Oral, DAILY, YULI HolguinO., 100 mg at 03/20/20 0558  •  senna-docusate (PERICOLACE or SENOKOT S) 8.6-50 MG per  tablet 2 Tab, 2 Tab, Oral, BID, 2 Tab at 03/15/20 0522 **AND** polyethylene glycol/lytes (MIRALAX) PACKET 1 Packet, 1 Packet, Oral, QDAY PRN **AND** magnesium hydroxide (MILK OF MAGNESIA) suspension 30 mL, 30 mL, Oral, QDAY PRN **AND** bisacodyl (DULCOLAX) suppository 10 mg, 10 mg, Rectal, QDAY PRN, CHIARA Holguin.O.  •  acetaminophen (TYLENOL) tablet 650 mg, 650 mg, Oral, Q6HRS PRN, YULI HolguinO., 650 mg at 03/20/20 0612  •  ondansetron (ZOFRAN) syringe/vial injection 4 mg, 4 mg, Intravenous, Q4HRS PRN, CHIARA Holguin.O.  •  ondansetron (ZOFRAN ODT) dispertab 4 mg, 4 mg, Oral, Q4HRS PRN, CHIARA Holguin.O.  •  promethazine (PHENERGAN) tablet 12.5-25 mg, 12.5-25 mg, Oral, Q4HRS PRN, CHIARA Holguin.O.  •  prochlorperazine (COMPAZINE) injection 5-10 mg, 5-10 mg, Intravenous, Q4HRS PRN, CHIARA Holguin.O.  •  HYDROmorphone pf (DILAUDID) injection 0.5 mg, 0.5 mg, Intravenous, Q2HRS PRN, Raquel Encinas M.D., 0.5 mg at 03/19/20 2055

## 2020-03-21 LAB
ALBUMIN SERPL BCP-MCNC: 2.1 G/DL (ref 3.2–4.9)
ALBUMIN/GLOB SERPL: 0.6 G/DL
ALP SERPL-CCNC: 797 U/L (ref 30–99)
ALT SERPL-CCNC: 24 U/L (ref 2–50)
ANION GAP SERPL CALC-SCNC: 12 MMOL/L (ref 7–16)
ANISOCYTOSIS BLD QL SMEAR: ABNORMAL
AST SERPL-CCNC: 35 U/L (ref 12–45)
BASOPHILS # BLD AUTO: 1.7 % (ref 0–1.8)
BASOPHILS # BLD: 0.28 K/UL (ref 0–0.12)
BILIRUB SERPL-MCNC: 0.5 MG/DL (ref 0.1–1.5)
BUN SERPL-MCNC: 20 MG/DL (ref 8–22)
CALCIUM SERPL-MCNC: 8.4 MG/DL (ref 8.4–10.2)
CHLORIDE SERPL-SCNC: 100 MMOL/L (ref 96–112)
CO2 SERPL-SCNC: 20 MMOL/L (ref 20–33)
COMMENT 1642: NORMAL
CREAT SERPL-MCNC: 0.44 MG/DL (ref 0.5–1.4)
EOSINOPHIL # BLD AUTO: 0.41 K/UL (ref 0–0.51)
EOSINOPHIL NFR BLD: 2.4 % (ref 0–6.9)
ERYTHROCYTE [DISTWIDTH] IN BLOOD BY AUTOMATED COUNT: 79.4 FL (ref 35.9–50)
GLOBULIN SER CALC-MCNC: 3.4 G/DL (ref 1.9–3.5)
GLUCOSE BLD-MCNC: 114 MG/DL (ref 65–99)
GLUCOSE BLD-MCNC: 175 MG/DL (ref 65–99)
GLUCOSE BLD-MCNC: 240 MG/DL (ref 65–99)
GLUCOSE SERPL-MCNC: 203 MG/DL (ref 65–99)
HCT VFR BLD AUTO: 37.7 % (ref 42–52)
HGB BLD-MCNC: 11.8 G/DL (ref 14–18)
IMM GRANULOCYTES # BLD AUTO: 0.48 K/UL (ref 0–0.11)
IMM GRANULOCYTES NFR BLD AUTO: 2.8 % (ref 0–0.9)
LYMPHOCYTES # BLD AUTO: 3.47 K/UL (ref 1–4.8)
LYMPHOCYTES NFR BLD: 20.5 % (ref 22–41)
MCH RBC QN AUTO: 30.3 PG (ref 27–33)
MCHC RBC AUTO-ENTMCNC: 31.3 G/DL (ref 33.7–35.3)
MCV RBC AUTO: 96.7 FL (ref 81.4–97.8)
MONOCYTES # BLD AUTO: 1.64 K/UL (ref 0–0.85)
MONOCYTES NFR BLD AUTO: 9.7 % (ref 0–13.4)
NEUTROPHILS # BLD AUTO: 10.68 K/UL (ref 1.82–7.42)
NEUTROPHILS NFR BLD: 62.9 % (ref 44–72)
NRBC # BLD AUTO: 0 K/UL
NRBC BLD-RTO: 0 /100 WBC
PLATELET # BLD AUTO: 340 K/UL (ref 164–446)
PLATELET BLD QL SMEAR: NORMAL
PMV BLD AUTO: 10.7 FL (ref 9–12.9)
POIKILOCYTOSIS BLD QL SMEAR: NORMAL
POTASSIUM SERPL-SCNC: 4.3 MMOL/L (ref 3.6–5.5)
PROT SERPL-MCNC: 5.5 G/DL (ref 6–8.2)
RBC # BLD AUTO: 3.9 M/UL (ref 4.7–6.1)
RBC BLD AUTO: PRESENT
SODIUM SERPL-SCNC: 132 MMOL/L (ref 135–145)
TARGETS BLD QL SMEAR: NORMAL
WBC # BLD AUTO: 17 K/UL (ref 4.8–10.8)

## 2020-03-21 PROCEDURE — 82962 GLUCOSE BLOOD TEST: CPT | Mod: 91

## 2020-03-21 PROCEDURE — 85025 COMPLETE CBC W/AUTO DIFF WBC: CPT

## 2020-03-21 PROCEDURE — 99232 SBSQ HOSP IP/OBS MODERATE 35: CPT | Performed by: INTERNAL MEDICINE

## 2020-03-21 PROCEDURE — 700102 HCHG RX REV CODE 250 W/ 637 OVERRIDE(OP): Performed by: INTERNAL MEDICINE

## 2020-03-21 PROCEDURE — 770006 HCHG ROOM/CARE - MED/SURG/GYN SEMI*

## 2020-03-21 PROCEDURE — 36415 COLL VENOUS BLD VENIPUNCTURE: CPT

## 2020-03-21 PROCEDURE — A9270 NON-COVERED ITEM OR SERVICE: HCPCS | Performed by: INTERNAL MEDICINE

## 2020-03-21 PROCEDURE — 80053 COMPREHEN METABOLIC PANEL: CPT

## 2020-03-21 RX ADMIN — OMEPRAZOLE 20 MG: 20 CAPSULE, DELAYED RELEASE ORAL at 06:02

## 2020-03-21 RX ADMIN — TAMSULOSIN HYDROCHLORIDE 0.4 MG: 0.4 CAPSULE ORAL at 06:02

## 2020-03-21 RX ADMIN — CYCLOBENZAPRINE HYDROCHLORIDE 10 MG: 10 TABLET, FILM COATED ORAL at 08:41

## 2020-03-21 RX ADMIN — PANCRELIPASE 48000 UNITS: 24000; 76000; 120000 CAPSULE, DELAYED RELEASE PELLETS ORAL at 06:02

## 2020-03-21 RX ADMIN — ACETAMINOPHEN 650 MG: 325 TABLET, FILM COATED ORAL at 16:41

## 2020-03-21 RX ADMIN — INSULIN GLARGINE 20 UNITS: 100 INJECTION, SOLUTION SUBCUTANEOUS at 16:31

## 2020-03-21 RX ADMIN — INSULIN HUMAN 13 UNITS: 100 INJECTION, SOLUTION PARENTERAL at 11:15

## 2020-03-21 RX ADMIN — OMEPRAZOLE 20 MG: 20 CAPSULE, DELAYED RELEASE ORAL at 16:31

## 2020-03-21 RX ADMIN — CYCLOBENZAPRINE HYDROCHLORIDE 10 MG: 10 TABLET, FILM COATED ORAL at 16:41

## 2020-03-21 RX ADMIN — INSULIN HUMAN 13 UNITS: 100 INJECTION, SOLUTION PARENTERAL at 06:03

## 2020-03-21 RX ADMIN — Medication 100 MG: at 06:12

## 2020-03-21 RX ADMIN — FUROSEMIDE 40 MG: 40 TABLET ORAL at 06:02

## 2020-03-21 RX ADMIN — ATORVASTATIN CALCIUM 40 MG: 40 TABLET, FILM COATED ORAL at 16:31

## 2020-03-21 RX ADMIN — ACETAMINOPHEN 650 MG: 325 TABLET, FILM COATED ORAL at 08:41

## 2020-03-21 ASSESSMENT — ENCOUNTER SYMPTOMS
SPEECH CHANGE: 0
PND: 0
CONSTIPATION: 0
FEVER: 0
HALLUCINATIONS: 0
ORTHOPNEA: 0
BLURRED VISION: 0
HEADACHES: 0
WEAKNESS: 1
WHEEZING: 0
VOMITING: 0
FALLS: 0
HEMOPTYSIS: 0
BACK PAIN: 0
PHOTOPHOBIA: 0
EYE PAIN: 0
NAUSEA: 0
WEIGHT LOSS: 0
DIARRHEA: 0
SORE THROAT: 0
TREMORS: 0
SPUTUM PRODUCTION: 0

## 2020-03-21 NOTE — PROGRESS NOTES
Report received from Ella BENSON. Assume care of Pt. Pt sitting at edged of bed eating drinking fluids. POC explained to Pt. Call light within reach and belongings at bed side. Denies furthers needs.

## 2020-03-21 NOTE — CARE PLAN
Problem: Safety  Goal: Will remain free from falls  Outcome: PROGRESSING AS EXPECTED     Problem: Venous Thromboembolism (VTW)/Deep Vein Thrombosis (DVT) Prevention:  Goal: Patient will participate in Venous Thrombosis (VTE)/Deep Vein Thrombosis (DVT)Prevention Measures  Outcome: PROGRESSING AS EXPECTED     Problem: Bowel/Gastric:  Goal: Will not experience complications related to bowel motility  Outcome: PROGRESSING AS EXPECTED     Problem: Knowledge Deficit  Goal: Knowledge of disease process/condition, treatment plan, diagnostic tests, and medications will improve  Outcome: PROGRESSING AS EXPECTED

## 2020-03-21 NOTE — PROGRESS NOTES
Hospital Medicine Daily Progress Note    Date of Service  3/21/2020    Chief Complaint  59 y.o. male admitted 2/14/2020 with abdominal pain    Hospital Course    History of type 2 diabetes, cognitive impairment who lives at a group home who was admitted for abdominal pain found to have sepsis secondary to ascending cholangitis.  He underwent ERCP with sphincterotomy to relieve obstruction.  No stent was placed due to significant edema.  Repeat ERCP was not performed due to acute respiratory failure with hypoxia which has now resolved.  He has improved with antibiotics and is almost ready to return to his group home however, blood sugars have been variable and they are not allowed to do sliding scale      Interval Problem Update  3/10: Has been hyperglycemic, today dropped to 59.  Lantus and mealtime insulin decreased.  Pain is controlled.  Appetite is improving but remains low.  3/11: Sugars are not at goal, back up to 385.  Insulin increased.  Alk phos and WBC rising, concern for recurrent obstructive process.  Repeat cxr ordered (previous effusion w loculation)  3/12: Sugars improved, not at goal.  WBC and alk phos still rising.  I discussed with GI, repeat MRCP.  3/13: Sugar control improved.  WBC still elevated however stable.  Alk phos stable.  Repeat MRCP shows worsening dilation of the CBD, await GI recommendations regarding need for repeat ERCP/EUS/stent  3/14: Planning for repeat ERCP Monday, Sugar control has improved  3/15: Sugars are slightly up, Lantus and prandial increased.  I discussed the case with psychiatry, they will evaluate his current decisional capacity  3/16: Seen by psych, lacks decisional capacity at this time.  ERCP repeated, pending results    3/17.  Patient overall remained stable.  No significant overnight event.  Still complains of back pain and body achiness. Patient's pain is general and local, 3-4/10, intermittent and does not radiate to other location, sharp and with some  tingling. Can be controlled by pain meds.   Tolerated procedure yesterday.  No fever tonight.  3/18.  Patient has been stable and pleasant.  Patient blood sugar still not controlled.  Patient wants to eat regular food however due to diabetes and not controlled blood sugar patient's diet remain to be diabetic.  Complains of general body achiness. Patient's pain is general, 2 to/10, intermittent and does not radiate to other location, sharp and with some tingling. Can be controlled by pain meds.   3/19.  Patient has been stable overnight no significant overnight event.  Cytology resolved negative.  Patient blood sugar better controlled this morning but still high during the day.  We will continue current insulin dosage. Patient otherwise denies fever, chills, nausea, vomiting, adb pain, SOB, CP, headache, constipation, diarrhea, cough, or sputum.  3/20.  Patient has been stable overnight.  Blood sugar better controlled today.  Still hyperglycemic pre-meal.  We will increase short acting insulin. Patient's pain is general 2-3/10, intermittent and does not radiate to other location, sharp and with some tingling. Can be controlled by pain meds.   3/21.  Patient tolerated insulin.  Patient's glucose better controlled today compared to yesterday.  Overall remained stable.  Awaiting for placement.  Continue current insulin dosage.    Consultants/Specialty  Dr. Lorenzo - [facility]    Code Status  DNR/DNI    Disposition  TBD - pending placement / medical stability    Review of Systems  Review of Systems   Constitutional: Positive for malaise/fatigue. Negative for fever and weight loss.   HENT: Negative for ear pain, hearing loss, sore throat and tinnitus.    Eyes: Negative for blurred vision, photophobia and pain.   Respiratory: Negative for hemoptysis, sputum production and wheezing.    Cardiovascular: Negative for chest pain, orthopnea, leg swelling and PND.   Gastrointestinal: Negative for constipation, diarrhea, nausea and  vomiting (Appetite improved). Abdominal pain: Resolved.   Genitourinary: Negative for dysuria, frequency and hematuria.   Musculoskeletal: Negative for back pain and falls.   Skin: Negative for itching.   Neurological: Positive for weakness. Negative for tremors, speech change and headaches.   Psychiatric/Behavioral: Negative for hallucinations and suicidal ideas.   All other systems reviewed and are negative.       Physical Exam  Temp:  [37 °C (98.6 °F)-37.1 °C (98.7 °F)] 37.1 °C (98.7 °F)  Pulse:  [100-101] 100  Resp:  [17-18] 18  BP: (122-135)/(63-70) 122/63  SpO2:  [94 %-95 %] 94 %    Physical Exam  Constitutional:       General: He is awake.      Appearance: He is underweight. He is not ill-appearing or toxic-appearing.   HENT:      Head: Normocephalic and atraumatic.      Right Ear: Tympanic membrane normal.      Left Ear: Tympanic membrane normal.      Nose: Nose normal. No congestion or rhinorrhea.      Mouth/Throat:      Mouth: Mucous membranes are moist.      Pharynx: No oropharyngeal exudate or posterior oropharyngeal erythema.   Eyes:      Extraocular Movements: Extraocular movements intact.      Pupils: Pupils are equal, round, and reactive to light.   Neck:      Musculoskeletal: Normal range of motion and neck supple.   Cardiovascular:      Rate and Rhythm: Normal rate and regular rhythm.      Heart sounds: No friction rub. No gallop.    Pulmonary:      Effort: Pulmonary effort is normal. No respiratory distress.      Breath sounds: Normal breath sounds. No wheezing or rhonchi.   Abdominal:      General: Abdomen is flat. Bowel sounds are normal. There is no distension.      Palpations: Abdomen is soft. There is no mass.      Tenderness: There is no guarding or rebound.   Musculoskeletal:         General: No swelling or tenderness.      Comments: Very thin extremities   Skin:     General: Skin is warm and dry.      Coloration: Skin is not jaundiced or pale.   Neurological:      General: No focal deficit  present.      Mental Status: He is alert and oriented to person, place, and time.      Cranial Nerves: No cranial nerve deficit.      Motor: No weakness.   Psychiatric:         Mood and Affect: Mood normal.         Behavior: Behavior normal.      Comments: Very pleasant mood         Fluids    Intake/Output Summary (Last 24 hours) at 3/21/2020 1149  Last data filed at 3/21/2020 0800  Gross per 24 hour   Intake 1560 ml   Output 1600 ml   Net -40 ml       Laboratory  Recent Labs     03/19/20  0352 03/21/20  0413   WBC 17.5* 17.0*   RBC 3.86* 3.90*   HEMOGLOBIN 11.7* 11.8*   HEMATOCRIT 35.3* 37.7*   MCV 91.5 96.7   MCH 30.3 30.3   MCHC 33.1* 31.3*   RDW 73.9* 79.4*   PLATELETCT 443 340   MPV 10.8 10.7     Recent Labs     03/19/20  0352 03/21/20 0413   SODIUM 134* 132*   POTASSIUM 4.4 4.3   CHLORIDE 98 100   CO2 24 20   GLUCOSE 74 203*   BUN 20 20   CREATININE 0.52 0.44*   CALCIUM 9.0 8.4                   Imaging  DX-PORTABLE FLUOROSCOPY < 1 HOUR   Final Result      Intraoperative fluoroscopy spot images as described above.      [id number]   Final Result      1.  Interval increase in common bile duct dilatation and intrahepatic biliary ductal dilatation since the prior examination. Filling defects in the common bile duct just before the stricture at the level of the pancreatic head have developed since the    prior exam. Differential diagnosis includes sludge as well as common duct stones.      2.  Pancreatic ductal dilatation is again identified which is slightly less than on the prior exam. Debris within the pancreatic duct is now present that appears similar to the debris within the common bile duct.      3.  Enlarged pancreatic head containing small cystic structures is again identified. The 2 larger cysts in the posterior pancreatic head have nearly completely resolved. Enlargement of the pancreatic head could be related to pancreatitis. Neoplasm is    also possible.      4.  Bright T2 signal and enlargement  of the right adrenal gland is again noted.      5.  Sludge again noted in the gallbladder.      6.  Small right pleural effusion is identified.      DX-CHEST-PORTABLE (1 VIEW)   Final Result      1.  Cardiomegaly with bilateral interstitial opacities.      2.  Small bilateral pleural effusions.         DX-CHEST-PORTABLE (1 VIEW)   Final Result         1. No significant interval change.      DX-CHEST-PORTABLE (1 VIEW)   Final Result      New subpulmonic effusions, bibasilar atelectasis and edema      DX-PORTABLE FLUOROSCOPY < 1 HOUR   Final Result      Portable fluoroscopy utilized for 1.1 minute.         INTERPRETING LOCATION: [address] [place] [address], [facility], [id number]      [id number] SPHINCTEROTOMY   Final Result      Intraoperative fluoroscopy spot images as described above.      CT-ABDOMEN WITH & W/O   Final Result      1.  No significant change since CT abdomen and pelvis obtained 2/16/2020      2.  Findings consistent with high-grade biliary obstruction with dilation of the common bile duct measuring 17 mm, dilated gallbladder, dilated pancreatic duct with change in caliber of the pancreatic duct at the level pancreatic head      3.  Findings could be either malignancy the pancreatic head versus scarring from chronic pancreatitis.      4.  Sequela of chronic pancreatitis      5.  Large right medial chest rim-enhancing loculated fluid collection probably within the pleural space measuring 10.5 x 3.1 centimeter transversely and 7 cm craniocaudal      6.  Rim-enhancing fluid collection in the hepatorenal space measuring 11.2 cm craniocaudal and 2-3 cm in thickness.      7.  The rim-enhancing fluid collection are suspicious for infection/abscess      8.  Bilateral atelectasis and small-moderate-sized pleural effusion after      9.  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  Biliary dilatation without  evidence of high-grade biliary obstruction.   2.  Gallbladder visualization after morphine administration. No acute cholecystitis.      CT-ABDOMEN-PELVIS WITH   Final Result      1.  New rim enhancing fluid collection tracks along the right heart margin, azygoesophageal recess and possibly communicates with Morison's pouch tracking along the right aspect of the inferior vena cava. This is nonspecific and could indicate abscess,    dissecting pseudocyst, mucinous cystic neoplasm related fluid      2.  New extra and intrahepatic biliary ductal dilatation with 6 cm dilatation of the gallbladder also seen. This indicates distal common bile duct/ampullary level obstruction. Stricture, mass effect from adjacent pancreas calcifications or small    ampullary mass are differential possibilities      3.  Decreased small pleural effusions. Some loculation on the right is possible      4.  Slightly decreased diffuse pancreatic ductal dilatation now measuring 11 mm. Evidence of chronic pancreatitis. New 3 cm cystic structure anterior to the pancreas could be a pseudocyst. Abscess or necrotic/cystic neoplasm are in the differential.      5.  Multiple chronic findings including status post aorto bifemoral bypass with approaching 70% stenosis of the left superficial femoral artery, nonobstructive right 3 mm nephrolithiasis, large volume rectal vault stool, dilated bladder which could    indicate outlet obstruction or neurogenic bladder, splenosis      [id number]   Final Result      1.  Severe dilatation of the intrahepatic biliary ducts, common bile duct and pancreatic duct. The distal common bile duct and the proximal pancreatic duct is not visualized at the pancreatic head. The pancreatic head appears to be diffusely enlarged and    demonstrates multiple small cysts. These findings are concerning for pancreatic neoplasm such as intraductal papillary mucinous cystoadenoma. The other differential diagnosis includes  periampullary carcinoma. MR examination of the abdomen with contrast    is recommended for further evaluation.   2.  Mixed signal intensity in the right perinephric space likely represents perinephric hematoma.   3.  Large hiatal hernia.   4.  Diffuse gastric wall thickening.      US-RUQ   Final Result      1.  Hepatomegaly.   2.  Hepatic steatosis.   3.  Gallbladder sludge. Mild intrahepatic ductal dilation. Dilation of the common duct up to 13 mm. No definite distal obstructing etiology is identified. MRCP could be obtained for further evaluation if indicated.   4.  Trace ascites.   5.  Pancreatic calcifications likely sequela of chronic pancreatitis.      DX-CHEST-PORTABLE (1 VIEW)   Final Result      Cardiomegaly.           Assessment/Plan      Type 2 diabetes mellitus with hyperglycemia, with long-term current use of insulin (HCC)- (present on admission)  Assessment & Plan  Home dose is: Lantus 12 units at night and regular insulin 10 units 3 times daily.  Has had labile sugars due to variable PO intake requiring insulin adjustments and infection  Group home does not want sliding scale.  Sugars are not at goal, average 150-200  Increase Lantus to 20 units and insulin short acting 13 units 3 times a day, currently tolerated, continue current dosage  Gradually titrate up insulin and avoid hypoglycemic event, patient's blood sugar better controlled this morning but still elevated during the day, will continue current dosage for another day and adjusted accordingly.  Slightly better.  Continue monitor blood sugar, to avoid hypoglycemic event    * Cholangitis  Assessment & Plan  Biliary obstruction suggested on imaging, GI consulted and they performed an ERCP on 2/24 with sphincterotomy  Swelling was too significant to place ampullary duct stent.  He improved without stent placement therefore this was deferred, but Alk P and WBC are rising again  Endoscopy done showing pancreatic calcifications but no stones, no  area to biopsy  Repeat MRCP shows worsening dilation of the common bile duct favoring persistent sludge, stricture or stone  Repeat ERCP/stent/EUS 3/17.    Cytology negative.  Follow-up as outpatient with GI  Continue monitor, GI continue to follow, stent need to be changed in 3 months.    Pancreatic mass  Assessment & Plan  MRI with concern for pancreatic malignancy  ERCP with no evidence of malignancy, only calcifications of pancreas present   is elevated but less than 1000 so cannot rule in cancer, likely due chronic pancreatitis  Repeat CT showed chronic pancreatitis and loculated chest effusion    At this moment GI does not believe patient has significant evidence for periodic cancer  Due to rising WBC and alk phos, repeat MRCP was done and shows worsening obstruction  Repeat ERCP 3/17 status post stent  Cytology negative for malignancy, GI signed off      Normocytic anemia- (present on admission)  Assessment & Plan  Stable, no e/o blood loss    Cognitive decline- (present on admission)  Assessment & Plan  Patient has had chronic cognitive decline.  His sister is his financial decision-maker however is considering guardianship for medical decisions  Mental status at his baseline  He was seen by Psych for decisional capacity and at this time does NOT have capacity for medical decisions  Need group home    Hypomagnesemia  Assessment & Plan  Replaced  Repeat in AM    Sepsis ([facility])  Assessment & Plan  This was Sepsis Present on admission, improved but WBC rising again.  Source: Ascending cholangitis  Abdomen is still soft, tolerating diet  Currently, he has finished antibiotics  Repeat MRCP showed increased CBD dilation from previously seen favoring persistent obstruction/stone or sludge  Repeat ERCP        Acute respiratory failure with hypoxia ([facility])  Assessment & Plan  Due to loculated effusion  Completed abx  Currently resolved      Recurrent major depressive disorder, in full remission ([facility])  Assessment &  Plan  .    Severe protein-calorie malnutrition (HCC)- (present on admission)  Assessment & Plan  Encourage p.o. intake  No pancreatic mass identified    Hyponatremia  Assessment & Plan  Due to hyperglycemia and +/- infection?    Chronic pancreatitis (HCC)- (present on admission)  Assessment & Plan  Continue pancreatic enzymes with meals  Celiac nerve block for pain control, per GI  He is tolerating a diet    Dyslipidemia- (present on admission)  Assessment & Plan  Continue statin       VTE prophylaxis: SCDs    I have seen and examined patient on 3/21/2020. I have reviewed vitals, new labs and imaging. I have discussed POC with RN. There are no changes from (3/20/2020) except for what is mentioned above.         Current Facility-Administered Medications:   •  insulin regular (HUMULIN R) injection 13 Units, 13 Units, Subcutaneous, TID AC, Ruma Christopher M.D., 13 Units at 03/21/20 1115  •  insulin glargine (LANTUS) injection 20 Units, 20 Units, Subcutaneous, Q EVENING, Ruma Christopher M.D., 20 Units at 03/20/20 1657  •  NOTIFY MD and PharmD, , , Once **AND** glucose 4 g chewable tablet 16 g, 16 g, Oral, Q15 MIN PRN **AND** DEXTROSE 10% BOLUS 250 mL, 250 mL, Intravenous, Q15 MIN PRN, Raquel Encinas M.D.  •  lactated ringers infusion, 1,000 mL, Intravenous, Intra-Op Once PRN, Carlos Alberto Ansari M.D.  •  furosemide (LASIX) tablet 40 mg, 40 mg, Oral, Q DAY, Ruma Christopher M.D., 40 mg at 03/21/20 0602  •  menthol (HALLS) lozenge 1 Lozenge, 1 Lozenge, Oral, Q2HRS PRN, Betsey Gentile M.D., 1 Lozenge at 03/05/20 1119  •  cyclobenzaprine (FLEXERIL) tablet 10 mg, 10 mg, Oral, TID PRN, Betsey Gentile M.D., 10 mg at 03/21/20 0841  •  atorvastatin (LIPITOR) tablet 40 mg, 40 mg, Oral, Q EVENING, Grace Patel DSAWYER, 40 mg at 03/20/20 1703  •  omeprazole (PRILOSEC) capsule 20 mg, 20 mg, Oral, BID, Grace Patel D.O., 20 mg at 03/21/20 0602  •  pancrelipase (Lip-Prot-Amyl) (CREON 36924) [address]5865-57991 units capsule 48,000 Units, 2 Cap, Oral,  DAILY, Grace Patel D.O., 48,000 Units at 03/21/20 0602  •  tamsulosin (FLOMAX) capsule 0.4 mg, 0.4 mg, Oral, DAILY, Grace Patel D.O., 0.4 mg at 03/21/20 0602  •  thiamine tablet 100 mg, 100 mg, Oral, DAILY, Grace Patel D.O., 100 mg at 03/21/20 0612  •  senna-docusate (PERICOLACE or SENOKOT S) 8.6-50 MG per tablet 2 Tab, 2 Tab, Oral, BID, 2 Tab at 03/15/20 0522 **AND** polyethylene glycol/lytes (MIRALAX) PACKET 1 Packet, 1 Packet, Oral, QDAY PRN **AND** magnesium hydroxide (MILK OF MAGNESIA) suspension 30 mL, 30 mL, Oral, QDAY PRN **AND** bisacodyl (DULCOLAX) suppository 10 mg, 10 mg, Rectal, QDAY PRN, CHIARA Holguin.RAPHAEL.  •  acetaminophen (TYLENOL) tablet 650 mg, 650 mg, Oral, Q6HRS PRN, Grace Patel D.O., 650 mg at 03/21/20 0841  •  ondansetron (ZOFRAN) syringe/vial injection 4 mg, 4 mg, Intravenous, Q4HRS PRN, YULI HolguinO.  •  ondansetron (ZOFRAN ODT) dispertab 4 mg, 4 mg, Oral, Q4HRS PRN, Grace Patel D.O.  •  promethazine (PHENERGAN) tablet 12.5-25 mg, 12.5-25 mg, Oral, Q4HRS PRN, YULI HolguinO.  •  prochlorperazine (COMPAZINE) injection 5-10 mg, 5-10 mg, Intravenous, Q4HRS PRN, CHIARA Holguin.O.  •  HYDROmorphone pf (DILAUDID) injection 0.5 mg, 0.5 mg, Intravenous, Q2HRS PRN, Raquel Encinas M.D., 0.5 mg at 03/19/20 2055

## 2020-03-21 NOTE — PROGRESS NOTES
Pt resting in bed. VSS. Pain better overall. No signs of distress noted. Discussed POC for the day. Questions encouraged and answered.

## 2020-03-21 NOTE — CARE PLAN
Problem: Communication  Goal: The ability to communicate needs accurately and effectively will improve  Outcome: MET     Problem: Safety  Goal: Will remain free from injury  Outcome: MET  Goal: Will remain free from falls  Outcome: MET   Pt a/o x 4. Pain better. Tylenol and flexeril.

## 2020-03-21 NOTE — PROGRESS NOTES
Pt awake in bed. Pt denies pain or n/v at this time. Discussed POC. No needs at this time. Fall precautions in place.

## 2020-03-22 LAB
ALBUMIN SERPL BCP-MCNC: 2.7 G/DL (ref 3.2–4.9)
ALBUMIN/GLOB SERPL: 0.7 G/DL
ALP SERPL-CCNC: 832 U/L (ref 30–99)
ALT SERPL-CCNC: 24 U/L (ref 2–50)
ANION GAP SERPL CALC-SCNC: 13 MMOL/L (ref 7–16)
AST SERPL-CCNC: 43 U/L (ref 12–45)
BASOPHILS # BLD AUTO: 1.1 % (ref 0–1.8)
BASOPHILS # BLD: 0.2 K/UL (ref 0–0.12)
BILIRUB SERPL-MCNC: 0.5 MG/DL (ref 0.1–1.5)
BUN SERPL-MCNC: 20 MG/DL (ref 8–22)
CALCIUM SERPL-MCNC: 9 MG/DL (ref 8.4–10.2)
CHLORIDE SERPL-SCNC: 96 MMOL/L (ref 96–112)
CO2 SERPL-SCNC: 22 MMOL/L (ref 20–33)
CREAT SERPL-MCNC: 0.55 MG/DL (ref 0.5–1.4)
EOSINOPHIL # BLD AUTO: 0.46 K/UL (ref 0–0.51)
EOSINOPHIL NFR BLD: 2.5 % (ref 0–6.9)
ERYTHROCYTE [DISTWIDTH] IN BLOOD BY AUTOMATED COUNT: 75.3 FL (ref 35.9–50)
GLOBULIN SER CALC-MCNC: 3.8 G/DL (ref 1.9–3.5)
GLUCOSE BLD-MCNC: 175 MG/DL (ref 65–99)
GLUCOSE BLD-MCNC: 225 MG/DL (ref 65–99)
GLUCOSE BLD-MCNC: 247 MG/DL (ref 65–99)
GLUCOSE BLD-MCNC: 82 MG/DL (ref 65–99)
GLUCOSE SERPL-MCNC: 257 MG/DL (ref 65–99)
HCT VFR BLD AUTO: 41.2 % (ref 42–52)
HGB BLD-MCNC: 13.1 G/DL (ref 14–18)
IMM GRANULOCYTES # BLD AUTO: 0.48 K/UL (ref 0–0.11)
IMM GRANULOCYTES NFR BLD AUTO: 2.6 % (ref 0–0.9)
LYMPHOCYTES # BLD AUTO: 3.53 K/UL (ref 1–4.8)
LYMPHOCYTES NFR BLD: 19.4 % (ref 22–41)
MCH RBC QN AUTO: 30.1 PG (ref 27–33)
MCHC RBC AUTO-ENTMCNC: 31.8 G/DL (ref 33.7–35.3)
MCV RBC AUTO: 94.7 FL (ref 81.4–97.8)
MONOCYTES # BLD AUTO: 1.75 K/UL (ref 0–0.85)
MONOCYTES NFR BLD AUTO: 9.6 % (ref 0–13.4)
NEUTROPHILS # BLD AUTO: 11.78 K/UL (ref 1.82–7.42)
NEUTROPHILS NFR BLD: 64.8 % (ref 44–72)
NRBC # BLD AUTO: 0 K/UL
NRBC BLD-RTO: 0 /100 WBC
PLATELET # BLD AUTO: 333 K/UL (ref 164–446)
PMV BLD AUTO: 10.9 FL (ref 9–12.9)
POTASSIUM SERPL-SCNC: 4.4 MMOL/L (ref 3.6–5.5)
PROT SERPL-MCNC: 6.5 G/DL (ref 6–8.2)
RBC # BLD AUTO: 4.35 M/UL (ref 4.7–6.1)
SODIUM SERPL-SCNC: 131 MMOL/L (ref 135–145)
WBC # BLD AUTO: 18.2 K/UL (ref 4.8–10.8)

## 2020-03-22 PROCEDURE — 82962 GLUCOSE BLOOD TEST: CPT | Mod: 91

## 2020-03-22 PROCEDURE — 36415 COLL VENOUS BLD VENIPUNCTURE: CPT

## 2020-03-22 PROCEDURE — A9270 NON-COVERED ITEM OR SERVICE: HCPCS | Performed by: INTERNAL MEDICINE

## 2020-03-22 PROCEDURE — 85025 COMPLETE CBC W/AUTO DIFF WBC: CPT

## 2020-03-22 PROCEDURE — 700102 HCHG RX REV CODE 250 W/ 637 OVERRIDE(OP): Performed by: INTERNAL MEDICINE

## 2020-03-22 PROCEDURE — 99232 SBSQ HOSP IP/OBS MODERATE 35: CPT | Performed by: INTERNAL MEDICINE

## 2020-03-22 PROCEDURE — 770006 HCHG ROOM/CARE - MED/SURG/GYN SEMI*

## 2020-03-22 PROCEDURE — 80053 COMPREHEN METABOLIC PANEL: CPT

## 2020-03-22 RX ORDER — INSULIN GLARGINE 100 [IU]/ML
23 INJECTION, SOLUTION SUBCUTANEOUS EVERY EVENING
Status: DISCONTINUED | OUTPATIENT
Start: 2020-03-22 | End: 2020-03-24 | Stop reason: HOSPADM

## 2020-03-22 RX ADMIN — ACETAMINOPHEN 650 MG: 325 TABLET, FILM COATED ORAL at 20:21

## 2020-03-22 RX ADMIN — PANCRELIPASE 48000 UNITS: 24000; 76000; 120000 CAPSULE, DELAYED RELEASE PELLETS ORAL at 06:27

## 2020-03-22 RX ADMIN — FUROSEMIDE 40 MG: 40 TABLET ORAL at 06:27

## 2020-03-22 RX ADMIN — INSULIN HUMAN 13 UNITS: 100 INJECTION, SOLUTION PARENTERAL at 11:21

## 2020-03-22 RX ADMIN — OMEPRAZOLE 20 MG: 20 CAPSULE, DELAYED RELEASE ORAL at 17:20

## 2020-03-22 RX ADMIN — OMEPRAZOLE 20 MG: 20 CAPSULE, DELAYED RELEASE ORAL at 06:27

## 2020-03-22 RX ADMIN — Medication 100 MG: at 06:27

## 2020-03-22 RX ADMIN — CYCLOBENZAPRINE HYDROCHLORIDE 10 MG: 10 TABLET, FILM COATED ORAL at 09:41

## 2020-03-22 RX ADMIN — CYCLOBENZAPRINE HYDROCHLORIDE 10 MG: 10 TABLET, FILM COATED ORAL at 20:21

## 2020-03-22 RX ADMIN — INSULIN GLARGINE 23 UNITS: 100 INJECTION, SOLUTION SUBCUTANEOUS at 18:04

## 2020-03-22 RX ADMIN — INSULIN HUMAN 13 UNITS: 100 INJECTION, SOLUTION PARENTERAL at 20:17

## 2020-03-22 RX ADMIN — ATORVASTATIN CALCIUM 40 MG: 40 TABLET, FILM COATED ORAL at 17:20

## 2020-03-22 RX ADMIN — TAMSULOSIN HYDROCHLORIDE 0.4 MG: 0.4 CAPSULE ORAL at 06:27

## 2020-03-22 RX ADMIN — INSULIN HUMAN 13 UNITS: 100 INJECTION, SOLUTION PARENTERAL at 06:32

## 2020-03-22 RX ADMIN — ACETAMINOPHEN 650 MG: 325 TABLET, FILM COATED ORAL at 09:41

## 2020-03-22 ASSESSMENT — ENCOUNTER SYMPTOMS
BACK PAIN: 0
CLAUDICATION: 0
NAUSEA: 0
FALLS: 0
WEAKNESS: 1
PHOTOPHOBIA: 0
FEVER: 0
BLURRED VISION: 0
HEMOPTYSIS: 0
ORTHOPNEA: 0
SPUTUM PRODUCTION: 0
CONSTIPATION: 0
HALLUCINATIONS: 0
ABDOMINAL PAIN: 0
DIAPHORESIS: 0
SPEECH CHANGE: 0
HEADACHES: 0
SORE THROAT: 0
WHEEZING: 0
TINGLING: 0
EYE PAIN: 0
WEIGHT LOSS: 0
TREMORS: 0

## 2020-03-22 NOTE — CARE PLAN
Problem: Knowledge Deficit  Goal: Knowledge of the prescribed therapeutic regimen will improve  Outcome: PROGRESSING AS EXPECTED  Intervention: Discuss information regarding therpeutic regimen and document in education  Note: Patient/ family member updated on Plan Of Care and Nurses communications with Doctors.      Problem: Bowel/Gastric:  Goal: Normal bowel function is maintained or improved  Note: Encourage Patient to increase fluid intake and to adhere on schedule laxatives/ stool softeners to improve regularity in bowel elimination.

## 2020-03-22 NOTE — PROGRESS NOTES
Pt resting in bed. VSS. No signs of distress noted. Ate breakfast. Instructed pt to call next time he gets up so we can change linens and his gown.

## 2020-03-22 NOTE — PROGRESS NOTES
Pt in bed, alert and awake. Pt agree w/. POC- meds, safety, monitoring of bg for insulin. Call light use for needs and assistance encouraged. Will continue to monitor.

## 2020-03-22 NOTE — PROGRESS NOTES
Hospital Medicine Daily Progress Note    Date of Service  3/22/2020    Chief Complaint  59 y.o. male admitted 2/14/2020 with abdominal pain    Hospital Course    History of type 2 diabetes, cognitive impairment who lives at a group home who was admitted for abdominal pain found to have sepsis secondary to ascending cholangitis.  He underwent ERCP with sphincterotomy to relieve obstruction.  No stent was placed due to significant edema.  Repeat ERCP was not performed due to acute respiratory failure with hypoxia which has now resolved.  He has improved with antibiotics and is almost ready to return to his group home however, blood sugars have been variable and they are not allowed to do sliding scale      Interval Problem Update  3/10: Has been hyperglycemic, today dropped to 59.  Lantus and mealtime insulin decreased.  Pain is controlled.  Appetite is improving but remains low.  3/11: Sugars are not at goal, back up to 385.  Insulin increased.  Alk phos and WBC rising, concern for recurrent obstructive process.  Repeat cxr ordered (previous effusion w loculation)  3/12: Sugars improved, not at goal.  WBC and alk phos still rising.  I discussed with GI, repeat MRCP.  3/13: Sugar control improved.  WBC still elevated however stable.  Alk phos stable.  Repeat MRCP shows worsening dilation of the CBD, await GI recommendations regarding need for repeat ERCP/EUS/stent  3/14: Planning for repeat ERCP Monday, Sugar control has improved  3/15: Sugars are slightly up, Lantus and prandial increased.  I discussed the case with psychiatry, they will evaluate his current decisional capacity  3/16: Seen by psych, lacks decisional capacity at this time.  ERCP repeated, pending results    3/17.  Patient overall remained stable.  No significant overnight event.  Still complains of back pain and body achiness. Patient's pain is general and local, 3-4/10, intermittent and does not radiate to other location, sharp and with some  tingling. Can be controlled by pain meds.   Tolerated procedure yesterday.  No fever tonight.  3/18.  Patient has been stable and pleasant.  Patient blood sugar still not controlled.  Patient wants to eat regular food however due to diabetes and not controlled blood sugar patient's diet remain to be diabetic.  Complains of general body achiness. Patient's pain is general, 2 to/10, intermittent and does not radiate to other location, sharp and with some tingling. Can be controlled by pain meds.   3/19.  Patient has been stable overnight no significant overnight event.  Cytology resolved negative.  Patient blood sugar better controlled this morning but still high during the day.  We will continue current insulin dosage. Patient otherwise denies fever, chills, nausea, vomiting, adb pain, SOB, CP, headache, constipation, diarrhea, cough, or sputum.  3/20.  Patient has been stable overnight.  Blood sugar better controlled today.  Still hyperglycemic pre-meal.  We will increase short acting insulin. Patient's pain is general 2-3/10, intermittent and does not radiate to other location, sharp and with some tingling. Can be controlled by pain meds.   3/21.  Patient tolerated insulin.  Patient's glucose better controlled today compared to yesterday.  Overall remained stable.  Awaiting for placement.  Continue current insulin dosage.  3/22.  Patient is pleasant and overall stable.  Blood sugar still not controlled but better.  Increase Lantus today. Patient's pain is tgeneral 2-3/10, intermittent and does not radiate to other location, sharp and with some tingling. Can be controlled by pain meds.       Consultants/Specialty  Dr. Lorenzo - [facility]    Code Status  DNR/DNI    Disposition  TBD - pending placement / medical stability    Review of Systems  Review of Systems   Constitutional: Positive for malaise/fatigue. Negative for diaphoresis, fever and weight loss.   HENT: Negative for ear pain, hearing loss, sore throat and tinnitus.     Eyes: Negative for blurred vision, photophobia and pain.   Respiratory: Negative for hemoptysis, sputum production and wheezing.    Cardiovascular: Negative for chest pain, orthopnea and claudication.   Gastrointestinal: Negative for abdominal pain (Resolved), constipation and nausea. Vomiting: Appetite improved.   Genitourinary: Negative for dysuria, frequency and hematuria.   Musculoskeletal: Negative for back pain and falls.   Skin: Negative for itching.   Neurological: Positive for weakness. Negative for tingling, tremors, speech change and headaches.   Psychiatric/Behavioral: Negative for hallucinations and suicidal ideas.   All other systems reviewed and are negative.       Physical Exam  Temp:  [36.6 °C (97.8 °F)-37.3 °C (99.2 °F)] 36.6 °C (97.8 °F)  Pulse:  [87-95] 91  Resp:  [18] 18  BP: (123-137)/(59-77) 126/59  SpO2:  [93 %-98 %] 98 %    Physical Exam  Constitutional:       General: He is awake.      Appearance: He is underweight. He is not ill-appearing or toxic-appearing.   HENT:      Head: Normocephalic and atraumatic.      Right Ear: Tympanic membrane normal.      Left Ear: Tympanic membrane normal.      Nose: Nose normal. No congestion or rhinorrhea.      Mouth/Throat:      Mouth: Mucous membranes are moist.      Pharynx: No oropharyngeal exudate.   Eyes:      General:         Right eye: No discharge.         Left eye: No discharge.      Extraocular Movements: Extraocular movements intact.      Pupils: Pupils are equal, round, and reactive to light.   Neck:      Musculoskeletal: Normal range of motion and neck supple.   Cardiovascular:      Rate and Rhythm: Normal rate and regular rhythm.      Heart sounds: No friction rub. No gallop.    Pulmonary:      Effort: Pulmonary effort is normal. No respiratory distress.      Breath sounds: Normal breath sounds. No rhonchi or rales.   Abdominal:      General: Abdomen is flat. Bowel sounds are normal.      Palpations: Abdomen is soft. There is no mass.       Tenderness: There is no abdominal tenderness. There is no guarding or rebound.   Musculoskeletal:         General: No swelling or tenderness.      Comments: Very thin extremities   Skin:     General: Skin is warm and dry.      Coloration: Skin is not jaundiced or pale.   Neurological:      General: No focal deficit present.      Mental Status: He is alert and oriented to person, place, and time.      Cranial Nerves: No cranial nerve deficit.      Motor: No weakness.   Psychiatric:         Mood and Affect: Mood normal.         Behavior: Behavior normal.      Comments: Very pleasant mood         Fluids    Intake/Output Summary (Last 24 hours) at 3/22/2020 1206  Last data filed at 3/22/2020 1025  Gross per 24 hour   Intake 1200 ml   Output 1650 ml   Net -450 ml       Laboratory  Recent Labs     03/21/20  0413 03/22/20  0657   WBC 17.0* 18.2*   RBC 3.90* 4.35*   HEMOGLOBIN 11.8* 13.1*   HEMATOCRIT 37.7* 41.2*   MCV 96.7 94.7   MCH 30.3 30.1   MCHC 31.3* 31.8*   RDW 79.4* 75.3*   PLATELETCT 340 333   MPV 10.7 10.9     Recent Labs     03/21/20  0413 03/22/20  0657   SODIUM 132* 131*   POTASSIUM 4.3 4.4   CHLORIDE 100 96   CO2 20 22   GLUCOSE 203* 257*   BUN 20 20   CREATININE 0.44* 0.55   CALCIUM 8.4 9.0                   Imaging  DX-PORTABLE FLUOROSCOPY < 1 HOUR   Final Result      Intraoperative fluoroscopy spot images as described above.      [id number]   Final Result      1.  Interval increase in common bile duct dilatation and intrahepatic biliary ductal dilatation since the prior examination. Filling defects in the common bile duct just before the stricture at the level of the pancreatic head have developed since the    prior exam. Differential diagnosis includes sludge as well as common duct stones.      2.  Pancreatic ductal dilatation is again identified which is slightly less than on the prior exam. Debris within the pancreatic duct is now present that appears similar to the debris within the common bile  duct.      3.  Enlarged pancreatic head containing small cystic structures is again identified. The 2 larger cysts in the posterior pancreatic head have nearly completely resolved. Enlargement of the pancreatic head could be related to pancreatitis. Neoplasm is    also possible.      4.  Bright T2 signal and enlargement of the right adrenal gland is again noted.      5.  Sludge again noted in the gallbladder.      6.  Small right pleural effusion is identified.      DX-CHEST-PORTABLE (1 VIEW)   Final Result      1.  Cardiomegaly with bilateral interstitial opacities.      2.  Small bilateral pleural effusions.         DX-CHEST-PORTABLE (1 VIEW)   Final Result         1. No significant interval change.      DX-CHEST-PORTABLE (1 VIEW)   Final Result      New subpulmonic effusions, bibasilar atelectasis and edema      DX-PORTABLE FLUOROSCOPY < 1 HOUR   Final Result      Portable fluoroscopy utilized for 1.1 minute.         INTERPRETING LOCATION: [address] [place] [address], [facility]      [id number] SPHINCTEROTOMY   Final Result      Intraoperative fluoroscopy spot images as described above.      CT-ABDOMEN WITH & W/O   Final Result      1.  No significant change since CT abdomen and pelvis obtained 2/16/2020      2.  Findings consistent with high-grade biliary obstruction with dilation of the common bile duct measuring 17 mm, dilated gallbladder, dilated pancreatic duct with change in caliber of the pancreatic duct at the level pancreatic head      3.  Findings could be either malignancy the pancreatic head versus scarring from chronic pancreatitis.      4.  Sequela of chronic pancreatitis      5.  Large right medial chest rim-enhancing loculated fluid collection probably within the pleural space measuring 10.5 x 3.1 centimeter transversely and 7 cm craniocaudal      6.  Rim-enhancing fluid collection in the hepatorenal space measuring 11.2 cm craniocaudal and 2-3 cm in thickness.      7.  The rim-enhancing fluid  collection are suspicious for infection/abscess      8.  Bilateral atelectasis and small-moderate-sized pleural effusion after      9.  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  Biliary dilatation without evidence of high-grade biliary obstruction.   2.  Gallbladder visualization after morphine administration. No acute cholecystitis.      CT-ABDOMEN-PELVIS WITH   Final Result      1.  New rim enhancing fluid collection tracks along the right heart margin, azygoesophageal recess and possibly communicates with Morison's pouch tracking along the right aspect of the inferior vena cava. This is nonspecific and could indicate abscess,    dissecting pseudocyst, mucinous cystic neoplasm related fluid      2.  New extra and intrahepatic biliary ductal dilatation with 6 cm dilatation of the gallbladder also seen. This indicates distal common bile duct/ampullary level obstruction. Stricture, mass effect from adjacent pancreas calcifications or small    ampullary mass are differential possibilities      3.  Decreased small pleural effusions. Some loculation on the right is possible      4.  Slightly decreased diffuse pancreatic ductal dilatation now measuring 11 mm. Evidence of chronic pancreatitis. New 3 cm cystic structure anterior to the pancreas could be a pseudocyst. Abscess or necrotic/cystic neoplasm are in the differential.      5.  Multiple chronic findings including status post aorto bifemoral bypass with approaching 70% stenosis of the left superficial femoral artery, nonobstructive right 3 mm nephrolithiasis, large volume rectal vault stool, dilated bladder which could    indicate outlet obstruction or neurogenic bladder, splenosis      [id number]-F/O   Final Result      1.  Severe dilatation of the intrahepatic biliary ducts, common bile duct and pancreatic duct. The distal common bile duct and  the proximal pancreatic duct is not visualized at the pancreatic head. The pancreatic head appears to be diffusely enlarged and    demonstrates multiple small cysts. These findings are concerning for pancreatic neoplasm such as intraductal papillary mucinous cystoadenoma. The other differential diagnosis includes periampullary carcinoma. MR examination of the abdomen with contrast    is recommended for further evaluation.   2.  Mixed signal intensity in the right perinephric space likely represents perinephric hematoma.   3.  Large hiatal hernia.   4.  Diffuse gastric wall thickening.      US-RUQ   Final Result      1.  Hepatomegaly.   2.  Hepatic steatosis.   3.  Gallbladder sludge. Mild intrahepatic ductal dilation. Dilation of the common duct up to 13 mm. No definite distal obstructing etiology is identified. MRCP could be obtained for further evaluation if indicated.   4.  Trace ascites.   5.  Pancreatic calcifications likely sequela of chronic pancreatitis.      DX-CHEST-PORTABLE (1 VIEW)   Final Result      Cardiomegaly.           Assessment/Plan      Type 2 diabetes mellitus with hyperglycemia, with long-term current use of insulin (HCC)- (present on admission)  Assessment & Plan  Home dose is: Lantus 12 units at night and regular insulin 10 units 3 times daily.  Has had labile sugars due to variable PO intake requiring insulin adjustments and infection  Group home does not want sliding scale.  Sugars are not at goal, average 150-200  Increase Lantus to 23 units and insulin short acting 13 units 3 times a day, currently tolerated  Blood sugar still not controlled but better.  Increase Lantus today.   Gradually titrate up insulin and avoid hypoglycemic event, patient's blood sugar better controlled this morning but still elevated during the day, will continue current dosage for another day and adjusted accordingly.  Slightly better.  Continue monitor blood sugar, to avoid hypoglycemic event    *  Cholangitis  Assessment & Plan  Biliary obstruction suggested on imaging, GI consulted and they performed an ERCP on 2/24 with sphincterotomy  Swelling was too significant to place ampullary duct stent.  He improved without stent placement therefore this was deferred, but Alk P and WBC are rising again  Endoscopy done showing pancreatic calcifications but no stones, no area to biopsy  Repeat MRCP shows worsening dilation of the common bile duct favoring persistent sludge, stricture or stone  Repeat ERCP/stent/EUS 3/17.    Cytology negative.  Follow-up as outpatient with GI  Continue monitor, GI continue to follow, stent need to be changed in 3 months.  Monitor, ALK trending down    Pancreatic mass  Assessment & Plan  MRI with concern for pancreatic malignancy  ERCP with no evidence of malignancy, only calcifications of pancreas present   is elevated but less than 1000 so cannot rule in cancer, likely due chronic pancreatitis  Repeat CT showed chronic pancreatitis and loculated chest effusion    At this moment GI does not believe patient has significant evidence for periodic cancer  Due to rising WBC and alk phos, repeat MRCP was done and shows worsening obstruction  Repeat ERCP 3/17 status post stent  Cytology negative for malignancy, GI signed off      Normocytic anemia- (present on admission)  Assessment & Plan  Stable, no e/o blood loss    Cognitive decline- (present on admission)  Assessment & Plan  Patient has had chronic cognitive decline.  His sister is his financial decision-maker however is considering guardianship for medical decisions  Mental status at his baseline  He was seen by Psych for decisional capacity and at this time does NOT have capacity for medical decisions  Need group home    Hypomagnesemia  Assessment & Plan  Replaced  Repeat in AM    Sepsis (HCC)  Assessment & Plan  This was Sepsis Present on admission, improved but WBC rising again.  Source: Ascending cholangitis  Abdomen is still  soft, tolerating diet  Currently, he has finished antibiotics  Repeat MRCP showed increased CBD dilation from previously seen favoring persistent obstruction/stone or sludge  Repeat ERCP with sten placed    Currently resolved and off abx        Acute respiratory failure with hypoxia (HCC)  Assessment & Plan  Due to loculated effusion  Completed abx  Currently resolved      Recurrent major depressive disorder, in full remission (HCC)  Assessment & Plan  .    Severe protein-calorie malnutrition (HCC)- (present on admission)  Assessment & Plan  Encourage p.o. intake  No pancreatic mass identified    Hyponatremia  Assessment & Plan  Due to hyperglycemia and +/- infection?    Chronic pancreatitis (HCC)- (present on admission)  Assessment & Plan  Continue pancreatic enzymes with meals  Celiac nerve block for pain control, per GI  He is tolerating a diet    Dyslipidemia- (present on admission)  Assessment & Plan  Continue statin       VTE prophylaxis: SCDs    I have seen and examined patient on 3/22/2020. I have reviewed vitals, new labs and imaging. I have discussed POC with RN. There are no changes from (3/21/2020) except for what is mentioned above.         Current Facility-Administered Medications:   •  insulin glargine (LANTUS) injection 23 Units, 23 Units, Subcutaneous, Q EVENING, Ruma Christopher M.D.  •  insulin regular (HUMULIN R) injection 13 Units, 13 Units, Subcutaneous, TID AC, Ruma Christopher M.D., 13 Units at 03/22/20 1121  •  NOTIFY MD and PharmD, , , Once **AND** glucose 4 g chewable tablet 16 g, 16 g, Oral, Q15 MIN PRN **AND** DEXTROSE 10% BOLUS 250 mL, 250 mL, Intravenous, Q15 MIN PRN, Raquel Encinas M.D.  •  lactated ringers infusion, 1,000 mL, Intravenous, Intra-Op Once PRN, Carlos Alberto Ansari M.D.  •  furosemide (LASIX) tablet 40 mg, 40 mg, Oral, Q DAY, Ruma Christopher M.D., 40 mg at 03/22/20 0627  •  menthol (HALLS) lozenge 1 Lozenge, 1 Lozenge, Oral, Q2HRS PRN, Betsey Gentile M.D., 1 Lozenge at 03/05/20 1119  •   cyclobenzaprine (FLEXERIL) tablet 10 mg, 10 mg, Oral, TID PRN, Betsey Gentile M.D., 10 mg at 03/22/20 0941  •  atorvastatin (LIPITOR) tablet 40 mg, 40 mg, Oral, Q EVENING, YULI HolguinO., 40 mg at 03/21/20 1631  •  omeprazole (PRILOSEC) capsule 20 mg, 20 mg, Oral, BID, YULI HolguinO., 20 mg at 03/22/20 0627  •  pancrelipase (Lip-Prot-Amyl) (CREON 58831) 46896-75967 units capsule 48,000 Units, 2 Cap, Oral, DAILY, Grace Patel D.O., 48,000 Units at 03/22/20 0627  •  tamsulosin (FLOMAX) capsule 0.4 mg, 0.4 mg, Oral, DAILY, YULI HolguinO., 0.4 mg at 03/22/20 0627  •  thiamine tablet 100 mg, 100 mg, Oral, DAILY, YULI HolguinO., 100 mg at 03/22/20 0627  •  senna-docusate (PERICOLACE or SENOKOT S) 8.6-50 MG per tablet 2 Tab, 2 Tab, Oral, BID, 2 Tab at 03/15/20 0522 **AND** polyethylene glycol/lytes (MIRALAX) PACKET 1 Packet, 1 Packet, Oral, QDAY PRN **AND** magnesium hydroxide (MILK OF MAGNESIA) suspension 30 mL, 30 mL, Oral, QDAY PRN **AND** bisacodyl (DULCOLAX) suppository 10 mg, 10 mg, Rectal, QDAY PRN, Grace Patel D.O.  •  acetaminophen (TYLENOL) tablet 650 mg, 650 mg, Oral, Q6HRS PRN, Grace Patel D.O., 650 mg at 03/22/20 0941  •  ondansetron (ZOFRAN) syringe/vial injection 4 mg, 4 mg, Intravenous, Q4HRS PRN, Grace Patel D.O.  •  ondansetron (ZOFRAN ODT) dispertab 4 mg, 4 mg, Oral, Q4HRS PRN, Grace Patel D.O.  •  promethazine (PHENERGAN) tablet 12.5-25 mg, 12.5-25 mg, Oral, Q4HRS PRN, Grace Ptael D.O.  •  prochlorperazine (COMPAZINE) injection 5-10 mg, 5-10 mg, Intravenous, Q4HRS PRN, Grace Patel D.O.  •  HYDROmorphone pf (DILAUDID) injection 0.5 mg, 0.5 mg, Intravenous, Q2HRS PRN, Raquel Encinas M.D., 0.5 mg at 03/19/20 2055

## 2020-03-22 NOTE — CARE PLAN
Problem: Infection  Goal: Will remain free from infection  Outcome: PROGRESSING AS EXPECTED     Problem: Venous Thromboembolism (VTW)/Deep Vein Thrombosis (DVT) Prevention:  Goal: Patient will participate in Venous Thrombosis (VTE)/Deep Vein Thrombosis (DVT)Prevention Measures  Outcome: PROGRESSING AS EXPECTED     Problem: Knowledge Deficit  Goal: Knowledge of disease process/condition, treatment plan, diagnostic tests, and medications will improve  Outcome: PROGRESSING AS EXPECTED

## 2020-03-23 PROBLEM — E83.42 HYPOMAGNESEMIA: Status: RESOLVED | Noted: 2018-11-24 | Resolved: 2020-03-23

## 2020-03-23 PROBLEM — K83.09 CHOLANGITIS: Status: RESOLVED | Noted: 2020-02-14 | Resolved: 2020-03-23

## 2020-03-23 PROBLEM — A41.9 SEPSIS (HCC): Status: RESOLVED | Noted: 2018-08-24 | Resolved: 2020-03-23

## 2020-03-23 PROBLEM — K86.89 PANCREATIC MASS: Status: RESOLVED | Noted: 2020-02-14 | Resolved: 2020-03-23

## 2020-03-23 PROBLEM — E87.1 HYPONATREMIA: Status: RESOLVED | Noted: 2019-12-19 | Resolved: 2020-03-23

## 2020-03-23 PROBLEM — J96.01 ACUTE RESPIRATORY FAILURE WITH HYPOXIA (HCC): Status: RESOLVED | Noted: 2020-03-01 | Resolved: 2020-03-23

## 2020-03-23 LAB
GLUCOSE BLD-MCNC: 119 MG/DL (ref 65–99)
GLUCOSE BLD-MCNC: 245 MG/DL (ref 65–99)
GLUCOSE BLD-MCNC: 254 MG/DL (ref 65–99)

## 2020-03-23 PROCEDURE — A9270 NON-COVERED ITEM OR SERVICE: HCPCS | Performed by: INTERNAL MEDICINE

## 2020-03-23 PROCEDURE — 770006 HCHG ROOM/CARE - MED/SURG/GYN SEMI*

## 2020-03-23 PROCEDURE — 99239 HOSP IP/OBS DSCHRG MGMT >30: CPT | Performed by: HOSPITALIST

## 2020-03-23 PROCEDURE — 700102 HCHG RX REV CODE 250 W/ 637 OVERRIDE(OP): Performed by: INTERNAL MEDICINE

## 2020-03-23 PROCEDURE — 82962 GLUCOSE BLOOD TEST: CPT | Mod: 91

## 2020-03-23 RX ORDER — INSULIN LISPRO 100 [IU]/ML
12 INJECTION, SOLUTION INTRAVENOUS; SUBCUTANEOUS
Qty: 5 PEN | Refills: 6 | Status: SHIPPED | OUTPATIENT
Start: 2020-03-23 | End: 2020-03-24 | Stop reason: SDUPTHER

## 2020-03-23 RX ORDER — INSULIN DEGLUDEC 200 U/ML
23 INJECTION, SOLUTION SUBCUTANEOUS
Qty: 3 PEN | Refills: 11 | Status: SHIPPED | OUTPATIENT
Start: 2020-03-23 | End: 2020-03-24 | Stop reason: SDUPTHER

## 2020-03-23 RX ADMIN — OMEPRAZOLE 20 MG: 20 CAPSULE, DELAYED RELEASE ORAL at 06:10

## 2020-03-23 RX ADMIN — FUROSEMIDE 40 MG: 40 TABLET ORAL at 06:09

## 2020-03-23 RX ADMIN — TAMSULOSIN HYDROCHLORIDE 0.4 MG: 0.4 CAPSULE ORAL at 06:10

## 2020-03-23 RX ADMIN — ACETAMINOPHEN 650 MG: 325 TABLET, FILM COATED ORAL at 06:14

## 2020-03-23 RX ADMIN — INSULIN HUMAN 13 UNITS: 100 INJECTION, SOLUTION PARENTERAL at 17:06

## 2020-03-23 RX ADMIN — ATORVASTATIN CALCIUM 40 MG: 40 TABLET, FILM COATED ORAL at 17:11

## 2020-03-23 RX ADMIN — INSULIN HUMAN 13 UNITS: 100 INJECTION, SOLUTION PARENTERAL at 06:51

## 2020-03-23 RX ADMIN — CYCLOBENZAPRINE HYDROCHLORIDE 10 MG: 10 TABLET, FILM COATED ORAL at 17:16

## 2020-03-23 RX ADMIN — CYCLOBENZAPRINE HYDROCHLORIDE 10 MG: 10 TABLET, FILM COATED ORAL at 06:14

## 2020-03-23 RX ADMIN — INSULIN HUMAN 13 UNITS: 100 INJECTION, SOLUTION PARENTERAL at 11:13

## 2020-03-23 RX ADMIN — Medication 100 MG: at 06:10

## 2020-03-23 RX ADMIN — OMEPRAZOLE 20 MG: 20 CAPSULE, DELAYED RELEASE ORAL at 17:11

## 2020-03-23 RX ADMIN — PANCRELIPASE 48000 UNITS: 24000; 76000; 120000 CAPSULE, DELAYED RELEASE PELLETS ORAL at 06:10

## 2020-03-23 RX ADMIN — INSULIN GLARGINE 23 UNITS: 100 INJECTION, SOLUTION SUBCUTANEOUS at 17:06

## 2020-03-23 RX ADMIN — ACETAMINOPHEN 650 MG: 325 TABLET, FILM COATED ORAL at 17:16

## 2020-03-23 ASSESSMENT — ENCOUNTER SYMPTOMS
NECK PAIN: 0
ABDOMINAL PAIN: 0
EYE PAIN: 0
HEMOPTYSIS: 0
WEAKNESS: 1
ORTHOPNEA: 0
HALLUCINATIONS: 0
SPEECH CHANGE: 0
BLURRED VISION: 0
CHILLS: 0
NAUSEA: 0
CLAUDICATION: 0
CONSTIPATION: 0
HEADACHES: 0
FOCAL WEAKNESS: 0
WEIGHT LOSS: 0
DOUBLE VISION: 0
FALLS: 0
TREMORS: 0
COUGH: 0
TINGLING: 0
SHORTNESS OF BREATH: 0
WHEEZING: 0
BACK PAIN: 0
DIAPHORESIS: 0
SPUTUM PRODUCTION: 0
PHOTOPHOBIA: 0
SORE THROAT: 0
FEVER: 0

## 2020-03-23 NOTE — DISCHARGE PLANNING
Anticipated Discharge Disposition: [facility]    Action: LSW spoke with pt's sister (POA) and received confirmation that pt can be accepted tomorrow at 1pm. LSW sent transport request form to [facility].     Barriers to Discharge: None    Plan: LSW will continue to assess pt for discharge needs.

## 2020-03-23 NOTE — CARE PLAN
Problem: Infection  Goal: Will remain free from infection  Outcome: PROGRESSING AS EXPECTED  Note: No s/s infection, afebrile     Problem: Venous Thromboembolism (VTW)/Deep Vein Thrombosis (DVT) Prevention:  Goal: Patient will participate in Venous Thrombosis (VTE)/Deep Vein Thrombosis (DVT)Prevention Measures  Outcome: PROGRESSING AS EXPECTED  Note: Refuses SCDs but no s/s DVT at this time     Problem: Knowledge Deficit  Goal: Knowledge of disease process/condition, treatment plan, diagnostic tests, and medications will improve  Outcome: PROGRESSING AS EXPECTED     Problem: Pain Management  Goal: Pain level will decrease to patient's comfort goal  Outcome: PROGRESSING AS EXPECTED  Note: Pain managed with current PRN options     Problem: Psychosocial Needs:  Goal: Level of anxiety will decrease  Outcome: PROGRESSING AS EXPECTED  Note: Pt pleasant and calm     Problem: Discharge Barriers/Planning  Goal: Patient's continuum of care needs will be met  Outcome: PROGRESSING SLOWER THAN EXPECTED  Note: Continued placement needs

## 2020-03-23 NOTE — DISCHARGE PLANNING
Anticipated Discharge Disposition: [facility] tomorrow 3/24/2020    Action: Spoke with sister Aniyah. She stated that pt's private nurse is unable to provide services for 30 days. Therefore, [facility] cannot take pt back at this time- until nurse available for pt's diabetes management. Aniyah identified and submitted down payment for [facility], who is unable to take pt until tomorrow 3/24/2020.     Aniyah will call Unit LSW once she gets home to provide address and contact number for [facility].     Barriers to Discharge: n/a    Plan: Unit LSW to contact [facility] and arrange transport for tomorrow [facility]

## 2020-03-23 NOTE — DISCHARGE SUMMARY
Discharge Summary    CHIEF COMPLAINT ON ADMISSION  Chief Complaint   Patient presents with   • Altered Mental Status     pt confused this morning per staff at [facility] assisted living.    • Tachycardia     122 bmp in triage.   • Hypotension     88/55 in triage.   • Cough     x 1 week       Reason for Admission  Per assisted living; disoriented    Admission Date  2/14/2020    CODE STATUS  DNAR/DNI    HPI & HOSPITAL COURSE  This is a 59 y.o. male here with  History of type 2 diabetes, cognitive impairment who lives at a group home who was admitted for abdominal pain found to have sepsis secondary to ascending cholangitis.  He underwent ERCP with sphincterotomy to relieve obstruction.  No stent was placed due to significant edema for the first time.  Repeat ERCP was then later performed and a stent was subsequently placed.  During the procedure patient has been biopsied several times and all result negative for malignancy.  GI recommend patient to follow-up as outpatient and removal of his stent in 3 months which will be June 2020 with outpatient GI follow-up.  Patient's diabetes has been controlled by diet and insulin.  Patient's insulin dosage has been adjusted during this admission.     He has improved and completed antibiotics during this hospital stay. He will be discharged to a group home. His problem list and plan are detailed below.     Type 2 diabetes mellitus with hyperglycemia, with long-term current use of insulin ([facility])- (present on admission)  Assessment & Plan  Recommend long-acting insulin 23 units nightly and short-acting 12 units 3 times daily  Close monitor blood sugar  Currently better controlled 150-200     * Cholangitis  Assessment & Plan  Biliary obstruction suggested on imaging, GI consulted and they performed an ERCP on 2/24 with sphincterotomy  Swelling was too significant to place ampullary duct stent.  He improved   Endoscopy done showing pancreatic calcifications but no stones, no area to  biopsy  Repeat MRCP shows worsening dilation of the common bile duct favoring persistent sludge, stricture or stone  Repeat ERCP/stent/EUS 3/17.    Cytology negative.  Follow-up as outpatient with GI, stent need to be removed in 3 months  Continue monitor, GI continue to follow, stent need to be changed in 3 months.  Monitor, ALK trending down     Pancreatic mass  Assessment & Plan  MRI with concern for pancreatic malignancy  ERCP with no evidence of malignancy, only calcifications of pancreas present  Repeat CT showed chronic pancreatitis and loculated chest effusion  Repeat ERCP cytology negative for malignancy.    Cytology negative for malignancy, GI signed off        Normocytic anemia- (present on admission)  Assessment & Plan  Stable, no e/o blood loss     Cognitive decline- (present on admission)  Assessment & Plan  Patient has had chronic cognitive decline.  His sister is his financial decision-maker however is considering guardianship for medical decisions  Mental status at his baseline  He was seen by Psych for decisional capacity and at this time does NOT have capacity for medical decisions  Need group home     Hypomagnesemia  Assessment & Plan  Replaced  Repeat in AM     Sepsis ([facility])  Assessment & Plan  This was Sepsis Present on admission, improved but WBC rising again.  Source: Ascending cholangitis  Abdomen is still soft, tolerating diet  Currently, he has finished antibiotics  Repeat MRCP showed increased CBD dilation from previously seen favoring persistent obstruction/stone or sludge  Repeat ERCP with sten placed     Currently resolved and off abx  Resolved         Acute respiratory failure with hypoxia ([facility])  Assessment & Plan  Due to loculated effusion  Completed abx  Currently resolved        Recurrent major depressive disorder, in full remission ([facility])  Assessment & Plan  .     Severe protein-calorie malnutrition (HCC)- (present on admission)  Assessment & Plan  Encourage p.o. intake  No  pancreatic mass identified     Hyponatremia  Assessment & Plan  Due to hyperglycemia   Currently improved     Chronic pancreatitis (HCC)- (present on admission)  Assessment & Plan  Continue pancreatic enzymes with meals  Celiac nerve block for pain control, per GI  He is tolerating a diet     Dyslipidemia- (present on admission)  Assessment & Plan  Continue     Therefore, he is discharged in fair and stable condition to home with close outpatient follow-up.    The patient met 2-midnight criteria for an inpatient stay at the time of discharge.    Discharge Date  3/24/2020    FOLLOW UP ITEMS POST DISCHARGE  none    DISCHARGE DIAGNOSES  Principal Problem (Resolved):    Cholangitis POA: Yes  Active Problems:    Type 2 diabetes mellitus with hyperglycemia, with long-term current use of insulin (HCC) POA: Yes    Cognitive decline POA: Yes    Normocytic anemia POA: Yes    Dyslipidemia POA: Yes    Chronic pancreatitis (HCC) POA: Yes    Severe protein-calorie malnutrition (HCC) POA: Yes  Resolved Problems:    Pancreatic mass POA: Yes    Sepsis (HCC) POA: Yes    Hypomagnesemia POA: Yes    Hypokalemia POA: Unknown    Metabolic acidosis POA: Unknown    Loculated pleural effusion POA: Unknown    Acute respiratory failure with hypoxia (HCC) POA: Yes    Hyponatremia POA: Yes      FOLLOW UP  Future Appointments   Date Time Provider Department Center   5/26/2020 12:40 PM MANDY Tong     No follow-up provider specified.    MEDICATIONS ON DISCHARGE     Medication List      CHANGE how you take these medications      Instructions   insulin lispro 100 UNIT/ML Sopn injection PEN  What changed:  how much to take  Commonly known as:  HumaLOG KwikPen   Inject 12 Units as instructed 3 times a day before meals.  Dose:  12 Units     Tresiba FlexTouch 200 UNIT/ML Sopn  What changed:  how much to take  Generic drug:  Insulin Degludec   Doctor's comments:  D/c levemir  Inject 23 Units as instructed every bedtime.  Dose:   23 Units        CONTINUE taking these medications      Instructions   aspirin 81 MG Chew chewable tablet  Commonly known as:  ASA   CHEW 1 TABLET BY MOUTH ONCE DAILY.     atorvastatin 40 MG Tabs  Commonly known as:  LIPITOR   Take 1 Tab by mouth every evening.  Dose:  40 mg     bismuth subsalicylate 262 MG Chew  Commonly known as:  PEPTO-BISMOL   Doctor's comments:  Last Dose 1/14 at 0600  Take 2 Tabs by mouth every 6 hours.  Dose:  524 mg     Creon 86148-26303 units Cpep  Generic drug:  pancrelipase (Lip-Prot-Amyl)   Take 2 Caps by mouth every day.  Dose:  2 Cap     multivitamin Tabs   Take 1 Tab by mouth every day.  Dose:  1 Tab     omeprazole 20 MG delayed-release capsule  Commonly known as:  PRILOSEC   Take 1 Cap by mouth 2 Times a Day.  Dose:  20 mg     sucralfate 1 GM Tabs  Commonly known as:  CARAFATE   1 tab 30 minutes prior to meals and before bed x 10D, then BID PRN abd pain     tamsulosin 0.4 MG capsule  Commonly known as:  FLOMAX   Take 1 Cap by mouth every day.  Dose:  0.4 mg     Urine Glucose-Ketones Test Strp   1 Strip by In Vitro route every day.  Dose:  1 Strip     Vitamin D 50 MCG (2000 UT) Caps   Take 2 Caps by mouth every day.  Dose:  2 Cap        STOP taking these medications    [facility] Caps     lisinopril 2.5 MG Tabs  Commonly known as:  PRINIVIL     metroNIDAZOLE 250 MG Tabs  Commonly known as:  FLAGYL     thiamine 100 MG tablet  Commonly known as:  THIAMINE            Allergies  No Known Allergies    DIET  Orders Placed This Encounter   Procedures   • Diet Order Consistent Carbohydrate, Diabetic     Standing Status:   Standing     Number of Occurrences:   1     Order Specific Question:   Diet:     Answer:   Consistent Carbohydrate [4]     Order Specific Question:   Diet:     Answer:   Diabetic [3]       ACTIVITY  As tolerated.  Weight bearing as tolerated    CONSULTATIONS  GI    PROCEDURES  3/16  Procedure(s):  ERCP, DIAGNOSTIC - Wound Class: Clean  Contaminated    2/24  Procedures   ENDOSCOPIC RETROGRADE CHOLANGIOPANCREATOGRAPHY (ERCP) BILIARY SPHINCTEROTOMY [50110 (CPT®)]   CHOLANGIOGRAM X-RAYS BILE/PANCREAS ENDOSCOPY [88245 (CPT®)]       DX-PORTABLE FLUOROSCOPY < 1 HOUR   Final Result      Intraoperative fluoroscopy spot images as described above.      [id number]   Final Result      1.  Interval increase in common bile duct dilatation and intrahepatic biliary ductal dilatation since the prior examination. Filling defects in the common bile duct just before the stricture at the level of the pancreatic head have developed since the    prior exam. Differential diagnosis includes sludge as well as common duct stones.      2.  Pancreatic ductal dilatation is again identified which is slightly less than on the prior exam. Debris within the pancreatic duct is now present that appears similar to the debris within the common bile duct.      3.  Enlarged pancreatic head containing small cystic structures is again identified. The 2 larger cysts in the posterior pancreatic head have nearly completely resolved. Enlargement of the pancreatic head could be related to pancreatitis. Neoplasm is    also possible.      4.  Bright T2 signal and enlargement of the right adrenal gland is again noted.      5.  Sludge again noted in the gallbladder.      6.  Small right pleural effusion is identified.      DX-CHEST-PORTABLE (1 VIEW)   Final Result      1.  Cardiomegaly with bilateral interstitial opacities.      2.  Small bilateral pleural effusions.         DX-CHEST-PORTABLE (1 VIEW)   Final Result         1. No significant interval change.      DX-CHEST-PORTABLE (1 VIEW)   Final Result      New subpulmonic effusions, bibasilar atelectasis and edema      DX-PORTABLE FLUOROSCOPY < 1 HOUR   Final Result      Portable fluoroscopy utilized for 1.1 minute.         INTERPRETING LOCATION: [address] [place] [address], [facility]      [id number] SPHINCTEROTOMY   Final Result       Intraoperative fluoroscopy spot images as described above.      CT-ABDOMEN WITH & W/O   Final Result      1.  No significant change since CT abdomen and pelvis obtained 2/16/2020      2.  Findings consistent with high-grade biliary obstruction with dilation of the common bile duct measuring 17 mm, dilated gallbladder, dilated pancreatic duct with change in caliber of the pancreatic duct at the level pancreatic head      3.  Findings could be either malignancy the pancreatic head versus scarring from chronic pancreatitis.      4.  Sequela of chronic pancreatitis      5.  Large right medial chest rim-enhancing loculated fluid collection probably within the pleural space measuring 10.5 x 3.1 centimeter transversely and 7 cm craniocaudal      6.  Rim-enhancing fluid collection in the hepatorenal space measuring 11.2 cm craniocaudal and 2-3 cm in thickness.      7.  The rim-enhancing fluid collection are suspicious for infection/abscess      8.  Bilateral atelectasis and small-moderate-sized pleural effusion after      9.  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  Biliary dilatation without evidence of high-grade biliary obstruction.   2.  Gallbladder visualization after morphine administration. No acute cholecystitis.      CT-ABDOMEN-PELVIS WITH   Final Result      1.  New rim enhancing fluid collection tracks along the right heart margin, azygoesophageal recess and possibly communicates with Morison's pouch tracking along the right aspect of the inferior vena cava. This is nonspecific and could indicate abscess,    dissecting pseudocyst, mucinous cystic neoplasm related fluid      2.  New extra and intrahepatic biliary ductal dilatation with 6 cm dilatation of the gallbladder also seen. This indicates distal common bile duct/ampullary level obstruction. Stricture, mass effect from adjacent pancreas  calcifications or small    ampullary mass are differential possibilities      3.  Decreased small pleural effusions. Some loculation on the right is possible      4.  Slightly decreased diffuse pancreatic ductal dilatation now measuring 11 mm. Evidence of chronic pancreatitis. New 3 cm cystic structure anterior to the pancreas could be a pseudocyst. Abscess or necrotic/cystic neoplasm are in the differential.      5.  Multiple chronic findings including status post aorto bifemoral bypass with approaching 70% stenosis of the left superficial femoral artery, nonobstructive right 3 mm nephrolithiasis, large volume rectal vault stool, dilated bladder which could    indicate outlet obstruction or neurogenic bladder, splenosis      [id number]-M/O   Final Result      1.  Severe dilatation of the intrahepatic biliary ducts, common bile duct and pancreatic duct. The distal common bile duct and the proximal pancreatic duct is not visualized at the pancreatic head. The pancreatic head appears to be diffusely enlarged and    demonstrates multiple small cysts. These findings are concerning for pancreatic neoplasm such as intraductal papillary mucinous cystoadenoma. The other differential diagnosis includes periampullary carcinoma. MR examination of the abdomen with contrast    is recommended for further evaluation.   2.  Mixed signal intensity in the right perinephric space likely represents perinephric hematoma.   3.  Large hiatal hernia.   4.  Diffuse gastric wall thickening.      US-RUQ   Final Result      1.  Hepatomegaly.   2.  Hepatic steatosis.   3.  Gallbladder sludge. Mild intrahepatic ductal dilation. Dilation of the common duct up to 13 mm. No definite distal obstructing etiology is identified. MRCP could be obtained for further evaluation if indicated.   4.  Trace ascites.   5.  Pancreatic calcifications likely sequela of chronic pancreatitis.      DX-CHEST-PORTABLE (1 VIEW)   Final Result      Cardiomegaly.         PE:  Gen: AAOx3, NAD  Eyes: PELLA  Neck: no JVD, no lymphadenopathy  Cardia: RRR, no mrg  Lungs: CTAB, no rales, rhonci or wheezing  Abd: NABS, soft, non extended, no mass  EXT: No C/C/E, peripheral pulse 2+ b/l  Neuro: CN II-XII intact, non focal, reflex 2+ symmetrical  Skin: Intact, no lesion, warm  Psych: Appropriate.      LABORATORY  Lab Results   Component Value Date    SODIUM 131 (L) 03/22/2020    POTASSIUM 4.4 03/22/2020    CHLORIDE 96 03/22/2020    CO2 22 03/22/2020    GLUCOSE 257 (H) 03/22/2020    BUN 20 03/22/2020    CREATININE 0.55 03/22/2020        Lab Results   Component Value Date    WBC 18.2 (H) 03/22/2020    HEMOGLOBIN 13.1 (L) 03/22/2020    HEMATOCRIT 41.2 (L) 03/22/2020    PLATELETCT 333 03/22/2020        Total time of the discharge process exceeds 38 minutes.

## 2020-03-23 NOTE — PROGRESS NOTES
"Report received from ADRIANA Guaman RN. Pt awake, alert, appropriate and pleasant. Per report, PM dose of 13 units regular insulin held for blood glucose of 82, lantus given as scheduled. Reflected on MAR. Pt denies s/s hypoglycemia at this time. Denies needs. VSS. /76   Pulse 99   Temp 36.7 °C (98.1 °F) (Oral)   Resp 18   Ht 1.905 m (6' 3\")   Wt 62.2 kg (137 lb 2 oz)   SpO2 96%   BMI 17.14 kg/m²  Call light in reach. Will continue to monitor.   "

## 2020-03-23 NOTE — CARE PLAN
Problem: Infection  Goal: Will remain free from infection  Outcome: PROGRESSING AS EXPECTED     Problem: Venous Thromboembolism (VTW)/Deep Vein Thrombosis (DVT) Prevention:  Goal: Patient will participate in Venous Thrombosis (VTE)/Deep Vein Thrombosis (DVT)Prevention Measures  Outcome: PROGRESSING AS EXPECTED     Problem: Bowel/Gastric:  Goal: Normal bowel function is maintained or improved  Outcome: PROGRESSING AS EXPECTED  Goal: Will not experience complications related to bowel motility  Outcome: PROGRESSING AS EXPECTED     Problem: Knowledge Deficit  Goal: Knowledge of disease process/condition, treatment plan, diagnostic tests, and medications will improve  Outcome: PROGRESSING AS EXPECTED  Goal: Knowledge of the prescribed therapeutic regimen will improve  Outcome: PROGRESSING AS EXPECTED     Problem: Discharge Barriers/Planning  Goal: Patient's continuum of care needs will be met  Outcome: PROGRESSING AS EXPECTED

## 2020-03-23 NOTE — PROGRESS NOTES
Pt HS blood glucose is 225. Pt requesting dinner 13 units insulin now as it was held earlier at dinner time d/t glucose 82, also requesting a snack as hungry now. Provided PB&J sandwich. Hospitalist paged regarding insulin.

## 2020-03-23 NOTE — PROGRESS NOTES
Hospital Medicine Daily Progress Note    Date of Service  3/23/2020    Chief Complaint  59 y.o. male admitted 2/14/2020 with abdominal pain    Hospital Course    History of type 2 diabetes, cognitive impairment who lives at a group home who was admitted for abdominal pain found to have sepsis secondary to ascending cholangitis.  He underwent ERCP with sphincterotomy to relieve obstruction.  No stent was placed due to significant edema.  Repeat ERCP was not performed due to acute respiratory failure with hypoxia which has now resolved.  He has improved with antibiotics and is almost ready to return to his group home however, blood sugars have been variable and they are not allowed to do sliding scale      Interval Problem Update  3/10: Has been hyperglycemic, today dropped to 59.  Lantus and mealtime insulin decreased.  Pain is controlled.  Appetite is improving but remains low.  3/11: Sugars are not at goal, back up to 385.  Insulin increased.  Alk phos and WBC rising, concern for recurrent obstructive process.  Repeat cxr ordered (previous effusion w loculation)  3/12: Sugars improved, not at goal.  WBC and alk phos still rising.  I discussed with GI, repeat MRCP.  3/13: Sugar control improved.  WBC still elevated however stable.  Alk phos stable.  Repeat MRCP shows worsening dilation of the CBD, await GI recommendations regarding need for repeat ERCP/EUS/stent  3/14: Planning for repeat ERCP Monday, Sugar control has improved  3/15: Sugars are slightly up, Lantus and prandial increased.  I discussed the case with psychiatry, they will evaluate his current decisional capacity  3/16: Seen by psych, lacks decisional capacity at this time.  ERCP repeated, pending results    3/17.  Patient overall remained stable.  No significant overnight event.  Still complains of back pain and body achiness. Patient's pain is general and local, 3-4/10, intermittent and does not radiate to other location, sharp and with some  tingling. Can be controlled by pain meds.   Tolerated procedure yesterday.  No fever tonight.  3/18.  Patient has been stable and pleasant.  Patient blood sugar still not controlled.  Patient wants to eat regular food however due to diabetes and not controlled blood sugar patient's diet remain to be diabetic.  Complains of general body achiness. Patient's pain is general, 2 to/10, intermittent and does not radiate to other location, sharp and with some tingling. Can be controlled by pain meds.   3/19.  Patient has been stable overnight no significant overnight event.  Cytology resolved negative.  Patient blood sugar better controlled this morning but still high during the day.  We will continue current insulin dosage. Patient otherwise denies fever, chills, nausea, vomiting, adb pain, SOB, CP, headache, constipation, diarrhea, cough, or sputum.  3/20.  Patient has been stable overnight.  Blood sugar better controlled today.  Still hyperglycemic pre-meal.  We will increase short acting insulin. Patient's pain is general 2-3/10, intermittent and does not radiate to other location, sharp and with some tingling. Can be controlled by pain meds.   3/21.  Patient tolerated insulin.  Patient's glucose better controlled today compared to yesterday.  Overall remained stable.  Awaiting for placement.  Continue current insulin dosage.  3/22.  Patient is pleasant and overall stable.  Blood sugar still not controlled but better.  Increase Lantus today. Patient's pain is tgeneral 2-3/10, intermittent and does not radiate to other location, sharp and with some tingling. Can be controlled by pain meds.   3/23.  Patient is pleasant.  No adverse overnight event.  Pending placement Patient otherwise denies fever, chills, nausea, vomiting, adb pain, SOB, CP, headache, constipation, diarrhea, cough, or sputum.      Consultants/Specialty  Dr. Lorenzo - [facility]    Code Status  DNR/DNI    Disposition  TBD - pending placement / medical  stability    Review of Systems  Review of Systems   Constitutional: Positive for malaise/fatigue. Negative for chills, diaphoresis, fever and weight loss.   HENT: Negative for congestion, ear pain, hearing loss, sore throat and tinnitus.    Eyes: Negative for blurred vision, double vision, photophobia and pain.   Respiratory: Negative for cough, hemoptysis, sputum production, shortness of breath and wheezing.    Cardiovascular: Negative for chest pain, orthopnea, claudication and leg swelling.   Gastrointestinal: Negative for abdominal pain (Resolved), constipation and nausea. Vomiting: Appetite improved.   Genitourinary: Negative for dysuria, frequency, hematuria and urgency.   Musculoskeletal: Negative for back pain, falls and neck pain.   Skin: Negative for itching and rash.   Neurological: Positive for weakness. Negative for tingling, tremors, speech change, focal weakness and headaches.   Psychiatric/Behavioral: Negative for hallucinations and suicidal ideas.   All other systems reviewed and are negative.       Physical Exam  Temp:  [36.5 °C (97.7 °F)-36.9 °C (98.4 °F)] 36.5 °C (97.7 °F)  Pulse:  [] 80  Resp:  [18] 18  BP: (111-135)/(62-76) 111/62  SpO2:  [96 %] 96 %    Physical Exam  Vitals signs and nursing note reviewed.   Constitutional:       General: He is awake. He is not in acute distress.     Appearance: Normal appearance. He is underweight. He is not ill-appearing or toxic-appearing.   HENT:      Head: Normocephalic and atraumatic.      Right Ear: Tympanic membrane, ear canal and external ear normal.      Left Ear: Tympanic membrane, ear canal and external ear normal.      Nose: Nose normal. No congestion or rhinorrhea.      Mouth/Throat:      Mouth: Mucous membranes are moist.      Pharynx: Oropharynx is clear. No oropharyngeal exudate.   Eyes:      General:         Right eye: No discharge.         Left eye: No discharge.      Extraocular Movements: Extraocular movements intact.       Conjunctiva/sclera: Conjunctivae normal.      Pupils: Pupils are equal, round, and reactive to light.   Neck:      Musculoskeletal: Normal range of motion and neck supple. No neck rigidity.      Vascular: No carotid bruit.   Cardiovascular:      Rate and Rhythm: Normal rate and regular rhythm.      Pulses: Normal pulses.      Heart sounds: Normal heart sounds. No murmur. No friction rub. No gallop.    Pulmonary:      Effort: Pulmonary effort is normal. No respiratory distress.      Breath sounds: Normal breath sounds. No stridor. No wheezing, rhonchi or rales.   Chest:      Chest wall: No tenderness.   Abdominal:      General: Abdomen is flat. Bowel sounds are normal. There is no distension.      Palpations: Abdomen is soft. There is no mass.      Tenderness: There is no abdominal tenderness. There is no guarding or rebound.      Hernia: No hernia is present.   Musculoskeletal: Normal range of motion.         General: No swelling or tenderness.      Comments: Very thin extremities   Lymphadenopathy:      Cervical: No cervical adenopathy.   Skin:     General: Skin is warm and dry.      Capillary Refill: Capillary refill takes more than 3 seconds.      Coloration: Skin is not jaundiced or pale.   Neurological:      General: No focal deficit present.      Mental Status: He is alert and oriented to person, place, and time. Mental status is at baseline.      Cranial Nerves: No cranial nerve deficit.      Motor: No weakness.   Psychiatric:         Mood and Affect: Mood normal.         Behavior: Behavior normal.      Comments: Very pleasant mood         Fluids    Intake/Output Summary (Last 24 hours) at 3/23/2020 1144  Last data filed at 3/23/2020 1100  Gross per 24 hour   Intake 600 ml   Output 2650 ml   Net -2050 ml       Laboratory  Recent Labs     03/21/20  0413 03/22/20  0657   WBC 17.0* 18.2*   RBC 3.90* 4.35*   HEMOGLOBIN 11.8* 13.1*   HEMATOCRIT 37.7* 41.2*   MCV 96.7 94.7   MCH 30.3 30.1   MCHC 31.3* 31.8*   RDW  79.4* 75.3*   PLATELETCT 340 333   MPV 10.7 10.9     Recent Labs     03/21/20  0413 03/22/20  0657   SODIUM 132* 131*   POTASSIUM 4.3 4.4   CHLORIDE 100 96   CO2 20 22   GLUCOSE 203* 257*   BUN 20 20   CREATININE 0.44* 0.55   CALCIUM 8.4 9.0                   Imaging  DX-PORTABLE FLUOROSCOPY < 1 HOUR   Final Result      Intraoperative fluoroscopy spot images as described above.      [id number]   Final Result      1.  Interval increase in common bile duct dilatation and intrahepatic biliary ductal dilatation since the prior examination. Filling defects in the common bile duct just before the stricture at the level of the pancreatic head have developed since the    prior exam. Differential diagnosis includes sludge as well as common duct stones.      2.  Pancreatic ductal dilatation is again identified which is slightly less than on the prior exam. Debris within the pancreatic duct is now present that appears similar to the debris within the common bile duct.      3.  Enlarged pancreatic head containing small cystic structures is again identified. The 2 larger cysts in the posterior pancreatic head have nearly completely resolved. Enlargement of the pancreatic head could be related to pancreatitis. Neoplasm is    also possible.      4.  Bright T2 signal and enlargement of the right adrenal gland is again noted.      5.  Sludge again noted in the gallbladder.      6.  Small right pleural effusion is identified.      DX-CHEST-PORTABLE (1 VIEW)   Final Result      1.  Cardiomegaly with bilateral interstitial opacities.      2.  Small bilateral pleural effusions.         DX-CHEST-PORTABLE (1 VIEW)   Final Result         1. No significant interval change.      DX-CHEST-PORTABLE (1 VIEW)   Final Result      New subpulmonic effusions, bibasilar atelectasis and edema      DX-PORTABLE FLUOROSCOPY < 1 HOUR   Final Result      Portable fluoroscopy utilized for 1.1 minute.         INTERPRETING LOCATION: [address] [place] [address],  [id number]      [id number] SPHINCTEROTOMY   Final Result      Intraoperative fluoroscopy spot images as described above.      CT-ABDOMEN WITH & W/O   Final Result      1.  No significant change since CT abdomen and pelvis obtained 2/16/2020      2.  Findings consistent with high-grade biliary obstruction with dilation of the common bile duct measuring 17 mm, dilated gallbladder, dilated pancreatic duct with change in caliber of the pancreatic duct at the level pancreatic head      3.  Findings could be either malignancy the pancreatic head versus scarring from chronic pancreatitis.      4.  Sequela of chronic pancreatitis      5.  Large right medial chest rim-enhancing loculated fluid collection probably within the pleural space measuring 10.5 x 3.1 centimeter transversely and 7 cm craniocaudal      6.  Rim-enhancing fluid collection in the hepatorenal space measuring 11.2 cm craniocaudal and 2-3 cm in thickness.      7.  The rim-enhancing fluid collection are suspicious for infection/abscess      8.  Bilateral atelectasis and small-moderate-sized pleural effusion after      9.  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  Biliary dilatation without evidence of high-grade biliary obstruction.   2.  Gallbladder visualization after morphine administration. No acute cholecystitis.      CT-ABDOMEN-PELVIS WITH   Final Result      1.  New rim enhancing fluid collection tracks along the right heart margin, azygoesophageal recess and possibly communicates with Morison's pouch tracking along the right aspect of the inferior vena cava. This is nonspecific and could indicate abscess,    dissecting pseudocyst, mucinous cystic neoplasm related fluid      2.  New extra and intrahepatic biliary ductal dilatation with 6 cm dilatation of the gallbladder also seen. This indicates distal common bile duct/ampullary level  obstruction. Stricture, mass effect from adjacent pancreas calcifications or small    ampullary mass are differential possibilities      3.  Decreased small pleural effusions. Some loculation on the right is possible      4.  Slightly decreased diffuse pancreatic ductal dilatation now measuring 11 mm. Evidence of chronic pancreatitis. New 3 cm cystic structure anterior to the pancreas could be a pseudocyst. Abscess or necrotic/cystic neoplasm are in the differential.      5.  Multiple chronic findings including status post aorto bifemoral bypass with approaching 70% stenosis of the left superficial femoral artery, nonobstructive right 3 mm nephrolithiasis, large volume rectal vault stool, dilated bladder which could    indicate outlet obstruction or neurogenic bladder, splenosis      [id number]/O   Final Result      1.  Severe dilatation of the intrahepatic biliary ducts, common bile duct and pancreatic duct. The distal common bile duct and the proximal pancreatic duct is not visualized at the pancreatic head. The pancreatic head appears to be diffusely enlarged and    demonstrates multiple small cysts. These findings are concerning for pancreatic neoplasm such as intraductal papillary mucinous cystoadenoma. The other differential diagnosis includes periampullary carcinoma. MR examination of the abdomen with contrast    is recommended for further evaluation.   2.  Mixed signal intensity in the right perinephric space likely represents perinephric hematoma.   3.  Large hiatal hernia.   4.  Diffuse gastric wall thickening.      US-RUQ   Final Result      1.  Hepatomegaly.   2.  Hepatic steatosis.   3.  Gallbladder sludge. Mild intrahepatic ductal dilation. Dilation of the common duct up to 13 mm. No definite distal obstructing etiology is identified. MRCP could be obtained for further evaluation if indicated.   4.  Trace ascites.   5.  Pancreatic calcifications likely sequela of chronic pancreatitis.       DX-CHEST-PORTABLE (1 VIEW)   Final Result      Cardiomegaly.           Assessment/Plan      Type 2 diabetes mellitus with hyperglycemia, with long-term current use of insulin (HCC)- (present on admission)  Assessment & Plan  Home dose is: Lantus 12 units at night and regular insulin 10 units 3 times daily.  Has had labile sugars due to variable PO intake requiring insulin adjustments and infection  Group home does not want sliding scale.  Sugars are not at goal, average 150-200  Increase Lantus to 23 units and insulin short acting 13 units 3 times a day, currently tolerated  Blood sugar still not controlled but better.  Increase Lantus today.   Gradually titrate up insulin and avoid hypoglycemic event, patient's blood sugar better controlled this morning but still elevated during the day, will continue current dosage for another day and adjusted accordingly.  Slightly better.  Continue monitor blood sugar, to avoid hypoglycemic event    * Cholangitis  Assessment & Plan  Biliary obstruction suggested on imaging, GI consulted and they performed an ERCP on 2/24 with sphincterotomy  Swelling was too significant to place ampullary duct stent.  He improved without stent placement therefore this was deferred, but Alk P and WBC are rising again  Endoscopy done showing pancreatic calcifications but no stones, no area to biopsy  Repeat MRCP shows worsening dilation of the common bile duct favoring persistent sludge, stricture or stone  Repeat ERCP/stent/EUS 3/17.    Cytology negative.  Follow-up as outpatient with GI  Continue monitor, GI continue to follow, stent need to be changed in 3 months.  Monitor, ALK trending down    Pancreatic mass  Assessment & Plan  MRI with concern for pancreatic malignancy  ERCP with no evidence of malignancy, only calcifications of pancreas present   is elevated but less than 1000 so cannot rule in cancer, likely due chronic pancreatitis  Repeat CT showed chronic pancreatitis and  loculated chest effusion    At this moment GI does not believe patient has significant evidence for periodic cancer  Due to rising WBC and alk phos, repeat MRCP was done and shows worsening obstruction  Repeat ERCP 3/17 status post stent  Cytology negative for malignancy, GI signed off      Normocytic anemia- (present on admission)  Assessment & Plan  Stable, no e/o blood loss    Cognitive decline- (present on admission)  Assessment & Plan  Patient has had chronic cognitive decline.  His sister is his financial decision-maker however is considering guardianship for medical decisions  Mental status at his baseline  He was seen by Psych for decisional capacity and at this time does NOT have capacity for medical decisions  Need group home    Hypomagnesemia  Assessment & Plan  Replaced  Repeat in AM    Sepsis ([facility])  Assessment & Plan  This was Sepsis Present on admission, improved but WBC rising again.  Source: Ascending cholangitis  Abdomen is still soft, tolerating diet  Currently, he has finished antibiotics  Repeat MRCP showed increased CBD dilation from previously seen favoring persistent obstruction/stone or sludge  Repeat ERCP with sten placed    Currently resolved and off abx        Acute respiratory failure with hypoxia ([facility])  Assessment & Plan  Due to loculated effusion  Completed abx  Currently resolved      Recurrent major depressive disorder, in full remission ([facility])  Assessment & Plan  .    Severe protein-calorie malnutrition (HCC)- (present on admission)  Assessment & Plan  Encourage p.o. intake  No pancreatic mass identified    Hyponatremia  Assessment & Plan  Due to hyperglycemia and +/- infection?    Chronic pancreatitis (HCC)- (present on admission)  Assessment & Plan  Continue pancreatic enzymes with meals  Celiac nerve block for pain control, per GI  He is tolerating a diet    Dyslipidemia- (present on admission)  Assessment & Plan  Continue statin       VTE prophylaxis: SCDs    I have seen and  examined patient on 3/23/2020. I have reviewed vitals, new labs and imaging. I have discussed POC with RN. There are no changes from (3/22/2020) except for what is mentioned above.         Current Facility-Administered Medications:   •  insulin glargine (LANTUS) injection 23 Units, 23 Units, Subcutaneous, Q EVENING, Ruma Christopher M.D., 23 Units at 03/22/20 1804  •  insulin regular (HUMULIN R) injection 13 Units, 13 Units, Subcutaneous, TID AC, Ruma Christopher M.D., 13 Units at 03/23/20 1113  •  NOTIFY MD and PharmD, , , Once **AND** glucose 4 g chewable tablet 16 g, 16 g, Oral, Q15 MIN PRN **AND** DEXTROSE 10% BOLUS 250 mL, 250 mL, Intravenous, Q15 MIN PRN, Raquel Encinas M.D.  •  lactated ringers infusion, 1,000 mL, Intravenous, Intra-Op Once PRN, Carlos Alberto Ansari M.D.  •  furosemide (LASIX) tablet 40 mg, 40 mg, Oral, Q DAY, Ruma Christopher M.D., 40 mg at 03/23/20 0609  •  menthol (HALLS) lozenge 1 Lozenge, 1 Lozenge, Oral, Q2HRS PRN, Betsey Gentile M.D., 1 Lozenge at 03/05/20 1119  •  cyclobenzaprine (FLEXERIL) tablet 10 mg, 10 mg, Oral, TID PRN, Betsey Gentile M.D., 10 mg at 03/23/20 0614  •  atorvastatin (LIPITOR) tablet 40 mg, 40 mg, Oral, Q EVENING, Grace Patel D.O., 40 mg at 03/22/20 1720  •  omeprazole (PRILOSEC) capsule 20 mg, 20 mg, Oral, BID, Grace Patel D.O., 20 mg at 03/23/20 0610  •  pancrelipase (Lip-Prot-Amyl) (CREON 65063) 11295-86238 units capsule 48,000 Units, 2 Cap, Oral, DAILY, CHIARA Holguin.O., 48,000 Units at 03/23/20 0610  •  tamsulosin (FLOMAX) capsule 0.4 mg, 0.4 mg, Oral, DAILY, CHIARA Holguin.O., 0.4 mg at 03/23/20 0610  •  thiamine tablet 100 mg, 100 mg, Oral, DAILY, YULI HolguinO., 100 mg at 03/23/20 0610  •  senna-docusate (PERICOLACE or SENOKOT S) 8.6-50 MG per tablet 2 Tab, 2 Tab, Oral, BID, 2 Tab at 03/15/20 0522 **AND** polyethylene glycol/lytes (MIRALAX) PACKET 1 Packet, 1 Packet, Oral, QDAY PRN **AND** magnesium hydroxide (MILK OF MAGNESIA) suspension 30 mL,  30 mL, Oral, QDAY PRN **AND** bisacodyl (DULCOLAX) suppository 10 mg, 10 mg, Rectal, QDAY PRN, Grace Patel D.O.  •  acetaminophen (TYLENOL) tablet 650 mg, 650 mg, Oral, Q6HRS PRN, Grace Patel D.O., 650 mg at 03/23/20 0614  •  ondansetron (ZOFRAN) syringe/vial injection 4 mg, 4 mg, Intravenous, Q4HRS PRN, YULI HolguinO.  •  ondansetron (ZOFRAN ODT) dispertab 4 mg, 4 mg, Oral, Q4HRS PRN, YULI HolguinO.  •  promethazine (PHENERGAN) tablet 12.5-25 mg, 12.5-25 mg, Oral, Q4HRS PRN, YULI HolguinO.  •  prochlorperazine (COMPAZINE) injection 5-10 mg, 5-10 mg, Intravenous, Q4HRS PRN, YULI HolguinO.  •  HYDROmorphone pf (DILAUDID) injection 0.5 mg, 0.5 mg, Intravenous, Q2HRS PRN, Raquel Encinas M.D., 0.5 mg at 03/19/20 2055

## 2020-03-23 NOTE — PROGRESS NOTES
Call back received from Jeanette - ortiz yu to given PM dose of 13 units insulin (that was held earlier) at this time. Reflected on MAR.

## 2020-03-24 VITALS
HEIGHT: 75 IN | SYSTOLIC BLOOD PRESSURE: 131 MMHG | HEART RATE: 88 BPM | DIASTOLIC BLOOD PRESSURE: 65 MMHG | BODY MASS INDEX: 17.05 KG/M2 | OXYGEN SATURATION: 98 % | RESPIRATION RATE: 18 BRPM | TEMPERATURE: 98 F | WEIGHT: 137.13 LBS

## 2020-03-24 LAB
GLUCOSE BLD-MCNC: 115 MG/DL (ref 65–99)
GLUCOSE BLD-MCNC: 181 MG/DL (ref 65–99)

## 2020-03-24 PROCEDURE — 700102 HCHG RX REV CODE 250 W/ 637 OVERRIDE(OP): Performed by: INTERNAL MEDICINE

## 2020-03-24 PROCEDURE — A9270 NON-COVERED ITEM OR SERVICE: HCPCS | Performed by: INTERNAL MEDICINE

## 2020-03-24 PROCEDURE — 82962 GLUCOSE BLOOD TEST: CPT

## 2020-03-24 RX ORDER — ATORVASTATIN CALCIUM 40 MG/1
40 TABLET, FILM COATED ORAL EVERY EVENING
Qty: 30 TAB | Refills: 1 | Status: SHIPPED | OUTPATIENT
Start: 2020-03-24 | End: 2020-06-11

## 2020-03-24 RX ORDER — TAMSULOSIN HYDROCHLORIDE 0.4 MG/1
0.4 CAPSULE ORAL DAILY
Qty: 30 CAP | Refills: 11 | Status: SHIPPED | OUTPATIENT
Start: 2020-03-24

## 2020-03-24 RX ORDER — OMEPRAZOLE 20 MG/1
20 CAPSULE, DELAYED RELEASE ORAL 2 TIMES DAILY
Qty: 60 CAP | Refills: 8 | Status: SHIPPED | OUTPATIENT
Start: 2020-03-24 | End: 2021-01-15 | Stop reason: SDUPTHER

## 2020-03-24 RX ORDER — INSULIN LISPRO 100 [IU]/ML
12 INJECTION, SOLUTION INTRAVENOUS; SUBCUTANEOUS
Qty: 5 PEN | Refills: 6 | Status: SHIPPED
Start: 2020-03-24 | End: 2020-03-31

## 2020-03-24 RX ORDER — PANCRELIPASE 24000; 76000; 120000 [USP'U]/1; [USP'U]/1; [USP'U]/1
2 CAPSULE, DELAYED RELEASE PELLETS ORAL DAILY
Qty: 60 CAP | Refills: 11 | Status: SHIPPED | OUTPATIENT
Start: 2020-03-24

## 2020-03-24 RX ORDER — ASPIRIN 81 MG/1
TABLET, CHEWABLE ORAL
Qty: 30 TAB | Refills: 11 | Status: SHIPPED | OUTPATIENT
Start: 2020-03-24

## 2020-03-24 RX ORDER — INSULIN DEGLUDEC 200 U/ML
23 INJECTION, SOLUTION SUBCUTANEOUS
Qty: 3 PEN | Refills: 11 | Status: SHIPPED
Start: 2020-03-24 | End: 2020-03-31

## 2020-03-24 RX ORDER — SUCRALFATE 1 G/1
TABLET ORAL
Qty: 60 TAB | Refills: 0 | Status: SHIPPED
Start: 2020-03-24 | End: 2020-05-08

## 2020-03-24 RX ADMIN — INSULIN HUMAN 13 UNITS: 100 INJECTION, SOLUTION PARENTERAL at 11:29

## 2020-03-24 RX ADMIN — TAMSULOSIN HYDROCHLORIDE 0.4 MG: 0.4 CAPSULE ORAL at 06:49

## 2020-03-24 RX ADMIN — Medication 100 MG: at 06:49

## 2020-03-24 RX ADMIN — ACETAMINOPHEN 650 MG: 325 TABLET, FILM COATED ORAL at 06:52

## 2020-03-24 RX ADMIN — PANCRELIPASE 48000 UNITS: 24000; 76000; 120000 CAPSULE, DELAYED RELEASE PELLETS ORAL at 06:49

## 2020-03-24 RX ADMIN — INSULIN HUMAN 13 UNITS: 100 INJECTION, SOLUTION PARENTERAL at 08:06

## 2020-03-24 RX ADMIN — FUROSEMIDE 40 MG: 40 TABLET ORAL at 06:49

## 2020-03-24 RX ADMIN — OMEPRAZOLE 20 MG: 20 CAPSULE, DELAYED RELEASE ORAL at 06:49

## 2020-03-24 RX ADMIN — CYCLOBENZAPRINE HYDROCHLORIDE 10 MG: 10 TABLET, FILM COATED ORAL at 06:53

## 2020-03-24 NOTE — CARE PLAN
Problem: Infection  Goal: Will remain free from infection  Outcome: PROGRESSING AS EXPECTED     Problem: Venous Thromboembolism (VTW)/Deep Vein Thrombosis (DVT) Prevention:  Goal: Patient will participate in Venous Thrombosis (VTE)/Deep Vein Thrombosis (DVT)Prevention Measures  Outcome: PROGRESSING AS EXPECTED     Problem: Bowel/Gastric:  Goal: Normal bowel function is maintained or improved  Outcome: PROGRESSING AS EXPECTED  Goal: Will not experience complications related to bowel motility  Outcome: PROGRESSING AS EXPECTED     Problem: Knowledge Deficit  Goal: Knowledge of disease process/condition, treatment plan, diagnostic tests, and medications will improve  Outcome: PROGRESSING AS EXPECTED  Goal: Knowledge of the prescribed therapeutic regimen will improve  Outcome: PROGRESSING AS EXPECTED

## 2020-03-24 NOTE — DISCHARGE PLANNING
Received phone call from patients sister, Aniyah. Aniyah requesting H&P, Dc Summary, PT/OT notes and Psych Eval to be faxed to [facility].     Paperwork faxed at [phone] to [phone].

## 2020-03-24 NOTE — PROGRESS NOTES
Patient alert and cooperative this shift.  Discharge planned for today but moved to tomorrow at 1300.  Patient aware.  Blood sugar elevated- insulin given according to orders.  No acute changes or concerns at this time.

## 2020-03-24 NOTE — PROGRESS NOTES
Called [facility] to update them on patient's discharge and timing.  Called Aniyah roberts's POA to update as well.  No concerns or questions at this time.  Patient D/C'd with med van.

## 2020-03-24 NOTE — CARE PLAN
Problem: Infection  Goal: Will remain free from infection  Outcome: PROGRESSING AS EXPECTED  Note: No s/s infection     Problem: Venous Thromboembolism (VTW)/Deep Vein Thrombosis (DVT) Prevention:  Goal: Patient will participate in Venous Thrombosis (VTE)/Deep Vein Thrombosis (DVT)Prevention Measures  Outcome: PROGRESSING AS EXPECTED     Problem: Bowel/Gastric:  Goal: Normal bowel function is maintained or improved  Outcome: PROGRESSING AS EXPECTED  Goal: Will not experience complications related to bowel motility  Outcome: PROGRESSING AS EXPECTED     Problem: Knowledge Deficit  Goal: Knowledge of disease process/condition, treatment plan, diagnostic tests, and medications will improve  Outcome: PROGRESSING AS EXPECTED  Note: Pt aware of POC, denies needs  Goal: Knowledge of the prescribed therapeutic regimen will improve  Outcome: PROGRESSING AS EXPECTED     Problem: Discharge Barriers/Planning  Goal: Patient's continuum of care needs will be met  Outcome: PROGRESSING AS EXPECTED     Problem: Fluid Volume:  Goal: Will maintain balanced intake and output  Outcome: PROGRESSING AS EXPECTED     Problem: Pain Management  Goal: Pain level will decrease to patient's comfort goal  Outcome: PROGRESSING AS EXPECTED     Problem: Psychosocial Needs:  Goal: Level of anxiety will decrease  Outcome: PROGRESSING AS EXPECTED     Problem: Mobility  Goal: Risk for activity intolerance will decrease  Outcome: PROGRESSING AS EXPECTED     Problem: Respiratory:  Goal: Respiratory status will improve  Outcome: PROGRESSING AS EXPECTED

## 2020-03-24 NOTE — DISCHARGE INSTRUCTIONS
Discharge Instructions    Discharged to group home by medical transportation with escort. Discharged via wheelchair, hospital escort: Yes.  Special equipment needed: Not Applicable    Be sure to schedule a follow-up appointment with your primary care doctor or any specialists as instructed.     Discharge Plan:   Smoking Cessation Offered: Patient Refused  Influenza Vaccine Indication: Not indicated: Previously immunized this influenza season and > 8 years of age    I understand that a diet low in cholesterol, fat, and sodium is recommended for good health. Unless I have been given specific instructions below for another diet, I accept this instruction as my diet prescription.   Other diet: regular diet    Special Instructions: None    · Is patient discharged on Warfarin / Coumadin?   No     Depression / Suicide Risk    As you are discharged from this [facility] facility, it is important to learn how to keep safe from harming yourself.    Recognize the warning signs:  · Abrupt changes in personality, positive or negative- including increase in energy   · Giving away possessions  · Change in eating patterns- significant weight changes-  positive or negative  · Change in sleeping patterns- unable to sleep or sleeping all the time   · Unwillingness or inability to communicate  · Depression  · Unusual sadness, discouragement and loneliness  · Talk of wanting to die  · Neglect of personal appearance   · Rebelliousness- reckless behavior  · Withdrawal from people/activities they love  · Confusion- inability to concentrate     If you or a loved one observes any of these behaviors or has concerns about self-harm, here's what you can do:  · Talk about it- your feelings and reasons for harming yourself  · Remove any means that you might use to hurt yourself (examples: pills, rope, extension cords, firearm)  · Get professional help from the community (Mental Health, Substance Abuse, psychological counseling)  · Do not be  alone:Call your Safe Contact- someone whom you trust who will be there for you.  · Call your local CRISIS HOTLINE 822-5714 or 290-718-2627  · Call your local Children's Mobile Crisis Response Team [place] (094) 669-4690 or www.PaySimple  · Call the toll free National Suicide Prevention Hotlines   · National Suicide Prevention Lifeline 961-941-IFDX (6029)  · National Memoright Line Network 800-SUICIDE (902-6384)

## 2020-03-24 NOTE — DISCHARGE PLANNING
Received Transport Form @ 0800  Spoke to Matheus @ [facility] ([facility])     Transport is scheduled for 3/24 @1300 going to [facility] @ [address] [place], [place].       Birdie BENSON CM notified of transport time.

## 2020-03-24 NOTE — PROGRESS NOTES
"Report received from VIRGILIO Kay RN. Pt awake, alert, appropriate and pleasant. VSS. /75   Pulse 95   Temp 36.7 °C (98 °F) (Oral)   Resp 18   Ht 1.905 m (6' 3\")   Wt 62.2 kg (137 lb 2 oz)   SpO2 95%   BMI 17.14 kg/m²  Call light in reach. Pt denies needs at this time. Will continue to monitor.   "

## 2020-03-31 ENCOUNTER — OFFICE VISIT (OUTPATIENT)
Dept: ENDOCRINOLOGY | Facility: MEDICAL CENTER | Age: 60
End: 2020-03-31
Payer: COMMERCIAL

## 2020-03-31 ENCOUNTER — APPOINTMENT (OUTPATIENT)
Dept: MEDICAL GROUP | Facility: MEDICAL CENTER | Age: 60
End: 2020-03-31
Payer: COMMERCIAL

## 2020-03-31 ENCOUNTER — OFFICE VISIT (OUTPATIENT)
Dept: MEDICAL GROUP | Facility: MEDICAL CENTER | Age: 60
End: 2020-03-31
Payer: COMMERCIAL

## 2020-03-31 VITALS
BODY MASS INDEX: 17.14 KG/M2 | HEART RATE: 100 BPM | HEIGHT: 75 IN | RESPIRATION RATE: 16 BRPM | SYSTOLIC BLOOD PRESSURE: 118 MMHG | DIASTOLIC BLOOD PRESSURE: 72 MMHG | OXYGEN SATURATION: 98 %

## 2020-03-31 DIAGNOSIS — E11.65 TYPE 2 DIABETES MELLITUS WITH HYPERGLYCEMIA, WITH LONG-TERM CURRENT USE OF INSULIN (HCC): ICD-10-CM

## 2020-03-31 DIAGNOSIS — E11.69 HYPERLIPIDEMIA ASSOCIATED WITH TYPE 2 DIABETES MELLITUS (HCC): ICD-10-CM

## 2020-03-31 DIAGNOSIS — E55.9 VITAMIN D DEFICIENCY: ICD-10-CM

## 2020-03-31 DIAGNOSIS — E16.2 HYPOGLYCEMIA: ICD-10-CM

## 2020-03-31 DIAGNOSIS — E43 SEVERE PROTEIN-CALORIE MALNUTRITION (HCC): ICD-10-CM

## 2020-03-31 DIAGNOSIS — Z79.4 TYPE 2 DIABETES MELLITUS WITH HYPERGLYCEMIA, WITH LONG-TERM CURRENT USE OF INSULIN (HCC): ICD-10-CM

## 2020-03-31 DIAGNOSIS — K86.1 IDIOPATHIC CHRONIC PANCREATITIS (HCC): ICD-10-CM

## 2020-03-31 DIAGNOSIS — Z79.4 LONG-TERM INSULIN USE (HCC): ICD-10-CM

## 2020-03-31 DIAGNOSIS — R41.89 COGNITIVE DECLINE: ICD-10-CM

## 2020-03-31 DIAGNOSIS — E78.5 HYPERLIPIDEMIA ASSOCIATED WITH TYPE 2 DIABETES MELLITUS (HCC): ICD-10-CM

## 2020-03-31 DIAGNOSIS — I10 ESSENTIAL HYPERTENSION: ICD-10-CM

## 2020-03-31 PROBLEM — R41.3 MEMORY LOSS: Status: RESOLVED | Noted: 2018-10-04 | Resolved: 2020-03-31

## 2020-03-31 PROCEDURE — 99214 OFFICE O/P EST MOD 30 MIN: CPT | Mod: 95,CR | Performed by: INTERNAL MEDICINE

## 2020-03-31 PROCEDURE — 99214 OFFICE O/P EST MOD 30 MIN: CPT | Performed by: NURSE PRACTITIONER

## 2020-03-31 PROCEDURE — 95251 CONT GLUC MNTR ANALYSIS I&R: CPT | Performed by: INTERNAL MEDICINE

## 2020-03-31 RX ORDER — LANOLIN ALCOHOL/MO/W.PET/CERES
100 CREAM (GRAM) TOPICAL DAILY
Qty: 30 TAB | Refills: 1 | Status: SHIPPED | OUTPATIENT
Start: 2020-03-31 | End: 2020-05-11

## 2020-03-31 RX ORDER — PEDI NUTRITION,MILK BASED,IRON 0.03 G-0.6
1 LIQUID (ML) ORAL PRN
Qty: 6 BOTTLE | Refills: 6 | Status: SHIPPED
Start: 2020-03-31 | End: 2020-11-12 | Stop reason: SDUPTHER

## 2020-03-31 RX ORDER — INSULIN LISPRO 100 [IU]/ML
10 INJECTION, SOLUTION INTRAVENOUS; SUBCUTANEOUS
Qty: 5 PEN | Refills: 3 | Status: ON HOLD | OUTPATIENT
Start: 2020-03-31 | End: 2020-07-24

## 2020-03-31 RX ORDER — PEDI NUTRITION,MILK BASED,IRON 0.03 G-0.6
1 LIQUID (ML) ORAL PRN
Qty: 6 BOTTLE | Refills: 6 | Status: SHIPPED | OUTPATIENT
Start: 2020-03-31 | End: 2020-03-31 | Stop reason: SDUPTHER

## 2020-03-31 RX ORDER — INSULIN DEGLUDEC 100 U/ML
20 INJECTION, SOLUTION SUBCUTANEOUS
Qty: 5 PEN | Refills: 3 | Status: SHIPPED | OUTPATIENT
Start: 2020-03-31 | End: 2020-08-27

## 2020-03-31 RX ORDER — CHOLECALCIFEROL (VITAMIN D3) 50 MCG
2000 TABLET ORAL DAILY
Qty: 30 TAB | Refills: 11 | Status: SHIPPED | OUTPATIENT
Start: 2020-03-31 | End: 2020-12-03 | Stop reason: SDUPTHER

## 2020-03-31 NOTE — PROGRESS NOTES
CHIEF COMPLAINT: Patient is here for follow up of Type 2 Diabetes Mellitus.  Patient was presented for a telehealth consultation via secure and encrypted videoconferencing technology due to the CV pandemic.   This encounter was conducted via Zoom . Verbal consent was obtained. Patient's identity was verified.    HPI:     Pawel Tatum is a 59 y.o. male with Type 2 Diabetes Mellitus here for follow up.    Labs from 12/25/2019 show HbA1c is fair at 7.0%    Patient was previously followed by the PA and this is my first time seeing him today.    He has a history of Chronic idiopathic pancreatitis, uncontrolled diabetes at baseline with hx of neuropathy, foot osteomyelitis in the past and was recently admitted for ascending cholangitis in [facility] treated with ERCP.    He has poor cognitive function and video conference was completed today with  present.  He used to be in a group home and is now in a respite home      Despite of his history of pancreatitis he was placed on Ozempic by the PA who previously took care of him.    Prior to the initiation of the conference the  told me that the patient had a severe hypoglycemic event leading to a seizure but EMS was called.  Patient was not admitted    The direct cause of his hypoglycemic event was because he took Humalog 45 mins prior to a meal    He is on Lantus 30u daily and Humalog 14 u QAC from his recent hospital discharge.    We downloaded his DEXCOM G6 CGM remotely and saw multiple hypoglycemic events    He has hyperlipidemia and is on Atorvastatin and is overdue for labs.  He is tolerating this well.      BG Diary:03/31/20  see dexcom download    Weight has decreased 5-10 pounds over last 4 weeks    Diabetes Complications   Retinopathy: No known retinopathy.  Last eye exam: patient is overdue for an eye exam  Neuropathy: Denies paresthesias or numbness in hands or feet. Denies any foot wounds.  Exercise: Minimal.  Diet: Fair.  Patient's  medications, allergies, and social histories were reviewed and updated as appropriate.    ROS:     CONS:     No fever, no chills   EYES:     No diplopia, no blurry vision   CV:           No chest pain, no palpitations   PULM:     No SOB, no cough, no hemoptysis.   GI:            No nausea, no vomiting, no diarrhea, no constipation   ENDO:     No polyuria, no polydipsia, no heat intolerance, no cold intolerance       Past Medical History:  Problem List:  2020-03: Acute respiratory failure with hypoxia ([facility])  2020-02: Loculated pleural effusion  2020-02: Pancreatic mass  2020-02: Cholangitis  2020-01: Cellulitis of right upper extremity  2019-12: Helicobacter pylori (H. pylori) infection  2019-12: Cognitive decline  2019-12: Severe protein-calorie malnutrition ([facility])  2019-12: Normocytic anemia  2019-12: Melanotic stools  2019-12: Uncontrolled type 1 diabetes mellitus with hyperglycemia   ([facility])  2019-12: Metabolic acidosis  2019-12: Hyponatremia  2019-08: H/O Bell's palsy  2019-01: Leukocytosis  2019-01: Peripheral neuropathy  2019-01: Subacute osteomyelitis of right foot ([facility])  2018-12: Leukocytosis  2018-11: Type 2 diabetes mellitus with hyperglycemia, with long-term   current use of insulin ([facility])  2018-11: DINESH (acute kidney injury) ([facility])  2018-11: Hypomagnesemia  2018-11: Hypokalemia  2018-11: Hypophosphatemia  2018-11: Chronic pancreatitis ([facility])  2018-11: Diabetic ketoacidosis ([facility])  2018-11: Hypotension  2018-10: Dyslipidemia  2018-10: Recurrent major depressive disorder, in full remission ([facility])  2018-10: Memory loss  2018-08: History of recent UGI bleed  2018-08: Hyperlipidemia  2018-08: UTI (urinary tract infection)  2018-08: Sepsis ([facility])  2018-08: Tobacco abuse  2018-08: Choledocholithiasis  2018-08: Uncontrolled type 2 diabetes mellitus with hyperosmolarity   without coma ([facility])  2018-08: Diabetic peripheral angiopathy ([facility])  2018-08: Essential hypertension  2018-08: Vitamin D deficiency      Past Surgical  History:  Past Surgical History:   Procedure Laterality Date   • PB ERCP,DIAGNOSTIC  3/16/2020    Procedure: ERCP, DIAGNOSTIC;  Surgeon: Carlos Alberto Ansari M.D.;  Location: [facility];  Service: Gastroenterology   • PB ERCP,DIAGNOSTIC  2/24/2020    Procedure: ERCP (ENDOSCOPIC RETROGRADE CHOLANGIOPANCREATOGRAPHY);  Surgeon: Hemant Lorenzo M.D.;  Location: [facility];  Service: Gastroenterology   • PB ERCP,DIAGNOSTIC  2/18/2020    Procedure: ERCP (ENDOSCOPIC RETROGRADE CHOLANGIOPANCREATOGRAPHY);  Surgeon: Carlos Alberto Ansari M.D.;  Location: [facility];  Service: Gastroenterology   • PB ENDOSCOPIC US EXAM, ESOPH  2/18/2020    Procedure: EGD, WITH ENDOSCOPIC US;  Surgeon: Carlos Alberto Ansari M.D.;  Location: [facility];  Service: Gastroenterology   • PB UPPER GI ENDOSCOPY,BIOPSY N/A 12/27/2019    Procedure: GASTROSCOPY, WITH BIOPSY;  Surgeon: Hector Villarreal M.D.;  Location: [facility];  Service: Gastroenterology   • TOE AMPUTATION Right 1/12/2019    Procedure: TOE AMPUTATION;  Surgeon: Nicanor Reddy M.D.;  Location: [facility];  Service: Orthopedics   • GASTROSCOPY-ENDO N/A 8/25/2018    Procedure: GASTROSCOPY-ENDO;  Surgeon: Jaswant Frye M.D.;  Location: [facility];  Service: Gastroenterology   • OTHER ABDOMINAL SURGERY          Allergies:  Patient has no known allergies.     Social History:  Social History     Tobacco Use   • Smoking status: Current Every Day Smoker     Packs/day: 0.25     Years: 15.00     Pack years: 3.75   • Smokeless tobacco: Never Used   • Tobacco comment: Trying to quit   Substance Use Topics   • Alcohol use: No   • Drug use: No        Family History:   family history includes No Known Problems in his father and mother.      PHYSICAL EXAM:   OBJECTIVE:  Vital signs: There were no vitals taken for this visit.  GENERAL: Well-developed, undernourished in no apparent distress.    EYE:  No ocular asymmetry, PERRLA  HENT: Pink, moist mucous membranes.    NECK: No thyromegaly.   CARDIOVASCULAR: Normal precordial impulse seen with normal carotid pulsation  LUNGS: Symmetrical chest expansion with normal phonation of voice   ABDOMEN: Non obese abdomen with no visible organomegaly  EXTREMITIES: No clubbing, cyanosis, or edema.   NEUROLOGICAL: No gross focal motor abnormalities   LYMPH: No cervical adenopathy seen.   SKIN: No rashes, lesions.       Labs:  Lab Results   Component Value Date/Time    HBA1C 7.0 (H) 12/25/2019 03:31 AM        Lab Results   Component Value Date/Time    WBC 18.2 (H) 03/22/2020 06:57 AM    RBC 4.35 (L) 03/22/2020 06:57 AM    HEMOGLOBIN 13.1 (L) 03/22/2020 06:57 AM    MCV 94.7 03/22/2020 06:57 AM    MCH 30.1 03/22/2020 06:57 AM    MCHC 31.8 (L) 03/22/2020 06:57 AM    RDW 75.3 (H) 03/22/2020 06:57 AM    MPV 10.9 03/22/2020 06:57 AM       Lab Results   Component Value Date/Time    SODIUM 131 (L) 03/22/2020 06:57 AM    POTASSIUM 4.4 03/22/2020 06:57 AM    CHLORIDE 96 03/22/2020 06:57 AM    CO2 22 03/22/2020 06:57 AM    ANION 13.0 03/22/2020 06:57 AM    GLUCOSE 257 (H) 03/22/2020 06:57 AM    BUN 20 03/22/2020 06:57 AM    CREATININE 0.55 03/22/2020 06:57 AM    CALCIUM 9.0 03/22/2020 06:57 AM    ASTSGOT 43 03/22/2020 06:57 AM    ALTSGPT 24 03/22/2020 06:57 AM    TBILIRUBIN 0.5 03/22/2020 06:57 AM    ALBUMIN 2.7 (L) 03/22/2020 06:57 AM    TOTPROTEIN 6.5 03/22/2020 06:57 AM    GLOBULIN 3.8 (H) 03/22/2020 06:57 AM    AGRATIO 0.7 03/22/2020 06:57 AM       Lab Results   Component Value Date/Time    CHOLSTRLTOT 147 01/07/2019 1415    TRIGLYCERIDE 54 01/07/2019 1415    HDL 86 01/07/2019 1415    LDL 50 01/07/2019 1415       No results found for: MICROALBCALC, MALBCRT, MALBEXCR, AUGTOM84, MICROALBUR, MICRALB, UMICROALBUM, MICROALBTIM     Lab Results   Component Value Date/Time    TSHULTRASEN 1.490 12/25/2019 0331     No results found for: FREEDIR  No results found for: FREET3  No results  found for: THYSTIMIG        ASSESSMENT/PLAN:     1. Type 2 diabetes mellitus with hyperglycemia, with long-term current use of insulin (HCC)  Unstable  He is overdue for labs   I am ordering a CBC, CMP, TSH, A!C this week  DEXCOM CGM was downloaded and reviewed  Reduce Lantus to 20u daily  Reduce Humalog to 10u QAC  Advised patient to take mealtime insulin only when he is going to eat  Will plan for follow up in 4 weeks    2. Hypoglycemia  Unstable  Hypoglycemias was from injecting fast acting insulin 30 to 45 mins prior to meals  Advised patient to stop doing this  See plan above    3. Hyperlipidemia associated with type 2 diabetes mellitus (HCC)  Unstable   Will check lipid panel this week    4. Idiopathic chronic pancreatitis (HCC)  Unstable  I am discontinuing the ozempic prescribed by the PA      5. Long-term insulin use (HCC)  Patient is on long term insulin therapy for his diabetes      Return in about 4 weeks (around 4/28/2020).      Thank you kindly for allowing me to participate in the diabetes care plan for this patient.    Ruddy Fry MD, DAVION, ECNU  03/31/20    CC:   JESSIE ArmandoPMAYO

## 2020-03-31 NOTE — PROGRESS NOTES
Subjective:      Pawel Tatum is a 59 y.o. male who presents with Hospital Follow-up and Requesting Labs (home health, and for his as needed medications)        CC: Patient is here today with his guardian for hospital follow-up after pancreatitis.    HPI       1. Type 2 diabetes mellitus with hyperglycemia, with long-term current use of insulin (HCC)  Patient was going to endocrinology previous to his hospitalization and was sent home on short and long-acting insulin.  He recently was having low blood sugar reactions so had a video conference with the endocrinology physician who appropriately adjusted his insulin downward.  He also has nutritional shakes and glucose tablets to use if needed.  Lab work is been ordered and he will complete this before his next endocrinology visit.    2. Idiopathic chronic pancreatitis (HCC)  Patient went to the hospital on February 14 with complaints of altered mental status and cough and was found to be septic secondary to an a sending cholangitis.  He underwent an ERCP with sphincterotomy to relieve the obstruction but they were unable to place a stent due to edema.  A repeat ERCP was done later done and a stent was placed.  Biopsies came back negative for malignancy.  He has a follow-up appointment soon with GI to possibly remove the stent.    3. Severe protein-calorie malnutrition (HCC)  Patient's BMI is down to 17 with his recent hospitalization illnesses.  However, he states his appetite is now improved since coming to his respite care home.  He does have a supplement shake to use daily.    4. Cognitive decline  Patient has had problems with cognitive decline and was in a group home but currently is in a respite home.  He is currently with his  and she states that he will be switching over to  guardianship services of [place] in the near future.    5. Essential hypertension  Pressure remains good on current medicines    6. Vitamin D deficiency  Patient needs  "refills on his vitamin D and B1.  Past Medical History:   Diagnosis Date   • Diabetes (HCC)    • Hypertension    • Seizure disorder (HCC)     Pt had a seizure with \" unknown illness\" in recent months.      Social History     Socioeconomic History   • Marital status: Single     Spouse name: Not on file   • Number of children: Not on file   • Years of education: Not on file   • Highest education level: Not on file   Occupational History   • Not on file   Social Needs   • Financial resource strain: Not on file   • Food insecurity     Worry: Not on file     Inability: Not on file   • Transportation needs     Medical: Not on file     Non-medical: Not on file   Tobacco Use   • Smoking status: Current Every Day Smoker     Packs/day: 0.25     Years: 15.00     Pack years: 3.75   • Smokeless tobacco: Never Used   • Tobacco comment: Trying to quit   Substance and Sexual Activity   • Alcohol use: No   • Drug use: No   • Sexual activity: Not Currently   Lifestyle   • Physical activity     Days per week: Not on file     Minutes per session: Not on file   • Stress: Not on file   Relationships   • Social connections     Talks on phone: Not on file     Gets together: Not on file     Attends Nondenominational service: Not on file     Active member of club or organization: Not on file     Attends meetings of clubs or organizations: Not on file     Relationship status: Not on file   • Intimate partner violence     Fear of current or ex partner: Not on file     Emotionally abused: Not on file     Physically abused: Not on file     Forced sexual activity: Not on file   Other Topics Concern   • Not on file   Social History Narrative   • Not on file     Current Outpatient Medications   Medication Sig Dispense Refill   • Insulin Degludec (TRESIBA FLEXTOUCH) 100 UNIT/ML Solution Pen-injector Inject 20 Units as instructed every bedtime. 5 PEN 3   • insulin lispro (HUMALOG KWIKPEN) 100 UNIT/ML Solution Pen-injector injection PEN Inject 10 Units as " "instructed 3 times a day before meals. 5 PEN 3   • Nutritional Supplements (GLUCOSE MANAGEMENT) Tab Take 4 Tabs by mouth as needed. Prn blood sugars less than 60 50 Tab 3   • Nutritional Supplements (GLUCERNA 1.0 GER) Liquid Take 1 Each by mouth as needed (hypoglycemia, blood sugars less than 70). 6 Bottle 6   • thiamine (THIAMINE) 100 MG tablet Take 1 Tab by mouth every day. 30 Tab 1   • Cholecalciferol (VITAMIN D) 2000 UNIT Tab Take 1 Tab by mouth every day. 30 Tab 11   • sucralfate (CARAFATE) 1 GM Tab 1 tab 30 minutes prior to meals and before bed x 10D, then BID PRN abd pain 60 Tab 0   • Urine Glucose-Ketones Test Strip 1 Strip by In Vitro route every day. 100 Strip 2   • atorvastatin (LIPITOR) 40 MG Tab Take 1 Tab by mouth every evening. 30 Tab 1   • aspirin (ASA) 81 MG Chew Tab chewable tablet CHEW 1 TABLET BY MOUTH ONCE DAILY. 30 Tab 11   • omeprazole (PRILOSEC) 20 MG delayed-release capsule Take 1 Cap by mouth 2 Times a Day. 60 Cap 8   • pancrelipase, Lip-Prot-Amyl, (CREON) 26078-96882 units Cap DR Particles Take 2 Caps by mouth every day. 60 Cap 11   • tamsulosin (FLOMAX) 0.4 MG capsule Take 1 Cap by mouth every day. 30 Cap 11   • bismuth subsalicylate (PEPTO-BISMOL) 262 MG Chew Tab Take 2 Tabs by mouth every 6 hours. 45 Tab 0   • multivitamin (THERAGRAN) Tab Take 1 Tab by mouth every day. 30 Tab 1   • Cholecalciferol (VITAMIN D) 2000 units Cap Take 2 Caps by mouth every day. 60 Cap 5     No current facility-administered medications for this visit.      Family History   Problem Relation Age of Onset   • No Known Problems Mother    • No Known Problems Father          Review of Systems   Gastrointestinal: Positive for abdominal pain.   Psychiatric/Behavioral: Positive for memory loss.   All other systems reviewed and are negative.         Objective:     /72 (BP Location: Left arm, Patient Position: Sitting, BP Cuff Size: Adult)   Pulse 100   Resp 16   Ht 1.905 m (6' 3\")   SpO2 98%   BMI 17.14 kg/m² "      Physical Exam  Vitals signs and nursing note reviewed.   Constitutional:       General: He is not in acute distress.     Appearance: He is well-developed. He is not diaphoretic.   HENT:      Head: Normocephalic and atraumatic.      Right Ear: External ear normal.      Left Ear: External ear normal.      Nose: Nose normal.   Eyes:      General:         Right eye: No discharge.         Left eye: No discharge.      Conjunctiva/sclera: Conjunctivae normal.   Neck:      Musculoskeletal: Normal range of motion and neck supple.      Thyroid: No thyromegaly.      Trachea: No tracheal deviation.   Cardiovascular:      Rate and Rhythm: Normal rate and regular rhythm.      Heart sounds: Normal heart sounds. No murmur.   Pulmonary:      Effort: Pulmonary effort is normal. No respiratory distress.      Breath sounds: Normal breath sounds. No wheezing or rales.   Lymphadenopathy:      Cervical: No cervical adenopathy.   Skin:     General: Skin is warm and dry.      Findings: No erythema or rash.   Neurological:      Mental Status: He is alert and oriented to person, place, and time.      Coordination: Coordination normal.   Psychiatric:         Behavior: Behavior normal.         Thought Content: Thought content normal.         Judgment: Judgment normal.      Comments: Patient able to answer some questions in the office today but his guardian is providing most of the information regarding his health status and current living situation.                 Assessment/Plan:       1. Type 2 diabetes mellitus with hyperglycemia, with long-term current use of insulin (HCC)  Patient's insulin dosing was decreased by endocrinology today which should help prevent his hypoglycemia which he has been having.  He has a Glucerna shake use as well as  - Nutritional Supplements (GLUCERNA 1.0 GER) Liquid; Take 1 Each by mouth as needed (hypoglycemia, blood sugars less than 70).  Dispense: 6 Bottle; Refill: 6  - REFERRAL TO PODIATRY  - REFERRAL  TO HOME HEALTH    2. Idiopathic chronic pancreatitis (HCC)  Patient appears to be doing well with just minimal pain and no nausea and vomiting.  His GI appointment was in June but his guardian says they have gotten an appointment instead in 2 weeks to review how he is doing and to discuss removal of the stent.  - REFERRAL TO HOME HEALTH    3. Severe protein-calorie malnutrition (HCC)  I have refilled patient's vitamins and reminded him to try to eat 3 meals a day.  - thiamine (THIAMINE) 100 MG tablet; Take 1 Tab by mouth every day.  Dispense: 30 Tab; Refill: 1  - Cholecalciferol (VITAMIN D) 2000 UNIT Tab; Take 1 Tab by mouth every day.  Dispense: 30 Tab; Refill: 11  - REFERRAL TO HOME HEALTH    4. Cognitive decline  Patient's guardian feels that patient would do better with home health, especially with his needing insulin now.  - REFERRAL TO HOME HEALTH    5. Essential hypertension  Blood pressure currently controlled and no changes made    6. Vitamin D deficiency  Vitamin D refilled at current dosing.

## 2020-03-31 NOTE — LETTER
March 31, 2020        Pawel Tatum  [address]  [place] [address]        To whom it may concern:    Please provide patient's Humalog insulin no more than 5-10 minutes before meals to prevent hypoglycemia and provide glucose tabs if patient becomes hypoglycemic with blood glucose below 70.    If you have any questions or concerns, please don't hesitate to call.        Sincerely,        PAULINE Armando.    Electronically Signed

## 2020-04-01 ASSESSMENT — ENCOUNTER SYMPTOMS
ABDOMINAL PAIN: 1
MEMORY LOSS: 1

## 2020-04-08 ENCOUNTER — TELEPHONE (OUTPATIENT)
Dept: MEDICAL GROUP | Facility: MEDICAL CENTER | Age: 60
End: 2020-04-08

## 2020-04-09 ENCOUNTER — HOSPITAL ENCOUNTER (OUTPATIENT)
Facility: MEDICAL CENTER | Age: 60
End: 2020-04-09
Attending: INTERNAL MEDICINE
Payer: COMMERCIAL

## 2020-04-09 LAB
ALBUMIN SERPL BCP-MCNC: 3.7 G/DL (ref 3.2–4.9)
ALBUMIN/GLOB SERPL: 0.9 G/DL
ALP SERPL-CCNC: 423 U/L (ref 30–99)
ALT SERPL-CCNC: 33 U/L (ref 2–50)
ANION GAP SERPL CALC-SCNC: 21 MMOL/L (ref 7–16)
AST SERPL-CCNC: 44 U/L (ref 12–45)
BASOPHILS # BLD AUTO: 0.8 % (ref 0–1.8)
BASOPHILS # BLD: 0.14 K/UL (ref 0–0.12)
BILIRUB SERPL-MCNC: 0.4 MG/DL (ref 0.1–1.5)
BUN SERPL-MCNC: 16 MG/DL (ref 8–22)
CALCIUM SERPL-MCNC: 9.6 MG/DL (ref 8.5–10.5)
CHLORIDE SERPL-SCNC: 100 MMOL/L (ref 96–112)
CO2 SERPL-SCNC: 15 MMOL/L (ref 20–33)
COMMENT 1642: NORMAL
CREAT SERPL-MCNC: 0.56 MG/DL (ref 0.5–1.4)
EOSINOPHIL # BLD AUTO: 0.31 K/UL (ref 0–0.51)
EOSINOPHIL NFR BLD: 1.8 % (ref 0–6.9)
ERYTHROCYTE [DISTWIDTH] IN BLOOD BY AUTOMATED COUNT: 65.4 FL (ref 35.9–50)
FASTING STATUS PATIENT QL REPORTED: NORMAL
GLOBULIN SER CALC-MCNC: 4.1 G/DL (ref 1.9–3.5)
GLUCOSE SERPL-MCNC: 78 MG/DL (ref 65–99)
HCT VFR BLD AUTO: 40.3 % (ref 42–52)
HGB BLD-MCNC: 13.2 G/DL (ref 14–18)
IMM GRANULOCYTES # BLD AUTO: 0.26 K/UL (ref 0–0.11)
IMM GRANULOCYTES NFR BLD AUTO: 1.5 % (ref 0–0.9)
LYMPHOCYTES # BLD AUTO: 4.59 K/UL (ref 1–4.8)
LYMPHOCYTES NFR BLD: 27.3 % (ref 22–41)
MCH RBC QN AUTO: 30.6 PG (ref 27–33)
MCHC RBC AUTO-ENTMCNC: 32.8 G/DL (ref 33.7–35.3)
MCV RBC AUTO: 93.5 FL (ref 81.4–97.8)
MONOCYTES # BLD AUTO: 1.57 K/UL (ref 0–0.85)
MONOCYTES NFR BLD AUTO: 9.3 % (ref 0–13.4)
MORPHOLOGY BLD-IMP: NORMAL
NEUTROPHILS # BLD AUTO: 9.93 K/UL (ref 1.82–7.42)
NEUTROPHILS NFR BLD: 59.3 % (ref 44–72)
NRBC # BLD AUTO: 0.02 K/UL
NRBC BLD-RTO: 0.1 /100 WBC
PLATELET # BLD AUTO: 585 K/UL (ref 164–446)
PMV BLD AUTO: 10.7 FL (ref 9–12.9)
POTASSIUM SERPL-SCNC: 4.9 MMOL/L (ref 3.6–5.5)
PROT SERPL-MCNC: 7.8 G/DL (ref 6–8.2)
RBC # BLD AUTO: 4.31 M/UL (ref 4.7–6.1)
SODIUM SERPL-SCNC: 136 MMOL/L (ref 135–145)
T4 FREE SERPL-MCNC: 1.09 NG/DL (ref 0.53–1.43)
TSH SERPL DL<=0.005 MIU/L-ACNC: 2.28 UIU/ML (ref 0.38–5.33)
WBC # BLD AUTO: 16.8 K/UL (ref 4.8–10.8)

## 2020-04-09 PROCEDURE — 83036 HEMOGLOBIN GLYCOSYLATED A1C: CPT

## 2020-04-09 PROCEDURE — 80053 COMPREHEN METABOLIC PANEL: CPT

## 2020-04-09 PROCEDURE — 84443 ASSAY THYROID STIM HORMONE: CPT

## 2020-04-09 PROCEDURE — 84439 ASSAY OF FREE THYROXINE: CPT

## 2020-04-09 PROCEDURE — 85025 COMPLETE CBC W/AUTO DIFF WBC: CPT

## 2020-04-09 PROCEDURE — 84681 ASSAY OF C-PEPTIDE: CPT

## 2020-04-09 PROCEDURE — 86341 ISLET CELL ANTIBODY: CPT

## 2020-04-10 LAB
EST. AVERAGE GLUCOSE BLD GHB EST-MCNC: 171 MG/DL
HBA1C MFR BLD: 7.6 % (ref 0–5.6)

## 2020-04-11 LAB — C PEPTIDE SERPL-MCNC: <0.1 NG/ML (ref 0.8–3.5)

## 2020-04-12 LAB — GAD65 AB SER IA-ACNC: <5 IU/ML (ref 0–5)

## 2020-04-24 ENCOUNTER — TELEPHONE (OUTPATIENT)
Dept: HEALTH INFORMATION MANAGEMENT | Facility: MEDICAL CENTER | Age: 60
End: 2020-04-24

## 2020-04-24 NOTE — TELEPHONE ENCOUNTER
I spoke with Pawel Rc's caregiver at [facility].  She states they have tried all different snacks at bedtime to keep him from dropping down.  He is usually having a peanut butter sandwich on low carbohydrate bread.  He has also been having a banana or some other fruit.  She states he may be taking more fruit off the table because he is terrified that he will go low.  I encouraged her to keep his bedtime snack to around 30 grams with protein.

## 2020-04-28 ENCOUNTER — TELEMEDICINE (OUTPATIENT)
Dept: ENDOCRINOLOGY | Facility: MEDICAL CENTER | Age: 60
End: 2020-04-28
Payer: COMMERCIAL

## 2020-04-28 DIAGNOSIS — E10.65 TYPE 1 DIABETES MELLITUS WITH HYPERGLYCEMIA (HCC): ICD-10-CM

## 2020-04-28 DIAGNOSIS — E16.2 HYPOGLYCEMIA: ICD-10-CM

## 2020-04-28 DIAGNOSIS — Z79.4 LONG-TERM INSULIN USE (HCC): ICD-10-CM

## 2020-04-28 DIAGNOSIS — E10.69 HYPERLIPIDEMIA DUE TO TYPE 1 DIABETES MELLITUS (HCC): ICD-10-CM

## 2020-04-28 DIAGNOSIS — E78.5 HYPERLIPIDEMIA DUE TO TYPE 1 DIABETES MELLITUS (HCC): ICD-10-CM

## 2020-04-28 PROCEDURE — 99214 OFFICE O/P EST MOD 30 MIN: CPT | Mod: 25,95,CR | Performed by: INTERNAL MEDICINE

## 2020-04-28 PROCEDURE — 95251 CONT GLUC MNTR ANALYSIS I&R: CPT | Mod: 95,CR | Performed by: INTERNAL MEDICINE

## 2020-04-28 NOTE — PROGRESS NOTES
CHIEF COMPLAINT: Patient is here for follow up of Type 1 Diabetes Mellitus.  Patient was presented for a telehealth consultation via secure and encrypted videoconferencing technology due to the CV pandemic.   This encounter was conducted via Zoom . Verbal consent was obtained. Patient's identity was verified.    HPI:     Pawel Tatum is a 59 y.o. male with Type 1 Diabetes Mellitus here for follow up.    Labs from 4/9/2020 show HbA1c is fair at 7.6%    Patient was previously followed by the PA and this is my second time seeing him today.  He has poor cognitive function and video conference was completed today with  present.  He used to be in a group home and is now in a respite home    He has a history of Chronic idiopathic pancreatitis, uncontrolled diabetes at baseline with hx of neuropathy, foot osteomyelitis in the past and was recently admitted for ascending cholangitis in [facility] treated with ERCP.    Despite of his history of pancreatitis he was placed on Ozempic by the PA who previously took care of him.   Ozempic has been stopped.     Last visit we discovered that he was having hypoglycemic episodes because he was taking rapid acting insulin 45 minutes prior to meal we need reductions on his basal insulin and mealtime insulin and told him to take the mealtime insulin just before he eats and  NOT 30 -45 mins prior to his meal    Since making the proper changes has had less occurrence of hypoglycemia    He is on Lantus 20u daily and Humalog 10 u QAC    We downloaded his DEXCOM G6 CGM remotely and saw multiple hypoglycemic events    He has hyperlipidemia and is on Atorvastatin and is overdue for labs.  He is tolerating this well.      BG Diary:  see dexcom download on 4/24/2020    Weight has decreased 5-10 pounds over last 4 weeks    Diabetes Complications   Retinopathy: No known retinopathy.  Last eye exam: patient is overdue for an eye exam  Neuropathy: Denies paresthesias or numbness in  hands or feet. Denies any foot wounds.  Exercise: Minimal.  Diet: Fair.  Patient's medications, allergies, and social histories were reviewed and updated as appropriate.    ROS:     CONS:     No fever, no chills   EYES:     No diplopia, no blurry vision   CV:           No chest pain, no palpitations   PULM:     No SOB, no cough, no hemoptysis.   GI:            No nausea, no vomiting, no diarrhea, no constipation   ENDO:     No polyuria, no polydipsia, no heat intolerance, no cold intolerance       Past Medical History:  Problem List:  2020-03: Hypoglycemia  2020-03: Long-term insulin use ([facility])  2020-03: Acute respiratory failure with hypoxia ([facility])  2020-02: Loculated pleural effusion  2020-02: Pancreatic mass  2020-02: Cholangitis  2020-01: Cellulitis of right upper extremity  2019-12: Helicobacter pylori (H. pylori) infection  2019-12: Cognitive decline  2019-12: Severe protein-calorie malnutrition ([facility])  2019-12: Normocytic anemia  2019-12: Melanotic stools  2019-12: Uncontrolled type 1 diabetes mellitus with hyperglycemia   ([facility])  2019-12: Metabolic acidosis  2019-12: Hyponatremia  2019-08: H/O Bell's palsy  2019-01: Leukocytosis  2019-01: Peripheral neuropathy  2019-01: Subacute osteomyelitis of right foot ([facility])  2018-12: Leukocytosis  2018-11: Type 2 diabetes mellitus with hyperglycemia, with long-term   current use of insulin ([facility])  2018-11: DINESH (acute kidney injury) ([facility])  2018-11: Hypomagnesemia  2018-11: Hypokalemia  2018-11: Hypophosphatemia  2018-11: Chronic pancreatitis ([facility])  2018-11: Diabetic ketoacidosis ([facility])  2018-11: Hypotension  2018-10: Hyperlipidemia associated with type 2 diabetes mellitus ([facility])  2018-10: Recurrent major depressive disorder, in full remission ([facility])  2018-10: Memory loss  2018-08: History of recent UGI bleed  2018-08: Hyperlipidemia  2018-08: UTI (urinary tract infection)  2018-08: Sepsis ([facility])  2018-08: Tobacco abuse  2018-08: Choledocholithiasis  2018-08: Uncontrolled type 2  diabetes mellitus with hyperosmolarity   without coma (HCC)  2018-08: Diabetic peripheral angiopathy (HCC)  2018-08: Essential hypertension  2018-08: Vitamin D deficiency      Past Surgical History:  Past Surgical History:   Procedure Laterality Date   • PB ERCP,DIAGNOSTIC  3/16/2020    Procedure: ERCP, DIAGNOSTIC;  Surgeon: Carlos Alberto Ansari M.D.;  Location: [facility];  Service: Gastroenterology   • PB ERCP,DIAGNOSTIC  2/24/2020    Procedure: ERCP (ENDOSCOPIC RETROGRADE CHOLANGIOPANCREATOGRAPHY);  Surgeon: Hemant Lorenzo M.D.;  Location: [facility];  Service: Gastroenterology   • PB ERCP,DIAGNOSTIC  2/18/2020    Procedure: ERCP (ENDOSCOPIC RETROGRADE CHOLANGIOPANCREATOGRAPHY);  Surgeon: Carlos Alberto Ansari M.D.;  Location: [facility];  Service: Gastroenterology   • PB ENDOSCOPIC US EXAM, ESOPH  2/18/2020    Procedure: EGD, WITH ENDOSCOPIC US;  Surgeon: Carlos Alberto Ansari M.D.;  Location: [facility];  Service: Gastroenterology   • PB UPPER GI ENDOSCOPY,BIOPSY N/A 12/27/2019    Procedure: GASTROSCOPY, WITH BIOPSY;  Surgeon: Hector Villarreal M.D.;  Location: [facility];  Service: Gastroenterology   • TOE AMPUTATION Right 1/12/2019    Procedure: TOE AMPUTATION;  Surgeon: Nicanor Reddy M.D.;  Location: [facility];  Service: Orthopedics   • GASTROSCOPY-ENDO N/A 8/25/2018    Procedure: GASTROSCOPY-ENDO;  Surgeon: Jaswant Frye M.D.;  Location: [facility];  Service: Gastroenterology   • OTHER ABDOMINAL SURGERY          Allergies:  Patient has no known allergies.     Social History:  Social History     Tobacco Use   • Smoking status: Current Every Day Smoker     Packs/day: 0.25     Years: 15.00     Pack years: 3.75   • Smokeless tobacco: Never Used   • Tobacco comment: Trying to quit   Substance Use Topics   • Alcohol use: No   • Drug use: No        Family History:   family history includes No Known  Problems in his father and mother.      PHYSICAL EXAM:   OBJECTIVE:  Vital signs: There were no vitals taken for this visit.  GENERAL: Well-developed, undernourished in no apparent distress.   EYE:  No ocular asymmetry, PERRLA  HENT: Pink, moist mucous membranes.    NECK: No thyromegaly.   CARDIOVASCULAR: Normal precordial impulse seen with normal carotid pulsation  LUNGS: Symmetrical chest expansion with normal phonation of voice   ABDOMEN: Non obese abdomen with no visible organomegaly  EXTREMITIES: No clubbing, cyanosis, or edema.   NEUROLOGICAL: No gross focal motor abnormalities   LYMPH: No cervical adenopathy seen.   SKIN: No rashes, lesions.       Labs:  Lab Results   Component Value Date/Time    HBA1C 7.6 (H) 04/09/2020 10:00 AM        Lab Results   Component Value Date/Time    WBC 16.8 (H) 04/09/2020 10:00 AM    RBC 4.31 (L) 04/09/2020 10:00 AM    HEMOGLOBIN 13.2 (L) 04/09/2020 10:00 AM    MCV 93.5 04/09/2020 10:00 AM    MCH 30.6 04/09/2020 10:00 AM    MCHC 32.8 (L) 04/09/2020 10:00 AM    RDW 65.4 (H) 04/09/2020 10:00 AM    MPV 10.7 04/09/2020 10:00 AM       Lab Results   Component Value Date/Time    SODIUM 136 04/09/2020 10:00 AM    POTASSIUM 4.9 04/09/2020 10:00 AM    CHLORIDE 100 04/09/2020 10:00 AM    CO2 15 (L) 04/09/2020 10:00 AM    ANION 21.0 (H) 04/09/2020 10:00 AM    GLUCOSE 78 04/09/2020 10:00 AM    BUN 16 04/09/2020 10:00 AM    CREATININE 0.56 04/09/2020 10:00 AM    CALCIUM 9.6 04/09/2020 10:00 AM    ASTSGOT 44 04/09/2020 10:00 AM    ALTSGPT 33 04/09/2020 10:00 AM    TBILIRUBIN 0.4 04/09/2020 10:00 AM    ALBUMIN 3.7 04/09/2020 10:00 AM    TOTPROTEIN 7.8 04/09/2020 10:00 AM    GLOBULIN 4.1 (H) 04/09/2020 10:00 AM    AGRATIO 0.9 04/09/2020 10:00 AM       Lab Results   Component Value Date/Time    CHOLSTRLTOT 147 01/07/2019 1415    TRIGLYCERIDE 54 01/07/2019 1415    HDL 86 01/07/2019 1415    LDL 50 01/07/2019 1415       No results found for: MICROALBCALC, MALBCRT, MALBEXCR, WLWDBI01, MICROALBUR,  MICRALB, UMICROALBUM, MICROALBTIM     Lab Results   Component Value Date/Time    VY 1.490 12/25/2019 0331     No results found for: FREEDIR  No results found for: FREET3  No results found for: THYSTIMIG        ASSESSMENT/PLAN:     1. Type 1 diabetes mellitus with hyperglycemia, with long-term current use of insulin (HCC)  Stable  Hypoglycemic episodes have been drastically reduced  Dexcom G6 CGM was downloaded reviewed please see scanned media tab  Continue Lantus 20 units daily  Increase Humalog to 10 for breakfast, 10 for lunch and 11 units for supper or dinner  Recommend that he watch his carb intake  We will plan for follow-up in 6 weeks to review blood sugars      2. Hypoglycemia  Resolved  Reviewed the proper treatment of hypoglycemia    3. Hyperlipidemia associated with type 2 diabetes mellitus (HCC)  Unstable   Will check lipid panel with his next labs      4. Long-term insulin use (HCC)  Patient is on long term insulin therapy for his diabetes      Return in about 6 weeks (around 6/9/2020).      Thank you kindly for allowing me to participate in the diabetes care plan for this patient.    Ruddy Fry MD, [facility], [facility]  03/31/20    CC:   JAKE Armando

## 2020-05-01 ENCOUNTER — PATIENT MESSAGE (OUTPATIENT)
Dept: MEDICAL GROUP | Facility: MEDICAL CENTER | Age: 60
End: 2020-05-01

## 2020-05-01 ENCOUNTER — TELEPHONE (OUTPATIENT)
Dept: ENDOCRINOLOGY | Facility: MEDICAL CENTER | Age: 60
End: 2020-05-01

## 2020-05-01 NOTE — TELEPHONE ENCOUNTER
From: Pawel Tatum  To: JAKE Armando  Sent: 5/1/2020 10:57 AM PDT  Subject: Non-Urgent Medical Question    Hi,   We are getting ready to move Mr. Tatum back to [facility], his permanent residence. They require a new packet to be completed prior to his return. Currently he's in a respite placement at [facility].   Could you complete this and fax to [phone] or should we schedule an in-person appointment.   Thanks,   [employer] [facility] [place], Legal Co-Guardian of Mr. Pawel Tatum

## 2020-05-01 NOTE — TELEPHONE ENCOUNTER
[facility] left a voice mail stating they did not receive [facility] medical records, I re faxed them today.

## 2020-05-08 ENCOUNTER — OFFICE VISIT (OUTPATIENT)
Dept: MEDICAL GROUP | Facility: MEDICAL CENTER | Age: 60
End: 2020-05-08
Payer: COMMERCIAL

## 2020-05-08 VITALS
RESPIRATION RATE: 16 BRPM | HEIGHT: 75 IN | BODY MASS INDEX: 17.41 KG/M2 | OXYGEN SATURATION: 94 % | DIASTOLIC BLOOD PRESSURE: 64 MMHG | WEIGHT: 140 LBS | HEART RATE: 100 BPM | SYSTOLIC BLOOD PRESSURE: 126 MMHG

## 2020-05-08 DIAGNOSIS — E10.65 UNCONTROLLED TYPE 1 DIABETES MELLITUS WITH HYPERGLYCEMIA (HCC): ICD-10-CM

## 2020-05-08 DIAGNOSIS — Z13.6 SCREENING FOR CARDIOVASCULAR CONDITION: ICD-10-CM

## 2020-05-08 DIAGNOSIS — I10 ESSENTIAL HYPERTENSION: ICD-10-CM

## 2020-05-08 DIAGNOSIS — E43 SEVERE PROTEIN-CALORIE MALNUTRITION (HCC): ICD-10-CM

## 2020-05-08 DIAGNOSIS — R41.89 COGNITIVE DECLINE: ICD-10-CM

## 2020-05-08 DIAGNOSIS — Z12.5 SCREENING FOR PROSTATE CANCER: ICD-10-CM

## 2020-05-08 PROBLEM — G62.9 PERIPHERAL NEUROPATHY: Status: RESOLVED | Noted: 2019-01-11 | Resolved: 2020-05-08

## 2020-05-08 PROBLEM — A04.8 HELICOBACTER PYLORI (H. PYLORI) INFECTION: Status: RESOLVED | Noted: 2019-12-31 | Resolved: 2020-05-08

## 2020-05-08 PROBLEM — E16.2 HYPOGLYCEMIA: Status: RESOLVED | Noted: 2020-03-31 | Resolved: 2020-05-08

## 2020-05-08 PROBLEM — E10.69 HYPERLIPIDEMIA DUE TO TYPE 1 DIABETES MELLITUS (HCC): Status: RESOLVED | Noted: 2018-10-04 | Resolved: 2020-05-08

## 2020-05-08 PROBLEM — E78.5 HYPERLIPIDEMIA DUE TO TYPE 1 DIABETES MELLITUS (HCC): Status: RESOLVED | Noted: 2018-10-04 | Resolved: 2020-05-08

## 2020-05-08 PROBLEM — E11.00 UNCONTROLLED TYPE 2 DIABETES MELLITUS WITH HYPEROSMOLARITY WITHOUT COMA (HCC): Status: RESOLVED | Noted: 2018-08-16 | Resolved: 2020-05-08

## 2020-05-08 PROBLEM — F33.42 RECURRENT MAJOR DEPRESSIVE DISORDER, IN FULL REMISSION (HCC): Status: RESOLVED | Noted: 2018-10-04 | Resolved: 2020-05-08

## 2020-05-08 PROCEDURE — 99214 OFFICE O/P EST MOD 30 MIN: CPT | Performed by: NURSE PRACTITIONER

## 2020-05-08 ASSESSMENT — ENCOUNTER SYMPTOMS
FOCAL WEAKNESS: 1
MEMORY LOSS: 1

## 2020-05-08 ASSESSMENT — FIBROSIS 4 INDEX: FIB4 SCORE: 0.8

## 2020-05-08 NOTE — PROGRESS NOTES
"Subjective:      Pawel Tatum is a 59 y.o. male who presents with Other (fill ot paperwork)        CC: Patient here today accompanied by his  for physical and paperwork to go to a different assisted living.    HPI       1. Essential hypertension  Blood pressure has been controlled and looks within acceptable range today with having stopped medicine in the past.    2. Cognitive decline  Patient in a assisted living and also has a  which comes with him to each visit.    3. Severe protein-calorie malnutrition (HCC)  Patient's weight appears to be stable and he is hoping that with the change in living situation, he may gain some weight.  He is complaining today about the lack of low-carb foods that he was previously been provided.    4. Uncontrolled type 1 diabetes mellitus with hyperglycemia (HCC)  Patient follows with endocrinology for his diabetes and review of last note shows that his Lantus dosing was kept the same with Humalog dosing adjusted based on readings from his Dexcom.    5. Screening for prostate cancer  Patient due for yearly testing.    6. Screening for cardiovascular condition  Patient due for lipid panel with last lipid panel from over a year ago showing good LDL and total cholesterol on his statin  Past Medical History:   Diagnosis Date   • Diabetes (HCC)    • Hypertension    • Seizure disorder (HCC)     Pt had a seizure with \" unknown illness\" in recent months.      Social History     Socioeconomic History   • Marital status: Single     Spouse name: Not on file   • Number of children: Not on file   • Years of education: Not on file   • Highest education level: Not on file   Occupational History   • Not on file   Social Needs   • Financial resource strain: Not on file   • Food insecurity     Worry: Not on file     Inability: Not on file   • Transportation needs     Medical: Not on file     Non-medical: Not on file   Tobacco Use   • Smoking status: Current Every Day " Smoker     Packs/day: 0.25     Years: 15.00     Pack years: 3.75   • Smokeless tobacco: Never Used   • Tobacco comment: Trying to quit   Substance and Sexual Activity   • Alcohol use: No   • Drug use: No   • Sexual activity: Not Currently   Lifestyle   • Physical activity     Days per week: Not on file     Minutes per session: Not on file   • Stress: Not on file   Relationships   • Social connections     Talks on phone: Not on file     Gets together: Not on file     Attends [employer] service: Not on file     Active member of club or organization: Not on file     Attends meetings of clubs or organizations: Not on file     Relationship status: Not on file   • Intimate partner violence     Fear of current or ex partner: Not on file     Emotionally abused: Not on file     Physically abused: Not on file     Forced sexual activity: Not on file   Other Topics Concern   • Not on file   Social History Narrative   • Not on file     Current Outpatient Medications   Medication Sig Dispense Refill   • Insulin Degludec (TRESIBA FLEXTOUCH) 100 UNIT/ML Solution Pen-injector Inject 20 Units as instructed every bedtime. 5 PEN 3   • insulin lispro (HUMALOG KWIKPEN) 100 UNIT/ML Solution Pen-injector injection PEN Inject 10 Units as instructed 3 times a day before meals. 5 PEN 3   • Nutritional Supplements (GLUCOSE MANAGEMENT) Tab Take 4 Tabs by mouth as needed. Prn blood sugars less than 60 50 Tab 3   • Nutritional Supplements (GLUCERNA 1.0 GER) Liquid Take 1 Each by mouth as needed (hypoglycemia, blood sugars less than 70). 6 Bottle 6   • thiamine (THIAMINE) 100 MG tablet Take 1 Tab by mouth every day. 30 Tab 1   • Cholecalciferol (VITAMIN D) 2000 UNIT Tab Take 1 Tab by mouth every day. 30 Tab 11   • Urine Glucose-Ketones Test Strip 1 Strip by In Vitro route every day. 100 Strip 2   • atorvastatin (LIPITOR) 40 MG Tab Take 1 Tab by mouth every evening. 30 Tab 1   • aspirin (ASA) 81 MG Chew Tab chewable tablet CHEW 1 TABLET BY MOUTH  "ONCE DAILY. 30 Tab 11   • omeprazole (PRILOSEC) 20 MG delayed-release capsule Take 1 Cap by mouth 2 Times a Day. 60 Cap 8   • pancrelipase, Lip-Prot-Amyl, (CREON) 00179-06272 units Cap DR Particles Take 2 Caps by mouth every day. 60 Cap 11   • tamsulosin (FLOMAX) 0.4 MG capsule Take 1 Cap by mouth every day. 30 Cap 11   • multivitamin (THERAGRAN) Tab Take 1 Tab by mouth every day. 30 Tab 1     No current facility-administered medications for this visit.      Family History   Problem Relation Age of Onset   • No Known Problems Mother    • No Known Problems Father          Review of Systems   Skin: Positive for rash.   Neurological: Positive for focal weakness.   Psychiatric/Behavioral: Positive for memory loss.   All other systems reviewed and are negative.         Objective:     /64 (BP Location: Right arm, Patient Position: Sitting, BP Cuff Size: Adult)   Pulse 100   Resp 16   Ht 1.905 m (6' 3\")   Wt 63.5 kg (140 lb)   SpO2 94%   BMI 17.50 kg/m²      Physical Exam  Vitals signs and nursing note reviewed.   Constitutional:       General: He is not in acute distress.     Appearance: He is well-developed. He is not diaphoretic.   HENT:      Head: Normocephalic and atraumatic.      Right Ear: External ear normal.      Left Ear: External ear normal.      Nose: Nose normal.   Eyes:      General:         Right eye: No discharge.         Left eye: No discharge.      Conjunctiva/sclera: Conjunctivae normal.   Neck:      Musculoskeletal: Normal range of motion and neck supple.      Thyroid: No thyromegaly.      Trachea: No tracheal deviation.   Cardiovascular:      Rate and Rhythm: Normal rate and regular rhythm.      Heart sounds: Normal heart sounds. No murmur.   Pulmonary:      Effort: Pulmonary effort is normal. No respiratory distress.      Breath sounds: Normal breath sounds. No wheezing or rales.   Abdominal:      Comments: Incisional hernia still present and appears unchanged.   Lymphadenopathy:      " Cervical: No cervical adenopathy.   Skin:     General: Skin is warm and dry.      Findings: Abrasion present. No erythema or rash.      Comments: Healing abrasions present on left arm   Neurological:      Mental Status: He is alert and oriented to person, place, and time.      Coordination: Coordination normal.      Comments: Left eyelid weakness.   Psychiatric:         Behavior: Behavior normal.         Thought Content: Thought content normal.         Cognition and Memory: Memory is impaired.         Judgment: Judgment normal.      Comments: Patient is fairly alert and able to answer many questions today with his  also providing information                 Assessment/Plan:       1. Essential hypertension  Patient will continue with same medication and is multiform paperwork for attending [facility] was completed today    2. Cognitive decline  Patient has case management and has had recent home health but they will be leaving soon.    3. Severe protein-calorie malnutrition (HCC)  Patient is hopeful that he will have more choice in diet with moving to [facility].    4. Uncontrolled type 1 diabetes mellitus with hyperglycemia (HCC)  Patient's medications have been adjusted and a copy of his medicine list was provided with his physical.  He will do his lab work and then follow back with endocrinology    5. Screening for prostate cancer    - PROSTATE SPECIFIC AG SCREENING; Future    6. Screening for cardiovascular condition  Patient overdue for lipid panel  - Lipid Profile; Future

## 2020-05-13 ENCOUNTER — TELEPHONE (OUTPATIENT)
Dept: ENDOCRINOLOGY | Facility: MEDICAL CENTER | Age: 60
End: 2020-05-13

## 2020-05-13 NOTE — TELEPHONE ENCOUNTER
Dinesh left a voicemail following up on the order for sensors & transmitters. Phone #[phone].

## 2020-05-13 NOTE — TELEPHONE ENCOUNTER
Phone Number Called: [phone]    Call outcome: Called [facility]    Message: Contacted [facility] they will be faxing over clinical note request for patient. They was wanted us to look out for it and send it back. To them. I told her we would.

## 2020-06-10 ENCOUNTER — OFFICE VISIT (OUTPATIENT)
Dept: ENDOCRINOLOGY | Facility: MEDICAL CENTER | Age: 60
End: 2020-06-10
Payer: COMMERCIAL

## 2020-06-10 VITALS
HEIGHT: 75 IN | WEIGHT: 141.3 LBS | HEART RATE: 84 BPM | BODY MASS INDEX: 17.57 KG/M2 | DIASTOLIC BLOOD PRESSURE: 60 MMHG | SYSTOLIC BLOOD PRESSURE: 124 MMHG | OXYGEN SATURATION: 98 %

## 2020-06-10 DIAGNOSIS — E16.2 HYPOGLYCEMIA: ICD-10-CM

## 2020-06-10 DIAGNOSIS — Z79.4 LONG-TERM INSULIN USE (HCC): ICD-10-CM

## 2020-06-10 DIAGNOSIS — E10.69 HYPERLIPIDEMIA DUE TO TYPE 1 DIABETES MELLITUS (HCC): ICD-10-CM

## 2020-06-10 DIAGNOSIS — E10.65 TYPE 1 DIABETES MELLITUS WITH HYPERGLYCEMIA (HCC): ICD-10-CM

## 2020-06-10 DIAGNOSIS — E78.5 HYPERLIPIDEMIA DUE TO TYPE 1 DIABETES MELLITUS (HCC): ICD-10-CM

## 2020-06-10 PROCEDURE — 99214 OFFICE O/P EST MOD 30 MIN: CPT | Performed by: INTERNAL MEDICINE

## 2020-06-10 PROCEDURE — 95251 CONT GLUC MNTR ANALYSIS I&R: CPT | Performed by: INTERNAL MEDICINE

## 2020-06-10 ASSESSMENT — FIBROSIS 4 INDEX: FIB4 SCORE: 0.8

## 2020-06-10 NOTE — PROGRESS NOTES
CHIEF COMPLAINT: Patient is here for follow up of Type 1 Diabetes Mellitus.      HPI:     Pawel Tatum is a 59 y.o. male with Type 1 Diabetes Mellitus here for follow up.    Labs from 4/9/2020 show HbA1c is fair at 7.6%    Patient was previously followed by the PA   He has a history of poor cognitive function, traumatic brain injury and history of severe hypoglycemia  He is now a resident of a respite home called [facility]  He has a history of Chronic idiopathic pancreatitis, uncontrolled diabetes at baseline with hx of neuropathy, foot osteomyelitis in the past and had ascending cholangitis treated with ERCP in [facility].    Despite of his history of pancreatitis he was placed on Ozempic by the PA who previously took care of him.  Ozempic has been stopped.     Previously we discovered that he was having hypoglycemic episodes because he was taking rapid acting insulin 45 minutes prior to meal we made reductions on his basal insulin and mealtime insulin and told him to take the mealtime insulin just before he eats and NOT 30-45 mins prior to his meal      Since making the proper changes has had less occurrence of hypoglycemia    He is on Tresiba 20u daily and Humalog 10u for breakfast, 10u for lunch and 11 for dinner    He regularly eats a sandwich at night before bedtime    We downloaded his DEXCOM G6 CGM today and his average BG was 194 with a standard deviation of 104 with time in range of 48%.  There is a pattern of prolonged hyperglycemia after 9 PM which is after the the time that he eats his peanut butter and jelly sandwich    He has hyperlipidemia and is on Atorvastatin and is overdue for labs.  He is tolerating this well.      BG Diary:  see dexcom download     Weight has been stable    Diabetes Complications   Retinopathy: No known retinopathy.  Last eye exam: patient is overdue for an eye exam  Neuropathy: Denies paresthesias or numbness in hands or feet. Denies any foot wounds.  Exercise:  Minimal.  Diet: Fair.  Patient's medications, allergies, and social histories were reviewed and updated as appropriate.    ROS:     CONS:     No fever, no chills   EYES:     No diplopia, no blurry vision   CV:           No chest pain, no palpitations   PULM:     No SOB, no cough, no hemoptysis.   GI:            No nausea, no vomiting, no diarrhea, no constipation   ENDO:     No polyuria, no polydipsia, no heat intolerance, no cold intolerance       Past Medical History:  Problem List:  2020-03: Hypoglycemia  2020-03: Long-term insulin use ([facility])  2020-03: Acute respiratory failure with hypoxia ([facility])  2020-02: Loculated pleural effusion  2020-02: Pancreatic mass  2020-02: Cholangitis  2020-01: Cellulitis of right upper extremity  2019-12: Helicobacter pylori (H. pylori) infection  2019-12: Cognitive decline  2019-12: Severe protein-calorie malnutrition ([facility])  2019-12: Normocytic anemia  2019-12: Melanotic stools  2019-12: Uncontrolled type 1 diabetes mellitus with hyperglycemia   ([facility])  2019-12: Metabolic acidosis  2019-12: Hyponatremia  2019-08: H/O Bell's palsy  2019-01: Leukocytosis  2019-01: Peripheral neuropathy  2019-01: Subacute osteomyelitis of right foot ([facility])  2018-12: Leukocytosis  2018-11: Type 1 diabetes mellitus with hyperglycemia ([facility])  2018-11: DINESH (acute kidney injury) ([facility])  2018-11: Hypomagnesemia  2018-11: Hypokalemia  2018-11: Hypophosphatemia  2018-11: Chronic pancreatitis ([facility])  2018-11: Diabetic ketoacidosis ([facility])  2018-11: Hypotension  2018-10: Hyperlipidemia due to type 1 diabetes mellitus ([facility])  2018-10: Recurrent major depressive disorder, in full remission ([facility])  2018-10: Memory loss  2018-08: History of recent UGI bleed  2018-08: Hyperlipidemia  2018-08: UTI (urinary tract infection)  2018-08: Sepsis ([facility])  2018-08: Tobacco abuse  2018-08: Choledocholithiasis  2018-08: Uncontrolled type 2 diabetes mellitus with hyperosmolarity   without coma ([facility])  2018-08: Diabetic peripheral angiopathy  ([facility])  2018-08: Essential hypertension  2018-08: Vitamin D deficiency      Past Surgical History:  Past Surgical History:   Procedure Laterality Date   • PB ERCP,DIAGNOSTIC  3/16/2020    Procedure: ERCP, DIAGNOSTIC;  Surgeon: Carlos Alberto Ansari M.D.;  Location: [facility];  Service: Gastroenterology   • PB ERCP,DIAGNOSTIC  2/24/2020    Procedure: ERCP (ENDOSCOPIC RETROGRADE CHOLANGIOPANCREATOGRAPHY);  Surgeon: Hemant Lorenzo M.D.;  Location: [facility];  Service: Gastroenterology   • PB ERCP,DIAGNOSTIC  2/18/2020    Procedure: ERCP (ENDOSCOPIC RETROGRADE CHOLANGIOPANCREATOGRAPHY);  Surgeon: Carlos Alberto Ansari M.D.;  Location: [facility];  Service: Gastroenterology   • PB ENDOSCOPIC US EXAM, ESOPH  2/18/2020    Procedure: EGD, WITH ENDOSCOPIC US;  Surgeon: Carlos Alberto Ansari M.D.;  Location: [facility];  Service: Gastroenterology   • PB UPPER GI ENDOSCOPY,BIOPSY N/A 12/27/2019    Procedure: GASTROSCOPY, WITH BIOPSY;  Surgeon: Hector Villarreal M.D.;  Location: [facility];  Service: Gastroenterology   • TOE AMPUTATION Right 1/12/2019    Procedure: TOE AMPUTATION;  Surgeon: Nicanor Reddy M.D.;  Location: [facility];  Service: Orthopedics   • GASTROSCOPY-ENDO N/A 8/25/2018    Procedure: GASTROSCOPY-ENDO;  Surgeon: Jaswant Frye M.D.;  Location: [facility];  Service: Gastroenterology   • OTHER ABDOMINAL SURGERY          Allergies:  Patient has no known allergies.     Social History:  Social History     Tobacco Use   • Smoking status: Current Every Day Smoker     Packs/day: 0.25     Years: 15.00     Pack years: 3.75   • Smokeless tobacco: Never Used   • Tobacco comment: Trying to quit   Substance Use Topics   • Alcohol use: No   • Drug use: No        Family History:   family history includes No Known Problems in his father and mother.      PHYSICAL EXAM:   OBJECTIVE:  Vital signs: /60 (BP  "Location: Left arm, Patient Position: Sitting, BP Cuff Size: Adult)   Pulse 84   Ht 1.905 m (6' 3\")   Wt 64.1 kg (141 lb 4.8 oz)   SpO2 98%   BMI 17.66 kg/m²   GENERAL: Well-developed, undernourished in no apparent distress.   EYE:  No ocular asymmetry, PERRLA  HENT: Pink, moist mucous membranes.    NECK: No thyromegaly.   CARDIOVASCULAR: Normal precordial impulse seen with normal carotid pulsation  LUNGS: Symmetrical chest expansion with normal phonation of voice   ABDOMEN: Non obese abdomen with no visible organomegaly  EXTREMITIES: No clubbing, cyanosis, or edema.   NEUROLOGICAL: No gross focal motor abnormalities   LYMPH: No cervical adenopathy seen.   SKIN: No rashes, lesions.   Monofilament testing with a 10 gram force: sensation: decreased bilaterally  Visual Inspection: Feet without maceration, ulcers, or fissures.  Pedal pulses: intact bilaterally      Labs:  Lab Results   Component Value Date/Time    HBA1C 7.6 (H) 04/09/2020 10:00 AM        Lab Results   Component Value Date/Time    WBC 16.8 (H) 04/09/2020 10:00 AM    RBC 4.31 (L) 04/09/2020 10:00 AM    HEMOGLOBIN 13.2 (L) 04/09/2020 10:00 AM    MCV 93.5 04/09/2020 10:00 AM    MCH 30.6 04/09/2020 10:00 AM    MCHC 32.8 (L) 04/09/2020 10:00 AM    RDW 65.4 (H) 04/09/2020 10:00 AM    MPV 10.7 04/09/2020 10:00 AM       Lab Results   Component Value Date/Time    SODIUM 136 04/09/2020 10:00 AM    POTASSIUM 4.9 04/09/2020 10:00 AM    CHLORIDE 100 04/09/2020 10:00 AM    CO2 15 (L) 04/09/2020 10:00 AM    ANION 21.0 (H) 04/09/2020 10:00 AM    GLUCOSE 78 04/09/2020 10:00 AM    BUN 16 04/09/2020 10:00 AM    CREATININE 0.56 04/09/2020 10:00 AM    CALCIUM 9.6 04/09/2020 10:00 AM    ASTSGOT 44 04/09/2020 10:00 AM    ALTSGPT 33 04/09/2020 10:00 AM    TBILIRUBIN 0.4 04/09/2020 10:00 AM    ALBUMIN 3.7 04/09/2020 10:00 AM    TOTPROTEIN 7.8 04/09/2020 10:00 AM    GLOBULIN 4.1 (H) 04/09/2020 10:00 AM    AGRATIO 0.9 04/09/2020 10:00 AM       Lab Results   Component Value " Date/Time    CHOLSTRLTOT 147 01/07/2019 1415    TRIGLYCERIDE 54 01/07/2019 1415    HDL 86 01/07/2019 1415    LDL 50 01/07/2019 1415       No results found for: MICROALBCALC, MALBCRT, MALBEXCR, ZTMXCT06, MICROALBUR, MICRALB, UMICROALBUM, MICROALBTIM     Lab Results   Component Value Date/Time    TSHULTRASEN 1.490 12/25/2019 0331     No results found for: FREEDIR  No results found for: FREET3  No results found for: THYSTIMIG        ASSESSMENT/PLAN:     1. Type 1 diabetes mellitus with hyperglycemia, with long-term current use of insulin (HCC)  Stable  Hypoglycemic episodes have been drastically reduced  Explained to patient that based on his previous medical history   I prefer that we avoid hypoglycemia and that mild hyperglycemia is permitted    Dexcom G6 CGM was downloaded and reviewed   I want him to stop eating a PB and J sandwich at bedtime  I am reducing his Tresiba to 18 units daily  Continue Humalog 10u for breakfast, 10u for lunch and 11u units for supper or dinner  Recommend that he watch his carb intake  His nurse Maxwell is going to update me about the status of his sugars  We will plan for follow-up in 3 months with a repeat of his A1c      2. Hypoglycemia  Resolved  Reviewed the proper treatment of hypoglycemia    3. Hyperlipidemia associated with type 2 diabetes mellitus (HCC)  Unstable   Will check lipid panel with his next labs in 3 months      4. Long-term insulin use (HCC)  Patient is on long term insulin therapy for his diabetes      Return in about 3 months (around 9/10/2020).      Thank you kindly for allowing me to participate in the diabetes care plan for this patient.    Ruddy Fry MD, FACE, NU  03/31/20    CC:   JAKE Armando

## 2020-06-26 ENCOUNTER — HOSPITAL ENCOUNTER (OUTPATIENT)
Dept: LAB | Facility: MEDICAL CENTER | Age: 60
End: 2020-06-26
Attending: INTERNAL MEDICINE
Payer: COMMERCIAL

## 2020-06-26 ENCOUNTER — TELEPHONE (OUTPATIENT)
Dept: ENDOCRINOLOGY | Facility: MEDICAL CENTER | Age: 60
End: 2020-06-26

## 2020-06-26 DIAGNOSIS — Z79.4 LONG-TERM INSULIN USE (HCC): ICD-10-CM

## 2020-06-26 DIAGNOSIS — E16.2 HYPOGLYCEMIA: ICD-10-CM

## 2020-06-26 DIAGNOSIS — E78.5 HYPERLIPIDEMIA DUE TO TYPE 1 DIABETES MELLITUS (HCC): ICD-10-CM

## 2020-06-26 DIAGNOSIS — E10.69 HYPERLIPIDEMIA DUE TO TYPE 1 DIABETES MELLITUS (HCC): ICD-10-CM

## 2020-06-26 DIAGNOSIS — E10.65 TYPE 1 DIABETES MELLITUS WITH HYPERGLYCEMIA (HCC): ICD-10-CM

## 2020-06-26 LAB
ALBUMIN SERPL BCP-MCNC: 3.2 G/DL (ref 3.2–4.9)
ALBUMIN/GLOB SERPL: 0.8 G/DL
ALP SERPL-CCNC: 309 U/L (ref 30–99)
ALT SERPL-CCNC: 31 U/L (ref 2–50)
ANION GAP SERPL CALC-SCNC: 6 MMOL/L (ref 7–16)
AST SERPL-CCNC: 33 U/L (ref 12–45)
BILIRUB SERPL-MCNC: 0.3 MG/DL (ref 0.1–1.5)
BUN SERPL-MCNC: 15 MG/DL (ref 8–22)
CALCIUM SERPL-MCNC: 9.4 MG/DL (ref 8.5–10.5)
CHLORIDE SERPL-SCNC: 105 MMOL/L (ref 96–112)
CHOLEST SERPL-MCNC: 115 MG/DL (ref 100–199)
CO2 SERPL-SCNC: 23 MMOL/L (ref 20–33)
CREAT SERPL-MCNC: 0.84 MG/DL (ref 0.5–1.4)
CREAT UR-MCNC: 117.88 MG/DL
EST. AVERAGE GLUCOSE BLD GHB EST-MCNC: 189 MG/DL
FASTING STATUS PATIENT QL REPORTED: NORMAL
GLOBULIN SER CALC-MCNC: 4 G/DL (ref 1.9–3.5)
GLUCOSE SERPL-MCNC: 118 MG/DL (ref 65–99)
HBA1C MFR BLD: 8.2 % (ref 0–5.6)
HDLC SERPL-MCNC: 55 MG/DL
LDLC SERPL CALC-MCNC: 50 MG/DL
MICROALBUMIN UR-MCNC: 33.3 MG/DL
MICROALBUMIN/CREAT UR: 282 MG/G (ref 0–30)
POTASSIUM SERPL-SCNC: 4.6 MMOL/L (ref 3.6–5.5)
PROT SERPL-MCNC: 7.2 G/DL (ref 6–8.2)
SODIUM SERPL-SCNC: 134 MMOL/L (ref 135–145)
TRIGL SERPL-MCNC: 52 MG/DL (ref 0–149)

## 2020-06-26 PROCEDURE — 82570 ASSAY OF URINE CREATININE: CPT

## 2020-06-26 PROCEDURE — 80053 COMPREHEN METABOLIC PANEL: CPT

## 2020-06-26 PROCEDURE — 80061 LIPID PANEL: CPT

## 2020-06-26 PROCEDURE — 36415 COLL VENOUS BLD VENIPUNCTURE: CPT

## 2020-06-26 PROCEDURE — 82043 UR ALBUMIN QUANTITATIVE: CPT

## 2020-06-26 PROCEDURE — 83036 HEMOGLOBIN GLYCOSYLATED A1C: CPT

## 2020-06-26 NOTE — TELEPHONE ENCOUNTER
----- Message from Ruddy Fry M.D. sent at 6/26/2020  2:04 PM PDT -----  Mr. Tatum,    Your A1c went up slightly to 8.2% however the good news is you are no longer having severe low sugars compared to before    Your A1c was low before because you were having a lot of sugar crashes and very bad low sugar events    I want you to keep taking your medications as we discussed    Take Tresiba 18 units at night    Take Humalog 10 for breakfast, 10 for lunch and 11 for supper     try to avoid snacking after supper please and   try to avoid snacking after bedtime please because this is the reason why your A1c is also going up    Thank you    Dr. Fry

## 2020-06-26 NOTE — TELEPHONE ENCOUNTER
Phone Number Called: [phone]    Call outcome: Left detailed message for patient. Informed to call back with any additional questions.    Message: Contacted patient and I let him know Dr. Fry sent a [employer] message regarding results. I asked patient to call us or send us a [employer] message if he had any questions.

## 2020-06-30 ENCOUNTER — OFFICE VISIT (OUTPATIENT)
Dept: MEDICAL GROUP | Facility: MEDICAL CENTER | Age: 60
End: 2020-06-30
Payer: COMMERCIAL

## 2020-06-30 VITALS
BODY MASS INDEX: 18.28 KG/M2 | RESPIRATION RATE: 16 BRPM | HEART RATE: 92 BPM | WEIGHT: 147 LBS | HEIGHT: 75 IN | DIASTOLIC BLOOD PRESSURE: 72 MMHG | OXYGEN SATURATION: 96 % | SYSTOLIC BLOOD PRESSURE: 128 MMHG

## 2020-06-30 DIAGNOSIS — E44.0 MODERATE PROTEIN MALNUTRITION (HCC): ICD-10-CM

## 2020-06-30 DIAGNOSIS — I10 ESSENTIAL HYPERTENSION: ICD-10-CM

## 2020-06-30 DIAGNOSIS — E10.65 UNCONTROLLED TYPE 1 DIABETES MELLITUS WITH HYPERGLYCEMIA (HCC): ICD-10-CM

## 2020-06-30 DIAGNOSIS — K86.1 IDIOPATHIC CHRONIC PANCREATITIS (HCC): ICD-10-CM

## 2020-06-30 PROBLEM — E43 SEVERE PROTEIN-CALORIE MALNUTRITION (HCC): Status: RESOLVED | Noted: 2019-12-25 | Resolved: 2020-06-30

## 2020-06-30 PROCEDURE — 99213 OFFICE O/P EST LOW 20 MIN: CPT | Performed by: NURSE PRACTITIONER

## 2020-06-30 ASSESSMENT — FIBROSIS 4 INDEX: FIB4 SCORE: 0.62

## 2020-06-30 ASSESSMENT — PATIENT HEALTH QUESTIONNAIRE - PHQ9: CLINICAL INTERPRETATION OF PHQ2 SCORE: 0

## 2020-06-30 ASSESSMENT — ENCOUNTER SYMPTOMS: MEMORY LOSS: 1

## 2020-06-30 NOTE — PROGRESS NOTES
"Subjective:      Pawel Tatum is a 59 y.o. male who presents with Follow-Up (3 month)        CC: Patient is here today accompanied by his caregiver for follow-up on issues such as pancreatitis, hypertension and malnutrition.    HPI         1. Idiopathic chronic pancreatitis (HCC)  Review of last notes from gastroenterology show that patient is to follow-up soon for possible removal or changing of the intrapancreatic bile duct stricture stent.  His alkaline phosphatase levels remain elevated but he is not complaining of abdominal pain and has not had to been to the ER since February.  He is taking his pancrelipase.    2. Essential hypertension  Blood pressure appears well-controlled and is now off medications.    3. Uncontrolled type 1 diabetes mellitus with hyperglycemia (HCC)  Patient being well cared for by endocrinology which has been adjusting his medications because of elevated hemoglobin A1c with frequent blood sugar crashes.  He is somewhat limited and when he can eat because he is in a group home.    4. Moderate protein malnutrition (HCC)  Patient's weight has increased in the last 2 months and his BMI is now at 18.  He has Glucerna to use if needed.  Past Medical History:   Diagnosis Date   • Diabetes (HCC)    • Hypertension    • Seizure disorder (HCC)     Pt had a seizure with \" unknown illness\" in recent months.      Social History     Socioeconomic History   • Marital status: Single     Spouse name: Not on file   • Number of children: Not on file   • Years of education: Not on file   • Highest education level: Not on file   Occupational History   • Not on file   Social Needs   • Financial resource strain: Not on file   • Food insecurity     Worry: Not on file     Inability: Not on file   • Transportation needs     Medical: Not on file     Non-medical: Not on file   Tobacco Use   • Smoking status: Current Every Day Smoker     Packs/day: 0.25     Years: 15.00     Pack years: 3.75   • Smokeless " tobacco: Never Used   • Tobacco comment: Trying to quit   Substance and Sexual Activity   • Alcohol use: No   • Drug use: No   • Sexual activity: Not Currently   Lifestyle   • Physical activity     Days per week: Not on file     Minutes per session: Not on file   • Stress: Not on file   Relationships   • Social connections     Talks on phone: Not on file     Gets together: Not on file     Attends Muslim service: Not on file     Active member of club or organization: Not on file     Attends meetings of clubs or organizations: Not on file     Relationship status: Not on file   • Intimate partner violence     Fear of current or ex partner: Not on file     Emotionally abused: Not on file     Physically abused: Not on file     Forced sexual activity: Not on file   Other Topics Concern   • Not on file   Social History Narrative   • Not on file     Current Outpatient Medications   Medication Sig Dispense Refill   • atorvastatin (LIPITOR) 40 MG Tab TAKE 1 TABLET BY MOUTH AT BEDTIME. 90 Tab 3   • Multiple Vitamin (MULTI-VITAMINS) Tab TAKE 1 TABLET BY MOUTH ONCE DAILY. 90 Tab 3   • thiamine (THIAMINE) 100 MG Tab TAKE 1 TABLET BY MOUTH ONCE DAILY. 90 Tab 3   • Insulin Degludec (TRESIBA FLEXTOUCH) 100 UNIT/ML Solution Pen-injector Inject 20 Units as instructed every bedtime. 5 PEN 3   • insulin lispro (HUMALOG KWIKPEN) 100 UNIT/ML Solution Pen-injector injection PEN Inject 10 Units as instructed 3 times a day before meals. 5 PEN 3   • Nutritional Supplements (GLUCOSE MANAGEMENT) Tab Take 4 Tabs by mouth as needed. Prn blood sugars less than 60 50 Tab 3   • Nutritional Supplements (GLUCERNA 1.0 GER) Liquid Take 1 Each by mouth as needed (hypoglycemia, blood sugars less than 70). 6 Bottle 6   • Cholecalciferol (VITAMIN D) 2000 UNIT Tab Take 1 Tab by mouth every day. 30 Tab 11   • Urine Glucose-Ketones Test Strip 1 Strip by In Vitro route every day. 100 Strip 2   • aspirin (ASA) 81 MG Chew Tab chewable tablet CHEW 1 TABLET BY  "MOUTH ONCE DAILY. 30 Tab 11   • omeprazole (PRILOSEC) 20 MG delayed-release capsule Take 1 Cap by mouth 2 Times a Day. 60 Cap 8   • pancrelipase, Lip-Prot-Amyl, (CREON) 76434-74401 units Cap DR Particles Take 2 Caps by mouth every day. 60 Cap 11   • tamsulosin (FLOMAX) 0.4 MG capsule Take 1 Cap by mouth every day. 30 Cap 11     No current facility-administered medications for this visit.      Family History   Problem Relation Age of Onset   • No Known Problems Mother    • No Known Problems Father          Review of Systems   Psychiatric/Behavioral: Positive for memory loss.   All other systems reviewed and are negative.         Objective:     /72 (BP Location: Right arm, Patient Position: Sitting, BP Cuff Size: Adult)   Pulse 92   Resp 16   Ht 1.905 m (6' 3\")   Wt 66.7 kg (147 lb)   SpO2 96%   BMI 18.37 kg/m²      Physical Exam  Vitals signs and nursing note reviewed.   Constitutional:       General: He is not in acute distress.     Appearance: He is well-developed. He is not diaphoretic.   HENT:      Head: Normocephalic and atraumatic.      Right Ear: External ear normal.      Left Ear: External ear normal.      Nose: Nose normal.   Eyes:      General:         Right eye: No discharge.         Left eye: No discharge.      Conjunctiva/sclera: Conjunctivae normal.   Neck:      Musculoskeletal: Normal range of motion and neck supple.      Thyroid: No thyromegaly.      Trachea: No tracheal deviation.   Cardiovascular:      Rate and Rhythm: Normal rate and regular rhythm.      Heart sounds: Normal heart sounds. No murmur.   Pulmonary:      Effort: Pulmonary effort is normal. No respiratory distress.      Breath sounds: Normal breath sounds. No wheezing or rales.   Abdominal:      Comments: Reducible abdominal hernia without tenderness   Lymphadenopathy:      Cervical: No cervical adenopathy.   Skin:     General: Skin is warm and dry.      Findings: No erythema or rash.   Neurological:      Mental Status: He " is alert and oriented to person, place, and time.      Coordination: Coordination normal.   Psychiatric:         Behavior: Behavior normal.         Thought Content: Thought content normal.         Judgment: Judgment normal.                 Assessment/Plan:       1. Idiopathic chronic pancreatitis (HCC)  Patient and his caregiver were reminded to contact GI in the next few weeks for possible removal or changing of his stent.  He will continue with same medication and paperwork was signed for his group home today.    2. Essential hypertension  Blood pressure remaining well controlled without medication and he is on Flomax for his BPH but is not reporting dizziness    3. Uncontrolled type 1 diabetes mellitus with hyperglycemia (HCC)  Patient will continue to follow with endocrinology and it appears he does have an appointment with them in about a month.  We reviewed his medicines as recently prescribed.    4. Moderate protein malnutrition (HCC)  Patient showing slight weight gain and he will continue to try to eat 3 meals a day but with no extra snacking in the evening.

## 2020-07-13 RX ORDER — ATORVASTATIN CALCIUM 40 MG/1
TABLET, FILM COATED ORAL
Qty: 90 TAB | Refills: 3 | Status: SHIPPED | OUTPATIENT
Start: 2020-07-13 | End: 2020-11-20 | Stop reason: SDUPTHER

## 2020-07-23 ENCOUNTER — APPOINTMENT (OUTPATIENT)
Dept: RADIOLOGY | Facility: MEDICAL CENTER | Age: 60
DRG: 469 | End: 2020-07-23
Attending: EMERGENCY MEDICINE
Payer: MEDICARE

## 2020-07-23 ENCOUNTER — HOSPITAL ENCOUNTER (INPATIENT)
Facility: MEDICAL CENTER | Age: 60
LOS: 4 days | DRG: 469 | End: 2020-07-28
Attending: EMERGENCY MEDICINE | Admitting: HOSPITALIST
Payer: MEDICARE

## 2020-07-23 DIAGNOSIS — S72.002D CLOSED FRACTURE OF LEFT HIP WITH ROUTINE HEALING, SUBSEQUENT ENCOUNTER: ICD-10-CM

## 2020-07-23 DIAGNOSIS — S72.002A CLOSED FRACTURE OF LEFT HIP, INITIAL ENCOUNTER (HCC): ICD-10-CM

## 2020-07-23 LAB
BASOPHILS # BLD AUTO: 0.6 % (ref 0–1.8)
BASOPHILS # BLD: 0.15 K/UL (ref 0–0.12)
EOSINOPHIL # BLD AUTO: 0.01 K/UL (ref 0–0.51)
EOSINOPHIL NFR BLD: 0 % (ref 0–6.9)
ERYTHROCYTE [DISTWIDTH] IN BLOOD BY AUTOMATED COUNT: 54.6 FL (ref 35.9–50)
GLUCOSE BLD-MCNC: 97 MG/DL (ref 65–99)
HCT VFR BLD AUTO: 36.6 % (ref 42–52)
HGB BLD-MCNC: 11.6 G/DL (ref 14–18)
IMM GRANULOCYTES # BLD AUTO: 0.54 K/UL (ref 0–0.11)
IMM GRANULOCYTES NFR BLD AUTO: 2 % (ref 0–0.9)
LYMPHOCYTES # BLD AUTO: 1.14 K/UL (ref 1–4.8)
LYMPHOCYTES NFR BLD: 4.2 % (ref 22–41)
MCH RBC QN AUTO: 27.2 PG (ref 27–33)
MCHC RBC AUTO-ENTMCNC: 31.7 G/DL (ref 33.7–35.3)
MCV RBC AUTO: 85.9 FL (ref 81.4–97.8)
MONOCYTES # BLD AUTO: 2.05 K/UL (ref 0–0.85)
MONOCYTES NFR BLD AUTO: 7.6 % (ref 0–13.4)
NEUTROPHILS # BLD AUTO: 23.12 K/UL (ref 1.82–7.42)
NEUTROPHILS NFR BLD: 85.6 % (ref 44–72)
NRBC # BLD AUTO: 0 K/UL
NRBC BLD-RTO: 0 /100 WBC
PLATELET # BLD AUTO: 367 K/UL (ref 164–446)
PMV BLD AUTO: 10.3 FL (ref 9–12.9)
RBC # BLD AUTO: 4.26 M/UL (ref 4.7–6.1)
WBC # BLD AUTO: 27 K/UL (ref 4.8–10.8)

## 2020-07-23 PROCEDURE — 83735 ASSAY OF MAGNESIUM: CPT

## 2020-07-23 PROCEDURE — 84484 ASSAY OF TROPONIN QUANT: CPT

## 2020-07-23 PROCEDURE — 93005 ELECTROCARDIOGRAM TRACING: CPT | Performed by: EMERGENCY MEDICINE

## 2020-07-23 PROCEDURE — 87040 BLOOD CULTURE FOR BACTERIA: CPT | Mod: 91

## 2020-07-23 PROCEDURE — 73501 X-RAY EXAM HIP UNI 1 VIEW: CPT | Mod: LT

## 2020-07-23 PROCEDURE — 99285 EMERGENCY DEPT VISIT HI MDM: CPT

## 2020-07-23 PROCEDURE — 700111 HCHG RX REV CODE 636 W/ 250 OVERRIDE (IP): Performed by: EMERGENCY MEDICINE

## 2020-07-23 PROCEDURE — 85025 COMPLETE CBC W/AUTO DIFF WBC: CPT

## 2020-07-23 PROCEDURE — 82962 GLUCOSE BLOOD TEST: CPT

## 2020-07-23 PROCEDURE — 71045 X-RAY EXAM CHEST 1 VIEW: CPT

## 2020-07-23 PROCEDURE — 80053 COMPREHEN METABOLIC PANEL: CPT

## 2020-07-23 PROCEDURE — 96374 THER/PROPH/DIAG INJ IV PUSH: CPT

## 2020-07-23 RX ORDER — DEXTROSE MONOHYDRATE 25 G/50ML
INJECTION, SOLUTION INTRAVENOUS
Status: COMPLETED
Start: 2020-07-23 | End: 2020-07-24

## 2020-07-23 RX ORDER — SODIUM CHLORIDE 9 MG/ML
1000 INJECTION, SOLUTION INTRAVENOUS ONCE
Status: COMPLETED | OUTPATIENT
Start: 2020-07-24 | End: 2020-07-24

## 2020-07-23 RX ADMIN — FENTANYL CITRATE 50 MCG: 50 INJECTION INTRAMUSCULAR; INTRAVENOUS at 23:15

## 2020-07-23 ASSESSMENT — FIBROSIS 4 INDEX: FIB4 SCORE: 0.63

## 2020-07-24 ENCOUNTER — APPOINTMENT (OUTPATIENT)
Dept: RADIOLOGY | Facility: MEDICAL CENTER | Age: 60
DRG: 469 | End: 2020-07-24
Attending: ORTHOPAEDIC SURGERY
Payer: MEDICARE

## 2020-07-24 ENCOUNTER — APPOINTMENT (OUTPATIENT)
Dept: RADIOLOGY | Facility: MEDICAL CENTER | Age: 60
DRG: 469 | End: 2020-07-24
Attending: EMERGENCY MEDICINE
Payer: MEDICARE

## 2020-07-24 ENCOUNTER — ANESTHESIA EVENT (OUTPATIENT)
Dept: SURGERY | Facility: MEDICAL CENTER | Age: 60
DRG: 469 | End: 2020-07-24
Payer: MEDICARE

## 2020-07-24 ENCOUNTER — ANESTHESIA (OUTPATIENT)
Dept: SURGERY | Facility: MEDICAL CENTER | Age: 60
DRG: 469 | End: 2020-07-24
Payer: MEDICARE

## 2020-07-24 DIAGNOSIS — E10.65 TYPE 1 DIABETES MELLITUS WITH HYPERGLYCEMIA (HCC): ICD-10-CM

## 2020-07-24 LAB
ALBUMIN SERPL BCP-MCNC: 3.1 G/DL (ref 3.2–4.9)
ALBUMIN/GLOB SERPL: 0.8 G/DL
ALP SERPL-CCNC: 284 U/L (ref 30–99)
ALT SERPL-CCNC: 25 U/L (ref 2–50)
ANION GAP SERPL CALC-SCNC: 14 MMOL/L (ref 7–16)
APPEARANCE UR: CLEAR
AST SERPL-CCNC: 33 U/L (ref 12–45)
BILIRUB SERPL-MCNC: 0.4 MG/DL (ref 0.1–1.5)
BILIRUB UR QL STRIP.AUTO: NEGATIVE
BUN SERPL-MCNC: 18 MG/DL (ref 8–22)
CALCIUM SERPL-MCNC: 9 MG/DL (ref 8.5–10.5)
CHLORIDE SERPL-SCNC: 103 MMOL/L (ref 96–112)
CO2 SERPL-SCNC: 21 MMOL/L (ref 20–33)
COLOR UR: YELLOW
COVID ORDER STATUS COVID19: NORMAL
CREAT SERPL-MCNC: 0.77 MG/DL (ref 0.5–1.4)
EKG IMPRESSION: NORMAL
ERYTHROCYTE [DISTWIDTH] IN BLOOD BY AUTOMATED COUNT: 54.4 FL (ref 35.9–50)
EST. AVERAGE GLUCOSE BLD GHB EST-MCNC: 157 MG/DL
GLOBULIN SER CALC-MCNC: 3.7 G/DL (ref 1.9–3.5)
GLUCOSE BLD-MCNC: 124 MG/DL (ref 65–99)
GLUCOSE BLD-MCNC: 130 MG/DL (ref 65–99)
GLUCOSE BLD-MCNC: 134 MG/DL (ref 65–99)
GLUCOSE BLD-MCNC: 138 MG/DL (ref 65–99)
GLUCOSE BLD-MCNC: 160 MG/DL (ref 65–99)
GLUCOSE BLD-MCNC: 160 MG/DL (ref 65–99)
GLUCOSE BLD-MCNC: 167 MG/DL (ref 65–99)
GLUCOSE BLD-MCNC: 270 MG/DL (ref 65–99)
GLUCOSE BLD-MCNC: 32 MG/DL (ref 65–99)
GLUCOSE SERPL-MCNC: 82 MG/DL (ref 65–99)
GLUCOSE UR STRIP.AUTO-MCNC: NEGATIVE MG/DL
HBA1C MFR BLD: 7.1 % (ref 0–5.6)
HCT VFR BLD AUTO: 34.4 % (ref 42–52)
HGB BLD-MCNC: 10.9 G/DL (ref 14–18)
IRON SATN MFR SERPL: 12 % (ref 15–55)
IRON SERPL-MCNC: 25 UG/DL (ref 50–180)
KETONES UR STRIP.AUTO-MCNC: NEGATIVE MG/DL
LACTATE BLD-SCNC: 1.3 MMOL/L (ref 0.5–2)
LEUKOCYTE ESTERASE UR QL STRIP.AUTO: NEGATIVE
MAGNESIUM SERPL-MCNC: 1.9 MG/DL (ref 1.5–2.5)
MCH RBC QN AUTO: 27.1 PG (ref 27–33)
MCHC RBC AUTO-ENTMCNC: 31.7 G/DL (ref 33.7–35.3)
MCV RBC AUTO: 85.6 FL (ref 81.4–97.8)
MICRO URNS: NORMAL
NITRITE UR QL STRIP.AUTO: NEGATIVE
PH UR STRIP.AUTO: 5 [PH] (ref 5–8)
PLATELET # BLD AUTO: 365 K/UL (ref 164–446)
PMV BLD AUTO: 9.5 FL (ref 9–12.9)
POTASSIUM SERPL-SCNC: 4.5 MMOL/L (ref 3.6–5.5)
PROT SERPL-MCNC: 6.8 G/DL (ref 6–8.2)
PROT UR QL STRIP: NEGATIVE MG/DL
RBC # BLD AUTO: 4.02 M/UL (ref 4.7–6.1)
RBC UR QL AUTO: NEGATIVE
SARS-COV-2 RNA RESP QL NAA+PROBE: NOTDETECTED
SODIUM SERPL-SCNC: 138 MMOL/L (ref 135–145)
SP GR UR STRIP.AUTO: 1.01
SPECIMEN SOURCE: NORMAL
TIBC SERPL-MCNC: 208 UG/DL (ref 250–450)
TROPONIN T SERPL-MCNC: 19 NG/L (ref 6–19)
UIBC SERPL-MCNC: 183 UG/DL (ref 110–370)
UROBILINOGEN UR STRIP.AUTO-MCNC: 0.2 MG/DL
WBC # BLD AUTO: 18.9 K/UL (ref 4.8–10.8)

## 2020-07-24 PROCEDURE — 160042 HCHG SURGERY MINUTES - EA ADDL 1 MIN LEVEL 5: Performed by: ORTHOPAEDIC SURGERY

## 2020-07-24 PROCEDURE — 83036 HEMOGLOBIN GLYCOSYLATED A1C: CPT

## 2020-07-24 PROCEDURE — L8699 PROSTHETIC IMPLANT NOS: HCPCS | Performed by: ORTHOPAEDIC SURGERY

## 2020-07-24 PROCEDURE — 72192 CT PELVIS W/O DYE: CPT

## 2020-07-24 PROCEDURE — 700111 HCHG RX REV CODE 636 W/ 250 OVERRIDE (IP): Performed by: EMERGENCY MEDICINE

## 2020-07-24 PROCEDURE — 700111 HCHG RX REV CODE 636 W/ 250 OVERRIDE (IP): Performed by: ORTHOPAEDIC SURGERY

## 2020-07-24 PROCEDURE — 99223 1ST HOSP IP/OBS HIGH 75: CPT | Mod: AI,GC | Performed by: HOSPITALIST

## 2020-07-24 PROCEDURE — A9270 NON-COVERED ITEM OR SERVICE: HCPCS | Performed by: STUDENT IN AN ORGANIZED HEALTH CARE EDUCATION/TRAINING PROGRAM

## 2020-07-24 PROCEDURE — 502000 HCHG MISC OR IMPLANTS RC 0278: Performed by: ORTHOPAEDIC SURGERY

## 2020-07-24 PROCEDURE — 770006 HCHG ROOM/CARE - MED/SURG/GYN SEMI*

## 2020-07-24 PROCEDURE — U0003 INFECTIOUS AGENT DETECTION BY NUCLEIC ACID (DNA OR RNA); SEVERE ACUTE RESPIRATORY SYNDROME CORONAVIRUS 2 (SARS-COV-2) (CORONAVIRUS DISEASE [COVID-19]), AMPLIFIED PROBE TECHNIQUE, MAKING USE OF HIGH THROUGHPUT TECHNOLOGIES AS DESCRIBED BY CMS-2020-01-R: HCPCS

## 2020-07-24 PROCEDURE — 700101 HCHG RX REV CODE 250: Performed by: ANESTHESIOLOGY

## 2020-07-24 PROCEDURE — 700102 HCHG RX REV CODE 250 W/ 637 OVERRIDE(OP): Performed by: STUDENT IN AN ORGANIZED HEALTH CARE EDUCATION/TRAINING PROGRAM

## 2020-07-24 PROCEDURE — 700105 HCHG RX REV CODE 258: Performed by: EMERGENCY MEDICINE

## 2020-07-24 PROCEDURE — 700105 HCHG RX REV CODE 258: Performed by: STUDENT IN AN ORGANIZED HEALTH CARE EDUCATION/TRAINING PROGRAM

## 2020-07-24 PROCEDURE — A9270 NON-COVERED ITEM OR SERVICE: HCPCS | Performed by: ANESTHESIOLOGY

## 2020-07-24 PROCEDURE — 70450 CT HEAD/BRAIN W/O DYE: CPT

## 2020-07-24 PROCEDURE — 700102 HCHG RX REV CODE 250 W/ 637 OVERRIDE(OP): Performed by: ANESTHESIOLOGY

## 2020-07-24 PROCEDURE — 82962 GLUCOSE BLOOD TEST: CPT

## 2020-07-24 PROCEDURE — 700111 HCHG RX REV CODE 636 W/ 250 OVERRIDE (IP): Performed by: ANESTHESIOLOGY

## 2020-07-24 PROCEDURE — 502578 HCHG PACK, TOTAL HIP: Performed by: ORTHOPAEDIC SURGERY

## 2020-07-24 PROCEDURE — 0SRS0J9 REPLACEMENT OF LEFT HIP JOINT, FEMORAL SURFACE WITH SYNTHETIC SUBSTITUTE, CEMENTED, OPEN APPROACH: ICD-10-PCS | Performed by: ORTHOPAEDIC SURGERY

## 2020-07-24 PROCEDURE — 160035 HCHG PACU - 1ST 60 MINS PHASE I: Performed by: ORTHOPAEDIC SURGERY

## 2020-07-24 PROCEDURE — 160009 HCHG ANES TIME/MIN: Performed by: ORTHOPAEDIC SURGERY

## 2020-07-24 PROCEDURE — C9803 HOPD COVID-19 SPEC COLLECT: HCPCS | Performed by: ORTHOPAEDIC SURGERY

## 2020-07-24 PROCEDURE — 96376 TX/PRO/DX INJ SAME DRUG ADON: CPT

## 2020-07-24 PROCEDURE — 160002 HCHG RECOVERY MINUTES (STAT): Performed by: ORTHOPAEDIC SURGERY

## 2020-07-24 PROCEDURE — 83550 IRON BINDING TEST: CPT

## 2020-07-24 PROCEDURE — 501838 HCHG SUTURE GENERAL: Performed by: ORTHOPAEDIC SURGERY

## 2020-07-24 PROCEDURE — 700105 HCHG RX REV CODE 258: Performed by: ORTHOPAEDIC SURGERY

## 2020-07-24 PROCEDURE — 36415 COLL VENOUS BLD VENIPUNCTURE: CPT

## 2020-07-24 PROCEDURE — 160036 HCHG PACU - EA ADDL 30 MINS PHASE I: Performed by: ORTHOPAEDIC SURGERY

## 2020-07-24 PROCEDURE — 700105 HCHG RX REV CODE 258: Performed by: ANESTHESIOLOGY

## 2020-07-24 PROCEDURE — 700101 HCHG RX REV CODE 250: Performed by: EMERGENCY MEDICINE

## 2020-07-24 PROCEDURE — 700111 HCHG RX REV CODE 636 W/ 250 OVERRIDE (IP): Performed by: STUDENT IN AN ORGANIZED HEALTH CARE EDUCATION/TRAINING PROGRAM

## 2020-07-24 PROCEDURE — 72170 X-RAY EXAM OF PELVIS: CPT

## 2020-07-24 PROCEDURE — 73552 X-RAY EXAM OF FEMUR 2/>: CPT | Mod: LT

## 2020-07-24 PROCEDURE — 85027 COMPLETE CBC AUTOMATED: CPT

## 2020-07-24 PROCEDURE — 160048 HCHG OR STATISTICAL LEVEL 1-5: Performed by: ORTHOPAEDIC SURGERY

## 2020-07-24 PROCEDURE — 96375 TX/PRO/DX INJ NEW DRUG ADDON: CPT

## 2020-07-24 PROCEDURE — 83540 ASSAY OF IRON: CPT

## 2020-07-24 PROCEDURE — 83605 ASSAY OF LACTIC ACID: CPT

## 2020-07-24 PROCEDURE — 160031 HCHG SURGERY MINUTES - 1ST 30 MINS LEVEL 5: Performed by: ORTHOPAEDIC SURGERY

## 2020-07-24 PROCEDURE — 81003 URINALYSIS AUTO W/O SCOPE: CPT

## 2020-07-24 DEVICE — IMPLANTABLE DEVICE: Type: IMPLANTABLE DEVICE | Site: HIP | Status: FUNCTIONAL

## 2020-07-24 DEVICE — BONE CEMENT SIMPLEX FULL DOSE - (10EA/PK): Type: IMPLANTABLE DEVICE | Site: HIP | Status: FUNCTIONAL

## 2020-07-24 RX ORDER — DEXTROSE MONOHYDRATE 25 G/50ML
50 INJECTION, SOLUTION INTRAVENOUS
Status: DISCONTINUED | OUTPATIENT
Start: 2020-07-24 | End: 2020-07-24

## 2020-07-24 RX ORDER — MEPERIDINE HYDROCHLORIDE 25 MG/ML
12.5 INJECTION INTRAMUSCULAR; INTRAVENOUS; SUBCUTANEOUS
Status: DISCONTINUED | OUTPATIENT
Start: 2020-07-24 | End: 2020-07-24 | Stop reason: HOSPADM

## 2020-07-24 RX ORDER — DEXTROSE MONOHYDRATE 25 G/50ML
50 INJECTION, SOLUTION INTRAVENOUS
Status: DISCONTINUED | OUTPATIENT
Start: 2020-07-24 | End: 2020-07-27

## 2020-07-24 RX ORDER — POLYETHYLENE GLYCOL 3350 17 G/17G
1 POWDER, FOR SOLUTION ORAL
Status: DISCONTINUED | OUTPATIENT
Start: 2020-07-24 | End: 2020-07-28 | Stop reason: HOSPADM

## 2020-07-24 RX ORDER — SODIUM CHLORIDE, SODIUM LACTATE, POTASSIUM CHLORIDE, CALCIUM CHLORIDE 600; 310; 30; 20 MG/100ML; MG/100ML; MG/100ML; MG/100ML
INJECTION, SOLUTION INTRAVENOUS CONTINUOUS
Status: DISCONTINUED | OUTPATIENT
Start: 2020-07-24 | End: 2020-07-26

## 2020-07-24 RX ORDER — BISACODYL 10 MG
10 SUPPOSITORY, RECTAL RECTAL
Status: DISCONTINUED | OUTPATIENT
Start: 2020-07-24 | End: 2020-07-28 | Stop reason: HOSPADM

## 2020-07-24 RX ORDER — LABETALOL HYDROCHLORIDE 5 MG/ML
10 INJECTION, SOLUTION INTRAVENOUS
Status: DISCONTINUED | OUTPATIENT
Start: 2020-07-24 | End: 2020-07-28 | Stop reason: HOSPADM

## 2020-07-24 RX ORDER — ACETAMINOPHEN 500 MG
500 TABLET ORAL EVERY 6 HOURS PRN
Status: DISCONTINUED | OUTPATIENT
Start: 2020-07-24 | End: 2020-07-28 | Stop reason: HOSPADM

## 2020-07-24 RX ORDER — OXYCODONE HCL 5 MG/5 ML
10 SOLUTION, ORAL ORAL
Status: DISCONTINUED | OUTPATIENT
Start: 2020-07-24 | End: 2020-07-24 | Stop reason: HOSPADM

## 2020-07-24 RX ORDER — VANCOMYCIN HYDROCHLORIDE 1 G/20ML
1000 INJECTION, POWDER, LYOPHILIZED, FOR SOLUTION INTRAVENOUS
Status: DISCONTINUED | OUTPATIENT
Start: 2020-07-24 | End: 2020-07-24

## 2020-07-24 RX ORDER — CELECOXIB 200 MG/1
200 CAPSULE ORAL ONCE
Status: COMPLETED | OUTPATIENT
Start: 2020-07-24 | End: 2020-07-24

## 2020-07-24 RX ORDER — AMOXICILLIN 250 MG
2 CAPSULE ORAL 2 TIMES DAILY
Status: DISCONTINUED | OUTPATIENT
Start: 2020-07-24 | End: 2020-07-28 | Stop reason: HOSPADM

## 2020-07-24 RX ORDER — CEFAZOLIN SODIUM 1 G/3ML
INJECTION, POWDER, FOR SOLUTION INTRAMUSCULAR; INTRAVENOUS PRN
Status: DISCONTINUED | OUTPATIENT
Start: 2020-07-24 | End: 2020-07-24 | Stop reason: SURG

## 2020-07-24 RX ORDER — VANCOMYCIN HYDROCHLORIDE 1 G/20ML
INJECTION, POWDER, LYOPHILIZED, FOR SOLUTION INTRAVENOUS
Status: COMPLETED | OUTPATIENT
Start: 2020-07-24 | End: 2020-07-24

## 2020-07-24 RX ORDER — INSULIN ASPART 100 [IU]/ML
10 INJECTION, SOLUTION INTRAVENOUS; SUBCUTANEOUS
Qty: 5 EACH | Refills: 11 | Status: SHIPPED | OUTPATIENT
Start: 2020-07-24 | End: 2020-08-27 | Stop reason: SDUPTHER

## 2020-07-24 RX ORDER — MORPHINE SULFATE 4 MG/ML
2 INJECTION, SOLUTION INTRAMUSCULAR; INTRAVENOUS EVERY 4 HOURS PRN
Status: DISCONTINUED | OUTPATIENT
Start: 2020-07-24 | End: 2020-07-26

## 2020-07-24 RX ORDER — OMEPRAZOLE 20 MG/1
20 CAPSULE, DELAYED RELEASE ORAL 2 TIMES DAILY
Status: DISCONTINUED | OUTPATIENT
Start: 2020-07-24 | End: 2020-07-28 | Stop reason: HOSPADM

## 2020-07-24 RX ORDER — DEXTROSE AND SODIUM CHLORIDE 5; .9 G/100ML; G/100ML
INJECTION, SOLUTION INTRAVENOUS ONCE
Status: COMPLETED | OUTPATIENT
Start: 2020-07-24 | End: 2020-07-24

## 2020-07-24 RX ORDER — DEXTROSE MONOHYDRATE 25 G/50ML
50 INJECTION, SOLUTION INTRAVENOUS ONCE
Status: COMPLETED | OUTPATIENT
Start: 2020-07-24 | End: 2020-07-24

## 2020-07-24 RX ORDER — LIDOCAINE HYDROCHLORIDE 20 MG/ML
INJECTION, SOLUTION EPIDURAL; INFILTRATION; INTRACAUDAL; PERINEURAL PRN
Status: DISCONTINUED | OUTPATIENT
Start: 2020-07-24 | End: 2020-07-24 | Stop reason: SURG

## 2020-07-24 RX ORDER — SODIUM CHLORIDE, SODIUM LACTATE, POTASSIUM CHLORIDE, CALCIUM CHLORIDE 600; 310; 30; 20 MG/100ML; MG/100ML; MG/100ML; MG/100ML
INJECTION, SOLUTION INTRAVENOUS CONTINUOUS
Status: DISCONTINUED | OUTPATIENT
Start: 2020-07-24 | End: 2020-07-24 | Stop reason: HOSPADM

## 2020-07-24 RX ORDER — TAMSULOSIN HYDROCHLORIDE 0.4 MG/1
0.4 CAPSULE ORAL DAILY
Status: DISCONTINUED | OUTPATIENT
Start: 2020-07-24 | End: 2020-07-28

## 2020-07-24 RX ORDER — HYDROMORPHONE HYDROCHLORIDE 1 MG/ML
0.1 INJECTION, SOLUTION INTRAMUSCULAR; INTRAVENOUS; SUBCUTANEOUS
Status: DISCONTINUED | OUTPATIENT
Start: 2020-07-24 | End: 2020-07-24 | Stop reason: HOSPADM

## 2020-07-24 RX ORDER — INSULIN GLARGINE 100 [IU]/ML
15 INJECTION, SOLUTION SUBCUTANEOUS EVERY EVENING
Status: DISCONTINUED | OUTPATIENT
Start: 2020-07-24 | End: 2020-07-25

## 2020-07-24 RX ORDER — MORPHINE SULFATE 4 MG/ML
4 INJECTION, SOLUTION INTRAMUSCULAR; INTRAVENOUS ONCE
Status: COMPLETED | OUTPATIENT
Start: 2020-07-24 | End: 2020-07-24

## 2020-07-24 RX ORDER — LIDOCAINE HYDROCHLORIDE 40 MG/ML
SOLUTION TOPICAL PRN
Status: DISCONTINUED | OUTPATIENT
Start: 2020-07-24 | End: 2020-07-24 | Stop reason: SURG

## 2020-07-24 RX ORDER — OXYCODONE HCL 5 MG/5 ML
5 SOLUTION, ORAL ORAL
Status: DISCONTINUED | OUTPATIENT
Start: 2020-07-24 | End: 2020-07-24 | Stop reason: HOSPADM

## 2020-07-24 RX ORDER — ACETAMINOPHEN 500 MG
1000 TABLET ORAL ONCE
Status: COMPLETED | OUTPATIENT
Start: 2020-07-24 | End: 2020-07-24

## 2020-07-24 RX ORDER — HYDROMORPHONE HYDROCHLORIDE 1 MG/ML
0.4 INJECTION, SOLUTION INTRAMUSCULAR; INTRAVENOUS; SUBCUTANEOUS
Status: DISCONTINUED | OUTPATIENT
Start: 2020-07-24 | End: 2020-07-24 | Stop reason: HOSPADM

## 2020-07-24 RX ORDER — ONDANSETRON 2 MG/ML
4 INJECTION INTRAMUSCULAR; INTRAVENOUS
Status: DISCONTINUED | OUTPATIENT
Start: 2020-07-24 | End: 2020-07-24 | Stop reason: HOSPADM

## 2020-07-24 RX ORDER — DEXAMETHASONE SODIUM PHOSPHATE 4 MG/ML
INJECTION, SOLUTION INTRA-ARTICULAR; INTRALESIONAL; INTRAMUSCULAR; INTRAVENOUS; SOFT TISSUE PRN
Status: DISCONTINUED | OUTPATIENT
Start: 2020-07-24 | End: 2020-07-24 | Stop reason: SURG

## 2020-07-24 RX ORDER — PHENYLEPHRINE HCL IN 0.9% NACL 0.5 MG/5ML
SYRINGE (ML) INTRAVENOUS PRN
Status: DISCONTINUED | OUTPATIENT
Start: 2020-07-24 | End: 2020-07-24 | Stop reason: SURG

## 2020-07-24 RX ORDER — HYDROMORPHONE HYDROCHLORIDE 1 MG/ML
0.2 INJECTION, SOLUTION INTRAMUSCULAR; INTRAVENOUS; SUBCUTANEOUS
Status: DISCONTINUED | OUTPATIENT
Start: 2020-07-24 | End: 2020-07-24 | Stop reason: HOSPADM

## 2020-07-24 RX ORDER — CEFAZOLIN SODIUM 2 G/100ML
2 INJECTION, SOLUTION INTRAVENOUS EVERY 8 HOURS
Status: COMPLETED | OUTPATIENT
Start: 2020-07-24 | End: 2020-07-25

## 2020-07-24 RX ORDER — HALOPERIDOL 5 MG/ML
1 INJECTION INTRAMUSCULAR
Status: DISCONTINUED | OUTPATIENT
Start: 2020-07-24 | End: 2020-07-24 | Stop reason: HOSPADM

## 2020-07-24 RX ORDER — SODIUM CHLORIDE, SODIUM LACTATE, POTASSIUM CHLORIDE, CALCIUM CHLORIDE 600; 310; 30; 20 MG/100ML; MG/100ML; MG/100ML; MG/100ML
INJECTION, SOLUTION INTRAVENOUS
Status: DISCONTINUED | OUTPATIENT
Start: 2020-07-24 | End: 2020-07-24 | Stop reason: SURG

## 2020-07-24 RX ADMIN — PROPOFOL 130 MG: 10 INJECTION, EMULSION INTRAVENOUS at 10:15

## 2020-07-24 RX ADMIN — ROCURONIUM BROMIDE 30 MG: 10 INJECTION, SOLUTION INTRAVENOUS at 10:15

## 2020-07-24 RX ADMIN — MIDAZOLAM 1 MG: 1 INJECTION INTRAMUSCULAR; INTRAVENOUS at 10:10

## 2020-07-24 RX ADMIN — OMEPRAZOLE 20 MG: 20 CAPSULE, DELAYED RELEASE ORAL at 05:31

## 2020-07-24 RX ADMIN — VANCOMYCIN HYDROCHLORIDE 1 G: 1 INJECTION, POWDER, LYOPHILIZED, FOR SOLUTION INTRAVENOUS at 10:15

## 2020-07-24 RX ADMIN — Medication 200 MCG: at 11:30

## 2020-07-24 RX ADMIN — EPHEDRINE SULFATE 10 MG: 50 INJECTION, SOLUTION INTRAVENOUS at 10:34

## 2020-07-24 RX ADMIN — DEXTROSE MONOHYDRATE 50 ML: 25 INJECTION, SOLUTION INTRAVENOUS at 00:01

## 2020-07-24 RX ADMIN — MORPHINE SULFATE 2 MG: 4 INJECTION INTRAVENOUS at 05:13

## 2020-07-24 RX ADMIN — SODIUM CHLORIDE, POTASSIUM CHLORIDE, SODIUM LACTATE AND CALCIUM CHLORIDE: 600; 310; 30; 20 INJECTION, SOLUTION INTRAVENOUS at 10:09

## 2020-07-24 RX ADMIN — TAMSULOSIN HYDROCHLORIDE 0.4 MG: 0.4 CAPSULE ORAL at 05:32

## 2020-07-24 RX ADMIN — INSULIN GLARGINE 15 UNITS: 100 INJECTION, SOLUTION SUBCUTANEOUS at 18:21

## 2020-07-24 RX ADMIN — FENTANYL CITRATE 150 MCG: 50 INJECTION INTRAMUSCULAR; INTRAVENOUS at 10:15

## 2020-07-24 RX ADMIN — Medication 200 MCG: at 10:31

## 2020-07-24 RX ADMIN — ACETAMINOPHEN 1000 MG: 500 TABLET ORAL at 09:35

## 2020-07-24 RX ADMIN — DEXTROSE AND SODIUM CHLORIDE: 5; 900 INJECTION, SOLUTION INTRAVENOUS at 00:37

## 2020-07-24 RX ADMIN — MORPHINE SULFATE 4 MG: 4 INJECTION INTRAVENOUS at 02:54

## 2020-07-24 RX ADMIN — CEFAZOLIN SODIUM 2 G: 2 INJECTION, SOLUTION INTRAVENOUS at 18:19

## 2020-07-24 RX ADMIN — VANCOMYCIN HYDROCHLORIDE 1000 MG: 500 INJECTION, POWDER, LYOPHILIZED, FOR SOLUTION INTRAVENOUS at 23:46

## 2020-07-24 RX ADMIN — DEXAMETHASONE SODIUM PHOSPHATE 8 MG: 4 INJECTION, SOLUTION INTRA-ARTICULAR; INTRALESIONAL; INTRAMUSCULAR; INTRAVENOUS; SOFT TISSUE at 10:23

## 2020-07-24 RX ADMIN — SODIUM CHLORIDE 1000 ML: 9 INJECTION, SOLUTION INTRAVENOUS at 00:15

## 2020-07-24 RX ADMIN — INSULIN LISPRO 3 UNITS: 100 INJECTION, SOLUTION INTRAVENOUS; SUBCUTANEOUS at 21:25

## 2020-07-24 RX ADMIN — SUGAMMADEX 200 MG: 100 INJECTION, SOLUTION INTRAVENOUS at 11:22

## 2020-07-24 RX ADMIN — MORPHINE SULFATE 4 MG: 4 INJECTION INTRAVENOUS at 00:56

## 2020-07-24 RX ADMIN — CEFAZOLIN 2 G: 330 INJECTION, POWDER, FOR SOLUTION INTRAMUSCULAR; INTRAVENOUS at 10:15

## 2020-07-24 RX ADMIN — OMEPRAZOLE 20 MG: 20 CAPSULE, DELAYED RELEASE ORAL at 18:18

## 2020-07-24 RX ADMIN — CELECOXIB 200 MG: 200 CAPSULE ORAL at 09:35

## 2020-07-24 RX ADMIN — FENTANYL CITRATE 50 MCG: 50 INJECTION INTRAMUSCULAR; INTRAVENOUS at 11:24

## 2020-07-24 RX ADMIN — DOCUSATE SODIUM 50 MG AND SENNOSIDES 8.6 MG 2 TABLET: 8.6; 5 TABLET, FILM COATED ORAL at 05:31

## 2020-07-24 RX ADMIN — SODIUM CHLORIDE, POTASSIUM CHLORIDE, SODIUM LACTATE AND CALCIUM CHLORIDE: 600; 310; 30; 20 INJECTION, SOLUTION INTRAVENOUS at 18:21

## 2020-07-24 RX ADMIN — LIDOCAINE HYDROCHLORIDE 4 ML: 40 SOLUTION TOPICAL at 10:18

## 2020-07-24 RX ADMIN — Medication 200 MCG: at 10:27

## 2020-07-24 RX ADMIN — SODIUM CHLORIDE, POTASSIUM CHLORIDE, SODIUM LACTATE AND CALCIUM CHLORIDE: 600; 310; 30; 20 INJECTION, SOLUTION INTRAVENOUS at 21:35

## 2020-07-24 RX ADMIN — Medication 100 MCG: at 11:19

## 2020-07-24 RX ADMIN — LIDOCAINE HYDROCHLORIDE 3.5 ML: 20 INJECTION, SOLUTION EPIDURAL; INFILTRATION; INTRACAUDAL at 10:15

## 2020-07-24 SDOH — ECONOMIC STABILITY: FOOD INSECURITY: WITHIN THE PAST 12 MONTHS, THE FOOD YOU BOUGHT JUST DIDN'T LAST AND YOU DIDN'T HAVE MONEY TO GET MORE.: NEVER TRUE

## 2020-07-24 SDOH — ECONOMIC STABILITY: FOOD INSECURITY: WITHIN THE PAST 12 MONTHS, YOU WORRIED THAT YOUR FOOD WOULD RUN OUT BEFORE YOU GOT MONEY TO BUY MORE.: NEVER TRUE

## 2020-07-24 SDOH — ECONOMIC STABILITY: TRANSPORTATION INSECURITY
IN THE PAST 12 MONTHS, HAS THE LACK OF TRANSPORTATION KEPT YOU FROM MEDICAL APPOINTMENTS OR FROM GETTING MEDICATIONS?: NO

## 2020-07-24 SDOH — ECONOMIC STABILITY: TRANSPORTATION INSECURITY
IN THE PAST 12 MONTHS, HAS LACK OF TRANSPORTATION KEPT YOU FROM MEETINGS, WORK, OR FROM GETTING THINGS NEEDED FOR DAILY LIVING?: NO

## 2020-07-24 ASSESSMENT — COGNITIVE AND FUNCTIONAL STATUS - GENERAL
MOBILITY SCORE: 14
SUGGESTED CMS G CODE MODIFIER MOBILITY: CL
STANDING UP FROM CHAIR USING ARMS: A LOT
DRESSING REGULAR LOWER BODY CLOTHING: A LOT
DAILY ACTIVITIY SCORE: 18
SUGGESTED CMS G CODE MODIFIER DAILY ACTIVITY: CK
MOVING TO AND FROM BED TO CHAIR: A LITTLE
WALKING IN HOSPITAL ROOM: A LOT
DRESSING REGULAR UPPER BODY CLOTHING: A LOT
TURNING FROM BACK TO SIDE WHILE IN FLAT BAD: A LITTLE
MOVING FROM LYING ON BACK TO SITTING ON SIDE OF FLAT BED: A LOT
CLIMB 3 TO 5 STEPS WITH RAILING: A LOT
HELP NEEDED FOR BATHING: A LITTLE
TOILETING: A LITTLE

## 2020-07-24 ASSESSMENT — LIFESTYLE VARIABLES
AVERAGE NUMBER OF DAYS PER WEEK YOU HAVE A DRINK CONTAINING ALCOHOL: 0
HAVE YOU EVER FELT YOU SHOULD CUT DOWN ON YOUR DRINKING: NO
HOW MANY TIMES IN THE PAST YEAR HAVE YOU HAD 5 OR MORE DRINKS IN A DAY: 0
HAVE PEOPLE ANNOYED YOU BY CRITICIZING YOUR DRINKING: NO
EVER FELT BAD OR GUILTY ABOUT YOUR DRINKING: NO
ON A TYPICAL DAY WHEN YOU DRINK ALCOHOL HOW MANY DRINKS DO YOU HAVE: 0
CONSUMPTION TOTAL: NEGATIVE
TOTAL SCORE: 0
TOTAL SCORE: 0
DOES PATIENT WANT TO STOP DRINKING: NO
ALCOHOL_USE: NO
TOTAL SCORE: 0
SUBSTANCE_ABUSE: 0
EVER HAD A DRINK FIRST THING IN THE MORNING TO STEADY YOUR NERVES TO GET RID OF A HANGOVER: NO
EVER_SMOKED: YES

## 2020-07-24 ASSESSMENT — ENCOUNTER SYMPTOMS
PALPITATIONS: 0
DIARRHEA: 1
MYALGIAS: 0
HEADACHES: 0
HEMOPTYSIS: 0
WHEEZING: 0
SHORTNESS OF BREATH: 0
DIZZINESS: 0
CHILLS: 0
LOSS OF CONSCIOUSNESS: 0
HEARTBURN: 0
DOUBLE VISION: 0
BLURRED VISION: 0
CONSTIPATION: 0
BRUISES/BLEEDS EASILY: 0
ORTHOPNEA: 0
FEVER: 0
ABDOMINAL PAIN: 0
NAUSEA: 0
VOMITING: 0
COUGH: 0

## 2020-07-24 ASSESSMENT — COPD QUESTIONNAIRES
DURING THE PAST 4 WEEKS HOW MUCH DID YOU FEEL SHORT OF BREATH: NONE/LITTLE OF THE TIME
COPD SCREENING SCORE: 2
DO YOU EVER COUGH UP ANY MUCUS OR PHLEGM?: NO/ONLY WITH OCCASIONAL COLDS OR INFECTIONS
IN THE PAST 12 MONTHS DO YOU DO LESS THAN YOU USED TO BECAUSE OF YOUR BREATHING PROBLEMS: DISAGREE/UNSURE
HAVE YOU SMOKED AT LEAST 100 CIGARETTES IN YOUR ENTIRE LIFE: NO/DON'T KNOW

## 2020-07-24 ASSESSMENT — PAIN SCALES - GENERAL: PAIN_LEVEL: 0

## 2020-07-24 ASSESSMENT — FIBROSIS 4 INDEX: FIB4 SCORE: 1.11

## 2020-07-24 NOTE — ED NOTES
Report given to Kat BENSON. Transport here to take patient off unit. Patient swabbed for covid and sent to lab prior to transfer.

## 2020-07-24 NOTE — H&P
"History & Physical Note    Date of Admission: 7/24/2020  Admission Status: Inpatient  [facility] Team: Purple  Attending: Viktor  Senior Resident: Dr. Mccrary  Intern: Dr. Soler  Contact Number: [phone]    Chief Complaint: \"I fell\"     History of Present Illness (HPI): Pawel is a 60 y.o. male with a past medical history that includes chronic pancreatitis, Diabetes type 2  who presented 7/23/2020 with a fall that occurred, per the patient, 2-3 hours prior to presentation. He says he tried laying on his side while in bed at the group home he lives at, and then accidentally fell from the bed to the floor. He denies loss of conscious, denies hitting his head, says he was on the floor for about 20 minutes before he was found down. He endorses left thigh and and hip pain following the fall. While in transit to the ED, he was noted to have a blood glucose of 32 and received D10 and oral glucose, which improved his glucose to 97. Patient states that he had a sandwhich for dinner and pasta for lunch and that he ate a fair amount of both. Upon admission it was noted that the patient was incontinent of stool, covering much of his lower body, and that the stool was very loose.  Of note, patient is on 2L nasal cannula in the ED, is not on O2 at home.  Imaging obtained in the ED revealed a subcapital left femoral neck fracture.     Review of Systems: .  Review of Systems   Constitutional: Negative for chills, fever and malaise/fatigue.   HENT: Negative for hearing loss and tinnitus.    Eyes: Negative for blurred vision and double vision.   Respiratory: Negative for cough, hemoptysis, shortness of breath and wheezing.    Cardiovascular: Negative for chest pain, palpitations and orthopnea.   Gastrointestinal: Positive for diarrhea. Negative for abdominal pain, constipation, heartburn, nausea and vomiting.   Genitourinary: Negative for dysuria and urgency.   Musculoskeletal: Positive for joint pain. Negative for myalgias.   Skin: " Negative for rash.   Neurological: Negative for dizziness, loss of consciousness and headaches.   Endo/Heme/Allergies: Does not bruise/bleed easily.   Psychiatric/Behavioral: Negative for substance abuse.         Past Medical History:   Past Medical History was with patient.   has a past medical history of Diabetes (HCC), Hypertension.    Past Surgical History: Past Surgical History was reviewed with patient.   has a past surgical history that includes gastroscopy-endo (N/A, 2018); other abdominal surgery; toe amputation (Right, 2019); pr upper gi endoscopy,biopsy (N/A, 2019); pr ercp,diagnostic (2020); pr endoscopic us exam, esoph (2020); pr ercp,diagnostic (2020); and pr ercp,diagnostic (3/16/2020).    Medications: Medications have been reviewed  with patient.  Prior to Admission Medications   Prescriptions Last Dose Informant Patient Reported? Taking?   Cholecalciferol (VITAMIN D) 2000 UNIT Tab   No No   Sig: Take 1 Tab by mouth every day.   Insulin Degludec (TRESIBA FLEXTOUCH) 100 UNIT/ML Solution Pen-injector   No No   Sig: Inject 20 Units as instructed every bedtime.   Multiple Vitamin (MULTI-VITAMINS) Tab   No No   Sig: TAKE 1 TABLET BY MOUTH ONCE DAILY.   Nutritional Supplements (GLUCERNA 1.0 GER) Liquid   No No   Sig: Take 1 Each by mouth as needed (hypoglycemia, blood sugars less than 70).   Nutritional Supplements (GLUCOSE MANAGEMENT) Tab   No No   Sig: Take 4 Tabs by mouth as needed. Prn blood sugars less than 60   Urine Glucose-Ketones Test Strip   No No   Si Strip by In Vitro route every day.   aspirin (ASA) 81 MG Chew Tab chewable tablet   No No   Sig: CHEW 1 TABLET BY MOUTH ONCE DAILY.   atorvastatin (LIPITOR) 40 MG Tab   No No   Sig: TAKE 1 TABLET BY MOUTH AT BEDTIME.   insulin lispro (HUMALOG KWIKPEN) 100 UNIT/ML Solution Pen-injector injection PEN   No No   Sig: Inject 10 Units as instructed 3 times a day before meals.   omeprazole (PRILOSEC) 20 MG  delayed-release capsule   No No   Sig: Take 1 Cap by mouth 2 Times a Day.   pancrelipase, Lip-Prot-Amyl, (CREON) 31919-65393 units Cap DR Particles   No No   Sig: Take 2 Caps by mouth every day.   tamsulosin (FLOMAX) 0.4 MG capsule   No No   Sig: Take 1 Cap by mouth every day.   thiamine (THIAMINE) 100 MG Tab   No No   Sig: TAKE 1 TABLET BY MOUTH ONCE DAILY.      Facility-Administered Medications: None        Allergies: Allergies have been reviewed with patient.  No Known Allergies    Family History: Reviewed  family history includes No Known Problems in his father and mother.     Social History:   Tobacco: Former; half a pack a day for ~40 years: 20 pack year  Alcohol: Denies  Recreational drugs (illegal and prescription):  Denies  Living situation:  Group home  Primary Care Provider: JAKE Armando  Physical Exam:   Vitals:  Temp:  [36 °C (96.8 °F)] 36 °C (96.8 °F)  Pulse:  [63-79] 76  Resp:  [16-22] 16  BP: (152-172)/(77-88) 163/79  SpO2:  [96 %-99 %] 96 %    Physical Exam  Constitutional:       Appearance: He is not diaphoretic.      Comments: Appears to be in mild distress     HENT:      Head: Normocephalic and atraumatic.      Mouth/Throat:      Mouth: Mucous membranes are dry.   Eyes:      Extraocular Movements: Extraocular movements intact.   Neck:      Musculoskeletal: No neck rigidity or muscular tenderness.   Cardiovascular:      Rate and Rhythm: Regular rhythm.      Heart sounds: No murmur.   Pulmonary:      Effort: No respiratory distress.      Breath sounds: No wheezing.      Comments: Patient is unable to sit up and rolling is too painful, unable to complete pulmonary exam,   Abdominal:      General: There is no distension.      Palpations: There is mass.      Tenderness: There is no abdominal tenderness. There is no guarding or rebound.      Comments: Protruding spherical mass noted on midline abdomen, healed surgical scars. Abdominal muscles noted to be tensed   Musculoskeletal:          General: No deformity.      Right lower leg: No edema.      Left lower leg: No edema.      Comments: Tenderness to palpation over left thigh, no hematoma appreciated, skin intact, no bones visible   Skin:     Coloration: Skin is not jaundiced.      Comments: Evaluation of back unable to be completed due to patient pain  Small amount of stool noted on bases of patient's feet   Neurological:      Mental Status: He is alert and oriented to person, place, and time.      Comments: Periodic right eye twitching noted during interview.  Left facial weakness noted when asked to bare teeth; patient states this is not new     Psychiatric:         Mood and Affect: Mood normal.         Labs:   Recent Results (from the past 24 hour(s))   ACCU-CHEK GLUCOSE    Collection Time: 07/23/20 10:24 PM   Result Value Ref Range    Glucose - Accu-Ck 97 65 - 99 mg/dL   CBC WITH DIFFERENTIAL    Collection Time: 07/23/20 10:46 PM   Result Value Ref Range    WBC 27.0 (H) 4.8 - 10.8 K/uL    RBC 4.26 (L) 4.70 - 6.10 M/uL    Hemoglobin 11.6 (L) 14.0 - 18.0 g/dL    Hematocrit 36.6 (L) 42.0 - 52.0 %    MCV 85.9 81.4 - 97.8 fL    MCH 27.2 27.0 - 33.0 pg    MCHC 31.7 (L) 33.7 - 35.3 g/dL    RDW 54.6 (H) 35.9 - 50.0 fL    Platelet Count 367 164 - 446 K/uL    MPV 10.3 9.0 - 12.9 fL    Neutrophils-Polys 85.60 (H) 44.00 - 72.00 %    Lymphocytes 4.20 (L) 22.00 - 41.00 %    Monocytes 7.60 0.00 - 13.40 %    Eosinophils 0.00 0.00 - 6.90 %    Basophils 0.60 0.00 - 1.80 %    Immature Granulocytes 2.00 (H) 0.00 - 0.90 %    Nucleated RBC 0.00 /100 WBC    Neutrophils (Absolute) 23.12 (H) 1.82 - 7.42 K/uL    Lymphs (Absolute) 1.14 1.00 - 4.80 K/uL    Monos (Absolute) 2.05 (H) 0.00 - 0.85 K/uL    Eos (Absolute) 0.01 0.00 - 0.51 K/uL    Baso (Absolute) 0.15 (H) 0.00 - 0.12 K/uL    Immature Granulocytes (abs) 0.54 (H) 0.00 - 0.11 K/uL    NRBC (Absolute) 0.00 K/uL   EKG (NOW)    Collection Time: 07/23/20 11:26 PM   Result Value Ref Range    Report       [facility] Emergency Dept.    Test Date:  2020  Pt Name:    SHAUN CORCORAN                  Department: ER  MRN:        [id number]                      Room:       BL 18  Gender:     Male                         Technician: [id number]  :        [id number]                   Requested By:ERNST AGRAWAL  Order #:    [id number]                    Reading MD:    Measurements  Intervals                                Axis  Rate:       67                           P:          -21  IL:         184                          QRS:        -19  QRSD:       94                           T:          -18  QT:         440  QTc:        465    Interpretive Statements  SINUS RHYTHM  CONSIDER LEFT ATRIAL ABNORMALITY  PROBABLE INFERIOR INFARCT, AGE INDETERMINATE  Compared to ECG 2019 12:13:17  Myocardial infarct finding now present     Comp Metabolic Panel    Collection Time: 20 11:37 PM   Result Value Ref Range    Sodium 138 135 - 145 mmol/L    Potassium 4.5 3.6 - 5.5 mmol/L    Chloride 103 96 - 112 mmol/L    Co2 21 20 - 33 mmol/L    Anion Gap 14.0 7.0 - 16.0    Glucose 82 65 - 99 mg/dL    Bun 18 8 - 22 mg/dL    Creatinine 0.77 0.50 - 1.40 mg/dL    Calcium 9.0 8.5 - 10.5 mg/dL    AST(SGOT) 33 12 - 45 U/L    ALT(SGPT) 25 2 - 50 U/L    Alkaline Phosphatase 284 (H) 30 - 99 U/L    Total Bilirubin 0.4 0.1 - 1.5 mg/dL    Albumin 3.1 (L) 3.2 - 4.9 g/dL    Total Protein 6.8 6.0 - 8.2 g/dL    Globulin 3.7 (H) 1.9 - 3.5 g/dL    A-G Ratio 0.8 g/dL   TROPONIN    Collection Time: 20 11:37 PM   Result Value Ref Range    Troponin T 19 6 - 19 ng/L   ESTIMATED GFR    Collection Time: 20 11:37 PM   Result Value Ref Range    GFR If African American >60 >60 mL/min/1.73 m 2    GFR If Non African American >60 >60 mL/min/1.73 m 2   ACCU-CHEK GLUCOSE    Collection Time: 20 11:55 PM   Result Value Ref Range    Glucose - Accu-Ck 32 (LL) 65 - 99 mg/dL   LACTIC ACID    Collection Time: 20 12:10 AM   Result Value Ref Range     Lactic Acid 1.3 0.5 - 2.0 mmol/L   ACCU-CHEK GLUCOSE    Collection Time: 07/24/20 12:35 AM   Result Value Ref Range    Glucose - Accu-Ck 134 (H) 65 - 99 mg/dL       EKG: Per my read, sinus rhytym    Imaging:   CT Head: negative for any acute bleeding, fractures, or mass effect  XR chest: negative for acute trauma, no infiltrates  XR Pelvis: impacted subcapital left femoral neck fracture  XR Femur: Impacted left subcapital femoral neck fracture.    Assessment and Plan    Leg Fracture, secondary to mechanical ground level fall  Assessment & Plan  -ortho consulted  -holding medical VTE prophylaxis in anticipation of eventual procedure , Bilateral SCDs in meantime  -2mg q4 morphine for pain  -NPO in anticipation of eventual procedure, after may begin diabetic diet  -fall risk precautions   -Patient will eventually need PT/OT    Acute diarrhea  Assessment & Plan  -covered in feces upon arrival  -C.Diff vs general colitis vs over-aggressive bowel regimen vs food induced  -risk factors for c.diff: lives in group home  -Patient meets 2/4 sirs criteria (Elevated respiratory rate prior to O2 supplementaion, Leukocytosis)  -Ordered 1L of LR, day team to consider starting patient on continuous LR while NPO for anticipated surgery  -if diarrhea returns, consider sending out panel for C.Diff  -non-elevated lactic acid    Leukocytosis  Assessment & Plan  -Likely secondary to acute physical trauma  -2/4 SIRS criteria  -blood cultures drawn    Failure to Thrive/ Debility/ Generalized Weakness  Assessment & Plan  -Unclear etiology  -malnutrition vs chronic disease vs social situation  -possible further investigation by day team into living situation at group home    Acute respiratory failure with hypoxia  Assessment & Plan  -not on oxygen at home  -required 2L supplementation while in ED  -Likely secondary to acute trauma    Anemia, likely acute on chronic  Assessment & Plan  -iron studies ordered  -Patient seems to trend in the  11-13 Hgb range    Diabetes  Assessment & Plan  -hypoglycemia protocol  -Hemoglobin A1c ordered  -Day team to resume insulin once patient is back to eating    Chronic pancreatitis  Assessment & Plan  -continue home pancrelipase    BPH  Assessment & Plan  -continue home Flomax    Hypercholesterolemia  Assessment & Plan  - holding home artovastatin, day team to reassess    Chronically Elevated Alk phos  Assessment & Plan  -No acute change, continue to monitor    Chronic Hypoalbuminemia  Assessment & Plan  -No acute change    QUALITY MEASURES  CODE STATUS: FULL  VTE PROPHYLAXIS: SCDs, holding medical VTE prophylaxis due to anticipated procedure  DIET: NPO in anticipation of procedure  DISPOSITION: Inpatient    Wiflredo Paul M.D. PGY-1: Internal Medicine

## 2020-07-24 NOTE — OR NURSING
The pt remains sleepy. He answers questions appropriately and follows command. VSS. Dressing CDI. Will not take PO fluids.Denies pain.

## 2020-07-24 NOTE — PROGRESS NOTES
"Patient arrived to unit via bed from PACU. Patient is still very drowsy but arouses appropriately to commands, on 5L02 via oxymask. Dressing in place to left hip. Ice applied. Patient states no needs at this time.    Blood Pressure 145/79   Pulse 87   Temperature 36.8 °C (98.3 °F) (Temporal)   Respiration 13   Height 1.905 m (6' 3\")   Weight 68.3 kg (150 lb 9.2 oz)   Oxygen Saturation 94%   Body Mass Index 18.82 kg/m²     "

## 2020-07-24 NOTE — ANESTHESIA PROCEDURE NOTES
Airway    Date/Time: 7/24/2020 10:18 AM  Performed by: Ena Lizarraga M.D.  Authorized by: Ena Lizarraga M.D.     Location:  OR  Urgency:  Elective  Indications for Airway Management:  Anesthesia      Spontaneous Ventilation: absent    Sedation Level:  Deep  Preoxygenated: Yes    Patient Position:  Sniffing  Mask Difficulty Assessment:  2 - vent by mask + OA or adjuvant +/- NMBA  Final Airway Type:  Endotracheal airway  Final Endotracheal Airway:  ETT  Cuffed: Yes    Technique Used for Successful ETT Placement:  Direct laryngoscopy  Devices/Methods Used in Placement:  Intubating stylet    Insertion Site:  Oral  Blade Type:  Gilberto  Laryngoscope Blade/Videolaryngoscope Blade Size:  4  ETT Size (mm):  7.5  Measured from:  Teeth  ETT to Teeth (cm):  22  Placement Verified by: capnometry and palpation of cuff    Cormack-Lehane Classification:  Grade IIa - partial view of glottis  Number of Attempts at Approach:  1

## 2020-07-24 NOTE — ANESTHESIA QCDR
2019 [facility] Clinical Data Registry (for Quality Improvement)     Postoperative nausea/vomiting risk protocol (Adult = 18 yrs and Pediatric 3-17 yrs)- (430 and 463)  General inhalation anesthetic (NOT TIVA) with PONV risk factors: No  Provision of anti-emetic therapy with at least 2 different classes of agents: N/A  Patient DID NOT receive anti-emetic therapy and reason is documented in Medical Record: N/A    Multimodal Pain Management- (477)  Non-emergent surgery AND patient age >= 18: Yes  Use of Multimodal Pain Management, two or more drugs and/or interventions, NOT including systemic opioids: Yes  Exception: Documented allergy to multiple classes of analgesics: N/A    Smoking Abstinence (404)  Patient is current smoker (cigarette, pipe, e-cig, marijuanna): Yes  Elective Surgery: No  Abstinence instructions provided prior to day of surgery:   Patient abstained from smoking on day of surgery:     Pre-Op Beta-Blocker in Isolated CABG (44)  Isolated CABG AND patient age >= 18: No  Beta-blocker admin within 24 hours of surgical incision:   Exception:of medical reason(s) for not administering beta blocker within 24 hours prior to surgical incision (e.g., not  indicated,other medical reason):     PACU assessment of acute postoperative pain prior to Anesthesia Care End- Applies to Patients Age = 18- (ABG7)  Initial PACU pain score is which of the following: < 7/10  Patient unable to report pain score: N/A    Post-anesthetic transfer of care checklist/protocol to PACU/ICU- (426 and 427)  Upon conclusion of case, patient transferred to which of the following locations: PACU/Non-ICU  Use of transfer checklist/protocol: Yes  Exclusion: Service Performed in Patient Hospital Room (and thus did not require transfer): N/A  Unplanned admission to ICU related to anesthesia service up through end of PACU care- (MD51)  Unplanned admission to ICU (not initially anticipated at anesthesia start time): No

## 2020-07-24 NOTE — ANESTHESIA POSTPROCEDURE EVALUATION
Patient: Pawel Tatum    Procedure Summary     Date:  07/24/20 Room / Location:  [place] [address] / SURGERY [facility]    Anesthesia Start:  1009 Anesthesia Stop:  1142    Procedures:       ARTHROPLASTY, HIP, TOTAL (Left Hip)      HEMIARTHROPLASTY, HIP (Left Hip) Diagnosis:  (Left displaced femoral neck fracture)    Surgeon:  David Hays M.D. Responsible Provider:  Ena Lizarraga M.D.    Anesthesia Type:  general ASA Status:  3          Final Anesthesia Type: general  Last vitals  BP   Blood Pressure: 138/72    Temp   36.8 °C (98.3 °F)    Pulse   Pulse: 82   Resp   16    SpO2   92 %      Anesthesia Post Evaluation    Patient location during evaluation: PACU  Patient participation: complete - patient participated  Level of consciousness: sleepy but conscious  Pain score: 0    Airway patency: patent  Anesthetic complications: no  Cardiovascular status: adequate  Respiratory status: acceptable  Hydration status: acceptable    PONV: none           Nurse Pain Score: 0 (NPRS)

## 2020-07-24 NOTE — PROGRESS NOTES
Pt arrived to T435-1 from the ED vis [facility].    Report received from ED RN.  Assessment complete.  A&O x 4. Patient calls appropriately.  Patient is on bed rest, unable to ambulate. Bed alarm on.   Patient has 8/10 pain. Pain managed with prescribed medications.  Denies N&V. NPO at this time.  + void, + flatus, + BM.  Patient denies SOB.  SCD's off.  Review plan with of care with patient. Call light and personal belongings with in reach. Hourly rounding in place. All needs met at this time.

## 2020-07-24 NOTE — CONSULTS
DATE OF SERVICE:  07/24/2020    ORTHOPEDIC CONSULTATION    REQUESTING PHYSICIAN:  Mandy Hernandez MD, emergency department.    REASON FOR CONSULTATION:  Left displaced femoral neck fracture.    CHIEF COMPLAINT:  Left hip pain.    HISTORY OF PRESENT ILLNESS:  The patient is a 60-year-old male.  He had a fall   related to hypoglycemia and injured his left hip.  He was presented to [facility]   Emergency Department from his assisted living facility, where he was found to   have a left displaced femoral neck fracture.  I was consulted to provide   treatment recommendations.  Dr. Hernandez states he has also had some hypoglycemia   during his stay in the emergency department a couple of times well.  He   denies other injuries.    ALLERGIES:  No known drug allergies.    PAST MEDICAL DIAGNOSES:  Diabetes, hypertension, seizure disorder.    PAST SURGICAL HISTORY:  Gastroscopy, right toe amputation 2019, ERCP.    SOCIAL HISTORY:  Patient smokes a quarter pack of cigarettes a day.  Denies   alcohol and illicit drug use.    REVIEW OF SYSTEMS:  He denies obvious fevers, chills, nausea, vomiting,   shortness of breath, chest pain.  Otherwise, normal per AMA criteria other   than that is stated in the HPI.    FAMILY HISTORY:  Negative for significant medical problems according to   medical record.    PHYSICAL EXAMINATION:  VITAL SIGNS:  Temperature is 96.8, heart rate 76, respiratory rate 16, blood   pressure 163/79, pulse oximetry 96% on 2 L nasal cannula.  GENERAL APPEARANCE:  Patient is alert, oriented, pleasant, cooperative, in no   acute distress.  He is lying supine on the [facility].  He has a surgical   mask in place.  HEAD, EYES, EARS, NOSE AND THROAT:  Normocephalic and atraumatic.  Mucous   membranes are moist.  PULMONARY:  Symmetric, unlabored breathing.  CARDIOVASCULAR:  Extremities are well perfused.  ABDOMEN:  Very thin, not obviously distended.  MUSCULOSKELETAL:  Left lower extremity is shortened and externally  rotated.    He is able to dorsi and plantarflex his foot and flex and extend the toes.  He   has some superficial abrasions scattered throughout the left lower extremity   as well as right lower extremity.  He has no tenderness to palpation of the   left knee.  He has no deformity of the right lower extremity.  He has no pain   with log roll.  He is nontender to the right knee.  His bilateral upper   extremities are grossly neurovascularly intact without evidence of obvious   traumatic deformity.    DIAGNOSTIC IMAGING:  AP pelvis and 2 views of the left femur show a left   displaced transcervical femoral neck fracture.    ASSESSMENT:  A 60-year-old male who lives at assisted living facility.  He is   very thin and cachectic appearing.  He has  seizure disorder and diabetes.  He   has a left displaced femoral neck fracture.    RECOMMENDATIONS:  1.  I discussed these findings in detail with the patient and we discussed   treatment options, which I do recommend arthroplasty for his injury.  We   discussed options for hemiarthroplasty versus total hip arthroplasty and we   discussed pros and cons of both.  After having this discussion, he prefers hip   hemiarthroplasty and I actually feel he is a better candidate for this based   on some of his medical comorbidities and his baseline functional status.  2.  I discussed risks in general of hip arthroplasty including infection,   iatrogenic fracture, periprosthetic related fracture, blood loss, possibly   requiring transfusion, potential for injury to neurovascular structures, limb   length inequality, hip instability, infection and general risks of anesthesia.    He expressed understanding and wishes to proceed with surgery when possible.  3.  I recommend the patient be kept n.p.o. and I will try to get him to the   operating room later this morning for a left hip hemiarthroplasty for his   displaced femoral neck fracture.       ____________________________________      MD MAO Yañez / SALINAS    DD:  07/24/2020 02:20:48  DT:  07/24/2020 02:59:48    D#:  [id number]  Job#:  [id number]

## 2020-07-24 NOTE — PROGRESS NOTES
Patient was unable to reliably dictate medications he receives. He resides at [facility]. Phone call was placed, medication review was done by phone with home staff:    Acetaminophen 325 x2 tabs BID  ASA 81mg qD  Atorvastatin 40mg qD  Creon (pancrelipase)  glucerna for BG <70  GlucTab for BG <70  humalog 10u q breakfast, lunch; 11u dinner, bedtime  Omeprazole 20mg qD  tamsulosin 0.4mg qD  Tresiba 20u am, 18u q bedtime  Vit B1  Vit D

## 2020-07-24 NOTE — OP REPORT
DATE OF SERVICE:  07/24/2020    PREOPERATIVE DIAGNOSIS:  Left displaced femoral neck fracture.    POSTOPERATIVE DIAGNOSIS:  Left displaced femoral neck fracture.    PROCEDURE PERFORMED:  Open treatment of left femoral neck fracture with   hemiarthroplasty.    SURGEON:  David Hays MD    ANESTHESIOLOGIST:  Ena Lizarraga MD    ANESTHESIA:  General.    ASSISTANT:  Charli Mcdaniel PA-C    ESTIMATED BLOOD LOSS:  150 mL.    IMPLANTS:  Luverne size 7 LEIGH standard neck angle femoral stem with a -3x51 mm   outer diameter bipolar femoral head.    INDICATION FOR PROCEDURE:  The patient is a 60-year-old male.  He fell at his   assisted living facility and sustained a left displaced femoral neck fracture.    He was transferred to [facility] where he was admitted to the hospitalist   service, felt to be medically optimized for surgical management.  I met the   patient and discussed treatment options.  He is only 60 years old and does   have some very mild radiographic signs of arthritis.  We discussed options for   total versus hip hemiarthroplasty and given the pros and cons, he preferred   to proceed with hip hemiarthroplasty, which I feel is very reasonable given   his relatively sedentary functional status as well as his medical   comorbidities.  He signed informed consent preoperatively and wished to   proceed with surgery as outlined above.    DESCRIPTION OF PROCEDURE:  The patient was met in the preop holding area and   the surgical site was signed.  His consent was confirmed to be accurate.  He   was taken back to the operating room and general anesthesia was induced.    Ancef and vancomycin were administered.  He was positioned in the right   lateral decubitus position with Stulberg positioners and an axillary roll.    Left lower extremity was provisionally cleansed with alcohol.  It was then   prepped and draped in the usual sterile fashion.  A formal timeout was   performed to confirm patient's correct  name, correct surgical site, correct   procedure, and correct laterality.  A posterior approach to the hip was then   performed with scalpel down through skin.  Dissection was carried with Bovie   cautery down to the iliotibial band and gluteal fascia, which was split   longitudinally.  A plane was developed in between piriformis and gluteus   minimus and I released the piriformis and short external rotators and   full-thickness sleeve off the proximal femur with the posterior capsule.    The femoral head was excised.  There were some degenerative changes present on   both sides of the hip joint, but to a mild degree.  I used an oscillating saw   to resect the excess femoral neck.  I then started preparing the femur with   appropriate retractors in place with a box osteotome followed by canal finding   reamer followed by sequential broaching up to a size 7, which had good   rotational stability and fit in the canal.  I then trialed and selected that   stem.  I then removed all trial implants, thoroughly irrigated out the   acetabulum and the femoral canal, placed a cement restrictor, mixed vancomycin   impregnated antibiotic cement and filled the canal and pressurized it and   inserted the size 7 LEIGH standard neck angle femoral stem adding about 5   degrees of anteversion compared to his native anteversion and held it in place   while cement cured, removing excess cement in the process.  Once it was   sufficiently cured, I then trialed again with +0 mm neck length.  His hip   seemed a little bit tight in extension and I trialed with a -3, which had   better range of motion, still excellent stability parameters and what I felt   was clinically symmetric limb lengths.    I then removed the trial component, thoroughly irrigated out the wound once   more, thoroughly cleansed, and dried the Young taper and gently impacted the   component femoral head and reduced the hip and soaked the wound in dilute   Betadine solution  for 3 minutes and then irrigated the wound once more with   Pulsavac using normal saline.  I repaired the piriformis and the posterior   capsule including the short external rotators through bone tunnels in the   greater trochanter with #5 Ti-Cron.  I repaired the IT band and gluteal fascia   with size 2 Stratafix suture, subQ layers with 0 Vicryl, 2-0 Vicryl, and skin   edges with staples.  The wound was thoroughly cleansed, dried, and sterile   silver-impregnated Mepilex dressing was applied.  He was then transferred in   supine position, awoken from anesthesia, transferred over to the [facility], and   taken to postanesthesia care unit in stable condition.    PLAN:  1.  Patient will be readmitted to medical service postoperatively.  2.  He can be weightbearing as tolerated left lower extremity.  3.  Recommend he maintain posterior hip precautions at all times.  4.  He will need Ancef and vancomycin for 24 hours postop for infection   prophylaxis.  5.  He can be started on Lovenox or equivalent tomorrow morning and should   continue SCDs for DVT prophylaxis.  6.  He should work with physical and occupational therapy as soon as possible   for mobilization.       ____________________________________     MD MAO Yañez / SALINAS    DD:  07/24/2020 11:55:36  DT:  07/24/2020 12:23:46    D#:  [id number]  Job#:  [id number]

## 2020-07-24 NOTE — ANESTHESIA PREPROCEDURE EVALUATION
59 yo w/left hip fracture    Relevant Problems   ANESTHESIA (within normal limits)      CARDIAC   (+) Diabetic peripheral angiopathy (HCC)   (+) Essential hypertension      Other   (+) Chronic pancreatitis (HCC)   (+) Long-term insulin use (HCC)   (+) Normocytic anemia   (+) Tobacco abuse   (+) Uncontrolled type 1 diabetes mellitus with hyperglycemia (HCC)     Oxygen requirement since admission to hospital    Denies CAD, CP/SOB, CVA, LUNG/LIVER/KIDNEY DZ, GERD, URI    Physical Exam    Airway   Mallampati: II  TM distance: >3 FB  Neck ROM: full       Cardiovascular   Rhythm: regular  Rate: normal  (-) murmur     Dental   (+) upper dentures           Pulmonary   Breath sounds clear to auscultation     Abdominal    Neurological - normal exam                 Anesthesia Plan    ASA 3       Plan - general       Airway plan will be ETT        Induction: intravenous    Postoperative Plan: Postoperative administration of opioids is intended.    Pertinent diagnostic labs and testing reviewed    Informed Consent:    Anesthetic plan and risks discussed with patient.

## 2020-07-24 NOTE — ED PROVIDER NOTES
ED Provider Note    CHIEF COMPLAINT  Chief Complaint   Patient presents with   • Fall       HPI  Pawel Tatum is a 60 y.o. male who presents to the emergency department for a fall.  The patient has a history of diabetes chronic pancreatitis and prior episodes of hypoglycemia, as well as a history of a TBI.  He lives in a group home and was found down this evening.  He is most complaining of some left hip pain he was found to have a glucose of 32 on arrival and was given D10 blood glucose here with our Accu-Chek was 97.  He states he is having left anterior thigh pain but really no neck pain or back pain he does not really recall the incident.  He states he took his medications as prescribed and is been eating normally so is not sure exactly what happened.  Reading the patient's endocrinology notes he had his last changed to his meds about 1 month ago and they told him to stop eating peanut butter and jelly sandwich when he goes to bed at night.  Pain is about a 4 out of 10 in the left hip and left anterior thigh and tetanus is up-to-date.  He denies chest pain or shortness of breath prior to event    Recent changes  I am reducing his Tresiba to 18 units daily  Continue Humalog 10u for breakfast, 10u for lunch and 11u units for supper or dinner    REVIEW OF SYSTEMS  Positives as above. Pertinent negatives include chest pain shortness of breath fevers chills cough congestion recent illness dysuria hematuria flank pain back pain neck pain vision changes weakness numbness or tingling unilaterally  Night sweats weight loss  All other review of systems are negative    PAST MEDICAL HISTORY   has a past medical history of Diabetes (HCC), Hypertension, and Seizure disorder (HCC).    SOCIAL HISTORY  Social History     Tobacco Use   • Smoking status: Current Every Day Smoker     Packs/day: 0.25     Years: 15.00     Pack years: 3.75   • Smokeless tobacco: Never Used   • Tobacco comment: Trying to quit   Substance and  "Sexual Activity   • Alcohol use: No   • Drug use: No   • Sexual activity: Not Currently       SURGICAL HISTORY   has a past surgical history that includes gastroscopy-endo (N/A, 8/25/2018); other abdominal surgery; toe amputation (Right, 1/12/2019); upper gi endoscopy,biopsy (N/A, 12/27/2019); ercp,diagnostic (2/18/2020); endoscopic us exam, esoph (2/18/2020); ercp,diagnostic (2/24/2020); and ercp,diagnostic (3/16/2020).    CURRENT MEDICATIONS  Home Medications    **Home medications have not yet been reviewed for this encounter**         ALLERGIES  No Known Allergies    PHYSICAL EXAM  VITAL SIGNS: BP (!) 163/84   Pulse 67   Temp 36 °C (96.8 °F)   Resp (!) 21   Ht 1.803 m (5' 11\")   Wt 63.5 kg (140 lb)   SpO2 99%   BMI 19.53 kg/m²    Pulse ox interpretation: I interpret this pulse ox as normal.  Constitutional: Alert in no apparent distress.  HENT: Normocephali abrasion of the left frontal scalp, MMM  Eyes: PER, Conjunctiva normal, Non-icteric.   Neck: Normal range of motion, No tenderness, Supple, No stridor.   Cardiovascular: Regular rate and rhythm, no murmurs.   Thorax & Lungs: Normal breath sounds, No respiratory distress, No wheezing, No chest tenderness.   Abdomen: Bowel sounds normal, Soft, No tenderness, No pulsatile masses. No peritoneal signs.  Skin: Warm, Dry, No erythema, No rash.   Skin tear to the right anterior shin  Back: No bony tenderness, No CVA tenderness.   Extremities/MSK: Intact equal distal pulses, No edema, tenderness and limited range of motion of the left hip no cyanosis  Neurologic: Alert and oriented x3, Symmetric smile, eyes shut tight bilaterally, forehead wrinkles bilaterally, sensation intact to light touch bilateral face, tongue midline, head turn and shoulder shrug with full strength. Hearing intact grossly bilaterally. 5/5  strength bilaterally, 5/5 tricep and bicep strength bilaterally. Sensation intact to light touch r, m, u, axillary nerves bilaterally.  Difficult to " assess bilateral lower extremity strength in the right is 5 out of 5 but left is minimal secondary to left hip pain. Sensation intact to light touch bilateral lower extremities in all nerve distributions.  Intact finger-to-nose        DIFFERENTIAL DIAGNOSIS AND WORK UP PLAN    This is a 60 y.o. male who presents with hypoglycemic event leading to a fall possible left-sided hip fracture pelvic fracture, he does have evidence of head trauma as well and he is a little somnolent on my examinations we will recheck his blood glucose, he states there is been no change his medication downloads are made a month ago he is been doing it as he was prescribed and has been eating normally.  Will evaluate for signs of infectious process fracture intracranial injury ACS arrhythmia dehydration electrolyte abnormality.    DIAGNOSTIC STUDIES / PROCEDURES    EKG  Results for orders placed or performed during the hospital encounter of 20   EKG (NOW)   Result Value Ref Range    Report       [facility] Emergency Dept.    Test Date:  2020  Pt Name:    SHAUN CORCORAN                  Department: ER  MRN:        [id number]                      Room:        18  Gender:     Male                         Technician: [id number]  :        [id number]                   Requested By:MANDY AGRAWAL  Order #:    [id number]                    Reading MD: Mandy Agrawal MD    Measurements  Intervals                                Axis  Rate:       67                           P:          -21  AR:         184                          QRS:        -19  QRSD:       94                           T:          -18  QT:         440  QTc:        465    Interpretive Statements  Sinus rhythm at a rate of 67 no ST elevations or ST depressions borderline Q  waves in leads III and aVF also borderline axis but otherwise normal  intervals  Compared to ECG 2019 12:13:17  Borderline Q waves in the inferior leads  Electronically Signed On  7- 3:17:27 PDT by Mandy Hernandez MD          LABS  Pertinent Lab Findings  CBC with white blood cell count of 27 as well as left shift hemoglobin is 11 CMP with mildly elevated alk phos which is chronic according to his labs otherwise normal, repeat Accu-Chek is down again in the 30s he was given an amp of D50 and started on a D10 drip troponin normal blood culture sent lactate normal  Still pending urinalysis      RADIOLOGY  DX-FEMUR-2+ LEFT   Final Result         1.  Impacted left subcapital femoral neck fracture.      DX-HIP-UNILATERAL-WITH PELVIS-1 VIEW LEFT   Final Result         1.  Impacted subcapital left femoral neck fracture   2.  Atherosclerosis      DX-CHEST-PORTABLE (1 VIEW)   Final Result         1.  Left basilar atelectasis, no focal infiltrate   2.  Atherosclerosis      CT-HEAD W/O    (Results Pending)   CT-PELVIS W/O PLUS RECONS    (Results Pending)     The radiologist's interpretation of all radiological studies have been reviewed by me.      COURSE & MEDICAL DECISION MAKING  Pertinent Labs & Imaging studies reviewed. (See chart for details)    11:56 PM  Reassessed the patient at the bedside we discussed his fracture, again his glucose had dropped to 30 again he was given D50 and will be started on a D10 drip especially in light of this recurrent drop in his blood glucose.  He has evidence of left-sided hip fracture and will discuss case with orthopedics      12:03 AM  Spoke w Dr Hays who will consult on the patient later tomorrow for possible surgical repair.      1:06 AM  Spoke w [facility] who is accepted the patient for hospitalization      The patient has been having a lot of severe left-sided hip pain we have been unable get a head CT for an extended period of time due to that, he received his third round of IV pain management to get him to the scanner.  We will also scan his hip always there just to ensure a good surgical approach.    Still pending a urinalysis the patient is not showing  "any other signs of sepsis no hypotension no fever suspect the elevated white blood cell count is likely secondary to the fall in the hypoglycemic episode itself    BP (!) 163/79   Pulse 76   Temp 36 °C (96.8 °F)   Resp 16   Ht 1.803 m (5' 11\")   Wt 63.5 kg (140 lb)   SpO2 96%   BMI 19.53 kg/m²       I verified that the patient was wearing a mask and I was wearing appropriate PPE every time I entered the room. The patient's mask was on the patient at all times during my encounter except for a brief view of the oropharynx.    DISPOSITION:  Patient will be evaluated for hospitalization by [facility] in guarded condition.        FINAL IMPRESSION  1. Hypoglycemia  2. L hip fracture  3. Multiple abrasions         Electronically signed by: Mandy Hernandez M.D., 7/23/2020 10:46 PM    This dictation has been created using voice recognition software and/or scribes. The accuracy of the dictation is limited by the abilities of the software and the expertise of the scribes. I expect there may be some errors of grammar and possibly content. I made every attempt to manually correct the errors within my dictation. However, errors related to voice recognition software and/or scribes may still exist and should be interpreted within the appropriate context.    "

## 2020-07-24 NOTE — ANESTHESIA TIME REPORT
Anesthesia Start and Stop Event Times     Date Time Event    7/24/2020 0940 Ready for Procedure     1009 Anesthesia Start     1142 Anesthesia Stop        Responsible Staff  07/24/20    Name Role Begin End    Ena Lizarraga M.D. Anesth 1009 1142        Preop Diagnosis (Free Text):  Pre-op Diagnosis     Left displaced femoral neck fracture        Preop Diagnosis (Codes):    Post op Diagnosis  Left displaced femoral neck fracture (HCC)      Premium Reason  Non-Premium    Comments:

## 2020-07-24 NOTE — ED NOTES
Patients pain improved, 3/10 at this time. Pt reports tolerable at this time as long as he is not moved.

## 2020-07-24 NOTE — OR SURGEON
Immediate Post OP Note    PreOp Diagnosis: Left displaced femoral neck fracture    PostOp Diagnosis: same    Procedure(s):  HEMIARTHROPLASTY, HIP - Wound Class: Clean    Surgeon(s):  David Hays M.D.    Anesthesiologist/Type of Anesthesia:  Anesthesiologist: Ena Lizarraga M.D./General    Surgical Staff:  Circulator: Kenisha Le R.N.  Limb Frazier: Justin Garcia R.N.  Scrub Person: Yoan Enrique    Specimens removed if any:  * No specimens in log *    Estimated Blood Loss: 150cc    Findings: see dictation    Complications: none known    PLAN:  --readmit medicine postop  --WBAT LLE  --posterior hip precautions  --ancef/vanc x 24hrs postop  --start lovenox or equivalent tomorrow am, continue SCDs  --PT/OT for mobilization ASAP        7/24/2020 11:48 AM David Hays M.D.

## 2020-07-24 NOTE — PROGRESS NOTES
Triage officer note: 59 y/o male presented after being found down at assisted living.    [facility] IM resident Dr Epperson to discuss the case with ERP Dr Hernandez and assume care.

## 2020-07-24 NOTE — SENIOR ADMIT NOTE
60-year-old male admitted after mechanical ground-level fall when he was trying to get out of his bed at his [facility].      Afebrile.  Hypertensive up to 172/88.  2 L NC.  HR 70s  Pleasant and friendly.  Lying supine.  NAD bed not able to move around much secondary to pain.  Multiple scattered ecchymosis, abrasions.  Feet covered in feces.  Malnourished appearing  Left hip without obvious bony trauma.      Assessment and plan:  1.  Left femoral neck fracture status post mechanical ground-level fall  2.  Hypertensive urgency  3.  Leukocytosis  4.  Normocytic anemia, acute versus chronic  5.  Hyperglycemia, significant (32), likely 2/2 poor PO intake  6.  Elevated alkaline phosphatase  7.  Chronic pancreatitis/insufficiency  8.  Particular malnutrition  9.  Social situation/safety    - Ortho already consulted.  N.p.o. for now.  Pain control  - 2/4 Sirs, not really suspicious for acute infection.  Did blood cultures anyways.  Leukocytosis likely chronic in the setting of pancreatitis versus demargination in the setting of acute trauma  - Need to investigate living situation given patient's condition upon arrival, covered in feces and multiple unexplained ecchymosis/traumas, low BG        To contact [facility] night team (5:30 PM-6:00 AM)  Primary team color: Radha  Intern: Beverly  Senior: Zeenat

## 2020-07-25 PROBLEM — S72.002A CLOSED FRACTURE OF LEFT HIP (HCC): Status: ACTIVE | Noted: 2020-07-25

## 2020-07-25 LAB
ALBUMIN SERPL BCP-MCNC: 2.7 G/DL (ref 3.2–4.9)
ALBUMIN/GLOB SERPL: 0.8 G/DL
ALP SERPL-CCNC: 233 U/L (ref 30–99)
ALT SERPL-CCNC: 16 U/L (ref 2–50)
ANION GAP SERPL CALC-SCNC: 12 MMOL/L (ref 7–16)
AST SERPL-CCNC: 15 U/L (ref 12–45)
BASOPHILS # BLD AUTO: 0 % (ref 0–1.8)
BASOPHILS # BLD AUTO: 0.3 % (ref 0–1.8)
BASOPHILS # BLD: 0 K/UL (ref 0–0.12)
BASOPHILS # BLD: 0.06 K/UL (ref 0–0.12)
BILIRUB SERPL-MCNC: 0.3 MG/DL (ref 0.1–1.5)
BUN SERPL-MCNC: 17 MG/DL (ref 8–22)
CALCIUM SERPL-MCNC: 8.8 MG/DL (ref 8.5–10.5)
CHLORIDE SERPL-SCNC: 105 MMOL/L (ref 96–112)
CO2 SERPL-SCNC: 20 MMOL/L (ref 20–33)
CREAT SERPL-MCNC: 0.63 MG/DL (ref 0.5–1.4)
EOSINOPHIL # BLD AUTO: 0 K/UL (ref 0–0.51)
EOSINOPHIL # BLD AUTO: 0 K/UL (ref 0–0.51)
EOSINOPHIL NFR BLD: 0 % (ref 0–6.9)
EOSINOPHIL NFR BLD: 0 % (ref 0–6.9)
ERYTHROCYTE [DISTWIDTH] IN BLOOD BY AUTOMATED COUNT: 51.8 FL (ref 35.9–50)
ERYTHROCYTE [DISTWIDTH] IN BLOOD BY AUTOMATED COUNT: 52.1 FL (ref 35.9–50)
FERRITIN SERPL-MCNC: 133 NG/ML (ref 22–322)
GLOBULIN SER CALC-MCNC: 3.3 G/DL (ref 1.9–3.5)
GLUCOSE BLD-MCNC: 167 MG/DL (ref 65–99)
GLUCOSE BLD-MCNC: 211 MG/DL (ref 65–99)
GLUCOSE BLD-MCNC: 265 MG/DL (ref 65–99)
GLUCOSE SERPL-MCNC: 229 MG/DL (ref 65–99)
HCT VFR BLD AUTO: 32.1 % (ref 42–52)
HCT VFR BLD AUTO: 32.2 % (ref 42–52)
HGB BLD-MCNC: 10.5 G/DL (ref 14–18)
HGB BLD-MCNC: 10.7 G/DL (ref 14–18)
IMM GRANULOCYTES # BLD AUTO: 0.24 K/UL (ref 0–0.11)
IMM GRANULOCYTES NFR BLD AUTO: 1.1 % (ref 0–0.9)
LDH SERPL L TO P-CCNC: 224 U/L (ref 107–266)
LYMPHOCYTES # BLD AUTO: 1.89 K/UL (ref 1–4.8)
LYMPHOCYTES # BLD AUTO: 2.54 K/UL (ref 1–4.8)
LYMPHOCYTES NFR BLD: 10.4 % (ref 22–41)
LYMPHOCYTES NFR BLD: 9 % (ref 22–41)
MANUAL DIFF BLD: NORMAL
MCH RBC QN AUTO: 27.3 PG (ref 27–33)
MCH RBC QN AUTO: 27.5 PG (ref 27–33)
MCHC RBC AUTO-ENTMCNC: 32.7 G/DL (ref 33.7–35.3)
MCHC RBC AUTO-ENTMCNC: 33.2 G/DL (ref 33.7–35.3)
MCV RBC AUTO: 82.8 FL (ref 81.4–97.8)
MCV RBC AUTO: 83.4 FL (ref 81.4–97.8)
MONOCYTES # BLD AUTO: 1.73 K/UL (ref 0–0.85)
MONOCYTES # BLD AUTO: 2.34 K/UL (ref 0–0.85)
MONOCYTES NFR BLD AUTO: 8.3 % (ref 0–13.4)
MONOCYTES NFR BLD AUTO: 9.6 % (ref 0–13.4)
MORPHOLOGY BLD-IMP: NORMAL
MYELOCYTES NFR BLD MANUAL: 0.9 %
NEUTROPHILS # BLD AUTO: 16.97 K/UL (ref 1.82–7.42)
NEUTROPHILS # BLD AUTO: 19.32 K/UL (ref 1.82–7.42)
NEUTROPHILS NFR BLD: 78.3 % (ref 44–72)
NEUTROPHILS NFR BLD: 81.3 % (ref 44–72)
NEUTS BAND NFR BLD MANUAL: 0.9 % (ref 0–10)
NRBC # BLD AUTO: 0 K/UL
NRBC # BLD AUTO: 0.03 K/UL
NRBC BLD-RTO: 0 /100 WBC
NRBC BLD-RTO: 0.1 /100 WBC
PLATELET # BLD AUTO: 362 K/UL (ref 164–446)
PLATELET # BLD AUTO: 366 K/UL (ref 164–446)
PLATELET BLD QL SMEAR: NORMAL
PMV BLD AUTO: 9.6 FL (ref 9–12.9)
PMV BLD AUTO: 9.8 FL (ref 9–12.9)
POTASSIUM SERPL-SCNC: 4.3 MMOL/L (ref 3.6–5.5)
PROT SERPL-MCNC: 6 G/DL (ref 6–8.2)
RBC # BLD AUTO: 3.85 M/UL (ref 4.7–6.1)
RBC # BLD AUTO: 3.89 M/UL (ref 4.7–6.1)
SODIUM SERPL-SCNC: 137 MMOL/L (ref 135–145)
WBC # BLD AUTO: 20.9 K/UL (ref 4.8–10.8)
WBC # BLD AUTO: 24.4 K/UL (ref 4.8–10.8)

## 2020-07-25 PROCEDURE — 700102 HCHG RX REV CODE 250 W/ 637 OVERRIDE(OP): Performed by: STUDENT IN AN ORGANIZED HEALTH CARE EDUCATION/TRAINING PROGRAM

## 2020-07-25 PROCEDURE — A9270 NON-COVERED ITEM OR SERVICE: HCPCS | Performed by: STUDENT IN AN ORGANIZED HEALTH CARE EDUCATION/TRAINING PROGRAM

## 2020-07-25 PROCEDURE — 99232 SBSQ HOSP IP/OBS MODERATE 35: CPT | Mod: GC | Performed by: HOSPITALIST

## 2020-07-25 PROCEDURE — 82962 GLUCOSE BLOOD TEST: CPT | Mod: 91

## 2020-07-25 PROCEDURE — 97161 PT EVAL LOW COMPLEX 20 MIN: CPT | Performed by: PHYSICAL THERAPIST

## 2020-07-25 PROCEDURE — 700111 HCHG RX REV CODE 636 W/ 250 OVERRIDE (IP): Performed by: STUDENT IN AN ORGANIZED HEALTH CARE EDUCATION/TRAINING PROGRAM

## 2020-07-25 PROCEDURE — 85027 COMPLETE CBC AUTOMATED: CPT

## 2020-07-25 PROCEDURE — 85025 COMPLETE CBC W/AUTO DIFF WBC: CPT

## 2020-07-25 PROCEDURE — 770006 HCHG ROOM/CARE - MED/SURG/GYN SEMI*

## 2020-07-25 PROCEDURE — 85007 BL SMEAR W/DIFF WBC COUNT: CPT

## 2020-07-25 PROCEDURE — 80053 COMPREHEN METABOLIC PANEL: CPT

## 2020-07-25 PROCEDURE — 36415 COLL VENOUS BLD VENIPUNCTURE: CPT

## 2020-07-25 PROCEDURE — 83615 LACTATE (LD) (LDH) ENZYME: CPT

## 2020-07-25 PROCEDURE — 97166 OT EVAL MOD COMPLEX 45 MIN: CPT

## 2020-07-25 PROCEDURE — 700111 HCHG RX REV CODE 636 W/ 250 OVERRIDE (IP): Performed by: ORTHOPAEDIC SURGERY

## 2020-07-25 PROCEDURE — 82728 ASSAY OF FERRITIN: CPT

## 2020-07-25 RX ORDER — INSULIN GLARGINE 100 [IU]/ML
18 INJECTION, SOLUTION SUBCUTANEOUS EVERY EVENING
Status: DISCONTINUED | OUTPATIENT
Start: 2020-07-25 | End: 2020-07-26

## 2020-07-25 RX ORDER — FERROUS GLUCONATE 324(38)MG
324 TABLET ORAL
Status: DISCONTINUED | OUTPATIENT
Start: 2020-07-25 | End: 2020-07-28 | Stop reason: HOSPADM

## 2020-07-25 RX ORDER — OXYCODONE HYDROCHLORIDE 5 MG/1
5 TABLET ORAL EVERY 4 HOURS PRN
Status: COMPLETED | OUTPATIENT
Start: 2020-07-25 | End: 2020-07-26

## 2020-07-25 RX ADMIN — ENOXAPARIN SODIUM 40 MG: 40 INJECTION SUBCUTANEOUS at 08:53

## 2020-07-25 RX ADMIN — OMEPRAZOLE 20 MG: 20 CAPSULE, DELAYED RELEASE ORAL at 06:02

## 2020-07-25 RX ADMIN — ACETAMINOPHEN 500 MG: 500 TABLET ORAL at 17:39

## 2020-07-25 RX ADMIN — INSULIN LISPRO 3 UNITS: 100 INJECTION, SOLUTION INTRAVENOUS; SUBCUTANEOUS at 21:13

## 2020-07-25 RX ADMIN — TAMSULOSIN HYDROCHLORIDE 0.4 MG: 0.4 CAPSULE ORAL at 06:02

## 2020-07-25 RX ADMIN — OXYCODONE 5 MG: 5 TABLET ORAL at 22:24

## 2020-07-25 RX ADMIN — PANCRELIPASE 48000 UNITS: 24000; 76000; 120000 CAPSULE, DELAYED RELEASE PELLETS ORAL at 06:11

## 2020-07-25 RX ADMIN — INSULIN LISPRO 1 UNITS: 100 INJECTION, SOLUTION INTRAVENOUS; SUBCUTANEOUS at 17:37

## 2020-07-25 RX ADMIN — ACETAMINOPHEN 500 MG: 500 TABLET ORAL at 09:21

## 2020-07-25 RX ADMIN — CEFAZOLIN SODIUM 2 G: 2 INJECTION, SOLUTION INTRAVENOUS at 02:24

## 2020-07-25 RX ADMIN — INSULIN GLARGINE 18 UNITS: 100 INJECTION, SOLUTION SUBCUTANEOUS at 21:13

## 2020-07-25 RX ADMIN — INSULIN LISPRO 3 UNITS: 100 INJECTION, SOLUTION INTRAVENOUS; SUBCUTANEOUS at 11:54

## 2020-07-25 RX ADMIN — OMEPRAZOLE 20 MG: 20 CAPSULE, DELAYED RELEASE ORAL at 17:39

## 2020-07-25 RX ADMIN — INSULIN LISPRO 2 UNITS: 100 INJECTION, SOLUTION INTRAVENOUS; SUBCUTANEOUS at 06:06

## 2020-07-25 RX ADMIN — FERROUS GLUCONATE 324 MG: 324 TABLET ORAL at 08:53

## 2020-07-25 ASSESSMENT — ENCOUNTER SYMPTOMS
FEVER: 0
BLURRED VISION: 0
ABDOMINAL PAIN: 0
COUGH: 0
DOUBLE VISION: 0
DIZZINESS: 0
ORTHOPNEA: 0
DIARRHEA: 0
SORE THROAT: 0
TINGLING: 0
SHORTNESS OF BREATH: 0
NAUSEA: 0
HEADACHES: 1
VOMITING: 0
CHILLS: 0

## 2020-07-25 ASSESSMENT — GAIT ASSESSMENTS
GAIT LEVEL OF ASSIST: SUPERVISED
DISTANCE (FEET): 200
DEVIATION: DECREASED TOE OFF;ANTALGIC
ASSISTIVE DEVICE: FRONT WHEEL WALKER

## 2020-07-25 ASSESSMENT — COGNITIVE AND FUNCTIONAL STATUS - GENERAL
CLIMB 3 TO 5 STEPS WITH RAILING: A LITTLE
WALKING IN HOSPITAL ROOM: A LITTLE
TURNING FROM BACK TO SIDE WHILE IN FLAT BAD: A LITTLE
DRESSING REGULAR LOWER BODY CLOTHING: A LOT
MOVING TO AND FROM BED TO CHAIR: A LITTLE
HELP NEEDED FOR BATHING: A LOT
DRESSING REGULAR UPPER BODY CLOTHING: A LITTLE
TOILETING: A LITTLE
DAILY ACTIVITIY SCORE: 16
PERSONAL GROOMING: A LITTLE
MOBILITY SCORE: 18
SUGGESTED CMS G CODE MODIFIER DAILY ACTIVITY: CK
MOVING FROM LYING ON BACK TO SITTING ON SIDE OF FLAT BED: A LITTLE
STANDING UP FROM CHAIR USING ARMS: A LITTLE
EATING MEALS: A LITTLE
SUGGESTED CMS G CODE MODIFIER MOBILITY: CK

## 2020-07-25 ASSESSMENT — ACTIVITIES OF DAILY LIVING (ADL): TOILETING: INDEPENDENT

## 2020-07-25 NOTE — PROGRESS NOTES
Pt is A&Ox4, very irritable.  No family is at bedside. VSS.  Denies pain. Will be a two assist when OOB.  Denies N/T at this time.  Dressing to left hip are CDI.  Pt updated on POC, needs met and questions answered.  Informed pt he needs to try to void now, pt states he already voided after surgery.  States he voided around 1700.  Asked pt if staff witnessed void, pt stated yes.  Informed pt the next time he voids he needs to void in urinal and staff needs to witness it so it can be documented in computer.  Pt verbalizes understanding.  Calls appropriately.  Call light within reach and working properly.

## 2020-07-25 NOTE — PROGRESS NOTES
Daily Progress Note:     Date of Service: 7/25/2020  Primary Team: [facility] Purple Team   Attending: Efren Hoang M.D.   Senior Resident: Dr. Mccrary  Intern: Dr. Soler  Contact:  [phone]    Chief Complaint:   L hip pain    Subjective:   NAOE. Endorses L hip pain, without radiation, 7/10, not limiting function. Denies CP, SOB, abd pain, f/c, n/v.    Consultants/Specialty:  Orthopaedic surgery    Review of Systems:    Review of Systems   Constitutional: Negative for chills and fever.   HENT: Negative for ear discharge, hearing loss, nosebleeds and sore throat.    Eyes: Negative for blurred vision and double vision.   Respiratory: Negative for cough and shortness of breath.    Cardiovascular: Negative for chest pain and orthopnea.   Gastrointestinal: Negative for abdominal pain, diarrhea, nausea and vomiting.   Genitourinary: Negative for dysuria, frequency, hematuria and urgency.   Neurological: Positive for headaches. Negative for dizziness and tingling.       Objective Data:   Physical Exam:   Vitals:   Temp:  [36.5 °C (97.7 °F)-37.2 °C (98.9 °F)] 36.7 °C (98 °F)  Pulse:  [63-83] 67  Resp:  [16-18] 16  BP: (129-140)/(70-82) 136/75  SpO2:  [92 %-97 %] 92 %     Physical Exam  Vitals signs and nursing note reviewed.   Constitutional:       General: He is not in acute distress.     Comments: Dozing, appears comfortable in bed, easily arrousable   HENT:      Head: Normocephalic and atraumatic.      Mouth/Throat:      Mouth: Mucous membranes are moist.   Eyes:      Pupils: Pupils are equal, round, and reactive to light.   Cardiovascular:      Rate and Rhythm: Normal rate and regular rhythm.      Heart sounds: No murmur. No friction rub. No gallop.    Pulmonary:      Breath sounds: Normal breath sounds.   Abdominal:      General: There is no distension.      Palpations: Abdomen is soft.      Tenderness: There is no abdominal tenderness.   Skin:     General: Skin is warm and dry.           Labs:   Lab Results    Component Value Date/Time    SODIUM 137 07/25/2020 05:56 AM    POTASSIUM 4.3 07/25/2020 05:56 AM    CHLORIDE 105 07/25/2020 05:56 AM    CO2 20 07/25/2020 05:56 AM    GLUCOSE 229 (H) 07/25/2020 05:56 AM    BUN 17 07/25/2020 05:56 AM    CREATININE 0.63 07/25/2020 05:56 AM      Recent Labs     07/23/20  2246 07/24/20  1646 07/25/20  0556   WBC 27.0* 18.9* 20.9*   RBC 4.26* 4.02* 3.89*   HEMOGLOBIN 11.6* 10.9* 10.7*   HEMATOCRIT 36.6* 34.4* 32.2*   MCV 85.9 85.6 82.8   MCH 27.2 27.1 27.5   RDW 54.6* 54.4* 51.8*   PLATELETCT 367 365 366   MPV 10.3 9.5 9.6   NEUTSPOLYS 85.60*  --  81.30*   LYMPHOCYTES 4.20*  --  9.00*   MONOCYTES 7.60  --  8.30   EOSINOPHILS 0.00  --  0.00   BASOPHILS 0.60  --  0.30       Imaging:   Last abd/pelvis CT    Problem Representation:    Pawel Tatum is a 59yo M with history of TBI, HTN, DM, found down at assisted living, presenting for L hip pain, found to have fracture of the L femoral neck. POD #1.     Closed fracture of left hip (HCC)  Assessment & Plan  Was found down at [facility] on 7/23. He did not recall events leading to his fall, but was found to have L femoral neck fracture. Underwent arthroplasty on 7/24.  -acetaminophen 500mg PRN  -Morhpine 2mg PRN  -SNF for PT referral placed    Leukocytosis  Assessment & Plan  History of leukocytosis. On admission, was found to have leukocytosis with elevated white count. WBC could be stress related, but given picture of anaemia, investigation for other causes was undertaken. Peripheral smear was within acceptable results, and LDH for hemolytic process was negative. May need to reestablish baseline values, and follow up with primary care.    Hypoglycemia  Assessment & Plan  Found to have hypoglycemia, likely causing AMS leading to fall and hip fracture. He lives in group home, unsure how intensive his assistance is, but he is dependent upon them for medications. Since admission, he has been trending as hyperglycemic.  Recommend follow up with PCP at discharge.  -SSI  -Lantus 10u    Hypertension  Assessment & Plan  Elevated BP at admission. BP has been under control while in hospital.  -continue home BP medications

## 2020-07-25 NOTE — THERAPY
"Occupational Therapy   Initial Evaluation     Patient Name: Pawel Tatum  Age:  60 y.o., Sex:  male  Medical Record #: [id number]  Today's Date: 7/25/2020     Precautions  Precautions: Fall Risk, Posterior Hip Precautions, Weight Bearing As Tolerated Left Lower Extremity, Other (See Comments)(Hx TBI)    Assessment  Patient is 60 y.o. male s/p L  hemiarthroplasty 7/24, posterior hip precautions, WBAT. PMH significant for DM, pancreatitis with episodes of hypoglycemia, Hx TBI.  Pt limited by decreased cognition, decreased carryover of post-op precautions, decreased safety awareness, decreased balance, and decreased activity tolerance impacting pt's ability to safely/indep complete ADLs and functional transfers/mobility. Pt would benefit from skilled OT services in the acute care setting to address these deficits.     Plan    Recommend Occupational Therapy 3 times per week until therapy goals are met for the following treatments:  Adaptive Equipment, Cognitive Skill Development, Neuro Re-Education / Balance, Self Care/Activities of Daily Living, Therapeutic Activities and Therapeutic Exercises.    Discharge recommendations:  Recommend post-acute placement for additional occupational therapy services prior to discharge home.      Subjective    \"They can do it for me\"  However when this therapist called [facility]'s [facility], [facility] reported pt needs to be indep with ADLs prior to return to facility as they provide IADL support only     Objective     07/25/20 0959   Prior Living Situation   Prior Services None   Housing / Facility Assisted Living Residence   Steps Into Home 0   Steps In Home 0   Bathroom Set up Walk In Shower;Grab Bars   Equipment Owned None   Lives with - Patient's Self Care Capacity Attendant / Paid Care Giver   Comments This therapist spoke with guardiraad Salazar over the phone, who reported she can purchase shower chair and 3-in-1 commode for pt. Pt reports no DME/AD owned.    Prior Level of ADL Function   Self " Feeding Independent   Grooming / Hygiene Independent   Bathing Independent   Dressing Independent   Toileting Independent   Comments per pt report. [facility] reported they cannot provide ADL assist upon pt's return home and that pt needs to be independent with these tasks   Prior Level of IADL Function   Medication Management Dependent   Laundry Dependent   Kitchen Mobility Other (Comments)  (n/a, meals provided by [facility])   Home Management Dependent   Shopping Dependent   Prior Level Of Mobility Independent Without Device in Home   Comments [facility] confirmed that they complete all IADLs for pt at baseline.   Vitals   O2 (LPM) 2   O2 Delivery Device Oxymask   Vitals Comments Pt received with oxymask around neck, O2 93%-95% on RA at rest. O2 de-sat to 88% on RA during activity, when oxymask donned O2 cesar to 93% <1 minute   Cognition    Cognition / Consciousness X   Safety Awareness Impaired   New Learning Impaired   Sequencing Impaired   Comments Pt non receptive to OT education regarding posterior hip precautions and how to modify ADLs/mobility to accomodate new precautions. Pt easily escalated when therapist attempted to educate pt, and refused learning to use LB dressing AE. Pt reported c/g at [facility] can provide ADL assist upon return home, however when this therapist called [facility] to confirm, they reported pt needs to be independend as they don't provide ADL assist. Guardian Marie reported she and pt's sister will call pt to encourage him to be more receptive to therapy so he can d/c to SNF prior to return home.   Balance Assessment   Sitting Balance (Static) Fair   Sitting Balance (Dynamic) Fair -   Standing Balance (Static) Fair -   Standing Balance (Dynamic) Fair -   Weight Shift Sitting Fair   Weight Shift Standing Fair   Comments FWW   Bed Mobility    Supine to Sit Supervised   Sit to Supine Supervised   Scooting Supervised   Comments increased time, min VCs for safe technique. Pt broke 2/3 precautions during supine <>sit  transfers   ADL Assessment   Grooming   (refused)   Lower Body Dressing Moderate Assist  (supervision to don R sock, max A don L sock)   Toileting   (refused)   Functional Mobility   Sit to Stand Minimal Assist  (pt bent past 90 suring sit to stand transfer to FWW)   Bed, Chair, Wheelchair Transfer Minimal Assist  (bent past 90 when sitting EOB)   Toilet Transfers Refused   Transfer Method Stand Step  (FWW)   Mobility bed>hallways>bed, FWW   Comments escalated when this therapist attempted to educate pt on modified transfer techniques to maintain precautions   Activity Tolerance   Sitting in Chair refused   Sitting Edge of Bed 5 mins   Standing 5 mins   Patient / Family Goals   Patient / Family Goal #1 none stated   Short Term Goals   Short Term Goal # 1 Pt will complete UB/LB dressing at supervision with AE PRN x 5 sessions   Short Term Goal # 2 Pt will complete toilet transfers/toileting tasks at supervision x 5 sessions   Short Term Goal # 3 Pt will verbalize 3/3 precautions and demo good carryover into ADLs and functional transfers x 5 sessions   Anticipated Discharge Equipment   DC Equipment Tub / Shower Seat;Bed Side Commode

## 2020-07-25 NOTE — CARE PLAN
Problem: Safety  Goal: Will remain free from injury  Outcome: PROGRESSING AS EXPECTED   Educate patient on proper positioning when getting out of bed, no IV fluids infusing, call light within reach, bed in lowest position.    Problem: Pain Management  Goal: Pain level will decrease to patient's comfort goal  Outcome: PROGRESSING AS EXPECTED   Keep pain at or below patient comfort goal of 4 or less, administer pain medication as appropriate, reposition for comfort.

## 2020-07-25 NOTE — PROGRESS NOTES
Assumed care of patient at 0700. Patient is alert and oriented, respirations are unlabored and regular. Lung sounds clear, diminished in bases, on 2L 02 via oxymask. Abdomen soft, non tender, +bowel sounds. Voiding without difficulty. Left hip silvadene dressing, CDI, tenderness to touch but patient declines pain intervention. +2 pedal pulses bilaterally, warm. Insulin pump to left abdomen. Patient ambulated to bathroom with FWW, standby assist. Bed in lowest position, call light within reach, patient states no needs at this time.

## 2020-07-25 NOTE — PROGRESS NOTES
2 Rn skin check:    RFA posterior skin tear, now scabbed.  Left shin skin tear, now scabbed.  Right shin skin tear with tegaderm, scant drainage.  KEVAN edema from infiltrated IV, removed.  Left hip silvadene dressing from surgery, CDI.  Sacrum pink, blanching.  Patient turns self in bed.  SCDs in place.  Pillows for support.  Oxygen via oxymask, top of ears or pink, blanching.  Insulin pump noted to LLQ.

## 2020-07-25 NOTE — THERAPY
Physical Therapy   Initial Evaluation     Patient Name: Pawel Tatum  Age:  60 y.o., Sex:  male  Medical Record #: [id number]  Today's Date: 7/25/2020     Precautions: Fall Risk, Posterior Hip Precautions, Weight Bearing As Tolerated Left Lower Extremity    Assessment  Patient is 60 y.o. male with a diagnosis of L hip hemiarthroplasty following a GLF. Pt has a Hx of a TBI and demonstrates aggitation and impulsive moments during TX. Pt is unable to follow the posterior hip precautions and is reluctant to take VC's from caregiver. Pt has reduced ROM, decreased activity tolerance and pain . Pt has decreased AROM and strength . Pt will benefit form PT to address these issues. I recommend PT 3 x week .       Recommend Physical Therapy 3 times per week until therapy goals are met for the following treatments:  Bed mobility, gait, therapeutic exercise,     Discharge recommendations: I recommend a SNU stay as pt is impulsive and has aggitation  2/2 an old TBI. Pt needs to review his Posterior hip precautions and must work with his caretaker/ at his assisted living facility  If he wants to return there.          07/25/20 1159   Prior Living Situation   Prior Services None   Housing / Facility Assisted Living Residence   Steps Into Home 0   Steps In Home 0   Bathroom Set up Walk In Shower;Grab Bars   Equipment Owned None   Lives with - Patient's Self Care Capacity Attendant / Paid Care Giver   Comments pt will need a shower chair and  toilet riser.     Prior Level of Functional Mobility   Bed Mobility Unable To Determine At This Time   Transfer Status Unable To Determine At This Time   Ambulation Unable To Determine At This Time   Assistive Devices Used None   History of Falls   History of Falls Yes   Date of Last Fall   (GLF responsible for this hospitalization/ s/p hemiarthroplas)   Cognition    Cognition / Consciousness X   Safety Awareness Impaired   New Learning Impaired   Sequencing Impaired   Comments pt has bouts  of aggitation and is reluctant to follow cues for safety with ambulation and use of posterior precautions with mobility . pt has impulsivness and needs to slow down gait.   Passive ROM Lower Body   Passive ROM Lower Body X   Comments pt has expected stiffness Lhip    Active ROM Lower Body    Active ROM Lower Body  X   Comments pt has expected stiffness with L hip s/p hemiarthroplasty   Strength Lower Body   Lower Body Strength  X   Comments L Hip weakness, L quads/Hamstrings WNL   Sensation Lower Body   Lower Extremity Sensation   WDL   Lower Body Muscle Tone   Lower Body Muscle Tone  X   Coordination Lower Body    Comments pt has coordinated gait with good initiation of L hip flexion, and coordinated weight bearing into the L LE.    Vision   Vision Comments pt wears glasses for reading   Balance Assessment   Sitting Balance (Static) Fair   Sitting Balance (Dynamic) Fair   Standing Balance (Static) Fair   Standing Balance (Dynamic) Fair -   Weight Shift Sitting Fair   Weight Shift Standing Fair   Comments with a FWW   Gait Analysis   Gait Level Of Assist Supervised   Assistive Device Front Wheel Walker   Distance (Feet) 200   # of Times Distance was Traveled 1   Deviation Decreased Toe Off;Antalgic   # of Stairs Climbed 0   Weight Bearing Status WBAT L    Vision Deficits Impacting Mobility no   Skilled Intervention Verbal Cuing;Other (See Comments)  (pt needs to slow down pace, pt is resistant to safety cues)   Bed Mobility    Supine to Sit Supervised   Sit to Supine Supervised   Scooting Supervised   Functional Mobility   Sit to Stand Minimal Assist   Bed, Chair, Wheelchair Transfer Minimal Assist   Transfer Method Stand Step   Mobility bed,room, hallway   How much difficulty does the patient currently have...   Turning over in bed (including adjusting bedclothes, sheets and blankets)? 3   Sitting down on and standing up from a chair with arms (e.g., wheelchair, bedside commode, etc.) 3   Moving from lying on back  to sitting on the side of the bed? 3   How much help from another person does the patient currently need...   Moving to and from a bed to a chair (including a wheelchair)? 3   Need to walk in a hospital room? 3   Climbing 3-5 steps with a railing? 3   6 clicks Mobility Score 18   Activity Tolerance   Sitting in Chair refused   Sitting Edge of Bed 3   Standing 7   Edema / Skin Assessment   Edema / Skin  Not Assessed   Patient / Family Goals    Patient / Family Goal #1 pt would not choose a goal   Short Term Goals    Short Term Goal # 1 pt will get to EOB without railing and with HOB flat in 3 TX's   Short Term Goal # 2 pt will transfer with a FWW with SPV in 4 Tx's   Short Term Goal # 3 pt will ambulate 350' with the LRAD and SPV in 5 TX's   Problem List    Problems Pain;Impaired Bed Mobility;Impaired Transfers;Impaired Ambulation;Functional ROM Deficit;Functional Strength Deficit;Impaired Balance;Impaired Coordination;Decreased Activity Tolerance;Safety Awareness Deficits / Cognition;Limited Knowledge of Post-Op Precautions;Motor Planning / Sequencing   Anticipated Discharge Equipment   DC Equipment Tub / Shower Seat;Bed Side Commode

## 2020-07-25 NOTE — PROGRESS NOTES
"   Orthopaedic PA Progress Note    Interval changes:PT note read and appreciated    ROS - Patient denies any new issues. No chest pain, dyspnea, or fever.  Pain well controlled.    /75   Pulse 67   Temp 36.7 °C (98 °F) (Temporal)   Resp 16   Ht 1.905 m (6' 3\")   Wt 68.3 kg (150 lb 9.2 oz)   SpO2 92%     Patient seen and examined  No acute distress  Breathing non labored  RRR  Surgical dressing is clean, dry, and intact. Patient clearly fires tibialis anterior, EHL, and gastrocnemius/soleus. Sensation is intact to light touch throughout superficial peroneal, deep peroneal, tibial, saphenous, and sural nerve distributions. Strong and palpable 2+ dorsalis pedis and posterior tibial pulses with capillary refill less than 2 seconds. No lower leg tenderness or discomfort.    Recent Labs     07/23/20  2246 07/24/20  1646 07/25/20  0556   WBC 27.0* 18.9* 20.9*   RBC 4.26* 4.02* 3.89*   HEMOGLOBIN 11.6* 10.9* 10.7*   HEMATOCRIT 36.6* 34.4* 32.2*   MCV 85.9 85.6 82.8   MCH 27.2 27.1 27.5   MCHC 31.7* 31.7* 33.2*   RDW 54.6* 54.4* 51.8*   PLATELETCT 367 365 366   MPV 10.3 9.5 9.6       Active Hospital Problems    Diagnosis   • Tobacco abuse [Z72.0]     Priority: Low       Assessment/Plan:  POD#1 S/P L hip padilla  Wt bearing status - AT with [facility]  PT/OT-initiated  Wound care:dressing CDI  Drains - no  Dill-no  Sutures/Staples out- 10-14 days post operatively  Antibiotics: complete  DVT Prophylaxis- TEDS/SCDs/Foot pumps.   Lovenox: Start 40 mg daily, Duration-until ambulatory > 150'  Future Procedures - none planned  Case Coordination for Discharge Planning - Disposition per Med/PT/OT      "

## 2020-07-25 NOTE — CARE PLAN
Problem: Communication  Goal: The ability to communicate needs accurately and effectively will improve  Outcome: MET  Note: Pt educated on use of call light and white board for communication, pt verbalizes understanding of white board communication and times of rounding.

## 2020-07-25 NOTE — ASSESSMENT & PLAN NOTE
History of leukocytosis. On admission, was found to have leukocytosis with elevated white count. WBC could be stress related, but given picture of anaemia, investigation for other causes was undertaken. Peripheral smear was within acceptable results, and LDH for hemolytic process was negative. May need to reestablish baseline values, and follow up with primary care.

## 2020-07-25 NOTE — ASSESSMENT & PLAN NOTE
Found to have hypoglycemia, likely causing AMS leading to fall and hip fracture. He lives in group home, unsure how intensive his assistance is, but he is dependent upon them for medications. Since admission, he has been trending as hyperglycemic. Recommend follow up with PCP at discharge.  -SSI  -Lantus 10u

## 2020-07-25 NOTE — ASSESSMENT & PLAN NOTE
Elevated BP at admission. BP has been under control while in hospital.  -continue home BP medications

## 2020-07-25 NOTE — ASSESSMENT & PLAN NOTE
Was found down at [facility] facility ([facility]) on 7/23. He did not recall events leading to his fall, but was found to have L femoral neck fracture. Underwent arthroplasty on 7/24. Pain continues to be rated 7/10 consistently but appears comfortable in bed. Coached on requested PRN pain meds. Awaiting acceptance at SNF.  -acetaminophen 500mg PRN  -Morphine 2mg PRN  -SNF for PT referral placed

## 2020-07-26 LAB
ANION GAP SERPL CALC-SCNC: 12 MMOL/L (ref 7–16)
BUN SERPL-MCNC: 22 MG/DL (ref 8–22)
CALCIUM SERPL-MCNC: 8.9 MG/DL (ref 8.5–10.5)
CHLORIDE SERPL-SCNC: 104 MMOL/L (ref 96–112)
CO2 SERPL-SCNC: 22 MMOL/L (ref 20–33)
CREAT SERPL-MCNC: 0.65 MG/DL (ref 0.5–1.4)
ERYTHROCYTE [DISTWIDTH] IN BLOOD BY AUTOMATED COUNT: 55.7 FL (ref 35.9–50)
GLUCOSE SERPL-MCNC: 55 MG/DL (ref 65–99)
HCT VFR BLD AUTO: 36.8 % (ref 42–52)
HGB BLD-MCNC: 11.5 G/DL (ref 14–18)
MCH RBC QN AUTO: 27.1 PG (ref 27–33)
MCHC RBC AUTO-ENTMCNC: 31.3 G/DL (ref 33.7–35.3)
MCV RBC AUTO: 86.6 FL (ref 81.4–97.8)
PLATELET # BLD AUTO: 373 K/UL (ref 164–446)
PMV BLD AUTO: 9.4 FL (ref 9–12.9)
POTASSIUM SERPL-SCNC: 3.8 MMOL/L (ref 3.6–5.5)
RBC # BLD AUTO: 4.25 M/UL (ref 4.7–6.1)
SODIUM SERPL-SCNC: 138 MMOL/L (ref 135–145)
WBC # BLD AUTO: 18.4 K/UL (ref 4.8–10.8)

## 2020-07-26 PROCEDURE — A9270 NON-COVERED ITEM OR SERVICE: HCPCS | Performed by: STUDENT IN AN ORGANIZED HEALTH CARE EDUCATION/TRAINING PROGRAM

## 2020-07-26 PROCEDURE — 82962 GLUCOSE BLOOD TEST: CPT

## 2020-07-26 PROCEDURE — 99232 SBSQ HOSP IP/OBS MODERATE 35: CPT | Mod: GC | Performed by: HOSPITALIST

## 2020-07-26 PROCEDURE — 80048 BASIC METABOLIC PNL TOTAL CA: CPT

## 2020-07-26 PROCEDURE — 700102 HCHG RX REV CODE 250 W/ 637 OVERRIDE(OP): Performed by: STUDENT IN AN ORGANIZED HEALTH CARE EDUCATION/TRAINING PROGRAM

## 2020-07-26 PROCEDURE — 36415 COLL VENOUS BLD VENIPUNCTURE: CPT

## 2020-07-26 PROCEDURE — 85027 COMPLETE CBC AUTOMATED: CPT

## 2020-07-26 PROCEDURE — 770006 HCHG ROOM/CARE - MED/SURG/GYN SEMI*

## 2020-07-26 PROCEDURE — 700111 HCHG RX REV CODE 636 W/ 250 OVERRIDE (IP): Performed by: STUDENT IN AN ORGANIZED HEALTH CARE EDUCATION/TRAINING PROGRAM

## 2020-07-26 RX ORDER — OXYCODONE HYDROCHLORIDE 5 MG/1
5 TABLET ORAL EVERY 6 HOURS PRN
Status: DISCONTINUED | OUTPATIENT
Start: 2020-07-26 | End: 2020-07-28 | Stop reason: HOSPADM

## 2020-07-26 RX ORDER — INSULIN GLARGINE 100 [IU]/ML
14 INJECTION, SOLUTION SUBCUTANEOUS EVERY EVENING
Status: DISCONTINUED | OUTPATIENT
Start: 2020-07-26 | End: 2020-07-27

## 2020-07-26 RX ADMIN — TAMSULOSIN HYDROCHLORIDE 0.4 MG: 0.4 CAPSULE ORAL at 04:38

## 2020-07-26 RX ADMIN — ENOXAPARIN SODIUM 40 MG: 40 INJECTION SUBCUTANEOUS at 08:48

## 2020-07-26 RX ADMIN — INSULIN LISPRO 3 UNITS: 100 INJECTION, SOLUTION INTRAVENOUS; SUBCUTANEOUS at 07:00

## 2020-07-26 RX ADMIN — INSULIN LISPRO 2 UNITS: 100 INJECTION, SOLUTION INTRAVENOUS; SUBCUTANEOUS at 20:00

## 2020-07-26 RX ADMIN — INSULIN GLARGINE 14 UNITS: 100 INJECTION, SOLUTION SUBCUTANEOUS at 20:24

## 2020-07-26 RX ADMIN — ACETAMINOPHEN 500 MG: 500 TABLET ORAL at 17:10

## 2020-07-26 RX ADMIN — OMEPRAZOLE 20 MG: 20 CAPSULE, DELAYED RELEASE ORAL at 17:10

## 2020-07-26 RX ADMIN — PANCRELIPASE 48000 UNITS: 24000; 76000; 120000 CAPSULE, DELAYED RELEASE PELLETS ORAL at 04:38

## 2020-07-26 RX ADMIN — OXYCODONE 5 MG: 5 TABLET ORAL at 04:38

## 2020-07-26 RX ADMIN — OMEPRAZOLE 20 MG: 20 CAPSULE, DELAYED RELEASE ORAL at 04:38

## 2020-07-26 RX ADMIN — OXYCODONE 5 MG: 5 TABLET ORAL at 20:23

## 2020-07-26 ASSESSMENT — ENCOUNTER SYMPTOMS
VOMITING: 0
FEVER: 0
NAUSEA: 0
CHILLS: 0

## 2020-07-26 NOTE — DISCHARGE PLANNING
Anticipated Discharge Disposition: SNF    Action: Spoke with pt and obtained choice for SNF: 1. [facility]. 2. [facility]. 3. [facility]. Faxed to [facility] at [phone].     Barriers to Discharge: Medical clearance. SnF acceptance.     Plan: follow up with SNFs.

## 2020-07-26 NOTE — PROGRESS NOTES
"Patient requesting \"something stronger for pain than Tylenol.\"  Patient has no IV and does not want one.  [facility] notified.  Order received for Oxycodone 5mg Q4 hours PRN.  "

## 2020-07-26 NOTE — PROGRESS NOTES
Received report of patient at beginning of shift. Patient is A&Ox4, irritable with assessment. Dressing in place to left hip, clean/dry/intact. No complaints of pain, on 2L O2 via oxy mask. Patient resting in bed, denies any needs at this time. Safety education provided, bed alarm on, call light within reach.

## 2020-07-26 NOTE — ASSESSMENT & PLAN NOTE
Was found down at [facility] facility ([facility]) on 7/23. He did not recall events leading to his fall, but was found to have L femoral neck fracture. Underwent arthroplasty on 7/24.  -acetaminophen 500mg PRN  -PRN oxycodone  -SNF for PT referral placed

## 2020-07-26 NOTE — PROGRESS NOTES
Daily Progress Note:     Date of Service: 7/26/2020  Primary Team: [facility] IM Purple Team   Attending: Efren Hoang M.D.   Senior Resident: Dr. Mccrary  Intern: Dr. Soler  Contact:  [phone]    Chief Complaint:   Left hip pain after fracture    Subjective:   No acute events overnight, hip pain improving, reportedly improving with regards to activity. Will need skilled nursing facility placement.    Consultants/Specialty:  Orthopedic surgery  Review of Systems:    Review of Systems   Constitutional: Negative for chills and fever.   Cardiovascular: Negative for chest pain and leg swelling.   Gastrointestinal: Negative for nausea and vomiting.   Genitourinary: Negative for dysuria and hematuria.   Musculoskeletal: Positive for joint pain.       Objective Data:   Physical Exam:   Vitals:   Temp:  [36.7 °C (98.1 °F)-37.1 °C (98.8 °F)] 36.7 °C (98.1 °F)  Pulse:  [72-96] 96  Resp:  [16-18] 18  BP: (128-146)/(67-82) 132/79  SpO2:  [92 %-95 %] 92 %    Physical Exam  Constitutional:       Appearance: He is not toxic-appearing.   HENT:      Head: Normocephalic and atraumatic.      Mouth/Throat:      Mouth: Mucous membranes are moist.   Cardiovascular:      Rate and Rhythm: Normal rate and regular rhythm.      Heart sounds: No murmur.   Pulmonary:      Effort: Pulmonary effort is normal.      Breath sounds: Normal breath sounds. No wheezing or rales.   Abdominal:      General: Abdomen is flat. Bowel sounds are normal.      Palpations: Abdomen is soft.   Musculoskeletal:         General: No swelling.      Comments: Left hip dressing CDI  Pulses now intact, warm lower extremity   Neurological:      Mental Status: He is alert and oriented to person, place, and time.           Labs:   Hemoglobin stable, WBC continually elevated, LDH normal  Imaging:   N/A  Problem Representation:     Closed fracture of left hip (HCC)  Assessment & Plan  Was found down at [facility] ([facility]) on 7/23. He did not recall events  leading to his fall, but was found to have L femoral neck fracture. Underwent arthroplasty on 7/24.  -acetaminophen 500mg PRN  -PRN oxycodone  -SNF for PT referral placed    Leukocytosis  Assessment & Plan  Persistently elevated for the past year or more, needs outpatient evaluation for possible CMML versus other hematologic malignancy given monocytosis, LDH/smear normal    Outpatient follow-up    Hypoglycemia  Assessment & Plan  Labile, with DM2    Decreased lantus due to hypoglycemia this AM    Hypertension  Assessment & Plan  Stable

## 2020-07-26 NOTE — ASSESSMENT & PLAN NOTE
Persistently elevated for the past year or more, needs outpatient evaluation for possible CMML versus other hematologic malignancy given monocytosis, LDH/smear normal    Outpatient follow-up

## 2020-07-26 NOTE — CARE PLAN
Problem: Safety  Goal: Will remain free from injury  Outcome: PROGRESSING AS EXPECTED  Patient call appropriately     Problem: Knowledge Deficit  Goal: Knowledge of disease process/condition, treatment plan, diagnostic tests, and medications will improve  Outcome: PROGRESSING AS EXPECTED   Patient aware of POC.  All questions answered.

## 2020-07-27 LAB
GLUCOSE BLD-MCNC: 110 MG/DL (ref 65–99)
GLUCOSE BLD-MCNC: 184 MG/DL (ref 65–99)
GLUCOSE BLD-MCNC: 202 MG/DL (ref 65–99)
GLUCOSE BLD-MCNC: 251 MG/DL (ref 65–99)
GLUCOSE BLD-MCNC: 296 MG/DL (ref 65–99)
GLUCOSE BLD-MCNC: 321 MG/DL (ref 65–99)
GLUCOSE BLD-MCNC: 49 MG/DL (ref 65–99)
GLUCOSE BLD-MCNC: 85 MG/DL (ref 65–99)
GLUCOSE BLD-MCNC: 86 MG/DL (ref 65–99)

## 2020-07-27 PROCEDURE — 700102 HCHG RX REV CODE 250 W/ 637 OVERRIDE(OP): Performed by: STUDENT IN AN ORGANIZED HEALTH CARE EDUCATION/TRAINING PROGRAM

## 2020-07-27 PROCEDURE — 82962 GLUCOSE BLOOD TEST: CPT | Mod: 91

## 2020-07-27 PROCEDURE — A9270 NON-COVERED ITEM OR SERVICE: HCPCS | Performed by: STUDENT IN AN ORGANIZED HEALTH CARE EDUCATION/TRAINING PROGRAM

## 2020-07-27 PROCEDURE — 700111 HCHG RX REV CODE 636 W/ 250 OVERRIDE (IP): Performed by: STUDENT IN AN ORGANIZED HEALTH CARE EDUCATION/TRAINING PROGRAM

## 2020-07-27 PROCEDURE — 99232 SBSQ HOSP IP/OBS MODERATE 35: CPT | Mod: GC | Performed by: HOSPITALIST

## 2020-07-27 PROCEDURE — 770006 HCHG ROOM/CARE - MED/SURG/GYN SEMI*

## 2020-07-27 RX ORDER — INSULIN GLARGINE 100 [IU]/ML
12 INJECTION, SOLUTION SUBCUTANEOUS EVERY EVENING
Status: DISCONTINUED | OUTPATIENT
Start: 2020-07-27 | End: 2020-07-27

## 2020-07-27 RX ORDER — INSULIN GLARGINE 100 [IU]/ML
10 INJECTION, SOLUTION SUBCUTANEOUS EVERY EVENING
Status: DISCONTINUED | OUTPATIENT
Start: 2020-07-27 | End: 2020-07-28 | Stop reason: HOSPADM

## 2020-07-27 RX ORDER — DEXTROSE MONOHYDRATE 25 G/50ML
50 INJECTION, SOLUTION INTRAVENOUS
Status: DISCONTINUED | OUTPATIENT
Start: 2020-07-27 | End: 2020-07-28 | Stop reason: HOSPADM

## 2020-07-27 RX ADMIN — INSULIN LISPRO 4 UNITS: 100 INJECTION, SOLUTION INTRAVENOUS; SUBCUTANEOUS at 16:53

## 2020-07-27 RX ADMIN — OMEPRAZOLE 20 MG: 20 CAPSULE, DELAYED RELEASE ORAL at 04:08

## 2020-07-27 RX ADMIN — OMEPRAZOLE 20 MG: 20 CAPSULE, DELAYED RELEASE ORAL at 16:48

## 2020-07-27 RX ADMIN — OXYCODONE 5 MG: 5 TABLET ORAL at 21:25

## 2020-07-27 RX ADMIN — TAMSULOSIN HYDROCHLORIDE 0.4 MG: 0.4 CAPSULE ORAL at 04:09

## 2020-07-27 RX ADMIN — INSULIN GLARGINE 10 UNITS: 100 INJECTION, SOLUTION SUBCUTANEOUS at 21:31

## 2020-07-27 RX ADMIN — INSULIN LISPRO 1 UNITS: 100 INJECTION, SOLUTION INTRAVENOUS; SUBCUTANEOUS at 11:42

## 2020-07-27 RX ADMIN — PANCRELIPASE 48000 UNITS: 24000; 76000; 120000 CAPSULE, DELAYED RELEASE PELLETS ORAL at 04:09

## 2020-07-27 RX ADMIN — FERROUS GLUCONATE 324 MG: 324 TABLET ORAL at 11:38

## 2020-07-27 RX ADMIN — ACETAMINOPHEN 500 MG: 500 TABLET ORAL at 09:31

## 2020-07-27 RX ADMIN — ENOXAPARIN SODIUM 40 MG: 40 INJECTION SUBCUTANEOUS at 09:29

## 2020-07-27 RX ADMIN — OXYCODONE 5 MG: 5 TABLET ORAL at 11:38

## 2020-07-27 RX ADMIN — OXYCODONE 5 MG: 5 TABLET ORAL at 04:08

## 2020-07-27 ASSESSMENT — ENCOUNTER SYMPTOMS
BLOOD IN STOOL: 0
ORTHOPNEA: 0
DEPRESSION: 0
HEADACHES: 0
DIARRHEA: 0
DOUBLE VISION: 0
DIZZINESS: 0
PALPITATIONS: 0
NERVOUS/ANXIOUS: 0
BLURRED VISION: 0
ABDOMINAL PAIN: 0
EYE PAIN: 0
FEVER: 0
NAUSEA: 0
VOMITING: 0
CONSTIPATION: 0
SORE THROAT: 0
CHILLS: 0
COUGH: 0
MYALGIAS: 0
TINGLING: 0
SHORTNESS OF BREATH: 0

## 2020-07-27 NOTE — PROGRESS NOTES
Received report of patient at start of shift. Patient irritable, refusing full assessment. Refusing to answer orientation questions. PRN oxycodone and tylenol administered for complaints of pain. Patient resting in bed throughout shift. Bed alarm on, safety precautions in place.

## 2020-07-27 NOTE — PROGRESS NOTES
"   Orthopaedic PA Progress Note    Interval changes:clear for dispo from Ortho standpoint, OOB earlier today    ROS - Patient denies any new issues. No chest pain, dyspnea, or fever.  Pain well controlled.    /85   Pulse 74   Temp 36.1 °C (97 °F) (Temporal)   Resp 18   Ht 1.905 m (6' 3\")   Wt 68.3 kg (150 lb 9.2 oz)   SpO2 91%     Patient seen and examined  No acute distress  Breathing non labored  RRR  Surgical dressing is clean, dry, and intact. Patient clearly fires tibialis anterior, EHL, and gastrocnemius/soleus. Sensation is intact to light touch throughout superficial peroneal, deep peroneal, tibial, saphenous, and sural nerve distributions. Strong and palpable 2+ dorsalis pedis and posterior tibial pulses with capillary refill less than 2 seconds. No lower leg tenderness or discomfort.    Recent Labs     07/25/20  0556 07/25/20  1402 07/26/20  0647   WBC 20.9* 24.4* 18.4*   RBC 3.89* 3.85* 4.25*   HEMOGLOBIN 10.7* 10.5* 11.5*   HEMATOCRIT 32.2* 32.1* 36.8*   MCV 82.8 83.4 86.6   MCH 27.5 27.3 27.1   MCHC 33.2* 32.7* 31.3*   RDW 51.8* 52.1* 55.7*   PLATELETCT 366 362 373   MPV 9.6 9.8 9.4       Active Hospital Problems    Diagnosis   • Closed fracture of left hip (HCC) [S72.002A]     Priority: High   • Leukocytosis [D72.829]     Priority: Medium   • Tobacco abuse [Z72.0]     Priority: Low   • Hypertension [I10]     Priority: Low   • Hypoglycemia [E16.2]     Assessment/Plan:  POD#3 S/P L hip padilla  Wt bearing status - AT with [facility]  PT/OT-initiated  Wound care:dressing CDI  Drains - no  Dill-no  Sutures/Staples out- 10-14 days post operatively  Antibiotics: complete  DVT Prophylaxis- TEDS/SCDs/Foot pumps.   Lovenox: Start 40 mg daily, Duration-until ambulatory > 150'  Future Procedures - none planned  Case Coordination for Discharge Planning - Disposition per Med/PT/OT      "

## 2020-07-27 NOTE — DISCHARGE PLANNING
Anticipated Discharge Disposition: SNF    Action: LSW received a call from CARRI Tamayo with [facility] [phone] requesting an update regarding patient's transfer to SNF. LSW explained responses from [facility], [facility] and [facility] are pending. LSW faxed an updated SNF choice form to [facility].    Barriers to Discharge: Accepting SNF and transfer arrangements.    Plan: SNF, pending acceptance.

## 2020-07-27 NOTE — DISCHARGE PLANNING
Received Choice form at [phone]  Agency/Facility Name: 1) [facility] 2) [facility] 3) [facility]  Referral sent per Choice form @ [phone]

## 2020-07-27 NOTE — CARE PLAN
Problem: Safety  Goal: Will remain free from injury  Outcome: PROGRESSING AS EXPECTED  Patient calls appropriately.     Problem: Pain Management  Goal: Pain level will decrease to patient's comfort goal  Outcome: PROGRESSING SLOWER THAN EXPECTED  [facility] notified of patient's pain to [facility]LE 8/10 s/p Tylenol administration.  Order received for Oxycodone 5mg Q4 hours PRN.

## 2020-07-27 NOTE — CARE PLAN
Problem: Safety  Goal: Will remain free from falls  Outcome: PROGRESSING AS EXPECTED  Note: Bed locked in lowest position, treaded socks in place, call light within reach, bed alarm on, room near nursing station.  Pt instructed to call for assistance. Frequent rounding completed.     Problem: Discharge Barriers/Planning  Goal: Patient's continuum of care needs will be met  Outcome: PROGRESSING AS EXPECTED  Note: Patient pending SNF acceptance. Case management assisting.

## 2020-07-27 NOTE — CARE PLAN
Problem: Safety  Goal: Will remain free from falls  Outcome: PROGRESSING AS EXPECTED  Note: Bed locked in lowest position, treaded socks in place, call light within reach, room near nursing station, bed alarm on.  Pt instructed to call for assistance.      Problem: Pain Management  Goal: Pain level will decrease to patient's comfort goal  Outcome: PROGRESSING AS EXPECTED  Note: PRN medications administered for complaints of pain.

## 2020-07-27 NOTE — PROGRESS NOTES
"Daily Progress Note:     Date of Service: 7/27/2020  Primary Team: [facility] Purple Team   Attending: Albert Baum M.D.   Senior Resident: Dr. Mccrary  Intern: Dr. Solre  Contact:  [phone]    Chief Complaint:   L hip pain    Subjective:  NAOE. POD3 L hip hemiarthroplasty. Endorses pain 7/10 of L hip, non-radiating, worsened with movement. Denies any CP, SOB, n/v, f/c, diarrhoea/constipation. States that he doesn't feel ready for SNF, but is amenable to plans for pain control and phyical therapy. At bedside rounding, he continues to endorse L hip pain, but is agreeable to therapy as indicated--\"just want to feel better.\"    Consultants/Specialty:  Orthopaedic surg    Review of Systems:  Review of Systems   Constitutional: Negative for chills and fever.   HENT: Negative for congestion, hearing loss, nosebleeds, sore throat and tinnitus.    Eyes: Negative for blurred vision, double vision and pain.   Respiratory: Negative for cough and shortness of breath.    Cardiovascular: Negative for chest pain, palpitations, orthopnea and leg swelling.   Gastrointestinal: Negative for abdominal pain, blood in stool, constipation, diarrhea, nausea and vomiting.   Genitourinary: Negative for dysuria, frequency, hematuria and urgency.   Musculoskeletal: Negative for joint pain and myalgias.        Chronic back/neck pain, unchanged from prior to admission   Neurological: Negative for dizziness, tingling and headaches.   Psychiatric/Behavioral: Negative for depression. The patient is not nervous/anxious.        Objective Data:   Physical Exam:   Vitals:   Temp:  [36.1 °C (97 °F)-37.4 °C (99.3 °F)] 36.1 °C (97 °F)  Pulse:  [74-79] 74  Resp:  [16-18] 18  BP: (143-152)/(84-89) 152/85  SpO2:  [90 %-98 %] 91 %    Physical Exam  Constitutional:       General: He is not in acute distress.     Appearance: He is not diaphoretic.   HENT:      Head: Normocephalic and atraumatic.      Mouth/Throat:      Mouth: Mucous membranes are " moist.      Pharynx: Oropharynx is clear.   Eyes:      Pupils: Pupils are equal, round, and reactive to light.   Cardiovascular:      Rate and Rhythm: Normal rate and regular rhythm.      Heart sounds: No murmur. No friction rub. No gallop.    Pulmonary:      Breath sounds: Normal breath sounds.   Abdominal:      General: There is no distension.      Palpations: Abdomen is soft.      Tenderness: There is no abdominal tenderness. There is no guarding or rebound.   Musculoskeletal:      Comments: L hip wound healing, without ecchymosis.   Skin:     General: Skin is warm and dry.   Neurological:      Mental Status: He is alert.           Labs:   Lab Results   Component Value Date/Time    SODIUM 138 07/26/2020 06:47 AM    POTASSIUM 3.8 07/26/2020 06:47 AM    CHLORIDE 104 07/26/2020 06:47 AM    CO2 22 07/26/2020 06:47 AM    GLUCOSE 55 (L) 07/26/2020 06:47 AM    BUN 22 07/26/2020 06:47 AM    CREATININE 0.65 07/26/2020 06:47 AM      Recent Labs     07/25/20  0556 07/25/20  1402 07/26/20  0647   WBC 20.9* 24.4* 18.4*   RBC 3.89* 3.85* 4.25*   HEMOGLOBIN 10.7* 10.5* 11.5*   HEMATOCRIT 32.2* 32.1* 36.8*   MCV 82.8 83.4 86.6   MCH 27.5 27.3 27.1   RDW 51.8* 52.1* 55.7*   PLATELETCT 366 362 373   MPV 9.6 9.8 9.4   NEUTSPOLYS 81.30* 78.30*  --    LYMPHOCYTES 9.00* 10.40*  --    MONOCYTES 8.30 9.60  --    EOSINOPHILS 0.00 0.00  --    BASOPHILS 0.30 0.00  --        Imaging:   Last performed 7/24  Problem Representation:     Pawel Tatum is a 61yo M with hx of TBI, DM2, HTN, presented for L hip pain post fall, now POD3 for L hip hemiarthroplasty, awaiting placement at SNF for subacute rehab.    Closed fracture of left hip (HCC)  Assessment & Plan  Was found down at [facility] on 7/23. He did not recall events leading to his fall, but was found to have L femoral neck fracture. Underwent arthroplasty on 7/24. Pain continues to be rated 7/10 consistently but appears comfortable in bed. Coached on requested PRN  pain meds. Awaiting acceptance at SNF.  -acetaminophen 500mg PRN  -Morphine 2mg PRN  -SNF for PT referral placed    Leukocytosis  Assessment & Plan  History of leukocytosis. On admission, was found to have leukocytosis with elevated white count. WBC could be stress related, but given picture of anaemia, investigation for other causes was undertaken. Peripheral smear was within acceptable results, and LDH for hemolytic process was negative. May need to reestablish baseline values, and follow up with primary care.    Hypoglycemia  Assessment & Plan  Found to have hypoglycemia, likely causing AMS leading to fall and hip fracture. He lives in group home, unsure how intensive his assistance is, but he is dependent upon them for medications. Since admission, he has been trending as hyperglycemic. Recommend follow up with PCP at discharge.  -SSI  -Lantus 10u    Hypertension  Assessment & Plan  Elevated BP at admission. BP has been under control while in hospital.  -continue home BP medications

## 2020-07-27 NOTE — PROGRESS NOTES
"   Orthopaedic PA Progress Note    Interval changes:clear for dispo from Ortho standpoint, OOB earlier today    ROS - Patient denies any new issues. No chest pain, dyspnea, or fever.  Pain well controlled.    /89   Pulse 79   Temp 37 °C (98.6 °F) (Temporal)   Resp 16   Ht 1.905 m (6' 3\")   Wt 68.3 kg (150 lb 9.2 oz)   SpO2 92%     Patient seen and examined  No acute distress  Breathing non labored  RRR  Surgical dressing is clean, dry, and intact. Patient clearly fires tibialis anterior, EHL, and gastrocnemius/soleus. Sensation is intact to light touch throughout superficial peroneal, deep peroneal, tibial, saphenous, and sural nerve distributions. Strong and palpable 2+ dorsalis pedis and posterior tibial pulses with capillary refill less than 2 seconds. No lower leg tenderness or discomfort.    Recent Labs     07/25/20  0556 07/25/20  1402 07/26/20  0647   WBC 20.9* 24.4* 18.4*   RBC 3.89* 3.85* 4.25*   HEMOGLOBIN 10.7* 10.5* 11.5*   HEMATOCRIT 32.2* 32.1* 36.8*   MCV 82.8 83.4 86.6   MCH 27.5 27.3 27.1   MCHC 33.2* 32.7* 31.3*   RDW 51.8* 52.1* 55.7*   PLATELETCT 366 362 373   MPV 9.6 9.8 9.4       Active Hospital Problems    Diagnosis   • Closed fracture of left hip (HCC) [S72.002A]     Priority: High   • Leukocytosis [D72.829]     Priority: Medium   • Tobacco abuse [Z72.0]     Priority: Low   • Hypertension [I10]     Priority: Low   • Hypoglycemia [E16.2]     Assessment/Plan:  POD#2 S/P L hip padilla  Wt bearing status - AT with [facility]  PT/OT-initiated  Wound care:dressing CDI  Drains - no  Dill-no  Sutures/Staples out- 10-14 days post operatively  Antibiotics: complete  DVT Prophylaxis- TEDS/SCDs/Foot pumps.   Lovenox: Start 40 mg daily, Duration-until ambulatory > 150'  Future Procedures - none planned  Case Coordination for Discharge Planning - Disposition per Med/PT/OT      "

## 2020-07-28 VITALS
DIASTOLIC BLOOD PRESSURE: 70 MMHG | BODY MASS INDEX: 18.72 KG/M2 | TEMPERATURE: 98.8 F | HEIGHT: 75 IN | SYSTOLIC BLOOD PRESSURE: 126 MMHG | OXYGEN SATURATION: 90 % | WEIGHT: 150.57 LBS | RESPIRATION RATE: 18 BRPM | HEART RATE: 86 BPM

## 2020-07-28 LAB
ANION GAP SERPL CALC-SCNC: 10 MMOL/L (ref 7–16)
BASOPHILS # BLD AUTO: 0.6 % (ref 0–1.8)
BASOPHILS # BLD: 0.09 K/UL (ref 0–0.12)
BUN SERPL-MCNC: 17 MG/DL (ref 8–22)
CALCIUM SERPL-MCNC: 8.3 MG/DL (ref 8.5–10.5)
CHLORIDE SERPL-SCNC: 100 MMOL/L (ref 96–112)
CO2 SERPL-SCNC: 24 MMOL/L (ref 20–33)
CREAT SERPL-MCNC: 0.6 MG/DL (ref 0.5–1.4)
EOSINOPHIL # BLD AUTO: 0.22 K/UL (ref 0–0.51)
EOSINOPHIL NFR BLD: 1.4 % (ref 0–6.9)
ERYTHROCYTE [DISTWIDTH] IN BLOOD BY AUTOMATED COUNT: 51 FL (ref 35.9–50)
GLUCOSE BLD-MCNC: 166 MG/DL (ref 65–99)
GLUCOSE BLD-MCNC: 186 MG/DL (ref 65–99)
GLUCOSE BLD-MCNC: 349 MG/DL (ref 65–99)
GLUCOSE SERPL-MCNC: 224 MG/DL (ref 65–99)
HCT VFR BLD AUTO: 33.4 % (ref 42–52)
HGB BLD-MCNC: 10.9 G/DL (ref 14–18)
IMM GRANULOCYTES # BLD AUTO: 0.19 K/UL (ref 0–0.11)
IMM GRANULOCYTES NFR BLD AUTO: 1.2 % (ref 0–0.9)
LYMPHOCYTES # BLD AUTO: 3.75 K/UL (ref 1–4.8)
LYMPHOCYTES NFR BLD: 24.6 % (ref 22–41)
MCH RBC QN AUTO: 27.2 PG (ref 27–33)
MCHC RBC AUTO-ENTMCNC: 32.6 G/DL (ref 33.7–35.3)
MCV RBC AUTO: 83.3 FL (ref 81.4–97.8)
MONOCYTES # BLD AUTO: 1.66 K/UL (ref 0–0.85)
MONOCYTES NFR BLD AUTO: 10.9 % (ref 0–13.4)
NEUTROPHILS # BLD AUTO: 9.36 K/UL (ref 1.82–7.42)
NEUTROPHILS NFR BLD: 61.3 % (ref 44–72)
NRBC # BLD AUTO: 0.02 K/UL
NRBC BLD-RTO: 0.1 /100 WBC
PLATELET # BLD AUTO: 354 K/UL (ref 164–446)
PMV BLD AUTO: 9.7 FL (ref 9–12.9)
POTASSIUM SERPL-SCNC: 3.8 MMOL/L (ref 3.6–5.5)
RBC # BLD AUTO: 4.01 M/UL (ref 4.7–6.1)
SODIUM SERPL-SCNC: 134 MMOL/L (ref 135–145)
WBC # BLD AUTO: 15.3 K/UL (ref 4.8–10.8)

## 2020-07-28 PROCEDURE — 700111 HCHG RX REV CODE 636 W/ 250 OVERRIDE (IP): Performed by: STUDENT IN AN ORGANIZED HEALTH CARE EDUCATION/TRAINING PROGRAM

## 2020-07-28 PROCEDURE — 82962 GLUCOSE BLOOD TEST: CPT

## 2020-07-28 PROCEDURE — A9270 NON-COVERED ITEM OR SERVICE: HCPCS | Performed by: STUDENT IN AN ORGANIZED HEALTH CARE EDUCATION/TRAINING PROGRAM

## 2020-07-28 PROCEDURE — 85025 COMPLETE CBC W/AUTO DIFF WBC: CPT

## 2020-07-28 PROCEDURE — 99239 HOSP IP/OBS DSCHRG MGMT >30: CPT | Mod: GC | Performed by: HOSPITALIST

## 2020-07-28 PROCEDURE — 80048 BASIC METABOLIC PNL TOTAL CA: CPT

## 2020-07-28 PROCEDURE — 700102 HCHG RX REV CODE 250 W/ 637 OVERRIDE(OP): Performed by: STUDENT IN AN ORGANIZED HEALTH CARE EDUCATION/TRAINING PROGRAM

## 2020-07-28 PROCEDURE — 36415 COLL VENOUS BLD VENIPUNCTURE: CPT

## 2020-07-28 RX ORDER — POLYETHYLENE GLYCOL 3350 17 G/17G
17 POWDER, FOR SOLUTION ORAL
Refills: 3
Start: 2020-07-28 | End: 2020-08-20

## 2020-07-28 RX ORDER — TAMSULOSIN HYDROCHLORIDE 0.4 MG/1
0.4 CAPSULE ORAL
Status: DISCONTINUED | OUTPATIENT
Start: 2020-07-28 | End: 2020-07-28 | Stop reason: HOSPADM

## 2020-07-28 RX ORDER — OXYCODONE HYDROCHLORIDE 5 MG/1
5 TABLET ORAL EVERY 6 HOURS PRN
Qty: 30 TAB | Refills: 0
Start: 2020-07-28 | End: 2020-07-28

## 2020-07-28 RX ORDER — OXYCODONE HYDROCHLORIDE 5 MG/1
5 TABLET ORAL EVERY 8 HOURS PRN
Qty: 15 TAB | Refills: 0 | Status: SHIPPED | OUTPATIENT
Start: 2020-07-28 | End: 2020-08-02

## 2020-07-28 RX ORDER — FERROUS GLUCONATE 324(38)MG
324 TABLET ORAL
Qty: 30 TAB
Start: 2020-07-29 | End: 2021-01-15 | Stop reason: SDUPTHER

## 2020-07-28 RX ADMIN — INSULIN LISPRO 1 UNITS: 100 INJECTION, SOLUTION INTRAVENOUS; SUBCUTANEOUS at 06:21

## 2020-07-28 RX ADMIN — PANCRELIPASE 48000 UNITS: 24000; 76000; 120000 CAPSULE, DELAYED RELEASE PELLETS ORAL at 06:16

## 2020-07-28 RX ADMIN — OMEPRAZOLE 20 MG: 20 CAPSULE, DELAYED RELEASE ORAL at 06:16

## 2020-07-28 RX ADMIN — TAMSULOSIN HYDROCHLORIDE 0.4 MG: 0.4 CAPSULE ORAL at 10:04

## 2020-07-28 RX ADMIN — OXYCODONE 5 MG: 5 TABLET ORAL at 06:16

## 2020-07-28 RX ADMIN — INSULIN LISPRO 4 UNITS: 100 INJECTION, SOLUTION INTRAVENOUS; SUBCUTANEOUS at 12:12

## 2020-07-28 RX ADMIN — ENOXAPARIN SODIUM 40 MG: 40 INJECTION SUBCUTANEOUS at 10:04

## 2020-07-28 ASSESSMENT — ENCOUNTER SYMPTOMS
CONSTIPATION: 0
WEAKNESS: 0
ORTHOPNEA: 0
DIARRHEA: 0
DIAPHORESIS: 0
MYALGIAS: 0
TINGLING: 0
DEPRESSION: 0
NAUSEA: 0
NERVOUS/ANXIOUS: 0
PALPITATIONS: 0
SHORTNESS OF BREATH: 0
BLOOD IN STOOL: 0
DIZZINESS: 0
BLURRED VISION: 0
FEVER: 0
VOMITING: 0
COUGH: 0
ABDOMINAL PAIN: 0
HEADACHES: 0
DOUBLE VISION: 0
SORE THROAT: 0
CHILLS: 0

## 2020-07-28 NOTE — PROGRESS NOTES
Pt is A+Ox4   Complains of 9/10 pain; medicated per MAR     Tolerating a diabetic diet    Pt was requesting sweets and pastas; educated on diabetic diet and carbohydrate intake    Denies N/V/D   VSS     Voiding appropriately in the urinal     WB as tolerated on the left lower extremity     No PIV present     Bed locked and in the lowest position, call light within reach, pt updated on POC, upper bed rails raised, bed alarm on    Hourly rounding in place

## 2020-07-28 NOTE — DISCHARGE INSTRUCTIONS
"Hip Hemiarthroplasty  The hip joint is located where the upper end of the femur meets the pelvis socket (acetabulum). The femur, or thigh bone, looks like a long stem with a ball on the end. The acetabulum is a socket or cup-like structure in the pelvis, or hip bone. This \"ball and socket\" allows your hip to move.  During Hip hemiarthroplasty, your surgeon removes the diseased bone tissue and cartilage from the hip joint. The healthy parts of the hip are left intact. The head of the femur (the ball) and the socket (acetabulum) is replaced with new, artificial parts. The new hip is made of materials that allow a normal movement of the joint. This surgery usually lasts 2 to 3 hours.   The purpose of this surgery is to reduce pain and improve range of motion. It is one of the most successful joint replacement surgeries. It most often greatly improves the quality of life.  LET YOUR CAREGIVERS KNOW ABOUT:  · Allergies  · Medications taken including herbs, eye drops, over the counter medications, and creams  · Use of steroids (by mouth or creams)  · Previous problems with anesthetics or Novocaine  · Possibility of pregnancy, if this applies  · History of blood clots (thrombophlebitis)  · History of bleeding or blood problems  · Previous surgery  · Other health problems  BEFORE THE PROCEDURE  · Do not eat or drink anything for as long as directed by your caregiver prior to surgery.  · You should be present 60 minutes prior to your procedure or as directed.  Prior to surgery an IV (intravenous line for giving fluids) may be started. You may be given an anesthetic (medications and gas to help you sleep) during the procedure.   AFTER THE PROCEDURE  After surgery, you will be taken to the recovery area. There a nurse will watch and check your progress. You may have a catheter (a long, narrow, hollow tube) in your bladder following surgery. The catheter helps you pass your water. Once you're awake, stable, and taking fluids " well, barring other problems you'll be returned to your room. You will receive physical therapy until you are doing well and your caregiver feels it is safe for you to be transferred home or to an extended care facility.  HOME CARE INSTRUCTIONS   · You may resume normal diet and activities as directed or allowed.  · Change dressings if necessary or as directed.  · Only take over-the-counter or prescription medicines for pain, discomfort, or fever as directed by your caregiver.  SEEK IMMEDIATE MEDICAL CARE IF:  You develop:  · Swelling of your calf or leg or develop shortness of breath or chest pain.  · Redness, swelling, or increasing pain in the wound.  · Pus coming from wound.  · An unexplained oral temperature over 102° F (38.9° C) develops.  · A foul smell coming from the wound or dressing.  · A breaking open of the wound (edges not staying together) after sutures or staples have been removed.  MAKE SURE YOU:   · Understand these instructions.  · Will watch your condition.  · Will get help right away if you are not doing well or get worse.  Document Released: 01/02/2008 Document Revised: 03/11/2013 Document Reviewed: 01/29/2008  ExitCare® Patient Information ©2014 [employer].      Discharge Instructions    [facility] with escort. Discharged via wheelchair, hospital escort: Yes.  Special equipment needed: Not Applicable    Be sure to schedule a follow-up appointment with your primary care doctor or any specialists as instructed.     Discharge Plan:   Diet Plan: Discussed  Activity Level: Discussed  Smoking Cessation Offered: Patient Refused  Confirmed Follow up Appointment: Patient to Call and Schedule Appointment  Confirmed Symptoms Management: Discussed  Medication Reconciliation Updated: Yes    I understand that a diet low in cholesterol, fat, and sodium is recommended for good health. Unless I have been given specific instructions below for another diet, I accept  this instruction as my diet prescription.   Other diet: Regular    Special Instructions: None    · Is patient discharged on Warfarin / Coumadin?   No     Depression / Suicide Risk    As you are discharged from this [facility] facility, it is important to learn how to keep safe from harming yourself.    Recognize the warning signs:  · Abrupt changes in personality, positive or negative- including increase in energy   · Giving away possessions  · Change in eating patterns- significant weight changes-  positive or negative  · Change in sleeping patterns- unable to sleep or sleeping all the time   · Unwillingness or inability to communicate  · Depression  · Unusual sadness, discouragement and loneliness  · Talk of wanting to die  · Neglect of personal appearance   · Rebelliousness- reckless behavior  · Withdrawal from people/activities they love  · Confusion- inability to concentrate     If you or a loved one observes any of these behaviors or has concerns about self-harm, here's what you can do:  · Talk about it- your feelings and reasons for harming yourself  · Remove any means that you might use to hurt yourself (examples: pills, rope, extension cords, firearm)  · Get professional help from the community (Mental Health, Substance Abuse, psychological counseling)  · Do not be alone:Call your Safe Contact- someone whom you trust who will be there for you.  · Call your local CRISIS HOTLINE 195-0500 or 763-157-9953  · Call your local Children's Mobile Crisis Response Team [place] (157) 651-2967 or www.Loved.la  · Call the toll free National Suicide Prevention Hotlines   · National Suicide Prevention Lifeline 551-277-NRJQ (3271)  · National Hope Line Network 800-SUICIDE (054-9051)

## 2020-07-28 NOTE — PROGRESS NOTES
"   Orthopaedic PA Progress Note    Interval changes:Doing well    ROS - Patient denies any new issues. No chest pain, dyspnea, or fever.  Pain well controlled.    /75   Pulse 66   Temp 36.7 °C (98 °F) (Temporal)   Resp 16   Ht 1.905 m (6' 3\")   Wt 68.3 kg (150 lb 9.2 oz)   SpO2 90%     Patient seen and examined  No acute distress  Breathing non labored  RRR  Surgical dressing is clean, dry, and intact. Patient clearly fires tibialis anterior, EHL, and gastrocnemius/soleus. Sensation is intact to light touch throughout superficial peroneal, deep peroneal, tibial, saphenous, and sural nerve distributions. Strong and palpable 2+ dorsalis pedis and posterior tibial pulses with capillary refill less than 2 seconds. No lower leg tenderness or discomfort.    Recent Labs     07/25/20  1402 07/26/20  0647 07/28/20  0519   WBC 24.4* 18.4* 15.3*   RBC 3.85* 4.25* 4.01*   HEMOGLOBIN 10.5* 11.5* 10.9*   HEMATOCRIT 32.1* 36.8* 33.4*   MCV 83.4 86.6 83.3   MCH 27.3 27.1 27.2   MCHC 32.7* 31.3* 32.6*   RDW 52.1* 55.7* 51.0*   PLATELETCT 362 373 354   MPV 9.8 9.4 9.7       Active Hospital Problems    Diagnosis   • Closed fracture of left hip (HCC) [S72.002A]     Priority: High   • Leukocytosis [D72.829]     Priority: Medium   • Tobacco abuse [Z72.0]     Priority: Low   • Hypertension [I10]     Priority: Low   • Hypoglycemia [E16.2]     Assessment/Plan:  POD#4 S/P L hip padilla  Wt bearing status - AT with [facility]  PT/OT-initiated  Wound care:dressing CDI  Drains - no  Dill-no  Sutures/Staples out- 10-14 days post operatively  Antibiotics: complete  DVT Prophylaxis- TEDS/SCDs/Foot pumps.   Lovenox: Start 40 mg daily, Duration-until ambulatory > 150'  Future Procedures - none planned  Case Coordination for Discharge Planning - Disposition per Med/PT/OT  Pt to see Dr. Hays in clinic next week.      "

## 2020-07-28 NOTE — PROGRESS NOTES
Late entry 1500 - Discharging patient to [facility] per physician order.  Discharged with medical transportation via wheelchair.  Patient demonstrated understanding of discharge instructions, follow up appointments, home medications, prescriptions, and home care for surgical wound.  Ambulating with standby/1 person assistance, voiding without difficulty, tolerating oral medications, oxygen saturation greater than 90%, tolerating diet.   Educational handouts given and discussed.  Verbalized understanding of discharge instructions and educational handouts.  All questions answered.  Clothing, IPAD, Iphone, and chargers with patient at time of discharge. Report called to KELLEY Batista at [facility]. COBRA and paper prescription for oxycodone with patient/transporter at time of discharge.    Late entry 1630 - Patient called RN stating that his is missing his wallet. Wallet not located in patient's room. Admission navigator shows that patient refused safekeeping upon admit. [facility] also called asking about patient's wallet. Roni stated that she will call the previous facility where patient resided to ask about wallet.

## 2020-07-28 NOTE — DISCHARGE SUMMARY
Internal Medicine Discharge Summary     Date of Admission: 7/23/2020  Date of Discharge: 07/28/20  Service: Internal Medicine  Attending Physician: Albert Baum MD  Senior Resident: Nasir Mccrary M.D.  Intern: Harley Soler MD    Discharge Diagnosis:   Left femoral hip fracture    Hospital problems:  Active Problems:    Closed fracture of left hip (HCC) POA: Unknown    Leukocytosis POA: Unknown    Hypoglycemia POA: Unknown    Hypertension POA: Unknown    Tobacco abuse POA: Unknown  Resolved Problems:    * No resolved hospital problems. *        Hospital Course:   Pawel Tatum is a 60 year old gentleman with a past medical history significant for chronic pancreatitis, type 2 diabetes mellitus with labile control, chronic leukocytosis, and hypertension who presented 7/23/2020 from his assisted living facility after a mechanical ground level fall. He underwent hemiarthroplasty on 7/24/2020 without any significant post-operative complications. He was evaluated by physical and occupational therapy who recommend discharge to a skilled nursing facility. While inpatient he was noted to have a chronic leukocytosis with monocytosiswhich will require further outpatient workup. He was subsequently discharged to his skilled nursing facility in stable condition on 7/28/2020.    Consultants:   Orthopedic surgery    Physical Exam on Day of Discharge:   Vitals:    07/27/20 1600 07/27/20 2030 07/28/20 0445 07/28/20 0813   BP: 135/85 154/88 139/75 126/70   Pulse: 86 77 66 86   Resp: 16 16 16 18   Temp: 36.9 °C (98.4 °F) 37.3 °C (99.1 °F) 36.7 °C (98 °F) 37.1 °C (98.8 °F)   TempSrc: Temporal Temporal Temporal Temporal   SpO2: 99% 90% 90% 90%   Weight:       Height:         Weight/BMI: Body mass index is 18.82 kg/m².  Pulse Oximetry: 90 %, O2 (LPM): 0, O2 Delivery Device: None - Room Air    General: No acute distress, appears comfortable  CVS: RRR no m/r/g  PULM: Clear to auscultation bilaterally  ABD: Non-tender, non-distended,  +BS  EXTR: No peripheral edema, dressing CDI, both extremities warm and well perfused, NVI  NEURO: Alert and oriented x3    Most Recent Labs:    Lab Results   Component Value Date/Time    WBC 15.3 (H) 07/28/2020 05:19 AM    RBC 4.01 (L) 07/28/2020 05:19 AM    HEMOGLOBIN 10.9 (L) 07/28/2020 05:19 AM    HEMATOCRIT 33.4 (L) 07/28/2020 05:19 AM    MCV 83.3 07/28/2020 05:19 AM    MCH 27.2 07/28/2020 05:19 AM    MCHC 32.6 (L) 07/28/2020 05:19 AM    MPV 9.7 07/28/2020 05:19 AM    NEUTSPOLYS 61.30 07/28/2020 05:19 AM    LYMPHOCYTES 24.60 07/28/2020 05:19 AM    MONOCYTES 10.90 07/28/2020 05:19 AM    EOSINOPHILS 1.40 07/28/2020 05:19 AM    BASOPHILS 0.60 07/28/2020 05:19 AM    HYPOCHROMIA 1+ 02/19/2020 02:11 AM    ANISOCYTOSIS 1+ 03/21/2020 04:13 AM      Lab Results   Component Value Date/Time    SODIUM 134 (L) 07/28/2020 05:19 AM    POTASSIUM 3.8 07/28/2020 05:19 AM    CHLORIDE 100 07/28/2020 05:19 AM    CO2 24 07/28/2020 05:19 AM    GLUCOSE 224 (H) 07/28/2020 05:19 AM    BUN 17 07/28/2020 05:19 AM    CREATININE 0.60 07/28/2020 05:19 AM      Lab Results   Component Value Date/Time    ALTSGPT 16 07/25/2020 05:56 AM    ASTSGOT 15 07/25/2020 05:56 AM    ALKPHOSPHAT 233 (H) 07/25/2020 05:56 AM    TBILIRUBIN 0.3 07/25/2020 05:56 AM    LIPASE 8 02/15/2020 07:22 AM    ALBUMIN 2.7 (L) 07/25/2020 05:56 AM    GLOBULIN 3.3 07/25/2020 05:56 AM    PREALBUMIN 9.0 (L) 12/25/2019 03:31 AM    INR 0.98 03/03/2020 08:15 AM    MACROCYTOSIS 1+ 03/15/2020 06:47 AM     Lab Results   Component Value Date/Time    PROTHROMBTM 13.1 03/03/2020 08:15 AM    INR 0.98 03/03/2020 08:15 AM        Procedures and Imaging:     DX-PELVIS-1 OR 2 VIEWS   Final Result      Recent placement of LEFT hip prosthesis, without associated fracture.      CT-PELVIS W/O PLUS RECONS   Final Result         1.  Impacted left femoral neck fracture   2.  Atherosclerosis      CT-HEAD W/O   Final Result         1.  No acute intracranial abnormality.   2.  Atherosclerosis.       DX-FEMUR-2+ LEFT   Final Result         1.  Impacted left subcapital femoral neck fracture.      DX-HIP-UNILATERAL-WITH PELVIS-1 VIEW LEFT   Final Result         1.  Impacted subcapital left femoral neck fracture   2.  Atherosclerosis      DX-CHEST-PORTABLE (1 VIEW)   Final Result         1.  Left basilar atelectasis, no focal infiltrate   2.  Atherosclerosis          Condition at Discharge:   Stable     Disposition:    [facility]    Discharge Medications:      Medication List      START taking these medications      Instructions   enoxaparin 40 MG/0.4ML Soln inj  Commonly known as:  LOVENOX   Doctor's comments:  Until ambulatory greater than 150 feet  Inject 40 mg as instructed every day for 14 days.  Dose:  40 mg     ferrous gluconate 324 (38 Fe) MG Tabs  Start taking on:  July 29, 2020  Commonly known as:  FERGON   Take 1 Tab by mouth every 48 hours.  Dose:  324 mg     NovoLOG FlexPen 100 UNIT/ML injection PEN  Generic drug:  insulin aspart   Inject 10 Units as instructed 3 times a day before meals.  Dose:  10 Units     oxyCODONE immediate-release 5 MG Tabs  Commonly known as:  ROXICODONE   Take 1 Tab by mouth every 6 hours as needed for up to 10 days.  Dose:  5 mg     polyethylene glycol/lytes 17 g Pack  Commonly known as:  MIRALAX   Take 1 Packet by mouth 1 time daily as needed (if sennosides and docusate ineffective after 24 hours).  Dose:  17 g        CONTINUE taking these medications      Instructions   aspirin 81 MG Chew chewable tablet  Commonly known as:  ASA   CHEW 1 TABLET BY MOUTH ONCE DAILY.     atorvastatin 40 MG Tabs  Commonly known as:  LIPITOR   TAKE 1 TABLET BY MOUTH AT BEDTIME.     Creon [id number] units Cpep  Generic drug:  pancrelipase (Lip-Prot-Amyl)   Take 2 Caps by mouth every day.  Dose:  2 Cap     * Glucose Management Tabs   Take 4 Tabs by mouth as needed. Prn blood sugars less than 60  Dose:  4 Tab     * Glucerna 1.0 Kyle Liqd   Take 1 Each by mouth as needed  (hypoglycemia, blood sugars less than 70).  Dose:  1 Each     Multi-Vitamins Tabs   TAKE 1 TABLET BY MOUTH ONCE DAILY.     omeprazole 20 MG delayed-release capsule  Commonly known as:  PRILOSEC   Take 1 Cap by mouth 2 Times a Day.  Dose:  20 mg     tamsulosin 0.4 MG capsule  Commonly known as:  FLOMAX   Take 1 Cap by mouth every day.  Dose:  0.4 mg     thiamine 100 MG Tabs  Commonly known as:  THIAMINE   TAKE 1 TABLET BY MOUTH ONCE DAILY.     Tresiba FlexTouch 100 UNIT/ML Sopn  Generic drug:  Insulin Degludec   Inject 20 Units as instructed every bedtime.  Dose:  20 Units     Urine Glucose-Ketones Test Strp   1 Strip by In Vitro route every day.  Dose:  1 Strip     vitamin D 2000 UNIT Tabs   Take 1 Tab by mouth every day.  Dose:  2,000 Units         * This list has 2 medication(s) that are the same as other medications prescribed for you. Read the directions carefully, and ask your doctor or other care provider to review them with you.                  Instructions:   Follow up with orthopedic surgery as scheduled  Follow up with primary care in 1-2 weeks    The patient was instructed to return to the ER in the event of worsening symptoms. I have counseled the patient on the importance of compliance and the patient has agreed to proceed with all medical recommendations and follow up plan indicated above.   The patient understands that all medications come with benefits and risks. Risks may include permanent injury or death and these risks can be minimized with close reassessment and monitoring.        Follow-up:   Primary care -recommend hematology/oncology referral for chronic leukocytosis, consider CMML. Recommend titration of insulin due to labile blood sugar    Time Spent on Discharge: 40 minutes

## 2020-07-28 NOTE — DISCHARGE PLANNING
Agency/Facility Name: [facility]  Spoke To: Chiara  Outcome: Bed available for patient today. Chiara to arrange transport via wheelchair van @ 1500.    DIPAK Snow notified.

## 2020-07-28 NOTE — PROGRESS NOTES
Daily Progress Note:     Date of Service: 7/28/2020  Primary Team: [facility] Purple Team   Attending: Albert Baum M.D.   Senior Resident: Dr. Mccrary  Intern: Dr. Soler  Contact:  [phone]    Chief Complaint:   L hip pain    Subjective:  NAOE. POD4 L hip hemiarthroplasty. Endorses stiffness and pain of L hip, rated 7-8/10, non-radiating, worsened with movement. Denies CP, SOB, n/v, f/c, diarrhoea/constipation. He understands plan to move to SNF for strengthening with PT/OT.    Consultants/Specialty:  Orthopaedics    Review of Systems:    Review of Systems   Constitutional: Negative for chills, diaphoresis and fever.   HENT: Negative for congestion, hearing loss, nosebleeds, sore throat and tinnitus.    Eyes: Negative for blurred vision and double vision.   Respiratory: Negative for cough and shortness of breath.    Cardiovascular: Negative for chest pain, palpitations, orthopnea and leg swelling.   Gastrointestinal: Negative for abdominal pain, blood in stool, constipation, diarrhea, nausea and vomiting.   Genitourinary: Negative for dysuria, frequency, hematuria and urgency.   Musculoskeletal: Negative for joint pain and myalgias.   Neurological: Negative for dizziness, tingling, weakness and headaches.   Psychiatric/Behavioral: Negative for depression. The patient is not nervous/anxious.        Objective Data:   Physical Exam:   Vitals:   Temp:  [36.7 °C (98 °F)-37.3 °C (99.1 °F)] 37.1 °C (98.8 °F)  Pulse:  [66-86] 86  Resp:  [16-18] 18  BP: (126-154)/(70-88) 126/70  SpO2:  [90 %-99 %] 90 %    Physical Exam  Constitutional:       General: He is not in acute distress.  HENT:      Head: Normocephalic and atraumatic.      Mouth/Throat:      Mouth: Mucous membranes are moist.      Pharynx: Oropharynx is clear.   Eyes:      Extraocular Movements: Extraocular movements intact.      Pupils: Pupils are equal, round, and reactive to light.   Neck:      Musculoskeletal: Normal range of motion.   Cardiovascular:       Rate and Rhythm: Normal rate and regular rhythm.      Heart sounds: No murmur. No friction rub. No gallop.    Pulmonary:      Breath sounds: Normal breath sounds.   Abdominal:      General: There is no distension.      Palpations: Abdomen is soft.      Tenderness: There is no abdominal tenderness. There is no rebound.   Musculoskeletal: Normal range of motion.   Skin:     General: Skin is warm and dry.   Neurological:      Mental Status: He is alert and oriented to person, place, and time.           Labs:   Lab Results   Component Value Date/Time    SODIUM 134 (L) 07/28/2020 05:19 AM    POTASSIUM 3.8 07/28/2020 05:19 AM    CHLORIDE 100 07/28/2020 05:19 AM    CO2 24 07/28/2020 05:19 AM    GLUCOSE 224 (H) 07/28/2020 05:19 AM    BUN 17 07/28/2020 05:19 AM    CREATININE 0.60 07/28/2020 05:19 AM      Recent Labs     07/26/20  0647 07/28/20  0519   WBC 18.4* 15.3*   RBC 4.25* 4.01*   HEMOGLOBIN 11.5* 10.9*   HEMATOCRIT 36.8* 33.4*   MCV 86.6 83.3   MCH 27.1 27.2   RDW 55.7* 51.0*   PLATELETCT 373 354   MPV 9.4 9.7   NEUTSPOLYS  --  61.30   LYMPHOCYTES  --  24.60   MONOCYTES  --  10.90   EOSINOPHILS  --  1.40   BASOPHILS  --  0.60       Imaging:     Problem Representation:    Pawel Tatum is 61yo M with hx of TBI, DM2, HTN, presenting for L hip pain, now POD4 for L hip hemiarthroplasty, awaiting SNF placement.    Closed fracture of left hip (HCC)  Assessment & Plan  [facility] on 7/[facility]. He did not recall events leading to his fall, but was found to have L femoral neck fracture. Underwent arthroplasty on 7/24. Pain continues to be rated 7/10 consistently but appears comfortable in bed. Coached on requested PRN pain meds. Awaiting acceptance at SNF.  -acetaminophen 500mg PRN  -Morphine 2mg PRN  -SNF for PT referral placed    Leukocytosis  Assessment & Plan  History of leukocytosis. On admission, was found to have leukocytosis with elevated white count. WBC could be stress  related, but given picture of anaemia, investigation for other causes was undertaken. Peripheral smear was within acceptable results, and LDH for hemolytic process was negative. May need to reestablish baseline values, and follow up with primary care.    Hypoglycemia  Assessment & Plan  Found to have hypoglycemia, likely causing AMS leading to fall and hip fracture. He lives in group home, unsure how intensive his assistance is, but he is dependent upon them for medications. Since admission, he has been trending as hyperglycemic. Recommend follow up with PCP at discharge.  -SSI  -Lantus 10u    Hypertension  Assessment & Plan  Elevated BP at admission. BP has been under control while in hospital.  -continue home BP medications

## 2020-07-28 NOTE — DISCHARGE PLANNING
Anticipated Discharge Disposition: SNF    Action: This patient was accepted at [facility]. LSW spoke with Roni, patient's guardian, Star stated her choice is [facility].    Per Michelle, Bedside RN, this patient is appropriate to transfer via [facility]. LSW faxed the Transportation Form to [facility].    Per [facility], the transfer to [facility] is scheduled for today at 1500 via [facility]. LSW copied the patient's medical record and completed the [employer] Form. LSW obtained verbal consent for the [employer] Form from Roni, Guardian, update provided to RN via phone.    Per MD, this patient is medically cleared for transfer to SNF.     LSW submitted the PASRR, File # [id number].    Barriers to Discharge: None.    Plan: SNF.

## 2020-07-28 NOTE — PROGRESS NOTES
Received report of patient at beginning of shift. Patient is A&Ox4, less irritable compared to yesterday's shift with this RN. Complains of 8/10 left hip pain, declines intervention. Assessment complete on room air. Patient up to bathroom with x1 assist, +BM. Patient resting in bed watching TV. Encouraged to use call light for assistance. Bed alarm on, safety precautions in place.

## 2020-07-28 NOTE — DISCHARGE PLANNING
Agency/Facility Name: [facility], [place]  Outcome: Patient accepted via Epic.

## 2020-07-28 NOTE — CARE PLAN
Problem: Safety  Goal: Will remain free from injury  Outcome: PROGRESSING AS EXPECTED     Problem: Knowledge Deficit  Goal: Knowledge of disease process/condition, treatment plan, diagnostic tests, and medications will improve  Outcome: PROGRESSING AS EXPECTED     Problem: Pain Management  Goal: Pain level will decrease to patient's comfort goal  Outcome: PROGRESSING AS EXPECTED     Problem: Skin Integrity  Goal: Risk for impaired skin integrity will decrease  Outcome: PROGRESSING AS EXPECTED     Problem: Psychosocial Needs:  Goal: Level of anxiety will decrease  Outcome: PROGRESSING AS EXPECTED

## 2020-07-29 LAB
BACTERIA BLD CULT: NORMAL
BACTERIA BLD CULT: NORMAL
SIGNIFICANT IND 70042: NORMAL
SIGNIFICANT IND 70042: NORMAL
SITE SITE: NORMAL
SITE SITE: NORMAL
SOURCE SOURCE: NORMAL
SOURCE SOURCE: NORMAL

## 2020-08-12 ENCOUNTER — TELEMEDICINE (OUTPATIENT)
Dept: ENDOCRINOLOGY | Facility: MEDICAL CENTER | Age: 60
End: 2020-08-12
Attending: INTERNAL MEDICINE
Payer: MEDICARE

## 2020-08-12 DIAGNOSIS — E10.69 HYPERLIPIDEMIA DUE TO TYPE 1 DIABETES MELLITUS (HCC): ICD-10-CM

## 2020-08-12 DIAGNOSIS — E10.65 TYPE 1 DIABETES MELLITUS WITH HYPERGLYCEMIA (HCC): ICD-10-CM

## 2020-08-12 DIAGNOSIS — Z79.4 LONG-TERM INSULIN USE (HCC): ICD-10-CM

## 2020-08-12 DIAGNOSIS — E16.2 HYPOGLYCEMIA: ICD-10-CM

## 2020-08-12 DIAGNOSIS — E78.5 HYPERLIPIDEMIA DUE TO TYPE 1 DIABETES MELLITUS (HCC): ICD-10-CM

## 2020-08-12 PROCEDURE — 999999 HB NO CHARGE: Performed by: INTERNAL MEDICINE

## 2020-08-12 PROCEDURE — 95251 CONT GLUC MNTR ANALYSIS I&R: CPT | Mod: 95,CR | Performed by: INTERNAL MEDICINE

## 2020-08-12 PROCEDURE — 99214 OFFICE O/P EST MOD 30 MIN: CPT | Mod: 25,95,CR | Performed by: INTERNAL MEDICINE

## 2020-08-12 NOTE — PROGRESS NOTES
CHIEF COMPLAINT: Patient is here for follow up of Type 1 Diabetes Mellitus.  Patient was presented for a telehealth consultation via secure and encrypted videoconferencing technology. This encounter was conducted via Zoom . Verbal consent was obtained. Patient's identity was verified.    HPI:     Pawel Tatum is a 59 y.o. male with Type 1 Diabetes Mellitus here for follow up.    Labs from 7/24/2020 show Hba1c is fair at 7.1%  Labs from 4/9/2020 show HbA1c is fair at 7.6%    Patient was previously followed by the PA   He has a history of poor cognitive function, traumatic brain injury and history of severe hypoglycemia  He used to be in respite home called [facility]  He had a fall and was admitted to [facility]    He has a history of Chronic idiopathic pancreatitis, uncontrolled diabetes at baseline with hx of neuropathy, foot osteomyelitis in the past and had ascending cholangitis treated with ERCP in [facility].    Despite of his history of pancreatitis he was placed on Ozempic by the PA who previously took care of him.  Ozempic has been stopped.     Previously we discovered that he was having hypoglycemic episodes because he was taking rapid acting insulin 45 minutes prior to meal we made reductions on his basal insulin and mealtime insulin and told him to take the mealtime insulin just before he eats and NOT 30-45 mins prior to his meal      Since making the proper changes has had less occurrence of hypoglycemia  He used to be on Tresiba 20u daily and Humalog 10u for breakfast, 10u for lunch and 11 for dinner        He is now on Lantus 10u bid at the [facility], Novolin R 5u [facility] with sliding scale of 1 unit for every 50 above 150  He regularly eats a sandwich at night before bedtime    We downloaded his DEXCOM G6 CGM today and his average BG was 250 with a standard deviation of 104 with time in range of 23%.  There is a pattern of hyperglycemia throughout the day    He has hyperlipidemia and is on Atorvastatin   He is  tolerating this well.  LDL-C was 50 on 6/2020      BG Diary:  see dexcom download     Weight has been stable    Diabetes Complications   Retinopathy: No known retinopathy.  Last eye exam: patient is overdue for an eye exam  Neuropathy: Denies paresthesias or numbness in hands or feet. Denies any foot wounds.  Exercise: Minimal.  Diet: Fair.  Patient's medications, allergies, and social histories were reviewed and updated as appropriate.    ROS:     CONS:     No fever, no chills   EYES:     No diplopia, no blurry vision   CV:           No chest pain, no palpitations   PULM:     No SOB, no cough, no hemoptysis.   GI:            No nausea, no vomiting, no diarrhea, no constipation   ENDO:     No polyuria, no polydipsia, no heat intolerance, no cold intolerance       Past Medical History:  Problem List:  2020-07: Closed fracture of left hip ([facility])  2020-03: Hypoglycemia  2020-03: Long-term insulin use ([facility])  2020-03: Acute respiratory failure with hypoxia ([facility])  2020-02: Loculated pleural effusion  2020-02: Pancreatic mass  2020-02: Cholangitis  2020-01: Cellulitis of right upper extremity  2019-12: Helicobacter pylori (H. pylori) infection  2019-12: Cognitive decline  2019-12: Moderate protein malnutrition ([facility])  2019-12: Normocytic anemia  2019-12: Melanotic stools  2019-12: Uncontrolled type 1 diabetes mellitus with hyperglycemia   ([facility])  2019-12: Metabolic acidosis  2019-12: Hyponatremia  2019-08: H/O Bell's palsy  2019-01: Leukocytosis  2019-01: Peripheral neuropathy  2019-01: Subacute osteomyelitis of right foot ([facility])  2018-12: Leukocytosis  2018-11: Type 1 diabetes mellitus with hyperglycemia ([facility])  2018-11: DINESH (acute kidney injury) ([facility])  2018-11: Hypomagnesemia  2018-11: Hypokalemia  2018-11: Hypophosphatemia  2018-11: Chronic pancreatitis ([facility])  2018-11: Diabetic ketoacidosis ([facility])  2018-11: Hypotension  2018-10: Hyperlipidemia due to type 1 diabetes mellitus ([facility])  2018-10: Recurrent major depressive  disorder, in full remission ([facility])  2018-10: Memory loss  2018-08: History of recent UGI bleed  2018-08: Hyperlipidemia  2018-08: UTI (urinary tract infection)  2018-08: Sepsis ([facility])  2018-08: Tobacco abuse  2018-08: Choledocholithiasis  2018-08: Uncontrolled type 2 diabetes mellitus with hyperosmolarity   without coma ([facility])  2018-08: Diabetic peripheral angiopathy ([facility])  2018-08: Hypertension  2018-08: Vitamin D deficiency      Past Surgical History:  Past Surgical History:   Procedure Laterality Date   • PB TOTAL HIP ARTHROPLASTY Left 7/24/2020    Procedure: ARTHROPLASTY, HIP, TOTAL;  Surgeon: David Hays M.D.;  Location: SURGERY [facility];  Service: Orthopedics   • PB PARTIAL HIP REPLACEMENT Left 7/24/2020    Procedure: HEMIARTHROPLASTY, HIP;  Surgeon: David Hays M.D.;  Location: [facility];  Service: Orthopedics   • PB ERCP,DIAGNOSTIC  3/16/2020    Procedure: ERCP, DIAGNOSTIC;  Surgeon: Carlos Alberto Ansari M.D.;  Location: SURGERY [facility];  Service: Gastroenterology   • PB ERCP,DIAGNOSTIC  2/24/2020    Procedure: ERCP (ENDOSCOPIC RETROGRADE CHOLANGIOPANCREATOGRAPHY);  Surgeon: Hemant Lorenzo M.D.;  Location: [facility];  Service: Gastroenterology   • PB ERCP,DIAGNOSTIC  2/18/2020    Procedure: ERCP (ENDOSCOPIC RETROGRADE CHOLANGIOPANCREATOGRAPHY);  Surgeon: Carlos Alberto Ansari M.D.;  Location: [facility];  Service: Gastroenterology   • PB ENDOSCOPIC US EXAM, ESOPH  2/18/2020    Procedure: EGD, WITH ENDOSCOPIC US;  Surgeon: Carlos Alberto Ansari M.D.;  Location: SURGERY [facility];  Service: Gastroenterology   • PB UPPER GI ENDOSCOPY,BIOPSY N/A 12/27/2019    Procedure: GASTROSCOPY, WITH BIOPSY;  Surgeon: Hector Villarreal M.D.;  Location: ENDOSCOPY [facility];  Service: Gastroenterology   • TOE AMPUTATION Right 1/12/2019    Procedure: TOE AMPUTATION;  Surgeon: Nicanor Reddy M.D.;  Location: SURGERY [facility];   Service: Orthopedics   • GASTROSCOPY-ENDO N/A 8/25/2018    Procedure: GASTROSCOPY-ENDO;  Surgeon: Jaswant Frye M.D.;  Location: [facility];  Service: Gastroenterology   • OTHER ABDOMINAL SURGERY          Allergies:  Patient has no known allergies.     Social History:  Social History     Tobacco Use   • Smoking status: Current Every Day Smoker     Packs/day: 0.25     Years: 15.00     Pack years: 3.75   • Smokeless tobacco: Never Used   • Tobacco comment: Trying to quit   Substance Use Topics   • Alcohol use: No   • Drug use: No        Family History:   family history includes No Known Problems in his father and mother.      PHYSICAL EXAM:   OBJECTIVE:  Vital signs: There were no vitals taken for this visit.  GENERAL: Well-developed, undernourished in no apparent distress.   EYE:  No ocular asymmetry, PERRLA  HENT: Pink, moist mucous membranes.    NECK: No thyromegaly.   CARDIOVASCULAR: Normal precordial impulse seen with normal carotid pulsation  LUNGS: Symmetrical chest expansion with normal phonation of voice   ABDOMEN: Non obese abdomen with no visible organomegaly  EXTREMITIES: No clubbing, cyanosis, or edema.   NEUROLOGICAL: No gross focal motor abnormalities   LYMPH: No cervical adenopathy seen.   SKIN: No rashes, lesions.       Labs:  Lab Results   Component Value Date/Time    HBA1C 7.1 (H) 07/24/2020 09:09 AM        Lab Results   Component Value Date/Time    WBC 15.3 (H) 07/28/2020 05:19 AM    RBC 4.01 (L) 07/28/2020 05:19 AM    HEMOGLOBIN 10.9 (L) 07/28/2020 05:19 AM    MCV 83.3 07/28/2020 05:19 AM    MCH 27.2 07/28/2020 05:19 AM    MCHC 32.6 (L) 07/28/2020 05:19 AM    RDW 51.0 (H) 07/28/2020 05:19 AM    MPV 9.7 07/28/2020 05:19 AM       Lab Results   Component Value Date/Time    SODIUM 134 (L) 07/28/2020 05:19 AM    POTASSIUM 3.8 07/28/2020 05:19 AM    CHLORIDE 100 07/28/2020 05:19 AM    CO2 24 07/28/2020 05:19 AM    ANION 10.0 07/28/2020 05:19 AM    GLUCOSE 224 (H) 07/28/2020 05:19 AM     BUN 17 07/28/2020 05:19 AM    CREATININE 0.60 07/28/2020 05:19 AM    CALCIUM 8.3 (L) 07/28/2020 05:19 AM    ASTSGOT 15 07/25/2020 05:56 AM    ALTSGPT 16 07/25/2020 05:56 AM    TBILIRUBIN 0.3 07/25/2020 05:56 AM    ALBUMIN 2.7 (L) 07/25/2020 05:56 AM    TOTPROTEIN 6.0 07/25/2020 05:56 AM    GLOBULIN 3.3 07/25/2020 05:56 AM    AGRATIO 0.8 07/25/2020 05:56 AM       Lab Results   Component Value Date/Time    CHOLSTRLTOT 147 01/07/2019 1415    TRIGLYCERIDE 54 01/07/2019 1415    HDL 86 01/07/2019 1415    LDL 50 01/07/2019 1415       Lab Results   Component Value Date/Time    MALBCRT 282 (H) 06/26/2020 07:42 AM    MICROALBUR 33.3 06/26/2020 07:42 AM        Lab Results   Component Value Date/Time    TSHULTRASEN 1.490 12/25/2019 0331     No results found for: FREEDIR  No results found for: FREET3  No results found for: THYSTIMIG        ASSESSMENT/PLAN:     1. Type 1 diabetes mellitus with hyperglycemia, with long-term current use of insulin (HCC)  Stable  Therapeutic CGM was downloaded and reviewed  Recommend increasing his Lantus 11u bid  Increase Novolin R to 7u QAC  Continue SSI  Recommend that he watch his carb intake  His nurse Maxwell is going to update me about the status of his sugars  We will plan for follow-up in 3 months with a repeat of his A1c      2. Hypoglycemia  Resolved  Reviewed the proper treatment of hypoglycemia    3. Hyperlipidemia associated with type 2 diabetes mellitus (HCC)  Controlled  Repeat lipids in 12 months    4. Long-term insulin use (HCC)  Patient is on long term insulin therapy for his diabetes      No follow-ups on file.      Thank you kindly for allowing me to participate in the diabetes care plan for this patient.    Ruddy Fry MD, FACE, [facility]  03/31/20    CC:   JAKE Armando

## 2020-08-20 ENCOUNTER — PRE-ADMISSION TESTING (OUTPATIENT)
Dept: ADMISSIONS | Facility: MEDICAL CENTER | Age: 60
End: 2020-08-20
Attending: INTERNAL MEDICINE
Payer: MEDICARE

## 2020-08-20 RX ORDER — INSULIN GLARGINE 100 [IU]/ML
INJECTION, SOLUTION SUBCUTANEOUS
COMMUNITY
Start: 2020-08-19 | End: 2020-08-27 | Stop reason: SDUPTHER

## 2020-08-20 RX ORDER — ACETAMINOPHEN 325 MG/1
TABLET ORAL
COMMUNITY
Start: 2020-07-22 | End: 2020-08-27

## 2020-08-20 RX ORDER — HUMAN INSULIN 100 [IU]/ML
INJECTION, SOLUTION SUBCUTANEOUS
COMMUNITY
Start: 2020-08-19 | End: 2020-08-27

## 2020-08-20 RX ORDER — THIAMINE MONONITRATE (VIT B1) 100 MG
100 TABLET ORAL DAILY
COMMUNITY

## 2020-08-20 ASSESSMENT — FIBROSIS 4 INDEX: FIB4 SCORE: 0.66

## 2020-08-20 NOTE — OR NURSING
PreAdmit Appointment: Patient given Preparing for your procedure handout. Patient instructed to continue regularly prescribed medications through day before surgery. Instructed to take the following medications the day of surgery with a sip of water per Anesthesia protocol:Patient instructed to use tylenol if needed and take Prilosec, Lipitor and Flomax day of surgery. Patient given insulin instructions to take back to facility where he resides. Covid test negative. Patient denies issues with anesthesia. Education provided. Patient has a Guardian.

## 2020-08-24 ENCOUNTER — ANESTHESIA (OUTPATIENT)
Dept: SURGERY | Facility: MEDICAL CENTER | Age: 60
End: 2020-08-24
Payer: MEDICARE

## 2020-08-24 ENCOUNTER — HOSPITAL ENCOUNTER (OUTPATIENT)
Facility: MEDICAL CENTER | Age: 60
End: 2020-08-24
Attending: INTERNAL MEDICINE | Admitting: INTERNAL MEDICINE
Payer: MEDICARE

## 2020-08-24 ENCOUNTER — ANESTHESIA EVENT (OUTPATIENT)
Dept: SURGERY | Facility: MEDICAL CENTER | Age: 60
End: 2020-08-24
Payer: MEDICARE

## 2020-08-24 VITALS
RESPIRATION RATE: 16 BRPM | WEIGHT: 139.11 LBS | OXYGEN SATURATION: 92 % | TEMPERATURE: 97.5 F | BODY MASS INDEX: 17.3 KG/M2 | HEART RATE: 70 BPM | DIASTOLIC BLOOD PRESSURE: 62 MMHG | HEIGHT: 75 IN | SYSTOLIC BLOOD PRESSURE: 114 MMHG

## 2020-08-24 PROBLEM — E10.9 DIABETES MELLITUS TYPE 1 (HCC): Status: ACTIVE | Noted: 2020-08-24

## 2020-08-24 LAB
GLUCOSE BLD-MCNC: 157 MG/DL (ref 65–99)
PATHOLOGY CONSULT NOTE: NORMAL

## 2020-08-24 PROCEDURE — 700101 HCHG RX REV CODE 250

## 2020-08-24 PROCEDURE — 502240 HCHG MISC OR SUPPLY RC 0272: Performed by: INTERNAL MEDICINE

## 2020-08-24 PROCEDURE — 160025 RECOVERY II MINUTES (STATS): Performed by: INTERNAL MEDICINE

## 2020-08-24 PROCEDURE — 160009 HCHG ANES TIME/MIN: Performed by: INTERNAL MEDICINE

## 2020-08-24 PROCEDURE — 160035 HCHG PACU - 1ST 60 MINS PHASE I: Performed by: INTERNAL MEDICINE

## 2020-08-24 PROCEDURE — 160046 HCHG PACU - 1ST 60 MINS PHASE II: Performed by: INTERNAL MEDICINE

## 2020-08-24 PROCEDURE — 500066 HCHG BITE BLOCK, ECT: Performed by: INTERNAL MEDICINE

## 2020-08-24 PROCEDURE — 700111 HCHG RX REV CODE 636 W/ 250 OVERRIDE (IP): Performed by: STUDENT IN AN ORGANIZED HEALTH CARE EDUCATION/TRAINING PROGRAM

## 2020-08-24 PROCEDURE — 160207 HCHG ENDO MINUTES - EA ADDL 1 MIN LEVEL 3: Performed by: INTERNAL MEDICINE

## 2020-08-24 PROCEDURE — 700105 HCHG RX REV CODE 258: Performed by: INTERNAL MEDICINE

## 2020-08-24 PROCEDURE — 700102 HCHG RX REV CODE 250 W/ 637 OVERRIDE(OP)

## 2020-08-24 PROCEDURE — A9270 NON-COVERED ITEM OR SERVICE: HCPCS

## 2020-08-24 PROCEDURE — 160048 HCHG OR STATISTICAL LEVEL 1-5: Performed by: INTERNAL MEDICINE

## 2020-08-24 PROCEDURE — 88300 SURGICAL PATH GROSS: CPT

## 2020-08-24 PROCEDURE — C1726 CATH, BAL DIL, NON-VASCULAR: HCPCS | Performed by: INTERNAL MEDICINE

## 2020-08-24 PROCEDURE — 160002 HCHG RECOVERY MINUTES (STAT): Performed by: INTERNAL MEDICINE

## 2020-08-24 PROCEDURE — C2617 STENT, NON-COR, TEM W/O DEL: HCPCS | Performed by: INTERNAL MEDICINE

## 2020-08-24 PROCEDURE — 110371 HCHG SHELL REV 272: Performed by: INTERNAL MEDICINE

## 2020-08-24 PROCEDURE — 160202 HCHG ENDO MINUTES - 1ST 30 MINS LEVEL 3: Performed by: INTERNAL MEDICINE

## 2020-08-24 PROCEDURE — 88173 CYTOPATH EVAL FNA REPORT: CPT

## 2020-08-24 PROCEDURE — 700101 HCHG RX REV CODE 250: Performed by: STUDENT IN AN ORGANIZED HEALTH CARE EDUCATION/TRAINING PROGRAM

## 2020-08-24 PROCEDURE — C1769 GUIDE WIRE: HCPCS | Performed by: INTERNAL MEDICINE

## 2020-08-24 PROCEDURE — 82962 GLUCOSE BLOOD TEST: CPT

## 2020-08-24 PROCEDURE — 503093 HCHG BRUSH, CYTOLOGY: Performed by: INTERNAL MEDICINE

## 2020-08-24 DEVICE — STENT BILIARY ADVANTIX 10FR X 9CM: Type: IMPLANTABLE DEVICE | Status: FUNCTIONAL

## 2020-08-24 DEVICE — STENT ADVANTIX BILIARY 7FR X 7CM: Type: IMPLANTABLE DEVICE | Status: FUNCTIONAL

## 2020-08-24 RX ORDER — HYDROMORPHONE HYDROCHLORIDE 1 MG/ML
0.1 INJECTION, SOLUTION INTRAMUSCULAR; INTRAVENOUS; SUBCUTANEOUS
Status: DISCONTINUED | OUTPATIENT
Start: 2020-08-24 | End: 2020-08-24 | Stop reason: HOSPADM

## 2020-08-24 RX ORDER — SODIUM CHLORIDE, SODIUM LACTATE, POTASSIUM CHLORIDE, CALCIUM CHLORIDE 600; 310; 30; 20 MG/100ML; MG/100ML; MG/100ML; MG/100ML
INJECTION, SOLUTION INTRAVENOUS CONTINUOUS
Status: DISCONTINUED | OUTPATIENT
Start: 2020-08-24 | End: 2020-08-24 | Stop reason: HOSPADM

## 2020-08-24 RX ORDER — HYDROMORPHONE HYDROCHLORIDE 1 MG/ML
0.4 INJECTION, SOLUTION INTRAMUSCULAR; INTRAVENOUS; SUBCUTANEOUS
Status: DISCONTINUED | OUTPATIENT
Start: 2020-08-24 | End: 2020-08-24 | Stop reason: HOSPADM

## 2020-08-24 RX ORDER — ROCURONIUM BROMIDE 10 MG/ML
INJECTION, SOLUTION INTRAVENOUS PRN
Status: DISCONTINUED | OUTPATIENT
Start: 2020-08-24 | End: 2020-08-24 | Stop reason: SURG

## 2020-08-24 RX ORDER — OXYCODONE HCL 5 MG/5 ML
10 SOLUTION, ORAL ORAL
Status: DISCONTINUED | OUTPATIENT
Start: 2020-08-24 | End: 2020-08-24 | Stop reason: HOSPADM

## 2020-08-24 RX ORDER — LIDOCAINE HYDROCHLORIDE 10 MG/ML
INJECTION, SOLUTION INFILTRATION; PERINEURAL
Status: COMPLETED
Start: 2020-08-24 | End: 2020-08-24

## 2020-08-24 RX ORDER — DEXAMETHASONE SODIUM PHOSPHATE 4 MG/ML
INJECTION, SOLUTION INTRA-ARTICULAR; INTRALESIONAL; INTRAMUSCULAR; INTRAVENOUS; SOFT TISSUE PRN
Status: DISCONTINUED | OUTPATIENT
Start: 2020-08-24 | End: 2020-08-24 | Stop reason: SURG

## 2020-08-24 RX ORDER — ONDANSETRON 2 MG/ML
4 INJECTION INTRAMUSCULAR; INTRAVENOUS
Status: DISCONTINUED | OUTPATIENT
Start: 2020-08-24 | End: 2020-08-24 | Stop reason: HOSPADM

## 2020-08-24 RX ORDER — OXYCODONE HYDROCHLORIDE AND ACETAMINOPHEN 325; 5 MG/5ML; MG/5ML
5 SOLUTION ORAL EVERY 4 HOURS PRN
COMMUNITY
End: 2020-08-27

## 2020-08-24 RX ORDER — ONDANSETRON 2 MG/ML
INJECTION INTRAMUSCULAR; INTRAVENOUS PRN
Status: DISCONTINUED | OUTPATIENT
Start: 2020-08-24 | End: 2020-08-24 | Stop reason: SURG

## 2020-08-24 RX ORDER — OXYCODONE HCL 5 MG/5 ML
5 SOLUTION, ORAL ORAL
Status: DISCONTINUED | OUTPATIENT
Start: 2020-08-24 | End: 2020-08-24 | Stop reason: HOSPADM

## 2020-08-24 RX ORDER — HYDROMORPHONE HYDROCHLORIDE 1 MG/ML
0.2 INJECTION, SOLUTION INTRAMUSCULAR; INTRAVENOUS; SUBCUTANEOUS
Status: DISCONTINUED | OUTPATIENT
Start: 2020-08-24 | End: 2020-08-24 | Stop reason: HOSPADM

## 2020-08-24 RX ORDER — KETOROLAC TROMETHAMINE 30 MG/ML
INJECTION, SOLUTION INTRAMUSCULAR; INTRAVENOUS PRN
Status: DISCONTINUED | OUTPATIENT
Start: 2020-08-24 | End: 2020-08-24 | Stop reason: SURG

## 2020-08-24 RX ADMIN — Medication 0.5 ML: at 08:01

## 2020-08-24 RX ADMIN — DEXAMETHASONE SODIUM PHOSPHATE 8 MG: 4 INJECTION, SOLUTION INTRAMUSCULAR; INTRAVENOUS at 09:02

## 2020-08-24 RX ADMIN — ROCURONIUM BROMIDE 30 MG: 10 INJECTION, SOLUTION INTRAVENOUS at 09:04

## 2020-08-24 RX ADMIN — KETOROLAC TROMETHAMINE 30 MG: 30 INJECTION, SOLUTION INTRAMUSCULAR at 09:37

## 2020-08-24 RX ADMIN — POVIDONE-IODINE 15 ML: 10 SOLUTION TOPICAL at 08:02

## 2020-08-24 RX ADMIN — FENTANYL CITRATE 50 MCG: 50 INJECTION, SOLUTION INTRAMUSCULAR; INTRAVENOUS at 09:02

## 2020-08-24 RX ADMIN — ONDANSETRON 4 MG: 2 INJECTION INTRAMUSCULAR; INTRAVENOUS at 09:02

## 2020-08-24 RX ADMIN — PROPOFOL 150 MG: 10 INJECTION, EMULSION INTRAVENOUS at 09:02

## 2020-08-24 RX ADMIN — LIDOCAINE HYDROCHLORIDE 0.5 ML: 10 INJECTION, SOLUTION INFILTRATION; PERINEURAL at 08:01

## 2020-08-24 RX ADMIN — SUGAMMADEX 200 MG: 100 INJECTION, SOLUTION INTRAVENOUS at 09:37

## 2020-08-24 RX ADMIN — SODIUM CHLORIDE, POTASSIUM CHLORIDE, SODIUM LACTATE AND CALCIUM CHLORIDE 1000 ML: 600; 310; 30; 20 INJECTION, SOLUTION INTRAVENOUS at 08:02

## 2020-08-24 ASSESSMENT — PAIN SCALES - GENERAL: PAIN_LEVEL: 0

## 2020-08-24 NOTE — OR NURSING
Patient allergies and NPO status verified, home medication reconciliation completed and belongings secured. Patient verbalizes understanding of pain scale, expected course of stay and plan of care. Surgical site verified with patient.  IV access established.   Marie- Guardian at bedside, Pt resting, not engaged in care.  Pt resides at [facility] in [place].  Guardian's info on chart,

## 2020-08-24 NOTE — OR NURSING
"0950: To PACU from OR via gurney, Patient rouse able, denies pain or nausea. No grimace and is receiving supplemental oxygen at 6 lpm via mask.    1005: Dr. Ansari at bedside. \" 1 stent out, 2 stents in, dilation and we want a follow up in 3 Months.     1010: Patient is awake and Pain free, he appears to be at baseline, and looks comfortable, no grimace present.    1015: Meets criteria to transfer to Stage 2,  updated by telephone.  "

## 2020-08-24 NOTE — OR NURSING
1118- Discharge instructions reviewed with Marie, guardian, via telephone and Omkar, facility staff member, upon discharge. D/Paul to care of facility post uneventful stay in PACU 2.

## 2020-08-24 NOTE — ANESTHESIA PREPROCEDURE EVALUATION
"Anes H&P:  PAST MEDICAL HISTORY:   60 y.o. male who presents for Procedure(s):  ERCP, DIAGNOSTIC -WITH STENT REVISION.  He has current and past medical problems significant for:    Past Medical History:   Diagnosis Date   • Diabetes (HCC)    • High cholesterol    • Hypertension    • Pain     back, and \"where stent was\"   • Seizure disorder (HCC)     Pt had a seizure with \" unknown illness\" in recent months.        SMOKING/ALCOHOL/RECREATIONAL DRUG USE:  Social History     Tobacco Use   • Smoking status: Current Every Day Smoker     Packs/day: 0.25     Years: 15.00     Pack years: 3.75   • Smokeless tobacco: Never Used   • Tobacco comment: Trying to quit   Substance Use Topics   • Alcohol use: No   • Drug use: No     Social History     Substance and Sexual Activity   Drug Use No       PAST SURGICAL HISTORY:  Past Surgical History:   Procedure Laterality Date   • PB TOTAL HIP ARTHROPLASTY Left 7/24/2020    Procedure: ARTHROPLASTY, HIP, TOTAL;  Surgeon: David Hays M.D.;  Location: [facility];  Service: Orthopedics   • PB PARTIAL HIP REPLACEMENT Left 7/24/2020    Procedure: HEMIARTHROPLASTY, HIP;  Surgeon: David Hays M.D.;  Location: [facility];  Service: Orthopedics   • PB ERCP,DIAGNOSTIC  3/16/2020    Procedure: ERCP, DIAGNOSTIC;  Surgeon: Carlos Alberto Ansari M.D.;  Location: [facility];  Service: Gastroenterology   • PB ERCP,DIAGNOSTIC  2/24/2020    Procedure: ERCP (ENDOSCOPIC RETROGRADE CHOLANGIOPANCREATOGRAPHY);  Surgeon: Hemant Lorenzo M.D.;  Location: [facility];  Service: Gastroenterology   • PB ERCP,DIAGNOSTIC  2/18/2020    Procedure: ERCP (ENDOSCOPIC RETROGRADE CHOLANGIOPANCREATOGRAPHY);  Surgeon: Carlos Alberto Ansari M.D.;  Location: [facility];  Service: Gastroenterology   • PB ENDOSCOPIC US EXAM, ESOPH  2/18/2020    Procedure: EGD, WITH ENDOSCOPIC US;  Surgeon: Carlos Alberto Ansari M.D.;  Location: [facility]" ORS;  Service: Gastroenterology   • PB UPPER GI ENDOSCOPY,BIOPSY N/A 12/27/2019    Procedure: GASTROSCOPY, WITH BIOPSY;  Surgeon: Hector Villarreal M.D.;  Location: ENDOSCOPY [facility];  Service: Gastroenterology   • TOE AMPUTATION Right 1/12/2019    Procedure: TOE AMPUTATION;  Surgeon: Nicanor Reddy M.D.;  Location: SURGERY [facility];  Service: Orthopedics   • GASTROSCOPY-ENDO N/A 8/25/2018    Procedure: GASTROSCOPY-ENDO;  Surgeon: Jaswant Frye M.D.;  Location: ENDOSCOPY [facility];  Service: Gastroenterology   • OTHER ABDOMINAL SURGERY         ALLERGIES:   No Known Allergies    MEDICATIONS:  No current facility-administered medications on file prior to encounter.      Current Outpatient Medications on File Prior to Encounter   Medication Sig Dispense Refill   • oxyCODONE-acetaminophen (ROXICET) 5-325 MG/5ML Solution Take 5 mL by mouth every four hours as needed.     • ferrous gluconate (FERGON) 324 (38 Fe) MG Tab Take 1 Tab by mouth every 48 hours. 30 Tab    • insulin aspart (NOVOLOG FLEXPEN) 100 UNIT/ML injection PEN Inject 10 Units as instructed 3 times a day before meals. 5 Each 11   • atorvastatin (LIPITOR) 40 MG Tab TAKE 1 TABLET BY MOUTH AT BEDTIME. 90 Tab 3   • Multiple Vitamin (MULTI-VITAMINS) Tab TAKE 1 TABLET BY MOUTH ONCE DAILY. 90 Tab 3   • Insulin Degludec (TRESIBA FLEXTOUCH) 100 UNIT/ML Solution Pen-injector Inject 20 Units as instructed every bedtime. 5 PEN 3   • Nutritional Supplements (GLUCOSE MANAGEMENT) Tab Take 4 Tabs by mouth as needed. Prn blood sugars less than 60 50 Tab 3   • Nutritional Supplements (GLUCERNA 1.0 GER) Liquid Take 1 Each by mouth as needed (hypoglycemia, blood sugars less than 70). 6 Bottle 6   • Cholecalciferol (VITAMIN D) 2000 UNIT Tab Take 1 Tab by mouth every day. 30 Tab 11   • aspirin (ASA) 81 MG Chew Tab chewable tablet CHEW 1 TABLET BY MOUTH ONCE DAILY. 30 Tab 11   • omeprazole (PRILOSEC) 20 MG delayed-release capsule Take 1 Cap by mouth  2 Times a Day. 60 Cap 8   • pancrelipase, Lip-Prot-Amyl, (CREON) 35361-70245 units Cap DR Particles Take 2 Caps by mouth every day. 60 Cap 11   • tamsulosin (FLOMAX) 0.4 MG capsule Take 1 Cap by mouth every day. 30 Cap 11       LABS:  Lab Results   Component Value Date/Time    HEMOGLOBIN 10.9 (L) 07/28/2020 0519    HEMATOCRIT 33.4 (L) 07/28/2020 0519    WBC 15.3 (H) 07/28/2020 0519     Lab Results   Component Value Date/Time    SODIUM 134 (L) 07/28/2020 0519    POTASSIUM 3.8 07/28/2020 0519    CHLORIDE 100 07/28/2020 0519    CO2 24 07/28/2020 0519    GLUCOSE 224 (H) 07/28/2020 0519    BUN 17 07/28/2020 0519    CALCIUM 8.3 (L) 07/28/2020 0519       PREVIOUS ANESTHETICS: See EMR  __________________________________________      Relevant Problems   ANESTHESIA (within normal limits)      PULMONARY (within normal limits)      NEURO   (+) H/O Bell's palsy      CARDIAC   (+) Diabetic peripheral angiopathy (HCC)   (+) Hypertension      GI (within normal limits)       (within normal limits)      ENDO   (+) Diabetes mellitus type 1 (HCC)   (+) Type 1 diabetes mellitus with hyperglycemia (HCC)      OB (within normal limits)      Other   (+) Cognitive decline       Physical Exam    Airway   Mallampati: III  TM distance: >3 FB  Neck ROM: full       Cardiovascular - normal exam  Rhythm: regular  Rate: normal  (-) murmur     Dental   (+) upper dentures, lower dentures           Pulmonary - normal exam     Abdominal    Neurological - normal exam                 Anesthesia Plan    ASA 3   ASA physical status 3 criteria: diabetes - poorly controlled    Plan - general       Airway plan will be ETT        Induction: intravenous    Postoperative Plan: Postoperative administration of opioids is intended.    Pertinent diagnostic labs and testing reviewed    Informed Consent:    Anesthetic plan and risks discussed with patient and legal guardian.    Use of blood products discussed with: whom consented to blood products.

## 2020-08-24 NOTE — ANESTHESIA QCDR
2019 [facility] Clinical Data Registry (for Quality Improvement)     Postoperative nausea/vomiting risk protocol (Adult = 18 yrs and Pediatric 3-17 yrs)- (430 and 463)  General inhalation anesthetic (NOT TIVA) with PONV risk factors: Yes  Provision of anti-emetic therapy with at least 2 different classes of agents: Yes   Patient DID NOT receive anti-emetic therapy and reason is documented in Medical Record:  N/A    Multimodal Pain Management- (477)  Non-emergent surgery AND patient age >= 18: Yes  Use of Multimodal Pain Management, two or more drugs and/or interventions, NOT including systemic opioids: No  Exception: Documented allergy to multiple classes of analgesics: No       Smoking Abstinence (404)  Patient is current smoker (cigarette, pipe, e-cig, marijuanna): No  Elective Surgery:   Abstinence instructions provided prior to day of surgery:   Patient abstained from smoking on day of surgery:     Pre-Op Beta-Blocker in Isolated CABG (44)  Isolated CABG AND patient age >= 18: No  Beta-blocker admin within 24 hours of surgical incision:   Exception:of medical reason(s) for not administering beta blocker within 24 hours prior to surgical incision (e.g., not  indicated,other medical reason):     PACU assessment of acute postoperative pain prior to Anesthesia Care End- Applies to Patients Age = 18- (ABG7)  Initial PACU pain score is which of the following: < 7/10  Patient unable to report pain score: N/A    Post-anesthetic transfer of care checklist/protocol to PACU/ICU- (426 and 427)  Upon conclusion of case, patient transferred to which of the following locations: PACU/Non-ICU  Use of transfer checklist/protocol: Yes  Exclusion: Service Performed in Patient Hospital Room (and thus did not require transfer): N/A  Unplanned admission to ICU related to anesthesia service up through end of PACU care- (MD51)  Unplanned admission to ICU (not initially anticipated at anesthesia start time): No

## 2020-08-24 NOTE — ANESTHESIA PROCEDURE NOTES
Airway    Date/Time: 8/24/2020 9:06 AM  Performed by: Da Ocampo M.D.  Authorized by: Da Ocampo M.D.     Location:  OR  Urgency:  Elective  Indications for Airway Management:  Anesthesia      Spontaneous Ventilation: absent    Sedation Level:  Deep  Preoxygenated: Yes    Patient Position:  Sniffing  Mask Difficulty Assessment:  2 - vent by mask + OA or adjuvant +/- NMBA  Final Airway Type:  Endotracheal airway  Final Endotracheal Airway:  ETT  Cuffed: Yes    Technique Used for Successful ETT Placement:  Direct laryngoscopy    Insertion Site:  Oral  Blade Type:  Gilberto  Laryngoscope Blade/Videolaryngoscope Blade Size:  3  ETT Size (mm):  7.5  Measured from:  Teeth  ETT to Teeth (cm):  23  Placement Verified by: auscultation and capnometry    Cormack-Lehane Classification:  Grade I - full view of glottis  Number of Attempts at Approach:  1

## 2020-08-24 NOTE — ANESTHESIA TIME REPORT
Anesthesia Start and Stop Event Times     Date Time Event    8/24/2020 0845 Ready for Procedure     0859 Anesthesia Start     0953 Anesthesia Stop        Responsible Staff  08/24/20    Name Role Begin End    Da Ocampo M.D. Anesth 0859 0953        Preop Diagnosis (Free Text):  Pre-op Diagnosis     BILE DUCT STRICTURE, CHOLEDOCHOLITHIASIS, CHRONIC PANCREATITIS, DEMENTIA        Preop Diagnosis (Codes):  Diagnosis Information     Diagnosis Code(s): Bile duct stricture [K83.1]        Post op Diagnosis  Chronic pancreatitis (HCC)      Premium Reason  Non-Premium    Comments:

## 2020-08-24 NOTE — DISCHARGE INSTRUCTIONS
ENDOSCOPY HOME CARE INSTRUCTIONS    GASTROSCOPY OR ERCP  1. Don't eat or drink anything for about an hour after the test. You can then resume your regular diet.  2. Don't drive or drink alcohol for 24 hours. The medication you received will make you too drowsy.  3. Don't take any coffee, tea, or aspirin products until after you see your doctor. These can harm the lining of your stomach.  4. If you begin to vomit bloody material, or develop black or bloody stools, call your doctor as soon as possible.  5. If you have any neck, chest, abdominal pain or temp of 100 degrees, call your doctor.  6. Call office if you do not receive a call within 2 weeks of procedure.    Dr. Ansari  [facility]  [facility]  [phone]      ERCP with:      Bile duct stent removal                          Bile duct balloon stone extraction                          Bile duct hydraulic stricture dilation                          Bile duct stricture brush biopsy                          Bile duct plastic stent placement x 2                          Radiologic interpretation of static and dynamic fluoroscopic images     Indication:        Bile duct stricture     Sedation:         GA (KAMAR Ocampo)     Findings:                          Ampulla                                      Bile duct stent in place                                      Edematous with redundant folds                                      Post sphincterotomy anatomy                          Pancreatic duct                                      Neither injected nor cannulated                          CBD                                      Dilated upstream duct - 12mm                                      Stricture                                                  Through distal duct in head of pancreas                                                  Irregular consistent with chronic pancreatitis                 Therapeutics                          Bile duct stent removal  with snare                          Bile duct balloon stone extraction - 12mm                          Bile duct hydraulic stricture dilation - 10mm x 40mm                          Bile duct stricture brush biopsy                          Bile duct plastic stent placement x 2 - 10F 9cm & 7F 7cm     Plan:               Follow liver enzymes                          Follow up pathology- office should call with results                          Repeat ERCP 3 months for stent revision        You should call 911 if you develop problems with breathing or chest pain.  If any questions arise, call your doctor. If your doctor is not available, please feel free to call [phone]. You can also call the HEALTH HOTLINE open 24 hours/day, 7 days/week and speak to a nurse at [phone], or toll free [phone].    Depression / Suicide Risk    As you are discharged from this [facility] facility, it is important to learn how to keep safe from harming yourself.    Recognize the warning signs:  · Abrupt changes in personality, positive or negative- including increase in energy   · Giving away possessions  · Change in eating patterns- significant weight changes-  positive or negative  · Change in sleeping patterns- unable to sleep or sleeping all the time   · Unwillingness or inability to communicate  · Depression  · Unusual sadness, discouragement and loneliness  · Talk of wanting to die  · Neglect of personal appearance   · Rebelliousness- reckless behavior  · Withdrawal from people/activities they love  · Confusion- inability to concentrate     If you or a loved one observes any of these behaviors or has concerns about self-harm, here's what you can do:  · Talk about it- your feelings and reasons for harming yourself  · Remove any means that you might use to hurt yourself (examples: pills, rope, extension cords, firearm)  · Get professional help from the community (Mental Health, Substance Abuse, psychological  counseling)  · Do not be alone:Call your Safe Contact- someone whom you trust who will be there for you.  · Call your local CRISIS HOTLINE 855-4405 or 120-609-6853  · Call your local Children's Mobile Crisis Response Team [place] (180) 629-0938 or www.SiteBrains  · Call the toll free National Suicide Prevention Hotlines   · National Suicide Prevention Lifeline 431-220-LBIC (6627)  · National Hope Line Network 800-SUICIDE (892-6935)    I acknowledge receipt and understanding of these Home Care Instructions.

## 2020-08-24 NOTE — ANESTHESIA POSTPROCEDURE EVALUATION
Patient: Pawel Tatum    Procedure Summary     Date: 08/24/20 Room / Location:  ENDOSCOPIC ULTRASOUND ROOM / SURGERY [facility]    Anesthesia Start: 0859 Anesthesia Stop: 0953    Procedure: ERCP, DIAGNOSTIC -WITH STENT REVISION Diagnosis:       Bile duct stricture      (BILE DUCT STRICTURE)    Surgeon: Carlos Alberto Ansari M.D. Responsible Provider: Da Ocampo M.D.    Anesthesia Type: general ASA Status: 3          Final Anesthesia Type: general  Last vitals  BP   Blood Pressure: 114/62    Temp   36.4 °C (97.5 °F)    Pulse   Pulse: 70   Resp   16    SpO2   92 %      Anesthesia Post Evaluation    Patient location during evaluation: PACU  Patient participation: complete - patient participated (Dementia - at baseline)  Level of consciousness: awake and alert  Pain score: 0    Airway patency: patent  Anesthetic complications: no  Cardiovascular status: hemodynamically stable  Respiratory status: acceptable  Hydration status: euvolemic    PONV: none           Nurse Pain Score: 0 (NPRS)

## 2020-08-24 NOTE — PROCEDURES
DATE OF SERVICE:  08/24/2020    PROCEDURE:  Endoscopic retrograde cholangiopancreatography with:  1.  Bile duct stent removal.  2.  Bile duct stone extraction.  3.  Bile duct hydraulic stricture dilation.  4.  Bile duct stricture brush biopsy.  5.  Bile duct plastic stent placement x2.  6.  Radiologic interpretation of static and dynamic fluoroscopic images by   myself, at no time was a radiologist present in the room.    INDICATIONS:  Bile duct stricture, chronic pancreatitis.    FINAL IMPRESSION:  1.  Biliary ampulla showing bile duct stent in place.  Edematous with   redundant folds.  Post-sphincterotomy anatomy.  2.  Pancreatic duct neither injected nor cannulated.  3.  Cystic duct patent with filling of the gallbladder present.  4.  Intrahepatic duct dilation.  5.  Proximal common bile duct dilation up to 12 mm.  6.  Bile duct stricture through the distal duct in the head of the pancreas,   irregular consistent with chronic pancreatitis.    THERAPEUTICS:  1.  Bile duct stent removal with snare.  2.  Bile duct balloon stone extraction with 12 mm balloon.  3.  Bile duct hydraulic stricture dilation with 10x40 mm balloon.  4.  Bile duct stricture brush biopsy.  5.  Bile duct plastic stent placement with 10-Panamanian 9 cm stent and 7-Panamanian 7   cm stent.    RECOMMENDATIONS:  1.  Follow liver enzymes.  2.  Follow up pathology.  3.  Repeat ERCP in 3 months for stent revision.    PROCEDURE:  Prior to the procedure, physical exam was stable.  During   procedure, vital signs remained within normal limits.  Prior to sedation,   informed consent was obtained.  Risks, benefits, alternatives including but   not limited to risk of bleeding, infection, perforation, reaction to the   sedating medicine, failure to identify pathology, pancreatitis and death   explained to the patient, who accepted all risks.  Patient prepped in the   prone position after intubation and sedation by anesthesia.  Scope tip of the   Olympus flexible  side-viewing TJF duodenoscope passed to the level of the   biliary ampulla in the short position after the gastric pool was suctioned   dry.  Of note, the stomach was somewhat large and J-shaped making scope   passage challenging.  The biliary ampulla was seen.  The previously placed   bile duct stent was seen in place and was occluded.  There were redundant   folds and post-sphincterotomy anatomy.  A snare was passed down the scope   channel and the previously placed bile duct stent was snared at the scope tip   and removed through the scope channel intact in the usual fashion.  There was   pus and bile seen flowing from the ampulla.  A 9-12 mm occlusion balloon   loaded with a 0.035 wire was utilized for selective cannulation of the common   bile duct.  This was achieved on the very first attempt and at no time was   pancreatic duct injected, cannulated, or otherwise manipulated.  With wire   left in place in the right intrahepatic biliary tree, injection of contrast   was made.  This demonstrated a 2-3 cm long stricture through the head of the   pancreas, which had irregular borders suspicious for chronic pancreatitis.    Intrahepatic biliary tree was dilated as was the upstream bile duct up to 12   mm in diameter.  The cystic duct was patent and filled and the gallbladder   partially filled.  With the wire left in place, multiple balloon sweeps were   made from the common hepatic duct down.  This delivered soft stone debris and   small stone fragments ____.  Three sweeps were made.  The third sweep was   clear of any other debris, but there was pus extracted also.  The wire was   left in place and a 10x40 hydraulic dilation balloon was chosen and exchanged   over the wire and passed across the strictured area and slowly inflated, the   stricture inflated fully.  The balloon was held in place for 1 minute and then   deflated.  With deflation and removal of the balloon, once again pus and bile   was seen flowing  from the duct as well as a small amount of blood.  The wire   was left in place and balloon dilator was removed off of the wire and   exchanged for a brush biopsy catheter.  Biopsies of the distal duct were done   in the usual fashion and catheter removed.  Following this, the wire was left   in place and a 10-Israeli 9 cm plastic biliary stent was deployed across the   ampulla in the usual fashion with good bile flow.  The bile duct was   recannulated alongside the stent and the wire was passed into the intrahepatic   biliary tree.  At this point, a 7-Israeli 7 cm plastic biliary stent was   chosen and loaded on the deployment device and deployed across the ampulla.    Both stents were seen to have the distal flange and in the duodenum with good   positioning seen.  Excellent bile flow was seen.  At this time, the scope was   withdrawn.  Air and liquid were suctioned.  Patient tolerated the procedure   well and was sent to recovery without immediate complications.       ____________________________________     MD ROGELIO VELA / SALINAS    DD:  08/24/2020 09:59:20  DT:  08/24/2020 10:35:55    D#:  [id number]  Job#:  [id number]

## 2020-08-27 ENCOUNTER — OFFICE VISIT (OUTPATIENT)
Dept: MEDICAL GROUP | Facility: MEDICAL CENTER | Age: 60
End: 2020-08-27
Payer: MEDICARE

## 2020-08-27 VITALS
HEIGHT: 75 IN | BODY MASS INDEX: 16.91 KG/M2 | OXYGEN SATURATION: 97 % | RESPIRATION RATE: 16 BRPM | WEIGHT: 136 LBS | HEART RATE: 100 BPM | DIASTOLIC BLOOD PRESSURE: 70 MMHG | SYSTOLIC BLOOD PRESSURE: 126 MMHG

## 2020-08-27 DIAGNOSIS — K86.1 IDIOPATHIC CHRONIC PANCREATITIS (HCC): ICD-10-CM

## 2020-08-27 DIAGNOSIS — E10.65 TYPE 1 DIABETES MELLITUS WITH HYPERGLYCEMIA (HCC): ICD-10-CM

## 2020-08-27 DIAGNOSIS — I10 ESSENTIAL HYPERTENSION: ICD-10-CM

## 2020-08-27 DIAGNOSIS — E10.65 UNCONTROLLED TYPE 1 DIABETES MELLITUS WITH HYPERGLYCEMIA (HCC): ICD-10-CM

## 2020-08-27 DIAGNOSIS — S72.002D CLOSED FRACTURE OF LEFT HIP WITH ROUTINE HEALING, SUBSEQUENT ENCOUNTER: ICD-10-CM

## 2020-08-27 PROBLEM — E10.9 DIABETES MELLITUS TYPE 1 (HCC): Status: RESOLVED | Noted: 2020-08-24 | Resolved: 2020-08-27

## 2020-08-27 LAB — GLUCOSE BLD-MCNC: 54 MG/DL (ref 70–100)

## 2020-08-27 PROCEDURE — 82962 GLUCOSE BLOOD TEST: CPT | Performed by: NURSE PRACTITIONER

## 2020-08-27 PROCEDURE — 99214 OFFICE O/P EST MOD 30 MIN: CPT | Performed by: NURSE PRACTITIONER

## 2020-08-27 RX ORDER — INSULIN ASPART 100 [IU]/ML
7 INJECTION, SOLUTION INTRAVENOUS; SUBCUTANEOUS
Qty: 5 EACH | Refills: 11
Start: 2020-08-27

## 2020-08-27 RX ORDER — OXYCODONE HYDROCHLORIDE AND ACETAMINOPHEN 5; 325 MG/1; MG/1
1 TABLET ORAL 2 TIMES DAILY PRN
Qty: 28 TAB | Refills: 0 | Status: SHIPPED | OUTPATIENT
Start: 2020-08-27 | End: 2020-09-10

## 2020-08-27 RX ORDER — INSULIN GLARGINE 100 [IU]/ML
11 INJECTION, SOLUTION SUBCUTANEOUS 2 TIMES DAILY
Qty: 15 EACH | Refills: 11
Start: 2020-08-27

## 2020-08-27 RX ORDER — NALOXONE HYDROCHLORIDE 4 MG/.1ML
SPRAY NASAL
Qty: 1 EACH | Refills: 0 | Status: SHIPPED
Start: 2020-08-27 | End: 2020-12-11

## 2020-08-27 RX ORDER — ACETAMINOPHEN 325 MG/1
650 TABLET ORAL 2 TIMES DAILY PRN
Qty: 120 TAB | Refills: 11 | Status: SHIPPED | OUTPATIENT
Start: 2020-08-27 | End: 2021-01-15 | Stop reason: SDUPTHER

## 2020-08-27 ASSESSMENT — FIBROSIS 4 INDEX: FIB4 SCORE: 0.66

## 2020-08-27 NOTE — PROGRESS NOTES
Subjective:      Pawel Tatum is a 60 y.o. male who presents with Follow-Up (broken hip)        CC: Patient is here today accompanied by his caregiver for follow-up post hospitalization for hip fracture and ERCP.    HPI         1. Closed fracture of left hip with routine healing, subsequent encounter  Patient apparently had a fall in July and on July 23 she was admitted to the hospital with left hip fracture.  He underwent a hemiarthroplasty on July 24 and had no significant complications afterwards and received physical and occupational therapy and then discharged home on July 28.  He has been receiving therapy and except for hip pain, he feels he is doing well.  Notes from [facility] orthopedics from last week show he is stable.  He is requesting a refill on Percocet to use for breakthrough pain when Tylenol is not working.    2. Uncontrolled type 1 diabetes mellitus with hyperglycemia (HCC)  Patient and his caregiver have been in touch with  regarding his diabetes and he is on a stable dose now of NovoLog insulin 7 units 3 times daily and Lantus insulin 11 units twice daily.  He has a DExiscan in place.  His blood sugars were running high initially postop but have stabilized according to his caregiver.  His reading in the office was slightly low today and he was given some carbohydrates to bring it up.  He may need to adjust his Lantus slightly downward if he is continuing to have these issues but he will soon be going to a regular diet which should help.    3. Essential hypertension  Blood pressure well controlled in the office today.    4. Idiopathic chronic pancreatitis (HCC)  Patient followed by gastroenterology for this and apparently he was having some issues recently and underwent an ERCP with stent revision 3 days ago.  He is currently on a clear diet for the next day.  He is taking his pancrelipase.  Review of his pathology showed no malignant cells and benign-appearing ductal  "epithelium present.  We do not have records back yet from gastroenterology.  He is not complaining of nausea or vomiting.  Past Medical History:   Diagnosis Date   • Diabetes (HCC)    • High cholesterol    • Hypertension    • Pain     back, and \"where stent was\"   • Seizure disorder (HCC)     Pt had a seizure with \" unknown illness\" in recent months.      Social History     Socioeconomic History   • Marital status: Single     Spouse name: Not on file   • Number of children: Not on file   • Years of education: Not on file   • Highest education level: Not on file   Occupational History   • Not on file   Social Needs   • Financial resource strain: Not on file   • Food insecurity     Worry: Never true     Inability: Never true   • Transportation needs     Medical: No     Non-medical: No   Tobacco Use   • Smoking status: Current Every Day Smoker     Packs/day: 0.25     Years: 15.00     Pack years: 3.75   • Smokeless tobacco: Never Used   • Tobacco comment: Trying to quit   Substance and Sexual Activity   • Alcohol use: No   • Drug use: No   • Sexual activity: Not Currently   Lifestyle   • Physical activity     Days per week: Not on file     Minutes per session: Not on file   • Stress: Not on file   Relationships   • Social connections     Talks on phone: Not on file     Gets together: Not on file     Attends [employer]ical service: Not on file     Active member of club or organization: Not on file     Attends meetings of clubs or organizations: Not on file     Relationship status: Not on file   • Intimate partner violence     Fear of current or ex partner: Not on file     Emotionally abused: Not on file     Physically abused: Not on file     Forced sexual activity: Not on file   Other Topics Concern   • Not on file   Social History Narrative   • Not on file     Current Outpatient Medications   Medication Sig Dispense Refill   • acetaminophen (TYLENOL) 325 MG Suppos Insert 625 mg in rectum 2 times a day.     • " oxyCODONE-acetaminophen (PERCOCET) 5-325 MG Tab Take 1 Tab by mouth 2 times a day as needed for up to 14 days. 28 Tab 0   • Naloxone (NARCAN) 4 MG/0.1ML Liquid One spray in one nostril for overdose and call 911. 1 Each 0   • insulin glargine (LANTUS SOLOSTAR) 100 UNIT/ML Solution Pen-injector injection Inject 11 Units as instructed 2 times a day. 15 Each 11   • insulin aspart (NOVOLOG FLEXPEN) 100 UNIT/ML injection PEN Inject 7 Units as instructed 3 times a day before meals. 5 Each 11   • CONTOUR NEXT TEST strip      • Nutritional Supplements (GLUCOSE MANAGEMENT) Tab Take 4 Tabs by mouth as needed. Prn blood sugars less than 60 50 Tab 3   • Nutritional Supplements (GLUCERNA 1.0 GER) Liquid Take 1 Each by mouth as needed (hypoglycemia, blood sugars less than 70). 6 Bottle 6   • omeprazole (PRILOSEC) 20 MG delayed-release capsule Take 1 Cap by mouth 2 Times a Day. 60 Cap 8   • tamsulosin (FLOMAX) 0.4 MG capsule Take 1 Cap by mouth every day. 30 Cap 11   • glucose 4 GM chewable tablet      • thiamine (THIAMINE) 100 MG Tab Take 100 mg by mouth every day.     • ferrous gluconate (FERGON) 324 (38 Fe) MG Tab Take 1 Tab by mouth every 48 hours. 30 Tab    • atorvastatin (LIPITOR) 40 MG Tab TAKE 1 TABLET BY MOUTH AT BEDTIME. 90 Tab 3   • Multiple Vitamin (MULTI-VITAMINS) Tab TAKE 1 TABLET BY MOUTH ONCE DAILY. 90 Tab 3   • Cholecalciferol (VITAMIN D) 2000 UNIT Tab Take 1 Tab by mouth every day. 30 Tab 11   • aspirin (ASA) 81 MG Chew Tab chewable tablet CHEW 1 TABLET BY MOUTH ONCE DAILY. 30 Tab 11   • pancrelipase, Lip-Prot-Amyl, (CREON) 74522-90881 units Cap DR Particles Take 2 Caps by mouth every day. 60 Cap 11     No current facility-administered medications for this visit.      Family History   Problem Relation Age of Onset   • No Known Problems Mother    • No Known Problems Father          Review of Systems   Musculoskeletal: Positive for joint pain.   All other systems reviewed and are negative.         Objective:     BP  "126/70 (BP Location: Right arm, Patient Position: Sitting, BP Cuff Size: Adult)   Pulse 100   Resp 16   Ht 1.905 m (6' 3\")   Wt 61.7 kg (136 lb)   SpO2 97%   BMI 17.00 kg/m²      Physical Exam  Vitals signs and nursing note reviewed.   Constitutional:       General: He is not in acute distress.     Appearance: He is well-developed. He is not diaphoretic.   HENT:      Head: Normocephalic and atraumatic.      Right Ear: External ear normal.      Left Ear: External ear normal.      Nose: Nose normal.   Eyes:      General:         Right eye: No discharge.         Left eye: No discharge.      Conjunctiva/sclera: Conjunctivae normal.   Neck:      Musculoskeletal: Normal range of motion and neck supple.      Thyroid: No thyromegaly.      Trachea: No tracheal deviation.   Cardiovascular:      Rate and Rhythm: Normal rate and regular rhythm.      Heart sounds: Normal heart sounds. No murmur.   Pulmonary:      Effort: Pulmonary effort is normal. No respiratory distress.      Breath sounds: Normal breath sounds. No wheezing or rales.   Musculoskeletal:      Comments: Mild limp with walking.   Lymphadenopathy:      Cervical: No cervical adenopathy.   Skin:     General: Skin is warm and dry.      Findings: No erythema or rash.   Neurological:      Mental Status: He is alert and oriented to person, place, and time.      Coordination: Coordination normal.   Psychiatric:         Behavior: Behavior normal.         Thought Content: Thought content normal.         Judgment: Judgment normal.      Comments: Patient able to answer some questions today and his caregiver provides most of the information.                 Assessment/Plan:        1. Closed fracture of left hip with routine healing, subsequent encounter  His caregiver and patient are requesting refills on Percocet for when pain is severe.  He may take 2 regular strength Tylenol up to twice a day for the pain but if it is severe, he may take a Percocet up to twice a day " as well.   shows no recent controlled substance refills and a drug contract was signed today.  ORT showed low risk for addiction.  - oxyCODONE-acetaminophen (PERCOCET) 5-325 MG Tab; Take 1 Tab by mouth 2 times a day as needed for up to 14 days.  Dispense: 28 Tab; Refill: 0    2. Uncontrolled type 1 diabetes mellitus with hyperglycemia (HCC)  Patient stabilizing and has a set diabetic medication schedule set up through endocrinology and will be following with endocrinology in November but knows to go back sooner if any problems occur.  Medication list updated.  - POCT Glucose    3. Essential hypertension  Blood pressure currently controlled on medication.    5. Idiopathic chronic pancreatitis (HCC)  Patient will continue on his pancrelipase and will be going back to his regular low-carb diet soon.  I will await notes from gastroenterology.

## 2020-09-29 ENCOUNTER — HOSPITAL ENCOUNTER (OUTPATIENT)
Facility: MEDICAL CENTER | Age: 60
End: 2020-09-29
Attending: INTERNAL MEDICINE
Payer: MEDICARE

## 2020-09-29 DIAGNOSIS — E10.65 TYPE 1 DIABETES MELLITUS WITH HYPERGLYCEMIA (HCC): ICD-10-CM

## 2020-09-29 LAB
ALBUMIN SERPL BCP-MCNC: 3.2 G/DL (ref 3.2–4.9)
ALBUMIN/GLOB SERPL: 1.1 G/DL
ALP SERPL-CCNC: 411 U/L (ref 30–99)
ALT SERPL-CCNC: 22 U/L (ref 2–50)
ANION GAP SERPL CALC-SCNC: 12 MMOL/L (ref 7–16)
AST SERPL-CCNC: 24 U/L (ref 12–45)
BILIRUB SERPL-MCNC: 0.2 MG/DL (ref 0.1–1.5)
BUN SERPL-MCNC: 19 MG/DL (ref 8–22)
CALCIUM SERPL-MCNC: 9.3 MG/DL (ref 8.5–10.5)
CHLORIDE SERPL-SCNC: 101 MMOL/L (ref 96–112)
CO2 SERPL-SCNC: 24 MMOL/L (ref 20–33)
CREAT SERPL-MCNC: 0.72 MG/DL (ref 0.5–1.4)
EST. AVERAGE GLUCOSE BLD GHB EST-MCNC: 174 MG/DL
GLOBULIN SER CALC-MCNC: 2.8 G/DL (ref 1.9–3.5)
GLUCOSE SERPL-MCNC: 147 MG/DL (ref 65–99)
HBA1C MFR BLD: 7.7 % (ref 0–5.6)
POTASSIUM SERPL-SCNC: 5.3 MMOL/L (ref 3.6–5.5)
PROT SERPL-MCNC: 6 G/DL (ref 6–8.2)
SODIUM SERPL-SCNC: 137 MMOL/L (ref 135–145)

## 2020-09-29 PROCEDURE — 80053 COMPREHEN METABOLIC PANEL: CPT

## 2020-09-29 PROCEDURE — 83036 HEMOGLOBIN GLYCOSYLATED A1C: CPT

## 2020-10-07 RX ORDER — FLURBIPROFEN SODIUM 0.3 MG/ML
1 SOLUTION/ DROPS OPHTHALMIC 3 TIMES DAILY
Qty: 100 EACH | Refills: 11 | Status: SHIPPED | OUTPATIENT
Start: 2020-10-07

## 2020-11-12 ENCOUNTER — TELEPHONE (OUTPATIENT)
Dept: MEDICAL GROUP | Facility: MEDICAL CENTER | Age: 60
End: 2020-11-12

## 2020-11-12 DIAGNOSIS — E11.65 TYPE 2 DIABETES MELLITUS WITH HYPERGLYCEMIA, WITH LONG-TERM CURRENT USE OF INSULIN (HCC): ICD-10-CM

## 2020-11-12 DIAGNOSIS — Z79.4 TYPE 2 DIABETES MELLITUS WITH HYPERGLYCEMIA, WITH LONG-TERM CURRENT USE OF INSULIN (HCC): ICD-10-CM

## 2020-11-12 DIAGNOSIS — E16.2 HYPOGLYCEMIA: ICD-10-CM

## 2020-11-12 RX ORDER — PEDI NUTRITION,MILK BASED,IRON 0.03 G-0.6
1 LIQUID (ML) ORAL PRN
Qty: 1000 ML | Refills: 11 | Status: SHIPPED | OUTPATIENT
Start: 2020-11-12

## 2020-11-13 DIAGNOSIS — E10.65 UNCONTROLLED TYPE 1 DIABETES MELLITUS WITH HYPERGLYCEMIA (HCC): ICD-10-CM

## 2020-11-18 ENCOUNTER — TELEMEDICINE (OUTPATIENT)
Dept: ENDOCRINOLOGY | Facility: MEDICAL CENTER | Age: 60
End: 2020-11-18
Attending: INTERNAL MEDICINE
Payer: MEDICARE

## 2020-11-18 DIAGNOSIS — E10.69 HYPERLIPIDEMIA DUE TO TYPE 1 DIABETES MELLITUS (HCC): ICD-10-CM

## 2020-11-18 DIAGNOSIS — Z79.4 LONG-TERM INSULIN USE (HCC): ICD-10-CM

## 2020-11-18 DIAGNOSIS — E78.5 HYPERLIPIDEMIA DUE TO TYPE 1 DIABETES MELLITUS (HCC): ICD-10-CM

## 2020-11-18 DIAGNOSIS — E16.2 HYPOGLYCEMIA: ICD-10-CM

## 2020-11-18 DIAGNOSIS — E10.65 TYPE 1 DIABETES MELLITUS WITH HYPERGLYCEMIA (HCC): ICD-10-CM

## 2020-11-18 PROCEDURE — 999999 HB NO CHARGE: Mod: 95 | Performed by: INTERNAL MEDICINE

## 2020-11-18 PROCEDURE — 99214 OFFICE O/P EST MOD 30 MIN: CPT | Mod: 95,CR | Performed by: INTERNAL MEDICINE

## 2020-11-18 NOTE — PROGRESS NOTES
CHIEF COMPLAINT: Patient is here for follow up of Type 1 Diabetes Mellitus.  Patient was presented for a telehealth consultation via secure and encrypted videoconferencing technology. This encounter was conducted via Zoom . Verbal consent was obtained. Patient's identity was verified.    HPI:     Pawel Tatum is a 59 y.o. male with Type 1 Diabetes Mellitus here for follow up.    Labs from 9/29/2020show a1c is fair at 7.7%  Labs from 7/24/2020 show Hba1c is fair at 7.1%  Labs from 4/9/2020 show HbA1c is fair at 7.6%    Patient was previously followed by the PA   He has a history of poor cognitive function, traumatic brain injury and history of severe hypoglycemia  He is now at [facility]    He has a history of Chronic idiopathic pancreatitis, uncontrolled diabetes at baseline with hx of neuropathy, foot osteomyelitis in the past and had ascending cholangitis treated with ERCP in [facility].      Previously he was having hypoglycemic episodes because he was taking rapid acting insulin 45 minutes prior to meal             He is now on Lantus pen 11u BID,  Novolog pen 7u QAC He has no sliding scale per rules in the assisted living      We downloaded his DEXCOM G6 CGM today and his average BG was 159 with a standard deviation of 95 with time in range of 55%.  There is a pattern of hyperglycemia throughout the day    He eats a whole big peanut butter sandwich at bedtime  And it makes his BG go up to 400 overnight    He is going for another ERCP to possibly remove or replace his biliary stent for the bile duct structure      He has hyperlipidemia and is on Atorvastatin   He is tolerating this well.  LDL-C was 50 on 6/2020      BG Diary:  see dexcom download     Weight has been stable    Diabetes Complications   Retinopathy: No known retinopathy.  Last eye exam: He saw his ophthalmologist in the Spring of 2020 and had a negative eye exam    Neuropathy: Denies paresthesias or numbness in hands or feet. Denies any foot  wounds.  Exercise: Minimal.  Diet: Fair.  Patient's medications, allergies, and social histories were reviewed and updated as appropriate.    ROS:     CONS:     No fever, no chills   EYES:     No diplopia, no blurry vision   CV:           No chest pain, no palpitations   PULM:     No SOB, no cough, no hemoptysis.   GI:            No nausea, no vomiting, no diarrhea, no constipation   ENDO:     No polyuria, no polydipsia, no heat intolerance, no cold intolerance       Past Medical History:  Problem List:  2020-08: Diabetes mellitus type 1 ([facility])  2020-07: Closed fracture of left hip ([facility])  2020-03: Hypoglycemia  2020-03: Long-term insulin use ([facility])  2020-03: Acute respiratory failure with hypoxia ([facility])  2020-02: Loculated pleural effusion  2020-02: Pancreatic mass  2020-02: Cholangitis  2020-01: Cellulitis of right upper extremity  2019-12: Helicobacter pylori (H. pylori) infection  2019-12: Cognitive decline  2019-12: Moderate protein malnutrition ([facility])  2019-12: Normocytic anemia  2019-12: Melanotic stools  2019-12: Uncontrolled type 1 diabetes mellitus with hyperglycemia   ([facility])  2019-12: Metabolic acidosis  2019-12: Hyponatremia  2019-08: H/O Bell's palsy  2019-01: Leukocytosis  2019-01: Peripheral neuropathy  2019-01: Subacute osteomyelitis of right foot ([facility])  2018-12: Leukocytosis  2018-11: Type 1 diabetes mellitus with hyperglycemia ([facility])  2018-11: DINESH (acute kidney injury) ([facility])  2018-11: Hypomagnesemia  2018-11: Hypokalemia  2018-11: Hypophosphatemia  2018-11: Chronic pancreatitis ([facility])  2018-11: Diabetic ketoacidosis ([facility])  2018-11: Hypotension  2018-10: Hyperlipidemia due to type 1 diabetes mellitus ([facility])  2018-10: Recurrent major depressive disorder, in full remission ([facility])  2018-10: Memory loss  2018-08: History of recent UGI bleed  2018-08: Hyperlipidemia  2018-08: UTI (urinary tract infection)  2018-08: Sepsis ([facility])  2018-08: Tobacco abuse  2018-08: Choledocholithiasis  2018-08: Uncontrolled type 2  diabetes mellitus with hyperosmolarity   without coma (HCC)  2018-08: Diabetic peripheral angiopathy (HCC)  2018-08: Essential hypertension  2018-08: Vitamin D deficiency      Past Surgical History:  Past Surgical History:   Procedure Laterality Date   • PB ERCP,DIAGNOSTIC  8/24/2020    Procedure: ERCP, DIAGNOSTIC -WITH STENT REVISION;  Surgeon: Carlos Alberto Ansari M.D.;  Location: [facility];  Service: Gastroenterology   • PB TOTAL HIP ARTHROPLASTY Left 7/24/2020    Procedure: ARTHROPLASTY, HIP, TOTAL;  Surgeon: David Hays M.D.;  Location: SURGERY [facility];  Service: Orthopedics   • PB PARTIAL HIP REPLACEMENT Left 7/24/2020    Procedure: HEMIARTHROPLASTY, HIP;  Surgeon: David Hays M.D.;  Location: [facility];  Service: Orthopedics   • PB ERCP,DIAGNOSTIC  3/16/2020    Procedure: ERCP, DIAGNOSTIC;  Surgeon: Carlos Alberto Ansari M.D.;  Location: [facility];  Service: Gastroenterology   • PB ERCP,DIAGNOSTIC  2/24/2020    Procedure: ERCP (ENDOSCOPIC RETROGRADE CHOLANGIOPANCREATOGRAPHY);  Surgeon: Hemant Lorenzo M.D.;  Location: [facility];  Service: Gastroenterology   • PB ERCP,DIAGNOSTIC  2/18/2020    Procedure: ERCP (ENDOSCOPIC RETROGRADE CHOLANGIOPANCREATOGRAPHY);  Surgeon: Carlos Alberto Ansari M.D.;  Location: [facility];  Service: Gastroenterology   • PB ENDOSCOPIC US EXAM, ESOPH  2/18/2020    Procedure: EGD, WITH ENDOSCOPIC US;  Surgeon: Carlos Alberto Ansari M.D.;  Location: [facility];  Service: Gastroenterology   • PB UPPER GI ENDOSCOPY,BIOPSY N/A 12/27/2019    Procedure: GASTROSCOPY, WITH BIOPSY;  Surgeon: Hector Villarreal M.D.;  Location: [facility];  Service: Gastroenterology   • TOE AMPUTATION Right 1/12/2019    Procedure: TOE AMPUTATION;  Surgeon: Nicanor Reddy M.D.;  Location: SURGERY [facility];  Service: Orthopedics   • GASTROSCOPY-ENDO N/A 8/25/2018    Procedure:  GASTROSCOPY-ENDO;  Surgeon: Jaswant Frye M.D.;  Location: ENDOSCOPY [facility];  Service: Gastroenterology   • OTHER ABDOMINAL SURGERY          Allergies:  Patient has no known allergies.     Social History:  Social History     Tobacco Use   • Smoking status: Current Every Day Smoker     Packs/day: 0.25     Years: 15.00     Pack years: 3.75   • Smokeless tobacco: Never Used   • Tobacco comment: Trying to quit   Substance Use Topics   • Alcohol use: No   • Drug use: No        Family History:   family history includes No Known Problems in his father and mother.      PHYSICAL EXAM:   OBJECTIVE:  Vital signs: There were no vitals taken for this visit.  GENERAL: Well-developed, undernourished in no apparent distress.   EYE:  No ocular asymmetry, PERRLA  HENT: Pink, moist mucous membranes.    NECK: No thyromegaly.   CARDIOVASCULAR: Normal precordial impulse seen with normal carotid pulsation  LUNGS: Symmetrical chest expansion with normal phonation of voice   ABDOMEN: Non obese abdomen with no visible organomegaly  EXTREMITIES: No clubbing, cyanosis, or edema.   NEUROLOGICAL: No gross focal motor abnormalities   LYMPH: No cervical adenopathy seen.   SKIN: No rashes, lesions.   Visual Inspection: Feet without maceration, ulcers, or fissures.        Labs:  Lab Results   Component Value Date/Time    HBA1C 7.7 (H) 09/29/2020 11:59 AM        Lab Results   Component Value Date/Time    WBC 15.3 (H) 07/28/2020 05:19 AM    RBC 4.01 (L) 07/28/2020 05:19 AM    HEMOGLOBIN 10.9 (L) 07/28/2020 05:19 AM    MCV 83.3 07/28/2020 05:19 AM    MCH 27.2 07/28/2020 05:19 AM    MCHC 32.6 (L) 07/28/2020 05:19 AM    RDW 51.0 (H) 07/28/2020 05:19 AM    MPV 9.7 07/28/2020 05:19 AM       Lab Results   Component Value Date/Time    SODIUM 137 09/29/2020 11:59 AM    POTASSIUM 5.3 09/29/2020 11:59 AM    CHLORIDE 101 09/29/2020 11:59 AM    CO2 24 09/29/2020 11:59 AM    ANION 12.0 09/29/2020 11:59 AM    GLUCOSE 147 (H) 09/29/2020 11:59 AM    BUN  19 09/29/2020 11:59 AM    CREATININE 0.72 09/29/2020 11:59 AM    CALCIUM 9.3 09/29/2020 11:59 AM    ASTSGOT 24 09/29/2020 11:59 AM    ALTSGPT 22 09/29/2020 11:59 AM    TBILIRUBIN 0.2 09/29/2020 11:59 AM    ALBUMIN 3.2 09/29/2020 11:59 AM    TOTPROTEIN 6.0 09/29/2020 11:59 AM    GLOBULIN 2.8 09/29/2020 11:59 AM    AGRATIO 1.1 09/29/2020 11:59 AM       Lab Results   Component Value Date/Time    CHOLSTRLTOT 147 01/07/2019 1415    TRIGLYCERIDE 54 01/07/2019 1415    HDL 86 01/07/2019 1415    LDL 50 01/07/2019 1415       Lab Results   Component Value Date/Time    MALBCRT 282 (H) 06/26/2020 07:42 AM    MICROALBUR 33.3 06/26/2020 07:42 AM        Lab Results   Component Value Date/Time    TSHULTRASEN 1.490 12/25/2019 0331     No results found for: FREEDIR  No results found for: FREET3  No results found for: THYSTIMIG        ASSESSMENT/PLAN:     1. Type 1 diabetes mellitus with hyperglycemia, with long-term current use of insulin (HCC)  Stable  Therapeutic CGM was downloaded and reviewed  Continue Lantus 11u bid  Continue Novolog 7u QAC  Start Novolog 4u with peanut butter sandwich  Recommend that he watch his carb intake  His nurse Maxwell is going to update me about the status of his sugars  We will plan for follow-up in 3 months with a repeat of his A1c    2. Hypoglycemia  Stable  He has less hypoglycemia  Reviewed the proper treatment of hypoglycemia    3. Hyperlipidemia associated with type 2 diabetes mellitus (HCC)  Controlled  Repeat lipids in 12 months    4. Long-term insulin use (HCC)  Patient is on long term insulin therapy for his diabetes      Return in about 3 months (around 2/18/2021).      Thank you kindly for allowing me to participate in the diabetes care plan for this patient.    Ruddy Fry MD, FACE, N  03/31/20    CC:   JAKE Armando

## 2020-11-20 RX ORDER — ATORVASTATIN CALCIUM 40 MG/1
TABLET, FILM COATED ORAL
Qty: 90 TAB | Refills: 3 | Status: SHIPPED | OUTPATIENT
Start: 2020-11-20

## 2020-12-02 ENCOUNTER — TELEPHONE (OUTPATIENT)
Dept: MEDICAL GROUP | Facility: MEDICAL CENTER | Age: 60
End: 2020-12-02

## 2020-12-03 DIAGNOSIS — E43 SEVERE PROTEIN-CALORIE MALNUTRITION (HCC): ICD-10-CM

## 2020-12-03 RX ORDER — CHOLECALCIFEROL (VITAMIN D3) 50 MCG
2000 TABLET ORAL DAILY
Qty: 30 TAB | Refills: 11 | Status: SHIPPED | OUTPATIENT
Start: 2020-12-03

## 2020-12-03 RX ORDER — CHOLECALCIFEROL (VITAMIN D3) 50 MCG
2000 TABLET ORAL DAILY
Qty: 30 TAB | Refills: 11 | Status: SHIPPED | OUTPATIENT
Start: 2020-12-03 | End: 2020-12-03 | Stop reason: SDUPTHER

## 2020-12-03 NOTE — TELEPHONE ENCOUNTER
Can someone find out from the pharmacy what dose of vitamin D3 they are taking since I cannot find it in the current medical record.

## 2020-12-11 ENCOUNTER — PRE-ADMISSION TESTING (OUTPATIENT)
Dept: ADMISSIONS | Facility: MEDICAL CENTER | Age: 60
End: 2020-12-11
Attending: INTERNAL MEDICINE
Payer: MEDICARE

## 2020-12-11 DIAGNOSIS — Z01.812 PRE-OPERATIVE LABORATORY EXAMINATION: ICD-10-CM

## 2020-12-11 LAB
ALBUMIN SERPL BCP-MCNC: 3 G/DL (ref 3.2–4.9)
ALBUMIN/GLOB SERPL: 0.8 G/DL
ALP SERPL-CCNC: 675 U/L (ref 30–99)
ALT SERPL-CCNC: 17 U/L (ref 2–50)
ANION GAP SERPL CALC-SCNC: 10 MMOL/L (ref 7–16)
AST SERPL-CCNC: 28 U/L (ref 12–45)
BILIRUB SERPL-MCNC: 0.4 MG/DL (ref 0.1–1.5)
BUN SERPL-MCNC: 21 MG/DL (ref 8–22)
CALCIUM SERPL-MCNC: 9 MG/DL (ref 8.4–10.2)
CHLORIDE SERPL-SCNC: 100 MMOL/L (ref 96–112)
CO2 SERPL-SCNC: 22 MMOL/L (ref 20–33)
CREAT SERPL-MCNC: 0.86 MG/DL (ref 0.5–1.4)
ERYTHROCYTE [DISTWIDTH] IN BLOOD BY AUTOMATED COUNT: 53.1 FL (ref 35.9–50)
GLOBULIN SER CALC-MCNC: 3.7 G/DL (ref 1.9–3.5)
GLUCOSE SERPL-MCNC: 176 MG/DL (ref 65–99)
HCT VFR BLD AUTO: 35 % (ref 42–52)
HGB BLD-MCNC: 11.3 G/DL (ref 14–18)
MCH RBC QN AUTO: 27.6 PG (ref 27–33)
MCHC RBC AUTO-ENTMCNC: 32.3 G/DL (ref 33.7–35.3)
MCV RBC AUTO: 85.6 FL (ref 81.4–97.8)
PLATELET # BLD AUTO: 388 K/UL (ref 164–446)
PMV BLD AUTO: 10.4 FL (ref 9–12.9)
POTASSIUM SERPL-SCNC: 4.8 MMOL/L (ref 3.6–5.5)
PROT SERPL-MCNC: 6.7 G/DL (ref 6–8.2)
RBC # BLD AUTO: 4.09 M/UL (ref 4.7–6.1)
SODIUM SERPL-SCNC: 132 MMOL/L (ref 135–145)
WBC # BLD AUTO: 22.6 K/UL (ref 4.8–10.8)

## 2020-12-11 PROCEDURE — 85027 COMPLETE CBC AUTOMATED: CPT

## 2020-12-11 PROCEDURE — 80053 COMPREHEN METABOLIC PANEL: CPT

## 2020-12-11 PROCEDURE — 36415 COLL VENOUS BLD VENIPUNCTURE: CPT

## 2020-12-11 ASSESSMENT — FIBROSIS 4 INDEX
FIB4 SCORE: 0.9
FIB4 SCORE: 0.9

## 2020-12-11 NOTE — OR NURSING
"Pre-admit appointment completed. \"Preparing for your procedure\" sheet given to pt along with verbal and written instructions. Pt instructed to continue regularly prescribed medications through the day before surgery. Pt instructed to take the following medications the day of surgery with a sip of water, per anesthesia protocol; prilosec, and if needed-tylenol. Insulin as directed by his MD. Private duty nurse will verify (copy scanned into Epic).    Pt's legal guardian, Aniyah Tatum and pt's private duty nurse here for pre admit appointment.    Pt denies any problems with anesthesia.    Pt to get COVID test DOS.           Pt given instructions to self isolate as much as possible as pt lives in assisted living facility. Pt has his own apartment, but gets meds dispensed and meals brought to his room. Instructed pt to contact doctor if any symptoms of COVID occur.  "

## 2020-12-16 ENCOUNTER — ANESTHESIA EVENT (OUTPATIENT)
Dept: SURGERY | Facility: MEDICAL CENTER | Age: 60
End: 2020-12-16
Payer: MEDICARE

## 2020-12-16 ENCOUNTER — ANESTHESIA (OUTPATIENT)
Dept: SURGERY | Facility: MEDICAL CENTER | Age: 60
End: 2020-12-16
Payer: MEDICARE

## 2020-12-16 ENCOUNTER — HOSPITAL ENCOUNTER (OUTPATIENT)
Facility: MEDICAL CENTER | Age: 60
End: 2020-12-16
Attending: INTERNAL MEDICINE | Admitting: INTERNAL MEDICINE
Payer: MEDICARE

## 2020-12-16 ENCOUNTER — APPOINTMENT (OUTPATIENT)
Dept: RADIOLOGY | Facility: MEDICAL CENTER | Age: 60
End: 2020-12-16
Attending: INTERNAL MEDICINE
Payer: MEDICARE

## 2020-12-16 VITALS
DIASTOLIC BLOOD PRESSURE: 55 MMHG | RESPIRATION RATE: 16 BRPM | SYSTOLIC BLOOD PRESSURE: 105 MMHG | WEIGHT: 146.61 LBS | TEMPERATURE: 98.8 F | HEART RATE: 77 BPM | OXYGEN SATURATION: 96 % | HEIGHT: 75 IN | BODY MASS INDEX: 18.23 KG/M2

## 2020-12-16 LAB
COVID ORDER STATUS COVID19: NORMAL
GLUCOSE BLD-MCNC: 63 MG/DL (ref 65–99)
GLUCOSE BLD-MCNC: 95 MG/DL (ref 65–99)
INR PPP: 1.16 (ref 0.87–1.13)
PATHOLOGY CONSULT NOTE: NORMAL
PROTHROMBIN TIME: 14.5 SEC (ref 12–14.6)
SARS-COV+SARS-COV-2 AG RESP QL IA.RAPID: NOTDETECTED
SPECIMEN SOURCE: NORMAL

## 2020-12-16 PROCEDURE — 160048 HCHG OR STATISTICAL LEVEL 1-5: Performed by: INTERNAL MEDICINE

## 2020-12-16 PROCEDURE — 502240 HCHG MISC OR SUPPLY RC 0272: Performed by: INTERNAL MEDICINE

## 2020-12-16 PROCEDURE — 700105 HCHG RX REV CODE 258: Performed by: INTERNAL MEDICINE

## 2020-12-16 PROCEDURE — 160202 HCHG ENDO MINUTES - 1ST 30 MINS LEVEL 3: Performed by: INTERNAL MEDICINE

## 2020-12-16 PROCEDURE — C1769 GUIDE WIRE: HCPCS | Performed by: INTERNAL MEDICINE

## 2020-12-16 PROCEDURE — 160009 HCHG ANES TIME/MIN: Performed by: INTERNAL MEDICINE

## 2020-12-16 PROCEDURE — 110371 HCHG SHELL REV 272: Performed by: INTERNAL MEDICINE

## 2020-12-16 PROCEDURE — 160025 RECOVERY II MINUTES (STATS): Performed by: INTERNAL MEDICINE

## 2020-12-16 PROCEDURE — 500066 HCHG BITE BLOCK, ECT: Performed by: INTERNAL MEDICINE

## 2020-12-16 PROCEDURE — A9270 NON-COVERED ITEM OR SERVICE: HCPCS | Performed by: INTERNAL MEDICINE

## 2020-12-16 PROCEDURE — 160207 HCHG ENDO MINUTES - EA ADDL 1 MIN LEVEL 3: Performed by: INTERNAL MEDICINE

## 2020-12-16 PROCEDURE — 160046 HCHG PACU - 1ST 60 MINS PHASE II: Performed by: INTERNAL MEDICINE

## 2020-12-16 PROCEDURE — C2617 STENT, NON-COR, TEM W/O DEL: HCPCS | Performed by: INTERNAL MEDICINE

## 2020-12-16 PROCEDURE — 160002 HCHG RECOVERY MINUTES (STAT): Performed by: INTERNAL MEDICINE

## 2020-12-16 PROCEDURE — 503093 HCHG BRUSH, CYTOLOGY: Performed by: INTERNAL MEDICINE

## 2020-12-16 PROCEDURE — 88104 CYTOPATH FL NONGYN SMEARS: CPT

## 2020-12-16 PROCEDURE — 87426 SARSCOV CORONAVIRUS AG IA: CPT

## 2020-12-16 PROCEDURE — C1726 CATH, BAL DIL, NON-VASCULAR: HCPCS | Performed by: INTERNAL MEDICINE

## 2020-12-16 PROCEDURE — 85610 PROTHROMBIN TIME: CPT

## 2020-12-16 PROCEDURE — 700111 HCHG RX REV CODE 636 W/ 250 OVERRIDE (IP): Performed by: ANESTHESIOLOGY

## 2020-12-16 PROCEDURE — 700101 HCHG RX REV CODE 250: Performed by: ANESTHESIOLOGY

## 2020-12-16 PROCEDURE — 700101 HCHG RX REV CODE 250: Performed by: INTERNAL MEDICINE

## 2020-12-16 PROCEDURE — 160035 HCHG PACU - 1ST 60 MINS PHASE I: Performed by: INTERNAL MEDICINE

## 2020-12-16 PROCEDURE — 82962 GLUCOSE BLOOD TEST: CPT

## 2020-12-16 DEVICE — STENT BILIARY ADVANTIX 10FR X 9CM: Type: IMPLANTABLE DEVICE | Status: FUNCTIONAL

## 2020-12-16 DEVICE — STENT BILIARY ADVANTIX 10FR X 7CM: Type: IMPLANTABLE DEVICE | Status: FUNCTIONAL

## 2020-12-16 DEVICE — STENT ADVANTIX BILIARY 7FR X 7CM: Type: IMPLANTABLE DEVICE | Status: FUNCTIONAL

## 2020-12-16 RX ORDER — SUCCINYLCHOLINE/SOD CL,ISO/PF 200MG/10ML
SYRINGE (ML) INTRAVENOUS PRN
Status: DISCONTINUED | OUTPATIENT
Start: 2020-12-16 | End: 2020-12-16 | Stop reason: HOSPADM

## 2020-12-16 RX ORDER — HALOPERIDOL 5 MG/ML
1 INJECTION INTRAMUSCULAR
Status: DISCONTINUED | OUTPATIENT
Start: 2020-12-16 | End: 2020-12-16 | Stop reason: HOSPADM

## 2020-12-16 RX ORDER — DEXTROSE MONOHYDRATE 25 G/50ML
INJECTION, SOLUTION INTRAVENOUS
Status: DISCONTINUED | OUTPATIENT
Start: 2020-12-16 | End: 2020-12-16 | Stop reason: SURG

## 2020-12-16 RX ORDER — SODIUM CHLORIDE, SODIUM LACTATE, POTASSIUM CHLORIDE, CALCIUM CHLORIDE 600; 310; 30; 20 MG/100ML; MG/100ML; MG/100ML; MG/100ML
INJECTION, SOLUTION INTRAVENOUS CONTINUOUS
Status: DISCONTINUED | OUTPATIENT
Start: 2020-12-16 | End: 2020-12-16 | Stop reason: HOSPADM

## 2020-12-16 RX ORDER — DIPHENHYDRAMINE HYDROCHLORIDE 50 MG/ML
12.5 INJECTION INTRAMUSCULAR; INTRAVENOUS
Status: DISCONTINUED | OUTPATIENT
Start: 2020-12-16 | End: 2020-12-16 | Stop reason: HOSPADM

## 2020-12-16 RX ORDER — LABETALOL HYDROCHLORIDE 5 MG/ML
5 INJECTION, SOLUTION INTRAVENOUS
Status: DISCONTINUED | OUTPATIENT
Start: 2020-12-16 | End: 2020-12-16 | Stop reason: HOSPADM

## 2020-12-16 RX ORDER — METOPROLOL TARTRATE 1 MG/ML
1 INJECTION, SOLUTION INTRAVENOUS
Status: DISCONTINUED | OUTPATIENT
Start: 2020-12-16 | End: 2020-12-16 | Stop reason: HOSPADM

## 2020-12-16 RX ORDER — ROCURONIUM BROMIDE 10 MG/ML
INJECTION, SOLUTION INTRAVENOUS PRN
Status: DISCONTINUED | OUTPATIENT
Start: 2020-12-16 | End: 2020-12-16 | Stop reason: SURG

## 2020-12-16 RX ORDER — DEXAMETHASONE SODIUM PHOSPHATE 4 MG/ML
INJECTION, SOLUTION INTRA-ARTICULAR; INTRALESIONAL; INTRAMUSCULAR; INTRAVENOUS; SOFT TISSUE PRN
Status: DISCONTINUED | OUTPATIENT
Start: 2020-12-16 | End: 2020-12-16 | Stop reason: SURG

## 2020-12-16 RX ORDER — ONDANSETRON 2 MG/ML
INJECTION INTRAMUSCULAR; INTRAVENOUS PRN
Status: DISCONTINUED | OUTPATIENT
Start: 2020-12-16 | End: 2020-12-16 | Stop reason: SURG

## 2020-12-16 RX ADMIN — PROPOFOL 100 MG: 10 INJECTION, EMULSION INTRAVENOUS at 09:00

## 2020-12-16 RX ADMIN — POVIDONE IODINE 15 ML: 100 SOLUTION TOPICAL at 08:25

## 2020-12-16 RX ADMIN — DEXTROSE MONOHYDRATE: 25 INJECTION, SOLUTION INTRAVENOUS at 08:40

## 2020-12-16 RX ADMIN — ROCURONIUM BROMIDE 5 MG: 10 INJECTION, SOLUTION INTRAVENOUS at 09:00

## 2020-12-16 RX ADMIN — SODIUM CHLORIDE, POTASSIUM CHLORIDE, SODIUM LACTATE AND CALCIUM CHLORIDE: 600; 310; 30; 20 INJECTION, SOLUTION INTRAVENOUS at 08:26

## 2020-12-16 RX ADMIN — Medication 100 MG: at 09:00

## 2020-12-16 RX ADMIN — DEXAMETHASONE SODIUM PHOSPHATE 4 MG: 4 INJECTION, SOLUTION INTRAMUSCULAR; INTRAVENOUS at 08:48

## 2020-12-16 RX ADMIN — ONDANSETRON 4 MG: 2 INJECTION INTRAMUSCULAR; INTRAVENOUS at 08:51

## 2020-12-16 ASSESSMENT — PAIN SCALES - GENERAL: PAIN_LEVEL: 0

## 2020-12-16 ASSESSMENT — FIBROSIS 4 INDEX: FIB4 SCORE: 1.09

## 2020-12-16 NOTE — OR NURSING
1024 Pt arrived from PACU s/p ERCP with stent placement, report received from KELLEY Moe. Pt breathing unlabored; Denies pain or nausea at this time; Pt changing into home clothes @ bedside with assistance x1.     1055 Discharge instructions and medications gone over with pt and private duty nurse. All questions answered. Pt meets DC criteria. PIV DC'd. Pt transported to vehicle via WC in stable condition.

## 2020-12-16 NOTE — PROCEDURES
DATE OF PROCEDURE:  12/16/2020     PROCEDURES:  Endoscopic retrograde cholangiopancreatography with:  1.  Bile duct stent removal.  2.  Bile duct stricture hydraulic dilation.  3.  Bile duct stricture brush biopsy.  4.  Bile duct stone extraction.  5.  Bile duct stent placement x3.  6.  Radiologic interpretation of static and dynamic fluoroscopic images by   myself at no time with the radiologist present in the room.     INDICATION:  1.  Bile duct stricture.  2.  Chronic pancreatitis.     FINAL IMPRESSION:  1.  Biliary ampulla post-sphincterotomy anatomy.  2.  Biliary stent seen in place and apparently occluded.  3.  Pancreatic duct neither injected nor cannulated.  4.  Common bile duct proximal duct dilated.  5.  Filling defects present in the mid bile duct consistent with stones.  6.  Bile duct stricture through the head of the pancreas, 3 cm length.  Narrow   lumen with irregular borders.  This was soft to dilation and balloon sweeps.     THERAPEUTICS:  1.  Bile duct stent removal x2 with snare.  2.  Bile duct stricture hydraulic dilation with 8 x 40 mm balloon.  3.  Bile duct stricture brush biopsy.  4.  Bile duct stone extraction with balloon productive of dark, irregularly   shaped stones.  5.  Bile duct stent placement x3 with 10-[place] citizen 9 cm, 10-[place] citizen 7 cm, and   7-[place] citizen 7 cm plastic stents.     RECOMMENDATIONS:  1.  Follow liver enzymes.  2.  Follow up pathology.  3.  Repeat ERCP with stent revision in 3 months.  4.  The patient may benefit in the long term from cholecystectomy so a covered   metal stent becomes an option.     DESCRIPTION OF PROCEDURE:  Prior to the procedure, physical exam was stable.    During procedure, vital signs remained within normal limits.  Prior to   sedation, informed consent was obtained.  Risks, benefits and alternatives   including but not limited to risk of bleeding, infection, perforation, adverse   reaction to medication, failure to identify pathology, pancreatitis and  death   explained to the patient at length.  He accepted all risks as well as his   caregivers and durable power of .  The patient was intubated, sedated   by anesthesia, prepped in the prone position.  Scope tip of the Olympus   flexible Side-viewing duodenoscope passed to the level of the biliary ampulla   in the short position.  The previously placed biliary stents were seen in   place and appeared occluded.  These were removed through the scope channel in   the usual fashion with a snare.  The biliary ampulla had post-sphincterotomy   anatomy.  An occlusion balloon catheter with a 0.035 wire was utilized for   selective cannulation of the common bile duct, this was achieved on the very   first attempt.  The wire was left in the duct and the balloon was inflated to   12 mm within the duodenum and pressed up against the biliary orifice.    Injection of contrast was made.  This showed a narrow lumen stricture through   the head of the pancreas has had irregular borders and was approximately 3 cm   in length.  The bile duct proximal to this was significantly dilated to   approximately 15 mm.  There were small filling defects within this consistent   with small stones.  The wire was left in place and the occlusion balloon was   removed and an 8 x 40 mm hydraulic dilation balloon was exchanged over the   wire and passed through the strictured area.  This was slowly inflated up to 8   mm without any resistance.  At 8 mm full inflation, the balloon was held in   place for a minute and then slid slightly more proximal and distal and the   balloon fully inflated, slid easily through the strictured area.  The balloon   was deflated and removed off of the wire at this time.  A brush biopsy   catheter was passed over the wire and brushings were made of the distal bile   duct through the head of the pancreas.  Once this was complete, the brush   biopsy catheter was removed and a 9-12 occlusion balloon was re-exchanged  over   the wire and passed into the proximal bile duct.  This was inflated and   withdrawn down the duct with delivery of small stone debris as well as two 5   mm dark stones with irregular surface.  Repeat sweeps were made until no   further debris or stones were seen.  The wire was then left in place.  The   balloon was removed and a 10-[place]ian 9 cm plastic biliary stent was chosen and   deployed across the strictured area.  This was lubricated first with mineral   oil.  A second 10-[place]ian stent at this time 7 cm was then lubricated and   passed alongside the first stent in the usual fashion.  The wire was   recannulated into the bile duct and a third stent was lubricated.  At this   time, a 7-[place]ian 7 cm end deployed.  All external flanges were seen in the   duodenum.  Good bile flow was seen.  Good position was seen under fluoroscopic   guidance.  Once this was complete, the procedure was deemed complete.  The   scope was withdrawn.  Air and liquid were suctioned.  The patient tolerated   the procedure well and was sent to recovery without immediate complications.        ______________________________  MD ROGELIO VELA/DYLAN/JOAQUINA    DD:  12/16/2020 09:52  DT:  12/16/2020 10:31    Job#:  [id number]

## 2020-12-16 NOTE — ANESTHESIA PROCEDURE NOTES
Airway    Date/Time: 12/16/2020 9:00 AM  Performed by: Gerber Ying D.O.  Authorized by: Gerber Ying D.O.     Location:  OR  Urgency:  Elective  Indications for Airway Management:  Anesthesia      Spontaneous Ventilation: absent    Sedation Level:  Deep  Preoxygenated: Yes    Patient Position:  Sniffing  Final Airway Type:  Endotracheal airway  Final Endotracheal Airway:  ETT  Cuffed: Yes    Technique Used for Successful ETT Placement:  Direct laryngoscopy    Insertion Site:  Oral  Blade Type:  Gilberto  Laryngoscope Blade/Videolaryngoscope Blade Size:  3  ETT Size (mm):  7.5  Measured from:  Lips  ETT to Lips (cm):  22  Placement Verified by: auscultation and capnometry    Cormack-Lehane Classification:  Grade I - full view of glottis  Number of Attempts at Approach:  1

## 2020-12-16 NOTE — DISCHARGE INSTRUCTIONS
ENDOSCOPY HOME CARE INSTRUCTIONS    GASTROSCOPY OR ERCP  1. Don't eat or drink anything for about an hour after the test. You can then resume your regular diet.  2. Don't drive or drink alcohol for 24 hours. The medication you received will make you too drowsy.  3. Don't take any coffee, tea, or aspirin products until after you see your doctor. These can harm the lining of your stomach.  4. If you begin to vomit bloody material, or develop black or bloody stools, call your doctor as soon as possible.  5. If you have any neck, chest, abdominal pain or temp of 100 degrees, call your doctor.  6. See your doctor if any questions or concerns arise   7. Additional instructions:   Plan:   Follow liver enzymes   Follow up pathology   Repeat ERCP with stent revision in 3 months   May benefit in long term from cholecystectomy so covered metal stent becomes option    8. Prescriptions: N/A  Findings:                          Ampulla                                      Post sphincterotomy anatomy                                      2 biliary stents in place - occluded                          Pancreatic duct                                      Neither injected nor cannulated                          CBD                                      Proximal duct dilated                                      Filling defects present                                      Stricture                                                  Through head of pancreas                                                  3cm                                                  Narrow lumen with irregular borders                                                  Soft to dilation and balloon sweep     Therapeutics                          Bile duct stent removal x 2 with snare                          Bile duct stricture hydraulic dilation - 8mm x 40mm                          Bile duct stricture brush biopsy                          Bile duct stone extraction with  balloon                          Bile duct stent placement x 3 - 10F 9cm, 10F 7cm, 7F 7cm             Dr. Ansari  [facility]  [facility]  [phone]              You should call 911 if you develop problems with breathing or chest pain.  If any questions arise, call your doctor. If your doctor is not available, please feel free to call [phone]. You can also call the HEALTH HOTLINE open 24 hours/day, 7 days/week and speak to a nurse at [phone], or toll free [phone].    Depression / Suicide Risk    As you are discharged from this [facility] facility, it is important to learn how to keep safe from harming yourself.    Recognize the warning signs:  · Abrupt changes in personality, positive or negative- including increase in energy   · Giving away possessions  · Change in eating patterns- significant weight changes-  positive or negative  · Change in sleeping patterns- unable to sleep or sleeping all the time   · Unwillingness or inability to communicate  · Depression  · Unusual sadness, discouragement and loneliness  · Talk of wanting to die  · Neglect of personal appearance   · Rebelliousness- reckless behavior  · Withdrawal from people/activities they love  · Confusion- inability to concentrate     If you or a loved one observes any of these behaviors or has concerns about self-harm, here's what you can do:  · Talk about it- your feelings and reasons for harming yourself  · Remove any means that you might use to hurt yourself (examples: pills, rope, extension cords, firearm)  · Get professional help from the community (Mental Health, Substance Abuse, psychological counseling)  · Do not be alone:Call your Safe Contact- someone whom you trust who will be there for you.  · Call your local CRISIS HOTLINE 995-5681 or 539-797-4276  · Call your local Children's Mobile Crisis Response Team [place] (569) 138-6499 or www.Wrnch  · Call the toll free National Suicide Prevention  Hotlines   · National Suicide Prevention Lifeline 619-494-HCDA (4139)  · National [place] Line Network 800-SUICIDE (411-3322)    I acknowledge receipt and understanding of these Home Care Instructions.

## 2020-12-16 NOTE — OR SURGEON
Immediate Post OP Note    PreOp Diagnosis: Bile duct stricture     Chronic pancreatitis    PostOp Diagnosis: Same    Procedure(s):  ERCP, DIAGNOSTIC - WITH STENT REVISION - Wound Class: Clean Contaminated    Surgeon(s):  Carlos Alberto Ansari M.D.    Anesthesiologist/Type of Anesthesia:  Anesthesiologist: Gerber Ying D.O./* No anesthesia type entered *    Surgical Staff:  Circulator: Da Carroll R.N.  [facility]  Radiology Technologist: Lauren Arias    Specimens removed if any:  ID Type Source Tests Collected by Time Destination   A : slides - bile duct stricture  brushings Other Other PATHOLOGY SPECIMEN Carlos Alberto Ansari M.D. 12/16/2020  9:21 AM    B : brush head Other Other PATHOLOGY SPECIMEN Carlos Alberto Ansari M.D. 12/16/2020  9:35 AM        Dr. Ansari  [facility]  [facility]  [phone]    ERCP with: Bile duct stent removal x 2    Bile duct stricture hydraulic dilation    Bile duct stricture brush biopsy    Bile duct stone extraction    Bile duct stent placement x 3    Radiologic interpretation of static and dynamic fluoroscopic images    Indication: Bile duct stricture    Chronic pancreatitis    Sedation: GA (Stepan)    Findings:    Ampulla     Post sphincterotomy anatomy     2 biliary stents in place - occluded    Pancreatic duct     Neither injected nor cannulated    CBD     Proximal duct dilated     Filling defects present     Stricture      Through head of pancreas      3cm      Narrow lumen with irregular borders      Soft to dilation and balloon sweep     Therapeutics    Bile duct stent removal x 2 with snare    Bile duct stricture hydraulic dilation - 8mm x 40mm    Bile duct stricture brush biopsy    Bile duct stone extraction with balloon    Bile duct stent placement x 3 - 10F 9cm, 10F 7cm, 7F 7cm    Plan:  Follow liver enzymes    Follow up pathology    Repeat ERCP with stent revision in 3 months    May benefit in long term from  cholecystectomy so covered metal stent becomes option      12/16/2020 9:40 AM Carlos Alberto Ansari M.D.

## 2020-12-16 NOTE — ANESTHESIA POSTPROCEDURE EVALUATION
Patient: Pawel Tatum    Procedure Summary     Date: 12/16/20 Room / Location:  ENDOSCOPIC ULTRASOUND ROOM / SURGERY [facility]    Anesthesia Start: 0840 Anesthesia Stop: 0948    Procedure: ERCP, DIAGNOSTIC - WITH STENT REVISION Diagnosis:       Chronic pancreatitis (HCC)      (Chronic pancreatitis (HCC) [577.1.ICD-9-CM])    Surgeons: Carlos Alberto Ansari M.D. Responsible Provider: Gerber Ying D.O.    Anesthesia Type: general ASA Status: 3          Final Anesthesia Type: general  Last vitals  BP   Blood Pressure: 104/61    Temp   37.1 °C (98.8 °F)    Pulse   Pulse: (!) 108   Resp   18    SpO2   92 %      Anesthesia Post Evaluation    Patient location during evaluation: PACU  Patient participation: complete - patient participated  Level of consciousness: awake and alert  Pain score: 0    Airway patency: patent  Anesthetic complications: no  Cardiovascular status: hemodynamically stable  Respiratory status: acceptable  Hydration status: euvolemic    PONV: none           Nurse Pain Score: 0 (NPRS)

## 2020-12-16 NOTE — OR NURSING
0648: CNA brought patient back to pre-op and RN assumed care. Private duty RN and pt's guardian at bedside.  0704: COVID test sent to lab.  0823: COVID results are rcvd, pt is (-).  0827: Patient allergies and NPO status verified, home medication reconciliation completed and belongings secured. Patient verbalizes understanding of pain scale, expected course of stay and plan of care. Surgical site verified with patient. IV access established.

## 2020-12-16 NOTE — ANESTHESIA TIME REPORT
Anesthesia Start and Stop Event Times     Date Time Event    12/16/2020 0738 Ready for Procedure     0840 Anesthesia Start     0948 Anesthesia Stop        Responsible Staff  12/16/20    Name Role Begin End    Gerber Ying D.O. Anesth 0840 0948        Preop Diagnosis (Free Text):  Pre-op Diagnosis     BILE DUCT STRICTURE, CHOLEDOCHOLITHIASIS, CHRONIC PANCREATITIS, DEMENTIA, GERD WITH ESOPHAGITIS, PANCREATIC INSUFFICIENCY        Preop Diagnosis (Codes):  Diagnosis Information     Diagnosis Code(s): Chronic pancreatitis (HCC) [K86.1]        Post op Diagnosis  Bile duct stricture      Premium Reason  Non-Premium    Comments:

## 2020-12-16 NOTE — OR NURSING
0944: To PACU post ERCP w/ stent exchange. Pt is extubated, breathing is spontaneous and unlabored.  1000: No pain or nausea.  1007: POC FS=96.  1015: Meets criteria for stage ll.

## 2020-12-16 NOTE — ANESTHESIA PREPROCEDURE EVALUATION
Relevant Problems   NEURO   (+) H/O Bell's palsy      CARDIAC   (+) Diabetic peripheral angiopathy (HCC)   (+) Essential hypertension      ENDO   (+) Type 1 diabetes mellitus with hyperglycemia (HCC)       Physical Exam    Airway   Mallampati: II  TM distance: >3 FB  Neck ROM: full       Cardiovascular - normal exam  Rhythm: regular  Rate: normal  (-) murmur     Dental - normal exam           Pulmonary - normal exam  Breath sounds clear to auscultation     Abdominal    Neurological - abnormal exam                 Anesthesia Plan    ASA 3   ASA physical status 3 criteria: diabetes - poorly controlled    Plan - general       Airway plan will be ETT        Induction: intravenous    Postoperative Plan: Postoperative administration of opioids is intended.    Pertinent diagnostic labs and testing reviewed    Informed Consent:    Anesthetic plan and risks discussed with patient.    Use of blood products discussed with: patient whom consented to blood products.

## 2021-01-06 NOTE — PROGRESS NOTES
Critical Care Progress Note    Date of admission  12/19/2019    Chief Complaint  59 y.o. male admitted 12/19/2019 with DKA    Hospital Course    59 y.o. male w/ PMH DM, HTN [facility] who presented 12/19/2019 with mild SOB and found to be in DKA upon arrival to the ED. HE notes recent swithc of his meds. HE denies any fevers, chill, cough, or myalgias. He does endorse generalized weakness, nausea without vomiting, mild abdominal pain and diarrhea. He notes poor po intake over the past week.The patient is somewhat slow to respond and there is no other friends or family avail be for history. Pt was tachycardic on arrival and due to SOB cards was consulted in the ED and didnt feel any further w/u was necessary. In take labs revealed; Glucose 442, trace urine ketones, Anion gap of 23, BHB 5.33 and Na 128.       Interval Problem Update  Reviewed last 24 hour events:    A&O x4  Follows well  SR  SBp 120s  NE 2  UO good  Poor PO  Transitioned to SSI  Hgb 8.4 after one unit pRBCs  Room air  IS 1500  SQ heparin  PPI home med  No ABX    BMP pending  Pain - neuropathic -> JOHAN? SSRI?  Continue to actively titrate NE drip  Mobilize if we get him off pressors  Therapies may be needed    Serial follow-up  Norepinephrine dose coming down  BMP finally back, potassium 3.9, bicarb 21, anion gap 6, chloride 110, BUN 39 with creatinine 0.85     YESTERDAY  A&O x4  Denies N/V/D  SR 80s  SBp 80-100s  UO good  NE drip 10  I/Os + 3.3 L  Hgb 6.9  No bleeding clinically  I drip 4  D10 150 -> 200 cc/hr  LA 2.2    Liter bolus LR  Transfuse 1 unit of blood  Monitor for bleeding  Transition to SSI/Levimir  Continue to actively titrate norepinephrine    Serial follow-up  Blood sugars between 102 and 225 after transition  Patient given half his usual p.m. dose of Levemir and started on medium sliding scale  Insulin drip continued for 2 hours after dose of long-acting insulin and then discontinued  Blood pressure improved with fluid bolus  and unit of blood  Still requiring norepinephrine although dose is been coming down from 10 -> 7 -> 5 mics  Volume status reassessed with exam and he actually appears euvolemic  Echocardiogram reviewed again, LV ejection fraction 65% with normal right-sided size and function, no significant pericardial effusion noted either  IV fluids continue    Review of Systems  Review of Systems   Constitutional: Positive for malaise/fatigue (Improved again but not resolved). Negative for chills and fever.   HENT: Negative for sore throat.    Eyes: Negative for blurred vision and double vision.   Respiratory: Negative for cough, sputum production and shortness of breath.    Cardiovascular: Negative for chest pain, palpitations, orthopnea and PND.   Gastrointestinal: Positive for nausea (Persisting but mild and associated with anorexia). Negative for abdominal pain, blood in stool, diarrhea, melena and vomiting (Resolved).   Genitourinary: Negative.    Musculoskeletal: Negative for back pain.   Neurological: Negative for sensory change, speech change, focal weakness and headaches.   Endo/Heme/Allergies: Does not bruise/bleed easily.   Psychiatric/Behavioral: Negative for depression. The patient is not nervous/anxious.         Vital Signs for last 24 hours   Temp:  [36.6 °C (97.9 °F)-37.3 °C (99.2 °F)] 36.8 °C (98.2 °F)  Pulse:  [] 77  Resp:  [12-42] 21  BP: ()/(44-72) 104/54  SpO2:  [85 %-100 %] 92 %    Hemodynamic parameters for last 24 hours       Respiratory Information for the last 24 hours       Physical Exam   Physical Exam  Vitals signs reviewed.   Constitutional:       Appearance: He is underweight. He is ill-appearing (More chronic than acute). He is not toxic-appearing.      Interventions: Nasal cannula in place.   HENT:      Head: Normocephalic and atraumatic.      Mouth/Throat:      Mouth: Mucous membranes are moist.   Eyes:      General: No scleral icterus.     Extraocular Movements: Extraocular movements  intact.      Pupils: Pupils are equal, round, and reactive to light.   Neck:      Musculoskeletal: Neck supple. No neck rigidity.   Cardiovascular:      Rate and Rhythm: Normal rate and regular rhythm.  No extrasystoles are present.     Pulses: Normal pulses.      Heart sounds: No murmur. No friction rub. No gallop.       Comments: Sinus rhythm  Pulmonary:      Effort: Pulmonary effort is normal. No respiratory distress.      Breath sounds: No wheezing, rhonchi or rales.   Abdominal:      General: Abdomen is flat. Bowel sounds are normal. There is no distension.      Palpations: Abdomen is soft.      Tenderness: There is no tenderness. There is no right CVA tenderness, left CVA tenderness or guarding.   Musculoskeletal:         General: No swelling.      Right lower leg: No edema.      Left lower leg: No edema.   Skin:     General: Skin is warm and dry.      Capillary Refill: Capillary refill takes less than 2 seconds.      Coloration: Skin is not cyanotic.      Nails: There is no clubbing.     Neurological:      General: No focal deficit present.      Mental Status: He is alert and oriented to person, place, and time.      GCS: GCS eye subscore is 4. GCS verbal subscore is 5. GCS motor subscore is 6.      Cranial Nerves: Cranial nerves are intact.      Sensory: Sensation is intact.      Motor: Weakness (Mild and diffuse) present.      Comments: Follows well   Psychiatric:         Attention and Perception: Attention normal.         Mood and Affect: Mood normal. Mood is not anxious.         Speech: Speech normal.         Behavior: Behavior is not agitated. Behavior is cooperative.         Cognition and Memory: Cognition and memory normal.         Medications  Current Facility-Administered Medications   Medication Dose Route Frequency Provider Last Rate Last Dose   • insulin glargine (LANTUS) injection 20 Units  20 Units Subcutaneous Q EVENING Charli Salinas M.D.   20 Units at 12/20/19 1806   • insulin regular  (HUMULIN R) injection 2-9 Units  2-9 Units Subcutaneous 4X/DAY ACHS Charli Salinas M.D.   Stopped at 12/21/19 0700    And   • glucose 4 g chewable tablet 16 g  16 g Oral Q15 MIN PRN Charli Salinas M.D.        And   • DEXTROSE 10% BOLUS 250 mL  250 mL Intravenous Q15 MIN PRN Charli Salinas M.D.       • lactated ringers infusion   Intravenous Continuous Rosendo Alas D.O. 75 mL/hr at 12/21/19 0400     • tamsulosin (FLOMAX) capsule 0.4 mg  0.4 mg Oral DAILY Georges Celaya, D.O.   0.4 mg at 12/21/19 0517   • omeprazole (PRILOSEC) capsule 20 mg  20 mg Oral DAILY Georges Celaya, D.O.   20 mg at 12/21/19 0517   • pancrelipase (Lip-Prot-Amyl) (CREON 82862) 88113-34407 units capsule 48,000 Units  2 Cap Oral DAILY Georges Celaya D.O.   48,000 Units at 12/21/19 0833   • senna-docusate (PERICOLACE or SENOKOT S) 8.6-50 MG per tablet 2 Tab  2 Tab Oral BID Georges Celaya D.O.   Stopped at 12/20/19 0600    And   • polyethylene glycol/lytes (MIRALAX) PACKET 1 Packet  1 Packet Oral QDAY PRN Georges Celaya D.O.        And   • magnesium hydroxide (MILK OF MAGNESIA) suspension 30 mL  30 mL Oral QDAY PRN Georges Celaya, D.O.        And   • bisacodyl (DULCOLAX) suppository 10 mg  10 mg Rectal QDAY PRN Georges Celaya D.O.       • Respiratory Care per Protocol   Nebulization Continuous RT Georges Celaya D.O.       • heparin injection 5,000 Units  5,000 Units Subcutaneous Q8HRS Georges Celaya D.O.   5,000 Units at 12/21/19 0517   • acetaminophen (TYLENOL) tablet 650 mg  650 mg Oral Q6HRS PRN Georges Celaya D.O.   650 mg at 12/21/19 0841   • ondansetron (ZOFRAN) syringe/vial injection 4 mg  4 mg Intravenous Q4HRS PRN CHIARA Rhodes.OYuridia   4 mg at 12/20/19 1103   • ondansetron (ZOFRAN ODT) dispertab 4 mg  4 mg Oral Q4HRS PRN CHIARA Rhodes.O.       • promethazine (PHENERGAN) tablet 12.5-25 mg  12.5-25 mg Oral Q4HRS PRN  Georges Celaya D.O.       • promethazine (PHENERGAN) suppository 12.5-25 mg  12.5-25 mg Rectal Q4HRS PRN Georges Celaya D.O.       • prochlorperazine (COMPAZINE) injection 5-10 mg  5-10 mg Intravenous Q4HRS PRN Georges Celaya D.O.       • norepinephrine (LEVOPHED) 8 mg in  mL Infusion  0-30 mcg/min Intravenous Continuous CHIARA Turner Jr..O.   Stopped at 12/21/19 1204       Fluids    Intake/Output Summary (Last 24 hours) at 12/21/2019 1215  Last data filed at 12/21/2019 1000  Gross per 24 hour   Intake 2328.61 ml   Output 3400 ml   Net -1071.39 ml       Laboratory          Recent Labs     12/19/19 1930 12/20/19 0200 12/20/19  0621 12/21/19  0922   SODIUM 136 138 140 137   POTASSIUM 3.5* 4.1 4.0 3.9   CHLORIDE 110 114* 114* 110   CO2 13* 17* 17* 21   * 116* 104* 39*   CREATININE 1.61* 1.47* 1.29 0.85   MAGNESIUM 1.8  --  2.4  --    PHOSPHORUS 3.3  --  2.7  --    CALCIUM 6.7* 7.2* 7.4* 7.8*     Recent Labs     12/19/19 1135 12/20/19 0200 12/20/19  0621 12/21/19  0922   ALTSGPT 10  --   --   --   --    ASTSGOT 10*  --   --   --   --    ALKPHOSPHAT 74  --   --   --   --    TBILIRUBIN 0.5  --   --   --   --    GLUCOSE 442*   < > 138* 107* 140*    < > = values in this interval not displayed.     Recent Labs     12/19/19 1135 12/19/19 1930 12/20/19 0200 12/21/19  0320   WBC 18.7* 12.2* 10.9* 9.4   NEUTSPOLYS 85.10* 73.10*  --  48.20   LYMPHOCYTES 7.00* 13.80*  --  28.30   MONOCYTES 6.10 11.00  --  17.40*   EOSINOPHILS 0.00 0.10  --  1.80   BASOPHILS 0.00 0.20  --  0.60   ASTSGOT 10*  --   --   --    ALTSGPT 10  --   --   --    ALKPHOSPHAT 74  --   --   --    TBILIRUBIN 0.5  --   --   --      Recent Labs     12/19/19  1135 12/19/19  1930 12/20/19  0200 12/21/19  0320   RBC 3.29* 2.25* 2.10* 2.58*   HEMOGLOBIN 10.7* 7.2* 6.9* 8.4*   HEMATOCRIT 31.3* 21.5* 20.2* 24.5*   PLATELETCT 371 255 263 289   PROTHROMBTM 13.9  --   --   --    APTT 24.5*  --   --   --    INR 1.04  --    --   --        Imaging  X-Ray:  I have personally reviewed the images and compared with prior images.  EKG:  I have personally reviewed the images and compared with prior images.  Echo:   Reviewed    Assessment/Plan  Diabetic ketoacidosis without coma associated with type 1 diabetes mellitus (HCC)  Assessment & Plan  Blood sugar improved, anion gap closed, bicarb level improving  Likely secondary to recent med changes by endocrinology  Transitioned from IV insulin/DKA protocols to SSI and long-acting insulin late 12/20  Continue to monitor BMP and blood sugar closely for need to resume IV insulin, patient appears brittle  Monitor and Replete lytes prn      Hypotension  Assessment & Plan  Improved, secondary to hypovolemia, persisting query other etiologies  Continue fluid resuscitation  In part due to acidosis, this also has improved as well  No active bleeding although hemoglobin 6.9, will transfuse, follow-up hemoglobin improved  Resolved level sent a.m. 12/21-pending  Echocardiogram grossly normal, doubt primary cardiac etiology  Oxygenation good, doubt thromboembolic event    Normochromic normocytic anemia  Assessment & Plan  Hemoglobin has drifted down to 6.9 with rehydration  Still no obvious clinical bleeding, continue to monitor  Transfuse 1 unit of blood, follow-up hemoglobin 8.4  Serial CBC  If further transfusion required obtain TEG with mapping and assess for the need for additional blood products    Metabolic acidosis, increased anion gap  Assessment & Plan  DKA, DINESH, elevated Lactate, all improved, component of non-anion gap metabolic acidosis persisting  Continue aggressive fluid repletion with LR and tx of DKA  Monitor for any signs of concurrent  NAGMA, chloride elevated, limit chloride containing solutions  No role for alkali therapy at present    DINESH (acute kidney injury) (HCC)  Assessment & Plan  Likely pre-renal, continues to improve, creatinine now normal, BUN still elevated  Continue IV fluid  resuscitation  Renal dose meds, avoid nephrotoxins  Strict I/Os  Continue serial BMP    Essential hypertension- (present on admission)  Assessment & Plan  Patient still on norepinephrine infusion, continue to hold home regimen for blood pressure      Hyponatremia  Assessment & Plan  128 on intake labs, normal today at 137  Likely hypovolemic, in part related to hyperglycemia as well  IVF ongoing  Monitor with serial BMP    Leukocytosis  Assessment & Plan  Afebrile no over signs of infection, CXR and UA unremarkable  Potentially stress related  ROS for ID negative again, continue to monitor mostly for infection especially as he remains on IV pressors    Dyslipidemia- (present on admission)  Assessment & Plan  Resume statin    Tobacco abuse- (present on admission)  Assessment & Plan  Smoking cessation encouraged again       VTE:  Heparin  Ulcer: PPI  Lines: None    I have performed a physical exam and reviewed and updated ROS and Plan today (12/21/2019). In review of yesterday's note (12/20/2019), there are no changes except as documented above.     Patient remains critically ill.  Metabolic studies improved but he still on IV pressors and continue to search for alternative etiology.  Fluid resuscitation ongoing and monitoring for bleeding or alternative cardiopulmonary etiologies for low blood pressure.  Actively titrating norepinephrine infusion, finally coming down in dosage.  Prognosis remains guarded.  Patient remains at high risk for increased morbidity and mortality without above critical care interventions.    Discussed patient condition and risk of morbidity and/or mortality with Hospitalist, RN, RT, Pharmacy, Charge nurse / hot rounds and Patient     The patient remains critically ill.  Critical care time = 35 minutes in directly providing and coordinating critical care and extensive data review.  No time overlap and excludes procedures.   You can access the FollowMyHealth Patient Portal offered by [facility] by registering at the following website: [email or web address]. By joining [employer]’s FollowMyHealth portal, you will also be able to view your health information using other applications (apps) compatible with our system.

## 2021-01-13 ENCOUNTER — TELEPHONE (OUTPATIENT)
Dept: ENDOCRINOLOGY | Facility: MEDICAL CENTER | Age: 61
End: 2021-01-13

## 2021-01-14 ENCOUNTER — HOME HEALTH ADMISSION (OUTPATIENT)
Dept: HOME HEALTH SERVICES | Facility: HOME HEALTHCARE | Age: 61
End: 2021-01-14
Payer: MEDICARE

## 2021-01-14 NOTE — TELEPHONE ENCOUNTER
Star from [facility] called stating pt is in a skilled nursing facility. They need an order for routine insulin. This is the only order they need before he is discharge. Roni's phone number is [phone].

## 2021-01-15 ENCOUNTER — HOME CARE VISIT (OUTPATIENT)
Dept: HOME HEALTH SERVICES | Facility: HOME HEALTHCARE | Age: 61
End: 2021-01-15

## 2021-01-15 ENCOUNTER — PATIENT MESSAGE (OUTPATIENT)
Dept: MEDICAL GROUP | Facility: MEDICAL CENTER | Age: 61
End: 2021-01-15

## 2021-01-15 DIAGNOSIS — S72.002D CLOSED FRACTURE OF LEFT HIP WITH ROUTINE HEALING, SUBSEQUENT ENCOUNTER: ICD-10-CM

## 2021-01-15 DIAGNOSIS — E10.65 TYPE 1 DIABETES MELLITUS WITH HYPERGLYCEMIA (HCC): ICD-10-CM

## 2021-01-15 RX ORDER — ACETAMINOPHEN 325 MG/1
650 TABLET ORAL 2 TIMES DAILY PRN
Qty: 120 TAB | Refills: 11 | Status: SHIPPED
Start: 2021-01-15 | End: 2021-01-15 | Stop reason: SDUPTHER

## 2021-01-15 RX ORDER — ACETAMINOPHEN 325 MG/1
650 TABLET ORAL 2 TIMES DAILY PRN
Qty: 120 TAB | Refills: 11 | Status: SHIPPED | OUTPATIENT
Start: 2021-01-15

## 2021-01-15 RX ORDER — FERROUS GLUCONATE 324(38)MG
324 TABLET ORAL
Qty: 30 TAB | Refills: 11 | Status: SHIPPED | OUTPATIENT
Start: 2021-01-15

## 2021-01-15 RX ORDER — OMEPRAZOLE 20 MG/1
20 CAPSULE, DELAYED RELEASE ORAL 2 TIMES DAILY
Qty: 60 CAP | Refills: 8 | Status: SHIPPED | OUTPATIENT
Start: 2021-01-15

## 2021-01-15 NOTE — TELEPHONE ENCOUNTER
From: Pawel Tatum  To: JAKE Armando  Sent: 1/15/2021 3:30 PM PST  Subject: Prescription Question    Pawel had ERCP last month and spiked BS so was admitted to [place] because [facility] was full. He then went to [place] and is now back at [facility]. However, he needs Rx for:  Ferrous gluconate  Vit B1  Vit D3  Multivitamin   Omeprazol  And prn Tylenol please ASAP  Thanks,  Maxwell  [phone]  [email or web address]

## 2021-01-28 NOTE — CARE PLAN
Patient called stating with in the last 24 hours he has been feeling anxious and slight pressure on the right side of the chest.      Please call to advise. Problem: Safety  Goal: Will remain free from falls  Outcome: PROGRESSING AS EXPECTED  Reinforced the use of call light when needing assistance. Treaded socks on. Bed on lowest position. Personal belongings within reach. Clutter free environment provided.     Problem: Infection  Goal: Will remain free from infection  Outcome: PROGRESSING AS EXPECTED  The patient will remain free from infection. Reinforced the importance of hand washing

## 2021-03-15 DIAGNOSIS — Z23 NEED FOR VACCINATION: ICD-10-CM

## 2021-10-08 NOTE — CARE PLAN
23.3 Problem: Knowledge Deficit  Goal: Knowledge of disease process/condition, treatment plan, diagnostic tests, and medications will improve    Intervention: Explain information regarding disease process/condition, treatment plan, diagnostic tests, and medications and document in education  Updated patient on placement needs. Verbalized understanding       Problem: Knowledge Deficit:  Goal: Knowledge of the prescribed therapeutic regimen will improve    Intervention: Provide patient or significant other/support system with Diabetes information and educate on diet, medication administration, blood sugar monitoring, foot care,  signs and symptoms of a blood sugar imbalance and what to do, and document in education tab  Reinforced education on diabetic diet and limiting snacks. Verbalized understanding

## 2021-10-16 NOTE — PROGRESS NOTES
Thank you for connecting with us today on the [facility] service of [facility]. If you have any questions after your visit, please feel free to contact us at [phone]. If you are experiencing a medical emergency, call 911 immediately.  If your symptoms worsen or do not improve, please contact your primary care provider to schedule an in-person visit. Symptoms that require immediate attention require a visit at Urgent Care ([place]), [facility] ([place]) or the Emergency Room of a nearby hospital.    If you have any billing questions please call the billing department.    Phone: [phone]. Hours: Mon. - Thu. 7:30 a.m. - 6 p.m. and Friday 7:30 p.m. - 5 p.m.     YOU WERE PRESCRIBED AN ANTIBIOTIC TODAY: I recommend taking an over-the-counter probiotic (Such as acidophilus, Floragen, Florastor or lactobacillus for adults, or [id number] for children --  for the entire duration of your antibiotics, and continuing it for at least 1 week after the antibiotics are finished.  Do not take a probiotic within 2 hours of taking your antibiotic.  This will ruin the benefits of the probiotic This will help reduce your chance of developing antibiotic-related diarrhea and/or yeast infections.    · Adequate rest and hydration, Put a warm compress over the nose and forehead to help relieve sinus pressure, placing a warm moist cloth over an ear that hurts and steam inhalation are helpful.  · Intranasal Saline sprays may be useful for treating congestion by reducing inflammation and thinning mucus  ·  Saline nasal irrigations (a neti pot) may be helpful in relieving nasal symptoms by washing the mucus and irritants from your nose and moisturizing the nasal passages.   · Honey given before bedtime for children older that 1 year of age can help reduce cough.     If symptoms have not significantly improved follow up with your primary care provider.  Sooner if symptoms worsen.     Feel better fast.  "  Gastroenterology Progress Note     Author: Carlos Alberto Ansari M.D.  Date & Time Created: 2/20/2020 9:12 AM    Chief Complaint:  Elevated liver tests, abnormal MRI    Interval History:  Initial history (Dr. Villarreal):  59 y.o. male with IDDM, chronic pancreatitis seizure disorder, iron deficiency anemia who presented 2/14/2020 with lethargy and poor appetite per assisted living facility.  He is difficult historian as does not want to answer questions.  He states \"I feel fine and always have.\"  He was evaluated 12/2019 for iron deficiency anemia and DKA.  Had EGD with severe erosive esophagitis.  He refused to drink prep for colonoscopy.  He apparently was brought by assisted living facility after they noted some lethargy and poor appetite.  He has weight loss of 7 pounds over the past month or so.  He denies abdominal pain and states he never had abdominal pain and his appetite is great.  Denies fevers, chills, nausea, vomiting, diarrhea, constipation, melena, hematochezia.  In ED, he had significant leukocytosis with newly elevated alkaline phosphatase and bilirubin with fairly normal transaminases.  He was started on IVF and antibiotics.  He had RUQ US followed by MRCP showing dilated bile and pancreatic ducts, distended GB, chronic pancreatitis and possible abnormal lesions head of pancreas.    2/16/2020: Continues to deny any symptoms including abdominal pain, nausea, vomiting, fevers.  Was refusing IV placement and vitals overnight.  Says he is agreeable to IV placement today  02/17/2020 - No complaints.  Says he feels fine, but does report urine is darker.  I explained imaging findings at length.  02/18/2020 - EUS showed extensive, severe pancreatic calcifications making identification of any pancreatic mass or fluid collection impossible.  Mediastinum was not assessed.  ERCP not done due to normalizing liver enzymes and added risk to patient.  02/19/2020 - No events overnight.  Patient denies "   DANIA Farmer       Sinusitis (Antibiotic Treatment)    The sinuses are air-filled spaces within the bones of the face. They connect to the inside of the nose. Sinusitis is an inflammation of the tissue that lines the sinuses. Sinusitis can occur during a cold. It can also happen due to allergies to pollens and other particles in the air. Sinusitis can cause symptoms of sinus congestion and a feeling of fullness. A sinus infection causes fever, headache, and facial pain. There is often green or yellow fluid draining from the nose or into the back of the throat (post-nasal drip). You have been given antibiotics to treat this condition.   Home care  · Take the full course of antibiotics as instructed. Don't stop taking them, even when you feel better.  · Drink plenty of water, hot tea, and other liquids as directed by the healthcare provider. This may help thin nasal mucus. It also may help your sinuses drain fluids.  · Heat may help soothe painful areas of your face. Use a towel soaked in hot water. Or,  the shower and direct the warm spray onto your face. Using a vaporizer along with a menthol rub at night may also help soothe symptoms.   · An expectorant with guaifenesin may help thin nasal mucus and help your sinuses drain fluids. Talk with your provider or pharmacists before taking an over-the-counter (OTC) medicine if you have any questions about it or its side effects..  · You can use an OTC decongestant, unless a similar medicine was prescribed to you. Nasal sprays work the fastest. Use one that contains phenylephrine or oxymetazoline. First blow your nose gently. Then use the spray. Don't use these medicines more often than directed on the label. If you do, your symptoms may get worse. You may also take pills that contain pseudoephedrine. Don’t use products that combine multiple medicines. This is because side effects may be increased. Read labels. You can also ask the pharmacist for  "fevers/chills/sweats and states he \"feels fine\".  Labs show worsening leukocytosis with improving liver enzymes.  02/20/2020 - Tolerating regular diet well.  Denies any abdominal pain.  He does report chronic mid-back pain, but this is not severe or worsening.  No nausea, vomiting, fevers, chills or sweats.  Leukocytosis slightly better.  Alkaline phosphatase still elevated, but remainder of liver enzymes normal.    Review of Systems:  Review of Systems   Constitutional: Positive for weight loss. Negative for chills, fever and malaise/fatigue.   Respiratory: Negative for shortness of breath.    Cardiovascular: Negative for chest pain.   Gastrointestinal: Negative for abdominal pain, blood in stool, constipation, diarrhea, heartburn, melena, nausea and vomiting.       Physical Exam:  Physical Exam  Vitals signs and nursing note reviewed.   Constitutional:       Appearance: Normal appearance.   Eyes:      General: No scleral icterus.  Cardiovascular:      Rate and Rhythm: Normal rate and regular rhythm.      Pulses: Normal pulses.      Heart sounds: Normal heart sounds.   Pulmonary:      Effort: Pulmonary effort is normal. No respiratory distress.      Breath sounds: Normal breath sounds. No stridor. No wheezing, rhonchi or rales.   Abdominal:      General: Abdomen is flat. Bowel sounds are normal. There is no distension.      Palpations: Abdomen is soft. There is no mass.      Tenderness: There is no abdominal tenderness. There is no guarding or rebound.      Hernia: No hernia is present.   Skin:     General: Skin is warm and dry.      Coloration: Skin is not jaundiced.   Neurological:      General: No focal deficit present.      Mental Status: He is alert and oriented to person, place, and time.         Labs:          Recent Labs     02/18/20  0235 02/19/20  0211 02/20/20  0319   SODIUM 133* 134* 135   POTASSIUM 3.2* 3.4* 3.6   CHLORIDE 101 103 103   CO2 22 21 22   BUN 12 10 11   CREATININE 0.53 0.64 0.60 "   MAGNESIUM 1.2*  --   --    CALCIUM 7.7* 7.8* 8.0*     Recent Labs     209   ALTSGPT 18 18 17   ASTSGOT 29 34 40   ALKPHOSPHAT 824* 790* 790*   TBILIRUBIN 0.8 0.8 0.8   GLUCOSE 174* 164* 164*     Recent Labs     20   RBC 3.18* 3.10* 3.12*   HEMOGLOBIN 9.5* 9.2* 9.3*   HEMATOCRIT 27.2* 26.4* 26.5*   PLATELETCT 388 418 521*     Recent Labs     20   WBC 31.5* 37.4* 28.3*   NEUTSPOLYS 64.00 85.00* 85.00*   LYMPHOCYTES 10.00* 4.00* 3.00*   MONOCYTES 18.00* 2.00 8.00   EOSINOPHILS 2.00 0.00 1.00   BASOPHILS 0.00 0.00 0.00   ASTSGOT 29 34 40   ALTSGPT 18 18 17   ALKPHOSPHAT 824* 790* 790*   TBILIRUBIN 0.8 0.8 0.8     Hemodynamics:  Temp (24hrs), Av.5 °C (99.5 °F), Min:36.6 °C (97.9 °F), Max:38 °C (100.4 °F)  Temperature: 37.8 °C (100.1 °F)  Pulse  Av.8  Min: 50  Max: 122   Blood Pressure: 133/66     Respiratory:    Respiration: 18, Pulse Oximetry: (!) 85 %(put back on oxygen)           Fluids:    Intake/Output Summary (Last 24 hours) at 2020 0706  Last data filed at 2/15/2020 2200  Gross per 24 hour   Intake 2485 ml   Output 600 ml   Net 1885 ml     Weight: 62.2 kg (137 lb 2 oz)  GI/Nutrition:  Orders Placed This Encounter   Procedures   • Diet Order Diabetic     Standing Status:   Standing     Number of Occurrences:   1     Order Specific Question:   Diet:     Answer:   Diabetic [3]     Medical Decision Making, by Problem:  Active Hospital Problems    Diagnosis   • *Sepsis (HCC) [A41.9]   • Elevated alkaline phosphatase level [R74.8]   • Type 2 diabetes mellitus with hyperglycemia, with long-term current use of insulin (HCC) [E11.65, Z79.4]   • Cognitive decline [R41.89]   • Metabolic acidosis [E87.2]   • Hypokalemia [E87.6]   • Hyponatremia [E87.1]   • Chronic pancreatitis (HCC) [K86.1]   • Dyslipidemia [E78.5]   • Cholangitis [K83.09]     Impression  / Plan  60 y/o with chronic  help. (People with high blood pressure should not use decongestants. They can raise blood pressure.) Talk with your provider or pharmacist if you have any questions about the medicine..  · OTC antihistamines may help if allergies contributed to your sinusitis. Talk with your provider or pharmacist if you have any questions about the medicine..  · Don't use nasal rinses or irrigation during an acute sinus infection, unless your healthcare provider tells you to. Rinsing may spread the infection to other areas in your sinuses.  · Use acetaminophen or ibuprofen to control pain, unless another pain medicine was prescribed to you. If you have chronic liver or kidney disease or ever had a stomach ulcer, talk with your healthcare provider before using these medicines. Never give aspirin to anyone under age 18 who is ill with a fever. It may cause severe liver damage.  · Don't smoke. This can make symptoms worse.    Follow-up care  Follow up with your healthcare provider, or as advised.   When to seek medical advice  Call your healthcare provider if any of these occur:   · Facial pain or headache that gets worse  · Stiff neck  · Unusual drowsiness or confusion  · Swelling of your forehead or eyelids  · Symptoms don't go away in 10 days  · Vision problems, such as blurred or double vision  · Fever of 100.4ºF (38ºC) or higher, or as directed by your healthcare provider  Call 911  Call 911 if any of these occur:   · Seizure  · Trouble breathing  · Feeling dizzy or faint  · Fingernails, skin or lips look blue, purple , or gray  Prevention  Here are steps you can take to help prevent an infection:   · Keep good hand washing habits.  · Don’t have close contact with people who have sore throats, colds, or other upper respiratory infections.  · Don’t smoke, and stay away from secondhand smoke.  · Stay up to date with of your vaccines.  [employer] last reviewed this educational content on 12/1/2019 © 2000-2021 [employer]. All rights reserved. This information is not intended as a substitute for professional medical care. Always follow your healthcare professional's instructions.         pancreatitis, IDDM, iron deficiency anemia, GERD with esophagitis that presented for lethargy and found to have significant leukocytosis and newly elevated liver tests.  Concern for sepsis from biliary source.  Imaging suggests dilation of biliary and pancreatic ducts but no obvious stones although could have ampullary lesion of main duct IPMN causing obstruction.      1. Elevated liver tests, alk phos remains high but other liver enzymes normalized - Suspect downstream stricture due to chronic calcific pancreatitis with transient obstruction by acute inflammation vs sludge/stone which passed  2. Sepsis, unclear source - Mediastinum was not assessed at EUS due to focus being on pancreas and biliary tree  3. Leukocytosis - Worsening  4. Abnormal imaging of abdomen with dilated bile ducts and pancreatic duct and questionable mass head of pancreas - Only severe chronic calcific pancreatitis identified on EUS  5. Chronic pancreatitis  6. IDDM  7. Iron deficiency anemia  8. GERD with esophagitis  9. Hypertension  10. Seizure disorder  11. Aggressive behavior to healthcare workers  12. Back pain    1. Follow liver enzymes  2. Continue IV antibiotics and IVF  3. EUS +/- celiac block could be repeated to focus on mediastinum fluid collection if need be vs IR - no availability on endoscopy schedule tomorrow      Quality-Core Measures   Reviewed items::  Radiology images reviewed, Labs reviewed and Medications reviewed

## 2021-12-22 NOTE — PROGRESS NOTES
Bedside report completed with Mike. Safety measures in place. Patient denies needs at this time.   Provided by Anesthesia Department

## 2022-03-20 NOTE — ANESTHESIA PREPROCEDURE EVALUATION
Relevant Problems   ANESTHESIA (within normal limits)      NEURO   (+) H/O Bell's palsy      CARDIAC   (+) Diabetic peripheral angiopathy (HCC)   (+) Essential hypertension      ENDO   (+) Type 2 diabetes mellitus with hyperglycemia, with long-term current use of insulin (HCC)   (+) Uncontrolled type 2 diabetes mellitus with hyperosmolarity without coma (HCC)       Physical Exam    Airway   Mallampati: II  TM distance: >3 FB  Neck ROM: full       Cardiovascular - normal exam  Rhythm: regular  Rate: normal  (-) murmur     Dental - normal exam  (+) upper dentures         Pulmonary - normal exam  Breath sounds clear to auscultation     Abdominal    Neurological - normal exam               Anesthesia Plan    ASA 2       Plan - general       Airway plan will be ETT      Plan Factors:   Patient was not previously instructed to abstain from smoking on day of procedure.  Patient did not smoke on day of procedure.      Induction: intravenous    Postoperative Plan: Postoperative administration of opioids is intended.    Pertinent diagnostic labs and testing reviewed    Informed Consent:    Anesthetic plan and risks discussed with patient.    Use of blood products discussed with: patient whom consented to blood products.         
stated

## 2022-05-10 NOTE — PROGRESS NOTES
Problem: Skin/Tissue Integrity  Goal: Absence of new skin breakdown  Outcome: Progressing     Problem: Safety - Adult  Goal: Free from fall injury  Outcome: Progressing Received report from KELLEY Frazier. Patient is awake, Pt resting comfortably in bed, plan of care discussed with patient, declines any needs at this time and denies pain. Call light within reach and safety precautions in place.     07:45 Got IV access, Called [facility] in [facility] for [facility],  patient will go down at 12:00 today for test    Called CT to get time for CT of the ABD, they reported they will call back later today with a time.

## 2022-06-08 NOTE — CARE PLAN
Problem: Safety  Goal: Will remain free from falls  Outcome: PROGRESSING AS EXPECTED  Intervention: Implement fall precautions  Flowsheets  Taken 3/15/2020 0900 by Jennifer Gilligan, R.N.  Environmental Precautions:   Treaded Slipper Socks on Patient   Personal Belongings, Wastebasket, Call Bell etc. in Easy Reach   Report Given to Other Health Care Providers Regarding Fall Risk   Bed in Low Position   Communication Sign for Patients & Families   Mobility Assessed & Appropriate Sign Placed  Taken 3/15/2020 0932 by Jennifer Gilligan, R.N.  Bedrails: Bedrails Closest to Bathroom Down  Taken 3/9/2020 0800 by Richard Medina R.N.  Chair/Bed Strip Alarm: Patient Educated Regarding Fall Risk and Need for Bed Alarm, Understands and Continues to Refuse  Note: Patient educated and understands the fall precautions in place to prevent falls.  Bed alarm is on, IV pole not in use at this time, treaded slipper socks on, and bedrails closest to bathroom down.  Patient also educated and understands the use of the call button for any assistance with mobility.      Problem: Discharge Barriers/Planning  Goal: Patient's continuum of care needs will be met  Outcome: PROGRESSING SLOWER THAN EXPECTED  Intervention: Collaborate with Transitional Care Team and Interdisciplinary Team to meet discharge needs  Note: Difficulties present in regards to placement for this patient, SW/CM working diligently to find appropriate placement and work with patient's sister.      fall

## 2024-04-16 NOTE — PROGRESS NOTES
Assumed care of patient at 0700. Patient is alert and oriented, respirations are unlabored and regular on oxygen. Lung sounds clear throughout, diminished in bilateral bases. Abdomen soft, non tender, rounded, appears to have upper/mid abdomen hernia present. NPO at this time. Blood glucose this . Voiding without difficulty. Patient refusing to be repositioned in bed due to extreme pain during movement. Skin tear to right posterior forearm, left anterior shin and right anterior shin. Could not assess back or heels at this time. Patient awaiting surgery this AM. Bed in lowest position, call light within reach, patient states no needs at this time.   Pt called and no answer left detailed vm for pt to call back when is getting TB test done so that pharmacy can move forward on auth for med- needs results. In epic still pending.

## 2024-04-19 NOTE — PROGRESS NOTES
Representative from [facility] at bedside consulting, sister and guardian representative at bedside.   hand grasp, leg strength strong and equal bilaterally

## 2024-10-15 NOTE — THERAPY
"Physical Therapy Evaluation completed.   Bed Mobility:  Supine to Sit: Supervised  Transfers: Sit to Stand: Supervised  Gait: Level Of Assist: Stand by Assist with No Equipment Needed       Plan of Care: Patient with no further skilled PT needs in the acute care setting at this time  Discharge Recommendations: Equipment: No Equipment Needed.     See \"Rehab Therapy-Acute\" Patient Summary Report for complete documentation.     Pt was recently admitted for possible osteomyelitis for R foot and is now s/p R second toe amputation with WBAT in RLE and to have offloading shoe on during ambulation. Pt was able to demonstrate SBA to SPV for all functional mobility with on AD. No tomas LOB was noted during sessoin and pt was able to go up/down 6 steps with railing use. Pt demonstrated with a recipricol gait pattern with slight antalgic gait due to pain and leg legnth discrepancy due to offloading shoe on RLE. Pt is in no need for acute skilled PT needs at this time. Antcicipate pt to d/c from hospital once medically clear and recommend outpatien therapy for RLE for optimal progression.   " Initial (On Arrival)

## (undated) DEVICE — ELECTRODE 850 FOAM ADHESIVE - HYDROGEL RADIOTRNSPRNT (50/PK)

## (undated) DEVICE — WATER IRRIGATION STERILE 1000ML (12EA/CA)

## (undated) DEVICE — SET EXTENSION WITH 2 PORTS (48EA/CA) ***PART #2C8610 IS A SUBSTITUTE*****

## (undated) DEVICE — KIT ANESTHESIA W/CIRCUIT & 3/LT BAG W/FILTER (20EA/CA)

## (undated) DEVICE — TUBING CLEARLINK DUO-VENT - C-FLO (48EA/CA)

## (undated) DEVICE — SPONGE GAUZE NON-STERILE 4X4 - (2000/CA 10PK/CA)

## (undated) DEVICE — MICROKNIFE XL

## (undated) DEVICE — LACTATED RINGERS INJ 1000 ML - (14EA/CA 60CA/PF)

## (undated) DEVICE — PAD LAP STERILE 18 X 18 - (5/PK 40PK/CA)

## (undated) DEVICE — CON SEDATION/>5 YR 1ST 15 MIN

## (undated) DEVICE — SENSOR SPO2 ADULT LNCS ADTX (20/BX) ORDER ITEM #19593

## (undated) DEVICE — INTRODUCER STENT NAVIFLEX 7FR

## (undated) DEVICE — SYRINGE SAFETY 10 ML 18 GA X 1 1/2 BLUNT LL (100/BX 4BX/CA)

## (undated) DEVICE — GOWN WARMING STANDARD FLEX - (30/CA)

## (undated) DEVICE — CANNULA O2 COMFORT SOFT EAR ADULT 7 FT TUBING (50/CA)

## (undated) DEVICE — ELECTRODE DUAL RETURN W/ CORD - (50/PK)

## (undated) DEVICE — SLEEVE, VASO, THIGH, MED

## (undated) DEVICE — KIT CUSTOM PROCEDURE SINGLE FOR ENDO  (15/CA)

## (undated) DEVICE — GOWN SURGEONS LARGE - (32/CA)

## (undated) DEVICE — CHLORAPREP 26 ML APPLICATOR - ORANGE TINT(25/CA)

## (undated) DEVICE — NEPTUNE 4 PORT MANIFOLD - (20/PK)

## (undated) DEVICE — EXTRACTOR PRO XL 9-12 MM ABOVE

## (undated) DEVICE — FILM CASSETTE ENDO

## (undated) DEVICE — SYRINGE 12 CC LUER TIP - (80/BX) OBSOLETE ITEM

## (undated) DEVICE — FORCEP RADIAL JAW 4 STANDARD CAPACITY W/NEEDLE 240CM (40EA/BX)

## (undated) DEVICE — BRUSH CYTOLOGY

## (undated) DEVICE — CAPTIVATOR

## (undated) DEVICE — GLOVE BIOGEL INDICATOR SZ 7.5 SURGICAL PF LTX - (50PR/BX 4BX/CA)

## (undated) DEVICE — SOD. CHL. INJ. 0.9% 1000 ML - (14EA/CA 60CA/PF)

## (undated) DEVICE — DRAPE STRLE REG TOWEL 18X24 - (10/BX 4BX/CA)"

## (undated) DEVICE — KIT  I.V. START (100EA/CA)

## (undated) DEVICE — BLADE SAGITTAL SAW DUAL CUT 25.0 X 90.0 X 1.27MM (1/EA)

## (undated) DEVICE — CONTAINER, SPECIMEN, STERILE

## (undated) DEVICE — SYRINGE 3 CC 22 GA X 1-1/2 - NDL SAFETY (50/BX 8BX/CA)

## (undated) DEVICE — SYRINGE SAFETY 5 ML 18 GA X 1-1/2 BLUNT LL (100/BX 4BX/CA)

## (undated) DEVICE — GLOVE BIOGEL ECLIPSE PF LATEX SIZE 8.0  (50PR/BX)

## (undated) DEVICE — MASK WITH FACE SHIELD (25/BX 4BX/CA)

## (undated) DEVICE — SYRINGE SAFETY 3 ML 18 GA X 1 1/2 BLUNT LL (100/BX 8BX/CA)

## (undated) DEVICE — DRESSING POST OP BORDER 4 X 10 (5EA/BX)

## (undated) DEVICE — GLOVE, LITE (PAIR)

## (undated) DEVICE — SET LEADWIRE 5 LEAD BEDSIDE DISPOSABLE ECG (1SET OF 5/EA)

## (undated) DEVICE — TUBE SUCTION YANKAUER  1/4 X 6FT (20EA/CA)"

## (undated) DEVICE — SOD. CHL 10CC SYRINGE PREFILL - W/10 CC (30/BX)

## (undated) DEVICE — SUTURE 3-0 ETHILON PS-1 (36PK/BX)

## (undated) DEVICE — HANDPIECE 10FT INTPLS SCT PLS IRRIGATION HAND CONTROL SET (6/PK)

## (undated) DEVICE — BALLOON  DILATATION  10-4 5.8 180

## (undated) DEVICE — DRAPE LARGE 3 QUARTER - (20/CA)

## (undated) DEVICE — SENSOR SPO2 NEO LNCS ADHESIVE (20/BX) SEE USER NOTES

## (undated) DEVICE — DRESSING 3X8 ADAPTIC GAUZE - NON-ADHERING (36/PK 6PK/BX)

## (undated) DEVICE — CANISTER SUCTION RIGID RED 1500CC (40EA/CA)

## (undated) DEVICE — MIXER BONE CEMENT REVOLUTION - W/FEMORAL PRESSURIZER (6/CA)

## (undated) DEVICE — HOOD, SURGICAL

## (undated) DEVICE — GLOVE BIOGEL INDICATOR SZ 6.5 SURGICAL PF LTX - (50PR/BX 4BX/CA)

## (undated) DEVICE — KIT ROOM DECONTAMINATION

## (undated) DEVICE — SUTURE 2-0 VICRYL PLUS CTX - 36 INCH (36/BX)

## (undated) DEVICE — GOWN SURGEONS X-LARGE - DISP. (30/CA)

## (undated) DEVICE — SODIUM CHL. IRRIGATION 0.9% 3000ML (4EA/CA 65CA/PF)

## (undated) DEVICE — SYRINGE DISP. 50CC LS - (40/BX)

## (undated) DEVICE — SUTURE GENERAL

## (undated) DEVICE — SODIUM CHL. INJ. 0.9% 500ML (24EA/CA 50CA/PF)

## (undated) DEVICE — MASK ANESTHESIA ADULT  - (100/CA)

## (undated) DEVICE — BITE BLOCK ADULT 60FR (100EA/CA)

## (undated) DEVICE — HEAD HOLDER JUNIOR/ADULT

## (undated) DEVICE — SPHINCTERTOME TRUETOME 44 20MM PRELOAD JAG 035IN

## (undated) DEVICE — CANISTER SUCTION 3000ML MECHANICAL FILTER AUTO SHUTOFF MEDI-VAC NONSTERILE LF DISP  (40EA/CA)

## (undated) DEVICE — INTRODUCER STENT NAVIFLEX 10FR

## (undated) DEVICE — BLOCK BITE ENDOSCOPIC 2809 - (100/BX) INTERMEDIATE

## (undated) DEVICE — KIT HIP PREP IM ENCHANCE TOTAL (5EA/BX)

## (undated) DEVICE — BALLOON  DILATATION  8-4 5.8 180

## (undated) DEVICE — WATER IRRIG. STER. 1500 ML - (9/CA)

## (undated) DEVICE — DRAPE X LONG COILED INSTRUMENT ORGANIZER EUS (20EA/BX)

## (undated) DEVICE — CUFF BP ADULT MEDIUM DISPOSABLE (20EA/CA)

## (undated) DEVICE — TUBE E-T HI-LO CUFF 7.5MM (10EA/PK)

## (undated) DEVICE — Device

## (undated) DEVICE — BASIN EMESIS DISP. - (250/CA)

## (undated) DEVICE — SODIUM CHL IRRIGATION 0.9% 1000ML (12EA/CA)

## (undated) DEVICE — PADDING CAST 4 IN STERILE - 4 X 4 YDS (24/CA)

## (undated) DEVICE — SYRINGE DISP. 60 CC LL - (30/BX, 12BX/CA)**WHEN THESE ARE GONE ORDER #500206**

## (undated) DEVICE — PROTECTOR ULNA NERVE - (36PR/CA)

## (undated) DEVICE — GUIDE JAGWIRE 035 STRAIGHT (2EA/BX)

## (undated) DEVICE — SYRINGE 6 CC 20 GA X 1 1/2 - NDL SAFETY  (50/BX)

## (undated) DEVICE — CATHETER IV SAFETY 20 GA X 1-1/4 (50/BX)

## (undated) DEVICE — GLOVE BIOGEL PI INDICATOR SZ 8.0 SURGICAL PF LF -(50/BX 4BX/CA)

## (undated) DEVICE — GLOVE BIOGEL ECLIPSE  PF LATEX SIZE 6.5 (50PR/BX)

## (undated) DEVICE — CAPTIVATOR II-10MM ROUND STIFF  (40/BX)

## (undated) DEVICE — BANDAGE ELASTIC 4 HONEYCOMB - 4"X5YD LF (20/CA)"

## (undated) DEVICE — SUTURE 0 VICRYL PLUS CTX - 36 INCH (36/BX)

## (undated) DEVICE — INFLATOR ENCORE DEVICE 20CC

## (undated) DEVICE — TUBE CONNECTING SUCTION - CLEAR PLASTIC STERILE 72 IN (50EA/CA)

## (undated) DEVICE — PACK LOWER EXTREMITY - (2/CA)

## (undated) DEVICE — SUCTION INSTRUMENT YANKAUER BULBOUS TIP W/O VENT (50EA/CA)

## (undated) DEVICE — CANNULA W/ SUPPLY TUBING O2 - (50/CA)

## (undated) DEVICE — STOCKINETTE IMPERVIOUS 12X48 - STERILELF (10/CA)"

## (undated) DEVICE — SUTURE 5 TI-CRON HOS-14 - (36/BX)

## (undated) DEVICE — GLOVE BIOGEL PI ORTHO SZ 7 PF LF (40PR/BX)

## (undated) DEVICE — LENS/HOOD FOR SPACESUIT - (32/PK) PEEL AWAY FACE

## (undated) DEVICE — TIP INTPLS HFLO ML ORFC BTRY - (12/CS)  FOR SURGILAV

## (undated) DEVICE — BLADE SURGICAL #15 - (50/BX 3BX/CA)

## (undated) DEVICE — DRAPE SURG STERI-DRAPE 7X11OD - (40EA/CA)

## (undated) DEVICE — BOVIE BLADE COATED - (50/PK)

## (undated) DEVICE — PACK TOTAL HIP - (1/CA)